# Patient Record
Sex: MALE | Race: WHITE | NOT HISPANIC OR LATINO | Employment: OTHER | URBAN - METROPOLITAN AREA
[De-identification: names, ages, dates, MRNs, and addresses within clinical notes are randomized per-mention and may not be internally consistent; named-entity substitution may affect disease eponyms.]

---

## 2017-01-05 ENCOUNTER — APPOINTMENT (OUTPATIENT)
Dept: RADIOLOGY | Facility: MEDICAL CENTER | Age: 37
DRG: 270 | End: 2017-01-05
Payer: MEDICARE

## 2017-01-05 ENCOUNTER — HOSPITAL ENCOUNTER (INPATIENT)
Facility: MEDICAL CENTER | Age: 37
LOS: 1 days | DRG: 270 | End: 2017-01-05
Attending: SURGERY | Admitting: SURGERY
Payer: MEDICARE

## 2017-01-05 ENCOUNTER — APPOINTMENT (OUTPATIENT)
Dept: RADIOLOGY | Facility: MEDICAL CENTER | Age: 37
DRG: 270 | End: 2017-01-05
Attending: SURGERY
Payer: MEDICARE

## 2017-01-05 VITALS
WEIGHT: 174.16 LBS | DIASTOLIC BLOOD PRESSURE: 65 MMHG | HEART RATE: 107 BPM | SYSTOLIC BLOOD PRESSURE: 96 MMHG | TEMPERATURE: 97.3 F | HEIGHT: 64 IN | RESPIRATION RATE: 16 BRPM | BODY MASS INDEX: 29.73 KG/M2 | OXYGEN SATURATION: 92 %

## 2017-01-05 DIAGNOSIS — I82.90 THROMBUS: ICD-10-CM

## 2017-01-05 PROBLEM — T82.868A THROMBOSIS OF RENAL DIALYSIS ARTERIOVENOUS GRAFT (HCC): Status: ACTIVE | Noted: 2017-01-05

## 2017-01-05 LAB
ANION GAP SERPL CALC-SCNC: 15 MMOL/L (ref 0–11.9)
BUN SERPL-MCNC: 88 MG/DL (ref 8–22)
CALCIUM SERPL-MCNC: 8.5 MG/DL (ref 8.5–10.5)
CHLORIDE SERPL-SCNC: 94 MMOL/L (ref 96–112)
CO2 SERPL-SCNC: 22 MMOL/L (ref 20–33)
CREAT SERPL-MCNC: 9.89 MG/DL (ref 0.5–1.4)
ERYTHROCYTE [DISTWIDTH] IN BLOOD BY AUTOMATED COUNT: 61.7 FL (ref 35.9–50)
FIBRINOGEN PPP-MCNC: 368 MG/DL (ref 215–460)
GFR SERPL CREATININE-BSD FRML MDRD: 6 ML/MIN/1.73 M 2
GLUCOSE SERPL-MCNC: 95 MG/DL (ref 65–99)
HCT VFR BLD AUTO: 33.2 % (ref 42–52)
HGB BLD-MCNC: 10.4 G/DL (ref 14–18)
INR PPP: 1.38 (ref 0.87–1.13)
MCH RBC QN AUTO: 30.4 PG (ref 27–33)
MCHC RBC AUTO-ENTMCNC: 31.3 G/DL (ref 33.7–35.3)
MCV RBC AUTO: 97.1 FL (ref 81.4–97.8)
PLATELET # BLD AUTO: 208 K/UL (ref 164–446)
PMV BLD AUTO: 9.6 FL (ref 9–12.9)
POTASSIUM SERPL-SCNC: 6.3 MMOL/L (ref 3.6–5.5)
PROTHROMBIN TIME: 17.4 SEC (ref 12–14.6)
RBC # BLD AUTO: 3.42 M/UL (ref 4.7–6.1)
SODIUM SERPL-SCNC: 131 MMOL/L (ref 135–145)
WBC # BLD AUTO: 4.8 K/UL (ref 4.8–10.8)

## 2017-01-05 PROCEDURE — 160002 HCHG RECOVERY MINUTES (STAT): Performed by: SURGERY

## 2017-01-05 PROCEDURE — 500698 HCHG HEMOCLIP, MEDIUM: Performed by: SURGERY

## 2017-01-05 PROCEDURE — 85384 FIBRINOGEN ACTIVITY: CPT

## 2017-01-05 PROCEDURE — 047L3ZZ DILATION OF LEFT FEMORAL ARTERY, PERCUTANEOUS APPROACH: ICD-10-PCS | Performed by: SURGERY

## 2017-01-05 PROCEDURE — 501499 HCHG SURG-I-LOOP, MINI (BLUE): Performed by: SURGERY

## 2017-01-05 PROCEDURE — 700105 HCHG RX REV CODE 258: Performed by: SURGERY

## 2017-01-05 PROCEDURE — 110382 HCHG SHELL REV 271: Performed by: SURGERY

## 2017-01-05 PROCEDURE — B51W1ZZ FLUOROSCOPY OF DIALYSIS SHUNT/FISTULA USING LOW OSMOLAR CONTRAST: ICD-10-PCS | Performed by: SURGERY

## 2017-01-05 PROCEDURE — C1894 INTRO/SHEATH, NON-LASER: HCPCS

## 2017-01-05 PROCEDURE — 501838 HCHG SUTURE GENERAL: Performed by: SURGERY

## 2017-01-05 PROCEDURE — A4606 OXYGEN PROBE USED W OXIMETER: HCPCS | Performed by: SURGERY

## 2017-01-05 PROCEDURE — 80048 BASIC METABOLIC PNL TOTAL CA: CPT

## 2017-01-05 PROCEDURE — 700111 HCHG RX REV CODE 636 W/ 250 OVERRIDE (IP)

## 2017-01-05 PROCEDURE — 501837 HCHG SUTURE CV: Performed by: SURGERY

## 2017-01-05 PROCEDURE — 306637 HCHG MISC ORTHO ITEM RC 0274

## 2017-01-05 PROCEDURE — 36905 THRMBC/NFS DIALYSIS CIRCUIT: CPT

## 2017-01-05 PROCEDURE — 06CN3ZZ EXTIRPATION OF MATTER FROM LEFT FEMORAL VEIN, PERCUTANEOUS APPROACH: ICD-10-PCS | Performed by: SURGERY

## 2017-01-05 PROCEDURE — 160025 RECOVERY II MINUTES (STATS): Performed by: SURGERY

## 2017-01-05 PROCEDURE — 500700 HCHG HEMOCLIP, SMALL (RED): Performed by: SURGERY

## 2017-01-05 PROCEDURE — 500043 HCHG BAG-A-JET: Performed by: SURGERY

## 2017-01-05 PROCEDURE — 85610 PROTHROMBIN TIME: CPT

## 2017-01-05 PROCEDURE — 500382 HCHG DRAIN, PENROSE 1X18: Performed by: SURGERY

## 2017-01-05 PROCEDURE — 160035 HCHG PACU - 1ST 60 MINS PHASE I: Performed by: SURGERY

## 2017-01-05 PROCEDURE — 3E05317 INTRODUCTION OF OTHER THROMBOLYTIC INTO PERIPHERAL ARTERY, PERCUTANEOUS APPROACH: ICD-10-PCS | Performed by: SURGERY

## 2017-01-05 PROCEDURE — 067N3ZZ DILATION OF LEFT FEMORAL VEIN, PERCUTANEOUS APPROACH: ICD-10-PCS | Performed by: SURGERY

## 2017-01-05 PROCEDURE — 04CL3ZZ EXTIRPATION OF MATTER FROM LEFT FEMORAL ARTERY, PERCUTANEOUS APPROACH: ICD-10-PCS | Performed by: SURGERY

## 2017-01-05 PROCEDURE — 700102 HCHG RX REV CODE 250 W/ 637 OVERRIDE(OP): Performed by: SURGERY

## 2017-01-05 PROCEDURE — 160046 HCHG PACU - 1ST 60 MINS PHASE II: Performed by: SURGERY

## 2017-01-05 PROCEDURE — A6402 STERILE GAUZE <= 16 SQ IN: HCPCS | Performed by: SURGERY

## 2017-01-05 PROCEDURE — A9270 NON-COVERED ITEM OR SERVICE: HCPCS | Performed by: SURGERY

## 2017-01-05 PROCEDURE — 700111 HCHG RX REV CODE 636 W/ 250 OVERRIDE (IP): Performed by: SURGERY

## 2017-01-05 PROCEDURE — 85027 COMPLETE CBC AUTOMATED: CPT

## 2017-01-05 PROCEDURE — 500888 HCHG PACK, MINOR BASIN: Performed by: SURGERY

## 2017-01-05 PROCEDURE — 502240 HCHG MISC OR SUPPLY RC 0272: Performed by: SURGERY

## 2017-01-05 RX ORDER — IODIXANOL 270 MG/ML
28 INJECTION, SOLUTION INTRAVASCULAR ONCE
Status: COMPLETED | OUTPATIENT
Start: 2017-01-05 | End: 2017-01-05

## 2017-01-05 RX ORDER — GABAPENTIN 300 MG/1
1200 CAPSULE ORAL
COMMUNITY
End: 2017-08-18

## 2017-01-05 RX ORDER — WARFARIN SODIUM 5 MG/1
5 TABLET ORAL DAILY
COMMUNITY
End: 2017-10-06

## 2017-01-05 RX ORDER — MIDAZOLAM HYDROCHLORIDE 1 MG/ML
INJECTION INTRAMUSCULAR; INTRAVENOUS
Status: COMPLETED
Start: 2017-01-05 | End: 2017-01-05

## 2017-01-05 RX ORDER — DIPHENHYDRAMINE HYDROCHLORIDE 50 MG/ML
25 INJECTION INTRAMUSCULAR; INTRAVENOUS ONCE
Status: COMPLETED | OUTPATIENT
Start: 2017-01-05 | End: 2017-01-05

## 2017-01-05 RX ORDER — DIPHENHYDRAMINE HYDROCHLORIDE 50 MG/ML
INJECTION INTRAMUSCULAR; INTRAVENOUS
Status: COMPLETED
Start: 2017-01-05 | End: 2017-01-05

## 2017-01-05 RX ORDER — MIDAZOLAM HYDROCHLORIDE 1 MG/ML
.5-2 INJECTION INTRAMUSCULAR; INTRAVENOUS PRN
Status: ACTIVE | OUTPATIENT
Start: 2017-01-05 | End: 2017-01-05

## 2017-01-05 RX ORDER — SODIUM CHLORIDE 9 MG/ML
500 INJECTION, SOLUTION INTRAVENOUS CONTINUOUS
Status: DISCONTINUED | OUTPATIENT
Start: 2017-01-05 | End: 2017-01-05 | Stop reason: HOSPADM

## 2017-01-05 RX ORDER — DIPHENHYDRAMINE HYDROCHLORIDE 50 MG/ML
25 INJECTION INTRAMUSCULAR; INTRAVENOUS
Status: DISCONTINUED | OUTPATIENT
Start: 2017-01-05 | End: 2017-01-05 | Stop reason: HOSPADM

## 2017-01-05 RX ORDER — DIPHENHYDRAMINE HYDROCHLORIDE 50 MG/ML
INJECTION INTRAMUSCULAR; INTRAVENOUS
Status: DISCONTINUED
Start: 2017-01-05 | End: 2017-01-05 | Stop reason: HOSPADM

## 2017-01-05 RX ORDER — DIPHENHYDRAMINE HYDROCHLORIDE 50 MG/ML
50 INJECTION INTRAMUSCULAR; INTRAVENOUS ONCE
Status: COMPLETED | OUTPATIENT
Start: 2017-01-05 | End: 2017-01-05

## 2017-01-05 RX ORDER — SODIUM CHLORIDE 9 MG/ML
500 INJECTION, SOLUTION INTRAVENOUS PRN
Status: DISCONTINUED | OUTPATIENT
Start: 2017-01-05 | End: 2017-01-05 | Stop reason: HOSPADM

## 2017-01-05 RX ORDER — DOXYCYCLINE HYCLATE 100 MG
100 TABLET ORAL 2 TIMES DAILY
Status: ON HOLD | COMMUNITY
Start: 2016-12-08 | End: 2017-03-28

## 2017-01-05 RX ADMIN — IODIXANOL 28 ML: 270 INJECTION, SOLUTION INTRAVASCULAR at 20:15

## 2017-01-05 RX ADMIN — DIPHENHYDRAMINE HYDROCHLORIDE 50 MG: 50 INJECTION INTRAMUSCULAR; INTRAVENOUS at 17:42

## 2017-01-05 RX ADMIN — ALTEPLASE 10 MG/HR: KIT at 18:22

## 2017-01-05 RX ADMIN — HYDROCORTISONE SODIUM SUCCINATE 100 MG: 100 INJECTION, POWDER, FOR SOLUTION INTRAMUSCULAR; INTRAVENOUS at 19:23

## 2017-01-05 RX ADMIN — DIPHENHYDRAMINE HYDROCHLORIDE 25 MG: 50 INJECTION INTRAMUSCULAR; INTRAVENOUS at 19:20

## 2017-01-05 RX ADMIN — SODIUM CHLORIDE 500 ML: 9 INJECTION, SOLUTION INTRAVENOUS at 16:00

## 2017-01-05 ASSESSMENT — PAIN SCALES - GENERAL
PAINLEVEL_OUTOF10: 0

## 2017-01-05 NOTE — IP AVS SNAPSHOT
" After Visit Summary                                                                                                                Name:Denis De Anda  Medical Record Number:[id number]  CSN: [id number]    YOB: 1980   Age: 36 y.o.  Sex: male  HT:1.626 m (5' 4.02\") WT: 79 kg (174 lb 2.6 oz)          Admit Date: 1/5/2017     Discharge Date:   Today's Date: 1/5/2017  Attending Doctor:  Jairo Hopkins M.D.                  Allergies:  Baclofen; Keflex; Pcn; Tape; and Contrast media with iodine                Discharge Instructions         ACTIVITY: Rest and take it easy for the first 24 hours.  A responsible adult is recommended to remain with you during that time.  It is normal to feel sleepy.  We encourage you to not do anything that requires balance, judgment or coordination.    MILD FLU-LIKE SYMPTOMS ARE NORMAL. YOU MAY EXPERIENCE GENERALIZED MUSCLE ACHES, THROAT IRRITATION, HEADACHE AND/OR SOME NAUSEA.    FOR 24 HOURS DO NOT:  Drive, operate machinery or run household appliances.  Drink beer or alcoholic beverages.   Make important decisions or sign legal documents.    SPECIAL INSTRUCTIONS:   Remind him to take Warfarin 10 mg tonight.  Remind him to take 15 grams Kayexalate tonight.  NO PUSHING, PULLING OR HEAVY LIFTING (10 LBS) FOR 24 HOURS, THEN RESUME NORMAL ACTIVITIES      DIET: To avoid nausea, slowly advance diet as tolerated, avoiding spicy or greasy foods for the first day.  Add more substantial food to your diet according to your physician's instructions.  INCREASE FLUIDS AND FIBER TO AVOID CONSTIPATION.    SURGICAL DRESSING/BATHING: Follow instructions given to you by your surgeon    FOLLOW-UP APPOINTMENT:  A follow-up appointment should be arranged with your doctor; call to schedule.    You should CALL YOUR PHYSICIAN if you develop:  Fever greater than 101 degrees F.  Pain not relieved by medication, or persistent nausea or vomiting.  Excessive bleeding (blood soaking through dressing) or " unexpected drainage from the wound.  Extreme redness or swelling around the incision site, drainage of pus or foul smelling drainage.  Inability to urinate or empty your bladder within 8 hours.  Problems with breathing or chest pain.    You should call 911 if you develop problems with breathing or chest pain.  If you are unable to contact your doctor or surgical center, you should go to the nearest emergency room or urgent care center.  Physician's telephone #: Dr. Hopkins [phone]    If any questions arise, call your doctor.  If your doctor is not available, please feel free to call the Surgical Center at [phone].  The Center is open Monday through Friday from 7AM to 7PM.  You can also call the [email or web address] HOTLINE open 24 hours/day, 7 days/week and speak to a nurse at [phone], or toll free at [phone].    A registered nurse may call you a few days after your surgery to see how you are doing after your procedure.    MEDICATIONS: Resume taking daily medication.  Take prescribed pain medication with food.  If no medication is prescribed, you may take non-aspirin pain medication if needed.  PAIN MEDICATION CAN BE VERY CONSTIPATING.  Take a stool softener or laxative such as senokot, pericolace, or milk of magnesia if needed.    No prescription given.  No pain medication given in recovery.    If your physician has prescribed pain medication that includes Acetaminophen (Tylenol), do not take additional Acetaminophen (Tylenol) while taking the prescribed medication.    Depression / Suicide Risk    As you are discharged from this [facility] facility, it is important to learn how to keep safe from harming yourself.    Recognize the warning signs:  · Abrupt changes in personality, positive or negative- including increase in energy   · Giving away possessions  · Change in eating patterns- significant weight changes-  positive or negative  · Change in sleeping patterns- unable to sleep or sleeping all the  time   · Unwillingness or inability to communicate  · Depression  · Unusual sadness, discouragement and loneliness  · Talk of wanting to die  · Neglect of personal appearance   · Rebelliousness- reckless behavior  · Withdrawal from people/activities they love  · Confusion- inability to concentrate     If you or a loved one observes any of these behaviors or has concerns about self-harm, here's what you can do:  · Talk about it- your feelings and reasons for harming yourself  · Remove any means that you might use to hurt yourself (examples: pills, rope, extension cords, firearm)  · Get professional help from the community (Mental Health, Substance Abuse, psychological counseling)  · Do not be alone:Call your Safe Contact- someone whom you trust who will be there for you.  · Call your local CRISIS HOTLINE 194-0449 or 226-326-5964  · Call your local Children's Mobile Crisis Response Team [place] (458) 169-7710 or www.[employer]  · Call the toll free National Suicide Prevention Hotlines   · National Suicide Prevention Lifeline 560-397-DIMY (3217)  · [employer] [facility] Network 800-SUICIDE (923-1163)       Medication List      CONTINUE taking these medications        Instructions    calcitRIOL 0.25 MCG Caps   Commonly known as:  ROCALTROL    Take 0.75 mcg by mouth every bedtime.   Dose:  0.75 mcg       cinacalcet 30 MG Tabs   Commonly known as:  SENSIPAR    Take 1 Tab by mouth every evening.   Dose:  30 mg       doxycycline 100 MG Tabs   Commonly known as:  VIBRAMYCIN    Take 100 mg by mouth 2 times a day. Unknown course started the second week of December   Dose:  100 mg       gabapentin 300 MG Caps   Commonly known as:  NEURONTIN    Take 1,200 mg by mouth every bedtime.   Dose:  1200 mg       hydrocodone/acetaminophen  MG Tabs   Commonly known as:  NORCO    Take 2 Tabs by mouth every 12 hours as needed.   Dose:  2 Tab       RENVELA 800 MG Tabs   Generic drug:  Sevelamer Carbonate    Take 3,200 mg by  mouth 3 times a day, with meals. With meals   Dose:  3200 mg       warfarin 5 MG Tabs   Commonly known as:  COUMADIN    Take 5 mg by mouth every day. 7.5 mg on Mon, Tues, Thurs and Friday 5 mg all other days   Dose:  5 mg               Medication Information     Above and/or attached are the medications your physician expects you to take upon discharge. Review all of your home medications and newly ordered medications with your doctor and/or pharmacist. Follow medication instructions as directed by your doctor and/or pharmacist. Please keep your medication list with you and share with your physician. Update the information when medications are discontinued, doses are changed, or new medications (including over-the-counter products) are added; and carry medication information at all times in the event of emergency situations.        Resources     Quit Smoking / Tobacco Use:    I understand the use of any tobacco products increases my chance of suffering from future heart disease or stroke and could cause other illnesses which may shorten my life. Quitting the use of tobacco products is the single most important thing I can do to improve my health. For further information on smoking / tobacco cessation call a Toll Free Quit Line at 1-664.740.1449 (*National [employer] [place]) or 1-968.536.4445 (American Lung Association) or you can access the web based program at www.lungusa.org.    [place] Tobacco Users Help Line:  (375) 798-2443       Toll Free: 1-392.735.4151  Quit Tobacco Program [facility] (927)689-0684    Crisis Hotline:    [place] Crisis Hotline:  1-392-OIEIYON or 1-889.449.6531    [place] Crisis Hotline:    1-461.832.8767 or 375-787-9424    Discharge Survey:   Thank you for choosing [facility]. We hope we did everything we could to make your hospital stay a pleasant one. You may be receiving a survey and we would appreciate your time and participation in answering the questions. Your  input is very valuable to us in our efforts to improve our service to our patients and their families.            Signatures     My signature on this form indicates that:    1. I acknowledge receipt and understanding of these Home Care Instruction.  2. My questions regarding this information have been answered to my satisfaction.  3. I have formulated a plan with my discharge nurse to obtain my prescribed medications for home.    __________________________________      __________________________________                   Patient Signature                                 Guardian/Responsible Adult Signature      __________________________________                 __________       ________                       Nurse Signature                                               Date                 Time

## 2017-01-05 NOTE — PROGRESS NOTES
The Medication Reconciliation has been completed per patient  Allergies have been reviewed  Antibiotic use in 30 days - On a long term doxycycline    Pharmacy:  [facility]    Dialysis is 4-5 times per week at home

## 2017-01-05 NOTE — IP AVS SNAPSHOT
1/5/2017          Denis De Anda  [address]  [place] [address]    Dear Denis:    [facility] wants to ensure your discharge home is safe and you or your loved ones have had all your questions answered regarding your care after you leave the hospital.    You may receive a telephone call within two days of your discharge.  This call is to make certain you understand your discharge instructions as well as ensure we provided you with the best care possible during your stay with us.     The call will only last approximately 3-5 minutes and will be done by a nurse.    Once again, we want to ensure your discharge home is safe and that you have a clear understanding of any next steps in your care.  If you have any questions or concerns, please do not hesitate to contact us, we are here for you.  Thank you for choosing [facility] for your healthcare needs.    Sincerely,    Ej Irene    [facility]

## 2017-01-06 ENCOUNTER — APPOINTMENT (OUTPATIENT)
Dept: RADIOLOGY | Facility: MEDICAL CENTER | Age: 37
DRG: 252 | End: 2017-01-06
Attending: SURGERY
Payer: MEDICARE

## 2017-01-06 ENCOUNTER — HOSPITAL ENCOUNTER (INPATIENT)
Facility: MEDICAL CENTER | Age: 37
LOS: 1 days | DRG: 252 | End: 2017-01-07
Attending: SURGERY | Admitting: SURGERY
Payer: MEDICARE

## 2017-01-06 ENCOUNTER — HOSPITAL ENCOUNTER (OUTPATIENT)
Facility: MEDICAL CENTER | Age: 37
DRG: 252 | End: 2017-01-06
Attending: SURGERY
Payer: MEDICARE

## 2017-01-06 PROBLEM — T82.858A BYPASS GRAFT STENOSIS (HCC): Status: ACTIVE | Noted: 2017-01-06

## 2017-01-06 LAB
ANION GAP SERPL CALC-SCNC: 20 MMOL/L (ref 0–11.9)
BASOPHILS # BLD AUTO: 0.02 K/UL (ref 0–0.12)
BASOPHILS NFR BLD AUTO: 0.4 % (ref 0–1.8)
BUN SERPL-MCNC: 109 MG/DL (ref 8–22)
CALCIUM SERPL-MCNC: 8.5 MG/DL (ref 8.5–10.5)
CHLORIDE SERPL-SCNC: 94 MMOL/L (ref 96–112)
CO2 SERPL-SCNC: 20 MMOL/L (ref 20–33)
CREAT SERPL-MCNC: 11.89 MG/DL (ref 0.5–1.4)
EOSINOPHIL # BLD: 0.08 K/UL (ref 0–0.51)
EOSINOPHIL NFR BLD AUTO: 1.6 % (ref 0–6.9)
ERYTHROCYTE [DISTWIDTH] IN BLOOD BY AUTOMATED COUNT: 61.3 FL (ref 35.9–50)
GLUCOSE SERPL-MCNC: 84 MG/DL (ref 65–99)
HCT VFR BLD AUTO: 32.2 % (ref 42–52)
HGB BLD-MCNC: 10.2 G/DL (ref 14–18)
IMM GRANULOCYTES # BLD AUTO: 0.02 K/UL (ref 0–0.11)
IMM GRANULOCYTES NFR BLD AUTO: 0.4 % (ref 0–0.9)
INR PPP: 1.55 (ref 0.87–1.13)
LYMPHOCYTES # BLD: 0.95 K/UL (ref 1–4.8)
LYMPHOCYTES NFR BLD AUTO: 19.5 % (ref 22–41)
MCH RBC QN AUTO: 30.4 PG (ref 27–33)
MCHC RBC AUTO-ENTMCNC: 31.7 G/DL (ref 33.7–35.3)
MCV RBC AUTO: 96.1 FL (ref 81.4–97.8)
MONOCYTES # BLD: 0.42 K/UL (ref 0–0.85)
MONOCYTES NFR BLD AUTO: 8.6 % (ref 0–13.4)
NEUTROPHILS # BLD: 3.39 K/UL (ref 1.82–7.42)
NEUTROPHILS NFR BLD AUTO: 69.5 % (ref 44–72)
NRBC # BLD AUTO: 0 K/UL
NRBC BLD-RTO: 0 /100 WBC
PLATELET # BLD AUTO: 163 K/UL (ref 164–446)
PMV BLD AUTO: 9.4 FL (ref 9–12.9)
POTASSIUM SERPL-SCNC: 5.8 MMOL/L (ref 3.6–5.5)
PROTHROMBIN TIME: 19.1 SEC (ref 12–14.6)
RBC # BLD AUTO: 3.35 M/UL (ref 4.7–6.1)
SODIUM SERPL-SCNC: 134 MMOL/L (ref 135–145)
WBC # BLD AUTO: 4.9 K/UL (ref 4.8–10.8)

## 2017-01-06 PROCEDURE — 04CL0ZZ EXTIRPATION OF MATTER FROM LEFT FEMORAL ARTERY, OPEN APPROACH: ICD-10-PCS | Performed by: SURGERY

## 2017-01-06 PROCEDURE — A4606 OXYGEN PROBE USED W OXIMETER: HCPCS | Performed by: SURGERY

## 2017-01-06 PROCEDURE — 502240 HCHG MISC OR SUPPLY RC 0272: Performed by: SURGERY

## 2017-01-06 PROCEDURE — 500043 HCHG BAG-A-JET: Performed by: SURGERY

## 2017-01-06 PROCEDURE — 160029 HCHG SURGERY MINUTES - 1ST 30 MINS LEVEL 4: Performed by: SURGERY

## 2017-01-06 PROCEDURE — 501445 HCHG STAPLER, SKIN DISP: Performed by: SURGERY

## 2017-01-06 PROCEDURE — 500700 HCHG HEMOCLIP, SMALL (RED): Performed by: SURGERY

## 2017-01-06 PROCEDURE — C2628 CATHETER, OCCLUSION: HCPCS | Performed by: SURGERY

## 2017-01-06 PROCEDURE — C1752 CATH,HEMODIALYSIS,SHORT-TERM: HCPCS | Performed by: SURGERY

## 2017-01-06 PROCEDURE — 90935 HEMODIALYSIS ONE EVALUATION: CPT

## 2017-01-06 PROCEDURE — 110371 HCHG SHELL REV 272: Performed by: SURGERY

## 2017-01-06 PROCEDURE — 160035 HCHG PACU - 1ST 60 MINS PHASE I: Performed by: SURGERY

## 2017-01-06 PROCEDURE — C1725 CATH, TRANSLUMIN NON-LASER: HCPCS | Performed by: SURGERY

## 2017-01-06 PROCEDURE — 501498 HCHG SURG-I-LOOP, MAXI (BLUE): Performed by: SURGERY

## 2017-01-06 PROCEDURE — 501480 HCHG STOCKINETTE: Performed by: SURGERY

## 2017-01-06 PROCEDURE — 06HM33Z INSERTION OF INFUSION DEVICE INTO RIGHT FEMORAL VEIN, PERCUTANEOUS APPROACH: ICD-10-PCS | Performed by: SURGERY

## 2017-01-06 PROCEDURE — 160002 HCHG RECOVERY MINUTES (STAT): Performed by: SURGERY

## 2017-01-06 PROCEDURE — 160039 HCHG SURGERY MINUTES - EA ADDL 1 MIN LEVEL 3: Performed by: SURGERY

## 2017-01-06 PROCEDURE — 067D0ZZ DILATION OF LEFT COMMON ILIAC VEIN, OPEN APPROACH: ICD-10-PCS | Performed by: SURGERY

## 2017-01-06 PROCEDURE — 160048 HCHG OR STATISTICAL LEVEL 1-5: Performed by: SURGERY

## 2017-01-06 PROCEDURE — A6402 STERILE GAUZE <= 16 SQ IN: HCPCS | Performed by: SURGERY

## 2017-01-06 PROCEDURE — 501838 HCHG SUTURE GENERAL: Performed by: SURGERY

## 2017-01-06 PROCEDURE — C1757 CATH, THROMBECTOMY/EMBOLECT: HCPCS | Performed by: SURGERY

## 2017-01-06 PROCEDURE — 700101 HCHG RX REV CODE 250

## 2017-01-06 PROCEDURE — 501837 HCHG SUTURE CV: Performed by: SURGERY

## 2017-01-06 PROCEDURE — B54BZZA ULTRASONOGRAPHY OF RIGHT LOWER EXTREMITY VEINS, GUIDANCE: ICD-10-PCS | Performed by: SURGERY

## 2017-01-06 PROCEDURE — 160036 HCHG PACU - EA ADDL 30 MINS PHASE I: Performed by: SURGERY

## 2017-01-06 PROCEDURE — 770001 HCHG ROOM/CARE - MED/SURG/GYN PRIV*

## 2017-01-06 PROCEDURE — C1769 GUIDE WIRE: HCPCS | Performed by: SURGERY

## 2017-01-06 PROCEDURE — 500698 HCHG HEMOCLIP, MEDIUM: Performed by: SURGERY

## 2017-01-06 PROCEDURE — 700111 HCHG RX REV CODE 636 W/ 250 OVERRIDE (IP)

## 2017-01-06 PROCEDURE — 501499 HCHG SURG-I-LOOP, MINI (BLUE): Performed by: SURGERY

## 2017-01-06 PROCEDURE — 500382 HCHG DRAIN, PENROSE 1X18: Performed by: SURGERY

## 2017-01-06 PROCEDURE — 502626 HCHG SURGIFLO HEMOSTATIC MATRIX 6ML: Performed by: SURGERY

## 2017-01-06 PROCEDURE — 160041 HCHG SURGERY MINUTES - EA ADDL 1 MIN LEVEL 4: Performed by: SURGERY

## 2017-01-06 PROCEDURE — 06CN0ZZ EXTIRPATION OF MATTER FROM LEFT FEMORAL VEIN, OPEN APPROACH: ICD-10-PCS | Performed by: SURGERY

## 2017-01-06 PROCEDURE — 160009 HCHG ANES TIME/MIN: Performed by: SURGERY

## 2017-01-06 PROCEDURE — B41G1ZZ FLUOROSCOPY OF LEFT LOWER EXTREMITY ARTERIES USING LOW OSMOLAR CONTRAST: ICD-10-PCS | Performed by: SURGERY

## 2017-01-06 PROCEDURE — 500888 HCHG PACK, MINOR BASIN: Performed by: SURGERY

## 2017-01-06 PROCEDURE — 160028 HCHG SURGERY MINUTES - 1ST 30 MINS LEVEL 3: Performed by: SURGERY

## 2017-01-06 PROCEDURE — B51C1ZZ FLUOROSCOPY OF LEFT LOWER EXTREMITY VEINS USING LOW OSMOLAR CONTRAST: ICD-10-PCS | Performed by: SURGERY

## 2017-01-06 PROCEDURE — 700111 HCHG RX REV CODE 636 W/ 250 OVERRIDE (IP): Performed by: INTERNAL MEDICINE

## 2017-01-06 PROCEDURE — 5A1D60Z PERFORMANCE OF URINARY FILTRATION, MULTIPLE: ICD-10-PCS | Performed by: INTERNAL MEDICINE

## 2017-01-06 PROCEDURE — A9270 NON-COVERED ITEM OR SERVICE: HCPCS | Performed by: SURGERY

## 2017-01-06 PROCEDURE — 700102 HCHG RX REV CODE 250 W/ 637 OVERRIDE(OP): Performed by: SURGERY

## 2017-01-06 PROCEDURE — 110382 HCHG SHELL REV 271: Performed by: SURGERY

## 2017-01-06 DEVICE — IMPLANTABLE DEVICE: Type: IMPLANTABLE DEVICE | Status: FUNCTIONAL

## 2017-01-06 RX ORDER — ONDANSETRON 2 MG/ML
INJECTION INTRAMUSCULAR; INTRAVENOUS
Status: COMPLETED
Start: 2017-01-06 | End: 2017-01-06

## 2017-01-06 RX ORDER — HEPARIN SODIUM 1000 [USP'U]/ML
3300 INJECTION, SOLUTION INTRAVENOUS; SUBCUTANEOUS ONCE
Status: COMPLETED | OUTPATIENT
Start: 2017-01-06 | End: 2017-01-06

## 2017-01-06 RX ORDER — WARFARIN SODIUM 7.5 MG/1
7.5 TABLET ORAL
Status: DISCONTINUED | OUTPATIENT
Start: 2017-01-06 | End: 2017-01-07 | Stop reason: HOSPADM

## 2017-01-06 RX ORDER — BUPIVACAINE HYDROCHLORIDE AND EPINEPHRINE 5; 5 MG/ML; UG/ML
INJECTION, SOLUTION EPIDURAL; INTRACAUDAL; PERINEURAL
Status: DISCONTINUED | OUTPATIENT
Start: 2017-01-06 | End: 2017-01-06 | Stop reason: HOSPADM

## 2017-01-06 RX ORDER — GABAPENTIN 400 MG/1
1200 CAPSULE ORAL
Status: DISCONTINUED | OUTPATIENT
Start: 2017-01-06 | End: 2017-01-07 | Stop reason: HOSPADM

## 2017-01-06 RX ORDER — HEPARIN SODIUM 1000 [USP'U]/ML
INJECTION, SOLUTION INTRAVENOUS; SUBCUTANEOUS
Status: COMPLETED
Start: 2017-01-06 | End: 2017-01-06

## 2017-01-06 RX ORDER — DOXYCYCLINE 100 MG/1
100 TABLET ORAL EVERY 12 HOURS
Status: DISCONTINUED | OUTPATIENT
Start: 2017-01-06 | End: 2017-01-07 | Stop reason: HOSPADM

## 2017-01-06 RX ORDER — HYDROCODONE BITARTRATE AND ACETAMINOPHEN 10; 325 MG/1; MG/1
2 TABLET ORAL EVERY 4 HOURS PRN
Status: DISCONTINUED | OUTPATIENT
Start: 2017-01-06 | End: 2017-01-07 | Stop reason: HOSPADM

## 2017-01-06 RX ORDER — SEVELAMER HYDROCHLORIDE 800 MG/1
3200 TABLET, FILM COATED ORAL
Status: DISCONTINUED | OUTPATIENT
Start: 2017-01-06 | End: 2017-01-07 | Stop reason: HOSPADM

## 2017-01-06 RX ORDER — SODIUM POLYSTYRENE SULFONATE 15 G/60ML
15 SUSPENSION ORAL; RECTAL ONCE
Status: DISCONTINUED | OUTPATIENT
Start: 2017-01-06 | End: 2017-01-07 | Stop reason: HOSPADM

## 2017-01-06 RX ORDER — DIPHENHYDRAMINE HYDROCHLORIDE 50 MG/ML
50 INJECTION INTRAMUSCULAR; INTRAVENOUS
Status: DISCONTINUED | OUTPATIENT
Start: 2017-01-06 | End: 2017-01-07 | Stop reason: HOSPADM

## 2017-01-06 RX ORDER — CINACALCET 30 MG/1
30 TABLET, FILM COATED ORAL EVERY EVENING
Status: DISCONTINUED | OUTPATIENT
Start: 2017-01-06 | End: 2017-01-07 | Stop reason: HOSPADM

## 2017-01-06 RX ORDER — IODIXANOL 270 MG/ML
INJECTION, SOLUTION INTRAVASCULAR
Status: DISCONTINUED | OUTPATIENT
Start: 2017-01-06 | End: 2017-01-06 | Stop reason: HOSPADM

## 2017-01-06 RX ORDER — HEPARIN SODIUM 5000 [USP'U]/ML
INJECTION, SOLUTION INTRAVENOUS; SUBCUTANEOUS
Status: DISCONTINUED | OUTPATIENT
Start: 2017-01-06 | End: 2017-01-06 | Stop reason: HOSPADM

## 2017-01-06 RX ORDER — HEPARIN SODIUM,PORCINE 1000/ML
VIAL (ML) INJECTION
Status: DISCONTINUED | OUTPATIENT
Start: 2017-01-06 | End: 2017-01-06 | Stop reason: HOSPADM

## 2017-01-06 RX ORDER — SODIUM CHLORIDE 9 MG/ML
500 INJECTION, SOLUTION INTRAVENOUS ONCE
Status: COMPLETED | OUTPATIENT
Start: 2017-01-06 | End: 2017-01-06

## 2017-01-06 RX ADMIN — HYDROCODONE BITARTRATE AND ACETAMINOPHEN 2 TABLET: 10; 325 TABLET ORAL at 21:58

## 2017-01-06 RX ADMIN — GABAPENTIN 1200 MG: 400 CAPSULE ORAL at 21:33

## 2017-01-06 RX ADMIN — HEPARIN SODIUM 3300 UNITS: 1000 INJECTION, SOLUTION INTRAVENOUS; SUBCUTANEOUS at 20:40

## 2017-01-06 RX ADMIN — ONDANSETRON 4 MG: 2 INJECTION, SOLUTION INTRAMUSCULAR; INTRAVENOUS at 16:15

## 2017-01-06 RX ADMIN — SODIUM CHLORIDE 500 ML: 9 INJECTION, SOLUTION INTRAVENOUS at 12:40

## 2017-01-06 RX ADMIN — CINACALCET HYDROCHLORIDE 30 MG: 30 TABLET, COATED ORAL at 21:58

## 2017-01-06 RX ADMIN — CALCITRIOL 0.75 MCG: 0.5 CAPSULE, LIQUID FILLED ORAL at 21:58

## 2017-01-06 RX ADMIN — DOXYCYCLINE 100 MG: 100 TABLET ORAL at 21:33

## 2017-01-06 ASSESSMENT — PAIN SCALES - GENERAL
PAINLEVEL_OUTOF10: 6
PAINLEVEL_OUTOF10: 0
PAINLEVEL_OUTOF10: 2
PAINLEVEL_OUTOF10: 6
PAINLEVEL_OUTOF10: 0

## 2017-01-06 ASSESSMENT — LIFESTYLE VARIABLES
EVER_SMOKED: NEVER
ALCOHOL_USE: NO

## 2017-01-06 NOTE — IP AVS SNAPSHOT
" After Visit Summary                                                                                                                  Name:Denis De Anda  Medical Record Number:[id number]  CSN: [id number]    YOB: 1980   Age: 36 y.o.  Sex: male  HT:1.626 m (5' 4\") WT: 81 kg (178 lb 9.2 oz)          Admit Date: 1/6/2017     Discharge Date:   Today's Date: 1/7/2017  Attending Doctor:  Jairo Hopkins M.D.                  Allergies:  Baclofen; Contrast media with iodine; Keflex; Pcn; and Tape            Discharge Instructions       Discharge Instructions    Discharged to home by car with relative. Discharged via walking, hospital escort: Refused.  Special equipment needed: Not Applicable    Be sure to schedule a follow-up appointment with your primary care doctor or any specialists as instructed.     Discharge Plan:   Diet Plan: Discussed  Activity Level: Discussed  Confirmed Follow up Appointment: Patient to Call and Schedule Appointment  Confirmed Symptoms Management: Discussed  Medication Reconciliation Updated: Yes  Influenza Vaccine Indication: Not indicated: Previously immunized this influenza season and > 8 years of age (September 2016)    I understand that a diet low in cholesterol, fat, and sodium is recommended for good health. Unless I have been given specific instructions below for another diet, I accept this instruction as my diet prescription.   Other diet: Renal diet     Special Instructions: None  · Is patient Post Blood Transfusion?  No    · Is patient discharged on Warfarin / Coumadin?   Yes    You are receiving the drug warfarin. Please understand the importance of monitoring warfarin with scheduled PT/INR blood draws.  [facility]up with [facility] in one week for INR lab..    IMPORTANT: HOW TO USE THIS INFORMATION:  This is a summary and does NOT have all possible information about this product. This information does not assure that this product is safe, effective, or " "appropriate for you. This information is not individual medical advice and does not substitute for the advice of your health care professional. Always ask your health care professional for complete information about this product and your specific health needs.      WARFARIN - ORAL (WARF-uh-rin)      COMMON BRAND NAME(S): Coumadin      WARNING:  Warfarin can cause very serious (possibly fatal) bleeding. This is more likely to occur when you first start taking this medication or if you take too much warfarin. To decrease your risk for bleeding, your doctor or other health care provider will monitor you closely and check your lab results (INR test) to make sure you are not taking too much warfarin. Keep all medical and laboratory appointments. Tell your doctor right away if you notice any signs of serious bleeding. See also Side Effects section.      USES:  This medication is used to treat blood clots (such as in deep vein thrombosis-DVT or pulmonary embolus-PE) and/or to prevent new clots from forming in your body. Preventing harmful blood clots helps to reduce the risk of a stroke or heart attack. Conditions that increase your risk of developing blood clots include a certain type of irregular heart rhythm (atrial fibrillation), heart valve replacement, recent heart attack, and certain surgeries (such as hip/knee replacement). Warfarin is commonly called a \"blood thinner,\" but the more correct term is \"anticoagulant.\" It helps to keep blood flowing smoothly in your body by decreasing the amount of certain substances (clotting proteins) in your blood.      HOW TO USE:  Read the Medication Guide provided by your pharmacist before you start taking warfarin and each time you get a refill. If you have any questions, ask your doctor or pharmacist. Take this medication by mouth with or without food as directed by your doctor or other health care professional, usually once a day. It is very important to take it exactly as " directed. Do not increase the dose, take it more frequently, or stop using it unless directed by your doctor. Dosage is based on your medical condition, laboratory tests (such as INR), and response to treatment. Your doctor or other health care provider will monitor you closely while you are taking this medication to determine the right dose for you. Use this medication regularly to get the most benefit from it. To help you remember, take it at the same time each day. It is important to eat a balanced, consistent diet while taking warfarin. Some foods can affect how warfarin works in your body and may affect your treatment and dose. Avoid sudden large increases or decreases in your intake of foods high in vitamin K (such as broccoli, cauliflower, cabbage, brussels sprouts, kale, spinach, and other green leafy vegetables, liver, green tea, certain vitamin supplements). If you are trying to lose weight, check with your doctor before you try to go on a diet. Cranberry products may also affect how your warfarin works. Limit the amount of cranberry juice (16 ounces/480 milliliters a day) or other cranberry products you may drink or eat.      SIDE EFFECTS:  Nausea, loss of appetite, or stomach/abdominal pain may occur. If any of these effects persist or worsen, tell your doctor or pharmacist promptly. Remember that your doctor has prescribed this medication because he or she has judged that the benefit to you is greater than the risk of side effects. Many people using this medication do not have serious side effects. This medication can cause serious bleeding if it affects your blood clotting proteins too much (shown by unusually high INR lab results). Even if your doctor stops your medication, this risk of bleeding can continue for up to a week. Tell your doctor right away if you have any signs of serious bleeding, including: unusual pain/swelling/discomfort, unusual/easy bruising, prolonged bleeding from cuts or gums,  persistent/frequent nosebleeds, unusually heavy/prolonged menstrual flow, pink/dark urine, coughing up blood, vomit that is bloody or looks like coffee grounds, severe headache, dizziness/fainting, unusual or persistent tiredness/weakness, bloody/black/tarry stools, chest pain, shortness of breath, difficulty swallowing. Tell your doctor right away if any of these unlikely but serious side effects occur: persistent nausea/vomiting, severe stomach/abdominal pain, yellowing eyes/skin. This drug rarely has caused very serious (possibly fatal) problems if its effects lead to small blood clots (usually at the beginning of treatment). This can lead to severe skin/tissue damage that may require surgery or amputation if left untreated. Patients with certain blood conditions (protein C or S deficiency) may be at greater risk. Get medical help right away if any of these rare but serious side effects occur: painful/red/purplish patches on the skin (such as on the toe, breast, abdomen), change in the amount of urine, vision changes, confusion, slurred speech, weakness on one side of the body. A very serious allergic reaction to this drug is rare. However, get medical help right away if you notice any symptoms of a serious allergic reaction, including: rash, itching/swelling (especially of the face/tongue/throat), severe dizziness, trouble breathing. This is not a complete list of possible side effects. If you notice other effects not listed above, contact your doctor or pharmacist. In the [place] - Call your doctor for medical advice about side effects. You may report side effects to FDA at 8-895-MEQ-7237. In [place] - Call your doctor for medical advice about side effects. You may report side effects to [facility] [place] at [phone].      PRECAUTIONS:  Before taking warfarin, tell your doctor or pharmacist if you are allergic to it; or if you have any other allergies. This product may contain inactive ingredients, which can cause  allergic reactions or other problems. Talk to your pharmacist for more details. Before using this medication, tell your doctor or pharmacist your medical history, especially of: blood disorders (such as anemia, hemophilia), bleeding problems (such as bleeding of the stomach/intestines, bleeding in the brain), blood vessel disorders (such as aneurysms), recent major injury/surgery, liver disease, alcohol use, mental/mood disorders (including memory problems), frequent falls/injuries. It is important that all your doctors and dentists know that you take warfarin. Before having surgery or any medical/dental procedures, tell your doctor or dentist that you are taking this medication and about all the products you use (including prescription drugs, nonprescription drugs, and herbal products). Avoid getting injections into the muscles. If you must have an injection into a muscle (for example, a flu shot), it should be given in the arm. This way, it will be easier to check for bleeding and/or apply pressure bandages. This medication may cause stomach bleeding. Daily use of alcohol while using this medicine will increase your risk for stomach bleeding and may also affect how this medication works. Limit or avoid alcoholic beverages. If you have not been eating well, if you have an illness or infection that causes fever, vomiting, or diarrhea for more than 2 days, or if you start using any antibiotic medications, contact your doctor or pharmacist immediately because these conditions can affect how warfarin works. This medication can cause heavy bleeding. To lower the chance of getting cut, bruised, or injured, use great caution with sharp objects like safety razors and nail cutters. Use an electric razor when shaving and a soft toothbrush when brushing your teeth. Avoid activities such as contact sports. If you fall or injure yourself, especially if you hit your head, call your doctor immediately. Your doctor may need to  "check you. The Food & Drug Administration has stated that generic warfarin products are interchangeable. However, consult your doctor or pharmacist before switching warfarin products. Be careful not to take more than one medication that contains warfarin unless specifically directed by the doctor or health care provider who is monitoring your warfarin treatment. Older adults may be at greater risk for bleeding while using this drug. This medication is not recommended for use during pregnancy because of serious (possibly fatal) harm to an unborn baby. Discuss the use of reliable forms of birth control with your doctor. If you become pregnant or think you may be pregnant, tell your doctor immediately. If you are planning pregnancy, discuss a plan for managing your condition with your doctor before you become pregnant. Your doctor may switch the type of medication you use during pregnancy. Very small amounts of this medication may pass into breast milk but is unlikely to harm a nursing infant. Consult your doctor before breast-feeding.      DRUG INTERACTIONS:  Drug interactions may change how your medications work or increase your risk for serious side effects. This document does not contain all possible drug interactions. Keep a list of all the products you use (including prescription/nonprescription drugs and herbal products) and share it with your doctor and pharmacist. Do not start, stop, or change the dosage of any medicines without your doctor's approval. Warfarin interacts with many prescription, nonprescription, vitamin, and herbal products. This includes medications that are applied to the skin or inside the vagina or rectum. The interactions with warfarin usually result in an increase or decrease in the \"blood-thinning\" (anticoagulant) effect. Your doctor or other health care professional should closely monitor you to prevent serious bleeding or clotting problems. While taking warfarin, it is very important " to tell your doctor or pharmacist of any changes in medications, vitamins, or herbal products that you are taking. Some products that may interact with this drug include: capecitabine, imatinib, mifepristone. Aspirin, aspirin-like drugs (salicylates), and nonsteroidal anti-inflammatory drugs (NSAIDs such as ibuprofen, naproxen, celecoxib) may have effects similar to warfarin. These drugs may increase the risk of bleeding problems if taken during treatment with warfarin. Carefully check all prescription/nonprescription product labels (including drugs applied to the skin such as pain-relieving creams) since the products may contain NSAIDs or salicylates. Talk to your doctor about using a different medication (such as acetaminophen) to treat pain/fever. Low-dose aspirin and related drugs (such as clopidogrel, ticlopidine) should be continued if prescribed by your doctor for specific medical reasons such as heart attack or stroke prevention. Consult your doctor or pharmacist for more details. Many herbal products interact with warfarin. Tell your doctor before taking any herbal products, especially bromelains, coenzyme Q10, cranberry, danshen, dong quai, fenugreek, garlic, ginkgo biloba, ginseng, and Harry's wort, among others. This medication may interfere with a certain laboratory test to measure theophylline levels, possibly causing false test results. Make sure laboratory personnel and all your doctors know you use this drug.      OVERDOSE:  If overdose is suspected, contact a poison control center or emergency room immediately. [place] residents can call the [place] National Poison Hotline at 1-750.387.1180. [place] residents can call a provincial poison control center. Symptoms of overdose may include: bloody/black/tarry stools, pink/dark urine, unusual/prolonged bleeding.      NOTES:  Do not share this medication with others. Laboratory and/or medical tests (such as INR, complete blood count) must be performed  periodically to monitor your progress or check for side effects. Consult your doctor for more details.      MISSED DOSE:  For the best possible benefit, do not miss any doses. If you do miss a dose and remember on the same day, take it as soon as you remember. If you remember on the next day, skip the missed dose and resume your usual dosing schedule. Do not double the dose to catch up because this could increase your risk for bleeding. Keep a record of missed doses to give to your doctor or pharmacist. Contact your doctor or pharmacist if you miss 2 or more doses in a row.      STORAGE:  Store at room temperature away from light and moisture. Do not store in the bathroom. Keep all medications away from children and pets. Do not flush medications down the toilet or pour them into a drain unless instructed to do so. Properly discard this product when it is  or no longer needed. Consult your pharmacist or local waste disposal company for more details about how to safely discard your product.      MEDICAL ALERT:  Your condition and medication can cause complications in a medical emergency. For information about enrolling in MedicAlert, call 1-205.260.3374 ([place]) or 1-315.885.5936 ([place]).      Information last revised 2010 Copyright(c) 2010 [employer]      Vitamin K Foods and Warfarin  Warfarin is a medicine that helps prevent harmful blood clots by causing blood to clot more slowly. It does this by decreasing the activity of vitamin K, which promotes normal blood clotting. For the dose of warfarin you have been prescribed to work well, you need to get about the same amount of vitamin K from your food from day to day. Suddenly getting a lot more vitamin K could cause your blood to clot too quickly. A sudden decrease in vitamin K intake could cause your blood to clot too slowly. These changes in vitamin K intake could lead to dangerous blood clots or to bleeding.  WHAT GENERAL GUIDELINES DO I  NEED TO FOLLOW?  · Keep your intake of vitamin K consistent from day to day. To do this, you must be aware of which foods contain moderate or high amounts of vitamin K. Listed below are some foods that are very high, high, or moderately high in vitamin K. If you eat these foods, make sure you eat a consistent amount of them from day to day.   · Avoid major changes in your diet, or tell your health care provider before changing your diet.  · If you take a multivitamin that contains vitamin K, be sure to take it every day.  · If you drink green tea, drink the same amount each day.  WHAT FOODS ARE VERY HIGH IN VITAMIN K?   · Greens, such as Swiss chard and beet, neo, mustard, or turnip greens (fresh or frozen, cooked).  · Kale (fresh or frozen, cooked).    · Parsley (raw).   · Spinach (cooked).    WHAT FOODS ARE HIGH IN VITAMIN K?  · Asparagus (frozen, cooked).  · Broccoli.    · Bok joseline (cooked).    · [place] sprouts (fresh or frozen, cooked).   · Cabbage (cooked).  · Coleslaw.  WHAT FOODS ARE MODERATELY HIGH IN VITAMIN K?  · Blueberries.  · Black-eyed peas.  · Endive (raw).    · Green leaf lettuce (raw).    · Green scallions (raw).  · Kale (raw).  · Okra (frozen, cooked).  · Plantains (fried).  · Blaine lettuce (raw).    · Sauerkraut (canned).    · Spinach (raw).     This information is not intended to replace advice given to you by your health care provider. Make sure you discuss any questions you have with your health care provider.     Document Released: 10/15/2010 Document Revised: 01/08/2016 Document Reviewed: 10/22/2014  Elsevier Interactive Patient Education ©2016 [employer]             Depression / Suicide Risk    As you are discharged from this [facility] facility, it is important to learn how to keep safe from harming yourself.    Recognize the warning signs:  · Abrupt changes in personality, positive or negative- including increase in energy   · Giving away possessions  · Change in eating  patterns- significant weight changes-  positive or negative  · Change in sleeping patterns- unable to sleep or sleeping all the time   · Unwillingness or inability to communicate  · Depression  · Unusual sadness, discouragement and loneliness  · Talk of wanting to die  · Neglect of personal appearance   · Rebelliousness- reckless behavior  · Withdrawal from people/activities they love  · Confusion- inability to concentrate     If you or a loved one observes any of these behaviors or has concerns about self-harm, here's what you can do:  · Talk about it- your feelings and reasons for harming yourself  · Remove any means that you might use to hurt yourself (examples: pills, rope, extension cords, firearm)  · Get professional help from the community (Mental Health, Substance Abuse, psychological counseling)  · Do not be alone:Call your Safe Contact- someone whom you trust who will be there for you.  · Call your local CRISIS HOTLINE 509-4909 or 613-731-5267  · Call your local Children's Mobile Crisis Response Team [place] (701) 593-8608 or wwwZappRx  · Call the toll free National Suicide Prevention Hotlines   · National Suicide Prevention Lifeline 655-051-WRUO (3027)  National Hope Line Network 800-SUICIDE (424-5075)    Hemodialysis, Care After  These instructions provide you with information on caring for yourself after your hemodialysis session. Your health care provider may also give you more specific instructions. Your treatment has been planned according to current medical practices, but problems sometimes occur. Call your health care provider if you have any problems or questions after your procedure.  HOME CARE INSTRUCTIONS   General Instructions   · Take medicines only as directed by your health care provider.  · Keep all dialysis appointments. Dialysis appointments should be scheduled regularly. Do not skip an appointment.  · Carry a wallet card, bracelet, or medical identification tag that  shows you are a dialysis patient. Always keep it with you. If you are in an accident or have a medical emergency and cannot communicate, this item will inform health care providers about your condition.  Diet Instructions  · Talk to your health care provider about how much fluid you can consume. Keep track of your fluid intake to make sure you do not consume more fluid than is allowed. This is important because fluid can build up in your body between dialysis sessions. Built-up fluid affects your blood pressure and can make your heart work harder.  · Limit your sodium intake. This is important because sodium makes you thirsty and the amount of fluid you can drink every day is limited.  · Limit your intake of the mineral potassium. Potassium levels can rise between dialysis sessions. If they become too high, they can cause a dangerous heart rhythm and even death.  · Limit your intake of the mineral phosphorus. Consuming too much phosphorus can cause your bones to lose calcium. This makes them weaker and more likely to break.  · Eat high-quality proteins that are low in phosphorus. High-quality proteins include those found in fish, poultry, and eggs.  · Take vitamin and mineral supplements as directed by your health care provider.  · Talk with your health care provider before taking new supplements.  Vascular Access Instructions  · Avoid sleeping on your vascular access.  · If your vascular access is on your arm:  · Do not lift weights or heavy objects with that arm.  · Do not wear clothing that is tight over that arm.  · Do not have your blood pressure measured on that arm.  · Do not allow needle punctures (such as for taking blood) on that arm.  · If you have a catheter, do not open it between dialysis sessions.  · If you have a fistula or graft, wash it with antibacterial soap every day and before each dialysis session.  · If you have a fistula or graft, feel for a vibration over it (thrill) every day. A thrill  means the access is working.  SEEK MEDICAL CARE IF:  · You have a fever.  · You have chills.  · Your vascular access feels warm.  · You find a pimple on your vascular access.  · There is swelling or redness around the vascular access site.  · There is drainage or bleeding at the vascular access site.  SEEK IMMEDIATE MEDICAL CARE IF:  · You have a fistula, and pain, numbness, or unusual paleness develops in the hand on the side of your fistula.  · You have a fistula or graft and do not feel a thrill.  · You develop dizziness or weakness that you have not had before.  · You develop shortness of breath.  · You develop chest pain.  · There is pus at the vascular access site.  · There is bleeding at the vascular access site that cannot be easily controlled.     This information is not intended to replace advice given to you by your health care provider. Make sure you discuss any questions you have with your health care provider.     Document Released: 04/14/2014 Document Revised: 01/08/2016 Document Reviewed: 04/14/2014  [employer] Interactive Patient Education ©2016 [employer] Inc.                Follow-up Information     1. Follow up with Jairo Hopkins M.D. In 1 week.    Specialties:  Surgery, Radiology    Why:  Pt will Monday and set up appt.     Contact information    [address]  [place] [id number] [phone]           Discharge Medication Instructions:    Below are the medications your physician expects you to take upon discharge:    Review all your home medications and newly ordered medications with your doctor and/or pharmacist. Follow medication instructions as directed by your doctor and/or pharmacist.    Please keep your medication list with you and share with your physician.               Medication List      CONTINUE taking these medications        Instructions    calcitRIOL 0.25 MCG Caps   Last time this was given:  0.75 mcg on 1/6/2017  9:58 PM   Commonly known as:  ROCALTROL    Take 0.75 mcg  by mouth every bedtime.   Dose:  0.75 mcg       cinacalcet 30 MG Tabs   Last time this was given:  30 mg on 1/6/2017  9:58 PM   Commonly known as:  SENSIPAR    Take 1 Tab by mouth every evening.   Dose:  30 mg       doxycycline 100 MG Tabs   Commonly known as:  VIBRAMYCIN    Take 100 mg by mouth 2 times a day. Unknown course started the second week of December   Dose:  100 mg       gabapentin 300 MG Caps   Last time this was given:  1,200 mg on 1/6/2017  9:33 PM   Commonly known as:  NEURONTIN    Take 1,200 mg by mouth every bedtime.   Dose:  1200 mg       hydrocodone/acetaminophen  MG Tabs   Last time this was given:  2 Tabs on 1/7/2017  5:47 AM   Commonly known as:  NORCO    Take 2 Tabs by mouth every 12 hours as needed.   Dose:  2 Tab       RENVELA 800 MG Tabs   Generic drug:  Sevelamer Carbonate    Take 3,200 mg by mouth 3 times a day, with meals. With meals   Dose:  3200 mg       warfarin 5 MG Tabs   Commonly known as:  COUMADIN    Take 5 mg by mouth every day. 7.5 mg on Mon, Tues, Thurs and Friday 5 mg all other days 10 mg on night prior to surgery   Dose:  5 mg               Instructions           Diet / Nutrition:    Follow any diet instructions given to you by your doctor or the dietician, including how much salt (sodium) you are allowed each day.    If you are overweight, talk to your doctor about a weight reduction plan.    Activity:    Remain physically active following your doctor's instructions about exercise and activity.    Rest often.     Any time you become even a little tired or short of breath, SIT DOWN and rest.    Worsening Symptoms:    Report any of the following signs and symptoms to the doctor's office immediately:    *Pain of jaw, arm, or neck  *Chest pain not relieved by medication                               *Dizziness or loss of consciousness  *Difficulty breathing even when at rest   *More tired than usual                                       *Bleeding drainage or swelling of  surgical site  *Swelling of feet, ankles, legs or stomach                 *Fever (>100ºF)  *Pink or blood tinged sputum  *Weight gain (3lbs/day or 5lbs /week)           *Shock from internal defibrillator (if applicable)  *Palpitations or irregular heartbeats                *Cool and/or numb extremities    Stroke Awareness    Common Risk Factors for Stroke include:    Age  Atrial Fibrillation  Carotid Artery Stenosis  Diabetes Mellitus  Excessive alcohol consumption  High blood pressure  Overweight   Physical inactivity  Smoking    Warning signs and symptoms of a stroke include:    *Sudden numbness or weakness of the face, arm or leg (especially on one side of the body).  *Sudden confusion, trouble speaking or understanding.  *Sudden trouble seeing in one or both eyes.  *Sudden trouble walking, dizziness, loss of balance or coordination.Sudden severe headache with no known cause.    It is very important to get treatment quickly when a stroke occurs. If you experience any of the above warning signs, call 911 immediately.                   Disclaimer         Quit Smoking / Tobacco Use:    I understand the use of any tobacco products increases my chance of suffering from future heart disease or stroke and could cause other illnesses which may shorten my life. Quitting the use of tobacco products is the single most important thing I can do to improve my health. For further information on smoking / tobacco cessation call a Toll Free Quit Line at 1-130.478.1273 (*National [employer] [place]) or 1-453.745.9052 (American Lung Association) or you can access the web based program at www.lungusa.org.    [place] Tobacco Users Help Line:  (347) 275-6294       Toll Free: 1-362.590.2446  Quit Tobacco Program [facility] ([phone]    Crisis Hotline:    [place] Crisis Hotline:  4-493-TZEJGWG or 1-630.795.5382    [place] Crisis Hotline:    1-786.587.7042 or 419-633-6201    Discharge Survey:   Thank you for  choosing [facility]. We hope we did everything we could to make your hospital stay a pleasant one. You may be receiving a phone survey and we would appreciate your time and participation in answering the questions. Your input is very valuable to us in our efforts to improve our service to our patients and their families.        My signature on this form indicates that:    1. I have reviewed and understand the above information.  2. My questions regarding this information have been answered to my satisfaction.  3. I have formulated a plan with my discharge nurse to obtain my prescribed medications for home.                  Disclaimer         __________________________________                     __________       ________                       Patient Signature                                                 Date                    Time

## 2017-01-06 NOTE — IP AVS SNAPSHOT
[employer] Access Code: Activation code not generated  Current [employer] Status: Active    [employer]  A secure, online tool to manage your health information     [employer]’s [employer]® is a secure, online tool that connects you to your personalized health information from the privacy of your home -- day or night - making it very easy for you to manage your healthcare. Once the activation process is completed, you can even access your medical information using the [employer] aniket, which is available for free in the Apple Aniket store or Google Play store.     G[employer] provides the following levels of access (as shown below):   My Chart Features   [facility] Primary Care Doctor [facility]  Specialists [facility]  Urgent  Care Non-[facility]  Primary Care  Doctor   Email your healthcare team securely and privately 24/7 X X X X   Manage appointments: schedule your next appointment; view details of past/upcoming appointments X      Request prescription refills. X      View recent personal medical records, including lab and immunizations X X X X   View health record, including health history, allergies, medications X X X X   Read reports about your outpatient visits, procedures, consult and ER notes X X X X   See your discharge summary, which is a recap of your hospital and/or ER visit that includes your diagnosis, lab results, and care plan. X X       How to register for [employer]:  1. Go to  [email or web address].  2. Click on the Sign Up Now box, which takes you to the New Member Sign Up page. You will need to provide the following information:  a. Enter your [employer] Access Code exactly as it appears at the top of this page. (You will not need to use this code after you’ve completed the sign-up process. If you do not sign up before the expiration date, you must request a new code.)   b. Enter your date of birth.   c. Enter your home email address.   d. Click Submit, and follow the next screen’s instructions.  3. Create a Gam[employer] ID. This will  be your An[employer] login ID and cannot be changed, so think of one that is secure and easy to remember.  4. Create a [employer] password. You can change your password at any time.  5. Enter your Password Reset Question and Answer. This can be used at a later time if you forget your password.   6. Enter your e-mail address. This allows you to receive e-mail notifications when new information is available in [employer].  7. Click Sign Up. You can now view your health information.    For assistance activating your [employer] account, call [phone]

## 2017-01-06 NOTE — IP AVS SNAPSHOT
1/7/2017          Denis De Anda  [address]  [place] [address]    Dear Denis:    [facility] wants to ensure your discharge home is safe and you or your loved ones have had all your questions answered regarding your care after you leave the hospital.    You may receive a telephone call within two days of your discharge.  This call is to make certain you understand your discharge instructions as well as ensure we provided you with the best care possible during your stay with us.     The call will only last approximately 3-5 minutes and will be done by a nurse.    Once again, we want to ensure your discharge home is safe and that you have a clear understanding of any next steps in your care.  If you have any questions or concerns, please do not hesitate to contact us, we are here for you.  Thank you for choosing [facility] for your healthcare needs.    Sincerely,    Ej Irene    [facility]

## 2017-01-06 NOTE — PROGRESS NOTES
IR Procedure Note:    Dr. Hopkins performed an angioplasty and angiojet of left thigh.  Dr. Hopkins aware of potassium of 6.3. The pt tolerated the procedure well. The pt was tachycardic throughout the procedure but otherwise stable.  Gauze and tegaderm applied to left groin, CDI.  Pt understood MD order to take Kayexalate and dialyze when getting home, family also informed.  Report given to Srinivas DAVIDSON.  Dr. Hopkins spoke with family in waiting room.  Pt transported to [facility].

## 2017-01-06 NOTE — IP AVS SNAPSHOT
" <p align=\"LEFT\"><IMG SRC=\"//[email or web address]/blob$/[email or web address]\" alt=\"Image\" WIDTH=\"50%\" HEIGHT=\"200\" BORDER=\"\"></p>                   Name:Denis De Anda  Medical Record Number:[id number]  CSN: [id number]    YOB: 1980   Age: 36 y.o.  Sex: male  HT:1.626 m (5' 4\") WT: 81 kg (178 lb 9.2 oz)          Admit Date: 1/6/2017     Discharge Date:   Today's Date: 1/7/2017  Attending Doctor:  Jairo Hopkins M.D.                  Allergies:  Baclofen; Contrast media with iodine; Keflex; Pcn; and Tape          Follow-up Information     1. Follow up with Jairo Hopkins M.D. In 1 week.    Specialties:  Surgery, Radiology    Why:  Pt will Monday and set up appt.     Contact information    [address]  [address]  [facility] [address] [phone]           Medication List      Take these Medications        Instructions    calcitRIOL 0.25 MCG Caps   Commonly known as:  ROCALTROL    Take 0.75 mcg by mouth every bedtime.   Dose:  0.75 mcg       cinacalcet 30 MG Tabs   Commonly known as:  SENSIPAR    Take 1 Tab by mouth every evening.   Dose:  30 mg       doxycycline 100 MG Tabs   Commonly known as:  VIBRAMYCIN    Take 100 mg by mouth 2 times a day. Unknown course started the second week of December   Dose:  100 mg       gabapentin 300 MG Caps   Commonly known as:  NEURONTIN    Take 1,200 mg by mouth every bedtime.   Dose:  1200 mg       hydrocodone/acetaminophen  MG Tabs   Commonly known as:  NORCO    Take 2 Tabs by mouth every 12 hours as needed.   Dose:  2 Tab       RENVELA 800 MG Tabs   Generic drug:  Sevelamer Carbonate    Take 3,200 mg by mouth 3 times a day, with meals. With meals   Dose:  3200 mg       warfarin 5 MG Tabs   Commonly known as:  COUMADIN    Take 5 mg by mouth every day. 7.5 mg on Mon, Tues, Thurs and Friday 5 mg all other days 10 mg on night prior to surgery   Dose:  5 mg         "

## 2017-01-06 NOTE — DISCHARGE INSTRUCTIONS
ACTIVITY: Rest and take it easy for the first 24 hours.  A responsible adult is recommended to remain with you during that time.  It is normal to feel sleepy.  We encourage you to not do anything that requires balance, judgment or coordination.    MILD FLU-LIKE SYMPTOMS ARE NORMAL. YOU MAY EXPERIENCE GENERALIZED MUSCLE ACHES, THROAT IRRITATION, HEADACHE AND/OR SOME NAUSEA.    FOR 24 HOURS DO NOT:  Drive, operate machinery or run household appliances.  Drink beer or alcoholic beverages.   Make important decisions or sign legal documents.    SPECIAL INSTRUCTIONS:   Remind him to take Warfarin 10 mg tonight.  Remind him to take 15 grams Kayexalate tonight.  NO PUSHING, PULLING OR HEAVY LIFTING (10 LBS) FOR 24 HOURS, THEN RESUME NORMAL ACTIVITIES      DIET: To avoid nausea, slowly advance diet as tolerated, avoiding spicy or greasy foods for the first day.  Add more substantial food to your diet according to your physician's instructions.  INCREASE FLUIDS AND FIBER TO AVOID CONSTIPATION.    SURGICAL DRESSING/BATHING: Follow instructions given to you by your surgeon    FOLLOW-UP APPOINTMENT:  A follow-up appointment should be arranged with your doctor; call to schedule.    You should CALL YOUR PHYSICIAN if you develop:  Fever greater than 101 degrees F.  Pain not relieved by medication, or persistent nausea or vomiting.  Excessive bleeding (blood soaking through dressing) or unexpected drainage from the wound.  Extreme redness or swelling around the incision site, drainage of pus or foul smelling drainage.  Inability to urinate or empty your bladder within 8 hours.  Problems with breathing or chest pain.    You should call 911 if you develop problems with breathing or chest pain.  If you are unable to contact your doctor or surgical center, you should go to the nearest emergency room or urgent care center.  Physician's telephone #: Dr. Hopkins [phone]    If any questions arise, call your doctor.  If your doctor is not  available, please feel free to call the Surgical Center at [phone].  The Center is open Monday through Friday from 7AM to 7PM.  You can also call the HEALTH HOTLINE open 24 hours/day, 7 days/week and speak to a nurse at [phone], or toll free at [phone].    A registered nurse may call you a few days after your surgery to see how you are doing after your procedure.    MEDICATIONS: Resume taking daily medication.  Take prescribed pain medication with food.  If no medication is prescribed, you may take non-aspirin pain medication if needed.  PAIN MEDICATION CAN BE VERY CONSTIPATING.  Take a stool softener or laxative such as senokot, pericolace, or milk of magnesia if needed.    No prescription given.  No pain medication given in recovery.    If your physician has prescribed pain medication that includes Acetaminophen (Tylenol), do not take additional Acetaminophen (Tylenol) while taking the prescribed medication.    Depression / Suicide Risk    As you are discharged from this [facility] facility, it is important to learn how to keep safe from harming yourself.    Recognize the warning signs:  · Abrupt changes in personality, positive or negative- including increase in energy   · Giving away possessions  · Change in eating patterns- significant weight changes-  positive or negative  · Change in sleeping patterns- unable to sleep or sleeping all the time   · Unwillingness or inability to communicate  · Depression  · Unusual sadness, discouragement and loneliness  · Talk of wanting to die  · Neglect of personal appearance   · Rebelliousness- reckless behavior  · Withdrawal from people/activities they love  · Confusion- inability to concentrate     If you or a loved one observes any of these behaviors or has concerns about self-harm, here's what you can do:  · Talk about it- your feelings and reasons for harming yourself  · Remove any means that you might use to hurt yourself (examples: pills, rope,  extension cords, firearm)  · Get professional help from the community (Mental Health, Substance Abuse, psychological counseling)  · Do not be alone:Call your Safe Contact- someone whom you trust who will be there for you.  · Call your local CRISIS HOTLINE 334-4408 or 603-794-0662  · Call your local Children's Mobile Crisis Response Team [place] (756) 887-7842 or www.LoveIt  · Call the toll free National Suicide Prevention Hotlines   · National Suicide Prevention Lifeline 021-083-TLIN (3900)  · National Hope Line Network 800-SUICIDE (645-5311)

## 2017-01-06 NOTE — OR NURSING
Pt received from post procedure area, cath lab, es courted by RN for IR. Pt awake alert denying pain. Left groin graph/fistula site with 2 Sg site under clear tape/op site. Pulses checked with good results. Left groin and foot pp.  Pt with excessive itching regardless of previous anti itch medications provided during procedure. Dr. Hopkins contacted for t/o for meds. poc for pt to go home. Pt has home medications and does home hemodialysis. K elevated and surgeon aware. poc reviewed with Physician and pt and pt's Mom. . V/u and agreement.

## 2017-01-06 NOTE — OR NURSING
Pt aware of Dr. Hopkins's order to take kayexalate, warfarin and perform dialysis as soon as he gets home. Pt and family understand instructions. Dressings on Left thigh with gauze and tegaderm CDI. Pt verbalizes that he does his own dialysis at home.

## 2017-01-07 VITALS
DIASTOLIC BLOOD PRESSURE: 52 MMHG | BODY MASS INDEX: 30.49 KG/M2 | RESPIRATION RATE: 18 BRPM | SYSTOLIC BLOOD PRESSURE: 81 MMHG | TEMPERATURE: 96.6 F | WEIGHT: 178.57 LBS | HEIGHT: 64 IN | HEART RATE: 83 BPM | OXYGEN SATURATION: 97 %

## 2017-01-07 LAB
ANION GAP SERPL CALC-SCNC: 12 MMOL/L (ref 0–11.9)
BASOPHILS # BLD AUTO: 0.5 % (ref 0–1.8)
BASOPHILS # BLD: 0.02 K/UL (ref 0–0.12)
BUN SERPL-MCNC: 52 MG/DL (ref 8–22)
CALCIUM SERPL-MCNC: 7.2 MG/DL (ref 8.5–10.5)
CHLORIDE SERPL-SCNC: 98 MMOL/L (ref 96–112)
CO2 SERPL-SCNC: 26 MMOL/L (ref 20–33)
CREAT SERPL-MCNC: 7.49 MG/DL (ref 0.5–1.4)
EOSINOPHIL # BLD AUTO: 0.27 K/UL (ref 0–0.51)
EOSINOPHIL NFR BLD: 7.3 % (ref 0–6.9)
ERYTHROCYTE [DISTWIDTH] IN BLOOD BY AUTOMATED COUNT: 64.5 FL (ref 35.9–50)
GFR SERPL CREATININE-BSD FRML MDRD: 8 ML/MIN/1.73 M 2
GLUCOSE SERPL-MCNC: 114 MG/DL (ref 65–99)
HCT VFR BLD AUTO: 27.4 % (ref 42–52)
HGB BLD-MCNC: 8.4 G/DL (ref 14–18)
IMM GRANULOCYTES # BLD AUTO: 0.01 K/UL (ref 0–0.11)
IMM GRANULOCYTES NFR BLD AUTO: 0.3 % (ref 0–0.9)
INR PPP: 1.9 (ref 0.87–1.13)
LYMPHOCYTES # BLD AUTO: 1.08 K/UL (ref 1–4.8)
LYMPHOCYTES NFR BLD: 29.2 % (ref 22–41)
MCH RBC QN AUTO: 30.1 PG (ref 27–33)
MCHC RBC AUTO-ENTMCNC: 30.7 G/DL (ref 33.7–35.3)
MCV RBC AUTO: 98.2 FL (ref 81.4–97.8)
MONOCYTES # BLD AUTO: 0.34 K/UL (ref 0–0.85)
MONOCYTES NFR BLD AUTO: 9.2 % (ref 0–13.4)
NEUTROPHILS # BLD AUTO: 1.98 K/UL (ref 1.82–7.42)
NEUTROPHILS NFR BLD: 53.5 % (ref 44–72)
NRBC # BLD AUTO: 0 K/UL
NRBC BLD AUTO-RTO: 0 /100 WBC
PLATELET # BLD AUTO: 133 K/UL (ref 164–446)
PMV BLD AUTO: 9.9 FL (ref 9–12.9)
POTASSIUM SERPL-SCNC: 5 MMOL/L (ref 3.6–5.5)
PROTHROMBIN TIME: 22.4 SEC (ref 12–14.6)
RBC # BLD AUTO: 2.79 M/UL (ref 4.7–6.1)
SODIUM SERPL-SCNC: 136 MMOL/L (ref 135–145)
WBC # BLD AUTO: 3.7 K/UL (ref 4.8–10.8)

## 2017-01-07 PROCEDURE — 80048 BASIC METABOLIC PNL TOTAL CA: CPT

## 2017-01-07 PROCEDURE — 85610 PROTHROMBIN TIME: CPT

## 2017-01-07 PROCEDURE — A9270 NON-COVERED ITEM OR SERVICE: HCPCS | Performed by: SURGERY

## 2017-01-07 PROCEDURE — 700102 HCHG RX REV CODE 250 W/ 637 OVERRIDE(OP): Performed by: SURGERY

## 2017-01-07 PROCEDURE — 85025 COMPLETE CBC W/AUTO DIFF WBC: CPT

## 2017-01-07 PROCEDURE — 90935 HEMODIALYSIS ONE EVALUATION: CPT

## 2017-01-07 RX ADMIN — DOXYCYCLINE 100 MG: 100 TABLET ORAL at 08:38

## 2017-01-07 RX ADMIN — HYDROCODONE BITARTRATE AND ACETAMINOPHEN 2 TABLET: 10; 325 TABLET ORAL at 15:44

## 2017-01-07 RX ADMIN — HYDROCODONE BITARTRATE AND ACETAMINOPHEN 2 TABLET: 10; 325 TABLET ORAL at 05:47

## 2017-01-07 RX ADMIN — RENAGEL 3200 MG: 800 TABLET ORAL at 08:37

## 2017-01-07 ASSESSMENT — PAIN SCALES - GENERAL
PAINLEVEL_OUTOF10: 2
PAINLEVEL_OUTOF10: 8
PAINLEVEL_OUTOF10: 6

## 2017-01-07 ASSESSMENT — ENCOUNTER SYMPTOMS
NAUSEA: 0
CHILLS: 0
VOMITING: 0
FEVER: 0

## 2017-01-07 NOTE — PROGRESS NOTES
Hemodialysis ordered by Dr. Najjar. Treatment started at 1734 and ended at 2034. Dr. Najjar notified and aware pt hypotensive pre and during tx. NS bolus during tx, total of 600 ml. Pt stable, vss, no c/o post tx. See flow sheets for details. Net  ml. Report to ZONIA Sharpe RN.

## 2017-01-07 NOTE — DISCHARGE INSTRUCTIONS
Discharge Instructions    Discharged to home by car with relative. Discharged via walking, hospital escort: Refused.  Special equipment needed: Not Applicable    Be sure to schedule a follow-up appointment with your primary care doctor or any specialists as instructed.     Discharge Plan:   Diet Plan: Discussed  Activity Level: Discussed  Confirmed Follow up Appointment: Patient to Call and Schedule Appointment  Confirmed Symptoms Management: Discussed  Medication Reconciliation Updated: Yes  Influenza Vaccine Indication: Not indicated: Previously immunized this influenza season and > 8 years of age (September 2016)    I understand that a diet low in cholesterol, fat, and sodium is recommended for good health. Unless I have been given specific instructions below for another diet, I accept this instruction as my diet prescription.   Other diet: Renal diet     Special Instructions: None  · Is patient Post Blood Transfusion?  No    · Is patient discharged on Warfarin / Coumadin?   Yes    You are receiving the drug warfarin. Please understand the importance of monitoring warfarin with scheduled PT/INR blood draws.  Follow-up with the [facility] in one week for INR lab..    IMPORTANT: HOW TO USE THIS INFORMATION:  This is a summary and does NOT have all possible information about this product. This information does not assure that this product is safe, effective, or appropriate for you. This information is not individual medical advice and does not substitute for the advice of your health care professional. Always ask your health care professional for complete information about this product and your specific health needs.      WARFARIN - ORAL (WARF-uh-rin)      COMMON BRAND NAME(S): Coumadin      WARNING:  Warfarin can cause very serious (possibly fatal) bleeding. This is more likely to occur when you first start taking this medication or if you take too much warfarin. To decrease your risk for bleeding, your doctor  "or other health care provider will monitor you closely and check your lab results (INR test) to make sure you are not taking too much warfarin. Keep all medical and laboratory appointments. Tell your doctor right away if you notice any signs of serious bleeding. See also Side Effects section.      USES:  This medication is used to treat blood clots (such as in deep vein thrombosis-DVT or pulmonary embolus-PE) and/or to prevent new clots from forming in your body. Preventing harmful blood clots helps to reduce the risk of a stroke or heart attack. Conditions that increase your risk of developing blood clots include a certain type of irregular heart rhythm (atrial fibrillation), heart valve replacement, recent heart attack, and certain surgeries (such as hip/knee replacement). Warfarin is commonly called a \"blood thinner,\" but the more correct term is \"anticoagulant.\" It helps to keep blood flowing smoothly in your body by decreasing the amount of certain substances (clotting proteins) in your blood.      HOW TO USE:  Read the Medication Guide provided by your pharmacist before you start taking warfarin and each time you get a refill. If you have any questions, ask your doctor or pharmacist. Take this medication by mouth with or without food as directed by your doctor or other health care professional, usually once a day. It is very important to take it exactly as directed. Do not increase the dose, take it more frequently, or stop using it unless directed by your doctor. Dosage is based on your medical condition, laboratory tests (such as INR), and response to treatment. Your doctor or other health care provider will monitor you closely while you are taking this medication to determine the right dose for you. Use this medication regularly to get the most benefit from it. To help you remember, take it at the same time each day. It is important to eat a balanced, consistent diet while taking warfarin. Some foods can " affect how warfarin works in your body and may affect your treatment and dose. Avoid sudden large increases or decreases in your intake of foods high in vitamin K (such as broccoli, cauliflower, cabbage, brussels sprouts, kale, spinach, and other green leafy vegetables, liver, green tea, certain vitamin supplements). If you are trying to lose weight, check with your doctor before you try to go on a diet. Cranberry products may also affect how your warfarin works. Limit the amount of cranberry juice (16 ounces/480 milliliters a day) or other cranberry products you may drink or eat.      SIDE EFFECTS:  Nausea, loss of appetite, or stomach/abdominal pain may occur. If any of these effects persist or worsen, tell your doctor or pharmacist promptly. Remember that your doctor has prescribed this medication because he or she has judged that the benefit to you is greater than the risk of side effects. Many people using this medication do not have serious side effects. This medication can cause serious bleeding if it affects your blood clotting proteins too much (shown by unusually high INR lab results). Even if your doctor stops your medication, this risk of bleeding can continue for up to a week. Tell your doctor right away if you have any signs of serious bleeding, including: unusual pain/swelling/discomfort, unusual/easy bruising, prolonged bleeding from cuts or gums, persistent/frequent nosebleeds, unusually heavy/prolonged menstrual flow, pink/dark urine, coughing up blood, vomit that is bloody or looks like coffee grounds, severe headache, dizziness/fainting, unusual or persistent tiredness/weakness, bloody/black/tarry stools, chest pain, shortness of breath, difficulty swallowing. Tell your doctor right away if any of these unlikely but serious side effects occur: persistent nausea/vomiting, severe stomach/abdominal pain, yellowing eyes/skin. This drug rarely has caused very serious (possibly fatal) problems if its  effects lead to small blood clots (usually at the beginning of treatment). This can lead to severe skin/tissue damage that may require surgery or amputation if left untreated. Patients with certain blood conditions (protein C or S deficiency) may be at greater risk. Get medical help right away if any of these rare but serious side effects occur: painful/red/purplish patches on the skin (such as on the toe, breast, abdomen), change in the amount of urine, vision changes, confusion, slurred speech, weakness on one side of the body. A very serious allergic reaction to this drug is rare. However, get medical help right away if you notice any symptoms of a serious allergic reaction, including: rash, itching/swelling (especially of the face/tongue/throat), severe dizziness, trouble breathing. This is not a complete list of possible side effects. If you notice other effects not listed above, contact your doctor or pharmacist. In the [place] - Call your doctor for medical advice about side effects. You may report side effects to FDA at 2-117-MYR-3892. In [place] - Call your doctor for medical advice about side effects. You may report side effects to [facility] [place] at 1-140.403.4024.      PRECAUTIONS:  Before taking warfarin, tell your doctor or pharmacist if you are allergic to it; or if you have any other allergies. This product may contain inactive ingredients, which can cause allergic reactions or other problems. Talk to your pharmacist for more details. Before using this medication, tell your doctor or pharmacist your medical history, especially of: blood disorders (such as anemia, hemophilia), bleeding problems (such as bleeding of the stomach/intestines, bleeding in the brain), blood vessel disorders (such as aneurysms), recent major injury/surgery, liver disease, alcohol use, mental/mood disorders (including memory problems), frequent falls/injuries. It is important that all your doctors and dentists know that you take  warfarin. Before having surgery or any medical/dental procedures, tell your doctor or dentist that you are taking this medication and about all the products you use (including prescription drugs, nonprescription drugs, and herbal products). Avoid getting injections into the muscles. If you must have an injection into a muscle (for example, a flu shot), it should be given in the arm. This way, it will be easier to check for bleeding and/or apply pressure bandages. This medication may cause stomach bleeding. Daily use of alcohol while using this medicine will increase your risk for stomach bleeding and may also affect how this medication works. Limit or avoid alcoholic beverages. If you have not been eating well, if you have an illness or infection that causes fever, vomiting, or diarrhea for more than 2 days, or if you start using any antibiotic medications, contact your doctor or pharmacist immediately because these conditions can affect how warfarin works. This medication can cause heavy bleeding. To lower the chance of getting cut, bruised, or injured, use great caution with sharp objects like safety razors and nail cutters. Use an electric razor when shaving and a soft toothbrush when brushing your teeth. Avoid activities such as contact sports. If you fall or injure yourself, especially if you hit your head, call your doctor immediately. Your doctor may need to check you. The Food & Drug Administration has stated that generic warfarin products are interchangeable. However, consult your doctor or pharmacist before switching warfarin products. Be careful not to take more than one medication that contains warfarin unless specifically directed by the doctor or health care provider who is monitoring your warfarin treatment. Older adults may be at greater risk for bleeding while using this drug. This medication is not recommended for use during pregnancy because of serious (possibly fatal) harm to an unborn baby.  "Discuss the use of reliable forms of birth control with your doctor. If you become pregnant or think you may be pregnant, tell your doctor immediately. If you are planning pregnancy, discuss a plan for managing your condition with your doctor before you become pregnant. Your doctor may switch the type of medication you use during pregnancy. Very small amounts of this medication may pass into breast milk but is unlikely to harm a nursing infant. Consult your doctor before breast-feeding.      DRUG INTERACTIONS:  Drug interactions may change how your medications work or increase your risk for serious side effects. This document does not contain all possible drug interactions. Keep a list of all the products you use (including prescription/nonprescription drugs and herbal products) and share it with your doctor and pharmacist. Do not start, stop, or change the dosage of any medicines without your doctor's approval. Warfarin interacts with many prescription, nonprescription, vitamin, and herbal products. This includes medications that are applied to the skin or inside the vagina or rectum. The interactions with warfarin usually result in an increase or decrease in the \"blood-thinning\" (anticoagulant) effect. Your doctor or other health care professional should closely monitor you to prevent serious bleeding or clotting problems. While taking warfarin, it is very important to tell your doctor or pharmacist of any changes in medications, vitamins, or herbal products that you are taking. Some products that may interact with this drug include: capecitabine, imatinib, mifepristone. Aspirin, aspirin-like drugs (salicylates), and nonsteroidal anti-inflammatory drugs (NSAIDs such as ibuprofen, naproxen, celecoxib) may have effects similar to warfarin. These drugs may increase the risk of bleeding problems if taken during treatment with warfarin. Carefully check all prescription/nonprescription product labels (including drugs " applied to the skin such as pain-relieving creams) since the products may contain NSAIDs or salicylates. Talk to your doctor about using a different medication (such as acetaminophen) to treat pain/fever. Low-dose aspirin and related drugs (such as clopidogrel, ticlopidine) should be continued if prescribed by your doctor for specific medical reasons such as heart attack or stroke prevention. Consult your doctor or pharmacist for more details. Many herbal products interact with warfarin. Tell your doctor before taking any herbal products, especially bromelains, coenzyme Q10, cranberry, danshen, dong quai, fenugreek, garlic, ginkgo biloba, ginseng, and Harry's wort, among others. This medication may interfere with a certain laboratory test to measure theophylline levels, possibly causing false test results. Make sure laboratory personnel and all your doctors know you use this drug.      OVERDOSE:  If overdose is suspected, contact a poison control center or emergency room immediately.  residents can call the [place] Poison Hotline at 1-669.107.6360. [place] residents can call a provincial poison control center. Symptoms of overdose may include: bloody/black/tarry stools, pink/dark urine, unusual/prolonged bleeding.      NOTES:  Do not share this medication with others. Laboratory and/or medical tests (such as INR, complete blood count) must be performed periodically to monitor your progress or check for side effects. Consult your doctor for more details.      MISSED DOSE:  For the best possible benefit, do not miss any doses. If you do miss a dose and remember on the same day, take it as soon as you remember. If you remember on the next day, skip the missed dose and resume your usual dosing schedule. Do not double the dose to catch up because this could increase your risk for bleeding. Keep a record of missed doses to give to your doctor or pharmacist. Contact your doctor or pharmacist if you miss 2 or more  doses in a row.      STORAGE:  Store at room temperature away from light and moisture. Do not store in the bathroom. Keep all medications away from children and pets. Do not flush medications down the toilet or pour them into a drain unless instructed to do so. Properly discard this product when it is  or no longer needed. Consult your pharmacist or local waste disposal company for more details about how to safely discard your product.      MEDICAL ALERT:  Your condition and medication can cause complications in a medical emergency. For information about enrolling in MedicAlert, call 1-628.959.1983 ([place]) or 1-898.457.4064 ([place]).      Information last revised 2010 Copyright(c) 2010 [employer]      Vitamin K Foods and Warfarin  Warfarin is a medicine that helps prevent harmful blood clots by causing blood to clot more slowly. It does this by decreasing the activity of vitamin K, which promotes normal blood clotting. For the dose of warfarin you have been prescribed to work well, you need to get about the same amount of vitamin K from your food from day to day. Suddenly getting a lot more vitamin K could cause your blood to clot too quickly. A sudden decrease in vitamin K intake could cause your blood to clot too slowly. These changes in vitamin K intake could lead to dangerous blood clots or to bleeding.  WHAT GENERAL GUIDELINES DO I NEED TO FOLLOW?  · Keep your intake of vitamin K consistent from day to day. To do this, you must be aware of which foods contain moderate or high amounts of vitamin K. Listed below are some foods that are very high, high, or moderately high in vitamin K. If you eat these foods, make sure you eat a consistent amount of them from day to day.   · Avoid major changes in your diet, or tell your health care provider before changing your diet.  · If you take a multivitamin that contains vitamin K, be sure to take it every day.  · If you drink green tea, drink the same  amount each day.  WHAT FOODS ARE VERY HIGH IN VITAMIN K?   · Greens, such as Swiss chard and beet, neo, mustard, or turnip greens (fresh or frozen, cooked).  · Kale (fresh or frozen, cooked).    · Parsley (raw).   · Spinach (cooked).    WHAT FOODS ARE HIGH IN VITAMIN K?  · Asparagus (frozen, cooked).  · Broccoli.    · Bok joseline (cooked).    · [place] sprouts (fresh or frozen, cooked).   · Cabbage (cooked).  · Coleslaw.  WHAT FOODS ARE MODERATELY HIGH IN VITAMIN K?  · Blueberries.  · Black-eyed peas.  · Endive (raw).    · Green leaf lettuce (raw).    · Green scallions (raw).  · Kale (raw).  · Okra (frozen, cooked).  · Plantains (fried).  · Blaine lettuce (raw).    · Sauerkraut (canned).    · Spinach (raw).     This information is not intended to replace advice given to you by your health care provider. Make sure you discuss any questions you have with your health care provider.     Document Released: 10/15/2010 Document Revised: 01/08/2016 Document Reviewed: 10/22/2014  [employer] Interactive Patient Education ©2016 [employer]             Depression / Suicide Risk    As you are discharged from this [facility] facility, it is important to learn how to keep safe from harming yourself.    Recognize the warning signs:  · Abrupt changes in personality, positive or negative- including increase in energy   · Giving away possessions  · Change in eating patterns- significant weight changes-  positive or negative  · Change in sleeping patterns- unable to sleep or sleeping all the time   · Unwillingness or inability to communicate  · Depression  · Unusual sadness, discouragement and loneliness  · Talk of wanting to die  · Neglect of personal appearance   · Rebelliousness- reckless behavior  · Withdrawal from people/activities they love  · Confusion- inability to concentrate     If you or a loved one observes any of these behaviors or has concerns about self-harm, here's what you can do:  · Talk about it- your feelings  and reasons for harming yourself  · Remove any means that you might use to hurt yourself (examples: pills, rope, extension cords, firearm)  · Get professional help from the community (Mental Health, Substance Abuse, psychological counseling)  · Do not be alone:Call your Safe Contact- someone whom you trust who will be there for you.  · Call your local CRISIS HOTLINE 057-2499 or 310-457-1818  · Call your local Children's Mobile Crisis Response Team [place] (302) 258-7528 or [place]  · Call the toll free National Suicide Prevention Hotlines   · National Suicide Prevention Lifeline 238-603-UWGN (7758)  National Hope Line Network 800-SUICIDE (520-2844)    Hemodialysis, Care After  These instructions provide you with information on caring for yourself after your hemodialysis session. Your health care provider may also give you more specific instructions. Your treatment has been planned according to current medical practices, but problems sometimes occur. Call your health care provider if you have any problems or questions after your procedure.  HOME CARE INSTRUCTIONS   General Instructions   · Take medicines only as directed by your health care provider.  · Keep all dialysis appointments. Dialysis appointments should be scheduled regularly. Do not skip an appointment.  · Carry a wallet card, bracelet, or medical identification tag that shows you are a dialysis patient. Always keep it with you. If you are in an accident or have a medical emergency and cannot communicate, this item will inform health care providers about your condition.  Diet Instructions  · Talk to your health care provider about how much fluid you can consume. Keep track of your fluid intake to make sure you do not consume more fluid than is allowed. This is important because fluid can build up in your body between dialysis sessions. Built-up fluid affects your blood pressure and can make your heart work harder.  · Limit your sodium  intake. This is important because sodium makes you thirsty and the amount of fluid you can drink every day is limited.  · Limit your intake of the mineral potassium. Potassium levels can rise between dialysis sessions. If they become too high, they can cause a dangerous heart rhythm and even death.  · Limit your intake of the mineral phosphorus. Consuming too much phosphorus can cause your bones to lose calcium. This makes them weaker and more likely to break.  · Eat high-quality proteins that are low in phosphorus. High-quality proteins include those found in fish, poultry, and eggs.  · Take vitamin and mineral supplements as directed by your health care provider.  · Talk with your health care provider before taking new supplements.  Vascular Access Instructions  · Avoid sleeping on your vascular access.  · If your vascular access is on your arm:  · Do not lift weights or heavy objects with that arm.  · Do not wear clothing that is tight over that arm.  · Do not have your blood pressure measured on that arm.  · Do not allow needle punctures (such as for taking blood) on that arm.  · If you have a catheter, do not open it between dialysis sessions.  · If you have a fistula or graft, wash it with antibacterial soap every day and before each dialysis session.  · If you have a fistula or graft, feel for a vibration over it (thrill) every day. A thrill means the access is working.  SEEK MEDICAL CARE IF:  · You have a fever.  · You have chills.  · Your vascular access feels warm.  · You find a pimple on your vascular access.  · There is swelling or redness around the vascular access site.  · There is drainage or bleeding at the vascular access site.  SEEK IMMEDIATE MEDICAL CARE IF:  · You have a fistula, and pain, numbness, or unusual paleness develops in the hand on the side of your fistula.  · You have a fistula or graft and do not feel a thrill.  · You develop dizziness or weakness that you have not had before.  · You  develop shortness of breath.  · You develop chest pain.  · There is pus at the vascular access site.  · There is bleeding at the vascular access site that cannot be easily controlled.     This information is not intended to replace advice given to you by your health care provider. Make sure you discuss any questions you have with your health care provider.     Document Released: 04/14/2014 Document Revised: 01/08/2016 Document Reviewed: 04/14/2014  [employer] Interactive Patient Education ©2016 [employer]

## 2017-01-07 NOTE — PROGRESS NOTES
Hospital Medicine Progress Note, Adult, Complex               Author: Fadi Najjar Date & Time created: 2017  1:32 PM     Interval History:  Pt with ESRD, presented with AVF thrombosis.  Seen and examined while getting HD.      Review of Systems:  Review of Systems   Constitutional: Negative for fever and chills.   Gastrointestinal: Negative for nausea and vomiting.       Physical Exam:  Physical Exam   Constitutional: He is oriented to person, place, and time.   HENT:   Nose: Nose normal.   Cardiovascular: Normal rate.    Pulmonary/Chest: Effort normal. He has no wheezes. He has no rales.   Musculoskeletal: He exhibits no edema.   Neurological: He is alert and oriented to person, place, and time.       Labs:        Invalid input(s): [id number]      Recent Labs      17   16317   1021  17   0646   SODIUM  131*  134*  136   POTASSIUM  6.3*  5.8*  5.0   CHLORIDE  94*  94*  98   CO2  22  20  26   BUN  88*  109*  52*   CREATININE  9.89*  11.89*  7.49*   CALCIUM  8.5  8.5  7.2*     Recent Labs      17   1639  17   1021  17   0646   GLUCOSE  95  84  114*     Recent Labs      17   1628  17   1639  17   1021  17   0645  17   0646   RBC  3.42*   --   3.35*   --   2.79*   HEMOGLOBIN  10.4*   --   10.2*   --   8.4*   HEMATOCRIT  33.2*   --   32.2*   --   27.4*   PLATELETCT  208   --   163*   --   133*   PROTHROMBTM   --   17.4*  19.1*  22.4*   --    INR   --   1.38*  1.55*  1.90*   --      Recent Labs      17   1628  17   1021  17   0646   WBC  4.8  4.9  3.7*   NEUTSPOLYS   --   69.50  53.50   LYMPHOCYTES   --   19.50*  29.20   MONOCYTES   --   8.60  9.20   EOSINOPHILS   --   1.60  7.30*   BASOPHILS   --   0.40  0.50           Hemodynamics:  Temp (24hrs), Av.3 °C (97.4 °F), Min:35.9 °C (96.6 °F), Max:37.6 °C (99.6 °F)  Temperature: 35.9 °C (96.6 °F)  Pulse  Av.8  Min: 83  Max: 117Heart Rate (Monitored): (!) 119  Blood  Pressure: (!) 81/52 mmHg, NIBP: (!) 87/54 mmHg     Respiratory:    Respiration: 18, Pulse Oximetry: 97 %           Fluids:    Intake/Output Summary (Last 24 hours) at 01/07/17 1332  Last data filed at 01/07/17 0550   Gross per 24 hour   Intake   1930 ml   Output   1426 ml   Net    504 ml     Weight: 81 kg (178 lb 9.2 oz)  GI/Nutrition:  Orders Placed This Encounter   Procedures   • DIET ORDER     Standing Status: Standing      Number of Occurrences: 1      Standing Expiration Date:      Order Specific Question:  Diet:     Answer:  Renal [8]     Medical Decision Making, by Problem:  Active Hospital Problems    Diagnosis   • 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  Renal dose all meds  Avoid nephrotoxins  Epo with HD

## 2017-01-07 NOTE — PROGRESS NOTES
Report received from dialysis RN, pt tolerated well. Dr. Hopkins spoke with pt while in dialysis and wrote orders for discharge. Will prepare d/c paperwork.

## 2017-01-07 NOTE — OR NURSING
Report given to Lisa DAVIDSON. Patient to transfer to T 410. Plan of care discussed. Report also given to Mary Dialysis RN. Patient is to go to ground floor dialysis to complete session and from there transfer to room. Family at patient's bedside, updated on plan of care. Patient updated on plan of care. Questions answered. Patient placed on transport.

## 2017-01-07 NOTE — PROGRESS NOTES
HD treatment today per routine order using R femoral CVC.Treatment tolerated well.Net UF removed 1400 ml.Report given to primary Rn.

## 2017-01-07 NOTE — OR SURGEON
Immediate Post-Operative Note      PreOp Diagnosis: Stage 5 kidney disease with recurrent thrombosis of left thigh AV graft    PostOp Diagnosis: Same    Procedure(s) (LRB):  THROMBECTOMY-OPEN THROMBECTOMY OF LEFT THIGH GRAFT (Left)  Bilateral iliac venagrams.  Retrograde left femoral arteriograms.  Balloon angioplasty 12mm distal left iliac vein, and  7 mm of loop graft.  CATH PLACEMENT (Right) Thomas.FEMORAL VEIN    Surgeon(s):  Jairo Hopkins M.D.    Anesthesiologist/Type of Anesthesia:  Anesthesiologist: Mello Valentine M.D./General    Surgical Staff:  Circulator: Barbara Reeder R.N.  Scrub Person: Cassie Lara    Specimen: none    Estimated Blood Loss: 200    Findings: thrombosed graft with good inflow and outflow    Complications: None apparent.        1/6/2017 4:25 PM Jairo Hopkins

## 2017-01-07 NOTE — OP REPORT
DATE OF SERVICE:  01/06/2017    PREOPERATIVE DIAGNOSIS:  Stage V kidney disease with recurrent thrombosis,   left thigh arteriovenous loop graft.    POSTOPERATIVE DIAGNOSIS:  Stage V kidney disease with recurrent thrombosis,   left thigh arteriovenous loop graft.    OPERATIONS:  1.  Open thrombectomy, left thigh AV loop graft.  Operative graft angiographic   imaging.  Bilateral iliac venograms and retrograde left femoral arteriograms.  2.  Balloon angioplasty 12x40 mm distal left iliac vein and 7x40 mm venous end   of the AV loop graft.  3.  Right femoral Mahurkar catheter placement 12-Portuguese x 20 cm right common   femoral vein with fluoroscopic guidance and ultrasound guidance.    SURGEON:  Jairo Hopkins MD    ANESTHESIA:  General.    ANESTHESIOLOGIST:  Mello Valentine MD    FINDINGS:  The loop graft after thrombectomy had very vigorous inflow and an   unobstructed outflow.  Imaging study showed the graft to be clean and   unimpaired with significant tortuosity.  There was mild tortuosity in the   segmental graft replacement area of the lateral thigh.  The common femoral   artery, profunda and superficial femoral arteries in the groin were widely   patent.  The arterial end of the graft looked widely patent.  The venous   outflow after treatment was widely patent.  The iliac veins bilaterally were   widely patent by contrast imaging.  The Mahurkar catheter placement is   satisfactory radiographically and can be used for dialysis.  At the end of the   procedure this AV graft had a thrill palpable in the distal loop.    DESCRIPTION OF PROCEDURE:  The patient was brought back to the operating room.    He had recurrent thrombosis.  His left AV graft that had been treated   percutaneously yesterday.  He was placed on the operating table and put under   general anesthesia.  A timeout was performed.  Bilateral groins and the entire   left lower extremity were prepped with ChloraPrep and draped sterilely.  I   examined  with ultrasound, the left AV loop graft and saw the the graft to have   no sign of fluid around the graft and it appeared well incorporated   throughout its course.  There was thrombus noted throughout.  At the distal   loop and an operated area injected a mL of 0.5% Marcaine with epinephrine in   the skin and made a transverse incision over the distal loop graft.  I   dissected a short segment of graft out.  I opened it 50% with a scalpel and   fine scissors.  The venous outflow was treated with a 4-Micronesian Navneet balloon   thrombectomy catheter and I removed a lot of clot and got good backbleeding.    I placed an irrigation balloon catheter and imaged.  It looked widely patent.    I was concerned about the failure overnight, so I attempted to use a graft   thrombectomy catheter.  I attempted to insert this into the graft at the opening in the distal   loop and it was too stiff to pass through the graft safely and I did not want   to traumatize the graft.  I therefore withdrew it without utilizing it.  I   repeated thrombectomies several times.  I then, with the irrigation balloon   catheter in place,  instilled heparinized saline while I proceeded to do the   arterial end.  We had given 5000 units of heparin intravenously as an initial   dose when I still saw some clot forming I asked for another 1000 units for a   total of 6000 units of intravenous heparin for the procedure.  The heparin was   not reversed.  The left loop which was the arterial side was thrombectomized   of a large quantity of clot and very vigorous arterial flow was noted.    Imaging through a balloon occlusion catheter showed a few defects at the   proximal graft that look like small clots.  These were removed with additional   thrombectomy.  I inspected the graft and it looked very good.  It was widely   patent radiographically and its flow was very powerful consistent with an open   graft.  I instilled heparinized saline in this graft.  I  then took a   guidewire and a sheath size 7-Burmese on the venous outflow side and passed   this up and inflated a 12 mm balloon across the anastomosis and into the iliac   artery.  I did this trying to make sure that I had not overlooked any   particular aspect of this graft.  I noticed a slight narrowing just distal to   the venous anastomosis in the graft and a 7 mm balloon was inflated to 11   atmospheres and this showed improvement.  Once I completed these therapies and   was convinced that there was no more clot to be removed and we had good   inflow and outflow, I proceeded to close the graft with a running suture of C5   [place].  Flow was established.  I irrigated the wound.  I put a small   amount of Surgiflo in it and then irrigated most of this out.  I closed this   with 2 layers of running 4-0 Vicryl.  I could later feel a thrill through the   area.    I next moved to the right groin to establish a catheter dialysis.  I injected   local anesthesia over the right common femoral vein.  There were many abundant   tortuous accessory veins from the patient's collateral vein formation   secondary to superior vena cava syndrome.  I avoided these with the needle.  I   placed a 3 mm J wire and followed this by a series of dilators and then a   12-Burmese 20 cm long Mahurkar catheter.  Through the catheter, I injected   contrast imaging, the iliac system on the right and it looked widely patent.    I secured the catheter with 3-0 Ethilon.  I instilled 1 mL of 5000 units per   mL heparin in each port.  I placed a Biopatch and a sterile dressing.  Blood   loss was approximately 200 mL.  He tolerated the procedure well.  He was   premedicated for contrast allergy with 200 of Solu-Cortef and 50 mg of   Benadryl and did not show any manifestations of allergic reaction during the   procedure.  The left thigh wounds were dressed with Tegaderm and gauze.  He   was taken to recovery room.  I informed his parents and  brother of his status   and the operation.  The patient needs dialysis and Dr. Najjar has been   consulted and he will order dialysis tonight.  This will allow observation, so   I can make sure his graft is open or reoperate tomorrow as necessary.       ____________________________________     MD BEN NUGENT / RIAZ    DD:  01/06/2017 16:38:22  DT:  01/06/2017 17:50:27    D#:  [id number]  Job#:  [id number]

## 2017-01-07 NOTE — PROGRESS NOTES
PO 1  Left thigh AV graft thrombectomy.  Undergoing dialysis presently through Mahnarendra in right femoral vein.  He feels good.  His AVgraft has a thrill and bruit.    Plan discharge home and follow up if graft occludes or to remove Mahurkar if graft stays open.

## 2017-01-07 NOTE — CARE PLAN
Problem: Communication  Goal: The ability to communicate needs accurately and effectively will improve  Patient alert and oriented x4. Patient able to make needs known to staff.     Problem: Safety  Goal: Will remain free from falls  Room/floor clear. Proper signs up. Bed low and locked. Call light and belonging within reach. Hourly rounding in place.

## 2017-01-07 NOTE — PROGRESS NOTES
Patient arrived floor via gurney. Pt alert and oriented x4. Pt c/o R groin pain 6/10, medicated per MAR. Patient currently resting in bed no visible or stated distress other than R groin irritation. Checked dialysis site, no s/s bleeding, dressing CDI. +bruit, +thrill on L femoral graft. Pt hypotensive. Pt on RA, sating >90%. Bed control on and bed in locked in position. Bed alarm NOT on, patient educated regarding safety risk and continues to refuse. Pt agreed to call before getting up. Call light and personal belongings within reach. Hourly rounding in place.

## 2017-01-07 NOTE — CONSULTS
DATE OF SERVICE:  01/06/2017    REQUESTING PHYSICIAN:  Jairo Hopkins MD    REASON FOR CONSULTATION:  Management of chronic kidney disease and assist in   the need for urgent dialysis and also management of hyperkalemia.    Patient seen and examined, medical records were reviewed.    HISTORY OF PRESENT ILLNESS:  The patient is a very pleasant 36-year-old   gentleman with a past medical history significant for end-stage renal disease,   undergoes home hemodialysis on a daily basis; however, patient had   dysfunctional hemodialysis access, so his last dialysis was on Wednesday.  He   was admitted earlier today underwent open thrombectomy for his left thigh AV   loop graft and right femoral dialysis catheter placement by Dr. Hopkins;   however, he was found to have hyperkalemia with potassium at 5.8.  Also, he   missed dialysis for the last couple days, so we were called to manage his   hyperkalemia, end-stage renal disease, assessing the need for hemodialysis.    Patient actually is a very poor historian right now because he is lethargic   post-sedation.    Past medical history, social history, family history, medication, review of   systems as well as allergies are unobtainable because the patient's medical   status.    PHYSICAL EXAMINATION:  GENERAL:  Patient is in no apparent distress.  VITAL SIGNS:  Showed blood pressure of 100/70 and heart rate was 63.  HEAD:  Normocephalic, atraumatic.  Sclerae is anicteric.  NECK:  No lymphadenopathy.  CHEST:  Normal.  LUNGS:  Clear to auscultation.  HEART:  S1, S2.  ABDOMEN:  Soft.  EXTREMITIES:  There is +1 edema.  SKIN:  No skin rashes.  NEUROLOGIC:  Patient is lethargic.  EXIT SITE:  Patient had right femoral hemodialysis catheter.    LABORATORY DATA:  His recent labs were reviewed.    ASSESSMENT:  1.  End-stage renal disease.  2.  Severe hyperkalemia.  3.  Anemia.  4.  Arteriovenous graft thrombosis.    PLAN:  1.  We will plan on urgent dialysis today to manage his  hyperkalemia as well   as his uremia, we will do another dialysis tomorrow to manage his uremia.  2.  Renal dose all medications.  3.  Avoid nephrotoxins.  4.  Prognosis is guarded.    Plan discussed in detail with Dr. Hopkins.  I would like to thank you for   consulting this interesting case.       ____________________________________     FADI NAJJAR, MD FN / RIAZ    DD:  01/06/2017 18:59:34  DT:  01/06/2017 20:01:11    D#:  [id number]  Job#:  [id number]

## 2017-01-07 NOTE — DISCHARGE SUMMARY
FINAL DIAGNOSES:  1.  Stage V kidney disease.  2.  Recurrent thrombosis, left thigh arteriovenous graft.  3.  Superior vena cava syndrome.  4.  History of superficial venous thrombosis.  5.  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  6.  Neuropathy  7.  Osteoporosis related to kidney disease.    OPERATIONS ON THIS ADMISSION:  1.  Open thrombectomy with angioplasties, left thigh AV graft.  2.  Insertion of right femoral Mahurkar dialysis catheter.    SUMMARY:  This 36-year-old with a complex history underwent an outpatient   catheter thrombolysis with TPA and angioplasty on 01/05/2017.  He also was   experiencing hyperkalemia at that point in time.  Despite a radiographically   satisfactory thrombectomy, he rethrombosed that evening.  He was admitted to   the hospital on 01/06/2017 since he had hyperkalemia and desperately needed a   dialysis access.  We kept his potassium at a reasonable level with several   doses of Kayexalate.  He underwent a procedure under general anesthesia with   mechanical thrombectomy of his left thigh AV graft.  Imaging of his venous   outflow through the iliac vein.  Inflow imaging.  Once this part of the   procedure was completed and flow restored and the wound closed, he was given a   right femoral vein Mahurkar catheter.  I injected contrast through this and   it showed the iliac vein in the inferior vena cava to be widely patent.  This   catheter was used yesterday for hemodialysis and again today and it has   functioned well.  He is feeling better.  His potassium this morning had come   down to a level of 5.0, which is the lowest it has been in several days.  His   creatinine had reduced from 11.89 to 7.49 and BUN from 109 down to 52.  He has   a chronic anemia with hemoglobin presently of 8.4.  The patient has some   hypotension associated with dialysis.  He is presently without pain.  His   blood pressure has been 81/50, heart rate 83, temperature  afebrile.  He is   presently alert and oriented.  Normal respiratory pattern.  No breathing   distress.  His left thigh AV graft shows a dry incisional bandage in the   distal part of the loop where I made the opening in the graft for the   thrombectomy.  His flap coverage of the AV graft infection side on the lateral   thigh is satisfactory.  It has only slight redness and no apparent fluid on   ultrasound or exam.  There is a palpable thrill in the AV graft on the distal   end of the arterial loop.  There is no tenderness.  The right femoral area was   dried it with the catheter in place.    MEDICATIONS:  He will continue on his home medications as follows.  1.  Calcitriol 0.25 mcg capsules with the dosage of 3 capsules equalling 0.75   mcg by mouth every bedtime.  2.  Cinacalcet 30 mg tablet every evening.  3.  Doxycycline 100 mg p.o. b.i.d.  4.  Gabapentin 300 mg with a dosage being 1200 mg by mouth every bedtime.  5.  Hydrocodone and acetaminophen  mg 2 tablets every 12 hours as needed   for pain.  6.  Renvela 800 mg tablets, taking 3200 mg by mouth 3 times a day with meals.  7.  Warfarin dose being adjusted by currently 7.5 mg per day, anticipating 5   mg tomorrow and a protime INR on Monday.  He likely will be alternating 5 and   7.5 mg tablets.    DISPOSITION:  He is sent home.  The plan is he will undergo home dialysis   tomorrow, Monday and if the graft works and maintains patency, then he will   come by the office and we will remove his right femoral catheter.  Otherwise,   he will be readmitted for another thrombectomy.       ____________________________________     MD BEN NUGENT / RIAZ    DD:  01/07/2017 14:14:58  DT:  01/07/2017 14:43:20    D#:  [id number]  Job#:  [id number]

## 2017-01-08 NOTE — PROGRESS NOTES
Pt was discharge to home, family came and picked up pt to drive him home. IV removed, right groin cath dressing reinforced, current dressing clean and dry. Discharge instructions given, pt will call and make follow apt with Dr. Hopkins. Pt has been on Coumadin for some time and is familiar with the diet, drug interactions and lab draw requirements, no teaching needed. No prescriptions given by MD, pt stated he had all his meds at home and did not need anything. All belongings with pt, declined transport, ambulated out gait steady.

## 2017-01-10 ENCOUNTER — HOSPITAL ENCOUNTER (OUTPATIENT)
Facility: MEDICAL CENTER | Age: 37
End: 2017-01-10
Attending: SURGERY
Payer: MEDICARE

## 2017-01-10 LAB
INR PPP: 1.68 (ref 0.87–1.13)
PROTHROMBIN TIME: 20.3 SEC (ref 12–14.6)

## 2017-01-10 PROCEDURE — 36415 COLL VENOUS BLD VENIPUNCTURE: CPT

## 2017-01-10 PROCEDURE — 85610 PROTHROMBIN TIME: CPT

## 2017-01-18 ENCOUNTER — HOSPITAL ENCOUNTER (OUTPATIENT)
Facility: MEDICAL CENTER | Age: 37
End: 2017-01-18
Attending: SURGERY
Payer: MEDICARE

## 2017-01-18 LAB
INR PPP: 2.13 (ref 0.87–1.13)
PROTHROMBIN TIME: 24.5 SEC (ref 12–14.6)

## 2017-01-18 PROCEDURE — 85610 PROTHROMBIN TIME: CPT

## 2017-01-25 ENCOUNTER — HOSPITAL ENCOUNTER (OUTPATIENT)
Facility: MEDICAL CENTER | Age: 37
End: 2017-01-25
Attending: SURGERY
Payer: MEDICARE

## 2017-01-25 ENCOUNTER — HOSPITAL ENCOUNTER (OUTPATIENT)
Facility: MEDICAL CENTER | Age: 37
End: 2017-01-25
Attending: INTERNAL MEDICINE
Payer: MEDICARE

## 2017-01-25 LAB
ALBUMIN SERPL BCP-MCNC: 4.8 G/DL (ref 3.2–4.9)
ALBUMIN/GLOB SERPL: 1.2 G/DL
ALP SERPL-CCNC: 146 U/L (ref 30–99)
ALT SERPL-CCNC: 8 U/L (ref 2–50)
ANION GAP SERPL CALC-SCNC: 13 MMOL/L (ref 0–11.9)
AST SERPL-CCNC: 12 U/L (ref 12–45)
BASOPHILS # BLD AUTO: 0.05 K/UL (ref 0–0.12)
BASOPHILS NFR BLD AUTO: 1.3 % (ref 0–1.8)
BILIRUB SERPL-MCNC: 0.7 MG/DL (ref 0.1–1.5)
BUN SERPL-MCNC: 49 MG/DL (ref 8–22)
CALCIUM SERPL-MCNC: 9.4 MG/DL (ref 8.5–10.5)
CHLORIDE SERPL-SCNC: 95 MMOL/L (ref 96–112)
CO2 SERPL-SCNC: 27 MMOL/L (ref 20–33)
CREAT SERPL-MCNC: 7.94 MG/DL (ref 0.5–1.4)
CRP SERPL HS-MCNC: 2.3 MG/DL (ref 0–0.75)
EOSINOPHIL # BLD: 0.25 K/UL (ref 0–0.51)
EOSINOPHIL NFR BLD AUTO: 6.5 % (ref 0–6.9)
ERYTHROCYTE [DISTWIDTH] IN BLOOD BY AUTOMATED COUNT: 57.5 FL (ref 35.9–50)
ERYTHROCYTE [SEDIMENTATION RATE] IN BLOOD BY WESTERGREN METHOD: 55 MM/HOUR (ref 0–15)
GLOBULIN SER CALC-MCNC: 3.9 G/DL (ref 1.9–3.5)
GLUCOSE SERPL-MCNC: 93 MG/DL (ref 65–99)
HCT VFR BLD AUTO: 35.4 % (ref 42–52)
HGB BLD-MCNC: 11 G/DL (ref 14–18)
IMM GRANULOCYTES # BLD AUTO: 0.01 K/UL (ref 0–0.11)
IMM GRANULOCYTES NFR BLD AUTO: 0.3 % (ref 0–0.9)
INR PPP: 2.62 (ref 0.87–1.13)
LYMPHOCYTES # BLD: 0.92 K/UL (ref 1–4.8)
LYMPHOCYTES NFR BLD AUTO: 24 % (ref 22–41)
MCH RBC QN AUTO: 29.6 PG (ref 27–33)
MCHC RBC AUTO-ENTMCNC: 31.1 G/DL (ref 33.7–35.3)
MCV RBC AUTO: 95.2 FL (ref 81.4–97.8)
MONOCYTES # BLD: 0.39 K/UL (ref 0–0.85)
MONOCYTES NFR BLD AUTO: 10.2 % (ref 0–13.4)
NEUTROPHILS # BLD: 2.22 K/UL (ref 1.82–7.42)
NEUTROPHILS NFR BLD AUTO: 57.7 % (ref 44–72)
NRBC # BLD AUTO: 0.02 K/UL
NRBC BLD-RTO: 0.5 /100 WBC
PLATELET # BLD AUTO: 216 K/UL (ref 164–446)
PMV BLD AUTO: 9.3 FL (ref 9–12.9)
POTASSIUM SERPL-SCNC: 4.1 MMOL/L (ref 3.6–5.5)
PROT SERPL-MCNC: 8.7 G/DL (ref 6–8.2)
PROTHROMBIN TIME: 28.8 SEC (ref 12–14.6)
RBC # BLD AUTO: 3.72 M/UL (ref 4.7–6.1)
SODIUM SERPL-SCNC: 135 MMOL/L (ref 135–145)
WBC # BLD AUTO: 3.8 K/UL (ref 4.8–10.8)

## 2017-01-25 PROCEDURE — 85652 RBC SED RATE AUTOMATED: CPT

## 2017-01-25 PROCEDURE — 80053 COMPREHEN METABOLIC PANEL: CPT

## 2017-01-25 PROCEDURE — 86140 C-REACTIVE PROTEIN: CPT

## 2017-01-25 PROCEDURE — 85025 COMPLETE CBC W/AUTO DIFF WBC: CPT

## 2017-02-03 ENCOUNTER — HOSPITAL ENCOUNTER (OUTPATIENT)
Facility: MEDICAL CENTER | Age: 37
End: 2017-02-03
Attending: SURGERY
Payer: MEDICARE

## 2017-02-03 LAB
INR PPP: 4.32 (ref 0.87–1.13)
PROTHROMBIN TIME: 42.7 SEC (ref 12–14.6)

## 2017-02-03 PROCEDURE — 36415 COLL VENOUS BLD VENIPUNCTURE: CPT

## 2017-02-03 PROCEDURE — 85610 PROTHROMBIN TIME: CPT

## 2017-02-13 ENCOUNTER — HOSPITAL ENCOUNTER (OUTPATIENT)
Facility: MEDICAL CENTER | Age: 37
End: 2017-02-13
Attending: SURGERY
Payer: MEDICARE

## 2017-02-13 LAB
INR PPP: 3.78 (ref 0.87–1.13)
PROTHROMBIN TIME: 38.5 SEC (ref 12–14.6)

## 2017-02-13 PROCEDURE — 85610 PROTHROMBIN TIME: CPT

## 2017-02-21 ENCOUNTER — HOSPITAL ENCOUNTER (OUTPATIENT)
Facility: MEDICAL CENTER | Age: 37
End: 2017-02-21
Attending: SURGERY
Payer: MEDICARE

## 2017-02-21 LAB
INR PPP: 2.94 (ref 0.87–1.13)
PROTHROMBIN TIME: 31.6 SEC (ref 12–14.6)

## 2017-02-21 PROCEDURE — 85610 PROTHROMBIN TIME: CPT

## 2017-02-21 PROCEDURE — 36415 COLL VENOUS BLD VENIPUNCTURE: CPT

## 2017-03-02 ENCOUNTER — HOSPITAL ENCOUNTER (OUTPATIENT)
Facility: MEDICAL CENTER | Age: 37
End: 2017-03-02
Attending: SURGERY
Payer: MEDICARE

## 2017-03-02 LAB
INR PPP: 3.66 (ref 0.87–1.13)
PROTHROMBIN TIME: 37.5 SEC (ref 12–14.6)

## 2017-03-02 PROCEDURE — 85610 PROTHROMBIN TIME: CPT

## 2017-03-03 ENCOUNTER — PATIENT OUTREACH (OUTPATIENT)
Dept: HEALTH INFORMATION MANAGEMENT | Facility: OTHER | Age: 37
End: 2017-03-03

## 2017-03-03 NOTE — Clinical Note
3/20/2017  Denis De Anda  [address]  [place] [address]     Dear Denis,    Based on your medical history, our records indicate that you are eligible for [facility] Care Management, a free program that focuses on coordinating the care of individuals with ongoing or complex medical needs. Coordinated care can decrease the total cost of care and, most importantly, improve your overall health.    As a Care Management participant, you’ll be assigned a personal care manager - a medical provider that specializes in carefully monitoring your health and providing care in between visits with your primary care provider and specialists. This program is of no cost to you as it’s a part of [facility]’s ongoing commitment to serving the people of our community.    Benefits of our free Care Management program include:  • Priority appointment scheduling with your [facility] healthcare team  • A personal care manager to coordinate your healthcare   • A comprehensive needs assessment  • Development of an individualized care plan   • Chronic disease education  • A free in-home monitoring device (for eligible patients)    Please contact us at [phone] as soon as possible to confirm your enrollment. Once you have confirmed your participation in this program, we will schedule an introduction with your personal care manager.     For your convenience, we are available 7 days a week from 8:30 a.m. - 7 p.m.    We look forward to speaking with you soon.    Sincerely,   Gunjan FISH

## 2017-03-13 ENCOUNTER — HOSPITAL ENCOUNTER (OUTPATIENT)
Facility: MEDICAL CENTER | Age: 37
End: 2017-03-13
Attending: SURGERY
Payer: MEDICARE

## 2017-03-13 LAB
INR PPP: 3.38 (ref 0.87–1.13)
PROTHROMBIN TIME: 35.2 SEC (ref 12–14.6)

## 2017-03-13 PROCEDURE — 85610 PROTHROMBIN TIME: CPT

## 2017-03-20 NOTE — PROGRESS NOTES
Attempt #:3    Verify PCP: yes    Communication Preference Obtained: yes          Care Coordination Enrollment (Attempt to Enroll in Care Coordination)  2. Is patient appropriate: YES  3. Is patient interested: YES Report Name: TOP UTILIZATION REPORT        Care Gap Scheduling (Attempt to Schedule EACH Overdue Care Gap!)     Health Maintenance Due   Topic Date Due   • Annual Wellness Visit  NOT DUE YET         [employer] Activation: already active  [employer] Aniket: no  Virtual Visits: no  Opt In to Text Messages: no

## 2017-03-28 ENCOUNTER — APPOINTMENT (OUTPATIENT)
Dept: RADIOLOGY | Facility: MEDICAL CENTER | Age: 37
End: 2017-03-28
Attending: SURGERY
Payer: MEDICARE

## 2017-03-28 ENCOUNTER — HOSPITAL ENCOUNTER (OUTPATIENT)
Facility: MEDICAL CENTER | Age: 37
End: 2017-03-28
Attending: SURGERY | Admitting: SURGERY
Payer: MEDICARE

## 2017-03-28 VITALS
OXYGEN SATURATION: 100 % | HEIGHT: 64 IN | WEIGHT: 182.98 LBS | SYSTOLIC BLOOD PRESSURE: 108 MMHG | HEART RATE: 79 BPM | BODY MASS INDEX: 31.24 KG/M2 | RESPIRATION RATE: 12 BRPM | DIASTOLIC BLOOD PRESSURE: 72 MMHG | TEMPERATURE: 98.1 F

## 2017-03-28 DIAGNOSIS — N18.5 CHRONIC KIDNEY DISEASE, STAGE V (HCC): ICD-10-CM

## 2017-03-28 LAB
ANION GAP SERPL CALC-SCNC: 21 MMOL/L (ref 0–11.9)
BUN SERPL-MCNC: 75 MG/DL (ref 8–22)
CALCIUM SERPL-MCNC: 7.8 MG/DL (ref 8.5–10.5)
CHLORIDE SERPL-SCNC: 94 MMOL/L (ref 96–112)
CO2 SERPL-SCNC: 23 MMOL/L (ref 20–33)
CREAT SERPL-MCNC: 12.62 MG/DL (ref 0.5–1.4)
ERYTHROCYTE [DISTWIDTH] IN BLOOD BY AUTOMATED COUNT: 56.2 FL (ref 35.9–50)
GFR SERPL CREATININE-BSD FRML MDRD: 5 ML/MIN/1.73 M 2
GLUCOSE SERPL-MCNC: 92 MG/DL (ref 65–99)
HCT VFR BLD AUTO: 35.3 % (ref 42–52)
HGB BLD-MCNC: 11 G/DL (ref 14–18)
INR PPP: 1.35 (ref 0.87–1.13)
MCH RBC QN AUTO: 28.1 PG (ref 27–33)
MCHC RBC AUTO-ENTMCNC: 31.2 G/DL (ref 33.7–35.3)
MCV RBC AUTO: 90.3 FL (ref 81.4–97.8)
PLATELET # BLD AUTO: 173 K/UL (ref 164–446)
PMV BLD AUTO: 9.7 FL (ref 9–12.9)
POTASSIUM SERPL-SCNC: 5.1 MMOL/L (ref 3.6–5.5)
PROTHROMBIN TIME: 17.1 SEC (ref 12–14.6)
RBC # BLD AUTO: 3.91 M/UL (ref 4.7–6.1)
SODIUM SERPL-SCNC: 138 MMOL/L (ref 135–145)
WBC # BLD AUTO: 3.4 K/UL (ref 4.8–10.8)

## 2017-03-28 PROCEDURE — 700111 HCHG RX REV CODE 636 W/ 250 OVERRIDE (IP): Performed by: SURGERY

## 2017-03-28 PROCEDURE — 99153 MOD SED SAME PHYS/QHP EA: CPT

## 2017-03-28 PROCEDURE — 700101 HCHG RX REV CODE 250

## 2017-03-28 PROCEDURE — 76937 US GUIDE VASCULAR ACCESS: CPT

## 2017-03-28 PROCEDURE — 160002 HCHG RECOVERY MINUTES (STAT)

## 2017-03-28 PROCEDURE — 85610 PROTHROMBIN TIME: CPT

## 2017-03-28 PROCEDURE — 77001 FLUOROGUIDE FOR VEIN DEVICE: CPT

## 2017-03-28 PROCEDURE — 80048 BASIC METABOLIC PNL TOTAL CA: CPT

## 2017-03-28 PROCEDURE — 700117 HCHG RX CONTRAST REV CODE 255: Performed by: SURGERY

## 2017-03-28 PROCEDURE — 85027 COMPLETE CBC AUTOMATED: CPT

## 2017-03-28 PROCEDURE — 36558 INSERT TUNNELED CV CATH: CPT

## 2017-03-28 PROCEDURE — 700111 HCHG RX REV CODE 636 W/ 250 OVERRIDE (IP)

## 2017-03-28 RX ORDER — DIPHENHYDRAMINE HYDROCHLORIDE 50 MG/ML
INJECTION INTRAMUSCULAR; INTRAVENOUS
Status: COMPLETED
Start: 2017-03-28 | End: 2017-03-28

## 2017-03-28 RX ORDER — CLINDAMYCIN PHOSPHATE 150 MG/ML
INJECTION, SOLUTION INTRAVENOUS
Status: COMPLETED
Start: 2017-03-28 | End: 2017-03-28

## 2017-03-28 RX ORDER — CLINDAMYCIN PHOSPHATE 600 MG/50ML
600 INJECTION, SOLUTION INTRAVENOUS ONCE
Status: COMPLETED | OUTPATIENT
Start: 2017-03-28 | End: 2017-03-28

## 2017-03-28 RX ORDER — DIPHENHYDRAMINE HYDROCHLORIDE 50 MG/ML
50 INJECTION INTRAMUSCULAR; INTRAVENOUS
Status: COMPLETED | OUTPATIENT
Start: 2017-03-28 | End: 2017-03-28

## 2017-03-28 RX ORDER — OXYCODONE HYDROCHLORIDE AND ACETAMINOPHEN 5; 325 MG/1; MG/1
1 TABLET ORAL EVERY 4 HOURS PRN
Status: DISCONTINUED | OUTPATIENT
Start: 2017-03-28 | End: 2017-03-28 | Stop reason: HOSPADM

## 2017-03-28 RX ORDER — DEXTROSE AND SODIUM CHLORIDE 5; .45 G/100ML; G/100ML
INJECTION, SOLUTION INTRAVENOUS CONTINUOUS
Status: DISCONTINUED | OUTPATIENT
Start: 2017-03-28 | End: 2017-03-28 | Stop reason: HOSPADM

## 2017-03-28 RX ORDER — SODIUM CHLORIDE 9 MG/ML
500 INJECTION, SOLUTION INTRAVENOUS PRN
Status: DISCONTINUED | OUTPATIENT
Start: 2017-03-28 | End: 2017-03-28 | Stop reason: HOSPADM

## 2017-03-28 RX ORDER — HEPARIN SODIUM 5000 [USP'U]/ML
INJECTION, SOLUTION INTRAVENOUS; SUBCUTANEOUS
Status: COMPLETED
Start: 2017-03-28 | End: 2017-03-28

## 2017-03-28 RX ORDER — IODIXANOL 270 MG/ML
12 INJECTION, SOLUTION INTRAVASCULAR ONCE
Status: COMPLETED | OUTPATIENT
Start: 2017-03-28 | End: 2017-03-28

## 2017-03-28 RX ORDER — BUPIVACAINE HYDROCHLORIDE AND EPINEPHRINE 5; 5 MG/ML; UG/ML
INJECTION, SOLUTION EPIDURAL; INTRACAUDAL; PERINEURAL
Status: COMPLETED
Start: 2017-03-28 | End: 2017-03-28

## 2017-03-28 RX ORDER — MIDAZOLAM HYDROCHLORIDE 1 MG/ML
INJECTION INTRAMUSCULAR; INTRAVENOUS
Status: COMPLETED
Start: 2017-03-28 | End: 2017-03-28

## 2017-03-28 RX ORDER — MIDAZOLAM HYDROCHLORIDE 1 MG/ML
.5-2 INJECTION INTRAMUSCULAR; INTRAVENOUS PRN
Status: ACTIVE | OUTPATIENT
Start: 2017-03-28 | End: 2017-03-28

## 2017-03-28 RX ADMIN — IODIXANOL 12 ML: 270 INJECTION, SOLUTION INTRAVASCULAR at 09:48

## 2017-03-28 RX ADMIN — MIDAZOLAM HYDROCHLORIDE 1 MG: 1 INJECTION, SOLUTION INTRAMUSCULAR; INTRAVENOUS at 09:16

## 2017-03-28 RX ADMIN — BUPIVACAINE HYDROCHLORIDE AND EPINEPHRINE BITARTRATE: 5; .005 INJECTION, SOLUTION EPIDURAL; INTRACAUDAL; PERINEURAL at 08:45

## 2017-03-28 RX ADMIN — HEPARIN SODIUM: 5000 INJECTION, SOLUTION INTRAVENOUS; SUBCUTANEOUS at 09:45

## 2017-03-28 RX ADMIN — DIPHENHYDRAMINE HYDROCHLORIDE 50 MG: 50 INJECTION INTRAMUSCULAR; INTRAVENOUS at 08:34

## 2017-03-28 RX ADMIN — MIDAZOLAM HYDROCHLORIDE 1 MG: 1 INJECTION, SOLUTION INTRAMUSCULAR; INTRAVENOUS at 09:12

## 2017-03-28 RX ADMIN — HEPARIN SODIUM: 5000 INJECTION, SOLUTION INTRAVENOUS; SUBCUTANEOUS at 08:45

## 2017-03-28 RX ADMIN — MIDAZOLAM 1 MG: 1 INJECTION INTRAMUSCULAR; INTRAVENOUS at 09:12

## 2017-03-28 RX ADMIN — FENTANYL CITRATE 50 MCG: 50 INJECTION, SOLUTION INTRAMUSCULAR; INTRAVENOUS at 09:12

## 2017-03-28 RX ADMIN — DEXTROSE AND SODIUM CHLORIDE: 5; .45 INJECTION, SOLUTION INTRAVENOUS at 08:15

## 2017-03-28 RX ADMIN — HYDROCORTISONE SODIUM SUCCINATE 200 MG: 100 INJECTION, POWDER, FOR SOLUTION INTRAMUSCULAR; INTRAVENOUS at 08:42

## 2017-03-28 RX ADMIN — CLINDAMYCIN PHOSPHATE 600 MG: 150 INJECTION, SOLUTION INTRAMUSCULAR; INTRAVENOUS at 08:42

## 2017-03-28 ASSESSMENT — PAIN SCALES - GENERAL
PAINLEVEL_OUTOF10: 0

## 2017-03-28 NOTE — OR NURSING
1009 Received to PPU #1, awake. With double lumen catheter to R femoral- intact and capped with dressing CDI.  Family at bedside.

## 2017-03-28 NOTE — IP AVS SNAPSHOT
" Home Care Instructions                                                                                                                Name:Denis Inman Lagrange  Medical Record Number:[id number]  CSN: [id number]    YOB: 1980   Age: 36 y.o.  Sex: male  HT:1.626 m (5' 4\") WT: 83 kg (182 lb 15.7 oz)          Admit Date: 3/28/2017     Discharge Date:   Today's Date: 3/28/2017  Attending Doctor:  Jairo Hopkins M.D.                  Allergies:  Baclofen; Contrast media with iodine; Keflex; Pcn; and Tape                Discharge Instructions       TUNNELED HEMODIALYSIS CATHETER, Care After  Refer to this sheet in the next few weeks. These instructions provide you with information on caring for yourself after your procedure. Your caregiver may also give you more specific instructions. Your treatment has been planned according to current medical practices, but problems sometimes occur. Call your caregiver if you have any problems or questions after your procedure.   HOME CARE INSTRUCTIONS  · Rest at home the day of the procedure. You will likely be able to return to normal activities the following day.  · Follow your caregiver's specific instructions for the type of device that you have.  · Only take over-the-counter or prescription medicines as directed by your caregiver.  · Keep the insertion site of the catheter clean and dry at all times.  ¨ Change the bandages (dressings) over the catheter site as directed by your caregiver.  ¨ Wash the area around the catheter site during each dressing change. Sponge bathe the area using a germ-killing (antiseptic) solution as directed by your caregiver.  ¨ Look for redness or swelling at the insertion site during each dressing change.  · Apply an antibiotic ointment as directed by your caregiver.  · Flush your catheter as directed to keep it from becoming clogged.  · Always wash your hands thoroughly before changing dressings or flushing the catheter.  · Do not let air " enter the catheter.  ¨ Never open the cap at the catheter tip.  ¨ Always make sure there is no air in the syringe or in the tubing for infusions.     · Do not lift anything heavy.  · Do not drive until your caregiver approves.  · Do not shower or bathe until your caregiver approves. When you shower or bathe, place a piece of plastic wrap over the catheter site. Do not allow the catheter site or the dressing to get wet. If taking a bath, do not allow the catheter to get submerged in the water.  If the catheter was inserted through an arm vein:   · Avoid wearing tight clothes or jewelry on the arm that has the catheter.    · Do not sleep with your head on the arm that has the catheter.    · Do not allow use of a blood pressure cuff on the arm that has the catheter.    · Do not let anyone draw blood from the arm that has the catheter, except through the catheter itself.  SEEK MEDICAL CARE IF:  · You have bleeding at the insertion site of the catheter.    · You feel weak or nauseous.    · Your catheter is not working properly.    · You have redness, pain, swelling, and warmth at the insertion site.    · You notice fluid draining from the insertion site.    SEEK IMMEDIATE MEDICAL CARE IF:  · Your catheter breaks or has a hole in it.    · Your catheter comes loose or gets pulled completely out. If this happens, hold firm pressure over the area with your hand or a clean cloth.    · You have a fever.  · You have chills.    · Your catheter becomes totally blocked.    · You have swelling in your arm, shoulder, neck, or face.    · You have bleeding from the insertion site that does not stop.    · You develop chest pain or have trouble breathing.    · You feel dizzy or faint.    MAKE SURE YOU:  · Understand these instructions.  · Will watch your condition.  · Will get help right away if you are not doing well or get worse.     This information is not intended to replace advice given to you by your health care provider. Make  sure you discuss any questions you have with your health care provider.     Document Released: 12/04/2013 Document Revised: 08/20/2014 Document Reviewed: 12/04/2013  [employer] Patient Education ©2016 [employer]  Discharge Education for patients on SINA (Obstructive Sleep Apnea) Protocol    Prior to receiving sedation or anesthesia, we screen all patients for Obstructive Sleep Apnea.  During your screening, you were identified as having suspected, but not confirmed Obstructive Sleep Apnea(SINA).    What is Obstructive Sleep Apnea?  Sleep apnea (AP-ne-ah) is a common disorder which involves breathing pauses that occur during sleep.  These can last from 10 seconds to a minute or longer.  Normal breathing resumes often with a loud snort or choking sound.    Sleep apnea occurs in all age groups and both genders but is more common in men and people over 40 years of age.  It has been estimated that as many as 18 million Americans have sleep apnea.  Most people who have sleep apnea don’t know they have it because it only occurs during sleep.  A family member and/or bed partner may first notice the signs of sleep apnea.  Sleep apnea is a chronic (ongoing) condition that disrupts the quality and quantity of your sleep repeatedly throughout the night.  This often results in excessive daytime sleepiness or fatigue during the day.  It may also contribute to high blood pressure, heart problems, and complications following medications used for surgery and procedures.    To establish a definitive diagnosis, further testing from a specialist would be needed.  We recommend that you follow up with your primary care physician.    We recommend that you should be with an adult observer for at least 24 hours after your sedation/anesthesia.  If you have a CPAP machine, you should wear it during any sleep period (day or night) for the week following your procedure.  We encourage you to sleep on your side or in a sitting  position, even with napping.  Lying flat on your back increases the risk of apnea and airway obstruction during your post procedure recovery period.    It is important to prevent over-sedation that could increase your risk for apnea.  Please take all pain medication as directed by your physician.  If you are not getting pain relief, please contact your physician to discuss possible approaches to relieving pain while minimizing medications that can affect your breathing and oxygen levels.        ACTIVITY: Rest and take it easy for the first 24 hours.  A responsible adult is recommended to remain with you during that time.  It is normal to feel sleepy.  We encourage you to not do anything that requires balance, judgment or coordination.    MILD FLU-LIKE SYMPTOMS ARE NORMAL. YOU MAY EXPERIENCE GENERALIZED MUSCLE ACHES, THROAT IRRITATION, HEADACHE AND/OR SOME NAUSEA.    FOR 24 HOURS DO NOT:  Drive, operate machinery or run household appliances.  Drink beer or alcoholic beverages.   Make important decisions or sign legal documents.    SPECIAL INSTRUCTIONS:  CARE OF CATHETER  AS WAS PREVIOUSLY INSTRUCTED    DIET: To avoid nausea, slowly advance diet as tolerated, avoiding spicy or greasy foods for the first day.  Add more substantial food to your diet according to your physician's instructions.  Babies can be fed formula or breast milk as soon as they are hungry.  INCREASE FLUIDS AND FIBER TO AVOID CONSTIPATION.    SURGICAL DRESSING/BATHING: AS PER PREVIOUS  INSTRUCTIONS    FOLLOW-UP APPOINTMENT:  A follow-up appointment should be arranged with your doctor in 1 WEEK; call to schedule.    You should CALL YOUR PHYSICIAN if you develop:  Fever greater than 101 degrees F.  Pain not relieved by medication, or persistent nausea or vomiting.  Excessive bleeding (blood soaking through dressing) or unexpected drainage from the wound.  Extreme redness or swelling around the incision site, drainage of pus or foul smelling  drainage.  Inability to urinate or empty your bladder within 8 hours.  Problems with breathing or chest pain.    You should call 911 if you develop problems with breathing or chest pain.  If you are unable to contact your doctor or surgical center, you should go to the nearest emergency room or urgent care center.  Physician's telephone # DR WINTERS- [phone]    If any questions arise, call your doctor.  If your doctor is not available, please feel free to call the Surgical Center at [phone].  The Center is open Monday through Friday from 7AM to 7PM.  You can also call the HEALTH HOTLINE open 24 hours/day, 7 days/week and speak to a nurse at [phone], or toll free at [phone].    A registered nurse may call you a few days after your surgery to see how you are doing after your procedure.    MEDICATIONS: Resume taking daily medication.  Take prescribed pain medication with food.  If no medication is prescribed, you may take non-aspirin pain medication if needed.  PAIN MEDICATION CAN BE VERY CONSTIPATING.  Take a stool softener or laxative such as senokot, pericolace, or milk of magnesia if needed.        If your physician has prescribed pain medication that includes Acetaminophen (Tylenol), do not take additional Acetaminophen (Tylenol) while taking the prescribed medication.    Depression / Suicide Risk    As you are discharged from this [facility] facility, it is important to learn how to keep safe from harming yourself.    Recognize the warning signs:  · Abrupt changes in personality, positive or negative- including increase in energy   · Giving away possessions  · Change in eating patterns- significant weight changes-  positive or negative  · Change in sleeping patterns- unable to sleep or sleeping all the time   · Unwillingness or inability to communicate  · Depression  · Unusual sadness, discouragement and loneliness  · Talk of wanting to die  · Neglect of personal  appearance   · Rebelliousness- reckless behavior  · Withdrawal from people/activities they love  · Confusion- inability to concentrate     If you or a loved one observes any of these behaviors or has concerns about self-harm, here's what you can do:  · Talk about it- your feelings and reasons for harming yourself  · Remove any means that you might use to hurt yourself (examples: pills, rope, extension cords, firearm)  · Get professional help from the community (Mental Health, Substance Abuse, psychological counseling)  · Do not be alone:Call your Safe Contact- someone whom you trust who will be there for you.  · Call your local CRISIS HOTLINE 552-2667 or 588-785-3606  · Call your local Children's Mobile Crisis Response Team [place] (854) 279-3156 or www.SoCAT  · Call the toll free National Suicide Prevention Hotlines   · National Suicide Prevention Lifeline 800-027-KZUJ (4348)  · [employer] 800-SUICIDE (314-7616)       Medication List      CONTINUE taking these medications        Instructions    calcitRIOL 0.25 MCG Caps   Commonly known as:  ROCALTROL    Take 0.75 mcg by mouth every bedtime.   Dose:  0.75 mcg       cinacalcet 30 MG Tabs   Commonly known as:  SENSIPAR    Take 1 Tab by mouth every evening.   Dose:  30 mg       gabapentin 300 MG Caps   Commonly known as:  NEURONTIN    Take 1,200 mg by mouth every bedtime.   Dose:  1200 mg       hydrocodone/acetaminophen  MG Tabs   Commonly known as:  NORCO    Take 2 Tabs by mouth every 12 hours as needed.   Dose:  2 Tab       RENVELA 800 MG Tabs   Generic drug:  Sevelamer Carbonate    Take 3,200 mg by mouth 3 times a day, with meals. With meals   Dose:  3200 mg       warfarin 5 MG Tabs   Commonly known as:  COUMADIN    Take 5 mg by mouth every day. 7.5 mg on Mon, Tues, Thurs and Friday 5 mg all other days 10 mg on night prior to surgery   Dose:  5 mg               Medication Information     Above and/or attached are the  medications your physician expects you to take upon discharge. Review all of your home medications and newly ordered medications with your doctor and/or pharmacist. Follow medication instructions as directed by your doctor and/or pharmacist. Please keep your medication list with you and share with your physician. Update the information when medications are discontinued, doses are changed, or new medications (including over-the-counter products) are added; and carry medication information at all times in the event of emergency situations.        Resources     Quit Smoking / Tobacco Use:    I understand the use of any tobacco products increases my chance of suffering from future heart disease or stroke and could cause other illnesses which may shorten my life. Quitting the use of tobacco products is the single most important thing I can do to improve my health. For further information on smoking / tobacco cessation call a Toll Free Quit Line at 1-100.691.9929 (*[employer] [place]) or 1-551.185.4968 (American Lung Association) or you can access the web based program at www.[facility].org.    [place] Tobacco Users Help Line:  (440) 608-5745       Toll Free: 1-710.384.7083  Quit Tobacco Program [facility] (455)820-1737    Crisis Hotline:    [place] Crisis Hotline:  6-332-VLLHZCC or 1-263.328.9280    [place] Crisis Hotline:    1-326.732.4102 or 846-147-5698    Discharge Survey:   Thank you for choosing [facility]. We hope we did everything we could to make your hospital stay a pleasant one. You may be receiving a survey and we would appreciate your time and participation in answering the questions. Your input is very valuable to us in our efforts to improve our service to our patients and their families.            Signatures     My signature on this form indicates that:    1. I acknowledge receipt and understanding of these Home Care Instruction.  2. My questions regarding this information  have been answered to my satisfaction.  3. I have formulated a plan with my discharge nurse to obtain my prescribed medications for home.    __________________________________      __________________________________                   Patient Signature                                 Guardian/Responsible Adult Signature      __________________________________                 __________       ________                       Nurse Signature                                               Date                 Time

## 2017-03-28 NOTE — OR NURSING
1009 Received to PPU #1, awake. With Hemodialysis catheter to R groin intact and capped, dressing CDI. Pules palpable to distal foot.   No c/o pain or discomfort. Family at bedside.  1032 No changes, given juice.  1125 Sleeping , no signs of discomfort. O@ on at 2l/nc.  1200 Given snacks to eat. Family assisting. No changes.  1230 Prepared for discharge by Nydia DAVIDSON.   1245 Discharged home with family.

## 2017-03-28 NOTE — OR NURSING
0716 Goran DAVIDSON from Radiology made aware of iodine contrast..  0803 Call placed to Dr Hopkins.  0810 Dr Dixon called back and was advised of iodine allergy with order for benadryl and solucortef- carried out. Called Pharmacy for solucortef.  0830 RN went to Pharmacy to  drug- Goran from IR to administer.

## 2017-03-28 NOTE — DISCHARGE INSTRUCTIONS
TUNNELED HEMODIALYSIS CATHETER, Care After  Refer to this sheet in the next few weeks. These instructions provide you with information on caring for yourself after your procedure. Your caregiver may also give you more specific instructions. Your treatment has been planned according to current medical practices, but problems sometimes occur. Call your caregiver if you have any problems or questions after your procedure.   HOME CARE INSTRUCTIONS  · Rest at home the day of the procedure. You will likely be able to return to normal activities the following day.  · Follow your caregiver's specific instructions for the type of device that you have.  · Only take over-the-counter or prescription medicines as directed by your caregiver.  · Keep the insertion site of the catheter clean and dry at all times.  ¨ Change the bandages (dressings) over the catheter site as directed by your caregiver.  ¨ Wash the area around the catheter site during each dressing change. Sponge bathe the area using a germ-killing (antiseptic) solution as directed by your caregiver.  ¨ Look for redness or swelling at the insertion site during each dressing change.  · Apply an antibiotic ointment as directed by your caregiver.  · Flush your catheter as directed to keep it from becoming clogged.  · Always wash your hands thoroughly before changing dressings or flushing the catheter.  · Do not let air enter the catheter.  ¨ Never open the cap at the catheter tip.  ¨ Always make sure there is no air in the syringe or in the tubing for infusions.     · Do not lift anything heavy.  · Do not drive until your caregiver approves.  · Do not shower or bathe until your caregiver approves. When you shower or bathe, place a piece of plastic wrap over the catheter site. Do not allow the catheter site or the dressing to get wet. If taking a bath, do not allow the catheter to get submerged in the water.  If the catheter was inserted through an arm vein:   · Avoid  wearing tight clothes or jewelry on the arm that has the catheter.    · Do not sleep with your head on the arm that has the catheter.    · Do not allow use of a blood pressure cuff on the arm that has the catheter.    · Do not let anyone draw blood from the arm that has the catheter, except through the catheter itself.  SEEK MEDICAL CARE IF:  · You have bleeding at the insertion site of the catheter.    · You feel weak or nauseous.    · Your catheter is not working properly.    · You have redness, pain, swelling, and warmth at the insertion site.    · You notice fluid draining from the insertion site.    SEEK IMMEDIATE MEDICAL CARE IF:  · Your catheter breaks or has a hole in it.    · Your catheter comes loose or gets pulled completely out. If this happens, hold firm pressure over the area with your hand or a clean cloth.    · You have a fever.  · You have chills.    · Your catheter becomes totally blocked.    · You have swelling in your arm, shoulder, neck, or face.    · You have bleeding from the insertion site that does not stop.    · You develop chest pain or have trouble breathing.    · You feel dizzy or faint.    MAKE SURE YOU:  · Understand these instructions.  · Will watch your condition.  · Will get help right away if you are not doing well or get worse.     This information is not intended to replace advice given to you by your health care provider. Make sure you discuss any questions you have with your health care provider.     Document Released: 12/04/2013 Document Revised: 08/20/2014 Document Reviewed: 12/04/2013  [employer] Interactive Patient Education ©2016 [employer]  Discharge Education for patients on SINA (Obstructive Sleep Apnea) Protocol    Prior to receiving sedation or anesthesia, we screen all patients for Obstructive Sleep Apnea.  During your screening, you were identified as having suspected, but not confirmed Obstructive Sleep Apnea(SINA).    What is Obstructive Sleep Apnea?  Sleep apnea  (AP-ne-ah) is a common disorder which involves breathing pauses that occur during sleep.  These can last from 10 seconds to a minute or longer.  Normal breathing resumes often with a loud snort or choking sound.    Sleep apnea occurs in all age groups and both genders but is more common in men and people over 40 years of age.  It has been estimated that as many as 18 million Americans have sleep apnea.  Most people who have sleep apnea don’t know they have it because it only occurs during sleep.  A family member and/or bed partner may first notice the signs of sleep apnea.  Sleep apnea is a chronic (ongoing) condition that disrupts the quality and quantity of your sleep repeatedly throughout the night.  This often results in excessive daytime sleepiness or fatigue during the day.  It may also contribute to high blood pressure, heart problems, and complications following medications used for surgery and procedures.    To establish a definitive diagnosis, further testing from a specialist would be needed.  We recommend that you follow up with your primary care physician.    We recommend that you should be with an adult observer for at least 24 hours after your sedation/anesthesia.  If you have a CPAP machine, you should wear it during any sleep period (day or night) for the week following your procedure.  We encourage you to sleep on your side or in a sitting position, even with napping.  Lying flat on your back increases the risk of apnea and airway obstruction during your post procedure recovery period.    It is important to prevent over-sedation that could increase your risk for apnea.  Please take all pain medication as directed by your physician.  If you are not getting pain relief, please contact your physician to discuss possible approaches to relieving pain while minimizing medications that can affect your breathing and oxygen levels.        ACTIVITY: Rest and take it easy for the first 24 hours.  A  responsible adult is recommended to remain with you during that time.  It is normal to feel sleepy.  We encourage you to not do anything that requires balance, judgment or coordination.    MILD FLU-LIKE SYMPTOMS ARE NORMAL. YOU MAY EXPERIENCE GENERALIZED MUSCLE ACHES, THROAT IRRITATION, HEADACHE AND/OR SOME NAUSEA.    FOR 24 HOURS DO NOT:  Drive, operate machinery or run household appliances.  Drink beer or alcoholic beverages.   Make important decisions or sign legal documents.    SPECIAL INSTRUCTIONS:  CARE OF CATHETER  AS WAS PREVIOUSLY INSTRUCTED    DIET: To avoid nausea, slowly advance diet as tolerated, avoiding spicy or greasy foods for the first day.  Add more substantial food to your diet according to your physician's instructions.  Babies can be fed formula or breast milk as soon as they are hungry.  INCREASE FLUIDS AND FIBER TO AVOID CONSTIPATION.    SURGICAL DRESSING/BATHING: AS PER PREVIOUS  INSTRUCTIONS    FOLLOW-UP APPOINTMENT:  A follow-up appointment should be arranged with your doctor in 1 WEEK; call to schedule.    You should CALL YOUR PHYSICIAN if you develop:  Fever greater than 101 degrees F.  Pain not relieved by medication, or persistent nausea or vomiting.  Excessive bleeding (blood soaking through dressing) or unexpected drainage from the wound.  Extreme redness or swelling around the incision site, drainage of pus or foul smelling drainage.  Inability to urinate or empty your bladder within 8 hours.  Problems with breathing or chest pain.    You should call 911 if you develop problems with breathing or chest pain.  If you are unable to contact your doctor or surgical center, you should go to the nearest emergency room or urgent care center.  Physician's telephone # DR WINTERS- [phone]    If any questions arise, call your doctor.  If your doctor is not available, please feel free to call the Surgical Center at [phone].  The Center is open Monday through Friday from 7AM to 7PM.  You  can also call the HEALTH HOTLINE open 24 hours/day, 7 days/week and speak to a nurse at [phone], or toll free at [phone].    A registered nurse may call you a few days after your surgery to see how you are doing after your procedure.    MEDICATIONS: Resume taking daily medication.  Take prescribed pain medication with food.  If no medication is prescribed, you may take non-aspirin pain medication if needed.  PAIN MEDICATION CAN BE VERY CONSTIPATING.  Take a stool softener or laxative such as senokot, pericolace, or milk of magnesia if needed.        If your physician has prescribed pain medication that includes Acetaminophen (Tylenol), do not take additional Acetaminophen (Tylenol) while taking the prescribed medication.    Depression / Suicide Risk    As you are discharged from this [facility] facility, it is important to learn how to keep safe from harming yourself.    Recognize the warning signs:  · Abrupt changes in personality, positive or negative- including increase in energy   · Giving away possessions  · Change in eating patterns- significant weight changes-  positive or negative  · Change in sleeping patterns- unable to sleep or sleeping all the time   · Unwillingness or inability to communicate  · Depression  · Unusual sadness, discouragement and loneliness  · Talk of wanting to die  · Neglect of personal appearance   · Rebelliousness- reckless behavior  · Withdrawal from people/activities they love  · Confusion- inability to concentrate     If you or a loved one observes any of these behaviors or has concerns about self-harm, here's what you can do:  · Talk about it- your feelings and reasons for harming yourself  · Remove any means that you might use to hurt yourself (examples: pills, rope, extension cords, firearm)  · Get professional help from the community (Mental Health, Substance Abuse, psychological counseling)  · Do not be alone:Call your Safe Contact- someone whom you trust who  will be there for you.  · Call your local CRISIS HOTLINE 986-7436 or 668-259-3480  · Call your local Children's Mobile Crisis Response Team [place] (455) 901-1541 or www.NMRKT  · Call the toll free National Suicide Prevention Hotlines   · National Suicide Prevention Lifeline 634-883-DFMR (3107)  · National EachNet Line Network 800-SUICIDE (919-8080)

## 2017-03-28 NOTE — IP AVS SNAPSHOT
3/28/2017          Denis De Anda  [address]  [place] [address]    Dear Denis:    Critical access hospital wants to ensure your discharge home is safe and you or your loved ones have had all your questions answered regarding your care after you leave the hospital.    You may receive a telephone call within two days of your discharge.  This call is to make certain you understand your discharge instructions as well as ensure we provided you with the best care possible during your stay with us.     The call will only last approximately 3-5 minutes and will be done by a nurse.    Once again, we want to ensure your discharge home is safe and that you have a clear understanding of any next steps in your care.  If you have any questions or concerns, please do not hesitate to contact us, we are here for you.  Thank you for choosing [facility] for your healthcare needs.    Sincerely,    Ej Irene    [facility]

## 2017-03-28 NOTE — OP REPORT
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    OPERATIONS:  1.  Right common femoral vein sheath placement under ultrasound and   fluoroscopic guidance after confirming patency.  2.  Right iliac and inferior vena cava venograms.  3.  Insertion of tunneled Palindrome 28 cm long hemodialysis catheter, right   femoral vein.    SURGEON:  Jairo Hopkins MD    ANESTHESIA:  Local anesthesia and moderate conscious sedation with 10 mL 1%   Xylocaine and 10 mL 0.5% bupivacaine with epinephrine.    FINDINGS:  Iliac vein on the right and vena cava appear widely patent.    DESCRIPTION OF PROCEDURE:  After obtaining informed consent, the patient was   positioned supine on the angiography table.  His right groin and right entire   anterior thigh were prepped with ChloraPrep and draped sterilely.  The patient   had been premedicated with Solu-Medrol 200 mg and Benadryl 50 mg for his   contrast allergy.  He was also given preprocedure prophylactic antibiotics in   the form of 600 mg of Cleocin.  He was maintained with mask oxygen through the   procedure and remained stable.    After draping the right groin and thigh areas sterilely following the   ChloraPrep, I examined the right common femoral vein with ultrasound and   confirmed it was fully compressible.  The adjacent common femoral artery had   mild medial calcinosis, but no large plaque.  I injected local anesthesia in   the planned catheter tunnel site and the entrance site to the vein in the   groin.  I made a short incision approximately 1.5 cm long several centimeters   distal to the groin crease.  I created a small subcutaneous space with a   mosquito hemostat.  I then used ultrasound and  an angioaccess needle.  I had   injected local anesthesia over the common femoral vein.  I inserted the   angioaccess needle into the right common femoral vein and followed this with a   3 mm J-wire and a 5-Dominican sheath.  Through the sheath, I injected contrast   and did digital subtraction imaging showing patent external iliac, common   iliac, and inferior vena cava.  I tunneled a 28 cm long Palindrome catheter   between a stab wound approximately 5-6 cm distal to the insertion site.    Following this, I placed a long J-wire through the sheath and removed the   sheath.  The dilators were successively passed over the guidewire.  They were   observed fluoroscopically.  The dilator sheath was placed and I removed the   wire and dilator and inserted the catheter through the sheath and with some   manipulation was able to get it advanced up into the right common iliac vein.    I adjusted the cuff and the catheter tunnel to be approximately 2.5 cm   proximal to the skin site entry point.  I aspirated and flushed both ports and   they worked well with no resistance.  I flushed the catheter with dilute   heparin saline.  I then instilled 1.75 mL of concentrated heparin in each   port.  The port capacity is 2.1 cm.  When hemostasis was good, I closed the   proximal thigh incision with 2 layers of running 4-0 Vicryl.  I dressed this   initially with Dermabond.  I secured the catheter with 3-0 Ethilon to the   thigh.  I approximated the exit site of the catheter from the skin with a   single vertical mattress of 4-0 Vicryl to help control bleeding should there   be some and to also help prevent dislodgement of the catheter.  When the area   looked satisfactory, I placed a Biopatch around the exit site.  This was   followed by an island dressing.  I placed a Tegaderm over the incision near   the groin crease with a 2x2 on the gauze.  The dressings all appeared very   satisfactory and there was no bleeding.  The catheter  course looked good on   fluoroscopy with no kinking.  Function as I stated was very good.  He   tolerated the procedure well and was taken to the recovery area for   observation to make sure he does not have a contrast reaction and to make sure   that he does not have bleeding prior to discharge.  I spoke to his parents in   detail about management plans.       ____________________________________     MD BEN NUGENT / RIAZ    DD:  03/28/2017 10:15:17  DT:  03/28/2017 14:00:22    D#:  [id number]  Job#:  [id number]

## 2017-03-28 NOTE — PROGRESS NOTES
IR NOTE:  RIGHT FEMORAL TUNNELED DIALYSIS CATHETER PLACED BY DR. WINTERS, PT TOLERATED WELL AND HEMODYNAMICALLY STABLE ON RA.

## 2017-04-04 ENCOUNTER — TELEPHONE (OUTPATIENT)
Dept: VASCULAR LAB | Facility: MEDICAL CENTER | Age: 37
End: 2017-04-04

## 2017-04-04 NOTE — TELEPHONE ENCOUNTER
Left voicemail message for patient to return call to [facility] to establish care      Renetta Dunlap, PHARMD

## 2017-04-05 ENCOUNTER — HOSPITAL ENCOUNTER (OUTPATIENT)
Facility: MEDICAL CENTER | Age: 37
End: 2017-04-05
Attending: SURGERY
Payer: MEDICARE

## 2017-04-05 LAB
INR PPP: 2.43 (ref 0.87–1.13)
PROTHROMBIN TIME: 27.2 SEC (ref 12–14.6)

## 2017-04-05 PROCEDURE — 85610 PROTHROMBIN TIME: CPT

## 2017-04-06 ENCOUNTER — ANTICOAGULATION MONITORING (OUTPATIENT)
Dept: VASCULAR LAB | Facility: MEDICAL CENTER | Age: 37
End: 2017-04-06

## 2017-04-06 NOTE — PROGRESS NOTES
Anticoagulation Summary as of 4/6/2017     INR goal 2.0-3.0   Selected INR 2.43 (4/5/2017)   Maintenance plan 7.5 mg (5 mg x 1.5) on Mon, Fri; 5 mg (5 mg x 1) all other days   Weekly total 40 mg   Plan last modified Raul Shea, PHARMD (4/6/2017)   Next INR check 4/11/2017   Target end date Indefinite    Indications   AV graft thrombosis (CMS-HCC) [T82.868A]         Anticoagulation Episode Summary     INR check location     Preferred lab     Send INR reminders to     Comments       Anticoagulation Care Providers     Provider Role Specialty Phone number    Jairo Hopkins M.D. 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 Has been on warfarin for some time. Continue current dose of warfarin for now. Has a new patient appointment in clinic on Tuesday.    Raul Shea, PHARMD

## 2017-04-17 ENCOUNTER — ANTICOAGULATION VISIT (OUTPATIENT)
Dept: VASCULAR LAB | Facility: MEDICAL CENTER | Age: 37
End: 2017-04-17
Attending: INTERNAL MEDICINE
Payer: MEDICARE

## 2017-04-17 ENCOUNTER — HOSPITAL ENCOUNTER (OUTPATIENT)
Facility: MEDICAL CENTER | Age: 37
End: 2017-04-17
Attending: INTERNAL MEDICINE
Payer: MEDICARE

## 2017-04-17 DIAGNOSIS — T82.868A AV GRAFT THROMBOSIS, INITIAL ENCOUNTER (HCC): ICD-10-CM

## 2017-04-17 LAB
INR PPP: 5.38 (ref 0.87–1.13)
INR PPP: >8 (ref 2–3.5)
PROTHROMBIN TIME: 50.8 SEC (ref 12–14.6)

## 2017-04-17 PROCEDURE — 99211 OFF/OP EST MAY X REQ PHY/QHP: CPT

## 2017-04-17 PROCEDURE — 85610 PROTHROMBIN TIME: CPT

## 2017-04-17 NOTE — PROGRESS NOTES
Anticoagulation Summary as of 4/17/2017     INR goal 2.0-3.0   Selected INR >8 (4/17/2017)   The INR is not numeric and cannot be flagged as normal/abnormal.   Maintenance plan 7.5 mg (5 mg x 1.5) on Mon, Fri; 5 mg (5 mg x 1) all other days   Weekly total 40 mg   Plan last modified VIVEK TrinidadD (4/6/2017)   Next INR check 4/20/2017   Target end date Indefinite    Indications   AV graft thrombosis (CMS-HCC) [T82.868A]         Anticoagulation Episode Summary     INR check location     Preferred lab     Send INR reminders to     Comments       Anticoagulation Care Providers     Provider Role Specialty Phone number    Jairo Hopkins M.D. Referring Surgery [phone]    [facility] Responsible  [phone]        Anticoagulation Patient Findings   Negatives Missed Doses, Extra Doses, Medication Changes, Antibiotic Use, Diet Changes, Dental/Other Procedures, Hospitalization, Bleeding Gums, Nose Bleeds, Blood in Urine, Blood in Stool, Any Bruising, Other Complaints        Patient seen in clinic today, and INR was SUPRA-therapeutic at >8.0.  Confirmed the current warfarin dosing regimen and patient compliance. Patient denies any interval changes to diet and/or medications. Patient denies any signs/symptoms of bleeding or clotting. Patient had dialysis two days ago. Patient will go to lab to confirm reading. Patient will HOLD warfarin x 2 days, then will resume current regimen and will follow up on Thursday.    **note: when we receive lab INR confirmation, if there are any changes, pt would like us to call him at x [phone] since he will be at work all day    Next appt  Thursday April 20, 2017 7:45am    Isaiah East  PharmD

## 2017-04-17 NOTE — MR AVS SNAPSHOT
Denis De Anda   2017 8:15 AM   Anticoagulation Visit   MRN: [id number]    Department:  Vascular Medicine   Dept Phone:  [phone]    Description:  Male : 1980   Provider:  [facility] EXAM 5           Allergies as of 2017     Allergen Noted Reactions    Baclofen 2015   Unspecified    Total loss of memory, sedation.   RXN=2015    Contrast Media With Iodine [Iodine] 2016   Rash    RXN=2017    Keflex 2009   Rash    RXN=possibly >10 years    Pcn [Penicillins] 2009   Rash    RXN=possibly >10 years ago    Tape 2013   Rash    Paper tape and tegaderm ok  RXN=ongoing      Vital Signs     Smoking Status                   Never Smoker            Basic Information     Date Of Birth Sex Race Ethnicity Preferred Language    1980 Male White Non- English      Your appointments     2017  3:00 PM   CARE MANAGEMENT OUTREACH with Julia Moran R.N.   Population Health (--)    [address] [place] [address] [id number] [phone]           ***IMPORTANT**** This is a phone visit only. Do not come into the office. The Care Manager will call you at the scheduled time for Care Manager Telephone Visit.            2017  7:40 AM   Established Patient with Cricket Hook M.D.   [facility]    [address] [place] [id number] [phone]           You will be receiving a confirmation call a few days before your appointment from our automated call confirmation system.            2017  7:45 AM   Established Patient with [facility] EXAM 4   [facility]  (--)    [address] [place] [id number]   [phone]              Problem List              ICD-10-CM Priority Class Noted - Resolved    Insomnia G47.00   2009 - Present    Dupuytren's contracture of hand M72.0 Low  2010 - Present    Sleep apnea G47.30   10/3/2011 - Present    Osteopenia M85.80   5/10/2013 -  Present    Vertebral compression fracture (CMS-HCC) M48.50XA   5/10/2013 - Present    Hyperlipidemia with target LDL less than 100 E78.5 Low  6/28/2013 - Present    Thrombophlebitis I80.9   9/13/2013 - Present    Venous collateral circulation, any site I87.8   9/13/2013 - Present    Calciphylaxis E83.59   9/13/2013 - Present    Anemia D64.9   9/26/2013 - Present    End stage renal disease (CMS-HCC) N18.6   12/12/2013 - Present    Pain R52   2/21/2015 - Present    Hyponatremia E87.1   2/23/2015 - Present    Reflex sympathetic dystrophy G90.50   3/24/2015 - Present    Non-healing surgical wound T81.89XA   6/22/2015 - Present    Acropachy [id number]   3/25/2016 - Present    Seizure disorder (CMS-HCC) G40.909   4/5/2016 - Present    Bacteremia R78.81   4/7/2016 - Present    Lethargy R53.83   4/9/2016 - Present    Pain syndrome, chronic G89.4   4/10/2016 - Present    Hyperkalemia E87.5   7/8/2016 - Present    ESRD (end stage renal disease) (CMS-HCC) N18.6 Medium  7/8/2016 - Present    Dermatofibroma of right upper arm D23.61   7/11/2016 - Present    Other specified epidermal thickening L85.8   8/5/2016 - Present    Chronic anticoagulation Z79.01   9/18/2016 - Present    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   10/20/2016 - Present    Open wound of left thigh S71.102A High  10/28/2016 - Present    Hypertension I10 Medium  11/15/2016 - Present    Hyperparathyroidism (CMS-HCC) E21.3 Low  11/15/2016 - Present    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 High  11/15/2016 - Present    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, 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 Low  11/15/2016 - Present    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   1/5/2017 - Present    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   1/6/2017 - Present    Chronic kidney disease, stage V (CMS-HCC) N18.5   3/28/2017 - Present      Health Maintenance        Date Due Completion Dates    IMM DTaP/Tdap/Td Vaccine (2 - Tdap) 3/20/2018 (Originally  8/16/2000) 1/7/2000    IMM PNEUMOCOCCAL 19-64 (ADULT) HIGHEST RISK SERIES (1 of 3 - PCV13) 3/20/2018 (Originally 8/16/2000) ---            Results     POCT Protime      Component    INR    >8                        Current Immunizations     Hepatitis B Vaccine Non-Recombivax (Ped/Adol) 10/5/2000, 2/8/2000, 1/7/2000    Influenza TIV (IM) 9/30/2015, 9/9/2014    Influenza Vaccine Pediatric 9/15/2009    MMR Vaccine 3/11/1983    Pneumococcal Vaccine (PCV7) Historical Data 1/1/2013    QF GOLD 8/7/2013    QUANTIFERON GOLD 8/7/2013    TD Vaccine 1/7/2000      Below and/or attached are the medications your provider expects you to take. Review all of your home medications and newly ordered medications with your provider and/or pharmacist. Follow medication instructions as directed by your provider and/or pharmacist. Please keep your medication list with you and share with your provider. Update the information when medications are discontinued, doses are changed, or new medications (including over-the-counter products) are added; and carry medication information at all times in the event of emergency situations     Allergies:  BACLOFEN - Unspecified     CONTRAST MEDIA WITH IODINE - Rash     KEFLEX - Rash     PCN - Rash     TAPE - Rash               Medications  Valid as of: April 17, 2017 -  8:32 AM    Generic Name Brand Name Tablet Size Instructions for use    Calcitriol (Cap) ROCALTROL 0.25 MCG Take 0.75 mcg by mouth every bedtime.        Cinacalcet HCl (Tab) SENSIPAR 30 MG Take 1 Tab by mouth every evening.        Gabapentin (Cap) NEURONTIN 300 MG Take 1,200 mg by mouth every bedtime.        Hydrocodone-Acetaminophen (Tab) NORCO  MG Take 2 Tabs by mouth every 12 hours as needed.        Sevelamer Carbonate (Tab) Sevelamer Carbonate 800 MG Take 3,200 mg by mouth 3 times a day, with meals. With meals        Warfarin Sodium (Tab) COUMADIN 5 MG Take 5 mg by mouth every day. 7.5 mg on Mon, Tues, Thurs and Friday  5 mg all  other days  10 mg on night prior to surgery        .                 Medicines prescribed today were sent to:     [id number] - [place], [place] - [address] AT [facility] & [address]    [address] [place] [address]    Phone: [phone] Fax: [phone]    Open 24 Hours?: No    [facility] - [place] [id number] [address]    Phone: [phone] Fax: [phone]    Open 24 Hours?: No      Medication refill instructions:       If your prescription bottle indicates you have medication refills left, it is not necessary to call your provider’s office. Please contact your pharmacy and they will refill your medication.    If your prescription bottle indicates you do not have any refills left, you may request refills at any time through one of the following ways: The online Rad system (except Urgent Care), by calling your provider’s office, or by asking your pharmacy to contact your provider’s office with a refill request. Medication refills are processed only during regular business hours and may not be available until the next business day. Your provider may request additional information or to have a follow-up visit with you prior to refilling your medication.   *Please Note: Medication refills are assigned a new Rx number when refilled electronically. Your pharmacy may indicate that no refills were authorized even though a new prescription for the same medication is available at the pharmacy. Please request the medicine by name with the pharmacy before contacting your provider for a refill.        Warfarin Dosing Calendar   April 2017 Details    Sun Mon Tue Wed Thu Fri Sat           1                 2               3               4               5               6               7               8                 9               10               11               12               13               14               15                 16                17   >8   Hold   See details      18      Hold         19      5 mg         20      5 mg         21               22                 23               24               25               26               27               28               29                 30                      Date Details   04/17 This INR check   INR: >8       Date of next INR:  4/20/2017         How to take your warfarin dose     To take:  5 mg Take 1 of the 5 mg tablets.    Hold Do not take your warfarin dose. See the Details table to the right for additional instructions.                   [employer] Access Code: Activation code not generated  Current [employer] Status: Active

## 2017-04-18 ENCOUNTER — PATIENT OUTREACH (OUTPATIENT)
Dept: HEALTH INFORMATION MANAGEMENT | Facility: OTHER | Age: 37
End: 2017-04-18

## 2017-04-18 LAB — INR BLD: >8 (ref 0.9–1.2)

## 2017-04-18 NOTE — PROGRESS NOTES
Outbound call to patient for possible enrollment in CCM, 2nd attempt. Left VM to call back. Scheduled another outreach.

## 2017-04-19 ENCOUNTER — HOSPITAL ENCOUNTER (OUTPATIENT)
Dept: RADIOLOGY | Facility: MEDICAL CENTER | Age: 37
End: 2017-04-19
Attending: INTERNAL MEDICINE
Payer: MEDICARE

## 2017-04-19 ENCOUNTER — OFFICE VISIT (OUTPATIENT)
Dept: MEDICAL GROUP | Facility: PHYSICIAN GROUP | Age: 37
End: 2017-04-19
Payer: MEDICARE

## 2017-04-19 VITALS
SYSTOLIC BLOOD PRESSURE: 112 MMHG | RESPIRATION RATE: 14 BRPM | HEART RATE: 98 BPM | TEMPERATURE: 97.1 F | WEIGHT: 184 LBS | HEIGHT: 64 IN | BODY MASS INDEX: 31.41 KG/M2 | DIASTOLIC BLOOD PRESSURE: 66 MMHG | OXYGEN SATURATION: 100 %

## 2017-04-19 DIAGNOSIS — M54.6 ACUTE MIDLINE THORACIC BACK PAIN: ICD-10-CM

## 2017-04-19 PROCEDURE — 72070 X-RAY EXAM THORAC SPINE 2VWS: CPT

## 2017-04-19 PROCEDURE — 1036F TOBACCO NON-USER: CPT | Performed by: INTERNAL MEDICINE

## 2017-04-19 PROCEDURE — 99204 OFFICE O/P NEW MOD 45 MIN: CPT | Performed by: INTERNAL MEDICINE

## 2017-04-19 PROCEDURE — G8432 DEP SCR NOT DOC, RNG: HCPCS | Performed by: INTERNAL MEDICINE

## 2017-04-19 PROCEDURE — G8419 CALC BMI OUT NRM PARAM NOF/U: HCPCS | Performed by: INTERNAL MEDICINE

## 2017-04-19 PROCEDURE — 72040 X-RAY EXAM NECK SPINE 2-3 VW: CPT

## 2017-04-19 ASSESSMENT — PATIENT HEALTH QUESTIONNAIRE - PHQ9: CLINICAL INTERPRETATION OF PHQ2 SCORE: 0

## 2017-04-19 NOTE — ASSESSMENT & PLAN NOTE
Pt reports that for the past 2 weeks he has been having back pain in the middle of the upper back between the shoulders. The pain can get to a 10/10 intensity at it's worse but currently it's mild. He takes norco daily for pain.     There was no preceding trauma. Sitting for long periods makes it worse. Sometimes walking makes it worse. Prior to onset he did not have similar symptoms for many years when he had previous fractures.     Denies fevers/chills, numbness/weakness of extremities, bowel/bladder incontinence.

## 2017-04-19 NOTE — PROGRESS NOTES
Chief Complaint   Patient presents with   • Back Pain     2 weeks          HISTORY OF THE PRESENT ILLNESS: Patient is a 36 y.o. male. This pleasant patient is here today for back pain      Acute midline thoracic back pain  Pt reports that for the past 2 weeks he has been having back pain in the middle of the upper back between the shoulders. The pain can get to a 10/10 intensity at it's worse but currently it's mild. He takes norco daily for pain.     There was no preceding trauma. Sitting for long periods makes it worse. Sometimes walking makes it worse. Prior to onset he did not have similar symptoms for many years when he had previous fractures.     Denies fevers/chills, numbness/weakness of extremities, bowel/bladder incontinence.       Allergies: Baclofen; Contrast media with iodine; Keflex; Pcn; and Tape    Current Outpatient Prescriptions Ordered in [facility]   Medication Sig Dispense Refill   • warfarin (COUMADIN) 5 MG Tab Take 5 mg by mouth every day. 7.5 mg on Mon, Tues, Thurs and Friday  5 mg all other days  10 mg on night prior to surgery     • gabapentin (NEURONTIN) 300 MG Cap Take 1,200 mg by mouth every bedtime.     • hydrocodone/acetaminophen (NORCO)  MG Tab Take 2 Tabs by mouth every 12 hours as needed.     • cinacalcet (SENSIPAR) 30 MG Tab Take 1 Tab by mouth every evening. 30 Tab 0   • Sevelamer Carbonate (RENVELA) 800 MG TABS Take 3,200 mg by mouth 3 times a day, with meals. With meals     • calcitRIOL (ROCALTROL) 0.25 MCG CAPS Take 0.75 mcg by mouth every bedtime.       No current Epic-ordered facility-administered medications on file.       Past Medical History   Diagnosis Date   • Contracture of palmar fascia    • Toxic uninodular goiter without mention of thyrotoxic crisis or storm    • Hypertension    • Pain      Chronic pain   • Chronic kidney disease, unspecified    • Renal failure      hemodialysis   • Arrhythmia      irregular EKG   • Urinary incontinence    • Seizure (CMS-HCC)       last seizure 04/1/2016   • Sleep apnea      BIPAP   • Dialysis       hemodialysis at home 5 days a week   • Hemorrhagic disorder (CMS-HCC)      on coumadin       Past Surgical History   Procedure Laterality Date   • Mass excision ortho  10/9/08     Performed by BELKIS BREAUX at SURGERY SAME DAY [facility] ORS   • Av fistula creation  11/6/08     Performed by MARI WINTERS at SURGERY [facility] ORS   • Arteriogram  3/5/2009     Performed by MARI WINTERS at SURGERY [facility] ORS   • Angioplasty balloon  3/5/2009     Performed by MARI WINTERS at SURGERY [facility] ORS   • Av fistulogram  3/5/2009     Performed by MARI WINTERS at SURGERY [facility] ORS   • Us-kidney transplant  1996, 2001     x2   • Endarterectomy  11/16/2010     Performed by MARI WINTERS at SURGERY [facility] ORS   • Av fistulogram  11/16/2010     Performed by MARI WINTERS at SURGERY [facility] ORS   • Cath placement  2/1/2011     Performed by MARI WINTERS at SURGERY [facility] ORS   • Angioplasty balloon  2/1/2011     Performed by MARI WINTERS at SURGERY [facility] ORS   • Av fistulogram  6/5/2011     Performed by MARI WINTERS at SURGERY [facility] ORS   • Cath placement  6/5/2011     Performed by MARI WINTERS at SURGERY [facility] ORS   • Av fistula revision  11/3/2011     Performed by MARI WINTERS at SURGERY [facility] ORS   • Bone spur excision  12/8/2011     Performed by BELKIS BREAUX at SURGERY SAME DAY [facility] ORS   • Recovery  5/18/2012     Performed by SURGERY, IR-RECOVERY at SURGERY [facility] ORS   • Incision and drainage general  9/13/2013     Performed by Mari Winters M.D. at SURGERY [facility] ORS   • Debridement  9/13/2013     Performed by Mari Winters M.D. at SURGERY [facility] ORS   • Vein ligation  9/13/2013     Performed by Mari Winters M.D. at SURGERY [facility] ORS   • Recovery  6/13/2014     Performed by Ir-Recovery Surgery at  SURGERY SAME DAY [facility] ORS   • Av fistula revision  9/30/2014     Performed by Jairo Hopkins M.D. at SURGERY [facility]   • Irrigation & debridement general  12/15/2014     Performed by Jairo Hopkins M.D. at SURGERY [facility]   • Av fistula revision  2/8/2015     Performed by Jairo Hopkins M.D. at SURGERY [facility]   • Av fistula revision  2/22/2015     Performed by Lurdes Moffett M.D. at SURGERY [facility]   • Av fistula revision  3/20/2015     Performed by Jairo Hopkins M.D. at SURGERY [facility]   • Pr inject nerv blck,stellate ganglion  3/24/2015     Performed by Bin Pro at [facility]   • Av fistula creation  4/20/2015     Performed by Jairo Hopkins M.D. at SURGERY [facility]   • Av fistula thrombolysis Left 6/3/2015     Procedure: AV FISTULA THROMBOLYSIS THIGH REPLACEMENT;  Surgeon: Jairo Hopkins M.D.;  Location: SURGERY [facility];  Service:    • Irrigation & debridement general Left 6/3/2015     Procedure: IRRIGATION & DEBRIDEMENT GENERAL;  Surgeon: Jairo Hopkins M.D.;  Location: SURGERY [facility];  Service:    • Av fistula thrombolysis Left 6/9/2015     Procedure: THROMBECTOMY AV GRAFT THIGH;  Surgeon: Jairo Hopkins M.D.;  Location: [facility];  Service:    • Av fistula revision Left 6/17/2015     Procedure: AV FISTULA REVISION;  Surgeon: Jairo Hopkins M.D.;  Location: SURGERY [facility];  Service:    • Irrigation & debridement general Left 6/17/2015     Procedure: IRRIGATION & DEBRIDEMENT GENERAL ARM W/EXPLORATION WOUND & CONTROL BLEEDING;  Surgeon: Jairo Hopkins M.D.;  Location: SURGERY [facility];  Service:    • Recovery  8/7/2015     Procedure:  VASCULAR CASE VIANEY-RIGHT ILIAC VENOGRAM WITH ANGIOPLASTY, REMOVAL RIGHT FEMORAL TUNNELED DIALYSIS CATHETER  **VRE**;  Surgeon: Hang Surgery;  Location: SURGERY PRE[facility]POST PROC UNIT [facility];  Service:    • Pr percut implnt  neuroelect,epidural  2/19/2016     Procedure: IMPLANT NEUROSTIM EPI ARRAY;  Surgeon: Bin Pro;  [facility]: [facility];  Service: Pain Management   • Pr percut implnt neuroelect,epidural  2/19/2016     Procedure: IMPLANT NEUROSTIM EPI ARRAY;  Surgeon: Bin Pro;  Location: [facility];  Service: Pain Management   • Parathyroidectomy  2006   • Inguinal hernia repair Bilateral 2001, 2002   • Spinal cord stimulator N/A 3/25/2016     Procedure: SPINAL CORD STIMULATOR;  Surgeon: Bin Pro;  Location: SURGERY [facility];  Service:    • Recovery  7/8/2016     Procedure: IR1-VASCULAR CASE-HOPKINS-LEFT THIGH AV GRAFT THROMBOLYSIS WITH TISSUE PLASMINOGEN ACTIVATOR AND ANGIOJET ARTHERECTOMY   ;  Surgeon: Recoveryonly Surgery;  Location: SURGERY PRE-POST PROC UNIT [facility];  Service:    • Lesion excision general Right 7/11/2016     Procedure: LESION EXCISION GENERAL FOR ARM SKIN;  Surgeon: Jairo Hopkins M.D.;  Location: SURGERY [facility];  Service:    • Mass excision general Right 8/5/2016     Procedure: MASS EXCISION GENERAL FOR CALCIPHYLAXIS SKIN AND SUBCUTANEOUS TISSUE FOREARM;  Surgeon: Jairo Hopkins M.D.;  Location: SURGERY [facility];  Service:    • Cath placement Right 9/17/2016     Procedure: CATH PLACEMENT - tunneled dialysis cath placement right femoral ;  Surgeon: Quentin Alicia M.D.;  Location: SURGERY [facility];  Service:    • Thrombectomy Left 9/18/2016     Procedure: THROMBECTOMY - and fistula revision;  Surgeon: Jairo Hopkins M.D.;  Location: SURGERY [facility];  Service:    • Av fistulogram  9/18/2016     Procedure: AV FISTULOGRAM;  Surgeon: Jairo Hopkins M.D.;  Location: SURGERY [facility];  Service:    • Thrombectomy Left 10/20/2016     Procedure: THROMBECTOMY THIGH;  Surgeon: Jairo Hopkins M.D.;  Location: SURGERY [facility];  Service:    • Incision and drainage general  10/20/2016     Procedure: INCISION AND DRAINAGE  "GENERAL HEMATOMA;  Surgeon: Jairo Hopkins M.D.;  Location: SURGERY [facility];  Service:    • Irrigation & debridement general Left 10/28/2016     Procedure: IRRIGATION & DEBRIDEMENT GENERAL THIGH WITH IRRIGATING WOUND VAC PLACEMENT;  Surgeon: Jairo Hopkins M.D.;  Location: SURGERY [facility];  Service:    • Flap closure Left 11/17/2016     Procedure: Fasciocutaneous Flap Closure Left Upper Leg;  Surgeon: Samson Rosenbaum Jr., M.D.;  Location: SURGERY [facility];  Service:    • Thrombectomy Left 1/6/2017     Procedure: THROMBECTOMY-OPEN THROMBECTOMY WITH LEFT THIGH GRAFT;  Surgeon: Jairo Hopkins M.D.;  Location: SURGERY [facility];  Service:    • Cath placement Right 1/6/2017     Procedure: CATH PLACEMENT;  Surgeon: Jairo Hopkins M.D.;  Location: SURGERY [facility];  Service:    • Thrombectomy Left 1/5/2017     Procedure: THROMBECTOMY-THIGH AV LOOP GRAFT AND ANGIOJET;  Surgeon: Jairo Hopkins M.D.;  Location: SURGERY [facility];  Service:        Social History   Substance Use Topics   • Smoking status: Never Smoker    • Smokeless tobacco: Never Used   • Alcohol Use: No       Family History   Problem Relation Age of Onset   • Arthritis Father    • Lung Disease Father    • Heart Disease Father    • Hypertension Brother        Review of Systems :    Denies bowel/bladder incontinence, fevers/chills        Exam: Blood pressure 112/66, pulse 98, temperature 36.2 °C (97.1 °F), resp. rate 14, height 1.626 m (5' 4\"), weight 83.462 kg (184 lb), SpO2 100 %.    Blood pressure 112/66, pulse 98, temperature 36.2 °C (97.1 °F), resp. rate 14, height 1.626 m (5' 4\"), weight 83.462 kg (184 lb), SpO2 100 %. Body mass index is 31.57 kg/(m^2).   Physical Exam:  Constitutional: Alert & oriented, no acute distress  Eye: Equal, round and reactive, conjunctiva clear, lids normal  ENMT: Lips without lesions, good dentition, oropharynx clear  Respiratory: Unlabored respiratory effort, lungs " clear to auscultation, no wheezes, no ronchi  Cardiovascular: Normal S1, S2, no murmur, no edema  Abdomen: obese  Skin: Warm, dry, good turgor, no rashes in visible areas  Neuro: No overt focal neurologic deficits, normal gait  Psych: Normal strength and sensation of bilateral upper and lower extremities  Musculoskeletal: Mild TTP of back    Assessment/Plan  1. Acute midline thoracic back pain  Will order x-rays for further evaluation. No red flag symptoms of fevers/chills, numbness/weakness of extremities, bowel/bladder incontinence that are indicative of spinal cord compression. Pt offered PT but states he does not have time to do this with his work schedule. Therefore pt given information packet with stretching/exercises that can be done produced by [facility]. Pt counseled that if pain persists to let us know and we will consider referral to [facility]  - DX-THORACIC SPINE-2 VIEWS; Future  - DX-CERVICAL SPINE-2 OR 3 VIEWS; Future      Return if symptoms worsen or fail to improve.    Please note that this dictation was created using voice recognition software. I have made every reasonable attempt to correct obvious errors, but I expect that there are errors of grammar and possibly content that I did not discover before finalizing the note.

## 2017-04-19 NOTE — MR AVS SNAPSHOT
"        Denis De Anda   2017 7:40 AM   Office Visit   MRN: [id number]    Department:  [facility]   Dept Phone:  [phone]    Description:  Male : 1980   Provider:  Cricket Hook M.D.           Reason for Visit     Back Pain 2 weeks       Allergies as of 2017     Allergen Noted Reactions    Baclofen 2015   Unspecified    Total loss of memory, sedation.   RXN=2015    Contrast Media With Iodine [Iodine] 2016   Rash    RXN=2017    Keflex 2009   Rash    RXN=possibly >10 years    Pcn [Penicillins] 2009   Rash    RXN=possibly >10 years ago    Tape 2013   Rash    Paper tape and tegaderm ok  RXN=ongoing      You were diagnosed with     Acute midline thoracic back pain   [[id number]]         Vital Signs     Blood Pressure Pulse Temperature Respirations Height Weight    112/66 mmHg 98 36.2 °C (97.1 °F) 14 1.626 m (5' 4\") 83.462 kg (184 lb)    Body Mass Index Oxygen Saturation Smoking Status             31.57 kg/m2 100% Never Smoker          Basic Information     Date Of Birth Sex Race Ethnicity Preferred Language    1980 Male White Non- English      Your appointments     2017  7:45 AM   Established Patient with [facility] EXAM 4   [facility]  (--)    [address] [place] [address]   [phone]            2017  4:00 PM   CARE MANAGEMENT OUTREACH with Julia Moran R.N.   Population Health (--)    [address] [place] [address]   [phone]           ***IMPORTANT**** This is a phone visit only. Do not come into the office. The Care Manager will call you at the scheduled time for Care Manager Telephone Visit.              Problem List              ICD-10-CM Priority Class Noted - Resolved    Insomnia G47.00   2009 - Present    Dupuytren's contracture of hand M72.0 Low  2010 - Present    Sleep apnea G47.30   10/3/2011 - Present    Osteopenia M85.80   5/10/2013 - " Present    Vertebral compression fracture (CMS-HCC) M48.50XA   5/10/2013 - Present    Hyperlipidemia with target LDL less than 100 E78.5 Low  6/28/2013 - Present    Thrombophlebitis I80.9   9/13/2013 - Present    Venous collateral circulation, any site I87.8   9/13/2013 - Present    Calciphylaxis E83.59   9/13/2013 - Present    Anemia D64.9   9/26/2013 - Present    End stage renal disease (CMS-HCC) N18.6   12/12/2013 - Present    Pain R52   2/21/2015 - Present    Hyponatremia E87.1   2/23/2015 - Present    Reflex sympathetic dystrophy G90.50   3/24/2015 - Present    Non-healing surgical wound T81.89XA   6/22/2015 - Present    Acropachy [id number]   3/25/2016 - Present    Seizure disorder (CMS-HCC) G40.909   4/5/2016 - Present    Bacteremia R78.81   4/7/2016 - Present    Lethargy R53.83   4/9/2016 - Present    Pain syndrome, chronic G89.4   4/10/2016 - Present    Hyperkalemia E87.5   7/8/2016 - Present    ESRD (end stage renal disease) (CMS-HCC) N18.6 Medium  7/8/2016 - Present    Dermatofibroma of right upper arm D23.61   7/11/2016 - Present    Other specified epidermal thickening L85.8   8/5/2016 - Present    Chronic anticoagulation Z79.01   9/18/2016 - Present    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   10/20/2016 - Present    Open wound of left thigh S71.102A High  10/28/2016 - Present    Hypertension I10 Medium  11/15/2016 - Present    Hyperparathyroidism (CMS-HCC) E21.3 Low  11/15/2016 - Present    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 High  11/15/2016 - Present    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, 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 Low  11/15/2016 - Present    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   1/5/2017 - Present    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   1/6/2017 - Present    Chronic kidney disease, stage V (CMS-HCC) N18.5   3/28/2017 - Present    Acute midline thoracic back pain M54.6   4/19/2017 - Present      Health Maintenance        Date Due Completion  Dates    IMM DTaP/Tdap/Td Vaccine (2 - Tdap) 3/20/2018 (Originally 8/16/2000) 1/7/2000    IMM PNEUMOCOCCAL 19-64 (ADULT) HIGHEST RISK SERIES (1 of 3 - PCV13) 3/20/2018 (Originally 8/16/2000) ---            Current Immunizations     Hepatitis B Vaccine Non-Recombivax (Ped/Adol) 10/5/2000, 2/8/2000, 1/7/2000    Influenza TIV (IM) 9/30/2015, 9/9/2014    Influenza Vaccine Pediatric 9/15/2009    MMR Vaccine 3/11/1983    Pneumococcal Vaccine (PCV7) Historical Data 1/1/2013    QF GOLD 8/7/2013    QUANTIFERON GOLD 8/7/2013    TD Vaccine 1/7/2000      Below and/or attached are the medications your provider expects you to take. Review all of your home medications and newly ordered medications with your provider and/or pharmacist. Follow medication instructions as directed by your provider and/or pharmacist. Please keep your medication list with you and share with your provider. Update the information when medications are discontinued, doses are changed, or new medications (including over-the-counter products) are added; and carry medication information at all times in the event of emergency situations     Allergies:  BACLOFEN - Unspecified     CONTRAST MEDIA WITH IODINE - Rash     KEFLEX - Rash     PCN - Rash     TAPE - Rash               Medications  Valid as of: April 19, 2017 -  8:01 AM    Generic Name Brand Name Tablet Size Instructions for use    Calcitriol (Cap) ROCALTROL 0.25 MCG Take 0.75 mcg by mouth every bedtime.        Cinacalcet HCl (Tab) SENSIPAR 30 MG Take 1 Tab by mouth every evening.        Gabapentin (Cap) NEURONTIN 300 MG Take 1,200 mg by mouth every bedtime.        Hydrocodone-Acetaminophen (Tab) NORCO  MG Take 2 Tabs by mouth every 12 hours as needed.        Sevelamer Carbonate (Tab) Sevelamer Carbonate 800 MG Take 3,200 mg by mouth 3 times a day, with meals. With meals        Warfarin Sodium (Tab) COUMADIN 5 MG Take 5 mg by mouth every day. 7.5 mg on Mon, Tues, Thurs and Friday  5 mg all other  days  10 mg on night prior to surgery        .                 Medicines prescribed today were sent to:     [id number]  [place], [place] - [address] AT [facility]    [address] [place] [address]    Phone: [phone] Fax: [phone]    Open 24 Hours?: No    [facility] [address] [facility]    [address] [place] [address]    Phone: [phone] Fax: [phone]    Open 24 Hours?: No      Medication refill instructions:       If your prescription bottle indicates you have medication refills left, it is not necessary to call your provider’s office. Please contact your pharmacy and they will refill your medication.    If your prescription bottle indicates you do not have any refills left, you may request refills at any time through one of the following ways: The online [employer] system (except Urgent Care), by calling your provider’s office, or by asking your pharmacy to contact your provider’s office with a refill request. Medication refills are processed only during regular business hours and may not be available until the next business day. Your provider may request additional information or to have a follow-up visit with you prior to refilling your medication.   *Please Note: Medication refills are assigned a new Rx number when refilled electronically. Your pharmacy may indicate that no refills were authorized even though a new prescription for the same medication is available at the pharmacy. Please request the medicine by name with the pharmacy before contacting your provider for a refill.        Your To Do List     Future Labs/Procedures Complete By Expires    DX-CERVICAL SPINE-2 OR 3 VIEWS  As directed 4/19/2018    DX-THORACIC SPINE-2 VIEWS  As directed 4/19/2018         [employer] Access Code: Activation code not generated  Current [employer] Status: Active

## 2017-04-21 ENCOUNTER — TELEPHONE (OUTPATIENT)
Dept: MEDICAL GROUP | Facility: PHYSICIAN GROUP | Age: 37
End: 2017-04-21

## 2017-04-21 NOTE — TELEPHONE ENCOUNTER
1. Caller Name: Denis De Anda                                         Call Back Number: [phone] (home)       Patient approves a detailed voicemail message: yes    2. SPECIFIC Action To Be Taken: Referral    3. Diagnosis/Clinical Reason for Request: T14.8 (ICD-10-CM) - Compression fracture    4. Specialty & Provider Name/Lab/Imaging Location: Dr. Eddie Baker [facility] [phone]    5. Is appointment scheduled for requested order/referral: no    Patient informed they will receive a return phone call from the office ONLY if there are any questions before processing their request. Advised to call back if they haven't received a call from the referral department in 5 days.

## 2017-04-26 ENCOUNTER — TELEPHONE (OUTPATIENT)
Dept: VASCULAR LAB | Facility: MEDICAL CENTER | Age: 37
End: 2017-04-26

## 2017-04-27 NOTE — TELEPHONE ENCOUNTER
Patient missed anticoag appt yesterday.  Will ask clinical staff to reach out to him for update.    Michael Bloch, MD  Anticoagulation Clinic

## 2017-05-01 ENCOUNTER — ANTICOAGULATION MONITORING (OUTPATIENT)
Dept: VASCULAR LAB | Facility: MEDICAL CENTER | Age: 37
End: 2017-05-01

## 2017-05-01 DIAGNOSIS — T82.868D AV GRAFT THROMBOSIS, SUBSEQUENT ENCOUNTER: ICD-10-CM

## 2017-05-01 NOTE — PROGRESS NOTES
Called patient to follow up since last appointment patient was a no show.   Asked patient to please call the [facility] to reschedule a follow up appointment.    Isaiah East PharmD

## 2017-05-04 ENCOUNTER — HOSPITAL ENCOUNTER (OUTPATIENT)
Facility: MEDICAL CENTER | Age: 37
End: 2017-05-04
Attending: PAIN MEDICINE | Admitting: PAIN MEDICINE
Payer: MEDICARE

## 2017-05-04 VITALS
OXYGEN SATURATION: 95 % | RESPIRATION RATE: 18 BRPM | TEMPERATURE: 97 F | DIASTOLIC BLOOD PRESSURE: 63 MMHG | SYSTOLIC BLOOD PRESSURE: 112 MMHG | HEART RATE: 100 BPM | HEIGHT: 64 IN | WEIGHT: 182.76 LBS | BODY MASS INDEX: 31.2 KG/M2

## 2017-05-04 PROBLEM — G89.4 CHRONIC PAIN DISORDER: Status: ACTIVE | Noted: 2017-05-04

## 2017-05-04 LAB
BASOPHILS # BLD AUTO: 1.1 % (ref 0–1.8)
BASOPHILS # BLD: 0.05 K/UL (ref 0–0.12)
EOSINOPHIL # BLD AUTO: 0.21 K/UL (ref 0–0.51)
EOSINOPHIL NFR BLD: 4.6 % (ref 0–6.9)
ERYTHROCYTE [DISTWIDTH] IN BLOOD BY AUTOMATED COUNT: 63.8 FL (ref 35.9–50)
HCT VFR BLD AUTO: 37.1 % (ref 42–52)
HGB BLD-MCNC: 11.7 G/DL (ref 14–18)
IMM GRANULOCYTES # BLD AUTO: 0.02 K/UL (ref 0–0.11)
IMM GRANULOCYTES NFR BLD AUTO: 0.4 % (ref 0–0.9)
INR PPP: 1.16 (ref 0.87–1.13)
LYMPHOCYTES # BLD AUTO: 1.13 K/UL (ref 1–4.8)
LYMPHOCYTES NFR BLD: 24.6 % (ref 22–41)
MCH RBC QN AUTO: 29.7 PG (ref 27–33)
MCHC RBC AUTO-ENTMCNC: 31.5 G/DL (ref 33.7–35.3)
MCV RBC AUTO: 94.2 FL (ref 81.4–97.8)
MONOCYTES # BLD AUTO: 0.42 K/UL (ref 0–0.85)
MONOCYTES NFR BLD AUTO: 9.2 % (ref 0–13.4)
NEUTROPHILS # BLD AUTO: 2.76 K/UL (ref 1.82–7.42)
NEUTROPHILS NFR BLD: 60.1 % (ref 44–72)
NRBC # BLD AUTO: 0 K/UL
NRBC BLD AUTO-RTO: 0 /100 WBC
PLATELET # BLD AUTO: 149 K/UL (ref 164–446)
PMV BLD AUTO: 9.8 FL (ref 9–12.9)
POTASSIUM SERPL-SCNC: 5 MMOL/L (ref 3.6–5.5)
PROTHROMBIN TIME: 14.6 SEC (ref 12–14.6)
RBC # BLD AUTO: 3.94 M/UL (ref 4.7–6.1)
WBC # BLD AUTO: 4.6 K/UL (ref 4.8–10.8)

## 2017-05-04 PROCEDURE — 160009 HCHG ANES TIME/MIN: Performed by: PAIN MEDICINE

## 2017-05-04 PROCEDURE — 36415 COLL VENOUS BLD VENIPUNCTURE: CPT

## 2017-05-04 PROCEDURE — 501445 HCHG STAPLER, SKIN DISP: Performed by: PAIN MEDICINE

## 2017-05-04 PROCEDURE — 84132 ASSAY OF SERUM POTASSIUM: CPT

## 2017-05-04 PROCEDURE — 501838 HCHG SUTURE GENERAL: Performed by: PAIN MEDICINE

## 2017-05-04 PROCEDURE — 160036 HCHG PACU - EA ADDL 30 MINS PHASE I: Performed by: PAIN MEDICINE

## 2017-05-04 PROCEDURE — 500448 HCHG DRESSING, TELFA 3X4: Performed by: PAIN MEDICINE

## 2017-05-04 PROCEDURE — 700101 HCHG RX REV CODE 250

## 2017-05-04 PROCEDURE — 160035 HCHG PACU - 1ST 60 MINS PHASE I: Performed by: PAIN MEDICINE

## 2017-05-04 PROCEDURE — 85610 PROTHROMBIN TIME: CPT

## 2017-05-04 PROCEDURE — 700111 HCHG RX REV CODE 636 W/ 250 OVERRIDE (IP)

## 2017-05-04 PROCEDURE — 160041 HCHG SURGERY MINUTES - EA ADDL 1 MIN LEVEL 4: Performed by: PAIN MEDICINE

## 2017-05-04 PROCEDURE — 160048 HCHG OR STATISTICAL LEVEL 1-5: Performed by: PAIN MEDICINE

## 2017-05-04 PROCEDURE — 160002 HCHG RECOVERY MINUTES (STAT): Performed by: PAIN MEDICINE

## 2017-05-04 PROCEDURE — A6402 STERILE GAUZE <= 16 SQ IN: HCPCS | Performed by: PAIN MEDICINE

## 2017-05-04 PROCEDURE — 500866 HCHG NEEDLE, SPINAL 25G: Performed by: PAIN MEDICINE

## 2017-05-04 PROCEDURE — 160029 HCHG SURGERY MINUTES - 1ST 30 MINS LEVEL 4: Performed by: PAIN MEDICINE

## 2017-05-04 PROCEDURE — 85025 COMPLETE CBC W/AUTO DIFF WBC: CPT

## 2017-05-04 RX ORDER — BACITRACIN 50000 [IU]/1
INJECTION, POWDER, FOR SOLUTION INTRAMUSCULAR
Status: DISCONTINUED | OUTPATIENT
Start: 2017-05-04 | End: 2017-05-04 | Stop reason: HOSPADM

## 2017-05-04 RX ORDER — LIDOCAINE HYDROCHLORIDE 10 MG/ML
INJECTION, SOLUTION INFILTRATION; PERINEURAL
Status: DISCONTINUED | OUTPATIENT
Start: 2017-05-04 | End: 2017-05-04 | Stop reason: HOSPADM

## 2017-05-04 RX ORDER — BUPIVACAINE HYDROCHLORIDE AND EPINEPHRINE 2.5; 5 MG/ML; UG/ML
INJECTION, SOLUTION EPIDURAL; INFILTRATION; INTRACAUDAL; PERINEURAL
Status: DISCONTINUED | OUTPATIENT
Start: 2017-05-04 | End: 2017-05-04 | Stop reason: HOSPADM

## 2017-05-04 RX ORDER — SODIUM CHLORIDE 9 MG/ML
1000 INJECTION, SOLUTION INTRAVENOUS
Status: DISCONTINUED | OUTPATIENT
Start: 2017-05-04 | End: 2017-05-04 | Stop reason: HOSPADM

## 2017-05-04 ASSESSMENT — PAIN SCALES - GENERAL
PAINLEVEL_OUTOF10: 0
PAINLEVEL_OUTOF10: 1
PAINLEVEL_OUTOF10: 0

## 2017-05-04 NOTE — OR NURSING
1439 To PACU from OR on [facility]. All bedside rails up for safe transfer. Sleeping, skin pink warm dry. Back dressing cdi. Even non labored breathing  1445 arousble and calling. denies pain and nausea.   1456 52/34, 69/46 BP, notified MD Vasquez, given neostigmin and phenylephrine.   1459 84/37  1504 95/41  1555 pt awake and alert. Denies pain and nausea. Sitting on edge of bed with feet dangling. Denies dizziness. Even non labored breathing.   1600 Ok for pt to discharge with current blood pressure per MD Vasquez.  Pt discharged via wheelchair escorted by family and RN. Left with all personal possessions and discharge instructions. Pt and family verbalized understanding of instructions.  PIV removed. Even and non labored breathing. No signs of distress noted.

## 2017-05-04 NOTE — IP AVS SNAPSHOT
5/4/2017    Denis De Anda  [address]  [place] [address]    Dear Denis:    [facility] wants to ensure your discharge home is safe and you or your loved ones have had all of your questions answered regarding your care after you leave the hospital.    Below is a list of resources and contact information should you have any questions regarding your hospital stay, follow-up instructions, or active medical symptoms.    Questions or Concerns Regarding… Contact   Medical Questions Related to Your Discharge  (7 days a week, 8am-5pm) Contact a Nurse Care Coordinator   [phone]   Medical Questions Not Related to Your Discharge  (24 hours a day / 7 days a week)  Contact the Nurse Health Line   [phone]    Medications or Discharge Instructions Refer to your discharge packet   or contact your [facility] Primary Care Provider   [phone]   Follow-up Appointment(s) Schedule your appointment via [employer]   or contact Scheduling [phone]   Billing Review your statement via [employer]  or contact Billing [phone]   Medical Records Review your records via [employer]   or contact Medical Records [phone]     You may receive a telephone call within two days of discharge. This call is to make certain you understand your discharge instructions and have the opportunity to have any questions answered. You can also easily access your medical information, test results and upcoming appointments via the [employer] free online health management tool. You can learn more and sign up at [employer]. For assistance setting up your [employer] account, please call [phone].    Once again, we want to ensure your discharge home is safe and that you have a clear understanding of any next steps in your care. If you have any questions or concerns, please do not hesitate to contact us, we are here for you. Thank you for choosing [facility] for your healthcare needs.    Sincerely,    Your [facility] Healthcare Team

## 2017-05-04 NOTE — IP AVS SNAPSHOT
" Home Care Instructions                                                                                                                Name:Denis Siegelham  Medical Record Number:[id number]  CSN: [id number]    YOB: 1980   Age: 36 y.o.  Sex: male  HT:1.626 m (5' 4\") WT: 82.9 kg (182 lb 12.2 oz)          Admit Date: 5/4/2017     Discharge Date:   Today's Date: 5/4/2017  Attending Doctor:  Bin Pro M.D.                  Allergies:  Baclofen; Contrast media with iodine; Keflex; Pcn; and Tape                Discharge Instructions         ACTIVITY: Rest and take it easy for the first 24 hours.  A responsible adult is recommended to remain with you during that time.  It is normal to feel sleepy.  We encourage you to not do anything that requires balance, judgment or coordination.    MILD FLU-LIKE SYMPTOMS ARE NORMAL. YOU MAY EXPERIENCE GENERALIZED MUSCLE ACHES, THROAT IRRITATION, HEADACHE AND/OR SOME NAUSEA.    FOR 24 HOURS DO NOT:  Drive, operate machinery or run household appliances.  Drink beer or alcoholic beverages.   Make important decisions or sign legal documents.    SPECIAL INSTRUCTIONS:     Keep clean dry and intact  Ok to shower in three day and take off dressing.     DIET: To avoid nausea, slowly advance diet as tolerated, avoiding spicy or greasy foods for the first day.  Add more substantial food to your diet according to your physician's instructions.  Babies can be fed formula or breast milk as soon as they are hungry.  INCREASE FLUIDS AND FIBER TO AVOID CONSTIPATION.      FOLLOW-UP APPOINTMENT:  A follow-up appointment should be arranged with your doctor: call to schedule.    You should CALL YOUR PHYSICIAN if you develop:  Fever greater than 101 degrees F.  Pain not relieved by medication, or persistent nausea or vomiting.  Excessive bleeding (blood soaking through dressing) or unexpected drainage from the wound.  Extreme redness or swelling around the incision site, drainage of pus or foul " smelling drainage.  Inability to urinate or empty your bladder within 8 hours.  Problems with breathing or chest pain.    You should call 911 if you develop problems with breathing or chest pain.  If you are unable to contact your doctor or surgical center, you should go to the nearest emergency room or urgent care center.  Physician's telephone #: [phone]      If any questions arise, call your doctor.  If your doctor is not available, please feel free to call the Surgical Center at [phone].  The Center is open Monday through Friday from 7AM to 7PM.  You can also call the [employer] HOTLINE open 24 hours/day, 7 days/week and speak to a nurse at [phone], or toll free at [phone].    A registered nurse may call you a few days after your surgery to see how you are doing after your procedure.    MEDICATIONS: Resume taking daily medication.  Take prescribed pain medication with food.  If no medication is prescribed, you may take non-aspirin pain medication if needed.  PAIN MEDICATION CAN BE VERY CONSTIPATING.  Take a stool softener or laxative such as senokot, pericolace, or milk of magnesia if needed.    Prescription given for clindamycin.  Last pain medication given at n/a.    If your physician has prescribed pain medication that includes Acetaminophen (Tylenol), do not take additional Acetaminophen (Tylenol) while taking the prescribed medication.    Depression / Suicide Risk    As you are discharged from this Critical access hospital facility, it is important to learn how to keep safe from harming yourself.    Recognize the warning signs:  · Abrupt changes in personality, positive or negative- including increase in energy   · Giving away possessions  · Change in eating patterns- significant weight changes-  positive or negative  · Change in sleeping patterns- unable to sleep or sleeping all the time   · Unwillingness or inability to communicate  · Depression  · Unusual sadness, discouragement and  loneliness  · Talk of wanting to die  · Neglect of personal appearance   · Rebelliousness- reckless behavior  · Withdrawal from people/activities they love  · Confusion- inability to concentrate     If you or a loved one observes any of these behaviors or has concerns about self-harm, here's what you can do:  · Talk about it- your feelings and reasons for harming yourself  · Remove any means that you might use to hurt yourself (examples: pills, rope, extension cords, firearm)  · Get professional help from the community (Mental Health, Substance Abuse, psychological counseling)  · Do not be alone:Call your Safe Contact- someone whom you trust who will be there for you.  · Call your local CRISIS HOTLINE 580-1781 or 718-613-1296  · Call your local Children's Mobile Crisis Response Team [place] (357) 217-6013 or www.[employer]  · Call the toll free National Suicide Prevention Hotlines   · National Suicide Prevention Lifeline 137-997-PHOT (6403)  · [employer] Hope Line Network 800-SUICIDE (446-9830)       Medication List      ASK your doctor about these medications        Instructions    Morning Afternoon Evening Bedtime    calcitRIOL 0.25 MCG Caps   Commonly known as:  ROCALTROL        Take 0.75 mcg by mouth every bedtime.   Dose:  0.75 mcg                        cinacalcet 30 MG Tabs   Commonly known as:  SENSIPAR        Take 1 Tab by mouth every evening.   Dose:  30 mg                        gabapentin 300 MG Caps   Commonly known as:  NEURONTIN        Take 1,200 mg by mouth every bedtime.   Dose:  1200 mg                        hydrocodone/acetaminophen  MG Tabs   Commonly known as:  NORCO        Take 2 Tabs by mouth every 12 hours as needed.   Dose:  2 Tab                        RENVELA 800 MG Tabs   Generic drug:  Sevelamer Carbonate        Take 3,200 mg by mouth 3 times a day, with meals. With meals   Dose:  3200 mg                        warfarin 5 MG Tabs   Commonly known as:  COUMADIN         Take 5 mg by mouth every day. 7.5 mg on Mon, Tues, Thurs and Friday 5 mg all other days 10 mg on night prior to surgery   Dose:  5 mg                                Medication Information     Above and/or attached are the medications your physician expects you to take upon discharge. Review all of your home medications and newly ordered medications with your doctor and/or pharmacist. Follow medication instructions as directed by your doctor and/or pharmacist. Please keep your medication list with you and share with your physician. Update the information when medications are discontinued, doses are changed, or new medications (including over-the-counter products) are added; and carry medication information at all times in the event of emergency situations.        Resources     Quit Smoking / Tobacco Use:    I understand the use of any tobacco products increases my chance of suffering from future heart disease or stroke and could cause other illnesses which may shorten my life. Quitting the use of tobacco products is the single most important thing I can do to improve my health. For further information on smoking / tobacco cessation call a Toll Free Quit Line at 1-118.785.5757 (*[facility]) or 1-286.241.2723 (American Lung Association) or you can access the web based program at www.lungusa.org.    [place] Tobacco Users Help Line:  (741) 203-7171       Toll Free: 1-421.757.4635  Quit Tobacco Program [facility] Services (385)586-2991    Crisis Hotline:    [place] Crisis Hotline:  2-719-TFYVKAK or 1-626.256.6239    [place] Crisis Hotline:    1-706.924.1777 or 629-698-0523    Discharge Survey:   Thank you for choosing [facility]. We hope we did everything we could to make your hospital stay a pleasant one. You may be receiving a survey and we would appreciate your time and participation in answering the questions. Your input is very valuable to us in our efforts to improve our service to our  patients and their families.            Signatures     My signature on this form indicates that:    1. I acknowledge receipt and understanding of these Home Care Instruction.  2. My questions regarding this information have been answered to my satisfaction.  3. I have formulated a plan with my discharge nurse to obtain my prescribed medications for home.    __________________________________      __________________________________                   Patient Signature                                 Guardian/Responsible Adult Signature      __________________________________                 __________       ________                       Nurse Signature                                               Date                 Time

## 2017-05-04 NOTE — DISCHARGE INSTRUCTIONS
ACTIVITY: Rest and take it easy for the first 24 hours.  A responsible adult is recommended to remain with you during that time.  It is normal to feel sleepy.  We encourage you to not do anything that requires balance, judgment or coordination.    MILD FLU-LIKE SYMPTOMS ARE NORMAL. YOU MAY EXPERIENCE GENERALIZED MUSCLE ACHES, THROAT IRRITATION, HEADACHE AND/OR SOME NAUSEA.    FOR 24 HOURS DO NOT:  Drive, operate machinery or run household appliances.  Drink beer or alcoholic beverages.   Make important decisions or sign legal documents.    SPECIAL INSTRUCTIONS:     Keep clean dry and intact  Ok to shower in three day and take off dressing.     DIET: To avoid nausea, slowly advance diet as tolerated, avoiding spicy or greasy foods for the first day.  Add more substantial food to your diet according to your physician's instructions.  Babies can be fed formula or breast milk as soon as they are hungry.  INCREASE FLUIDS AND FIBER TO AVOID CONSTIPATION.      FOLLOW-UP APPOINTMENT:  A follow-up appointment should be arranged with your doctor: call to schedule.    You should CALL YOUR PHYSICIAN if you develop:  Fever greater than 101 degrees F.  Pain not relieved by medication, or persistent nausea or vomiting.  Excessive bleeding (blood soaking through dressing) or unexpected drainage from the wound.  Extreme redness or swelling around the incision site, drainage of pus or foul smelling drainage.  Inability to urinate or empty your bladder within 8 hours.  Problems with breathing or chest pain.    You should call 911 if you develop problems with breathing or chest pain.  If you are unable to contact your doctor or surgical center, you should go to the nearest emergency room or urgent care center.  Physician's telephone #: [phone]      If any questions arise, call your doctor.  If your doctor is not available, please feel free to call the Surgical Center at [phone].  The Center is open Monday through Friday  from 7AM to 7PM.  You can also call the HEALTH HOTLINE open 24 hours/day, 7 days/week and speak to a nurse at [phone], or toll free at [phone].    A registered nurse may call you a few days after your surgery to see how you are doing after your procedure.    MEDICATIONS: Resume taking daily medication.  Take prescribed pain medication with food.  If no medication is prescribed, you may take non-aspirin pain medication if needed.  PAIN MEDICATION CAN BE VERY CONSTIPATING.  Take a stool softener or laxative such as senokot, pericolace, or milk of magnesia if needed.    Prescription given for clindamycin.  Last pain medication given at n/a.    If your physician has prescribed pain medication that includes Acetaminophen (Tylenol), do not take additional Acetaminophen (Tylenol) while taking the prescribed medication.    Depression / Suicide Risk    As you are discharged from this [facility] facility, it is important to learn how to keep safe from harming yourself.    Recognize the warning signs:  · Abrupt changes in personality, positive or negative- including increase in energy   · Giving away possessions  · Change in eating patterns- significant weight changes-  positive or negative  · Change in sleeping patterns- unable to sleep or sleeping all the time   · Unwillingness or inability to communicate  · Depression  · Unusual sadness, discouragement and loneliness  · Talk of wanting to die  · Neglect of personal appearance   · Rebelliousness- reckless behavior  · Withdrawal from people/activities they love  · Confusion- inability to concentrate     If you or a loved one observes any of these behaviors or has concerns about self-harm, here's what you can do:  · Talk about it- your feelings and reasons for harming yourself  · Remove any means that you might use to hurt yourself (examples: pills, rope, extension cords, firearm)  · Get professional help from the community (Mental Health, Substance Abuse,  psychological counseling)  · Do not be alone:Call your Safe Contact- someone whom you trust who will be there for you.  · Call your local CRISIS HOTLINE 527-8878 or 813-273-6987  · Call your local Children's Mobile Crisis Response Team [place] (675) 066-2507 or www.besomebody.  · Call the toll free National Suicide Prevention Hotlines   · National Suicide Prevention Lifeline 391-238-WVIW (7187)  · National Hope Line Network 800-SUICIDE (964-7877)

## 2017-05-04 NOTE — OR NURSING
1250 Pt. Allergies and NPO status verified, home medications reconciled. Belongings secured. Pt. Verbalizes understanding of pain scale, expected course of stay and plan of care. Surgical site verified with pt. Pt. And family given home care instructions for discharge planning. IV access established.

## 2017-05-05 NOTE — OP REPORT
"Surgeon: Bin Pro MD      REMOVAL OF SCS AND EPIDURAL LEAD    ATTENDING:  Bin Pro MD      ASSISTANT:None   PREOPERATIVE DIAGNOSIS:  Chronic pain syndrome    POSTOPERATIVE DIAGNOSIS:  Chronic pain syndrome    PROCEDURE PERFORMED:  1. Removal of the epidural lead of the spinal cord stimulator.  2. Removal of the internal pulse generator and the extension wire.    ANESTHESIA:  General Anesthesia with ETT.    MONITORS:   Automatic blood pressure cuff and pulse oximetry.    INDICATIONS:  Patient stated that \"he wants it out\", he states that he believes his overall body twitching and seizures are related to the SCS, despite the fact that neurologist and other physician had told him in the past he has abnormal EEG and seizures are not related to that. I honored his decision and I removed the system.  MEDICATIONS:  please to see  the chart    ALLERGIES:  please see the chart  REVIEW OF SYSTEMS:  Negative for fever, chills, chest pain, SOB, bleeding abnormalities, nausea, vomiting, diarrhea    FOCUSED PHYSICAL EXAMINATION:  The patient is awake, alert and oriented, and is in no acute distress.  Vital signs are stable.  The patient is afebrile.  The rest of the PE is essentially unchanged from the patient's recent visit to our office.      We explained the procedure to the patient including the risks, benefits and alternatives to the procedure.  The patient verbalized understanding and was willing to proceed.    PROCEDURE IN DETAIL:  An informed consent was obtained.  The patient received clindamycin intravenously before the procedure. The patient was taken to the procedure room and was positively identified by the staff and the attending physician.  The patient was positioned prone on the procedure bed.  All bony prominences were checked and padded.  Vital signs were monitored as above and remained stable throughout the procedure. The skin was prepped and draped in the standard sterile fashion. A surgical pause " (time-out) was performed and was agreed upon by the members of the team. The skin and subcutaneous tissues were infiltrated with liodcaine 1% mixed with epinephrine and bupivacien 0.25% . Scalpel 10 was used and cut through the old incision site and scars.  Sharp and blunt dissection was utilized in both areas.  Full hemostasis was obtained with Bovie cautery.  The internal pulse generator and the extension wire were removed from the right side above the buttock area.  The anchoring device was disconnected from the epidural lead.  The epidural round lead was removed intact from the epidural space.  Both incisions were checked for hemostasis repeatedly.  Both incisions were irrigated with sterile normal saline mixed with bacitracin.  The subcutaneous tissues were approximated using 2-0 Vicryl.  The skin was approximated using staples.  The incision was covered by gauze, telfa and tegaderm.  The patient was transferred in stable condition to the Recovery Room.  Blood loss was minimal.    COMPLICATIONS:  None.    DISPOSITION:  1. Discharge to home when the discharge criteria are met.  2. Return to the Pain Clinic in 10-14 days for staple removal.

## 2017-05-07 NOTE — OP REPORT
"  Surgeon:   Bin Pro MD  Patient name:   Denis De Anda  YOB: 1980  Date Seen:   05/04/2017    Assistants: None    Surgeon: Bin Pro MD      REMOVAL OF SCS AND EPIDURAL LEAD    ATTENDING:  Bin Pro MD      ASSISTANT:None   PREOPERATIVE DIAGNOSIS:  Chronic pain syndrome    POSTOPERATIVE DIAGNOSIS:  Chronic pain syndrome    PROCEDURE PERFORMED:  1. Removal of the epidural lead of the spinal cord stimulator.  2. Removal of the internal pulse generator and the extension wire.    ANESTHESIA:  General Anesthesia with ETT.    MONITORS:   Automatic blood pressure cuff and pulse oximetry.    INDICATIONS:  Patient stated that \"he wants it out\", he states that he believes his overall body twitching and seizures are related to the SCS, despite the fact that neurologist and other physician had told him in the past he has abnormal EEG and seizures are not related to that. I honored his decision and I removed the system.  MEDICATIONS:  please to see  the chart    ALLERGIES:  please see the chart  REVIEW OF SYSTEMS:  Negative for fever, chills, chest pain, SOB, bleeding abnormalities, nausea, vomiting, diarrhea    FOCUSED PHYSICAL EXAMINATION:  The patient is awake, alert and oriented, and is in no acute distress.  Vital signs are stable.  The patient is afebrile.  The rest of the PE is essentially unchanged from the patient's recent visit to our office.      We explained the procedure to the patient including the risks, benefits and alternatives to the procedure.  The patient verbalized understanding and was willing to proceed.    PROCEDURE IN DETAIL:  An informed consent was obtained.  The patient received clindamycin intravenously before the procedure. The patient was taken to the procedure room and was positively identified by the staff and the attending physician.  The patient was positioned prone on the procedure bed.  All bony prominences were checked and padded.  Vital signs were monitored as " above and remained stable throughout the procedure. The skin was prepped and draped in the standard sterile fashion. A surgical pause (time-out) was performed and was agreed upon by the members of the team. The skin and subcutaneous tissues were infiltrated with liodcaine 1% mixed with epinephrine and bupivacien 0.25% . Scalpel 10 was used and cut through the old incision site and scars.  Sharp and blunt dissection was utilized in both areas.  Full hemostasis was obtained with Bovie cautery.  The internal pulse generator and the extension wire were removed from the right side above the buttock area.  The anchoring device was disconnected from the epidural lead.  The epidural round lead was removed intact from the epidural space.  Both incisions were checked for hemostasis repeatedly.  Both incisions were irrigated with sterile normal saline mixed with bacitracin.  The subcutaneous tissues were approximated using 2-0 Vicryl.  The skin was approximated using staples.  The incision was covered by gauze, telfa and tegaderm.  The patient was transferred in stable condition to the Recovery Room.  Blood loss was minimal.    COMPLICATIONS:  None.    DISPOSITION:  1. Discharge to home when the discharge criteria are met.  2. Return to the Pain Clinic in 10-14 days for staple removal

## 2017-05-12 ENCOUNTER — ANTICOAGULATION VISIT (OUTPATIENT)
Dept: VASCULAR LAB | Facility: MEDICAL CENTER | Age: 37
End: 2017-05-12
Attending: INTERNAL MEDICINE
Payer: MEDICARE

## 2017-05-12 DIAGNOSIS — Z79.01 CHRONIC ANTICOAGULATION: ICD-10-CM

## 2017-05-12 LAB — INR PPP: 1.6 (ref 2–3.5)

## 2017-05-12 PROCEDURE — 99212 OFFICE O/P EST SF 10 MIN: CPT

## 2017-05-12 PROCEDURE — 85610 PROTHROMBIN TIME: CPT

## 2017-05-12 NOTE — MR AVS SNAPSHOT
Denis De Anda   2017 7:30 AM   Anticoagulation Visit   MRN: [id number]    Department:  Vascular Medicine   Dept Phone:  [phone]    Description:  Male : 1980   Provider:  [facility] EXAM 5           Allergies as of 2017     Allergen Noted Reactions    Baclofen 2015   Unspecified    Total loss of memory, sedation.   RXN=2015    Contrast Media With Iodine [Iodine] 2016   Rash    RXN=2017    Keflex 2009   Rash    RXN=possibly >10 years    Pcn [Penicillins] 2009   Rash    RXN=possibly >10 years ago    Tape 2013   Rash    Paper tape and tegaderm ok  RXN=ongoing      Vital Signs     Smoking Status                   Never Smoker            Basic Information     Date Of Birth Sex Race Ethnicity Preferred Language    1980 Male White Non- English      Your appointments     May 12, 2017  7:30 AM   Established Patient with [facility] EXAM 5   [facility]  (--)    [address] [address]   [phone]            May 15, 2017  7:45 AM   Established Patient with [facility] EXAM 4   [facility]  (--)    [address] [address]   [phone]              Problem List              ICD-10-CM Priority Class Noted - Resolved    Insomnia G47.00   2009 - Present    Dupuytren's contracture of hand M72.0 Low  2010 - Present    Sleep apnea G47.30   10/3/2011 - Present    Osteopenia M85.80   5/10/2013 - Present    Vertebral compression fracture (CMS-HCC) M48.50XA   5/10/2013 - Present    Hyperlipidemia with target LDL less than 100 E78.5 Low  2013 - Present    Thrombophlebitis I80.9   2013 - Present    Venous collateral circulation, any site I87.8   2013 - Present    Calciphylaxis E83.59   2013 - Present    Anemia D64.9   2013 - Present    End stage renal disease (CMS-[facility]) N18.6   2013 - Present    Pain  R52   2/21/2015 - Present    Hyponatremia E87.1   2/23/2015 - Present    Reflex sympathetic dystrophy G90.50   3/24/2015 - Present    Non-healing surgical wound T81.89XA   6/22/2015 - Present    Acropachy BTC8856   3/25/2016 - Present    Seizure disorder (CMS-HCC) G40.909   4/5/2016 - Present    Bacteremia R78.81   4/7/2016 - Present    Lethargy R53.83   4/9/2016 - Present    Pain syndrome, chronic G89.4   4/10/2016 - Present    Hyperkalemia E87.5   7/8/2016 - Present    ESRD (end stage renal disease) (CMS-HCC) N18.6 Medium  7/8/2016 - Present    Dermatofibroma of right upper arm D23.61   7/11/2016 - Present    Other specified epidermal thickening L85.8   8/5/2016 - Present    Chronic anticoagulation Z79.01   9/18/2016 - Present    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   10/20/2016 - Present    Open wound of left thigh S71.102A High  10/28/2016 - Present    Hypertension I10 Medium  11/15/2016 - Present    Hyperparathyroidism (CMS-HCC) E21.3 Low  11/15/2016 - Present    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 High  11/15/2016 - Present    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, 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 Low  11/15/2016 - Present    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   1/5/2017 - Present    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   1/6/2017 - Present    Chronic kidney disease, stage V (CMS-HCC) N18.5   3/28/2017 - Present    Acute midline thoracic back pain M54.6   4/19/2017 - Present    Chronic pain disorder G89.4   5/4/2017 - Present      Health Maintenance        Date Due Completion Dates    IMM DTaP/Tdap/Td Vaccine (2 - Tdap) 3/20/2018 (Originally 8/16/2000) 1/7/2000    IMM PNEUMOCOCCAL 19-64 (ADULT) HIGHEST RISK SERIES (1 of 3 - PCV13) 3/20/2018 (Originally 8/16/2000) ---            Results     POCT Protime      Component    INR    1.6                        Current Immunizations     Hepatitis B Vaccine Non-Recombivax (Ped/Adol) 10/5/2000, 2/8/2000, 1/7/2000     Influenza TIV (IM) 9/30/2015, 9/9/2014    Influenza Vaccine Pediatric 9/15/2009    MMR Vaccine 3/11/1983    Pneumococcal Vaccine (PCV7) Historical Data 1/1/2013    QF GOLD 8/7/2013    QUANTIFERON GOLD 8/7/2013    TD Vaccine 1/7/2000      Below and/or attached are the medications your provider expects you to take. Review all of your home medications and newly ordered medications with your provider and/or pharmacist. Follow medication instructions as directed by your provider and/or pharmacist. Please keep your medication list with you and share with your provider. Update the information when medications are discontinued, doses are changed, or new medications (including over-the-counter products) are added; and carry medication information at all times in the event of emergency situations     Allergies:  BACLOFEN - Unspecified     CONTRAST MEDIA WITH IODINE - Rash     KEFLEX - Rash     PCN - Rash     TAPE - Rash               Medications  Valid as of: May 12, 2017 -  7:27 AM    Generic Name Brand Name Tablet Size Instructions for use    Calcitriol (Cap) ROCALTROL 0.25 MCG Take 0.75 mcg by mouth every bedtime.        Cinacalcet HCl (Tab) SENSIPAR 30 MG Take 1 Tab by mouth every evening.        Gabapentin (Cap) NEURONTIN 300 MG Take 1,200 mg by mouth every bedtime.        Hydrocodone-Acetaminophen (Tab) NORCO  MG Take 2 Tabs by mouth every 12 hours as needed.        Sevelamer Carbonate (Tab) Sevelamer Carbonate 800 MG Take 3,200 mg by mouth 3 times a day, with meals. With meals        Warfarin Sodium (Tab) COUMADIN 5 MG Take 5 mg by mouth every day. 7.5 mg on Mon, Tues, Thurs and Friday  5 mg all other days  10 mg on night prior to surgery        .                 Medicines prescribed today were sent to:     [id number] - [place] - [address] AT [facility] OF "[employer]" [address]    [address] [address]    Phone: [phone] Fax: [phone]    Open 24 Hours?: No    [facility] [place] - [address] [address]    Phone: [phone] Fax: [phone]    Open 24 Hours?: No      Medication refill instructions:       If your prescription bottle indicates you have medication refills left, it is not necessary to call your provider’s office. Please contact your pharmacy and they will refill your medication.    If your prescription bottle indicates you do not have any refills left, you may request refills at any time through one of the following ways: The online cafegive system (except Urgent Care), by calling your provider’s office, or by asking your pharmacy to contact your provider’s office with a refill request. Medication refills are processed only during regular business hours and may not be available until the next business day. Your provider may request additional information or to have a follow-up visit with you prior to refilling your medication.   *Please Note: Medication refills are assigned a new Rx number when refilled electronically. Your pharmacy may indicate that no refills were authorized even though a new prescription for the same medication is available at the pharmacy. Please request the medicine by name with the pharmacy before contacting your provider for a refill.        Warfarin Dosing Calendar   May 2017 Details    Sun Mon Tue Wed Thu Fri Sat      1               2               3               4               5               6                 7               8               9               10               11               12   1.6   5 mg   See details      13      5 mg           14      5 mg         15      7.5 mg         16               17               18               19               20                 21               22               23               24               25               26               27                 28               29               30               31                   Date Details   05/12 This INR check    INR: 1.6       Date of next INR:  5/15/2017         How to take your warfarin dose     To take:  5 mg Take 1 of the 5 mg tablets.    To take:  7.5 mg Take 1.5 of the 5 mg tablets.              [employer] Access Code: Activation code not generated  Current [employer] Status: Active

## 2017-05-12 NOTE — PROGRESS NOTES
Anticoagulation Summary as of 5/12/2017     INR goal 2.0-3.0   Selected INR 1.6! (5/12/2017)   Maintenance plan 7.5 mg (5 mg x 1.5) on Mon, Fri; 5 mg (5 mg x 1) all other days   Weekly total 40 mg   Plan last modified Raul Shea, PHARMD (4/6/2017)   Next INR check 5/15/2017   Target end date Indefinite    Indications   AV graft thrombosis (CMS-HCC) [T82.868A]         Anticoagulation Episode Summary     INR check location     Preferred lab     Send INR reminders to     Comments       Anticoagulation Care Providers     Provider Role Specialty Phone number    Jairo Hopkins M.D. Referring Surgery [phone]    [facility] Responsible  [phone]        Anticoagulation Patient Findings  Pt's INR is SUBtherapeutic.   Pt started warfarin only 2 days ago after having back surgery.  Denies s/s of bleeding.  No lovenox, pt is on dialysis.  Pt is on last day of Clindamycin, likely reason for rapid INR increase.  Pt to reduce tomorrow's dose then Pt is to continue with current warfarin dosing regimen.    Follow up in 3 days.    Donald Cedeño, VIVEKD

## 2017-05-15 ENCOUNTER — ANTICOAGULATION VISIT (OUTPATIENT)
Dept: VASCULAR LAB | Facility: MEDICAL CENTER | Age: 37
End: 2017-05-15
Attending: INTERNAL MEDICINE
Payer: MEDICARE

## 2017-05-15 ENCOUNTER — HOSPITAL ENCOUNTER (OUTPATIENT)
Facility: MEDICAL CENTER | Age: 37
End: 2017-05-15
Payer: COMMERCIAL

## 2017-05-15 VITALS — DIASTOLIC BLOOD PRESSURE: 58 MMHG | SYSTOLIC BLOOD PRESSURE: 91 MMHG

## 2017-05-15 DIAGNOSIS — Z79.01 CHRONIC ANTICOAGULATION: ICD-10-CM

## 2017-05-15 DIAGNOSIS — T82.868D AV GRAFT THROMBOSIS, SUBSEQUENT ENCOUNTER: ICD-10-CM

## 2017-05-15 LAB
BDY FAT % MEASURED: 22.2 %
BP DIAS: 63 MMHG
BP SYS: 127 MMHG
CHOLEST SERPL-MCNC: 141 MG/DL (ref 100–199)
DIABETES HTDIA: NO
EVENT NAME HTEVT: NORMAL
FASTING HTFAS: YES
GLUCOSE SERPL-MCNC: 91 MG/DL (ref 65–99)
HDLC SERPL-MCNC: 25 MG/DL
HYPERTENSION HTHYP: YES
INR BLD: 1.6 (ref 0.9–1.2)
INR BLD: 2.9 (ref 0.9–1.2)
INR PPP: 2.9 (ref 2–3.5)
LDLC SERPL CALC-MCNC: 72 MG/DL
SCREENING LOC CITY HTCIT: NORMAL
SCREENING LOC STATE HTSTA: NORMAL
SCREENING LOCATION HTLOC: NORMAL
SMOKING HTSMO: NO
SUBSCRIBER ID HTSID: NORMAL
TRIGL SERPL-MCNC: 219 MG/DL (ref 0–149)

## 2017-05-15 PROCEDURE — 80061 LIPID PANEL: CPT

## 2017-05-15 PROCEDURE — S5190 WELLNESS ASSESSMENT BY NONPH: HCPCS

## 2017-05-15 PROCEDURE — 99211 OFF/OP EST MAY X REQ PHY/QHP: CPT

## 2017-05-15 PROCEDURE — 82947 ASSAY GLUCOSE BLOOD QUANT: CPT

## 2017-05-15 PROCEDURE — 85610 PROTHROMBIN TIME: CPT

## 2017-05-15 NOTE — PROGRESS NOTES
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 Referring Surgery [phone]    [facility] Responsible  [phone]          Plan:  Patient is therapeutic today. Confirmed dosing regimen. He was off warfarin about 5 days ago, has taken 4 doses as outlined in calendar and INR is already therapeutic. Prior to his recent procedure INR was greater than 8.0. Patient denies any changes in medications or diet. Patient denies any signs or symptoms of bleeding or clotting. Instructed patient to call clinic if any unusual bleeding or bruising occurs. Will reduce pts warfarin dose to 2.5 mg today as INR has trended up quickly and then have him begin reduced weekly regimen as outlined. . Will follow-up with patient in 3 days.        Muriel Khan, Pharm D

## 2017-05-15 NOTE — MR AVS SNAPSHOT
Denis De Anda   5/15/2017 7:45 AM   Anticoagulation Visit   MRN: [id number]    Department:  Vascular Medicine   Dept Phone:  [phone]    Description:  Male : 1980   Provider:  V EXAM 4           Allergies as of 5/15/2017     Allergen Noted Reactions    Baclofen 2015   Unspecified    Total loss of memory, sedation.   RXN=2015    Contrast Media With Iodine [Iodine] 2016   Rash    RXN=2017    Keflex 2009   Rash    RXN=possibly >10 years    Pcn [Penicillins] 2009   Rash    RXN=possibly >10 years ago    Tape 2013   Rash    Paper tape and tegaderm ok  RXN=ongoing      Vital Signs     Smoking Status                   Never Smoker            Basic Information     Date Of Birth Sex Race Ethnicity Preferred Language    1980 Male White Non- English      Your appointments     May 15, 2017  7:45 AM   Established Patient with IHV EXAM 4   [facility]  (--)    [address] [address]   [phone]            May 18, 2017  7:30 AM   Established Patient with IHVH EXAM 5   [facility]  (--)    [address] [address]   [phone]              Problem List              ICD-10-CM Priority Class Noted - Resolved    Insomnia G47.00   2009 - Present    Dupuytren's contracture of hand M72.0 Low  2010 - Present    Sleep apnea G47.30   10/3/2011 - Present    Osteopenia M85.80   5/10/2013 - Present    Vertebral compression fracture (CMS-HCC) M48.50XA   5/10/2013 - Present    Hyperlipidemia with target LDL less than 100 E78.5 Low  2013 - Present    Thrombophlebitis I80.9   2013 - Present    Venous collateral circulation, any site I87.8   2013 - Present    Calciphylaxis E83.59   2013 - Present    Anemia D64.9   2013 - Present    End stage renal disease (CMS-[facility]) N18.6   2013 - Present    Pain  R52   2/21/2015 - Present    Hyponatremia E87.1   2/23/2015 - Present    Reflex sympathetic dystrophy G90.50   3/24/2015 - Present    Non-healing surgical wound T81.89XA   6/22/2015 - Present    Acropachy [id number]   3/25/2016 - Present    Seizure disorder (CMS-HCC) G40.909   4/5/2016 - Present    Bacteremia R78.81   4/7/2016 - Present    Lethargy R53.83   4/9/2016 - Present    Pain syndrome, chronic G89.4   4/10/2016 - Present    Hyperkalemia E87.5   7/8/2016 - Present    ESRD (end stage renal disease) (CMS-HCC) N18.6 Medium  7/8/2016 - Present    Dermatofibroma of right upper arm D23.61   7/11/2016 - Present    Other specified epidermal thickening L85.8   8/5/2016 - Present    Chronic anticoagulation Z79.01   9/18/2016 - Present    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   10/20/2016 - Present    Open wound of left thigh S71.102A High  10/28/2016 - Present    Hypertension I10 Medium  11/15/2016 - Present    Hyperparathyroidism (CMS-HCC) E21.3 Low  11/15/2016 - Present    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 High  11/15/2016 - Present    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, 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 Low  11/15/2016 - Present    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   1/5/2017 - Present    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   1/6/2017 - Present    Chronic kidney disease, stage V (CMS-HCC) N18.5   3/28/2017 - Present    Acute midline thoracic back pain M54.6   4/19/2017 - Present    Chronic pain disorder G89.4   5/4/2017 - Present      Health Maintenance        Date Due Completion Dates    IMM DTaP/Tdap/Td Vaccine (2 - Tdap) 3/20/2018 (Originally 8/16/2000) 1/7/2000    IMM PNEUMOCOCCAL 19-64 (ADULT) HIGHEST RISK SERIES (1 of 3 - PCV13) 3/20/2018 (Originally 8/16/2000) ---            Results     POCT Protime      Component    INR    2.9                        Current Immunizations     Hepatitis B Vaccine Non-Recombivax (Ped/Adol) 10/5/2000, 2/8/2000, 1/7/2000     Influenza TIV (IM) 9/30/2015, 9/9/2014    Influenza Vaccine Pediatric 9/15/2009    MMR Vaccine 3/11/1983    Pneumococcal Vaccine (PCV7) Historical Data 1/1/2013    QF GOLD 8/7/2013    QUANTIFERON GOLD 8/7/2013    TD Vaccine 1/7/2000      Below and/or attached are the medications your provider expects you to take. Review all of your home medications and newly ordered medications with your provider and/or pharmacist. Follow medication instructions as directed by your provider and/or pharmacist. Please keep your medication list with you and share with your provider. Update the information when medications are discontinued, doses are changed, or new medications (including over-the-counter products) are added; and carry medication information at all times in the event of emergency situations     Allergies:  BACLOFEN - Unspecified     CONTRAST MEDIA WITH IODINE - Rash     KEFLEX - Rash     PCN - Rash     TAPE - Rash               Medications  Valid as of: May 15, 2017 -  7:22 AM    Generic Name Brand Name Tablet Size Instructions for use    Calcitriol (Cap) ROCALTROL 0.25 MCG Take 0.75 mcg by mouth every bedtime.        Cinacalcet HCl (Tab) SENSIPAR 30 MG Take 1 Tab by mouth every evening.        Gabapentin (Cap) NEURONTIN 300 MG Take 1,200 mg by mouth every bedtime.        Hydrocodone-Acetaminophen (Tab) NORCO  MG Take 2 Tabs by mouth every 12 hours as needed.        Sevelamer Carbonate (Tab) Sevelamer Carbonate 800 MG Take 3,200 mg by mouth 3 times a day, with meals. With meals        Warfarin Sodium (Tab) COUMADIN 5 MG Take 5 mg by mouth every day. 7.5 mg on Mon, Tues, Thurs and Friday  5 mg all other days  10 mg on night prior to surgery        .                 Medicines prescribed today were sent to:     [id number] - [place] - [address] AT [facility] [address]    [address] [address]    Phone: [phone] Fax: [phone]    Open 24 Hours?: No    [facility] [place] - [address] [address]    Phone: [phone] Fax: [phone]    Open 24 Hours?: No      Medication refill instructions:       If your prescription bottle indicates you have medication refills left, it is not necessary to call your provider’s office. Please contact your pharmacy and they will refill your medication.    If your prescription bottle indicates you do not have any refills left, you may request refills at any time through one of the following ways: The online [employer] system (except Urgent Care), by calling your provider’s office, or by asking your pharmacy to contact your provider’s office with a refill request. Medication refills are processed only during regular business hours and may not be available until the next business day. Your provider may request additional information or to have a follow-up visit with you prior to refilling your medication.   *Please Note: Medication refills are assigned a new Rx number when refilled electronically. Your pharmacy may indicate that no refills were authorized even though a new prescription for the same medication is available at the pharmacy. Please request the medicine by name with the pharmacy before contacting your provider for a refill.        Warfarin Dosing Calendar   May 2017 Details    Sun Mon Tue Wed Thu Fri Sat      1               2               3               4               5               6                 7               8               9               10               11               12               13                 14               15   2.9   2.5 mg   See details      16      5 mg         17      5 mg         18      5 mg         19               20                 21               22               23               24               25               26               27                 28               29               30               31                   Date Details   05/15 This INR check    INR: 2.9       Date of next INR:  5/18/2017         How to take your warfarin dose     To take:  2.5 mg Take 0.5 of a 5 mg tablet.    To take:  5 mg Take 1 of the 5 mg tablets.              [employer] Access Code: Activation code not generated  Current Sien Status: Active

## 2017-05-18 ENCOUNTER — ANTICOAGULATION VISIT (OUTPATIENT)
Dept: VASCULAR LAB | Facility: MEDICAL CENTER | Age: 37
End: 2017-05-18
Attending: INTERNAL MEDICINE
Payer: MEDICARE

## 2017-05-18 VITALS — DIASTOLIC BLOOD PRESSURE: 52 MMHG | SYSTOLIC BLOOD PRESSURE: 88 MMHG | HEART RATE: 99 BPM

## 2017-05-18 DIAGNOSIS — T82.868D AV GRAFT THROMBOSIS, SUBSEQUENT ENCOUNTER: ICD-10-CM

## 2017-05-18 LAB
INR BLD: 2.8 (ref 0.9–1.2)
INR PPP: 2.8 (ref 2–3.5)

## 2017-05-18 PROCEDURE — 99211 OFF/OP EST MAY X REQ PHY/QHP: CPT

## 2017-05-18 PROCEDURE — 85610 PROTHROMBIN TIME: CPT

## 2017-05-18 NOTE — PROGRESS NOTES
OP Anticoagulation Service Note    Date: 5/18/2017    Anticoagulation Summary as of 5/18/2017     INR goal 2.0-3.0   Selected INR 2.8 (5/18/2017)   Maintenance plan 2.5 mg (5 mg x 0.5) on Mon; 5 mg (5 mg x 1) all other days   Weekly total 32.5 mg   Plan last modified Muriel Khan PHARMD (5/18/2017)   Next INR check 5/25/2017   Target end date Indefinite    Indications   AV graft thrombosis (CMS-HCC) [T82.868A]         Anticoagulation Episode Summary     INR check location     Preferred lab     Send INR reminders to     Comments       Anticoagulation Care Providers     Provider Role Specialty Phone number    Jairo Hopkins M.D. Referring Surgery [phone]    [facility] Responsible  [phone]        Plan:  Patient is therapeutic today. Confirmed he followed dosing instructions. Patient denies any changes in medications or diet. Patient denies any signs or symptoms of bleeding or clotting. Instructed patient to call clinic if any unusual bleeding or bruising occurs. Will begin dosing as outlined, as he received 32.5 mg this past week and INR has been stable. Will follow-up with patient in 1 week. Patient reports Dr. Hopkins told him his INR range was 2.5 - 3.5, will contact his office to confirm INR range.       Muriel Khan, Pharm D

## 2017-05-18 NOTE — MR AVS SNAPSHOT
Denis De Anda   2017 7:30 AM   Anticoagulation Visit   MRN: [id number]    Department:  Vascular Medicine   Dept Phone:  [phone]    Description:  Male : 1980   Provider:  V EXAM 5           Allergies as of 2017     Allergen Noted Reactions    Baclofen 2015   Unspecified    Total loss of memory, sedation.   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, subsequent encounter   [[id number]]         Vital Signs     Smoking Status                   Never Smoker            Basic Information     Date Of Birth Sex Race Ethnicity Preferred Language    1980 Male White Non- English      Your appointments     May 18, 2017  7:30 AM   Established Patient with IHV EXAM 5   [facility]  (--)    [address] [address]   [phone]            May 25, 2017  7:30 AM   Established Patient with IHV EXAM 5   [facility]  (--)    [address] [address]   [phone]              Problem List              ICD-10-CM Priority Class Noted - Resolved    Insomnia G47.00   2009 - Present    Dupuytren's contracture of hand M72.0 Low  2010 - Present    Sleep apnea G47.30   10/3/2011 - Present    Osteopenia M85.80   5/10/2013 - Present    Vertebral compression fracture (CMS-HCC) M48.50XA   5/10/2013 - Present    Hyperlipidemia with target LDL less than 100 E78.5 Low  2013 - Present    Thrombophlebitis I80.9   2013 - Present    Venous collateral circulation, any site I87.8   2013 - Present    Calciphylaxis E83.59   2013 - Present    Anemia D64.9    9/26/2013 - Present    End stage renal disease (CMS-HCC) N18.6   12/12/2013 - Present    Pain R52   2/21/2015 - Present    Hyponatremia E87.1   2/23/2015 - Present    Reflex sympathetic dystrophy G90.50   3/24/2015 - Present    Non-healing surgical wound T81.89XA   6/22/2015 - Present    Acropachy [id number]   3/25/2016 - Present    Seizure disorder (CMS-HCC) G40.909   4/5/2016 - Present    Bacteremia R78.81   4/7/2016 - Present    Lethargy R53.83   4/9/2016 - Present    Pain syndrome, chronic G89.4   4/10/2016 - Present    Hyperkalemia E87.5   7/8/2016 - Present    ESRD (end stage renal disease) (CMS-HCC) N18.6 Medium  7/8/2016 - Present    Dermatofibroma of right upper arm D23.61   7/11/2016 - Present    Other specified epidermal thickening L85.8   8/5/2016 - Present    Chronic anticoagulation Z79.01   9/18/2016 - Present    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   10/20/2016 - Present    Open wound of left thigh S71.102A High  10/28/2016 - Present    Hypertension I10 Medium  11/15/2016 - Present    Hyperparathyroidism (CMS-HCC) E21.3 Low  11/15/2016 - Present    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 High  11/15/2016 - Present    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, 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.11 Low  11/15/2016 - Present    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   1/5/2017 - Present    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   1/6/2017 - Present    Chronic kidney disease, stage V (CMS-HCC) N18.5   3/28/2017 - Present    Acute midline thoracic back pain M54.6   4/19/2017 - Present    Chronic pain disorder G89.4   5/4/2017 - Present      Health Maintenance        Date Due Completion Dates    IMM DTaP/Tdap/Td Vaccine (2 - Tdap) 3/20/2018 (Originally 8/16/2000) 1/7/2000    IMM PNEUMOCOCCAL 19-64 (ADULT) HIGHEST RISK SERIES (1 of 3 - PCV13) 3/20/2018 (Originally 8/16/2000) ---            Results     POCT Protime      Component    INR    2.8                        Current  Immunizations     Hepatitis B Vaccine Non-Recombivax (Ped/Adol) 10/5/2000, 2/8/2000, 1/7/2000    Influenza TIV (IM) 9/30/2015, 9/9/2014    Influenza Vaccine Pediatric 9/15/2009    MMR Vaccine 3/11/1983    Pneumococcal Vaccine (PCV7) Historical Data 1/1/2013    QF GOLD 8/7/2013    QUANTIFERON GOLD 8/7/2013    TD Vaccine 1/7/2000      Below and/or attached are the medications your provider expects you to take. Review all of your home medications and newly ordered medications with your provider and/or pharmacist. Follow medication instructions as directed by your provider and/or pharmacist. Please keep your medication list with you and share with your provider. Update the information when medications are discontinued, doses are changed, or new medications (including over-the-counter products) are added; and carry medication information at all times in the event of emergency situations     Allergies:  BACLOFEN - Unspecified     CONTRAST MEDIA WITH IODINE - Rash     KEFLEX - Rash     PCN - Rash     TAPE - Rash               Medications  Valid as of: May 18, 2017 -  7:15 AM    Generic Name Brand Name Tablet Size Instructions for use    Calcitriol (Cap) ROCALTROL 0.25 MCG Take 0.75 mcg by mouth every bedtime.        Cinacalcet HCl (Tab) SENSIPAR 30 MG Take 1 Tab by mouth every evening.        Gabapentin (Cap) NEURONTIN 300 MG Take 1,200 mg by mouth every bedtime.        Hydrocodone-Acetaminophen (Tab) NORCO  MG Take 2 Tabs by mouth every 12 hours as needed.        Sevelamer Carbonate (Tab) Sevelamer Carbonate 800 MG Take 3,200 mg by mouth 3 times a day, with meals. With meals        Warfarin Sodium (Tab) COUMADIN 5 MG Take 5 mg by mouth every day. 7.5 mg on Mon, Tues, Thurs and Friday  5 mg all other days  10 mg on night prior to surgery        .                 Medicines prescribed today were sent to:     [id number]  [place], [place] - [address] AT [facility] [place] [employer] [facility]    [address]  [place] [address]    Phone: [phone] Fax: [phone]    Open 24 Hours?: No    [facility] - [place], [place] - [address] [facility] [address] [facility]    [address] [facility] [facility] [address]    Phone: [phone] Fax: [phone]    Open 24 Hours?: No      Medication refill instructions:       If your prescription bottle indicates you have medication refills left, it is not necessary to call your provider’s office. Please contact your pharmacy and they will refill your medication.    If your prescription bottle indicates you do not have any refills left, you may request refills at any time through one of the following ways: The online [employer] system (except Urgent Care), by calling your provider’s office, or by asking your pharmacy to contact your provider’s office with a refill request. Medication refills are processed only during regular business hours and may not be available until the next business day. Your provider may request additional information or to have a follow-up visit with you prior to refilling your medication.   *Please Note: Medication refills are assigned a new Rx number when refilled electronically. Your pharmacy may indicate that no refills were authorized even though a new prescription for the same medication is available at the pharmacy. Please request the medicine by name with the pharmacy before contacting your provider for a refill.        Warfarin Dosing Calendar   May 2017 Details    Sun Mon Tue Wed Thu Fri Sat      1               2               3               4               5               6                 7               8               9               10               11               12               13                 14               15               16               17               18   2.8   5 mg   See details      19      5 mg         20      5 mg           21      5 mg         22      2.5 mg         23      5 mg         24      5 mg         25      5 mg         26               27                  28               29               30               31                   Date Details   05/18 This INR check   INR: 2.8       Date of next INR:  5/25/2017         How to take your warfarin dose     To take:  2.5 mg Take 0.5 of a 5 mg tablet.    To take:  5 mg Take 1 of the 5 mg tablets.              [employer] Access Code: Activation code not generated  Current [employer] Status: Active

## 2017-05-22 ENCOUNTER — ANTICOAGULATION MONITORING (OUTPATIENT)
Dept: VASCULAR LAB | Facility: MEDICAL CENTER | Age: 37
End: 2017-05-22

## 2017-05-22 DIAGNOSIS — T82.868D AV GRAFT THROMBOSIS, SUBSEQUENT ENCOUNTER: ICD-10-CM

## 2017-05-22 NOTE — PROGRESS NOTES
Dr. Hopkins's office returned phone call. Pts INR goal is 2.5 - 3.5, they will send new referral to our office. Last INR is within this range. Updated in chart.     VIVEK SalinasD

## 2017-05-25 ENCOUNTER — ANTICOAGULATION VISIT (OUTPATIENT)
Dept: VASCULAR LAB | Facility: MEDICAL CENTER | Age: 37
End: 2017-05-25
Attending: INTERNAL MEDICINE
Payer: MEDICARE

## 2017-05-25 VITALS — SYSTOLIC BLOOD PRESSURE: 91 MMHG | DIASTOLIC BLOOD PRESSURE: 53 MMHG | HEART RATE: 99 BPM

## 2017-05-25 DIAGNOSIS — T82.868D AV GRAFT THROMBOSIS, SUBSEQUENT ENCOUNTER: ICD-10-CM

## 2017-05-25 LAB
INR BLD: 3.7 (ref 0.9–1.2)
INR PPP: 3.7 (ref 2–3.5)

## 2017-05-25 PROCEDURE — 99211 OFF/OP EST MAY X REQ PHY/QHP: CPT

## 2017-05-25 PROCEDURE — 85610 PROTHROMBIN TIME: CPT

## 2017-05-25 NOTE — MR AVS SNAPSHOT
Denis De Anda   2017 7:30 AM   Anticoagulation Visit   MRN: [id number]    Department:  Vascular Medicine   Dept Phone:  [phone]    Description:  Male : 1980   Provider:  V EXAM 5           Allergies as of 2017     Allergen Noted Reactions    Baclofen 2015   Unspecified    Total loss of memory, sedation.   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, subsequent encounter   [[id number]]         Vital Signs     Smoking Status                   Never Smoker            Basic Information     Date Of Birth Sex Race Ethnicity Preferred Language    1980 Male White Non- English      Your appointments     May 25, 2017  7:30 AM   Established Patient with IHV EXAM 5   [facility]  (--)    [address] [facility] [address]   [phone]            2017  7:30 AM   Established Patient with IHV EXAM 4   [facility]  (--)    [address] [facility] [address]   [phone]              Problem List              ICD-10-CM Priority Class Noted - Resolved    Insomnia G47.00   2009 - Present    Dupuytren's contracture of hand M72.0 Low  2010 - Present    Sleep apnea G47.30   10/3/2011 - Present    Osteopenia M85.80   5/10/2013 - Present    Vertebral compression fracture (CMS-HCC) M48.50XA   5/10/2013 - Present    Hyperlipidemia with target LDL less than 100 E78.5 Low  2013 - Present    Thrombophlebitis I80.9   2013 - Present    Venous collateral circulation, any site I87.8   2013 - Present    Calciphylaxis E83.59   2013 - Present    Anemia D64.9    9/26/2013 - Present    End stage renal disease (CMS-HCC) N18.6   12/12/2013 - Present    Pain R52   2/21/2015 - Present    Hyponatremia E87.1   2/23/2015 - Present    Reflex sympathetic dystrophy G90.50   3/24/2015 - Present    Non-healing surgical wound T81.89XA   6/22/2015 - Present    Acropachy [id number]   3/25/2016 - Present    Seizure disorder (CMS-HCC) G40.909   4/5/2016 - Present    Bacteremia R78.81   4/7/2016 - Present    Lethargy R53.83   4/9/2016 - Present    Pain syndrome, chronic G89.4   4/10/2016 - Present    Hyperkalemia E87.5   7/8/2016 - Present    ESRD (end stage renal disease) (CMS-HCC) N18.6 Medium  7/8/2016 - Present    Dermatofibroma of right upper arm D23.61   7/11/2016 - Present    Other specified epidermal thickening L85.8   8/5/2016 - Present    Chronic anticoagulation Z79.01   9/18/2016 - Present    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   10/20/2016 - Present    Open wound of left thigh S71.102A High  10/28/2016 - Present    Hypertension I10 Medium  11/15/2016 - Present    Hyperparathyroidism (CMS-HCC) E21.3 Low  11/15/2016 - Present    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 High  11/15/2016 - Present    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, 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 Low  11/15/2016 - Present    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   1/5/2017 - Present    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   1/6/2017 - Present    Chronic kidney disease, stage V (CMS-HCC) N18.5   3/28/2017 - Present    Acute midline thoracic back pain M54.6   4/19/2017 - Present    Chronic pain disorder G89.4   5/4/2017 - Present      Health Maintenance        Date Due Completion Dates    IMM DTaP/Tdap/Td Vaccine (2 - Tdap) 3/20/2018 (Originally 8/16/2000) 1/7/2000    IMM PNEUMOCOCCAL 19-64 (ADULT) HIGHEST RISK SERIES (1 of 3 - PCV13) 3/20/2018 (Originally 8/16/2000) ---            Results     POCT Protime      Component    INR    3.7                        Current  Immunizations     Hepatitis B Vaccine Non-Recombivax (Ped/Adol) 10/5/2000, 2/8/2000, 1/7/2000    Influenza TIV (IM) 9/30/2015, 9/9/2014    Influenza Vaccine Pediatric 9/15/2009    MMR Vaccine 3/11/1983    Pneumococcal Vaccine (PCV7) Historical Data 1/1/2013    QF GOLD 8/7/2013    QUANTIFERON GOLD 8/7/2013    TD Vaccine 1/7/2000      Below and/or attached are the medications your provider expects you to take. Review all of your home medications and newly ordered medications with your provider and/or pharmacist. Follow medication instructions as directed by your provider and/or pharmacist. Please keep your medication list with you and share with your provider. Update the information when medications are discontinued, doses are changed, or new medications (including over-the-counter products) are added; and carry medication information at all times in the event of emergency situations     Allergies:  BACLOFEN - Unspecified     CONTRAST MEDIA WITH IODINE - Rash     KEFLEX - Rash     PCN - Rash     TAPE - Rash               Medications  Valid as of: May 25, 2017 -  7:19 AM    Generic Name Brand Name Tablet Size Instructions for use    Calcitriol (Cap) ROCALTROL 0.25 MCG Take 0.75 mcg by mouth every bedtime.        Cinacalcet HCl (Tab) SENSIPAR 30 MG Take 1 Tab by mouth every evening.        Gabapentin (Cap) NEURONTIN 300 MG Take 1,200 mg by mouth every bedtime.        Hydrocodone-Acetaminophen (Tab) NORCO  MG Take 2 Tabs by mouth every 12 hours as needed.        Sevelamer Carbonate (Tab) Sevelamer Carbonate 800 MG Take 3,200 mg by mouth 3 times a day, with meals. With meals        Warfarin Sodium (Tab) COUMADIN 5 MG Take 5 mg by mouth every day. 7.5 mg on Mon, Tues, Thurs and Friday  5 mg all other days  10 mg on night prior to surgery        .                 Medicines prescribed today were sent to:     [id number]  [place], [place] - [address] AT [facility] [address] [facility]    [address]  [place] [address]    Phone: [phone] Fax: [phone]    Open 24 Hours?: No    [facility] - [place], [place] - [address]    [address] [place] [address]    Phone: [phone] Fax: [phone]    Open 24 Hours?: No      Medication refill instructions:       If your prescription bottle indicates you have medication refills left, it is not necessary to call your provider’s office. Please contact your pharmacy and they will refill your medication.    If your prescription bottle indicates you do not have any refills left, you may request refills at any time through one of the following ways: The online [employer] system (except Urgent Care), by calling your provider’s office, or by asking your pharmacy to contact your provider’s office with a refill request. Medication refills are processed only during regular business hours and may not be available until the next business day. Your provider may request additional information or to have a follow-up visit with you prior to refilling your medication.   *Please Note: Medication refills are assigned a new Rx number when refilled electronically. Your pharmacy may indicate that no refills were authorized even though a new prescription for the same medication is available at the pharmacy. Please request the medicine by name with the pharmacy before contacting your provider for a refill.        Warfarin Dosing Calendar   May 2017 Details    Sun Mon Tue Wed Thu Fri Sat      1               2               3               4               5               6                 7               8               9               10               11               12               13                 14               15               16               17               18               19               20                 21               22               23               24               25   3.7   2.5 mg   See details      26      5 mg         27      5 mg           28         5 mg         29      2.5 mg         30      5 mg         31      5 mg             Date Details   05/25 This INR check   INR: 3.7               How to take your warfarin dose     To take:  2.5 mg Take 0.5 of a 5 mg tablet.    To take:  5 mg Take 1 of the 5 mg tablets.           Warfarin Dosing Calendar   June 2017 Details    Sun Mon Tue Wed Thu Fri Sat         1      2.5 mg         2               3                 4               5               6               7               8               9               10                 11               12               13               14               15               16               17                 18               19               20               21               22               23               24                 25               26               27               28               29               30                 Date Details   No additional details    Date of next INR:  6/1/2017         How to take your warfarin dose     To take:  2.5 mg Take 0.5 of a 5 mg tablet.              [employer] Access Code: Activation code not generated  Current [employer] Status: Active

## 2017-05-25 NOTE — PROGRESS NOTES
OP Anticoagulation Service Note    Date: 5/25/2017    Anticoagulation Summary as of 5/25/2017     INR goal 2.5-3.5   Selected INR 3.7! (5/25/2017)   Maintenance plan 2.5 mg (5 mg x 0.5) on Mon, Thu; 5 mg (5 mg x 1) all other days   Weekly total 30 mg   Plan last modified Muriel Khan PHARMD (5/25/2017)   Next INR check 6/1/2017   Target end date Indefinite    Indications   AV graft thrombosis (CMS-HCC) [T82.868A]         Anticoagulation Episode Summary     INR check location     Preferred lab     Send INR reminders to     Comments INR goal of 2.5 - 3.5 per Dr. Hopkins      Anticoagulation Care Providers     Provider Role Specialty Phone number    Jairo Hopkins M.D. Referring Surgery [phone]    [facility]agulation Services Responsible  [phone]        Anticoagulation Patient Findings   Negatives Missed Doses, Extra Doses, Medication Changes, Antibiotic Use, Diet Changes, Dental/Other Procedures, Hospitalization, Bleeding Gums, Nose Bleeds, Blood in Urine, Blood in Stool, Any Bruising, Other Complaints          Plan:  INR is high today. Confirmed dosing regimen. No missed or extra doses taken. Patient denies sign/symptoms of bleeding/clotting. No recent medication changes and patient has been eating a consistent diet. Instructed pt to call clinic with any concerns of bleeding or thrombosis. Will decrease weekly regimen as outlined.  Follow up in 1 week(s)      Muriel Khan PHARMD

## 2017-05-31 DIAGNOSIS — T82.868D AV GRAFT THROMBOSIS, SUBSEQUENT ENCOUNTER: ICD-10-CM

## 2017-06-01 ENCOUNTER — ANTICOAGULATION VISIT (OUTPATIENT)
Dept: VASCULAR LAB | Facility: MEDICAL CENTER | Age: 37
End: 2017-06-01
Attending: INTERNAL MEDICINE
Payer: MEDICARE

## 2017-06-01 DIAGNOSIS — Z79.01 CHRONIC ANTICOAGULATION: ICD-10-CM

## 2017-06-01 LAB
INR BLD: 3.4 (ref 0.9–1.2)
INR PPP: 3.4 (ref 2–3.5)

## 2017-06-01 PROCEDURE — 99211 OFF/OP EST MAY X REQ PHY/QHP: CPT

## 2017-06-01 PROCEDURE — 85610 PROTHROMBIN TIME: CPT

## 2017-06-01 NOTE — MR AVS SNAPSHOT
Denis De Anda   2017 7:30 AM   Anticoagulation Visit   MRN: [id number]    Department:  Vascular Medicine   Dept Phone:  [phone]    Description:  Male : 1980   Provider:  V EXAM 4           Allergies as of 2017     Allergen Noted Reactions    Baclofen 2015   Unspecified    Total loss of memory, sedation.   RXN=2015    Contrast Media With Iodine [Iodine] 2016   Rash    RXN=2017    Keflex 2009   Rash    RXN=possibly >10 years    Pcn [Penicillins] 2009   Rash    RXN=possibly >10 years ago    Tape 2013   Rash    Paper tape and tegaderm ok  RXN=ongoing      Vital Signs     Smoking Status                   Never Smoker            Basic Information     Date Of Birth Sex Race Ethnicity Preferred Language    1980 Male White Non- English      Your appointments     2017  7:30 AM   Established Patient with IHV EXAM 4   [facility]  (--)    [address] [facility] [address]   [phone]            2017  7:30 AM   Established Patient with IHV EXAM 5   [facility]  (--)    [address] [facility] [address]   [phone]              Problem List              ICD-10-CM Priority Class Noted - Resolved    Insomnia G47.00   2009 - Present    Dupuytren's contracture of hand M72.0 Low  2010 - Present    Sleep apnea G47.30   10/3/2011 - Present    Osteopenia M85.80   5/10/2013 - Present    Vertebral compression fracture (CMS-HCC) M48.50XA   5/10/2013 - Present    Hyperlipidemia with target LDL less than 100 E78.5 Low  2013 - Present    Thrombophlebitis I80.9   2013 - Present    Venous collateral circulation, any site I87.8   2013 - Present    Calciphylaxis E83.59   2013 - Present    Anemia D64.9   2013 - Present    End stage renal disease (CMS-[facility]) N18.6   2013 - Present    Pain R52    2/21/2015 - Present    Hyponatremia E87.1   2/23/2015 - Present    Reflex sympathetic dystrophy G90.50   3/24/2015 - Present    Non-healing surgical wound T81.89XA   6/22/2015 - Present    Acropachy [id number]   3/25/2016 - Present    Seizure disorder (CMS-HCC) G40.909   4/5/2016 - Present    Bacteremia R78.81   4/7/2016 - Present    Lethargy R53.83   4/9/2016 - Present    Pain syndrome, chronic G89.4   4/10/2016 - Present    Hyperkalemia E87.5   7/8/2016 - Present    ESRD (end stage renal disease) (CMS-HCC) N18.6 Medium  7/8/2016 - Present    Dermatofibroma of right upper arm D23.61   7/11/2016 - Present    Other specified epidermal thickening L85.8   8/5/2016 - Present    Chronic anticoagulation Z79.01   9/18/2016 - Present    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   10/20/2016 - Present    Open wound of left thigh S71.102A High  10/28/2016 - Present    Hypertension I10 Medium  11/15/2016 - Present    Hyperparathyroidism (CMS-HCC) E21.3 Low  11/15/2016 - Present    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 High  11/15/2016 - Present    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, 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.11 Low  11/15/2016 - Present    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   1/5/2017 - Present    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   1/6/2017 - Present    Chronic kidney disease, stage V (CMS-HCC) N18.5   3/28/2017 - Present    Acute midline thoracic back pain M54.6   4/19/2017 - Present    Chronic pain disorder G89.4   5/4/2017 - Present      Health Maintenance        Date Due Completion Dates    IMM DTaP/Tdap/Td Vaccine (2 - Tdap) 3/20/2018 (Originally 8/16/2000) 1/7/2000    IMM PNEUMOCOCCAL 19-64 (ADULT) HIGHEST RISK SERIES (1 of 3 - PCV13) 3/20/2018 (Originally 8/16/2000) ---            Results     POCT Protime      Component    INR    3.4                        Current Immunizations     Hepatitis B Vaccine Non-Recombivax (Ped/Adol) 10/5/2000, 2/8/2000, 1/7/2000     Influenza TIV (IM) 9/30/2015, 9/9/2014    Influenza Vaccine Pediatric 9/15/2009    MMR Vaccine 3/11/1983    Pneumococcal Vaccine (PCV7) Historical Data 1/1/2013    QF GOLD 8/7/2013    QUANTIFERON GOLD 8/7/2013    TD Vaccine 1/7/2000      Below and/or attached are the medications your provider expects you to take. Review all of your home medications and newly ordered medications with your provider and/or pharmacist. Follow medication instructions as directed by your provider and/or pharmacist. Please keep your medication list with you and share with your provider. Update the information when medications are discontinued, doses are changed, or new medications (including over-the-counter products) are added; and carry medication information at all times in the event of emergency situations     Allergies:  BACLOFEN - Unspecified     CONTRAST MEDIA WITH IODINE - Rash     KEFLEX - Rash     PCN - Rash     TAPE - Rash               Medications  Valid as of: June 01, 2017 -  7:24 AM    Generic Name Brand Name Tablet Size Instructions for use    Calcitriol (Cap) ROCALTROL 0.25 MCG Take 0.75 mcg by mouth every bedtime.        Cinacalcet HCl (Tab) SENSIPAR 30 MG Take 1 Tab by mouth every evening.        Gabapentin (Cap) NEURONTIN 300 MG Take 1,200 mg by mouth every bedtime.        Hydrocodone-Acetaminophen (Tab) NORCO  MG Take 2 Tabs by mouth every 12 hours as needed.        Sevelamer Carbonate (Tab) Sevelamer Carbonate 800 MG Take 3,200 mg by mouth 3 times a day, with meals. With meals        Warfarin Sodium (Tab) COUMADIN 5 MG Take 5 mg by mouth every day. 7.5 mg on Mon, Tues, Thurs and Friday  5 mg all other days  10 mg on night prior to surgery        .                 Medicines prescribed today were sent to:     [id number] - [place] - [address] AT [facility] [place] & [address]    [address] [address]    Phone: [phone] Fax: [phone]    Open 24 Hours?: No    [facility] -  [place], [place] - [address]    [address] [place] [address]    Phone: [phone] Fax: [phone]    Open 24 Hours?: No      Medication refill instructions:       If your prescription bottle indicates you have medication refills left, it is not necessary to call your provider’s office. Please contact your pharmacy and they will refill your medication.    If your prescription bottle indicates you do not have any refills left, you may request refills at any time through one of the following ways: The online [employer] system (except Urgent Care), by calling your provider’s office, or by asking your pharmacy to contact your provider’s office with a refill request. Medication refills are processed only during regular business hours and may not be available until the next business day. Your provider may request additional information or to have a follow-up visit with you prior to refilling your medication.   *Please Note: Medication refills are assigned a new Rx number when refilled electronically. Your pharmacy may indicate that no refills were authorized even though a new prescription for the same medication is available at the pharmacy. Please request the medicine by name with the pharmacy before contacting your provider for a refill.        Warfarin Dosing Calendar   June 2017 Details    Sun Mon Tue Wed Thu Fri Sat         1   3.4   2.5 mg   See details      2      5 mg         3      5 mg           4      5 mg         5      2.5 mg         6      5 mg         7      5 mg         8      2.5 mg         9      5 mg         10      5 mg           11      5 mg         12      2.5 mg         13               14               15               16               17                 18               19               20               21               22               23               24                 25               26               27               28               29               30                  Date Details   06/01 This INR check   INR: 3.4       Date of next INR:  6/12/2017         How to take your warfarin dose     To take:  2.5 mg Take 0.5 of a 5 mg tablet.    To take:  5 mg Take 1 of the 5 mg tablets.              [employer] Access Code: Activation code not generated  Current [employer] Status: Active

## 2017-06-01 NOTE — PROGRESS NOTES
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 Hopkins      Anticoagulation Care Providers     Provider Role Specialty Phone number    Jairo Hopkins M.D. Referring Surgery [phone]    [facility] Anticoagulation Services Responsible  [phone]        Anticoagulation Patient Findings    Patient's INR was therapeutic today in clinic.    Pt denies any unusual s/s of bleeding, bruising, clotting or any changes to diet or medications.  Confirmed dosing regimen.  Pt is to continue with current warfarin dosing regimen.  Follow up in 1.5 weeks.    Donald Cedeño PHARMD     Pt called back to report that he will be having plastic surgery 6-5-2017.  Pt will begin holding warfarin today - lovenox bridging is not indicated: last Cr 12.62.  Follow up in 1 weeks.post op   Renetta Dunlap PHARMD

## 2017-06-05 ENCOUNTER — HOSPITAL ENCOUNTER (OUTPATIENT)
Facility: MEDICAL CENTER | Age: 37
End: 2017-06-05
Attending: PLASTIC SURGERY | Admitting: PLASTIC SURGERY
Payer: MEDICARE

## 2017-06-05 VITALS
HEIGHT: 64 IN | HEART RATE: 67 BPM | OXYGEN SATURATION: 99 % | TEMPERATURE: 98.2 F | BODY MASS INDEX: 31.54 KG/M2 | WEIGHT: 184.75 LBS | RESPIRATION RATE: 16 BRPM

## 2017-06-05 PROBLEM — L30.4 INTERTRIGO: Status: ACTIVE | Noted: 2017-06-05

## 2017-06-05 LAB
ANION GAP SERPL CALC-SCNC: 14 MMOL/L (ref 0–11.9)
BUN SERPL-MCNC: 45 MG/DL (ref 8–22)
CALCIUM SERPL-MCNC: 9.4 MG/DL (ref 8.5–10.5)
CHLORIDE SERPL-SCNC: 95 MMOL/L (ref 96–112)
CO2 SERPL-SCNC: 26 MMOL/L (ref 20–33)
CREAT SERPL-MCNC: 9.53 MG/DL (ref 0.5–1.4)
GFR SERPL CREATININE-BSD FRML MDRD: 6 ML/MIN/1.73 M 2
GLUCOSE SERPL-MCNC: 83 MG/DL (ref 65–99)
INR PPP: 1.17 (ref 0.87–1.13)
POTASSIUM SERPL-SCNC: 5.2 MMOL/L (ref 3.6–5.5)
PROTHROMBIN TIME: 15.3 SEC (ref 12–14.6)
SODIUM SERPL-SCNC: 135 MMOL/L (ref 135–145)

## 2017-06-05 PROCEDURE — 160046 HCHG PACU - 1ST 60 MINS PHASE II: Performed by: PLASTIC SURGERY

## 2017-06-05 PROCEDURE — 500064 HCHG BINDER, 4-PANEL MED/LG: Performed by: PLASTIC SURGERY

## 2017-06-05 PROCEDURE — A6223 GAUZE >16<=48 NO W/SAL W/O B: HCPCS | Performed by: PLASTIC SURGERY

## 2017-06-05 PROCEDURE — 501835 HCHG SUTURE PLASTIC: Performed by: PLASTIC SURGERY

## 2017-06-05 PROCEDURE — 500698 HCHG HEMOCLIP, MEDIUM: Performed by: PLASTIC SURGERY

## 2017-06-05 PROCEDURE — 500389 HCHG DRAIN, RESERVOIR SUCT JP 100CC: Performed by: PLASTIC SURGERY

## 2017-06-05 PROCEDURE — 500123 HCHG BOVIE, CONTROL W/BLADE: Performed by: PLASTIC SURGERY

## 2017-06-05 PROCEDURE — 501838 HCHG SUTURE GENERAL: Performed by: PLASTIC SURGERY

## 2017-06-05 PROCEDURE — 700111 HCHG RX REV CODE 636 W/ 250 OVERRIDE (IP)

## 2017-06-05 PROCEDURE — 36415 COLL VENOUS BLD VENIPUNCTURE: CPT

## 2017-06-05 PROCEDURE — A6404 STERILE GAUZE > 48 SQ IN: HCPCS | Performed by: PLASTIC SURGERY

## 2017-06-05 PROCEDURE — 501445 HCHG STAPLER, SKIN DISP: Performed by: PLASTIC SURGERY

## 2017-06-05 PROCEDURE — 700101 HCHG RX REV CODE 250

## 2017-06-05 PROCEDURE — 500423 HCHG DRESSING, ABD COMBINE: Performed by: PLASTIC SURGERY

## 2017-06-05 PROCEDURE — 500371 HCHG DRAIN, BLAKE 10MM: Performed by: PLASTIC SURGERY

## 2017-06-05 PROCEDURE — 160002 HCHG RECOVERY MINUTES (STAT): Performed by: PLASTIC SURGERY

## 2017-06-05 PROCEDURE — 160048 HCHG OR STATISTICAL LEVEL 1-5: Performed by: PLASTIC SURGERY

## 2017-06-05 PROCEDURE — 160035 HCHG PACU - 1ST 60 MINS PHASE I: Performed by: PLASTIC SURGERY

## 2017-06-05 PROCEDURE — 502240 HCHG MISC OR SUPPLY RC 0272: Performed by: PLASTIC SURGERY

## 2017-06-05 PROCEDURE — 93010 ELECTROCARDIOGRAM REPORT: CPT | Performed by: INTERNAL MEDICINE

## 2017-06-05 PROCEDURE — 160036 HCHG PACU - EA ADDL 30 MINS PHASE I: Performed by: PLASTIC SURGERY

## 2017-06-05 PROCEDURE — 160009 HCHG ANES TIME/MIN: Performed by: PLASTIC SURGERY

## 2017-06-05 PROCEDURE — 500697 HCHG HEMOCLIP, LARGE (ORANGE): Performed by: PLASTIC SURGERY

## 2017-06-05 PROCEDURE — 85610 PROTHROMBIN TIME: CPT

## 2017-06-05 PROCEDURE — 160028 HCHG SURGERY MINUTES - 1ST 30 MINS LEVEL 3: Performed by: PLASTIC SURGERY

## 2017-06-05 PROCEDURE — 160039 HCHG SURGERY MINUTES - EA ADDL 1 MIN LEVEL 3: Performed by: PLASTIC SURGERY

## 2017-06-05 PROCEDURE — 700102 HCHG RX REV CODE 250 W/ 637 OVERRIDE(OP)

## 2017-06-05 PROCEDURE — 93005 ELECTROCARDIOGRAM TRACING: CPT | Performed by: PLASTIC SURGERY

## 2017-06-05 PROCEDURE — A9270 NON-COVERED ITEM OR SERVICE: HCPCS

## 2017-06-05 PROCEDURE — 160047 HCHG PACU  - EA ADDL 30 MINS PHASE II: Performed by: PLASTIC SURGERY

## 2017-06-05 PROCEDURE — 80048 BASIC METABOLIC PNL TOTAL CA: CPT

## 2017-06-05 PROCEDURE — 160025 RECOVERY II MINUTES (STATS): Performed by: PLASTIC SURGERY

## 2017-06-05 RX ORDER — ERYTHROMYCIN 500 MG/1
500 TABLET, COATED ORAL 4 TIMES DAILY
Qty: 28 TAB | Refills: 0 | Status: ON HOLD | OUTPATIENT
Start: 2017-06-05 | End: 2017-07-31

## 2017-06-05 RX ORDER — CELECOXIB 200 MG/1
CAPSULE ORAL
Status: COMPLETED
Start: 2017-06-05 | End: 2017-06-05

## 2017-06-05 RX ORDER — ACETAMINOPHEN 500 MG
TABLET ORAL
Status: COMPLETED
Start: 2017-06-05 | End: 2017-06-05

## 2017-06-05 RX ORDER — GABAPENTIN 300 MG/1
CAPSULE ORAL
Status: COMPLETED
Start: 2017-06-05 | End: 2017-06-05

## 2017-06-05 RX ORDER — HYDROCODONE BITARTRATE AND ACETAMINOPHEN 10; 325 MG/1; MG/1
1-2 TABLET ORAL EVERY 12 HOURS PRN
Qty: 20 TAB | Refills: 0 | Status: ON HOLD | OUTPATIENT
Start: 2017-06-05 | End: 2018-03-02

## 2017-06-05 RX ORDER — LIDOCAINE HYDROCHLORIDE 10 MG/ML
0.5 INJECTION, SOLUTION INFILTRATION; PERINEURAL
Status: COMPLETED | OUTPATIENT
Start: 2017-06-05 | End: 2017-06-05

## 2017-06-05 RX ORDER — SODIUM CHLORIDE 9 MG/ML
INJECTION, SOLUTION INTRAVENOUS CONTINUOUS
Status: DISCONTINUED | OUTPATIENT
Start: 2017-06-05 | End: 2017-06-05 | Stop reason: HOSPADM

## 2017-06-05 RX ORDER — BUPIVACAINE HYDROCHLORIDE AND EPINEPHRINE 2.5; 5 MG/ML; UG/ML
INJECTION, SOLUTION EPIDURAL; INFILTRATION; INTRACAUDAL; PERINEURAL
Status: DISCONTINUED | OUTPATIENT
Start: 2017-06-05 | End: 2017-06-05 | Stop reason: HOSPADM

## 2017-06-05 RX ORDER — LIDOCAINE HYDROCHLORIDE 10 MG/ML
INJECTION, SOLUTION INFILTRATION; PERINEURAL
Status: DISCONTINUED
Start: 2017-06-05 | End: 2017-06-05 | Stop reason: HOSPADM

## 2017-06-05 RX ORDER — LIDOCAINE AND PRILOCAINE 25; 25 MG/G; MG/G
1 CREAM TOPICAL
Status: COMPLETED | OUTPATIENT
Start: 2017-06-05 | End: 2017-06-05

## 2017-06-05 RX ORDER — OXYCODONE HCL 5 MG/5 ML
SOLUTION, ORAL ORAL
Status: COMPLETED
Start: 2017-06-05 | End: 2017-06-05

## 2017-06-05 RX ADMIN — OXYCODONE HYDROCHLORIDE 10 MG: 5 SOLUTION ORAL at 16:20

## 2017-06-05 RX ADMIN — FENTANYL CITRATE 25 MCG: 50 INJECTION, SOLUTION INTRAMUSCULAR; INTRAVENOUS at 16:25

## 2017-06-05 RX ADMIN — ACETAMINOPHEN 1000 MG: 500 TABLET, FILM COATED ORAL at 13:10

## 2017-06-05 RX ADMIN — LIDOCAINE HYDROCHLORIDE 0.5 ML: 10 INJECTION, SOLUTION INFILTRATION; PERINEURAL at 13:05

## 2017-06-05 RX ADMIN — GABAPENTIN 300 MG: 300 CAPSULE ORAL at 13:10

## 2017-06-05 RX ADMIN — FENTANYL CITRATE 25 MCG: 50 INJECTION, SOLUTION INTRAMUSCULAR; INTRAVENOUS at 16:20

## 2017-06-05 RX ADMIN — SODIUM CHLORIDE: 9 INJECTION, SOLUTION INTRAVENOUS at 13:10

## 2017-06-05 RX ADMIN — CELECOXIB 400 MG: 200 CAPSULE ORAL at 13:10

## 2017-06-05 ASSESSMENT — PAIN SCALES - GENERAL
PAINLEVEL_OUTOF10: 0
PAINLEVEL_OUTOF10: 4
PAINLEVEL_OUTOF10: 0

## 2017-06-05 NOTE — IP AVS SNAPSHOT
" Home Care Instructions                                                                                                                Name:Denis Siegelham  Medical Record Number:[id number]  CSN: [id number]    YOB: 1980   Age: 36 y.o.  Sex: male  HT:1.626 m (5' 4\") WT: 83.8 kg (184 lb 11.9 oz)          Admit Date: 6/5/2017     Discharge Date:   Today's Date: 6/5/2017  Attending Doctor:  Samson Rosenbaum Jr., *                  Allergies:  Baclofen; Contrast media with iodine; Keflex; Pcn; and Tape                Discharge Instructions         ACTIVITY: Rest and take it easy for the first 24 hours.  A responsible adult is recommended to remain with you during that time.  It is normal to feel sleepy.  We encourage you to not do anything that requires balance, judgment or coordination.    MILD FLU-LIKE SYMPTOMS ARE NORMAL. YOU MAY EXPERIENCE GENERALIZED MUSCLE ACHES, THROAT IRRITATION, HEADACHE AND/OR SOME NAUSEA.    FOR 24 HOURS DO NOT:  Drive, operate machinery or run household appliances.  Drink beer or alcoholic beverages.   Make important decisions or sign legal documents.    SPECIAL INSTRUCTIONS:     May remov dressings in 24-48 hours  Follow up Dr Rosenbaum x 1 week   LUISITO to bulb suction    Bulb Drain Home Care  A bulb drain consists of a thin rubber tube and a soft, round bulb that creates a gentle suction. The rubber tube is placed in the area where you had surgery. A bulb is attached to the end of the tube that is outside the body. The bulb drain removes excess fluid that normally builds up in a surgical wound after surgery. The color and amount of fluid will vary. Immediately after surgery, the fluid is bright red and is a little thicker than water. It may gradually change to a yellow or pink color and become more thin and water-like. When the amount decreases to about 1 or 2 tbsp in 24 hours, your health care provider will usually remove it.  DAILY CARE  · Keep the bulb flat (compressed) at all " times, except while emptying it. The flatness creates suction. You can flatten the bulb by squeezing it firmly in the middle and then closing the cap.  · Keep sites where the tube enters the skin dry and covered with a bandage (dressing).  · Secure the tube 1-2 in (2.5-5.1 cm) below the insertion sites to keep it from pulling on your stitches. The tube is stitched in place and will not slip out.  · Secure the bulb as directed by your health care provider.  · For the first 3 days after surgery, there usually is more fluid in the bulb. Empty the bulb whenever it becomes half full because the bulb does not create enough suction if it is too full. The bulb could also overflow. Write down how much fluid you remove each time you empty your drain. Add up the amount removed in 24 hours.  · Empty the bulb at the same time every day once the amount of fluid decreases and you only need to empty it once a day. Write down the amounts and the 24-hour totals to give to your health care provider. This helps your health care provider know when the tubes can be removed.  EMPTYING THE BULB DRAIN  Before emptying the bulb, get a measuring cup, a piece of paper and a pen, and wash your hands.  · Gently run your fingers down the tube (stripping) to empty any drainage from the tubing into the bulb. This may need to be done several times a day to clear the tubing of clots and tissue.  · Open the bulb cap to release suction, which causes it to inflate. Do not touch the inside of the cap.  · Gently run your fingers down the tube (stripping) to empty any drainage from the tubing into the bulb.  · Hold the cap out of the way, and pour fluid into the measuring cup.    · Squeeze the bulb to provide suction.   · Replace the cap.    · Check the tape that holds the tube to your skin. If it is becoming loose, you can remove the loose piece of tape and apply a new one. Then, pin the bulb to your shirt.    · Write down the amount of fluid you emptied  out. Write down the date and each time you emptied your bulb drain. (If there are 2 bulbs, note the amount of drainage from each bulb and keep the totals separate. Your health care provider will want to know the total amounts for each drain and which tube is draining more.)    · Flush the fluid down the toilet and wash your hands.    · Call your health care provider once you have less than 2 tbsp of fluid collecting in the bulb drain every 24 hours.  If there is drainage around the tube site, change dressings and keep the area dry. Cleanse around tube with sterile saline and place dry gauze around site. This gauze should be changed when it is soiled. If it stays clean and unsoiled, it should still be changed daily.   SEEK MEDICAL CARE IF:  · Your drainage has a bad smell or is cloudy.    · You have a fever.    · Your drainage is increasing instead of decreasing.    · Your tube fell out.    · You have redness or swelling around the tube site.    · You have drainage from a surgical wound.    · Your bulb drain will not stay flat after you empty it.    MAKE SURE YOU:   · Understand these instructions.  · Will watch your condition.  · Will get help right away if you are not doing well or get worse.     This information is not intended to replace advice given to you by your health care provider. Make sure you discuss any questions you have with your health care provider.      DIET: To avoid nausea, slowly advance diet as tolerated, avoiding spicy or greasy foods for the first day.  Add more substantial food to your diet according to your physician's instructions.  Babies can be fed formula or breast milk as soon as they are hungry.  INCREASE FLUIDS AND FIBER TO AVOID CONSTIPATION.    SURGICAL DRESSING/BATHING: Follow instructions given to you by your surgeon.    FOLLOW-UP APPOINTMENT:  A follow-up appointment should be arranged with your doctor; call to schedule.    You should CALL YOUR PHYSICIAN if you develop:  Fever  greater than 101 degrees F.  Pain not relieved by medication, or persistent nausea or vomiting.  Excessive bleeding (blood soaking through dressing) or unexpected drainage from the wound.  Extreme redness or swelling around the incision site, drainage of pus or foul smelling drainage.  Inability to urinate or empty your bladder within 8 hours.  Problems with breathing or chest pain.    You should call 911 if you develop problems with breathing or chest pain.  If you are unable to contact your doctor or surgical center, you should go to the nearest emergency room or urgent care center.  Physician's telephone #: Dr. Rosenbaum [phone]    If any questions arise, call your doctor.  If your doctor is not available, please feel free to call the Surgical Center at [phone].  The Center is open Monday through Friday from 7AM to 7PM.  You can also call the [employer] HOTLINE open 24 hours/day, 7 days/week and speak to a nurse at [phone], or toll free at [phone].    A registered nurse may call you a few days after your surgery to see how you are doing after your procedure.    MEDICATIONS: Resume taking daily medication.  Take prescribed pain medication with food.  If no medication is prescribed, you may take non-aspirin pain medication if needed.  PAIN MEDICATION CAN BE VERY CONSTIPATING.  Take a stool softener or laxative such as senokot, pericolace, or milk of magnesia if needed.    Prescription given for erythromycin and norco.  Last pain medication given at 4:20 pm.    If your physician has prescribed pain medication that includes Acetaminophen (Tylenol), do not take additional Acetaminophen (Tylenol) while taking the prescribed medication.    Depression / Suicide Risk    As you are discharged from this [facility] facility, it is important to learn how to keep safe from harming yourself.    Recognize the warning signs:  · Abrupt changes in personality, positive or negative- including increase in energy      · Giving away possessions  · Change in eating patterns- significant weight changes-  positive or negative  · Change in sleeping patterns- unable to sleep or sleeping all the time   · Unwillingness or inability to communicate  · Depression  · Unusual sadness, discouragement and loneliness  · Talk of wanting to die  · Neglect of personal appearance   · Rebelliousness- reckless behavior  · Withdrawal from people/activities they love  · Confusion- inability to concentrate     If you or a loved one observes any of these behaviors or has concerns about self-harm, here's what you can do:  · Talk about it- your feelings and reasons for harming yourself  · Remove any means that you might use to hurt yourself (examples: pills, rope, extension cords, firearm)  · Get professional help from the community (Mental Health, Substance Abuse, psychological counseling)  · Do not be alone:Call your Safe Contact- someone whom you trust who will be there for you.  · Call your local CRISIS HOTLINE 869-4706 or 375-329-6727  · Call your local Children's Mobile Crisis Response Team [place] (135) 385-8346 or [employer] [employer]  · Call the toll free National Suicide Prevention Hotlines   · National Suicide Prevention Lifeline 745-431-PYYM (5427)  · National Hope Line Network 800-SUICIDE (106-4949)       Medication List      START taking these medications        Instructions    Morning Afternoon Evening Bedtime    erythromycin base 500 MG tablet   Commonly known as:  E-MYCIN        Take 1 Tab by mouth 4 times a day.   Dose:  500 mg                          CHANGE how you take these medications        Instructions    Morning Afternoon Evening Bedtime    hydrocodone/acetaminophen  MG Tabs   What changed:  how much to take   Commonly known as:  NORCO        Take 1-2 Tabs by mouth every 12 hours as needed.   Dose:  1-2 Tab                          CONTINUE taking these medications        Instructions    Morning Afternoon Evening  Bedtime    calcitRIOL 0.25 MCG Caps   Commonly known as:  ROCALTROL        Take 0.75 mcg by mouth every bedtime.   Dose:  0.75 mcg                        cinacalcet 30 MG Tabs   Commonly known as:  SENSIPAR        Take 1 Tab by mouth every evening.   Dose:  30 mg                        gabapentin 300 MG Caps   Last time this was given:  300 mg on 6/5/2017  1:10 PM   Commonly known as:  NEURONTIN        Take 1,200 mg by mouth every bedtime.   Dose:  1200 mg                        RENVELA 800 MG Tabs   Generic drug:  Sevelamer Carbonate        Take 3,200 mg by mouth 3 times a day, with meals. With meals   Dose:  3200 mg                        warfarin 5 MG Tabs   Commonly known as:  COUMADIN        Take 5 mg by mouth every day. 2.5  mg on Mon, Thurs  5 mg all other days   Dose:  5 mg                             Where to Get Your Medications      You can get these medications from any pharmacy     Bring a paper prescription for each of these medications    - erythromycin base 500 MG tablet  - hydrocodone/acetaminophen  MG Tabs            Medication Information     Above and/or attached are the medications your physician expects you to take upon discharge. Review all of your home medications and newly ordered medications with your doctor and/or pharmacist. Follow medication instructions as directed by your doctor and/or pharmacist. Please keep your medication list with you and share with your physician. Update the information when medications are discontinued, doses are changed, or new medications (including over-the-counter products) are added; and carry medication information at all times in the event of emergency situations.        Resources     Quit Smoking / Tobacco Use:    I understand the use of any tobacco products increases my chance of suffering from future heart disease or stroke and could cause other illnesses which may shorten my life. Quitting the use of tobacco products is the single most important  thing I can do to improve my health. For further information on smoking / tobacco cessation call a Toll Free Quit Line at 1-533.146.7651 (*National [employer] [place]) or 1-486.994.3866 (American Lung Association) or you can access the web based program at www.lungusa.org.    [place] Tobacco Users Help Line:  (571) 765-5877       Toll Free: 1-182.867.9073  Quit Tobacco Program [facility] (066)802-2626    Crisis Hotline:    [place] Crisis Hotline:  0-646-XXEICXL or 1-395.115.6806    [place] Crisis Hotline:    1-969.444.8106 or 635-064-4659    Discharge Survey:   Thank you for choosing [facility]. We hope we did everything we could to make your hospital stay a pleasant one. You may be receiving a survey and we would appreciate your time and participation in answering the questions. Your input is very valuable to us in our efforts to improve our service to our patients and their families.            Signatures     My signature on this form indicates that:    1. I acknowledge receipt and understanding of these Home Care Instruction.  2. My questions regarding this information have been answered to my satisfaction.  3. I have formulated a plan with my discharge nurse to obtain my prescribed medications for home.    __________________________________      __________________________________                   Patient Signature                                 Guardian/Responsible Adult Signature      __________________________________                 __________       ________                       Nurse Signature                                               Date                 Time

## 2017-06-05 NOTE — OR SURGEON
Immediate Post-Operative Note      PreOp Diagnosis:Abdo pain with excess skin soft tissue    PostOp Diagnosis:same    Procedure(s):  ABDOMINOPLASTY - FOR PANNICULECTOMY - Wound Class: Clean    Surgeon(s):  EDISON Limon Jr., M.D.    Anesthesiologist/Type of Anesthesia:  Anesthesiologist: Leighton Connell M.D./General    Surgical Staff:  Circulator: Arielle Shepherd R.N.; Barbara Reeder R.N.  Scrub Person: Ashley Royal    Specimen: 0    Estimated Blood Loss: 150 ccc    Findings: 0    Complications: 0        6/5/2017 2:56 PM Samson Rosenbaum Jr.

## 2017-06-05 NOTE — PROGRESS NOTES
The Medication Reconciliation process has been completed by interviewing the patient    Allergies have been reviewed  Antibiotic use in 30 days - none    Home Pharmacy:  [facility]

## 2017-06-05 NOTE — IP AVS SNAPSHOT
6/5/2017    Denis De Anda  [address]  [place] [address]    Dear Denis:    [facility] wants to ensure your discharge home is safe and you or your loved ones have had all of your questions answered regarding your care after you leave the hospital.    Below is a list of resources and contact information should you have any questions regarding your hospital stay, follow-up instructions, or active medical symptoms.    Questions or Concerns Regarding… Contact   Medical Questions Related to Your Discharge  (7 days a week, 8am-5pm) Contact a Nurse Care Coordinator   [phone]   Medical Questions Not Related to Your Discharge  (24 hours a day / 7 days a week)  Contact the Nurse Health Line   [phone]    Medications or Discharge Instructions Refer to your discharge packet   or contact your [facility] Primary Care Provider   [phone]   Follow-up Appointment(s) Schedule your appointment via [employer]   or contact Scheduling [phone]   Billing Review your statement via [employer]  or contact Billing [phone]   Medical Records Review your records via [employer]   or contact Medical Records [phone]     You may receive a telephone call within two days of discharge. This call is to make certain you understand your discharge instructions and have the opportunity to have any questions answered. You can also easily access your medical information, test results and upcoming appointments via the [employer] free online health management tool. You can learn more and sign up at [employer]. For assistance setting up your [employer] account, please call [phone].    Once again, we want to ensure your discharge home is safe and that you have a clear understanding of any next steps in your care. If you have any questions or concerns, please do not hesitate to contact us, we are here for you. Thank you for choosing [facility] for your healthcare needs.    Sincerely,    Your [facility] Healthcare Team

## 2017-06-06 LAB — EKG IMPRESSION: NORMAL

## 2017-06-06 NOTE — DISCHARGE INSTRUCTIONS
ACTIVITY: Rest and take it easy for the first 24 hours.  A responsible adult is recommended to remain with you during that time.  It is normal to feel sleepy.  We encourage you to not do anything that requires balance, judgment or coordination.    MILD FLU-LIKE SYMPTOMS ARE NORMAL. YOU MAY EXPERIENCE GENERALIZED MUSCLE ACHES, THROAT IRRITATION, HEADACHE AND/OR SOME NAUSEA.    FOR 24 HOURS DO NOT:  Drive, operate machinery or run household appliances.  Drink beer or alcoholic beverages.   Make important decisions or sign legal documents.    SPECIAL INSTRUCTIONS:     May remove dressings in 24-48 hours  Follow up Dr Rosenbaum x 1 week   LUISITO to bulb suction    Bulb Drain Home Care  A bulb drain consists of a thin rubber tube and a soft, round bulb that creates a gentle suction. The rubber tube is placed in the area where you had surgery. A bulb is attached to the end of the tube that is outside the body. The bulb drain removes excess fluid that normally builds up in a surgical wound after surgery. The color and amount of fluid will vary. Immediately after surgery, the fluid is bright red and is a little thicker than water. It may gradually change to a yellow or pink color and become more thin and water-like. When the amount decreases to about 1 or 2 tbsp in 24 hours, your health care provider will usually remove it.  DAILY CARE  · Keep the bulb flat (compressed) at all times, except while emptying it. The flatness creates suction. You can flatten the bulb by squeezing it firmly in the middle and then closing the cap.  · Keep sites where the tube enters the skin dry and covered with a bandage (dressing).  · Secure the tube 1-2 in (2.5-5.1 cm) below the insertion sites to keep it from pulling on your stitches. The tube is stitched in place and will not slip out.  · Secure the bulb as directed by your health care provider.  · For the first 3 days after surgery, there usually is more fluid in the bulb. Empty the bulb whenever  it becomes half full because the bulb does not create enough suction if it is too full. The bulb could also overflow. Write down how much fluid you remove each time you empty your drain. Add up the amount removed in 24 hours.  · Empty the bulb at the same time every day once the amount of fluid decreases and you only need to empty it once a day. Write down the amounts and the 24-hour totals to give to your health care provider. This helps your health care provider know when the tubes can be removed.  EMPTYING THE BULB DRAIN  Before emptying the bulb, get a measuring cup, a piece of paper and a pen, and wash your hands.  · Gently run your fingers down the tube (stripping) to empty any drainage from the tubing into the bulb. This may need to be done several times a day to clear the tubing of clots and tissue.  · Open the bulb cap to release suction, which causes it to inflate. Do not touch the inside of the cap.  · Gently run your fingers down the tube (stripping) to empty any drainage from the tubing into the bulb.  · Hold the cap out of the way, and pour fluid into the measuring cup.    · Squeeze the bulb to provide suction.   · Replace the cap.    · Check the tape that holds the tube to your skin. If it is becoming loose, you can remove the loose piece of tape and apply a new one. Then, pin the bulb to your shirt.    · Write down the amount of fluid you emptied out. Write down the date and each time you emptied your bulb drain. (If there are 2 bulbs, note the amount of drainage from each bulb and keep the totals separate. Your health care provider will want to know the total amounts for each drain and which tube is draining more.)    · Flush the fluid down the toilet and wash your hands.    · Call your health care provider once you have less than 2 tbsp of fluid collecting in the bulb drain every 24 hours.  If there is drainage around the tube site, change dressings and keep the area dry. Cleanse around tube with  sterile saline and place dry gauze around site. This gauze should be changed when it is soiled. If it stays clean and unsoiled, it should still be changed daily.   SEEK MEDICAL CARE IF:  · Your drainage has a bad smell or is cloudy.    · You have a fever.    · Your drainage is increasing instead of decreasing.    · Your tube fell out.    · You have redness or swelling around the tube site.    · You have drainage from a surgical wound.    · Your bulb drain will not stay flat after you empty it.    MAKE SURE YOU:   · Understand these instructions.  · Will watch your condition.  · Will get help right away if you are not doing well or get worse.     This information is not intended to replace advice given to you by your health care provider. Make sure you discuss any questions you have with your health care provider.      DIET: To avoid nausea, slowly advance diet as tolerated, avoiding spicy or greasy foods for the first day.  Add more substantial food to your diet according to your physician's instructions.  Babies can be fed formula or breast milk as soon as they are hungry.  INCREASE FLUIDS AND FIBER TO AVOID CONSTIPATION.    SURGICAL DRESSING/BATHING: Follow instructions given to you by your surgeon.    FOLLOW-UP APPOINTMENT:  A follow-up appointment should be arranged with your doctor; call to schedule.    You should CALL YOUR PHYSICIAN if you develop:  Fever greater than 101 degrees F.  Pain not relieved by medication, or persistent nausea or vomiting.  Excessive bleeding (blood soaking through dressing) or unexpected drainage from the wound.  Extreme redness or swelling around the incision site, drainage of pus or foul smelling drainage.  Inability to urinate or empty your bladder within 8 hours.  Problems with breathing or chest pain.    You should call 911 if you develop problems with breathing or chest pain.  If you are unable to contact your doctor or surgical center, you should go to the nearest emergency  room or urgent care center.  Physician's telephone #: Dr. Rosenbaum [phone]    If any questions arise, call your doctor.  If your doctor is not available, please feel free to call the Surgical Center at [phone].  The Center is open Monday through Friday from 7AM to 7PM.  You can also call the HEALTH HOTLINE open 24 hours/day, 7 days/week and speak to a nurse at [phone], or toll free at [phone].    A registered nurse may call you a few days after your surgery to see how you are doing after your procedure.    MEDICATIONS: Resume taking daily medication.  Take prescribed pain medication with food.  If no medication is prescribed, you may take non-aspirin pain medication if needed.  PAIN MEDICATION CAN BE VERY CONSTIPATING.  Take a stool softener or laxative such as senokot, pericolace, or milk of magnesia if needed.    Prescription given for erythromycin and norco.  Last pain medication given at 4:20 pm.    If your physician has prescribed pain medication that includes Acetaminophen (Tylenol), do not take additional Acetaminophen (Tylenol) while taking the prescribed medication.    Depression / Suicide Risk    As you are discharged from this [facility] facility, it is important to learn how to keep safe from harming yourself.    Recognize the warning signs:  · Abrupt changes in personality, positive or negative- including increase in energy   · Giving away possessions  · Change in eating patterns- significant weight changes-  positive or negative  · Change in sleeping patterns- unable to sleep or sleeping all the time   · Unwillingness or inability to communicate  · Depression  · Unusual sadness, discouragement and loneliness  · Talk of wanting to die  · Neglect of personal appearance   · Rebelliousness- reckless behavior  · Withdrawal from people/activities they love  · Confusion- inability to concentrate     If you or a loved one observes any of these behaviors or has concerns about self-harm,  here's what you can do:  · Talk about it- your feelings and reasons for harming yourself  · Remove any means that you might use to hurt yourself (examples: pills, rope, extension cords, firearm)  · Get professional help from the community (Mental Health, Substance Abuse, psychological counseling)  · Do not be alone:Call your Safe Contact- someone whom you trust who will be there for you.  · Call your local CRISIS HOTLINE 432-4328 or 290-459-5235  · Call your local Children's Mobile Crisis Response Team [place] (406) 126-9974 or www."[employer]  · Call the toll free National Suicide Prevention Hotlines   · National Suicide Prevention Lifeline 987-839-SLFT (6261)  · National Hope Line Network 800-SUICIDE (703-0993)

## 2017-06-06 NOTE — OP REPORT
DATE OF SERVICE:  06/05/2017    DATE OF PROCEDURE:  06/05/2017      PREOPERATIVE DIAGNOSIS:  Abdominal pain with abdominal pannus.      POSTOPERATIVE DIAGNOSIS:  Abdominal pain with abdominal pannus.      PROCEDURE:  Panniculectomy of the abdominal tissue.      SURGEON:  Samson Rosenbaum MD      ASSISTANT:  Jason Robertson MD      ANESTHESIA:  Leighton Connell MD      INDICATION FOR PROCEDURE:  The patient is a 36-year-old white male who has had   a long history of renal failure.  He has had over 100 surgeries.  The patient   is currently on dialysis, but he is employed and has issues with his abdomen   because of the contour of his abdomen after over 100 surgeries.  He cannot   keep his pants on properly and this necessitates him to cinching his pants to   such a degree that he gets excoriation of his skin which is obviously painful.    The patient and I had talked about options because the contour of his   abdomen, I could certainly see how his pants would not be able to stay in   position and that he would have this issue.  We talked about how we might   alter the contour of his abdomen to alleviate this problem.  The patient was   well informed of the risks, benefits and alternatives to the procedure and   participated in the decision to proceed with surgery.      DESCRIPTION OF PROCEDURE:  Patient was marked preoperatively in the holding   area, taken to the operating room and under general anesthesia, was prepped   and draped over the abdomen.  An incision made across the lower abdomen as   previously marked.  This was carried down through the skin and soft tissue   using electrocautery.  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  These were dealt with by using   Hemoclips as well as clamping, cutting and tying these vessels with 3-0   Vicryl.  This allowed us to take off the excess soft tissue that was inferior   to the umbilicus down to the fascia.  When this  had been accomplished and the   wounds were meticulously hemostatic, a 10 flat LUISITO drain was brought in through   separate stab wound and the wounds closed in layers using running and   interrupted sutures of 0 and 2-0 Vicryl and running subcuticular 3-0 Monocryl.    Sterile dressings were then applied.  The patient tolerated the procedure   well and was taken to the recovery room in good condition.  Needle, sponge,   and instrument counts were correct.       ____________________________________     MD YOVANI LYNNE / RIAZ    DD:  06/05/2017 15:15:11  DT:  06/05/2017 18:22:33    D#:  [id number]  Job#:  [id number]

## 2017-06-06 NOTE — OR NURSING
Patient and mom educated on LUISITO drain care.  Patient states he has had one in the past and knows how to manage it. Patient reminded to record amount emptied each time.  All questions answered.

## 2017-06-09 ENCOUNTER — HOSPITAL ENCOUNTER (OUTPATIENT)
Dept: RADIOLOGY | Facility: MEDICAL CENTER | Age: 37
End: 2017-06-09
Attending: NEUROLOGICAL SURGERY
Payer: MEDICARE

## 2017-06-09 DIAGNOSIS — S22.040A CLOSED WEDGE COMPRESSION FRACTURE OF FOURTH THORACIC VERTEBRA, INITIAL ENCOUNTER: ICD-10-CM

## 2017-06-09 PROCEDURE — 72070 X-RAY EXAM THORAC SPINE 2VWS: CPT

## 2017-06-12 ENCOUNTER — APPOINTMENT (OUTPATIENT)
Dept: VASCULAR LAB | Facility: MEDICAL CENTER | Age: 37
End: 2017-06-12
Attending: INTERNAL MEDICINE
Payer: MEDICARE

## 2017-06-15 ENCOUNTER — ANTICOAGULATION VISIT (OUTPATIENT)
Dept: VASCULAR LAB | Facility: MEDICAL CENTER | Age: 37
End: 2017-06-15
Attending: INTERNAL MEDICINE
Payer: MEDICARE

## 2017-06-15 DIAGNOSIS — T82.868S AV GRAFT THROMBOSIS, SEQUELA: ICD-10-CM

## 2017-06-15 LAB
INR BLD: 2.4 (ref 0.9–1.2)
INR PPP: 2.4 (ref 2–3.5)

## 2017-06-15 PROCEDURE — 85610 PROTHROMBIN TIME: CPT

## 2017-06-15 PROCEDURE — 99212 OFFICE O/P EST SF 10 MIN: CPT

## 2017-06-15 NOTE — PROGRESS NOTES
Anticoagulation Summary as of 6/15/2017     INR goal 2.5-3.5   Selected INR 2.4! 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 Hopkins      Anticoagulation Care Providers     Provider Role Specialty Phone number    Jairo Hopkins M.D. Referring Surgery [phone]    [facility] Responsible  [phone]        Anticoagulation Patient Findings    Pt's INR is SUBtherapeutic.  Pt denies any unusual s/s of bleeding, bruising, clotting or any changes to diet.   Started Clindamycin a week ago, would have seen reaction by this time.  Bolus today then Pt is to continue with current warfarin dosing regimen.    Follow up in 1 week.    Donald Cedeño, VIVEKD

## 2017-06-15 NOTE — MR AVS SNAPSHOT
Denis De Anda   6/15/2017 8:45 AM   Anticoagulation Visit   MRN: [id number]    Department:  Vascular Medicine   Dept Phone:  [phone]    Description:  Male : 1980   Provider:  [facility]           Allergies as of 6/15/2017     Allergen Noted Reactions    Baclofen 2015   Unspecified    Total loss of memory, sedation.   RXN=2015    Contrast Media With Iodine [Iodine] 2016   Rash    RXN=2017    Keflex 2009   Rash    RXN=possibly >10 years    Pcn [Penicillins] 2009   Rash    RXN=possibly >10 years ago    Tape 2013   Rash    Paper tape and tegaderm ok  RXN=ongoing      Vital Signs     Smoking Status                   Never Smoker            Basic Information     Date Of Birth Sex Race Ethnicity Preferred Language    1980 Male White Non- English      Your appointments     Larry 15, 2017  8:45 AM   Established Patient with [facility]AM [facility]   [facility]  (--)    [address] [facility] [address]   [phone]            2017  7:45 AM   Established Patient with [facility] EXAM 5   [facility]  (--)    [address] [address]   [phone]              Problem List              ICD-10-CM Priority Class Noted - Resolved    Insomnia G47.00   2009 - Present    Dupuytren's contracture of hand M72.0 Low  2010 - Present    Sleep apnea G47.30   10/3/2011 - Present    Osteopenia M85.80   5/10/2013 - Present    Vertebral compression fracture (CMS-HCC) M48.50XA   5/10/2013 - Present    Hyperlipidemia with target LDL less than 100 E78.5 Low  2013 - Present    Thrombophlebitis I80.9   2013 - Present    Venous collateral circulation, any site I87.8   2013 - Present    Calciphylaxis E83.59   2013 - Present    Anemia D64.9   2013 - Present    End stage renal disease (CMS-[facility]) N18.6   2013 - Present    Pain  R52   2/21/2015 - Present    Hyponatremia E87.1   2/23/2015 - Present    Reflex sympathetic dystrophy G90.50   3/24/2015 - Present    Non-healing surgical wound T81.89XA   6/22/2015 - Present    Acropachy [id number]   3/25/2016 - Present    Seizure disorder (CMS-HCC) G40.909   4/5/2016 - Present    Bacteremia R78.81   4/7/2016 - Present    Lethargy R53.83   4/9/2016 - Present    Pain syndrome, chronic G89.4   4/10/2016 - Present    Hyperkalemia E87.5   7/8/2016 - Present    ESRD (end stage renal disease) (CMS-HCC) N18.6 Medium  7/8/2016 - Present    Dermatofibroma of right upper arm D23.61   7/11/2016 - Present    Other specified epidermal thickening L85.8   8/5/2016 - Present    Chronic anticoagulation Z79.01   9/18/2016 - Present    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   10/20/2016 - Present    Open wound of left thigh S71.102A High  10/28/2016 - Present    Hypertension I10 Medium  11/15/2016 - Present    Hyperparathyroidism (CMS-HCC) E21.3 Low  11/15/2016 - Present    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 High  11/15/2016 - Present    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, 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 Low  11/15/2016 - Present    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   1/5/2017 - Present    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   1/6/2017 - Present    Chronic kidney disease, stage V (CMS-HCC) N18.5   3/28/2017 - Present    Acute midline thoracic back pain M54.6   4/19/2017 - Present    Chronic pain disorder G89.4   5/4/2017 - Present    Intertrigo L30.4   6/5/2017 - Present      Health Maintenance        Date Due Completion Dates    IMM DTaP/Tdap/Td Vaccine (2 - Tdap) 3/20/2018 (Originally 8/16/2000) 1/7/2000    IMM PNEUMOCOCCAL 19-64 (ADULT) HIGHEST RISK SERIES (1 of 3 - PCV13) 3/20/2018 (Originally 8/16/2000) ---            Results     POCT Protime      Component    INR    2.4                        Current Immunizations     Hepatitis B Vaccine Non-Recombivax  (Ped/Adol) 10/5/2000, 2/8/2000, 1/7/2000    Influenza TIV (IM) 9/30/2015, 9/9/2014    Influenza Vaccine Pediatric 9/15/2009    MMR Vaccine 3/11/1983    Pneumococcal Vaccine (PCV7) Historical Data 1/1/2013    QF GOLD 8/7/2013    QUANTIFERON GOLD 8/7/2013    TD Vaccine 1/7/2000      Below and/or attached are the medications your provider expects you to take. Review all of your home medications and newly ordered medications with your provider and/or pharmacist. Follow medication instructions as directed by your provider and/or pharmacist. Please keep your medication list with you and share with your provider. Update the information when medications are discontinued, doses are changed, or new medications (including over-the-counter products) are added; and carry medication information at all times in the event of emergency situations     Allergies:  BACLOFEN - Unspecified     CONTRAST MEDIA WITH IODINE - Rash     KEFLEX - Rash     PCN - Rash     TAPE - Rash               Medications  Valid as of: Shelley 15, 2017 -  7:57 AM    Generic Name Brand Name Tablet Size Instructions for use    Calcitriol (Cap) ROCALTROL 0.25 MCG Take 0.75 mcg by mouth every bedtime.        Cinacalcet HCl (Tab) SENSIPAR 30 MG Take 1 Tab by mouth every evening.        Erythromycin Base (Tab) E-MYCIN 500 MG Take 1 Tab by mouth 4 times a day.        Gabapentin (Cap) NEURONTIN 300 MG Take 1,200 mg by mouth every bedtime.        Hydrocodone-Acetaminophen (Tab) NORCO  MG Take 1-2 Tabs by mouth every 12 hours as needed.        Sevelamer Carbonate (Tab) Sevelamer Carbonate 800 MG Take 3,200 mg by mouth 3 times a day, with meals. With meals        Warfarin Sodium (Tab) COUMADIN 5 MG Take 5 mg by mouth every day. 2.5  mg on Mon, Thurs   5 mg all other days        .                 Medicines prescribed today were sent to:     [id number] - [place] ROMAN - [address] AT [facility] [address]    [address] [address]     Phone: [phone] Fax: [phone]    Open 24 Hours?: No    [facility] - [place], [place] - [address]    [address] [place] [address]    Phone: [phone] Fax: [phone]    Open 24 Hours?: No      Medication refill instructions:       If your prescription bottle indicates you have medication refills left, it is not necessary to call your provider’s office. Please contact your pharmacy and they will refill your medication.    If your prescription bottle indicates you do not have any refills left, you may request refills at any time through one of the following ways: The online [employer] system (except Urgent Care), by calling your provider’s office, or by asking your pharmacy to contact your provider’s office with a refill request. Medication refills are processed only during regular business hours and may not be available until the next business day. Your provider may request additional information or to have a follow-up visit with you prior to refilling your medication.   *Please Note: Medication refills are assigned a new Rx number when refilled electronically. Your pharmacy may indicate that no refills were authorized even though a new prescription for the same medication is available at the pharmacy. Please request the medicine by name with the pharmacy before contacting your provider for a refill.        Warfarin Dosing Calendar   June 2017 Details    Sun Mon Tue Wed Thu Fri Sat         1               2               3                 4               5               6               7               8               9               10                 11               12               13               14               15   2.4   5 mg   See details      16      5 mg         17      5 mg           18      5 mg         19      2.5 mg         20      5 mg         21      5 mg         22      2.5 mg         23               24                 25               26               27                28               29               30                 Date Details   06/15 This INR check   INR: 2.4       Date of next INR:  6/22/2017         How to take your warfarin dose     To take:  2.5 mg Take 0.5 of a 5 mg tablet.    To take:  5 mg Take 1 of the 5 mg tablets.              [employer] Access Code: Activation code not generated  Current [employer] Status: Active

## 2017-06-22 ENCOUNTER — ANTICOAGULATION VISIT (OUTPATIENT)
Dept: VASCULAR LAB | Facility: MEDICAL CENTER | Age: 37
End: 2017-06-22
Attending: INTERNAL MEDICINE
Payer: MEDICARE

## 2017-06-22 VITALS — DIASTOLIC BLOOD PRESSURE: 50 MMHG | HEART RATE: 100 BPM | SYSTOLIC BLOOD PRESSURE: 85 MMHG

## 2017-06-22 DIAGNOSIS — T82.868S: ICD-10-CM

## 2017-06-22 LAB
INR BLD: 3.6 (ref 0.9–1.2)
INR PPP: 3.6 (ref 2–3.5)

## 2017-06-22 PROCEDURE — 85610 PROTHROMBIN TIME: CPT

## 2017-06-22 PROCEDURE — 99212 OFFICE O/P EST SF 10 MIN: CPT

## 2017-06-22 NOTE — PROGRESS NOTES
Anticoagulation Summary as of 6/22/2017     INR goal 2.5-3.5   Selected INR 3.6! 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 Hopkins      Anticoagulation Care Providers     Provider Role Specialty Phone number    Jairo Hopkins M.D. Referring Surgery [phone]    [facility] Responsible  [phone]        Anticoagulation Patient Findings    Patient's INR was SUPRA therapeutic.  Pt denies any unusual s/s of bleeding, bruising, clotting or any changes to diet or medications.  Confirmed dosing regimen.  Pt already scheduled for a reduced dose today, Pt is to continue with current warfarin dosing regimen.   BP is low, pt denies dizziness.  CTM.     Follow up in 1 weeks.    Donald Cedeño, PHARMD

## 2017-06-22 NOTE — MR AVS SNAPSHOT
Denis De Anda   2017 7:45 AM   Anticoagulation Visit   MRN: [id number]    Department:  Vascular Medicine   Dept Phone:  [phone]    Description:  Male : 1980   Provider:  [facility] EXAM 5           Allergies as of 2017     Allergen Noted Reactions    Baclofen 2015   Unspecified    Total loss of memory, sedation.   RXN=2015    Contrast Media With Iodine [Iodine] 2016   Rash    RXN=2017    Keflex 2009   Rash    RXN=possibly >10 years    Pcn [Penicillins] 2009   Rash    RXN=possibly >10 years ago    Tape 2013   Rash    Paper tape and tegaderm ok  RXN=ongoing      Vital Signs     Smoking Status                   Never Smoker            Basic Information     Date Of Birth Sex Race Ethnicity Preferred Language    1980 Male White Non- English      Your appointments     2017  7:45 AM   Established Patient with IHV EXAM 5   [facility]  (--)    [address] [facility] [address] [facility] [address]   [phone]            2017  7:30 AM   Established Patient with IHVH EXAM 5   [facility]  (--)    [address] [address] [facility] [address]   [phone]              Problem List              ICD-10-CM Priority Class Noted - Resolved    Insomnia G47.00   2009 - Present    Dupuytren's contracture of hand M72.0 Low  2010 - Present    Sleep apnea G47.30   10/3/2011 - Present    Osteopenia M85.80   5/10/2013 - Present    Vertebral compression fracture (CMS-HCC) M48.50XA   5/10/2013 - Present    Hyperlipidemia with target LDL less than 100 E78.5 Low  2013 - Present    Thrombophlebitis I80.9   2013 - Present    Venous collateral circulation, any site I87.8   2013 - Present    Calciphylaxis E83.59   2013 - Present    Anemia D64.9   2013 - Present    End stage renal disease (CMS-[facility]) N18.6   2013 - Present    Pain  R52   2/21/2015 - Present    Hyponatremia E87.1   2/23/2015 - Present    Reflex sympathetic dystrophy G90.50   3/24/2015 - Present    Non-healing surgical wound T81.89XA   6/22/2015 - Present    Acropachy [id number]   3/25/2016 - Present    Seizure disorder (CMS-HCC) G40.909   4/5/2016 - Present    Bacteremia R78.81   4/7/2016 - Present    Lethargy R53.83   4/9/2016 - Present    Pain syndrome, chronic G89.4   4/10/2016 - Present    Hyperkalemia E87.5   7/8/2016 - Present    ESRD (end stage renal disease) (CMS-HCC) N18.6 Medium  7/8/2016 - Present    Dermatofibroma of right upper arm D23.61   7/11/2016 - Present    Other specified epidermal thickening L85.8   8/5/2016 - Present    Chronic anticoagulation Z79.01   9/18/2016 - Present    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   10/20/2016 - Present    Open wound of left thigh S71.102A High  10/28/2016 - Present    Hypertension I10 Medium  11/15/2016 - Present    Hyperparathyroidism (CMS-HCC) E21.3 Low  11/15/2016 - Present    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 High  11/15/2016 - Present    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, 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 Low  11/15/2016 - Present    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   1/5/2017 - Present    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   1/6/2017 - Present    Chronic kidney disease, stage V (CMS-HCC) N18.5   3/28/2017 - Present    Acute midline thoracic back pain M54.6   4/19/2017 - Present    Chronic pain disorder G89.4   5/4/2017 - Present    Intertrigo L30.4   6/5/2017 - Present      Health Maintenance        Date Due Completion Dates    IMM DTaP/Tdap/Td Vaccine (2 - Tdap) 3/20/2018 (Originally 8/16/2000) 1/7/2000    IMM PNEUMOCOCCAL 19-64 (ADULT) HIGHEST RISK SERIES (1 of 3 - PCV13) 3/20/2018 (Originally 8/16/2000) ---            Results     POCT Protime      Component    INR    3.6                        Current Immunizations     Hepatitis B Vaccine Non-Recombivax  (Ped/Adol) 10/5/2000, 2/8/2000, 1/7/2000    Influenza TIV (IM) 9/30/2015, 9/9/2014    Influenza Vaccine Pediatric 9/15/2009    MMR Vaccine 3/11/1983    Pneumococcal Vaccine (PCV7) Historical Data 1/1/2013    QF GOLD 8/7/2013    QUANTIFERON GOLD 8/7/2013    TD Vaccine 1/7/2000      Below and/or attached are the medications your provider expects you to take. Review all of your home medications and newly ordered medications with your provider and/or pharmacist. Follow medication instructions as directed by your provider and/or pharmacist. Please keep your medication list with you and share with your provider. Update the information when medications are discontinued, doses are changed, or new medications (including over-the-counter products) are added; and carry medication information at all times in the event of emergency situations     Allergies:  BACLOFEN - Unspecified     CONTRAST MEDIA WITH IODINE - Rash     KEFLEX - Rash     PCN - Rash     TAPE - Rash               Medications  Valid as of: June 22, 2017 -  7:20 AM    Generic Name Brand Name Tablet Size Instructions for use    Calcitriol (Cap) ROCALTROL 0.25 MCG Take 0.75 mcg by mouth every bedtime.        Cinacalcet HCl (Tab) SENSIPAR 30 MG Take 1 Tab by mouth every evening.        Erythromycin Base (Tab) E-MYCIN 500 MG Take 1 Tab by mouth 4 times a day.        Gabapentin (Cap) NEURONTIN 300 MG Take 1,200 mg by mouth every bedtime.        Hydrocodone-Acetaminophen (Tab) NORCO  MG Take 1-2 Tabs by mouth every 12 hours as needed.        Sevelamer Carbonate (Tab) Sevelamer Carbonate 800 MG Take 3,200 mg by mouth 3 times a day, with meals. With meals        Warfarin Sodium (Tab) COUMADIN 5 MG Take 5 mg by mouth every day. 2.5  mg on Mon, Thurs   5 mg all other days        .                 Medicines prescribed today were sent to:     [id number] - [place] - [address] AT [facility] [address]    [address] [address]     Phone: [phone] Fax: [phone]    Open 24 Hours?: No    [facility] - [place], [place] - [address]    [address] [place] [address]    Phone: [phone] Fax: [phone]    Open 24 Hours?: No      Medication refill instructions:       If your prescription bottle indicates you have medication refills left, it is not necessary to call your provider’s office. Please contact your pharmacy and they will refill your medication.    If your prescription bottle indicates you do not have any refills left, you may request refills at any time through one of the following ways: The online [employer] system (except Urgent Care), by calling your provider’s office, or by asking your pharmacy to contact your provider’s office with a refill request. Medication refills are processed only during regular business hours and may not be available until the next business day. Your provider may request additional information or to have a follow-up visit with you prior to refilling your medication.   *Please Note: Medication refills are assigned a new Rx number when refilled electronically. Your pharmacy may indicate that no refills were authorized even though a new prescription for the same medication is available at the pharmacy. Please request the medicine by name with the pharmacy before contacting your provider for a refill.        Warfarin Dosing Calendar   June 2017 Details    Sun Mon Tue Wed Thu Fri Sat         1               2               3                 4               5               6               7               8               9               10                 11               12               13               14               15               16               17                 18               19               20               21               22   3.6   2.5 mg   See details      23      5 mg         24      5 mg           25      5 mg         26      2.5 mg         27      5 mg         28         5 mg         29      2.5 mg         30                 Date Details   06/22 This INR check   INR: 3.6       Date of next INR:  6/29/2017         How to take your warfarin dose     To take:  2.5 mg Take 0.5 of a 5 mg tablet.    To take:  5 mg Take 1 of the 5 mg tablets.              [employer] Access Code: Activation code not generated  Current [employer] Status: Active

## 2017-06-29 ENCOUNTER — ANTICOAGULATION VISIT (OUTPATIENT)
Dept: VASCULAR LAB | Facility: MEDICAL CENTER | Age: 37
End: 2017-06-29
Attending: INTERNAL MEDICINE
Payer: MEDICARE

## 2017-06-29 DIAGNOSIS — T82.868S: ICD-10-CM

## 2017-06-29 LAB — INR PPP: 4.3 (ref 2–3.5)

## 2017-06-29 PROCEDURE — 85610 PROTHROMBIN TIME: CPT

## 2017-06-29 PROCEDURE — 99212 OFFICE O/P EST SF 10 MIN: CPT

## 2017-06-29 NOTE — MR AVS SNAPSHOT
Denis De Anda   2017 7:30 AM   Anticoagulation Visit   MRN: [id number]    Department:  Vascular Medicine   Dept Phone:  [phone]    Description:  Male : 1980   Provider:  V EXAM 5           Allergies as of 2017     Allergen Noted Reactions    Baclofen 2015   Unspecified    Total loss of memory, sedation.   RXN=2015    Contrast Media With Iodine [Iodine] 2016   Rash    RXN=2017    Keflex 2009   Rash    RXN=possibly >10 years    Pcn [Penicillins] 2009   Rash    RXN=possibly >10 years ago    Tape 2013   Rash    Paper tape and tegaderm ok  RXN=ongoing      Vital Signs     Smoking Status                   Never Smoker            Basic Information     Date Of Birth Sex Race Ethnicity Preferred Language    1980 Male White Non- English      Your appointments     2017  7:30 AM   Established Patient with IHVH EXAM 5   [facility] [place] [place] [facility]  (--)    [address] [facility] [address]   [phone]            2017  7:45 AM   Established Patient with IHVH EXAM 4   [facility] [place] [place] [facility]  (--)    [address] [facility] [address]   [phone]              Problem List              ICD-10-CM Priority Class Noted - Resolved    Insomnia G47.00   2009 - Present    Dupuytren's contracture of hand M72.0 Low  2010 - Present    Sleep apnea G47.30   10/3/2011 - Present    Osteopenia M85.80   5/10/2013 - Present    Vertebral compression fracture (CMS-HCC) M48.50XA   5/10/2013 - Present    Hyperlipidemia with target LDL less than 100 E78.5 Low  2013 - Present    Thrombophlebitis I80.9   2013 - Present    Venous collateral circulation, any site I87.8   2013 - Present    Calciphylaxis E83.59   2013 - Present    Anemia D64.9   2013 - Present    End stage renal disease (CMS-[facility]) N18.6   2013 - Present    Pain  R52   2/21/2015 - Present    Hyponatremia E87.1   2/23/2015 - Present    Reflex sympathetic dystrophy G90.50   3/24/2015 - Present    Non-healing surgical wound T81.89XA   6/22/2015 - Present    Acropachy [id number]   3/25/2016 - Present    Seizure disorder (CMS-HCC) G40.909   4/5/2016 - Present    Bacteremia R78.81   4/7/2016 - Present    Lethargy R53.83   4/9/2016 - Present    Pain syndrome, chronic G89.4   4/10/2016 - Present    Hyperkalemia E87.5   7/8/2016 - Present    ESRD (end stage renal disease) (CMS-HCC) N18.6 Medium  7/8/2016 - Present    Dermatofibroma of right upper arm D23.61   7/11/2016 - Present    Other specified epidermal thickening L85.8   8/5/2016 - Present    Chronic anticoagulation Z79.01   9/18/2016 - Present    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   10/20/2016 - Present    Open wound of left thigh S71.102A High  10/28/2016 - Present    Hypertension I10 Medium  11/15/2016 - Present    Hyperparathyroidism (CMS-HCC) E21.3 Low  11/15/2016 - Present    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 High  11/15/2016 - Present    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, 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 Low  11/15/2016 - Present    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   1/5/2017 - Present    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   1/6/2017 - Present    Chronic kidney disease, stage V (CMS-HCC) N18.5   3/28/2017 - Present    Acute midline thoracic back pain M54.6   4/19/2017 - Present    Chronic pain disorder G89.4   5/4/2017 - Present    Intertrigo L30.4   6/5/2017 - Present      Health Maintenance        Date Due Completion Dates    IMM DTaP/Tdap/Td Vaccine (2 - Tdap) 3/20/2018 (Originally 8/16/2000) 1/7/2000    IMM PNEUMOCOCCAL 19-64 (ADULT) HIGHEST RISK SERIES (1 of 3 - PCV13) 3/20/2018 (Originally 8/16/2000) ---            Results     POCT Protime      Component    INR    4.3                        Current Immunizations     Hepatitis B Vaccine Non-Recombivax  (Ped/Adol) 10/5/2000, 2/8/2000, 1/7/2000    Influenza TIV (IM) 9/30/2015, 9/9/2014    Influenza Vaccine Pediatric 9/15/2009    MMR Vaccine 3/11/1983    Pneumococcal Vaccine (PCV7) Historical Data 1/1/2013    QF GOLD 8/7/2013    QUANTIFERON GOLD 8/7/2013    TD Vaccine 1/7/2000      Below and/or attached are the medications your provider expects you to take. Review all of your home medications and newly ordered medications with your provider and/or pharmacist. Follow medication instructions as directed by your provider and/or pharmacist. Please keep your medication list with you and share with your provider. Update the information when medications are discontinued, doses are changed, or new medications (including over-the-counter products) are added; and carry medication information at all times in the event of emergency situations     Allergies:  BACLOFEN - Unspecified     CONTRAST MEDIA WITH IODINE - Rash     KEFLEX - Rash     PCN - Rash     TAPE - Rash               Medications  Valid as of: June 29, 2017 -  7:07 AM    Generic Name Brand Name Tablet Size Instructions for use    Calcitriol (Cap) ROCALTROL 0.25 MCG Take 0.75 mcg by mouth every bedtime.        Cinacalcet HCl (Tab) SENSIPAR 30 MG Take 1 Tab by mouth every evening.        Erythromycin Base (Tab) E-MYCIN 500 MG Take 1 Tab by mouth 4 times a day.        Gabapentin (Cap) NEURONTIN 300 MG Take 1,200 mg by mouth every bedtime.        Hydrocodone-Acetaminophen (Tab) NORCO  MG Take 1-2 Tabs by mouth every 12 hours as needed.        Sevelamer Carbonate (Tab) Sevelamer Carbonate 800 MG Take 3,200 mg by mouth 3 times a day, with meals. With meals        Warfarin Sodium (Tab) COUMADIN 5 MG Take 5 mg by mouth every day. 2.5  mg on Mon, Thurs   5 mg all other days        .                 Medicines prescribed today were sent to:     [id number] - [place] - [address] AT [facility] & [address]    [address] [address]     Phone: [phone] Fax: [phone]    Open 24 Hours?: No    [facility] - [place], [place] - [address]    [address] [place] [address]    Phone: [phone] Fax: [phone]    Open 24 Hours?: No      Medication refill instructions:       If your prescription bottle indicates you have medication refills left, it is not necessary to call your provider’s office. Please contact your pharmacy and they will refill your medication.    If your prescription bottle indicates you do not have any refills left, you may request refills at any time through one of the following ways: The online [employer] system (except Urgent Care), by calling your provider’s office, or by asking your pharmacy to contact your provider’s office with a refill request. Medication refills are processed only during regular business hours and may not be available until the next business day. Your provider may request additional information or to have a follow-up visit with you prior to refilling your medication.   *Please Note: Medication refills are assigned a new Rx number when refilled electronically. Your pharmacy may indicate that no refills were authorized even though a new prescription for the same medication is available at the pharmacy. Please request the medicine by name with the pharmacy before contacting your provider for a refill.        Warfarin Dosing Calendar   June 2017 Details    Sun Mon Tue Wed Thu Fri Sat         1               2               3                 4               5               6               7               8               9               10                 11               12               13               14               15               16               17                 18               19               20               21               22               23               24                 25               26               27               28               29   4.3   Hold   See details       30      2.5 mg           Date Details   06/29 This INR check   INR: 4.3               How to take your warfarin dose     To take:  2.5 mg Take 0.5 of a 5 mg tablet.    Hold Do not take your warfarin dose. See the Details table to the right for additional instructions.                Warfarin Dosing Calendar   July 2017 Details    Sun Mon Tue Wed Thu Fri Sat           1      5 mg           2      5 mg         3      2.5 mg         4      5 mg         5      2.5 mg         6      5 mg         7      2.5 mg         8      5 mg           9      5 mg         10      2.5 mg         11      5 mg         12      2.5 mg         13      5 mg         14               15                 16               17               18               19               20               21               22                 23               24               25               26               27               28               29                 30               31                     Date Details   No additional details    Date of next INR:  7/13/2017         How to take your warfarin dose     To take:  2.5 mg Take 0.5 of a 5 mg tablet.    To take:  5 mg Take 1 of the 5 mg tablets.              [employer] Access Code: Activation code not generated  Current [employer] Status: Active

## 2017-06-29 NOTE — PROGRESS NOTES
Anticoagulation Summary as of 6/29/2017     INR goal 2.5-3.5   Selected INR 4.3! (6/29/2017)   Maintenance plan 2.5 mg (5 mg x 0.5) on Mon, Wed, Fri; 5 mg (5 mg x 1) all other days   Weekly total 27.5 mg   Plan last modified Chriss Keating, PHARMD (6/29/2017)   Next INR check 7/13/2017   Target end date Indefinite    Indications   AV graft thrombosis (CMS-HCC) [T82.868A]         Anticoagulation Episode Summary     INR check location     Preferred lab     Send INR reminders to     Comments INR goal of 2.5 - 3.5 per Dr. Hopkins      Anticoagulation Care Providers     Provider Role Specialty Phone number    Jairo Hopkins M.D. Referring Surgery [phone]    [facility] Responsible  [phone]        Anticoagulation Patient Findings   Positives Antibiotic Use    Negatives Missed Doses, Extra Doses, Medication Changes, Diet Changes, Dental/Other Procedures, Hospitalization, Bleeding Gums, Nose Bleeds, Blood in Urine, Blood in Stool, Any Bruising, Other Complaints    Comments clinda           Current Outpatient Prescriptions on File Prior to Visit   Medication Sig Dispense Refill   • hydrocodone/acetaminophen (NORCO)  MG Tab Take 1-2 Tabs by mouth every 12 hours as needed. 20 Tab 0   • erythromycin base (E-MYCIN) 500 MG tablet Take 1 Tab by mouth 4 times a day. 28 Tab 0   • warfarin (COUMADIN) 5 MG Tab Take 5 mg by mouth every day. 2.5  mg on Mon, Thurs   5 mg all other days     • gabapentin (NEURONTIN) 300 MG Cap Take 1,200 mg by mouth every bedtime.     • cinacalcet (SENSIPAR) 30 MG Tab Take 1 Tab by mouth every evening. 30 Tab 0   • Sevelamer Carbonate (RENVELA) 800 MG TABS Take 3,200 mg by mouth 3 times a day, with meals. With meals     • calcitRIOL (ROCALTROL) 0.25 MCG CAPS Take 0.75 mcg by mouth every bedtime.       No current facility-administered medications on file prior to visit.       Lab Results   Component Value Date/Time    SODIUM 135 06/05/2017 01:02 PM    POTASSIUM 5.2  06/05/2017 01:02 PM    CHLORIDE 95* 06/05/2017 01:02 PM    CO2 26 06/05/2017 01:02 PM    GLUCOSE 83 06/05/2017 01:02 PM    BUN 45* 06/05/2017 01:02 PM    CREATININE 9.53* 06/05/2017 01:02 PM          Denis Inman Adilson seen in clinic today  INR  supra-therapeutic.    Denies signs/symptoms of bleeding and/or thrombosis.    Denies changes to diet or medications.   Follow up appointment in 1 week(s).      Hold today then decrease weekly warfarin dose as noted       Chriss Keating, PHARMD

## 2017-06-30 LAB — INR BLD: 4.3 (ref 0.9–1.2)

## 2017-07-06 ENCOUNTER — ANTICOAGULATION VISIT (OUTPATIENT)
Dept: VASCULAR LAB | Facility: MEDICAL CENTER | Age: 37
End: 2017-07-06
Attending: INTERNAL MEDICINE
Payer: MEDICARE

## 2017-07-06 VITALS — HEART RATE: 104 BPM | DIASTOLIC BLOOD PRESSURE: 43 MMHG | SYSTOLIC BLOOD PRESSURE: 85 MMHG

## 2017-07-06 DIAGNOSIS — T82.868S AV GRAFT THROMBOSIS, SEQUELA: ICD-10-CM

## 2017-07-06 DIAGNOSIS — T82.868A AV GRAFT THROMBOSIS, INITIAL ENCOUNTER (HCC): ICD-10-CM

## 2017-07-06 LAB — INR PPP: 2.7 (ref 2–3.5)

## 2017-07-06 PROCEDURE — 85610 PROTHROMBIN TIME: CPT

## 2017-07-06 PROCEDURE — 99211 OFF/OP EST MAY X REQ PHY/QHP: CPT

## 2017-07-06 NOTE — MR AVS SNAPSHOT
Denis De Anda   2017 7:45 AM   Anticoagulation Visit   MRN: [id number]    Department:  Vascular Medicine   Dept Phone:  [phone]    Description:  Male : 1980   Provider:  [facility] EXAM 4           Allergies as of 2017     Allergen Noted Reactions    Baclofen 2015   Unspecified    Total loss of memory, sedation.   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, sequela   [[id number]]         Vital Signs     Blood Pressure Pulse Smoking Status             85/43 mmHg 104 Never Smoker          Basic Information     Date Of Birth Sex Race Ethnicity Preferred Language    1980 Male White Non- English      Your appointments     2017  7:45 AM   Established Patient with V EXAM 4   [facility]  (--)    [address] [place] [address]   [phone]            2017  7:30 AM   Established Patient with V EXAM 4   [facility]  (--)    [address] [place] [address]   [phone]              Problem List              ICD-10-CM Priority Class Noted - Resolved    Insomnia G47.00   2009 - Present    Dupuytren's contracture of hand M72.0 Low  2010 - Present    Sleep apnea G47.30   10/3/2011 - Present    Osteopenia M85.80   5/10/2013 - Present    Vertebral compression fracture (CMS-HCC) M48.50XA   5/10/2013 - Present    Hyperlipidemia with target LDL less than 100 E78.5 Low  2013 - Present    Thrombophlebitis I80.9   2013 - Present    Venous collateral circulation, any site I87.8   2013 - Present    Calciphylaxis E83.59   2013 - Present    Anemia  D64.9   9/26/2013 - Present    End stage renal disease (CMS-HCC) N18.6   12/12/2013 - Present    Pain R52   2/21/2015 - Present    Hyponatremia E87.1   2/23/2015 - Present    Reflex sympathetic dystrophy G90.50   3/24/2015 - Present    Non-healing surgical wound T81.89XA   6/22/2015 - Present    Acropachy HWQ8052   3/25/2016 - Present    Seizure disorder (CMS-HCC) G40.909   4/5/2016 - Present    Bacteremia R78.81   4/7/2016 - Present    Lethargy R53.83   4/9/2016 - Present    Pain syndrome, chronic G89.4   4/10/2016 - Present    Hyperkalemia E87.5   7/8/2016 - Present    ESRD (end stage renal disease) (CMS-HCC) N18.6 Medium  7/8/2016 - Present    Dermatofibroma of right upper arm D23.61   7/11/2016 - Present    Other specified epidermal thickening L85.8   8/5/2016 - Present    Chronic anticoagulation Z79.01   9/18/2016 - Present    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   10/20/2016 - Present    Open wound of left thigh S71.102A High  10/28/2016 - Present    Hypertension I10 Medium  11/15/2016 - Present    Hyperparathyroidism (CMS-HCC) E21.3 Low  11/15/2016 - Present    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 High  11/15/2016 - Present    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, 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 Low  11/15/2016 - Present    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   1/5/2017 - Present    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   1/6/2017 - Present    Chronic kidney disease, stage V (CMS-HCC) N18.5   3/28/2017 - Present    Acute midline thoracic back pain M54.6   4/19/2017 - Present    Chronic pain disorder G89.4   5/4/2017 - Present    Intertrigo L30.4   6/5/2017 - Present      Health Maintenance        Date Due Completion Dates    IMM INFLUENZA (1) 3/20/2018 (Originally 9/1/2017) 9/30/2015, 9/9/2014, 9/15/2009    IMM DTaP/Tdap/Td Vaccine (2 - Tdap) 3/20/2018 (Originally 8/16/2000) 1/7/2000    IMM PNEUMOCOCCAL 19-64 (ADULT) HIGHEST RISK SERIES (1 of 3 - PCV13)  3/20/2018 (Originally 8/16/2000) ---            Results     POCT Protime      Component    INR    2.7                        Current Immunizations     Hepatitis B Vaccine Non-Recombivax (Ped/Adol) 10/5/2000, 2/8/2000, 1/7/2000    Influenza TIV (IM) 9/30/2015, 9/9/2014    Influenza Vaccine Pediatric 9/15/2009    MMR Vaccine 3/11/1983    Pneumococcal Vaccine (PCV7) Historical Data 1/1/2013    QF GOLD 8/7/2013    QUANTIFERON GOLD 8/7/2013    TD Vaccine 1/7/2000      Below and/or attached are the medications your provider expects you to take. Review all of your home medications and newly ordered medications with your provider and/or pharmacist. Follow medication instructions as directed by your provider and/or pharmacist. Please keep your medication list with you and share with your provider. Update the information when medications are discontinued, doses are changed, or new medications (including over-the-counter products) are added; and carry medication information at all times in the event of emergency situations     Allergies:  BACLOFEN - Unspecified     CONTRAST MEDIA WITH IODINE - Rash     KEFLEX - Rash     PCN - Rash     TAPE - Rash               Medications  Valid as of: July 06, 2017 -  7:29 AM    Generic Name Brand Name Tablet Size Instructions for use    Calcitriol (Cap) ROCALTROL 0.25 MCG Take 0.75 mcg by mouth every bedtime.        Cinacalcet HCl (Tab) SENSIPAR 30 MG Take 1 Tab by mouth every evening.        Erythromycin Base (Tab) E-MYCIN 500 MG Take 1 Tab by mouth 4 times a day.        Gabapentin (Cap) NEURONTIN 300 MG Take 1,200 mg by mouth every bedtime.        Hydrocodone-Acetaminophen (Tab) NORCO  MG Take 1-2 Tabs by mouth every 12 hours as needed.        Sevelamer Carbonate (Tab) Sevelamer Carbonate 800 MG Take 3,200 mg by mouth 3 times a day, with meals. With meals        Warfarin Sodium (Tab) COUMADIN 5 MG Take 5 mg by mouth every day. 2.5  mg on Mon, Thurs   5 mg all other days        .                    Medicines prescribed today were sent to:     [id number] - [place], [place] - [address] AT [facility] [address]    [address] [place] [address]    Phone: [phone] Fax: [phone]    Open 24 Hours?: No    [facility] - [place], [place] - [address]    [address] [place] [address]    Phone: [phone] Fax: [phone]    Open 24 Hours?: No      Medication refill instructions:       If your prescription bottle indicates you have medication refills left, it is not necessary to call your provider’s office. Please contact your pharmacy and they will refill your medication.    If your prescription bottle indicates you do not have any refills left, you may request refills at any time through one of the following ways: The online [facility] system (except Urgent Care), by calling your provider’s office, or by asking your pharmacy to contact your provider’s office with a refill request. Medication refills are processed only during regular business hours and may not be available until the next business day. Your provider may request additional information or to have a follow-up visit with you prior to refilling your medication.   *Please Note: Medication refills are assigned a new Rx number when refilled electronically. Your pharmacy may indicate that no refills were authorized even though a new prescription for the same medication is available at the pharmacy. Please request the medicine by name with the pharmacy before contacting your provider for a refill.        Warfarin Dosing Calendar   July 2017 Details    Sun Mon Tue Wed Thu Fri Sat           1                 2               3               4               5               6   2.7   5 mg   See details      7      2.5 mg         8      5 mg           9      5 mg         10      2.5 mg         11      5 mg         12      2.5 mg         13      5 mg         14      2.5 mg         15      5 mg           16       5 mg         17      2.5 mg         18      5 mg         19      2.5 mg         20      5 mg         21               22                 23               24               25               26               27               28               29                 30               31                     Date Details   07/06 This INR check   INR: 2.7       Date of next INR:  7/20/2017         How to take your warfarin dose     To take:  2.5 mg Take 0.5 of a 5 mg tablet.    To take:  5 mg Take 1 of the 5 mg tablets.              [employer] Access Code: Activation code not generated  Current [employer] Status: Active

## 2017-07-06 NOTE — PROGRESS NOTES
Anticoagulation Summary as of 7/6/2017     INR goal 2.5-3.5   Selected INR 2.7 (7/6/2017)   Maintenance plan 2.5 mg (5 mg x 0.5) on Mon, Wed, Fri; 5 mg (5 mg x 1) all other days   Weekly total 27.5 mg   Plan last modified Chriss Keating PHARMD (6/29/2017)   Next INR check 7/20/2017   Target end date Indefinite    Indications   AV graft thrombosis (CMS-HCC) [T82.868A]         Anticoagulation Episode Summary     INR check location     Preferred lab     Send INR reminders to     Comments INR goal of 2.5 - 3.5 per Dr. Hopkins      Anticoagulation Care Providers     Provider Role Specialty Phone number    Jairo Hopkins M.D. Referring Surgery [phone]    [facility] Responsible  [phone]        Anticoagulation Patient Findings   Negatives Missed Doses, Extra Doses, Medication Changes, Antibiotic Use, Diet Changes, Dental/Other Procedures, Hospitalization, Bleeding Gums, Nose Bleeds, Blood in Urine, Blood in Stool, Any Bruising, Other Complaints        Pt is therapeutic today. Pt is to continue with current warfarin dosing regimen.  Pt denies any unusual s/s of bleeding, bruising, clotting or any changes to diet or medications.  Follow up in 2 weeks.    VIVEK VarelaD    PT is s/p plastic surgery 6-5-2017 and his appetite is not quite back to normal yet

## 2017-07-31 ENCOUNTER — HOSPITAL ENCOUNTER (INPATIENT)
Facility: MEDICAL CENTER | Age: 37
LOS: 1 days | DRG: 901 | End: 2017-08-01
Attending: PLASTIC SURGERY | Admitting: PLASTIC SURGERY
Payer: MEDICARE

## 2017-07-31 PROBLEM — Z98.890: Status: ACTIVE | Noted: 2017-07-31

## 2017-07-31 PROBLEM — I96 GANGRENE (HCC): Status: ACTIVE | Noted: 2017-07-31

## 2017-07-31 LAB
ANION GAP SERPL CALC-SCNC: 15 MMOL/L (ref 0–11.9)
BUN SERPL-MCNC: 48 MG/DL (ref 8–22)
CALCIUM SERPL-MCNC: 9.5 MG/DL (ref 8.5–10.5)
CHLORIDE SERPL-SCNC: 94 MMOL/L (ref 96–112)
CO2 SERPL-SCNC: 26 MMOL/L (ref 20–33)
CREAT SERPL-MCNC: 8.31 MG/DL (ref 0.5–1.4)
ERYTHROCYTE [DISTWIDTH] IN BLOOD BY AUTOMATED COUNT: 60 FL (ref 35.9–50)
GFR SERPL CREATININE-BSD FRML MDRD: 7 ML/MIN/1.73 M 2
GLUCOSE SERPL-MCNC: 96 MG/DL (ref 65–99)
HCT VFR BLD AUTO: 29.7 % (ref 42–52)
HGB BLD-MCNC: 9.1 G/DL (ref 14–18)
INR PPP: 7.9 (ref 0.87–1.13)
MCH RBC QN AUTO: 27.9 PG (ref 27–33)
MCHC RBC AUTO-ENTMCNC: 30.6 G/DL (ref 33.7–35.3)
MCV RBC AUTO: 91.1 FL (ref 81.4–97.8)
PLATELET # BLD AUTO: 232 K/UL (ref 164–446)
PMV BLD AUTO: 9.2 FL (ref 9–12.9)
POTASSIUM SERPL-SCNC: 3.9 MMOL/L (ref 3.6–5.5)
PROTHROMBIN TIME: 68.7 SEC (ref 12–14.6)
RBC # BLD AUTO: 3.26 M/UL (ref 4.7–6.1)
SODIUM SERPL-SCNC: 135 MMOL/L (ref 135–145)
WBC # BLD AUTO: 3.9 K/UL (ref 4.8–10.8)

## 2017-07-31 PROCEDURE — 500440 HCHG DRESSING, STERILE ROLL (KERLIX): Performed by: PLASTIC SURGERY

## 2017-07-31 PROCEDURE — 160028 HCHG SURGERY MINUTES - 1ST 30 MINS LEVEL 3: Performed by: PLASTIC SURGERY

## 2017-07-31 PROCEDURE — 500423 HCHG DRESSING, ABD COMBINE: Performed by: PLASTIC SURGERY

## 2017-07-31 PROCEDURE — 160039 HCHG SURGERY MINUTES - EA ADDL 1 MIN LEVEL 3: Performed by: PLASTIC SURGERY

## 2017-07-31 PROCEDURE — 85610 PROTHROMBIN TIME: CPT

## 2017-07-31 PROCEDURE — 700101 HCHG RX REV CODE 250

## 2017-07-31 PROCEDURE — 501487 HCHG STRYKER TIP: Performed by: PLASTIC SURGERY

## 2017-07-31 PROCEDURE — 501486 HCHG STRYKER IRRIG SET HC W/TUBING: Performed by: PLASTIC SURGERY

## 2017-07-31 PROCEDURE — 500064 HCHG BINDER, 4-PANEL MED/LG: Performed by: PLASTIC SURGERY

## 2017-07-31 PROCEDURE — 700111 HCHG RX REV CODE 636 W/ 250 OVERRIDE (IP)

## 2017-07-31 PROCEDURE — 160009 HCHG ANES TIME/MIN: Performed by: PLASTIC SURGERY

## 2017-07-31 PROCEDURE — 501445 HCHG STAPLER, SKIN DISP: Performed by: PLASTIC SURGERY

## 2017-07-31 PROCEDURE — 160048 HCHG OR STATISTICAL LEVEL 1-5: Performed by: PLASTIC SURGERY

## 2017-07-31 PROCEDURE — 160002 HCHG RECOVERY MINUTES (STAT): Performed by: PLASTIC SURGERY

## 2017-07-31 PROCEDURE — A6223 GAUZE >16<=48 NO W/SAL W/O B: HCPCS | Performed by: PLASTIC SURGERY

## 2017-07-31 PROCEDURE — 501838 HCHG SUTURE GENERAL: Performed by: PLASTIC SURGERY

## 2017-07-31 PROCEDURE — 500697 HCHG HEMOCLIP, LARGE (ORANGE): Performed by: PLASTIC SURGERY

## 2017-07-31 PROCEDURE — 85027 COMPLETE CBC AUTOMATED: CPT

## 2017-07-31 PROCEDURE — 770006 HCHG ROOM/CARE - MED/SURG/GYN SEMI*

## 2017-07-31 PROCEDURE — 0JB80ZZ EXCISION OF ABDOMEN SUBCUTANEOUS TISSUE AND FASCIA, OPEN APPROACH: ICD-10-PCS | Performed by: PLASTIC SURGERY

## 2017-07-31 PROCEDURE — 160035 HCHG PACU - 1ST 60 MINS PHASE I: Performed by: PLASTIC SURGERY

## 2017-07-31 PROCEDURE — 160036 HCHG PACU - EA ADDL 30 MINS PHASE I: Performed by: PLASTIC SURGERY

## 2017-07-31 PROCEDURE — 500444 HCHG HEMOSTAT, SURGICEL 2X3: Performed by: PLASTIC SURGERY

## 2017-07-31 PROCEDURE — 80048 BASIC METABOLIC PNL TOTAL CA: CPT

## 2017-07-31 PROCEDURE — 502240 HCHG MISC OR SUPPLY RC 0272: Performed by: PLASTIC SURGERY

## 2017-07-31 RX ORDER — LIDOCAINE AND PRILOCAINE 25; 25 MG/G; MG/G
1 CREAM TOPICAL
Status: COMPLETED | OUTPATIENT
Start: 2017-07-31 | End: 2017-07-31

## 2017-07-31 RX ORDER — CLINDAMYCIN HYDROCHLORIDE 150 MG/1
600 CAPSULE ORAL 2 TIMES DAILY
COMMUNITY
End: 2017-08-18

## 2017-07-31 RX ORDER — LIDOCAINE HYDROCHLORIDE 10 MG/ML
0.5 INJECTION, SOLUTION INFILTRATION; PERINEURAL
Status: COMPLETED | OUTPATIENT
Start: 2017-07-31 | End: 2017-07-31

## 2017-07-31 RX ORDER — HALOPERIDOL 5 MG/ML
INJECTION INTRAMUSCULAR
Status: COMPLETED
Start: 2017-07-31 | End: 2017-07-31

## 2017-07-31 RX ORDER — SODIUM CHLORIDE 9 MG/ML
INJECTION, SOLUTION INTRAVENOUS CONTINUOUS
Status: DISCONTINUED | OUTPATIENT
Start: 2017-07-31 | End: 2017-08-01 | Stop reason: HOSPADM

## 2017-07-31 RX ORDER — LIDOCAINE HYDROCHLORIDE 10 MG/ML
INJECTION, SOLUTION INFILTRATION; PERINEURAL
Status: COMPLETED
Start: 2017-07-31 | End: 2017-07-31

## 2017-07-31 RX ORDER — MAGNESIUM HYDROXIDE 1200 MG/15ML
LIQUID ORAL
Status: DISCONTINUED | OUTPATIENT
Start: 2017-07-31 | End: 2017-07-31 | Stop reason: HOSPADM

## 2017-07-31 RX ORDER — LORAZEPAM 2 MG/ML
INJECTION INTRAMUSCULAR
Status: COMPLETED
Start: 2017-07-31 | End: 2017-07-31

## 2017-07-31 RX ADMIN — HALOPERIDOL LACTATE 1 MG: 5 INJECTION, SOLUTION INTRAMUSCULAR at 20:26

## 2017-07-31 RX ADMIN — HYDROMORPHONE HYDROCHLORIDE 0.5 MG: 1 INJECTION, SOLUTION INTRAMUSCULAR; INTRAVENOUS; SUBCUTANEOUS at 20:34

## 2017-07-31 RX ADMIN — HALOPERIDOL LACTATE 1 MG: 5 INJECTION, SOLUTION INTRAMUSCULAR at 20:30

## 2017-07-31 RX ADMIN — LIDOCAINE HYDROCHLORIDE 0.5 ML: 10 INJECTION, SOLUTION INFILTRATION; PERINEURAL at 18:45

## 2017-07-31 RX ADMIN — LORAZEPAM 0.5 MG: 2 INJECTION INTRAMUSCULAR; INTRAVENOUS at 20:26

## 2017-07-31 RX ADMIN — LORAZEPAM 2026 MG: 2 INJECTION INTRAMUSCULAR; INTRAVENOUS at 00:05

## 2017-07-31 RX ADMIN — SODIUM CHLORIDE: 9 INJECTION, SOLUTION INTRAVENOUS at 18:59

## 2017-07-31 RX ADMIN — HYDROMORPHONE HYDROCHLORIDE 0.5 MG: 1 INJECTION, SOLUTION INTRAMUSCULAR; INTRAVENOUS; SUBCUTANEOUS at 20:42

## 2017-07-31 RX ADMIN — HYDROMORPHONE HYDROCHLORIDE 0.5 MG: 1 INJECTION, SOLUTION INTRAMUSCULAR; INTRAVENOUS; SUBCUTANEOUS at 20:26

## 2017-07-31 RX ADMIN — LORAZEPAM 0.5 MG: 2 INJECTION INTRAMUSCULAR; INTRAVENOUS at 20:36

## 2017-07-31 RX ADMIN — MORPHINE SULFATE: 50 INJECTION, SOLUTION, CONCENTRATE INTRAVENOUS at 20:45

## 2017-07-31 RX ADMIN — HYDROMORPHONE HYDROCHLORIDE 0.5 MG: 1 INJECTION, SOLUTION INTRAMUSCULAR; INTRAVENOUS; SUBCUTANEOUS at 20:50

## 2017-07-31 ASSESSMENT — PAIN SCALES - GENERAL
PAINLEVEL_OUTOF10: 2
PAINLEVEL_OUTOF10: 6
PAINLEVEL_OUTOF10: 4
PAINLEVEL_OUTOF10: 2
PAINLEVEL_OUTOF10: 2
PAINLEVEL_OUTOF10: 4
PAINLEVEL_OUTOF10: 4
PAINLEVEL_OUTOF10: 10
PAINLEVEL_OUTOF10: 10
PAINLEVEL_OUTOF10: 2
PAINLEVEL_OUTOF10: 4
PAINLEVEL_OUTOF10: 2
PAINLEVEL_OUTOF10: 2
PAINLEVEL_OUTOF10: 10
PAINLEVEL_OUTOF10: 4
PAINLEVEL_OUTOF10: 2

## 2017-07-31 NOTE — IP AVS SNAPSHOT
8/1/2017    Denis De Anda  [address]  [place] [address]    Dear Denis:    [facility] wants to ensure your discharge home is safe and you or your loved ones have had all of your questions answered regarding your care after you leave the hospital.    Below is a list of resources and contact information should you have any questions regarding your hospital stay, follow-up instructions, or active medical symptoms.    Questions or Concerns Regarding… Contact   Medical Questions Related to Your Discharge  (7 days a week, 8am-5pm) Contact a Nurse Care Coordinator   [phone]   Medical Questions Not Related to Your Discharge  (24 hours a day / 7 days a week)  Contact the Nurse Health Line   [phone]    Medications or Discharge Instructions Refer to your discharge packet   or contact your [facility] Primary Care Provider   [phone]   Follow-up Appointment(s) Schedule your appointment via yetu   or contact Scheduling [phone]   Billing Review your statement via yetu  or contact Billing [phone]   Medical Records Review your records via yetu   or contact Medical Records [phone]     You may receive a telephone call within two days of discharge. This call is to make certain you understand your discharge instructions and have the opportunity to have any questions answered. You can also easily access your medical information, test results and upcoming appointments via the yetu free online health management tool. You can learn more and sign up at [email or web address]. For assistance setting up your yetu account, please call [phone].    Once again, we want to ensure your discharge home is safe and that you have a clear understanding of any next steps in your care. If you have any questions or concerns, please do not hesitate to contact us, we are here for you. Thank you for choosing [facility] for your healthcare needs.    Sincerely,    Your [facility] Healthcare Team

## 2017-07-31 NOTE — IP AVS SNAPSHOT
" <p align=\"LEFT\"><IMG SRC=\"//[email or web address]\" alt=\"Image\" WIDTH=\"50%\" HEIGHT=\"200\" BORDER=\"\"></p>                   Name:Denis De Anda  Medical Record Number:[id number]  CSN: [id number]    YOB: 1980   Age: 36 y.o.  Sex: male  HT:1.626 m (5' 4\") WT: 80.1 kg (176 lb 9.4 oz)          Admit Date: 7/31/2017     Discharge Date:   Today's Date: 8/1/2017  Attending Doctor:  Samson Rosenbaum Jr., *                  Allergies:  Baclofen; Contrast media with iodine; Keflex; Pcn; and Tape          Follow-up Information     1. Follow up with Samson Rosenbaum Jr., M.D. On 8/4/2017.    Specialty:  Plastic Surgery    Contact information    [address]  [facility] [address]  [phone]           Medication List      Take these Medications        Instructions    calcitRIOL 0.25 MCG Caps   Commonly known as:  ROCALTROL    Take 0.75 mcg by mouth every bedtime.   Dose:  0.75 mcg       cinacalcet 30 MG Tabs   Commonly known as:  SENSIPAR    Take 1 Tab by mouth every evening.   Dose:  30 mg       clindamycin 150 MG Caps   Commonly known as:  CLEOCIN    Take 600 mg by mouth 2 times a day.   Dose:  600 mg       gabapentin 300 MG Caps   Commonly known as:  NEURONTIN    Take 1,200 mg by mouth every bedtime.   Dose:  1200 mg       hydrocodone/acetaminophen  MG Tabs   Commonly known as:  NORCO    Take 1-2 Tabs by mouth every 12 hours as needed.   Dose:  1-2 Tab       RENVELA 800 MG Tabs   Generic drug:  Sevelamer Carbonate    Take 3,200 mg by mouth 3 times a day, with meals. With meals   Dose:  3200 mg       warfarin 5 MG Tabs   Commonly known as:  COUMADIN    Take 5 mg by mouth every day. 2.5  mg on Mon, Thurs  5 mg all other days   Dose:  5 mg         "

## 2017-07-31 NOTE — IP AVS SNAPSHOT
[employer] Access Code: Activation code not generated  Current [employer] Status: Active    [employer]  A secure, online tool to manage your health information     [employer]® is a secure, online tool that connects you to your personalized health information from the privacy of your home -- day or night - making it very easy for you to manage your healthcare. Once the activation process is completed, you can even access your medical information using the [employer] aniket, which is available for free in the Apple Aniket store or Google Play store.     [employer] provides the following levels of access (as shown below):   My Chart Features   [facility] Primary Care Doctor [facility]  Specialists [facility]  Urgent  Care Non-[facility]  Primary Care  Doctor   Email your healthcare team securely and privately 24/7 X X X X   Manage appointments: schedule your next appointment; view details of past/upcoming appointments X      Request prescription refills. X      View recent personal medical records, including lab and immunizations X X X X   View health record, including health history, allergies, medications X X X X   Read reports about your outpatient visits, procedures, consult and ER notes X X X X   See your discharge summary, which is a recap of your hospital and/or ER visit that includes your diagnosis, lab results, and care plan. X X       How to register for [employer]:  1. Go to  [email or web address].  2. Click on the Sign Up Now box, which takes you to the New Member Sign Up page. You will need to provide the following information:  a. Enter your [employer] Access Code exactly as it appears at the top of this page. (You will not need to use this code after you’ve completed the sign-up process. If you do not sign up before the expiration date, you must request a new code.)   b. Enter your date of birth.   c. Enter your home email address.   d. Click Submit, and follow the next screen’s instructions.  3. Create a [employer] ID. This will  be your [employer] login ID and cannot be changed, so think of one that is secure and easy to remember.  4. Create a [employer] password. You can change your password at any time.  5. Enter your Password Reset Question and Answer. This can be used at a later time if you forget your password.   6. Enter your e-mail address. This allows you to receive e-mail notifications when new information is available in [employer].  7. Click Sign Up. You can now view your health information.    For assistance activating your [employer] account, call [phone]

## 2017-07-31 NOTE — IP AVS SNAPSHOT
" Home Care Instructions                                                                                                                  Name:Denis De Anda  Medical Record Number:[id number]  CSN: [id number]    YOB: 1980   Age: 36 y.o.  Sex: male  HT:1.626 m (5' 4\") WT: 80.1 kg (176 lb 9.4 oz)          Admit Date: 7/31/2017     Discharge Date:   Today's Date: 8/1/2017  Attending Doctor:  Samson Rosenbaum Jr., *                  Allergies:  Baclofen; Contrast media with iodine; Keflex; Pcn; and Tape            Discharge Instructions       YOB: 1980   Age: 36 y.o.               Admit Date: 7/31/2017     Discharge Date: 8/1/2017  Attending Doctor:  Samson Rosenbaum Jr., *                  Allergies:  Baclofen; Contrast media with iodine; Keflex; Pcn; and Tape  Medical History (as on file):   Past Medical History   Diagnosis Date   • Contracture of palmar fascia    • Toxic uninodular goiter without mention of thyrotoxic crisis or storm    • Hypertension    • Pain      Chronic pain   • Chronic kidney disease, unspecified    • Renal failure      hemodialysis   • Arrhythmia      irregular EKG   • Urinary incontinence    • Seizure (CMS-HCC)      last seizure 04/1/2016   • Sleep apnea      BIPAP   • Dialysis       hemodialysis at home 5 days a week   • Hemorrhagic disorder (CMS-[facility])      on coumadin     Past Surgical History   Procedure Laterality Date   • Mass excision ortho  10/9/08     Performed by BELKIS BREAUX at SURGERY SAME DAY [facility] ORS   • Av fistula creation  11/6/08     Performed by MARI WINTERS at SURGERY [facility] ORS   • Arteriogram  3/5/2009     Performed by MARI WINTERS at SURGERY [facility] ORS   • Angioplasty balloon  3/5/2009     Performed by MARI WINTERS at SURGERY [facility] ORS   • Av fistulogram  3/5/2009     Performed by MARI WINTERS at SURGERY [facility] ORS   • Us-kidney transplant  1996, 2001     x2   • Endarterectomy  11/16/2010   " Performed by JAIRO HOPKINS at SURGERY [facility] ORS   • Av fistulogram  11/16/2010     Performed by JAIRO HOPKINS at SURGERY [facility] ORS   • Cath placement  2/1/2011     Performed by JAIRO HOPKINS at SURGERY [facility] ORS   • Angioplasty balloon  2/1/2011     Performed by JAIRO HOPKINS at SURGERY [facility] ORS   • Av fistulogram  6/5/2011     Performed by JAIRO HOPKINS at SURGERY [facility] ORS   • Cath placement  6/5/2011     Performed by JAIRO HOPKINS at SURGERY [facility] ORS   • Av fistula revision  11/3/2011     Performed by JAIRO HOPKINS at SURGERY [facility] ORS   • Bone spur excision  12/8/2011     Performed by BELKIS BREAUX at SURGERY SAME DAY [facility] ORS   • Recovery  5/18/2012     Performed by SURGERY, IR-RECOVERY at [facility] ORS   • Incision and drainage general  9/13/2013     Performed by Jairo Hopkins M.D. at SURGERY [facility] ORS   • Debridement  9/13/2013     Performed by Jairo Hopkins M.D. at SURGERY [facility] ORS   • Vein ligation  9/13/2013     Performed by Jairo Hopkins M.D. at SURGERY [facility]   • Recovery  6/13/2014     Performed by Ir-Recovery Surgery at [facility] SAME DAY [facility] ORS   • Av fistula revision  9/30/2014     Performed by Jairo Hopkins M.D. at SURGERY [facility]   • Irrigation & debridement general  12/15/2014     Performed by Jairo Hopkins M.D. at SURGERY [facility] ORS   • Av fistula revision  2/8/2015     Performed by Jairo Hopkins M.D. at SURGERY [facility] ORS   • Av fistula revision  2/22/2015     Performed by Lurdes Moffett M.D. at SURGERY [facility]   • Av fistula revision  3/20/2015     Performed by Jairo Hopkins M.D. at SURGERY [facility]   • Pr inject nerv blck,stellate ganglion  3/24/2015     Performed by Bin Pro at SURGERY [facility]   • Av fistula creation  4/20/2015     Performed by Jairo Hopkins M.D. at SURGERY [facility] ORS   • Av  fistula thrombolysis Left 6/3/2015     Procedure: AV FISTULA THROMBOLYSIS THIGH REPLACEMENT;  Surgeon: Jairo Hopkins M.D.;  Location: SURGERY [facility];  Service:    • Irrigation & debridement general Left 6/3/2015     Procedure: IRRIGATION & DEBRIDEMENT GENERAL;  Surgeon: Jairo Hopkins M.D.;  Location: SURGERY [facility];  Service:    • Av fistula thrombolysis Left 6/9/2015     Procedure: THROMBECTOMY AV GRAFT THIGH;  Surgeon: Jairo Hopkins M.D.;  Location: SURGERY [facility];  Service:    • Av fistula revision Left 6/17/2015     Procedure: AV FISTULA REVISION;  Surgeon: Jairo Hopkins M.D.;  Location: SURGERY [facility];  Service:    • Irrigation & debridement general Left 6/17/2015     Procedure: IRRIGATION & DEBRIDEMENT GENERAL ARM W/EXPLORATION WOUND & CONTROL BLEEDING;  Surgeon: Jairo Hopkins M.D.;  Location: SURGERY [facility];  Service:    • Recovery  8/7/2015     Procedure:  VASCULAR CASE VIANEY-RIGHT ILIAC VENOGRAM WITH ANGIOPLASTY, REMOVAL RIGHT FEMORAL TUNNELED DIALYSIS CATHETER  **VRE**;  Surgeon: Recoveryonly Surgery;  Location: SURGERY PRE-POST PROC UNIT [facility] [place] [place];  Service:    • Pr percut implnt neuroelect,epidural  2/19/2016     Procedure: IMPLANT NEUROSTIM EPI ARRAY;  Surgeon: Bin Pro;  Location: SURGERY [facility];  Service: Pain Management   • Pr percut implnt neuroelect,epidural  2/19/2016     Procedure: IMPLANT NEUROSTIM EPI ARRAY;  Surgeon: Bin Pro;  Location: SURGERY [facility];  Service: Pain Management   • Parathyroidectomy  2006   • Inguinal hernia repair Bilateral 2001, 2002   • Spinal cord stimulator N/A 3/25/2016     Procedure: SPINAL CORD STIMULATOR;  Surgeon: Bin Pro;  Location: SURGERY [facility];  Service:    • Recovery  7/8/2016     Procedure: IR1-VASCULAR CASE-VIANEY-LEFT THIGH AV GRAFT THROMBOLYSIS WITH TISSUE PLASMINOGEN ACTIVATOR AND ANGIOJET ARTHERECTOMY   ;  Surgeon: Recoveryonly Surgery;  Location: SURGERY  PRE-POST PROC UNIT [facility] [place];  Service:    • Lesion excision general Right 7/11/2016     Procedure: LESION EXCISION GENERAL FOR ARM SKIN;  Surgeon: Jairo Hopkins M.D.;  Location: SURGERY [facility];  Service:    • Mass excision general Right 8/5/2016     Procedure: MASS EXCISION GENERAL FOR CALCIPHYLAXIS SKIN AND SUBCUTANEOUS TISSUE FOREARM;  Surgeon: Jairo Hopkins M.D.;  Location: SURGERY [facility];  Service:    • Cath placement Right 9/17/2016     Procedure: CATH PLACEMENT - tunneled dialysis cath placement right femoral ;  Surgeon: Quentin Alicia M.D.;  Location: SURGERY [facility];  Service:    • Thrombectomy Left 9/18/2016     Procedure: THROMBECTOMY - and fistula revision;  Surgeon: Jairo Hopkins M.D.;  Location: SURGERY [facility];  Service:    • Av fistulogram  9/18/2016     Procedure: AV FISTULOGRAM;  Surgeon: Jairo Hopkins M.D.;  Location: SURGERY [facility];  Service:    • Thrombectomy Left 10/20/2016     Procedure: THROMBECTOMY THIGH;  Surgeon: Jairo Hopkins M.D.;  Location: SURGERY [facility];  Service:    • Incision and drainage general  10/20/2016     Procedure: INCISION AND DRAINAGE GENERAL HEMATOMA;  Surgeon: Jairo Hopkins M.D.;  Location: SURGERY [facility];  Service:    • Irrigation & debridement general Left 10/28/2016     Procedure: IRRIGATION & DEBRIDEMENT GENERAL THIGH WITH IRRIGATING WOUND VAC PLACEMENT;  Surgeon: Jairo Hopkins M.D.;  Location: SURGERY [facility];  Service:    • Flap closure Left 11/17/2016     Procedure: Fasciocutaneous Flap Closure Left Upper Leg;  Surgeon: Samson Rosenbaum Jr., M.D.;  Location: SURGERY [facility];  Service:    • Thrombectomy Left 1/6/2017     Procedure: THROMBECTOMY-OPEN THROMBECTOMY WITH LEFT THIGH GRAFT;  Surgeon: Jairo Hopkins M.D.;  Location: SURGERY [facility];  Service:    • Cath placement Right 1/6/2017     Procedure: CATH PLACEMENT;  Surgeon: Jairo Hopkins M.D.;   Location: SURGERY [facility];  Service:    • Thrombectomy Left 1/5/2017     Procedure: THROMBECTOMY-THIGH AV LOOP GRAFT AND ANGIOJET;  Surgeon: Jairo Hopkins M.D.;  Location: SURGERY [facility];  Service:    • Spinal cord stimulator N/A 5/4/2017     Procedure: SPINAL CORD STIMULATOR - EXPLANT;  Surgeon: Bin Pro M.D.;  Location: SURGERY [facility];  Service:    • Abdominoplasty  6/5/2017     Procedure: ABDOMINOPLASTY - FOR PANNICULECTOMY;  Surgeon: Samson Rosenbaum Jr., M.D.;  Location: SURGERY [facility];  Service:    • Irrigation & debridement general  7/31/2017     Procedure: IRRIGATION & DEBRIDEMENT GENERAL-ABDOMEN;  Surgeon: Samson Rosenbaum Jr., M.D.;  Location: SURGERY [facility];  Service:        Discharge Instructions  Blood Pressure: (!) 92/59 mmHg  Weight: 80.1 kg (176 lb 9.4 oz)    Discharged to home by car with relative. Discharged via wheelchair, hospital escort: Yes.    Special equipment needed: Not Applicable    Belongings with: Personal    Instructions:    Keep binder on except for shower   Follow up Dr Rosenbaum this Friday  Call coumadin clinic for follow up when you get home     Be sure to schedule a follow-up appointment with your primary care doctor or any specialists as instructed.     Discharge Plan:   Diet Plan: Discussed  Activity Level: Discussed  Confirmed Follow up Appointment: Patient to Call and Schedule Appointment  Confirmed Symptoms Management: Discussed  Medication Reconciliation Updated: Yes  Influenza Vaccine Indication: Not indicated: Previously immunized this influenza season and > 8 years of age    DIET:  You may eat any foods that you can tolerate.  It is a good idea to eat a high fiber diet and take in plenty of fluids to prevent constipation.  If you do become constipated you may want to take a mild laxative or take ducolax tablets on a daily basis until your bowel habits are regular.  Constipation can be very uncomfortable, along with  straining, after recent surgery.    I understand that a diet low in cholesterol, fat, and sodium is recommended for good health. Unless I have been given specific instructions below for another diet, I accept this instruction as my diet prescription.       Discharge Medication Instructions:    Below are the medications your physician expects you to take upon discharge:    Review all your home medications and newly ordered medications with your doctor and/or pharmacist. Follow medication instructions as directed by your doctor and/or pharmacist.  GENERAL POST-OPERATIVE  PATIENT INSTRUCTIONS      FOLLOW-UP:  Please call your physician/ return to ER if you have any fevers greater than 100.4, drainage from your wound that is not clear or looks infected, persistent bleeding, increasing abdominal pain, problems urinating, or persistent nausea/vomiting.      WOUND CARE INSTRUCTIONS: Keep binder on except for shower   Follow Dr Rosenbaum this Friday No baths, hot tubs, swimming, no submerging incisions, unit til healed.     ACTIVITY:  You are encouraged to cough and deep breath or use your incentive spirometer if you were given one, every 15-30 minutes when awake.  This will help prevent respiratory complications and low grade fevers post-operatively if you had a general anesthetic.  You may want to hug a pillow when coughing and sneezing to add additional support to the surgical area, if you had abdominal or chest surgery, which will decrease pain during these times.  You are encouraged to walk and engage in light activity for the next two weeks.  You should not lift more than 10 pounds during this time frame as it could put you at increased risk for complications.      MEDICATIONS:  Try to take narcotic medications and anti-inflammatory medications, such as tylenol, ibuprofen, naprosyn, etc., with food.  This will minimize stomach upset from the medication.  Should you develop nausea and vomiting from the pain medication, or  develop a rash, please discontinue the medication and contact your physician.  You should not drive, make important decisions, or operate machinery when taking narcotic pain medication.    QUESTIONS:  Please feel free to call your physician or the hospital  if you have any questions, and they will be glad to assist you.         Discharge Instructions    Special Instructions: None    · Is patient discharged on Warfarin / Coumadin?   Yes    You are receiving the drug warfarin. Please understand the importance of monitoring warfarin with scheduled PT/INR blood draws.  Follow-up with a call to your personal Doctor's office in 3 days to schedule a PT/INR. .    IMPORTANT: HOW TO USE THIS INFORMATION:  This is a summary and does NOT have all possible information about this product. This information does not assure that this product is safe, effective, or appropriate for you. This information is not individual medical advice and does not substitute for the advice of your health care professional. Always ask your health care professional for complete information about this product and your specific health needs.      WARFARIN - ORAL (WARF-uh-rin)      COMMON BRAND NAME(S): Coumadin      WARNING:  Warfarin can cause very serious (possibly fatal) bleeding. This is more likely to occur when you first start taking this medication or if you take too much warfarin. To decrease your risk for bleeding, your doctor or other health care provider will monitor you closely and check your lab results (INR test) to make sure you are not taking too much warfarin. Keep all medical and laboratory appointments. Tell your doctor right away if you notice any signs of serious bleeding. See also Side Effects section.      USES:  This medication is used to treat blood clots (such as in deep vein thrombosis-DVT or pulmonary embolus-PE) and/or to prevent new clots from forming in your body. Preventing harmful blood clots helps to reduce the  "risk of a stroke or heart attack. Conditions that increase your risk of developing blood clots include a certain type of irregular heart rhythm (atrial fibrillation), heart valve replacement, recent heart attack, and certain surgeries (such as hip/knee replacement). Warfarin is commonly called a \"blood thinner,\" but the more correct term is \"anticoagulant.\" It helps to keep blood flowing smoothly in your body by decreasing the amount of certain substances (clotting proteins) in your blood.      HOW TO USE:  Read the Medication Guide provided by your pharmacist before you start taking warfarin and each time you get a refill. If you have any questions, ask your doctor or pharmacist. Take this medication by mouth with or without food as directed by your doctor or other health care professional, usually once a day. It is very important to take it exactly as directed. Do not increase the dose, take it more frequently, or stop using it unless directed by your doctor. Dosage is based on your medical condition, laboratory tests (such as INR), and response to treatment. Your doctor or other health care provider will monitor you closely while you are taking this medication to determine the right dose for you. Use this medication regularly to get the most benefit from it. To help you remember, take it at the same time each day. It is important to eat a balanced, consistent diet while taking warfarin. Some foods can affect how warfarin works in your body and may affect your treatment and dose. Avoid sudden large increases or decreases in your intake of foods high in vitamin K (such as broccoli, cauliflower, cabbage, brussels sprouts, kale, spinach, and other green leafy vegetables, liver, green tea, certain vitamin supplements). If you are trying to lose weight, check with your doctor before you try to go on a diet. Cranberry products may also affect how your warfarin works. Limit the amount of cranberry juice (16 ounces/480 " milliliters a day) or other cranberry products you may drink or eat.      SIDE EFFECTS:  Nausea, loss of appetite, or stomach/abdominal pain may occur. If any of these effects persist or worsen, tell your doctor or pharmacist promptly. Remember that your doctor has prescribed this medication because he or she has judged that the benefit to you is greater than the risk of side effects. Many people using this medication do not have serious side effects. This medication can cause serious bleeding if it affects your blood clotting proteins too much (shown by unusually high INR lab results). Even if your doctor stops your medication, this risk of bleeding can continue for up to a week. Tell your doctor right away if you have any signs of serious bleeding, including: unusual pain/swelling/discomfort, unusual/easy bruising, prolonged bleeding from cuts or gums, persistent/frequent nosebleeds, unusually heavy/prolonged menstrual flow, pink/dark urine, coughing up blood, vomit that is bloody or looks like coffee grounds, severe headache, dizziness/fainting, unusual or persistent tiredness/weakness, bloody/black/tarry stools, chest pain, shortness of breath, difficulty swallowing. Tell your doctor right away if any of these unlikely but serious side effects occur: persistent nausea/vomiting, severe stomach/abdominal pain, yellowing eyes/skin. This drug rarely has caused very serious (possibly fatal) problems if its effects lead to small blood clots (usually at the beginning of treatment). This can lead to severe skin/tissue damage that may require surgery or amputation if left untreated. Patients with certain blood conditions (protein C or S deficiency) may be at greater risk. Get medical help right away if any of these rare but serious side effects occur: painful/red/purplish patches on the skin (such as on the toe, breast, abdomen), change in the amount of urine, vision changes, confusion, slurred speech, weakness on one  side of the body. A very serious allergic reaction to this drug is rare. However, get medical help right away if you notice any symptoms of a serious allergic reaction, including: rash, itching/swelling (especially of the face/tongue/throat), severe dizziness, trouble breathing. This is not a complete list of possible side effects. If you notice other effects not listed above, contact your doctor or pharmacist. In the [place] - Call your doctor for medical advice about side effects. You may report side effects to FDA at 8-174-VUG-4930. In [place] - Call your doctor for medical advice about side effects. You may report side effects to [facility] [place] at 1-654.514.9413.      PRECAUTIONS:  Before taking warfarin, tell your doctor or pharmacist if you are allergic to it; or if you have any other allergies. This product may contain inactive ingredients, which can cause allergic reactions or other problems. Talk to your pharmacist for more details. Before using this medication, tell your doctor or pharmacist your medical history, especially of: blood disorders (such as anemia, hemophilia), bleeding problems (such as bleeding of the stomach/intestines, bleeding in the brain), blood vessel disorders (such as aneurysms), recent major injury/surgery, liver disease, alcohol use, mental/mood disorders (including memory problems), frequent falls/injuries. It is important that all your doctors and dentists know that you take warfarin. Before having surgery or any medical/dental procedures, tell your doctor or dentist that you are taking this medication and about all the products you use (including prescription drugs, nonprescription drugs, and herbal products). Avoid getting injections into the muscles. If you must have an injection into a muscle (for example, a flu shot), it should be given in the arm. This way, it will be easier to check for bleeding and/or apply pressure bandages. This medication may cause stomach bleeding. Daily use  of alcohol while using this medicine will increase your risk for stomach bleeding and may also affect how this medication works. Limit or avoid alcoholic beverages. If you have not been eating well, if you have an illness or infection that causes fever, vomiting, or diarrhea for more than 2 days, or if you start using any antibiotic medications, contact your doctor or pharmacist immediately because these conditions can affect how warfarin works. This medication can cause heavy bleeding. To lower the chance of getting cut, bruised, or injured, use great caution with sharp objects like safety razors and nail cutters. Use an electric razor when shaving and a soft toothbrush when brushing your teeth. Avoid activities such as contact sports. If you fall or injure yourself, especially if you hit your head, call your doctor immediately. Your doctor may need to check you. The Food & Drug Administration has stated that generic warfarin products are interchangeable. However, consult your doctor or pharmacist before switching warfarin products. Be careful not to take more than one medication that contains warfarin unless specifically directed by the doctor or health care provider who is monitoring your warfarin treatment. Older adults may be at greater risk for bleeding while using this drug. This medication is not recommended for use during pregnancy because of serious (possibly fatal) harm to an unborn baby. Discuss the use of reliable forms of birth control with your doctor. If you become pregnant or think you may be pregnant, tell your doctor immediately. If you are planning pregnancy, discuss a plan for managing your condition with your doctor before you become pregnant. Your doctor may switch the type of medication you use during pregnancy. Very small amounts of this medication may pass into breast milk but is unlikely to harm a nursing infant. Consult your doctor before breast-feeding.      DRUG INTERACTIONS:  Drug  "interactions may change how your medications work or increase your risk for serious side effects. This document does not contain all possible drug interactions. Keep a list of all the products you use (including prescription/nonprescription drugs and herbal products) and share it with your doctor and pharmacist. Do not start, stop, or change the dosage of any medicines without your doctor's approval. Warfarin interacts with many prescription, nonprescription, vitamin, and herbal products. This includes medications that are applied to the skin or inside the vagina or rectum. The interactions with warfarin usually result in an increase or decrease in the \"blood-thinning\" (anticoagulant) effect. Your doctor or other health care professional should closely monitor you to prevent serious bleeding or clotting problems. While taking warfarin, it is very important to tell your doctor or pharmacist of any changes in medications, vitamins, or herbal products that you are taking. Some products that may interact with this drug include: capecitabine, imatinib, mifepristone. Aspirin, aspirin-like drugs (salicylates), and nonsteroidal anti-inflammatory drugs (NSAIDs such as ibuprofen, naproxen, celecoxib) may have effects similar to warfarin. These drugs may increase the risk of bleeding problems if taken during treatment with warfarin. Carefully check all prescription/nonprescription product labels (including drugs applied to the skin such as pain-relieving creams) since the products may contain NSAIDs or salicylates. Talk to your doctor about using a different medication (such as acetaminophen) to treat pain/fever. Low-dose aspirin and related drugs (such as clopidogrel, ticlopidine) should be continued if prescribed by your doctor for specific medical reasons such as heart attack or stroke prevention. Consult your doctor or pharmacist for more details. Many herbal products interact with warfarin. Tell your doctor before taking " any herbal products, especially bromelains, coenzyme Q10, cranberry, danshen, dong quai, fenugreek, garlic, ginkgo biloba, ginseng, and Harry's wort, among others. This medication may interfere with a certain laboratory test to measure theophylline levels, possibly causing false test results. Make sure laboratory personnel and all your doctors know you use this drug.      OVERDOSE:  If overdose is suspected, contact a poison control center or emergency room immediately. [place] residents can call the [place] National Poison Hotline at 1-539.444.5652. [place] residents can call a provincial poison control center. Symptoms of overdose may include: bloody/black/tarry stools, pink/dark urine, unusual/prolonged bleeding.      NOTES:  Do not share this medication with others. Laboratory and/or medical tests (such as INR, complete blood count) must be performed periodically to monitor your progress or check for side effects. Consult your doctor for more details.      MISSED DOSE:  For the best possible benefit, do not miss any doses. If you do miss a dose and remember on the same day, take it as soon as you remember. If you remember on the next day, skip the missed dose and resume your usual dosing schedule. Do not double the dose to catch up because this could increase your risk for bleeding. Keep a record of missed doses to give to your doctor or pharmacist. Contact your doctor or pharmacist if you miss 2 or more doses in a row.      STORAGE:  Store at room temperature away from light and moisture. Do not store in the bathroom. Keep all medications away from children and pets. Do not flush medications down the toilet or pour them into a drain unless instructed to do so. Properly discard this product when it is  or no longer needed. Consult your pharmacist or local waste disposal company for more details about how to safely discard your product.      MEDICAL ALERT:  Your condition and medication can cause complications in  a medical emergency. For information about enrolling in MedicAlert, call 1-621.253.6055 ([place]) or 1-601.824.9847 ([place]).      Information last revised October 2010 Copyright(c) 2010 [employer]             · Is patient Post Blood Transfusion?  No    Depression / Suicide Risk    As you are discharged from this [facility] facility, it is important to learn how to keep safe from harming yourself.    Recognize the warning signs:  · Abrupt changes in personality, positive or negative- including increase in energy   · Giving away possessions  · Change in eating patterns- significant weight changes-  positive or negative  · Change in sleeping patterns- unable to sleep or sleeping all the time   · Unwillingness or inability to communicate  · Depression  · Unusual sadness, discouragement and loneliness  · Talk of wanting to die  · Neglect of personal appearance   · Rebelliousness- reckless behavior  · Withdrawal from people/activities they love  · Confusion- inability to concentrate     If you or a loved one observes any of these behaviors or has concerns about self-harm, here's what you can do:  · Talk about it- your feelings and reasons for harming yourself  · Remove any means that you might use to hurt yourself (examples: pills, rope, extension cords, firearm)  · Get professional help from the community (Mental Health, Substance Abuse, psychological counseling)  · Do not be alone:Call your Safe Contact- someone whom you trust who will be there for you.  · Call your local CRISIS HOTLINE 678-3818 or 047-675-0731  · Call your local Children's Mobile Crisis Response Team [place] (016) 707-7984 or www.[employer]  · Call the toll free National Suicide Prevention Hotlines   · National Suicide Prevention Lifeline 237-537-XCRC (5574)  · National Hope Line Network 800-SUICIDE (854-0300)          Follow-up Information     1. Follow up with Samson Rosenbaum Jr., M.D. On 8/4/2017.    Specialty:  Plastic  Surgery    Contact information    [address]  [place] [address]  [phone]           Discharge Medication Instructions:    Below are the medications your physician expects you to take upon discharge:    Review all your home medications and newly ordered medications with your doctor and/or pharmacist. Follow medication instructions as directed by your doctor and/or pharmacist.    Please keep your medication list with you and share with your physician.               Medication List      CONTINUE taking these medications        Instructions    Morning Afternoon Evening Bedtime    calcitRIOL 0.25 MCG Caps   Commonly known as:  ROCALTROL        Take 0.75 mcg by mouth every bedtime.   Dose:  0.75 mcg                        cinacalcet 30 MG Tabs   Commonly known as:  SENSIPAR        Take 1 Tab by mouth every evening.   Dose:  30 mg                        clindamycin 150 MG Caps   Commonly known as:  CLEOCIN        Take 600 mg by mouth 2 times a day.   Dose:  600 mg                        gabapentin 300 MG Caps   Commonly known as:  NEURONTIN        Take 1,200 mg by mouth every bedtime.   Dose:  1200 mg                        hydrocodone/acetaminophen  MG Tabs   Commonly known as:  NORCO        Take 1-2 Tabs by mouth every 12 hours as needed.   Dose:  1-2 Tab                        RENVELA 800 MG Tabs   Generic drug:  Sevelamer Carbonate        Take 3,200 mg by mouth 3 times a day, with meals. With meals   Dose:  3200 mg                        warfarin 5 MG Tabs   Commonly known as:  COUMADIN        Take 5 mg by mouth every day. 2.5  mg on Mon, Thurs  5 mg all other days   Dose:  5 mg                                Instructions           Diet / Nutrition:    Follow any diet instructions given to you by your doctor or the dietician, including how much salt (sodium) you are allowed each day.    If you are overweight, talk to your doctor about a weight reduction plan.    Activity:    Remain physically  active following your doctor's instructions about exercise and activity.    Rest often.     Any time you become even a little tired or short of breath, SIT DOWN and rest.    Worsening Symptoms:    Report any of the following signs and symptoms to the doctor's office immediately:    *Pain of jaw, arm, or neck  *Chest pain not relieved by medication                               *Dizziness or loss of consciousness  *Difficulty breathing even when at rest   *More tired than usual                                       *Bleeding drainage or swelling of surgical site  *Swelling of feet, ankles, legs or stomach                 *Fever (>100ºF)  *Pink or blood tinged sputum  *Weight gain (3lbs/day or 5lbs /week)           *Shock from internal defibrillator (if applicable)  *Palpitations or irregular heartbeats                *Cool and/or numb extremities    Stroke Awareness    Common Risk Factors for Stroke include:    Age  Atrial Fibrillation  Carotid Artery Stenosis  Diabetes Mellitus  Excessive alcohol consumption  High blood pressure  Overweight   Physical inactivity  Smoking    Warning signs and symptoms of a stroke include:    *Sudden numbness or weakness of the face, arm or leg (especially on one side of the body).  *Sudden confusion, trouble speaking or understanding.  *Sudden trouble seeing in one or both eyes.  *Sudden trouble walking, dizziness, loss of balance or coordination.Sudden severe headache with no known cause.    It is very important to get treatment quickly when a stroke occurs. If you experience any of the above warning signs, call 911 immediately.                   Disclaimer         Quit Smoking / Tobacco Use:    I understand the use of any tobacco products increases my chance of suffering from future heart disease or stroke and could cause other illnesses which may shorten my life. Quitting the use of tobacco products is the single most important thing I can do to improve my health. For further  information on smoking / tobacco cessation call a Toll Free Quit Line at 1-828.708.8250 (*National Cancer [place]) or 1-322.107.2410 (American Lung Association) or you can access the web based program at www.lungusa.org.    [place] Tobacco Users Help Line:  (821) 846-7382       Toll Free: 1-801.151.4154  Quit Tobacco Program [facility] (578)958-1704    Crisis Hotline:    [place] Crisis Hotline:  5-318-ASRZEOQ or 1-294.179.6188    [place] Crisis Hotline:    1-739.790.6196 or 606-401-9588    Discharge Survey:   Thank you for choosing [facility]. We hope we did everything we could to make your hospital stay a pleasant one. You may be receiving a phone survey and we would appreciate your time and participation in answering the questions. Your input is very valuable to us in our efforts to improve our service to our patients and their families.        My signature on this form indicates that:    1. I have reviewed and understand the above information.  2. My questions regarding this information have been answered to my satisfaction.  3. I have formulated a plan with my discharge nurse to obtain my prescribed medications for home.                  Disclaimer         __________________________________                     __________       ________                       Patient Signature                                                 Date                    Time

## 2017-08-01 VITALS
SYSTOLIC BLOOD PRESSURE: 92 MMHG | TEMPERATURE: 97.8 F | OXYGEN SATURATION: 92 % | BODY MASS INDEX: 30.15 KG/M2 | DIASTOLIC BLOOD PRESSURE: 59 MMHG | WEIGHT: 176.59 LBS | HEART RATE: 82 BPM | RESPIRATION RATE: 18 BRPM | HEIGHT: 64 IN

## 2017-08-01 ASSESSMENT — PAIN SCALES - GENERAL
PAINLEVEL_OUTOF10: 5
PAINLEVEL_OUTOF10: 3
PAINLEVEL_OUTOF10: 5
PAINLEVEL_OUTOF10: 2

## 2017-08-01 ASSESSMENT — LIFESTYLE VARIABLES
ALCOHOL_USE: NO
EVER_SMOKED: NEVER

## 2017-08-01 NOTE — CARE PLAN
Problem: Safety  Goal: Will remain free from falls  Intervention: Implement fall precautions  Patient educated on fall prevention and is refusing bed alarm despite education.

## 2017-08-01 NOTE — PROGRESS NOTES
Pt admitted to room 436-02  via transport in [facility] from PACU.  Pt lethargic upon arrival, easily arousable .VSS, on 3L 02.  pain in abd controlled with morphine PCA. Basal rate discontinued due to lethargy. Abd dressing d/i, binder in place. Oriented to room call light and smoking policy.  Reviewed plan of care (equipment, incentive spirometer, sequential compression devices, medications, activity, diet, fall precautions, skin care, and pain) with patient and family.

## 2017-08-01 NOTE — PROGRESS NOTES
"Blood pressure 125/51, pulse 77, temperature 36.4 °C (97.6 °F), resp. rate 16, height 1.626 m (5' 4\"), weight 80.1 kg (176 lb 9.4 oz), SpO2 100 %.    I/O last 3 completed shifts:  In: 700 [I.V.:700]  Out: 10     Pt much better  Pain was intense in recovery room but now doing well  Wound look fine . No further bleeding  I think OK for DC  FU my office this FRIDAy  "

## 2017-08-01 NOTE — OR SURGEON
Operative Report    PreOp Diagnosis:Bleeding wound abdomen    PostOp Diagnosis: same    Procedure(s):  IRRIGATION & DEBRIDEMENT GENERAL-ABDOMEN - Wound Class: Dirty or Infected  Closure of wound    Surgeon(s):  Samson Rosenbaum Jr., M.D.    Anesthesiologist/Type of Anesthesia:  Anesthesiologist: Nando Ornelas M.D./General    Surgical Staff:  Circulator: Qian Leigh R.N.  Scrub Person: Josiah Harris    Specimens:  * No specimens in log *    Estimated Blood Loss:min    Findings: wound    Complications: 0        7/31/2017 8:32 PM Samson Rosenbaum Jr.

## 2017-08-01 NOTE — PROGRESS NOTES
"Patient fatigued this am but easily aroused. Patient has long history with renal faliure and dialysis, reports preforms dialysis M,W,F through fistula in left thigh. Anuric. Patient would like BP only taken in right arm, CNA given report. INR was supratherapetuic yesterday, on coumadin at home. Patient has lateral abdominal incision with xeroform, dry gauze and abdominal binder, no bleeding noted. PCA for pain control. MD orders reviewed, all questions answered. /51 mmHg  Pulse 77  Temp(Src) 36.4 °C (97.6 °F)  Resp 16  Ht 1.626 m (5' 4\")  Wt 80.1 kg (176 lb 9.4 oz)  BMI 30.30 kg/m2  SpO2 100%            "

## 2017-08-01 NOTE — DISCHARGE INSTRUCTIONS
YOB: 1980   Age: 36 y.o.               Admit Date: 7/31/2017     Discharge Date: 8/1/2017  Attending Doctor:  Samson Rosenbaum Jr., *                  Allergies:  Baclofen; Contrast media with iodine; Keflex; Pcn; and Tape  Medical History (as on file):   Past Medical History   Diagnosis Date   • Contracture of palmar fascia    • Toxic uninodular goiter without mention of thyrotoxic crisis or storm    • Hypertension    • Pain      Chronic pain   • Chronic kidney disease, unspecified    • Renal failure      hemodialysis   • Arrhythmia      irregular EKG   • Urinary incontinence    • Seizure (CMS-HCC)      last seizure 04/1/2016   • Sleep apnea      BIPAP   • Dialysis       hemodialysis at home 5 days a week   • Hemorrhagic disorder (CMS-HCC)      on coumadin     Past Surgical History   Procedure Laterality Date   • Mass excision ortho  10/9/08     Performed by BELKIS BREAUX at SURGERY SAME DAY [facility]   • Av fistula creation  11/6/08     Performed by MARI WINTERS at SURGERY [facility] ORS   • Arteriogram  3/5/2009     Performed by MARI WINTERS at SURGERY [facility] ORS   • Angioplasty balloon  3/5/2009     Performed by MARI WINTERS at SURGERY [facility] ORS   • Av fistulogram  3/5/2009     Performed by MARI WINTERS at SURGERY [facility] ORS   • Us-kidney transplant  1996, 2001     x2   • Endarterectomy  11/16/2010     Performed by MARI WINTERS at SURGERY [facility] ORS   • Av fistulogram  11/16/2010     Performed by MARI WINTERS at SURGERY [facility] ORS   • Cath placement  2/1/2011     Performed by MARI WINTERS at SURGERY [facility] ORS   • Angioplasty balloon  2/1/2011     Performed by MARI WINTERS at SURGERY [facility] ORS   • Av fistulogram  6/5/2011     Performed by MARI WINTERS at SURGERY [facility] ORS   • Cath placement  6/5/2011     Performed by MARI WINTERS at SURGERY [facility] ORS   • Av fistula revision  11/3/2011      Performed by JAIRO HOPKINS at SURGERY [facility]   • Bone spur excision  12/8/2011     Performed by BELKIS BREAUX at SURGERY SAME DAY [facility] ORS   • Recovery  5/18/2012     Performed by SURGERY, IR-RECOVERY at [facility]   • Incision and drainage general  9/13/2013     Performed by Jairo Hopkins M.D. at SURGERY [facility]   • Debridement  9/13/2013     Performed by Jairo Hopkins M.D. at SURGERY [facility]   • Vein ligation  9/13/2013     Performed by Jairo Hopkins M.D. at SURGERY [facility]   • Recovery  6/13/2014     Performed by Ir-Recovery Surgery at SURGERY SAME DAY [facility]   • Av fistula revision  9/30/2014     Performed by Jairo Hopkins M.D. at [facility]   • Irrigation & debridement general  12/15/2014     Performed by Jairo Hopkins M.D. at SURGERY [facility]   • Av fistula revision  2/8/2015     Performed by Jairo Hopkins M.D. at SURGERY [facility]   • Av fistula revision  2/22/2015     Performed by Lurdes Moffett M.D. at SURGERY [facility]   • Av fistula revision  3/20/2015     Performed by Jairo Hopkins M.D. at [facility]   • Pr inject nerv blck,stellate ganglion  3/24/2015     Performed by Bin Pro at [facility]   • Av fistula creation  4/20/2015     Performed by Jairo Hopkins M.D. at SURGERY [facility]   • Av fistula thrombolysis Left 6/3/2015     Procedure: AV FISTULA THROMBOLYSIS THIGH REPLACEMENT;  Surgeon: Jairo Hopkins M.D.;  Location: SURGERY [facility];  Service:    • Irrigation & debridement general Left 6/3/2015     Procedure: IRRIGATION & DEBRIDEMENT GENERAL;  Surgeon: Jairo Hopkins M.D.;  Location: [facility];  Service:    • Av fistula thrombolysis Left 6/9/2015     Procedure: THROMBECTOMY AV GRAFT THIGH;  Surgeon: Jairo Hopkins M.D.;  Location: SURGERY [facility];  Service:    • Av fistula revision Left  6/17/2015     Procedure: AV FISTULA REVISION;  Surgeon: Jairo Hopkins M.D.;  Location: SURGERY [facility];  Service:    • Irrigation & debridement general Left 6/17/2015     Procedure: IRRIGATION & DEBRIDEMENT GENERAL ARM W/EXPLORATION WOUND & CONTROL BLEEDING;  Surgeon: Jairo Hopkins M.D.;  Location: SURGERY [facility];  Service:    • Recovery  8/7/2015     Procedure:  VASCULAR CASE VIANEY-RIGHT ILIAC VENOGRAM WITH ANGIOPLASTY, REMOVAL RIGHT FEMORAL TUNNELED DIALYSIS CATHETER  **VRE**;  Surgeon: Melinda Surgery;  Location: SURGERY PRE-POST PROC UNIT [facility];  Service:    • Pr percut implnt neuroelect,epidural  2/19/2016     Procedure: IMPLANT NEUROSTIM EPI ARRAY;  Surgeon: Bin Pro;  Location: SURGERY [facility];  Service: Pain Management   • Pr percut implnt neuroelect,epidural  2/19/2016     Procedure: IMPLANT NEUROSTIM EPI ARRAY;  Surgeon: Bin Pro;  Location: SURGERY [facility];  Service: Pain Management   • Parathyroidectomy  2006   • Inguinal hernia repair Bilateral 2001, 2002   • Spinal cord stimulator N/A 3/25/2016     Procedure: SPINAL CORD STIMULATOR;  Surgeon: Bin Pro;  Location: SURGERY [facility];  Service:    • Recovery  7/8/2016     Procedure: IR1-VASCULAR CASE-VIANEY-LEFT THIGH AV GRAFT THROMBOLYSIS WITH TISSUE PLASMINOGEN ACTIVATOR AND ANGIOJET ARTHERECTOMY   ;  Surgeon: Melinda Surgery;  Location: SURGERY PRE-POST PROC UNIT [facility];  Service:    • Lesion excision general Right 7/11/2016     Procedure: LESION EXCISION GENERAL FOR ARM SKIN;  Surgeon: Jairo Hopkins M.D.;  Location: SURGERY [facility];  Service:    • Mass excision general Right 8/5/2016     Procedure: MASS EXCISION GENERAL FOR CALCIPHYLAXIS SKIN AND SUBCUTANEOUS TISSUE FOREARM;  Surgeon: Jairo Hopkins M.D.;  Location: SURGERY [facility];  Service:    • Cath placement Right 9/17/2016     Procedure: CATH PLACEMENT - tunneled dialysis cath placement right femoral ;   Surgeon: Quentin Alicia M.D.;  Location: [facility];  Service:    • Thrombectomy Left 9/18/2016     Procedure: THROMBECTOMY - and fistula revision;  Surgeon: Jairo Hopkins M.D.;  Location: [facility];  Service:    • Av fistulogram  9/18/2016     Procedure: AV FISTULOGRAM;  Surgeon: Jairo Hopkins M.D.;  Location: [facility];  Service:    • Thrombectomy Left 10/20/2016     Procedure: THROMBECTOMY THIGH;  Surgeon: Jairo Hopkins M.D.;  Location: [facility];  Service:    • Incision and drainage general  10/20/2016     Procedure: INCISION AND DRAINAGE GENERAL HEMATOMA;  Surgeon: Jairo Hopkins M.D.;  Location: [facility];  Service:    • Irrigation & debridement general Left 10/28/2016     Procedure: IRRIGATION & DEBRIDEMENT GENERAL THIGH WITH IRRIGATING WOUND VAC PLACEMENT;  Surgeon: Jairo Hopkins M.D.;  Location: [facility];  Service:    • Flap closure Left 11/17/2016     Procedure: Fasciocutaneous Flap Closure Left Upper Leg;  Surgeon: Samson Rosenbaum Jr., M.D.;  Location: [facility];  Service:    • Thrombectomy Left 1/6/2017     Procedure: THROMBECTOMY-OPEN THROMBECTOMY WITH LEFT THIGH GRAFT;  Surgeon: Jairo Hopkins M.D.;  Location: [facility];  Service:    • Cath placement Right 1/6/2017     Procedure: CATH PLACEMENT;  Surgeon: Jairo Hopkins M.D.;  Location: [facility];  Service:    • Thrombectomy Left 1/5/2017     Procedure: THROMBECTOMY-THIGH AV LOOP GRAFT AND ANGIOJET;  Surgeon: Jairo Hopkins M.D.;  Location: [facility];  Service:    • Spinal cord stimulator N/A 5/4/2017     Procedure: SPINAL CORD STIMULATOR - EXPLANT;  Surgeon: Bin Pro M.D.;  Location: [facility];  Service:    • Abdominoplasty  6/5/2017     Procedure: ABDOMINOPLASTY - FOR PANNICULECTOMY;  Surgeon: Samson Rosenbaum Jr., M.D.;  Location: SURGERY [facility];   Service:    • Irrigation & debridement general  7/31/2017     Procedure: IRRIGATION & DEBRIDEMENT GENERAL-ABDOMEN;  Surgeon: Samson Rosenbaum Jr., M.D.;  Location: SURGERY [facility];  Service:        Discharge Instructions  Blood Pressure: (!) 92/59 mmHg  Weight: 80.1 kg (176 lb 9.4 oz)    Discharged to home by car with relative. Discharged via wheelchair, hospital escort: Yes.    Special equipment needed: Not Applicable    Belongings with: Personal    Instructions:    Keep binder on except for shower   Follow up Dr Rosenbaum this Friday  Call coumadin clinic for follow up when you get home     Be sure to schedule a follow-up appointment with your primary care doctor or any specialists as instructed.     Discharge Plan:   Diet Plan: Discussed  Activity Level: Discussed  Confirmed Follow up Appointment: Patient to Call and Schedule Appointment  Confirmed Symptoms Management: Discussed  Medication Reconciliation Updated: Yes  Influenza Vaccine Indication: Not indicated: Previously immunized this influenza season and > 8 years of age    DIET:  You may eat any foods that you can tolerate.  It is a good idea to eat a high fiber diet and take in plenty of fluids to prevent constipation.  If you do become constipated you may want to take a mild laxative or take ducolax tablets on a daily basis until your bowel habits are regular.  Constipation can be very uncomfortable, along with straining, after recent surgery.    I understand that a diet low in cholesterol, fat, and sodium is recommended for good health. Unless I have been given specific instructions below for another diet, I accept this instruction as my diet prescription.       Discharge Medication Instructions:    Below are the medications your physician expects you to take upon discharge:    Review all your home medications and newly ordered medications with your doctor and/or pharmacist. Follow medication instructions as directed by your doctor and/or  pharmacist.  GENERAL POST-OPERATIVE  PATIENT INSTRUCTIONS      FOLLOW-UP:  Please call your physician/ return to ER if you have any fevers greater than 100.4, drainage from your wound that is not clear or looks infected, persistent bleeding, increasing abdominal pain, problems urinating, or persistent nausea/vomiting.      WOUND CARE INSTRUCTIONS: Keep binder on except for shower   Follow Dr Rosenbaum this Friday No baths, hot tubs, swimming, no submerging incisions, unit til healed.     ACTIVITY:  You are encouraged to cough and deep breath or use your incentive spirometer if you were given one, every 15-30 minutes when awake.  This will help prevent respiratory complications and low grade fevers post-operatively if you had a general anesthetic.  You may want to hug a pillow when coughing and sneezing to add additional support to the surgical area, if you had abdominal or chest surgery, which will decrease pain during these times.  You are encouraged to walk and engage in light activity for the next two weeks.  You should not lift more than 10 pounds during this time frame as it could put you at increased risk for complications.      MEDICATIONS:  Try to take narcotic medications and anti-inflammatory medications, such as tylenol, ibuprofen, naprosyn, etc., with food.  This will minimize stomach upset from the medication.  Should you develop nausea and vomiting from the pain medication, or develop a rash, please discontinue the medication and contact your physician.  You should not drive, make important decisions, or operate machinery when taking narcotic pain medication.    QUESTIONS:  Please feel free to call your physician or the hospital  if you have any questions, and they will be glad to assist you.         Discharge Instructions    Special Instructions: None    · Is patient discharged on Warfarin / Coumadin?   Yes    You are receiving the drug warfarin. Please understand the importance of monitoring  "warfarin with scheduled PT/INR blood draws.  Follow-up with a call to your personal Doctor's office in 3 days to schedule a PT/INR. .    IMPORTANT: HOW TO USE THIS INFORMATION:  This is a summary and does NOT have all possible information about this product. This information does not assure that this product is safe, effective, or appropriate for you. This information is not individual medical advice and does not substitute for the advice of your health care professional. Always ask your health care professional for complete information about this product and your specific health needs.      WARFARIN - ORAL (WARF-uh-rin)      COMMON BRAND NAME(S): Coumadin      WARNING:  Warfarin can cause very serious (possibly fatal) bleeding. This is more likely to occur when you first start taking this medication or if you take too much warfarin. To decrease your risk for bleeding, your doctor or other health care provider will monitor you closely and check your lab results (INR test) to make sure you are not taking too much warfarin. Keep all medical and laboratory appointments. Tell your doctor right away if you notice any signs of serious bleeding. See also Side Effects section.      USES:  This medication is used to treat blood clots (such as in deep vein thrombosis-DVT or pulmonary embolus-PE) and/or to prevent new clots from forming in your body. Preventing harmful blood clots helps to reduce the risk of a stroke or heart attack. Conditions that increase your risk of developing blood clots include a certain type of irregular heart rhythm (atrial fibrillation), heart valve replacement, recent heart attack, and certain surgeries (such as hip/knee replacement). Warfarin is commonly called a \"blood thinner,\" but the more correct term is \"anticoagulant.\" It helps to keep blood flowing smoothly in your body by decreasing the amount of certain substances (clotting proteins) in your blood.      HOW TO USE:  Read the Medication " Guide provided by your pharmacist before you start taking warfarin and each time you get a refill. If you have any questions, ask your doctor or pharmacist. Take this medication by mouth with or without food as directed by your doctor or other health care professional, usually once a day. It is very important to take it exactly as directed. Do not increase the dose, take it more frequently, or stop using it unless directed by your doctor. Dosage is based on your medical condition, laboratory tests (such as INR), and response to treatment. Your doctor or other health care provider will monitor you closely while you are taking this medication to determine the right dose for you. Use this medication regularly to get the most benefit from it. To help you remember, take it at the same time each day. It is important to eat a balanced, consistent diet while taking warfarin. Some foods can affect how warfarin works in your body and may affect your treatment and dose. Avoid sudden large increases or decreases in your intake of foods high in vitamin K (such as broccoli, cauliflower, cabbage, brussels sprouts, kale, spinach, and other green leafy vegetables, liver, green tea, certain vitamin supplements). If you are trying to lose weight, check with your doctor before you try to go on a diet. Cranberry products may also affect how your warfarin works. Limit the amount of cranberry juice (16 ounces/480 milliliters a day) or other cranberry products you may drink or eat.      SIDE EFFECTS:  Nausea, loss of appetite, or stomach/abdominal pain may occur. If any of these effects persist or worsen, tell your doctor or pharmacist promptly. Remember that your doctor has prescribed this medication because he or she has judged that the benefit to you is greater than the risk of side effects. Many people using this medication do not have serious side effects. This medication can cause serious bleeding if it affects your blood clotting  proteins too much (shown by unusually high INR lab results). Even if your doctor stops your medication, this risk of bleeding can continue for up to a week. Tell your doctor right away if you have any signs of serious bleeding, including: unusual pain/swelling/discomfort, unusual/easy bruising, prolonged bleeding from cuts or gums, persistent/frequent nosebleeds, unusually heavy/prolonged menstrual flow, pink/dark urine, coughing up blood, vomit that is bloody or looks like coffee grounds, severe headache, dizziness/fainting, unusual or persistent tiredness/weakness, bloody/black/tarry stools, chest pain, shortness of breath, difficulty swallowing. Tell your doctor right away if any of these unlikely but serious side effects occur: persistent nausea/vomiting, severe stomach/abdominal pain, yellowing eyes/skin. This drug rarely has caused very serious (possibly fatal) problems if its effects lead to small blood clots (usually at the beginning of treatment). This can lead to severe skin/tissue damage that may require surgery or amputation if left untreated. Patients with certain blood conditions (protein C or S deficiency) may be at greater risk. Get medical help right away if any of these rare but serious side effects occur: painful/red/purplish patches on the skin (such as on the toe, breast, abdomen), change in the amount of urine, vision changes, confusion, slurred speech, weakness on one side of the body. A very serious allergic reaction to this drug is rare. However, get medical help right away if you notice any symptoms of a serious allergic reaction, including: rash, itching/swelling (especially of the face/tongue/throat), severe dizziness, trouble breathing. This is not a complete list of possible side effects. If you notice other effects not listed above, contact your doctor or pharmacist. In the [place] - Call your doctor for medical advice about side effects. You may report side effects to FDA at  6-637-OJA-9639. In [place] - Call your doctor for medical advice about side effects. You may report side effects to [facility] [place] at 1-969.900.1782.      PRECAUTIONS:  Before taking warfarin, tell your doctor or pharmacist if you are allergic to it; or if you have any other allergies. This product may contain inactive ingredients, which can cause allergic reactions or other problems. Talk to your pharmacist for more details. Before using this medication, tell your doctor or pharmacist your medical history, especially of: blood disorders (such as anemia, hemophilia), bleeding problems (such as bleeding of the stomach/intestines, bleeding in the brain), blood vessel disorders (such as aneurysms), recent major injury/surgery, liver disease, alcohol use, mental/mood disorders (including memory problems), frequent falls/injuries. It is important that all your doctors and dentists know that you take warfarin. Before having surgery or any medical/dental procedures, tell your doctor or dentist that you are taking this medication and about all the products you use (including prescription drugs, nonprescription drugs, and herbal products). Avoid getting injections into the muscles. If you must have an injection into a muscle (for example, a flu shot), it should be given in the arm. This way, it will be easier to check for bleeding and/or apply pressure bandages. This medication may cause stomach bleeding. Daily use of alcohol while using this medicine will increase your risk for stomach bleeding and may also affect how this medication works. Limit or avoid alcoholic beverages. If you have not been eating well, if you have an illness or infection that causes fever, vomiting, or diarrhea for more than 2 days, or if you start using any antibiotic medications, contact your doctor or pharmacist immediately because these conditions can affect how warfarin works. This medication can cause heavy bleeding. To lower the chance of  getting cut, bruised, or injured, use great caution with sharp objects like safety razors and nail cutters. Use an electric razor when shaving and a soft toothbrush when brushing your teeth. Avoid activities such as contact sports. If you fall or injure yourself, especially if you hit your head, call your doctor immediately. Your doctor may need to check you. The Food & Drug Administration has stated that generic warfarin products are interchangeable. However, consult your doctor or pharmacist before switching warfarin products. Be careful not to take more than one medication that contains warfarin unless specifically directed by the doctor or health care provider who is monitoring your warfarin treatment. Older adults may be at greater risk for bleeding while using this drug. This medication is not recommended for use during pregnancy because of serious (possibly fatal) harm to an unborn baby. Discuss the use of reliable forms of birth control with your doctor. If you become pregnant or think you may be pregnant, tell your doctor immediately. If you are planning pregnancy, discuss a plan for managing your condition with your doctor before you become pregnant. Your doctor may switch the type of medication you use during pregnancy. Very small amounts of this medication may pass into breast milk but is unlikely to harm a nursing infant. Consult your doctor before breast-feeding.      DRUG INTERACTIONS:  Drug interactions may change how your medications work or increase your risk for serious side effects. This document does not contain all possible drug interactions. Keep a list of all the products you use (including prescription/nonprescription drugs and herbal products) and share it with your doctor and pharmacist. Do not start, stop, or change the dosage of any medicines without your doctor's approval. Warfarin interacts with many prescription, nonprescription, vitamin, and herbal products. This includes medications  "that are applied to the skin or inside the vagina or rectum. The interactions with warfarin usually result in an increase or decrease in the \"blood-thinning\" (anticoagulant) effect. Your doctor or other health care professional should closely monitor you to prevent serious bleeding or clotting problems. While taking warfarin, it is very important to tell your doctor or pharmacist of any changes in medications, vitamins, or herbal products that you are taking. Some products that may interact with this drug include: capecitabine, imatinib, mifepristone. Aspirin, aspirin-like drugs (salicylates), and nonsteroidal anti-inflammatory drugs (NSAIDs such as ibuprofen, naproxen, celecoxib) may have effects similar to warfarin. These drugs may increase the risk of bleeding problems if taken during treatment with warfarin. Carefully check all prescription/nonprescription product labels (including drugs applied to the skin such as pain-relieving creams) since the products may contain NSAIDs or salicylates. Talk to your doctor about using a different medication (such as acetaminophen) to treat pain/fever. Low-dose aspirin and related drugs (such as clopidogrel, ticlopidine) should be continued if prescribed by your doctor for specific medical reasons such as heart attack or stroke prevention. Consult your doctor or pharmacist for more details. Many herbal products interact with warfarin. Tell your doctor before taking any herbal products, especially bromelains, coenzyme Q10, cranberry, danshen, dong quai, fenugreek, garlic, ginkgo biloba, ginseng, and Harry's wort, among others. This medication may interfere with a certain laboratory test to measure theophylline levels, possibly causing false test results. Make sure laboratory personnel and all your doctors know you use this drug.      OVERDOSE:  If overdose is suspected, contact a poison control center or emergency room immediately. [place] residents can call the [place] National " Poison Hotline at 1-991.965.6677. [place] residents can call a provincial poison control center. Symptoms of overdose may include: bloody/black/tarry stools, pink/dark urine, unusual/prolonged bleeding.      NOTES:  Do not share this medication with others. Laboratory and/or medical tests (such as INR, complete blood count) must be performed periodically to monitor your progress or check for side effects. Consult your doctor for more details.      MISSED DOSE:  For the best possible benefit, do not miss any doses. If you do miss a dose and remember on the same day, take it as soon as you remember. If you remember on the next day, skip the missed dose and resume your usual dosing schedule. Do not double the dose to catch up because this could increase your risk for bleeding. Keep a record of missed doses to give to your doctor or pharmacist. Contact your doctor or pharmacist if you miss 2 or more doses in a row.      STORAGE:  Store at room temperature away from light and moisture. Do not store in the bathroom. Keep all medications away from children and pets. Do not flush medications down the toilet or pour them into a drain unless instructed to do so. Properly discard this product when it is  or no longer needed. Consult your pharmacist or local waste disposal company for more details about how to safely discard your product.      MEDICAL ALERT:  Your condition and medication can cause complications in a medical emergency. For information about enrolling in MedicAlert, call 1-864.228.1786 ([place]) or 1-818.116.7323 ([place]).      Information last revised 2010 Copyright(c) 2010 [employer]             · Is patient Post Blood Transfusion?  No    Depression / Suicide Risk    As you are discharged from this [facility], it is important to learn how to keep safe from harming yourself.    Recognize the warning signs:  · Abrupt changes in personality, positive or negative- including increase  in energy   · Giving away possessions  · Change in eating patterns- significant weight changes-  positive or negative  · Change in sleeping patterns- unable to sleep or sleeping all the time   · Unwillingness or inability to communicate  · Depression  · Unusual sadness, discouragement and loneliness  · Talk of wanting to die  · Neglect of personal appearance   · Rebelliousness- reckless behavior  · Withdrawal from people/activities they love  · Confusion- inability to concentrate     If you or a loved one observes any of these behaviors or has concerns about self-harm, here's what you can do:  · Talk about it- your feelings and reasons for harming yourself  · Remove any means that you might use to hurt yourself (examples: pills, rope, extension cords, firearm)  · Get professional help from the community (Mental Health, Substance Abuse, psychological counseling)  · Do not be alone:Call your Safe Contact- someone whom you trust who will be there for you.  · Call your local CRISIS HOTLINE 255-1241 or 853-930-5772  · Call your local Children's Mobile Crisis Response Team [place] (585) 502-8820 or www.LiveLeaf  · Call the toll free National Suicide Prevention Hotlines   · National Suicide Prevention Lifeline 289-779-MWSB (9740)  · National Hope Line Network 800-SUICIDE (905-2072)

## 2017-08-01 NOTE — OP REPORT
DATE OF SERVICE:  07/31/2017    PREOPERATIVE DIAGNOSIS:  Complex bleeding wound of abdomen.    POSTOPERATIVE DIAGNOSIS:  Complex bleeding wound of abdomen.    PROCEDURE:  Debridement of wound with closure of wound.    SURGEON:  Samson Rosenbaum MD    ANESTHESIOLOGIST:  Nando Ornelas MD    INDICATION FOR PROCEDURE:  The patient is a 36-year-old white male who   recently had an excision of painful abdominal wall tissue couple of months   ago.  He has a long history of chronic renal failure.  He has been on dialysis   for many, many years.  He was slow to heal and had some areas of breakdown   that were persistently bleeding.  This had become a source of concern for the   patient and the wound was such that I felt we would not be able to temporize   this as an outpatient any longer.  It will be necessary to take him to the OR.    Patient was well informed of the risks, benefits and alternatives to the   procedure and participated in the decision to proceed.    DESCRIPTION OF PROCEDURE:  Patient was marked preoperatively in the holding   area, taken to the operating room and under general anesthesia was prepped and   draped over the abdomen.  I began by sharply debriding devitalized tissue   across the lower abdominal incision.  This was over approximately 12 cm of the   wound.  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  In debriding all the areas, I tried to remove all devitalized   tissue that we could, while still not getting into the large abdominal wall   vessels.  When I felt that we had cleaned out the area sufficiently, I then   placed large stitches of 0 Prolene across the wound to provide hemostasis.    There were some other areas that had some excoriation and these were covered   with some silver nitrate as well as some Surgicel.  When I had the wounds   cleaned and hemostatic with these sutures,  I then placed a compressive   dressing and covered this with an abdominal binder.  The patient tolerated the   procedure well.  He had minimal blood loss during the operation.  He was   taken to the recovery room in stable condition.  Needle, sponge, and   instrument counts were correct.       ____________________________________     MD YOVANI LYNNE / RIAZ    DD:  07/31/2017 20:42:23  DT:  07/31/2017 21:12:02    D#:  [id number]  Job#:  [id number]

## 2017-08-01 NOTE — PROGRESS NOTES
Discharging Patient home per physician order.  Discharged with father.  Demonstrated understanding of discharge instructions, follow up appointments, home medications, prescriptions, home care for surgical wound, and nursing care instructions for post-op abdominal surgery and incisional care per MD order, coumadin information and follow up.   Ambulating self assistance, voiding without difficulty, pain well controlled, tolerating oral medications, oxygen saturation greater than 90% , tolerating diet.   Educational handouts  given and discussed.  Verbalized understanding of discharge instructions and educational handouts.  All questions answered.  Belongings with patient at time of discharge.

## 2017-08-01 NOTE — DISCHARGE SUMMARY
DATE OF ADMISSION:  07/31/2017    DATE OF DISCHARGE:  08/01/2017    ADMISSION DIAGNOSIS:  Bleeding open wound from abdomen.    DISCHARGE DIAGNOSIS:  Bleeding open wound from abdomen.    PROCEDURES DURING THIS HOSPITALIZATION:  Debridement and closure of bleeding   wound of abdomen.    HISTORY OF PRESENT ILLNESS AND HOSPITAL COURSE:  Patient is a 36-year-old who   is a long time chronic renal failure patient who had surgery on his abdomen to   remove some painful tissue couple of months ago, had persistent bleeding from   varices in and around his abdominal wound.  I felt that this was going to be   at this point unmanageable with outpatient care and we would need to take him   to the operating room.  He was taken to the operating room on the date of   admission where debridement and suturing of this wound was undertaken.  In the   recovery room, the patient was complaining of intense pain like he never had   before and I felt it would be necessary to admit him for pain control.    Postoperatively, he did well however and wound up needing very little pain   medication after his initial bout of pain in the recovery room and on the   morning of the next postoperative day 1, he said he was without significant   pain and was very anxious to be discharged home.  He does dialysis at home and   I think this would be appropriate to allow him to go home.  The wounds   themselves were looking good with no further significant bleeding.  He is due   to follow up in my office in just 4 days and we will plan to see him back at   that time.  He is discharged home in stable condition.  His prognosis remains   very guarded.  Obviously, he is with chronic renal failure and he is due to   have many more problems in the future and likely to have further   hospitalizations, but overall he is doing well now.  He will continue with his   regular medications at home.  Again, I will see him in about 4 days, sooner   if any questions or  concerns.       ____________________________________     MD YOVANI LYNNE    DD:  08/01/2017 08:52:57  DT:  08/01/2017 09:03:43    D#:  [id number]  Job#:  [id number]

## 2017-08-03 ENCOUNTER — PATIENT OUTREACH (OUTPATIENT)
Dept: HEALTH INFORMATION MANAGEMENT | Facility: OTHER | Age: 37
End: 2017-08-03

## 2017-08-06 NOTE — DOCUMENTATION QUERY
DOCUMENTATION QUERY    PROVIDERS: Please select “Cosign w/ note”to reply to query.    To better represent the severity of illness of your patient, please review the following information and exercise your independent professional judgment in responding to this query.     Debridement of wound is documented in operative report on 7-31-17 - Sharply debriding devitalized tissue across the abdominal wall.  Based upon the clinical findings, risk factors, and treatment, can this diagnosis be further specified?    • Excisional Debridement (if so please include)  1. deepest level of debridement treated  2. instrument  3. nature of the tissue  4. appearance and size of wound  5. technique  • Non-excisional Debridement  • Other explanation of clinical findings (please document)  • Unable to determine        The medical record reflects the following:   Clinical Findings  Bleeding abdominal wound, ESRD    Treatment  Debridement in operating room on 7-31-17   Risk Factors  Bleeding abdominal wound, ESRD   Location within medical record  Operative report     Thank you,   KRISTIAN Ontiveros

## 2017-08-15 ENCOUNTER — ANTICOAGULATION VISIT (OUTPATIENT)
Dept: VASCULAR LAB | Facility: MEDICAL CENTER | Age: 37
End: 2017-08-15
Attending: INTERNAL MEDICINE
Payer: MEDICARE

## 2017-08-15 DIAGNOSIS — T82.868A AV GRAFT THROMBOSIS, INITIAL ENCOUNTER (HCC): ICD-10-CM

## 2017-08-15 LAB — INR PPP: 1.8 (ref 2–3.5)

## 2017-08-15 PROCEDURE — 99212 OFFICE O/P EST SF 10 MIN: CPT

## 2017-08-15 PROCEDURE — 85610 PROTHROMBIN TIME: CPT

## 2017-08-15 NOTE — MR AVS SNAPSHOT
Denis De Anda   8/15/2017 11:00 AM   Anticoagulation Visit   MRN: [id number]    Department:  Vascular Medicine   Dept Phone:  [phone]    Description:  Male : 1980   Provider:  [facility] EXAM 4           Allergies as of 8/15/2017     Allergen Noted Reactions    Baclofen 2015   Unspecified    Total loss of memory, sedation.   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, initial encounter (CMS-HCC)   [[id number]]         Vital Signs     Smoking Status                   Never Smoker            Basic Information     Date Of Birth Sex Race Ethnicity Preferred Language    1980 Male White Non- English      Your appointments     Aug 15, 2017 11:00 AM   Established Patient with [facility] EXAM 4   [facility]  (--)    [address] [address]   [phone]            Aug 18, 2017 10:00 AM   ANNUAL EXAM PREVENTATIVE with TUNDE Palmer   [facility] [address] [address]   [phone]            Aug 21, 2017  2:15 PM   Established Patient with [facility] EXAM 4   [facility]  (--)    [address] [address]   [phone]              Problem List              ICD-10-CM Priority Class Noted - Resolved    Insomnia G47.00   2009 - Present    Dupuytren's contracture of hand M72.0 Low  2010 - Present    Sleep apnea G47.30   10/3/2011 - Present    Osteopenia M85.80   5/10/2013 - Present    Vertebral compression fracture (CMS-HCC) M48.50XA   5/10/2013 - Present    Hyperlipidemia with target LDL less than 100 E78.5 Low   6/28/2013 - Present    Thrombophlebitis I80.9   9/13/2013 - Present    Venous collateral circulation, any site I87.8   9/13/2013 - Present    Calciphylaxis 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.59   9/13/2013 - Present    Anemia D64.9   9/26/2013 - Present    End stage renal disease (CMS-HCC) N18.6   12/12/2013 - Present    Pain R52   2/21/2015 - Present    Hyponatremia E87.1   2/23/2015 - Present    Reflex sympathetic dystrophy G90.50   3/24/2015 - Present    Non-healing surgical wound T81.89XA   6/22/2015 - Present    Acropachy TNE2423   3/25/2016 - Present    Seizure disorder (CMS-HCC) G40.909   4/5/2016 - Present    Bacteremia R78.81   4/7/2016 - Present    Lethargy R53.83   4/9/2016 - Present    Pain syndrome, chronic G89.4   4/10/2016 - Present    Hyperkalemia E87.5   7/8/2016 - Present    ESRD (end stage renal disease) (CMS-HCC) N18.6 Medium  7/8/2016 - Present    Dermatofibroma of right upper arm D23.61   7/11/2016 - Present    Other specified epidermal thickening L85.8   8/5/2016 - Present    Chronic anticoagulation Z79.01   9/18/2016 - Present    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   10/20/2016 - Present    Open wound of left thigh S71.102A High  10/28/2016 - Present    Hypertension I10 Medium  11/15/2016 - Present    Hyperparathyroidism (CMS-HCC) E21.3 Low  11/15/2016 - Present    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 High  11/15/2016 - Present    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, 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 Low  11/15/2016 - Present    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   1/5/2017 - Present    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   1/6/2017 - Present    Chronic kidney disease, stage V (CMS-HCC) N18.5   3/28/2017 - Present    Acute midline thoracic back pain M54.6   4/19/2017 - Present    Chronic pain disorder G89.4   5/4/2017 - Present    Intertrigo L30.4   6/5/2017 - Present    Gangrene (CMS-HCC) I96   7/31/2017 - Present    Status post repair of complex wound Z98.890    7/31/2017 - Present      Health Maintenance        Date Due Completion Dates    IMM INFLUENZA (1) 3/20/2018 (Originally 9/1/2017) 9/30/2015, 9/9/2014, 9/15/2009    IMM DTaP/Tdap/Td Vaccine (2 - Tdap) 3/20/2018 (Originally 8/16/2000) 1/7/2000    IMM PNEUMOCOCCAL 19-64 (ADULT) HIGHEST RISK SERIES (1 of 3 - PCV13) 3/20/2018 (Originally 8/16/2000) ---            Results     POCT Protime      Component    INR    1.8                        Current Immunizations     Hepatitis B Vaccine Non-Recombivax (Ped/Adol) 10/5/2000, 2/8/2000, 1/7/2000    Influenza TIV (IM) 9/30/2015, 9/9/2014    Influenza Vaccine Pediatric 9/15/2009    MMR Vaccine 3/11/1983    Pneumococcal Vaccine (PCV7) Historical Data 1/1/2013    QF GOLD 8/7/2013    QUANTIFERON GOLD 8/7/2013    TD Vaccine 1/7/2000      Below and/or attached are the medications your provider expects you to take. Review all of your home medications and newly ordered medications with your provider and/or pharmacist. Follow medication instructions as directed by your provider and/or pharmacist. Please keep your medication list with you and share with your provider. Update the information when medications are discontinued, doses are changed, or new medications (including over-the-counter products) are added; and carry medication information at all times in the event of emergency situations     Allergies:  BACLOFEN - Unspecified     CONTRAST MEDIA WITH IODINE - Rash     KEFLEX - Rash     PCN - Rash     TAPE - Rash               Medications  Valid as of: August 15, 2017 - 10:53 AM    Generic Name Brand Name Tablet Size Instructions for use    Calcitriol (Cap) ROCALTROL 0.25 MCG Take 0.75 mcg by mouth every bedtime.        Cinacalcet HCl (Tab) SENSIPAR 30 MG Take 1 Tab by mouth every evening.        Clindamycin HCl (Cap) CLEOCIN 150 MG Take 600 mg by mouth 2 times a day.        Gabapentin (Cap) NEURONTIN 300 MG Take 1,200 mg by mouth every bedtime.        Hydrocodone-Acetaminophen (Tab)  NORCO  MG Take 1-2 Tabs by mouth every 12 hours as needed.        Sevelamer Carbonate (Tab) Sevelamer Carbonate 800 MG Take 3,200 mg by mouth 3 times a day, with meals. With meals        Warfarin Sodium (Tab) COUMADIN 5 MG Take 5 mg by mouth every day. 2.5  mg on Mon, Thurs   5 mg all other days        .                 Medicines prescribed today were sent to:     [id number] - [place] - [address] AT [facility] [address]    [address] [place] [address]    Phone: [phone] Fax: [phone]    Open 24 Hours?: No    [facility] [place] - [address]    [address] [place] [address]    Phone: [phone] Fax: [phone]    Open 24 Hours?: No      Medication refill instructions:       If your prescription bottle indicates you have medication refills left, it is not necessary to call your provider’s office. Please contact your pharmacy and they will refill your medication.    If your prescription bottle indicates you do not have any refills left, you may request refills at any time through one of the following ways: The online [employer] system (except Urgent Care), by calling your provider’s office, or by asking your pharmacy to contact your provider’s office with a refill request. Medication refills are processed only during regular business hours and may not be available until the next business day. Your provider may request additional information or to have a follow-up visit with you prior to refilling your medication.   *Please Note: Medication refills are assigned a new Rx number when refilled electronically. Your pharmacy may indicate that no refills were authorized even though a new prescription for the same medication is available at the pharmacy. Please request the medicine by name with the pharmacy before contacting your provider for a refill.        Warfarin Dosing Calendar   August 2017 Details    Sun Mon Tue Wed Thu Fri Sat       1                  2               3               4               5                 6               7               8               9               10               11               12                 13               14               15   1.8   5 mg   See details      16      2.5 mg         17      5 mg         18      2.5 mg         19      5 mg           20      5 mg         21      2.5 mg         22      5 mg         23               24               25               26                 27               28               29               30               31                  Date Details   08/15 This INR check   INR: 1.8       Date of next INR:  8/22/2017         How to take your warfarin dose     To take:  2.5 mg Take 0.5 of a 5 mg tablet.    To take:  5 mg Take 1 of the 5 mg tablets.              [employer] Access Code: Activation code not generated  Current [employer] Status: Active

## 2017-08-15 NOTE — PROGRESS NOTES
OP Anticoagulation Service Note    Date: 8/15/2017    Anticoagulation Summary as of 8/15/2017     INR goal 2.5-3.5   Selected INR 1.8! (8/15/2017)   Maintenance plan 2.5 mg (5 mg x 0.5) on Mon, Wed, Fri; 5 mg (5 mg x 1) all other days   Weekly total 27.5 mg   Plan last modified Chriss Keating PHARMD (6/29/2017)   Next INR check 8/21/2017   Target end date Indefinite    Indications   AV graft thrombosis (CMS-HCC) [T82.868A]         Anticoagulation Episode Summary     INR check location     Preferred lab     Send INR reminders to     Comments INR goal of 2.5 - 3.5 per Dr. Hopkins      Anticoagulation Care Providers     Provider Role Specialty Phone number    Jairo Hopkins M.D. Referring Surgery [phone]    [facility] Responsible  [phone]        Anticoagulation Patient Findings      Plan:  INR is low today. Patient was off his warfarin for a procedure. Prior to procedure his INR was 7.9. He had surgery on some abdomen wounds.  Patient denies sign/symptoms of bleeding/clotting. No recent medication changes and patient has been eating a consistent diet.  Instructed pt to call clinic with any concerns of bleeding or thrombosis. Pt restarted his warfarin about 5 days ago at his usual dose, will have him continue current dose of warfarin.  Follow up in 6 day(s)      Jocelyn Sharpe D

## 2017-08-18 ENCOUNTER — OFFICE VISIT (OUTPATIENT)
Dept: MEDICAL GROUP | Facility: PHYSICIAN GROUP | Age: 37
End: 2017-08-18
Payer: MEDICARE

## 2017-08-18 VITALS
SYSTOLIC BLOOD PRESSURE: 80 MMHG | OXYGEN SATURATION: 97 % | HEART RATE: 111 BPM | DIASTOLIC BLOOD PRESSURE: 42 MMHG | BODY MASS INDEX: 30.56 KG/M2 | TEMPERATURE: 97.7 F | WEIGHT: 179 LBS | HEIGHT: 64 IN

## 2017-08-18 DIAGNOSIS — Z79.01 CHRONIC ANTICOAGULATION: ICD-10-CM

## 2017-08-18 DIAGNOSIS — T86.12 RENAL TRANSPLANT FAILURE AND REJECTION: ICD-10-CM

## 2017-08-18 DIAGNOSIS — G89.4 CHRONIC PAIN DISORDER: ICD-10-CM

## 2017-08-18 DIAGNOSIS — E21.3 HYPERPARATHYROIDISM (HCC): ICD-10-CM

## 2017-08-18 DIAGNOSIS — E78.5 HYPERLIPIDEMIA WITH TARGET LDL LESS THAN 100: ICD-10-CM

## 2017-08-18 DIAGNOSIS — E83.59 CALCIPHYLAXIS: ICD-10-CM

## 2017-08-18 DIAGNOSIS — E87.1 HYPONATREMIA: ICD-10-CM

## 2017-08-18 DIAGNOSIS — Z00.00 MEDICARE ANNUAL WELLNESS VISIT, SUBSEQUENT: Primary | ICD-10-CM

## 2017-08-18 DIAGNOSIS — G90.50 REFLEX SYMPATHETIC DYSTROPHY: ICD-10-CM

## 2017-08-18 DIAGNOSIS — I80.9 THROMBOPHLEBITIS: ICD-10-CM

## 2017-08-18 DIAGNOSIS — I96 GANGRENE (HCC): ICD-10-CM

## 2017-08-18 DIAGNOSIS — T82.868S: ICD-10-CM

## 2017-08-18 DIAGNOSIS — I99.8 VENOUS COLLATERAL CIRCULATION, ANY SITE: ICD-10-CM

## 2017-08-18 DIAGNOSIS — M54.6 ACUTE MIDLINE THORACIC BACK PAIN: ICD-10-CM

## 2017-08-18 DIAGNOSIS — T82.868A AV GRAFT THROMBOSIS, INITIAL ENCOUNTER (HCC): ICD-10-CM

## 2017-08-18 DIAGNOSIS — M72.0 DUPUYTREN'S CONTRACTURE OF HAND: ICD-10-CM

## 2017-08-18 DIAGNOSIS — N18.6 ESRD (END STAGE RENAL DISEASE) (HCC): ICD-10-CM

## 2017-08-18 DIAGNOSIS — E87.5 HYPERKALEMIA: ICD-10-CM

## 2017-08-18 DIAGNOSIS — G89.4 PAIN SYNDROME, CHRONIC: ICD-10-CM

## 2017-08-18 DIAGNOSIS — G47.30 SLEEP APNEA, UNSPECIFIED TYPE: ICD-10-CM

## 2017-08-18 DIAGNOSIS — D23.61 DERMATOFIBROMA OF RIGHT UPPER ARM: ICD-10-CM

## 2017-08-18 DIAGNOSIS — G40.909 SEIZURE DISORDER (HCC): ICD-10-CM

## 2017-08-18 DIAGNOSIS — R78.81 BACTEREMIA: ICD-10-CM

## 2017-08-18 DIAGNOSIS — T86.11 RENAL TRANSPLANT FAILURE AND REJECTION: ICD-10-CM

## 2017-08-18 DIAGNOSIS — N18.6 END STAGE RENAL DISEASE (HCC): ICD-10-CM

## 2017-08-18 DIAGNOSIS — R52 PAIN: ICD-10-CM

## 2017-08-18 DIAGNOSIS — N18.5 CHRONIC KIDNEY DISEASE, STAGE V (HCC): ICD-10-CM

## 2017-08-18 DIAGNOSIS — Z98.890 STATUS POST REPAIR OF COMPLEX WOUND: ICD-10-CM

## 2017-08-18 PROBLEM — T82.858A BYPASS GRAFT STENOSIS (HCC): Status: RESOLVED | Noted: 2017-01-06 | Resolved: 2017-08-18

## 2017-08-18 LAB — INR BLD: 1.8 (ref 0.9–1.2)

## 2017-08-18 PROCEDURE — G0439 PPPS, SUBSEQ VISIT: HCPCS | Performed by: NURSE PRACTITIONER

## 2017-08-18 RX ORDER — GABAPENTIN 600 MG/1
1800 TABLET ORAL
Status: ON HOLD | COMMUNITY
End: 2020-03-14

## 2017-08-18 RX ORDER — HYDROCODONE BITARTRATE 30 MG/1
TABLET, EXTENDED RELEASE ORAL
Status: ON HOLD | COMMUNITY
End: 2018-03-02

## 2017-08-18 ASSESSMENT — PATIENT HEALTH QUESTIONNAIRE - PHQ9: CLINICAL INTERPRETATION OF PHQ2 SCORE: 0

## 2017-08-18 NOTE — PATIENT INSTRUCTIONS
Continue with care through Tony Mcmillan M.D..  Next Medicare Annual Wellness Visit is due in one year.    Continue care with specialists you are seeing for your chronic problems.    Attached is some preventative information for you to read.          Fall Prevention and Home Safety  Falls cause injuries and can affect all age groups. It is possible to prevent falls.   HOW TO PREVENT FALLS  · Wear shoes with rubber soles that do not have an opening for your toes.   · Keep the inside and outside of your house well lit.   · Use night lights throughout your home.   · Remove clutter from floors.   · Clean up floor spills.   · Remove throw rugs or fasten them to the floor with carpet tape.   · Do not place electrical cords across pathways.   · Put grab bars by your tub, shower, and toilet. Do not use towel bars as grab bars.   · Put handrails on both sides of the stairway. Fix loose handrails.   · Do not climb on stools or stepladders, if possible.   · Do not wax your floors.   · Repair uneven or unsafe sidewalks, walkways, or stairs.   · Keep items you use a lot within reach.   · Be aware of pets.   · Keep emergency numbers next to the telephone.   · Put smoke detectors in your home and near bedrooms.   Ask your doctor what other things you can do to prevent falls.  Document Released: 10/14/2010 Document Revised: 06/18/2013 Document Reviewed: 03/19/2013  ExitCare® Patient Information ©2013 [employer].    Exercise to Stay Healthy      Exercise helps you become and stay healthy.  EXERCISE IDEAS AND TIPS  Choose exercises that:  · You enjoy.   · Fit into your day.   You do not need to exercise really hard to be healthy. You can do exercises at a slow or medium level and stay healthy. You can:  · Stretch before and after working out.   · Try yoga, Pilates, or carito chi.   · Lift weights.   · Walk fast, swim, jog, run, climb stairs, bicycle, dance, or rollerskate.   · Take aerobic classes.   Exercises that burn  about 150 calories:  · Running 1 ½ miles in 15 minutes.   · Playing volleyball for 45 to 60 minutes.   · Washing and waxing a car for 45 to 60 minutes.   · Playing touch football for 45 minutes.   · Walking 1 ¾ miles in 35 minutes.   · Pushing a stroller 1 ½ miles in 30 minutes.   · Playing basketball for 30 minutes.   · Raking leaves for 30 minutes.   · Bicycling 5 miles in 30 minutes.   · Walking 2 miles in 30 minutes.   · Dancing for 30 minutes.   · Shoveling snow for 15 minutes.   · Swimming laps for 20 minutes.   · Walking up stairs for 15 minutes.   · Bicycling 4 miles in 15 minutes.   · Gardening for 30 to 45 minutes.   · Jumping rope for 15 minutes.   · Washing windows or floors for 45 to 60 minutes.     .   .   Document Released: 06/18/2013 Document Reviewed: 06/18/2013  ExitCare® Patient Information ©2013 [employer].    Recommend annual flu vaccine.  Next due in Fall, 2017.  If you decide not to have the flu vaccine, recommend good handwashing and staying out of crowds during flu season.

## 2017-08-18 NOTE — ASSESSMENT & PLAN NOTE
Chronic condition managed with current medical regimen  Stable per review   Continue with BiPap  Followed by pulm

## 2017-08-18 NOTE — MR AVS SNAPSHOT
Denis Siegelham   2017 10:00 AM   Office Visit   MRN: [id number]    Department:  [facility]   Dept Phone:  [phone]    Description:  Male : 1980   Provider:  TUNDE Palmer           Reason for Visit     Annual Exam subsequent      Allergies as of 2017     Allergen Noted Reactions    Baclofen 2015   Unspecified    Total loss of memory, sedation.   RXN=2015    Contrast Media With Iodine [Iodine] 2016   Rash    RXN=2017    Keflex 2009   Rash    RXN=possibly >10 years    Pcn [Penicillins] 2009   Rash    RXN=possibly >10 years ago    Tape 2013   Rash    Paper tape and tegaderm ok  RXN=ongoing      You were diagnosed with     Medicare annual wellness visit, subsequent   [024677]  -  Primary     Vertebral compression fracture, sequela   [691082]       Venous collateral circulation, any site   [160772]       Thrombosis of renal dialysis arteriovenous graft, sequela   [790708]       Thrombophlebitis   [438377]       Status post repair of complex wound   [310830]       Sleep apnea, unspecified type   [0620091]       Seizure disorder (CMS-HCC)   [612073]       Renal transplant failure and rejection   [935054]       Reflex sympathetic dystrophy   [2766048]       Pain   [265910]       Pain syndrome, chronic   [593224]       Hyponatremia   [937129]       Hyperparathyroidism (CMS-HCC)   [7442916]       Hyperlipidemia with target LDL less than 100   [129123]       Hyperkalemia   [382148]       Gangrene (CMS-[facility])   [785.4.ICD-9-CM]       ESRD (end stage renal disease) (CMS-HCC)   [233108]       End stage renal disease (CMS-HCC)   [585.6.ICD-9-CM]       Dupuytren's contracture of hand   [129934]       Dermatofibroma of right upper arm   [308174]       Chronic pain disorder   [843528]       Chronic kidney disease, stage V (CMS-[facility])   [585.5.ICD-9-CM]       Chronic anticoagulation   [637923]       Calciphylaxis   [515345]       Bacteremia    "[790.7.ICD-9-CM]       AV graft thrombosis, initial encounter (CMS-HCC)   [3083188]       Acute midline thoracic back pain   [3192943]         Vital Signs     Blood Pressure Pulse Temperature Height Weight Body Mass Index    80/42 mmHg 111 36.5 °C (97.7 °F) 1.626 m (5' 4\") 81.194 kg (179 lb) 30.71 kg/m2    Oxygen Saturation Smoking Status                97% Never Smoker           Basic Information     Date Of Birth Sex Race Ethnicity Preferred Language    1980 Male White Non- English      Your appointments     Aug 21, 2017  2:15 PM   Established Patient with IHV EXAM 4   [facility]  (--)    [facility] [facility] [address]   [phone]              Problem List              ICD-10-CM Priority Class Noted - Resolved    Insomnia G47.00   5/29/2009 - Present    Sleep apnea G47.30   10/3/2011 - Present    Osteopenia M85.80   5/10/2013 - Present    Vertebral compression fracture (CMS-HCC) M48.50XA   5/10/2013 - Present    Hyperlipidemia with target LDL less than 100 E78.5 Low  6/28/2013 - Present    Venous collateral circulation, any site I87.8   9/13/2013 - Present    Anemia D64.9   9/26/2013 - Present    Hyponatremia E87.1   2/23/2015 - Present    Reflex sympathetic dystrophy G90.50   3/24/2015 - Present    Non-healing surgical wound T81.89XA   6/22/2015 - Present    Acropachy [id number]   3/25/2016 - Present    Seizure disorder (CMS-HCC) G40.909   4/5/2016 - Present    Lethargy R53.83   4/9/2016 - Present    Pain syndrome, chronic G89.4   4/10/2016 - Present    Hyperkalemia E87.5   7/8/2016 - Present    ESRD (end stage renal disease) (CMS-HCC) N18.6 Medium  7/8/2016 - Present    Dermatofibroma of right upper arm D23.61   7/11/2016 - Present    Other specified epidermal thickening L85.8   8/5/2016 - Present    Chronic anticoagulation Z79.01   9/18/2016 - Present    Open wound of left thigh S71.102A High  10/28/2016 - Present    Hypertension I10 " Medium  11/15/2016 - Present    Hyperparathyroidism (CMS-HCC) E21.3 Low  11/15/2016 - Present    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, T86.11 Low  11/15/2016 - Present    Chronic kidney disease, stage V (CMS-HCC) N18.5   3/28/2017 - Present    Acute midline thoracic back pain M54.6   4/19/2017 - Present    Intertrigo L30.4   6/5/2017 - Present    Gangrene (CMS-HCC) I96   7/31/2017 - Present    Status post repair of complex wound Z98.890   7/31/2017 - Present      Health Maintenance        Date Due Completion Dates    IMM INFLUENZA (1) 3/20/2018 (Originally 9/1/2017) 9/30/2015, 9/9/2014, 9/15/2009    IMM DTaP/Tdap/Td Vaccine (2 - Tdap) 3/20/2018 (Originally 8/16/2000) 1/7/2000    IMM PNEUMOCOCCAL 19-64 (ADULT) HIGHEST RISK SERIES (1 of 3 - PCV13) 3/20/2018 (Originally 8/16/2000) ---            Current Immunizations     Hepatitis B Vaccine Non-Recombivax (Ped/Adol) 10/5/2000, 2/8/2000, 1/7/2000    Influenza TIV (IM) 9/30/2015, 9/9/2014    Influenza Vaccine Pediatric 9/15/2009    MMR Vaccine 3/11/1983    Pneumococcal Vaccine (PCV7) Historical Data 1/1/2013    QF GOLD 8/7/2013    QUANTIFERON GOLD 8/7/2013    TD Vaccine 1/7/2000      Below and/or attached are the medications your provider expects you to take. Review all of your home medications and newly ordered medications with your provider and/or pharmacist. Follow medication instructions as directed by your provider and/or pharmacist. Please keep your medication list with you and share with your provider. Update the information when medications are discontinued, doses are changed, or new medications (including over-the-counter products) are added; and carry medication information at all times in the event of emergency situations     Allergies:  BACLOFEN - Unspecified     CONTRAST MEDIA WITH IODINE - Rash     KEFLEX - Rash     PCN - Rash     TAPE - Rash               Medications  Valid as of: August 18, 2017 -  1:08 PM    Generic Name Brand Name Tablet  Size Instructions for use    Calcitriol (Cap) ROCALTROL 0.25 MCG Take 0.75 mcg by mouth every bedtime.        Cinacalcet HCl (Tab) SENSIPAR 30 MG Take 1 Tab by mouth every evening.        Gabapentin (Tab) NEURONTIN 600 MG Take 600 mg by mouth 3 times a day.        HYDROcodone Bitartrate (Tablet Extended Release 24 hour Abuse-Deterrent) HYDROcodone Bitartrate ER 30 MG Take  by mouth.        Hydrocodone-Acetaminophen (Tab) NORCO  MG Take 1-2 Tabs by mouth every 12 hours as needed.        Sevelamer Carbonate (Tab) Sevelamer Carbonate 800 MG Take 3,200 mg by mouth 3 times a day, with meals. With meals        Warfarin Sodium (Tab) COUMADIN 5 MG Take 5 mg by mouth every day. 2.5  mg on Mon, Thurs   5 mg all other days        .                 Medicines prescribed today were sent to:     [facility] [address] [place] [address] [place], [place] - [address] [facility] [address]    [address] [place] [address]    Phone: [phone] Fax: [phone]    Open 24 Hours?: No    [facility] - [place], [place] - [address]    [address] [place] [address]    Phone: [phone] Fax: [phone]    Open 24 Hours?: No      Medication refill instructions:       If your prescription bottle indicates you have medication refills left, it is not necessary to call your provider’s office. Please contact your pharmacy and they will refill your medication.    If your prescription bottle indicates you do not have any refills left, you may request refills at any time through one of the following ways: The online [employer] system (except Urgent Care), by calling your provider’s office, or by asking your pharmacy to contact your provider’s office with a refill request. Medication refills are processed only during regular business hours and may not be available until the next business day. Your provider may request additional information or to have a follow-up visit with you prior to refilling your medication.   *Please  Note: Medication refills are assigned a new Rx number when refilled electronically. Your pharmacy may indicate that no refills were authorized even though a new prescription for the same medication is available at the pharmacy. Please request the medicine by name with the pharmacy before contacting your provider for a refill.        Instructions    Continue with care through Tony Mcmillan M.D..  Next Medicare Annual Wellness Visit is due in one year.    Continue care with specialists you are seeing for your chronic problems.    Attached is some preventative information for you to read.          Fall Prevention and Home Safety  Falls cause injuries and can affect all age groups. It is possible to prevent falls.   HOW TO PREVENT FALLS  · Wear shoes with rubber soles that do not have an opening for your toes.   · Keep the inside and outside of your house well lit.   · Use night lights throughout your home.   · Remove clutter from floors.   · Clean up floor spills.   · Remove throw rugs or fasten them to the floor with carpet tape.   · Do not place electrical cords across pathways.   · Put grab bars by your tub, shower, and toilet. Do not use towel bars as grab bars.   · Put handrails on both sides of the stairway. Fix loose handrails.   · Do not climb on stools or stepladders, if possible.   · Do not wax your floors.   · Repair uneven or unsafe sidewalks, walkways, or stairs.   · Keep items you use a lot within reach.   · Be aware of pets.   · Keep emergency numbers next to the telephone.   · Put smoke detectors in your home and near bedrooms.   Ask your doctor what other things you can do to prevent falls.  Document Released: 10/14/2010 Document Revised: 06/18/2013 Document Reviewed: 03/19/2013  ExitCare® Patient Information ©2013 [employer]    Exercise to Stay Healthy      Exercise helps you become and stay healthy.  EXERCISE IDEAS AND TIPS  Choose exercises that:  · You enjoy.   · Fit into your day.      You do not need to exercise really hard to be healthy. You can do exercises at a slow or medium level and stay healthy. You can:  · Stretch before and after working out.   · Try yoga, Pilates, or carito chi.   · Lift weights.   · Walk fast, swim, jog, run, climb stairs, bicycle, dance, or rollerskate.   · Take aerobic classes.   Exercises that burn about 150 calories:  · Running 1 ½ miles in 15 minutes.   · Playing volleyball for 45 to 60 minutes.   · Washing and waxing a car for 45 to 60 minutes.   · Playing touch football for 45 minutes.   · Walking 1 ¾ miles in 35 minutes.   · Pushing a stroller 1 ½ miles in 30 minutes.   · Playing basketball for 30 minutes.   · Raking leaves for 30 minutes.   · Bicycling 5 miles in 30 minutes.   · Walking 2 miles in 30 minutes.   · Dancing for 30 minutes.   · Shoveling snow for 15 minutes.   · Swimming laps for 20 minutes.   · Walking up stairs for 15 minutes.   · Bicycling 4 miles in 15 minutes.   · Gardening for 30 to 45 minutes.   · Jumping rope for 15 minutes.   · Washing windows or floors for 45 to 60 minutes.     .   .   Document Released: 06/18/2013 Document Reviewed: 06/18/2013  ExitCare® Patient Information ©2013 [employer].    Recommend annual flu vaccine.  Next due in Fall, 2017.  If you decide not to have the flu vaccine, recommend good handwashing and staying out of crowds during flu season.                 Vayyar Access Code: Activation code not generated  Current Vayyar Status: Active

## 2017-08-18 NOTE — ASSESSMENT & PLAN NOTE
Chronic condition managed with current medical regimen  Stable per review with last seizure 3/2016  Pt believes the last seizure was due to his spinal cord stimulator, since removal no seizures   Continue with current meds  Followed by neuro

## 2017-08-18 NOTE — PROGRESS NOTES
CC:   Medicare Annual Wellness Visit    HPI:  Denis is a 37 y.o. here for Medicare Annual Wellness Visit    Patient Active Problem List    Diagnosis Date Noted   • Open wound of left thigh 10/28/2016     Priority: High   • Hypertension 11/15/2016     Priority: Medium   • ESRD (end stage renal disease) (CMS-HCC) 07/08/2016     Priority: Medium   • Hyperparathyroidism (CMS-HCC) 11/15/2016     Priority: Low   • Renal transplant failure and rejection x 2 11/15/2016     Priority: Low   • Hyperlipidemia with target LDL less than 100 06/28/2013     Priority: Low   • Gangrene (CMS-HCC) 07/31/2017   • Status post repair of complex wound 07/31/2017   • Intertrigo 06/05/2017   • Acute midline thoracic back pain 04/19/2017   • Chronic kidney disease, stage V (CMS-HCC) 03/28/2017   • Chronic anticoagulation 09/18/2016   • Other specified epidermal thickening 08/05/2016   • Dermatofibroma of right upper arm 07/11/2016   • Hyperkalemia 07/08/2016   • Pain syndrome, chronic 04/10/2016   • Lethargy 04/09/2016   • Seizure disorder (CMS-HCC) 04/05/2016   • Acropachy 03/25/2016   • Non-healing surgical wound 06/22/2015   • Reflex sympathetic dystrophy 03/24/2015   • Hyponatremia 02/23/2015   • Anemia 09/26/2013   • Venous collateral circulation, any site 09/13/2013   • Osteopenia 05/10/2013   • Vertebral compression fracture (CMS-HCC) 05/10/2013   • Sleep apnea 10/03/2011   • Insomnia 05/29/2009     Current Outpatient Prescriptions   Medication Sig Dispense Refill   • gabapentin (NEURONTIN) 600 MG tablet Take 600 mg by mouth 3 times a day.     • HYDROcodone Bitartrate ER (HYSINGLA ER) 30 MG Tablet Extended Release 24 hour Abuse-Deterrent Take  by mouth.     • hydrocodone/acetaminophen (NORCO)  MG Tab Take 1-2 Tabs by mouth every 12 hours as needed. 20 Tab 0   • warfarin (COUMADIN) 5 MG Tab Take 5 mg by mouth every day. 2.5  mg on Mon, Thurs   5 mg all other days     • cinacalcet (SENSIPAR) 30 MG Tab Take 1 Tab by mouth every  evening. 30 Tab 0   • Sevelamer Carbonate (RENVELA) 800 MG TABS Take 3,200 mg by mouth 3 times a day, with meals. With meals     • calcitRIOL (ROCALTROL) 0.25 MCG CAPS Take 0.75 mcg by mouth every bedtime.       No current facility-administered medications for this visit.      Current supplements: no  Chronic narcotic pain medicines: no  Allergies: Baclofen; Contrast media with iodine; Keflex; Pcn; and Tape  Exercise: as tha, works full time  Current social contact/activities: Has support of family and friends      Screening:  Depression Screening    Little interest or pleasure in doing things?  0 - not at all  Feeling down, depressed , or hopeless? 0 - not at all  Trouble falling or staying asleep, or sleeping too much?     Feeling tired or having little energy?     Poor appetite or overeating?     Feeling bad about yourself - or that you are a failure or have let yourself or your family down?    Trouble concentrating on things, such as reading the newspaper or watching television?    Moving or speaking so slowly that other people could have noticed.  Or the opposite - being so fidgety or restless that you have been moving around a lot more than usual?     Thoughts that you would be better off dead, or of hurting yourself?     Patient Health Questionnaire Score:      If depressive symptoms identified deferred to follow up visit unless specifically addressed in assessment and plan.    Interpretation of PHQ-9 Total Score   Score Severity   1-4 No Depression   5-9 Mild Depression   10-14 Moderate Depression   15-19 Moderately Severe Depression   20-27 Severe Depression      Screening for Cognitive Impairment    Three Minute Recall (apple, watch, malik)  2/3    Draw clock face with all 12 numbers set to the hand to show 10 minutes past 11 o'clock  1 5  Cognitive concerns identified deferred for follow up unless specifically addressed in assessment and plan.    Fall Risk Assessment    Has the patient had two or more  falls in the last year or any fall with injury in the last year?  No    Safety Assessment    Throw rugs on floor.  No  Handrails on all stairs.  Yes  Good lighting in all hallways.  Yes  Difficulty hearing.  No  Patient counseled about all safety risks that were identified.    Functional Assessment ADLs    Are there any barriers preventing you from cooking for yourself or meeting nutritional needs?  No.    Are there any barriers preventing you from driving safely or obtaining transportation?  No.    Are there any barriers preventing you from using a telephone or calling for help?  No.    Are there any barriers preventing you from shopping?  No.    Are there any barriers preventing you from taking care of your own finances?  No.    Are there any barriers preventing you from managing your medications?  No.    Are currently engaging any exercise or physical activity?  No.       Health Maintenance Summary                Annual Wellness Visit Overdue 7/24/2016      Done 7/24/2015 Visit Dx: Medicare annual wellness visit, subsequent    IMM INFLUENZA Postponed 3/20/2018 Originally 9/1/2017. Patient Refused     Done 9/30/2015 Imm Admin: Influenza TIV (IM)     Patient has more history with this topic...    IMM DTaP/Tdap/Td Vaccine Postponed 3/20/2018 Originally 8/16/2000. Patient Refused     Done 1/7/2000 Imm Admin: TD Vaccine    IMM PNEUMOCOCCAL 19-64 (ADULT) HIGHEST RISK SERIES Postponed 3/20/2018 Originally 8/16/2000. Patient Refused          Patient Care Team:  Tony Mcmillan M.D. as PCP - General  Jairo Hopkins M.D. (Surgery)  Dee Perales M.D. (Nephrology)  Samson Rosenbaum Jr., M.D. as Consulting Physician (Plastic Surgery)  Bin Pro M.D. as Consulting Physician (Pain Management)        Social History   Substance Use Topics   • Smoking status: Never Smoker    • Smokeless tobacco: Never Used   • Alcohol Use: No       Family History   Problem Relation Age of Onset   • Arthritis Father      RA   • Lung  Disease Father    • Heart Disease Father      MI   • Hypertension Brother      He  has a past medical history of Contracture of palmar fascia; Toxic uninodular goiter without mention of thyrotoxic crisis or storm; Hypertension; Pain; Chronic kidney disease, unspecified; Renal failure; Arrhythmia; Urinary incontinence; Seizure (CMS-[facility]); Sleep apnea; Dialysis; and Hemorrhagic disorder (CMS-HCC). He also has no past medical history of Encounter for long-term (current) use of other medications.   Past Surgical History   Procedure Laterality Date   • Mass excision ortho  10/9/08     Performed by BELKIS BREAUX at SURGERY SAME DAY [facility]S   • Av fistula creation  11/6/08     Performed by MARI WINTERS at SURGERY [facility]   • Arteriogram  3/5/2009     Performed by MARI WINTERS at SURGERY [facility]   • Angioplasty balloon  3/5/2009     Performed by MARI WINTERS at SURGERY [facility]   • Av fistulogram  3/5/2009     Performed by MARI WINTERS at SURGERY [facility]   • Us-kidney transplant  1996, 2001     x2   • Endarterectomy  11/16/2010     Performed by MARI WINTERS at SURGERY [facility]   • Av fistulogram  11/16/2010     Performed by MARI WINTERS at SURGERY [facility]   • Cath placement  2/1/2011     Performed by MARI WINTERS at SURGERY [facility]   • Angioplasty balloon  2/1/2011     Performed by MARI WINTERS at SURGERY [facility]   • Av fistulogram  6/5/2011     Performed by MARI WINTERS at SURGERY [facility]   • Cath placement  6/5/2011     Performed by MARI WINTERS at SURGERY [facility]   • Av fistula revision  11/3/2011     Performed by MARI WINTERS at SURGERY [facility]   • Bone spur excision  12/8/2011     Performed by BELKIS BREAUX at SURGERY SAME DAY [facility]   • Recovery  5/18/2012     Performed by SURGERY, IR-RECOVERY at SURGERY [facility] ORS   • Incision and drainage general   9/13/2013     Performed by Jairo Hopkins M.D. at SURGERY [facility]   • Debridement  9/13/2013     Performed by Jairo Hopkins M.D. at SURGERY [facility]   • Vein ligation  9/13/2013     Performed by Jairo Hopkins M.D. at SURGERY [facility]   • Recovery  6/13/2014     Performed by Ir-Recovery Surgery at [facility] SAME DAY [facility]   • Av fistula revision  9/30/2014     Performed by Jairo Hopkins M.D. at SURGERY [facility]   • Irrigation & debridement general  12/15/2014     Performed by Jairo Hopkins M.D. at SURGERY [facility]   • Av fistula revision  2/8/2015     Performed by Jairo Hopkins M.D. at SURGERY [facility]   • Av fistula revision  2/22/2015     Performed by Lurdes Moffett M.D. at SURGERY [facility]   • Av fistula revision  3/20/2015     Performed by Jairo Hopkins M.D. at SURGERY [facility]   • Pr inject nerv blck,stellate ganglion  3/24/2015     Performed by Bin Pro at [facility]   • Av fistula creation  4/20/2015     Performed by Jairo Hopkins M.D. at [facility]   • Av fistula thrombolysis Left 6/3/2015     Procedure: AV FISTULA THROMBOLYSIS THIGH REPLACEMENT;  Surgeon: Jairo Hopkins M.D.;  Location: [facility];  Service:    • Irrigation & debridement general Left 6/3/2015     Procedure: IRRIGATION & DEBRIDEMENT GENERAL;  Surgeon: Jairo Hopkins M.D.;  Location: [facility];  Service:    • Av fistula thrombolysis Left 6/9/2015     Procedure: THROMBECTOMY AV GRAFT THIGH;  Surgeon: Jairo Hopkins M.D.;  Location: SURGERY [facility];  Service:    • Av fistula revision Left 6/17/2015     Procedure: AV FISTULA REVISION;  Surgeon: Jairo Hopkins M.D.;  Location: [facility];  Service:    • Irrigation & debridement general Left 6/17/2015     Procedure: IRRIGATION & DEBRIDEMENT GENERAL ARM W/EXPLORATION WOUND & CONTROL BLEEDING;  Surgeon: Jairo ROWE  EDISON Hopkins;  Location: SURGERY [facility];  Service:    • Recovery  8/7/2015     Procedure:  VASCULAR CASE VIANEY-RIGHT ILIAC VENOGRAM WITH ANGIOPLASTY, REMOVAL RIGHT FEMORAL TUNNELED DIALYSIS CATHETER  **VRE**;  Surgeon: Melinda Surgery;  Location: SURGERY PRE-POST PROC UNIT [facility];  Service:    • Pr percut implnt neuroelect,epidural  2/19/2016     Procedure: IMPLANT NEUROSTIM EPI ARRAY;  Surgeon: Bin Pro;  Location: SURGERY [facility];  Service: Pain Management   • Pr percut implnt neuroelect,epidural  2/19/2016     Procedure: IMPLANT NEUROSTIM EPI ARRAY;  Surgeon: Bin Pro;  Location: SURGERY [facility];  Service: Pain Management   • Parathyroidectomy  2006   • Inguinal hernia repair Bilateral 2001, 2002   • Spinal cord stimulator N/A 3/25/2016     Procedure: SPINAL CORD STIMULATOR;  Surgeon: Bin Pro;  Location: [facility];  Service:    • Recovery  7/8/2016     Procedure: IR1-VASCULAR CASE-VIANEY-LEFT THIGH AV GRAFT THROMBOLYSIS WITH TISSUE PLASMINOGEN ACTIVATOR AND ANGIOJET ARTHERECTOMY   ;  Surgeon: Melinda Surgery;  Location: SURGERY PRE-POST PROC UNIT [facility];  Service:    • Lesion excision general Right 7/11/2016     Procedure: LESION EXCISION GENERAL FOR ARM SKIN;  Surgeon: Jairo Hopkins M.D.;  Location: [facility];  Service:    • Mass excision general Right 8/5/2016     Procedure: MASS EXCISION GENERAL FOR CALCIPHYLAXIS SKIN AND SUBCUTANEOUS TISSUE FOREARM;  Surgeon: Jairo Hopkins M.D.;  Location: [facility];  Service:    • Cath placement Right 9/17/2016     Procedure: CATH PLACEMENT - tunneled dialysis cath placement right femoral ;  Surgeon: Quentin Alicia M.D.;  Location: [facility];  Service:    • Thrombectomy Left 9/18/2016     Procedure: THROMBECTOMY - and fistula revision;  Surgeon: Jairo Hopkins M.D.;  Location: [facility];  Service:    • Av fistulogram  9/18/2016     Procedure: AV  FISTULOGRAM;  Surgeon: Jairo Hopkins M.D.;  Location: [facility];  Service:    • Thrombectomy Left 10/20/2016     Procedure: THROMBECTOMY THIGH;  Surgeon: Jairo Hopkins M.D.;  Location: [facility];  Service:    • Incision and drainage general  10/20/2016     Procedure: INCISION AND DRAINAGE GENERAL HEMATOMA;  Surgeon: Jairo Hopkins M.D.;  Location: [facility];  Service:    • Irrigation & debridement general Left 10/28/2016     Procedure: IRRIGATION & DEBRIDEMENT GENERAL THIGH WITH IRRIGATING WOUND VAC PLACEMENT;  Surgeon: Jairo Hopkins M.D.;  Location: [facility];  Service:    • Flap closure Left 11/17/2016     Procedure: Fasciocutaneous Flap Closure Left Upper Leg;  Surgeon: Samson Rosenbaum Jr., M.D.;  Location: [facility];  Service:    • Thrombectomy Left 1/6/2017     Procedure: THROMBECTOMY-OPEN THROMBECTOMY WITH LEFT THIGH GRAFT;  Surgeon: Jairo Hopkins M.D.;  Location: [facility];  Service:    • Cath placement Right 1/6/2017     Procedure: CATH PLACEMENT;  Surgeon: Jairo Hopkins M.D.;  Location: [facility];  Service:    • Thrombectomy Left 1/5/2017     Procedure: THROMBECTOMY-THIGH AV LOOP GRAFT AND ANGIOJET;  Surgeon: Jairo Hopkins M.D.;  Location: [facility];  Service:    • Spinal cord stimulator N/A 5/4/2017     Procedure: SPINAL CORD STIMULATOR - EXPLANT;  Surgeon: Bin Pro M.D.;  Location: [facility];  Service:    • Abdominoplasty  6/5/2017     Procedure: ABDOMINOPLASTY - FOR PANNICULECTOMY;  Surgeon: Samson Rosenbaum Jr., M.D.;  Location: [facility];  Service:    • Irrigation & debridement general  7/31/2017     Procedure: IRRIGATION & DEBRIDEMENT GENERAL-ABDOMEN;  Surgeon: Samson Rosenbaum Jr., M.D.;  Location: [facility];  Service:        ROS:    Ostomy or other tubes or amputations: yes  L thigh graft for home  "dialysis  Chronic oxygen use no  Last eye exam 2016  : Denies incontinence.dialysis  Gait: Uses no assistive device   Hearing excellent.    Dentition good    Exam: Blood pressure 80/42, pulse 111, temperature 36.5 °C (97.7 °F), height 1.626 m (5' 4\"), weight 81.194 kg (179 lb), SpO2 97 %. Body mass index is 30.71 kg/(m^2).  Alert, oriented in no acute distress.  Eye contact is good, speech goal directed, affect calm      Assessment and Plan. The following treatment and monitoring plan is recommended for all problems as listed below:   Vertebral compression fracture  Chronic condition managed with current medical regimen  Stable per review   Continue with current meds  Followed by specialty     Venous collateral circulation, any site  Followed by vascular    Thrombosis of renal dialysis arteriovenous graft (CMS-[facility])  Historical data, resolved with intervention    Thrombophlebitis  Resolved per pt    Status post repair of complex wound  Wounds are still in process of healing  Followed by wound care    Sleep apnea  Chronic condition managed with current medical regimen  Stable per review   Continue with BiPap  Followed by pulm     Seizure disorder  Chronic condition managed with current medical regimen  Stable per review with last seizure 3/2016  Pt believes the last seizure was due to his spinal cord stimulator, since removal no seizures   Continue with current meds  Followed by neuro        Renal transplant failure and rejection x 2  Has been on the kidney transplant list for past 13 yrs    Reflex sympathetic dystrophy  Chronic condition managed with current medical regimen  Stable per review at this time   Continue with surveillance  Followed by specialty       Pain  duplicate    Pain syndrome, chronic  Chronic condition managed with current medical regimen  Cont with chronic pain   Continue with current meds, recent increase in dose of gabapentin  Followed by specialty..        Other specified epidermal " thickening  Followed by specialty  No change per pt    Osteopenia  Chronic condition managed with current medical regimen  Stable per review   Continue with current meds  Followed by Tony Mcmillan M.D..        Hyponatremia  7/31/2017   Sodium 135 135 - 145 mmol/L Final   Followed by nephrology and Tony Mcmillan M.D.       Hypertension  BP 80/42, pts normal range  Followed by specialty    Hyperparathyroidism (CMS-HCC)  7/31/2017   Calcium 9.5 8.5 - 10.5 mg/dL Final   Followed by specialty      Hyperlipidemia with target LDL less than 100  No current labs in chart  No meds, diet managed  Followed by Tony Mcmillan M.D..       Hyperkalemia  7/31/2017   Potassium 3.9 3.6 - 5.5 mmol/L Final   Followed by specialty and Tony Mcmillan M.D..       Gangrene (CMS-HCC)  Per pt surgical intervention  Followed by wound care    ESRD (end stage renal disease) (CMS-HCC)  Pt states home dialysis  Access is L thigh graft  Followed by nephrology    End stage renal disease (CMS-HCC)  duplicate    Dupuytren's contracture of hand  Per pt resolved    Dermatofibroma of right upper arm  Per pt no intervention  Followed by Tony Mcmillan M.D..     Chronic pain disorder  duplicate    Chronic kidney disease, stage V (CMS-HCC)  Pt states home dialysis  Access is L thigh graft  Followed by nephrology    Chronic anticoagulation  Has routine INR  Followed by specialty    Calciphylaxis  Surgically removed by vascular surgeon    Bypass graft stenosis (CMS-HCC)  Resolved with intervention    Bacteremia  Historical data    AV graft thrombosis (CMS-HCC)  Resolved with intervention    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  Resolved with intervention    Anemia  Chronic anemia of renal failure  Managed by nephrology    Acute midline thoracic back pain  States improved but not gone with increase of gabapentin  Followed by Tony Mcmillan M.D..     Acropachy  Followed by [facility]  Care Screening recommendations reviewed with patient today: per Patient Instructions  DPA/Advanced directive: .has NO advanced directive - not interested in additional information    Next office visit for recheck of chronic medical conditions is due with Tony Mcmillan M.D. in 6 months

## 2017-08-18 NOTE — ASSESSMENT & PLAN NOTE
Chronic condition managed with current medical regimen  Cont with chronic pain   Continue with current meds, recent increase in dose of gabapentin  Followed by specialty..

## 2017-08-18 NOTE — ASSESSMENT & PLAN NOTE
Chronic condition managed with current medical regimen  Stable per review at this time   Continue with surveillance  Followed by specialty

## 2017-08-18 NOTE — ASSESSMENT & PLAN NOTE
7/31/2017   Potassium 3.9 3.6 - 5.5 mmol/L Final   Followed by filomena and Tony Mcmillan M.D..

## 2017-08-18 NOTE — ASSESSMENT & PLAN NOTE
Chronic condition managed with current medical regimen  Stable per review   Continue with current meds  Followed by specialty

## 2017-08-18 NOTE — ASSESSMENT & PLAN NOTE
Chronic condition managed with current medical regimen  Stable per review   Continue with current meds  Followed by Tony Mcmillan M.D..

## 2017-08-24 ENCOUNTER — ANTICOAGULATION VISIT (OUTPATIENT)
Dept: VASCULAR LAB | Facility: MEDICAL CENTER | Age: 37
End: 2017-08-24
Attending: INTERNAL MEDICINE
Payer: MEDICARE

## 2017-08-24 VITALS — HEART RATE: 105 BPM | DIASTOLIC BLOOD PRESSURE: 54 MMHG | SYSTOLIC BLOOD PRESSURE: 99 MMHG

## 2017-08-24 DIAGNOSIS — Z79.01 CHRONIC ANTICOAGULATION: ICD-10-CM

## 2017-08-24 LAB — INR PPP: 4.3 (ref 2–3.5)

## 2017-08-24 NOTE — PROGRESS NOTES
"Anticoagulation Summary as of 8/24/2017     INR goal 2.5-3.5   Selected INR 4.3! (8/24/2017)   Maintenance plan 5 mg (5 mg x 1) on Mon, Wed, Fri; 2.5 mg (5 mg x 0.5) all other days   Weekly total 25 mg   Plan last modified Donald Cedeño, PHARMD (8/24/2017)   Next INR check 8/31/2017   Target end date Indefinite    Indications   AV graft thrombosis (CMS-HCC) (Resolved) [T82.868A]         Anticoagulation Episode Summary     INR check location     Preferred lab     Send INR reminders to     Comments INR goal of 2.5 - 3.5 per Dr. Hopkins      Anticoagulation Care Providers     Provider Role Specialty Phone number    Jairo Hopkins M.D. Referring Surgery [phone]    [facility] Responsible  [phone]        Anticoagulation Patient Findings      Denis De Anda seen in clinic today  INR SUPRA-therapeutic.    Surgical site has not had bleeding except for last night.  Pt states he found himself in a 12\" puddle of blood.  States he has obtained hemostasis.   Denies changes to diet or medications.   Follow up appointment in 1 week(s).  Instructed pt to seek medical help for serious bleeding.    Hold warfarin today then begin 10% reduced regimen.     Donald Cedeño, PHARMD             "

## 2017-08-24 NOTE — MR AVS SNAPSHOT
Denis De Anda   2017 8:00 AM   Anticoagulation Visit   MRN: [id number]    Department:  Vascular Medicine   Dept Phone:  [phone]    Description:  Male : 1980   Provider:  [facility] EXAM 4           Allergies as of 2017     Allergen Noted Reactions    Baclofen 2015   Unspecified    Total loss of memory, sedation.   RXN=2015    Contrast Media With Iodine [Iodine] 2016   Rash    RXN=2017    Keflex 2009   Rash    RXN=possibly >10 years    Pcn [Penicillins] 2009   Rash    RXN=possibly >10 years ago    Tape 2013   Rash    Paper tape and tegaderm ok  RXN=ongoing      Vital Signs     Smoking Status                   Never Smoker            Basic Information     Date Of Birth Sex Race Ethnicity Preferred Language    1980 Male White Non- English      Your appointments     Aug 31, 2017  8:00 AM   Established Patient with [facility] EXAM 4   [facility]  (--)    [facility] [id number]   [phone]              Problem List              ICD-10-CM Priority Class Noted - Resolved    Insomnia G47.00   2009 - Present    Sleep apnea G47.30   10/3/2011 - Present    Osteopenia M85.80   5/10/2013 - Present    Vertebral compression fracture (CMS-HCC) M48.50XA   5/10/2013 - Present    Hyperlipidemia with target LDL less than 100 E78.5 Low  2013 - Present    Venous collateral circulation, any site I87.8   2013 - Present    Anemia D64.9   2013 - Present    Hyponatremia E87.1   2015 - Present    Reflex sympathetic dystrophy G90.50   3/24/2015 - Present    Non-healing surgical wound T81.89XA   2015 - Present    Acropachy [id number]   3/25/2016 - Present    Seizure disorder (CMS-HCC) G40.909   2016 - Present    Lethargy R53.83   2016 - Present    Pain syndrome, chronic G89.4   4/10/2016 - Present    Hyperkalemia E87.5   2016 - Present    ESRD (end stage renal  disease) (CMS-HCC) N18.6 Medium  7/8/2016 - Present    Dermatofibroma of right upper arm D23.61   7/11/2016 - Present    Other specified epidermal thickening L85.8   8/5/2016 - Present    Chronic anticoagulation Z79.01   9/18/2016 - Present    Open wound of left thigh S71.102A High  10/28/2016 - Present    Hypertension I10 Medium  11/15/2016 - Present    Hyperparathyroidism (CMS-HCC) E21.3 Low  11/15/2016 - Present    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, T86.11 Low  11/15/2016 - Present    Chronic kidney disease, stage V (CMS-HCC) N18.5   3/28/2017 - Present    Acute midline thoracic back pain M54.6   4/19/2017 - Present    Intertrigo L30.4   6/5/2017 - Present    Gangrene (CMS-HCC) I96   7/31/2017 - Present    Status post repair of complex wound Z98.890   7/31/2017 - Present      Health Maintenance        Date Due Completion Dates    IMM INFLUENZA (1) 3/20/2018 (Originally 9/1/2017) 9/30/2015, 9/9/2014, 9/15/2009    IMM DTaP/Tdap/Td Vaccine (2 - Tdap) 3/20/2018 (Originally 8/16/2000) 1/7/2000    IMM PNEUMOCOCCAL 19-64 (ADULT) HIGHEST RISK SERIES (1 of 3 - PCV13) 3/20/2018 (Originally 8/16/2000) ---            Results     POCT Protime      Component    INR    4.3                        Current Immunizations     Hepatitis B Vaccine Non-Recombivax (Ped/Adol) 10/5/2000, 2/8/2000, 1/7/2000    Influenza TIV (IM) 9/30/2015, 9/9/2014    Influenza Vaccine Pediatric 9/15/2009    MMR Vaccine 3/11/1983    Pneumococcal Vaccine (PCV7) Historical Data 1/1/2013    QF GOLD 8/7/2013    QUANTIFERON GOLD 8/7/2013    TD Vaccine 1/7/2000      Below and/or attached are the medications your provider expects you to take. Review all of your home medications and newly ordered medications with your provider and/or pharmacist. Follow medication instructions as directed by your provider and/or pharmacist. Please keep your medication list with you and share with your provider. Update the information when medications are  discontinued, doses are changed, or new medications (including over-the-counter products) are added; and carry medication information at all times in the event of emergency situations     Allergies:  BACLOFEN - Unspecified     CONTRAST MEDIA WITH IODINE - Rash     KEFLEX - Rash     PCN - Rash     TAPE - Rash               Medications  Valid as of: August 24, 2017 -  8:14 AM    Generic Name Brand Name Tablet Size Instructions for use    Calcitriol (Cap) ROCALTROL 0.25 MCG Take 0.75 mcg by mouth every bedtime.        Cinacalcet HCl (Tab) SENSIPAR 30 MG Take 1 Tab by mouth every evening.        Gabapentin (Tab) NEURONTIN 600 MG Take 600 mg by mouth 3 times a day.        HYDROcodone Bitartrate (Tablet Extended Release 24 hour Abuse-Deterrent) HYDROcodone Bitartrate ER 30 MG Take  by mouth.        Hydrocodone-Acetaminophen (Tab) NORCO  MG Take 1-2 Tabs by mouth every 12 hours as needed.        Sevelamer Carbonate (Tab) Sevelamer Carbonate 800 MG Take 3,200 mg by mouth 3 times a day, with meals. With meals        Warfarin Sodium (Tab) COUMADIN 5 MG Take 5 mg by mouth every day. 2.5  mg on Mon, Thurs   5 mg all other days        .                 Medicines prescribed today were sent to:     [facility] [address] [place] [address] [place] [address] [address] AT [facility] [address] [place] [address] [place] [address]    Phone: [phone] Fax: [phone]    Open 24 Hours?: No    [facility] - [address]    [address] [facility] [address] [place] [address]    Phone: [phone] Fax: [phone]    Open 24 Hours?: No      Medication refill instructions:       If your prescription bottle indicates you have medication refills left, it is not necessary to call your provider’s office. Please contact your pharmacy and they will refill your medication.    If your prescription bottle indicates you do not have any refills left, you may request refills at any time through one of the following ways: The online  [employer] system (except Urgent Care), by calling your provider’s office, or by asking your pharmacy to contact your provider’s office with a refill request. Medication refills are processed only during regular business hours and may not be available until the next business day. Your provider may request additional information or to have a follow-up visit with you prior to refilling your medication.   *Please Note: Medication refills are assigned a new Rx number when refilled electronically. Your pharmacy may indicate that no refills were authorized even though a new prescription for the same medication is available at the pharmacy. Please request the medicine by name with the pharmacy before contacting your provider for a refill.        Warfarin Dosing Calendar   August 2017 Details    Sun Mon Tue Wed Thu Fri Sat       1               2               3               4               5                 6               7               8               9               10               11               12                 13               14               15               16               17               18               19                 20               21               22               23               24   4.3   Hold   See details      25      5 mg         26      2.5 mg           27      2.5 mg         28      5 mg         29      2.5 mg         30      5 mg         31      2.5 mg            Date Details   08/24 This INR check   INR: 4.3       Date of next INR:  8/31/2017         How to take your warfarin dose     To take:  2.5 mg Take 0.5 of a 5 mg tablet.    To take:  5 mg Take 1 of the 5 mg tablets.    Hold Do not take your warfarin dose. See the Details table to the right for additional instructions.                   [employer] Access Code: Activation code not generated  Current [employer] Status: Active

## 2017-08-28 ENCOUNTER — TELEPHONE (OUTPATIENT)
Dept: NEPHROLOGY | Facility: MEDICAL CENTER | Age: 37
End: 2017-08-28

## 2017-08-28 DIAGNOSIS — Z99.2 ESRD (END STAGE RENAL DISEASE) ON DIALYSIS (HCC): ICD-10-CM

## 2017-08-28 DIAGNOSIS — N18.6 ESRD (END STAGE RENAL DISEASE) ON DIALYSIS (HCC): ICD-10-CM

## 2017-08-28 LAB — INR BLD: 4.3 (ref 0.9–1.2)

## 2017-08-28 NOTE — TELEPHONE ENCOUNTER
Was the patient seen in the last year in this department? Yes     Does patient have an active prescription for medications requested? Yes     Received Request Via: Pharmacy       Pharmacy called that pt needs a refill for sensipar 60mg

## 2017-08-29 RX ORDER — CINACALCET 30 MG/1
60 TABLET, FILM COATED ORAL EVERY EVENING
Qty: 90 TAB | Refills: 3 | Status: ON HOLD | OUTPATIENT
Start: 2017-08-29 | End: 2018-03-09

## 2017-09-06 NOTE — TELEPHONE ENCOUNTER
Pharmacy is requesting sensipar being written for sensipar 60mg and every other day 30mg. They need us to verify this dosage and rewrite the prescription reflecting this. Please advise.

## 2017-09-12 ENCOUNTER — TELEPHONE (OUTPATIENT)
Dept: NEPHROLOGY | Facility: MEDICAL CENTER | Age: 37
End: 2017-09-12

## 2017-09-12 NOTE — TELEPHONE ENCOUNTER
Pharmacy is requesting sensipar being written for sensipar 60mg and every other day 30mg. They need us to verify this dosage and rewrite the prescription reflecting this. Please advise.  Thank you

## 2017-09-14 ENCOUNTER — HOSPITAL ENCOUNTER (OUTPATIENT)
Facility: MEDICAL CENTER | Age: 37
End: 2017-09-14
Attending: INTERNAL MEDICINE
Payer: MEDICARE

## 2017-09-14 LAB — HGB BLD-MCNC: 8.4 G/DL (ref 14–18)

## 2017-09-14 PROCEDURE — 85018 HEMOGLOBIN: CPT

## 2017-09-14 PROCEDURE — 36415 COLL VENOUS BLD VENIPUNCTURE: CPT

## 2017-09-20 ENCOUNTER — EH NON-PROVIDER (OUTPATIENT)
Dept: OCCUPATIONAL MEDICINE | Facility: CLINIC | Age: 37
End: 2017-09-20

## 2017-09-20 ENCOUNTER — HOSPITAL ENCOUNTER (OUTPATIENT)
Facility: MEDICAL CENTER | Age: 37
End: 2017-09-20
Attending: PREVENTIVE MEDICINE
Payer: COMMERCIAL

## 2017-09-20 DIAGNOSIS — Z02.89 ENCOUNTER FOR OCCUPATIONAL HEALTH EXAMINATION: ICD-10-CM

## 2017-09-20 PROCEDURE — 86480 TB TEST CELL IMMUN MEASURE: CPT | Performed by: PREVENTIVE MEDICINE

## 2017-09-21 ENCOUNTER — OFFICE VISIT (OUTPATIENT)
Dept: MEDICAL GROUP | Facility: PHYSICIAN GROUP | Age: 37
End: 2017-09-21
Payer: MEDICARE

## 2017-09-21 VITALS
TEMPERATURE: 97.9 F | HEART RATE: 114 BPM | OXYGEN SATURATION: 92 % | DIASTOLIC BLOOD PRESSURE: 58 MMHG | SYSTOLIC BLOOD PRESSURE: 92 MMHG | HEIGHT: 64 IN | BODY MASS INDEX: 31.24 KG/M2 | RESPIRATION RATE: 16 BRPM | WEIGHT: 183 LBS

## 2017-09-21 DIAGNOSIS — R60.0 LOCALIZED EDEMA: ICD-10-CM

## 2017-09-21 PROBLEM — H93.8X9 EAR PRESSURE: Status: ACTIVE | Noted: 2017-09-21

## 2017-09-21 PROBLEM — H93.8X1 PRESSURE SENSATION IN RIGHT EAR: Status: ACTIVE | Noted: 2017-09-21

## 2017-09-21 LAB
M TB TUBERC IFN-G BLD QL: NEGATIVE
M TB TUBERC IFN-G/MITOGEN IGNF BLD: -0.02
M TB TUBERC IGNF/MITOGEN IGNF CONTROL: 10.35 [IU]/ML
MITOGEN IGNF BCKGRD COR BLD-ACNC: 0.07 [IU]/ML

## 2017-09-21 PROCEDURE — 99213 OFFICE O/P EST LOW 20 MIN: CPT | Performed by: INTERNAL MEDICINE

## 2017-09-21 NOTE — ASSESSMENT & PLAN NOTE
Pt reports swelling an pressure in his right ear. He first thought it was cauliflower ear but then realized it was a fluid collection. He states that he can push on the area and move the fluid around. There is no pain with movement of the ear but when he moved the fluid around. He denies any fevers/chills. He denies any edema of the other parts of the neck or face. He denies lymphadenopathy. He denies symptoms of nasal congestion, rhinitis, or sore throat. There was no trauma to the area. He denies new environmental exposures, medications, or chemical exposures.

## 2017-09-21 NOTE — PROGRESS NOTES
Subjective:   Denis De Anda is a 37 y.o. male here today for right ear edema    Localized edema  Pt reports swelling an pressure in his right ear. He first thought it was cauliflower ear but then realized it was a fluid collection. He states that he can push on the area and move the fluid around. There is no pain with movement of the ear but when he moved the fluid around. He denies any fevers/chills. He denies any edema of the other parts of the neck or face. He denies lymphadenopathy. He denies symptoms of nasal congestion, rhinitis, or sore throat. There was no trauma to the area. He denies new environmental exposures, medications, or chemical exposures.        Current medicines (including changes today)  Current Outpatient Prescriptions   Medication Sig Dispense Refill   • cinacalcet (SENSIPAR) 30 MG Tab Take 2 Tabs by mouth every evening. 90 Tab 3   • gabapentin (NEURONTIN) 600 MG tablet Take 600 mg by mouth 3 times a day.     • HYDROcodone Bitartrate ER (HYSINGLA ER) 30 MG Tablet Extended Release 24 hour Abuse-Deterrent Take  by mouth.     • hydrocodone/acetaminophen (NORCO)  MG Tab Take 1-2 Tabs by mouth every 12 hours as needed. 20 Tab 0   • warfarin (COUMADIN) 5 MG Tab Take 5 mg by mouth every day. 2.5  mg on Mon, Thurs   5 mg all other days     • Sevelamer Carbonate (RENVELA) 800 MG TABS Take 3,200 mg by mouth 3 times a day, with meals. With meals     • calcitRIOL (ROCALTROL) 0.25 MCG CAPS Take 0.75 mcg by mouth every bedtime.       No current facility-administered medications for this visit.      He  has a past medical history of Arrhythmia; Chronic kidney disease, unspecified; Contracture of palmar fascia; Dialysis; Hemorrhagic disorder (CMS-HCC); Hypertension; Pain; Renal failure; Seizure (CMS-[facility]); Sleep apnea; Toxic uninodular goiter without mention of thyrotoxic crisis or storm; and Urinary incontinence. He also has no past medical history of Encounter for long-term (current) use of other  "medications.    ROS   Denies fevers/chills, pain of ear     Objective:     Blood pressure (!) 92/58, pulse (!) 114, temperature 36.6 °C (97.9 °F), resp. rate 16, height 1.626 m (5' 4\"), weight 83 kg (183 lb), SpO2 92 %. Body mass index is 31.41 kg/m².   Physical Exam:  Constitutional: Alert & oriented, no acute distress  Ears: Right ear with edema at the superior aspect, area is soft to palpation, no pain with palpation, normal ear canal  Neuro: No overt focal neurologic deficits, normal gait    Assessment and Plan:   The following treatment plan was discussed    1. Localized edema  Etiology is unclear. This does not appear to be on infectious origin as there is no pain with palpation. There are no other areas of edema. We will monitor this clinically. I have asked patient to inform us if pain develops as we can consider treatment for otitis externa in this situation. If edema worsens or if it has not improved in 1-2 months patient will inform us and we can consider further testing with ultrasound and/or testing for angioedema.     Followup: Return if symptoms worsen or fail to improve.    Please note that this dictation was created using voice recognition software. I have made every reasonable attempt to correct obvious errors, but I expect that there are errors of grammar and possibly content that I did not discover before finalizing the note.          "

## 2017-09-23 ENCOUNTER — HOSPITAL ENCOUNTER (EMERGENCY)
Facility: MEDICAL CENTER | Age: 37
End: 2017-09-23
Attending: EMERGENCY MEDICINE
Payer: MEDICARE

## 2017-09-23 ENCOUNTER — APPOINTMENT (OUTPATIENT)
Dept: RADIOLOGY | Facility: MEDICAL CENTER | Age: 37
End: 2017-09-23
Attending: EMERGENCY MEDICINE
Payer: MEDICARE

## 2017-09-23 VITALS
TEMPERATURE: 98.2 F | HEART RATE: 112 BPM | BODY MASS INDEX: 30.11 KG/M2 | WEIGHT: 176.37 LBS | DIASTOLIC BLOOD PRESSURE: 67 MMHG | HEIGHT: 64 IN | RESPIRATION RATE: 21 BRPM | SYSTOLIC BLOOD PRESSURE: 118 MMHG | OXYGEN SATURATION: 99 %

## 2017-09-23 DIAGNOSIS — K28.4 BLEEDING ULCER: ICD-10-CM

## 2017-09-23 LAB
ALBUMIN SERPL BCP-MCNC: 3.7 G/DL (ref 3.2–4.9)
ALBUMIN/GLOB SERPL: 1 G/DL
ALP SERPL-CCNC: 119 U/L (ref 30–99)
ALT SERPL-CCNC: <5 U/L (ref 2–50)
ANION GAP SERPL CALC-SCNC: 18 MMOL/L (ref 0–11.9)
APTT PPP: 62.4 SEC (ref 24.7–36)
AST SERPL-CCNC: 6 U/L (ref 12–45)
BASOPHILS # BLD AUTO: 0.7 % (ref 0–1.8)
BASOPHILS # BLD: 0.05 K/UL (ref 0–0.12)
BILIRUB SERPL-MCNC: 0.4 MG/DL (ref 0.1–1.5)
BUN SERPL-MCNC: 71 MG/DL (ref 8–22)
CALCIUM SERPL-MCNC: 8.9 MG/DL (ref 8.5–10.5)
CHLORIDE SERPL-SCNC: 94 MMOL/L (ref 96–112)
CO2 SERPL-SCNC: 24 MMOL/L (ref 20–33)
CREAT SERPL-MCNC: 9.84 MG/DL (ref 0.5–1.4)
EOSINOPHIL # BLD AUTO: 0.08 K/UL (ref 0–0.51)
EOSINOPHIL NFR BLD: 1.1 % (ref 0–6.9)
ERYTHROCYTE [DISTWIDTH] IN BLOOD BY AUTOMATED COUNT: 56.5 FL (ref 35.9–50)
GFR SERPL CREATININE-BSD FRML MDRD: 6 ML/MIN/1.73 M 2
GLOBULIN SER CALC-MCNC: 3.7 G/DL (ref 1.9–3.5)
GLUCOSE SERPL-MCNC: 120 MG/DL (ref 65–99)
HCT VFR BLD AUTO: 26.2 % (ref 42–52)
HGB BLD-MCNC: 7.9 G/DL (ref 14–18)
IMM GRANULOCYTES # BLD AUTO: 0.05 K/UL (ref 0–0.11)
IMM GRANULOCYTES NFR BLD AUTO: 0.7 % (ref 0–0.9)
INR PPP: 2.22 (ref 0.87–1.13)
LYMPHOCYTES # BLD AUTO: 0.71 K/UL (ref 1–4.8)
LYMPHOCYTES NFR BLD: 9.5 % (ref 22–41)
MCH RBC QN AUTO: 27.5 PG (ref 27–33)
MCHC RBC AUTO-ENTMCNC: 30.2 G/DL (ref 33.7–35.3)
MCV RBC AUTO: 91.3 FL (ref 81.4–97.8)
MONOCYTES # BLD AUTO: 0.45 K/UL (ref 0–0.85)
MONOCYTES NFR BLD AUTO: 6 % (ref 0–13.4)
NEUTROPHILS # BLD AUTO: 6.17 K/UL (ref 1.82–7.42)
NEUTROPHILS NFR BLD: 82 % (ref 44–72)
NRBC # BLD AUTO: 0 K/UL
NRBC BLD AUTO-RTO: 0 /100 WBC
PLATELET # BLD AUTO: 221 K/UL (ref 164–446)
PMV BLD AUTO: 9 FL (ref 9–12.9)
POTASSIUM SERPL-SCNC: 4.8 MMOL/L (ref 3.6–5.5)
PROT SERPL-MCNC: 7.4 G/DL (ref 6–8.2)
PROTHROMBIN TIME: 25.3 SEC (ref 12–14.6)
RBC # BLD AUTO: 2.87 M/UL (ref 4.7–6.1)
SODIUM SERPL-SCNC: 136 MMOL/L (ref 135–145)
WBC # BLD AUTO: 7.5 K/UL (ref 4.8–10.8)

## 2017-09-23 PROCEDURE — 85610 PROTHROMBIN TIME: CPT

## 2017-09-23 PROCEDURE — 74176 CT ABD & PELVIS W/O CONTRAST: CPT

## 2017-09-23 PROCEDURE — 96361 HYDRATE IV INFUSION ADD-ON: CPT

## 2017-09-23 PROCEDURE — 85730 THROMBOPLASTIN TIME PARTIAL: CPT

## 2017-09-23 PROCEDURE — 96374 THER/PROPH/DIAG INJ IV PUSH: CPT

## 2017-09-23 PROCEDURE — 99284 EMERGENCY DEPT VISIT MOD MDM: CPT

## 2017-09-23 PROCEDURE — 71010 DX-CHEST-PORTABLE (1 VIEW): CPT

## 2017-09-23 PROCEDURE — 80053 COMPREHEN METABOLIC PANEL: CPT

## 2017-09-23 PROCEDURE — 700111 HCHG RX REV CODE 636 W/ 250 OVERRIDE (IP)

## 2017-09-23 PROCEDURE — 85025 COMPLETE CBC W/AUTO DIFF WBC: CPT

## 2017-09-23 PROCEDURE — 96376 TX/PRO/DX INJ SAME DRUG ADON: CPT

## 2017-09-23 PROCEDURE — 700105 HCHG RX REV CODE 258: Performed by: EMERGENCY MEDICINE

## 2017-09-23 PROCEDURE — 700111 HCHG RX REV CODE 636 W/ 250 OVERRIDE (IP): Performed by: EMERGENCY MEDICINE

## 2017-09-23 RX ORDER — SODIUM CHLORIDE 9 MG/ML
1000 INJECTION, SOLUTION INTRAVENOUS ONCE
Status: COMPLETED | OUTPATIENT
Start: 2017-09-23 | End: 2017-09-23

## 2017-09-23 RX ADMIN — FENTANYL CITRATE 75 MCG: 50 INJECTION, SOLUTION INTRAMUSCULAR; INTRAVENOUS at 19:00

## 2017-09-23 RX ADMIN — FENTANYL CITRATE 50 MCG: 50 INJECTION, SOLUTION INTRAMUSCULAR; INTRAVENOUS at 16:06

## 2017-09-23 RX ADMIN — SODIUM CHLORIDE 1000 ML: 9 INJECTION, SOLUTION INTRAVENOUS at 17:00

## 2017-09-23 RX ADMIN — FENTANYL CITRATE 50 MCG: 50 INJECTION, SOLUTION INTRAMUSCULAR; INTRAVENOUS at 17:21

## 2017-09-23 ASSESSMENT — ENCOUNTER SYMPTOMS
PALPITATIONS: 0
ROS GI COMMENTS: SEE ABOVE
NEUROLOGICAL NEGATIVE: 1
HEADACHES: 0
FEVER: 0
EYES NEGATIVE: 1
CARDIOVASCULAR NEGATIVE: 1
MUSCULOSKELETAL NEGATIVE: 1
CONSTITUTIONAL NEGATIVE: 1
CHILLS: 0
ORTHOPNEA: 0
RESPIRATORY NEGATIVE: 1
ROS SKIN COMMENTS: SEE ABOVE

## 2017-09-23 ASSESSMENT — PAIN SCALES - GENERAL: PAINLEVEL_OUTOF10: 10

## 2017-09-23 NOTE — ED NOTES
Lab here for blood collection.  Mother is a phlebotomist and Dr Bowling approves for her to draw him.

## 2017-09-23 NOTE — ED NOTES
.............  Chief Complaint   Patient presents with   • Post Op Bleeding     BIB EMS from home.  wound in lower rt abd began bleeding spontaneuosly-lost about 250 ml of blood.  Pressure applied and currently no bleeding   Pt had initial surgery to remove excess abd skin in June 2017.  Wound dehised and was repaired in aug 2017.  Cont to bleed everyday but today could not get is stopped.  Pt does home hemodialysis.  Last had it thurs.

## 2017-09-23 NOTE — ED PROVIDER NOTES
ED Provider Note    CHIEF COMPLAINT  Chief Complaint   Patient presents with   • Post Op Bleeding     BIB EMS from home.  wound in lower rt abd began bleeding spontaneuosly-lost about 250 ml of blood.  Pressure applied and currently no bleeding       HPI  Denis De Anda is a 37 y.o. male With comfortable medical history including ESRD, on dialysis, treated by multiple fistula revisions secondary to clot. Patient on Coumadin for this. 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 The patient with recent cosmetic surgery for excess abdominal skin in June, this was then revised in August. Since the revision patient reports he's had considerable bleeding at the site. He has been seen in the emergency department for this multiple times. Patient reports today the bleeding worsen considerably, reporting around 1 coffee cup of blood loss from the wound site. Patient reports the bleeding is venous oozing. Denies any bleeding otherwise. Denies any hematochezia or melena. Denies any vomiting or hematemesis. Patient denies any fevers or constitutional symptoms. Patient with long-standing pain at site of surgery and reports this is chronic but mildly worse today. Patient reports pain is superficially located immediately at the ulceration site. He denies any [place] abdominal pain. Patient denies any recent history of dialysis problems last dialysis was on Thursday and reports this was normal. He does his dialysis at home. He does not feel significantly volume overloaded today and denies any shortness of breath lower extremity edema or orthopnea.    REVIEW OF SYSTEMS  Review of Systems   Constitutional: Negative.  Negative for chills and fever.   HENT: Negative.    Eyes: Negative.    Respiratory: Negative.    Cardiovascular: Negative.  Negative for chest pain, palpitations, orthopnea and leg swelling.   Gastrointestinal:        See above   Genitourinary: Negative.    Musculoskeletal: Negative.    Skin:         See above   Neurological: Negative.  Negative for headaches.       See HPI for further details. All other systems are negative.     PAST MEDICAL HISTORY   has a past medical history of Arrhythmia; Chronic kidney disease, unspecified; Contracture of palmar fascia; Dialysis; Hemorrhagic disorder (CMS-HCC); Hypertension; Pain; Renal failure; Seizure (CMS-[facility]); Sleep apnea; Toxic uninodular goiter without mention of thyrotoxic crisis or storm; and Urinary incontinence.    SOCIAL HISTORY  Social History     Social History Main Topics   • Smoking status: Never Smoker   • Smokeless tobacco: Never Used   • Alcohol use No   • Drug use: No   • Sexual activity: Yes     Partners: Female       SURGICAL HISTORY   has a past surgical history that includes mass excision ortho (10/9/08); av fistula creation (11/6/08); arteriogram (3/5/2009); angioplasty balloon (3/5/2009); av fistulogram (3/5/2009); us-kidney transplant (1996, 2001); endarterectomy (11/16/2010); av fistulogram (11/16/2010); cath placement (2/1/2011); angioplasty balloon (2/1/2011); av fistulogram (6/5/2011); cath placement (6/5/2011); av fistula revision (11/3/2011); bone spur excision (12/8/2011); recovery (5/18/2012); incision and drainage general (9/13/2013); debridement (9/13/2013); vein ligation (9/13/2013); recovery (6/13/2014); av fistula revision (9/30/2014); irrigation & debridement general (12/15/2014); av fistula revision (2/8/2015); av fistula revision (2/22/2015); av fistula revision (3/20/2015); inject nerv blck,stellate ganglion (3/24/2015); av fistula creation (4/20/2015); av fistula thrombolysis (Left, 6/3/2015); irrigation & debridement general (Left, 6/3/2015); av fistula thrombolysis (Left, 6/9/2015); av fistula revision (Left, 6/17/2015); irrigation & debridement general (Left, 6/17/2015); recovery (8/7/2015); percut implnt neuroelect,epidural (2/19/2016); percut implnt neuroelect,epidural (2/19/2016); parathyroidectomy (2006); inguinal hernia  repair (Bilateral, 2001, 2002); spinal cord stimulator (N/A, 3/25/2016); recovery (7/8/2016); lesion excision general (Right, 7/11/2016); mass excision general (Right, 8/5/2016); cath placement (Right, 9/17/2016); thrombectomy (Left, 9/18/2016); av fistulogram (9/18/2016); thrombectomy (Left, 10/20/2016); incision and drainage general (10/20/2016); irrigation & debridement general (Left, 10/28/2016); flap closure (Left, 11/17/2016); thrombectomy (Left, 1/6/2017); cath placement (Right, 1/6/2017); thrombectomy (Left, 1/5/2017); spinal cord stimulator (N/A, 5/4/2017); abdominoplasty (6/5/2017); and irrigation & debridement general (7/31/2017).    CURRENT MEDICATIONS  Home Medications     Reviewed by Mary Spence R.N. (Registered Nurse) on 09/23/17 at 1534  Med List Status: Partial   Medication Last Dose Status   calcitRIOL (ROCALTROL) 0.25 MCG CAPS 9/22/2017 Active   cinacalcet (SENSIPAR) 30 MG Tab 9/22/2017 Active   gabapentin (NEURONTIN) 600 MG tablet 9/22/2017 Active   HYDROcodone Bitartrate ER (HYSINGLA ER) 30 MG Tablet Extended Release 24 hour Abuse-Deterrent 9/22/2017 Active   hydrocodone/acetaminophen (NORCO)  MG Tab 9/22/2017 Active   Sevelamer Carbonate (RENVELA) 800 MG TABS 9/22/2017 Active   warfarin (COUMADIN) 5 MG Tab 9/22/2017 Active                ALLERGIES  Allergies   Allergen Reactions   • Baclofen Unspecified     Total loss of memory, sedation.   RXN=6/2015   • Contrast Media With Iodine [Iodine] Rash     RXN=1/5/2017   • Keflex Rash     RXN=possibly >10 years   • Pcn [Penicillins] Rash     RXN=possibly >10 years ago   • Tape Rash     Paper tape and tegaderm ok  RXN=ongoing       PHYSICAL EXAM  Physical Exam   Constitutional: He is oriented to person, place, and time. He appears well-developed and well-nourished.   HENT:   Head: Normocephalic and atraumatic.   Eyes: Conjunctivae are normal. Pupils are equal, round, and reactive to light.   Neck: Normal range of motion. Neck supple.    Abdominal: Soft. Bowel sounds are normal.   Caput medusa, positive fluid wave, no pain to deep palpation of the abdomen. Patient with grade 2 skin ulceration overlying the right lower quadrant with mild venous oozing. There is no underlying fluctuance. There is minimal surrounding erythema and minimal induration.   Neurological: He is alert and oriented to person, place, and time.   Skin: Skin is warm and dry. No rash noted.     Results for orders placed or performed during the hospital encounter of 09/23/17   CBC WITH DIFFERENTIAL   Result Value Ref Range    WBC 7.5 4.8 - 10.8 K/uL    RBC 2.87 (L) 4.70 - 6.10 M/uL    Hemoglobin 7.9 (L) 14.0 - 18.0 g/dL    Hematocrit 26.2 (L) 42.0 - 52.0 %    MCV 91.3 81.4 - 97.8 fL    MCH 27.5 27.0 - 33.0 pg    MCHC 30.2 (L) 33.7 - 35.3 g/dL    RDW 56.5 (H) 35.9 - 50.0 fL    Platelet Count 221 164 - 446 K/uL    MPV 9.0 9.0 - 12.9 fL    Neutrophils-Polys 82.00 (H) 44.00 - 72.00 %    Lymphocytes 9.50 (L) 22.00 - 41.00 %    Monocytes 6.00 0.00 - 13.40 %    Eosinophils 1.10 0.00 - 6.90 %    Basophils 0.70 0.00 - 1.80 %    Immature Granulocytes 0.70 0.00 - 0.90 %    Nucleated RBC 0.00 /100 WBC    Neutrophils (Absolute) 6.17 1.82 - 7.42 K/uL    Lymphs (Absolute) 0.71 (L) 1.00 - 4.80 K/uL    Monos (Absolute) 0.45 0.00 - 0.85 K/uL    Eos (Absolute) 0.08 0.00 - 0.51 K/uL    Baso (Absolute) 0.05 0.00 - 0.12 K/uL    Immature Granulocytes (abs) 0.05 0.00 - 0.11 K/uL    NRBC (Absolute) 0.00 K/uL   COMP METABOLIC PANEL   Result Value Ref Range    Sodium 136 135 - 145 mmol/L    Potassium 4.8 3.6 - 5.5 mmol/L    Chloride 94 (L) 96 - 112 mmol/L    Co2 24 20 - 33 mmol/L    Anion Gap 18.0 (H) 0.0 - 11.9    Glucose 120 (H) 65 - 99 mg/dL    Bun 71 (HH) 8 - 22 mg/dL    Creatinine 9.84 (HH) 0.50 - 1.40 mg/dL    Calcium 8.9 8.5 - 10.5 mg/dL    AST(SGOT) 6 (L) 12 - 45 U/L    ALT(SGPT) <5 2 - 50 U/L    Alkaline Phosphatase 119 (H) 30 - 99 U/L    Total Bilirubin 0.4 0.1 - 1.5 mg/dL    Albumin 3.7 3.2 - 4.9  g/dL    Total Protein 7.4 6.0 - 8.2 g/dL    Globulin 3.7 (H) 1.9 - 3.5 g/dL    A-G Ratio 1.0 g/dL   APTT   Result Value Ref Range    APTT 62.4 (H) 24.7 - 36.0 sec   PROTHROMBIN TIME   Result Value Ref Range    PT 25.3 (H) 12.0 - 14.6 sec    INR 2.22 (H) 0.87 - 1.13   ESTIMATED GFR   Result Value Ref Range    GFR If  7 (A) >60 mL/min/1.73 m 2    GFR If Non African American 6 (A) >60 mL/min/1.73 m 2     CT-ABDOMEN-PELVIS W/O   Final Result      1.  Left lower quadrant subcutaneous open wound that does not appear to connect to the abdomen and there is no evidence for abscess or fistula      2.  Extensive collateral vasculature within the abdominal wall      3.  Splenomegaly which is probably due to portal hypertension      4.  Small left kidney consistent with chronic renal failure      5.  Absent right kidney      6.  Rejected right lower quadrant renal transplant      DX-CHEST-PORTABLE (1 VIEW)   Final Result      Stable chest with no acute consolidation identified      Stable superior mediastinum widening could be from ectopic thyroid gland, lymphadenopathy or vascular structures              COURSE & MEDICAL DECISION MAKING  Pertinent Labs & Imaging studies reviewed. (See chart for details)  Patient here with ongoing pain and bleeding at surgical site. Patient tachycardic likely secondary to pain and possible blood loss. Checking basic labs. Patient without any signs of major volume overload, we will give small bolus of normal saline for dehydration and reassess. Continue reassess given patient's tenuous volume status. Patient without any signs of systemic infection. Given fentanyl for pain, avoid morphine given patient's ESRD. Patient does not make any urine, will check CT with contrast for evaluation of possible fistula versus underlying abscess given the pain.  Patient's labs consistent with ESRD and Coumadin use. Patient with elevated anion gap which is consistent with patient's uremia. Awaiting  CT. Will discuss case with patient's surgeon.  CT is negative for any underlying abscess or fistula  I discussed case with the patient's plastic surgeon. Patient with bleeding likely secondary to the encroaching caput medusa. Patient with a drop in hemoglobin not below threshold. I discussed patient and we can admit him and check serial hemoglobins however given that there is no current bleeding I feel that he would be safe for discharge. Patient would like to return home. 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 Patient will follow-up with his plastic surgeon. Patient will continue the antibiotics for possible associated infection.  The patient will not drink alcohol nor drive with prescribed medications. The patient will return for worsening symptoms and is stable at the time of discharge. The patient verbalizes understanding and will comply.    FINAL IMPRESSION  1. Postsurgical bleeding       Electronically signed by: Jeff Bowling, 9/23/2017 4:08 PM

## 2017-09-24 NOTE — ED NOTES
Has been re medicated but pt states that small dose won't help at all.  No further bleeding from the rt lower abd incisional site. Awaits CT

## 2017-09-24 NOTE — DISCHARGE INSTRUCTIONS
U have large veins on her abdomen that are complicating the ceiling of your surgical wound. Occasionally these large veins will bleed. If the bleeding is significant, enough to soak through the dressing or you develop any major shortness of breath or feeling like there going to pass out you must return to the emergency department. Please restart the clindamycin which your prescribed. Please take the at home medication regimen which is prescribed by your pain specialist. Please follow up with your pain specialist and discuss changing the regimen so your pain can be better managed

## 2017-10-06 ENCOUNTER — ANTICOAGULATION MONITORING (OUTPATIENT)
Dept: VASCULAR LAB | Facility: MEDICAL CENTER | Age: 37
End: 2017-10-06

## 2017-10-06 RX ORDER — WARFARIN SODIUM 2.5 MG/1
TABLET ORAL
Qty: 180 TAB | Refills: 0 | Status: ON HOLD | OUTPATIENT
Start: 2017-10-06 | End: 2018-03-09

## 2017-10-06 NOTE — PROGRESS NOTES
Dosing calendar updated to reflect 2.5mg tablets that the patient requested  Renetta Dunlap, JocelynD

## 2017-10-10 ENCOUNTER — ANTICOAGULATION VISIT (OUTPATIENT)
Dept: VASCULAR LAB | Facility: MEDICAL CENTER | Age: 37
End: 2017-10-10
Attending: INTERNAL MEDICINE
Payer: MEDICARE

## 2017-10-10 DIAGNOSIS — Z79.01 CHRONIC ANTICOAGULATION: ICD-10-CM

## 2017-10-10 LAB
INR BLD: 1.6 (ref 0.9–1.2)
INR PPP: 2.6 (ref 2–3.5)

## 2017-10-10 PROCEDURE — 99211 OFF/OP EST MAY X REQ PHY/QHP: CPT

## 2017-10-10 PROCEDURE — 85610 PROTHROMBIN TIME: CPT

## 2017-10-10 NOTE — PROGRESS NOTES
OP Anticoagulation Service Note    Date: 10/10/2017  There were no vitals filed for this visit.    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 Hopkins      Anticoagulation Care Providers     Provider Role Specialty Phone number    Jairo Hopkins M.D. Referring Surgery [phone]    [facility]  [phone]        Anticoagulation Patient Findings  Patient Findings     Positives:   Hospital admission    Negatives:   Signs/symptoms of thrombosis, Signs/symptoms of bleeding, Laboratory test error suspected, Change in health, Change in alcohol use, Change in activity, Upcoming invasive procedure, Emergency department visit, Upcoming dental procedure, Missed doses, Extra doses, Change in medications, Change in diet/appetite, Bruising, Other complaints    Comments:   Bleeding from surgical sites, pt may need skin graft in the future          HPI:   Denis De Anda seen in clinic today, 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   Denies any interval changes to diet  Denies any interval changes to medications since last appt.   Denies any complications or cost  restrictions with current therapy.       A/P   INR  sub-therapeutic.   Will have pt resume his previous warfarin dosing regimen.     Follow up appointment in 1 week(s).  Muriel Khan, JocelynD

## 2017-10-17 ENCOUNTER — ANTICOAGULATION VISIT (OUTPATIENT)
Dept: VASCULAR LAB | Facility: MEDICAL CENTER | Age: 37
End: 2017-10-17
Attending: INTERNAL MEDICINE
Payer: MEDICARE

## 2017-10-17 VITALS — DIASTOLIC BLOOD PRESSURE: 51 MMHG | HEART RATE: 99 BPM | SYSTOLIC BLOOD PRESSURE: 106 MMHG

## 2017-10-17 DIAGNOSIS — Z79.01 CHRONIC ANTICOAGULATION: ICD-10-CM

## 2017-10-17 LAB — INR PPP: 3.5 (ref 2–3.5)

## 2017-10-17 PROCEDURE — 99211 OFF/OP EST MAY X REQ PHY/QHP: CPT

## 2017-10-17 PROCEDURE — 85610 PROTHROMBIN TIME: CPT

## 2017-10-18 LAB — INR BLD: 3.5 (ref 0.9–1.2)

## 2017-10-24 ENCOUNTER — ANTICOAGULATION VISIT (OUTPATIENT)
Dept: VASCULAR LAB | Facility: MEDICAL CENTER | Age: 37
End: 2017-10-24
Attending: INTERNAL MEDICINE
Payer: MEDICARE

## 2017-10-24 VITALS — HEART RATE: 117 BPM | SYSTOLIC BLOOD PRESSURE: 88 MMHG | DIASTOLIC BLOOD PRESSURE: 53 MMHG

## 2017-10-24 DIAGNOSIS — Z79.01 CHRONIC ANTICOAGULATION: ICD-10-CM

## 2017-10-24 LAB — INR PPP: 4 (ref 2–3.5)

## 2017-10-24 PROCEDURE — 85610 PROTHROMBIN TIME: CPT

## 2017-10-24 PROCEDURE — 99212 OFFICE O/P EST SF 10 MIN: CPT

## 2017-10-24 NOTE — PROGRESS NOTES
Anticoagulation Summary  As of 10/24/2017    INR goal:   2.5-3.5   TTR:   32.2 % (6.4 mo)   Today's INR:   4.0!   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 Hopkins      Anticoagulation Care Providers     Provider Role Specialty Phone number    Jairo Hopkins M.D. Referring Surgery [phone]    [facility] Responsible  [phone]        Anticoagulation Patient Findings      HPI:  Denis De Anda seen in clinic today, on anticoagulation therapy with warfarin for AV thrombis  Changes to current medical/health status since last appt:   Denies signs/symptoms of bleeding and/or thrombosis since the last appt.    Denies any interval changes to diet  Denies any interval changes to medications since last appt.   Denies any complications or cost restrictions with current therapy.   BP recorded in vitals.    A/P   INR  supra-therapeutic.   Decrease weekly warfarin dose as noted      Follow up appointment in 1 week(s).     Chriss Keating, JocelynD

## 2017-10-26 LAB — INR BLD: 4 (ref 0.9–1.2)

## 2017-10-31 ENCOUNTER — ANTICOAGULATION VISIT (OUTPATIENT)
Dept: VASCULAR LAB | Facility: MEDICAL CENTER | Age: 37
End: 2017-10-31
Attending: INTERNAL MEDICINE
Payer: MEDICARE

## 2017-10-31 VITALS — DIASTOLIC BLOOD PRESSURE: 46 MMHG | SYSTOLIC BLOOD PRESSURE: 88 MMHG | HEART RATE: 121 BPM

## 2017-10-31 DIAGNOSIS — Z79.01 CHRONIC ANTICOAGULATION: ICD-10-CM

## 2017-10-31 LAB
INR BLD: 2.4 (ref 0.9–1.2)
INR PPP: 2.4 (ref 2–3.5)

## 2017-10-31 PROCEDURE — 99211 OFF/OP EST MAY X REQ PHY/QHP: CPT

## 2017-10-31 PROCEDURE — 85610 PROTHROMBIN TIME: CPT

## 2017-10-31 NOTE — PROGRESS NOTES
Anticoagulation Summary  As of 10/31/2017    INR goal:   2.5-3.5   TTR:   33.3 % (6.6 mo)   Today's INR:   2.4!   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 Hopkins      Anticoagulation Care Providers     Provider Role Specialty Phone number    Jairo Hopkins M.D. Referring Surgery [phone]    [facility] Responsible  [phone]        Anticoagulation Patient Findings      HPI:  Denis De Anda seen in clinic today, on anticoagulation therapy with warfarin for AV thrombois  Changes to current medical/health status since last appt:   Denies signs/symptoms of bleeding and/or thrombosis since the last appt.    Denies any interval changes to diet  Denies any interval changes to medications since last appt.   Denies any complications or cost restrictions with current therapy.   BP recorded in vitals.    A/P   INR  sub-therapeutic.   Continue weekly warfarin dose as noted  Per pt as his last INR was 4.0      Follow up appointment in 2 week(s).     Chriss Keating, JocelynD

## 2017-11-14 ENCOUNTER — APPOINTMENT (OUTPATIENT)
Dept: RADIOLOGY | Facility: MEDICAL CENTER | Age: 37
End: 2017-11-14
Attending: SURGERY
Payer: MEDICARE

## 2017-11-14 ENCOUNTER — HOSPITAL ENCOUNTER (EMERGENCY)
Facility: MEDICAL CENTER | Age: 37
End: 2017-11-15
Attending: EMERGENCY MEDICINE | Admitting: SURGERY
Payer: MEDICARE

## 2017-11-14 LAB
ABO GROUP BLD: NORMAL
ALBUMIN SERPL BCP-MCNC: 4.2 G/DL (ref 3.2–4.9)
ALBUMIN/GLOB SERPL: 1.1 G/DL
ALP SERPL-CCNC: 151 U/L (ref 30–99)
ALT SERPL-CCNC: <5 U/L (ref 2–50)
ANION GAP SERPL CALC-SCNC: 21 MMOL/L (ref 0–11.9)
ANISOCYTOSIS BLD QL SMEAR: ABNORMAL
APTT PPP: 55 SEC (ref 24.7–36)
AST SERPL-CCNC: 7 U/L (ref 12–45)
BASOPHILS # BLD AUTO: 2.7 % (ref 0–1.8)
BASOPHILS # BLD: 0.18 K/UL (ref 0–0.12)
BILIRUB SERPL-MCNC: 0.3 MG/DL (ref 0.1–1.5)
BLD GP AB SCN SERPL QL: NORMAL
BUN SERPL-MCNC: 90 MG/DL (ref 8–22)
CALCIUM SERPL-MCNC: 8.8 MG/DL (ref 8.5–10.5)
CHLORIDE SERPL-SCNC: 93 MMOL/L (ref 96–112)
CO2 SERPL-SCNC: 20 MMOL/L (ref 20–33)
CREAT SERPL-MCNC: 12.05 MG/DL (ref 0.5–1.4)
EOSINOPHIL # BLD AUTO: 0.17 K/UL (ref 0–0.51)
EOSINOPHIL NFR BLD: 2.6 % (ref 0–6.9)
ERYTHROCYTE [DISTWIDTH] IN BLOOD BY AUTOMATED COUNT: 61.2 FL (ref 35.9–50)
GFR SERPL CREATININE-BSD FRML MDRD: 5 ML/MIN/1.73 M 2
GLOBULIN SER CALC-MCNC: 4 G/DL (ref 1.9–3.5)
GLUCOSE SERPL-MCNC: 95 MG/DL (ref 65–99)
HCT VFR BLD AUTO: 36.5 % (ref 42–52)
HGB BLD-MCNC: 10 G/DL (ref 14–18)
HYPOCHROMIA BLD QL SMEAR: ABNORMAL
INR PPP: 2.57 (ref 0.87–1.13)
LYMPHOCYTES # BLD AUTO: 1.21 K/UL (ref 1–4.8)
LYMPHOCYTES NFR BLD: 18.6 % (ref 22–41)
MANUAL DIFF BLD: NORMAL
MCH RBC QN AUTO: 26.2 PG (ref 27–33)
MCHC RBC AUTO-ENTMCNC: 27.4 G/DL (ref 33.7–35.3)
MCV RBC AUTO: 95.8 FL (ref 81.4–97.8)
MICROCYTES BLD QL SMEAR: ABNORMAL
MONOCYTES # BLD AUTO: 0.34 K/UL (ref 0–0.85)
MONOCYTES NFR BLD AUTO: 5.3 % (ref 0–13.4)
MORPHOLOGY BLD-IMP: NORMAL
NEUTROPHILS # BLD AUTO: 4.6 K/UL (ref 1.82–7.42)
NEUTROPHILS NFR BLD: 70.8 % (ref 44–72)
NRBC # BLD AUTO: 0 K/UL
NRBC BLD AUTO-RTO: 0 /100 WBC
PLATELET # BLD AUTO: 209 K/UL (ref 164–446)
PLATELET BLD QL SMEAR: NORMAL
PMV BLD AUTO: 9.2 FL (ref 9–12.9)
POLYCHROMASIA BLD QL SMEAR: NORMAL
POTASSIUM SERPL-SCNC: 5.6 MMOL/L (ref 3.6–5.5)
PROT SERPL-MCNC: 8.2 G/DL (ref 6–8.2)
PROTHROMBIN TIME: 27.3 SEC (ref 12–14.6)
RBC # BLD AUTO: 3.81 M/UL (ref 4.7–6.1)
RBC BLD AUTO: PRESENT
RH BLD: NORMAL
SODIUM SERPL-SCNC: 134 MMOL/L (ref 135–145)
WBC # BLD AUTO: 6.5 K/UL (ref 4.8–10.8)

## 2017-11-14 PROCEDURE — 160035 HCHG PACU - 1ST 60 MINS PHASE I: Performed by: SURGERY

## 2017-11-14 PROCEDURE — C1894 INTRO/SHEATH, NON-LASER: HCPCS | Performed by: SURGERY

## 2017-11-14 PROCEDURE — 86850 RBC ANTIBODY SCREEN: CPT

## 2017-11-14 PROCEDURE — 501445 HCHG STAPLER, SKIN DISP: Performed by: SURGERY

## 2017-11-14 PROCEDURE — 700111 HCHG RX REV CODE 636 W/ 250 OVERRIDE (IP): Performed by: ANESTHESIOLOGY

## 2017-11-14 PROCEDURE — 501837 HCHG SUTURE CV: Performed by: SURGERY

## 2017-11-14 PROCEDURE — 502240 HCHG MISC OR SUPPLY RC 0272: Performed by: SURGERY

## 2017-11-14 PROCEDURE — 160041 HCHG SURGERY MINUTES - EA ADDL 1 MIN LEVEL 4: Performed by: SURGERY

## 2017-11-14 PROCEDURE — 36415 COLL VENOUS BLD VENIPUNCTURE: CPT

## 2017-11-14 PROCEDURE — C1725 CATH, TRANSLUMIN NON-LASER: HCPCS | Performed by: SURGERY

## 2017-11-14 PROCEDURE — 700101 HCHG RX REV CODE 250

## 2017-11-14 PROCEDURE — 85027 COMPLETE CBC AUTOMATED: CPT

## 2017-11-14 PROCEDURE — 85610 PROTHROMBIN TIME: CPT

## 2017-11-14 PROCEDURE — 160009 HCHG ANES TIME/MIN: Performed by: SURGERY

## 2017-11-14 PROCEDURE — 93005 ELECTROCARDIOGRAM TRACING: CPT | Performed by: EMERGENCY MEDICINE

## 2017-11-14 PROCEDURE — 99291 CRITICAL CARE FIRST HOUR: CPT

## 2017-11-14 PROCEDURE — C1769 GUIDE WIRE: HCPCS | Performed by: SURGERY

## 2017-11-14 PROCEDURE — 160029 HCHG SURGERY MINUTES - 1ST 30 MINS LEVEL 4: Performed by: SURGERY

## 2017-11-14 PROCEDURE — C1750 CATH, HEMODIALYSIS,LONG-TERM: HCPCS | Performed by: SURGERY

## 2017-11-14 PROCEDURE — L8670 VASCULAR GRAFT, SYNTHETIC: HCPCS | Performed by: SURGERY

## 2017-11-14 PROCEDURE — 501838 HCHG SUTURE GENERAL: Performed by: SURGERY

## 2017-11-14 PROCEDURE — 94760 N-INVAS EAR/PLS OXIMETRY 1: CPT

## 2017-11-14 PROCEDURE — 110454 HCHG SHELL REV 250: Performed by: SURGERY

## 2017-11-14 PROCEDURE — 500700 HCHG HEMOCLIP, SMALL (RED): Performed by: SURGERY

## 2017-11-14 PROCEDURE — 86901 BLOOD TYPING SEROLOGIC RH(D): CPT

## 2017-11-14 PROCEDURE — 160036 HCHG PACU - EA ADDL 30 MINS PHASE I: Performed by: SURGERY

## 2017-11-14 PROCEDURE — 85007 BL SMEAR W/DIFF WBC COUNT: CPT

## 2017-11-14 PROCEDURE — 700111 HCHG RX REV CODE 636 W/ 250 OVERRIDE (IP)

## 2017-11-14 PROCEDURE — 80053 COMPREHEN METABOLIC PANEL: CPT

## 2017-11-14 PROCEDURE — 160048 HCHG OR STATISTICAL LEVEL 1-5: Performed by: SURGERY

## 2017-11-14 PROCEDURE — 500698 HCHG HEMOCLIP, MEDIUM: Performed by: SURGERY

## 2017-11-14 PROCEDURE — 86900 BLOOD TYPING SEROLOGIC ABO: CPT

## 2017-11-14 PROCEDURE — 85730 THROMBOPLASTIN TIME PARTIAL: CPT

## 2017-11-14 PROCEDURE — 160002 HCHG RECOVERY MINUTES (STAT): Performed by: SURGERY

## 2017-11-14 DEVICE — CATHETER HEMOSPLIT 27CM (5EA/CA): Type: IMPLANTABLE DEVICE | Site: FEMUR | Status: FUNCTIONAL

## 2017-11-14 DEVICE — GRAFT GOR STRCH 4-7X45CM: Type: IMPLANTABLE DEVICE | Site: FEMUR | Status: FUNCTIONAL

## 2017-11-14 RX ORDER — IODIXANOL 270 MG/ML
INJECTION, SOLUTION INTRAVASCULAR
Status: DISCONTINUED | OUTPATIENT
Start: 2017-11-14 | End: 2017-11-15 | Stop reason: HOSPADM

## 2017-11-14 RX ORDER — BUPIVACAINE HYDROCHLORIDE 5 MG/ML
INJECTION, SOLUTION EPIDURAL; INTRACAUDAL
Status: DISCONTINUED | OUTPATIENT
Start: 2017-11-14 | End: 2017-11-15 | Stop reason: HOSPADM

## 2017-11-14 RX ORDER — METHYLPREDNISOLONE SODIUM SUCCINATE 125 MG/2ML
125 INJECTION, POWDER, LYOPHILIZED, FOR SOLUTION INTRAMUSCULAR; INTRAVENOUS ONCE
Status: DISCONTINUED | OUTPATIENT
Start: 2017-11-14 | End: 2017-11-15 | Stop reason: HOSPADM

## 2017-11-14 RX ORDER — HEPARIN SODIUM 5000 [USP'U]/ML
INJECTION, SOLUTION INTRAVENOUS; SUBCUTANEOUS
Status: DISCONTINUED | OUTPATIENT
Start: 2017-11-14 | End: 2017-11-15 | Stop reason: HOSPADM

## 2017-11-14 ASSESSMENT — PAIN SCALES - GENERAL
PAINLEVEL_OUTOF10: 1
PAINLEVEL_OUTOF10: 0

## 2017-11-14 NOTE — ED NOTES
Pt to triage with bleeding from left leg dialysis site. Dressing applied to site. Pt to have surgery on Thursday. Pt advised to return to triage nurse for any changes or concerns.

## 2017-11-15 VITALS
RESPIRATION RATE: 12 BRPM | BODY MASS INDEX: 31.16 KG/M2 | TEMPERATURE: 98.3 F | HEIGHT: 64 IN | WEIGHT: 182.54 LBS | DIASTOLIC BLOOD PRESSURE: 66 MMHG | HEART RATE: 106 BPM | SYSTOLIC BLOOD PRESSURE: 99 MMHG | OXYGEN SATURATION: 96 %

## 2017-11-15 NOTE — ED PROVIDER NOTES
ED Provider Note    Scribed for Vin Judge M.D. by Katheryn Ventura. 11/14/2017, 4:52 PM.    Primary care provider: Tony Mcmillan M.D.  Means of arrival: walk in  History obtained from: Patient  History limited by: None    CHIEF COMPLAINT  Chief Complaint   Patient presents with   • Vascular Access Problem       HPI  Denis De Anda is a 37 y.o. male with renal failure who presents to the Emergency Department for vascular access issues with his left thigh fistula onset today. He states the fistula has not been used in dialysis for the past two months. The patient last received dialysis yesterday. The patient reports the area around the fistula has been bleeding intermittently for the past week. He states he followed up with Dr. 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 The patient reports he was scheduled for surgery in two days. He claims today he noticed blood dripping down his leg. The patient states after consulting with Dr. Hopkins, he was recommended to come to the ED to receive emergency surgery tonight. He reports he is currently on Coumadin for previous thrombosed veins on his thigh.     REVIEW OF SYSTEMS  Pertinent positives include vascular access issues. Pertinent negatives include no fever.   E.    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    SURGICAL HISTORY   has a past surgical history that includes mass excision ortho (10/9/08); av fistula creation (11/6/08); arteriogram (3/5/2009); angioplasty balloon (3/5/2009); av fistulogram (3/5/2009); us-kidney transplant (1996, 2001); endarterectomy (11/16/2010); av fistulogram (11/16/2010); cath placement (2/1/2011); angioplasty  balloon (2/1/2011); av fistulogram (6/5/2011); cath placement (6/5/2011); av fistula revision (11/3/2011); bone spur excision (12/8/2011); recovery (5/18/2012); incision and drainage general (9/13/2013); debridement (9/13/2013); vein ligation (9/13/2013); recovery (6/13/2014); av fistula revision (9/30/2014); irrigation & debridement general (12/15/2014); av fistula revision (2/8/2015); av fistula revision (2/22/2015); av fistula revision (3/20/2015); inject nerv blck,stellate ganglion (3/24/2015); av fistula creation (4/20/2015); av fistula thrombolysis (Left, 6/3/2015); irrigation & debridement general (Left, 6/3/2015); av fistula thrombolysis (Left, 6/9/2015); av fistula revision (Left, 6/17/2015); irrigation & debridement general (Left, 6/17/2015); recovery (8/7/2015); percut implnt neuroelect,epidural (2/19/2016); percut implnt neuroelect,epidural (2/19/2016); parathyroidectomy (2006); inguinal hernia repair (Bilateral, 2001, 2002); spinal cord stimulator (N/A, 3/25/2016); recovery (7/8/2016); lesion excision general (Right, 7/11/2016); mass excision general (Right, 8/5/2016); cath placement (Right, 9/17/2016); thrombectomy (Left, 9/18/2016); av fistulogram (9/18/2016); thrombectomy (Left, 10/20/2016); incision and drainage general (10/20/2016); irrigation & debridement general (Left, 10/28/2016); flap closure (Left, 11/17/2016); thrombectomy (Left, 1/6/2017); cath placement (Right, 1/6/2017); thrombectomy (Left, 1/5/2017); spinal cord stimulator (N/A, 5/4/2017); abdominoplasty (6/5/2017); and irrigation & debridement general (7/31/2017).    SOCIAL HISTORY  Social History   Substance Use Topics   • Smoking status: Never Smoker   • Smokeless tobacco: Never Used   • Alcohol use No      History   Drug Use No       FAMILY HISTORY  Family History   Problem Relation Age of Onset   • Arthritis Father      RA   • Lung Disease Father    • Heart Disease Father      MI   • Hypertension Brother        CURRENT  "MEDICATIONS  Home Medications     Reviewed by Ayana Salinas R.N. (Registered Nurse) on 11/14/17 at 1645  Med List Status: <None>   Medication Last Dose Status   calcitRIOL (ROCALTROL) 0.25 MCG CAPS 9/22/2017 Active   cinacalcet (SENSIPAR) 30 MG Tab 9/22/2017 Active   gabapentin (NEURONTIN) 600 MG tablet 9/22/2017 Active   HYDROcodone Bitartrate ER (HYSINGLA ER) 30 MG Tablet Extended Release 24 hour Abuse-Deterrent 9/22/2017 Active   hydrocodone/acetaminophen (NORCO)  MG Tab 9/22/2017 Active   Sevelamer Carbonate (RENVELA) 800 MG TABS 9/22/2017 Active   warfarin (COUMADIN) 2.5 MG Tab  Active                ALLERGIES  Allergies   Allergen Reactions   • Baclofen Unspecified     Total loss of memory, sedation.   RXN=6/2015   • Contrast Media With Iodine [Iodine] Rash     RXN=1/5/2017   • Keflex Rash     RXN=possibly >10 years   • Pcn [Penicillins] Rash     RXN=possibly >10 years ago   • Tape Rash     Paper tape and tegaderm ok  RXN=ongoing       PHYSICAL EXAM  VITAL SIGNS: /70   Pulse (!) 131   Temp 37.1 °C (98.7 °F) (Temporal)   Resp 18   Ht 1.626 m (5' 4\")   Wt 82.8 kg (182 lb 8.7 oz)   SpO2 94%   BMI 31.33 kg/m²     Constitutional: Well developed, Well nourished, No acute distress, Non-toxic appearance.   HENT: Normocephalic, Atraumatic, mucous membranes moist, no erythema, exudates, swelling, or masses, nares patent  Eyes: nonicteric  Neck: Supple, no meningismus  Lymphatic: No lymphadenopathy noted.   Cardiovascular: Regular rate and rhythm, no gallops rubs or murmurs  Lungs: Clear bilaterally   Abdomen: Open wound on right lower abdomen, non foul smelling but mild drainage, Bowel sounds normal, Soft, No tenderness  Skin: Warm, Dry, no rash  Extremities: Dressing on left thigh covering fistula, 2+ pedal pulse, No edema  Neurologic: Motor senses grossly intact, Alert, appropriate, follows commands, moving all extremities, normal speech   Psychiatric: Affect normal    DIAGNOSTIC STUDIES / " PROCEDURES    LABS  Results for orders placed or performed during the hospital encounter of 17   EKG (ER)   Result Value Ref Range    Report       [facility] Emergency Dept.    Test Date:  2017  Pt Name:    ODALYS APPIAH                Department: ER  MRN:        [id number]                      Room:       [facility]  Gender:     M                            Technician: [id number]  :        1980                   Requested By:BERNARDO DE LA TORRE  Order #:    [id number]                    Reading MD:    Measurements  Intervals                                Axis  Rate:       95                           P:          47  MN:         144                          QRS:        25  QRSD:       80                           T:          46  QT:         360  QTc:        453    Interpretive Statements  SINUS RHYTHM  BORDERLINE LOW VOLTAGE IN FRONTAL LEADS  Compared to ECG 2017 12:49:33  No significant changes         All labs reviewed by me.    EKG   Obtained at 17:50  Normal Sinus rhythm   Rate 95  Low Voltages  No ST segment elevation or depression.   No hypertrophy  Final Impression: Normal EKG with low voltages.    COURSE & MEDICAL DECISION MAKING  Nursing notes, VS, PMSFHx reviewed in chart.     4:52 PM Patient seen and examined at bedside. The patient presents with vascular access issues to his left thigh fistula and the differential diagnosis includes but is not limited to bleeding fistula. Ordered APTT, PTT, CMP, CBC, and EKG. He was informed I will be consulting with Dr. Hopkins.    4:56 PM - Paged Vascular Surgery.     5:19 PM - Patient was reevaluated at bedside. His heart rate is 112 at this time.     6:02 PM - Consult with Dr. Moffett, Vascular, who states that Dr. Hopkins will consult with the patient    6:03 PM - Patient was reevaluated at bedside. Dr. Hopkins is currently consulting with the patient.     6:31 PM - Patient has been taken to OR for surgery by Dr. Hopkins at this time.     Decision  Making:  This is a 37 y.o. year old male who presents with A bleeding fistula. The patient was evaluated here and subsequently I talked with Dr. Moffett over the phone. She stated Dr. Hopkins was on his way in. Dr. Hopkins arrived and took the patient to the operating room for repair of his fistula.    DISPOSITION:  Patient will be admitted to Dr. Hopkins in guarded condition.      FINAL IMPRESSION  1. Bleeding fistula      Katheryn SANCHEZ (Sugey), am scribing for, and in the presence of, Vin Judge M.D..    Electronically signed by: Katheryn Ventura (Sugey), 11/14/2017    IVin M.D. personally performed the services described in this documentation, as scribed by Katheryn Ventura in my presence, and it is both accurate and complete.    The note accurately reflects work and decisions made by me.  Vin Judge  11/17/2017  10:48 AM

## 2017-11-15 NOTE — OP REPORT
DATE OF SERVICE:  11/14/2017    PREOPERATIVE DIAGNOSIS:  Bleeding left thigh arteriovenous graft.    POSTOPERATIVE DIAGNOSIS:  Bleeding left thigh arteriovenous graft.    OPERATIONS:  1.  Right femoral vein tunneled hemodialysis catheter insertion under   ultrasound and fluoroscopic guidance.  2.  Left thigh AV graft sheath placement, one on the arterial and the other   being end of the AV loop graft.  3.  Balloon angioplasty 8 mm x 40 mm of the venous outflow and 5 mm x 40 mm of   the arterial inflow.  4.  Exploration of the bleeding sinus of the distal arterial loop of the AV   graft with resection of the generated graft segment.  5.  Interposition 20 cm long stretch [place]-Moose 7 mm diameter in the arterial   end of the graft bypassing the degenerated segment of graft.  6.  Completion angiogram.    SURGEON:  Jairo Hopkins MD    ASSISTANT:  None.    ANESTHESIA:  General anesthesia.    ANESTHESIOLOGIST:  Devorah Valdez MD    DESCRIPTION OF PROCEDURE:  After obtaining informed consent, the patient was   placed supine on the OSI operating table.  To get good IV access in case he   had blood loss, the anesthesia asked us to place a tunneled catheter first.    In the right groin, we clipped the hair and prepped it with ChloraPrep and   draped sterilely.  I examined with ultrasound, the common femoral vein, which   was patent.  The adjacent artery pulsation could be seen and the confluence of   the saphenofemoral junction, cephalad to the area I placed the initial access   needle.  I made a short groin incision and used ultrasound to insert an   angioaccess needle into the right common femoral vein followed by an Amplatz   wire.  I then tunneled a 27 cm Bard HemoSplit catheter between the stab wound   on the anterior thigh and the short groin incision.  I passed dilators over   the wire.  The catheter was inserted through the peelaway sheath and the   sheath removed.  The catheter was observed fluoroscopically.   What I noticed   was it went and flipped over in an angle.  This was the venous port.  I tried   with guidewires to adjust its position, but it turned out that it ended up   draped over the bifurcation of the inferior vena cava into the iliac veins and   this looked like a good location for this to undergo catheter.  Each port was   aspirated and flushed and functioned well.  The catheter was secured with 2-0   Ethilon to the thigh.  The groin incision was closed with 2 layers of running   4-0 Vicryl.  A Biopatch was placed around the catheter access site.  An   island dressing was applied.    Next, we prepped the patient's left groin and lower extremity   circumferentially with Betadine.  During this prep, I held pressure over the   bleeding site in the arterial end of the graft where there was a residual scab   and a recent significant hemorrhage.  Once the patient's leg was completely   prepped with Betadine, he was draped sterilely.  I was able to take my hand   off the bleeding site since it had temporarily stopped.  I used ultrasound   guidance with a micropuncture needle, wire, and dilator to place a 6-Algerian   sheath directed toward the groin in the proximal arterial end of the graft and   similarly in the proximal venous end of the graft.  We did several   fistulograms, and I had felt previously that there would be some benefit doing   angioplasties.  In addition, this is our method of control bleeding during   the case.  I passed a 5 mm x 40 mm Ravia balloon over Glidewire in the   arterial side of the graft.  This stopped the inflow when we reinflated the   balloon.  On the venous side, I passed an 8 mm x 40 mm noticed a wasting of   the balloon with inflation and this controlled the backflow.  I then proceeded   to examine the bleeding site.  The skin was eroded all around it from   multiple needle punctures, and there was a scab over the bleeding site in a   chronic 3 to 4 mm opening in the graft.  I  tried to dissect this graft out and   it was very fragile and it was significantly degenerated to not be of any   use.  I have considered putting in a segment that would bypass the bad area.    I would use a curvilinear incision over the looping end of the graft, where it   curved around the anterior thigh.  This was an incision I made in continuity   with the removal of the degenerated graft segment.  I found that the graft   where I exposed it had rings around it for support.  This was ideal since I   was mobilizing it to get a curvature that I could hook up to the new segment   of graft and keep the fistula running.  I carefully dissected out a 2-3 cm   long bit of the graft and cut off the degenerated section and preserved the   ring segment that I could easily angle reasonably gentle curve toward the   arterial inflow.  I used ultrasound, and I passed a coronary dilator through   this old graft trying to identify by ultrasound where the graft was a good   place to do the proximal anastomosis.  This was approximately 2/3rds of the   way up to the groin.  Through a longitudinal incision, 2.5-3 cm long, I   exposed a normal healthy-appearing graft unlike the other end.  This had never   been punctured because it was so deep.  I mobilized 2 cm of it, then   transected it distally.  I put a Clare-Wick tunneler between the distal graft   exposure and the proximal arterial graft and the graft exposure.  I sutured   the new graft, which was a 4-7 mm Bar[place]-Moose stretch graft and with the arterial   inflow graft.  I then tunneled it with the tunneler between this location in   the proximal thigh and the distal aspect of the exposed graft in the area   where the loop was transverse across the thigh.  I trimmed the graft to the   proper length and sewed it end-to-end to the segment of graft that had rings   on it.  Flow was established and appeared very good.  Hemostasis was difficult   at first.  The patient had been given  a dose of 5000 followed by another 1000   units of heparin for this long case.  He had an INR of 2.4 at the start   because he is on Coumadin to keep this open.  Once we had flow going, we did   fistulograms through the sheath and rapid flow was identified and no   significant stenosis evident.  The curvature is a little sharper at the area   of graft resection and replacement distally.  It did not look severe enough to   hamper ongoing patency and function.  We irrigated the wounds.  It took   thrombin mixed as Surgiflo to help aid in achieving hemostasis.  I suctioned   most of the residual powder out after using this.  We had some trouble getting   the arterial and venous sheath sites to stop bleeding, so we gave protamine   and ultimately gave 50 mg of protamine.  That helped a little bit, but we also   injected a small amount of thrombin into the sheath entry sites and this   helped stop the bleeding at these locations.  At the end of the procedure,   there was an excellent Doppler signal throughout the fistula consistent with   good rapid flow.  The wounds were dressed with gauze and Tegaderm.  Blood loss   was less than 100 mL.  The patient tolerated the procedure well and was taken   to recovery room.  I informed his family that he is to use his right femoral   tunneled catheter and in 4-6 weeks we will consider whether he can use his old   left thigh graft again.       ____________________________________     MD BEN Davis / RIAZ    DD:  11/15/2017 00:09:15  DT:  11/15/2017 02:08:56    D#:  [id number]  Job#:  [id number]

## 2017-11-15 NOTE — DISCHARGE INSTRUCTIONS
ACTIVITY: Rest and take it easy for the first 24 hours.  A responsible adult is recommended to remain with you during that time.  It is normal to feel sleepy.  We encourage you to not do anything that requires balance, judgment or coordination.    MILD FLU-LIKE SYMPTOMS ARE NORMAL. YOU MAY EXPERIENCE GENERALIZED MUSCLE ACHES, THROAT IRRITATION, HEADACHE AND/OR SOME NAUSEA.    FOR 24 HOURS DO NOT:  Drive, operate machinery or run household appliances.  Drink beer or alcoholic beverages.   Make important decisions or sign legal documents.    SPECIAL INSTRUCTIONS: keep dressing clean and dry    DIET: To avoid nausea, slowly advance diet as tolerated, avoiding spicy or greasy foods for the first day.  Add more substantial food to your diet according to your physician's instructions.  Babies can be fed formula or breast milk as soon as they are hungry.  INCREASE FLUIDS AND FIBER TO AVOID CONSTIPATION.    SURGICAL DRESSING/BATHING: keep dressing clean and dry    FOLLOW-UP APPOINTMENT:  A follow-up appointment should be arranged with your doctor in 1 week; call to schedule.    You should CALL YOUR PHYSICIAN if you develop:  Fever greater than 101 degrees F.  Pain not relieved by medication, or persistent nausea or vomiting.  Excessive bleeding (blood soaking through dressing) or unexpected drainage from the wound.  Extreme redness or swelling around the incision site, drainage of pus or foul smelling drainage.  Inability to urinate or empty your bladder within 8 hours.  Problems with breathing or chest pain.    You should call 911 if you develop problems with breathing or chest pain.  If you are unable to contact your doctor or surgical center, you should go to the nearest emergency room or urgent care center.  Physician's telephone #: Dr. Hopkins [phone]    If any questions arise, call your doctor.  If your doctor is not available, please feel free to call the Surgical Center at [phone].  The Center is open Monday  through Friday from 7AM to 7PM.  You can also call the HEALTH HOTLINE open 24 hours/day, 7 days/week and speak to a nurse at [phone], or toll free at [phone].    A registered nurse may call you a few days after your surgery to see how you are doing after your procedure.    MEDICATIONS: Resume taking daily medication.  Take prescribed pain medication with food.  If no medication is prescribed, you may take non-aspirin pain medication if needed.  PAIN MEDICATION CAN BE VERY CONSTIPATING.  Take a stool softener or laxative such as senokot, pericolace, or milk of magnesia if needed.      If your physician has prescribed pain medication that includes Acetaminophen (Tylenol), do not take additional Acetaminophen (Tylenol) while taking the prescribed medication.    Depression / Suicide Risk    As you are discharged from this [facility] facility, it is important to learn how to keep safe from harming yourself.    Recognize the warning signs:  · Abrupt changes in personality, positive or negative- including increase in energy   · Giving away possessions  · Change in eating patterns- significant weight changes-  positive or negative  · Change in sleeping patterns- unable to sleep or sleeping all the time   · Unwillingness or inability to communicate  · Depression  · Unusual sadness, discouragement and loneliness  · Talk of wanting to die  · Neglect of personal appearance   · Rebelliousness- reckless behavior  · Withdrawal from people/activities they love  · Confusion- inability to concentrate     If you or a loved one observes any of these behaviors or has concerns about self-harm, here's what you can do:  · Talk about it- your feelings and reasons for harming yourself  · Remove any means that you might use to hurt yourself (examples: pills, rope, extension cords, firearm)  · Get professional help from the community (Mental Health, Substance Abuse, psychological counseling)  · Do not be alone:Call your Safe  Contact- someone whom you trust who will be there for you.  · Call your local CRISIS HOTLINE 610-8333 or 392-058-4135  · Call your local Children's Mobile Crisis Response Team [place] (007) 397-7085 or www.[employer]  · Call the toll free National Suicide Prevention Hotlines   · National Suicide Prevention Lifeline 855-850-TTPO (2468)  · National Share0 Line Network 800-SUICIDE (336-8080)

## 2017-11-15 NOTE — OR SURGEON
Immediate Post OP Note    PreOp Diagnosis: Bleeding left thigh arteriovenous graft    PostOp Diagnosis: Same    Procedure(s):  CATH PLACEMENT - Tunneled dialysis right femoral vein - Wound Class: Clean  AV GRAFT REVISION - with interposition of 7 mm Gortex 20 cm graft.  Wound Class: Clean  Insertion of two sheaths and balloon angioplasties arterial and venous ends.    Surgeon(s):  Jairo Hopkins M.D.    Anesthesiologist/Type of Anesthesia:  Anesthesiologist: Devorah Valdez M.D./General    Surgical Staff:  Circulator: Allie Calero R.N.  Relief Circulator: Agapito Lange R.N.; Dipti Yee R.N.  Scrub Person: Jose Patel R.N.; Trace Buchanan  Radiology Technologist: Morteza Piña    Specimens:  * No specimens in log *    Estimated Blood Loss: 100    Findings: degenerated graft with large defect at bleeding site.    Complications: None initially apparent.        11/14/2017 11:52 PM Jairo Hopkins

## 2017-11-15 NOTE — ED NOTES
Assist RN  piv established, labs collected and sent.  Message left w/lab tech's pager to collect blue top on this pt.

## 2017-11-15 NOTE — ED NOTES
Pt ambulated back to yellow 56 with steady gait.  Pressure dressing applied to L leg.  Pt of Dr Hopkins. Pt states ERP to notify Dr Hopkins on patients arrival.

## 2017-11-30 ENCOUNTER — OFFICE VISIT (OUTPATIENT)
Dept: MEDICAL GROUP | Facility: PHYSICIAN GROUP | Age: 37
End: 2017-11-30
Payer: MEDICARE

## 2017-11-30 VITALS
HEART RATE: 102 BPM | TEMPERATURE: 97.3 F | OXYGEN SATURATION: 94 % | SYSTOLIC BLOOD PRESSURE: 110 MMHG | RESPIRATION RATE: 16 BRPM | DIASTOLIC BLOOD PRESSURE: 60 MMHG

## 2017-11-30 DIAGNOSIS — J18.9 ATYPICAL PNEUMONIA: ICD-10-CM

## 2017-11-30 DIAGNOSIS — J02.9 SORE THROAT: ICD-10-CM

## 2017-11-30 DIAGNOSIS — R40.20 LOSS OF CONSCIOUSNESS (HCC): ICD-10-CM

## 2017-11-30 PROBLEM — R05.9 COUGH: Status: ACTIVE | Noted: 2017-11-30

## 2017-11-30 PROBLEM — R55 SYNCOPE: Status: ACTIVE | Noted: 2017-11-30

## 2017-11-30 PROBLEM — R05.9 COUGH: Status: RESOLVED | Noted: 2017-11-30 | Resolved: 2017-11-30

## 2017-11-30 LAB
INT CON NEG: NEGATIVE
INT CON POS: POSITIVE
S PYO AG THROAT QL: NEGATIVE

## 2017-11-30 PROCEDURE — 99214 OFFICE O/P EST MOD 30 MIN: CPT | Performed by: INTERNAL MEDICINE

## 2017-11-30 PROCEDURE — 87880 STREP A ASSAY W/OPTIC: CPT | Performed by: INTERNAL MEDICINE

## 2017-11-30 RX ORDER — AZITHROMYCIN 250 MG/1
TABLET, FILM COATED ORAL
Qty: 6 TAB | Refills: 0 | Status: ON HOLD | OUTPATIENT
Start: 2017-11-30 | End: 2018-03-02

## 2017-11-30 NOTE — ASSESSMENT & PLAN NOTE
"Pt has been having episodes of syncope in which he has spells where he is standing and the next thing he knows is he's on the floor picking himself up. He is here with his family who states that she noticed that he was looking off into the distance and was not responsive. She noticed that he then was having abnormal twitching of his arms and that he was typing random things. She states that he was difficult to get to focus during this spell and \"his arms were moving and doing there own thing\". The next day he had an episode where he had a similar episode and fell and hit his toe. She states that his arms jerked up but he was not aware of it. There was no rhythmic movement of the limbs or tongue biting. He has had seizures in the past and she states it was not similar to a seizure he has had before, the most recent episode was about 1.5 years ago.   This began about 4 days ago, he has actually had improvement in symptoms since onset.   "

## 2017-11-30 NOTE — PROGRESS NOTES
"Subjective:   Denis De Anda is a 37 y.o. male here today for possible seizures, sore throat, cough    Loss of consciousness (CMS-[facility], located within [facility])  Pt has been having episodes of syncope in which he has spells where he is standing and the next thing he knows is he's on the floor picking himself up. He is here with his family who states that she noticed that he was looking off into the distance and was not responsive. She noticed that he then was having abnormal twitching of his arms and that he was typing random things. She states that he was difficult to get to focus during this spell and \"his arms were moving and doing there own thing\". The next day he had an episode where he had a similar episode and fell and hit his toe. She states that his arms jerked up but he was not aware of it. There was no rhythmic movement of the limbs or tongue biting. He has had seizures in the past and she states it was not similar to a seizure he has had before, the most recent episode was about 1.5 years ago.   This began about 4 days ago, he has actually had improvement in symptoms since onset.     Sore throat  Pt reports a sore throat on the left side that began yesterday. He reports associated symptoms of cough that is productive of mucus sputum. He denies fevers/chills. He denies facial pain/pressure in the sinuses. He denies nausea/vomiting or myalgia. He denies SOB but reports wheezing when he breathes.     He has had many sick contacts in the house. He gets similar symptoms about once per year.        Current medicines (including changes today)  Current Outpatient Prescriptions   Medication Sig Dispense Refill   • azithromycin (ZITHROMAX) 250 MG Tab 2 tabs po for 1 day, then 1 tab po for next 4 days 6 Tab 0   • warfarin (COUMADIN) 2.5 MG Tab Take one to two (1-2) tablets daily as directed by [facility] 180 Tab 0   • cinacalcet (SENSIPAR) 30 MG Tab Take 2 Tabs by mouth every evening. 90 Tab 3   • gabapentin " (NEURONTIN) 600 MG tablet Take 600 mg by mouth 3 times a day.     • HYDROcodone Bitartrate ER (HYSINGLA ER) 30 MG Tablet Extended Release 24 hour Abuse-Deterrent Take  by mouth.     • hydrocodone/acetaminophen (NORCO)  MG Tab Take 1-2 Tabs by mouth every 12 hours as needed. 20 Tab 0   • Sevelamer Carbonate (RENVELA) 800 MG TABS Take 3,200 mg by mouth 3 times a day, with meals. With meals     • calcitRIOL (ROCALTROL) 0.25 MCG CAPS Take 0.75 mcg by mouth every bedtime.       No current facility-administered medications for this visit.      He  has a past medical history of Arrhythmia; Chronic kidney disease, unspecified; Contracture of palmar fascia; Dialysis; Hemorrhagic disorder (CMS-HCC); Hypertension; Pain; Renal failure; Seizure (CMS-HCC); Sleep apnea; Toxic uninodular goiter without mention of thyrotoxic crisis or storm; and Urinary incontinence. He also has no past medical history of Encounter for long-term (current) use of other medications.    ROS   No fevers/chills, no nausea/vomiting       Objective:     Blood pressure 110/60, pulse (!) 102, temperature 36.3 °C (97.3 °F), resp. rate 16, SpO2 94 %. There is no height or weight on file to calculate BMI.   Physical Exam:  Constitutional: Alert & oriented, no acute distress  Eye: Conjunctiva clear, lids normal, no discharge  ENMT: Lips without lesions, normal external nose and ears, Oropharynx clear with no purulence or erythema, no tenderness to palpation of sinuses  Neck: Trachea midline, no masses, no thyromegaly. No cervical or supraclavicular lymphadenopathy  Respiratory: Unlabored respiratory effort, lungs clear to auscultation, no wheezes, no ronchi  Cardiovascular: Normal S1, S2, no murmur  Skin: Warm, dry, good turgor, no rashes in visible areas  Neuro: No overt focal neurologic deficits  Psych: Normal mood and affect      Assessment and Plan:   The following treatment plan was discussed    1. Loss of consciousness (CMS-HCC)  Etiology concerning  for possible partial complex seizures. Will refer to neurology for further evaluation  - REFERRAL TO NEUROLOGY    2. Sore throat  Rapid strep test negative. Etiology possibly due to atypical pneumonia given symptoms of productive cough. We'll prescribe azithromycin the patient counseled to not take this unless symptoms do not spontaneously improve over the next 3 days. Patient does not have any physical exam findings concerning for lobar pneumonia, sinusitis, or bacterial pharyngitis  - POCT Rapid Strep A    3. Atypical pneumonia  As above  - azithromycin (ZITHROMAX) 250 MG Tab; 2 tabs po for 1 day, then 1 tab po for next 4 days  Dispense: 6 Tab; Refill: 0      Followup: Return if symptoms worsen or fail to improve.    Please note that this dictation was created using voice recognition software. I have made every reasonable attempt to correct obvious errors, but I expect that there are errors of grammar and possibly content that I did not discover before finalizing the note.

## 2017-11-30 NOTE — ASSESSMENT & PLAN NOTE
Pt reports a sore throat on the left side that began yesterday. He reports associated symptoms of cough that is productive of mucus sputum. He denies fevers/chills. He denies facial pain/pressure in the sinuses. He denies nausea/vomiting or myalgia. He denies SOB but reports wheezing when he breathes.     He has had many sick contacts in the house. He gets similar symptoms about once per year.

## 2017-12-01 ENCOUNTER — TELEPHONE (OUTPATIENT)
Dept: MEDICAL GROUP | Facility: PHYSICIAN GROUP | Age: 37
End: 2017-12-01

## 2017-12-01 ENCOUNTER — OFFICE VISIT (OUTPATIENT)
Dept: NEUROLOGY | Facility: MEDICAL CENTER | Age: 37
End: 2017-12-01
Payer: MEDICARE

## 2017-12-01 VITALS
WEIGHT: 177 LBS | HEIGHT: 64 IN | HEART RATE: 71 BPM | RESPIRATION RATE: 16 BRPM | OXYGEN SATURATION: 96 % | BODY MASS INDEX: 30.22 KG/M2 | SYSTOLIC BLOOD PRESSURE: 116 MMHG | DIASTOLIC BLOOD PRESSURE: 58 MMHG | TEMPERATURE: 98.2 F

## 2017-12-01 DIAGNOSIS — N18.5 CHRONIC KIDNEY DISEASE, STAGE V (HCC): ICD-10-CM

## 2017-12-01 DIAGNOSIS — G40.909 SEIZURE DISORDER (HCC): ICD-10-CM

## 2017-12-01 PROCEDURE — 99214 OFFICE O/P EST MOD 30 MIN: CPT | Performed by: PSYCHIATRY & NEUROLOGY

## 2017-12-01 RX ORDER — LEVETIRACETAM 250 MG/1
250 TABLET ORAL 2 TIMES DAILY
Qty: 60 TAB | Refills: 3 | Status: ON HOLD | OUTPATIENT
Start: 2017-12-01 | End: 2019-01-25

## 2017-12-01 RX ORDER — LORAZEPAM 1 MG/1
1 TABLET ORAL
Qty: 2 TAB | Refills: 0 | Status: SHIPPED | OUTPATIENT
Start: 2017-12-01 | End: 2018-03-08

## 2017-12-01 NOTE — PATIENT INSTRUCTIONS
IMPRESSION:    1. Seizure like for 5 years--   2. Renal failure-- congenital, status post kidney dialysis( rejection) 2 times, now on dialysis. Hypercoagulability ( on coumadin for 3 years-- was put on by his vascular surgeon)    PLAN/RECOMMENDATIONS:      MRI of brain   EEG  And therapeutic trial of Keppra 250mg two times per day ( the patient was put on Keppra a couple of times  before)      Explain to the patient, MRI and routine EEG are not prefect  People with seizures could have normal MRI and normal EEG  PLUS, the patient's pervious EEG was not normal either    No driving 3 months after any spells of passing out      It is fine for the patient to go back to work-- he is working in [employer]      SIGNATURE:  Latasha Samayoa    CC:  Tony Mcmillan M.D.

## 2017-12-01 NOTE — PROGRESS NOTES
NEUROLOGY NOTE    Referring Physician  Tony Mcmillan M.D      CHIEF COMPLAINT:  Passing out --- 2 times Monday-- one time in Tuesday-- twitching and passing out    5 years ago, seizure like, was admitted to [facility]  2 years ago, seizure like--- was admitted to [facility] hospital in [facility]      Chief Complaint   Patient presents with   • Establish Care     Loss of consciouness       PRESENT ILLNESS:     Passing out --- 2 times Monday-- one time in Tuesday-- twitching and passing out    5 years ago, seizure like, was admitted to [facility]  2 years ago, seizure like--- was admitted to [facility] hospital in [facility]    PAST MEDICAL HISTORY:  Past Medical History:   Diagnosis Date   • Arrhythmia     irregular EKG   • Chronic kidney disease, unspecified    • Contracture of palmar fascia    • Dialysis      hemodialysis at home 5 days a week   • Hemorrhagic disorder (CMS-HCC)     on coumadin   • Hypertension    • Pain     Chronic pain   • Renal failure     hemodialysis   • Seizure (CMS-HCC)     last seizure 04/1/2016   • Sleep apnea     BIPAP   • Toxic uninodular goiter without mention of thyrotoxic crisis or storm    • Urinary incontinence        PAST SURGICAL HISTORY:  Past Surgical History:   Procedure Laterality Date   • CATH PLACEMENT Right 11/14/2017    Procedure: CATH PLACEMENT - PERMA;  Surgeon: Jairo Hopkins M.D.;  Location: [facility];  Service: General   • AV FISTULA REVISION Left 11/14/2017    Procedure: AV GRAFT REVISION;  Surgeon: Jairo Hopkins M.D.;  Location: [facility];  Service: General   • IRRIGATION & DEBRIDEMENT GENERAL  7/31/2017    Procedure: IRRIGATION & DEBRIDEMENT GENERAL-ABDOMEN;  Surgeon: Samson Rosenbaum Jr., M.D.;  Location: [facility];  Service:    • ABDOMINOPLASTY  6/5/2017    Procedure: ABDOMINOPLASTY - FOR PANNICULECTOMY;  Surgeon: Samson Rosenbaum Jr., M.D.;  Location: [facility];  Service:    • SPINAL  CORD STIMULATOR N/A 5/4/2017    Procedure: SPINAL CORD STIMULATOR - EXPLANT;  Surgeon: Bin Pro M.D.;  Location: [facility];  Service:    • THROMBECTOMY Left 1/6/2017    Procedure: THROMBECTOMY-OPEN THROMBECTOMY WITH LEFT THIGH GRAFT;  Surgeon: Jairo Hopkins M.D.;  Location: SURGERY [facility];  Service:    • CATH PLACEMENT Right 1/6/2017    Procedure: CATH PLACEMENT;  Surgeon: Jairo Hopkins M.D.;  Location: SURGERY [facility];  Service:    • THROMBECTOMY Left 1/5/2017    Procedure: THROMBECTOMY-THIGH AV LOOP GRAFT AND ANGIOJET;  Surgeon: Jairo Hopkins M.D.;  Location: SURGERY [facility];  Service:    • FLAP CLOSURE Left 11/17/2016    Procedure: Fasciocutaneous Flap Closure Left Upper Leg;  Surgeon: Samson Rosenbaum Jr., M.D.;  Location: SURGERY [facility];  Service:    • IRRIGATION & DEBRIDEMENT GENERAL Left 10/28/2016    Procedure: IRRIGATION & DEBRIDEMENT GENERAL THIGH WITH IRRIGATING WOUND VAC PLACEMENT;  Surgeon: Jairo Hopkins M.D.;  Location: SURGERY [facility];  Service:    • THROMBECTOMY Left 10/20/2016    Procedure: THROMBECTOMY THIGH;  Surgeon: Jairo Hopkins M.D.;  Location: [facility];  Service:    • INCISION AND DRAINAGE GENERAL  10/20/2016    Procedure: INCISION AND DRAINAGE GENERAL HEMATOMA;  Surgeon: Jairo Hopkins M.D.;  Location: SURGERY [facility];  Service:    • THROMBECTOMY Left 9/18/2016    Procedure: THROMBECTOMY - and fistula revision;  Surgeon: Jairo Hopkins M.D.;  Location: SURGERY [facility];  Service:    • AV FISTULOGRAM  9/18/2016    Procedure: AV FISTULOGRAM;  Surgeon: Jairo Hopkins M.D.;  Location: SURGERY [facility];  Service:    • CATH PLACEMENT Right 9/17/2016    Procedure: CATH PLACEMENT - tunneled dialysis cath placement right femoral ;  Surgeon: Quentin Alicia M.D.;  Location: SURGERY [facility];  Service:    • MASS EXCISION GENERAL Right 8/5/2016    Procedure: MASS  EXCISION GENERAL FOR CALCIPHYLAXIS SKIN AND SUBCUTANEOUS TISSUE FOREARM;  Surgeon: Jairo Hopkins M.D.;  Location: SURGERY [facility];  Service:    • LESION EXCISION GENERAL Right 7/11/2016    Procedure: LESION EXCISION GENERAL FOR ARM SKIN;  Surgeon: Jairo Hopkins M.D.;  Location: SURGERY [facility];  Service:    • RECOVERY  7/8/2016    Procedure: IR1-VASCULAR CASE-VIANEY-LEFT THIGH AV GRAFT THROMBOLYSIS WITH TISSUE PLASMINOGEN ACTIVATOR AND ANGIOJET ARTHERECTOMY   ;  Surgeon: [facility] Surgery;  Location: SURGERY PRE-POST PROC UNIT [facility];  Service:    • SPINAL CORD STIMULATOR N/A 3/25/2016    Procedure: SPINAL CORD STIMULATOR;  Surgeon: Bin Pro;  Location: SURGERY [facility];  Service:    • PB PERCUT IMPLNT NEUROELECT,EPIDURAL  2/19/2016    Procedure: IMPLANT NEUROSTIM EPI ARRAY;  Surgeon: Bin Pro;  Location: [facility];  Service: Pain Management   • PB PERCUT IMPLNT NEUROELECT,EPIDURAL  2/19/2016    Procedure: IMPLANT NEUROSTIM EPI ARRAY;  Surgeon: Bin Pro;  Location: SURGERY [facility];  Service: Pain Management   • RECOVERY  8/7/2015    Procedure:  VASCULAR CASE VIANEY-RIGHT ILIAC VENOGRAM WITH ANGIOPLASTY, REMOVAL RIGHT FEMORAL TUNNELED DIALYSIS CATHETER  **VRE**;  Surgeon: Recoveryonly Surgery;  Location: SURGERY PRE-POST PROC UNIT [facility];  Service:    • AV FISTULA REVISION Left 6/17/2015    Procedure: AV FISTULA REVISION;  Surgeon: Jairo Hopkins M.D.;  Location: SURGERY [facility];  Service:    • IRRIGATION & DEBRIDEMENT GENERAL Left 6/17/2015    Procedure: IRRIGATION & DEBRIDEMENT GENERAL ARM W/EXPLORATION WOUND & CONTROL BLEEDING;  Surgeon: Jairo Hopkins M.D.;  Location: SURGERY [facility];  Service:    • AV FISTULA THROMBOLYSIS Left 6/9/2015    Procedure: THROMBECTOMY AV GRAFT THIGH;  Surgeon: Jairo Hopkins M.D.;  Location: SURGERY [facility];  Service:    • AV FISTULA THROMBOLYSIS Left 6/3/2015    Procedure: AV FISTULA  THROMBOLYSIS THIGH REPLACEMENT;  Surgeon: Jairo Hopkins M.D.;  Location: SURGERY [facility];  Service:    • IRRIGATION & DEBRIDEMENT GENERAL Left 6/3/2015    Procedure: IRRIGATION & DEBRIDEMENT GENERAL;  Surgeon: Jairo Hopkins M.D.;  Location: [facility];  Service:    • AV FISTULA CREATION  4/20/2015    Performed by Jairo Hopkins M.D. at SURGERY [facility]   • PB INJECT NERV BLCK,STELLATE GANGLION  3/24/2015    Performed by Bin Pro [place]   • AV FISTULA REVISION  3/20/2015    Performed by Jairo Hopkins M.D. at SURGERY [facility]   • AV FISTULA REVISION  2/22/2015    Performed by Lurdes Moffett M.D. at SURGERY [facility]   • AV FISTULA REVISION  2/8/2015    Performed by Jairo Hopkins M.D. at SURGERY [facility]   • IRRIGATION & DEBRIDEMENT GENERAL  12/15/2014    Performed by Jairo Hopkins M.D. at SURGERY [facility]   • AV FISTULA REVISION  9/30/2014    Performed by Jairo Hopkins M.D. at SURGERY [facility]   • RECOVERY  6/13/2014    Performed by [facility] at [facility] SAME DAY [facility]   • INCISION AND DRAINAGE GENERAL  9/13/2013    Performed by Jairo Hopkins M.D. at SURGERY [facility]   • DEBRIDEMENT  9/13/2013    Performed by Jairo Hopkins M.D. at SURGERY [facility]   • VEIN LIGATION  9/13/2013    Performed by Jairo Hopkins M.D. at SURGERY [facility]   • RECOVERY  5/18/2012    Performed by SURGERY, IR-RECOVERY at [facility]   • BONE SPUR EXCISION  12/8/2011    Performed by BELKIS BREAUX at SURGERY SAME DAY [facility]   • AV FISTULA REVISION  11/3/2011    Performed by JAIRO HOPKINS at SURGERY [facility]   • AV FISTULOGRAM  6/5/2011    Performed by JAIRO HOPKINS at SURGERY [facility]   • CATH PLACEMENT  6/5/2011    Performed by JAIRO HOPKINS at SURGERY [facility]   • CATH PLACEMENT  2/1/2011    Performed by JAIRO HOPKINS at  SURGERY [facility]   • ANGIOPLASTY BALLOON  2/1/2011    Performed by MARI WINTERS at SURGERY [facility]   • ENDARTERECTOMY  11/16/2010    Performed by MARI WINTERS at SURGERY [facility]   • AV FISTULOGRAM  11/16/2010    Performed by MARI WINTERS at SURGERY [facility]   • ARTERIOGRAM  3/5/2009    Performed by MARI WINTERS at SURGERY [facility]   • ANGIOPLASTY BALLOON  3/5/2009    Performed by MARI WINTERS at SURGERY [facility]   • AV FISTULOGRAM  3/5/2009    Performed by MARI WINTERS at SURGERY [facility]   • AV FISTULA CREATION  11/6/08    Performed by MARI WINTERS at SURGERY [facility]   • MASS EXCISION ORTHO  10/9/08    Performed by BELKIS BREAUX at SURGERY SAME DAY [facility]   • PARATHYROIDECTOMY  2006   • INGUINAL HERNIA REPAIR Bilateral 2001, 2002   • US-KIDNEY TRANSPLANT  1996, 2001    x2       FAMILY HISTORY:  Family History   Problem Relation Age of Onset   • Arthritis Father      RA   • Lung Disease Father    • Heart Disease Father      MI   • Hypertension Brother        SOCIAL HISTORY:  Social History     Social History   • Marital status: Single     Spouse name: N/A   • Number of children: N/A   • Years of education: N/A     Occupational History   • Not on file.     Social History Main Topics   • Smoking status: Never Smoker   • Smokeless tobacco: Never Used   • Alcohol use No   • Drug use: No   • Sexual activity: Yes     Partners: Female     Other Topics Concern   • Not on file     Social History Narrative   • No narrative on file     ALLERGIES:  Allergies   Allergen Reactions   • Baclofen Unspecified     Total loss of memory, sedation.   RXN=6/2015   • Contrast Media With Iodine [Iodine] Rash     RXN=1/5/2017   • Keflex Rash     RXN=possibly >10 years   • Pcn [Penicillins] Rash     RXN=possibly >10 years ago   • Tape Rash     Paper tape and tegaderm ok  RXN=ongoing     TOBHX  History   Smoking Status   • Never Smoker  "  Smokeless Tobacco   • Never Used     ALCHX  History   Alcohol Use No     DRUGHX  History   Drug Use No           MEDICATIONS:  Current Outpatient Prescriptions   Medication   • warfarin (COUMADIN) 2.5 MG Tab   • cinacalcet (SENSIPAR) 30 MG Tab   • gabapentin (NEURONTIN) 600 MG tablet   • HYDROcodone Bitartrate ER (HYSINGLA ER) 30 MG Tablet Extended Release 24 hour Abuse-Deterrent   • hydrocodone/acetaminophen (NORCO)  MG Tab   • Sevelamer Carbonate (RENVELA) 800 MG TABS   • calcitRIOL (ROCALTROL) 0.25 MCG CAPS   • azithromycin (ZITHROMAX) 250 MG Tab     No current facility-administered medications for this visit.        REVIEW OF SYSTEM:    Constitutional: Denies fevers, Denies weight changes   Eyes: Denies changes in vision, no eye pain   Ears/Nose/Throat/Mouth: Denies nasal congestion or sore throat   Cardiovascular: Denies chest pain or palpitations   Respiratory: Denies SOB.   Gastrointestinal/Hepatic: Denies abdominal pain, nausea, vomiting, diarrhea, constipation or GI bleeding   Genitourinary: renal failure-- born--  Musculoskeletal/Rheum: Denies joint pain and swelling   Skin/Breast: Denies rash, denies breast lumps or discharge   Neurological: seizures,   Psychiatric: denies mood disorder   Endocrine: denies hx of diabetes or thyroid dysfunction   Heme/Oncology/Lymph Nodes: Denies enlarged lymph nodes, denies brusing or known bleeding disorder   Allergic/Immunologic: Denies hx of allergies         PHYSICAL AND NEUROLOGICAL EXMAINATIONS:  VITAL SIGNS: /58   Pulse 71   Temp 36.8 °C (98.2 °F)   Resp 16   Ht 1.626 m (5' 4\")   Wt 80.3 kg (177 lb)   SpO2 96%   BMI 30.38 kg/m²   CURRENT WEIGHT: BMI: Body mass index is 30.38 kg/m².  PREVIOUS WEIGHTS:  Wt Readings from Last 25 Encounters:   12/01/17 80.3 kg (177 lb)   11/14/17 82.8 kg (182 lb 8.7 oz)   09/23/17 80 kg (176 lb 5.9 oz)   09/21/17 83 kg (183 lb)   08/18/17 81.2 kg (179 lb)   07/31/17 80.1 kg (176 lb 9.4 oz)   06/05/17 83.8 kg (184 " lb 11.9 oz)   05/04/17 82.9 kg (182 lb 12.2 oz)   04/19/17 83.5 kg (184 lb)   03/28/17 83 kg (182 lb 15.7 oz)   01/06/17 81 kg (178 lb 9.2 oz)   01/05/17 79 kg (174 lb 2.6 oz)   10/28/16 77.2 kg (170 lb 3.1 oz)   10/19/16 75.7 kg (166 lb 14.2 oz)   09/17/16 78.1 kg (172 lb 2.9 oz)   09/15/16 73 kg (160 lb 15 oz)   08/05/16 74.6 kg (164 lb 7.4 oz)   07/11/16 73.9 kg (162 lb 14.7 oz)   07/08/16 75 kg (165 lb 5.5 oz)   04/09/16 75 kg (165 lb 5.5 oz)   04/07/16 74.4 kg (164 lb 0.4 oz)   03/25/16 74.9 kg (165 lb 2 oz)   02/19/16 77.1 kg (170 lb)   08/07/15 72 kg (158 lb 11.7 oz)   07/24/15 74.8 kg (165 lb)       General appearance of patient: WDWN(+) NAD(+)    EYES  o Fundus : Papilledem(-) Exudates(-) Hemorrhage(-)  Nervous System  Orientation to time, place and person(+)  Memory normal(-)  Language: aphasia(-)  Knowledge: past(+) Current(+)  Attention(+)  Cranial Nerves  • Nerve 2: intact  • Nerve 3,4,6: intact  • Nerve 5 : intact  • Nerve 7: intact  • Nerve 8: intact  • Nerve 9 & 10: intact  • Nerve 11: intact  • Nerve 12: intact  Muscle Power and muscle tone: symmetric, normal in upper and lower  Sensory System: Pin sensation intact(+)  Reflexes: symmetric throughout  Cerebellar Function FNP normal   Gait : Steady(+) TandemGait steady(+)  Heart and Vascular  Peripheral Vasucular system : Edema (-) Swelling(-)  RHB, Breathing sound clear  abdomen bowel sound normoactive  Extremities freely moveable  Joints no contracture       NEUROIMAGING: I reviewed the MRI/CT of brain       The patient stated that his congenital myelopathy shut down his kidney?    IMPRESSION:    1. Seizure like for 5 years--   2. Renal failure-- congenital, status post kidney dialysis( rejection) 2 times, now on dialysis. Hypercoagulability ( on coumadin for 3 years-- was put on by his vascular surgeon)    PLAN/RECOMMENDATIONS:      MRI of brain   EEG  And therapeutic trial of Keppra 250mg two times per day ( the patient was put on Keppra a couple  of times  before)      Explain to the patient, MRI and routine EEG are not prefect  People with seizures could have normal MRI and normal EEG  PLUS, the patient's pervious EEG was not normal either    No driving 3 months after any spells of passing out    It is fine for the patient to go back to work-- he is working in [employer]      SIGNATURE:  Latasha Samayoa    CC:  Tony Mcmillan M.D.

## 2017-12-01 NOTE — TELEPHONE ENCOUNTER
VOICEMAIL: Thursday 11/30/17 @ 2:46pm  1. Caller Name: Denis De Anda                      Call Back Number: [phone](CELL)  2. Message: Pt is asking for a work release and if it can be faxed to his work.    3. Patient approves office to leave a detailed voicemail/MyChart message: yes

## 2017-12-04 NOTE — TELEPHONE ENCOUNTER
Is this a note to go back to work? It looks like his neurologist stated in his note he is ok to go back to work.     Cricket Hook M.D.

## 2017-12-04 NOTE — TELEPHONE ENCOUNTER
Called pt back. He was at work.  Left message to call if ne still needs the letter to return to work

## 2017-12-05 ENCOUNTER — TELEPHONE (OUTPATIENT)
Dept: VASCULAR LAB | Facility: MEDICAL CENTER | Age: 37
End: 2017-12-05

## 2017-12-08 ENCOUNTER — HOSPITAL ENCOUNTER (OUTPATIENT)
Dept: RADIOLOGY | Facility: MEDICAL CENTER | Age: 37
End: 2017-12-08
Attending: PSYCHIATRY & NEUROLOGY
Payer: MEDICARE

## 2017-12-08 DIAGNOSIS — G40.909 SEIZURE DISORDER (HCC): ICD-10-CM

## 2017-12-08 DIAGNOSIS — N18.5 CHRONIC KIDNEY DISEASE, STAGE V (HCC): ICD-10-CM

## 2017-12-08 PROCEDURE — 70551 MRI BRAIN STEM W/O DYE: CPT

## 2017-12-13 ENCOUNTER — HOSPITAL ENCOUNTER (OUTPATIENT)
Facility: MEDICAL CENTER | Age: 37
End: 2017-12-13
Attending: SURGERY
Payer: MEDICARE

## 2017-12-13 ENCOUNTER — ANTICOAGULATION VISIT (OUTPATIENT)
Dept: VASCULAR LAB | Facility: MEDICAL CENTER | Age: 37
End: 2017-12-13
Attending: INTERNAL MEDICINE
Payer: MEDICARE

## 2017-12-13 DIAGNOSIS — T82.868A AV GRAFT THROMBOSIS, INITIAL ENCOUNTER (HCC): ICD-10-CM

## 2017-12-13 LAB
INR PPP: 3.67 (ref 0.87–1.13)
PROTHROMBIN TIME: 36.2 SEC (ref 12–14.6)

## 2017-12-13 PROCEDURE — 85610 PROTHROMBIN TIME: CPT | Mod: 59

## 2017-12-13 PROCEDURE — 85610 PROTHROMBIN TIME: CPT

## 2017-12-13 PROCEDURE — 99212 OFFICE O/P EST SF 10 MIN: CPT | Performed by: PHARMACIST

## 2017-12-13 NOTE — PROGRESS NOTES
Anticoagulation Summary  As of 12/13/2017    INR goal:   2.5-3.5   TTR:   39.9 % (8 mo)   Today's INR:   3.67!   Maintenance plan:   5 mg (2.5 mg x 2) on Mon, Fri; 2.5 mg (2.5 mg x 1) all other days   Weekly total:   22.5 mg   Plan last modified:   Chriss Keating, PharmD (10/24/2017)   Next INR check:   12/20/2017   Target end date:   Indefinite    Indications    AV graft thrombosis (CMS-HCC) (Resolved) [T82.868A]             Anticoagulation Episode Summary     INR check location:       Preferred lab:       Send INR reminders to:       Comments:   INR goal of 2.5 - 3.5 per Dr. Hopkins      Anticoagulation Care Providers     Provider Role Specialty Phone number    Jairo Hopkins M.D. Referring Surgery [phone]    [facility] Responsible  [phone]        Anticoagulation Patient Findings  Patient Findings     Positives:   Signs/symptoms of bleeding, Hospital admission    Negatives:   Signs/symptoms of thrombosis, Laboratory test error suspected, Change in health, Change in alcohol use, Change in activity, Upcoming invasive procedure, Emergency department visit, Upcoming dental procedure, Missed doses, Extra doses, Change in medications, Change in diet/appetite, Bruising, Other complaints    Comments:   Vascular surgery to repair graft done last month  Bloody nose over the last couple days        HPI:   Denis Adilson seen in clinic today, on anticoagulation therapy with warfarin for chronic anticoagulation due to history of AV graft thrombosis    Patient's previous INR was therapeutic at 2.57 on 11-14-17 during hospitalization, at which time patient was instructed to continue with current warfarin regimen.  He returns to clinic today to recheck INR to ensure it is therapeutic and thus preventing possible clotting and/or bleeding/bruising complications.    CHADS-VASc = n/a  (unadjusted ischemic stroke risk/year:  n/a)    Does patient have any changes to current medical/health status since  last appt (Y/N):  Hospital admission for emergency vascular graft repair last month  Does patient have any signs/symptoms of bleeding and/or thrombosis since the last appt (Y/N):  Bloody noses this week  Does patient have any interval changes to diet or medications since last appt (Y/N):  NO  Are there any complications or cost restrictions with current therapy (Y/N):  NO      Vitals:  Patient declines BP/vitals check at today's visit.     Asssessment:      INR supratherapeutic at 3.67, therefore putting patient at slightly higher risk of bleeding complications.   Reason(s) for out of range INR today:  No identifiable causes for high INR    NO POCT done as patient had this INR drawn at lab this morning    Plan:  Because he is experiencing bloody noses with elevated INR, will have him HOLD X 1, then resume current warfarin regimen.     Follow up:  Because warfarin is a high risk medication and current CHEST guidelines recommend regular monitoring intervals (few days up to 12 weeks), will have patient return to clinic in 1 weeks to recheck INR.    Cricket Mujica, PharmD

## 2017-12-16 LAB — EKG IMPRESSION: NORMAL

## 2017-12-20 ENCOUNTER — ANTICOAGULATION VISIT (OUTPATIENT)
Dept: VASCULAR LAB | Facility: MEDICAL CENTER | Age: 37
End: 2017-12-20
Attending: INTERNAL MEDICINE
Payer: MEDICARE

## 2017-12-20 VITALS — DIASTOLIC BLOOD PRESSURE: 49 MMHG | SYSTOLIC BLOOD PRESSURE: 76 MMHG | HEART RATE: 104 BPM

## 2017-12-20 DIAGNOSIS — Z79.01 CHRONIC ANTICOAGULATION: ICD-10-CM

## 2017-12-20 LAB
INR BLD: 1.8 (ref 0.9–1.2)
INR PPP: 1.8 (ref 2–3.5)

## 2017-12-20 PROCEDURE — 85610 PROTHROMBIN TIME: CPT

## 2017-12-20 PROCEDURE — 99212 OFFICE O/P EST SF 10 MIN: CPT | Performed by: NURSE PRACTITIONER

## 2017-12-20 NOTE — PROGRESS NOTES
Anticoagulation Summary  As of 12/20/2017    INR goal:   2.5-3.5   TTR:   40.2 % (8.3 mo)   Today's INR:   1.8!   Maintenance plan:   5 mg (2.5 mg x 2) on Mon, Fri; 2.5 mg (2.5 mg x 1) all other days   Weekly total:   22.5 mg   Plan last modified:   Chriss Keating, PharmD (10/24/2017)   Next INR check:   12/29/2017   Target end date:   Indefinite    Indications    AV graft thrombosis (CMS-HCC) (Resolved) [T82.868A]             Anticoagulation Episode Summary     INR check location:       Preferred lab:       Send INR reminders to:       Comments:   INR goal of 2.5 - 3.5 per Dr. Hopkins      Anticoagulation Care Providers     Provider Role Specialty Phone number    Jairo Hopkins M.D. Referring Surgery [phone]    [facility] Anticoagulation Services Responsible  [phone]        Anticoagulation Patient Findings      HPI:  Denis De Anda seen in clinic today for follow up on anticoagulation therapy in the presence of AV graft thrombosis. Denies any changes to current medical/health status since last appointment. Denies any medication or diet changes. No current symptoms of bleeding or thrombosis reported.    A/P:   INR subtherapeutic. INR down from 3.67 last week with a dose hold. Increase tonight then continue current regimen. No recent VTEs from what I can see after reviewing his chart. 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 BP recorded in vitals. BP 76/49. He states his BPs are hard to get due to his fistulas. Declined a recheck. No lightheadedness or dizziness.    Follow up appointment in 1 week(s).    Next Appointment: Friday , December 29, 2017 at 7:45 am.     Hanna LARKIN

## 2018-01-03 ENCOUNTER — ANTICOAGULATION VISIT (OUTPATIENT)
Dept: VASCULAR LAB | Facility: MEDICAL CENTER | Age: 38
End: 2018-01-03
Attending: INTERNAL MEDICINE
Payer: MEDICARE

## 2018-01-03 DIAGNOSIS — T82.868D THROMBOSIS OF ARTERIOVENOUS GRAFT, SUBSEQUENT ENCOUNTER: ICD-10-CM

## 2018-01-03 LAB
INR BLD: 1.9 (ref 0.9–1.2)
INR PPP: 1.9 (ref 2–3.5)

## 2018-01-03 PROCEDURE — 85610 PROTHROMBIN TIME: CPT

## 2018-01-03 PROCEDURE — 99212 OFFICE O/P EST SF 10 MIN: CPT | Performed by: PHARMACIST

## 2018-01-03 NOTE — PROGRESS NOTES
Anticoagulation Summary  As of 1/3/2018    INR goal:   2.5-3.5   TTR:   38.1 % (8.7 mo)   Today's INR:   1.9!   Maintenance plan:   5 mg (2.5 mg x 2) on Mon, Wed, Fri; 2.5 mg (2.5 mg x 1) all other days   Weekly total:   25 mg   Plan last modified:   Maeve Saha, PharmD (1/3/2018)   Next INR check:   1/10/2018   Target end date:   Indefinite    Indications    AV graft thrombosis (CMS-HCC) (Resolved) [T82.868A]             Anticoagulation Episode Summary     INR check location:       Preferred lab:       Send INR reminders to:       Comments:   INR goal of 2.5 - 3.5 per Dr. Hopkins      Anticoagulation Care Providers     Provider Role Specialty Phone number    Jairo Hopkins M.D. Referring Surgery [phone]    [facility] Anticoagulation Services Responsible  [phone]        Anticoagulation Patient Findings      HPI:  Denis De Anda seen in clinic today, on anticoagulation therapy with warfarin for AV graft thrombosis  Changes to current medical/health status since last appt: denies  Denies signs/symptoms of bleeding and/or thrombosis since the last appt.    Denies any interval changes to diet  Denies any interval changes to medications since last appt.   Denies any complications or cost restrictions with current therapy.   BP declined      A/P   INR  is SUB-therapeutic.   Will have pt start a 11% increased weekly dose.     Follow up appointment in 1 week(s).    Maeve Saha, PharmD

## 2018-01-10 ENCOUNTER — ANTICOAGULATION VISIT (OUTPATIENT)
Dept: VASCULAR LAB | Facility: MEDICAL CENTER | Age: 38
End: 2018-01-10
Attending: INTERNAL MEDICINE
Payer: MEDICARE

## 2018-01-10 ENCOUNTER — HOSPITAL ENCOUNTER (EMERGENCY)
Facility: MEDICAL CENTER | Age: 38
End: 2018-01-10
Payer: MEDICARE

## 2018-01-10 VITALS
DIASTOLIC BLOOD PRESSURE: 68 MMHG | HEIGHT: 64 IN | WEIGHT: 181.44 LBS | SYSTOLIC BLOOD PRESSURE: 110 MMHG | RESPIRATION RATE: 16 BRPM | BODY MASS INDEX: 30.98 KG/M2 | OXYGEN SATURATION: 96 % | HEART RATE: 125 BPM | TEMPERATURE: 97.3 F

## 2018-01-10 DIAGNOSIS — Z79.01 CHRONIC ANTICOAGULATION: ICD-10-CM

## 2018-01-10 LAB — INR PPP: 3.5 (ref 2–3.5)

## 2018-01-10 PROCEDURE — 302449 STATCHG TRIAGE ONLY (STATISTIC)

## 2018-01-10 PROCEDURE — 99211 OFF/OP EST MAY X REQ PHY/QHP: CPT | Performed by: NURSE PRACTITIONER

## 2018-01-10 PROCEDURE — 85610 PROTHROMBIN TIME: CPT

## 2018-01-10 NOTE — ED NOTES
Chief Complaint   Patient presents with   • Post Op Bleeding     RLQ bleeding from surgical site.       Noticed bleeding from surgical site while leaving work this afternoon.  States lost aprox 50 mL blood.  Bleeding controlled.  Triage process explained to patient.  Pt back to waiting room.  Pt instructed to inform RN if any changes or questions arise.

## 2018-01-10 NOTE — PROGRESS NOTES
Anticoagulation Summary  As of 1/10/2018    INR goal:   2.5-3.5   TTR:   38.7 % (9 mo)   Today's INR:   3.5   Maintenance plan:   5 mg (2.5 mg x 2) on Mon, Wed, Fri; 2.5 mg (2.5 mg x 1) all other days   Weekly total:   25 mg   Plan last modified:   Maeve Saha, PharmD (1/3/2018)   Next INR check:   1/17/2018   Target end date:   Indefinite    Indications    AV graft thrombosis (CMS-HCC) (Resolved) [T82.868A]             Anticoagulation Episode Summary     INR check location:       Preferred lab:       Send INR reminders to:       Comments:   INR goal of 2.5 - 3.5 per Dr. Hopkins      Anticoagulation Care Providers     Provider Role Specialty Phone number    Jairo Hopkins M.D. Referring Surgery [phone]    [facility] Anticoagulation Services Responsible  [phone]        Anticoagulation Patient Findings      HPI:  Denis De Anda seen in clinic today for follow up on anticoagulation therapy in the presence of AV graft thrombosis, chronic anticoagulation. Denies any changes to current medical/health status since last appointment. Denies any medication or diet changes. No current symptoms of bleeding or thrombosis reported.    A/P:   INR therapeutic. INR up from 1.9 last week, however, we will continue current regimen.     Follow up appointment in 1 week(s).    Next Appointment: Wednesday, January 17, 2018 at 7:30 am.     Hanna LARKIN

## 2018-01-11 LAB — INR BLD: 3.5 (ref 0.9–1.2)

## 2018-01-17 ENCOUNTER — ANTICOAGULATION VISIT (OUTPATIENT)
Dept: VASCULAR LAB | Facility: MEDICAL CENTER | Age: 38
End: 2018-01-17
Attending: INTERNAL MEDICINE
Payer: MEDICARE

## 2018-01-17 VITALS — HEART RATE: 119 BPM | SYSTOLIC BLOOD PRESSURE: 89 MMHG | DIASTOLIC BLOOD PRESSURE: 47 MMHG

## 2018-01-17 DIAGNOSIS — Z79.01 CHRONIC ANTICOAGULATION: ICD-10-CM

## 2018-01-17 LAB — INR PPP: 1.7 (ref 2–3.5)

## 2018-01-17 PROCEDURE — 85610 PROTHROMBIN TIME: CPT

## 2018-01-17 PROCEDURE — 99212 OFFICE O/P EST SF 10 MIN: CPT

## 2018-01-17 NOTE — PROGRESS NOTES
Anticoagulation Summary  As of 1/17/2018    INR goal:   2.5-3.5   TTR:   39.1 % (9.2 mo)   Today's INR:   1.7!   Maintenance plan:   5 mg (2.5 mg x 2) on Mon, Wed, Fri; 2.5 mg (2.5 mg x 1) all other days   Weekly total:   25 mg   Plan last modified:   Maeve Saha, PharmD (1/3/2018)   Next INR check:   1/24/2018   Target end date:   Indefinite    Indications    AV graft thrombosis (CMS-HCC) (Resolved) [T82.868A]             Anticoagulation Episode Summary     INR check location:       Preferred lab:       Send INR reminders to:       Comments:   INR goal of 2.5 - 3.5 per Dr. Hopkins      Anticoagulation Care Providers     Provider Role Specialty Phone number    Jairo Hopkins M.D. Referring Surgery [phone]    [facility] Anticoagulation Services Responsible  [phone]        Anticoagulation Patient Findings      HPI:  Denis Imnan De Anda seen in clinic today, on anticoagulation therapy with warfarn for AV graft thrombosis.   Changes to current medical/health status since last appt: Pt has had some bleeding surgical site, he consequently held a couple of doses over the past week.   Denies any interval changes to diet.   Denies any interval changes to medications since last appt.   Denies any complications or cost restrictions with current therapy.   BP recorded in vitals.  Confirmed dosing regimen.     A/P   INR  SUB-therapeutic.   Pt is to continue with current warfarin dosing regimen.     Follow up appointment in 1 week(s).    Donald Cedeño, PharmD

## 2018-01-18 ENCOUNTER — NON-PROVIDER VISIT (OUTPATIENT)
Dept: OCCUPATIONAL MEDICINE | Facility: CLINIC | Age: 38
End: 2018-01-18

## 2018-01-18 DIAGNOSIS — Z29.89 NEED FOR ISOLATION: ICD-10-CM

## 2018-01-18 LAB — INR BLD: 1.7 (ref 0.9–1.2)

## 2018-01-18 PROCEDURE — 94375 RESPIRATORY FLOW VOLUME LOOP: CPT | Performed by: PREVENTIVE MEDICINE

## 2018-01-19 ENCOUNTER — EH NON-PROVIDER (OUTPATIENT)
Dept: OCCUPATIONAL MEDICINE | Facility: CLINIC | Age: 38
End: 2018-01-19

## 2018-01-19 DIAGNOSIS — Z01.89 RESPIRATORY CLEARANCE EXAMINATION, ENCOUNTER FOR: ICD-10-CM

## 2018-01-19 PROCEDURE — 8916 PR CLEARANCE ONLY: Performed by: PREVENTIVE MEDICINE

## 2018-01-22 ENCOUNTER — HOSPITAL ENCOUNTER (OUTPATIENT)
Facility: MEDICAL CENTER | Age: 38
End: 2018-01-22
Payer: COMMERCIAL

## 2018-01-22 LAB
BDY FAT % MEASURED: 24.3 %
BP DIAS: 46 MMHG
BP SYS: 87 MMHG
CHOLEST SERPL-MCNC: 103 MG/DL (ref 100–199)
DIABETES HTDIA: NO
EVENT NAME HTEVT: NORMAL
FASTING HTFAS: YES
GLUCOSE SERPL-MCNC: 95 MG/DL (ref 65–99)
HDLC SERPL-MCNC: 26 MG/DL
HYPERTENSION HTHYP: NO
LDLC SERPL CALC-MCNC: 48 MG/DL
SCREENING LOC CITY HTCIT: NORMAL
SCREENING LOC STATE HTSTA: NORMAL
SCREENING LOCATION HTLOC: NORMAL
SMOKING HTSMO: NO
SUBSCRIBER ID HTSID: NORMAL
TRIGL SERPL-MCNC: 146 MG/DL (ref 0–149)

## 2018-01-22 PROCEDURE — 36415 COLL VENOUS BLD VENIPUNCTURE: CPT

## 2018-01-22 PROCEDURE — 82947 ASSAY GLUCOSE BLOOD QUANT: CPT

## 2018-01-22 PROCEDURE — S5190 WELLNESS ASSESSMENT BY NONPH: HCPCS

## 2018-01-22 PROCEDURE — 80061 LIPID PANEL: CPT

## 2018-01-23 ENCOUNTER — TELEPHONE (OUTPATIENT)
Dept: NEPHROLOGY | Facility: MEDICAL CENTER | Age: 38
End: 2018-01-23

## 2018-01-23 NOTE — TELEPHONE ENCOUNTER
Patient called and would like to talk to you please call him soon at [phone] after 3 thank you

## 2018-01-24 ENCOUNTER — ANTICOAGULATION VISIT (OUTPATIENT)
Dept: VASCULAR LAB | Facility: MEDICAL CENTER | Age: 38
End: 2018-01-24
Attending: INTERNAL MEDICINE
Payer: MEDICARE

## 2018-01-24 VITALS — HEART RATE: 120 BPM | DIASTOLIC BLOOD PRESSURE: 91 MMHG | SYSTOLIC BLOOD PRESSURE: 106 MMHG | HEIGHT: 64 IN

## 2018-01-24 DIAGNOSIS — T82.868D THROMBOSIS OF ARTERIOVENOUS GRAFT, SUBSEQUENT ENCOUNTER: ICD-10-CM

## 2018-01-24 LAB
INR BLD: 2.3 (ref 0.9–1.2)
INR PPP: 2.3 (ref 2–3.5)

## 2018-01-24 PROCEDURE — 85610 PROTHROMBIN TIME: CPT

## 2018-01-24 PROCEDURE — 99212 OFFICE O/P EST SF 10 MIN: CPT | Performed by: PHARMACIST

## 2018-01-24 NOTE — PROGRESS NOTES
Anticoagulation Summary  As of 1/24/2018    INR goal:   2.5-3.5   TTR:   38.2 % (9.4 mo)   Today's INR:   2.3!   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 Hopkins      Anticoagulation Care Providers     Provider Role Specialty Phone number    Jairo Hokpins M.D. 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    Denies any interval changes to diet  Denies any interval changes to medications since last appt.   Denies any complications or cost restrictions with current therapy.   BP recorded in vitals.      A/P   INR  is sub-therapeutic.   Will have pt take a boost dose of 7.5mg x1 today and then resume his normal dose. 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     Follow up appointment in 1 week(s).    Maeve Saha, PharmD

## 2018-01-31 ENCOUNTER — TELEPHONE (OUTPATIENT)
Dept: MEDICAL GROUP | Facility: PHYSICIAN GROUP | Age: 38
End: 2018-01-31

## 2018-01-31 DIAGNOSIS — R56.9 SEIZURE (HCC): ICD-10-CM

## 2018-01-31 NOTE — TELEPHONE ENCOUNTER
Phone Number Called: [phone] (home)     Message: patient would like to request referral to Dr. Hawk Ballesteros from neurology, patients twitching has been getting worse, patient is falling asleep while doing activities like watching TV. Please Advise    Left Message for patient to call back: no

## 2018-02-01 ENCOUNTER — TELEPHONE (OUTPATIENT)
Dept: NEUROLOGY | Facility: MEDICAL CENTER | Age: 38
End: 2018-02-01

## 2018-02-01 ENCOUNTER — HOSPITAL ENCOUNTER (OUTPATIENT)
Facility: MEDICAL CENTER | Age: 38
End: 2018-02-02
Attending: PLASTIC SURGERY | Admitting: PLASTIC SURGERY
Payer: MEDICARE

## 2018-02-01 PROBLEM — T81.30XA WOUND DEHISCENCE: Status: ACTIVE | Noted: 2018-02-01

## 2018-02-01 LAB
ALBUMIN SERPL BCP-MCNC: 4.2 G/DL (ref 3.2–4.9)
ALBUMIN/GLOB SERPL: 1.2 G/DL
ALP SERPL-CCNC: 165 U/L (ref 30–99)
ALT SERPL-CCNC: 8 U/L (ref 2–50)
ANION GAP SERPL CALC-SCNC: 16 MMOL/L (ref 0–11.9)
ANISOCYTOSIS BLD QL SMEAR: ABNORMAL
AST SERPL-CCNC: 10 U/L (ref 12–45)
BASOPHILS # BLD AUTO: 0.4 % (ref 0–1.8)
BASOPHILS # BLD: 0.04 K/UL (ref 0–0.12)
BILIRUB SERPL-MCNC: 0.5 MG/DL (ref 0.1–1.5)
BUN SERPL-MCNC: 51 MG/DL (ref 8–22)
CALCIUM SERPL-MCNC: 8.5 MG/DL (ref 8.5–10.5)
CHLORIDE SERPL-SCNC: 90 MMOL/L (ref 96–112)
CO2 SERPL-SCNC: 28 MMOL/L (ref 20–33)
COMMENT 1642: NORMAL
CREAT SERPL-MCNC: 7.62 MG/DL (ref 0.5–1.4)
EOSINOPHIL # BLD AUTO: 0.03 K/UL (ref 0–0.51)
EOSINOPHIL NFR BLD: 0.3 % (ref 0–6.9)
ERYTHROCYTE [DISTWIDTH] IN BLOOD BY AUTOMATED COUNT: 65.2 FL (ref 35.9–50)
GLOBULIN SER CALC-MCNC: 3.6 G/DL (ref 1.9–3.5)
GLUCOSE SERPL-MCNC: 111 MG/DL (ref 65–99)
HCT VFR BLD AUTO: 28.1 % (ref 42–52)
HGB BLD-MCNC: 8.8 G/DL (ref 14–18)
IMM GRANULOCYTES # BLD AUTO: 0.05 K/UL (ref 0–0.11)
IMM GRANULOCYTES NFR BLD AUTO: 0.5 % (ref 0–0.9)
LYMPHOCYTES # BLD AUTO: 0.66 K/UL (ref 1–4.8)
LYMPHOCYTES NFR BLD: 7.1 % (ref 22–41)
MACROCYTES BLD QL SMEAR: ABNORMAL
MCH RBC QN AUTO: 28.9 PG (ref 27–33)
MCHC RBC AUTO-ENTMCNC: 31.3 G/DL (ref 33.7–35.3)
MCV RBC AUTO: 92.4 FL (ref 81.4–97.8)
MICROCYTES BLD QL SMEAR: ABNORMAL
MONOCYTES # BLD AUTO: 0.63 K/UL (ref 0–0.85)
MONOCYTES NFR BLD AUTO: 6.8 % (ref 0–13.4)
MORPHOLOGY BLD-IMP: NORMAL
NEUTROPHILS # BLD AUTO: 7.91 K/UL (ref 1.82–7.42)
NEUTROPHILS NFR BLD: 84.9 % (ref 44–72)
NRBC # BLD AUTO: 0 K/UL
NRBC BLD-RTO: 0 /100 WBC
PLATELET # BLD AUTO: 170 K/UL (ref 164–446)
PLATELET BLD QL SMEAR: NORMAL
PMV BLD AUTO: 9.5 FL (ref 9–12.9)
POTASSIUM SERPL-SCNC: 4.3 MMOL/L (ref 3.6–5.5)
PROT SERPL-MCNC: 7.8 G/DL (ref 6–8.2)
RBC # BLD AUTO: 3.04 M/UL (ref 4.7–6.1)
RBC BLD AUTO: PRESENT
SODIUM SERPL-SCNC: 134 MMOL/L (ref 135–145)
WBC # BLD AUTO: 9.3 K/UL (ref 4.8–10.8)

## 2018-02-01 PROCEDURE — 501838 HCHG SUTURE GENERAL: Performed by: PLASTIC SURGERY

## 2018-02-01 PROCEDURE — 99291 CRITICAL CARE FIRST HOUR: CPT

## 2018-02-01 PROCEDURE — 160035 HCHG PACU - 1ST 60 MINS PHASE I: Performed by: PLASTIC SURGERY

## 2018-02-01 PROCEDURE — 500698 HCHG HEMOCLIP, MEDIUM: Performed by: PLASTIC SURGERY

## 2018-02-01 PROCEDURE — 700102 HCHG RX REV CODE 250 W/ 637 OVERRIDE(OP): Performed by: PLASTIC SURGERY

## 2018-02-01 PROCEDURE — 160048 HCHG OR STATISTICAL LEVEL 1-5: Performed by: PLASTIC SURGERY

## 2018-02-01 PROCEDURE — 80053 COMPREHEN METABOLIC PANEL: CPT

## 2018-02-01 PROCEDURE — 160036 HCHG PACU - EA ADDL 30 MINS PHASE I: Performed by: PLASTIC SURGERY

## 2018-02-01 PROCEDURE — 160002 HCHG RECOVERY MINUTES (STAT): Performed by: PLASTIC SURGERY

## 2018-02-01 PROCEDURE — A9270 NON-COVERED ITEM OR SERVICE: HCPCS | Performed by: PLASTIC SURGERY

## 2018-02-01 PROCEDURE — G0378 HOSPITAL OBSERVATION PER HR: HCPCS

## 2018-02-01 PROCEDURE — 85025 COMPLETE CBC W/AUTO DIFF WBC: CPT

## 2018-02-01 PROCEDURE — 500700 HCHG HEMOCLIP, SMALL (RED): Performed by: PLASTIC SURGERY

## 2018-02-01 PROCEDURE — 160027 HCHG SURGERY MINUTES - 1ST 30 MINS LEVEL 2: Performed by: PLASTIC SURGERY

## 2018-02-01 PROCEDURE — 700101 HCHG RX REV CODE 250

## 2018-02-01 PROCEDURE — 700111 HCHG RX REV CODE 636 W/ 250 OVERRIDE (IP): Performed by: PLASTIC SURGERY

## 2018-02-01 PROCEDURE — 160038 HCHG SURGERY MINUTES - EA ADDL 1 MIN LEVEL 2: Performed by: PLASTIC SURGERY

## 2018-02-01 PROCEDURE — 96374 THER/PROPH/DIAG INJ IV PUSH: CPT

## 2018-02-01 PROCEDURE — 700111 HCHG RX REV CODE 636 W/ 250 OVERRIDE (IP)

## 2018-02-01 PROCEDURE — 36415 COLL VENOUS BLD VENIPUNCTURE: CPT

## 2018-02-01 PROCEDURE — 700102 HCHG RX REV CODE 250 W/ 637 OVERRIDE(OP)

## 2018-02-01 PROCEDURE — A6402 STERILE GAUZE <= 16 SQ IN: HCPCS | Performed by: PLASTIC SURGERY

## 2018-02-01 PROCEDURE — A9270 NON-COVERED ITEM OR SERVICE: HCPCS

## 2018-02-01 PROCEDURE — 500697 HCHG HEMOCLIP, LARGE (ORANGE): Performed by: PLASTIC SURGERY

## 2018-02-01 PROCEDURE — 160009 HCHG ANES TIME/MIN: Performed by: PLASTIC SURGERY

## 2018-02-01 RX ORDER — AMOXICILLIN 250 MG
2 CAPSULE ORAL 2 TIMES DAILY
Status: DISCONTINUED | OUTPATIENT
Start: 2018-02-01 | End: 2018-02-02 | Stop reason: HOSPADM

## 2018-02-01 RX ORDER — HYDROMORPHONE HYDROCHLORIDE 2 MG/ML
INJECTION, SOLUTION INTRAMUSCULAR; INTRAVENOUS; SUBCUTANEOUS
Status: COMPLETED
Start: 2018-02-01 | End: 2018-02-01

## 2018-02-01 RX ORDER — OXYCODONE HCL 5 MG/5 ML
SOLUTION, ORAL ORAL
Status: COMPLETED
Start: 2018-02-01 | End: 2018-02-01

## 2018-02-01 RX ORDER — POLYETHYLENE GLYCOL 3350 17 G/17G
1 POWDER, FOR SOLUTION ORAL
Status: DISCONTINUED | OUTPATIENT
Start: 2018-02-01 | End: 2018-02-02 | Stop reason: HOSPADM

## 2018-02-01 RX ORDER — SODIUM CHLORIDE 9 MG/ML
INJECTION, SOLUTION INTRAVENOUS CONTINUOUS
Status: DISCONTINUED | OUTPATIENT
Start: 2018-02-01 | End: 2018-02-02

## 2018-02-01 RX ORDER — MAGNESIUM HYDROXIDE 1200 MG/15ML
LIQUID ORAL
Status: DISCONTINUED | OUTPATIENT
Start: 2018-02-01 | End: 2018-02-01 | Stop reason: HOSPADM

## 2018-02-01 RX ORDER — HYDROMORPHONE HYDROCHLORIDE 2 MG/ML
0.5 INJECTION, SOLUTION INTRAMUSCULAR; INTRAVENOUS; SUBCUTANEOUS
Status: DISCONTINUED | OUTPATIENT
Start: 2018-02-01 | End: 2018-02-02 | Stop reason: HOSPADM

## 2018-02-01 RX ORDER — BISACODYL 10 MG
10 SUPPOSITORY, RECTAL RECTAL
Status: DISCONTINUED | OUTPATIENT
Start: 2018-02-01 | End: 2018-02-02 | Stop reason: HOSPADM

## 2018-02-01 RX ORDER — AZITHROMYCIN 250 MG/1
250 TABLET, FILM COATED ORAL DAILY
Status: DISCONTINUED | OUTPATIENT
Start: 2018-02-02 | End: 2018-02-02

## 2018-02-01 RX ADMIN — FENTANYL CITRATE 50 MCG: 50 INJECTION, SOLUTION INTRAMUSCULAR; INTRAVENOUS at 21:10

## 2018-02-01 RX ADMIN — HYDROMORPHONE HYDROCHLORIDE 0.5 MG: 2 INJECTION INTRAMUSCULAR; INTRAVENOUS; SUBCUTANEOUS at 22:05

## 2018-02-01 RX ADMIN — HYDROMORPHONE HYDROCHLORIDE 0.5 MG: 2 INJECTION, SOLUTION INTRAMUSCULAR; INTRAVENOUS; SUBCUTANEOUS at 22:37

## 2018-02-01 RX ADMIN — STANDARDIZED SENNA CONCENTRATE AND DOCUSATE SODIUM 2 TABLET: 8.6; 5 TABLET, FILM COATED ORAL at 23:20

## 2018-02-01 RX ADMIN — HYDROMORPHONE HYDROCHLORIDE 0.5 MG: 2 INJECTION INTRAMUSCULAR; INTRAVENOUS; SUBCUTANEOUS at 21:48

## 2018-02-01 RX ADMIN — FENTANYL CITRATE 50 MCG: 50 INJECTION, SOLUTION INTRAMUSCULAR; INTRAVENOUS at 21:31

## 2018-02-01 RX ADMIN — HYDROMORPHONE HYDROCHLORIDE 0.5 MG: 2 INJECTION INTRAMUSCULAR; INTRAVENOUS; SUBCUTANEOUS at 21:07

## 2018-02-01 RX ADMIN — OXYCODONE HYDROCHLORIDE 10 MG: 5 SOLUTION ORAL at 21:05

## 2018-02-01 RX ADMIN — HYDROMORPHONE HYDROCHLORIDE 0.5 MG: 2 INJECTION INTRAMUSCULAR; INTRAVENOUS; SUBCUTANEOUS at 20:59

## 2018-02-01 ASSESSMENT — PATIENT HEALTH QUESTIONNAIRE - PHQ9
6. FEELING BAD ABOUT YOURSELF - OR THAT YOU ARE A FAILURE OR HAVE LET YOURSELF OR YOUR FAMILY DOWN: NOT AL ALL
SUM OF ALL RESPONSES TO PHQ QUESTIONS 1-9: 1
7. TROUBLE CONCENTRATING ON THINGS, SUCH AS READING THE NEWSPAPER OR WATCHING TELEVISION: NOT AT ALL
9. THOUGHTS THAT YOU WOULD BE BETTER OFF DEAD, OR OF HURTING YOURSELF: NOT AT ALL
5. POOR APPETITE OR OVEREATING: NOT AT ALL
SUM OF ALL RESPONSES TO PHQ9 QUESTIONS 1 AND 2: 1
1. LITTLE INTEREST OR PLEASURE IN DOING THINGS: SEVERAL DAYS
3. TROUBLE FALLING OR STAYING ASLEEP OR SLEEPING TOO MUCH: NOT AT ALL
8. MOVING OR SPEAKING SO SLOWLY THAT OTHER PEOPLE COULD HAVE NOTICED. OR THE OPPOSITE, BEING SO FIGETY OR RESTLESS THAT YOU HAVE BEEN MOVING AROUND A LOT MORE THAN USUAL: NOT AT ALL
2. FEELING DOWN, DEPRESSED, IRRITABLE, OR HOPELESS: NOT AT ALL
4. FEELING TIRED OR HAVING LITTLE ENERGY: NOT AT ALL

## 2018-02-01 ASSESSMENT — PAIN SCALES - GENERAL
PAINLEVEL_OUTOF10: 9
PAINLEVEL_OUTOF10: 8
PAINLEVEL_OUTOF10: 9
PAINLEVEL_OUTOF10: 9
PAINLEVEL_OUTOF10: 8
PAINLEVEL_OUTOF10: 8
PAINLEVEL_OUTOF10: 10
PAINLEVEL_OUTOF10: 10
PAINLEVEL_OUTOF10: 5

## 2018-02-01 ASSESSMENT — LIFESTYLE VARIABLES
EVER_SMOKED: NEVER
ALCOHOL_USE: NO

## 2018-02-02 VITALS
TEMPERATURE: 96.1 F | WEIGHT: 173.94 LBS | BODY MASS INDEX: 29.7 KG/M2 | RESPIRATION RATE: 18 BRPM | HEIGHT: 64 IN | SYSTOLIC BLOOD PRESSURE: 132 MMHG | OXYGEN SATURATION: 98 % | HEART RATE: 92 BPM | DIASTOLIC BLOOD PRESSURE: 64 MMHG

## 2018-02-02 PROCEDURE — A9270 NON-COVERED ITEM OR SERVICE: HCPCS | Performed by: PLASTIC SURGERY

## 2018-02-02 PROCEDURE — 700102 HCHG RX REV CODE 250 W/ 637 OVERRIDE(OP): Performed by: PLASTIC SURGERY

## 2018-02-02 PROCEDURE — G0378 HOSPITAL OBSERVATION PER HR: HCPCS

## 2018-02-02 PROCEDURE — 700111 HCHG RX REV CODE 636 W/ 250 OVERRIDE (IP): Performed by: PLASTIC SURGERY

## 2018-02-02 PROCEDURE — 96376 TX/PRO/DX INJ SAME DRUG ADON: CPT

## 2018-02-02 RX ORDER — LEVETIRACETAM 250 MG/1
250 TABLET ORAL 2 TIMES DAILY
Status: DISCONTINUED | OUTPATIENT
Start: 2018-02-02 | End: 2018-02-02 | Stop reason: HOSPADM

## 2018-02-02 RX ORDER — OXYCODONE HCL 20 MG/1
20 TABLET, FILM COATED, EXTENDED RELEASE ORAL DAILY
Status: DISCONTINUED | OUTPATIENT
Start: 2018-02-02 | End: 2018-02-02 | Stop reason: HOSPADM

## 2018-02-02 RX ORDER — SEVELAMER CARBONATE 800 MG/1
3200 TABLET, FILM COATED ORAL
Status: DISCONTINUED | OUTPATIENT
Start: 2018-02-02 | End: 2018-02-02 | Stop reason: HOSPADM

## 2018-02-02 RX ORDER — TIZANIDINE 4 MG/1
8 TABLET ORAL
Status: DISCONTINUED | OUTPATIENT
Start: 2018-02-02 | End: 2018-02-02 | Stop reason: HOSPADM

## 2018-02-02 RX ORDER — HYDROCODONE BITARTRATE AND ACETAMINOPHEN 10; 325 MG/1; MG/1
1-2 TABLET ORAL EVERY 12 HOURS PRN
Status: DISCONTINUED | OUTPATIENT
Start: 2018-02-02 | End: 2018-02-02 | Stop reason: HOSPADM

## 2018-02-02 RX ORDER — TIZANIDINE 4 MG/1
8 TABLET ORAL
Status: ON HOLD | COMMUNITY
End: 2018-03-02

## 2018-02-02 RX ORDER — HYDROCODONE BITARTRATE 30 MG/1
30 TABLET, EXTENDED RELEASE ORAL DAILY
Status: DISCONTINUED | OUTPATIENT
Start: 2018-02-02 | End: 2018-02-02

## 2018-02-02 RX ORDER — GABAPENTIN 300 MG/1
600 CAPSULE ORAL 3 TIMES DAILY
Status: DISCONTINUED | OUTPATIENT
Start: 2018-02-02 | End: 2018-02-02 | Stop reason: HOSPADM

## 2018-02-02 RX ORDER — CINACALCET 30 MG/1
60 TABLET, FILM COATED ORAL EVERY EVENING
Status: DISCONTINUED | OUTPATIENT
Start: 2018-02-02 | End: 2018-02-02 | Stop reason: HOSPADM

## 2018-02-02 RX ADMIN — LEVETIRACETAM 250 MG: 250 TABLET ORAL at 08:51

## 2018-02-02 RX ADMIN — TIZANIDINE 8 MG: 4 TABLET ORAL at 02:13

## 2018-02-02 RX ADMIN — CINACALCET HYDROCHLORIDE 60 MG: 30 TABLET, COATED ORAL at 02:16

## 2018-02-02 RX ADMIN — CALCITRIOL 0.75 MCG: 0.25 CAPSULE, LIQUID FILLED ORAL at 02:15

## 2018-02-02 RX ADMIN — STANDARDIZED SENNA CONCENTRATE AND DOCUSATE SODIUM 2 TABLET: 8.6; 5 TABLET, FILM COATED ORAL at 08:52

## 2018-02-02 RX ADMIN — HYDROMORPHONE HYDROCHLORIDE 0.5 MG: 2 INJECTION, SOLUTION INTRAMUSCULAR; INTRAVENOUS; SUBCUTANEOUS at 01:06

## 2018-02-02 RX ADMIN — OXYCODONE HYDROCHLORIDE 20 MG: 20 TABLET, FILM COATED, EXTENDED RELEASE ORAL at 02:14

## 2018-02-02 RX ADMIN — HYDROCODONE BITARTRATE AND ACETAMINOPHEN 2 TABLET: 10; 325 TABLET ORAL at 08:52

## 2018-02-02 RX ADMIN — GABAPENTIN 600 MG: 300 CAPSULE ORAL at 08:52

## 2018-02-02 ASSESSMENT — PAIN SCALES - GENERAL
PAINLEVEL_OUTOF10: 6
PAINLEVEL_OUTOF10: 7
PAINLEVEL_OUTOF10: 9
PAINLEVEL_OUTOF10: 9

## 2018-02-02 ASSESSMENT — COPD QUESTIONNAIRES
DO YOU EVER COUGH UP ANY MUCUS OR PHLEGM?: NO/ONLY WITH OCCASIONAL COLDS OR INFECTIONS
COPD SCREENING SCORE: 0
DURING THE PAST 4 WEEKS HOW MUCH DID YOU FEEL SHORT OF BREATH: NONE/LITTLE OF THE TIME
HAVE YOU SMOKED AT LEAST 100 CIGARETTES IN YOUR ENTIRE LIFE: NO/DON'T KNOW

## 2018-02-02 ASSESSMENT — LIFESTYLE VARIABLES: EVER_SMOKED: NEVER

## 2018-02-02 NOTE — DISCHARGE INSTRUCTIONS
Discharge Instructions    Discharged to home by car with relative. Discharged via wheelchair, hospital escort: Yes.  Special equipment needed: Not Applicable    Be sure to schedule a follow-up appointment with your primary care doctor or any specialists as instructed.     Discharge Plan:   Diet Plan: Discussed  Activity Level: Discussed  Confirmed Follow up Appointment: Patient to Call and Schedule Appointment  Confirmed Symptoms Management: Discussed  Medication Reconciliation Updated: Yes  Influenza Vaccine Indication: Not indicated: Previously immunized this influenza season and > 8 years of age    I understand that a diet low in cholesterol, fat, and sodium is recommended for good health. Unless I have been given specific instructions below for another diet, I accept this instruction as my diet prescription.   Other diet: per MD orders     Special Instructions: None    · Is patient discharged on Warfarin / Coumadin?   No     Depression / Suicide Risk    As [facility] are [facility], it is important to learn how to keep safe from harming yourself.    Recognize the warning signs:  · Abrupt changes in personality, positive or negative- including increase in energy   · Giving away possessions  · Change in eating patterns- significant weight changes-  positive or negative  · Change in sleeping patterns- unable to sleep or sleeping all the time   · Unwillingness or inability to communicate  · Depression  · Unusual sadness, discouragement and loneliness  · Talk of wanting to die  · Neglect of personal appearance   · Rebelliousness- reckless behavior  · Withdrawal from people/activities they love  · Confusion- inability to concentrate     If you or a loved one observes any of these behaviors or has concerns about self-harm, here's what you can do:  · Talk about it- your feelings and reasons for harming yourself  · Remove any means that you might use to hurt yourself (examples: pills, rope,  extension cords, firearm)  · Get professional help from the community (Mental Health, Substance Abuse, psychological counseling)  · Do not be alone:Call your Safe Contact- someone whom you trust who will be there for you.  · Call your local CRISIS HOTLINE 448-5707 or 106-489-1796  · Call your local Children's Mobile Crisis Response Team [place] (559) 896-2349 or www.Vecast  · Call the toll free National Suicide Prevention Hotlines   · National Suicide Prevention Lifeline 792-341-WXYV (3471)  · Instant API Line Network 800-SUICIDE (665-3227)      Wound Dehiscence  Wound dehiscence is when a surgical cut (incision) breaks open and does not heal properly after surgery. It usually happens 7-10 days after surgery. This can be a serious condition. It is important to identify and treat this condition early.   CAUSES   Some common causes of wound dehiscence include:  · Stretching of the wound area. This may be caused by lifting, vomiting, violent coughing, or straining during bowel movements.  · Wound infection.  · Early stitch (suture) removal.  RISK FACTORS  Various things can increase your risk of developing wound dehiscence, including:  · Obesity.  · Lung disease.  · Smoking.  · Poor nutrition.  · Contamination during surgery.  SIGNS AND SYMPTOMS  · Bleeding from the wound.  · Pain.  · Fever.  · Wound starts breaking open.  DIAGNOSIS   · Your health care provider may diagnose wound dehiscence by monitoring the incision and noting any changes in the wound. These changes can include an increase in drainage or pain. The health care provider may also ask you if you have noticed any stretching or tearing of the wound.  · Wound cultures may be taken to determine if there is an infection.   · Imaging studies, such as an MRI scan or CT scan, may be done to determine if there is a collection of pus or fluid in the wound area.  TREATMENT  Treatment may include:  · Wound care.  · Surgical repair.  · Antibiotic  medicine to treat or prevent infection.  · Medicines to reduce pain and swelling.  HOME CARE INSTRUCTIONS   · Only take over-the-counter or prescription medicines for pain, discomfort, or fever as directed by your health care provider. Taking pain medicine 30 minutes before changing a bandage (dressing) can help relieve pain.  · Take your antibiotics as directed. Finish them even if you start to feel better.  · Gently wash the area with mild soap and water 2 times a day, or as directed. Rinse off the soap. Pat the area dry with a clean towel. Do not rub the wound. This may cause bleeding.  · Follow your health care provider's instructions for how often you need to change the dressing and packing inside. Wash your hands well before and after changing your dressing. Apply a dressing to the wound as directed.  · Take showers. Do not soak the wound, bathe, swim, or use a hot tub until directed by your health care provider.  · Avoid exercises that make you sweat heavily.  · Use anti-itch medicine as directed by your health care provider. The wound may itch when it is healing. Do not pick or scratch at the wound.  · Do not lift more than 10 pounds (4.5 kg) until the wound is healed, or as directed by your health care provider.  · Keep all follow-up appointments as directed.  SEEK MEDICAL CARE IF:  · You have excessive bleeding from your surgical wound.  · Your wound does not seem to be healing properly.  · You have a fever.  SEEK IMMEDIATE MEDICAL CARE IF:   · You have increased swelling or redness around the wound.  · You have increasing pain in the wound.  · You have an increasing amount of pus coming from the wound.  · Your wound breaks open farther.  MAKE SURE YOU:   · Understand these instructions.  · Will watch your condition.  · Will get help right away if you are not doing well or get worse.     This information is not intended to replace advice given to you by your health care provider. Make sure you discuss any  questions you have with your health care provider.     Document Released: 03/09/2005 Document Revised: 12/23/2014 Document Reviewed: 08/25/2014  Elsevier Interactive Patient Education ©2016 [employer]

## 2018-02-02 NOTE — CONSULTS
DATE OF SERVICE:  02/01/2018    CHIEF COMPLAINT:  Bleeding from abdominal wound.    HISTORY OF PRESENT ILLNESS:  Patient is a 37-year-old white male who is known   to me from previous hospitalizations and surgeries.  The patient has a long   history of insulin-dependent diabetes.  He has been on dialysis.  He has had   multiple surgeries.  He had an abdominal operation to remove a significant   excoriated skin.  He has been very, very slow to heal and has had bleeding   issues from the wound.  Today he has spurting blood from abdominal varices   that is controlled with direct pressure, but he is unable to stop this   bleeding.  He came to the emergency room and I saw the patient in the   emergency room.    PAST MEDICAL HISTORY:  Reviewed in addition to the surgeries on his abdomen.    PAST SURGICAL HISTORY:  He has had multiple dialysis procedures.    MEDICATIONS:  Multiple, reviewed in the chart.    REVIEW OF SYSTEMS:  Negative for headache, fever, visual disturbances, nausea,   vomiting, chest pain, cough, abdominal pain, hematochezia, melena, or   diarrhea.    PHYSICAL EXAMINATION:  GENERAL:  He is lying in bed.  He is alert.  He is oriented.  HEENT:  He is normocephalic, atraumatic.  NECK:  Supple.  CHEST:  Clear.  ABDOMEN:  Wound on his abdomen has direct pressure with some gauze, but the   minute the gauze is removed, there is immediate gush of dark blood consistent   with an abdominal varices ruptured.  EXTREMITIES:  Within normal limits.    ASSESSMENT AND PLAN:  The patient is going to need to have this hemorrhage   controlled.  I think it is going to be best accomplished in the operating   room.  We will plan to get him upstairs as soon as we can, probably need to be   admitted and will consult the hospitalist service.       ____________________________________     MD YOVANI LYNNE / RIAZ    DD:  02/01/2018 19:31:44  DT:  02/01/2018 20:47:53    D#:  [id number]  Job#:  [id number]

## 2018-02-02 NOTE — DISCHARGE SUMMARY
DATE OF ADMISSION:  02/01/2018    DATE OF DISCHARGE:  02/02/2018    ADMISSION DIAGNOSIS:  Bleeding wound, abdomen.    DISCHARGE DIAGNOSIS:  Bleeding wound, abdomen.    PROCEDURES DURING THIS HOSPITALIZATION:  Closure of complex wound of abdomen.    HISTORY OF PRESENT ILLNESS AND HOSPITAL COURSE:  Patient is a 37-year-old   white male who has had a complex medical history stemming from renal failure.    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  He has   significant varices in the abdominal wall.  He has had bleeding from this in   the past.  Last night, he had extensive bleeding from a ruptured varix, came   to the emergency room, I saw the patient, took him immediately to the   operating room where the wound was cleaned and the bleeders tied off and the   wound was closed.  Postoperatively, the patient did well.  On the morning   after surgery, his pain was controlled with oral pain medications.  There was   no further bleeding.  I think it is safe to allow the patient to go home.  He   will need to be dialyzed.  Follow up in my office in about a week, obviously   we will see him sooner if any questions or concerns.  He can have his normal   diet.  He is able to be discharged with all of his preoperative medications.       ____________________________________     MD YOVANI LYNNE / RIAZ    DD:  02/02/2018 07:34:45  DT:  02/02/2018 14:56:15    D#:  [id number]  Job#:  [id number]

## 2018-02-02 NOTE — CARE PLAN
Problem: Pain Management  Goal: Pain level will decrease to patient's comfort goal  Outcome: PROGRESSING AS EXPECTED  Pain assessed and medicated per MAR. MD also contacted regarding patient's home medications for chronic pain. All orders received.    Problem: Communication  Goal: The ability to communicate needs accurately and effectively will improve  Outcome: PROGRESSING AS EXPECTED  Education provided on importance of using call light to alert staff of patient needs. Pt demonstrates understanding by using call light appropriately.

## 2018-02-02 NOTE — PROGRESS NOTES
Pt arrived on unit around 2230 with transport via hospital bed. Family at bedside.  Assessment completed.  AA&Ox4. Pt is tachycardic with HR in low 100s. Pt also hypotensive with BP 91/58. MD aware.  SpO2 >92% on 2L NC.  Reporting 9/10 pain. Medicated per MAR.   Generalized varices noted throughout chest and abdomen.  Surgical incision to right lower abdomen. Dressing intact. Slight drainage noted.  Renal diet. Denies N/V at this time.   Hemodialysis cath noted to right groin. Pt is anuric.  LBM PTA.  Pt oriented to room, unit, and introduced to staff.  All needs met at this time. Call light within reach. Pt calls appropriately.

## 2018-02-02 NOTE — PROGRESS NOTES
Pt A&Ox4, sitting up in bed. C/o pain to abdomen, medicated per MAR. Pt has dialysis cath to right groin. Dressing to right abdomen CDI, no s/s bleeding. Planning for d/c this AM. Pt states he does dialysis at home and will preform dialysis when he goes home today. Safety education provided. Bed locked in low. Call light within reach. Hourly rounding in place. Encouraged pt to call for assistance.

## 2018-02-02 NOTE — PROGRESS NOTES
"Blood pressure (!) 93/26, pulse 87, temperature 36.1 °C (96.9 °F), resp. rate 16, height 1.626 m (5' 4\"), weight 78.9 kg (173 lb 15.1 oz), SpO2 100 %.    I/O last 3 completed shifts:  In: 620 [P.O.:220; I.V.:400]  Out: 50   Pt well  No further bleed Wound OK   OK for DC Home   FU my office next week  "

## 2018-02-02 NOTE — PROGRESS NOTES
2 RN skin check complete. Surgical incision to right abdomen with dressing noted. Excoriation to right abdomen noted from previous surgery. Bilateral toes dry and flaky. No other notable findings.       Patient refused use of bed alarm despite receiving education regarding safety. Treaded slippers in place and bed in lowest position. Reinforced proper use of call light prior to ambulation. Patient verbalized understanding and uses call light appropriately.

## 2018-02-02 NOTE — PROGRESS NOTES
Pt discharged with RN in W/C. Prescriptions were given x 0. D/C instructions signed and placed in chart. Chart given to U/C. Charge notified. Medications from pts drawer returned to pharmacy. Controlled substance use informed consent complete.

## 2018-02-02 NOTE — ED NOTES
Pt states MD Rosenbaum has been into see him. Report given to pre-op Fady DAVIDSON, she will place pt on transport list. Pt states his last intake was a small bowl of cereal at 1000.

## 2018-02-02 NOTE — ED TRIAGE NOTES
Pt arrived via [facility] from home. Pt has plastic surgery to [facility] in July 2017 (states panis removed). Pt states since surgery intermittent post op bleeding to site. Pt states bleeding started this past Sat. But has been able to get it to stop with pressure bandage. Pt states today bleeding started again and it won't stop.     Pt has pressure applied to site. Plastic surgeon MD Rosenbaum, states he knows he is in the ED.     A/o x4, speaking in full sentences.

## 2018-02-02 NOTE — RESPIRATORY CARE
COPD EDUCATION by COPD CLINICAL EDUCATOR  2/2/2018 at 9:21 AM by Julissa Tovar     Patient reviewed by COPD education team. Patient does not qualify for COPD program.

## 2018-02-02 NOTE — OR SURGEON
Immediate Post OP Note    PreOp Diagnosis: Bleeding open wound    PostOp Diagnosis: same    Procedure(s):  WOUND EXPLORATION GENERAL, REPAIR BLEEDING - Wound Class: Contaminated    Surgeon(s):  Samson Rosenbaum Jr., M.D.    Anesthesiologist/Type of Anesthesia:  Anesthesiologist: Daniel Hess M.D./General    Surgical Staff:  Circulator: Delma Subramanian, R.NWaldo; Dipti Yee R.NWaldo  Scrub Person: Celsa Berg; Demetria Malone; Dipti Beard    Specimens:  * No specimens in log *    Estimated Blood Loss: 25 cc    Findings: bleeding abdominal wall varix    Complications:0        2/1/2018 9:03 PM Samson Rosenbaum Jr.

## 2018-02-02 NOTE — OP REPORT
DATE OF SERVICE:  02/01/2018    PREOPERATIVE DIAGNOSIS:  Bleeding open wound.    POSTOPERATIVE DIAGNOSIS:  Bleeding open wound.    PROCEDURE:  Closure of complex bleeding wound of abdomen.    SURGEON:  Samson Rosenbaum MD    ANESTHESIOLOGIST:  Daniel Hess MD    INDICATION FOR PROCEDURE:  The patient is a 37-year-old white male who is well   known to me.  He has had a complex wound on his abdomen that has been   granulating for several months.  He has had off and on bleeding from abdominal   varices.  Tonight he had an extensive bleed that was uncontrollable.  I told   him he needs to come to the emergency room.  I saw him there and we planned to   take him to the operating room.  Patient was well informed of the risks,   benefits and alternatives and participated in the decision to proceed with   surgery.    DESCRIPTION OF PROCEDURE:  The patient was marked preoperatively in the   holding area, taken to the operating room and under general anesthesia, was   prepped and draped over the abdomen.  The wound bled significantly when   pressure was removed and kept pressure over the area to again identify the   area that was bleeding.  This was an abdominal varix that had broken open.  A   2-0 nylon was used to place a stitch around this and this controlled the   bleeding in this area.  The wound was then cleaned off using a 10 blade as   best I could.  There were numerous areas that I began to bleed when this was   cleaned off of superficial tissue.  Additional stitches of 2-0 nylon were then   placed across the wound to bring the wound together and finally a #2 nylon   was used to stitch the entire area to hold it securely.  The bleeding was   completely stopped and the wound itself had been closed down following this.    Sterile dressings were then applied.  The patient tolerated the procedure   well.  Total length of the wound was 8 cm.  He was taken to the recovery room   in stable condition.  Needle, sponge, and  instrument counts were correct.       ____________________________________     MD YOVANI LYNNE / RIAZ    DD:  02/01/2018 21:13:34  DT:  02/01/2018 21:47:51    D#:  [id number]  Job#:  [id number]

## 2018-02-02 NOTE — ED NOTES
Pt states he last took coumadin a couple of days ago, states he stopped taking the medication when the bleeding began. Blood has been collected and sent.

## 2018-02-19 ENCOUNTER — HOSPITAL ENCOUNTER (INPATIENT)
Facility: MEDICAL CENTER | Age: 38
LOS: 11 days | DRG: 856 | End: 2018-03-02
Attending: EMERGENCY MEDICINE | Admitting: INTERNAL MEDICINE
Payer: MEDICARE

## 2018-02-19 ENCOUNTER — RESOLUTE PROFESSIONAL BILLING HOSPITAL PROF FEE (OUTPATIENT)
Dept: HOSPITALIST | Facility: MEDICAL CENTER | Age: 38
End: 2018-02-19
Payer: MEDICARE

## 2018-02-19 DIAGNOSIS — S31.109S OPEN WOUND ANTERIOR ABDOMINAL WALL, SEQUELA: ICD-10-CM

## 2018-02-19 DIAGNOSIS — T81.89XS NON-HEALING SURGICAL WOUND, SEQUELA: ICD-10-CM

## 2018-02-19 DIAGNOSIS — Z99.2 ANEMIA IN CHRONIC KIDNEY DISEASE, ON CHRONIC DIALYSIS (HCC): ICD-10-CM

## 2018-02-19 DIAGNOSIS — G89.4 PAIN SYNDROME, CHRONIC: ICD-10-CM

## 2018-02-19 DIAGNOSIS — D63.1 ANEMIA IN CHRONIC KIDNEY DISEASE, ON CHRONIC DIALYSIS (HCC): ICD-10-CM

## 2018-02-19 DIAGNOSIS — N18.9 CHRONIC RENAL FAILURE, UNSPECIFIED CKD STAGE: ICD-10-CM

## 2018-02-19 DIAGNOSIS — N18.6 ANEMIA IN CHRONIC KIDNEY DISEASE, ON CHRONIC DIALYSIS (HCC): ICD-10-CM

## 2018-02-19 PROBLEM — D68.318 HEMORRHAGIC DISORDER DUE TO CIRCULATING ANTICOAGULANTS (HCC): Status: ACTIVE | Noted: 2018-02-19

## 2018-02-19 LAB
ABO GROUP BLD: NORMAL
ALBUMIN SERPL BCP-MCNC: 4.2 G/DL (ref 3.2–4.9)
ALBUMIN/GLOB SERPL: 1 G/DL
ALP SERPL-CCNC: 196 U/L (ref 30–99)
ALT SERPL-CCNC: 8 U/L (ref 2–50)
ANION GAP SERPL CALC-SCNC: 18 MMOL/L (ref 0–11.9)
ANISOCYTOSIS BLD QL SMEAR: ABNORMAL
APTT PPP: 139.7 SEC (ref 24.7–36)
AST SERPL-CCNC: 9 U/L (ref 12–45)
BARCODED ABORH UBTYP: 5100
BARCODED ABORH UBTYP: 5100
BARCODED ABORH UBTYP: 7300
BARCODED ABORH UBTYP: 9500
BARCODED PRD CODE UBPRD: NORMAL
BARCODED UNIT NUM UBUNT: NORMAL
BASOPHILS # BLD AUTO: 0.7 % (ref 0–1.8)
BASOPHILS # BLD: 0.07 K/UL (ref 0–0.12)
BILIRUB SERPL-MCNC: 0.6 MG/DL (ref 0.1–1.5)
BLD GP AB SCN SERPL QL: NORMAL
BUN SERPL-MCNC: 72 MG/DL (ref 8–22)
CALCIUM SERPL-MCNC: 8.2 MG/DL (ref 8.5–10.5)
CHLORIDE SERPL-SCNC: 87 MMOL/L (ref 96–112)
CO2 SERPL-SCNC: 26 MMOL/L (ref 20–33)
COMMENT 1642: NORMAL
COMPONENT FT 8504FT: NORMAL
COMPONENT FT 8504FT: NORMAL
COMPONENT R 8504R: NORMAL
COMPONENT R 8504R: NORMAL
CREAT SERPL-MCNC: 11.22 MG/DL (ref 0.5–1.4)
EOSINOPHIL # BLD AUTO: 0.11 K/UL (ref 0–0.51)
EOSINOPHIL NFR BLD: 1.1 % (ref 0–6.9)
ERYTHROCYTE [DISTWIDTH] IN BLOOD BY AUTOMATED COUNT: 60.3 FL (ref 35.9–50)
GLOBULIN SER CALC-MCNC: 4.1 G/DL (ref 1.9–3.5)
GLUCOSE BLD-MCNC: 110 MG/DL (ref 65–99)
GLUCOSE SERPL-MCNC: 97 MG/DL (ref 65–99)
HCT VFR BLD AUTO: 28.8 % (ref 42–52)
HGB BLD-MCNC: 8.5 G/DL (ref 14–18)
IMM GRANULOCYTES # BLD AUTO: 0.09 K/UL (ref 0–0.11)
IMM GRANULOCYTES NFR BLD AUTO: 0.9 % (ref 0–0.9)
INR PPP: 6.32 (ref 0.87–1.13)
LIPASE SERPL-CCNC: 13 U/L (ref 11–82)
LYMPHOCYTES # BLD AUTO: 1.13 K/UL (ref 1–4.8)
LYMPHOCYTES NFR BLD: 11.7 % (ref 22–41)
MCH RBC QN AUTO: 27.2 PG (ref 27–33)
MCHC RBC AUTO-ENTMCNC: 29.5 G/DL (ref 33.7–35.3)
MCV RBC AUTO: 92 FL (ref 81.4–97.8)
MONOCYTES # BLD AUTO: 0.48 K/UL (ref 0–0.85)
MONOCYTES NFR BLD AUTO: 5 % (ref 0–13.4)
MORPHOLOGY BLD-IMP: NORMAL
NEUTROPHILS # BLD AUTO: 7.74 K/UL (ref 1.82–7.42)
NEUTROPHILS NFR BLD: 80.6 % (ref 44–72)
NRBC # BLD AUTO: 0 K/UL
NRBC BLD-RTO: 0 /100 WBC
PLATELET # BLD AUTO: 224 K/UL (ref 164–446)
PLATELET BLD QL SMEAR: NORMAL
PMV BLD AUTO: 10.3 FL (ref 9–12.9)
POTASSIUM SERPL-SCNC: 4.4 MMOL/L (ref 3.6–5.5)
PRODUCT TYPE UPROD: NORMAL
PROT SERPL-MCNC: 8.3 G/DL (ref 6–8.2)
PROTHROMBIN TIME: 55.7 SEC (ref 12–14.6)
RBC # BLD AUTO: 3.13 M/UL (ref 4.7–6.1)
RBC BLD AUTO: PRESENT
RH BLD: NORMAL
SODIUM SERPL-SCNC: 131 MMOL/L (ref 135–145)
UNIT STATUS USTAT: NORMAL
WBC # BLD AUTO: 9.6 K/UL (ref 4.8–10.8)

## 2018-02-19 PROCEDURE — 501838 HCHG SUTURE GENERAL: Performed by: PLASTIC SURGERY

## 2018-02-19 PROCEDURE — 160038 HCHG SURGERY MINUTES - EA ADDL 1 MIN LEVEL 2: Performed by: PLASTIC SURGERY

## 2018-02-19 PROCEDURE — 700111 HCHG RX REV CODE 636 W/ 250 OVERRIDE (IP)

## 2018-02-19 PROCEDURE — 82962 GLUCOSE BLOOD TEST: CPT

## 2018-02-19 PROCEDURE — 36430 TRANSFUSION BLD/BLD COMPNT: CPT

## 2018-02-19 PROCEDURE — 99291 CRITICAL CARE FIRST HOUR: CPT

## 2018-02-19 PROCEDURE — 86901 BLOOD TYPING SEROLOGIC RH(D): CPT

## 2018-02-19 PROCEDURE — 160048 HCHG OR STATISTICAL LEVEL 1-5: Performed by: PLASTIC SURGERY

## 2018-02-19 PROCEDURE — 86850 RBC ANTIBODY SCREEN: CPT

## 2018-02-19 PROCEDURE — 05LY3ZZ OCCLUSION OF UPPER VEIN, PERCUTANEOUS APPROACH: ICD-10-PCS | Performed by: PLASTIC SURGERY

## 2018-02-19 PROCEDURE — A6223 GAUZE >16<=48 NO W/SAL W/O B: HCPCS | Performed by: PLASTIC SURGERY

## 2018-02-19 PROCEDURE — 500423 HCHG DRESSING, ABD COMBINE: Performed by: PLASTIC SURGERY

## 2018-02-19 PROCEDURE — 5A1D70Z PERFORMANCE OF URINARY FILTRATION, INTERMITTENT, LESS THAN 6 HOURS PER DAY: ICD-10-PCS | Performed by: INTERNAL MEDICINE

## 2018-02-19 PROCEDURE — 0JB80ZZ EXCISION OF ABDOMEN SUBCUTANEOUS TISSUE AND FASCIA, OPEN APPROACH: ICD-10-PCS | Performed by: PLASTIC SURGERY

## 2018-02-19 PROCEDURE — 160035 HCHG PACU - 1ST 60 MINS PHASE I: Performed by: PLASTIC SURGERY

## 2018-02-19 PROCEDURE — 160002 HCHG RECOVERY MINUTES (STAT): Performed by: PLASTIC SURGERY

## 2018-02-19 PROCEDURE — 160003 HCHG RECOVERY MINUTES (EXTENDED) STAT: Performed by: PLASTIC SURGERY

## 2018-02-19 PROCEDURE — 160027 HCHG SURGERY MINUTES - 1ST 30 MINS LEVEL 2: Performed by: PLASTIC SURGERY

## 2018-02-19 PROCEDURE — 83690 ASSAY OF LIPASE: CPT

## 2018-02-19 PROCEDURE — 80053 COMPREHEN METABOLIC PANEL: CPT

## 2018-02-19 PROCEDURE — 700102 HCHG RX REV CODE 250 W/ 637 OVERRIDE(OP)

## 2018-02-19 PROCEDURE — 85730 THROMBOPLASTIN TIME PARTIAL: CPT

## 2018-02-19 PROCEDURE — 86900 BLOOD TYPING SEROLOGIC ABO: CPT

## 2018-02-19 PROCEDURE — 500064 HCHG BINDER, 4-PANEL MED/LG: Performed by: PLASTIC SURGERY

## 2018-02-19 PROCEDURE — 700105 HCHG RX REV CODE 258: Performed by: INTERNAL MEDICINE

## 2018-02-19 PROCEDURE — 96375 TX/PRO/DX INJ NEW DRUG ADDON: CPT

## 2018-02-19 PROCEDURE — 700102 HCHG RX REV CODE 250 W/ 637 OVERRIDE(OP): Performed by: INTERNAL MEDICINE

## 2018-02-19 PROCEDURE — A9270 NON-COVERED ITEM OR SERVICE: HCPCS | Performed by: INTERNAL MEDICINE

## 2018-02-19 PROCEDURE — P9017 PLASMA 1 DONOR FRZ W/IN 8 HR: HCPCS

## 2018-02-19 PROCEDURE — 160009 HCHG ANES TIME/MIN: Performed by: PLASTIC SURGERY

## 2018-02-19 PROCEDURE — A9270 NON-COVERED ITEM OR SERVICE: HCPCS

## 2018-02-19 PROCEDURE — 700111 HCHG RX REV CODE 636 W/ 250 OVERRIDE (IP): Performed by: EMERGENCY MEDICINE

## 2018-02-19 PROCEDURE — 90935 HEMODIALYSIS ONE EVALUATION: CPT

## 2018-02-19 PROCEDURE — 99223 1ST HOSP IP/OBS HIGH 75: CPT | Mod: AI | Performed by: INTERNAL MEDICINE

## 2018-02-19 PROCEDURE — 700101 HCHG RX REV CODE 250

## 2018-02-19 PROCEDURE — 160036 HCHG PACU - EA ADDL 30 MINS PHASE I: Performed by: PLASTIC SURGERY

## 2018-02-19 PROCEDURE — 85610 PROTHROMBIN TIME: CPT

## 2018-02-19 PROCEDURE — 96374 THER/PROPH/DIAG INJ IV PUSH: CPT

## 2018-02-19 PROCEDURE — 83036 HEMOGLOBIN GLYCOSYLATED A1C: CPT

## 2018-02-19 PROCEDURE — 30233L1 TRANSFUSION OF NONAUTOLOGOUS FRESH PLASMA INTO PERIPHERAL VEIN, PERCUTANEOUS APPROACH: ICD-10-PCS | Performed by: ANESTHESIOLOGY

## 2018-02-19 PROCEDURE — 36415 COLL VENOUS BLD VENIPUNCTURE: CPT

## 2018-02-19 PROCEDURE — 770020 HCHG ROOM/CARE - TELE (206)

## 2018-02-19 PROCEDURE — 85025 COMPLETE CBC W/AUTO DIFF WBC: CPT

## 2018-02-19 PROCEDURE — A6404 STERILE GAUZE > 48 SQ IN: HCPCS | Performed by: PLASTIC SURGERY

## 2018-02-19 RX ORDER — ONDANSETRON 2 MG/ML
4 INJECTION INTRAMUSCULAR; INTRAVENOUS EVERY 4 HOURS PRN
Status: DISCONTINUED | OUTPATIENT
Start: 2018-02-19 | End: 2018-03-02 | Stop reason: HOSPADM

## 2018-02-19 RX ORDER — SEVELAMER CARBONATE 800 MG/1
3200 TABLET, FILM COATED ORAL
Status: DISCONTINUED | OUTPATIENT
Start: 2018-02-19 | End: 2018-03-02 | Stop reason: HOSPADM

## 2018-02-19 RX ORDER — GABAPENTIN 300 MG/1
600 CAPSULE ORAL 3 TIMES DAILY
Status: DISCONTINUED | OUTPATIENT
Start: 2018-02-19 | End: 2018-03-02 | Stop reason: HOSPADM

## 2018-02-19 RX ORDER — ACETAMINOPHEN 325 MG/1
650 TABLET ORAL EVERY 6 HOURS PRN
Status: DISCONTINUED | OUTPATIENT
Start: 2018-02-19 | End: 2018-03-02 | Stop reason: HOSPADM

## 2018-02-19 RX ORDER — DEXTROSE MONOHYDRATE 25 G/50ML
25 INJECTION, SOLUTION INTRAVENOUS
Status: DISCONTINUED | OUTPATIENT
Start: 2018-02-19 | End: 2018-03-02 | Stop reason: HOSPADM

## 2018-02-19 RX ORDER — PHYTONADIONE 5 MG/1
5 TABLET ORAL ONCE
Status: COMPLETED | OUTPATIENT
Start: 2018-02-19 | End: 2018-02-19

## 2018-02-19 RX ORDER — TIZANIDINE 4 MG/1
8 TABLET ORAL
Status: DISCONTINUED | OUTPATIENT
Start: 2018-02-19 | End: 2018-02-20

## 2018-02-19 RX ORDER — HALOPERIDOL 5 MG/ML
INJECTION INTRAMUSCULAR
Status: COMPLETED
Start: 2018-02-19 | End: 2018-02-19

## 2018-02-19 RX ORDER — AMOXICILLIN 250 MG
2 CAPSULE ORAL 2 TIMES DAILY
Status: DISCONTINUED | OUTPATIENT
Start: 2018-02-19 | End: 2018-03-02 | Stop reason: HOSPADM

## 2018-02-19 RX ORDER — PROMETHAZINE HYDROCHLORIDE 25 MG/1
12.5-25 TABLET ORAL EVERY 4 HOURS PRN
Status: DISCONTINUED | OUTPATIENT
Start: 2018-02-19 | End: 2018-03-02 | Stop reason: HOSPADM

## 2018-02-19 RX ORDER — HEPARIN SODIUM 1000 [USP'U]/ML
INJECTION, SOLUTION INTRAVENOUS; SUBCUTANEOUS
Status: COMPLETED
Start: 2018-02-19 | End: 2018-02-19

## 2018-02-19 RX ORDER — LEVETIRACETAM 500 MG/1
250 TABLET ORAL 2 TIMES DAILY
Status: DISCONTINUED | OUTPATIENT
Start: 2018-02-19 | End: 2018-02-23

## 2018-02-19 RX ORDER — CINACALCET 30 MG/1
60 TABLET, FILM COATED ORAL EVERY EVENING
Status: DISCONTINUED | OUTPATIENT
Start: 2018-02-19 | End: 2018-03-02 | Stop reason: HOSPADM

## 2018-02-19 RX ORDER — MORPHINE SULFATE 4 MG/ML
4 INJECTION, SOLUTION INTRAMUSCULAR; INTRAVENOUS ONCE
Status: COMPLETED | OUTPATIENT
Start: 2018-02-19 | End: 2018-02-19

## 2018-02-19 RX ORDER — ONDANSETRON 2 MG/ML
4 INJECTION INTRAMUSCULAR; INTRAVENOUS ONCE
Status: COMPLETED | OUTPATIENT
Start: 2018-02-19 | End: 2018-02-19

## 2018-02-19 RX ORDER — LORAZEPAM 1 MG/1
1 TABLET ORAL
Status: DISCONTINUED | OUTPATIENT
Start: 2018-02-19 | End: 2018-03-02 | Stop reason: HOSPADM

## 2018-02-19 RX ORDER — OXYCODONE HYDROCHLORIDE 5 MG/1
5 TABLET ORAL
Status: DISCONTINUED | OUTPATIENT
Start: 2018-02-19 | End: 2018-03-02 | Stop reason: HOSPADM

## 2018-02-19 RX ORDER — LABETALOL HYDROCHLORIDE 5 MG/ML
10 INJECTION, SOLUTION INTRAVENOUS EVERY 4 HOURS PRN
Status: DISCONTINUED | OUTPATIENT
Start: 2018-02-19 | End: 2018-03-02 | Stop reason: HOSPADM

## 2018-02-19 RX ORDER — SODIUM CHLORIDE 9 MG/ML
INJECTION, SOLUTION INTRAVENOUS CONTINUOUS
Status: DISCONTINUED | OUTPATIENT
Start: 2018-02-19 | End: 2018-03-02 | Stop reason: HOSPADM

## 2018-02-19 RX ORDER — ONDANSETRON 4 MG/1
4 TABLET, ORALLY DISINTEGRATING ORAL EVERY 4 HOURS PRN
Status: DISCONTINUED | OUTPATIENT
Start: 2018-02-19 | End: 2018-03-02 | Stop reason: HOSPADM

## 2018-02-19 RX ORDER — PROMETHAZINE HYDROCHLORIDE 25 MG/1
12.5-25 SUPPOSITORY RECTAL EVERY 4 HOURS PRN
Status: DISCONTINUED | OUTPATIENT
Start: 2018-02-19 | End: 2018-03-02 | Stop reason: HOSPADM

## 2018-02-19 RX ORDER — MIDAZOLAM HYDROCHLORIDE 1 MG/ML
INJECTION INTRAMUSCULAR; INTRAVENOUS
Status: COMPLETED
Start: 2018-02-19 | End: 2018-02-19

## 2018-02-19 RX ORDER — POLYETHYLENE GLYCOL 3350 17 G/17G
1 POWDER, FOR SOLUTION ORAL
Status: DISCONTINUED | OUTPATIENT
Start: 2018-02-19 | End: 2018-03-02 | Stop reason: HOSPADM

## 2018-02-19 RX ORDER — OXYCODONE HYDROCHLORIDE 10 MG/1
10 TABLET ORAL
Status: DISCONTINUED | OUTPATIENT
Start: 2018-02-19 | End: 2018-03-02 | Stop reason: HOSPADM

## 2018-02-19 RX ORDER — BISACODYL 10 MG
10 SUPPOSITORY, RECTAL RECTAL
Status: DISCONTINUED | OUTPATIENT
Start: 2018-02-19 | End: 2018-03-02 | Stop reason: HOSPADM

## 2018-02-19 RX ORDER — MORPHINE SULFATE 4 MG/ML
4 INJECTION, SOLUTION INTRAMUSCULAR; INTRAVENOUS
Status: DISCONTINUED | OUTPATIENT
Start: 2018-02-19 | End: 2018-03-02 | Stop reason: HOSPADM

## 2018-02-19 RX ADMIN — HYDROMORPHONE HYDROCHLORIDE 0.25 MG: 10 INJECTION, SOLUTION INTRAMUSCULAR; INTRAVENOUS; SUBCUTANEOUS at 15:30

## 2018-02-19 RX ADMIN — CALCITRIOL 0.75 MCG: 0.25 CAPSULE, LIQUID FILLED ORAL at 22:46

## 2018-02-19 RX ADMIN — HEPARIN SODIUM 37000 UNITS: 1000 INJECTION, SOLUTION INTRAVENOUS; SUBCUTANEOUS at 21:58

## 2018-02-19 RX ADMIN — HALOPERIDOL LACTATE 1 MG: 5 INJECTION, SOLUTION INTRAMUSCULAR at 15:45

## 2018-02-19 RX ADMIN — MORPHINE SULFATE 4 MG: 4 INJECTION INTRAVENOUS at 13:19

## 2018-02-19 RX ADMIN — GABAPENTIN 600 MG: 300 CAPSULE ORAL at 22:45

## 2018-02-19 RX ADMIN — ONDANSETRON HYDROCHLORIDE 4 MG: 2 INJECTION, SOLUTION INTRAMUSCULAR; INTRAVENOUS at 13:19

## 2018-02-19 RX ADMIN — FENTANYL CITRATE 50 MCG: 50 INJECTION, SOLUTION INTRAMUSCULAR; INTRAVENOUS at 15:05

## 2018-02-19 RX ADMIN — MIDAZOLAM 1 MG: 1 INJECTION INTRAMUSCULAR; INTRAVENOUS at 15:35

## 2018-02-19 RX ADMIN — SODIUM CHLORIDE: 9 INJECTION, SOLUTION INTRAVENOUS at 22:54

## 2018-02-19 RX ADMIN — HYDROMORPHONE HYDROCHLORIDE 0.25 MG: 10 INJECTION, SOLUTION INTRAMUSCULAR; INTRAVENOUS; SUBCUTANEOUS at 15:20

## 2018-02-19 RX ADMIN — MIDAZOLAM 1 MG: 1 INJECTION INTRAMUSCULAR; INTRAVENOUS at 15:15

## 2018-02-19 RX ADMIN — SEVELAMER CARBONATE 3200 MG: 800 TABLET, FILM COATED ORAL at 22:48

## 2018-02-19 RX ADMIN — FENTANYL CITRATE 50 MCG: 50 INJECTION, SOLUTION INTRAMUSCULAR; INTRAVENOUS at 15:10

## 2018-02-19 RX ADMIN — OXYCODONE HYDROCHLORIDE 10 MG: 10 TABLET ORAL at 22:50

## 2018-02-19 RX ADMIN — TIZANIDINE HYDROCHLORIDE 8 MG: 4 TABLET ORAL at 22:47

## 2018-02-19 RX ADMIN — PHYTONADIONE 5 MG: 5 TABLET ORAL at 22:53

## 2018-02-19 RX ADMIN — LEVETIRACETAM 250 MG: 500 TABLET, FILM COATED ORAL at 22:45

## 2018-02-19 ASSESSMENT — PAIN SCALES - WONG BAKER
WONGBAKER_NUMERICALRESPONSE: HURTS AS MUCH AS POSSIBLE

## 2018-02-19 ASSESSMENT — COGNITIVE AND FUNCTIONAL STATUS - GENERAL
MOBILITY SCORE: 18
EATING MEALS: A LITTLE
DRESSING REGULAR LOWER BODY CLOTHING: A LITTLE
DAILY ACTIVITIY SCORE: 18
HELP NEEDED FOR BATHING: A LITTLE
DRESSING REGULAR UPPER BODY CLOTHING: A LITTLE
STANDING UP FROM CHAIR USING ARMS: A LITTLE
SUGGESTED CMS G CODE MODIFIER MOBILITY: CK
PERSONAL GROOMING: A LITTLE
WALKING IN HOSPITAL ROOM: A LITTLE
SUGGESTED CMS G CODE MODIFIER DAILY ACTIVITY: CK
MOVING TO AND FROM BED TO CHAIR: A LITTLE
MOVING FROM LYING ON BACK TO SITTING ON SIDE OF FLAT BED: A LITTLE
TOILETING: A LITTLE
CLIMB 3 TO 5 STEPS WITH RAILING: A LITTLE
TURNING FROM BACK TO SIDE WHILE IN FLAT BAD: A LITTLE

## 2018-02-19 ASSESSMENT — PAIN SCALES - GENERAL: PAINLEVEL_OUTOF10: 10

## 2018-02-19 NOTE — ED PROVIDER NOTES
ED Provider Note    CHIEF COMPLAINT  Chief Complaint   Patient presents with   • Open Wound     PT had sx about 2 weeks ago and rolled over in bed and abd incision opened. Abd is bleeding, but controlled with pressure. Pt states when pressure removed it will squirt across room. PT was laying in about 100 cc of blood when EMS arrived. Pt has been npo since yesterday. no s/s of infection       HPI  Denis De Anda is a 37 y.o. male who presents for evaluation of bleeding from an abdominal wall wound. The patient is followed by Dr. Samson Rosenbaum of plastic surgery. 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 This is resulted in the patient developing chest and abdominal wall very sees. 2 half weeks ago the patient was taken back to the operating room for bleeding from this site. Over the past 3 days he's been having some bleeding and then today he went to sit up and had significant bleeding. He is coming in for evaluation of continued abdominal wall bleeding.    REVIEW OF SYSTEMS  See HPI for further details. All other systems are negative.     PAST MEDICAL HISTORY  Past Medical History:   Diagnosis Date   • Arrhythmia     irregular EKG   • Chronic kidney disease, unspecified    • Contracture of palmar fascia    • Dialysis      hemodialysis at home 5 days a week   • Hemorrhagic disorder (CMS-HCC)     on coumadin   • Hypertension    • Pain     Chronic pain   • Renal failure     hemodialysis   • Seizure (CMS-HCC)     last seizure 04/1/2016   • Sleep apnea     BIPAP   • Toxic uninodular goiter without mention of thyrotoxic crisis or storm    • Urinary incontinence        FAMILY HISTORY  Family History   Problem Relation Age of Onset   • Arthritis Father      RA   • Lung Disease Father    • Heart Disease Father      MI   • Hypertension Brother        SOCIAL HISTORY  Social History     Social History   • Marital status: Single     Spouse name: N/A   • Number of  children: N/A   • Years of education: N/A     Social History Main Topics   • Smoking status: Never Smoker   • Smokeless tobacco: Never Used   • Alcohol use No   • Drug use: No   • Sexual activity: Yes     Partners: Female     Other Topics Concern   • Not on file     Social History Narrative   • No narrative on file       SURGICAL HISTORY  Past Surgical History:   Procedure Laterality Date   • WOUND EXPLORATION GENERAL  2/1/2018    Procedure: WOUND EXPLORATION GENERAL, REPAIR BLEEDING;  Surgeon: Samson Rosenbaum Jr., M.D.;  Location: [facility];  Service: Plastics   • CATH PLACEMENT Right 11/14/2017    Procedure: CATH PLACEMENT - PERMA;  Surgeon: Jairo Hopkins M.D.;  Location: SURGERY [facility];  Service: General   • AV FISTULA REVISION Left 11/14/2017    Procedure: AV GRAFT REVISION;  Surgeon: Jairo Hopkins M.D.;  Location: [facility];  Service: General   • IRRIGATION & DEBRIDEMENT GENERAL  7/31/2017    Procedure: IRRIGATION & DEBRIDEMENT GENERAL-ABDOMEN;  Surgeon: Samson Rosenbaum Jr., M.D.;  Location: [facility];  Service:    • ABDOMINOPLASTY  6/5/2017    Procedure: ABDOMINOPLASTY - FOR PANNICULECTOMY;  Surgeon: Samson Rosenbaum Jr., M.D.;  Location: [facility];  Service:    • SPINAL CORD STIMULATOR N/A 5/4/2017    Procedure: SPINAL CORD STIMULATOR - EXPLANT;  Surgeon: Bin Pro M.D.;  Location: [facility];  Service:    • THROMBECTOMY Left 1/6/2017    Procedure: THROMBECTOMY-OPEN THROMBECTOMY WITH LEFT THIGH GRAFT;  Surgeon: Jairo Hopkins M.D.;  Location: [facility];  Service:    • CATH PLACEMENT Right 1/6/2017    Procedure: CATH PLACEMENT;  Surgeon: Jairo Hopkins M.D.;  Location: [facility];  Service:    • THROMBECTOMY Left 1/5/2017    Procedure: THROMBECTOMY-THIGH AV LOOP GRAFT AND ANGIOJET;  Surgeon: Jairo Hopkins M.D.;  Location: [facility];  Service:    • FLAP CLOSURE  Left 11/17/2016    Procedure: Fasciocutaneous Flap Closure Left Upper Leg;  Surgeon: Samson Rosenbaum Jr., M.D.;  Location: SURGERY [facility];  Service:    • IRRIGATION & DEBRIDEMENT GENERAL Left 10/28/2016    Procedure: IRRIGATION & DEBRIDEMENT GENERAL THIGH WITH IRRIGATING WOUND VAC PLACEMENT;  Surgeon: Jairo Hopkins M.D.;  Location: SURGERY [facility];  Service:    • THROMBECTOMY Left 10/20/2016    Procedure: THROMBECTOMY THIGH;  Surgeon: Jairo Hopkins M.D.;  Location: SURGERY [facility];  Service:    • INCISION AND DRAINAGE GENERAL  10/20/2016    Procedure: INCISION AND DRAINAGE GENERAL HEMATOMA;  Surgeon: Jairo Hopkins M.D.;  Location: SURGERY [facility];  Service:    • THROMBECTOMY Left 9/18/2016    Procedure: THROMBECTOMY - and fistula revision;  Surgeon: Jairo Hopkins M.D.;  Location: SURGERY [facility];  Service:    • AV FISTULOGRAM  9/18/2016    Procedure: AV FISTULOGRAM;  Surgeon: Jairo Hopkins M.D.;  Location: SURGERY [facility];  Service:    • CATH PLACEMENT Right 9/17/2016    Procedure: CATH PLACEMENT - tunneled dialysis cath placement right femoral ;  Surgeon: Quentin Alicia M.D.;  Location: SURGERY [facility];  Service:    • MASS EXCISION GENERAL Right 8/5/2016    Procedure: MASS EXCISION GENERAL FOR CALCIPHYLAXIS SKIN AND SUBCUTANEOUS TISSUE FOREARM;  Surgeon: Jairo Hopkins M.D.;  Location: SURGERY [facility];  Service:    • LESION EXCISION GENERAL Right 7/11/2016    Procedure: LESION EXCISION GENERAL FOR ARM SKIN;  Surgeon: Jairo Hopkins M.D.;  Location: SURGERY [facility];  Service:    • RECOVERY  7/8/2016    Procedure: IR1-VASCULAR CASE-HOPKINS-LEFT THIGH AV GRAFT THROMBOLYSIS WITH TISSUE PLASMINOGEN ACTIVATOR AND ANGIOJET ARTHERECTOMY   ;  Surgeon: Melinda Surgery;  Location: SURGERY PRE-POST PROC UNIT [facility];  Service:    • SPINAL CORD STIMULATOR N/A 3/25/2016    Procedure: SPINAL CORD STIMULATOR;  Surgeon: Bin Pro;   Location: SURGERY [facility];  Service:    • PB PERCUT IMPLNT NEUROELECT,EPIDURAL  2/19/2016    Procedure: IMPLANT NEUROSTIM EPI ARRAY;  Surgeon: Bin Pro;  Location: SURGERY [facility];  Service: Pain Management   • PB PERCUT IMPLNT NEUROELECT,EPIDURAL  2/19/2016    Procedure: IMPLANT NEUROSTIM EPI ARRAY;  Surgeon: Bin Pro;  Location: SURGERY [facility];  Service: Pain Management   • RECOVERY  8/7/2015    Procedure:  VASCULAR CASE VIANEY-RIGHT ILIAC VENOGRAM WITH ANGIOPLASTY, REMOVAL RIGHT FEMORAL TUNNELED DIALYSIS CATHETER  **VRE**;  Surgeon: Melinda Surgery;  Location: SURGERY PRE-POST PROC UNIT [facility];  Service:    • AV FISTULA REVISION Left 6/17/2015    Procedure: AV FISTULA REVISION;  Surgeon: Jairo Hopkins M.D.;  Location: SURGERY [facility];  Service:    • IRRIGATION & DEBRIDEMENT GENERAL Left 6/17/2015    Procedure: IRRIGATION & DEBRIDEMENT GENERAL ARM W/EXPLORATION WOUND & CONTROL BLEEDING;  Surgeon: Jairo Hopkins M.D.;  Location: SURGERY [facility];  Service:    • AV FISTULA THROMBOLYSIS Left 6/9/2015    Procedure: THROMBECTOMY AV GRAFT THIGH;  Surgeon: Jairo Hopkins M.D.;  Location: SURGERY [facility];  Service:    • AV FISTULA THROMBOLYSIS Left 6/3/2015    Procedure: AV FISTULA THROMBOLYSIS THIGH REPLACEMENT;  Surgeon: Jairo Hopkins M.D.;  Location: SURGERY [facility];  Service:    • IRRIGATION & DEBRIDEMENT GENERAL Left 6/3/2015    Procedure: IRRIGATION & DEBRIDEMENT GENERAL;  Surgeon: Jairo Hopkins M.D.;  Location: SURGERY [facility];  Service:    • AV FISTULA CREATION  4/20/2015    Performed by Jairo Hopkins M.D. at [facility]   • PB INJECT NERV BLCK,STELLATE GANGLION  3/24/2015    Performed by Bin Pro at [facility]   • AV FISTULA REVISION  3/20/2015    Performed by Jairo Hopkins M.D. at [facility]   • AV FISTULA REVISION  2/22/2015    Performed by Lurdes Moffett M.D. at  SURGERY [facility] ORS   • AV FISTULA REVISION  2/8/2015    Performed by Jairo Winters M.D. at SURGERY [facility] ORS   • IRRIGATION & DEBRIDEMENT GENERAL  12/15/2014    Performed by Jairo Winters M.D. at SURGERY [facility]   • AV FISTULA REVISION  9/30/2014    Performed by Jairo Winters M.D. at SURGERY [facility]   • RECOVERY  6/13/2014    Performed by Ir[facility]Recovery Surgery at SURGERY SAME DAY [facility] ORS   • INCISION AND DRAINAGE GENERAL  9/13/2013    Performed by Jairo Winters M.D. at SURGERY [facility] ORS   • DEBRIDEMENT  9/13/2013    Performed by Jairo Winters M.D. at SURGERY [facility]   • VEIN LIGATION  9/13/2013    Performed by Jairo Winters M.D. at SURGERY [facility]   • RECOVERY  5/18/2012    Performed by SURGERY, IR-RECOVERY at SURGERY [facility]   • BONE SPUR EXCISION  12/8/2011    Performed by BELKIS BREAUX at SURGERY SAME DAY [facility]   • AV FISTULA REVISION  11/3/2011    Performed by JAIRO WINTERS at SURGERY [facility] ORS   • AV FISTULOGRAM  6/5/2011    Performed by JAIRO WINTERS at SURGERY [facility]   • CATH PLACEMENT  6/5/2011    Performed by JAIRO WINTERS at SURGERY [facility]   • CATH PLACEMENT  2/1/2011    Performed by JAIRO WINTERS at SURGERY [facility]   • ANGIOPLASTY BALLOON  2/1/2011    Performed by JAIRO WINTERS at SURGERY [facility]   • ENDARTERECTOMY  11/16/2010    Performed by JAIRO WINTERS at SURGERY [facility] ORS   • AV FISTULOGRAM  11/16/2010    Performed by JAIRO WINTERS at SURGERY [facility] ORS   • ARTERIOGRAM  3/5/2009    Performed by JAIRO WINTERS at SURGERY [facility]   • ANGIOPLASTY BALLOON  3/5/2009    Performed by JAIRO WINTERS at SURGERY [facility] ORS   • AV FISTULOGRAM  3/5/2009    Performed by JAIRO WINTERS at SURGERY [facility]   • AV FISTULA CREATION  11/6/08    Performed by JAIRO WINTERS at SURGERY [facility]   • MASS  EXCISION ORTHO  10/9/08    Performed by BELKIS BREAUX at SURGERY SAME DAY [facility] ORS   • PARATHYROIDECTOMY  2006   • INGUINAL HERNIA REPAIR Bilateral 2001, 2002   • US-KIDNEY TRANSPLANT  1996, 2001    x2       CURRENT MEDICATIONS  Home Medications     Reviewed by Eleni Redmond R.N. (Registered Nurse) on 02/19/18 at 1200  Med List Status: Partial   Medication Last Dose Status   azithromycin (ZITHROMAX) 250 MG Tab Not Taking Active   calcitRIOL (ROCALTROL) 0.25 MCG CAPS 2/18/2018 Active   cinacalcet (SENSIPAR) 30 MG Tab 2/18/2018 Active   gabapentin (NEURONTIN) 600 MG tablet 2/18/2018 Active   HYDROcodone Bitartrate ER (HYSINGLA ER) 30 MG Tablet Extended Release 24 hour Abuse-Deterrent 2/18/2018 Active   hydrocodone/acetaminophen (NORCO)  MG Tab 2/1/2018 Active   levetiracetam (KEPPRA) 250 MG tablet 2/18/2018 Active   lorazepam (ATIVAN) 1 MG Tab not taking Active   Sevelamer Carbonate (RENVELA) 800 MG TABS 2/18/2018 Active   tizanidine (ZANAFLEX) 4 MG Tab 2/18/2018 Active   warfarin (COUMADIN) 2.5 MG Tab 2/18/2018 Active                ALLERGIES  Allergies   Allergen Reactions   • Baclofen Unspecified     Total loss of memory, sedation.   RXN=6/2015   • Contrast Media With Iodine [Iodine] Rash     RXN=1/5/2017   • Keflex Rash     RXN=possibly >10 years   • Pcn [Penicillins] Rash     RXN=possibly >10 years ago   • Tape Rash     Paper tape and tegaderm ok  RXN=ongoing       PHYSICAL EXAM  VITAL SIGNS: Reviewed.   Constitutional: Well developed, Well nourished.   HENT: Normocephalic, Atraumatic.   Eyes:  EOMI, Conjunctiva normal, No discharge.   Cardiovascular: Normal heart rate.   Thorax & Lungs: No respiratory distress.   Abdomen: There is no open wound to the right side of the abdomen. There is some large nylon suture in place. He has active bleeding from the medial aspect of this wound.  Skin: Warm, Dry.   Musculoskeletal: Good range of motion in all major joints.  Neurologic: Awake  alert.    COURSE & MEDICAL DECISION MAKING  Pertinent Labs & Imaging studies reviewed. (See chart for details)  Is a 37-year-old here for evaluation of bleeding from an abdominal wound. The patient has an IVC thrombus and therefore is on Coumadin and has developed disease as a result of thrombus. He has an open wound on his abdomen that is being treated by Dr. Rosenbaum and Dr. Robertson. Remove the dressing on exam he has a steady flow of what appears to be venous blood. I have the patient hold direct pressure on the site. IV is established and laboratories are obtained. The patient has a history of chronic renal failure and is a dialysis patient. He has an anemia due to his chronic renal failure. I discussed the case with Dr. Robertson and he is coming to evaluate the patient and will admit the patient and take him to the operating room for surgical intervention.    FINAL IMPRESSION  1. Open wound abdominal wall  2. Abdominal wall varices with active bleeding  3. Chronic renal failure         Electronically signed by: Aravind Laird, 2/19/2018 1:02 PM

## 2018-02-19 NOTE — ED NOTES
Attempted PIV placement and unsuccessful. Sent down one purple specimen. Sosa DAVIDSON to attempt at this time.

## 2018-02-19 NOTE — CONSULTS
DATE OF SERVICE:  02/19/2018    CHIEF COMPLAINT:  Bleeding from abdominal wall.    BRIEF HISTORY:  The patient is a 37-year-old gentleman with a very complicated   medical history.  The patient has an occluded IVC, which has created massive   abdominal wall varices.  The patient is also a renal failure patient,   receiving dialysis for this.  He has been a patient of Dr. Rosenbaum's where he has   been treating an abdominal wound.  This wound will intermittently bleed.    This wound is now bleeding again from the massive dilated varices.  I took a   call from the patient, asked him to present to the emergency department.  He   is now in the emergency department with active bleeding from the abdominal   wall.    PAST MEDICAL HISTORY:  Significant for the thrombosed IVC, end-stage renal   disease, multiple abdominal wall varices, etc.    PAST SURGICAL HISTORY:  Significant for multiple treatments of this abdominal   wound because of active hemorrhage.    REVIEW OF SYSTEMS:  Noncontributory other than the acute bleeding.    PHYSICAL EXAMINATION:  GENERAL:  Reveals a gentleman who is uncomfortable from having his dressing   just removed.  VITAL SIGNS:  There is no temperature or vital signs recorded yet in the chart   and I am waiting for this.  ABDOMEN:  Has the documented varices with active bleeding, which is   moderate-to-severe in nature.    LABORATORY DATA:  Shows a hematocrit of 28.8 with the previous hematocrit over   2 weeks ago of 28.1.  His sodium is 131, potassium 4.4, chloride 87, CO2 of   26, glucose 97, BUN 72, creatinine 11.2, calcium 8.2.    ASSESSMENT AND PLAN:  Active bleeding requiring an emergent trip to the   operating room for control of hemorrhage.  The patient will be resuscitated in   the operating room accordingly.  His crit is not low, but he may just be   under resuscitated and will see.  Clearly, he does not appear to be in shock   at least at this point.  This is a difficult situation,  especially with the   need for his anticoagulation.  We will do our best to provide control of the   hemorrhage.       ____________________________________     MD BLAYNE BILLY / RIAZ    DD:  02/19/2018 13:28:45  DT:  02/19/2018 15:28:59    D#:  [id number]  Job#:  [id number]

## 2018-02-19 NOTE — OR SURGEON
Immediate Post OP Note    PreOp Diagnosis: active hemorrhage from abdominal wall secondary to abdominal wall varices.   Necrotic tissue of abdominal wall    PostOp Diagnosis: Same    Procedure(s):  Ligation of bleeding abdominal wall varices  Sharp debridement of skin and subcutaneous tissue of abdominal wall     Wound Class: Contaminated    Surgeon(s):  Jason Robertson M.D.    Anesthesiologist/Type of Anesthesia:  Anesthesiologist: Charlie Schroeder M.D./General    Surgical Staff:  Circulator: Emilie Gabriel R.N.  Relief Circulator: Andrés Alejandro R.N.  Scrub Person: Jessica Clayton  Count Clayton: Delma Eastman R.N.    Specimens:None  * No specimens in log *    Estimated Blood Loss:10 cc  Findings: couple of spots of bleeding, necrotic tissue of abdominal wall  Complications: none        2/19/2018 2:55 PM Jason Robertson

## 2018-02-19 NOTE — ED TRIAGE NOTES
PT BIB EMS for below complaint.   Chief Complaint   Patient presents with   • Open Wound     PT had sx about 2 weeks ago and rolled over in bed and abd incision opened. Abd is bleeding, but controlled with pressure. Pt states when pressure removed it will squirt across room. PT was laying in about 100 cc of blood when EMS arrived. Pt has been npo since yesterday. no s/s of infection     Triage complete. Pt in room. Pt on monitor.

## 2018-02-20 ENCOUNTER — APPOINTMENT (OUTPATIENT)
Dept: RADIOLOGY | Facility: MEDICAL CENTER | Age: 38
DRG: 856 | End: 2018-02-20
Attending: HOSPITALIST
Payer: MEDICARE

## 2018-02-20 PROBLEM — I86.8 ABDOMINAL VARICOSITIES: Status: ACTIVE | Noted: 2018-02-20

## 2018-02-20 PROBLEM — I82.220 IVC THROMBOSIS (HCC): Status: ACTIVE | Noted: 2018-02-20

## 2018-02-20 PROBLEM — R57.9 SHOCK (HCC): Status: ACTIVE | Noted: 2018-02-20

## 2018-02-20 LAB
AMMONIA PLAS-SCNC: 24 UMOL/L (ref 11–45)
ANION GAP SERPL CALC-SCNC: 14 MMOL/L (ref 0–11.9)
BASOPHILS # BLD AUTO: 0.9 % (ref 0–1.8)
BASOPHILS # BLD: 0.04 K/UL (ref 0–0.12)
BUN SERPL-MCNC: 35 MG/DL (ref 8–22)
CALCIUM SERPL-MCNC: 8.1 MG/DL (ref 8.5–10.5)
CFT BLD TEG: 10.2 MIN (ref 5–10)
CHLORIDE SERPL-SCNC: 96 MMOL/L (ref 96–112)
CLOT ANGLE BLD TEG: 65.3 DEGREES (ref 53–72)
CLOT LYSIS 30M P MA LENFR BLD TEG: 0 % (ref 0–8)
CO2 SERPL-SCNC: 26 MMOL/L (ref 20–33)
CORTIS SERPL-MCNC: 18.3 UG/DL (ref 0–23)
CREAT SERPL-MCNC: 7.28 MG/DL (ref 0.5–1.4)
CT.EXTRINSIC BLD ROTEM: 2 MIN (ref 1–3)
EKG IMPRESSION: NORMAL
EOSINOPHIL # BLD AUTO: 0.02 K/UL (ref 0–0.51)
EOSINOPHIL NFR BLD: 0.4 % (ref 0–6.9)
ERYTHROCYTE [DISTWIDTH] IN BLOOD BY AUTOMATED COUNT: 57.3 FL (ref 35.9–50)
ERYTHROCYTE [DISTWIDTH] IN BLOOD BY AUTOMATED COUNT: 58.4 FL (ref 35.9–50)
ERYTHROCYTE [DISTWIDTH] IN BLOOD BY AUTOMATED COUNT: 59.7 FL (ref 35.9–50)
ERYTHROCYTE [DISTWIDTH] IN BLOOD BY AUTOMATED COUNT: 61.3 FL (ref 35.9–50)
EST. AVERAGE GLUCOSE BLD GHB EST-MCNC: 108 MG/DL
GLUCOSE BLD-MCNC: 105 MG/DL (ref 65–99)
GLUCOSE BLD-MCNC: 120 MG/DL (ref 65–99)
GLUCOSE BLD-MCNC: 132 MG/DL (ref 65–99)
GLUCOSE BLD-MCNC: 174 MG/DL (ref 65–99)
GLUCOSE BLD-MCNC: 84 MG/DL (ref 65–99)
GLUCOSE SERPL-MCNC: 93 MG/DL (ref 65–99)
HBA1C MFR BLD: 5.4 % (ref 0–5.6)
HBV SURFACE AB SERPL IA-ACNC: >1000 MIU/ML (ref 0–10)
HCT VFR BLD AUTO: 24.2 % (ref 42–52)
HCT VFR BLD AUTO: 28.8 % (ref 42–52)
HCT VFR BLD AUTO: 29.7 % (ref 42–52)
HCT VFR BLD AUTO: 30.9 % (ref 42–52)
HGB BLD-MCNC: 7 G/DL (ref 14–18)
HGB BLD-MCNC: 8.9 G/DL (ref 14–18)
HGB BLD-MCNC: 9.2 G/DL (ref 14–18)
HGB BLD-MCNC: 9.4 G/DL (ref 14–18)
IMM GRANULOCYTES # BLD AUTO: 0.05 K/UL (ref 0–0.11)
IMM GRANULOCYTES NFR BLD AUTO: 1.1 % (ref 0–0.9)
INR PPP: 3.77 (ref 0.87–1.13)
INR PPP: 3.84 (ref 0.87–1.13)
LACTATE BLD-SCNC: 2.5 MMOL/L (ref 0.5–2)
LYMPHOCYTES # BLD AUTO: 0.34 K/UL (ref 1–4.8)
LYMPHOCYTES NFR BLD: 7.3 % (ref 22–41)
MAGNESIUM SERPL-MCNC: 2.3 MG/DL (ref 1.5–2.5)
MCF BLD TEG: 74.9 MM (ref 50–70)
MCH RBC QN AUTO: 27 PG (ref 27–33)
MCH RBC QN AUTO: 27.6 PG (ref 27–33)
MCH RBC QN AUTO: 28 PG (ref 27–33)
MCH RBC QN AUTO: 28 PG (ref 27–33)
MCHC RBC AUTO-ENTMCNC: 28.9 G/DL (ref 33.7–35.3)
MCHC RBC AUTO-ENTMCNC: 30.4 G/DL (ref 33.7–35.3)
MCHC RBC AUTO-ENTMCNC: 30.9 G/DL (ref 33.7–35.3)
MCHC RBC AUTO-ENTMCNC: 31 G/DL (ref 33.7–35.3)
MCV RBC AUTO: 90.3 FL (ref 81.4–97.8)
MCV RBC AUTO: 90.6 FL (ref 81.4–97.8)
MCV RBC AUTO: 90.9 FL (ref 81.4–97.8)
MCV RBC AUTO: 93.4 FL (ref 81.4–97.8)
MONOCYTES # BLD AUTO: 0.14 K/UL (ref 0–0.85)
MONOCYTES NFR BLD AUTO: 3 % (ref 0–13.4)
NEUTROPHILS # BLD AUTO: 4.09 K/UL (ref 1.82–7.42)
NEUTROPHILS NFR BLD: 87.3 % (ref 44–72)
NRBC # BLD AUTO: 0 K/UL
NRBC BLD-RTO: 0 /100 WBC
PA AA BLD-ACNC: 0 %
PA ADP BLD-ACNC: 54.3 %
PHOSPHATE SERPL-MCNC: 4.3 MG/DL (ref 2.5–4.5)
PLATELET # BLD AUTO: 163 K/UL (ref 164–446)
PLATELET # BLD AUTO: 185 K/UL (ref 164–446)
PLATELET # BLD AUTO: 202 K/UL (ref 164–446)
PLATELET # BLD AUTO: 212 K/UL (ref 164–446)
PMV BLD AUTO: 10.1 FL (ref 9–12.9)
PMV BLD AUTO: 9.2 FL (ref 9–12.9)
PMV BLD AUTO: 9.7 FL (ref 9–12.9)
PMV BLD AUTO: 9.9 FL (ref 9–12.9)
POTASSIUM SERPL-SCNC: 4.6 MMOL/L (ref 3.6–5.5)
PROTHROMBIN TIME: 37 SEC (ref 12–14.6)
PROTHROMBIN TIME: 37.5 SEC (ref 12–14.6)
RBC # BLD AUTO: 2.59 M/UL (ref 4.7–6.1)
RBC # BLD AUTO: 3.18 M/UL (ref 4.7–6.1)
RBC # BLD AUTO: 3.29 M/UL (ref 4.7–6.1)
RBC # BLD AUTO: 3.4 M/UL (ref 4.7–6.1)
SODIUM SERPL-SCNC: 136 MMOL/L (ref 135–145)
TEG ALGORITHM TGALG: ABNORMAL
TROPONIN I SERPL-MCNC: <0.01 NG/ML (ref 0–0.04)
WBC # BLD AUTO: 16 K/UL (ref 4.8–10.8)
WBC # BLD AUTO: 17.5 K/UL (ref 4.8–10.8)
WBC # BLD AUTO: 17.7 K/UL (ref 4.8–10.8)
WBC # BLD AUTO: 4.7 K/UL (ref 4.8–10.8)

## 2018-02-20 PROCEDURE — P9016 RBC LEUKOCYTES REDUCED: HCPCS | Mod: 91

## 2018-02-20 PROCEDURE — 93005 ELECTROCARDIOGRAM TRACING: CPT | Performed by: HOSPITALIST

## 2018-02-20 PROCEDURE — 4A133J1 MONITORING OF ARTERIAL PULSE, PERIPHERAL, PERCUTANEOUS APPROACH: ICD-10-PCS | Performed by: HOSPITALIST

## 2018-02-20 PROCEDURE — 86706 HEP B SURFACE ANTIBODY: CPT

## 2018-02-20 PROCEDURE — 36430 TRANSFUSION BLD/BLD COMPNT: CPT

## 2018-02-20 PROCEDURE — 82962 GLUCOSE BLOOD TEST: CPT

## 2018-02-20 PROCEDURE — 87205 SMEAR GRAM STAIN: CPT

## 2018-02-20 PROCEDURE — 85576 BLOOD PLATELET AGGREGATION: CPT

## 2018-02-20 PROCEDURE — 700101 HCHG RX REV CODE 250: Performed by: INTERNAL MEDICINE

## 2018-02-20 PROCEDURE — 87040 BLOOD CULTURE FOR BACTERIA: CPT | Mod: 91

## 2018-02-20 PROCEDURE — 85347 COAGULATION TIME ACTIVATED: CPT

## 2018-02-20 PROCEDURE — 99291 CRITICAL CARE FIRST HOUR: CPT | Mod: 25 | Performed by: HOSPITALIST

## 2018-02-20 PROCEDURE — 83605 ASSAY OF LACTIC ACID: CPT

## 2018-02-20 PROCEDURE — 02HV33Z INSERTION OF INFUSION DEVICE INTO SUPERIOR VENA CAVA, PERCUTANEOUS APPROACH: ICD-10-PCS | Performed by: HOSPITALIST

## 2018-02-20 PROCEDURE — 5A1D70Z PERFORMANCE OF URINARY FILTRATION, INTERMITTENT, LESS THAN 6 HOURS PER DAY: ICD-10-PCS | Performed by: INTERNAL MEDICINE

## 2018-02-20 PROCEDURE — 87077 CULTURE AEROBIC IDENTIFY: CPT | Mod: 91

## 2018-02-20 PROCEDURE — 84100 ASSAY OF PHOSPHORUS: CPT

## 2018-02-20 PROCEDURE — 36556 INSERT NON-TUNNEL CV CATH: CPT | Performed by: HOSPITALIST

## 2018-02-20 PROCEDURE — 85384 FIBRINOGEN ACTIVITY: CPT

## 2018-02-20 PROCEDURE — 93005 ELECTROCARDIOGRAM TRACING: CPT | Performed by: INTERNAL MEDICINE

## 2018-02-20 PROCEDURE — 700111 HCHG RX REV CODE 636 W/ 250 OVERRIDE (IP): Performed by: INTERNAL MEDICINE

## 2018-02-20 PROCEDURE — 84484 ASSAY OF TROPONIN QUANT: CPT

## 2018-02-20 PROCEDURE — 94640 AIRWAY INHALATION TREATMENT: CPT

## 2018-02-20 PROCEDURE — 85025 COMPLETE CBC W/AUTO DIFF WBC: CPT

## 2018-02-20 PROCEDURE — 87186 SC STD MICRODIL/AGAR DIL: CPT | Mod: 91

## 2018-02-20 PROCEDURE — 86923 COMPATIBILITY TEST ELECTRIC: CPT

## 2018-02-20 PROCEDURE — 4A133B1 MONITORING OF ARTERIAL PRESSURE, PERIPHERAL, PERCUTANEOUS APPROACH: ICD-10-PCS | Performed by: HOSPITALIST

## 2018-02-20 PROCEDURE — 71045 X-RAY EXAM CHEST 1 VIEW: CPT

## 2018-02-20 PROCEDURE — 700102 HCHG RX REV CODE 250 W/ 637 OVERRIDE(OP): Performed by: INTERNAL MEDICINE

## 2018-02-20 PROCEDURE — A9270 NON-COVERED ITEM OR SERVICE: HCPCS | Performed by: INTERNAL MEDICINE

## 2018-02-20 PROCEDURE — 700105 HCHG RX REV CODE 258: Performed by: INTERNAL MEDICINE

## 2018-02-20 PROCEDURE — 83735 ASSAY OF MAGNESIUM: CPT

## 2018-02-20 PROCEDURE — 03HY32Z INSERTION OF MONITORING DEVICE INTO UPPER ARTERY, PERCUTANEOUS APPROACH: ICD-10-PCS | Performed by: HOSPITALIST

## 2018-02-20 PROCEDURE — 30233N1 TRANSFUSION OF NONAUTOLOGOUS RED BLOOD CELLS INTO PERIPHERAL VEIN, PERCUTANEOUS APPROACH: ICD-10-PCS | Performed by: INTERNAL MEDICINE

## 2018-02-20 PROCEDURE — 770022 HCHG ROOM/CARE - ICU (200)

## 2018-02-20 PROCEDURE — 93010 ELECTROCARDIOGRAM REPORT: CPT | Performed by: INTERNAL MEDICINE

## 2018-02-20 PROCEDURE — 82140 ASSAY OF AMMONIA: CPT

## 2018-02-20 PROCEDURE — 82533 TOTAL CORTISOL: CPT

## 2018-02-20 PROCEDURE — 80074 ACUTE HEPATITIS PANEL: CPT

## 2018-02-20 PROCEDURE — 87070 CULTURE OTHR SPECIMN AEROBIC: CPT

## 2018-02-20 PROCEDURE — 90935 HEMODIALYSIS ONE EVALUATION: CPT

## 2018-02-20 PROCEDURE — 700101 HCHG RX REV CODE 250: Performed by: HOSPITALIST

## 2018-02-20 PROCEDURE — 36556 INSERT NON-TUNNEL CV CATH: CPT

## 2018-02-20 PROCEDURE — 80048 BASIC METABOLIC PNL TOTAL CA: CPT

## 2018-02-20 PROCEDURE — 700111 HCHG RX REV CODE 636 W/ 250 OVERRIDE (IP): Performed by: HOSPITALIST

## 2018-02-20 PROCEDURE — 700101 HCHG RX REV CODE 250

## 2018-02-20 PROCEDURE — 700105 HCHG RX REV CODE 258: Performed by: HOSPITALIST

## 2018-02-20 PROCEDURE — C1751 CATH, INF, PER/CENT/MIDLINE: HCPCS

## 2018-02-20 PROCEDURE — B543ZZA ULTRASONOGRAPHY OF RIGHT JUGULAR VEINS, GUIDANCE: ICD-10-PCS | Performed by: HOSPITALIST

## 2018-02-20 PROCEDURE — 85610 PROTHROMBIN TIME: CPT

## 2018-02-20 PROCEDURE — 36620 INSERTION CATHETER ARTERY: CPT | Performed by: HOSPITALIST

## 2018-02-20 PROCEDURE — 85027 COMPLETE CBC AUTOMATED: CPT | Mod: 91

## 2018-02-20 RX ORDER — SODIUM CHLORIDE 9 MG/ML
INJECTION, SOLUTION INTRAVENOUS
Status: ACTIVE
Start: 2018-02-20 | End: 2018-02-20

## 2018-02-20 RX ORDER — NOREPINEPHRINE BITARTRATE 1 MG/ML
INJECTION, SOLUTION INTRAVENOUS
Status: COMPLETED
Start: 2018-02-20 | End: 2018-02-20

## 2018-02-20 RX ORDER — ALBUMIN (HUMAN) 12.5 G/50ML
12.5 SOLUTION INTRAVENOUS
Status: DISCONTINUED | OUTPATIENT
Start: 2018-02-20 | End: 2018-03-02 | Stop reason: HOSPADM

## 2018-02-20 RX ORDER — IPRATROPIUM BROMIDE AND ALBUTEROL SULFATE 2.5; .5 MG/3ML; MG/3ML
3 SOLUTION RESPIRATORY (INHALATION)
Status: DISCONTINUED | OUTPATIENT
Start: 2018-02-20 | End: 2018-02-21

## 2018-02-20 RX ORDER — NOREPINEPHRINE BITARTRATE 1 MG/ML
INJECTION, SOLUTION INTRAVENOUS
Status: DISCONTINUED
Start: 2018-02-20 | End: 2018-02-20

## 2018-02-20 RX ORDER — HEPARIN SODIUM 1000 [USP'U]/ML
3900 INJECTION, SOLUTION INTRAVENOUS; SUBCUTANEOUS
Status: DISCONTINUED | OUTPATIENT
Start: 2018-02-20 | End: 2018-03-02 | Stop reason: HOSPADM

## 2018-02-20 RX ADMIN — NOREPINEPHRINE BITARTRATE 20 MCG/MIN: 1 INJECTION INTRAVENOUS at 12:00

## 2018-02-20 RX ADMIN — INSULIN HUMAN 1 UNITS: 100 INJECTION, SOLUTION PARENTERAL at 22:08

## 2018-02-20 RX ADMIN — GABAPENTIN 600 MG: 300 CAPSULE ORAL at 09:08

## 2018-02-20 RX ADMIN — STANDARDIZED SENNA CONCENTRATE AND DOCUSATE SODIUM 2 TABLET: 8.6; 5 TABLET, FILM COATED ORAL at 09:08

## 2018-02-20 RX ADMIN — IPRATROPIUM BROMIDE AND ALBUTEROL SULFATE 3 ML: .5; 3 SOLUTION RESPIRATORY (INHALATION) at 20:28

## 2018-02-20 RX ADMIN — TAZOBACTAM SODIUM AND PIPERACILLIN SODIUM 4.5 G: 500; 4 INJECTION, SOLUTION INTRAVENOUS at 22:07

## 2018-02-20 RX ADMIN — TAZOBACTAM SODIUM AND PIPERACILLIN SODIUM 4.5 G: 500; 4 INJECTION, SOLUTION INTRAVENOUS at 09:05

## 2018-02-20 RX ADMIN — OXYCODONE HYDROCHLORIDE 10 MG: 10 TABLET ORAL at 22:37

## 2018-02-20 RX ADMIN — MORPHINE SULFATE 4 MG: 4 INJECTION INTRAVENOUS at 22:04

## 2018-02-20 RX ADMIN — PHENYLEPHRINE HYDROCHLORIDE 100 MCG/MIN: 10 INJECTION INTRAVENOUS at 19:00

## 2018-02-20 RX ADMIN — VASOPRESSIN 0.03 UNITS/MIN: 20 INJECTION INTRAVENOUS at 13:33

## 2018-02-20 RX ADMIN — HYDROCORTISONE SODIUM SUCCINATE 50 MG: 100 INJECTION, POWDER, FOR SOLUTION INTRAMUSCULAR; INTRAVENOUS at 12:11

## 2018-02-20 RX ADMIN — LEVETIRACETAM 250 MG: 500 TABLET, FILM COATED ORAL at 22:05

## 2018-02-20 RX ADMIN — CINACALCET HYDROCHLORIDE 60 MG: 30 TABLET, COATED ORAL at 22:04

## 2018-02-20 RX ADMIN — HEPARIN SODIUM 3900 UNITS: 1000 INJECTION, SOLUTION INTRAVENOUS; SUBCUTANEOUS at 17:55

## 2018-02-20 RX ADMIN — TAZOBACTAM SODIUM AND PIPERACILLIN SODIUM 3.38 G: 375; 3 INJECTION, SOLUTION INTRAVENOUS at 05:29

## 2018-02-20 RX ADMIN — CALCITRIOL 0.75 MCG: 0.25 CAPSULE, LIQUID FILLED ORAL at 22:48

## 2018-02-20 RX ADMIN — VANCOMYCIN HYDROCHLORIDE 1900 MG: 100 INJECTION, POWDER, LYOPHILIZED, FOR SOLUTION INTRAVENOUS at 05:35

## 2018-02-20 RX ADMIN — LEVETIRACETAM 250 MG: 500 TABLET, FILM COATED ORAL at 09:09

## 2018-02-20 RX ADMIN — ACETAMINOPHEN 650 MG: 325 TABLET, FILM COATED ORAL at 09:08

## 2018-02-20 RX ADMIN — GABAPENTIN 600 MG: 300 CAPSULE ORAL at 22:05

## 2018-02-20 RX ADMIN — NOREPINEPHRINE BITARTRATE 8 MG: 1 INJECTION INTRAVENOUS at 05:04

## 2018-02-20 RX ADMIN — PHENYLEPHRINE HYDROCHLORIDE 20 MCG/MIN: 10 INJECTION INTRAVENOUS at 13:44

## 2018-02-20 RX ADMIN — HYDROCORTISONE SODIUM SUCCINATE 50 MG: 100 INJECTION, POWDER, FOR SOLUTION INTRAMUSCULAR; INTRAVENOUS at 17:44

## 2018-02-20 RX ADMIN — NOREPINEPHRINE BITARTRATE 20 MCG/MIN: 1 INJECTION INTRAVENOUS at 05:46

## 2018-02-20 ASSESSMENT — ENCOUNTER SYMPTOMS
WHEEZING: 0
VOMITING: 0
FEVER: 0
ORTHOPNEA: 1
CHILLS: 0
HEMOPTYSIS: 0
EYES NEGATIVE: 1
FOCAL WEAKNESS: 0
PALPITATIONS: 0
SHORTNESS OF BREATH: 1
MYALGIAS: 0
DIZZINESS: 0
ABDOMINAL PAIN: 1
HEADACHES: 0
BACK PAIN: 0
SENSORY CHANGE: 0
NAUSEA: 0
COUGH: 0
WEIGHT LOSS: 1
DIARRHEA: 0
SPUTUM PRODUCTION: 0

## 2018-02-20 ASSESSMENT — PAIN SCALES - GENERAL
PAINLEVEL_OUTOF10: 8
PAINLEVEL_OUTOF10: 8
PAINLEVEL_OUTOF10: ASSUMED PAIN PRESENT
PAINLEVEL_OUTOF10: 8
PAINLEVEL_OUTOF10: 10
PAINLEVEL_OUTOF10: 8
PAINLEVEL_OUTOF10: 0
PAINLEVEL_OUTOF10: 8
PAINLEVEL_OUTOF10: 0

## 2018-02-20 ASSESSMENT — LIFESTYLE VARIABLES: DO YOU DRINK ALCOHOL: NO

## 2018-02-20 NOTE — DISCHARGE PLANNING
Outpatient Dialysis Note    Confirmed patient is active at:    [facility]  [address]   [place] [address]      Schedule: [facility]      Spoke with Emilie at facility who confirmed.    Forwarded records for review.    Dialysis Coordinator, [facility]  Pina Irwin [phone]

## 2018-02-20 NOTE — PROGRESS NOTES
"Blood pressure (!) 86/41, pulse (!) 154, temperature 37.4 °C (99.3 °F), resp. rate 17, height 1.626 m (5' 4\"), weight 77.4 kg (170 lb 10.2 oz), SpO2 100 %.    I/O last 3 completed shifts:  In: 2710.8 [P.O.:100; I.V.:1310.8; Blood:800; Dialysis:500]  Out: 500 [Dialysis:500]  Events noted  Pt resting  Pain controlled  Wound actually looks better than it has  May be possible to skin graft soon but I would want pt to recover more before return to OR  "

## 2018-02-20 NOTE — PROGRESS NOTES
Pulmonary Critical Care Progress Note        Chief Complaint: Hypotension and acute blood loss anemia.     History of Present Illness:  I was kindly asked by Dr. Lindsay to see   and evaluate this gentleman regarding the above problems.  This is a   37-year-old gentleman with a complicated past medical history.  He has   end-stage renal disease.  He is on home hemodialysis 5 times a week.  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  Today, he came to the emergency room   complaining of bleeding from his abdominal wall varices.  He was seen by Dr. Jason Robertson from Plastic Surgery.  He was taken to the operating theater where   he underwent suture ligation of abdominal wall varices for control of active   hemorrhage.  There was also debridement of skin and subcutaneous tissue of the   abdominal wall.  Postoperatively, he has had hypotension and tachycardia and   is now critically ill in the intensive care unit on norepinephrine drip.     He denies any chest pain or chest pressure.  He has not had any recent illness   and has not had any fevers, chills or sweats prior to admission.    Please note above history obtained by the by partner Dr. Sandoval early this morning.    ROS:  Respiratory: positive shortness of breath, Cardiac: negative, GI: positive abdominal pain.  All other systems negative.    Interval Events:  24 hour interval history reviewed    ESRD with failed AV fistulas, clotting IVC filter.   Renal transplant, but has right femoral vascath.  Seizure d/o   Ligations of abdominal wall varices, and increase HR  2 FFP, 2 RBC's, 7.9 hbg given this AM  Levophed with MAP 60 goal  Pulmonary HTN   Sinus tachy this am  Lethargic  Confusion for months  Interstitial edema  Add Vasopressin planned.  SINA on Bipap  Sips and meds  Tm 101.4  LA 2.4    Zosyn and Vanco started overnight  3 l/min mask  HD last Pm, no fluid off  Cbc q6 being followed.   Wound culture.  Palliative care consult recommended by charge Rn.   Hydrocortisone 50 q 6 to be started    PFSH:  No change.    Respiratory:     Pulse Oximetry: 100 %  Chest Tube Drains:          Exam: rales bibasilar patient has significant anterior chest wall variceal lesions.   ImagingCXR  I have personally reviewed the chest x-ray my impression is  areas good placement of a right IJ central catheter. There is interstitial edema noted bilaterally.        Invalid input(s): [id number]    HemoDynamics:  Pulse: (!) 151, Heart Rate (Monitored): (!) 155  Blood Pressure: (!) 86/41, Arterial BP: 102/50, NIBP: 109/52  CVP (mm Hg): (!) 313 MM HG    Exam: sinus tachycardia. Patient remains in septic shock on vasopressors including phenylephrine, norepinephrine, vasopressin.  There is a right femoral Vas-Cath in place.  Imaging: None - Reviewed        Neuro:  GCS         Exam: Confused. The patient has obvious fatigue and appears acutely ill. He does move all 4 limbs to command. Imaging: None - Reviewed    Fluids:  Intake/Output       02/18/18 0700 - 02/19/18 0659 (Not Admitted) 02/19/18 0700 - 02/20/18 0659 02/20/18 0700 - 02/21/18 0659      [id number] 0293-7165 Total 8443-6465 1664-9895 Total 9883-5555 2486-8923 Total       Intake    P.O.  --  -- --  100  -- 100  --  -- --    P.O. -- -- -- 100 -- 100 -- -- --    I.V.  --  -- --  500  810.8 1310.8  --  -- --    Crystalloid Intake -- -- -- 500 -- 500 -- -- --    Norepinephrine Volume -- -- -- -- 10.8 10.8 -- -- --    IV Piggyback Volume (Vanco) -- -- -- -- 500 500 -- -- --    IV Piggyback Volume (Zosyn) -- -- -- -- 50 50 -- -- --    IV Volume (NS) -- -- -- -- 250 250 -- -- --    Blood  --  -- --  450  350 800  --  -- --    FFP Total Volume (Non-Barcoded) -- -- -- 450 -- 450 -- -- --    Volume (RELEASE RED BLOOD CELLS) -- -- -- -- 350 350 -- -- --    Dialysis  --  -- --   --  500 500  --  -- --    Dialysis Volume (Dialysis Intake) -- -- -- -- 500 500 -- -- --    Total Intake -- -- -- 1050 1660.8 2710.8 -- -- --       Output    Dialysis  --  -- --  --  500 500  --  -- --    Dialysis Output (Dialysis Output) -- -- -- -- 500 500 -- -- --    Total Output -- -- -- -- 500 500 -- -- --       Net I/O     -- -- -- 1050 1160.8 2210.8 -- -- --        Weight: 77.4 kg (170 lb 10.2 oz)  Recent Labs      185  18   011   SODIUM  131*  136   POTASSIUM  4.4  4.6   CHLORIDE  87*  96   CO2  26  26   BUN  72*  35*   CREATININE  11.22*  7.28*   CALCIUM  8.2*  8.1*       GI/Nutrition:  Exam: Surgical site noted patient is a binder in place which was not removed at this time. This is for treatment of his abdominal varices.  Imaging: None performed.   NPO  Liver Function  Recent Labs      185  18   ALTSGPT  8   --    ASTSGOT  9*   --    ALKPHOSPHAT  196*   --    TBILIRUBIN  0.6   --    LIPASE  13   --    GLUCOSE  97  93       Heme:  Recent Labs      18   01118   0134   RBC  3.13*  2.59*   --    HEMOGLOBIN  8.5*  7.0*   --    HEMATOCRIT  28.8*  24.2*   --    PLATELETCT  224  163*   --    PROTHROMBTM  55.7*  37.0*  37.5*   APTT  139.7*   --    --    INR  6.32*  3.77*  3.84*       Infectious Disease:  Temp  Av.1 °C (98.7 °F)  Min: 35.9 °C (96.7 °F)  Max: 38.6 °C (101.4 °F)  Micro: antibiotics reviewed. Blood cultures positive for gram-negative rods. Patient remains on Zosyn.  Recent Labs      185  18   WBC  9.6  4.7*   NEUTSPOLYS  80.60*  87.30*   LYMPHOCYTES  11.70*  7.30*   MONOCYTES  5.00  3.00   EOSINOPHILS  1.10  0.40   BASOPHILS  0.70  0.90   ASTSGOT  9*   --    ALTSGPT  8   --    ALKPHOSPHAT  196*   --    TBILIRUBIN  0.6   --      Current Facility-Administered Medications   Medication Dose Frequency Provider Last Rate Last Dose   • SODIUM CHLORIDE 0.9 % IV SOLN           • SODIUM CHLORIDE 0.9 % IV  SOLN           • norepinephrine (LEVOPHED) 8 mg in  mL Infusion  0.5-30 mcg/min Continuous ZONIA ScalesOWaldo 56.3 mL/hr at 02/20/18 0620 30 mcg/min at 02/20/18 0620   • MD ALERT... vancomycin per pharmacy protocol   pharmacy to dose ABRAHAM Scales.SCOTT.       • vancomycin 1,900 mg in  mL IVPB  25 mg/kg Once Moises Lindsay D.O. 166.7 mL/hr at 02/20/18 0535 1,900 mg at 02/20/18 0535   • piperacillin-tazobactam (ZOSYN) IVPB premix 4.5 g  4.5 g Q12HRS Moises Lindsay D.O.       • calcitRIOL (ROCALTROL) capsule 0.75 mcg  0.75 mcg QHS Jeronimo Langston M.D.   0.75 mcg at 02/19/18 2246   • cinacalcet (SENSIPAR) tablet 60 mg  60 mg Q EVENING Jeronimo Langston M.D.   Stopped at 02/19/18 2100   • gabapentin (NEURONTIN) capsule 600 mg  600 mg TID Jeronimo Langston M.D.   600 mg at 02/19/18 2245   • levETIRAcetam (KEPPRA) tablet 250 mg  250 mg BID Jeronimo Langston M.D.   250 mg at 02/19/18 2245   • LORazepam (ATIVAN) tablet 1 mg  1 mg QDAY PRN Jeronimo Langston M.D.       • sevelamer carbonate (RENVELA) tablet 3,200 mg  3,200 mg TID WITH MEALS Jeronimo Langston M.D.   3,200 mg at 02/19/18 2248   • tizanidine (ZANAFLEX) tablet 8 mg  8 mg QHS Jeronimo Langston M.D.   8 mg at 02/19/18 2247   • senna-docusate (PERICOLACE or SENOKOT S) 8.6-50 MG per tablet 2 Tab  2 Tab BID Jeronimo Langston M.D.        And   • polyethylene glycol/lytes (MIRALAX) PACKET 1 Packet  1 Packet QDAY PRN Jeronimo Langston M.D.        And   • magnesium hydroxide (MILK OF MAGNESIA) suspension 30 mL  30 mL QDAY PRN Jeronimo Langston M.D.        And   • bisacodyl (DULCOLAX) suppository 10 mg  10 mg QDAY PRN Jeronimo Langston M.D.       • Respiratory Care per Protocol   Continuous RT Jeronimo Langston M.D.       • NS infusion   Continuous Liu Sandoval M.D. 10 mL/hr at 02/20/18 0605     • labetalol (NORMODYNE,TRANDATE) injection 10 mg  10 mg Q4HRS PRN Jeronimo Langston M.D.       • ondansetron (ZOFRAN) syringe/vial injection 4 mg   4 mg Q4HRS PRN Jreonimo Langston M.D.       • ondansetron (ZOFRAN ODT) dispertab 4 mg  4 mg Q4HRS PRN Jeronimo Langston M.D.       • promethazine (PHENERGAN) tablet 12.5-25 mg  12.5-25 mg Q4HRS PRN Jeronimo Langston M.D.       • promethazine (PHENERGAN) suppository 12.5-25 mg  12.5-25 mg Q4HRS PRN Jeronimo Langston M.D.       • prochlorperazine (COMPAZINE) injection 5-10 mg  5-10 mg Q4HRS PRN Jeronimo Langston M.D.       • acetaminophen (TYLENOL) tablet 650 mg  650 mg Q6HRS PRN Jeronimo Langston M.D.       • Pharmacy Consult Request ...Pain Management Review   PRN Jeronimo Langston M.D.        And   • oxyCODONE immediate-release (ROXICODONE) tablet 5 mg  5 mg Q3HRS PRN Jeronimo Langston M.D.        And   • oxyCODONE immediate-release (ROXICODONE) tablet 10 mg  10 mg Q3HRS PRN Jeronimo Langston M.D.   10 mg at 02/19/18 2250    And   • morphine (pf) 4 mg/ml injection 4 mg  4 mg Q3HRS PRN Jeronimo Langston M.D.       • insulin regular (HUMULIN R) injection 1-6 Units  1-6 Units 4X/DAY ACHTIERRA Langston M.D.   Stopped at 02/19/18 2100    And   • glucose 4 g chewable tablet 16 g  16 g Q15 MIN PRN Jeronimo Langston M.D.        And   • dextrose 50% (D50W) injection 25 mL  25 mL Q15 MIN PRN Jeronimo Langston M.D.         Last reviewed on 2/19/2018  1:36 PM by Emilie Gabriel R.N.    Quality  Measures:  Labs reviewed and Medications reviewed  Diaz catheter: No Diaz      DVT Prophylaxis: Contraindicated - High bleeding risk  DVT prophylaxis - mechanical: SCDs  Ulcer prophylaxis: Yes  Antibiotics: Treating active infection/contamination beyond 24 hours perioperative coverage    Assessment and plan:    1. Status post abdominal wall variceal bleed with some necrotic skin tissue of the abdominal wall    -Patient underwent suture ligation of the varices to control hemorrhage as well as debridement of the skin of the abdominal wall.  -Procedure performed by plastic surgery Dr. Robertson.  -Your typical postop management with the use of binder at this  time.    2. Shock.    -Patient is a combination of septic and hemorrhagic shock.  -Blood cultures positive for gram-negative rods.  -Continue with broad-spectrum antibiotics with Zosyn.  -Consider ID consultation.  -We have added vasopressin, hydrocortisone for septic shock, and additional albumin. Dante-Synephrine may be a better choice for vasopressors if he remains tachycardic. If needed.    3. Occluded inferior vena cava.    -Patient normally on Coumadin but at this time is held. He had FFP given preoperatively as well. Packed cells were required as well.    4. 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    -Patient has a right femoral Vas-Cath in position.  -Did have dialysis performed perioperatively.  -Pending his respiratory status, may require repeat Hd.    5. Acute hypoxemic respiratory failure.    -At this time patient is not requiring intubation, however because of his shock and possible developing fluid overload may require intubation.   -Follow-up chest x-ray is planned for tomorrow.  -He does have obstructive sleep apnea and CPAP she has been ordered.    6. History of seizure disorder    -Continue on Keppra at this time.  -Continue to observe.    Discussed patient condition and risk of morbidity and/or mortality with Charge nurse / hot rounds.  M.D. rounds as well, as with family.  Patient remains critically ill.  Critical care time = 45 minutes in directly providing and coordinating critical care and extensive data review.  No time overlap and excludes procedures.    Chase Daly D.O.

## 2018-02-20 NOTE — PROGRESS NOTES
Arterial line successfully placed by Dr. Willams. BP 68/27. Room S104 ready, patient being prepared for transfer.

## 2018-02-20 NOTE — PROCEDURES
DATE OF SERVICE:  02/20/2018    PROCEDURE:  Right IJ triple lumen central line placement.    INDICATION:  Obtain central venous access for management of shock   undifferentiated.    CONSENT:  Verbal consent obtained from the patient's mother by phone, the   patient himself was obtunded and unable to give an adequate consent.    PROCEDURE IN DETAIL:  The patient supine with his head rotated to the left   side, sterilely prepped and draped in the usual fashion.  An ultrasound probe   was used to identify the right common carotid and right IJ.  Under direct   visualization of the probe, a guide needle was advanced into the lumen of the   right IJ, and nonpulsatile venous blood was returned.  A guidewire was then   advanced into the lumen of the needle into the right IJ.  The needle was   retracted and removed.  Using the ultrasound again, the wire was identified   within the lumen of the right IJ.  A small skin nick was then made, and the   dilator was advanced over the wire to dilate the tract.  The dilator was then   removed, and the central line was placed over the wire.  The line was then   sutured in place at the base of the neck using 3-0 Vicryl in the usual   fashion.  All 3 ports were aspirated of nonpulsatile venous blood, and then   flushed with sterile saline.  Sterile dressing was applied along with   antibiotics patch.    Stat postprocedure chest x-ray to my eye demonstrates appropriate placement   with no evidence of pneumothorax.  Final radiology read is pending.    ESTIMATED BLOOD LOSS:  1 mL.    COMPLICATIONS:  None.    The patient has tolerated the procedure well.       ____________________________________     DO NANCI Soares / RIAZ    DD:  02/20/2018 04:55:57  DT:  02/20/2018 09:01:54    D#:  [id number]  Job#:  [id number]

## 2018-02-20 NOTE — PROGRESS NOTES
"Pharmacy Kinetics 37 y.o. male on vancomycin day # 1 2/20/2018    Received vancomycin loading dose    Indication for Treatment: Unknown source of infection, multiple surgeries, ESRD patient    Pertinent history per medical record: Admitted on 2/19/2018 for Bleeding abdominal wall varices. PMH ESRD, he is currently on Coumadin for occluded IVC, which resulted to abdominal varices.  He has had several episodes of bleeding from the abdominal varices, and has required multiple surgeries for that. He had recurrence of bleeding in the last several days, and plastic surgery has admitted him initially for recurrent bleeding of the abdominal wall varices.  He was then brought to the OR and underwent suture ligation of the abdominal wall varices, and debridement of the skin and subcutaneous tissues of the abdominal wall.  Post op obtunded and hypotensive.  On quick chart review appears to have complex ID hx, seen by DOMINIC previously.      Other antibiotics: Zosyn    Allergies: Baclofen; Contrast media with iodine [iodine]; Keflex; Pcn [penicillins]; and Tape     List concerns for renal function: ESRD - appears to be anuric    Pertinent cultures to date:   2/20/18 blood cultures x2 in process  10/28/16 thigh wound - MSSA  8/5/16 elbow wound - MSSA  5/6/15 elbow wound - VRE    Recent Labs      02/19/18   1225  02/20/18   0117  02/20/18   0600   WBC  9.6  4.7*  16.0*   NEUTSPOLYS  80.60*  87.30*   --      Recent Labs      02/19/18   1225  02/20/18   0117   BUN  72*  35*   CREATININE  11.22*  7.28*   ALBUMIN  4.2   --      No results for input(s): VANCOTROUGH, VANCOPEAK, VANCORANDOM in the last 72 hours.  Intake/Output Summary (Last 24 hours) at 02/20/18 0755  Last data filed at 02/20/18 0600   Gross per 24 hour   Intake          2710.79 ml   Output              500 ml   Net          2210.79 ml      Blood pressure (!) 86/41, pulse (!) 151, temperature 37.1 °C (98.7 °F), resp. rate 20, height 1.626 m (5' 4\"), weight 77.4 kg (170 lb " 10.2 oz), SpO2 100 %. Temp (24hrs), Av.1 °C (98.7 °F), Min:35.9 °C (96.7 °F), Max:38.6 °C (101.4 °F)      A/P   1. Vancomycin dose change: Pulse dose vancomycin  2. Next vancomycin level: Level in ~48-72 hours (not ordered)  3. Goal trough: 16-20 mcg/ml  4. Comments: ESRD patient. Had a 24 hour level of 38.5 in 2016 at [facility]. Expect it will take quite some time to clear vanco. PCN allergy challenged with Zosyn. Monitor for rash. Narrow therapy as able. Pharmacy will follow.    Parker Gotti, PharmD, BCPS

## 2018-02-20 NOTE — PROGRESS NOTES
Patient arrived on floor via bed with PPU RN and Dayla transporter. Dialysis called at same time and stated patient is supposed to go to dialysis at this time. Patient placed on monitor. Monitor room notified. Per Sam[facility] CCT patient is SR on the monitor at this time. Patient stated pain okay at that time. Patient off floor in bed with Dayla transporter to dialysis.

## 2018-02-20 NOTE — PROGRESS NOTES
HD treatment were ordered by Dr. Perales, pt was able to toletate HD treatment without any difficulty, pt was running low bp and asymptomatic, notified Dr. Perales given order of no UF. Pt was only able to pull 500 m ldue to low bp during HD treatment. Gave report to Mary DAVIDSON, pt Fem cath were dry and intact, no swelling or redness were noted post tx. Per pt voiced that he will do his own dressing changed once he gets home and refused my offer to change his dialysis dressing. Access were locked with heparin 1000 units/ml, 1.9 ml arterial port and 2.0 ml venous port. Pt was stable post tx, denies any complaint of pain and SOB, no fever or any distress were noted post tx. Treatment started at 1922 and ended at 2152, see flow sheets for further details.

## 2018-02-20 NOTE — CONSULTS
DATE OF SERVICE:  02/20/2018    REQUESTING PHYSICIAN:  Moises Lindsay DO    CONSULTING PHYSICIAN:  Liu Sandoval MD    TYPE OF CONSULTATION:  Pulmonary medicine and critical care medicine.    REASON FOR CONSULTATION:  Evaluation and management of hypotension and acute   blood loss anemia and assist with critical care management.    CHIEF COMPLAINT:  Weakness, bleeding from abdomen.    HISTORY OF PRESENT ILLNESS:  I was kindly asked by Dr. Lindsay to see   and evaluate this gentleman regarding the above problems.  This is a   37-year-old gentleman with a complicated past medical history.  He has   end-stage renal disease.  He is on home hemodialysis 5 times a week.  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  Today, he came to the emergency room   complaining of bleeding from his abdominal wall varices.  He was seen by Dr. Jason Robertson from Plastic Surgery.  He was taken to the operating theater where   he underwent suture ligation of abdominal wall varices for control of active   hemorrhage.  There was also debridement of skin and subcutaneous tissue of the   abdominal wall.  Postoperatively, he has had hypotension and tachycardia and   is now critically ill in the intensive care unit on norepinephrine drip.    He denies any chest pain or chest pressure.  He has not had any recent illness   and has not had any fevers, chills or sweats prior to admission.    CURRENT MEDICATIONS:  He is on calcitriol 0.75 mcg a day, cinacalcet 60 mg in   the evening, gabapentin 600 mg 3 times a day, hydrocodone as needed,   Levetiracetam 250 mg twice a day, lorazepam as needed, sevelamer carbonate   3200 mg 3 times a day, tizanidine 8 mg at bedtime.  He takes Coumadin based   upon his INR.    ALLERGIES:  BACLOFEN, CONTRAST MEDIA, KEFLEX,  PENICILLIN, AND TAPE.    PAST SURGICAL HISTORY:  He has had 2 kidney transplants and an inguinal hernia   repair.  He has had multiple vascular access procedures.  He has also had   previous surgical treatment of bleeding from his abdominal wall varices.    ILLNESSES:  End-stage renal disease, on home hemodialysis; systemic arterial   hypertension; seizures; obstructive sleep apnea, on home BiPAP; diabetes   mellitus type 2; occluded inferior vena cava on Coumadin; and chronic pain.    SOCIAL HISTORY:  He does not smoke or drink.    FAMILY HISTORY:  Noncontributory.    REVIEW OF SYSTEMS:  The AMA and CMS system review does not reveal additional   significant positive findings.    PHYSICAL EXAMINATION:  VITAL SIGNS:  His maximum temperature since admission is 101.4, his   temperature right now is 99.6, his blood pressure is 86/41 with a heart rate   of 143 and a respiratory rate of 20.  GENERAL:  Well-developed, well-nourished gentleman.  HEENT:  Normocephalic, atraumatic.  Sinuses are nontender.  Nares patent.    Oropharynx with dry mucous membranes.  NECK:  Trachea midline, supple.  CHEST:  Symmetrical.  HEART:  Tachycardic, regular rhythm.  LUNGS:  He is slightly tachypneic.  His lungs are clear to auscultation and   percussion.  ABDOMEN:  Soft, nondistended.  He has a dressing and binder in place.  EXTREMITIES:  No clubbing or cyanosis.  NEUROLOGIC:  He is awake and alert.  He is moving all extremities.    DIAGNOSTIC DATA:  His white blood cell count is 4700, hemoglobin 7.0,   hematocrit 24.2, platelet count 163,000.  Sodium 136, potassium 4.6, chloride   96, CO2 26, BUN 35, creatinine 7.28, glucose 93.  Lactic acid 2.5.  INR was   3.84.  Hepatitis panel shows that it is positive for hepatitis surface   antibody.    IMPRESSION:  1.  Hypotension.  I suspect that this is due to acute blood loss anemia and   intravascular volume depletion.  2.  Acute blood loss anemia.  3.  Thrombocytopenia.  4.  Leukopenia.  5.   Status post suture ligation of abdominal wall varices for control of   active hemorrhage.  6.  Occluded inferior vena cava.  He chronically takes Coumadin.  His INR is   elevated.  7.  Chronic pain.  8.  Diabetes mellitus, type 2.  9.  Obstructive sleep apnea, on BiPAP.  10.  History of systemic arterial hypertension.  11.  Seizure disorder.  12.  End-stage renal disease, on hemodialysis.    PLAN/MEDICAL DECISION MAKING:  This gentleman is critically ill.  He is now in   the intensive care unit.  A central venous catheter and arterial catheter had   been placed.  We will transfuse packed red blood cells as well as fresh   frozen plasma.  I am going to stop his Coumadin.  I am going to follow his   response to transfusion very closely.  I am going to follow serial hemoglobins   and hematocrits and transfuse blood products as necessary.  I am going to   obtain a thromboelastogram with platelet mapping and we will make further   recommendations on transfusion of blood products based upon these results.    His antihypertensives will be held at this time, his blood sugars will be   controlled.    At the current time, this gentleman's prognosis is quite guarded and he is   critically ill.  I have spent a total of 90 minutes providing direct critical   care services at the bedside.  There has been no time overlap.  The time spent   excludes the time spent performing procedures (73623, [id number]).    The case has been reviewed with nursing, respiratory therapy, and Dr. Lindsay.    Thank you Dr. Lindsay for allowing us to participate in the care of   this gentleman.  We will continue to follow him with great interest.       ____________________________________     MD COLE ORTIZ / RIAZ    DD:  02/20/2018 06:21:49  DT:  02/20/2018 11:30:21    D#:  [id number]  Job#:  [id number]    cc: FARIBA Scales DO, MD

## 2018-02-20 NOTE — PROGRESS NOTES
Pt beginning to have frequent PACs; stat EKG ordered and troponins drawn.   Updated Dr Daly regarding EKG and pt's ectopy. Trops negative. Orders to call if pt's ectopy increases. No further orders rec'd at this time.

## 2018-02-20 NOTE — CONSULTS
DATE OF SERVICE:  02/19/2018    REQUESTING PHYSICIAN:  Jeronimo Langston M.D.    REASON FOR CONSULTATION:  To evaluate and provide dialysis for patient with   end-stage renal disease.    HISTORY OF PRESENT ILLNESS:  The patient is a 37-year-old male with end-stage   renal disease, on home hemodialysis, who completed last dialysis treatment on   Friday, admitted to the hospital with bleeding from abdominal wall varices   treated surgically.  Currently, patient in PACU, still somnolent, however,   answering questions appropriately, complaining of abdominal wall pain.  Also,   noticed quite complicated bleeding and was given 2 units of fresh frozen   plasma.    REVIEW OF SYSTEMS:  GENERAL:  Positive for fatigue.  No fever or chills.  Positive for weight   loss.  HEENT:  No headache.  No nasal congestion, no nasal bleeds.  No sore throat.    Eyes:  No double or blurry vision or eye pain.  RESPIRATORY:  No shortness of breath.  No cough, no hemoptysis.  CARDIOVASCULAR:  No chest pain, no palpitations, no dyspnea on exertion, no   edema.  GASTROINTESTINAL:  Positive for abdominal pain post surgically, no   nausea or vomiting.  GENITOURINARY:  Making very low urine, no dysuria.  MUSCULOSKELETAL:  No arthralgias, no myalgias.  No joint pain, no back pain.    PAST MEDICAL HISTORY:  End-stage renal disease, on hemodialysis, _____   seizure, thrombosed IVC, hypertension, calciphylaxis.    PAST SURGICAL HISTORY:  Multiple surgical treatments for abdominal wound, AV   fistula creation, tunneled dialysis catheter placements, history of renal   transplant surgery.    FAMILY HISTORY:  No history of kidney disease.    SOCIAL HISTORY:  No tobacco, alcohol or drug use.    PHYSICAL EXAMINATION:  VITAL SIGNS:  Blood pressure 83/45, pulse 99, temperature 36.2 Celsius.  GENERAL APPEARANCE:  Well-developed, well-nourished male in no acute distress.  HEENT:  Normocephalic, atraumatic.  Pupils equal, round, reactive to light.    Extraocular  movements intact.  Nares patent.  Oropharynx clear, moist mucosa,   no erythema or exudate. NECK:  Supple, no lymphadenopathy, no thyromegaly   appreciated.  LUNGS:  Clear to auscultation bilaterally.  No wheezes, rales, or rhonchi.  HEART:  Regular rate and rhythm.  No rub or gallop.  ABDOMEN:  Diffusely tender to palpation.  Bowel sounds diminished.  EXTREMITIES:  Trace pedal edema.  No cyanosis.  NEUROLOGIC:  Alert and oriented x3.  No focal deficit.    LABORATORY RESULTS:  Reviewed, revealed hemoglobin level 8.5, sodium 131,   potassium 4.4, CO2 26, BUN 72 and creatinine 11.2.    ASSESSMENT AND PLAN:  The patient is a 37-year-old male with end-stage renal   disease, on home hemodialysis, admitted with abdominal wall varices bleeding.  1.  End-stage renal disease.  We will continue dialysis today and then per   patient's schedule.  Electrolytes remain stable.  2.  Anemia.  To monitor hemoglobin level.  Epogen with hemodialysis.  3.  Volume.  We will provide gentle ultrafiltration with hemodialysis as blood   pressure tolerates.    RECOMMENDATIONS:  1.  Dialysis today, then per patient's schedule.  2.  Daily monitoring of hemoglobin level.  3.  Basic metabolic panel of heparin today with dialysis.  4.  To provide renal diet.  5.  We will follow the patient closely.    Thank you for the consult.       ____________________________________     MD CR OROZCO / RIAZ    DD:  02/19/2018 17:07:02  DT:  02/20/2018 02:26:47    D#:  [id number]  Job#:  [id number]

## 2018-02-20 NOTE — PROGRESS NOTES
Called patient mother Joy and updated her on patients status and that the patient is going to be moving to the ICU. Advised Joy that at this time we did not have a room number at this time but that as soon as we got the room number and which ICU I would call her back and let her know.

## 2018-02-20 NOTE — PROGRESS NOTES
Patient arrived to S104 at 0413 accompanied by ACLS RNx3, patient on transport monitor.       BP 82/51  Temp: 101f temporal   O2 sat: 100%, 3L oxymask    1 unit PRBC transfusing currently.   Dr. Willams at the bedside at 0418.

## 2018-02-20 NOTE — PROGRESS NOTES
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.  - pt has never had an IVC filter placed in his groin, but he has had clots removed from his R Femoral with grafts (mother is unsure if arterial vs venous).

## 2018-02-20 NOTE — PROGRESS NOTES
Pt seen and examined. Discussed with family.  Seen today by overnight hospitalist.  Keep in ICU. titrating pressors.

## 2018-02-20 NOTE — PROGRESS NOTES
"Pharmacy Kinetics 37 y.o. male on vancomycin day # 2 2018    Currently on Vancomycin Pulse dose    Indication for Treatment: Unknown source of infection    Pertinent history per medical record: Admitted on 2018 for Bleeding abdominal wall varices. 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  He has had several episodes of bleeding from the abdominal varices, and has required multiple surgeries for that. He had recurrence of bleeding in the last several days, and plastic surgery has admitted him initially for recurrent bleeding of the abdominal wall varices.  He was then brought to the OR and underwent suture ligation of the abdominal wall varices, and debridement of the skin and subcutaneous tissues of the abdominal wall.  Post op obtunded and hypotensive.  On quick chart review appears to have complex ID hx, seen by [facility]INIC previously.    Other antibiotics: Zosyn 4.5g q12h    Allergies: Baclofen; Contrast media with iodine [iodine]; Keflex; Pcn [penicillins]; and Tape     List concerns for renal function: HD-ESRD s/p 2 rejected renal transplants    Pertinent cultures to date:    BLD - Results pending    Recent Labs      18   1225  18   0117  18   0600   WBC  9.6  4.7*  16.0*   NEUTSPOLYS  80.60*  87.30*   --      Recent Labs      18   1225  18   0117   BUN  72*  35*   CREATININE  11.22*  7.28*   ALBUMIN  4.2   --      No results for input(s): VANCOTROUGH, VANCOPEAK, VANCORANDOM in the last 72 hours.  Intake/Output Summary (Last 24 hours) at 18 1142  Last data filed at 18 1000   Gross per 24 hour   Intake          3017.89 ml   Output              500 ml   Net          2517.89 ml      Blood pressure (!) 86/41, pulse (!) 138, temperature 37.4 °C (99.3 °F), resp. rate (!) 29, height 1.626 m (5' 4\"), weight 77.4 kg (170 lb 10.2 oz), SpO2 100 %. Temp (24hrs), Av.1 °C (98.7 °F), Min:35.9 °C (96.7 °F), Max:38.6 °C " (101.4 °F)      A/P   1. Vancomycin dose change: Continue with vancomycin pulse dosing  2. Next vancomycin level: 2/21 @ 530 (24 hour level)  3. Goal trough: 16-20 mcg/mL  4. Comments: Patient dialyzed yesterday, now back on home schedule of 5 days per week. Vancomycin given at 530 this morning. Based on previous vancomycin dosing, should clear vancomycin slowly. Infectious causes include: post surgical wound, bacteremia from catheter, and necrotic bowel. Will continue to monitor.    Tab eFlton, JocelynD

## 2018-02-20 NOTE — PROGRESS NOTES
Patient arrived back on floor via bed with transport Dayla from Dialysis. Patient previously placed on monitor. Monitor room notified that patient is back on floor. Per Alejandra WANG patient ST on the monitor at this time. Patient states pain of  10/10. Plan of care discussed with the patient. Patient declines any other needs at this time. Bed locked and in the lowest position with call light within reach.

## 2018-02-20 NOTE — PROGRESS NOTES
Nephrology Progress Note, Adult, Complex               Author: Dee Angella Date & Time created: 2/20/2018  1:30 PM     Interval History:  36 y/o male with ESRD/HHD admitted with abdominal wound infection, bleeding from varices -treated surgically  Septic/hypotensive  Received dialysis last night -not able UF effectively due to low BP  This morning very somnolent ,   hypotensive on pressor  Tachycardic    Review of Systems:  Review of Systems   Constitutional: Positive for malaise/fatigue and weight loss. Negative for chills and fever.   HENT: Negative.    Eyes: Negative.    Respiratory: Positive for shortness of breath. Negative for cough, hemoptysis, sputum production and wheezing.    Cardiovascular: Positive for orthopnea. Negative for chest pain, palpitations and leg swelling.   Gastrointestinal: Positive for abdominal pain. Negative for diarrhea, nausea and vomiting.   Genitourinary: Negative for dysuria.   Musculoskeletal: Negative for back pain, joint pain and myalgias.   Skin: Negative.    Neurological: Negative for dizziness, sensory change, focal weakness and headaches.       Physical Exam:  Physical Exam   Constitutional: He appears well-developed and well-nourished. No distress.   HENT:   Head: Normocephalic and atraumatic.   Nose: Nose normal.   Mouth/Throat: Oropharynx is clear and moist.   Eyes: Conjunctivae and EOM are normal. Pupils are equal, round, and reactive to light. No scleral icterus.   Neck: Normal range of motion. Neck supple. No thyromegaly present.   Cardiovascular: Normal rate and regular rhythm.  Exam reveals no gallop and no friction rub.    Pulmonary/Chest: Effort normal and breath sounds normal. No respiratory distress.   Abdominal: He exhibits distension. There is tenderness.   Abdominal wall covered with dressing   Musculoskeletal: He exhibits no edema.   Lymphadenopathy:     He has no cervical adenopathy.   Neurological: No cranial nerve deficit.   Very somnolent   Skin: Skin is  warm. No rash noted. No erythema.   Nursing note and vitals reviewed.      Labs:        Invalid input(s): [id number]      Recent Labs      18   1225  18   0117   SODIUM  131*  136   POTASSIUM  4.4  4.6   CHLORIDE  87*  96   CO2  26  26   BUN  72*  35*   CREATININE  11.22*  7.28*   CALCIUM  8.2*  8.1*     Recent Labs      18   1225  18   0117   ALTSGPT  8   --    ASTSGOT  9*   --    ALKPHOSPHAT  196*   --    TBILIRUBIN  0.6   --    LIPASE  13   --    GLUCOSE  97  93     Recent Labs      18   1225  18   0117  18   0134  18   0600   RBC  3.13*  2.59*   --   3.18*   HEMOGLOBIN  8.5*  7.0*   --   8.9*   HEMATOCRIT  28.8*  24.2*   --   28.8*   PLATELETCT  224  163*   --   185   PROTHROMBTM  55.7*  37.0*  37.5*   --    APTT  139.7*   --    --    --    INR  6.32*  3.77*  3.84*   --      Recent Labs      18   1225  18   0117  18   0600   WBC  9.6  4.7*  16.0*   NEUTSPOLYS  80.60*  87.30*   --    LYMPHOCYTES  11.70*  7.30*   --    MONOCYTES  5.00  3.00   --    EOSINOPHILS  1.10  0.40   --    BASOPHILS  0.70  0.90   --    ASTSGOT  9*   --    --    ALTSGPT  8   --    --    ALKPHOSPHAT  196*   --    --    TBILIRUBIN  0.6   --    --            Hemodynamics:  Temp (24hrs), Av.1 °C (98.7 °F), Min:35.9 °C (96.7 °F), Max:38.6 °C (101.4 °F)  Temperature: 37.4 °C (99.3 °F)  Pulse  Av  Min: 84  Max: 154Heart Rate (Monitored): (!) 138  Blood Pressure: (!) 86/41, Arterial BP: 101/57, NIBP: 121/65  CVP (mm Hg): (!) 30 MM HG  Respiratory:    Respiration: (!) 29, Pulse Oximetry: 100 %, O2 Daily Delivery Respiratory : Silicone Nasal Cannula           Fluids:    Intake/Output Summary (Last 24 hours) at 18 1330  Last data filed at 18 1000   Gross per 24 hour   Intake          3017.89 ml   Output              500 ml   Net          2517.89 ml     Weight: 77.4 kg (170 lb 10.2 oz)  GI/Nutrition:  Orders Placed This Encounter   Procedures   • Diet Order      Standing Status:   Standing     Number of Occurrences:   1     Order Specific Question:   Diet:     Answer:   Clear Liquids - No Red Foods [12]     Medical Decision Making, by Problem:  Active Hospital Problems    Diagnosis   • Hypertension [I10]   • ESRD (end stage renal disease) (CMS-[facility]) [N18.6]   • IVC thrombosis (CMS-[facility]) [I82.220]   • Abdominal varicosities [I86.8]   • Shock (CMS-[facility]) [R57.9]   • Hemorrhagic disorder due to circulating anticoagulants (CMS-[facility]) [D68.318]   • Sleep apnea [G47.30]       Quality-Core Measures   Reviewed items::  Labs reviewed and Medications reviewed  Central line in place:  Dialysis    Assessment and plan:    1.ESRD/HHD -daily dialysis -will dialyze today  2.Hypotension/sepsis/on pressor  3.Electrolytes: mild hyponatremia -to monitor  4.Anemia: Hb better post transfusion  5.Volume:UF with HD as BP tolerates with albumin support    Recs: daily dialysis             All meds to renal doses             Keep MAP> 65             Will follow

## 2018-02-20 NOTE — PROGRESS NOTES
"Labeling on blood will not scan into the system. It says \"product unknown\". Blood Bank notified; they sent up a paper form to use to document instead. See this form for all documentation regarding this blood transfusion.   "

## 2018-02-20 NOTE — PROGRESS NOTES
Dr. Willams at bedside. Discussed patient's SBP in the 60s. Plan is to place arterial line. Still waiting on ICU bed to be cleaned.

## 2018-02-20 NOTE — H&P
CHIEF COMPLAINT:  Bleeding abdominal wall varices.     HISTORY OF PRESENT ILLNESS:  This is a 37-year-old male with history of   end-stage renal disease, on home hemodialysis, hypertension, seizure disorder,   obstructive sleep apnea, on nocturnal CPAP, and type 2 diabetes mellitus.  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  He has had several episodes of bleeding from the abdominal varices,   and has required multiple surgeries for that.     He had recurrence of bleeding in the last several days, and plastic surgery   has admitted him initially for recurrent bleeding of the abdominal wall   varices.  He was then brought to the OR and underwent suture ligation of the   abdominal wall varices, and debridement of the skin and subcutaneous tissues   of the abdominal wall.     Prior to this admission, patient denied any symptoms such as chest pain,   shortness of breath, nausea, vomiting, abdominal pain, or diarrhea.  He   continues to do home dialysis, and sees Dr. Serrato for his end-stage renal   disease.     Postoperatively, patient's blood pressure was noted to be low in the 70s-80s,   but patient states that this is his baseline and his blood pressure usually   runs low like that.  He denies any symptoms from the low blood pressure such   as lightheadedness, dizziness, or chest pain.     I was asked by Dr. Robertson of plastic surgery to admit the patient for continued   medical management.     REVIEW OF SYSTEMS  A complete review of system was done. All other systems were negative.     PMH/PSH/FMH: I personally reviewed all ancillary histories as noted.     PAST MEDICAL HISTORY:       Past Medical History:   Diagnosis Date   • Arrhythmia       irregular EKG   • Chronic kidney disease, unspecified     • Contracture of palmar fascia     • Dialysis        hemodialysis at home 5 days a week   • Hemorrhagic disorder (CMS-HCC)       on coumadin   • Hypertension     • Pain        Chronic pain   • Renal failure       hemodialysis   • Seizure (CMS-HCC)       last seizure 04/1/2016   • Sleep apnea       BIPAP   • Toxic uninodular goiter without mention of thyrotoxic crisis or storm     • Urinary incontinence           PAST SURGICAL HISTORY:  Past Surgical History   Past Surgical History:   Procedure Laterality Date   • WOUND CLOSURE GENERAL   2/19/2018     Procedure: WOUND CLOSURE GENERAL-ABDOMINAL WALL HEMORRHAGE;  Surgeon: Jason Robertson M.D.;  Location: SURGERY [facility];  Service: Plastics   • WOUND EXPLORATION GENERAL   2/1/2018     Procedure: WOUND EXPLORATION GENERAL, REPAIR BLEEDING;  Surgeon: Samson Rosenbaum Jr., M.D.;  Location: SURGERY [facility];  Service: Plastics   • CATH PLACEMENT Right 11/14/2017     Procedure: CATH PLACEMENT - PERMA;  Surgeon: Jairo Hopkins M.D.;  Location: SURGERY [facility];  Service: General   • AV FISTULA REVISION Left 11/14/2017     Procedure: AV GRAFT REVISION;  Surgeon: Jairo Hopkins M.D.;  Location: SURGERY [facility];  Service: General   • IRRIGATION & DEBRIDEMENT GENERAL   7/31/2017     Procedure: IRRIGATION & DEBRIDEMENT GENERAL-ABDOMEN;  Surgeon: Samson Rosenbaum Jr., M.D.;  Location: SURGERY [facility];  Service:    • ABDOMINOPLASTY   6/5/2017     Procedure: ABDOMINOPLASTY - FOR PANNICULECTOMY;  Surgeon: Samson Rosenbaum Jr., M.D.;  Location: SURGERY [facility];  Service:    • SPINAL CORD STIMULATOR N/A 5/4/2017     Procedure: SPINAL CORD STIMULATOR - EXPLANT;  Surgeon: Bin Pro M.D.;  Location: [facility];  Service:    • THROMBECTOMY Left 1/6/2017     Procedure: THROMBECTOMY-OPEN THROMBECTOMY WITH LEFT THIGH GRAFT;  Surgeon: Jairo Hokpins M.D.;  Location: SURGERY [facility];  Service:    • CATH PLACEMENT Right 1/6/2017     Procedure: CATH PLACEMENT;  Surgeon: Jairo Hopkins M.D.;  Location: [facility];  Service:    • THROMBECTOMY Left 1/5/2017     Procedure:  THROMBECTOMY-THIGH AV LOOP GRAFT AND ANGIOJET;  Surgeon: Jairo Hopkins M.D.;  Location: SURGERY [facility];  Service:    • FLAP CLOSURE Left 11/17/2016     Procedure: Fasciocutaneous Flap Closure Left Upper Leg;  Surgeon: Samson Rosenbaum Jr., M.D.;  Location: SURGERY [facility];  Service:    • IRRIGATION & DEBRIDEMENT GENERAL Left 10/28/2016     Procedure: IRRIGATION & DEBRIDEMENT GENERAL THIGH WITH IRRIGATING WOUND VAC PLACEMENT;  Surgeon: Jairo Hopkins M.D.;  Location: SURGERY [facility];  Service:    • THROMBECTOMY Left 10/20/2016     Procedure: THROMBECTOMY THIGH;  Surgeon: Jairo Hopkins M.D.;  Location: SURGERY [facility];  Service:    • INCISION AND DRAINAGE GENERAL   10/20/2016     Procedure: INCISION AND DRAINAGE GENERAL HEMATOMA;  Surgeon: Jairo Hopkins M.D.;  Location: [facility];  Service:    • THROMBECTOMY Left 9/18/2016     Procedure: THROMBECTOMY - and fistula revision;  Surgeon: Jairo Hopkins M.D.;  Location: [facility];  Service:    • AV FISTULOGRAM   9/18/2016     Procedure: AV FISTULOGRAM;  Surgeon: Jairo Hopkins M.D.;  Location: [facility];  Service:    • CATH PLACEMENT Right 9/17/2016     Procedure: CATH PLACEMENT - tunneled dialysis cath placement right femoral ;  Surgeon: Quentin Alicia M.D.;  Location: [facility];  Service:    • MASS EXCISION GENERAL Right 8/5/2016     Procedure: MASS EXCISION GENERAL FOR CALCIPHYLAXIS SKIN AND SUBCUTANEOUS TISSUE FOREARM;  Surgeon: Jairo Hopkins M.D.;  Location: SURGERY [facility];  Service:    • LESION EXCISION GENERAL Right 7/11/2016     Procedure: LESION EXCISION GENERAL FOR ARM SKIN;  Surgeon: Jairo Hopkins M.D.;  Location: [facility];  Service:    • RECOVERY   7/8/2016     Procedure: IR1-VASCULAR CASE-HOPKINS-LEFT THIGH AV GRAFT THROMBOLYSIS WITH TISSUE PLASMINOGEN ACTIVATOR AND ANGIOJET ARTHERECTOMY   ;  Surgeon: Melinda  Surgery;  Location: SURGERY PRE-POST PROC UNIT [facility];  Service:    • SPINAL CORD STIMULATOR N/A 3/25/2016     Procedure: SPINAL CORD STIMULATOR;  Surgeon: Bin Pro;  Location: SURGERY [facility];  Service:    • PB PERCUT IMPLNT NEUROELECT,EPIDURAL   2/19/2016     Procedure: IMPLANT NEUROSTIM EPI ARRAY;  Surgeon: Bin Pro;  Location: SURGERY [facility];  Service: Pain Management   • PB PERCUT IMPLNT NEUROELECT,EPIDURAL   2/19/2016     Procedure: IMPLANT NEUROSTIM EPI ARRAY;  Surgeon: Bin Pro;  Location: SURGERY [facility];  Service: Pain Management   • RECOVERY   8/7/2015     Procedure:  VASCULAR CASE VIANEY-RIGHT ILIAC VENOGRAM WITH ANGIOPLASTY, REMOVAL RIGHT FEMORAL TUNNELED DIALYSIS CATHETER  **VRE**;  Surgeon: Melinda Surgery;  Location: SURGERY PRE-POST PROC UNIT [facility];  Service:    • AV FISTULA REVISION Left 6/17/2015     Procedure: AV FISTULA REVISION;  Surgeon: Jairo Hopkins M.D.;  Location: SURGERY [facility];  Service:    • IRRIGATION & DEBRIDEMENT GENERAL Left 6/17/2015     Procedure: IRRIGATION & DEBRIDEMENT GENERAL ARM W/EXPLORATION WOUND & CONTROL BLEEDING;  Surgeon: Jairo Hopkins M.D.;  Location: SURGERY [facility];  Service:    • AV FISTULA THROMBOLYSIS Left 6/9/2015     Procedure: THROMBECTOMY AV GRAFT THIGH;  Surgeon: Jairo Hopkins M.D.;  Location: SURGERY [facility];  Service:    • AV FISTULA THROMBOLYSIS Left 6/3/2015     Procedure: AV FISTULA THROMBOLYSIS THIGH REPLACEMENT;  Surgeon: Jairo Hopkins M.D.;  Location: SURGERY [facility];  Service:    • IRRIGATION & DEBRIDEMENT GENERAL Left 6/3/2015     Procedure: IRRIGATION & DEBRIDEMENT GENERAL;  Surgeon: Jairo Hopkins M.D.;  Location: SURGERY [facility];  Service:    • AV FISTULA CREATION   4/20/2015     Performed by Jairo Hopkins M.D. at [facility]   • PB INJECT NERV BLCK,STELLATE GANGLION   3/24/2015     Performed by Bin Pro at [facility]  ORS   • AV FISTULA REVISION   3/20/2015     Performed by Jairo Winters M.D. at SURGERY [facility]   • AV FISTULA REVISION   2/22/2015     Performed by Lurdes Moffett M.D. at SURGERY [facility]   • AV FISTULA REVISION   2/8/2015     Performed by Jairo Winters M.D. at SURGERY [facility] ORS   • IRRIGATION & DEBRIDEMENT GENERAL   12/15/2014     Performed by Jairo Winters M.D. at SURGERY [facility] ORS   • AV FISTULA REVISION   9/30/2014     Performed by Jairo Winters M.D. at SURGERY [facility] ORS   • RECOVERY   6/13/2014     Performed by Ir-Recovery Surgery at SURGERY SAME DAY [facility] ORS   • INCISION AND DRAINAGE GENERAL   9/13/2013     Performed by Jairo Winters M.D. at SURGERY [facility]   • DEBRIDEMENT   9/13/2013     Performed by Jairo Winters M.D. at SURGERY [facility] ORS   • VEIN LIGATION   9/13/2013     Performed by Jairo Winters M.D. at SURGERY [facility] ORS   • RECOVERY   5/18/2012     Performed by SURGERY, IR-RECOVERY at SURGERY [facility] ORS   • BONE SPUR EXCISION   12/8/2011     Performed by BELKIS BREAUX at SURGERY SAME DAY [facility]   • AV FISTULA REVISION   11/3/2011     Performed by JAIRO WINTERS at SURGERY [facility] ORS   • AV FISTULOGRAM   6/5/2011     Performed by JAIRO WINTERS at SURGERY [facility] ORS   • CATH PLACEMENT   6/5/2011     Performed by JAIRO WINTERS at SURGERY [facility] ORS   • CATH PLACEMENT   2/1/2011     Performed by JAIRO WINTERS at SURGERY [facility] ORS   • ANGIOPLASTY BALLOON   2/1/2011     Performed by JAIRO WINTERS at SURGERY [facility] ORS   • ENDARTERECTOMY   11/16/2010     Performed by JAIRO WINTERS at SURGERY [facility] ORS   • AV FISTULOGRAM   11/16/2010     Performed by JAIRO WINTERS at SURGERY [facility] ORS   • ARTERIOGRAM   3/5/2009     Performed by JAIRO WINTERS at SURGERY [facility] ORS   • ANGIOPLASTY BALLOON   3/5/2009     Performed by JAIRO WINTERS at  SURGERY [facility] ORS   • AV FISTULOGRAM   3/5/2009     Performed by MARI WINTERS at SURGERY [facility] ORS   • AV FISTULA CREATION   11/6/08     Performed by MARI WINTERS at SURGERY [facility] ORS   • MASS EXCISION ORTHO   10/9/08     Performed by BELKIS BREAUX at SURGERY SAME DAY [facility] ORS   • PARATHYROIDECTOMY   2006   • INGUINAL HERNIA REPAIR Bilateral 2001, 2002   • US-KIDNEY TRANSPLANT   1996, 2001     x2            PERSONAL/SOCIAL HISTORY:       Social History   Substance Use Topics   • Smoking status: Never Smoker   • Smokeless tobacco: Never Used   • Alcohol use No         FAMILY MEDICAL HISTORY:  Family History          Family History   Problem Relation Age of Onset   • Arthritis Father         RA   • Lung Disease Father     • Heart Disease Father         MI   • Hypertension Brother              ALLERGIES:  Baclofen; Contrast media with iodine [iodine]; Keflex; Pcn [penicillins]; and Tape     HOME MEDICATIONS:          Prior to Admission medications    Medication Sig Start Date End Date Taking? Authorizing Provider   tizanidine (ZANAFLEX) 4 MG Tab Take 8 mg by mouth every bedtime.       Physician Outpatient   levetiracetam (KEPPRA) 250 MG tablet Take 1 Tab by mouth 2 times a day. 12/1/17     Latasha Samayoa M.D.   lorazepam (ATIVAN) 1 MG Tab Take 1 Tab by mouth 1 time daily as needed for Anxiety (30 minutes before MRI, could repeat one more dose for claustrophobia) for up to 2 doses. 30 minutes before MRI, could repeat one more dose for claustrophobia 12/1/17     Latasha Samayoa M.D.   azithromycin (ZITHROMAX) 250 MG Tab 2 tabs po for 1 day, then 1 tab po for next 4 days  Patient not taking: Reported on 12/1/2017 11/30/17     Cricket Hook M.D.   warfarin (COUMADIN) 2.5 MG Tab Take one to two (1-2) tablets daily as [facility] 10/6/17     SHAUNA Moya   cinacalcet (SENSIPAR) 30 MG Tab Take 2 Tabs by mouth every evening. 8/29/17     Gorge  Najjar, M.D.   gabapentin (NEURONTIN) 600 MG tablet Take 600 mg by mouth 3 times a day.       Physician Outpatient   HYDROcodone Bitartrate ER (HYSINGLA ER) 30 MG Tablet Extended Release 24 hour Abuse-Deterrent Take  by mouth.       Physician Outpatient   hydrocodone/acetaminophen (NORCO)  MG Tab Take 1-2 Tabs by mouth every 12 hours as needed. 6/5/17     Samson Rosenbaum Jr., M.D.   Sevelamer Carbonate (RENVELA) 800 MG TABS Take 3,200 mg by mouth 3 times a day, with meals. With meals       Physician Outpatient   calcitRIOL (ROCALTROL) 0.25 MCG CAPS Take 0.75 mcg by mouth every bedtime.       Dee Perales M.D.               PHYSICAL EXAMINATION:  VITAL SIGNS:  Blood pressure 71/43, heart rate 99, respiratory rate 17 and   oxygen saturation 100% on 3 liters nasal cannula.  CONSTITUTIONAL:  Slightly somnolent, but easily arousable, not in   cardiorespiratory distress.  HENT: Normocephalic, atraumatic, (-) tonsillopharyngal congestion or exudate.  EYES: PERRLA, pink conjuctivae, (-) icteric sclerae  NECK: (-) cervical lymphadenopathy, (-) neck rigidity  CARDIOVASCULAR: Distinct heart sounds, RRR, (-) murmurs, rubs, gallops, (-) LE edema  RESPIRATORY: Equal chest expansion, clear breath sounds, (-) crackles/wheezes/rales/rhonchi  GASTROINTESTINAL:  Ligated abdominal wall varices, with dressing intact,   CDI.  No further bleeding.  No tenderness.  No guarding or rebound.  No   peritoneal signs.  MUSCULOSKELETAL: (-) joint swelling/tenderness, (-) joint deformities, (-) muscle tenderness,   (-) gross limitation of movement of 4 extremities  SKIN: (-) erythema, warmth, rashes, ulcers, open wounds  PSYCHIATRIC: mood, affect, and thought content WNL, behavior age appropriate  NEUROLOGIC: Non-focal, moves all 4 extremities, sensory grossly intact        PERTINENT DIAGNOSTIC RESULTS:  Reviewed, and as mentioned above. Please refer to ED course.        DIAGNOSTICS:    CBC:  WBC of 9.6, hemoglobin of 8.5 and platelet  count of 224.     BMP:  Sodium 131, potassium 4.4, chloride 87, CO2 of 26, anion gap of 18,   glucose 97, BUN of 72 and creatinine of 11.22.  AST 9, ALT is 8, alkaline   phosphatase 196 and total bilirubin 0.6.  Albumin 4.2.  Lipase 13.     INR 6.32.     ASSESSMENT:  1.  Bleeding abdominal wall varices due to occluded IVC, s/p ligation  2.  Hemorrhagic disorder due to circulating extrinsic anticoagulants.  3.  Supratherapeutic INR.  4.  Baseline low blood pressure.  5.  End-stage renal disease, on hemodialysis.  6.  Seizure disorder.  7.  Obstructive sleep apnea, on CPAP.  8.  Type 2 diabetes mellitus.     PLAN:  ---  I will admit him to the telemetry unit.  I anticipate that he will need at   least 2 midnights hospital stay to provide medically necessary service.  ---  He already underwent ligation of the bleeding abdominal vessels.  Plastic   Surgery to continue to follow for postoperative management.  I will continue   him on pain control with oral oxycodone and IV morphine.  We will monitor him   for rebleeding.  We will monitor his hemoglobin daily.  ---  With his supratherapeutic INR and bleeding, I will give him oral vitamin K   5 mg x1.  We will follow his INR.  I will get Coumadin to be redosed by   pharmacy once his INR is therapeutic.  We will let it slide down for now.  He   already received FFPs.  ---  His blood pressure is borderline low, but this is likely his baseline,   with contribution from narcotics given postoperatively.  I will start him on   very gentle IV fluids of 50 mL per hour until the effects of IV narcotics has   subsided.  We will monitor him closely on telemetry.  He may need to be   started on midodrine if blood pressure is persistently low.  ---  I will resume his home dose calcitriol, Sensipar, and renvela.  I spoke with   Dr. Dee Perales of nephrology, who will see the patient for resumption of his   hemodialysis.  ---  I will resume his home dose Keppra.  ---  I will resume his  nocturnal CPAP for his SINA.  ---  I will put him on sliding scale insulin while he is in house.  Check   hemoglobin A1c.  We will do Accu-Cheks a.c. and at bedtime.        DVT prophylaxis -- hold Coumadin for now, supratherapeutic INR, SCD.  GI prophylaxis -- not indicated.  Code status -- full code.         ____________________________________     Jeronimo Langston M.D.    LANDON / RIAZ    DD:  02/19/2018 18:07:58  DT:  02/19/2018 19:18:17    D#:  [id number]  Job#:  [id number]

## 2018-02-20 NOTE — PROGRESS NOTES
Inpatient Anticoagulation Service Note    Date: 2/19/2018    Patient is a 38 yo male with a PMH of renal transplant (rejection x 2), HTN, Insomnia, abdominal wound (due to erosion of the skin around the abdominal wall varices), and ESRD on dialysis who presents to the ED with an occluded IVC w/ abdominal varices and active bleeding. Now s/p suture and ligation of abdominal wall varices with debridement of skin and subcutaneous tissue of the abdominal wall. This is his second time in 3 weeks going to the OR foe this.    As an outpatient he receives:  5 mg (2.5 mg x 2) on Mon, Wed, Fri; 2.5 mg (2.5 mg x 1) all other days    Goal INR: 2-3 (clarified with Dr. Langston, reduced INR goal due to active bleeding)    Indication: IVC thrombosis    Hemoglobin Value: (!) 8.5  Hematocrit Value: (!) 28.8  Lab Platelet Value: 224    INR from last 7 days     Date/Time INR Value    02/19/18 1225 (!)  6.32        Dose from last 7 days     None          (If less than 5 days and overlap therapy discontinued -- document reason (i.e. Bleed Risk))    (If still on overlap therapy, if No -- document reason (i.e. Bleed Risk))      Plan:   1. INR supratherapeutic, patient given 5 mg PO Vit K  2. Will HOLD warfarin   3. INR ordered daily x 7.  4. Pharmacist will continue to monitor     Pharmacist suggested discharge dosing:   Mon: 5 mg; Tues: 2.5 mg; Wed: 2.5; Thur: 2.5 mg; Fri: 5 mg; Sat.: 2.5mg ; Sun: 2.5 mg.    NAYANA Saha, PharmD

## 2018-02-20 NOTE — PROGRESS NOTES
Rounds with Dr Daly and PMA team; updated team on pt's status, including: pt's HR in 150s, pt is maxed on levo to maintain MAP >60mmHg, H&H up 2 points post 2 units of PRBCs, pt very lethargic and intermittently confused- pt falling asleep during conversation. Pt has large surgical wound on lower right abdominal quadrant which smells foul and has purulent drainage.   Orders to switch vasopressor from norepi to shyanne and to add vasopressin; orders for wound consult, orders for HD today, orders to start hydrocortisone. Will continue to Q6hr labs.

## 2018-02-20 NOTE — PROGRESS NOTES
Paged MD Matamoros regarding patient HR up in the 130s and 140s since arrival on the floor. Per the patient this is his baseline but per the documented HRs in EPIC he has been in the 80s and 90s. Per MD have labs drawn early to check H&H and have EKG ordered and call with results.   Problem: Ventilation Defect:  Goal: Ability to achieve and maintain unassisted ventilation or tolerate decreased levels of ventilator support  Outcome: MET Date Met:  06/02/17  Extubated

## 2018-02-20 NOTE — RESPIRATORY CARE
COPD EDUCATION by COPD CLINICAL EDUCATOR  2/20/2018 at 6:08 AM by Julissa Tovar     Patient reviewed by COPD education team. Patient does not qualify for COPD program.

## 2018-02-20 NOTE — PROGRESS NOTES
2 RN Skin Assessment with Alta Cleary arms have old scars.  Scab on Right tip of thumb - per patient it is old  Left upper thigh - scab from old graft site that per patient is old and healing slow  Left LE has scratches and scabs on shin  Bilat feet dry/calloused heels  bilat feet - toes all red and slow to refill, per patient this always red when he is cold  bilat ears - red/blanching; silicone tubing placed with grey foam ear protectors.   Dialysis permacath on right upper thigh - pt refusing dressing change per dialysis RN note.  Scab on right upper thigh above dialysis site.

## 2018-02-20 NOTE — PROGRESS NOTES
[facility]    Date of Service: 2018    Chief Complaint  37 y.o. male admitted 2018 with thrombosed IVC filter.  Admitted with bleeding abd varices.  S/p OR with Dr Robertson for ligation of same     Interval Problem Update  Called to floor pt obtunded and hypotensive    Consultants/Specialty  Plastics Dr Robertson    Disposition  Transfer to ICU    Critical care time 45mins exclusive of procedure time.  Treating undiferentiated shock, titratingn pressors, giving blood products        Review of Systems   Unable to perform ROS: Mental status change      Physical Exam  Laboratory/Imaging   Hemodynamics  Temp (24hrs), Av °C (98.6 °F), Min:35.9 °C (96.7 °F), Max:38.6 °C (101.4 °F)   Temperature: 37.3 °C (99.1 °F)  Pulse  Av.9  Min: 84  Max: 147 Heart Rate (Monitored): 83  Blood Pressure: (!) 86/41, NIBP: (!) 81/43      Respiratory      Respiration: (!) 30, Pulse Oximetry: 100 %, O2 Daily Delivery Respiratory : Silicone Nasal Cannula             Fluids    Intake/Output Summary (Last 24 hours) at 18 0523  Last data filed at 18 2152   Gross per 24 hour   Intake             1550 ml   Output              500 ml   Net             1050 ml       Nutrition  Orders Placed This Encounter   Procedures   • Diet Order     Standing Status:   Standing     Number of Occurrences:   1     Order Specific Question:   Diet:     Answer:   Renal [8]     Physical Exam   Constitutional: He appears well-developed and well-nourished. No distress.   HENT:   Head: Normocephalic and atraumatic.   Eyes: Conjunctivae are normal.   Neck: No JVD present.   Cardiovascular: Normal rate.  Exam reveals no gallop.    No murmur heard.  Pulmonary/Chest: Effort normal. No stridor. No respiratory distress. He has no wheezes. He has no rales.   Abdominal: Soft. There is no tenderness. There is no rebound and no guarding.   Multiple abd varices noted   Musculoskeletal: He exhibits no edema.   Neurological:   Pardeep  with dificulty, oriented to hospital but otherwise confused   Skin: Skin is warm and dry. No rash noted. He is not diaphoretic.   Nursing note and vitals reviewed.      Recent Labs      02/19/18   1225  02/20/18   0117   WBC  9.6  4.7*   RBC  3.13*  2.59*   HEMOGLOBIN  8.5*  7.0*   HEMATOCRIT  28.8*  24.2*   MCV  92.0  93.4   MCH  27.2  27.0   MCHC  29.5*  28.9*   RDW  60.3*  61.3*   PLATELETCT  224  163*   MPV  10.3  9.9     Recent Labs      02/19/18   1225  02/20/18   0117   SODIUM  131*  136   POTASSIUM  4.4  4.6   CHLORIDE  87*  96   CO2  26  26   GLUCOSE  97  93   BUN  72*  35*   CREATININE  11.22*  7.28*   CALCIUM  8.2*  8.1*     Recent Labs      02/19/18   1225  02/20/18   0117  02/20/18   0134   APTT  139.7*   --    --    INR  6.32*  3.77*  3.84*                  Assessment/Plan     Shock (CMS-[facility])   Assessment & Plan    Undiferentiated: sepsis vs hemorrhagic vs relative adrenal insuficiency  Vanc/zosyn given recent surgery  Culture  Check cortisol  Giving 2 units PRBCs  q6 H&H  RIJ  And art line placed  Follow CVP, MAP titrate fluids and pressors          Quality-Core Measures   Reviewed items::  EKG reviewed, Labs reviewed, Medications reviewed and Radiology images reviewed  DVT prophylaxis - mechanical:  SCDs  Ulcer Prophylaxis::  Not indicated  Antibiotics:  Treating active infection/contamination beyond 24 hours perioperative coverage

## 2018-02-20 NOTE — PROCEDURES
DATE OF SERVICE:  02/20/2018    PROCEDURE:  Right brachial arterial line placement.    INDICATION:  Obtain accurate blood pressure measurements in a patient who has   a shock.    Consent obtained from the patient's mother as the patient himself is obtunded   and unable to cooperate.    PROCEDURE IN DETAIL:  Area of the right lower arm sterilely prepped and draped   in the usual fashion.  Ultrasound probe was then used to identify the right   antecubital artery.  Under ultrasound guidance, a guide needle was then placed   into the lumen of the artery, and using Seldinger technique, an art line was   then placed.  This was sutured in place using 3-0 Vicryl.  It was attached to   the pressure transducer and an arterial waveform was noted with systolic   pressure of 60.  Sterile dressing was then applied.  The patient tolerated the   procedure well.    COMPLICATIONS:  None.       ____________________________________     DO NANCI Soares / RIAZ    DD:  02/20/2018 04:09:42  DT:  02/20/2018 08:58:50    D#:  [id number]  Job#:  [id number]

## 2018-02-20 NOTE — CODE DOCUMENTATION
After abdominal surgery, patient went to dialysis, and then was admitted to room T804. RN gave patient pain medication (10mg of Oxy) and then the patient went to sleep. Patient was found unresponsive to painful stimuli and was twitching. Rapid Response was called.

## 2018-02-21 PROBLEM — D69.6 THROMBOCYTOPENIA (HCC): Status: ACTIVE | Noted: 2018-02-21

## 2018-02-21 LAB
ALBUMIN SERPL BCP-MCNC: 3.5 G/DL (ref 3.2–4.9)
ALBUMIN/GLOB SERPL: 1 G/DL
ALP SERPL-CCNC: 181 U/L (ref 30–99)
ALT SERPL-CCNC: 8 U/L (ref 2–50)
ANION GAP SERPL CALC-SCNC: 14 MMOL/L (ref 0–11.9)
AST SERPL-CCNC: 14 U/L (ref 12–45)
BILIRUB SERPL-MCNC: 1 MG/DL (ref 0.1–1.5)
BUN SERPL-MCNC: 32 MG/DL (ref 8–22)
CALCIUM SERPL-MCNC: 8 MG/DL (ref 8.5–10.5)
CHLORIDE SERPL-SCNC: 97 MMOL/L (ref 96–112)
CO2 SERPL-SCNC: 23 MMOL/L (ref 20–33)
CREAT SERPL-MCNC: 5.54 MG/DL (ref 0.5–1.4)
ERYTHROCYTE [DISTWIDTH] IN BLOOD BY AUTOMATED COUNT: 56.1 FL (ref 35.9–50)
ERYTHROCYTE [DISTWIDTH] IN BLOOD BY AUTOMATED COUNT: 56.2 FL (ref 35.9–50)
ERYTHROCYTE [DISTWIDTH] IN BLOOD BY AUTOMATED COUNT: 56.3 FL (ref 35.9–50)
ERYTHROCYTE [DISTWIDTH] IN BLOOD BY AUTOMATED COUNT: 58 FL (ref 35.9–50)
GLOBULIN SER CALC-MCNC: 3.4 G/DL (ref 1.9–3.5)
GLUCOSE BLD-MCNC: 133 MG/DL (ref 65–99)
GLUCOSE BLD-MCNC: 198 MG/DL (ref 65–99)
GLUCOSE SERPL-MCNC: 189 MG/DL (ref 65–99)
GRAM STN SPEC: NORMAL
HCT VFR BLD AUTO: 24.7 % (ref 42–52)
HCT VFR BLD AUTO: 25.3 % (ref 42–52)
HCT VFR BLD AUTO: 25.7 % (ref 42–52)
HCT VFR BLD AUTO: 25.7 % (ref 42–52)
HGB BLD-MCNC: 7.8 G/DL (ref 14–18)
HGB BLD-MCNC: 7.9 G/DL (ref 14–18)
HGB BLD-MCNC: 8 G/DL (ref 14–18)
HGB BLD-MCNC: 8 G/DL (ref 14–18)
INR PPP: 2.67 (ref 0.87–1.13)
LACTATE BLD-SCNC: 1.5 MMOL/L (ref 0.5–2)
MCH RBC QN AUTO: 27.9 PG (ref 27–33)
MCH RBC QN AUTO: 27.9 PG (ref 27–33)
MCH RBC QN AUTO: 28.2 PG (ref 27–33)
MCH RBC QN AUTO: 28.3 PG (ref 27–33)
MCHC RBC AUTO-ENTMCNC: 31.1 G/DL (ref 33.7–35.3)
MCHC RBC AUTO-ENTMCNC: 31.1 G/DL (ref 33.7–35.3)
MCHC RBC AUTO-ENTMCNC: 31.2 G/DL (ref 33.7–35.3)
MCHC RBC AUTO-ENTMCNC: 31.6 G/DL (ref 33.7–35.3)
MCV RBC AUTO: 89.2 FL (ref 81.4–97.8)
MCV RBC AUTO: 89.5 FL (ref 81.4–97.8)
MCV RBC AUTO: 89.5 FL (ref 81.4–97.8)
MCV RBC AUTO: 90.7 FL (ref 81.4–97.8)
PLATELET # BLD AUTO: 137 K/UL (ref 164–446)
PLATELET # BLD AUTO: 141 K/UL (ref 164–446)
PLATELET # BLD AUTO: 148 K/UL (ref 164–446)
PLATELET # BLD AUTO: 158 K/UL (ref 164–446)
PMV BLD AUTO: 9.6 FL (ref 9–12.9)
PMV BLD AUTO: 9.7 FL (ref 9–12.9)
PMV BLD AUTO: 9.7 FL (ref 9–12.9)
PMV BLD AUTO: 9.9 FL (ref 9–12.9)
POTASSIUM SERPL-SCNC: 5.7 MMOL/L (ref 3.6–5.5)
PROT SERPL-MCNC: 6.9 G/DL (ref 6–8.2)
PROTHROMBIN TIME: 28.1 SEC (ref 12–14.6)
RBC # BLD AUTO: 2.77 M/UL (ref 4.7–6.1)
RBC # BLD AUTO: 2.79 M/UL (ref 4.7–6.1)
RBC # BLD AUTO: 2.87 M/UL (ref 4.7–6.1)
RBC # BLD AUTO: 2.87 M/UL (ref 4.7–6.1)
SIGNIFICANT IND 70042: NORMAL
SITE SITE: NORMAL
SODIUM SERPL-SCNC: 134 MMOL/L (ref 135–145)
SOURCE SOURCE: NORMAL
TROPONIN I SERPL-MCNC: 0.02 NG/ML (ref 0–0.04)
VANCOMYCIN SERPL-MCNC: 22.5 UG/ML
WBC # BLD AUTO: 11.8 K/UL (ref 4.8–10.8)
WBC # BLD AUTO: 7 K/UL (ref 4.8–10.8)
WBC # BLD AUTO: 8.7 K/UL (ref 4.8–10.8)
WBC # BLD AUTO: 9.9 K/UL (ref 4.8–10.8)

## 2018-02-21 PROCEDURE — 80202 ASSAY OF VANCOMYCIN: CPT

## 2018-02-21 PROCEDURE — 700101 HCHG RX REV CODE 250: Performed by: INTERNAL MEDICINE

## 2018-02-21 PROCEDURE — 85610 PROTHROMBIN TIME: CPT

## 2018-02-21 PROCEDURE — 83605 ASSAY OF LACTIC ACID: CPT

## 2018-02-21 PROCEDURE — A9270 NON-COVERED ITEM OR SERVICE: HCPCS | Performed by: INTERNAL MEDICINE

## 2018-02-21 PROCEDURE — 84484 ASSAY OF TROPONIN QUANT: CPT

## 2018-02-21 PROCEDURE — 700105 HCHG RX REV CODE 258: Performed by: INTERNAL MEDICINE

## 2018-02-21 PROCEDURE — 700111 HCHG RX REV CODE 636 W/ 250 OVERRIDE (IP): Performed by: INTERNAL MEDICINE

## 2018-02-21 PROCEDURE — 94640 AIRWAY INHALATION TREATMENT: CPT

## 2018-02-21 PROCEDURE — 85027 COMPLETE CBC AUTOMATED: CPT

## 2018-02-21 PROCEDURE — 87040 BLOOD CULTURE FOR BACTERIA: CPT | Mod: 91

## 2018-02-21 PROCEDURE — 700111 HCHG RX REV CODE 636 W/ 250 OVERRIDE (IP): Performed by: HOSPITALIST

## 2018-02-21 PROCEDURE — 5A1D70Z PERFORMANCE OF URINARY FILTRATION, INTERMITTENT, LESS THAN 6 HOURS PER DAY: ICD-10-PCS | Performed by: INTERNAL MEDICINE

## 2018-02-21 PROCEDURE — 90935 HEMODIALYSIS ONE EVALUATION: CPT

## 2018-02-21 PROCEDURE — 99233 SBSQ HOSP IP/OBS HIGH 50: CPT | Performed by: INTERNAL MEDICINE

## 2018-02-21 PROCEDURE — 82962 GLUCOSE BLOOD TEST: CPT

## 2018-02-21 PROCEDURE — 80053 COMPREHEN METABOLIC PANEL: CPT

## 2018-02-21 PROCEDURE — 700102 HCHG RX REV CODE 250 W/ 637 OVERRIDE(OP): Performed by: INTERNAL MEDICINE

## 2018-02-21 PROCEDURE — 770022 HCHG ROOM/CARE - ICU (200)

## 2018-02-21 RX ORDER — IPRATROPIUM BROMIDE AND ALBUTEROL SULFATE 2.5; .5 MG/3ML; MG/3ML
3 SOLUTION RESPIRATORY (INHALATION)
Status: DISCONTINUED | OUTPATIENT
Start: 2018-02-21 | End: 2018-03-02 | Stop reason: HOSPADM

## 2018-02-21 RX ORDER — PREDNISONE 50 MG/1
50 TABLET ORAL DAILY
Status: DISCONTINUED | OUTPATIENT
Start: 2018-02-21 | End: 2018-02-22

## 2018-02-21 RX ADMIN — INSULIN HUMAN 1 UNITS: 100 INJECTION, SOLUTION PARENTERAL at 18:41

## 2018-02-21 RX ADMIN — HEPARIN SODIUM 3900 UNITS: 1000 INJECTION, SOLUTION INTRAVENOUS; SUBCUTANEOUS at 09:50

## 2018-02-21 RX ADMIN — HYDROCORTISONE SODIUM SUCCINATE 50 MG: 100 INJECTION, POWDER, FOR SOLUTION INTRAMUSCULAR; INTRAVENOUS at 00:23

## 2018-02-21 RX ADMIN — VASOPRESSIN 0.03 UNITS/MIN: 20 INJECTION INTRAVENOUS at 01:04

## 2018-02-21 RX ADMIN — INSULIN HUMAN 2 UNITS: 100 INJECTION, SOLUTION PARENTERAL at 20:07

## 2018-02-21 RX ADMIN — HYDROCORTISONE SODIUM SUCCINATE 50 MG: 100 INJECTION, POWDER, FOR SOLUTION INTRAMUSCULAR; INTRAVENOUS at 05:01

## 2018-02-21 RX ADMIN — MORPHINE SULFATE 4 MG: 4 INJECTION INTRAVENOUS at 02:10

## 2018-02-21 RX ADMIN — GABAPENTIN 600 MG: 300 CAPSULE ORAL at 18:00

## 2018-02-21 RX ADMIN — OXYCODONE HYDROCHLORIDE 10 MG: 10 TABLET ORAL at 17:54

## 2018-02-21 RX ADMIN — OXYCODONE HYDROCHLORIDE 10 MG: 10 TABLET ORAL at 09:30

## 2018-02-21 RX ADMIN — PREDNISONE 50 MG: 50 TABLET ORAL at 12:00

## 2018-02-21 RX ADMIN — FENTANYL CITRATE 50 MCG: 50 INJECTION INTRAMUSCULAR; INTRAVENOUS at 19:49

## 2018-02-21 RX ADMIN — TAZOBACTAM SODIUM AND PIPERACILLIN SODIUM 4.5 G: 500; 4 INJECTION, SOLUTION INTRAVENOUS at 09:26

## 2018-02-21 RX ADMIN — LEVETIRACETAM 250 MG: 500 TABLET, FILM COATED ORAL at 19:47

## 2018-02-21 RX ADMIN — LEVETIRACETAM 250 MG: 500 TABLET, FILM COATED ORAL at 09:28

## 2018-02-21 RX ADMIN — CINACALCET HYDROCHLORIDE 60 MG: 30 TABLET, COATED ORAL at 19:50

## 2018-02-21 RX ADMIN — IPRATROPIUM BROMIDE AND ALBUTEROL SULFATE 3 ML: .5; 3 SOLUTION RESPIRATORY (INHALATION) at 07:25

## 2018-02-21 RX ADMIN — PHENYLEPHRINE HYDROCHLORIDE 20 MCG/MIN: 10 INJECTION INTRAVENOUS at 04:22

## 2018-02-21 RX ADMIN — GABAPENTIN 600 MG: 300 CAPSULE ORAL at 19:49

## 2018-02-21 RX ADMIN — INSULIN HUMAN 1 UNITS: 100 INJECTION, SOLUTION PARENTERAL at 06:12

## 2018-02-21 RX ADMIN — GABAPENTIN 600 MG: 300 CAPSULE ORAL at 09:25

## 2018-02-21 RX ADMIN — SEVELAMER CARBONATE 3200 MG: 800 TABLET, FILM COATED ORAL at 18:30

## 2018-02-21 RX ADMIN — CALCITRIOL 0.75 MCG: 0.25 CAPSULE, LIQUID FILLED ORAL at 19:48

## 2018-02-21 RX ADMIN — IPRATROPIUM BROMIDE AND ALBUTEROL SULFATE 3 ML: .5; 3 SOLUTION RESPIRATORY (INHALATION) at 19:25

## 2018-02-21 RX ADMIN — TAZOBACTAM SODIUM AND PIPERACILLIN SODIUM 4.5 G: 500; 4 INJECTION, SOLUTION INTRAVENOUS at 19:54

## 2018-02-21 RX ADMIN — FENTANYL CITRATE 25 MCG: 50 INJECTION, SOLUTION INTRAMUSCULAR; INTRAVENOUS at 12:49

## 2018-02-21 ASSESSMENT — PAIN SCALES - GENERAL
PAINLEVEL_OUTOF10: 8
PAINLEVEL_OUTOF10: 3
PAINLEVEL_OUTOF10: 6
PAINLEVEL_OUTOF10: 8
PAINLEVEL_OUTOF10: 6
PAINLEVEL_OUTOF10: 7
PAINLEVEL_OUTOF10: 3
PAINLEVEL_OUTOF10: 4
PAINLEVEL_OUTOF10: 3
PAINLEVEL_OUTOF10: 6
PAINLEVEL_OUTOF10: 2
PAINLEVEL_OUTOF10: 3
PAINLEVEL_OUTOF10: 3
PAINLEVEL_OUTOF10: 4
PAINLEVEL_OUTOF10: 5
PAINLEVEL_OUTOF10: 6
PAINLEVEL_OUTOF10: 8

## 2018-02-21 ASSESSMENT — ENCOUNTER SYMPTOMS
WEAKNESS: 0
DIARRHEA: 0
FALLS: 0
STRIDOR: 0
FEVER: 0
SPUTUM PRODUCTION: 0
EYES NEGATIVE: 1
SENSORY CHANGE: 0
LOSS OF CONSCIOUSNESS: 0
FOCAL WEAKNESS: 0
DEPRESSION: 0
HEMOPTYSIS: 0
CONSTIPATION: 0
BACK PAIN: 0
VOMITING: 0
HEADACHES: 0
ABDOMINAL PAIN: 1
PALPITATIONS: 0
COUGH: 0
NAUSEA: 0
WHEEZING: 0
DIZZINESS: 0
CHILLS: 0
SHORTNESS OF BREATH: 0
MYALGIAS: 0
TINGLING: 0
ORTHOPNEA: 0

## 2018-02-21 NOTE — PROGRESS NOTES
Dr. Lee updated on pt's + blood cultures. We also do not need repeat the blood cultures yet although they are ordered.

## 2018-02-21 NOTE — CARE PLAN
Problem: Pain  Goal: Alleviation of Pain or a reduction in pain to the patient's comfort goal  Outcome: PROGRESSING AS EXPECTED  Pain assessed q2h. Pt positioned with pillows for comfort. Pain medications given PRN as needed per MAR. Distraction techniques in place - television on.    Problem: Hemodynamic Status  Goal: Vital Signs and Fluid Balance Management  Outcome: PROGRESSING AS EXPECTED  Continuous BP monitoring via art line. Titrating vasopressor medications per MD order. I/O documented q2h. Peripheral perfusion assessed q4h.

## 2018-02-21 NOTE — PROGRESS NOTES
Pulmonary Critical Care Progress Note        Chief Complaint: Hypotension and acute blood loss anemia.     History of Present Illness:  I was kindly asked by Dr. Lindsay to see   and evaluate this gentleman regarding the above problems.  This is a   37-year-old gentleman with a complicated past medical history.  He has   end-stage renal disease.  He is on home hemodialysis 5 times a week.  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  Today, he came to the emergency room   complaining of bleeding from his abdominal wall varices.  He was seen by Dr. Jason Robertson from Plastic Surgery.  He was taken to the operating theater where   he underwent suture ligation of abdominal wall varices for control of active   hemorrhage.  There was also debridement of skin and subcutaneous tissue of the   abdominal wall.  Postoperatively, he has had hypotension and tachycardia and   is now critically ill in the intensive care unit on norepinephrine drip.     He denies any chest pain or chest pressure.  He has not had any recent illness   and has not had any fevers, chills or sweats prior to admission.    Please note above history obtained by the by partner Dr. Sandoval early this morning.    ROS:  Respiratory: positive shortness of breath, improved after hemodialysis Cardiac: negative, GI: positive abdominal pain, however less painful.  All other systems negative.    Interval Events:  24 hour interval history reviewed    No skin graft until Monday plan per plastic surgeon  More alert today and states he feels better  HD yesterday and today.  HD and using right fem. Cath  Wet to dry dressings initially recommended but now Xeroform per plastic surgeon  Off shyanne  D/C vaspressin  Sinus arrthymia  Room air  Clear liquid diet:  Advance  Lactic 1.5 last PM  H and H  7.8  CBC q8 please.  BMP 2 hours after Hd.   Gram - Prem on BC   D/c deonebs.  Zosyn and Vanco  Prednisone wean planned to 50 mg x 2    PFSH:  No change.    Respiratory:     Pulse Oximetry: 94 %  Chest Tube Drains:          Exam: rales bibasilar patient has significant anterior chest wall variceal lesions.   Imaging: No chest x-ray today        Invalid input(s): [id number]    HemoDynamics:  Pulse: (!) 59, Heart Rate (Monitored): 61  Arterial BP: 104/50, NIBP: 126/60  CVP (mm Hg): (!) 29 MM HG    Exam: Sinus bradycardia today. Off all vasopressors today. There is a right femoral Vas-Cath in place.  Imaging: None - Reviewed  Recent Labs      02/20/18   1558  02/21/18   0505   TROPONINI  <0.01  0.02       Neuro:  GCS Total Marcelle Coma Score: 15       Exam: Much more alert today The patient has obvious fatigue and appears acutely ill. He does move all 4 limbs to command. Imaging: None - Reviewed    Fluids:  Intake/Output       02/19/18 0700 - 02/20/18 0659 02/20/18 0700 - 02/21/18 0659 02/21/18 0700 - 02/22/18 0659      [id number] 8204-2319 Total 4438-7343 6009-1536 Total 0588-1720 6872-6087 Total       Intake    P.O.  100  -- 100  --  600 600  --  -- --    P.O. 100 -- 100 -- 600 600 -- -- --    I.V.  500  810.8 1310.8  790.3  642.4 1432.7  --  -- --    Crystalloid Intake 500 -- 500 -- -- -- -- -- --    Vasopressin Volume -- -- -- 40.1 108 148.1 -- -- --    Phenylephrine Volume -- -- -- 192.4 314.4 506.8 -- -- --    Norepinephrine Volume -- 10.8 10.8 387.9 -- 387.9 -- -- --    IV Piggyback Volume (Vanco) -- 500 500 -- -- -- -- -- --    IV Piggyback Volume (Zosyn) -- 50 50 50 100 150 -- -- --    IV Volume (NS) -- 250 250 120 120 240 -- -- --    Blood  450  350 800  --  -- --  --  -- --    FFP Total Volume (Non-Barcoded) 450 -- 450 -- -- -- -- -- --    Volume (RELEASE RED BLOOD CELLS) -- 350 350 -- -- -- -- -- --    Dialysis  --  500 500  500  -- 500  --  -- --    Dialysis Volume (Dialysis Intake) -- 500 500 500 -- 500  -- -- --    Total Intake 1050 1660.8 2710.8 1290.3 1242.4 2532.7 -- -- --       Output    Dialysis  --  500 500  1500  -- 1500  --  -- --    Dialysis Output (Dialysis Output) --  -- 1500 -- -- --    Total Output --  -- 1500 -- -- --       Net I/O     1050 1160.8 2210.8 -209.7 1242.4 1032.7 -- -- --        Weight: 78.4 kg (172 lb 13.5 oz)  Recent Labs      18   1225  18   0117  18   1558  18   0505   SODIUM  131*  136   --   134*   POTASSIUM  4.4  4.6   --   5.7*   CHLORIDE  87*  96   --   97   CO2  26  26   --   23   BUN  72*  35*   --   32*   CREATININE  11.22*  7.28*   --   5.54*   MAGNESIUM   --    --   2.3   --    PHOSPHORUS   --    --   4.3   --    CALCIUM  8.2*  8.1*   --   8.0*       GI/Nutrition:  Exam: Surgical site noted patient is a binder in place which was not removed at this time. This is for treatment of his abdominal varices.Report per the plastic surgeon is that overall appears improved.  Imaging: None performed.   NPO  Liver Function  Recent Labs      18   1225  18   0117  18   0505   ALTSGPT  8   --   8   ASTSGOT  9*   --   14   ALKPHOSPHAT  196*   --   181*   TBILIRUBIN  0.6   --   1.0   LIPASE  13   --    --    GLUCOSE  97  93  189*       Heme:  Recent Labs      18   1225  18   0117  18   0134   18   1750  18   0020  18   0505   RBC  3.13*  2.59*   --    < >  3.40*  2.87*  2.77*   HEMOGLOBIN  8.5*  7.0*   --    < >  9.4*  8.0*  7.8*   HEMATOCRIT  28.8*  24.2*   --    < >  30.9*  25.7*  24.7*   PLATELETCT  224  163*   --    < >  202  158*  137*   PROTHROMBTM  55.7*  37.0*  37.5*   --    --    --   28.1*   APTT  139.7*   --    --    --    --    --    --    INR  6.32*  3.77*  3.84*   --    --    --   2.67*    < > = values in this interval not displayed.       Infectious Disease:  Temp  Av.7 °C (98 °F)  Min: 35.9 °C (96.6 °F)  Max: 37.4 °C (99.3 °F)  Micro: antibiotics reviewed. Blood cultures  positive for gram-negative rods, Lactose fermenting. Also polymicrobial infection from the wound Patient remains on Zosyn.  Recent Labs      02/19/18   1225  02/20/18   0117   02/20/18   1750  02/21/18   0020  02/21/18   0505   WBC  9.6  4.7*   < >  17.7*  11.8*  9.9   NEUTSPOLYS  80.60*  87.30*   --    --    --    --    LYMPHOCYTES  11.70*  7.30*   --    --    --    --    MONOCYTES  5.00  3.00   --    --    --    --    EOSINOPHILS  1.10  0.40   --    --    --    --    BASOPHILS  0.70  0.90   --    --    --    --    ASTSGOT  9*   --    --    --    --   14   ALTSGPT  8   --    --    --    --   8   ALKPHOSPHAT  196*   --    --    --    --   181*   TBILIRUBIN  0.6   --    --    --    --   1.0    < > = values in this interval not displayed.     Current Facility-Administered Medications   Medication Dose Frequency Provider Last Rate Last Dose   • norepinephrine (LEVOPHED) 8 mg in  mL Infusion  0.5-30 mcg/min Continuous ABRAHAM Scales.O.   Stopped at 02/20/18 1440   • MD ALERT... vancomycin per pharmacy protocol   pharmacy to dose ABRAHAM Scales.SCOTT.       • piperacillin-tazobactam (ZOSYN) IVPB premix 4.5 g  4.5 g Q12HRS ABRAHAM Scales.OWaldo   4.5 g at 02/20/18 2207   • phenylephrine (JOSSY-SYNEPHRINE) 40,000 mcg in  mL Infusion  0-300 mcg/min Continuous ABRAHAM Licea.O. 7.5 mL/hr at 02/21/18 0422 20 mcg/min at 02/21/18 0422   • vasopressin (VASOSTRICT) 20 Units in  mL Infusion  0.03 Units/min Continuous ABRAHAM Licea.O. 9 mL/hr at 02/21/18 0104 0.03 Units/min at 02/21/18 0104   • hydrocortisone sodium succinate PF (SOLU-CORTEF) 100 MG injection 50 mg  50 mg Q6HRS Chase Daly D.O.   50 mg at 02/21/18 0501   • albumin human 25% solution 12.5 g  12.5 g DIALYSIS PRN Dee Perales M.D.       • heparin injection 3,900 Units  3,900 Units DIALYSIS PRN Dee Perales M.D.   3,900 Units at 02/20/18 3968   • ipratropium-albuterol (DUONEB) nebulizer solution 3 mL  3 mL 4X/DAY  (RT) Chase Daly D.O.   3 mL at 02/20/18 2028   • calcitRIOL (ROCALTROL) capsule 0.75 mcg  0.75 mcg QHS Jeronimo Langston M.D.   0.75 mcg at 02/20/18 2248   • cinacalcet (SENSIPAR) tablet 60 mg  60 mg Q EVENING Jeronimo Langston M.D.   60 mg at 02/20/18 2204   • gabapentin (NEURONTIN) capsule 600 mg  600 mg TID Jeronimo Langston M.D.   600 mg at 02/20/18 2205   • levETIRAcetam (KEPPRA) tablet 250 mg  250 mg BID Jeronimo Langston M.D.   250 mg at 02/20/18 2205   • LORazepam (ATIVAN) tablet 1 mg  1 mg QDAY PRN Jeronimo Langston M.D.       • sevelamer carbonate (RENVELA) tablet 3,200 mg  3,200 mg TID WITH MEALS Jeronimo Langston M.D.   Stopped at 02/20/18 0730   • senna-docusate (PERICOLACE or SENOKOT S) 8.6-50 MG per tablet 2 Tab  2 Tab BID Jeronimo Langston M.D.   2 Tab at 02/20/18 0908    And   • polyethylene glycol/lytes (MIRALAX) PACKET 1 Packet  1 Packet QDAY PRN Jeronimo Langston M.D.        And   • magnesium hydroxide (MILK OF MAGNESIA) suspension 30 mL  30 mL QDAY PRN Jeronimo Langston M.D.        And   • bisacodyl (DULCOLAX) suppository 10 mg  10 mg QDAY PRN Jeronimo Langston M.D.       • Respiratory Care per Protocol   Continuous RT Jeronimo Langston M.D.       • NS infusion   Continuous Liu Sandoval M.D. 10 mL/hr at 02/20/18 0900     • labetalol (NORMODYNE,TRANDATE) injection 10 mg  10 mg Q4HRS PRN Jeronimo Langston M.D.       • ondansetron (ZOFRAN) syringe/vial injection 4 mg  4 mg Q4HRS PRN Jeronimo Langston M.D.       • ondansetron (ZOFRAN ODT) dispertab 4 mg  4 mg Q4HRS PRN Jeronimo Langston M.D.       • promethazine (PHENERGAN) tablet 12.5-25 mg  12.5-25 mg Q4HRS PRN Jeronimo Langston M.D.       • promethazine (PHENERGAN) suppository 12.5-25 mg  12.5-25 mg Q4HRS PRN Jeronimo Langston M.D.       • prochlorperazine (COMPAZINE) injection 5-10 mg  5-10 mg Q4HRS PRN Jeronimo Langston M.D.       • acetaminophen (TYLENOL) tablet 650 mg  650 mg Q6HRS PRN Jeronimo Langston M.D.   650 mg at 02/20/18 0908   • Pharmacy Consult Request  ...Pain Management Review   PRN Jeronimo Langston M.D.        And   • oxyCODONE immediate-release (ROXICODONE) tablet 5 mg  5 mg Q3HRS PRN Jeronimo Langston M.D.        And   • oxyCODONE immediate-release (ROXICODONE) tablet 10 mg  10 mg Q3HRS PRN Jeronimo Langston M.D.   10 mg at 02/20/18 2237    And   • morphine (pf) 4 mg/ml injection 4 mg  4 mg Q3HRS PRN Jeronimo Langston M.D.   4 mg at 02/21/18 0210   • insulin regular (HUMULIN R) injection 1-6 Units  1-6 Units 4X/DAY OLE Langston M.D.   1 Units at 02/21/18 0612    And   • glucose 4 g chewable tablet 16 g  16 g Q15 MIN PRN Jeronimo Langston M.D.        And   • dextrose 50% (D50W) injection 25 mL  25 mL Q15 MIN PRN Jeronimo Langston M.D.         Last reviewed on 2/19/2018  1:36 PM by Emilie Gabriel R.N.    Quality  Measures:   Labs reviewed and Medications reviewed   Diaz catheter: No Diaz       DVT Prophylaxis: Contraindicated - High bleeding risk   DVT prophylaxis - mechanical: SCDs   Ulcer prophylaxis: Yes   Antibiotics: Treating active infection/contamination beyond 24 hours perioperative coverage    Assessment and plan:    1. Status post abdominal wall variceal bleed with some necrotic skin tissue of the abdominal wall    -Patient underwent suture ligation of the varices to control hemorrhage as well as debridement of the skin of the abdominal wall.  -Procedure performed by plastic surgery Dr. Robertson. Plan for skin grafting on Monday.  -Your typical postop management with the use of binder at this time.    2. Shock.    -Patient is a combination of septic and hemorrhagic shock.  -Blood cultures positive for gram-negative rods.  -Continue with broad-spectrum antibiotics with Zosyn.  -Consider ID consultation.  -Patient now off of all vasopressors and improved.    3. Initially reported occluded inferior vena cava. This was determined not to be the case as noted in previous note.    -Patient normally on Coumadin but at this time is held. He had FFP given  preoperatively as well. Packed cells were required as well.    4. 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    -Patient has a right femoral Vas-Cath in position.  -Did have dialysis performed perioperatively.  -Pending his respiratory status.   -Hemodialysis again performed today.    5. Acute hypoxemic respiratory failure.    -Patient was requiring higher dose FiO2 post procedure yesterday however today is improved.  -He does have obstructive sleep apnea and CPAP she has been ordered.  -Typical pulmonary toilet.    6. History of seizure disorder    -Continue on Keppra at this time.  -Continue to observe.    Discussed patient condition and risk of morbidity and/or mortality with Charge nurse / hot rounds.  M.D. rounds as well, as with family.  Patient remains critically ill.  Critical care time = 35 minutes in directly providing and coordinating critical care and extensive data review.  No time overlap and excludes procedures.    Chase Daly D.O.

## 2018-02-21 NOTE — PROGRESS NOTES
This RN paged and then spoke with Dr Robertson via telephone regarding pt's wound with foul smell ([phone]= [facility] [facility] of plastic surgery). MD gave orders to do a wet to dry dressing with xeroform BID. This RN will update wound care.

## 2018-02-21 NOTE — PROGRESS NOTES
[facility] Progress Note    Hemodialysis treatment ordered today per Dr. Perales x 3 hours. Treatment initiated at 1454 and ended at 1754.    Pt stable post HD, BP supported with 2 pressors,VSS.; see paper flow sheets for details.    Net UF 1,000 mL    Post tx, CVC flushed with saline then locked with heparin 1000 units/mL per designated amount in each wing then clamped and capped. Aspirate heparin prior to next CVC use.    Report given to Primary RN.

## 2018-02-21 NOTE — PROGRESS NOTES
Lab called with results: blood cultures showed GRAM NEGATIVE RODS. Pt is currently on ABX x2. MD will be assessing pt shortly. This RN passed results on to oncoming RNLesly.

## 2018-02-21 NOTE — CARE PLAN
Problem: Bronchoconstriction:  Goal: Improve in air movement and diminished wheezing  Duoneb QID

## 2018-02-21 NOTE — CARE PLAN
Problem: Knowledge Deficit  Goal: Knowledge of disease process/condition, treatment plan, diagnostic tests, and medications will improve  Outcome: MET Date Met: 02/20/18  Pt's RASS improved to 0 by the end of the shift; Pt A&Ox4 with mild confusion. Pt updated and his questions were answered.     Problem: Pain Management  Goal: Pain level will decrease to patient's comfort goal  Outcome: MET Date Met: 02/20/18  Pt refused gabapentin throughout the shift. He states he is at his baseline pain level.

## 2018-02-21 NOTE — WOUND TEAM
In to see another pt and RN discussed that consult had been placed for this pt. This pt had I&D done yesterday by Dr Robertson. Recommended that RN discuss with surgeon what is order for wound care of I&D wound. Wound team not following unless plastic surgeon orders.

## 2018-02-21 NOTE — PROGRESS NOTES
HD Tx per Dr. SHARYN Perales x 3 hrs., initiated at 0650, ended at 0950, net UF 1000 ml. See paper flow-sheet for details.    Report given to DIAN Sandhu RN.

## 2018-02-21 NOTE — PROGRESS NOTES
[facility] Hospitalist Progress Note    Date of Service: 2/21/2018    Chief Complaint  37 y.o. male admitted 2/19/2018 with bleeding abdominal wall varices.    Interval Problem Update  Patient does have a history of abdominal wall variceal bleeding, has had procedures in the past.  During this hospital stay patient did have suture ligation of abdominal wall varices, debridement of skin and subcutaneous tissue of abdominal wall by Dr. Robertson on 2/19.  Per Dr. Rosenbaum, possible skin graft on Monday.  Patient seen and examined in the ICU, ICU care given.  Patient was noted to be hypotensive, has been on pressors, these are being weaned off.  Discussed patient condition and plan with Intensivist, RN, RT and charge nurse / hot rounds.    Awake and oriented  HD yesterday and again today  Gets HD 5/week at home  NSR  -110 on shyanne/vaso  RA  Tolerating clears, advance diet  Small liquid BM today  Anuric  Afebrile  Repeat lactic 1.5  Drop in hgb to 7.8  Gram - rods on blood culture, on vanco/zosyn   Dr Rosenbaum, possible graft on Monday  Pain with dressing change, fentanyl, oxy prn    Consultants/Specialty  Intensivist  Plastic surgery  Nephrology    Disposition  Patient requires additional treatment in the hospital, will likely be able to go home at time of discharge        Review of Systems   Constitutional: Positive for malaise/fatigue. Negative for chills and fever.   HENT: Negative for congestion.    Respiratory: Negative for cough, sputum production, shortness of breath and stridor.    Cardiovascular: Negative for chest pain, palpitations and leg swelling.   Gastrointestinal: Positive for abdominal pain. Negative for constipation, diarrhea, nausea and vomiting.   Genitourinary: Negative for dysuria and urgency.   Musculoskeletal: Negative for falls and myalgias.   Neurological: Negative for dizziness, tingling, loss of consciousness, weakness and headaches.   Psychiatric/Behavioral: Negative for depression and suicidal ideas.    All other systems reviewed and are negative.     Physical Exam  Laboratory/Imaging   Hemodynamics  Temp (24hrs), Av.7 °C (98 °F), Min:35.9 °C (96.6 °F), Max:37.4 °C (99.3 °F)   Temperature: 36.1 °C (97 °F)  Pulse  Av.5  Min: 47  Max: 157 Heart Rate (Monitored): 68  Arterial BP: 104/50, NIBP: 126/60 CVP (mm Hg): (!) 29 MM HG    Respiratory      Respiration: 18, Pulse Oximetry: 96 %, O2 Daily Delivery Respiratory : Room Air with O2 Available     Given By:: Mouthpiece  RUL Breath Sounds: Diminished, RML Breath Sounds: Diminished, RLL Breath Sounds: Diminished, ANGEL Breath Sounds: Diminished, LLL Breath Sounds: Diminished    Fluids    Intake/Output Summary (Last 24 hours) at 18 0743  Last data filed at 18 0600   Gross per 24 hour   Intake          2532.72 ml   Output             1500 ml   Net          1032.72 ml       Nutrition  Orders Placed This Encounter   Procedures   • Diet Order     Standing Status:   Standing     Number of Occurrences:   1     Order Specific Question:   Diet:     Answer:   Clear Liquids - No Red Foods [12]     Physical Exam   Constitutional: He is oriented to person, place, and time. He appears well-developed.  Non-toxic appearance. No distress.   HENT:   Head: Normocephalic and atraumatic.   Mouth/Throat: Oropharynx is clear and moist. No oropharyngeal exudate.   Eyes: Right eye exhibits no discharge. Left eye exhibits no discharge.   Neck: Neck supple. No tracheal deviation, no edema and no erythema present.   Cardiovascular: Normal rate and regular rhythm.  Exam reveals no gallop and no friction rub.    No murmur heard.  Pulmonary/Chest: Effort normal and breath sounds normal. No stridor. No respiratory distress. He has no wheezes. He has no rales. He exhibits no tenderness.   Abdominal: Soft. Bowel sounds are normal. He exhibits no distension. There is tenderness.   Musculoskeletal: Normal range of motion. He exhibits no edema or tenderness.   Lymphadenopathy:     He has  no cervical adenopathy.   Neurological: He is alert and oriented to person, place, and time. No cranial nerve deficit.   Skin: Skin is warm and dry. No rash noted. He is not diaphoretic. No erythema.   Psychiatric: He has a normal mood and affect. His behavior is normal. Judgment and thought content normal.   Nursing note and vitals reviewed.      Recent Labs      02/20/18   1750  02/21/18   0020  02/21/18   0505   WBC  17.7*  11.8*  9.9   RBC  3.40*  2.87*  2.77*   HEMOGLOBIN  9.4*  8.0*  7.8*   HEMATOCRIT  30.9*  25.7*  24.7*   MCV  90.9  89.5  89.2   MCH  27.6  27.9  28.2   MCHC  30.4*  31.1*  31.6*   RDW  59.7*  56.2*  56.3*   PLATELETCT  202  158*  137*   MPV  9.2  9.7  9.7     Recent Labs      02/19/18   1225  02/20/18   0117  02/21/18   0505   SODIUM  131*  136  134*   POTASSIUM  4.4  4.6  5.7*   CHLORIDE  87*  96  97   CO2  26  26  23   GLUCOSE  97  93  189*   BUN  72*  35*  32*   CREATININE  11.22*  7.28*  5.54*   CALCIUM  8.2*  8.1*  8.0*     Recent Labs      02/19/18   1225  02/20/18   0117  02/20/18   0134  02/21/18   0505   APTT  139.7*   --    --    --    INR  6.32*  3.77*  3.84*  2.67*                  Assessment/Plan     Shock (CMS-HCC)   Assessment & Plan    - Patient was placed on vancomycin and Zosyn to cover for possible intra-abdominal infection  - More likely his shock is due to blood loss  - Now that his bleeding has stopped his pressors are being decreased, continue to wean to off        Abdominal varicosities   Assessment & Plan    - Chronic issue, acute bleeding  - Now status post surgery  - No sign of gross bleeding        ESRD (end stage renal disease) (CMS-HCC)- (present on admission)   Assessment & Plan    - Patient generally gets hemodialysis 5 times a week, hemodialysis per nephrology        Thrombocytopenia (CMS-HCC)   Assessment & Plan    - Mild, no need for transfusion        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, on Coumadin as an outpatient, restart when  able        Hemorrhagic disorder due to circulating anticoagulants (CMS-[facility])   Assessment & Plan    - Is on Coumadin for occluded IVC, currently being held due to bleeding        Hyperkalemia- (present on admission)   Assessment & Plan    - Needs hemodialysis  - Repeat BMP in the morning        Seizure disorder (CMS-[facility])- (present on admission)   Assessment & Plan    - Continue Keppra, no sign of seizure activity        Normocytic anemia- (present on admission)   Assessment & Plan    - No longer any sign of gross bleeding, did have significant bleeding initially  - Repeat CBC in the morning        Sleep apnea- (present on admission)   Assessment & Plan    - Patient uses BiPAP at night, [facility] with [facility]          Quality-Core Measures   Reviewed items::  Labs reviewed, Medications reviewed and Radiology images reviewed  DVT prophylaxis pharmacological::  Contraindicated - High bleeding risk  DVT prophylaxis - mechanical:  SCDs  Antibiotics:  Treating fecal contamination

## 2018-02-21 NOTE — PROGRESS NOTES
Nephrology Progress Note, Adult, Complex               Author: Dee Angella Date & Time created: 2/21/2018  10:23 AM     Interval History:  38 y/o male with ESRD/HHD admitted with abdominal wound infection, bleeding from varices -treated surgically  Doing much better.  NO SOB.Low BP improved  Seen and examined during dialysis -tolerates well      Review of Systems:  Review of Systems   Constitutional: Positive for malaise/fatigue. Negative for chills and fever.   HENT: Negative.    Eyes: Negative.    Respiratory: Negative for cough, hemoptysis, sputum production, shortness of breath and wheezing.    Cardiovascular: Negative for chest pain, palpitations, orthopnea and leg swelling.   Gastrointestinal: Positive for abdominal pain. Negative for diarrhea, nausea and vomiting.   Genitourinary: Negative for dysuria.   Musculoskeletal: Negative for back pain, joint pain and myalgias.   Skin: Negative.    Neurological: Negative for dizziness, sensory change, focal weakness and headaches.       Physical Exam:  Physical Exam   Constitutional: He is oriented to person, place, and time. He appears well-developed and well-nourished. No distress.   HENT:   Head: Normocephalic and atraumatic.   Nose: Nose normal.   Mouth/Throat: Oropharynx is clear and moist.   Eyes: Conjunctivae and EOM are normal. Pupils are equal, round, and reactive to light. No scleral icterus.   Neck: Normal range of motion. Neck supple. No thyromegaly present.   Cardiovascular: Normal rate and regular rhythm.  Exam reveals no gallop and no friction rub.    Pulmonary/Chest: Effort normal and breath sounds normal. No respiratory distress.   Abdominal: Bowel sounds are normal. He exhibits distension. There is tenderness.   Abdominal wall covered with dressing   Musculoskeletal: He exhibits no edema.   Lymphadenopathy:     He has no cervical adenopathy.   Neurological: He is alert and oriented to person, place, and time. No cranial nerve deficit.   Skin: Skin is  warm. No rash noted. No erythema.   Nursing note and vitals reviewed.      Labs:        Invalid input(s): [id number]  Recent Labs      02/20/18   1558  02/21/18   0505   TROPONINI  <0.01  0.02     Recent Labs      02/19/18   1225  02/20/18   0117  02/20/18   1558  02/21/18   0505   SODIUM  131*  136   --   134*   POTASSIUM  4.4  4.6   --   5.7*   CHLORIDE  87*  96   --   97   CO2  26  26   --   23   BUN  72*  35*   --   32*   CREATININE  11.22*  7.28*   --   5.54*   MAGNESIUM   --    --   2.3   --    PHOSPHORUS   --    --   4.3   --    CALCIUM  8.2*  8.1*   --   8.0*     Recent Labs      02/19/18   1225  02/20/18   0117  02/21/18   0505   ALTSGPT  8   --   8   ASTSGOT  9*   --   14   ALKPHOSPHAT  196*   --   181*   TBILIRUBIN  0.6   --   1.0   LIPASE  13   --    --    GLUCOSE  97  93  189*     Recent Labs      02/19/18   1225  02/20/18   0117  02/20/18   0134   02/20/18   1750  02/21/18   0020  02/21/18   0505   RBC  3.13*  2.59*   --    < >  3.40*  2.87*  2.77*   HEMOGLOBIN  8.5*  7.0*   --    < >  9.4*  8.0*  7.8*   HEMATOCRIT  28.8*  24.2*   --    < >  30.9*  25.7*  24.7*   PLATELETCT  224  163*   --    < >  202  158*  137*   PROTHROMBTM  55.7*  37.0*  37.5*   --    --    --   28.1*   APTT  139.7*   --    --    --    --    --    --    INR  6.32*  3.77*  3.84*   --    --    --   2.67*    < > = values in this interval not displayed.     Recent Labs      02/19/18   1225  02/20/18   0117   02/20/18   1750  02/21/18   0020  02/21/18   0505   WBC  9.6  4.7*   < >  17.7*  11.8*  9.9   NEUTSPOLYS  80.60*  87.30*   --    --    --    --    LYMPHOCYTES  11.70*  7.30*   --    --    --    --    MONOCYTES  5.00  3.00   --    --    --    --    EOSINOPHILS  1.10  0.40   --    --    --    --    BASOPHILS  0.70  0.90   --    --    --    --    ASTSGOT  9*   --    --    --    --   14   ALTSGPT  8   --    --    --    --   8   ALKPHOSPHAT  196*   --    --    --    --   181*   TBILIRUBIN  0.6   --    --    --    --   1.0    < > =  values in this interval not displayed.           Hemodynamics:  Temp (24hrs), Av.5 °C (97.7 °F), Min:35.9 °C (96.6 °F), Max:37.2 °C (99 °F)  Temperature: 36.6 °C (97.8 °F)  Pulse  Av.2  Min: 47  Max: 157Heart Rate (Monitored): 84  Blood Pressure: 130/63, Arterial BP: 111/51, NIBP: 117/55  CVP (mm Hg): (!) 34 MM HG  Respiratory:    Respiration: 18, Pulse Oximetry: 97 %, O2 Daily Delivery Respiratory : Room Air with O2 Available     Given By:: Mouthpiece  RUL Breath Sounds: Diminished, RML Breath Sounds: Diminished, RLL Breath Sounds: Diminished, ANGEL Breath Sounds: Diminished, LLL Breath Sounds: Diminished  Fluids:    Intake/Output Summary (Last 24 hours) at 18 1023  Last data filed at 18 0950   Gross per 24 hour   Intake          2725.62 ml   Output             3000 ml   Net          -274.38 ml     Weight: 78.4 kg (172 lb 13.5 oz)  GI/Nutrition:  Orders Placed This Encounter   Procedures   • Diet Order     Standing Status:   Standing     Number of Occurrences:   1     Order Specific Question:   Diet:     Answer:   Clear Liquids - No Red Foods [12]     Medical Decision Making, by Problem:  Active Hospital Problems    Diagnosis   • Hypertension [I10]   • ESRD (end stage renal disease) (CMS-HCC) [N18.6]   • 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]   • Abdominal varicosities [I86.8]   • Shock (CMS-[facility]) [R57.9]   • Hemorrhagic disorder due to circulating anticoagulants (CMS-[facility]) [D68.318]   • Sleep apnea [G47.30]       Quality-Core Measures   Reviewed items::  Labs reviewed and Medications reviewed  Central line in place:  Dialysis    Assessment and plan:    1.ESRD/HHD -daily dialysis -seen and examined during      dialysis -please see dialysis flow sheet for details  2.Hypotension/sepsis/on pressor  3.Electrolytes: mild hyponatremia -better-to monitor                          Hyperkalemia -correcting with HD  4.Anemia: Hb better post transfusion  5.Volume:UF with HD as BP tolerates with albumin  support    Recs: daily dialysis             All meds to renal doses             Keep MAP> 65             Will follow

## 2018-02-22 LAB
ALBUMIN SERPL BCP-MCNC: 3.3 G/DL (ref 3.2–4.9)
ALBUMIN/GLOB SERPL: 1 G/DL
ALP SERPL-CCNC: 129 U/L (ref 30–99)
ALT SERPL-CCNC: 7 U/L (ref 2–50)
ANION GAP SERPL CALC-SCNC: 11 MMOL/L (ref 0–11.9)
AST SERPL-CCNC: 11 U/L (ref 12–45)
BACTERIA BLD CULT: ABNORMAL
BILIRUB SERPL-MCNC: 0.7 MG/DL (ref 0.1–1.5)
BUN SERPL-MCNC: 31 MG/DL (ref 8–22)
CALCIUM SERPL-MCNC: 7.2 MG/DL (ref 8.5–10.5)
CHLORIDE SERPL-SCNC: 99 MMOL/L (ref 96–112)
CO2 SERPL-SCNC: 26 MMOL/L (ref 20–33)
CREAT SERPL-MCNC: 4.3 MG/DL (ref 0.5–1.4)
ERYTHROCYTE [DISTWIDTH] IN BLOOD BY AUTOMATED COUNT: 57.1 FL (ref 35.9–50)
ERYTHROCYTE [DISTWIDTH] IN BLOOD BY AUTOMATED COUNT: 58.3 FL (ref 35.9–50)
GLOBULIN SER CALC-MCNC: 3.4 G/DL (ref 1.9–3.5)
GLUCOSE BLD-MCNC: 141 MG/DL (ref 65–99)
GLUCOSE BLD-MCNC: 150 MG/DL (ref 65–99)
GLUCOSE BLD-MCNC: 152 MG/DL (ref 65–99)
GLUCOSE BLD-MCNC: 154 MG/DL (ref 65–99)
GLUCOSE BLD-MCNC: 171 MG/DL (ref 65–99)
GLUCOSE BLD-MCNC: 212 MG/DL (ref 65–99)
GLUCOSE SERPL-MCNC: 171 MG/DL (ref 65–99)
HCT VFR BLD AUTO: 24 % (ref 42–52)
HCT VFR BLD AUTO: 25.1 % (ref 42–52)
HGB BLD-MCNC: 7.4 G/DL (ref 14–18)
HGB BLD-MCNC: 7.6 G/DL (ref 14–18)
INR PPP: 3.09 (ref 0.87–1.13)
LACTATE BLD-SCNC: 1.4 MMOL/L (ref 0.5–2)
MCH RBC QN AUTO: 27.7 PG (ref 27–33)
MCH RBC QN AUTO: 27.9 PG (ref 27–33)
MCHC RBC AUTO-ENTMCNC: 30.3 G/DL (ref 33.7–35.3)
MCHC RBC AUTO-ENTMCNC: 30.8 G/DL (ref 33.7–35.3)
MCV RBC AUTO: 90.6 FL (ref 81.4–97.8)
MCV RBC AUTO: 91.6 FL (ref 81.4–97.8)
PLATELET # BLD AUTO: 164 K/UL (ref 164–446)
PLATELET # BLD AUTO: 172 K/UL (ref 164–446)
PMV BLD AUTO: 10.1 FL (ref 9–12.9)
PMV BLD AUTO: 9.9 FL (ref 9–12.9)
POTASSIUM SERPL-SCNC: 4.5 MMOL/L (ref 3.6–5.5)
PROT SERPL-MCNC: 6.7 G/DL (ref 6–8.2)
PROTHROMBIN TIME: 31.6 SEC (ref 12–14.6)
RBC # BLD AUTO: 2.65 M/UL (ref 4.7–6.1)
RBC # BLD AUTO: 2.74 M/UL (ref 4.7–6.1)
SIGNIFICANT IND 70042: ABNORMAL
SIGNIFICANT IND 70042: ABNORMAL
SITE SITE: ABNORMAL
SITE SITE: ABNORMAL
SODIUM SERPL-SCNC: 136 MMOL/L (ref 135–145)
SOURCE SOURCE: ABNORMAL
SOURCE SOURCE: ABNORMAL
TROPONIN I SERPL-MCNC: <0.01 NG/ML (ref 0–0.04)
VANCOMYCIN SERPL-MCNC: 10.9 UG/ML
WBC # BLD AUTO: 7.5 K/UL (ref 4.8–10.8)
WBC # BLD AUTO: 8.1 K/UL (ref 4.8–10.8)

## 2018-02-22 PROCEDURE — 80202 ASSAY OF VANCOMYCIN: CPT

## 2018-02-22 PROCEDURE — 5A1D70Z PERFORMANCE OF URINARY FILTRATION, INTERMITTENT, LESS THAN 6 HOURS PER DAY: ICD-10-PCS | Performed by: INTERNAL MEDICINE

## 2018-02-22 PROCEDURE — 99233 SBSQ HOSP IP/OBS HIGH 50: CPT | Performed by: INTERNAL MEDICINE

## 2018-02-22 PROCEDURE — 700102 HCHG RX REV CODE 250 W/ 637 OVERRIDE(OP): Performed by: INTERNAL MEDICINE

## 2018-02-22 PROCEDURE — A9270 NON-COVERED ITEM OR SERVICE: HCPCS | Performed by: INTERNAL MEDICINE

## 2018-02-22 PROCEDURE — 82962 GLUCOSE BLOOD TEST: CPT

## 2018-02-22 PROCEDURE — 700111 HCHG RX REV CODE 636 W/ 250 OVERRIDE (IP): Performed by: INTERNAL MEDICINE

## 2018-02-22 PROCEDURE — 80053 COMPREHEN METABOLIC PANEL: CPT

## 2018-02-22 PROCEDURE — 85610 PROTHROMBIN TIME: CPT

## 2018-02-22 PROCEDURE — 700105 HCHG RX REV CODE 258: Performed by: INTERNAL MEDICINE

## 2018-02-22 PROCEDURE — 85027 COMPLETE CBC AUTOMATED: CPT

## 2018-02-22 PROCEDURE — 84484 ASSAY OF TROPONIN QUANT: CPT

## 2018-02-22 PROCEDURE — 90935 HEMODIALYSIS ONE EVALUATION: CPT

## 2018-02-22 PROCEDURE — 700111 HCHG RX REV CODE 636 W/ 250 OVERRIDE (IP): Performed by: HOSPITALIST

## 2018-02-22 PROCEDURE — 770001 HCHG ROOM/CARE - MED/SURG/GYN PRIV*

## 2018-02-22 PROCEDURE — 83605 ASSAY OF LACTIC ACID: CPT

## 2018-02-22 RX ORDER — PHYTONADIONE 5 MG/1
10 TABLET ORAL ONCE
Status: COMPLETED | OUTPATIENT
Start: 2018-02-22 | End: 2018-02-22

## 2018-02-22 RX ORDER — PREDNISONE 20 MG/1
20 TABLET ORAL DAILY
Status: DISCONTINUED | OUTPATIENT
Start: 2018-02-23 | End: 2018-03-02 | Stop reason: HOSPADM

## 2018-02-22 RX ADMIN — INSULIN HUMAN 1 UNITS: 100 INJECTION, SOLUTION PARENTERAL at 17:14

## 2018-02-22 RX ADMIN — SODIUM CHLORIDE: 9 INJECTION, SOLUTION INTRAVENOUS at 22:31

## 2018-02-22 RX ADMIN — GABAPENTIN 600 MG: 300 CAPSULE ORAL at 20:10

## 2018-02-22 RX ADMIN — SEVELAMER CARBONATE 3200 MG: 800 TABLET, FILM COATED ORAL at 08:17

## 2018-02-22 RX ADMIN — INSULIN HUMAN 1 UNITS: 100 INJECTION, SOLUTION PARENTERAL at 06:25

## 2018-02-22 RX ADMIN — STANDARDIZED SENNA CONCENTRATE AND DOCUSATE SODIUM 2 TABLET: 8.6; 5 TABLET, FILM COATED ORAL at 08:23

## 2018-02-22 RX ADMIN — CALCITRIOL 0.75 MCG: 0.25 CAPSULE, LIQUID FILLED ORAL at 20:10

## 2018-02-22 RX ADMIN — OXYCODONE HYDROCHLORIDE 10 MG: 10 TABLET ORAL at 12:28

## 2018-02-22 RX ADMIN — TAZOBACTAM SODIUM AND PIPERACILLIN SODIUM 4.5 G: 500; 4 INJECTION, SOLUTION INTRAVENOUS at 08:27

## 2018-02-22 RX ADMIN — TAZOBACTAM SODIUM AND PIPERACILLIN SODIUM 4.5 G: 500; 4 INJECTION, SOLUTION INTRAVENOUS at 22:31

## 2018-02-22 RX ADMIN — FENTANYL CITRATE 50 MCG: 50 INJECTION INTRAMUSCULAR; INTRAVENOUS at 12:38

## 2018-02-22 RX ADMIN — HEPARIN SODIUM 3900 UNITS: 1000 INJECTION, SOLUTION INTRAVENOUS; SUBCUTANEOUS at 09:30

## 2018-02-22 RX ADMIN — OXYCODONE HYDROCHLORIDE 10 MG: 10 TABLET ORAL at 07:35

## 2018-02-22 RX ADMIN — GABAPENTIN 600 MG: 300 CAPSULE ORAL at 08:23

## 2018-02-22 RX ADMIN — OXYCODONE HYDROCHLORIDE 10 MG: 10 TABLET ORAL at 22:31

## 2018-02-22 RX ADMIN — SEVELAMER CARBONATE 3200 MG: 800 TABLET, FILM COATED ORAL at 12:29

## 2018-02-22 RX ADMIN — LEVETIRACETAM 250 MG: 500 TABLET, FILM COATED ORAL at 20:16

## 2018-02-22 RX ADMIN — LEVETIRACETAM 250 MG: 500 TABLET, FILM COATED ORAL at 08:23

## 2018-02-22 RX ADMIN — FENTANYL CITRATE 50 MCG: 50 INJECTION INTRAMUSCULAR; INTRAVENOUS at 11:37

## 2018-02-22 RX ADMIN — FENTANYL CITRATE 50 MCG: 50 INJECTION INTRAMUSCULAR; INTRAVENOUS at 20:11

## 2018-02-22 RX ADMIN — CINACALCET HYDROCHLORIDE 60 MG: 30 TABLET, COATED ORAL at 20:10

## 2018-02-22 RX ADMIN — OXYCODONE HYDROCHLORIDE 10 MG: 10 TABLET ORAL at 07:41

## 2018-02-22 RX ADMIN — OXYCODONE HYDROCHLORIDE 10 MG: 10 TABLET ORAL at 04:19

## 2018-02-22 RX ADMIN — GABAPENTIN 600 MG: 300 CAPSULE ORAL at 15:48

## 2018-02-22 RX ADMIN — PREDNISONE 50 MG: 50 TABLET ORAL at 08:23

## 2018-02-22 RX ADMIN — SEVELAMER CARBONATE 3200 MG: 800 TABLET, FILM COATED ORAL at 17:15

## 2018-02-22 RX ADMIN — PHYTONADIONE 10 MG: 5 TABLET ORAL at 09:40

## 2018-02-22 RX ADMIN — VANCOMYCIN HYDROCHLORIDE 1200 MG: 100 INJECTION, POWDER, LYOPHILIZED, FOR SOLUTION INTRAVENOUS at 13:48

## 2018-02-22 ASSESSMENT — PAIN SCALES - GENERAL
PAINLEVEL_OUTOF10: 4
PAINLEVEL_OUTOF10: 8
PAINLEVEL_OUTOF10: 8
PAINLEVEL_OUTOF10: 2
PAINLEVEL_OUTOF10: 7
PAINLEVEL_OUTOF10: 8
PAINLEVEL_OUTOF10: 1
PAINLEVEL_OUTOF10: 3
PAINLEVEL_OUTOF10: 7
PAINLEVEL_OUTOF10: 7
PAINLEVEL_OUTOF10: 3
PAINLEVEL_OUTOF10: 8
PAINLEVEL_OUTOF10: 1
PAINLEVEL_OUTOF10: 1

## 2018-02-22 ASSESSMENT — ENCOUNTER SYMPTOMS
MYALGIAS: 0
COUGH: 0
SHORTNESS OF BREATH: 0
DEPRESSION: 0
CHILLS: 0
ORTHOPNEA: 0
VOMITING: 0
BACK PAIN: 0
LOSS OF CONSCIOUSNESS: 0
EYES NEGATIVE: 1
PALPITATIONS: 0
FEVER: 0
DIARRHEA: 0
ABDOMINAL PAIN: 1
FALLS: 0
HEMOPTYSIS: 0
WHEEZING: 0
SENSORY CHANGE: 0
NAUSEA: 0
NECK PAIN: 0
DIZZINESS: 0
HEADACHES: 0
FOCAL WEAKNESS: 0
SPUTUM PRODUCTION: 0
STRIDOR: 0

## 2018-02-22 ASSESSMENT — LIFESTYLE VARIABLES: DO YOU DRINK ALCOHOL: NO

## 2018-02-22 NOTE — ASSESSMENT & PLAN NOTE
- Chronic issue, acute bleeding  - Now status post surgery  - No sign of gross bleeding  - Dr. Rosenbaum did skin graft on Monday  Continue with pain control

## 2018-02-22 NOTE — CARE PLAN
Problem: Bowel/Gastric:  Goal: Normal bowel function is maintained or improved  Outcome: MET Date Met: 02/21/18  BM today; pt on renal diet    Problem: Fluid Volume:  Goal: Will maintain balanced intake and output  Outcome: MET Date Met: 02/21/18  HD today; pt tolerated well

## 2018-02-22 NOTE — PROGRESS NOTES
Nephrology Progress Note, Adult, Complex               Author: Dee Angella Date & Time created: 2/22/2018  1:49 PM     Interval History:  36 y/o male with ESRD/HHD admitted with abdominal wound infection, bleeding from varices -treated surgically  Doing much better.  NO SOB.  Low BP improved  Received dialysis this morning -tolerated well  (+) blood cx from dialysis catheter for Klebsiella PNA    Review of Systems:  Review of Systems   Constitutional: Positive for malaise/fatigue. Negative for chills and fever.   HENT: Negative.    Eyes: Negative.    Respiratory: Negative for cough, hemoptysis, sputum production, shortness of breath and wheezing.    Cardiovascular: Negative for chest pain, palpitations, orthopnea and leg swelling.   Gastrointestinal: Positive for abdominal pain. Negative for diarrhea, nausea and vomiting.   Genitourinary: Negative for dysuria.   Musculoskeletal: Negative for back pain, joint pain and myalgias.   Skin: Negative.    Neurological: Negative for dizziness, sensory change, focal weakness and headaches.       Physical Exam:  Physical Exam   Constitutional: He is oriented to person, place, and time. He appears well-developed and well-nourished. No distress.   HENT:   Head: Normocephalic and atraumatic.   Nose: Nose normal.   Mouth/Throat: Oropharynx is clear and moist.   Eyes: Conjunctivae and EOM are normal. Pupils are equal, round, and reactive to light. No scleral icterus.   Neck: Normal range of motion. Neck supple. No thyromegaly present.   Cardiovascular: Normal rate and regular rhythm.  Exam reveals no gallop and no friction rub.    Pulmonary/Chest: Effort normal and breath sounds normal. No respiratory distress.   Abdominal: Bowel sounds are normal. He exhibits distension. There is tenderness.   Abdominal wall covered with dressing   Musculoskeletal: He exhibits no edema.   Lymphadenopathy:     He has no cervical adenopathy.   Neurological: He is alert and oriented to person, place,  and time. No cranial nerve deficit.   Skin: Skin is warm. No rash noted. No erythema.   Nursing note and vitals reviewed.      Labs:        Invalid input(s): [id number]  Recent Labs      02/20/18   1558  02/21/18   0505  02/22/18   0250   TROPONINI  <0.01  0.02  <0.01     Recent Labs      02/20/18   0117  02/20/18   1558  02/21/18   0505  02/22/18   0250   SODIUM  136   --   134*  136   POTASSIUM  4.6   --   5.7*  4.5   CHLORIDE  96   --   97  99   CO2  26   --   23  26   BUN  35*   --   32*  31*   CREATININE  7.28*   --   5.54*  4.30*   MAGNESIUM   --   2.3   --    --    PHOSPHORUS   --   4.3   --    --    CALCIUM  8.1*   --   8.0*  7.2*     Recent Labs      02/20/18   0117  02/21/18   0505  02/22/18   0250   ALTSGPT   --   8  7   ASTSGOT   --   14  11*   ALKPHOSPHAT   --   181*  129*   TBILIRUBIN   --   1.0  0.7   GLUCOSE  93  189*  171*     Recent Labs      02/20/18   0134   02/21/18   0505   02/21/18   1830  02/22/18   0250  02/22/18   0308  02/22/18   1114   RBC   --    < >  2.77*   < >  2.79*   --   2.65*  2.74*   HEMOGLOBIN   --    < >  7.8*   < >  7.9*   --   7.4*  7.6*   HEMATOCRIT   --    < >  24.7*   < >  25.3*   --   24.0*  25.1*   PLATELETCT   --    < >  137*   < >  141*   --   164  172   PROTHROMBTM  37.5*   --   28.1*   --    --   31.6*   --    --    INR  3.84*   --   2.67*   --    --   3.09*   --    --     < > = values in this interval not displayed.     Recent Labs      02/20/18   0117   02/21/18   0505   02/21/18   1830  02/22/18   0250  02/22/18   0308  02/22/18   1114   WBC  4.7*   < >  9.9   < >  7.0   --   8.1  7.5   NEUTSPOLYS  87.30*   --    --    --    --    --    --    --    LYMPHOCYTES  7.30*   --    --    --    --    --    --    --    MONOCYTES  3.00   --    --    --    --    --    --    --    EOSINOPHILS  0.40   --    --    --    --    --    --    --    BASOPHILS  0.90   --    --    --    --    --    --    --    ASTSGOT   --    --   14   --    --   11*   --    --    ALTSGPT   --     --   8   --    --   7   --    --    ALKPHOSPHAT   --    --   181*   --    --   129*   --    --    TBILIRUBIN   --    --   1.0   --    --   0.7   --    --     < > = values in this interval not displayed.           Hemodynamics:  Temp (24hrs), Av.4 °C (97.6 °F), Min:36.2 °C (97.1 °F), Max:36.7 °C (98.1 °F)  Temperature: 36.2 °C (97.1 °F)  Pulse  Av.8  Min: 47  Max: 157Heart Rate (Monitored): 75  Arterial BP: (!) 97/59, NIBP: 120/60  CVP (mm Hg): (!) 272 MM HG  Respiratory:    Respiration: 13, Pulse Oximetry: 99 %, O2 Daily Delivery Respiratory : Room Air with O2 Available     Given By:: Mouthpiece  RUL Breath Sounds: Clear, RML Breath Sounds: Clear, RLL Breath Sounds: Clear, ANGEL Breath Sounds: Clear, LLL Breath Sounds: Clear  Fluids:    Intake/Output Summary (Last 24 hours) at 18 1349  Last data filed at 18 0900   Gross per 24 hour   Intake              900 ml   Output             2000 ml   Net            -1100 ml     Weight: 77.8 kg (171 lb 8.3 oz)  GI/Nutrition:  Orders Placed This Encounter   Procedures   • Diet Order     Standing Status:   Standing     Number of Occurrences:   1     Order Specific Question:   Diet:     Answer:   Renal [8]     Medical Decision Making, by Problem:  Active Hospital Problems    Diagnosis   • Hypertension [I10]   • ESRD (end stage renal disease) (CMS-[facility]) [N18.6]   • 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]   • Abdominal varicosities [I86.8]   • Shock (CMS-[facility]) [R57.9]   • Hemorrhagic disorder due to circulating anticoagulants (CMS-[facility]) [D68.318]   • Sleep apnea [G47.30]       Quality-Core Measures   Reviewed items::  Labs reviewed and Medications reviewed  Central line in place:  Dialysis    Assessment and plan:    1.ESRD/HHD -daily dialysis -received dialysis this morning -please see dialysis flow sheet for details  2.Hypotension/sepsis -improved  -off pressor  3.Electrolytes: mild hyponatremia - normalized-to monitor                          Hyperkalemia -corrected  with HD  4.Anemia: Epogen  5.Volume:UF with HD as BP   6.bacteriemia/line infection -ID consulted-f/u recommendations  Recs: daily dialysis             All meds to renal doses             Keep MAP> 65             Will follow

## 2018-02-22 NOTE — CARE PLAN
Problem: Risk for Infection, Impaired Wound Healing  Goal: Remain free from signs and symptoms of infection  Outcome: PROGRESSING AS EXPECTED  Antibiotics per MD, sterile dressing changes to wound daily.    Problem: Pain  Goal: Alleviation of Pain or a reduction in pain to the patient's comfort goal  Outcome: PROGRESSING AS EXPECTED  Pain assessed q2h. Pt positioned with pillows for comfort. Pain medications given PRN as needed per MAR. Distraction techniques in place - television on.

## 2018-02-22 NOTE — PROGRESS NOTES
[facility]    Date of Service: 2018    Chief Complaint  37 y.o. male admitted 2018 with bleeding abdominal wall varices.    Interval Problem Update  Patient does have a history of abdominal wall variceal bleeding, has had procedures in the past.  During this hospital stay patient did have suture ligation of abdominal wall varices, debridement of skin and subcutaneous tissue of abdominal wall by Dr. Robertson on .  Per Dr. Rosenbaum, possible skin graft on Monday.  Patient seen and examined in the ICU, ICU care given.  Patient was noted to be hypotensive, has been on pressors, now off.  Discussed patient condition and plan with Intensivist, RN, RT and charge nurse / hot rounds.    Awake and oriented  HD daily  No overnight events  NSR  SBP   RA  Tolerating diet  Anuric  Afebrile  Remove right radial art line  kleb pneumo in blood, on zosyn    Consultants/Specialty  Intensivist  Plastic surgery  Nephrology  Infectious disease    Disposition  Patient requires additional treatment in the hospital, will likely be able to go home at time of discharge        Review of Systems   Constitutional: Positive for malaise/fatigue. Negative for chills and fever.   HENT: Negative for congestion and hearing loss.    Respiratory: Negative for sputum production, shortness of breath and stridor.    Cardiovascular: Negative for chest pain and palpitations.   Gastrointestinal: Positive for abdominal pain. Negative for nausea and vomiting.   Genitourinary: Negative for dysuria, frequency and urgency.   Musculoskeletal: Negative for falls, myalgias and neck pain.   Neurological: Negative for dizziness, loss of consciousness and headaches.   Psychiatric/Behavioral: Negative for depression and suicidal ideas.   All other systems reviewed and are negative.     Physical Exam  Laboratory/Imaging   Hemodynamics  Temp (24hrs), Av.5 °C (97.7 °F), Min:36.2 °C (97.1 °F), Max:36.7 °C (98.1 °F)   Temperature: 36.2 °C  (97.1 °F)  Pulse  Av.8  Min: 47  Max: 157 Heart Rate (Monitored): 75  Blood Pressure: 130/63, Arterial BP: (!) 97/59, NIBP: 120/60 CVP (mm Hg): (!) 272 MM HG    Respiratory      Respiration: 13, Pulse Oximetry: 99 %, O2 Daily Delivery Respiratory : Room Air with O2 Available     Given By:: Mouthpiece  RUL Breath Sounds: Clear, RML Breath Sounds: Clear, RLL Breath Sounds: Clear, ANGEL Breath Sounds: Clear, LLL Breath Sounds: Clear    Fluids    Intake/Output Summary (Last 24 hours) at 18 0740  Last data filed at 18 0600   Gross per 24 hour   Intake          1706.35 ml   Output             1500 ml   Net           206.35 ml       Nutrition  Orders Placed This Encounter   Procedures   • Diet Order     Standing Status:   Standing     Number of Occurrences:   1     Order Specific Question:   Diet:     Answer:   Renal [8]     Physical Exam   Constitutional: He is oriented to person, place, and time.  Non-toxic appearance. No distress.   HENT:   Head: Normocephalic and atraumatic.   Mouth/Throat: No oropharyngeal exudate.   Eyes: Right eye exhibits no discharge. Left eye exhibits no discharge. No scleral icterus.   Neck: Neck supple. No edema and no erythema present.   Cardiovascular: Normal rate and regular rhythm.    No murmur heard.  Pulmonary/Chest: Effort normal and breath sounds normal. No stridor. No respiratory distress. He has no wheezes. He exhibits no tenderness.   Abdominal: Soft. Bowel sounds are normal. He exhibits no distension. There is tenderness.   Musculoskeletal: He exhibits no edema or tenderness.   Lymphadenopathy:     He has no cervical adenopathy.   Neurological: He is alert and oriented to person, place, and time.   Skin: Skin is warm and dry. He is not diaphoretic. No erythema.   Psychiatric: He has a normal mood and affect. His behavior is normal. Judgment normal.   Nursing note and vitals reviewed.      Recent Labs      18   1200  18   1830  18   0308   WBC  8.7   7.0  8.1   RBC  2.87*  2.79*  2.65*   HEMOGLOBIN  8.0*  7.9*  7.4*   HEMATOCRIT  25.7*  25.3*  24.0*   MCV  89.5  90.7  90.6   MCH  27.9  28.3  27.9   MCHC  31.1*  31.2*  30.8*   RDW  56.1*  58.0*  57.1*   PLATELETCT  148*  141*  164   MPV  9.6  9.9  10.1     Recent Labs      02/20/18   0117  02/21/18   0505  02/22/18   0250   SODIUM  136  134*  136   POTASSIUM  4.6  5.7*  4.5   CHLORIDE  96  97  99   CO2  26  23  26   GLUCOSE  93  189*  171*   BUN  35*  32*  31*   CREATININE  7.28*  5.54*  4.30*   CALCIUM  8.1*  8.0*  7.2*     Recent Labs      02/19/18   1225   02/20/18   0134  02/21/18   0505  02/22/18   0250   APTT  139.7*   --    --    --    --    INR  6.32*   < >  3.84*  2.67*  3.09*    < > = values in this interval not displayed.                  Assessment/Plan     Shock (CMS-HCC)   Assessment & Plan    -Resolved, now off pressors        Abdominal varicosities   Assessment & Plan    - Chronic issue, acute bleeding  - Now status post surgery  - No sign of gross bleeding  - Dr. 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, on Coumadin as an outpatient, restart when able        Hemorrhagic disorder due to circulating anticoagulants (CMS-HCC)   Assessment & Plan    - Is on Coumadin for occluded IVC, currently being held due to bleeding        Hyperkalemia- (present on admission)   Assessment & Plan    -Resolved with hemodialysis  - Repeat BMP in the morning        Seizure disorder  (CMS-HCC)- (present on admission)   Assessment & Plan    - Continue Keppra, no sign of seizure activity        Normocytic anemia- (present on admission)   Assessment & Plan    - No longer any sign of gross bleeding, did have significant bleeding initially  - Repeat CBC in the morning        Sleep apnea- (present on admission)   Assessment & Plan    - Patient uses BiPAP at night, [facility] with [facility]          Quality-Core Measures   Reviewed items::  Labs reviewed, Medications reviewed and Radiology images reviewed  DVT prophylaxis pharmacological::  Contraindicated - High bleeding risk  DVT prophylaxis - mechanical:  SCDs  Antibiotics:  Treating active infection/contamination beyond 24 hours perioperative coverage

## 2018-02-22 NOTE — PROGRESS NOTES
Pharmacy Kinetics 37 y.o. male on vancomycin day # 4 2/22/2018    Currently on Vancomycin Pulse Dosing       Indication for Treatment: SSTI    Pertinent history per medical record: Admitted on 2/19/2018 for Bleeding abdominal wall varices. 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  He has had several episodes of bleeding from the abdominal varices, and has required multiple surgeries for that. He had recurrence of bleeding in the last several days, and plastic surgery has admitted him initially for recurrent bleeding of the abdominal wall varices.  He was then brought to the OR and underwent suture ligation of the abdominal wall varices, and debridement of the skin and subcutaneous tissues of the abdominal wall.    On quick chart review appears to have complex ID hx, seen by [facility] previously.     Other antibiotics: Zosyn 4.5g q12h     Allergies: Baclofen; Contrast media with iodine [iodine]; Keflex; Pcn [penicillins]; and Tape      List concerns for renal function: HD-ESRD s/p 2 rejected renal transplants     Pertinent cultures to date:   2/20 BLD - Klebseilla pneumoniae  2/20 WND - Enterococcus and Non-Lactose Fermenting Gram Negative Prem  2/21 BLD - NGTD    Recent Labs      02/20/18   0117   02/21/18   0505  02/21/18   1200  02/21/18   1830  02/22/18   0308  02/22/18   1114   WBC  4.7*   < >  9.9  8.7  7.0  8.1  7.5   NEUTSPOLYS  87.30*   --    --    --    --    --    --     < > = values in this interval not displayed.     Recent Labs      02/20/18   0117  02/21/18   0505  02/22/18   0250   BUN  35*  32*  31*   CREATININE  7.28*  5.54*  4.30*   ALBUMIN   --   3.5  3.3     Recent Labs      02/21/18   0505  02/22/18   1006   VANCORANDOM  22.5  10.9     Intake/Output Summary (Last 24 hours) at 02/22/18 1245  Last data filed at 02/22/18 0900   Gross per 24 hour   Intake              900 ml   Output             2000 ml   Net            -1100 ml      Blood pressure  "130/63, pulse 76, temperature 36.2 °C (97.1 °F), resp. rate 13, height 1.626 m (5' 4\"), weight 77.8 kg (171 lb 8.3 oz), SpO2 99 %. Temp (24hrs), Av.4 °C (97.6 °F), Min:36.2 °C (97.1 °F), Max:36.7 °C (98.1 °F)      A/P   1. Vancomycin dose change: 1,200 mg IV x 1  2. Next vancomycin level:  at 500 (prior to HD)  3. Goal trough: 12-16 mcg/mL  4. Comments: Vanco stopped yesterday due to blood with GNR. Restarted with wound results today positive for enterococcus. Random level after two dialysis sessions after 1,900mg load came back at 10 mcg/mL. Will check a level again prior to hemodialysis.    Tab Felton, PharmD      "

## 2018-02-22 NOTE — PROGRESS NOTES
Upon hanging 2100 zosyn dose, 0900 dose was hanging and appeared to be full/roller clamp was clamped as if it did not run. Pharmacy notified. Will notify MD.

## 2018-02-22 NOTE — PROGRESS NOTES
"Blood pressure 130/63, pulse 76, temperature 36.2 °C (97.1 °F), resp. rate 13, height 1.626 m (5' 4\"), weight 77.8 kg (171 lb 8.3 oz), SpO2 99 %.    I/O last 3 completed shifts:  In: 2948.7 [P.O.:1460; I.V.:988.7; Dialysis:500]  Out: 1500 [Dialysis:1500]  Improving  Plan STS Monday   OK to tfer to floor  "

## 2018-02-22 NOTE — PROGRESS NOTES
Pulmonary Critical Care Progress Note        Chief Complaint: Hypotension and acute blood loss anemia.     History of Present Illness:  I was kindly asked by Dr. Lindsay to see   and evaluate this gentleman regarding the above problems.  This is a   37-year-old gentleman with a complicated past medical history.  He has   end-stage renal disease.  He is on home hemodialysis 5 times a week.  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  Today, he came to the emergency room   complaining of bleeding from his abdominal wall varices.  He was seen by Dr. Jason Robertson from Plastic Surgery.  He was taken to the operating theater where   he underwent suture ligation of abdominal wall varices for control of active   hemorrhage.  There was also debridement of skin and subcutaneous tissue of the   abdominal wall.  Postoperatively, he has had hypotension and tachycardia and   is now critically ill in the intensive care unit on norepinephrine drip.     He denies any chest pain or chest pressure.  He has not had any recent illness   and has not had any fevers, chills or sweats prior to admission.    Please note above history obtained by the by partner Dr. Sandoval early this morning.    ROS:  Respiratory: positive shortness of breath, improved after hemodialysis Cardiac: negative, GI: positive abdominal pain, however less painful.  All other systems negative.    Interval Events:  24 hour interval history reviewed    Variceal bleed  Improved hemodyamics  Right radial oumar to d/c  Up in chair  Room air  HD today, day HD  INR 3.09 and holding anticoagulation. Additional vitamin K  Vitamin K today oral  Klebsiella on pneumonia, on Zosyn and Vanco.  Keeping of vancomycin for gram-positive cocci of the wound is well.  Skin graft Monday  INR daily  Status post  hemodialysis again today.  Plans for surgical plastic intervention Monday.  Appears stable for transfer to surgical floor.    PFSH:  No change.    Respiratory:     Pulse Oximetry: 99 %  Chest Tube Drains:          Exam: Less rales on exam today. Patient has significant anterior chest wall variceal lesions.   Imaging: No chest x-ray today        Invalid input(s): [id number]    HemoDynamics:  Pulse: 76, Heart Rate (Monitored): 75  Blood Pressure: 130/63, Arterial BP: (!) 97/59, NIBP: 120/60  CVP (mm Hg): (!) 272 MM HG    Exam: Sinus rhythm today without obvious murmur rub or gallop. There is a right femoral Vas-Cath in place.  Imaging: None - Reviewed  Recent Labs      02/20/18   1558  02/21/18   0505  02/22/18   0250   TROPONINI  <0.01  0.02  <0.01       Neuro:  GCS Total [place] Coma Score: 15       Exam: Much more alert today. He does move all 4 limbs to command. No obvious focal neurologic deficits today.   Imaging: None - Reviewed    Fluids:  Intake/Output       02/20/18 0700 - 02/21/18 0659 02/21/18 0700 - 02/22/18 0659 02/22/18 0700 - 02/23/18 0659      6780-3374 9516-8873 Total 7106-1798 0590-4757 Total 8730-6699 7658-1761 Total       Intake    P.O.  --  600 600  400  460 860  --  -- --    P.O. -- 600 600 400 460 860 -- -- --    I.V.  790.3  642.4 1432.7  146.4  200 346.4  --  -- --    Vasopressin Volume 40.1 108 148.1 31.4 -- 31.4 -- -- --    Phenylephrine Volume 192.4 314.4 506.8 15 -- 15 -- -- --    Norepinephrine Volume 387.9 -- 387.9 -- -- -- -- -- --    IV Piggyback Volume (Zosyn) 50 100 150 -- 100 100 -- -- --    IV Volume (NS) 120 120 240 100 100 200 -- -- --    Dialysis  500  -- 500  500  -- 500  --  -- --    Dialysis Volume (Dialysis Intake) 500 -- 500 500 -- 500 -- -- --    Total Intake 1290.3 1242.4 2532.7 1046.4 660 1706.4 -- -- --       Output    Urine  --  -- --  --  -- --  --  -- --    Number of Times Voided -- -- -- 0 x -- 0 x -- -- --    Dialysis  1500  -- 1500  1500  -- 1500  --  -- --     Dialysis Output (Dialysis Output) 1500 -- 1500 1500 -- 1500 -- -- --    Stool  --  -- --  --  -- --  --  -- --    Number of Times Stooled -- -- -- 1 x -- 1 x -- -- --    Total Output 1500 -- 1500 1500 -- 1500 -- -- --       Net I/O     -209.7 1242.4 1032.7 -453.7 660 206.4 -- -- --        Weight: 77.8 kg (171 lb 8.3 oz)  Recent Labs      02/20/18   0117  02/20/18   1558  02/21/18   0505  02/22/18   0250   SODIUM  136   --   134*  136   POTASSIUM  4.6   --   5.7*  4.5   CHLORIDE  96   --   97  99   CO2  26   --   23  26   BUN  35*   --   32*  31*   CREATININE  7.28*   --   5.54*  4.30*   MAGNESIUM   --   2.3   --    --    PHOSPHORUS   --   4.3   --    --    CALCIUM  8.1*   --   8.0*  7.2*       GI/Nutrition:  Exam: Surgical site noted patient is a binder in place which was not removed at this time. This is for treatment of his abdominal varices. Report per the plastic surgeon is that overall appears improved. Binders in place at this time.  Imaging: None performed.   NPO  Liver Function  Recent Labs      02/19/18   1225  02/20/18   0117  02/21/18   0505  02/22/18   0250   ALTSGPT  8   --   8  7   ASTSGOT  9*   --   14  11*   ALKPHOSPHAT  196*   --   181*  129*   TBILIRUBIN  0.6   --   1.0  0.7   LIPASE  13   --    --    --    GLUCOSE  97  93  189*  171*       Heme:  Recent Labs      02/19/18   1225   02/20/18   0134   02/21/18   0505  02/21/18   1200  02/21/18   1830  02/22/18   0250  02/22/18   0308   RBC  3.13*   < >   --    < >  2.77*  2.87*  2.79*   --   2.65*   HEMOGLOBIN  8.5*   < >   --    < >  7.8*  8.0*  7.9*   --   7.4*   HEMATOCRIT  28.8*   < >   --    < >  24.7*  25.7*  25.3*   --   24.0*   PLATELETCT  224   < >   --    < >  137*  148*  141*   --   164   PROTHROMBTM  55.7*   < >  37.5*   --   28.1*   --    --   31.6*   --    APTT  139.7*   --    --    --    --    --    --    --    --    INR  6.32*   < >  3.84*   --   2.67*   --    --   3.09*   --     < > = values in this interval not displayed.        Infectious Disease:  Temp  Av.5 °C (97.7 °F)  Min: 36.2 °C (97.1 °F)  Max: 36.7 °C (98.1 °F)  Micro: antibiotics reviewed. Blood cultures positive for Klebsiella pneumonia. Will continue Zosyn. Continue with vancomycin for gram-positive cocci from the wound. Await final culture.  Recent Labs      18   1225  18   0117   18   0505  18   1200  18   1830  18   0250  18   0308   WBC  9.6  4.7*   < >  9.9  8.7  7.0   --   8.1   NEUTSPOLYS  80.60*  87.30*   --    --    --    --    --    --    LYMPHOCYTES  11.70*  7.30*   --    --    --    --    --    --    MONOCYTES  5.00  3.00   --    --    --    --    --    --    EOSINOPHILS  1.10  0.40   --    --    --    --    --    --    BASOPHILS  0.70  0.90   --    --    --    --    --    --    ASTSGOT  9*   --    --   14   --    --   11*   --    ALTSGPT  8   --    --   8   --    --   7   --    ALKPHOSPHAT  196*   --    --   181*   --    --   129*   --    TBILIRUBIN  0.6   --    --   1.0   --    --   0.7   --     < > = values in this interval not displayed.     Current Facility-Administered Medications   Medication Dose Frequency Provider Last Rate Last Dose   • fentaNYL (SUBLIMAZE) injection 25 mcg  25 mcg Q HOUR PRN Chase Daly D.O.   25 mcg at 18 1249    Or   • fentaNYL (SUBLIMAZE) injection 50 mcg  50 mcg Q HOUR PRN Chase Daly D.O.   50 mcg at 18 194    Or   • fentaNYL (SUBLIMAZE) injection 100 mcg  100 mcg Q HOUR PRN Chase Daly D.O.       • predniSONE (DELTASONE) tablet 50 mg  50 mg DAILY Chase Daly D.O.   50 mg at 18 1200   • ipratropium-albuterol (DUONEB) nebulizer solution 3 mL  3 mL Q4H PRN (RT) Denis Lagos D.O.       • norepinephrine (LEVOPHED) 8 mg in  mL Infusion  0.5-30 mcg/min Continuous Moises Lindsay D.O.   Stopped at 18 1440   • piperacillin-tazobactam (ZOSYN) IVPB premix 4.5 g  4.5 g Q12HRS Moises Lindsay D.O.   4.5 g at 18 195    • phenylephrine (JOSSY-SYNEPHRINE) 40,000 mcg in  mL Infusion  0-300 mcg/min Continuous Chase Daly D.O.   Stopped at 02/21/18 0800   • albumin human 25% solution 12.5 g  12.5 g DIALYSIS PRN Dee Perales M.D.       • heparin injection 3,900 Units  3,900 Units DIALYSIS PRN Dee Perales M.D.   3,900 Units at 02/21/18 0950   • calcitRIOL (ROCALTROL) capsule 0.75 mcg  0.75 mcg QHS Jeronimo Langston M.D.   0.75 mcg at 02/21/18 1948   • cinacalcet (SENSIPAR) tablet 60 mg  60 mg Q EVENING Jeronimo Langston M.D.   60 mg at 02/21/18 1950   • gabapentin (NEURONTIN) capsule 600 mg  600 mg TID Jeronimo Langston M.D.   600 mg at 02/21/18 1949   • levETIRAcetam (KEPPRA) tablet 250 mg  250 mg BID Jeronimo Langston M.D.   250 mg at 02/21/18 1947   • LORazepam (ATIVAN) tablet 1 mg  1 mg QDAY PRN Jeronimo Langston M.D.       • sevelamer carbonate (RENVELA) tablet 3,200 mg  3,200 mg TID WITH MEALS Jeronimo Langston M.D.   3,200 mg at 02/21/18 1830   • senna-docusate (PERICOLACE or SENOKOT S) 8.6-50 MG per tablet 2 Tab  2 Tab BID Jeronimo Langston M.D.   Stopped at 02/21/18 0900    And   • polyethylene glycol/lytes (MIRALAX) PACKET 1 Packet  1 Packet QDAY PRN Jeronimo Langston M.D.        And   • magnesium hydroxide (MILK OF MAGNESIA) suspension 30 mL  30 mL QDAY PRN Jeronimo Langston M.D.        And   • bisacodyl (DULCOLAX) suppository 10 mg  10 mg QDAY PRN Jeronimo Langston M.D.       • Respiratory Care per Protocol   Continuous RT Jeronimo Langston M.D.       • NS infusion   Continuous Liu Sandoval M.D. 10 mL/hr at 02/20/18 0900     • labetalol (NORMODYNE,TRANDATE) injection 10 mg  10 mg Q4HRS PRN Jeronimo Langston M.D.       • ondansetron (ZOFRAN) syringe/vial injection 4 mg  4 mg Q4HRS PRN Jeronimo Langston M.D.       • ondansetron (ZOFRAN ODT) dispertab 4 mg  4 mg Q4HRS PRN eJronimo Langston M.D.       • promethazine (PHENERGAN) tablet 12.5-25 mg  12.5-25 mg Q4HRS PRN Jeronimo Langston M.D.       • promethazine (PHENERGAN) suppository  12.5-25 mg  12.5-25 mg Q4HRS PRN Jeronimo Langston M.D.       • prochlorperazine (COMPAZINE) injection 5-10 mg  5-10 mg Q4HRS PRN Jeronimo Langston M.D.       • acetaminophen (TYLENOL) tablet 650 mg  650 mg Q6HRS PRN Jeronimo Langston M.D.   650 mg at 02/20/18 0908   • Pharmacy Consult Request ...Pain Management Review   PRN Jeronimo Langston M.D.        And   • oxyCODONE immediate-release (ROXICODONE) tablet 5 mg  5 mg Q3HRS PRN Jeronimo Langston M.D.        And   • oxyCODONE immediate-release (ROXICODONE) tablet 10 mg  10 mg Q3HRS PRDELORES Langston M.D.   10 mg at 02/22/18 0419    And   • morphine (pf) 4 mg/ml injection 4 mg  4 mg Q3HRS PRN Jeronimo Langston M.D.   4 mg at 02/21/18 0210   • insulin regular (HUMULIN R) injection 1-6 Units  1-6 Units 4X/DAY OLE Langston M.D.   1 Units at 02/22/18 0625    And   • glucose 4 g chewable tablet 16 g  16 g Q15 MIN PRN Jeronimo Langston M.D.        And   • dextrose 50% (D50W) injection 25 mL  25 mL Q15 MIN PRDELORES Langston M.D.         Last reviewed on 2/19/2018  1:36 PM by Emilie Gabriel R.N.    Quality  Measures:  Labs reviewed and Medications reviewed  Diaz catheter: No Diaz      DVT Prophylaxis: Contraindicated - High bleeding risk  DVT prophylaxis - mechanical: SCDs  Ulcer prophylaxis: Yes  Antibiotics: Treating active infection/contamination beyond 24 hours perioperative coverage    Assessment and plan:    1. Status post abdominal wall variceal bleed with some necrotic skin tissue of the abdominal wall    -Patient underwent suture ligation of the varices to control hemorrhage as well as debridement of the skin of the abdominal wall.  -Procedure performed by plastic surgery Dr. Robertson. Plan for skin grafting on Monday.  -Your typical postop management with the use of binder at this time.    2. Shock.    -Patient is a combination of septic and hemorrhagic shock.  Blood cultures positive for Klebsiella pneumonia.  -Continue with broad-spectrum antibiotics with Zosyn.  Consider discontinue vancomycin if culture wound is negative  -Will wean midodrine at this time.    3. Initially reported occluded inferior vena cava. This was determined not to be the case as noted in previous note.    -Patient normally on Coumadin but at this time is held. He had FFP given preoperatively as well. Packed cells were required as well.  -INR remains elevated. We'll continue to follow.  -Vitamin K is being given in preparation for surgical procedure by Monday. He may require additional FFP.    4. 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    -Patient has a right femoral Vas-Cath in position.  -Did have dialysis performed perioperatively and again today..  -Pending his respiratory status.     5. Acute hypoxemic respiratory failure.    -Patient was requiring higher dose FiO2 post procedure yesterday however today is improved.  -He does have obstructive sleep apnea and CPAP she has been ordered.  -Typical pulmonary toilet.  -Overall improved. Patient not requiring oxygen support at this time.    6. History of seizure disorder    -Continue on Keppra at this time.  -Continue to observe.    Plan to transfer to surgical floor.    Discussed patient condition and risk of morbidity and/or mortality with Charge nurse / hot rounds.  M.D. rounds as well, as with family.  Patient remains critically ill.  Critical care time = 35 minutes in directly providing and coordinating critical care and extensive data review.  No time overlap and excludes procedures.    Chase Daly D.O.

## 2018-02-22 NOTE — PROGRESS NOTES
HD treatment today per routine order using R femoral catheter,run better reversed.Treatment tolerated well.BP in the 90's-100's during treatment.Net UF removed 2 L.Report given to primary Rn.

## 2018-02-22 NOTE — PROGRESS NOTES
Rounds with Dr Daly, Dr Lagos, and plastic surgeon Dr Rosenbaum. Updated team on pt's status. HD planned today.   Dr Daly gave orders to repeat blood cultures, advance diet, stop pressors, mobilize, and change CBC to Q8hrs.   0930 All pressors off by this time. VSS.

## 2018-02-22 NOTE — DISCHARGE PLANNING
Care Transition Team Assessment    SW met at bedside with pt to obtain the information used in this assessment. Pt confirmed accuracy of facesheet. Pt lives in a single story home with his parents who are a good support for him. Prior to coming to the hospital, pt was independent, drove self and works full time. Pt has no financial concerns. Pt uses the [facility] in [facility] for prescriptions. Pt denies any substance use but does state that he's on a high dose of pain medications that he would like to get off of them if his pain can be well managed.     Plan: Pt's d/c plan is unknown at this time. SW to remain available to assist with d/c.    Information Source  Orientation : Oriented x 4  Information Given By: Patient  Informant's Name:  (Denis)  Who is responsible for making decisions for patient? : Patient    Readmission Evaluation  Is this a readmission?: Yes - planned readmission    Elopement Risk  Legal Hold: No  Ambulatory or Self Mobile in Wheelchair: Yes  Disoriented: No  Psychiatric Symptoms: None  History of Wandering: No  Elopement this Admit: No  Vocalizing Wanting to Leave: No  Displays Behaviors, Body Language Wanting to Leave: No-Not at Risk for Elopement  Elopement Risk: Not at Risk for Elopement     Discharge Preparedness  What is your plan after discharge?: Uncertain - pending medical team collaboration, Home with help, Skilled nursing facility, Home health care  What are your discharge supports?: Parent  Prior Functional Level: Ambulatory, Drives Self, Independent with Activities of Daily Living, Independent with Medication Management  Difficulity with ADLs: Bathing, Dressing, Walking  Difficulity with IADLs: Cooking, Driving, Laundry, Shopping    Functional Assesment  Prior Functional Level: Ambulatory, Drives Self, Independent with Activities of Daily Living, Independent with Medication Management    Finances  Financial Barriers to Discharge: No  Prescription Coverage: Yes     Advance  Directive  Advance Directive?: None    Domestic Abuse  Have you ever been the victim of abuse or violence?: No    Psychological Assessment  History of Substance Abuse: Prescription opioids  Substance Abuse Comments:  (Uses daily pain meds)  History of Psychiatric Problems: No  Non-compliant with Treatment: No  Newly Diagnosed Illness: No    Discharge Risks or Barriers  Discharge risks or barriers?: No    Anticipated Discharge Information  Anticipated discharge disposition: Dialysis, Discharge needs currently unknown, HHC, Home, SNF

## 2018-02-22 NOTE — ASSESSMENT & PLAN NOTE
- Is on Coumadin for occluded IVC, currently being held due to bleeding.   Will discuss with surgery when to restart

## 2018-02-23 LAB
ALBUMIN SERPL BCP-MCNC: 3.5 G/DL (ref 3.2–4.9)
ALBUMIN/GLOB SERPL: 1.1 G/DL
ALP SERPL-CCNC: 143 U/L (ref 30–99)
ALT SERPL-CCNC: 9 U/L (ref 2–50)
ANION GAP SERPL CALC-SCNC: 12 MMOL/L (ref 0–11.9)
AST SERPL-CCNC: 10 U/L (ref 12–45)
BILIRUB SERPL-MCNC: 0.8 MG/DL (ref 0.1–1.5)
BUN SERPL-MCNC: 31 MG/DL (ref 8–22)
CALCIUM SERPL-MCNC: 7.8 MG/DL (ref 8.5–10.5)
CHLORIDE SERPL-SCNC: 98 MMOL/L (ref 96–112)
CO2 SERPL-SCNC: 27 MMOL/L (ref 20–33)
CREAT SERPL-MCNC: 4.24 MG/DL (ref 0.5–1.4)
ERYTHROCYTE [DISTWIDTH] IN BLOOD BY AUTOMATED COUNT: 57.5 FL (ref 35.9–50)
ERYTHROCYTE [DISTWIDTH] IN BLOOD BY AUTOMATED COUNT: 57.5 FL (ref 35.9–50)
GLOBULIN SER CALC-MCNC: 3.2 G/DL (ref 1.9–3.5)
GLUCOSE BLD-MCNC: 139 MG/DL (ref 65–99)
GLUCOSE BLD-MCNC: 141 MG/DL (ref 65–99)
GLUCOSE BLD-MCNC: 143 MG/DL (ref 65–99)
GLUCOSE BLD-MCNC: 149 MG/DL (ref 65–99)
GLUCOSE SERPL-MCNC: 133 MG/DL (ref 65–99)
HCT VFR BLD AUTO: 24.9 % (ref 42–52)
HCT VFR BLD AUTO: 25.9 % (ref 42–52)
HGB BLD-MCNC: 7.6 G/DL (ref 14–18)
HGB BLD-MCNC: 7.6 G/DL (ref 14–18)
INR PPP: 1.52 (ref 0.87–1.13)
LACTATE BLD-SCNC: 2.3 MMOL/L (ref 0.5–2)
MCH RBC QN AUTO: 27.4 PG (ref 27–33)
MCH RBC QN AUTO: 28.3 PG (ref 27–33)
MCHC RBC AUTO-ENTMCNC: 29.3 G/DL (ref 33.7–35.3)
MCHC RBC AUTO-ENTMCNC: 30.5 G/DL (ref 33.7–35.3)
MCV RBC AUTO: 92.6 FL (ref 81.4–97.8)
MCV RBC AUTO: 93.5 FL (ref 81.4–97.8)
PLATELET # BLD AUTO: 200 K/UL (ref 164–446)
PLATELET # BLD AUTO: 202 K/UL (ref 164–446)
PMV BLD AUTO: 10.1 FL (ref 9–12.9)
PMV BLD AUTO: 9.8 FL (ref 9–12.9)
POTASSIUM SERPL-SCNC: 4.3 MMOL/L (ref 3.6–5.5)
PROT SERPL-MCNC: 6.7 G/DL (ref 6–8.2)
PROTHROMBIN TIME: 18 SEC (ref 12–14.6)
RBC # BLD AUTO: 2.69 M/UL (ref 4.7–6.1)
RBC # BLD AUTO: 2.77 M/UL (ref 4.7–6.1)
SODIUM SERPL-SCNC: 137 MMOL/L (ref 135–145)
TROPONIN I SERPL-MCNC: <0.01 NG/ML (ref 0–0.04)
VANCOMYCIN TROUGH SERPL-MCNC: 22.5 UG/ML (ref 10–20)
WBC # BLD AUTO: 6.1 K/UL (ref 4.8–10.8)
WBC # BLD AUTO: 9.1 K/UL (ref 4.8–10.8)

## 2018-02-23 PROCEDURE — 82962 GLUCOSE BLOOD TEST: CPT | Mod: 91

## 2018-02-23 PROCEDURE — 700111 HCHG RX REV CODE 636 W/ 250 OVERRIDE (IP): Mod: JG

## 2018-02-23 PROCEDURE — A9270 NON-COVERED ITEM OR SERVICE: HCPCS | Performed by: INTERNAL MEDICINE

## 2018-02-23 PROCEDURE — 83605 ASSAY OF LACTIC ACID: CPT

## 2018-02-23 PROCEDURE — 770001 HCHG ROOM/CARE - MED/SURG/GYN PRIV*

## 2018-02-23 PROCEDURE — 80202 ASSAY OF VANCOMYCIN: CPT

## 2018-02-23 PROCEDURE — 90935 HEMODIALYSIS ONE EVALUATION: CPT

## 2018-02-23 PROCEDURE — 700102 HCHG RX REV CODE 250 W/ 637 OVERRIDE(OP): Performed by: INTERNAL MEDICINE

## 2018-02-23 PROCEDURE — 85610 PROTHROMBIN TIME: CPT

## 2018-02-23 PROCEDURE — 5A1D70Z PERFORMANCE OF URINARY FILTRATION, INTERMITTENT, LESS THAN 6 HOURS PER DAY: ICD-10-PCS | Performed by: INTERNAL MEDICINE

## 2018-02-23 PROCEDURE — 700111 HCHG RX REV CODE 636 W/ 250 OVERRIDE (IP): Performed by: INTERNAL MEDICINE

## 2018-02-23 PROCEDURE — 84484 ASSAY OF TROPONIN QUANT: CPT

## 2018-02-23 PROCEDURE — 90471 IMMUNIZATION ADMIN: CPT

## 2018-02-23 PROCEDURE — 99232 SBSQ HOSP IP/OBS MODERATE 35: CPT | Performed by: HOSPITALIST

## 2018-02-23 PROCEDURE — 85027 COMPLETE CBC AUTOMATED: CPT | Mod: 91

## 2018-02-23 PROCEDURE — 700111 HCHG RX REV CODE 636 W/ 250 OVERRIDE (IP): Performed by: HOSPITALIST

## 2018-02-23 PROCEDURE — 3E0234Z INTRODUCTION OF SERUM, TOXOID AND VACCINE INTO MUSCLE, PERCUTANEOUS APPROACH: ICD-10-PCS | Performed by: HOSPITALIST

## 2018-02-23 PROCEDURE — 80053 COMPREHEN METABOLIC PANEL: CPT

## 2018-02-23 PROCEDURE — 90670 PCV13 VACCINE IM: CPT | Performed by: HOSPITALIST

## 2018-02-23 RX ORDER — HEPARIN SODIUM 1000 [USP'U]/ML
INJECTION, SOLUTION INTRAVENOUS; SUBCUTANEOUS
Status: COMPLETED
Start: 2018-02-23 | End: 2018-02-23

## 2018-02-23 RX ORDER — LEVETIRACETAM 250 MG/1
250 TABLET ORAL 2 TIMES DAILY
Status: DISCONTINUED | OUTPATIENT
Start: 2018-02-23 | End: 2018-03-02 | Stop reason: HOSPADM

## 2018-02-23 RX ADMIN — FENTANYL CITRATE 50 MCG: 50 INJECTION INTRAMUSCULAR; INTRAVENOUS at 17:54

## 2018-02-23 RX ADMIN — FENTANYL CITRATE 50 MCG: 50 INJECTION INTRAMUSCULAR; INTRAVENOUS at 20:50

## 2018-02-23 RX ADMIN — GABAPENTIN 600 MG: 300 CAPSULE ORAL at 15:00

## 2018-02-23 RX ADMIN — SEVELAMER CARBONATE 3200 MG: 800 TABLET, FILM COATED ORAL at 17:14

## 2018-02-23 RX ADMIN — HEPARIN SODIUM 3900 UNITS: 1000 INJECTION, SOLUTION INTRAVENOUS; SUBCUTANEOUS at 15:32

## 2018-02-23 RX ADMIN — SEVELAMER CARBONATE 3200 MG: 800 TABLET, FILM COATED ORAL at 11:27

## 2018-02-23 RX ADMIN — LEVETIRACETAM 250 MG: 500 TABLET, FILM COATED ORAL at 08:25

## 2018-02-23 RX ADMIN — LEVETIRACETAM 250 MG: 250 TABLET ORAL at 22:33

## 2018-02-23 RX ADMIN — GABAPENTIN 600 MG: 300 CAPSULE ORAL at 20:50

## 2018-02-23 RX ADMIN — ERYTHROPOIETIN 10000 UNITS: 10000 INJECTION, SOLUTION INTRAVENOUS; SUBCUTANEOUS at 14:51

## 2018-02-23 RX ADMIN — OXYCODONE HYDROCHLORIDE 10 MG: 10 TABLET ORAL at 17:14

## 2018-02-23 RX ADMIN — FENTANYL CITRATE 50 MCG: 50 INJECTION INTRAMUSCULAR; INTRAVENOUS at 22:34

## 2018-02-23 RX ADMIN — FENTANYL CITRATE 100 MCG: 50 INJECTION INTRAMUSCULAR; INTRAVENOUS at 01:23

## 2018-02-23 RX ADMIN — TAZOBACTAM SODIUM AND PIPERACILLIN SODIUM 4.5 G: 500; 4 INJECTION, SOLUTION INTRAVENOUS at 16:36

## 2018-02-23 RX ADMIN — SEVELAMER CARBONATE 3200 MG: 800 TABLET, FILM COATED ORAL at 08:24

## 2018-02-23 RX ADMIN — FENTANYL CITRATE 50 MCG: 50 INJECTION INTRAMUSCULAR; INTRAVENOUS at 16:40

## 2018-02-23 RX ADMIN — PNEUMOCOCCAL 13-VALENT CONJUGATE VACCINE 0.5 ML: 2.2; 2.2; 2.2; 2.2; 2.2; 4.4; 2.2; 2.2; 2.2; 2.2; 2.2; 2.2; 2.2 INJECTION, SUSPENSION INTRAMUSCULAR at 22:34

## 2018-02-23 RX ADMIN — CINACALCET HYDROCHLORIDE 60 MG: 30 TABLET, COATED ORAL at 20:50

## 2018-02-23 RX ADMIN — PREDNISONE 20 MG: 20 TABLET ORAL at 08:26

## 2018-02-23 RX ADMIN — CALCITRIOL 0.75 MCG: 0.25 CAPSULE, LIQUID FILLED ORAL at 20:50

## 2018-02-23 RX ADMIN — FENTANYL CITRATE 50 MCG: 50 INJECTION INTRAMUSCULAR; INTRAVENOUS at 08:21

## 2018-02-23 RX ADMIN — FENTANYL CITRATE 50 MCG: 50 INJECTION INTRAMUSCULAR; INTRAVENOUS at 11:18

## 2018-02-23 RX ADMIN — FENTANYL CITRATE 50 MCG: 50 INJECTION INTRAMUSCULAR; INTRAVENOUS at 14:01

## 2018-02-23 RX ADMIN — GABAPENTIN 600 MG: 300 CAPSULE ORAL at 08:25

## 2018-02-23 ASSESSMENT — ENCOUNTER SYMPTOMS
VOMITING: 0
COUGH: 0
SENSORY CHANGE: 0
CHILLS: 0
ORTHOPNEA: 0
FLANK PAIN: 0
BACK PAIN: 0
HEADACHES: 0
DEPRESSION: 0
WHEEZING: 0
NAUSEA: 0
EYES NEGATIVE: 1
DIZZINESS: 0
HEMOPTYSIS: 0
SHORTNESS OF BREATH: 0
FOCAL WEAKNESS: 0
DIARRHEA: 0
SPUTUM PRODUCTION: 0
MYALGIAS: 0
FEVER: 0
PALPITATIONS: 0
ABDOMINAL PAIN: 1

## 2018-02-23 ASSESSMENT — PAIN SCALES - GENERAL
PAINLEVEL_OUTOF10: 9
PAINLEVEL_OUTOF10: 6
PAINLEVEL_OUTOF10: 8
PAINLEVEL_OUTOF10: 7
PAINLEVEL_OUTOF10: 9
PAINLEVEL_OUTOF10: 4
PAINLEVEL_OUTOF10: 7
PAINLEVEL_OUTOF10: 8
PAINLEVEL_OUTOF10: 6
PAINLEVEL_OUTOF10: 4

## 2018-02-23 NOTE — PROGRESS NOTES
Received report from LETY Lawrence.   Patient arrived to unit via wheelchair.   Ambulated with a steady gait.   Up self in room.  Updated on poc, unit policies, white board, skylight, and call light.   Ate dinner prior to arrival.

## 2018-02-23 NOTE — CONSULTS
DATE OF SERVICE:  02/22/2018    REASON FOR CONSULTATION:  Sepsis.    CONSULTING PHYSICIAN:  Dee Perales MD    HISTORY OF PRESENT ILLNESS:  This is a 37-year-old white male with   longstanding history of end-stage renal disease, on hemodialysis;   hypertension, seizure disorder, diabetes who was originally admitted to the   hospital on 02/19/2018 electively for repair of a nonhealing wound on his   right lower abdomen from a failed tummy tuck procedure done last June.  He   states he did not feel particularly well prior to the procedure, but   attributed that to his chronic wound.  However, according to the chart, he was   actually being admitted for repair of his abdominal varices.  After his   operative procedure with ligation of bleeding abdominal wall varices and sharp   debridement of the skin and subcutaneous tissues on 02/19/2018, he had   postoperative hypotension with systolic blood pressures in the 70s-80s and was   admitted to the ICU for further evaluation and management.  He was started   empirically on vancomycin and Zosyn.  Wound cultures are showing enterococcus   and Klebsiella.  Infectious disease is consulted for antibiotic   recommendations and management.  He initially required pressors, but those are   currently weaned and he is planned for transfer to the floor.    REVIEW OF SYSTEMS:  All other systems reviewed and are negative.    ALLERGIES:  HE STATES HE IS ALLERGIC TO PENICILLIN AND KEFLEX WITH A RASH.    However, he is tolerating Zosyn without any side effect.    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    FAMILY HISTORY:  Cancer, diabetes, heart disease, lung disease, hypertension.    SOCIAL HISTORY:  Does not smoke, drink or use illicit drugs.    PHYSICAL EXAMINATION:  GENERAL:  Chronically  ill-appearing male, but no acute distress.  VITAL SIGNS:  T-max of 101.4 and since been afebrile, current temperature is   97.1, blood pressure 97/59, pulse 76, respiratory rate 13, oxygen saturation   % on room air.  He weighs 78 kilos.  HEENT:  Normocephalic, atraumatic.  Pupils are equal, round, and reactive to   light.  Extraocular movements intact.  Oropharynx is clear.  He has fair   dentition.  NECK:  Supple.  CARDIOVASCULAR:  Regular rate and rhythm.  EXTREMITIES:  He has got a right upper extremity arterial line in place.  He   has a right femoral hemodialysis catheter in place.  He is nontender without   erythema.  He also has a left side AV graft that he states has been tender.    There is an eschar over it.  He states the last time the eschar came out, it   was foul smelling, and he has a right IJ.  CHEST:  Grossly clear to auscultation bilaterally, unlabored.  ABDOMEN:  Soft, however, it is very tender over his ulcerated wound.  He does   have palpable abdominal varices, no visible granulation, shaggy border   measuring at least 6 cm.  No orlando purulence.  NEUROLOGIC:  He is awake, alert, answering questions appropriately, following   commands.    LABORATORY DATA:  Current labs, white blood count is 7.5, H and H 7.6 and 25,   platelets 172.  Sodium 136, potassium 4.5, chloride 99, bicarbonate 26,   glucose 171, BUN 31, creatinine 4.3, alkaline phosphatase 129.  He is immune   to hepatitis B.  Hep C was negative.  Abdominal wound cultures are showing   non-lactose fermenting gram negative rods as well as group D enterococcus.  He   has a blood culture as well x2 that are showing Klebsiella pneumoniae,   resistant to ampicillin, but otherwise susceptible.  Chest x-ray showed some   interstitial edema.    ASSESSMENT AND PLAN:  A 37-year-old white male with end-stage renal disease,   on hemodialysis, originally admitted for elective surgery for ligation of   bleeding, abdominal varices as well as  debridement of chronic nonhealing   necrotic wound of his right abdomen.  Postoperatively, he was complicated by   hemodynamic instability, need for transfer to the ICU due to septic shock.  He   has been successfully weaned off pressors.  He has 2 out of 2 blood cultures   positive for Klebsiella.  Wound is the likely source.  Also, it looks like it   is going to be growing a non-lactose fermenting gram-negative latrell as well as   Enterococcus.  We will continue him on the vancomycin and Zosyn for the time   being, pending final cultures and susceptibilities.  It is unclear if his   hemodialysis catheter has been contaminated as well as his graft.  Repeat   blood cultures x2 have been done from the 21st so far those are negative.  We   will attempt to treat in situ as he has limited vascular options.  For his   abdominal wound culture, plastic surgery is following.  He is status post   debridement.  We will follow up culture results.  For his diabetes, his last   hemoglobin A1c was 5.4, continues to control blood sugars.  For his end-stage   renal disease, we will dose adjust antibiotics as needed.  Further   recommendations per culture results and clinical course, discussed with   nephrology.    Thank you and we will follow with you.       ____________________________________     MD ALEXANDRA HERNDON / RIAZ    DD:  02/22/2018 12:38:13  DT:  02/22/2018 16:28:07    D#:  [id number]  Job#:  [id number]

## 2018-02-23 NOTE — PROGRESS NOTES
1734  Phone report to Rochelle DAVIDSON on [facility] 4th floor, headed to T [phone].  Belongings packed

## 2018-02-23 NOTE — ASSESSMENT & PLAN NOTE
- Klebsiella per blood culture ×2   abdominal wound cx + for enterococcus   zosyn, ID following. Repeated cx neg so far.  Zosyn and date March 17  We'll need to arrange outpatient infusion versus skilled nursing

## 2018-02-23 NOTE — PROGRESS NOTES
HEMODIALYSIS NOTES:     HD today x 3 hours per Dr. Perales . Initiated at 1230 and ended at 1530. Patient tolerated treatment. See paper flowsheet for details.    UF Net: 2,000 mL    R femoral catheter intact and patent with good flow on lines reversed during dialysis. No s/sx of infection, no redness nor bleeding noted on the CVC site. CVC dressing clean, dry and intact. Blood returned and CVC port flushed with NS and Heparin 1000 units/mL used  to lock catheter given per designated amount. CVC port clamped and capped.     Report given to JAYLYN Godoy RN.

## 2018-02-23 NOTE — DOCUMENTATION QUERY
"DOCUMENTATION QUERY    PROVIDERS: Please select “Cosign w/ note”to reply to query.    To better represent the severity of illness of your patient, please review the following information and exercise your independent professional judgment in responding to this query.     \"Sepsis\" is documented in the Infectious Disease and Nephrology Consult Notes. \"Combination of septic and hemorrhagic shock\" is documented in the Progress Notes. Sepsis is not documented in the History and Physical. Based upon the clinical findings, risk factors, and treatment, please specify if this condition was present on admission or developed after admission.    • Sepsis present on admission   • Sepsis developed after admission  • Unable to determine    The medical record reflects the following:   Clinical Findings 2/19 Vital Signs: BP: 67/44 - 90/46; T: 97.4 F;  P: 84 -102; RR: 7 - 20  2/19 WBC's: 9.6  2/20 Vital Signs: BP: 60/38 - 136/65;  T: 101.4; P: 52 - 157; RR: 10 - 35  2/20 WBC's 4.7 - 17.7  Lactic Acid: 2.5  2/20 Blood Cultures x 2: Positive - Klebsiella pneumoniae  2/20 Abdominal Wound Culture: Positive - Non-lactose fermenting Gram negative latrell and Group D Enterococcus species  2/20 Progress Note: \"Shock, undifferentiated: sepsis vs hemorrhagic vs relative adrenal insufficiency\" is documented.   Treatment IVNS; vancomycin; zosyn; norepinephrine; phenylephrine; vasopressin   Risk Factors ESRD; DM; multiple surgeries for abdominal varices   Location within medical record Consult Notes, Progress Notes, Lab Results, Vitals Flowsheet and MAR     Thank you,   Nu Combs RN  Clinical   [phone]          "

## 2018-02-23 NOTE — PROGRESS NOTES
Report received bedside.  Family at bedside.     Pain 7/10- medicated per mar    Fistula site on the left upper thigh and access site on the right upper thigh assessed- thrill present.     Right IJ assessed- dressing not fully intact- to be changed ASAP- see LDA's  All lumens flushed, + blood return, all lumens clamped.   ABX running.     PIV in right forearm assessed- saline locked.     Pt on room air. Lungs clear.     Abdominal binder intact.     Bed in lowest and locked position, call light and personal belongings within reach, POC discussed and all questions answered at this time.

## 2018-02-23 NOTE — PROGRESS NOTES
Pharmacy Kinetics 37 y.o. male on vancomycin day # 5   2/23/2018    Currently on Vancomycin Pulse Dosing   Vancomycin 1900mg iv x1 ; 02/20/18@0535   Vancomycin 1200mg iv x1 ; 02/23/18@1348     Indication for Treatment: SSTI     Pertinent history per medical record: Admitted on 2/19/2018 for Bleeding abdominal wall varices. 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  He has had several episodes of bleeding from the abdominal varices, and has required multiple surgeries for that. He had recurrence of bleeding in the last several days, and plastic surgery has admitted him initially for recurrent bleeding of the abdominal wall varices.  He was then brought to the OR and underwent suture ligation of the abdominal wall varices, and debridement of the skin and subcutaneous tissues of the abdominal wall.     On quick chart review appears to have complex ID hx, seen by DOMINIC previously.     Other antibiotics: Zosyn 4.5g q12h     Allergies: Baclofen; Contrast media with iodine [iodine]; Keflex; Pcn [penicillins]; and Tape      List concerns for renal function: HD-ESRD s/p 2 rejected renal transplants     Pertinent cultures to date:   02/20/18: Line,BLDx2: Klebseilla pneumoniae  02/20/18: Abd,Wnd: Enterococcus and Non-Lactose Fermenting Gram Negative Prem  02/21/18: PBCx1 - NGTD  02/21/18:DialysisPort,BLD:NGTD    Recent Labs      02/21/18   1200  02/21/18   1830  02/22/18   0308  02/22/18   1114  02/23/18   0143   WBC  8.7  7.0  8.1  7.5  9.1     Recent Labs      02/21/18   0505  02/22/18   0250  02/23/18   0143   BUN  32*  31*  31*   CREATININE  5.54*  4.30*  4.24*   ALBUMIN  3.5  3.3  3.5     Recent Labs      02/21/18   0505  02/22/18   1006   VANCORANDOM  22.5  10.9     Intake/Output Summary (Last 24 hours) at 02/23/18 0839  Last data filed at 02/23/18 0400   Gross per 24 hour   Intake             1010 ml   Output             2000 ml   Net             -990 ml      Blood  "pressure 126/69, pulse 80, temperature 36.3 °C (97.3 °F), resp. rate 16, height 1.626 m (5' 4\"), weight 77.8 kg (171 lb 8.3 oz), SpO2 100 %. Temp (24hrs), Av.3 °C (97.4 °F), Min:36.1 °C (96.9 °F), Max:36.7 °C (98.1 °F)      A/P   1. Vancomycin dose change: Pulse dosing   2. Next vancomycin level: 18@1400  3. Goal trough: 12-16 mcg/mL   4. Comments: ID following , continue current regimen. No leukocytosis , afebrile over interval. Level today @ 1400.     Charlie Abreu PharmD BCPS   "

## 2018-02-23 NOTE — PROGRESS NOTES
Infectious Disease Progress Note    Author: Johanna Elias M.D. Date & Time of service: 2018  11:28 AM    Chief Complaint:  FU abdominal wall abscess    Interval History:   AF, WBC 9.1, transferred out of the ICU overnight, having lots of pain, plan for skin graft on Monday, tolerating abx without issues  Labs Reviewed, Medications Reviewed, Radiology Reviewed and Wound Reviewed.    Review of Systems:  Review of Systems   Constitutional: Negative for chills and fever.   Respiratory: Negative for cough and shortness of breath.    Gastrointestinal: Positive for abdominal pain. Negative for nausea and vomiting.       Hemodynamics:  Temp (24hrs), Av.4 °C (97.5 °F), Min:36.1 °C (96.9 °F), Max:36.7 °C (98.1 °F)  Temperature: 36.6 °C (97.8 °F)  Pulse  Av.4  Min: 46  Max: 157Heart Rate (Monitored): 81  Blood Pressure: 100/47, Arterial BP: (!) 95/48, NIBP: 102/42      Physical Exam:  Physical Exam   Constitutional: He is oriented to person, place, and time. He appears well-developed.   Chronically ill     HENT:   Head: Normocephalic and atraumatic.   Eyes: EOM are normal. Pupils are equal, round, and reactive to light.   Neck:   R IJ catheter   Pulmonary/Chest: Effort normal. He has no wheezes. He has no rales.   Abdominal: Soft. There is tenderness.   R sided abd wound dressed +Binder in place    Abdominal varices   Musculoskeletal:   left sided AV graft   Neurological: He is alert and oriented to person, place, and time.       Meds:    Current Facility-Administered Medications:   •  MD ALERT... vancomycin  •  predniSONE  •  epoetin rj  •  fentaNYL **OR** fentaNYL **OR** fentaNYL  •  ipratropium-albuterol  •  piperacillin-tazobactam  •  albumin human 25%  •  heparin  •  calcitRIOL  •  cinacalcet  •  gabapentin  •  levETIRAcetam  •  LORazepam  •  sevelamer carbonate  •  senna-docusate **AND** polyethylene glycol/lytes **AND** magnesium hydroxide **AND** bisacodyl  •  Respiratory Care per Protocol  •   NS  •  labetalol  •  ondansetron  •  ondansetron  •  promethazine  •  promethazine  •  prochlorperazine  •  acetaminophen  •  Notify provider if pain remains uncontrolled **AND** Use the numeric rating scale (NRS-11) on regular floors and Critical-Care Pain Observation Tool (CPOT) on ICUs/Trauma to assess pain **AND** Pulse Ox (Oximetry) **AND** Pharmacy Consult Request **AND** If patient difficult to arouse and/or has respiratory depression, stop any opiates that are currently infusing and call a Rapid Response. **AND** oxyCODONE immediate-release **AND** oxyCODONE immediate-release **AND** morphine injection  •  insulin regular **AND** Accu-Chek ACHS **AND** NOTIFY MD and PharmD **AND** glucose 4 g **AND** dextrose 50%    Labs:  Recent Labs      02/22/18   0308  02/22/18   1114  02/23/18   0143   WBC  8.1  7.5  9.1   RBC  2.65*  2.74*  2.69*   HEMOGLOBIN  7.4*  7.6*  7.6*   HEMATOCRIT  24.0*  25.1*  24.9*   MCV  90.6  91.6  92.6   MCH  27.9  27.7  28.3   RDW  57.1*  58.3*  57.5*   PLATELETCT  164  172  200   MPV  10.1  9.9  10.1     Recent Labs      02/21/18   0505  02/22/18   0250  02/23/18   0143   SODIUM  134*  136  137   POTASSIUM  5.7*  4.5  4.3   CHLORIDE  97  99  98   CO2  23  26  27   GLUCOSE  189*  171*  133*   BUN  32*  31*  31*     Recent Labs      02/21/18   0505  02/22/18   0250  02/23/18   0143   ALBUMIN  3.5  3.3  3.5   TBILIRUBIN  1.0  0.7  0.8   ALKPHOSPHAT  181*  129*  143*   TOTPROTEIN  6.9  6.7  6.7   ALTSGPT  8  7  9   ASTSGOT  14  11*  10*   CREATININE  5.54*  4.30*  4.24*       Imaging:  Dx-chest-portable (1 View)    Result Date: 2/20/2018 2/20/2018 4:49 AM HISTORY/REASON FOR EXAM:  Central line placement TECHNIQUE/EXAM DESCRIPTION AND NUMBER OF VIEWS: Single portable view of the chest. COMPARISON: 9/23/2017 FINDINGS: Right IJ catheter tip projects over the SVC. There are surgical clips projecting over the left upper medial hemithorax. The mediastinal and cardiac silhouette is upper limits  of normal. Central vessels are prominent. There is interstitial prominence. There is no significant pleural effusion. There is no visible pneumothorax. There is widening of the superior mediastinum similar to the prior study.     1.  Satisfactory appearance of the right IJ catheter without pneumothorax. 2.  Stable widening of the superior mediastinum possibly due to vascular prominence or lymphadenopathy. 3.  There are changes of interstitial edema.      Micro:  Results     Procedure Component Value Units Date/Time    CULTURE WOUND W/ GRAM STAIN [[id number]]  (Abnormal) Collected:  02/20/18 2230    Order Status:  Completed Specimen:  Wound from Abdominal Updated:  02/23/18 0923     Significant Indicator POS (POS)     Source WND     Site ABDOMINAL     Culture Result Wound -- (A)     Gram Stain Result --     Many WBCs.  Many Gram positive cocci.  Rare Gram negative rods.       Culture Result Wound -- (A)     Non-lactose fermenting Gram negative latrell  Light growth       Culture Result Wound -- (A)     Group D Enterococcus species  Light growth  Combination therapy with ampicillin, penicillin, or  vancomycin (for susceptible strains) plus an aminoglycoside  is usually indicated for serious enterococcal infections,  such as endocarditis unless high-level resistance to both  gentamicin and streptomycin is documented; such combinations  are predicted to result in synergistic killing of the  Enterococcus.      Narrative:       Collected By:[id number] RODARTE GARY MENENDEZ    BLOOD CULTURE [[id number]]  (Abnormal) Collected:  02/20/18 0525    Order Status:  Completed Specimen:  Blood from Line Updated:  02/22/18 0809     Significant Indicator POS (POS)     Source BLD     Site LINE     Blood Culture Growth detected by Bactec instrument. 02/21/2018  06:52 (A)     Blood Culture -- (A)     Klebsiella pneumoniae  See previous culture for sensitivity report.      Narrative:       CALL  Bender  19 tel. [phone],  CALLED  19 tel. [phone]  "02/21/2018, 06:55, RB PERF. RESULTS CALLED  TO:[id number]  Collected By:[id number] CAYDEN MENENDEZ  Per Hospital Policy: Only change Specimen Src: to \"Line\" if  specified by physician order.    BLOOD CULTURE [[id number]]  (Abnormal)  (Susceptibility) Collected:  02/20/18 0525    Order Status:  Completed Specimen:  Blood from Line Updated:  02/22/18 0808     Significant Indicator POS (POS)     Source BLD     Site LINE     Blood Culture Growth detected by Bactec instrument. 02/20/2018  18:04 (A)     Blood Culture Klebsiella pneumoniae (A)    Narrative:       Collected By:[id number] CAYDEN MENENDEZ  Per Hospital Policy: Only change Specimen Src: to \"Line\" if  specified by physician order.    Culture & Susceptibility     KLEBSIELLA PNEUMONIAE     Antibiotic Sensitivity Microscan Unit Status    Ampicillin Resistant >16 mcg/mL Final    Cefepime Sensitive <=8 mcg/mL Final    Cefotaxime Sensitive <=2 mcg/mL Final    Cefotetan Sensitive <=16 mcg/mL Final    Ceftazidime Sensitive <=1 mcg/mL Final    Ceftriaxone Sensitive <=8 mcg/mL Final    Cefuroxime Sensitive <=4 mcg/mL Final    Ciprofloxacin Sensitive <=1 mcg/mL Final    Ertapenem Sensitive <=1 mcg/mL Final    Gentamicin Sensitive <=4 mcg/mL Final    Pip/Tazobactam Sensitive <=16 mcg/mL Final    Tigecycline Sensitive <=2 mcg/mL Final    Tobramycin Sensitive <=4 mcg/mL Final    Trimeth/Sulfa Sensitive <=2/38 mcg/mL Final                       BLOOD CULTURE [[id number]] Collected:  02/21/18 1020    Order Status:  Completed Specimen:  Blood from Line Updated:  02/22/18 0652     Significant Indicator NEG     Source BLD     Site Peripheral     Blood Culture --     No Growth    Note: Blood cultures are incubated for 5 days and  are monitored continuously.Positive blood cultures  are called to the RN and reported as soon as  they are identified.      Narrative:       Collected By:[id number] TACOS BRASHER  Per Hospital Policy: Only change Specimen Src: to \"Line\" if  specified by physician " "order.    BLOOD CULTURE [[id number]] Collected:  02/21/18 0955    Order Status:  Completed Specimen:  Blood from Peripheral Updated:  02/22/18 0652     Significant Indicator NEG     Source BLD     Site Dialysis Port     Blood Culture --     No Growth    Note: Blood cultures are incubated for 5 days and  are monitored continuously.Positive blood cultures  are called to the RN and reported as soon as  they are identified.      Narrative:       Collected By:[id number] TACOS BRASHER  Per Hospital Policy: Only change Specimen Src: to \"Line\" if  specified by physician order.    GRAM STAIN [[id number]] Collected:  02/20/18 2230    Order Status:  Completed Specimen:  Wound Updated:  02/21/18 1533     Significant Indicator .     Source WND     Site ABDOMINAL     Gram Stain Result --     Many WBCs.  Many Gram positive cocci.  Rare Gram negative rods.      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    Wound cx + group D enterococcus, NLFGNR  Continue vancomycin and zosyn  Plan for skin graft by plastics on 2/26  Monitor vanco trough  S/p I&D on 2/19    Abdominal varices  S/p ligation on 2/19    ESRD on HD    Discussed with internal medicine.  "

## 2018-02-23 NOTE — PROGRESS NOTES
"Blood pressure 126/69, pulse 80, temperature 36.3 °C (97.3 °F), resp. rate 16, height 1.626 m (5' 4\"), weight 77.8 kg (171 lb 8.3 oz), SpO2 100 %.    I/O last 3 completed shifts:  In: 1790 [P.O.:1350; I.V.:440]  Out: 2000 [Dialysis:2000]  Wound should be ready for grafting by Monday  "

## 2018-02-23 NOTE — PROGRESS NOTES
Assumed care at 0700. Received report from night shift RN. Bedside report completed. AOx4.      C/o pain-medicated.  Denies nausea.  Tolerating diet.  +BM yesterday.      Left thigh fistula +bruit and thrill, scab present.  R. Thigh permacath noted, CDI    Ambulating with standby assist. Pt call light and belongings within reach, fall precautions in place.

## 2018-02-24 LAB
ALBUMIN SERPL BCP-MCNC: 3.2 G/DL (ref 3.2–4.9)
ALBUMIN/GLOB SERPL: 1.1 G/DL
ALP SERPL-CCNC: 135 U/L (ref 30–99)
ALT SERPL-CCNC: 10 U/L (ref 2–50)
ANION GAP SERPL CALC-SCNC: 12 MMOL/L (ref 0–11.9)
AST SERPL-CCNC: 11 U/L (ref 12–45)
BACTERIA WND AEROBE CULT: ABNORMAL
BILIRUB SERPL-MCNC: 0.7 MG/DL (ref 0.1–1.5)
BUN SERPL-MCNC: 28 MG/DL (ref 8–22)
CALCIUM SERPL-MCNC: 7.4 MG/DL (ref 8.5–10.5)
CHLORIDE SERPL-SCNC: 98 MMOL/L (ref 96–112)
CO2 SERPL-SCNC: 25 MMOL/L (ref 20–33)
CREAT SERPL-MCNC: 3.57 MG/DL (ref 0.5–1.4)
ERYTHROCYTE [DISTWIDTH] IN BLOOD BY AUTOMATED COUNT: 56.9 FL (ref 35.9–50)
ERYTHROCYTE [DISTWIDTH] IN BLOOD BY AUTOMATED COUNT: 56.9 FL (ref 35.9–50)
GLOBULIN SER CALC-MCNC: 2.9 G/DL (ref 1.9–3.5)
GLUCOSE BLD-MCNC: 124 MG/DL (ref 65–99)
GLUCOSE BLD-MCNC: 125 MG/DL (ref 65–99)
GLUCOSE BLD-MCNC: 126 MG/DL (ref 65–99)
GLUCOSE BLD-MCNC: 130 MG/DL (ref 65–99)
GLUCOSE SERPL-MCNC: 105 MG/DL (ref 65–99)
GRAM STN SPEC: ABNORMAL
HCT VFR BLD AUTO: 26.2 % (ref 42–52)
HCT VFR BLD AUTO: 27.6 % (ref 42–52)
HGB BLD-MCNC: 7.7 G/DL (ref 14–18)
HGB BLD-MCNC: 8.1 G/DL (ref 14–18)
INR PPP: 1.28 (ref 0.87–1.13)
LACTATE BLD-SCNC: 1.9 MMOL/L (ref 0.5–2)
MCH RBC QN AUTO: 27.6 PG (ref 27–33)
MCH RBC QN AUTO: 27.8 PG (ref 27–33)
MCHC RBC AUTO-ENTMCNC: 29.3 G/DL (ref 33.7–35.3)
MCHC RBC AUTO-ENTMCNC: 29.4 G/DL (ref 33.7–35.3)
MCV RBC AUTO: 93.9 FL (ref 81.4–97.8)
MCV RBC AUTO: 94.6 FL (ref 81.4–97.8)
PLATELET # BLD AUTO: 208 K/UL (ref 164–446)
PLATELET # BLD AUTO: 209 K/UL (ref 164–446)
PMV BLD AUTO: 9.3 FL (ref 9–12.9)
PMV BLD AUTO: 9.8 FL (ref 9–12.9)
POTASSIUM SERPL-SCNC: 4 MMOL/L (ref 3.6–5.5)
PROT SERPL-MCNC: 6.1 G/DL (ref 6–8.2)
PROTHROMBIN TIME: 15.7 SEC (ref 12–14.6)
RBC # BLD AUTO: 2.77 M/UL (ref 4.7–6.1)
RBC # BLD AUTO: 2.94 M/UL (ref 4.7–6.1)
SIGNIFICANT IND 70042: ABNORMAL
SITE SITE: ABNORMAL
SODIUM SERPL-SCNC: 135 MMOL/L (ref 135–145)
SOURCE SOURCE: ABNORMAL
TROPONIN I SERPL-MCNC: <0.01 NG/ML (ref 0–0.04)
WBC # BLD AUTO: 6 K/UL (ref 4.8–10.8)
WBC # BLD AUTO: 6.9 K/UL (ref 4.8–10.8)

## 2018-02-24 PROCEDURE — 85027 COMPLETE CBC AUTOMATED: CPT

## 2018-02-24 PROCEDURE — 83605 ASSAY OF LACTIC ACID: CPT

## 2018-02-24 PROCEDURE — 85610 PROTHROMBIN TIME: CPT

## 2018-02-24 PROCEDURE — 700102 HCHG RX REV CODE 250 W/ 637 OVERRIDE(OP): Performed by: INTERNAL MEDICINE

## 2018-02-24 PROCEDURE — 770001 HCHG ROOM/CARE - MED/SURG/GYN PRIV*

## 2018-02-24 PROCEDURE — A9270 NON-COVERED ITEM OR SERVICE: HCPCS | Performed by: INTERNAL MEDICINE

## 2018-02-24 PROCEDURE — 82962 GLUCOSE BLOOD TEST: CPT | Mod: 91

## 2018-02-24 PROCEDURE — 80053 COMPREHEN METABOLIC PANEL: CPT

## 2018-02-24 PROCEDURE — 99232 SBSQ HOSP IP/OBS MODERATE 35: CPT | Performed by: HOSPITALIST

## 2018-02-24 PROCEDURE — 700111 HCHG RX REV CODE 636 W/ 250 OVERRIDE (IP): Performed by: HOSPITALIST

## 2018-02-24 PROCEDURE — 700111 HCHG RX REV CODE 636 W/ 250 OVERRIDE (IP): Performed by: INTERNAL MEDICINE

## 2018-02-24 PROCEDURE — 84484 ASSAY OF TROPONIN QUANT: CPT

## 2018-02-24 RX ADMIN — FENTANYL CITRATE 50 MCG: 50 INJECTION INTRAMUSCULAR; INTRAVENOUS at 04:07

## 2018-02-24 RX ADMIN — LEVETIRACETAM 250 MG: 250 TABLET ORAL at 19:47

## 2018-02-24 RX ADMIN — FENTANYL CITRATE 50 MCG: 50 INJECTION INTRAMUSCULAR; INTRAVENOUS at 00:31

## 2018-02-24 RX ADMIN — FENTANYL CITRATE 50 MCG: 50 INJECTION INTRAMUSCULAR; INTRAVENOUS at 08:46

## 2018-02-24 RX ADMIN — FENTANYL CITRATE 50 MCG: 50 INJECTION, SOLUTION INTRAMUSCULAR; INTRAVENOUS at 21:45

## 2018-02-24 RX ADMIN — SEVELAMER CARBONATE 3200 MG: 800 TABLET, FILM COATED ORAL at 17:57

## 2018-02-24 RX ADMIN — TAZOBACTAM SODIUM AND PIPERACILLIN SODIUM 4.5 G: 500; 4 INJECTION, SOLUTION INTRAVENOUS at 04:56

## 2018-02-24 RX ADMIN — FENTANYL CITRATE 50 MCG: 50 INJECTION INTRAMUSCULAR; INTRAVENOUS at 10:37

## 2018-02-24 RX ADMIN — FENTANYL CITRATE 50 MCG: 50 INJECTION INTRAMUSCULAR; INTRAVENOUS at 12:21

## 2018-02-24 RX ADMIN — TAZOBACTAM SODIUM AND PIPERACILLIN SODIUM 4.5 G: 500; 4 INJECTION, SOLUTION INTRAVENOUS at 16:35

## 2018-02-24 RX ADMIN — OXYCODONE HYDROCHLORIDE 10 MG: 10 TABLET ORAL at 19:48

## 2018-02-24 RX ADMIN — LEVETIRACETAM 250 MG: 250 TABLET ORAL at 08:46

## 2018-02-24 RX ADMIN — GABAPENTIN 600 MG: 300 CAPSULE ORAL at 19:48

## 2018-02-24 RX ADMIN — FENTANYL CITRATE 50 MCG: 50 INJECTION INTRAMUSCULAR; INTRAVENOUS at 05:56

## 2018-02-24 RX ADMIN — STANDARDIZED SENNA CONCENTRATE AND DOCUSATE SODIUM 2 TABLET: 8.6; 5 TABLET, FILM COATED ORAL at 08:46

## 2018-02-24 RX ADMIN — CINACALCET HYDROCHLORIDE 30 MG: 30 TABLET, COATED ORAL at 19:51

## 2018-02-24 RX ADMIN — FENTANYL CITRATE 50 MCG: 50 INJECTION INTRAMUSCULAR; INTRAVENOUS at 14:10

## 2018-02-24 RX ADMIN — PREDNISONE 20 MG: 20 TABLET ORAL at 08:46

## 2018-02-24 RX ADMIN — SEVELAMER CARBONATE 3200 MG: 800 TABLET, FILM COATED ORAL at 08:46

## 2018-02-24 RX ADMIN — GABAPENTIN 600 MG: 300 CAPSULE ORAL at 08:46

## 2018-02-24 RX ADMIN — CALCITRIOL 0.75 MCG: 0.25 CAPSULE, LIQUID FILLED ORAL at 19:47

## 2018-02-24 RX ADMIN — FENTANYL CITRATE 75 MCG: 50 INJECTION, SOLUTION INTRAMUSCULAR; INTRAVENOUS at 16:50

## 2018-02-24 RX ADMIN — FENTANYL CITRATE 50 MCG: 50 INJECTION, SOLUTION INTRAMUSCULAR; INTRAVENOUS at 18:34

## 2018-02-24 RX ADMIN — GABAPENTIN 600 MG: 300 CAPSULE ORAL at 14:09

## 2018-02-24 RX ADMIN — SEVELAMER CARBONATE 3200 MG: 800 TABLET, FILM COATED ORAL at 12:21

## 2018-02-24 RX ADMIN — FENTANYL CITRATE 50 MCG: 50 INJECTION INTRAMUSCULAR; INTRAVENOUS at 06:51

## 2018-02-24 ASSESSMENT — ENCOUNTER SYMPTOMS
VOMITING: 0
FEVER: 0
FOCAL WEAKNESS: 0
PALPITATIONS: 0
WHEEZING: 0
ORTHOPNEA: 0
SHORTNESS OF BREATH: 0
HEADACHES: 0
SPUTUM PRODUCTION: 0
CHILLS: 0
DIARRHEA: 0
NAUSEA: 0
BACK PAIN: 0
ABDOMINAL PAIN: 1
HEMOPTYSIS: 0
DIZZINESS: 0
COUGH: 0
MYALGIAS: 0
SENSORY CHANGE: 0
EYES NEGATIVE: 1

## 2018-02-24 ASSESSMENT — PAIN SCALES - GENERAL
PAINLEVEL_OUTOF10: 10
PAINLEVEL_OUTOF10: 7
PAINLEVEL_OUTOF10: 7
PAINLEVEL_OUTOF10: 10
PAINLEVEL_OUTOF10: 9
PAINLEVEL_OUTOF10: 6
PAINLEVEL_OUTOF10: 10
PAINLEVEL_OUTOF10: 6
PAINLEVEL_OUTOF10: 8
PAINLEVEL_OUTOF10: ASSUMED PAIN PRESENT
PAINLEVEL_OUTOF10: 6

## 2018-02-24 NOTE — CARE PLAN
Problem: Safety  Goal: Will remain free from falls    Intervention: Assess risk factors for falls  Pt up with standby assist. Call light within reach. Calls for assistance as needed.       Problem: Psychosocial Needs:  Goal: Level of anxiety will decrease  Pt encouraged to ask questions and verbalize concerns    Problem: Mobility  Goal: Risk for activity intolerance will decrease  Pt encouraged to ambulate in hallway at least three times a day.

## 2018-02-24 NOTE — PROGRESS NOTES
[facility]    Date of Service: 2018    Chief Complaint  37 y.o. male admitted 2018 with bleeding abdominal wall varices.    Interval Problem Update  In bed, no new complains, continue c/o pain with dressing changes, as per nurse staff he is been asking for pain medication q1h will change to q2h for today and try to use more oral pain meds during dressing changes may give 75 mcg of fentanyl this will not be extra dose of fentanyl on top of his 50 mcg prn. No HD today.     Consultants/Specialty  Intensivist  Plastic surgery  Nephrology  Infectious disease    Disposition  Tbd.         Review of Systems   Constitutional: Negative for fever.   HENT: Negative for hearing loss.    Respiratory: Negative for cough.    Cardiovascular: Negative for chest pain.   Gastrointestinal: Positive for abdominal pain. Negative for vomiting.   Musculoskeletal: Negative for myalgias.   Neurological: Negative for dizziness.      Physical Exam  Laboratory/Imaging   Hemodynamics  Temp (24hrs), Av.6 °C (97.9 °F), Min:36.1 °C (97 °F), Max:36.9 °C (98.5 °F)   Temperature: 36.9 °C (98.5 °F)  Pulse  Av.2  Min: 46  Max: 157    Blood Pressure: 117/68     Respiratory      Respiration: 18, Pulse Oximetry: 98 %        RUL Breath Sounds: Clear, RML Breath Sounds: Clear, RLL Breath Sounds: Diminished, ANGEL Breath Sounds: Clear, LLL Breath Sounds: Diminished    Fluids    Intake/Output Summary (Last 24 hours) at 18 1504  Last data filed at 18 0400   Gross per 24 hour   Intake              800 ml   Output             2500 ml   Net            -1700 ml       Nutrition  Orders Placed This Encounter   Procedures   • Diet Order     Standing Status:   Standing     Number of Occurrences:   1     Order Specific Question:   Diet:     Answer:   Renal [8]     Physical Exam   Constitutional: He is oriented to person, place, and time.  Non-toxic appearance. No distress.   Neck: No edema and no erythema present.    Cardiovascular: Normal rate and regular rhythm.    No murmur heard.  Pulmonary/Chest: Effort normal and breath sounds normal. No respiratory distress. He has no wheezes.   Abdominal: Soft. Bowel sounds are normal. He exhibits no distension. There is tenderness. There is no rebound.   Musculoskeletal: He exhibits no edema.   Neurological: He is alert and oriented to person, place, and time.   Skin: No erythema.   Psychiatric: He has a normal mood and affect.   Nursing note and vitals reviewed.      Recent Labs      02/23/18   1650  02/24/18   0042  02/24/18   0946   WBC  6.1  6.9  6.0   RBC  2.77*  2.77*  2.94*   HEMOGLOBIN  7.6*  7.7*  8.1*   HEMATOCRIT  25.9*  26.2*  27.6*   MCV  93.5  94.6  93.9   MCH  27.4  27.8  27.6   MCHC  29.3*  29.4*  29.3*   RDW  57.5*  56.9*  56.9*   PLATELETCT  202  208  209   MPV  9.8  9.8  9.3     Recent Labs      02/22/18   0250  02/23/18   0143  02/24/18   0042   SODIUM  136  137  135   POTASSIUM  4.5  4.3  4.0   CHLORIDE  99  98  98   CO2  26  27  25   GLUCOSE  171*  133*  105*   BUN  31*  31*  28*   CREATININE  4.30*  4.24*  3.57*   CALCIUM  7.2*  7.8*  7.4*     Recent Labs      02/22/18   0250  02/23/18   0216  02/24/18   0042   INR  3.09*  1.52*  1.28*                  Assessment/Plan     Shock (CMS-HCC)   Assessment & Plan    -Resolved        Abdominal varicosities   Assessment & Plan    - Chronic issue, acute bleeding  - Now status post surgery  - No sign of gross bleeding  - Dr. Rosenbaum for possible skin graft on Monday.         Bacteremia- (present on admission)   Assessment & Plan    - Klebsiella per blood culture ×2  abdominal wound cx + for enterococcus, on vanc and zosyn, ID consulted. Repeated cx neg so far.         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        Hemorrhagic disorder due to circulating anticoagulants (CMS-[facility])   Assessment & Plan    - Is on Coumadin for occluded IVC, currently being held due to bleeding.         Hyperkalemia- (present on admission)   Assessment & Plan    -Resolved with hemodialysis          Seizure disorder (CMS-[facility])- (present on admission)   Assessment & Plan    - Continue Keppra, no sign of seizure activity        Normocytic anemia- (present on admission)   Assessment & Plan    - No longer any sign of gross bleeding, did have significant bleeding initially  - Repeat CBC in the morning, hb 8.1 today stable.         Sleep apnea- (present on admission)   Assessment & Plan    - Patient uses BiPAP at night, [facility] with [facility]          Quality-Core Measures   Reviewed items::  Labs reviewed and Medications reviewed  DVT prophylaxis pharmacological::  Contraindicated - High bleeding risk  DVT prophylaxis - mechanical:  SCDs  Antibiotics:  Treating active infection/contamination beyond 24 hours perioperative coverage

## 2018-02-24 NOTE — PROGRESS NOTES
Nephrology Progress Note, Adult, Complex               Author: Dee Angella Date & Time created: 2/23/2018  4:25 PM     Interval History:  36 y/o male with ESRD/HHD admitted with abdominal wound infection, bleeding from varices -treated surgically  Doing much better.  NO SOB.  Low BP improved  Received dialysis this morning -tolerated well  (+) blood cx from dialysis catheter for Klebsiella PNA -ID following    Review of Systems:  Review of Systems   Constitutional: Positive for malaise/fatigue. Negative for chills and fever.   HENT: Negative.    Eyes: Negative.    Respiratory: Negative for cough, hemoptysis, sputum production, shortness of breath and wheezing.    Cardiovascular: Negative for chest pain, palpitations, orthopnea and leg swelling.   Gastrointestinal: Positive for abdominal pain. Negative for diarrhea, nausea and vomiting.   Genitourinary: Negative for dysuria.   Musculoskeletal: Negative for back pain, joint pain and myalgias.   Skin: Negative.    Neurological: Negative for dizziness, sensory change, focal weakness and headaches.       Physical Exam:  Physical Exam   Constitutional: He is oriented to person, place, and time. He appears well-developed and well-nourished. No distress.   HENT:   Head: Normocephalic and atraumatic.   Nose: Nose normal.   Mouth/Throat: Oropharynx is clear and moist.   Eyes: Conjunctivae and EOM are normal. Pupils are equal, round, and reactive to light. No scleral icterus.   Neck: Normal range of motion. Neck supple. No thyromegaly present.   Cardiovascular: Normal rate and regular rhythm.  Exam reveals no gallop and no friction rub.    Pulmonary/Chest: Effort normal and breath sounds normal. No respiratory distress.   Abdominal: Bowel sounds are normal. He exhibits distension. There is tenderness.   Abdominal wall covered with dressing   Musculoskeletal: He exhibits no edema.   Lymphadenopathy:     He has no cervical adenopathy.   Neurological: He is alert and oriented to  person, place, and time. No cranial nerve deficit.   Skin: Skin is warm. No rash noted. No erythema.   Nursing note and vitals reviewed.      Labs:        Invalid input(s): [id number]  Recent Labs      18   05018   0143   TROPONINI  0.02  <0.01  <0.01     Recent Labs      18   0505  18   SODIUM  134*  136  137   POTASSIUM  5.7*  4.5  4.3   CHLORIDE  97  99  98   CO2  23  26  27   BUN  32*  31*  31*   CREATININE  5.54*  4.30*  4.24*   CALCIUM  8.0*  7.2*  7.8*     Recent Labs      18   05018   ALTSGPT  8  7  9   ASTSGOT  14  11*  10*   ALKPHOSPHAT  181*  129*  143*   TBILIRUBIN  1.0  0.7  0.8   GLUCOSE  189*  171*  133*     Recent Labs      18   05018   03018   0143  02/23/18   0216   RBC  2.77*   < >   --   2.65*  2.74*  2.69*   --    HEMOGLOBIN  7.8*   < >   --   7.4*  7.6*  7.6*   --    HEMATOCRIT  24.7*   < >   --   24.0*  25.1*  24.9*   --    PLATELETCT  137*   < >   --   164  172  200   --    PROTHROMBTM  28.1*   --   31.6*   --    --    --   18.0*   INR  2.67*   --   3.09*   --    --    --   1.52*    < > = values in this interval not displayed.     Recent Labs      18   05018   0143   WBC  9.9   < >   --   8.1  7.5  9.1   ASTSGOT  14   --   11*   --    --   10*   ALTSGPT  8   --   7   --    --   9   ALKPHOSPHAT  181*   --   129*   --    --   143*   TBILIRUBIN  1.0   --   0.7   --    --   0.8    < > = values in this interval not displayed.           Hemodynamics:  Temp (24hrs), Av.3 °C (97.4 °F), Min:36.1 °C (96.9 °F), Max:36.8 °C (98.3 °F)  Temperature: 36.8 °C (98.3 °F)  Pulse  Av.3  Min: 46  Max: 157Heart Rate (Monitored): 81  Blood Pressure: (!) 94/40, NIBP: 102/42    Respiratory:    Respiration: 17, Pulse Oximetry: 98 %        RUL Breath Sounds: Clear, RML Breath  Sounds: Clear, RLL Breath Sounds: Diminished, ANGEL Breath Sounds: Clear, LLL Breath Sounds: Diminished  Fluids:    Intake/Output Summary (Last 24 hours) at 02/23/18 1625  Last data filed at 02/23/18 1600   Gross per 24 hour   Intake             1590 ml   Output             2500 ml   Net             -910 ml        GI/Nutrition:  Orders Placed This Encounter   Procedures   • Diet Order     Standing Status:   Standing     Number of Occurrences:   1     Order Specific Question:   Diet:     Answer:   Renal [8]     Medical Decision Making, by Problem:  Active Hospital Problems    Diagnosis   • Hypertension [I10]   • ESRD (end stage renal disease) (CMS-[facility]) [N18.6]   • 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]   • Abdominal varicosities [I86.8]   • Shock (CMS-[facility]) [R57.9]   • Hemorrhagic disorder due to circulating anticoagulants (CMS-[facility]) [D68.318]   • Sleep apnea [G47.30]       Quality-Core Measures   Reviewed items::  Labs reviewed and Medications reviewed  Central line in place:  Dialysis    Assessment and plan:    1.ESRD/HHD -seen and examined during dialysis -please see dialysis flow sheet for details  2.Hypotension/sepsis -improved  -off pressor  3.Electrolytes: mild hyponatremia - normalized-to monitor                          Hyperkalemia -corrected with HD  4.Anemia: Epogen  5.Volume:UF with HD as BP   6.bacteriemia/line infection -ID consulted-f/u recommendations  Recs: hold  HD tomorrow             All meds to renal doses             Will follow

## 2018-02-24 NOTE — PROGRESS NOTES
Report received from RN, assumed care at 1900  Pt is  AOx4, responds appropriately.    Pt rates pain at 8/10, on a scale of 1-10, pt medicated per MAR  Pt denies any SOB, chest pain, new onset of numbness/ tingling   Pt ambulates with a stand by assist   Pt has +flatus, + bowel sounds, last BM on 2/22/2018  Pt is on dialysis   Pt has fistula site on the upper left thigh and access site on the right upper thigh, site was assessed thrill present   Pt has a right IJ triple lumen, all lines flushed and patent, NS running TKO through one line, dressing changed   Pt has abdominal wound that is covered with a dressing and abdominal binder   Pt is tolerating renal diet, denies nausea/vomiting.  Plan of care discussed, all questions answered.  Explained importance of calling before getting OOB and pt verbalizes understanding.  Call light and belongings within reach, treaded slipper socks on, bed in lowest locked position.  Hourly rounding in place, all needs met at this time

## 2018-02-24 NOTE — PROGRESS NOTES
Nephrology Progress Note, Adult, Complex               Author: Dee Angella Date & Time created: 2/24/2018  12:49 PM     Interval History:  38 y/o male with ESRD/HHD admitted with abdominal wound infection, bleeding from varices -treated surgically  Doing much better.  NO SOB.  Low BP improved  (+) blood cx from dialysis catheter for Klebsiella PNA -on Abx -ID following  Scheduled skin graft to abd wall on Monday at 17:00  Review of Systems:  Review of Systems   Constitutional: Positive for malaise/fatigue. Negative for chills and fever.   HENT: Negative.    Eyes: Negative.    Respiratory: Negative for cough, hemoptysis, sputum production, shortness of breath and wheezing.    Cardiovascular: Negative for chest pain, palpitations, orthopnea and leg swelling.   Gastrointestinal: Positive for abdominal pain. Negative for diarrhea, nausea and vomiting.   Genitourinary: Negative for dysuria.   Musculoskeletal: Negative for back pain, joint pain and myalgias.   Skin: Negative.    Neurological: Negative for dizziness, sensory change, focal weakness and headaches.       Physical Exam:  Physical Exam   Constitutional: He is oriented to person, place, and time. He appears well-developed and well-nourished. No distress.   HENT:   Head: Normocephalic and atraumatic.   Nose: Nose normal.   Mouth/Throat: Oropharynx is clear and moist.   Eyes: Conjunctivae and EOM are normal. Pupils are equal, round, and reactive to light. No scleral icterus.   Neck: Normal range of motion. Neck supple. No thyromegaly present.   Cardiovascular: Normal rate and regular rhythm.  Exam reveals no gallop and no friction rub.    Pulmonary/Chest: Effort normal and breath sounds normal. No respiratory distress.   Abdominal: Bowel sounds are normal. He exhibits distension. There is tenderness.   Abdominal wall covered with dressing   Musculoskeletal: He exhibits no edema.   Lymphadenopathy:     He has no cervical adenopathy.   Neurological: He is alert and  oriented to person, place, and time. No cranial nerve deficit.   Skin: Skin is warm. No rash noted. No erythema.   Nursing note and vitals reviewed.      Labs:        Invalid input(s): [id number]  Recent Labs      18   TROPONINI  <0.01  <0.01  <0.01     Recent Labs      18   SODIUM  136  137  135   POTASSIUM  4.5  4.3  4.0   CHLORIDE  99  98  98   CO2  26  27  25   BUN  31*  31*  28*   CREATININE  4.30*  4.24*  3.57*   CALCIUM  7.2*  7.8*  7.4*     Recent Labs      18   ALTSGPT  7  9  10   ASTSGOT  11*  10*  11*   ALKPHOSPHAT  129*  143*  135*   TBILIRUBIN  0.7  0.8  0.7   GLUCOSE  171*  133*  105*     Recent Labs      18   0946   RBC   --    < >   --   2.77*  2.77*  2.94*   HEMOGLOBIN   --    < >   --   7.6*  7.7*  8.1*   HEMATOCRIT   --    < >   --   25.9*  26.2*  27.6*   PLATELETCT   --    < >   --   202  208  209   PROTHROMBTM  31.6*   --   18.0*   --   15.7*   --    INR  3.09*   --   1.52*   --   1.28*   --     < > = values in this interval not displayed.     Recent Labs      18   0946   WBC   --    < >  9.1  6.1  6.9  6.0   ASTSGOT  11*   --   10*   --   11*   --    ALTSGPT  7   --   9   --   10   --    ALKPHOSPHAT  129*   --   143*   --   135*   --    TBILIRUBIN  0.7   --   0.8   --   0.7   --     < > = values in this interval not displayed.           Hemodynamics:  Temp (24hrs), Av.6 °C (97.9 °F), Min:36.1 °C (97 °F), Max:36.9 °C (98.5 °F)  Temperature: 36.9 °C (98.5 °F)  Pulse  Av  Min: 46  Max: 157   Blood Pressure: 117/68    Respiratory:    Respiration: 18, Pulse Oximetry: 98 %        RUL Breath Sounds: Clear, RML Breath Sounds: Clear, RLL Breath Sounds: Diminished, ANGEL Breath Sounds: Clear, LLL Breath  Sounds: Diminished  Fluids:    Intake/Output Summary (Last 24 hours) at 02/24/18 1249  Last data filed at 02/24/18 0400   Gross per 24 hour   Intake              800 ml   Output             2500 ml   Net            -1700 ml     Weight: 77.6 kg (171 lb 1.2 oz)  GI/Nutrition:  Orders Placed This Encounter   Procedures   • Diet Order     Standing Status:   Standing     Number of Occurrences:   1     Order Specific Question:   Diet:     Answer:   Renal [8]     Medical Decision Making, by Problem:  Active Hospital Problems    Diagnosis   • Hypertension [I10]   • ESRD (end stage renal disease) (CMS-[facility]) [N18.6]   • 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]   • Abdominal varicosities [I86.8]   • Shock (CMS-[facility]) [R57.9]   • Hemorrhagic disorder due to circulating anticoagulants (CMS-[facility]) [D68.318]   • Sleep apnea [G47.30]       Quality-Core Measures   Reviewed items::  Labs reviewed and Medications reviewed  Central line in place:  Dialysis    Assessment and plan:    1.ESRD/HHD -next dialysis tomorrow  2.Hypotension/sepsis -improved  -off pressor  3.Electrolytes: electrolytes - well controlled  4.Anemia: Epogen 10 000 units with HD on MWF  5.Volume:UF with HD as BP   6.bacteriemia/line infection -on vanc, Zosyn per ID recommendations  Recs: HD tomorrow             All meds to renal doses             Will follow

## 2018-02-24 NOTE — PROGRESS NOTES
"Pt AA&Ox4. Pain rating at 7. Medicated per MAR. No c/o n/v, n/t, SOB. Right quad abdomen wound dressing intact. Pt with abd binder in place. Right upper thigh permacath dressing intact. Left upper thigh fistula with bruit and thrill. Pt up with standby assist. Plan of care discussed. Hourly rounding in place. Call light within reach. Blood pressure 137/46, pulse 77, temperature 36.6 °C (97.9 °F), resp. rate 18, height 1.626 m (5' 4\"), weight 77.8 kg (171 lb 8.3 oz), SpO2 94 %.      "

## 2018-02-24 NOTE — PROGRESS NOTES
Infectious Disease Progress Note    Author: Johanna Elias M.D. Date & Time of service: 2018  10:24 AM    Chief Complaint:  FU abdominal wall abscess    Interval History:   AF, WBC 9.1, transferred out of the ICU overnight, having lots of pain, plan for skin graft on Monday, tolerating abx without issues   AF, WBC 6.9, sitting in chair, pain controlled if binder not removed  Labs Reviewed, Medications Reviewed, Radiology Reviewed and Wound Reviewed.    Review of Systems:  Review of Systems   Constitutional: Negative for chills and fever.   Respiratory: Negative for cough and shortness of breath.    Gastrointestinal: Positive for abdominal pain. Negative for nausea and vomiting.       Hemodynamics:  Temp (24hrs), Av.6 °C (97.9 °F), Min:36.1 °C (97 °F), Max:36.9 °C (98.5 °F)  Temperature: 36.9 °C (98.5 °F)  Pulse  Av  Min: 46  Max: 157   Blood Pressure: 117/68      Physical Exam:  Physical Exam   Constitutional: He is oriented to person, place, and time. He appears well-developed.   Chronically ill     HENT:   Head: Normocephalic and atraumatic.   Eyes: EOM are normal. Pupils are equal, round, and reactive to light.   Neck:   R IJ catheter   Pulmonary/Chest: Effort normal. He has no wheezes. He has no rales.   Abdominal: Soft. There is tenderness.   R sided abd wound dressed +Binder in place    Abdominal varices   Musculoskeletal:   left thigh AV graft   Neurological: He is alert and oriented to person, place, and time.       Meds:    Current Facility-Administered Medications:   •  levETIRAcetam  •  MD ALERT... vancomycin  •  predniSONE  •  epoetin rj  •  fentaNYL **OR** fentaNYL **OR** fentaNYL  •  ipratropium-albuterol  •  piperacillin-tazobactam  •  albumin human 25%  •  heparin  •  calcitRIOL  •  cinacalcet  •  gabapentin  •  LORazepam  •  sevelamer carbonate  •  senna-docusate **AND** polyethylene glycol/lytes **AND** magnesium hydroxide **AND** bisacodyl  •  Respiratory Care per  Protocol  •  NS  •  labetalol  •  ondansetron  •  ondansetron  •  promethazine  •  promethazine  •  prochlorperazine  •  acetaminophen  •  Notify provider if pain remains uncontrolled **AND** Use the numeric rating scale (NRS-11) on regular floors and Critical-Care Pain Observation Tool (CPOT) on ICUs/Trauma to assess pain **AND** Pulse Ox (Oximetry) **AND** Pharmacy Consult Request **AND** If patient difficult to arouse and/or has respiratory depression, stop any opiates that are currently infusing and call a Rapid Response. **AND** oxyCODONE immediate-release **AND** oxyCODONE immediate-release **AND** morphine injection  •  insulin regular **AND** Accu-Chek ACHS **AND** NOTIFY MD and PharmD **AND** glucose 4 g **AND** dextrose 50%    Labs:  Recent Labs      02/23/18   0143  02/23/18   1650  02/24/18   0042   WBC  9.1  6.1  6.9   RBC  2.69*  2.77*  2.77*   HEMOGLOBIN  7.6*  7.6*  7.7*   HEMATOCRIT  24.9*  25.9*  26.2*   MCV  92.6  93.5  94.6   MCH  28.3  27.4  27.8   RDW  57.5*  57.5*  56.9*   PLATELETCT  200  202  208   MPV  10.1  9.8  9.8     Recent Labs      02/22/18   0250  02/23/18   0143  02/24/18   0042   SODIUM  136  137  135   POTASSIUM  4.5  4.3  4.0   CHLORIDE  99  98  98   CO2  26  27  25   GLUCOSE  171*  133*  105*   BUN  31*  31*  28*     Recent Labs      02/22/18   0250  02/23/18   0143  02/24/18   0042   ALBUMIN  3.3  3.5  3.2   TBILIRUBIN  0.7  0.8  0.7   ALKPHOSPHAT  129*  143*  135*   TOTPROTEIN  6.7  6.7  6.1   ALTSGPT  7  9  10   ASTSGOT  11*  10*  11*   CREATININE  4.30*  4.24*  3.57*       Imaging:  Dx-chest-portable (1 View)    Result Date: 2/20/2018 2/20/2018 4:49 AM HISTORY/REASON FOR EXAM:  Central line placement TECHNIQUE/EXAM DESCRIPTION AND NUMBER OF VIEWS: Single portable view of the chest. COMPARISON: 9/23/2017 FINDINGS: Right IJ catheter tip projects over the SVC. There are surgical clips projecting over the left upper medial hemithorax. The mediastinal and cardiac silhouette is  upper limits of normal. Central vessels are prominent. There is interstitial prominence. There is no significant pleural effusion. There is no visible pneumothorax. There is widening of the superior mediastinum similar to the prior study.     1.  Satisfactory appearance of the right IJ catheter without pneumothorax. 2.  Stable widening of the superior mediastinum possibly due to vascular prominence or lymphadenopathy. 3.  There are changes of interstitial edema.      Micro:  Results     Procedure Component Value Units Date/Time    CULTURE WOUND W/ GRAM STAIN [[id number]]  (Abnormal)  (Susceptibility) Collected:  02/20/18 2230    Order Status:  Completed Specimen:  Wound from Abdominal Updated:  02/24/18 0832     Significant Indicator POS (POS)     Source WND     Site ABDOMINAL     Culture Result Wound -- (A)     Gram Stain Result --     Many WBCs.  Many Gram positive cocci.  Rare Gram negative rods.       Culture Result Wound -- (A)     Pseudomonas aeruginosa  Light growth  P.aeruginosa may develop resistance during prolonged therapy  with all antibiotics. Isolates that are initially susceptible  may become resistant within three to four days after  initiation of therapy. Testing of repeat isolates may be  warranted.       Culture Result Wound -- (A)     Enterococcus faecalis  Light growth  Combination therapy with ampicillin, penicillin, or  vancomycin (for susceptible strains) plus an aminoglycoside  is usually indicated for serious enterococcal infections,  such as endocarditis unless high-level resistance to both  gentamicin and streptomycin is documented; such combinations  are predicted to result in synergistic killing of the  Enterococcus.       Culture Result Wound -- (A)     Escherichia coli  Rare growth      Narrative:       Collected By:[id number] CAYDEN MENENDEZ    Culture & Susceptibility     ENTEROCOCCUS FAECALIS     Antibiotic Sensitivity Microscan Unit Status    Ampicillin Sensitive <=2 mcg/mL Final     "Daptomycin Sensitive <=0.5 mcg/mL Final    Gent Synergy Sensitive <=500 mcg/mL Final    Penicillin Sensitive 2 mcg/mL Final    Vancomycin Sensitive 2 mcg/mL Final              ESCHERICHIA COLI     Antibiotic Sensitivity Microscan Unit Status    Ampicillin Sensitive <=8 mcg/mL Final    Cefepime Sensitive <=8 mcg/mL Final    Cefotaxime Sensitive <=2 mcg/mL Final    Cefotetan Sensitive <=16 mcg/mL Final    Ceftazidime Sensitive <=1 mcg/mL Final    Ceftriaxone Sensitive <=8 mcg/mL Final    Cefuroxime Sensitive <=4 mcg/mL Final    Ciprofloxacin Sensitive <=1 mcg/mL Final    Ertapenem Sensitive <=1 mcg/mL Final    Gentamicin Sensitive <=4 mcg/mL Final    Pip/Tazobactam Sensitive <=16 mcg/mL Final    Tigecycline Sensitive <=2 mcg/mL Final    Tobramycin Sensitive <=4 mcg/mL Final    Trimeth/Sulfa Resistant >2/38 mcg/mL Final              PSEUDOMONAS AERUGINOSA     Antibiotic Sensitivity Microscan Unit Status    Amikacin Sensitive <=16 mcg/mL Final    Cefepime Sensitive <=8 mcg/mL Final    Ceftazidime Sensitive 4 mcg/mL Final    Ciprofloxacin Sensitive <=1 mcg/mL Final    Gentamicin Sensitive <=4 mcg/mL Final    Imipenem Sensitive 2 mcg/mL Final    Meropenem Sensitive <=1 mcg/mL Final    Pip/Tazobactam Sensitive <=16 mcg/mL Final    Tobramycin Sensitive <=4 mcg/mL Final                       BLOOD CULTURE [[id number]]  (Abnormal) Collected:  02/20/18 0525    Order Status:  Completed Specimen:  Blood from Line Updated:  02/22/18 0809     Significant Indicator POS (POS)     Source BLD     Site LINE     Blood Culture Growth detected by Bactec instrument. 02/21/2018  06:52 (A)     Blood Culture -- (A)     Klebsiella pneumoniae  See previous culture for sensitivity report.      Narrative:       CALL  Bender  19 tel. [phone],  CALLED  19 tel. [phone] 02/21/2018, 06:55, RB PERF. RESULTS CALLED  TO:[id number]  Collected By:[id number] CAYDEN MENENDEZ  Per Hospital Policy: Only change Specimen Src: to \"Line\" if  specified by physician " "order.    BLOOD CULTURE [[id number]]  (Abnormal)  (Susceptibility) Collected:  02/20/18 0525    Order Status:  Completed Specimen:  Blood from Line Updated:  02/22/18 0808     Significant Indicator POS (POS)     Source BLD     Site LINE     Blood Culture Growth detected by Bactec instrument. 02/20/2018  18:04 (A)     Blood Culture Klebsiella pneumoniae (A)    Narrative:       Collected By:[id number] CAYDEN MENENDEZ  Per Hospital Policy: Only change Specimen Src: to \"Line\" if  specified by physician order.    Culture & Susceptibility     KLEBSIELLA PNEUMONIAE     Antibiotic Sensitivity Microscan Unit Status    Ampicillin Resistant >16 mcg/mL Final    Cefepime Sensitive <=8 mcg/mL Final    Cefotaxime Sensitive <=2 mcg/mL Final    Cefotetan Sensitive <=16 mcg/mL Final    Ceftazidime Sensitive <=1 mcg/mL Final    Ceftriaxone Sensitive <=8 mcg/mL Final    Cefuroxime Sensitive <=4 mcg/mL Final    Ciprofloxacin Sensitive <=1 mcg/mL Final    Ertapenem Sensitive <=1 mcg/mL Final    Gentamicin Sensitive <=4 mcg/mL Final    Pip/Tazobactam Sensitive <=16 mcg/mL Final    Tigecycline Sensitive <=2 mcg/mL Final    Tobramycin Sensitive <=4 mcg/mL Final    Trimeth/Sulfa Sensitive <=2/38 mcg/mL Final                       BLOOD CULTURE [[id number]] Collected:  02/21/18 1020    Order Status:  Completed Specimen:  Blood from Line Updated:  02/22/18 0652     Significant Indicator NEG     Source BLD     Site Peripheral     Blood Culture --     No Growth    Note: Blood cultures are incubated for 5 days and  are monitored continuously.Positive blood cultures  are called to the RN and reported as soon as  they are identified.      Narrative:       Collected By:[id number] TACOS BRASHER  Per Hospital Policy: Only change Specimen Src: to \"Line\" if  specified by physician order.    BLOOD CULTURE [[id number]] Collected:  02/21/18 0955    Order Status:  Completed Specimen:  Blood from Peripheral Updated:  02/22/18 0652     Significant Indicator NEG    " " Source BLD     Site Dialysis Port     Blood Culture --     No Growth    Note: Blood cultures are incubated for 5 days and  are monitored continuously.Positive blood cultures  are called to the RN and reported as soon as  they are identified.      Narrative:       Collected By:[id number] TACOS BRASHER  Per Hospital Policy: Only change Specimen Src: to \"Line\" if  specified by physician order.    GRAM STAIN [[id number]] Collected:  02/20/18 2230    Order Status:  Completed Specimen:  Wound Updated:  02/21/18 1533     Significant Indicator .     Source WND     Site ABDOMINAL     Gram Stain Result --     Many WBCs.  Many Gram positive cocci.  Rare Gram negative rods.      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    Wound cx + enterococcus faecalis (amp S), E coli, PSAR  DC vancomycin   Continue zosyn  Plan for skin graft by plastics on 2/26  S/p I&D on 2/19    Abdominal varices  S/p ligation on 2/19    ESRD on HD    Discussed with internal medicine.  "

## 2018-02-24 NOTE — PROGRESS NOTES
[facility]ist Progress Note    Date of Service: 2018    Chief Complaint  37 y.o. male admitted 2018 with bleeding abdominal wall varices.    Interval Problem Update  Resting in bed, no fever no chills, possible grafting on Monday, continue iv atb per ID, he is in good spirit. Hemodynamically stable.     Consultants/Specialty  Intensivist  Plastic surgery  Nephrology  Infectious disease    Disposition  Tbd.         Review of Systems   Constitutional: Negative for fever and malaise/fatigue.   HENT: Negative for hearing loss.    Respiratory: Negative for cough.    Cardiovascular: Negative for chest pain and palpitations.   Gastrointestinal: Positive for abdominal pain. Negative for nausea and vomiting.   Genitourinary: Negative for flank pain.   Musculoskeletal: Negative for myalgias.   Neurological: Negative for dizziness.   Psychiatric/Behavioral: Negative for depression.      Physical Exam  Laboratory/Imaging   Hemodynamics  Temp (24hrs), Av.3 °C (97.4 °F), Min:36.1 °C (96.9 °F), Max:36.8 °C (98.3 °F)   Temperature: 36.8 °C (98.3 °F)  Pulse  Av.5  Min: 46  Max: 157 Heart Rate (Monitored): 81  Blood Pressure: (!) 94/40, NIBP: 102/42     Respiratory      Respiration: 17, Pulse Oximetry: 98 %        RUL Breath Sounds: Clear, RML Breath Sounds: Clear, RLL Breath Sounds: Diminished, ANGEL Breath Sounds: Clear, LLL Breath Sounds: Diminished    Fluids    Intake/Output Summary (Last 24 hours) at 18 1627  Last data filed at 18 1600   Gross per 24 hour   Intake             1590 ml   Output             2500 ml   Net             -910 ml       Nutrition  Orders Placed This Encounter   Procedures   • Diet Order     Standing Status:   Standing     Number of Occurrences:   1     Order Specific Question:   Diet:     Answer:   Renal [8]     Physical Exam   Constitutional: He is oriented to person, place, and time.  Non-toxic appearance. No distress.   HENT:   Mouth/Throat: No oropharyngeal exudate.    Eyes: No scleral icterus.   Neck: Neck supple. No edema and no erythema present.   Cardiovascular: Normal rate and regular rhythm.    No murmur heard.  Pulmonary/Chest: Effort normal and breath sounds normal. No respiratory distress. He has no wheezes.   Abdominal: Soft. Bowel sounds are normal. He exhibits no distension. There is tenderness. There is no rebound.   Musculoskeletal: He exhibits no edema.   Neurological: He is alert and oriented to person, place, and time.   Skin: No erythema.   Psychiatric: He has a normal mood and affect. His behavior is normal. Judgment normal.   Nursing note and vitals reviewed.      Recent Labs      02/22/18   0308  02/22/18   1114  02/23/18   0143   WBC  8.1  7.5  9.1   RBC  2.65*  2.74*  2.69*   HEMOGLOBIN  7.4*  7.6*  7.6*   HEMATOCRIT  24.0*  25.1*  24.9*   MCV  90.6  91.6  92.6   MCH  27.9  27.7  28.3   MCHC  30.8*  30.3*  30.5*   RDW  57.1*  58.3*  57.5*   PLATELETCT  164  172  200   MPV  10.1  9.9  10.1     Recent Labs      02/21/18   0505  02/22/18   0250  02/23/18   0143   SODIUM  134*  136  137   POTASSIUM  5.7*  4.5  4.3   CHLORIDE  97  99  98   CO2  23  26  27   GLUCOSE  189*  171*  133*   BUN  32*  31*  31*   CREATININE  5.54*  4.30*  4.24*   CALCIUM  8.0*  7.2*  7.8*     Recent Labs      02/21/18   0505  02/22/18   0250  02/23/18   0216   INR  2.67*  3.09*  1.52*                  Assessment/Plan     Shock (CMS-HCC)   Assessment & Plan    -Resolved        Abdominal varicosities   Assessment & Plan    - Chronic issue, acute bleeding  - Now status post surgery  - No sign of gross bleeding  - Dr. Rosenbaum for possible skin graft on Monday.         Bacteremia- (present on admission)   Assessment & Plan    - Klebsiella per blood culture ×2  abdominal wound cx + for enterococcus, on vanc and zosyn, ID consulted.         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        Hemorrhagic disorder due to circulating anticoagulants (CMS-HCC)   Assessment & Plan    - Is on Coumadin for occluded IVC, currently being held due to bleeding.         Hyperkalemia- (present on admission)   Assessment & Plan    -Resolved with hemodialysis          Seizure disorder (CMS-HCC)- (present on admission)   Assessment & Plan    - Continue Keppra, no sign of seizure activity        Normocytic anemia- (present on admission)   Assessment & Plan    - No longer any sign of gross bleeding, did have significant bleeding initially  - Repeat CBC in the morning, hb 7.6 today stable.         Sleep apnea- (present on admission)   Assessment & Plan    - Patient uses BiPAP at night, [facility]          Quality-Core Measures   Reviewed items::  Labs reviewed, Medications reviewed and Radiology images reviewed  DVT prophylaxis pharmacological::  Contraindicated - High bleeding risk  DVT prophylaxis - mechanical:  SCDs  Antibiotics:  Treating active infection/contamination beyond 24 hours perioperative coverage

## 2018-02-24 NOTE — CARE PLAN
Problem: Communication  Goal: The ability to communicate needs accurately and effectively will improve  Outcome: PROGRESSING AS EXPECTED  Pt able to communicate needs effectivly and efficiently, pt updated on POC, all needs met at this time     Problem: Safety  Goal: Will remain free from falls  Outcome: PROGRESSING AS EXPECTED  Call light within reach, treaded socks on, bed in lowest position, all needs met at this time, personal belongings within reach

## 2018-02-25 LAB
ANION GAP SERPL CALC-SCNC: 11 MMOL/L (ref 0–11.9)
BUN SERPL-MCNC: 46 MG/DL (ref 8–22)
CALCIUM SERPL-MCNC: 7.8 MG/DL (ref 8.5–10.5)
CHLORIDE SERPL-SCNC: 98 MMOL/L (ref 96–112)
CO2 SERPL-SCNC: 25 MMOL/L (ref 20–33)
CREAT SERPL-MCNC: 5.4 MG/DL (ref 0.5–1.4)
GLUCOSE BLD-MCNC: 111 MG/DL (ref 65–99)
GLUCOSE BLD-MCNC: 98 MG/DL (ref 65–99)
GLUCOSE SERPL-MCNC: 106 MG/DL (ref 65–99)
INR PPP: 1.23 (ref 0.87–1.13)
POTASSIUM SERPL-SCNC: 4.1 MMOL/L (ref 3.6–5.5)
PROTHROMBIN TIME: 15.2 SEC (ref 12–14.6)
SODIUM SERPL-SCNC: 134 MMOL/L (ref 135–145)

## 2018-02-25 PROCEDURE — A9270 NON-COVERED ITEM OR SERVICE: HCPCS | Performed by: INTERNAL MEDICINE

## 2018-02-25 PROCEDURE — 90935 HEMODIALYSIS ONE EVALUATION: CPT

## 2018-02-25 PROCEDURE — 700111 HCHG RX REV CODE 636 W/ 250 OVERRIDE (IP): Performed by: HOSPITALIST

## 2018-02-25 PROCEDURE — 770001 HCHG ROOM/CARE - MED/SURG/GYN PRIV*

## 2018-02-25 PROCEDURE — 82962 GLUCOSE BLOOD TEST: CPT | Mod: 91

## 2018-02-25 PROCEDURE — 5A1D70Z PERFORMANCE OF URINARY FILTRATION, INTERMITTENT, LESS THAN 6 HOURS PER DAY: ICD-10-PCS | Performed by: INTERNAL MEDICINE

## 2018-02-25 PROCEDURE — 700105 HCHG RX REV CODE 258: Performed by: INTERNAL MEDICINE

## 2018-02-25 PROCEDURE — 99232 SBSQ HOSP IP/OBS MODERATE 35: CPT | Performed by: HOSPITALIST

## 2018-02-25 PROCEDURE — 700102 HCHG RX REV CODE 250 W/ 637 OVERRIDE(OP): Performed by: INTERNAL MEDICINE

## 2018-02-25 PROCEDURE — 85610 PROTHROMBIN TIME: CPT

## 2018-02-25 PROCEDURE — 700111 HCHG RX REV CODE 636 W/ 250 OVERRIDE (IP): Performed by: INTERNAL MEDICINE

## 2018-02-25 PROCEDURE — 700111 HCHG RX REV CODE 636 W/ 250 OVERRIDE (IP)

## 2018-02-25 PROCEDURE — 80048 BASIC METABOLIC PNL TOTAL CA: CPT

## 2018-02-25 RX ORDER — HEPARIN SODIUM 1000 [USP'U]/ML
INJECTION, SOLUTION INTRAVENOUS; SUBCUTANEOUS
Status: COMPLETED
Start: 2018-02-25 | End: 2018-02-25

## 2018-02-25 RX ADMIN — OXYCODONE HYDROCHLORIDE 10 MG: 10 TABLET ORAL at 08:30

## 2018-02-25 RX ADMIN — LEVETIRACETAM 250 MG: 250 TABLET ORAL at 20:18

## 2018-02-25 RX ADMIN — FENTANYL CITRATE 50 MCG: 50 INJECTION, SOLUTION INTRAMUSCULAR; INTRAVENOUS at 09:35

## 2018-02-25 RX ADMIN — GABAPENTIN 600 MG: 300 CAPSULE ORAL at 08:30

## 2018-02-25 RX ADMIN — GABAPENTIN 600 MG: 300 CAPSULE ORAL at 20:17

## 2018-02-25 RX ADMIN — SEVELAMER CARBONATE 3200 MG: 800 TABLET, FILM COATED ORAL at 17:47

## 2018-02-25 RX ADMIN — SEVELAMER CARBONATE 3200 MG: 800 TABLET, FILM COATED ORAL at 08:30

## 2018-02-25 RX ADMIN — PREDNISONE 20 MG: 20 TABLET ORAL at 08:30

## 2018-02-25 RX ADMIN — FENTANYL CITRATE 75 MCG: 50 INJECTION, SOLUTION INTRAMUSCULAR; INTRAVENOUS at 16:44

## 2018-02-25 RX ADMIN — SODIUM CHLORIDE: 9 INJECTION, SOLUTION INTRAVENOUS at 03:41

## 2018-02-25 RX ADMIN — GABAPENTIN 600 MG: 300 CAPSULE ORAL at 15:35

## 2018-02-25 RX ADMIN — CALCITRIOL 0.75 MCG: 0.25 CAPSULE, LIQUID FILLED ORAL at 20:17

## 2018-02-25 RX ADMIN — FENTANYL CITRATE 50 MCG: 50 INJECTION, SOLUTION INTRAMUSCULAR; INTRAVENOUS at 14:08

## 2018-02-25 RX ADMIN — OXYCODONE HYDROCHLORIDE 10 MG: 10 TABLET ORAL at 15:59

## 2018-02-25 RX ADMIN — ONDANSETRON 4 MG: 4 TABLET, ORALLY DISINTEGRATING ORAL at 23:46

## 2018-02-25 RX ADMIN — TAZOBACTAM SODIUM AND PIPERACILLIN SODIUM 4.5 G: 500; 4 INJECTION, SOLUTION INTRAVENOUS at 15:36

## 2018-02-25 RX ADMIN — OXYCODONE HYDROCHLORIDE 10 MG: 10 TABLET ORAL at 20:18

## 2018-02-25 RX ADMIN — TAZOBACTAM SODIUM AND PIPERACILLIN SODIUM 4.5 G: 500; 4 INJECTION, SOLUTION INTRAVENOUS at 03:51

## 2018-02-25 RX ADMIN — OXYCODONE HYDROCHLORIDE 10 MG: 10 TABLET ORAL at 02:43

## 2018-02-25 RX ADMIN — LEVETIRACETAM 250 MG: 250 TABLET ORAL at 08:30

## 2018-02-25 RX ADMIN — FENTANYL CITRATE 75 MCG: 50 INJECTION, SOLUTION INTRAMUSCULAR; INTRAVENOUS at 03:40

## 2018-02-25 RX ADMIN — SEVELAMER CARBONATE 3200 MG: 800 TABLET, FILM COATED ORAL at 11:08

## 2018-02-25 RX ADMIN — CINACALCET HYDROCHLORIDE 60 MG: 30 TABLET, COATED ORAL at 20:18

## 2018-02-25 RX ADMIN — FENTANYL CITRATE 50 MCG: 50 INJECTION, SOLUTION INTRAMUSCULAR; INTRAVENOUS at 22:00

## 2018-02-25 RX ADMIN — HEPARIN SODIUM 3900 UNITS: 1000 INJECTION, SOLUTION INTRAVENOUS; SUBCUTANEOUS at 14:28

## 2018-02-25 RX ADMIN — OXYCODONE HYDROCHLORIDE 10 MG: 10 TABLET ORAL at 23:43

## 2018-02-25 ASSESSMENT — ENCOUNTER SYMPTOMS
SHORTNESS OF BREATH: 0
VOMITING: 0
MYALGIAS: 0
FOCAL WEAKNESS: 0
BLURRED VISION: 0
BACK PAIN: 0
NAUSEA: 0
FEVER: 0
CHILLS: 0
ABDOMINAL PAIN: 1
COUGH: 0
ORTHOPNEA: 0
PALPITATIONS: 0
SENSORY CHANGE: 0
SPUTUM PRODUCTION: 0
DIARRHEA: 0
HEADACHES: 0
WHEEZING: 0
EYES NEGATIVE: 1
DIZZINESS: 0
HEMOPTYSIS: 0

## 2018-02-25 ASSESSMENT — PAIN SCALES - GENERAL
PAINLEVEL_OUTOF10: 6
PAINLEVEL_OUTOF10: 8
PAINLEVEL_OUTOF10: 6
PAINLEVEL_OUTOF10: 7
PAINLEVEL_OUTOF10: ASSUMED PAIN PRESENT
PAINLEVEL_OUTOF10: 8

## 2018-02-25 NOTE — PROGRESS NOTES
Dressing to the abdomen wound changed. Pt tolerated well. MD at bedside to assess wound. Pt in chair at this time.

## 2018-02-25 NOTE — PROGRESS NOTES
Nephrology Progress Note, Adult, Complex               Author: Dee Angella Date & Time created: 2/25/2018  12:15 PM     Interval History:  36 y/o male with ESRD/HHD admitted with abdominal wound infection, bleeding from varices -treated surgically  Doing much better.  No complaints  No acute events  Scheduled skin graft to abdominal wound -tomorrow to OR at 17;00  (+) blood cx from dialysis catheter for Klebsiella PNA - abx per ID    Review of Systems:  Review of Systems   Constitutional: Positive for malaise/fatigue. Negative for chills and fever.   HENT: Negative.    Eyes: Negative.    Respiratory: Negative for cough, hemoptysis, sputum production, shortness of breath and wheezing.    Cardiovascular: Negative for chest pain, palpitations, orthopnea and leg swelling.   Gastrointestinal: Positive for abdominal pain. Negative for diarrhea, nausea and vomiting.   Genitourinary: Negative for dysuria.   Musculoskeletal: Negative for back pain, joint pain and myalgias.   Skin: Negative.    Neurological: Negative for dizziness, sensory change, focal weakness and headaches.       Physical Exam:  Physical Exam   Constitutional: He is oriented to person, place, and time. He appears well-developed and well-nourished. No distress.   HENT:   Head: Normocephalic and atraumatic.   Nose: Nose normal.   Mouth/Throat: Oropharynx is clear and moist.   Eyes: Conjunctivae and EOM are normal. Pupils are equal, round, and reactive to light. No scleral icterus.   Neck: Normal range of motion. Neck supple. No thyromegaly present.   Cardiovascular: Normal rate and regular rhythm.  Exam reveals no gallop and no friction rub.    Pulmonary/Chest: Effort normal and breath sounds normal. No respiratory distress.   Abdominal: Bowel sounds are normal. He exhibits no distension. There is tenderness.   Abdominal wall covered with dressing   Musculoskeletal: He exhibits no edema.   Lymphadenopathy:     He has no cervical adenopathy.   Neurological:  He is alert and oriented to person, place, and time. No cranial nerve deficit.   Skin: Skin is warm. No rash noted. No erythema.   Nursing note and vitals reviewed.      Labs:        Invalid input(s): [id number]  Recent Labs      18   TROPONINI  <0.01  <0.01     Recent Labs      183  18   0042  18   0330   SODIUM  137  135  134*   POTASSIUM  4.3  4.0  4.1   CHLORIDE  98  98  98   CO2  27  25  25   BUN  31*  28*  46*   CREATININE  4.24*  3.57*  5.40*   CALCIUM  7.8*  7.4*  7.8*     Recent Labs      18   0330   ALTSGPT  9  10   --    ASTSGOT  10*  11*   --    ALKPHOSPHAT  143*  135*   --    TBILIRUBIN  0.8  0.7   --    GLUCOSE  133*  105*  106*     Recent Labs      18   0216  18   0946  18   0330   RBC   --   2.77*  2.77*  2.94*   --    HEMOGLOBIN   --   7.6*  7.7*  8.1*   --    HEMATOCRIT   --   25.9*  26.2*  27.6*   --    PLATELETCT   --   202  208  209   --    PROTHROMBTM  18.0*   --   15.7*   --   15.2*   INR  1.52*   --   1.28*   --   1.23*     Recent Labs      18   00418   0946   WBC  9.1  6.1  6.9  6.0   ASTSGOT  10*   --   11*   --    ALTSGPT  9   --   10   --    ALKPHOSPHAT  143*   --   135*   --    TBILIRUBIN  0.8   --   0.7   --            Hemodynamics:  Temp (24hrs), Av.6 °C (97.8 °F), Min:36.2 °C (97.1 °F), Max:37.1 °C (98.8 °F)  Temperature: 36.4 °C (97.5 °F)  Pulse  Av.9  Min: 46  Max: 157   Blood Pressure: 135/70    Respiratory:    Respiration: 16, Pulse Oximetry: 98 %        RUL Breath Sounds: Clear, RML Breath Sounds: Clear, RLL Breath Sounds: Diminished, ANGEL Breath Sounds: Clear, LLL Breath Sounds: Diminished  Fluids:    Intake/Output Summary (Last 24 hours) at 18 1215  Last data filed at 18 0834   Gross per 24 hour   Intake              140 ml   Output                0 ml   Net               140 ml        GI/Nutrition:  Orders Placed This Encounter   Procedures   • Diet Order     Standing Status:   Standing     Number of Occurrences:   1     Order Specific Question:   Diet:     Answer:   Renal [8]   • DIET NPO     Standing Status:   Standing     Number of Occurrences:   8     Order Specific Question:   Restrict to:     Answer:   Sips with Medications [3]     Medical Decision Making, by Problem:  Active Hospital Problems    Diagnosis   • Hypertension [I10]   • ESRD (end stage renal disease) (CMS-HCC) [N18.6]   • 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]   • Abdominal varicosities [I86.8]   • Shock (CMS-HCC) [R57.9]   • Hemorrhagic disorder due to circulating anticoagulants (CMS-HCC) [D68.318]   • Sleep apnea [G47.30]       Quality-Core Measures   Reviewed items::  Labs reviewed and Medications reviewed  Central line in place:  Dialysis    Assessment and plan:    1.ESRD/HHD -seen and examined during dialysis -please see dialysis flow sheet for details  2.Hypotension/sepsis -improved  -off pressor  3.Electrolytes: mild hyponatremia - normalized-to monitor                          Hyperkalemia -corrected with HD  4.Anemia: Hb better -on Epogen  5.Volume:UF with HD as BP tolerates  Recs: HD today and tomorrow morning             All meds to renal doses             Will follow

## 2018-02-25 NOTE — PROGRESS NOTES
HD treatment were ordered by Dr. Perales, pt was able to tolerate HD treatment without any difficulty, pt was able to pull 2.0 liters of fluids. Gave report to Dany DAVIDSON, pt Fem dressing were dry and intact, no swelling or redness were noted post tx. Pt voiced that he will do his own dressing changed. Pt access were locked with heparin 1000 units.ml, 1.9 ml arterial port and 2.0 ml venous port. Pt was stable post tx, denies any complaint of pain and SOB, no fever or any distress were noted post tx. Treatment started at 1123 and ended at 1423, see flow sheets for further details.

## 2018-02-25 NOTE — PROGRESS NOTES
[facility]ist Progress Note    Date of Service: 2018    Chief Complaint  37 y.o. male admitted 2018 with bleeding abdominal wall varices.    Interval Problem Update  HD today, no complains, surgery tomorrow. NPO after MN.     Consultants/Specialty  Intensivist  Plastic surgery  Nephrology  Infectious disease    Disposition  Tbd.         Review of Systems   Constitutional: Negative for fever.   HENT: Negative for hearing loss.    Eyes: Negative for blurred vision.   Respiratory: Negative for cough.    Gastrointestinal: Positive for abdominal pain. Negative for vomiting.   Musculoskeletal: Negative for myalgias.   Neurological: Negative for dizziness.      Physical Exam  Laboratory/Imaging   Hemodynamics  Temp (24hrs), Av.6 °C (97.8 °F), Min:36.2 °C (97.1 °F), Max:37.1 °C (98.8 °F)   Temperature: 36.4 °C (97.5 °F)  Pulse  Av  Min: 46  Max: 157    Blood Pressure: 135/70     Respiratory      Respiration: 16, Pulse Oximetry: 98 %        RUL Breath Sounds: Clear, RML Breath Sounds: Clear, RLL Breath Sounds: Diminished, ANGEL Breath Sounds: Clear, LLL Breath Sounds: Diminished    Fluids    Intake/Output Summary (Last 24 hours) at 18 1453  Last data filed at 18 1423   Gross per 24 hour   Intake              780 ml   Output             2000 ml   Net            -1220 ml       Nutrition  Orders Placed This Encounter   Procedures   • Diet Order     Standing Status:   Standing     Number of Occurrences:   1     Order Specific Question:   Diet:     Answer:   Renal [8]   • DIET NPO     Standing Status:   Standing     Number of Occurrences:   8     Order Specific Question:   Restrict to:     Answer:   Sips with Medications [3]     Physical Exam   Constitutional: He is oriented to person, place, and time.  Non-toxic appearance. No distress.   Neck: No edema and no erythema present.   Cardiovascular: Normal rate and regular rhythm.    No murmur heard.  Pulmonary/Chest: Effort normal and breath sounds  normal. No respiratory distress. He has no wheezes.   Abdominal: Soft. Bowel sounds are normal. He exhibits no distension. There is tenderness. There is no rebound.   Right sided wound healing, no discharge.    Musculoskeletal: He exhibits no edema.   Neurological: He is alert and oriented to person, place, and time.   Skin: No erythema.   Psychiatric: He has a normal mood and affect.   Nursing note and vitals reviewed.      Recent Labs      02/23/18   1650  02/24/18   0042  02/24/18   0946   WBC  6.1  6.9  6.0   RBC  2.77*  2.77*  2.94*   HEMOGLOBIN  7.6*  7.7*  8.1*   HEMATOCRIT  25.9*  26.2*  27.6*   MCV  93.5  94.6  93.9   MCH  27.4  27.8  27.6   MCHC  29.3*  29.4*  29.3*   RDW  57.5*  56.9*  56.9*   PLATELETCT  202  208  209   MPV  9.8  9.8  9.3     Recent Labs      02/23/18   0143  02/24/18   0042  02/25/18   0330   SODIUM  137  135  134*   POTASSIUM  4.3  4.0  4.1   CHLORIDE  98  98  98   CO2  27  25  25   GLUCOSE  133*  105*  106*   BUN  31*  28*  46*   CREATININE  4.24*  3.57*  5.40*   CALCIUM  7.8*  7.4*  7.8*     Recent Labs      02/23/18   0216  02/24/18   0042  02/25/18   0330   INR  1.52*  1.28*  1.23*                  Assessment/Plan     Shock (CMS-HCC)   Assessment & Plan    -Resolved        Abdominal varicosities   Assessment & Plan    - Chronic issue, acute bleeding  - Now status post surgery  - No sign of gross bleeding  - Dr. Rosenbaum for possible skin graft on tomorrow.        Bacteremia- (present on admission)   Assessment & Plan    - Klebsiella per blood culture ×2  abdominal wound cx + for enterococcus, on vanc and zosyn, ID consulted. Repeated cx neg so far.         ESRD (end stage renal disease) (CMS-HCC)- (present on admission)   Assessment & Plan    - Patient generally gets hemodialysis 5 times a week, hemodialysis per nephrology        Thrombocytopenia (CMS-HCC)   Assessment & Plan    Resolved.         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        Hemorrhagic disorder due to circulating anticoagulants (CMS-[facility])   Assessment & Plan    - Is on Coumadin for occluded IVC, currently being held due to bleeding.         Hyperkalemia- (present on admission)   Assessment & Plan    -Resolved with hemodialysis          Seizure disorder (CMS-[facility])- (present on admission)   Assessment & Plan    - Continue Keppra, no sign of seizure activity        Normocytic anemia- (present on admission)   Assessment & Plan    - No longer any sign of gross bleeding, did have significant bleeding initially  - Repeat CBC in the morning, hb 8.1  stable.         Sleep apnea- (present on admission)   Assessment & Plan    - Patient uses BiPAP at night, [facility]          Quality-Core Measures   Reviewed items::  Labs reviewed and Medications reviewed  DVT prophylaxis pharmacological::  Contraindicated - High bleeding risk  DVT prophylaxis - mechanical:  SCDs  Antibiotics:  Treating active infection/contamination beyond 24 hours perioperative coverage

## 2018-02-25 NOTE — PROGRESS NOTES
Patient pain more controlled when given PO oxy before Iv. Tolerated dressing change without incident. CL dressing changed. VSS, accu checks WNL, patient would like them discontinued. States regular BM yesterday. Dialysis today.

## 2018-02-25 NOTE — PROGRESS NOTES
"Pt AA&Ox4. Pain rating at 8. Medicated per MAR. No c/o n/v, n/t, SOB. Right quad abdomen wound dressing intact. Pt with abd binder in place. Hypoactive BS. +BM.  Right upper thigh permacath dressing intact. Left upper thigh fistula with bruit and thrill. Pt to go to dialysis today.  Pt up with standby assist. Plan of care discussed. Hourly rounding in place. Call light within reach. Blood pressure 143/77, pulse 79, temperature 36.2 °C (97.1 °F), resp. rate 18, height 1.626 m (5' 4\"), weight 77.6 kg (171 lb 1.2 oz), SpO2 98 %.      "

## 2018-02-25 NOTE — PROGRESS NOTES
"Blood pressure 135/70, pulse 90, temperature 36.4 °C (97.5 °F), resp. rate 16, height 1.626 m (5' 4\"), weight 77.6 kg (171 lb 1.2 oz), SpO2 98 %.    I/O last 3 completed shifts:  In: 520 [P.O.:180; I.V.:340]  Out: -   Improving  Plan sts tomorrow  "

## 2018-02-26 LAB
BACTERIA BLD CULT: NORMAL
BACTERIA BLD CULT: NORMAL
BASOPHILS # BLD AUTO: 0.7 % (ref 0–1.8)
BASOPHILS # BLD: 0.05 K/UL (ref 0–0.12)
EOSINOPHIL # BLD AUTO: 0.12 K/UL (ref 0–0.51)
EOSINOPHIL NFR BLD: 1.6 % (ref 0–6.9)
ERYTHROCYTE [DISTWIDTH] IN BLOOD BY AUTOMATED COUNT: 58.1 FL (ref 35.9–50)
HCT VFR BLD AUTO: 27.5 % (ref 42–52)
HGB BLD-MCNC: 8 G/DL (ref 14–18)
IMM GRANULOCYTES # BLD AUTO: 0.26 K/UL (ref 0–0.11)
IMM GRANULOCYTES NFR BLD AUTO: 3.4 % (ref 0–0.9)
INR PPP: 1.23 (ref 0.87–1.13)
LYMPHOCYTES # BLD AUTO: 1.4 K/UL (ref 1–4.8)
LYMPHOCYTES NFR BLD: 18.3 % (ref 22–41)
MCH RBC QN AUTO: 27.6 PG (ref 27–33)
MCHC RBC AUTO-ENTMCNC: 29.1 G/DL (ref 33.7–35.3)
MCV RBC AUTO: 94.8 FL (ref 81.4–97.8)
MONOCYTES # BLD AUTO: 0.61 K/UL (ref 0–0.85)
MONOCYTES NFR BLD AUTO: 8 % (ref 0–13.4)
NEUTROPHILS # BLD AUTO: 5.21 K/UL (ref 1.82–7.42)
NEUTROPHILS NFR BLD: 68 % (ref 44–72)
NRBC # BLD AUTO: 0 K/UL
NRBC BLD-RTO: 0 /100 WBC
PLATELET # BLD AUTO: 251 K/UL (ref 164–446)
PMV BLD AUTO: 10.1 FL (ref 9–12.9)
POTASSIUM SERPL-SCNC: 3.8 MMOL/L (ref 3.6–5.5)
PROTHROMBIN TIME: 15.2 SEC (ref 12–14.6)
RBC # BLD AUTO: 2.9 M/UL (ref 4.7–6.1)
SIGNIFICANT IND 70042: NORMAL
SIGNIFICANT IND 70042: NORMAL
SITE SITE: NORMAL
SITE SITE: NORMAL
SOURCE SOURCE: NORMAL
SOURCE SOURCE: NORMAL
WBC # BLD AUTO: 7.7 K/UL (ref 4.8–10.8)

## 2018-02-26 PROCEDURE — 160002 HCHG RECOVERY MINUTES (STAT): Performed by: PLASTIC SURGERY

## 2018-02-26 PROCEDURE — 160009 HCHG ANES TIME/MIN: Performed by: PLASTIC SURGERY

## 2018-02-26 PROCEDURE — A9270 NON-COVERED ITEM OR SERVICE: HCPCS | Performed by: INTERNAL MEDICINE

## 2018-02-26 PROCEDURE — 700111 HCHG RX REV CODE 636 W/ 250 OVERRIDE (IP): Performed by: HOSPITALIST

## 2018-02-26 PROCEDURE — 160048 HCHG OR STATISTICAL LEVEL 1-5: Performed by: PLASTIC SURGERY

## 2018-02-26 PROCEDURE — 500447 HCHG DRESSING, TEGADERM 8X12: Performed by: PLASTIC SURGERY

## 2018-02-26 PROCEDURE — 36415 COLL VENOUS BLD VENIPUNCTURE: CPT

## 2018-02-26 PROCEDURE — 160036 HCHG PACU - EA ADDL 30 MINS PHASE I: Performed by: PLASTIC SURGERY

## 2018-02-26 PROCEDURE — 501835 HCHG SUTURE PLASTIC: Performed by: PLASTIC SURGERY

## 2018-02-26 PROCEDURE — 770001 HCHG ROOM/CARE - MED/SURG/GYN PRIV*

## 2018-02-26 PROCEDURE — 85610 PROTHROMBIN TIME: CPT

## 2018-02-26 PROCEDURE — 160029 HCHG SURGERY MINUTES - 1ST 30 MINS LEVEL 4: Performed by: PLASTIC SURGERY

## 2018-02-26 PROCEDURE — 501445 HCHG STAPLER, SKIN DISP: Performed by: PLASTIC SURGERY

## 2018-02-26 PROCEDURE — 5A1D70Z PERFORMANCE OF URINARY FILTRATION, INTERMITTENT, LESS THAN 6 HOURS PER DAY: ICD-10-PCS | Performed by: INTERNAL MEDICINE

## 2018-02-26 PROCEDURE — 0HBHXZZ EXCISION OF RIGHT UPPER LEG SKIN, EXTERNAL APPROACH: ICD-10-PCS | Performed by: PLASTIC SURGERY

## 2018-02-26 PROCEDURE — 700105 HCHG RX REV CODE 258: Performed by: INTERNAL MEDICINE

## 2018-02-26 PROCEDURE — 500440 HCHG DRESSING, STERILE ROLL (KERLIX): Performed by: PLASTIC SURGERY

## 2018-02-26 PROCEDURE — 99232 SBSQ HOSP IP/OBS MODERATE 35: CPT | Performed by: HOSPITALIST

## 2018-02-26 PROCEDURE — 700102 HCHG RX REV CODE 250 W/ 637 OVERRIDE(OP): Performed by: INTERNAL MEDICINE

## 2018-02-26 PROCEDURE — A9270 NON-COVERED ITEM OR SERVICE: HCPCS

## 2018-02-26 PROCEDURE — 700111 HCHG RX REV CODE 636 W/ 250 OVERRIDE (IP): Mod: JG | Performed by: INTERNAL MEDICINE

## 2018-02-26 PROCEDURE — 700102 HCHG RX REV CODE 250 W/ 637 OVERRIDE(OP)

## 2018-02-26 PROCEDURE — 700111 HCHG RX REV CODE 636 W/ 250 OVERRIDE (IP): Performed by: INTERNAL MEDICINE

## 2018-02-26 PROCEDURE — 85025 COMPLETE CBC W/AUTO DIFF WBC: CPT

## 2018-02-26 PROCEDURE — 700111 HCHG RX REV CODE 636 W/ 250 OVERRIDE (IP)

## 2018-02-26 PROCEDURE — A6223 GAUZE >16<=48 NO W/SAL W/O B: HCPCS | Performed by: PLASTIC SURGERY

## 2018-02-26 PROCEDURE — 90935 HEMODIALYSIS ONE EVALUATION: CPT

## 2018-02-26 PROCEDURE — 160041 HCHG SURGERY MINUTES - EA ADDL 1 MIN LEVEL 4: Performed by: PLASTIC SURGERY

## 2018-02-26 PROCEDURE — 84132 ASSAY OF SERUM POTASSIUM: CPT

## 2018-02-26 PROCEDURE — 0HR7X74 REPLACEMENT OF ABDOMEN SKIN WITH AUTOLOGOUS TISSUE SUBSTITUTE, PARTIAL THICKNESS, EXTERNAL APPROACH: ICD-10-PCS | Performed by: PLASTIC SURGERY

## 2018-02-26 PROCEDURE — 700101 HCHG RX REV CODE 250

## 2018-02-26 PROCEDURE — 160035 HCHG PACU - 1ST 60 MINS PHASE I: Performed by: PLASTIC SURGERY

## 2018-02-26 RX ORDER — EPINEPHRINE 1 MG/ML(1)
AMPUL (ML) INJECTION
Status: DISCONTINUED | OUTPATIENT
Start: 2018-02-26 | End: 2018-02-26 | Stop reason: HOSPADM

## 2018-02-26 RX ORDER — MAGNESIUM CARB/ALUMINUM HYDROX 105-160MG
TABLET,CHEWABLE ORAL
Status: DISCONTINUED | OUTPATIENT
Start: 2018-02-26 | End: 2018-02-26 | Stop reason: HOSPADM

## 2018-02-26 RX ORDER — HEPARIN SODIUM 1000 [USP'U]/ML
INJECTION, SOLUTION INTRAVENOUS; SUBCUTANEOUS
Status: COMPLETED
Start: 2018-02-26 | End: 2018-02-26

## 2018-02-26 RX ORDER — OXYCODONE HYDROCHLORIDE AND ACETAMINOPHEN 5; 325 MG/1; MG/1
TABLET ORAL
Status: COMPLETED
Start: 2018-02-26 | End: 2018-02-26

## 2018-02-26 RX ADMIN — HYDROMORPHONE HYDROCHLORIDE 0.5 MG: 10 INJECTION, SOLUTION INTRAMUSCULAR; INTRAVENOUS; SUBCUTANEOUS at 17:30

## 2018-02-26 RX ADMIN — GABAPENTIN 600 MG: 300 CAPSULE ORAL at 19:44

## 2018-02-26 RX ADMIN — FENTANYL CITRATE 50 MCG: 50 INJECTION, SOLUTION INTRAMUSCULAR; INTRAVENOUS at 19:43

## 2018-02-26 RX ADMIN — PREDNISONE 20 MG: 20 TABLET ORAL at 11:54

## 2018-02-26 RX ADMIN — OXYCODONE HYDROCHLORIDE 10 MG: 10 TABLET ORAL at 19:45

## 2018-02-26 RX ADMIN — FENTANYL CITRATE 50 MCG: 50 INJECTION, SOLUTION INTRAMUSCULAR; INTRAVENOUS at 17:30

## 2018-02-26 RX ADMIN — STANDARDIZED SENNA CONCENTRATE AND DOCUSATE SODIUM 1 TABLET: 8.6; 5 TABLET, FILM COATED ORAL at 19:45

## 2018-02-26 RX ADMIN — GABAPENTIN 600 MG: 300 CAPSULE ORAL at 11:54

## 2018-02-26 RX ADMIN — CALCITRIOL 0.75 MCG: 0.25 CAPSULE, LIQUID FILLED ORAL at 19:45

## 2018-02-26 RX ADMIN — TAZOBACTAM SODIUM AND PIPERACILLIN SODIUM 4.5 G: 500; 4 INJECTION, SOLUTION INTRAVENOUS at 03:58

## 2018-02-26 RX ADMIN — OXYCODONE HYDROCHLORIDE 10 MG: 10 TABLET ORAL at 03:41

## 2018-02-26 RX ADMIN — FENTANYL CITRATE 50 MCG: 50 INJECTION, SOLUTION INTRAMUSCULAR; INTRAVENOUS at 11:50

## 2018-02-26 RX ADMIN — FENTANYL CITRATE 50 MCG: 50 INJECTION, SOLUTION INTRAMUSCULAR; INTRAVENOUS at 17:40

## 2018-02-26 RX ADMIN — OXYCODONE HYDROCHLORIDE 10 MG: 10 TABLET ORAL at 22:43

## 2018-02-26 RX ADMIN — OXYCODONE AND ACETAMINOPHEN 2 TABLET: 5; 325 TABLET ORAL at 17:10

## 2018-02-26 RX ADMIN — HYDROMORPHONE HYDROCHLORIDE 0.5 MG: 10 INJECTION, SOLUTION INTRAMUSCULAR; INTRAVENOUS; SUBCUTANEOUS at 17:15

## 2018-02-26 RX ADMIN — FENTANYL CITRATE 50 MCG: 50 INJECTION, SOLUTION INTRAMUSCULAR; INTRAVENOUS at 00:43

## 2018-02-26 RX ADMIN — FENTANYL CITRATE 50 MCG: 50 INJECTION, SOLUTION INTRAMUSCULAR; INTRAVENOUS at 21:47

## 2018-02-26 RX ADMIN — FENTANYL CITRATE 50 MCG: 50 INJECTION, SOLUTION INTRAMUSCULAR; INTRAVENOUS at 13:53

## 2018-02-26 RX ADMIN — OXYCODONE HYDROCHLORIDE 10 MG: 10 TABLET ORAL at 06:45

## 2018-02-26 RX ADMIN — GABAPENTIN 600 MG: 300 CAPSULE ORAL at 14:04

## 2018-02-26 RX ADMIN — LEVETIRACETAM 250 MG: 250 TABLET ORAL at 19:45

## 2018-02-26 RX ADMIN — SEVELAMER CARBONATE 3200 MG: 800 TABLET, FILM COATED ORAL at 22:44

## 2018-02-26 RX ADMIN — HEPARIN SODIUM 3900 UNITS: 1000 INJECTION, SOLUTION INTRAVENOUS; SUBCUTANEOUS at 10:20

## 2018-02-26 RX ADMIN — CINACALCET HYDROCHLORIDE 60 MG: 30 TABLET, COATED ORAL at 19:45

## 2018-02-26 RX ADMIN — FENTANYL CITRATE 75 MCG: 50 INJECTION, SOLUTION INTRAMUSCULAR; INTRAVENOUS at 17:15

## 2018-02-26 RX ADMIN — HYDROMORPHONE HYDROCHLORIDE 0.5 MG: 10 INJECTION, SOLUTION INTRAMUSCULAR; INTRAVENOUS; SUBCUTANEOUS at 17:20

## 2018-02-26 RX ADMIN — FENTANYL CITRATE 50 MCG: 50 INJECTION, SOLUTION INTRAMUSCULAR; INTRAVENOUS at 05:36

## 2018-02-26 RX ADMIN — OXYCODONE HYDROCHLORIDE 10 MG: 10 TABLET ORAL at 09:53

## 2018-02-26 RX ADMIN — LEVETIRACETAM 250 MG: 250 TABLET ORAL at 11:56

## 2018-02-26 RX ADMIN — ERYTHROPOIETIN 10000 UNITS: 10000 INJECTION, SOLUTION INTRAVENOUS; SUBCUTANEOUS at 10:17

## 2018-02-26 RX ADMIN — HYDROMORPHONE HYDROCHLORIDE 0.5 MG: 10 INJECTION, SOLUTION INTRAMUSCULAR; INTRAVENOUS; SUBCUTANEOUS at 17:10

## 2018-02-26 RX ADMIN — FENTANYL CITRATE 75 MCG: 50 INJECTION, SOLUTION INTRAMUSCULAR; INTRAVENOUS at 17:20

## 2018-02-26 RX ADMIN — SODIUM CHLORIDE: 9 INJECTION, SOLUTION INTRAVENOUS at 06:12

## 2018-02-26 ASSESSMENT — ENCOUNTER SYMPTOMS
HEADACHES: 0
DOUBLE VISION: 0
SENSORY CHANGE: 0
DIZZINESS: 0
ABDOMINAL PAIN: 0
DIARRHEA: 0
SHORTNESS OF BREATH: 0
COUGH: 0
VOMITING: 0
MYALGIAS: 0
FOCAL WEAKNESS: 0
CHILLS: 0
NAUSEA: 0
FEVER: 0
ABDOMINAL PAIN: 1
BLURRED VISION: 0

## 2018-02-26 ASSESSMENT — PAIN SCALES - GENERAL
PAINLEVEL_OUTOF10: 9
PAINLEVEL_OUTOF10: 10
PAINLEVEL_OUTOF10: 6

## 2018-02-26 NOTE — PROGRESS NOTES
HEMODIALYSIS NOTES:     HD today x 3 hours per Dr. Najjar . Initiated at 0719 and ended at 1019. Patient verbalized headache one hour prior to end. Primary RN gave pain medication. Patient tolerated treatment. See paper flowsheet for details.    UF Net: 1,500 mL    R femoral catheter intact and patent with good flow on lines reversed during dialysis. No s/sx of infection, no redness nor bleeding noted on the CVC site. CVC dressing clean, dry and intact. Blood returned and CVC port flushed with NS and Heparin 1000 units/mL used  to lock catheter given per designated amount. CVC port clamped and capped.     Report given to SHARYN Veliz RN.

## 2018-02-26 NOTE — CARE PLAN
Problem: Infection  Goal: Will remain free from infection  Outcome: PROGRESSING AS EXPECTED  Continue IVAB as ordered  Dressing change completed as ordered  VSS  Continue to monitor labs

## 2018-02-26 NOTE — PROGRESS NOTES
Nephrology Progress Note, Adult, Complex               Author: Fadi Najjar Date & Time created: 2/26/2018  12:20 PM     Interval History:  36 y/o male with ESRD/HHD admitted with abdominal wound infection, bleeding from varices -treated surgically  Doing much better.  Seen and examined while getting HD.  Plan for surgery this afternoon  Review of Systems:  Review of Systems   Constitutional: Positive for malaise/fatigue. Negative for chills and fever.   Cardiovascular: Negative for chest pain and leg swelling.   Gastrointestinal: Negative for abdominal pain, diarrhea, nausea and vomiting.   Genitourinary: Negative for dysuria.   Neurological: Negative for dizziness, sensory change, focal weakness and headaches.       Physical Exam:  Physical Exam   Constitutional: He is oriented to person, place, and time.   HENT:   Nose: Nose normal.   Eyes: Conjunctivae are normal. No scleral icterus.   Cardiovascular: Normal rate and regular rhythm.    Pulmonary/Chest: Effort normal and breath sounds normal. No respiratory distress.   Musculoskeletal: He exhibits no edema.   Neurological: He is alert and oriented to person, place, and time.   Nursing note and vitals reviewed.      Labs:        Invalid input(s): [id number]  Recent Labs      02/24/18 0042   TROPONINI  <0.01     Recent Labs      02/24/18   0042  02/25/18   0330   SODIUM  135  134*   POTASSIUM  4.0  4.1   CHLORIDE  98  98   CO2  25  25   BUN  28*  46*   CREATININE  3.57*  5.40*   CALCIUM  7.4*  7.8*     Recent Labs      02/24/18   0042  02/25/18   0330   ALTSGPT  10   --    ASTSGOT  11*   --    ALKPHOSPHAT  135*   --    TBILIRUBIN  0.7   --    GLUCOSE  105*  106*     Recent Labs      02/24/18   0042  02/24/18   0946  02/25/18   0330  02/26/18   0710   RBC  2.77*  2.94*   --   2.90*   HEMOGLOBIN  7.7*  8.1*   --   8.0*   HEMATOCRIT  26.2*  27.6*   --   27.5*   PLATELETCT  208  209   --   251   PROTHROMBTM  15.7*   --   15.2*   --    INR  1.28*   --   1.23*   --       Recent Labs      18   0042  18   0946  18   0710   WBC  6.9  6.0  7.7   NEUTSPOLYS   --    --   68.00   LYMPHOCYTES   --    --   18.30*   MONOCYTES   --    --   8.00   EOSINOPHILS   --    --   1.60   BASOPHILS   --    --   0.70   ASTSGOT  11*   --    --    ALTSGPT  10   --    --    ALKPHOSPHAT  135*   --    --    TBILIRUBIN  0.7   --    --            Hemodynamics:  Temp (24hrs), Av.7 °C (98.1 °F), Min:36.6 °C (97.8 °F), Max:36.9 °C (98.5 °F)  Temperature: 36.7 °C (98 °F)  Pulse  Av.7  Min: 46  Max: 157   Blood Pressure: (!) 174/66    Respiratory:    Respiration: 19, Pulse Oximetry: 95 %        RUL Breath Sounds: Clear, RML Breath Sounds: Clear, RLL Breath Sounds: Clear, ANGEL Breath Sounds: Clear, LLL Breath Sounds: Diminished  Fluids:    Intake/Output Summary (Last 24 hours) at 18 1220  Last data filed at 18 1020   Gross per 24 hour   Intake             1300 ml   Output             4000 ml   Net            -2700 ml        GI/Nutrition:  Orders Placed This Encounter   Procedures   • DIET NPO     Standing Status:   Standing     Number of Occurrences:   8     Order Specific Question:   Restrict to:     Answer:   Sips with Medications [3]     Medical Decision Making, by Problem:  Active Hospital Problems    Diagnosis   • Hypertension [I10]   • ESRD (end stage renal disease) (CMS-[facility]) [N18.6]   • 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]   • Abdominal varicosities [I86.8]   • Shock (CMS-[facility]) [R57.9]   • Hemorrhagic disorder due to circulating anticoagulants (CMS-[facility]) [D68.318]   • Sleep apnea [G47.30]       Quality-Core Measures   Reviewed items::  Labs reviewed and Medications reviewed  Central line in place:  Dialysis    Assessment and plan:    1.ESRD/HHD:HD as needed  2.Hypotension/sepsis  3.Electrolytes: mild hyponatremia - normalized-to monitor                          Hyperkalemia -corrected with HD  4.Anemia: Hb better -on Epogen  5.Volume:UF with HD as BP tolerates  Recs:   HD  as needed  All meds to renal doses

## 2018-02-26 NOTE — PROGRESS NOTES
"Assumed care of patient from dialysis nurse.  Patient is alert and oriented times 4, states pain of 7/10, medicated per MAR.  VSS BP (!) 174/66   Pulse 94   Temp 36.7 °C (98 °F)   Resp 19   Ht 1.626 m (5' 4\")   Wt 77.6 kg (171 lb 1.2 oz)   SpO2 95%   BMI 29.37 kg/m²   PIV in the RAC, patent and saline locked.  Triple Lumen IJ to the right, patent and running NS TKO in all lumens.  Dressing to the abdomen, CDI.  Patient had dialysis today, 1.5L out.  Currently NPO awaiting surgery today.  Anuric, bowel movement yesterday.  POC discussed for the day, bed is locked and in the lowest position, call light is within reach.  All needs are met at this time.  Hourly rounding is in place.  "

## 2018-02-26 NOTE — PROGRESS NOTES
Patient in good spirits this pm, looking forward to OR tmw, NPO at Mn, dressing changed, pt tolerated it well. Premedicated with PO and IV analgesic as ordered. VSS, IVAB continued. Will ready for OR tmw.

## 2018-02-26 NOTE — PROGRESS NOTES
Patient refuses labs this am, states he is a first case this am for dialysis and they always draw his labs after and would like for the nurse at that time to draw all labs for today then.

## 2018-02-26 NOTE — CARE PLAN
Problem: Safety  Goal: Will remain free from injury  Pt up with standby assist. Call light within reach. Calls for assistance as needed.     Problem: Psychosocial Needs:  Goal: Level of anxiety will decrease  Pain controlled with oral and pain management,     Problem: Mobility  Goal: Risk for activity intolerance will decrease  Pt ambulates in hallway with independently.

## 2018-02-26 NOTE — PROGRESS NOTES
"Blood pressure (!) 174/66, pulse 94, temperature 36.7 °C (98 °F), resp. rate 19, height 1.626 m (5' 4\"), weight 77.6 kg (171 lb 1.2 oz), SpO2 95 %.    I/O last 3 completed shifts:  In: 940 [P.O.:200; I.V.:240; Dialysis:500]  Out: 2000 [Dialysis:2000]  To OR today for STSG  "

## 2018-02-26 NOTE — CARE PLAN
Problem: Pain Management  Goal: Pain level will decrease to patient's comfort goal  Outcome: PROGRESSING AS EXPECTED  Medicated per MAR for pain of 7/10    Problem: Mobility  Goal: Risk for activity intolerance will decrease  Outcome: PROGRESSING AS EXPECTED  Encouraged patient to ambulate as tolerated.

## 2018-02-27 LAB
ANION GAP SERPL CALC-SCNC: 13 MMOL/L (ref 0–11.9)
ANISOCYTOSIS BLD QL SMEAR: ABNORMAL
BASOPHILS # BLD AUTO: 0.3 % (ref 0–1.8)
BASOPHILS # BLD: 0.03 K/UL (ref 0–0.12)
BUN SERPL-MCNC: 28 MG/DL (ref 8–22)
CALCIUM SERPL-MCNC: 6.7 MG/DL (ref 8.5–10.5)
CHLORIDE SERPL-SCNC: 100 MMOL/L (ref 96–112)
CO2 SERPL-SCNC: 25 MMOL/L (ref 20–33)
COMMENT 1642: NORMAL
CREAT SERPL-MCNC: 4.16 MG/DL (ref 0.5–1.4)
EOSINOPHIL # BLD AUTO: 0 K/UL (ref 0–0.51)
EOSINOPHIL NFR BLD: 0 % (ref 0–6.9)
ERYTHROCYTE [DISTWIDTH] IN BLOOD BY AUTOMATED COUNT: 59.1 FL (ref 35.9–50)
GLUCOSE SERPL-MCNC: 100 MG/DL (ref 65–99)
HCT VFR BLD AUTO: 27.7 % (ref 42–52)
HGB BLD-MCNC: 8.2 G/DL (ref 14–18)
IMM GRANULOCYTES # BLD AUTO: 0.43 K/UL (ref 0–0.11)
IMM GRANULOCYTES NFR BLD AUTO: 3.7 % (ref 0–0.9)
INR PPP: 1.21 (ref 0.87–1.13)
LYMPHOCYTES # BLD AUTO: 0.71 K/UL (ref 1–4.8)
LYMPHOCYTES NFR BLD: 6.2 % (ref 22–41)
MCH RBC QN AUTO: 28.4 PG (ref 27–33)
MCHC RBC AUTO-ENTMCNC: 29.6 G/DL (ref 33.7–35.3)
MCV RBC AUTO: 95.8 FL (ref 81.4–97.8)
MICROCYTES BLD QL SMEAR: ABNORMAL
MONOCYTES # BLD AUTO: 0.77 K/UL (ref 0–0.85)
MONOCYTES NFR BLD AUTO: 6.7 % (ref 0–13.4)
MORPHOLOGY BLD-IMP: NORMAL
NEUTROPHILS # BLD AUTO: 9.57 K/UL (ref 1.82–7.42)
NEUTROPHILS NFR BLD: 83.1 % (ref 44–72)
NRBC # BLD AUTO: 0 K/UL
NRBC BLD-RTO: 0 /100 WBC
PLATELET # BLD AUTO: 282 K/UL (ref 164–446)
PLATELET BLD QL SMEAR: NORMAL
PMV BLD AUTO: 10.1 FL (ref 9–12.9)
POTASSIUM SERPL-SCNC: 4.5 MMOL/L (ref 3.6–5.5)
PROTHROMBIN TIME: 15 SEC (ref 12–14.6)
RBC # BLD AUTO: 2.89 M/UL (ref 4.7–6.1)
RBC BLD AUTO: PRESENT
SODIUM SERPL-SCNC: 138 MMOL/L (ref 135–145)
WBC # BLD AUTO: 11.5 K/UL (ref 4.8–10.8)

## 2018-02-27 PROCEDURE — 700111 HCHG RX REV CODE 636 W/ 250 OVERRIDE (IP): Performed by: INTERNAL MEDICINE

## 2018-02-27 PROCEDURE — A9270 NON-COVERED ITEM OR SERVICE: HCPCS | Performed by: INTERNAL MEDICINE

## 2018-02-27 PROCEDURE — A9270 NON-COVERED ITEM OR SERVICE: HCPCS | Performed by: SURGERY

## 2018-02-27 PROCEDURE — 700111 HCHG RX REV CODE 636 W/ 250 OVERRIDE (IP): Performed by: HOSPITALIST

## 2018-02-27 PROCEDURE — 700102 HCHG RX REV CODE 250 W/ 637 OVERRIDE(OP): Performed by: INTERNAL MEDICINE

## 2018-02-27 PROCEDURE — 700102 HCHG RX REV CODE 250 W/ 637 OVERRIDE(OP): Performed by: SURGERY

## 2018-02-27 PROCEDURE — 80048 BASIC METABOLIC PNL TOTAL CA: CPT

## 2018-02-27 PROCEDURE — 85025 COMPLETE CBC W/AUTO DIFF WBC: CPT

## 2018-02-27 PROCEDURE — 770001 HCHG ROOM/CARE - MED/SURG/GYN PRIV*

## 2018-02-27 PROCEDURE — 700105 HCHG RX REV CODE 258: Performed by: INTERNAL MEDICINE

## 2018-02-27 PROCEDURE — 99232 SBSQ HOSP IP/OBS MODERATE 35: CPT | Performed by: HOSPITALIST

## 2018-02-27 PROCEDURE — 85610 PROTHROMBIN TIME: CPT

## 2018-02-27 RX ORDER — BACITRACIN ZINC 500 [USP'U]/G
OINTMENT TOPICAL DAILY
Status: DISCONTINUED | OUTPATIENT
Start: 2018-02-27 | End: 2018-03-02 | Stop reason: HOSPADM

## 2018-02-27 RX ADMIN — TAZOBACTAM SODIUM AND PIPERACILLIN SODIUM 4.5 G: 500; 4 INJECTION, SOLUTION INTRAVENOUS at 04:21

## 2018-02-27 RX ADMIN — OXYCODONE HYDROCHLORIDE 10 MG: 10 TABLET ORAL at 16:52

## 2018-02-27 RX ADMIN — OXYCODONE HYDROCHLORIDE 10 MG: 10 TABLET ORAL at 09:55

## 2018-02-27 RX ADMIN — BACITRACIN ZINC: 500 OINTMENT TOPICAL at 17:21

## 2018-02-27 RX ADMIN — LEVETIRACETAM 250 MG: 250 TABLET ORAL at 19:38

## 2018-02-27 RX ADMIN — OXYCODONE HYDROCHLORIDE 10 MG: 10 TABLET ORAL at 13:21

## 2018-02-27 RX ADMIN — SODIUM CHLORIDE: 9 INJECTION, SOLUTION INTRAVENOUS at 21:30

## 2018-02-27 RX ADMIN — FENTANYL CITRATE 50 MCG: 50 INJECTION, SOLUTION INTRAMUSCULAR; INTRAVENOUS at 07:30

## 2018-02-27 RX ADMIN — GABAPENTIN 600 MG: 300 CAPSULE ORAL at 07:35

## 2018-02-27 RX ADMIN — SEVELAMER CARBONATE 3200 MG: 800 TABLET, FILM COATED ORAL at 17:26

## 2018-02-27 RX ADMIN — PREDNISONE 20 MG: 20 TABLET ORAL at 07:36

## 2018-02-27 RX ADMIN — GABAPENTIN 600 MG: 300 CAPSULE ORAL at 19:38

## 2018-02-27 RX ADMIN — FENTANYL CITRATE 50 MCG: 50 INJECTION, SOLUTION INTRAMUSCULAR; INTRAVENOUS at 21:31

## 2018-02-27 RX ADMIN — FENTANYL CITRATE 50 MCG: 50 INJECTION, SOLUTION INTRAMUSCULAR; INTRAVENOUS at 18:24

## 2018-02-27 RX ADMIN — SEVELAMER CARBONATE 3200 MG: 800 TABLET, FILM COATED ORAL at 07:35

## 2018-02-27 RX ADMIN — CALCITRIOL 0.75 MCG: 0.25 CAPSULE, LIQUID FILLED ORAL at 19:38

## 2018-02-27 RX ADMIN — FENTANYL CITRATE 50 MCG: 50 INJECTION, SOLUTION INTRAMUSCULAR; INTRAVENOUS at 10:34

## 2018-02-27 RX ADMIN — FENTANYL CITRATE 50 MCG: 50 INJECTION, SOLUTION INTRAMUSCULAR; INTRAVENOUS at 04:20

## 2018-02-27 RX ADMIN — OXYCODONE HYDROCHLORIDE 10 MG: 10 TABLET ORAL at 06:40

## 2018-02-27 RX ADMIN — FENTANYL CITRATE 50 MCG: 50 INJECTION, SOLUTION INTRAMUSCULAR; INTRAVENOUS at 15:03

## 2018-02-27 RX ADMIN — GABAPENTIN 600 MG: 300 CAPSULE ORAL at 15:05

## 2018-02-27 RX ADMIN — TAZOBACTAM SODIUM AND PIPERACILLIN SODIUM 4.5 G: 500; 4 INJECTION, SOLUTION INTRAVENOUS at 16:13

## 2018-02-27 RX ADMIN — OXYCODONE HYDROCHLORIDE 10 MG: 10 TABLET ORAL at 02:50

## 2018-02-27 RX ADMIN — CINACALCET HYDROCHLORIDE 60 MG: 30 TABLET, COATED ORAL at 19:38

## 2018-02-27 RX ADMIN — OXYCODONE HYDROCHLORIDE 10 MG: 10 TABLET ORAL at 20:50

## 2018-02-27 RX ADMIN — FENTANYL CITRATE 50 MCG: 50 INJECTION, SOLUTION INTRAMUSCULAR; INTRAVENOUS at 01:11

## 2018-02-27 RX ADMIN — LEVETIRACETAM 250 MG: 250 TABLET ORAL at 07:35

## 2018-02-27 RX ADMIN — SEVELAMER CARBONATE 3200 MG: 800 TABLET, FILM COATED ORAL at 11:54

## 2018-02-27 ASSESSMENT — PAIN SCALES - GENERAL
PAINLEVEL_OUTOF10: 8
PAINLEVEL_OUTOF10: 5
PAINLEVEL_OUTOF10: 7
PAINLEVEL_OUTOF10: 4
PAINLEVEL_OUTOF10: 5
PAINLEVEL_OUTOF10: 7
PAINLEVEL_OUTOF10: 7

## 2018-02-27 ASSESSMENT — ENCOUNTER SYMPTOMS
PALPITATIONS: 0
SENSORY CHANGE: 0
VOMITING: 0
HEADACHES: 0
COUGH: 0
DOUBLE VISION: 0
BLURRED VISION: 0
DEPRESSION: 0
BACK PAIN: 1
NAUSEA: 0
DIARRHEA: 0
FOCAL WEAKNESS: 0
MYALGIAS: 1
ABDOMINAL PAIN: 1
FEVER: 0
SHORTNESS OF BREATH: 0
DIZZINESS: 0
CHILLS: 0

## 2018-02-27 ASSESSMENT — LIFESTYLE VARIABLES: SUBSTANCE_ABUSE: 0

## 2018-02-27 NOTE — PROGRESS NOTES
Vascular    VSS afeb    Patient being transported to OR for STSG to abdominal wall.  Right catheter looks OK on the outside and left AVG has an excellent thrill.  Apparently Dr. Hopkins was waiting for eschar to fall off the lateral loop of the graft, but today, it almost looks like some dried on gauze, stuck in the base of the wound.     I asked Denis to see if Dr. Rosenbaum would be willing to look at the wound and clean it up.  If memory serves, I think he worked on the graft at some point to salvage the prosthetic lateral aspect of the graft.   Will check in again.

## 2018-02-27 NOTE — PROGRESS NOTES
"Blood pressure 153/92, pulse 78, temperature 36.4 °C (97.6 °F), resp. rate 18, height 1.626 m (5' 4\"), weight 77.6 kg (171 lb 1.2 oz), SpO2 100 %.    I/O last 3 completed shifts:  In: 1390 [P.O.:680; I.V.:210; Dialysis:500]  Out: 2040 [Dialysis:2000]  Doing as expected  Will change dressing THURSDAY  If all looks well, should be able to be DCd at that time  "

## 2018-02-27 NOTE — PROGRESS NOTES
Patient in good spirits this pm, sitting up in bed eating Germán Mccauley post op without N/V. Rating pain 9/10 to RLQ and burning to right upper thigh. Medicating with IV and PO as ordered with some relief.  abd dressing CDI binder on, RLE donor site with moderate amt of SS drainage under tegaderm. VSS, IVABX continued as ordered. Will continue to monitor

## 2018-02-27 NOTE — PROGRESS NOTES
[facility]    Date of Service: 2018    Chief Complaint  37 y.o. male admitted 2018 with bleeding abdominal wall varices.    Interval Problem Update  Had HD this morning, feeling ok no complains, waiting surgery.      Consultants/Specialty  Intensivist  Plastic surgery  Nephrology  Infectious disease    Disposition  Tbd.         Review of Systems   Constitutional: Negative for fever.   HENT: Negative for hearing loss.    Eyes: Negative for blurred vision and double vision.   Respiratory: Negative for cough.    Gastrointestinal: Positive for abdominal pain. Negative for vomiting.   Musculoskeletal: Negative for myalgias.   Neurological: Negative for dizziness.      Physical Exam  Laboratory/Imaging   Hemodynamics  Temp (24hrs), Av.6 °C (97.9 °F), Min:36.3 °C (97.4 °F), Max:36.9 °C (98.5 °F)   Temperature: 36.7 °C (98 °F)  Pulse  Av.9  Min: 46  Max: 157 Heart Rate (Monitored): 89  Blood Pressure: 153/51     Respiratory      Respiration: 17, Pulse Oximetry: 97 %        RUL Breath Sounds: Clear, RML Breath Sounds: Clear, RLL Breath Sounds: Clear, ANGEL Breath Sounds: Clear, LLL Breath Sounds: Diminished    Fluids    Intake/Output Summary (Last 24 hours) at 18 1657  Last data filed at 18 1200   Gross per 24 hour   Intake              710 ml   Output             2000 ml   Net            -1290 ml       Nutrition  Orders Placed This Encounter   Procedures   • DIET NPO     Standing Status:   Standing     Number of Occurrences:   8     Order Specific Question:   Restrict to:     Answer:   Sips with Medications [3]     Physical Exam   Constitutional: He is oriented to person, place, and time.  Non-toxic appearance. No distress.   Neck: No edema and no erythema present. No thyromegaly present.   Cardiovascular: Normal rate and regular rhythm.    No murmur heard.  Pulmonary/Chest: Effort normal and breath sounds normal. No respiratory distress. He has no wheezes.   Abdominal:  Soft. Bowel sounds are normal. He exhibits no distension. There is tenderness.   Right sided wound healing, no discharge.    Musculoskeletal: He exhibits no edema.   Neurological: He is alert and oriented to person, place, and time.   Skin: No erythema.   Psychiatric: He has a normal mood and affect.   Nursing note and vitals reviewed.      Recent Labs      02/24/18   0042  02/24/18   0946  02/26/18   0710   WBC  6.9  6.0  7.7   RBC  2.77*  2.94*  2.90*   HEMOGLOBIN  7.7*  8.1*  8.0*   HEMATOCRIT  26.2*  27.6*  27.5*   MCV  94.6  93.9  94.8   MCH  27.8  27.6  27.6   MCHC  29.4*  29.3*  29.1*   RDW  56.9*  56.9*  58.1*   PLATELETCT  208  209  251   MPV  9.8  9.3  10.1     Recent Labs      02/24/18   0042  02/25/18   0330  02/26/18   1351   SODIUM  135  134*   --    POTASSIUM  4.0  4.1  3.8   CHLORIDE  98  98   --    CO2  25  25   --    GLUCOSE  105*  106*   --    BUN  28*  46*   --    CREATININE  3.57*  5.40*   --    CALCIUM  7.4*  7.8*   --      Recent Labs      02/24/18   0042  02/25/18   0330  02/26/18   1359   INR  1.28*  1.23*  1.23*                  Assessment/Plan     Shock (CMS-HCC)   Assessment & Plan    -Resolved        Abdominal varicosities   Assessment & Plan    - Chronic issue, acute bleeding  - Now status post surgery  - No sign of gross bleeding  - Dr. Rosenbaum for possible skin graft on today        Bacteremia- (present on admission)   Assessment & Plan    - Klebsiella per blood culture ×2  abdominal wound cx + for enterococcus, on vanc and zosyn, ID consulted. Repeated cx neg so far.         ESRD (end stage renal disease) (CMS-HCC)- (present on admission)   Assessment & Plan    - Patient generally gets hemodialysis 5 times a week, hemodialysis per nephrology        Thrombocytopenia (CMS-HCC)   Assessment & Plan    Resolved.         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        Hemorrhagic disorder due to circulating  anticoagulants (CMS-HCC)   Assessment & Plan    - Is on Coumadin for occluded IVC, currently being held due to bleeding.         Hyperkalemia- (present on admission)   Assessment & Plan    -Resolved with hemodialysis.          Seizure disorder (CMS-HCC)- (present on admission)   Assessment & Plan    - Continue Keppra, no sign of seizure activity.        Normocytic anemia- (present on admission)   Assessment & Plan    - No longer any sign of gross bleeding, did have significant bleeding initially  - Repeat CBC in the morning, hb 8.0  stable.        Sleep apnea- (present on admission)   Assessment & Plan    - Patient uses BiPAP at night, [facility] with [facility].          Quality-Core Measures   Reviewed items::  Labs reviewed and Medications reviewed  DVT prophylaxis pharmacological::  Contraindicated - High bleeding risk  DVT prophylaxis - mechanical:  SCDs  Antibiotics:  Treating active infection/contamination beyond 24 hours perioperative coverage

## 2018-02-27 NOTE — OP REPORT
DATE OF SERVICE:  02/26/2018    PREOPERATIVE DIAGNOSIS:  Complex wound of abdomen.    POSTOPERATIVE DIAGNOSIS:  Complex wound of abdomen.    PROCEDURE:  Split skin graft to abdominal wound.    SURGEON:  Samson Rosenbaum MD.    ANESTHESIOLOGIST:  Eliseo Malhotra MD    INDICATION FOR PROCEDURE:  The patient is a 37-year-old white male who has had   a wound over his abdomen for quite some time now, we have been monitoring   this, hoping this was going to heal by secondary intention.  The patient has   numerous other medical problems including an inferior vena caval occlusion   which has resulted in him having extremely large abdominal varices.  These   have been prone to bleeding and we have been reluctant to do anything that   might cause extensive bleeding, which he has had numerous times in the past;   however, most recently, the patient had been admitted for bleeding and the   wound itself has progressed to the point where I feel it would tolerate a   split skin graft.  I talked to the patient about this at length and we elected   to proceed with split skin grafting.    DESCRIPTION OF PROCEDURE:  Patient was marked preoperatively in the holding   area, taken to the operating room and under general anesthesia was prepped and   draped over the abdomen as well as the right thigh.  I began by debriding the   wound sharply with curette.  Some other varices were noted; however, he did   not have an extensive bleeding.  Any bleeding he had was controlled with   judicious electrocautery and I felt that the wound was cleaned down to   granulating tissue and would likely support a graft.  At this point, a split   skin graft was taken from the patient's right thigh at 12/1000 of an inch   thickness.  This was meshed in 1.5:1 ratio.  This was placed over the wound   and secured using running sutures of 0 chromic.  A bolster was then placed.    This was a bolster using significant amounts of Xeroform as well as a Kerlix   gauze  that was covered with Tegaderm.  The donor site was covered with   epinephrine soaked _____ and Telfa covered with Tegaderm as well.  The patient   tolerated the procedure well and was taken to the recovery room in good   condition.  Needle, sponge, and instrument counts were correct.       ____________________________________     MD YOVANI LYNNE / RIAZ    DD:  02/26/2018 17:15:03  DT:  02/26/2018 18:31:19    D#:  [id number]  Job#:  [id number]

## 2018-02-27 NOTE — OR SURGEON
Immediate Post OP Note    PreOp Diagnosis: Complex wound Abdomen    PostOp Diagnosis: same    Procedure(s):  SPLIT THICKNESS SKIN GRAFT- TO ABDOMEN - Wound Class: Dirty or Infected    Surgeon(s):  Samson Rosenbaum Jr., M.D.    Anesthesiologist/Type of Anesthesia:  Anesthesiologist: Eliseo Malhotra M.D./General    Surgical Staff:  Circulator: Barbara Reeder R.N.  Scrub Person: Catarino Marquez; Eva Castro    Specimens:  * No specimens in log *    Estimated Blood Loss: <25cc    Findings: wound clean    Complications: 0        2/26/2018 5:00 PM Samson Rosenbaum Jr.

## 2018-02-27 NOTE — CARE PLAN
Problem: Safety  Goal: Will remain free from injury  Outcome: PROGRESSING AS EXPECTED  Encouraged patient to use call light as needed    Problem: Pain Management  Goal: Pain level will decrease to patient's comfort goal  Outcome: PROGRESSING AS EXPECTED  Medicated per MAR for pain of 7/10

## 2018-02-27 NOTE — PROGRESS NOTES
isela from Lab called with critical result of 6.7 CA at 0345. Critical lab result read back to ISELA.   Dr. santos notified of critical lab result at 0350.  Critical lab result read back by Dr. deutsch.

## 2018-02-27 NOTE — PROGRESS NOTES
"Assumed care of patient from night shift RN.  Patient is alert and oriented times 4, states pain of 8/10, medicated per MAR.  VSS /64   Pulse 82   Temp 36.4 °C (97.6 °F)   Resp (!) 10   Ht 1.626 m (5' 4\")   Wt 77.6 kg (171 lb 1.2 oz)   SpO2 96%   BMI 29.37 kg/m²   IJ in the R side of neck, triple lumen, patent and running NS TKO in all 3 lumen.  Abdominal wound to the R, dressing CDI.  Graft site R leg, tegaderm in place.  Permacath to the R thigh for dialysis.  Renal diet, tolerating well.  Last BM this AM, patient does not urinate (ESRD)  POC discussed for the day, bed is locked and in the lowest position, call light is within reach.  All needs are met at this time, hourly rounding is in place.  "

## 2018-02-27 NOTE — PROGRESS NOTES
Infectious Disease Progress Note    Author: Elmira Adame M.D. Date & Time of service: 2018  6:44 PM    Chief Complaint:  FU abdominal wall abscess    Interval History:   AF, WBC 9.1, transferred out of the ICU overnight, having lots of pain, plan for skin graft on Monday, tolerating abx without issues   AF, WBC 6.9, sitting in chair, pain controlled if binder not removed  2018-MAXIMUM TEMPERATURE 98.5 wbc 7.7 creatinine 5.4 c/o abdominal pain  Labs Reviewed, Medications Reviewed, Radiology Reviewed and Wound Reviewed.    Review of Systems:  Review of Systems   Constitutional: Negative for chills and fever.   Respiratory: Negative for cough and shortness of breath.    Gastrointestinal: Positive for abdominal pain. Negative for nausea and vomiting.       Hemodynamics:  Temp (24hrs), Av.6 °C (97.8 °F), Min:36 °C (96.8 °F), Max:36.9 °C (98.5 °F)  Temperature: 36 °C (96.8 °F)  Pulse  Av.6  Min: 46  Max: 157Heart Rate (Monitored): 68  Blood Pressure: 153/51, NIBP: (!) 108/36      Physical Exam:  Physical Exam   Constitutional: He is oriented to person, place, and time. He appears well-developed.   Chronically ill     HENT:   Head: Normocephalic and atraumatic.   Eyes: EOM are normal. Pupils are equal, round, and reactive to light.   Neck:   R IJ catheter   Pulmonary/Chest: Effort normal. He has no wheezes. He has no rales.   Abdominal: Soft. There is tenderness.   R sided abd wound dressed +Binder in place    Abdominal varices   Musculoskeletal:   left thigh AV graft   Neurological: He is alert and oriented to person, place, and time.       Meds:    Current Facility-Administered Medications:   •  [DISCONTINUED] fentaNYL **OR** fentaNYL **OR** [DISCONTINUED] fentaNYL  •  fentaNYL  •  levETIRAcetam  •  predniSONE  •  epoetin rj  •  ipratropium-albuterol  •  piperacillin-tazobactam  •  albumin human 25%  •  heparin  •  calcitRIOL  •  cinacalcet  •  gabapentin  •  LORazepam  •  sevelamer  carbonate  •  senna-docusate **AND** polyethylene glycol/lytes **AND** magnesium hydroxide **AND** bisacodyl  •  Respiratory Care per Protocol  •  NS  •  labetalol  •  ondansetron  •  ondansetron  •  promethazine  •  promethazine  •  prochlorperazine  •  acetaminophen  •  Notify provider if pain remains uncontrolled **AND** Use the numeric rating scale (NRS-11) on regular floors and Critical-Care Pain Observation Tool (CPOT) on ICUs/Trauma to assess pain **AND** Pulse Ox (Oximetry) **AND** Pharmacy Consult Request **AND** If patient difficult to arouse and/or has respiratory depression, stop any opiates that are currently infusing and call a Rapid Response. **AND** oxyCODONE immediate-release **AND** oxyCODONE immediate-release **AND** morphine injection  •  [DISCONTINUED] insulin regular **AND** Accu-Chek ACHS **AND** NOTIFY MD and PharmD **AND** glucose 4 g **AND** dextrose 50%    Labs:  Recent Labs      02/24/18 0042 02/24/18   0946  02/26/18   0710   WBC  6.9  6.0  7.7   RBC  2.77*  2.94*  2.90*   HEMOGLOBIN  7.7*  8.1*  8.0*   HEMATOCRIT  26.2*  27.6*  27.5*   MCV  94.6  93.9  94.8   MCH  27.8  27.6  27.6   RDW  56.9*  56.9*  58.1*   PLATELETCT  208  209  251   MPV  9.8  9.3  10.1   NEUTSPOLYS   --    --   68.00   LYMPHOCYTES   --    --   18.30*   MONOCYTES   --    --   8.00   EOSINOPHILS   --    --   1.60   BASOPHILS   --    --   0.70     Recent Labs      02/24/18 0042 02/25/18   0330  02/26/18   1351   SODIUM  135  134*   --    POTASSIUM  4.0  4.1  3.8   CHLORIDE  98  98   --    CO2  25  25   --    GLUCOSE  105*  106*   --    BUN  28*  46*   --      Recent Labs      02/24/18 0042 02/25/18   0330   ALBUMIN  3.2   --    TBILIRUBIN  0.7   --    ALKPHOSPHAT  135*   --    TOTPROTEIN  6.1   --    ALTSGPT  10   --    ASTSGOT  11*   --    CREATININE  3.57*  5.40*       Imaging:  Dx-chest-portable (1 View)    Result Date: 2/20/2018 2/20/2018 4:49 AM HISTORY/REASON FOR EXAM:  Central line placement  TECHNIQUE/EXAM DESCRIPTION AND NUMBER OF VIEWS: Single portable view of the chest. COMPARISON: 9/23/2017 FINDINGS: Right IJ catheter tip projects over the SVC. There are surgical clips projecting over the left upper medial hemithorax. The mediastinal and cardiac silhouette is upper limits of normal. Central vessels are prominent. There is interstitial prominence. There is no significant pleural effusion. There is no visible pneumothorax. There is widening of the superior mediastinum similar to the prior study.     1.  Satisfactory appearance of the right IJ catheter without pneumothorax. 2.  Stable widening of the superior mediastinum possibly due to vascular prominence or lymphadenopathy. 3.  There are changes of interstitial edema.      Micro:  Results     Procedure Component Value Units Date/Time    CULTURE WOUND W/ GRAM STAIN [[id number]]  (Abnormal)  (Susceptibility) Collected:  02/20/18 2230    Order Status:  Completed Specimen:  Wound from Abdominal Updated:  02/24/18 0832     Significant Indicator POS (POS)     Source WND     Site ABDOMINAL     Culture Result Wound -- (A)     Gram Stain Result --     Many WBCs.  Many Gram positive cocci.  Rare Gram negative rods.       Culture Result Wound -- (A)     Pseudomonas aeruginosa  Light growth  P.aeruginosa may develop resistance during prolonged therapy  with all antibiotics. Isolates that are initially susceptible  may become resistant within three to four days after  initiation of therapy. Testing of repeat isolates may be  warranted.       Culture Result Wound -- (A)     Enterococcus faecalis  Light growth  Combination therapy with ampicillin, penicillin, or  vancomycin (for susceptible strains) plus an aminoglycoside  is usually indicated for serious enterococcal infections,  such as endocarditis unless high-level resistance to both  gentamicin and streptomycin is documented; such combinations  are predicted to result in synergistic killing of  the  Enterococcus.       Culture Result Wound -- (A)     Escherichia coli  Rare growth      Narrative:       Collected By:[id number] CAYDEN MENENDEZ    Culture & Susceptibility     ENTEROCOCCUS FAECALIS     Antibiotic Sensitivity Microscan Unit Status    Ampicillin Sensitive <=2 mcg/mL Final    Daptomycin Sensitive <=0.5 mcg/mL Final    Gent Synergy Sensitive <=500 mcg/mL Final    Penicillin Sensitive 2 mcg/mL Final    Vancomycin Sensitive 2 mcg/mL Final              ESCHERICHIA COLI     Antibiotic Sensitivity Microscan Unit Status    Ampicillin Sensitive <=8 mcg/mL Final    Cefepime Sensitive <=8 mcg/mL Final    Cefotaxime Sensitive <=2 mcg/mL Final    Cefotetan Sensitive <=16 mcg/mL Final    Ceftazidime Sensitive <=1 mcg/mL Final    Ceftriaxone Sensitive <=8 mcg/mL Final    Cefuroxime Sensitive <=4 mcg/mL Final    Ciprofloxacin Sensitive <=1 mcg/mL Final    Ertapenem Sensitive <=1 mcg/mL Final    Gentamicin Sensitive <=4 mcg/mL Final    Pip/Tazobactam Sensitive <=16 mcg/mL Final    Tigecycline Sensitive <=2 mcg/mL Final    Tobramycin Sensitive <=4 mcg/mL Final    Trimeth/Sulfa Resistant >2/38 mcg/mL Final              PSEUDOMONAS AERUGINOSA     Antibiotic Sensitivity Microscan Unit Status    Amikacin Sensitive <=16 mcg/mL Final    Cefepime Sensitive <=8 mcg/mL Final    Ceftazidime Sensitive 4 mcg/mL Final    Ciprofloxacin Sensitive <=1 mcg/mL Final    Gentamicin Sensitive <=4 mcg/mL Final    Imipenem Sensitive 2 mcg/mL Final    Meropenem Sensitive <=1 mcg/mL Final    Pip/Tazobactam Sensitive <=16 mcg/mL Final    Tobramycin Sensitive <=4 mcg/mL Final                       BLOOD CULTURE [[id number]]  (Abnormal) Collected:  02/20/18 0525    Order Status:  Completed Specimen:  Blood from Line Updated:  02/22/18 0809     Significant Indicator POS (POS)     Source BLD     Site LINE     Blood Culture Growth detected by Bactec instrument. 02/21/2018  06:52 (A)     Blood Culture -- (A)     Klebsiella pneumoniae  See previous  "culture for sensitivity report.      Narrative:       CALL  Bender  19 tel. [phone],  CALLED  19 tel. [phone] 02/21/2018, 06:55, RB PERF. RESULTS CALLED  TO:[id number]  Collected By:[id number] CAYDEN MENENDEZ  Per Hospital Policy: Only change Specimen Src: to \"Line\" if  specified by physician order.    BLOOD CULTURE [[id number]]  (Abnormal)  (Susceptibility) Collected:  02/20/18 0525    Order Status:  Completed Specimen:  Blood from Line Updated:  02/22/18 0808     Significant Indicator POS (POS)     Source BLD     Site LINE     Blood Culture Growth detected by Bactec instrument. 02/20/2018  18:04 (A)     Blood Culture Klebsiella pneumoniae (A)    Narrative:       Collected By:[id number] CAYDEN MENENDEZ  Per Hospital Policy: Only change Specimen Src: to \"Line\" if  specified by physician order.    Culture & Susceptibility     KLEBSIELLA PNEUMONIAE     Antibiotic Sensitivity Microscan Unit Status    Ampicillin Resistant >16 mcg/mL Final    Cefepime Sensitive <=8 mcg/mL Final    Cefotaxime Sensitive <=2 mcg/mL Final    Cefotetan Sensitive <=16 mcg/mL Final    Ceftazidime Sensitive <=1 mcg/mL Final    Ceftriaxone Sensitive <=8 mcg/mL Final    Cefuroxime Sensitive <=4 mcg/mL Final    Ciprofloxacin Sensitive <=1 mcg/mL Final    Ertapenem Sensitive <=1 mcg/mL Final    Gentamicin Sensitive <=4 mcg/mL Final    Pip/Tazobactam Sensitive <=16 mcg/mL Final    Tigecycline Sensitive <=2 mcg/mL Final    Tobramycin Sensitive <=4 mcg/mL Final    Trimeth/Sulfa Sensitive <=2/38 mcg/mL Final                       BLOOD CULTURE [[id number]] Collected:  02/21/18 1020    Order Status:  Completed Specimen:  Blood from Line Updated:  02/22/18 0652     Significant Indicator NEG     Source BLD     Site Peripheral     Blood Culture --     No Growth    Note: Blood cultures are incubated for 5 days and  are monitored continuously.Positive blood cultures  are called to the RN and reported as soon as  they are identified.      Narrative:       Collected " "By:[id number] TACOS BRASHER  Per Hospital Policy: Only change Specimen Src: to \"Line\" if  specified by physician order.    BLOOD CULTURE [[id number]] Collected:  02/21/18 0955    Order Status:  Completed Specimen:  Blood from Peripheral Updated:  02/22/18 0652     Significant Indicator NEG     Source BLD     Site Dialysis Port     Blood Culture --     No Growth    Note: Blood cultures are incubated for 5 days and  are monitored continuously.Positive blood cultures  are called to the RN and reported as soon as  they are identified.      Narrative:       Collected By:[id number] TACOS BRASHER  Per Hospital Policy: Only change Specimen Src: to \"Line\" if  specified by physician order.    GRAM STAIN [[id number]] Collected:  02/20/18 2230    Order Status:  Completed Specimen:  Wound Updated:  02/21/18 1533     Significant Indicator .     Source WND     Site ABDOMINAL     Gram Stain Result --     Many WBCs.  Many Gram positive cocci.  Rare Gram negative rods.      Narrative:       Collected By:[id number] CAYDEN MENENDEZ    BLOOD CULTURE [[id number]]     Order Status:  Canceled Specimen:  Blood from Peripheral           Assessment:  Active Hospital Problems    Diagnosis   • Abdominal varicosities [I86.8]   • Shock (CMS-[facility]) [R57.9]   • Bacteremia [R78.81]   • ESRD (end stage renal disease) (CMS-[facility]) [N18.6]   • Thrombocytopenia (CMS-[facility]) [D69.6]   • IVC thrombosis (CMS-[facility]) [I82.220]   • Hemorrhagic disorder due to circulating anticoagulants (CMS-[facility]) [D68.318]   • Hyperkalemia [E87.5]   • Seizure disorder (CMS-[facility]) [G40.909]   • Normocytic anemia [D64.9]   • Sleep apnea [G47.30]       Plan:  Klebsiella pneumonia bacteremia  Likely 2/2 abd wound  Bcx 2/20 +klebsiella  Bcx 2/21 - NGTD  Continue zosyn  Worry about the dialysis catheter being infected since the cultures were positive from it      Nonhealing necrotic wound of abdomen.    Wound cx + enterococcus faecalis (amp S), E coli, PSAR  DC vancomycin   Continue " zosyn  Underwent skin graft on 2/26/2018  S/p I&D on 2/19    Abdominal varices  S/p ligation on 2/19    ESRD on HD    Discussed with internal medicine.

## 2018-02-27 NOTE — PROGRESS NOTES
Infectious Disease Progress Note    Author: Elmira Adame M.D. Date & Time of service: 2018  8:46 AM    Chief Complaint:  FU abdominal wall abscess    Interval History:   AF, WBC 9.1, transferred out of the ICU overnight, having lots of pain, plan for skin graft on Monday, tolerating abx without issues   AF, WBC 6.9, sitting in chair, pain controlled if binder not removed  2018-MAXIMUM TEMPERATURE 98.5 wbc 7.7 creatinine 5.4 c/o abdominal pain   -MAXIMUM TEMPERATURE 98.2. No new issues overnight. C/o  pain  Labs Reviewed, Medications Reviewed, Radiology Reviewed and Wound Reviewed.    Review of Systems:  Review of Systems   Constitutional: Negative for chills and fever.   Respiratory: Negative for cough and shortness of breath.    Gastrointestinal: Positive for abdominal pain. Negative for nausea and vomiting.       Hemodynamics:  Temp (24hrs), Av.5 °C (97.7 °F), Min:36 °C (96.8 °F), Max:36.8 °C (98.2 °F)  Temperature: 36.4 °C (97.6 °F)  Pulse  Av.5  Min: 46  Max: 157Heart Rate (Monitored): 68  Blood Pressure: 153/92, NIBP: (!) 108/36      Physical Exam:  Physical Exam   Constitutional: He is oriented to person, place, and time. He appears well-developed.   Chronically ill     HENT:   Head: Normocephalic and atraumatic.   Eyes: EOM are normal. Pupils are equal, round, and reactive to light.   Neck:   R IJ catheter   Pulmonary/Chest: Effort normal. He has no wheezes. He has no rales.   Abdominal: Soft. There is tenderness.   R sided abd wound dressed +Binder in place    Abdominal varices   Musculoskeletal:   left thigh AV graft   Neurological: He is alert and oriented to person, place, and time.       Meds:    Current Facility-Administered Medications:   •  [DISCONTINUED] fentaNYL **OR** fentaNYL **OR** [DISCONTINUED] fentaNYL  •  fentaNYL  •  levETIRAcetam  •  predniSONE  •  epoetin rj  •  ipratropium-albuterol  •  piperacillin-tazobactam  •  albumin human 25%  •  heparin  •   calcitRIOL  •  cinacalcet  •  gabapentin  •  LORazepam  •  sevelamer carbonate  •  senna-docusate **AND** polyethylene glycol/lytes **AND** magnesium hydroxide **AND** bisacodyl  •  Respiratory Care per Protocol  •  NS  •  labetalol  •  ondansetron  •  ondansetron  •  promethazine  •  promethazine  •  prochlorperazine  •  acetaminophen  •  Notify provider if pain remains uncontrolled **AND** Use the numeric rating scale (NRS-11) on regular floors and Critical-Care Pain Observation Tool (CPOT) on ICUs/Trauma to assess pain **AND** Pulse Ox (Oximetry) **AND** Pharmacy Consult Request **AND** If patient difficult to arouse and/or has respiratory depression, stop any opiates that are currently infusing and call a Rapid Response. **AND** oxyCODONE immediate-release **AND** oxyCODONE immediate-release **AND** morphine injection  •  [DISCONTINUED] insulin regular **AND** Accu-Chek ACHS **AND** NOTIFY MD and PharmD **AND** glucose 4 g **AND** dextrose 50%    Labs:  Recent Labs      02/24/18   0946  02/26/18   0710  02/27/18   0300   WBC  6.0  7.7  11.5*   RBC  2.94*  2.90*  2.89*   HEMOGLOBIN  8.1*  8.0*  8.2*   HEMATOCRIT  27.6*  27.5*  27.7*   MCV  93.9  94.8  95.8   MCH  27.6  27.6  28.4   RDW  56.9*  58.1*  59.1*   PLATELETCT  209  251  282   MPV  9.3  10.1  10.1   NEUTSPOLYS   --   68.00  83.10*   LYMPHOCYTES   --   18.30*  6.20*   MONOCYTES   --   8.00  6.70   EOSINOPHILS   --   1.60  0.00   BASOPHILS   --   0.70  0.30   RBCMORPHOLO   --    --   Present     Recent Labs      02/25/18   0330  02/26/18   1351  02/27/18   0300   SODIUM  134*   --   138   POTASSIUM  4.1  3.8  4.5   CHLORIDE  98   --   100   CO2  25   --   25   GLUCOSE  106*   --   100*   BUN  46*   --   28*     Recent Labs      02/25/18   0330  02/27/18   0300   CREATININE  5.40*  4.16*       Imaging:  Dx-chest-portable (1 View)    Result Date: 2/20/2018 2/20/2018 4:49 AM HISTORY/REASON FOR EXAM:  Central line placement TECHNIQUE/EXAM DESCRIPTION AND  "NUMBER OF VIEWS: Single portable view of the chest. COMPARISON: 9/23/2017 FINDINGS: Right IJ catheter tip projects over the SVC. There are surgical clips projecting over the left upper medial hemithorax. The mediastinal and cardiac silhouette is upper limits of normal. Central vessels are prominent. There is interstitial prominence. There is no significant pleural effusion. There is no visible pneumothorax. There is widening of the superior mediastinum similar to the prior study.     1.  Satisfactory appearance of the right IJ catheter without pneumothorax. 2.  Stable widening of the superior mediastinum possibly due to vascular prominence or lymphadenopathy. 3.  There are changes of interstitial edema.      Micro:  Results     Procedure Component Value Units Date/Time    BLOOD CULTURE [[id number]] Collected:  02/21/18 1020    Order Status:  Completed Specimen:  Blood from Line Updated:  02/26/18 2100     Significant Indicator NEG     Source BLD     Site Peripheral     Blood Culture No growth after 5 days of incubation.    Narrative:       Collected By:[id number] TACOS BRASHER  Per Hospital Policy: Only change Specimen Src: to \"Line\" if  specified by physician order.    BLOOD CULTURE [[id number]] Collected:  02/21/18 0955    Order Status:  Completed Specimen:  Blood from Peripheral Updated:  02/26/18 2100     Significant Indicator NEG     Source BLD     Site Dialysis Port     Blood Culture No growth after 5 days of incubation.    Narrative:       Collected By:[id number] TACOS BRASHER  Per Hospital Policy: Only change Specimen Src: to \"Line\" if  specified by physician order.    CULTURE WOUND W/ GRAM STAIN [[id number]]  (Abnormal)  (Susceptibility) Collected:  02/20/18 2230    Order Status:  Completed Specimen:  Wound from Abdominal Updated:  02/24/18 0832     Significant Indicator POS (POS)     Source WND     Site ABDOMINAL     Culture Result Wound -- (A)     Gram Stain Result --     Many WBCs.  Many Gram " positive cocci.  Rare Gram negative rods.       Culture Result Wound -- (A)     Pseudomonas aeruginosa  Light growth  P.aeruginosa may develop resistance during prolonged therapy  with all antibiotics. Isolates that are initially susceptible  may become resistant within three to four days after  initiation of therapy. Testing of repeat isolates may be  warranted.       Culture Result Wound -- (A)     Enterococcus faecalis  Light growth  Combination therapy with ampicillin, penicillin, or  vancomycin (for susceptible strains) plus an aminoglycoside  is usually indicated for serious enterococcal infections,  such as endocarditis unless high-level resistance to both  gentamicin and streptomycin is documented; such combinations  are predicted to result in synergistic killing of the  Enterococcus.       Culture Result Wound -- (A)     Escherichia coli  Rare growth      Narrative:       Collected By:[id number] CAYDEN MENENDEZ    Culture & Susceptibility     ENTEROCOCCUS FAECALIS     Antibiotic Sensitivity Microscan Unit Status    Ampicillin Sensitive <=2 mcg/mL Final    Daptomycin Sensitive <=0.5 mcg/mL Final    Gent Synergy Sensitive <=500 mcg/mL Final    Penicillin Sensitive 2 mcg/mL Final    Vancomycin Sensitive 2 mcg/mL Final              ESCHERICHIA COLI     Antibiotic Sensitivity Microscan Unit Status    Ampicillin Sensitive <=8 mcg/mL Final    Cefepime Sensitive <=8 mcg/mL Final    Cefotaxime Sensitive <=2 mcg/mL Final    Cefotetan Sensitive <=16 mcg/mL Final    Ceftazidime Sensitive <=1 mcg/mL Final    Ceftriaxone Sensitive <=8 mcg/mL Final    Cefuroxime Sensitive <=4 mcg/mL Final    Ciprofloxacin Sensitive <=1 mcg/mL Final    Ertapenem Sensitive <=1 mcg/mL Final    Gentamicin Sensitive <=4 mcg/mL Final    Pip/Tazobactam Sensitive <=16 mcg/mL Final    Tigecycline Sensitive <=2 mcg/mL Final    Tobramycin Sensitive <=4 mcg/mL Final    Trimeth/Sulfa Resistant >2/38 mcg/mL Final              PSEUDOMONAS AERUGINOSA      "Antibiotic Sensitivity Microscan Unit Status    Amikacin Sensitive <=16 mcg/mL Final    Cefepime Sensitive <=8 mcg/mL Final    Ceftazidime Sensitive 4 mcg/mL Final    Ciprofloxacin Sensitive <=1 mcg/mL Final    Gentamicin Sensitive <=4 mcg/mL Final    Imipenem Sensitive 2 mcg/mL Final    Meropenem Sensitive <=1 mcg/mL Final    Pip/Tazobactam Sensitive <=16 mcg/mL Final    Tobramycin Sensitive <=4 mcg/mL Final                       BLOOD CULTURE [[id number]]  (Abnormal) Collected:  02/20/18 0525    Order Status:  Completed Specimen:  Blood from Line Updated:  02/22/18 0809     Significant Indicator POS (POS)     Source BLD     Site LINE     Blood Culture Growth detected by Bactec instrument. 02/21/2018  06:52 (A)     Blood Culture -- (A)     Klebsiella pneumoniae  See previous culture for sensitivity report.      Narrative:       CALL  Bender  19 tel. [phone],  CALLED  19 tel. [phone] 02/21/2018, 06:55, RB PERF. RESULTS CALLED  TO:[id number]  Collected By:[id number] RODARTE GARY L  Per Hospital Policy: Only change Specimen Src: to \"Line\" if  specified by physician order.    BLOOD CULTURE [[id number]]  (Abnormal)  (Susceptibility) Collected:  02/20/18 0525    Order Status:  Completed Specimen:  Blood from Line Updated:  02/22/18 0808     Significant Indicator POS (POS)     Source BLD     Site LINE     Blood Culture Growth detected by Bactec instrument. 02/20/2018  18:04 (A)     Blood Culture Klebsiella pneumoniae (A)    Narrative:       Collected By:[id number] RODARTE GARY L  Per Hospital Policy: Only change Specimen Src: to \"Line\" if  specified by physician order.    Culture & Susceptibility     KLEBSIELLA PNEUMONIAE     Antibiotic Sensitivity Microscan Unit Status    Ampicillin Resistant >16 mcg/mL Final    Cefepime Sensitive <=8 mcg/mL Final    Cefotaxime Sensitive <=2 mcg/mL Final    Cefotetan Sensitive <=16 mcg/mL Final    Ceftazidime Sensitive <=1 mcg/mL Final    Ceftriaxone Sensitive <=8 mcg/mL Final    Cefuroxime " Sensitive <=4 mcg/mL Final    Ciprofloxacin Sensitive <=1 mcg/mL Final    Ertapenem Sensitive <=1 mcg/mL Final    Gentamicin Sensitive <=4 mcg/mL Final    Pip/Tazobactam Sensitive <=16 mcg/mL Final    Tigecycline Sensitive <=2 mcg/mL Final    Tobramycin Sensitive <=4 mcg/mL Final    Trimeth/Sulfa Sensitive <=2/38 mcg/mL Final                       GRAM STAIN [[id number]] Collected:  02/20/18 2230    Order Status:  Completed Specimen:  Wound Updated:  02/21/18 1533     Significant Indicator .     Source WND     Site ABDOMINAL     Gram Stain Result --     Many WBCs.  Many Gram positive cocci.  Rare Gram negative rods.      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    Wound cx + enterococcus faecalis (amp S), E coli, PSAR  DC vancomycin   Continue zosyn  Underwent skin graft on 2/26/2018  S/p I&D on 2/19    Abdominal varices  S/p ligation on 2/19    ESRD on HD    Discussed with internal medicine.

## 2018-02-28 LAB
ANION GAP SERPL CALC-SCNC: 11 MMOL/L (ref 0–11.9)
BASOPHILS # BLD AUTO: 0.9 % (ref 0–1.8)
BASOPHILS # BLD: 0.07 K/UL (ref 0–0.12)
BUN SERPL-MCNC: 42 MG/DL (ref 8–22)
CALCIUM SERPL-MCNC: 7 MG/DL (ref 8.5–10.5)
CHLORIDE SERPL-SCNC: 99 MMOL/L (ref 96–112)
CO2 SERPL-SCNC: 27 MMOL/L (ref 20–33)
CREAT SERPL-MCNC: 6.18 MG/DL (ref 0.5–1.4)
EOSINOPHIL # BLD AUTO: 0.17 K/UL (ref 0–0.51)
EOSINOPHIL NFR BLD: 2.1 % (ref 0–6.9)
ERYTHROCYTE [DISTWIDTH] IN BLOOD BY AUTOMATED COUNT: 60.7 FL (ref 35.9–50)
GLUCOSE SERPL-MCNC: 106 MG/DL (ref 65–99)
HCT VFR BLD AUTO: 26.2 % (ref 42–52)
HGB BLD-MCNC: 7.7 G/DL (ref 14–18)
IMM GRANULOCYTES # BLD AUTO: 0.17 K/UL (ref 0–0.11)
IMM GRANULOCYTES NFR BLD AUTO: 2.1 % (ref 0–0.9)
INR PPP: 1.25 (ref 0.87–1.13)
LYMPHOCYTES # BLD AUTO: 1.23 K/UL (ref 1–4.8)
LYMPHOCYTES NFR BLD: 15.5 % (ref 22–41)
MCH RBC QN AUTO: 28.2 PG (ref 27–33)
MCHC RBC AUTO-ENTMCNC: 29.4 G/DL (ref 33.7–35.3)
MCV RBC AUTO: 96 FL (ref 81.4–97.8)
MONOCYTES # BLD AUTO: 0.61 K/UL (ref 0–0.85)
MONOCYTES NFR BLD AUTO: 7.7 % (ref 0–13.4)
NEUTROPHILS # BLD AUTO: 5.68 K/UL (ref 1.82–7.42)
NEUTROPHILS NFR BLD: 71.7 % (ref 44–72)
NRBC # BLD AUTO: 0 K/UL
NRBC BLD-RTO: 0 /100 WBC
PLATELET # BLD AUTO: 275 K/UL (ref 164–446)
PMV BLD AUTO: 9.9 FL (ref 9–12.9)
POTASSIUM SERPL-SCNC: 4.5 MMOL/L (ref 3.6–5.5)
PROTHROMBIN TIME: 15.4 SEC (ref 12–14.6)
RBC # BLD AUTO: 2.73 M/UL (ref 4.7–6.1)
SODIUM SERPL-SCNC: 137 MMOL/L (ref 135–145)
WBC # BLD AUTO: 7.9 K/UL (ref 4.8–10.8)

## 2018-02-28 PROCEDURE — 700102 HCHG RX REV CODE 250 W/ 637 OVERRIDE(OP): Performed by: INTERNAL MEDICINE

## 2018-02-28 PROCEDURE — 700111 HCHG RX REV CODE 636 W/ 250 OVERRIDE (IP): Performed by: PLASTIC SURGERY

## 2018-02-28 PROCEDURE — 85025 COMPLETE CBC W/AUTO DIFF WBC: CPT

## 2018-02-28 PROCEDURE — 770001 HCHG ROOM/CARE - MED/SURG/GYN PRIV*

## 2018-02-28 PROCEDURE — 99233 SBSQ HOSP IP/OBS HIGH 50: CPT | Performed by: HOSPITALIST

## 2018-02-28 PROCEDURE — 80048 BASIC METABOLIC PNL TOTAL CA: CPT

## 2018-02-28 PROCEDURE — 5A1D70Z PERFORMANCE OF URINARY FILTRATION, INTERMITTENT, LESS THAN 6 HOURS PER DAY: ICD-10-PCS | Performed by: INTERNAL MEDICINE

## 2018-02-28 PROCEDURE — A9270 NON-COVERED ITEM OR SERVICE: HCPCS | Performed by: INTERNAL MEDICINE

## 2018-02-28 PROCEDURE — 700111 HCHG RX REV CODE 636 W/ 250 OVERRIDE (IP): Mod: JG

## 2018-02-28 PROCEDURE — 700111 HCHG RX REV CODE 636 W/ 250 OVERRIDE (IP): Performed by: HOSPITALIST

## 2018-02-28 PROCEDURE — 700111 HCHG RX REV CODE 636 W/ 250 OVERRIDE (IP): Performed by: INTERNAL MEDICINE

## 2018-02-28 PROCEDURE — 90935 HEMODIALYSIS ONE EVALUATION: CPT

## 2018-02-28 PROCEDURE — 85610 PROTHROMBIN TIME: CPT

## 2018-02-28 RX ORDER — HEPARIN SODIUM 1000 [USP'U]/ML
INJECTION, SOLUTION INTRAVENOUS; SUBCUTANEOUS
Status: COMPLETED
Start: 2018-02-28 | End: 2018-02-28

## 2018-02-28 RX ORDER — MEPERIDINE HYDROCHLORIDE 100 MG/ML
75 INJECTION INTRAMUSCULAR; INTRAVENOUS; SUBCUTANEOUS
Status: DISCONTINUED | OUTPATIENT
Start: 2018-02-28 | End: 2018-03-02 | Stop reason: HOSPADM

## 2018-02-28 RX ADMIN — FENTANYL CITRATE 50 MCG: 50 INJECTION, SOLUTION INTRAMUSCULAR; INTRAVENOUS at 11:42

## 2018-02-28 RX ADMIN — HEPARIN SODIUM 3900 UNITS: 1000 INJECTION, SOLUTION INTRAVENOUS; SUBCUTANEOUS at 11:10

## 2018-02-28 RX ADMIN — STANDARDIZED SENNA CONCENTRATE AND DOCUSATE SODIUM 2 TABLET: 8.6; 5 TABLET, FILM COATED ORAL at 21:45

## 2018-02-28 RX ADMIN — OXYCODONE HYDROCHLORIDE 10 MG: 10 TABLET ORAL at 00:44

## 2018-02-28 RX ADMIN — PREDNISONE 20 MG: 20 TABLET ORAL at 11:25

## 2018-02-28 RX ADMIN — MEPERIDINE HYDROCHLORIDE 75 MG: 100 INJECTION INTRAMUSCULAR; INTRAVENOUS; SUBCUTANEOUS at 21:49

## 2018-02-28 RX ADMIN — OXYCODONE HYDROCHLORIDE 10 MG: 10 TABLET ORAL at 06:17

## 2018-02-28 RX ADMIN — FENTANYL CITRATE 50 MCG: 50 INJECTION, SOLUTION INTRAMUSCULAR; INTRAVENOUS at 10:03

## 2018-02-28 RX ADMIN — GABAPENTIN 600 MG: 300 CAPSULE ORAL at 14:45

## 2018-02-28 RX ADMIN — LEVETIRACETAM 250 MG: 250 TABLET ORAL at 23:48

## 2018-02-28 RX ADMIN — SEVELAMER CARBONATE 3200 MG: 800 TABLET, FILM COATED ORAL at 17:52

## 2018-02-28 RX ADMIN — TAZOBACTAM SODIUM AND PIPERACILLIN SODIUM 4.5 G: 500; 4 INJECTION, SOLUTION INTRAVENOUS at 03:57

## 2018-02-28 RX ADMIN — CINACALCET HYDROCHLORIDE 60 MG: 30 TABLET, COATED ORAL at 21:45

## 2018-02-28 RX ADMIN — FENTANYL CITRATE 75 MCG: 50 INJECTION, SOLUTION INTRAMUSCULAR; INTRAVENOUS at 07:42

## 2018-02-28 RX ADMIN — GABAPENTIN 600 MG: 300 CAPSULE ORAL at 11:25

## 2018-02-28 RX ADMIN — FENTANYL CITRATE 50 MCG: 50 INJECTION, SOLUTION INTRAMUSCULAR; INTRAVENOUS at 03:57

## 2018-02-28 RX ADMIN — GABAPENTIN 600 MG: 300 CAPSULE ORAL at 21:45

## 2018-02-28 RX ADMIN — CALCITRIOL 0.75 MCG: 0.25 CAPSULE, LIQUID FILLED ORAL at 21:45

## 2018-02-28 RX ADMIN — LEVETIRACETAM 250 MG: 250 TABLET ORAL at 11:27

## 2018-02-28 RX ADMIN — SEVELAMER CARBONATE 3200 MG: 800 TABLET, FILM COATED ORAL at 12:58

## 2018-02-28 RX ADMIN — ERYTHROPOIETIN 10000 UNITS: 10000 INJECTION, SOLUTION INTRAVENOUS; SUBCUTANEOUS at 09:42

## 2018-02-28 RX ADMIN — TAZOBACTAM SODIUM AND PIPERACILLIN SODIUM 4.5 G: 500; 4 INJECTION, SOLUTION INTRAVENOUS at 17:52

## 2018-02-28 RX ADMIN — OXYCODONE HYDROCHLORIDE 10 MG: 10 TABLET ORAL at 11:25

## 2018-02-28 RX ADMIN — MEPERIDINE HYDROCHLORIDE 75 MG: 100 INJECTION INTRAMUSCULAR; INTRAVENOUS; SUBCUTANEOUS at 14:45

## 2018-02-28 ASSESSMENT — ENCOUNTER SYMPTOMS
SENSORY CHANGE: 0
BACK PAIN: 1
DEPRESSION: 0
ABDOMINAL PAIN: 1
DIARRHEA: 0
CHILLS: 0
MYALGIAS: 1
DIZZINESS: 0
COUGH: 0
ABDOMINAL PAIN: 0
FOCAL WEAKNESS: 0
PALPITATIONS: 0
VOMITING: 0
NAUSEA: 0
HEADACHES: 0
FEVER: 0
BLURRED VISION: 0
DOUBLE VISION: 0

## 2018-02-28 ASSESSMENT — PAIN SCALES - GENERAL
PAINLEVEL_OUTOF10: 6
PAINLEVEL_OUTOF10: 3
PAINLEVEL_OUTOF10: 10
PAINLEVEL_OUTOF10: 9
PAINLEVEL_OUTOF10: 2
PAINLEVEL_OUTOF10: 7
PAINLEVEL_OUTOF10: 9
PAINLEVEL_OUTOF10: 10

## 2018-02-28 ASSESSMENT — LIFESTYLE VARIABLES
DO YOU DRINK ALCOHOL: NO
SUBSTANCE_ABUSE: 0

## 2018-02-28 NOTE — PROGRESS NOTES
Infectious Disease Progress Note    Author: Elmira Adame M.D. Date & Time of service: 2018  4:46 PM    Chief Complaint:  FU abdominal wall abscess    Interval History:   AF, WBC 9.1, transferred out of the ICU overnight, having lots of pain, plan for skin graft on Monday, tolerating abx without issues   AF, WBC 6.9, sitting in chair, pain controlled if binder not removed  2018-MAXIMUM TEMPERATURE 98.5 wbc 7.7 creatinine 5.4 c/o abdominal pain  2018-MAXIMUM TEMPERATURE 98.2. Complains of some abdominal pain. WBC 11.5 platelets 282  Labs Reviewed, Medications Reviewed, Radiology Reviewed and Wound Reviewed.    Review of Systems:  Review of Systems   Constitutional: Negative for chills and fever.   Respiratory: Negative for cough and shortness of breath.    Gastrointestinal: Positive for abdominal pain. Negative for nausea and vomiting.       Hemodynamics:  Temp (24hrs), Av.4 °C (97.6 °F), Min:36 °C (96.8 °F), Max:36.8 °C (98.2 °F)  Temperature: 36.4 °C (97.6 °F)  Pulse  Av.5  Min: 46  Max: 157Heart Rate (Monitored): 68  Blood Pressure: 153/92, NIBP: (!) 108/36      Physical Exam:  Physical Exam   Constitutional: He is oriented to person, place, and time. He appears well-developed.   Chronically ill     HENT:   Head: Normocephalic and atraumatic.   Eyes: EOM are normal. Pupils are equal, round, and reactive to light.   Neck:   R IJ catheter   Pulmonary/Chest: Effort normal. He has no wheezes. He has no rales.   Abdominal: Soft. There is tenderness.       Abdominal binder present   Musculoskeletal:   Right thigh dialysis catheter   Neurological: He is alert and oriented to person, place, and time.       Meds:    Current Facility-Administered Medications:   •  [DISCONTINUED] fentaNYL **OR** fentaNYL **OR** [DISCONTINUED] fentaNYL  •  fentaNYL  •  levETIRAcetam  •  predniSONE  •  epoetin rj  •  ipratropium-albuterol  •  piperacillin-tazobactam  •  albumin human 25%  •  heparin  •   calcitRIOL  •  cinacalcet  •  gabapentin  •  LORazepam  •  sevelamer carbonate  •  senna-docusate **AND** polyethylene glycol/lytes **AND** magnesium hydroxide **AND** bisacodyl  •  Respiratory Care per Protocol  •  NS  •  labetalol  •  ondansetron  •  ondansetron  •  promethazine  •  promethazine  •  prochlorperazine  •  acetaminophen  •  Notify provider if pain remains uncontrolled **AND** Use the numeric rating scale (NRS-11) on regular floors and Critical-Care Pain Observation Tool (CPOT) on ICUs/Trauma to assess pain **AND** Pulse Ox (Oximetry) **AND** Pharmacy Consult Request **AND** If patient difficult to arouse and/or has respiratory depression, stop any opiates that are currently infusing and call a Rapid Response. **AND** oxyCODONE immediate-release **AND** oxyCODONE immediate-release **AND** morphine injection  •  [DISCONTINUED] insulin regular **AND** Accu-Chek ACHS **AND** NOTIFY MD and PharmD **AND** glucose 4 g **AND** dextrose 50%    Labs:  Recent Labs      02/26/18   0710  02/27/18   0300   WBC  7.7  11.5*   RBC  2.90*  2.89*   HEMOGLOBIN  8.0*  8.2*   HEMATOCRIT  27.5*  27.7*   MCV  94.8  95.8   MCH  27.6  28.4   RDW  58.1*  59.1*   PLATELETCT  251  282   MPV  10.1  10.1   NEUTSPOLYS  68.00  83.10*   LYMPHOCYTES  18.30*  6.20*   MONOCYTES  8.00  6.70   EOSINOPHILS  1.60  0.00   BASOPHILS  0.70  0.30   RBCMORPHOLO   --   Present     Recent Labs      02/25/18   0330  02/26/18   1351  02/27/18   0300   SODIUM  134*   --   138   POTASSIUM  4.1  3.8  4.5   CHLORIDE  98   --   100   CO2  25   --   25   GLUCOSE  106*   --   100*   BUN  46*   --   28*     Recent Labs      02/25/18   0330  02/27/18   0300   CREATININE  5.40*  4.16*       Imaging:  Dx-chest-portable (1 View)    Result Date: 2/20/2018 2/20/2018 4:49 AM HISTORY/REASON FOR EXAM:  Central line placement TECHNIQUE/EXAM DESCRIPTION AND NUMBER OF VIEWS: Single portable view of the chest. COMPARISON: 9/23/2017 FINDINGS: Right IJ catheter tip  "projects over the SVC. There are surgical clips projecting over the left upper medial hemithorax. The mediastinal and cardiac silhouette is upper limits of normal. Central vessels are prominent. There is interstitial prominence. There is no significant pleural effusion. There is no visible pneumothorax. There is widening of the superior mediastinum similar to the prior study.     1.  Satisfactory appearance of the right IJ catheter without pneumothorax. 2.  Stable widening of the superior mediastinum possibly due to vascular prominence or lymphadenopathy. 3.  There are changes of interstitial edema.      Micro:  Results     Procedure Component Value Units Date/Time    BLOOD CULTURE [[id number]] Collected:  02/21/18 1020    Order Status:  Completed Specimen:  Blood from Line Updated:  02/26/18 2100     Significant Indicator NEG     Source BLD     Site Peripheral     Blood Culture No growth after 5 days of incubation.    Narrative:       Collected By:[id number] TACOS BRASHER  Per Hospital Policy: Only change Specimen Src: to \"Line\" if  specified by physician order.    BLOOD CULTURE [[id number]] Collected:  02/21/18 0955    Order Status:  Completed Specimen:  Blood from Peripheral Updated:  02/26/18 2100     Significant Indicator NEG     Source BLD     Site Dialysis Port     Blood Culture No growth after 5 days of incubation.    Narrative:       Collected By:[id number] TACOS BRASHER  Per Hospital Policy: Only change Specimen Src: to \"Line\" if  specified by physician order.    CULTURE WOUND W/ GRAM STAIN [[id number]]  (Abnormal)  (Susceptibility) Collected:  02/20/18 2230    Order Status:  Completed Specimen:  Wound from Abdominal Updated:  02/24/18 0832     Significant Indicator POS (POS)     Source WND     Site ABDOMINAL     Culture Result Wound -- (A)     Gram Stain Result --     Many WBCs.  Many Gram positive cocci.  Rare Gram negative rods.       Culture Result Wound -- (A)     Pseudomonas aeruginosa  Light " growth  P.aeruginosa may develop resistance during prolonged therapy  with all antibiotics. Isolates that are initially susceptible  may become resistant within three to four days after  initiation of therapy. Testing of repeat isolates may be  warranted.       Culture Result Wound -- (A)     Enterococcus faecalis  Light growth  Combination therapy with ampicillin, penicillin, or  vancomycin (for susceptible strains) plus an aminoglycoside  is usually indicated for serious enterococcal infections,  such as endocarditis unless high-level resistance to both  gentamicin and streptomycin is documented; such combinations  are predicted to result in synergistic killing of the  Enterococcus.       Culture Result Wound -- (A)     Escherichia coli  Rare growth      Narrative:       Collected By:[id number] CAYDEN MENENDEZ    Culture & Susceptibility     ENTEROCOCCUS FAECALIS     Antibiotic Sensitivity Microscan Unit Status    Ampicillin Sensitive <=2 mcg/mL Final    Daptomycin Sensitive <=0.5 mcg/mL Final    Gent Synergy Sensitive <=500 mcg/mL Final    Penicillin Sensitive 2 mcg/mL Final    Vancomycin Sensitive 2 mcg/mL Final              ESCHERICHIA COLI     Antibiotic Sensitivity Microscan Unit Status    Ampicillin Sensitive <=8 mcg/mL Final    Cefepime Sensitive <=8 mcg/mL Final    Cefotaxime Sensitive <=2 mcg/mL Final    Cefotetan Sensitive <=16 mcg/mL Final    Ceftazidime Sensitive <=1 mcg/mL Final    Ceftriaxone Sensitive <=8 mcg/mL Final    Cefuroxime Sensitive <=4 mcg/mL Final    Ciprofloxacin Sensitive <=1 mcg/mL Final    Ertapenem Sensitive <=1 mcg/mL Final    Gentamicin Sensitive <=4 mcg/mL Final    Pip/Tazobactam Sensitive <=16 mcg/mL Final    Tigecycline Sensitive <=2 mcg/mL Final    Tobramycin Sensitive <=4 mcg/mL Final    Trimeth/Sulfa Resistant >2/38 mcg/mL Final              PSEUDOMONAS AERUGINOSA     Antibiotic Sensitivity Microscan Unit Status    Amikacin Sensitive <=16 mcg/mL Final    Cefepime Sensitive <=8  "mcg/mL Final    Ceftazidime Sensitive 4 mcg/mL Final    Ciprofloxacin Sensitive <=1 mcg/mL Final    Gentamicin Sensitive <=4 mcg/mL Final    Imipenem Sensitive 2 mcg/mL Final    Meropenem Sensitive <=1 mcg/mL Final    Pip/Tazobactam Sensitive <=16 mcg/mL Final    Tobramycin Sensitive <=4 mcg/mL Final                       BLOOD CULTURE [[id number]]  (Abnormal) Collected:  02/20/18 0525    Order Status:  Completed Specimen:  Blood from Line Updated:  02/22/18 0809     Significant Indicator POS (POS)     Source BLD     Site LINE     Blood Culture Growth detected by Bactec instrument. 02/21/2018  06:52 (A)     Blood Culture -- (A)     Klebsiella pneumoniae  See previous culture for sensitivity report.      Narrative:       CALL  Bender  19 tel. [phone],  CALLED  19 tel. [phone] 02/21/2018, 06:55, RB PERF. RESULTS CALLED  TO:[id number]  Collected By:[id number] [employer]  Per Hospital Policy: Only change Specimen Src: to \"Line\" if  specified by physician order.    BLOOD CULTURE [[id number]]  (Abnormal)  (Susceptibility) Collected:  02/20/18 0525    Order Status:  Completed Specimen:  Blood from Line Updated:  02/22/18 0808     Significant Indicator POS (POS)     Source BLD     Site LINE     Blood Culture Growth detected by Bactec instrument. 02/20/2018  18:04 (A)     Blood Culture Klebsiella pneumoniae (A)    Narrative:       Collected By:[id number] [employer]  Per Hospital Policy: Only change Specimen Src: to \"Line\" if  specified by physician order.    Culture & Susceptibility     KLEBSIELLA PNEUMONIAE     Antibiotic Sensitivity Microscan Unit Status    Ampicillin Resistant >16 mcg/mL Final    Cefepime Sensitive <=8 mcg/mL Final    Cefotaxime Sensitive <=2 mcg/mL Final    Cefotetan Sensitive <=16 mcg/mL Final    Ceftazidime Sensitive <=1 mcg/mL Final    Ceftriaxone Sensitive <=8 mcg/mL Final    Cefuroxime Sensitive <=4 mcg/mL Final    Ciprofloxacin Sensitive <=1 mcg/mL Final    Ertapenem Sensitive <=1 mcg/mL Final    " Gentamicin Sensitive <=4 mcg/mL Final    Pip/Tazobactam Sensitive <=16 mcg/mL Final    Tigecycline Sensitive <=2 mcg/mL Final    Tobramycin Sensitive <=4 mcg/mL Final    Trimeth/Sulfa Sensitive <=2/38 mcg/mL Final                       GRAM STAIN [[id number]] Collected:  02/20/18 2230    Order Status:  Completed Specimen:  Wound Updated:  02/21/18 1533     Significant Indicator .     Source WND     Site ABDOMINAL     Gram Stain Result --     Many WBCs.  Many Gram positive cocci.  Rare Gram negative rods.      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    Wound cx + enterococcus faecalis (amp S), E coli, PSAR  DC vancomycin   Continue zosyn  Underwent skin graft on 2/26/2018  S/p I&D on 2/19    Abdominal varices  S/p ligation on 2/19    ESRD on HD    Discussed with internal medicine.

## 2018-02-28 NOTE — PROGRESS NOTES
0645 Received report, introduced self to pt, reviewed labs, orders, allergies, code status    0750 pt taken to dialysis    1150 pt arrived back from dialysis, writhing in pain.    1500 dressing on LLE changed as ordered, spoke with Dr Rosenbaum, clarified to not touch the dressing on the graft site on RLE.     1500 pt noted to have substantial decrease in pain with the demerol.    1800 pt maintaining a low pain level still from 1445 dose of demerol. Is very pleasant with staff

## 2018-02-28 NOTE — PROGRESS NOTES
Nephrology Progress Note, Adult, Complex               Author: Fadi Najjar Date & Time created: 2/28/2018  2:22 PM     Interval History:  38 y/o male with ESRD/HHD admitted with abdominal wound infection.  Seen and examined while getting HD.    Review of Systems:  Review of Systems   Constitutional: Negative for chills, fever and malaise/fatigue.   Cardiovascular: Negative for chest pain and leg swelling.   Gastrointestinal: Positive for abdominal pain. Negative for diarrhea, nausea and vomiting.   Genitourinary: Negative for dysuria.   Neurological: Negative for dizziness, sensory change, focal weakness and headaches.       Physical Exam:  Physical Exam   Constitutional: He is oriented to person, place, and time. No distress.   HENT:   Nose: Nose normal.   Eyes: Conjunctivae are normal. No scleral icterus.   Cardiovascular: Normal rate and regular rhythm.    Pulmonary/Chest: Effort normal and breath sounds normal. No respiratory distress.   Musculoskeletal: He exhibits no edema.   Neurological: He is alert and oriented to person, place, and time.   Skin: He is not diaphoretic.   Psychiatric: He has a normal mood and affect. His behavior is normal.   Nursing note and vitals reviewed.      Labs:        Invalid input(s): [id number]      Recent Labs      02/26/18   1351  02/27/18   0300  02/28/18   0400   SODIUM   --   138  137   POTASSIUM  3.8  4.5  4.5   CHLORIDE   --   100  99   CO2   --   25  27   BUN   --   28*  42*   CREATININE   --   4.16*  6.18*   CALCIUM   --   6.7*  7.0*     Recent Labs      02/27/18   0300  02/28/18   0400   GLUCOSE  100*  106*     Recent Labs      02/26/18   0710  02/26/18   1359  02/27/18   0300  02/28/18   0400   RBC  2.90*   --   2.89*  2.73*   HEMOGLOBIN  8.0*   --   8.2*  7.7*   HEMATOCRIT  27.5*   --   27.7*  26.2*   PLATELETCT  251   --   282  275   PROTHROMBTM   --   15.2*  15.0*  15.4*   INR   --   1.23*  1.21*  1.25*     Recent Labs      02/26/18   0710  02/27/18   0300   18   0400   WBC  7.7  11.5*  7.9   NEUTSPOLYS  68.00  83.10*  71.70   LYMPHOCYTES  18.30*  6.20*  15.50*   MONOCYTES  8.00  6.70  7.70   EOSINOPHILS  1.60  0.00  2.10   BASOPHILS  0.70  0.30  0.90           Hemodynamics:  Temp (24hrs), Av.4 °C (97.6 °F), Min:35.9 °C (96.7 °F), Max:36.8 °C (98.2 °F)  Temperature: 36.8 °C (98.2 °F)  Pulse  Av.4  Min: 46  Max: 157   Blood Pressure: 158/88    Respiratory:    Respiration: 18, Pulse Oximetry: 96 %        RUL Breath Sounds: Clear, RML Breath Sounds: Clear, RLL Breath Sounds: Diminished, ANGEL Breath Sounds: Clear, LLL Breath Sounds: Diminished  Fluids:    Intake/Output Summary (Last 24 hours) at 18 1422  Last data filed at 18 1200   Gross per 24 hour   Intake             1200 ml   Output             1600 ml   Net             -400 ml        GI/Nutrition:  Orders Placed This Encounter   Procedures   • DIET ORDER     Standing Status:   Standing     Number of Occurrences:   1     Order Specific Question:   Diet:     Answer:   Renal [8]     Medical Decision Making, by Problem:  Active Hospital Problems    Diagnosis   • Hypertension [I10]   • ESRD (end stage renal disease) (CMS-HCC) [N18.6]   • 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

## 2018-02-28 NOTE — PROGRESS NOTES
Paged Dr Hopkins in regards to exposed graft material on the L thigh.  New orders received to irrigate with sterile saline, apply bacitracin ointment and cover with sterile occlusive dressing Q Day.

## 2018-02-28 NOTE — PROGRESS NOTES
Patient unable to void this pm, BS with 645cc  in bladder at 2300, patient refuses straight cath at this time, explained the risks and benefits of retention vs catheterization. Agreed to let patient wait until 0230 before straight cath. Patient denies pain tonight, right neck dressing CDI. Neuros intact, ambulated to BR with SBA, IS to 4000, on Ra, temperature runs low, this is normal per patient. IVF and abx continued as ordered. Will continue to monitor

## 2018-02-28 NOTE — PROGRESS NOTES
Nephrology Progress Note, Adult, Complex               Author: Fadi Najjar Date & Time created: 2/27/2018  4:10 PM     Interval History:  36 y/o male with ESRD/HHD admitted with abdominal wound infection, bleeding from varices -treated surgically  Doing much better.  Had surgery yesterday  Review of Systems:  Review of Systems   Constitutional: Positive for malaise/fatigue. Negative for chills and fever.   Cardiovascular: Negative for chest pain and leg swelling.   Gastrointestinal: Positive for abdominal pain. Negative for diarrhea, nausea and vomiting.   Genitourinary: Negative for dysuria.   Neurological: Negative for dizziness, sensory change, focal weakness and headaches.       Physical Exam:  Physical Exam   Constitutional: He is oriented to person, place, and time. No distress.   HENT:   Nose: Nose normal.   Eyes: Conjunctivae are normal. No scleral icterus.   Cardiovascular: Normal rate and regular rhythm.    Pulmonary/Chest: Effort normal and breath sounds normal. No respiratory distress.   Musculoskeletal: He exhibits no edema.   Neurological: He is alert and oriented to person, place, and time.   Skin: He is not diaphoretic.   Psychiatric: He has a normal mood and affect. His behavior is normal.   Nursing note and vitals reviewed.      Labs:        Invalid input(s): [id number]      Recent Labs      02/25/18   0330  02/26/18   1351  02/27/18   0300   SODIUM  134*   --   138   POTASSIUM  4.1  3.8  4.5   CHLORIDE  98   --   100   CO2  25   --   25   BUN  46*   --   28*   CREATININE  5.40*   --   4.16*   CALCIUM  7.8*   --   6.7*     Recent Labs      02/25/18   0330  02/27/18   0300   GLUCOSE  106*  100*     Recent Labs      02/25/18   0330  02/26/18   0710  02/26/18   1359  02/27/18   0300   RBC   --   2.90*   --   2.89*   HEMOGLOBIN   --   8.0*   --   8.2*   HEMATOCRIT   --   27.5*   --   27.7*   PLATELETCT   --   251   --   282   PROTHROMBTM  15.2*   --   15.2*  15.0*   INR  1.23*   --   1.23*  1.21*      Recent Labs      18   0710  18   0300   WBC  7.7  11.5*   NEUTSPOLYS  68.00  83.10*   LYMPHOCYTES  18.30*  6.20*   MONOCYTES  8.00  6.70   EOSINOPHILS  1.60  0.00   BASOPHILS  0.70  0.30           Hemodynamics:  Temp (24hrs), Av.4 °C (97.6 °F), Min:36 °C (96.8 °F), Max:36.8 °C (98.2 °F)  Temperature: 36.4 °C (97.6 °F)  Pulse  Av.5  Min: 46  Max: 157Heart Rate (Monitored): 68  Blood Pressure: 153/92, NIBP: (!) 108/36    Respiratory:    Respiration: 18, Pulse Oximetry: 100 %        RUL Breath Sounds: Clear, RML Breath Sounds: Clear, RLL Breath Sounds: Diminished, ANGEL Breath Sounds: Clear, LLL Breath Sounds: Diminished  Fluids:    Intake/Output Summary (Last 24 hours) at 18 1610  Last data filed at 18 1200   Gross per 24 hour   Intake              920 ml   Output               40 ml   Net              880 ml        GI/Nutrition:  Orders Placed This Encounter   Procedures   • DIET ORDER     Standing Status:   Standing     Number of Occurrences:   1     Order Specific Question:   Diet:     Answer:   Renal [8]     Medical Decision Making, by Problem:  Active Hospital Problems    Diagnosis   • Hypertension [I10]   • ESRD (end stage renal disease) (CMS-HCC) [N18.6]   • 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

## 2018-02-28 NOTE — CARE PLAN
Problem: Infection  Goal: Will remain free from infection  Outcome: PROGRESSING AS EXPECTED  Maintain standard precautions    Problem: Pain Management  Goal: Pain level will decrease to patient's comfort goal  Outcome: PROGRESSING SLOWER THAN EXPECTED  Administer pRNs as ordered, make sure providers are aware of pain level

## 2018-02-28 NOTE — PROGRESS NOTES
[facility]    Date of Service: 2018    Chief Complaint  37 y.o. male admitted 2018 with bleeding abdominal wall varices.    Interval Problem Update  Complains of generalized pain and wants Dilaudid every hour    Consultants/Specialty  Intensivist  Plastic surgery  Nephrology  Infectious disease    Disposition  Likely home on Thursday        Review of Systems   Constitutional: Negative for chills and fever.   HENT: Negative for hearing loss and tinnitus.    Eyes: Negative for blurred vision and double vision.   Respiratory: Negative for cough.    Cardiovascular: Negative for chest pain and palpitations.   Gastrointestinal: Positive for abdominal pain. Negative for nausea and vomiting.   Genitourinary: Negative for dysuria and frequency.   Musculoskeletal: Positive for back pain and myalgias.   Skin: Negative for itching and rash.   Neurological: Negative for dizziness and headaches.   Psychiatric/Behavioral: Negative for depression and substance abuse.      Physical Exam  Laboratory/Imaging   Hemodynamics  Temp (24hrs), Av.6 °C (97.9 °F), Min:36.4 °C (97.6 °F), Max:36.8 °C (98.2 °F)   Temperature: 36.7 °C (98 °F)  Pulse  Av.6  Min: 46  Max: 157    Blood Pressure: 152/70     Respiratory      Respiration: 18, Pulse Oximetry: 95 %        RUL Breath Sounds: Clear, RML Breath Sounds: Clear, RLL Breath Sounds: Diminished, ANGEL Breath Sounds: Clear, LLL Breath Sounds: Diminished    Fluids    Intake/Output Summary (Last 24 hours) at 18  Last data filed at 18 1608   Gross per 24 hour   Intake              540 ml   Output                0 ml   Net              540 ml       Nutrition  Orders Placed This Encounter   Procedures   • DIET ORDER     Standing Status:   Standing     Number of Occurrences:   1     Order Specific Question:   Diet:     Answer:   Renal [8]     Physical Exam   Constitutional: He is oriented to person, place, and time.  Non-toxic appearance. No  distress.   HENT:   Head: Normocephalic and atraumatic.   Eyes: Conjunctivae are normal. Pupils are equal, round, and reactive to light.   Neck: Normal range of motion. No edema and no erythema present. No thyromegaly present.   Cardiovascular: Normal rate and regular rhythm.    No murmur heard.  Pulmonary/Chest: Effort normal and breath sounds normal. No respiratory distress.   Abdominal: Soft. Bowel sounds are normal. He exhibits no distension. There is tenderness.   Right sided wound healing, no discharge.    Musculoskeletal: He exhibits no edema.   Neurological: He is alert and oriented to person, place, and time.   Skin: Skin is dry. He is not diaphoretic. No erythema.   Psychiatric: He has a normal mood and affect.   Nursing note and vitals reviewed.      Recent Labs      02/26/18   0710  02/27/18   0300   WBC  7.7  11.5*   RBC  2.90*  2.89*   HEMOGLOBIN  8.0*  8.2*   HEMATOCRIT  27.5*  27.7*   MCV  94.8  95.8   MCH  27.6  28.4   MCHC  29.1*  29.6*   RDW  58.1*  59.1*   PLATELETCT  251  282   MPV  10.1  10.1     Recent Labs      02/25/18   0330  02/26/18   1351  02/27/18   0300   SODIUM  134*   --   138   POTASSIUM  4.1  3.8  4.5   CHLORIDE  98   --   100   CO2  25   --   25   GLUCOSE  106*   --   100*   BUN  46*   --   28*   CREATININE  5.40*   --   4.16*   CALCIUM  7.8*   --   6.7*     Recent Labs      02/25/18   0330  02/26/18   1359  02/27/18   0300   INR  1.23*  1.23*  1.21*                  Assessment/Plan     Shock (CMS-HCC)   Assessment & Plan    -Resolved        Abdominal varicosities   Assessment & Plan    - Chronic issue, acute bleeding  - Now status post surgery  - No sign of gross bleeding  - Dr. Rosenbaum did skin graft yesterday and will follow up on Thursday  Continue with pain control        Bacteremia- (present on admission)   Assessment & Plan    - Klebsiella per blood culture ×2  abdominal wound cx + for enterococcus, on vanc and zosyn, ID following. Repeated cx neg so far.  concern for infected  dialysis catheter        ESRD (end stage renal disease) (CMS-HCC)- (present on admission)   Assessment & Plan    - Patient generally gets hemodialysis 5 times a week, hemodialysis per nephrology        Thrombocytopenia (CMS-HCC)   Assessment & Plan    Resolved.         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        Hemorrhagic disorder due to circulating anticoagulants (CMS-HCC)   Assessment & Plan    - Is on Coumadin for occluded IVC, currently being held due to bleeding.         Hyperkalemia- (present on admission)   Assessment & Plan    -Resolved with hemodialysis.          Seizure disorder (CMS-HCC)- (present on admission)   Assessment & Plan    - Continue Keppra, no sign of seizure activity.        Normocytic anemia- (present on admission)   Assessment & Plan    - No longer any sign of gross bleeding, did have significant bleeding initially  - Repeat CBC in the morning, hb 8.0  stable.        Sleep apnea- (present on admission)   Assessment & Plan    - Patient uses BiPAP at night, [facility].          Quality-Core Measures   Reviewed items::  Labs reviewed and Medications reviewed  DVT prophylaxis pharmacological::  Contraindicated - High bleeding risk  DVT prophylaxis - mechanical:  SCDs  Antibiotics:  Treating active infection/contamination beyond 24 hours perioperative coverage

## 2018-03-01 LAB
ANION GAP SERPL CALC-SCNC: 8 MMOL/L (ref 0–11.9)
BASOPHILS # BLD AUTO: 0.6 % (ref 0–1.8)
BASOPHILS # BLD: 0.06 K/UL (ref 0–0.12)
BUN SERPL-MCNC: 28 MG/DL (ref 8–22)
CALCIUM SERPL-MCNC: 7.1 MG/DL (ref 8.5–10.5)
CHLORIDE SERPL-SCNC: 100 MMOL/L (ref 96–112)
CO2 SERPL-SCNC: 27 MMOL/L (ref 20–33)
CREAT SERPL-MCNC: 5.47 MG/DL (ref 0.5–1.4)
EOSINOPHIL # BLD AUTO: 0.07 K/UL (ref 0–0.51)
EOSINOPHIL NFR BLD: 0.7 % (ref 0–6.9)
ERYTHROCYTE [DISTWIDTH] IN BLOOD BY AUTOMATED COUNT: 63.2 FL (ref 35.9–50)
GLUCOSE SERPL-MCNC: 104 MG/DL (ref 65–99)
HCT VFR BLD AUTO: 24.7 % (ref 42–52)
HGB BLD-MCNC: 7.1 G/DL (ref 14–18)
IMM GRANULOCYTES # BLD AUTO: 0.13 K/UL (ref 0–0.11)
IMM GRANULOCYTES NFR BLD AUTO: 1.3 % (ref 0–0.9)
INR PPP: 1.29 (ref 0.87–1.13)
LYMPHOCYTES # BLD AUTO: 0.86 K/UL (ref 1–4.8)
LYMPHOCYTES NFR BLD: 8.5 % (ref 22–41)
MCH RBC QN AUTO: 27.7 PG (ref 27–33)
MCHC RBC AUTO-ENTMCNC: 28.7 G/DL (ref 33.7–35.3)
MCV RBC AUTO: 96.5 FL (ref 81.4–97.8)
MONOCYTES # BLD AUTO: 0.66 K/UL (ref 0–0.85)
MONOCYTES NFR BLD AUTO: 6.5 % (ref 0–13.4)
NEUTROPHILS # BLD AUTO: 8.37 K/UL (ref 1.82–7.42)
NEUTROPHILS NFR BLD: 82.4 % (ref 44–72)
NRBC # BLD AUTO: 0 K/UL
NRBC BLD-RTO: 0 /100 WBC
PLATELET # BLD AUTO: 151 K/UL (ref 164–446)
PMV BLD AUTO: 10.3 FL (ref 9–12.9)
POTASSIUM SERPL-SCNC: 4.9 MMOL/L (ref 3.6–5.5)
PROTHROMBIN TIME: 15.8 SEC (ref 12–14.6)
RBC # BLD AUTO: 2.56 M/UL (ref 4.7–6.1)
SODIUM SERPL-SCNC: 135 MMOL/L (ref 135–145)
WBC # BLD AUTO: 10.2 K/UL (ref 4.8–10.8)

## 2018-03-01 PROCEDURE — 85610 PROTHROMBIN TIME: CPT

## 2018-03-01 PROCEDURE — 85025 COMPLETE CBC W/AUTO DIFF WBC: CPT

## 2018-03-01 PROCEDURE — A9270 NON-COVERED ITEM OR SERVICE: HCPCS | Performed by: SURGERY

## 2018-03-01 PROCEDURE — 99233 SBSQ HOSP IP/OBS HIGH 50: CPT | Performed by: HOSPITALIST

## 2018-03-01 PROCEDURE — 80048 BASIC METABOLIC PNL TOTAL CA: CPT

## 2018-03-01 PROCEDURE — 700111 HCHG RX REV CODE 636 W/ 250 OVERRIDE (IP): Performed by: PLASTIC SURGERY

## 2018-03-01 PROCEDURE — 700102 HCHG RX REV CODE 250 W/ 637 OVERRIDE(OP): Performed by: INTERNAL MEDICINE

## 2018-03-01 PROCEDURE — A9270 NON-COVERED ITEM OR SERVICE: HCPCS | Performed by: INTERNAL MEDICINE

## 2018-03-01 PROCEDURE — 770001 HCHG ROOM/CARE - MED/SURG/GYN PRIV*

## 2018-03-01 PROCEDURE — 700111 HCHG RX REV CODE 636 W/ 250 OVERRIDE (IP): Performed by: HOSPITALIST

## 2018-03-01 PROCEDURE — 700111 HCHG RX REV CODE 636 W/ 250 OVERRIDE (IP): Performed by: INTERNAL MEDICINE

## 2018-03-01 PROCEDURE — 700102 HCHG RX REV CODE 250 W/ 637 OVERRIDE(OP): Performed by: SURGERY

## 2018-03-01 RX ORDER — CINACALCET 30 MG/1
60 TABLET, FILM COATED ORAL DAILY
Status: ON HOLD | COMMUNITY
End: 2018-03-02

## 2018-03-01 RX ADMIN — MEPERIDINE HYDROCHLORIDE 75 MG: 100 INJECTION INTRAMUSCULAR; INTRAVENOUS; SUBCUTANEOUS at 11:24

## 2018-03-01 RX ADMIN — GABAPENTIN 600 MG: 300 CAPSULE ORAL at 20:44

## 2018-03-01 RX ADMIN — GABAPENTIN 600 MG: 300 CAPSULE ORAL at 11:15

## 2018-03-01 RX ADMIN — TAZOBACTAM SODIUM AND PIPERACILLIN SODIUM 4.5 G: 500; 4 INJECTION, SOLUTION INTRAVENOUS at 16:58

## 2018-03-01 RX ADMIN — SEVELAMER CARBONATE 3200 MG: 800 TABLET, FILM COATED ORAL at 16:58

## 2018-03-01 RX ADMIN — SEVELAMER CARBONATE 3200 MG: 800 TABLET, FILM COATED ORAL at 11:13

## 2018-03-01 RX ADMIN — CINACALCET HYDROCHLORIDE 60 MG: 30 TABLET, COATED ORAL at 20:44

## 2018-03-01 RX ADMIN — BACITRACIN ZINC: 500 OINTMENT TOPICAL at 11:31

## 2018-03-01 RX ADMIN — STANDARDIZED SENNA CONCENTRATE AND DOCUSATE SODIUM 2 TABLET: 8.6; 5 TABLET, FILM COATED ORAL at 11:15

## 2018-03-01 RX ADMIN — GABAPENTIN 600 MG: 300 CAPSULE ORAL at 14:57

## 2018-03-01 RX ADMIN — OXYCODONE HYDROCHLORIDE 10 MG: 10 TABLET ORAL at 23:46

## 2018-03-01 RX ADMIN — LEVETIRACETAM 250 MG: 250 TABLET ORAL at 11:20

## 2018-03-01 RX ADMIN — LEVETIRACETAM 250 MG: 250 TABLET ORAL at 20:44

## 2018-03-01 RX ADMIN — PREDNISONE 20 MG: 20 TABLET ORAL at 11:16

## 2018-03-01 RX ADMIN — OXYCODONE HYDROCHLORIDE 10 MG: 10 TABLET ORAL at 20:44

## 2018-03-01 RX ADMIN — TAZOBACTAM SODIUM AND PIPERACILLIN SODIUM 4.5 G: 500; 4 INJECTION, SOLUTION INTRAVENOUS at 04:30

## 2018-03-01 RX ADMIN — CALCITRIOL 0.75 MCG: 0.25 CAPSULE, LIQUID FILLED ORAL at 20:44

## 2018-03-01 ASSESSMENT — ENCOUNTER SYMPTOMS
HEADACHES: 0
BACK PAIN: 0
COUGH: 0
CHILLS: 0
ABDOMINAL PAIN: 1
SHORTNESS OF BREATH: 0
DIARRHEA: 0
FEVER: 0
VOMITING: 0
SENSORY CHANGE: 0
FOCAL WEAKNESS: 0
DIZZINESS: 0
ABDOMINAL PAIN: 0
DOUBLE VISION: 0
BLURRED VISION: 0
PALPITATIONS: 0
DEPRESSION: 0
NAUSEA: 0
MYALGIAS: 0

## 2018-03-01 ASSESSMENT — LIFESTYLE VARIABLES: SUBSTANCE_ABUSE: 0

## 2018-03-01 ASSESSMENT — PAIN SCALES - GENERAL
PAINLEVEL_OUTOF10: 5
PAINLEVEL_OUTOF10: 2
PAINLEVEL_OUTOF10: 6
PAINLEVEL_OUTOF10: 2
PAINLEVEL_OUTOF10: 6
PAINLEVEL_OUTOF10: 2
PAINLEVEL_OUTOF10: 2

## 2018-03-01 NOTE — DISCHARGE PLANNING
LSW attended IDT rounds, Pt was dicussed and discharge date has been estimated for Friday 3/2/18    LSW has completed IMM and Pt has no post acute needs at this time

## 2018-03-01 NOTE — PROGRESS NOTES
Hemodialysis ordered by Dr. Najjar. Treatment started at 0804 and ended at 1030 d/t clotting dialyzer and lines. Blood returned. Pt refused to go back to machine d/t severe pain to RT LE. Pt off tx 35 mins early, AMA signed. Dr. Najjar notified and aware. See flow sheets for details. Net UF 1.0 liters. Report to RADHA Templeton RN.

## 2018-03-01 NOTE — PROGRESS NOTES
"Blood pressure 124/76, pulse 78, temperature 36.9 °C (98.4 °F), resp. rate 17, height 1.626 m (5' 4\"), weight 77.6 kg (171 lb 1.2 oz), SpO2 97 %.    I/O last 3 completed shifts:  In: 1540 [P.O.:580; I.V.:460; Dialysis:500]  Out: 1600 [Dialysis:1600]  Pt well  Pain MUCH better  Pt now says he will not need any more IV demerol  Graft with 100 % take at this time   Donor site OK  Graft re bolstered  OK for DC I will FU my office next week  "

## 2018-03-01 NOTE — PROGRESS NOTES
Nephrology Progress Note, Adult, Complex               Author: Fadi Najjar Date & Time created: 3/1/2018  2:50 PM     Interval History:  38 y/o male with ESRD/HHD admitted with abdominal wound infection.  Doing better, his pain is better    Review of Systems:  Review of Systems   Constitutional: Negative for chills, fever and malaise/fatigue.   Cardiovascular: Negative for chest pain and leg swelling.   Gastrointestinal: Negative for diarrhea, nausea and vomiting.   Genitourinary: Negative for dysuria.   Neurological: Negative for dizziness, sensory change, focal weakness and headaches.       Physical Exam:  Physical Exam   Constitutional: He is oriented to person, place, and time. No distress.   HENT:   Nose: Nose normal.   Eyes: Conjunctivae are normal. No scleral icterus.   Cardiovascular: Normal rate and regular rhythm.    Pulmonary/Chest: Effort normal and breath sounds normal. No respiratory distress.   Musculoskeletal: He exhibits no edema.   Neurological: He is alert and oriented to person, place, and time.   Skin: He is not diaphoretic.   Psychiatric: He has a normal mood and affect. His behavior is normal.   Nursing note and vitals reviewed.      Labs:        Invalid input(s): [id number]      Recent Labs      02/27/18   0300  02/28/18   0400  03/01/18   0430   SODIUM  138  137  135   POTASSIUM  4.5  4.5  4.9   CHLORIDE  100  99  100   CO2  25 27 27   BUN  28*  42*  28*   CREATININE  4.16*  6.18*  5.47*   CALCIUM  6.7*  7.0*  7.1*     Recent Labs      02/27/18   0300  02/28/18   0400  03/01/18   0430   GLUCOSE  100*  106*  104*     Recent Labs      02/27/18   0300  02/28/18   0400  03/01/18   0430   RBC  2.89*  2.73*  2.56*   HEMOGLOBIN  8.2*  7.7*  7.1*   HEMATOCRIT  27.7*  26.2*  24.7*   PLATELETCT  282  275  151*   PROTHROMBTM  15.0*  15.4*  15.8*   INR  1.21*  1.25*  1.29*     Recent Labs      02/27/18   0300  02/28/18   0400  03/01/18   0430   WBC  11.5*  7.9  10.2   NEUTSPOLYS  83.10*  71.70   82.40*   LYMPHOCYTES  6.20*  15.50*  8.50*   MONOCYTES  6.70  7.70  6.50   EOSINOPHILS  0.00  2.10  0.70   BASOPHILS  0.30  0.90  0.60           Hemodynamics:  Temp (24hrs), Av.1 °C (98.8 °F), Min:36.2 °C (97.1 °F), Max:38.3 °C (100.9 °F)  Temperature: 36.9 °C (98.4 °F)  Pulse  Av.2  Min: 46  Max: 157   Blood Pressure: 124/76    Respiratory:    Respiration: 17, Pulse Oximetry: 97 %           Fluids:    Intake/Output Summary (Last 24 hours) at 18 1450  Last data filed at 18 1320   Gross per 24 hour   Intake              700 ml   Output                0 ml   Net              700 ml        GI/Nutrition:  Orders Placed This Encounter   Procedures   • DIET ORDER     Standing Status:   Standing     Number of Occurrences:   1     Order Specific Question:   Diet:     Answer:   Renal [8]     Medical Decision Making, by Problem:  Active Hospital Problems    Diagnosis   • Hypertension [I10]   • ESRD (end stage renal disease) (CMS-[facility]) [N18.6]   • 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

## 2018-03-01 NOTE — PROGRESS NOTES
[facility] Hospitalist Progress Note    Date of Service: 2018    Chief Complaint  37 y.o. male admitted 2018 with bleeding abdominal wall varices.    Interval Problem Update  Complains of severe pain in his graft site  No issues overnight  Dialysis per nephrology    Consultants/Specialty  Intensivist  Plastic surgery  Nephrology  Infectious disease    Disposition  Possibly home on Thursday        Review of Systems   Constitutional: Negative for chills and fever.   HENT: Negative for hearing loss and tinnitus.    Eyes: Negative for blurred vision and double vision.   Respiratory: Negative for cough.    Cardiovascular: Negative for chest pain and palpitations.   Gastrointestinal: Negative for abdominal pain and nausea.   Genitourinary: Negative for dysuria and frequency.   Musculoskeletal: Positive for back pain and myalgias.   Skin: Negative for itching and rash.   Neurological: Negative for dizziness and headaches.   Psychiatric/Behavioral: Negative for depression and substance abuse.      Physical Exam  Laboratory/Imaging   Hemodynamics  Temp (24hrs), Av.7 °C (98.1 °F), Min:35.9 °C (96.7 °F), Max:38.3 °C (100.9 °F)   Temperature: (!) 38.3 °C (100.9 °F)  Pulse  Av.7  Min: 46  Max: 157    Blood Pressure: 142/40     Respiratory      Respiration: 19, Pulse Oximetry: 92 %        RUL Breath Sounds: Clear, RML Breath Sounds: Clear, RLL Breath Sounds: Diminished, ANGEL Breath Sounds: Clear, LLL Breath Sounds: Diminished    Fluids    Intake/Output Summary (Last 24 hours) at 18 1742  Last data filed at 18 1315   Gross per 24 hour   Intake             1200 ml   Output             1600 ml   Net             -400 ml       Nutrition  Orders Placed This Encounter   Procedures   • DIET ORDER     Standing Status:   Standing     Number of Occurrences:   1     Order Specific Question:   Diet:     Answer:   Renal [8]     Physical Exam   Constitutional: He is oriented to person, place, and time.  Non-toxic  appearance. No distress.   HENT:   Head: Normocephalic and atraumatic.   Mouth/Throat: No oropharyngeal exudate.   Eyes: Conjunctivae are normal. Pupils are equal, round, and reactive to light.   Neck: Normal range of motion. No edema and no erythema present. No thyromegaly present.   Cardiovascular: Normal rate and regular rhythm.    No murmur heard.  Pulmonary/Chest: Effort normal and breath sounds normal. No respiratory distress.   Abdominal: Soft. Bowel sounds are normal. He exhibits no distension. There is tenderness.   Right sided wound healing, no discharge.    Musculoskeletal: He exhibits no edema.   Neurological: He is alert and oriented to person, place, and time.   Skin: Skin is dry. He is not diaphoretic. No erythema.   Psychiatric: He has a normal mood and affect.   Nursing note and vitals reviewed.      Recent Labs      02/26/18   0710  02/27/18   0300  02/28/18   0400   WBC  7.7  11.5*  7.9   RBC  2.90*  2.89*  2.73*   HEMOGLOBIN  8.0*  8.2*  7.7*   HEMATOCRIT  27.5*  27.7*  26.2*   MCV  94.8  95.8  96.0   MCH  27.6  28.4  28.2   MCHC  29.1*  29.6*  29.4*   RDW  58.1*  59.1*  60.7*   PLATELETCT  251  282  275   MPV  10.1  10.1  9.9     Recent Labs      02/26/18   1351  02/27/18   0300  02/28/18   0400   SODIUM   --   138  137   POTASSIUM  3.8  4.5  4.5   CHLORIDE   --   100  99   CO2   --   25  27   GLUCOSE   --   100*  106*   BUN   --   28*  42*   CREATININE   --   4.16*  6.18*   CALCIUM   --   6.7*  7.0*     Recent Labs      02/26/18   1359  02/27/18   0300  02/28/18   0400   INR  1.23*  1.21*  1.25*                  Assessment/Plan     Shock (CMS-HCC)   Assessment & Plan    -Resolved        Abdominal varicosities   Assessment & Plan    - Chronic issue, acute bleeding  - Now status post surgery  - No sign of gross bleeding  - Dr. Rosenbaum did skin graft on Monday and will follow up on Thursday  Continue with pain control: Started on Demerol per plastic surgery today        Bacteremia- (present on  admission)   Assessment & Plan    - Klebsiella per blood culture ×2  abdominal wound cx + for enterococcus, on vanc and zosyn, ID following. Repeated cx neg so far.  concern for infected dialysis catheter        ESRD (end stage renal disease) (CMS-HCC)- (present on admission)   Assessment & Plan    - Patient generally gets hemodialysis 5 times a week, hemodialysis per nephrology        Thrombocytopenia (CMS-HCC)   Assessment & Plan    Resolved.         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        Hemorrhagic disorder due to circulating anticoagulants (CMS-HCC)   Assessment & Plan    - Is on Coumadin for occluded IVC, currently being held due to bleeding.   Will discuss with surgery when to restart        Hyperkalemia- (present on admission)   Assessment & Plan    -Resolved with hemodialysis.          Seizure disorder (CMS-HCC)- (present on admission)   Assessment & Plan    - Continue Keppra, no sign of seizure activity.        Normocytic anemia- (present on admission)   Assessment & Plan    - No longer any sign of gross bleeding, did have significant bleeding initially  Stable        Sleep apnea- (present on admission)   Assessment & Plan    - Patient uses BiPAP at night, [facility] with [facility].          Quality-Core Measures   Reviewed items::  Labs reviewed and Medications reviewed  DVT prophylaxis pharmacological::  Contraindicated - High bleeding risk  DVT prophylaxis - mechanical:  SCDs  Antibiotics:  Treating active infection/contamination beyond 24 hours perioperative coverage

## 2018-03-02 ENCOUNTER — APPOINTMENT (OUTPATIENT)
Dept: RADIOLOGY | Facility: MEDICAL CENTER | Age: 38
DRG: 856 | End: 2018-03-02
Attending: INTERNAL MEDICINE
Payer: MEDICARE

## 2018-03-02 VITALS
OXYGEN SATURATION: 97 % | WEIGHT: 171.08 LBS | TEMPERATURE: 98 F | BODY MASS INDEX: 29.21 KG/M2 | HEIGHT: 64 IN | DIASTOLIC BLOOD PRESSURE: 79 MMHG | RESPIRATION RATE: 18 BRPM | HEART RATE: 97 BPM | SYSTOLIC BLOOD PRESSURE: 137 MMHG

## 2018-03-02 PROBLEM — R57.9 SHOCK (HCC): Status: RESOLVED | Noted: 2018-02-20 | Resolved: 2018-03-02

## 2018-03-02 LAB
ANION GAP SERPL CALC-SCNC: 9 MMOL/L (ref 0–11.9)
BASOPHILS # BLD AUTO: 0.5 % (ref 0–1.8)
BASOPHILS # BLD: 0.04 K/UL (ref 0–0.12)
BUN SERPL-MCNC: 41 MG/DL (ref 8–22)
CALCIUM SERPL-MCNC: 7.3 MG/DL (ref 8.5–10.5)
CHLORIDE SERPL-SCNC: 98 MMOL/L (ref 96–112)
CO2 SERPL-SCNC: 25 MMOL/L (ref 20–33)
CREAT SERPL-MCNC: 7.36 MG/DL (ref 0.5–1.4)
EOSINOPHIL # BLD AUTO: 0.08 K/UL (ref 0–0.51)
EOSINOPHIL NFR BLD: 1.1 % (ref 0–6.9)
ERYTHROCYTE [DISTWIDTH] IN BLOOD BY AUTOMATED COUNT: 62.5 FL (ref 35.9–50)
GLUCOSE SERPL-MCNC: 109 MG/DL (ref 65–99)
HCT VFR BLD AUTO: 26.3 % (ref 42–52)
HGB BLD-MCNC: 7.7 G/DL (ref 14–18)
IMM GRANULOCYTES # BLD AUTO: 0.09 K/UL (ref 0–0.11)
IMM GRANULOCYTES NFR BLD AUTO: 1.2 % (ref 0–0.9)
INR PPP: 1.21 (ref 0.87–1.13)
LYMPHOCYTES # BLD AUTO: 0.83 K/UL (ref 1–4.8)
LYMPHOCYTES NFR BLD: 11.4 % (ref 22–41)
MCH RBC QN AUTO: 28.3 PG (ref 27–33)
MCHC RBC AUTO-ENTMCNC: 29.3 G/DL (ref 33.7–35.3)
MCV RBC AUTO: 96.7 FL (ref 81.4–97.8)
MONOCYTES # BLD AUTO: 0.63 K/UL (ref 0–0.85)
MONOCYTES NFR BLD AUTO: 8.6 % (ref 0–13.4)
MORPHOLOGY BLD-IMP: NORMAL
NEUTROPHILS # BLD AUTO: 5.62 K/UL (ref 1.82–7.42)
NEUTROPHILS NFR BLD: 77.2 % (ref 44–72)
NRBC # BLD AUTO: 0 K/UL
NRBC BLD-RTO: 0 /100 WBC
PLATELET # BLD AUTO: 134 K/UL (ref 164–446)
PMV BLD AUTO: 10.7 FL (ref 9–12.9)
POTASSIUM SERPL-SCNC: 5.1 MMOL/L (ref 3.6–5.5)
PROTHROMBIN TIME: 15 SEC (ref 12–14.6)
RBC # BLD AUTO: 2.72 M/UL (ref 4.7–6.1)
SODIUM SERPL-SCNC: 132 MMOL/L (ref 135–145)
WBC # BLD AUTO: 7.3 K/UL (ref 4.8–10.8)

## 2018-03-02 PROCEDURE — 99239 HOSP IP/OBS DSCHRG MGMT >30: CPT | Performed by: HOSPITALIST

## 2018-03-02 PROCEDURE — 71045 X-RAY EXAM CHEST 1 VIEW: CPT

## 2018-03-02 PROCEDURE — 700102 HCHG RX REV CODE 250 W/ 637 OVERRIDE(OP): Performed by: INTERNAL MEDICINE

## 2018-03-02 PROCEDURE — 700111 HCHG RX REV CODE 636 W/ 250 OVERRIDE (IP)

## 2018-03-02 PROCEDURE — 80048 BASIC METABOLIC PNL TOTAL CA: CPT

## 2018-03-02 PROCEDURE — 85025 COMPLETE CBC W/AUTO DIFF WBC: CPT

## 2018-03-02 PROCEDURE — 90935 HEMODIALYSIS ONE EVALUATION: CPT

## 2018-03-02 PROCEDURE — 700111 HCHG RX REV CODE 636 W/ 250 OVERRIDE (IP): Performed by: HOSPITALIST

## 2018-03-02 PROCEDURE — 5A1D70Z PERFORMANCE OF URINARY FILTRATION, INTERMITTENT, LESS THAN 6 HOURS PER DAY: ICD-10-PCS | Performed by: INTERNAL MEDICINE

## 2018-03-02 PROCEDURE — 700111 HCHG RX REV CODE 636 W/ 250 OVERRIDE (IP): Performed by: INTERNAL MEDICINE

## 2018-03-02 PROCEDURE — 85610 PROTHROMBIN TIME: CPT

## 2018-03-02 PROCEDURE — A9270 NON-COVERED ITEM OR SERVICE: HCPCS | Performed by: INTERNAL MEDICINE

## 2018-03-02 RX ORDER — PREDNISONE 20 MG/1
20 TABLET ORAL DAILY
Qty: 30 TAB | Refills: 0 | Status: ON HOLD | OUTPATIENT
Start: 2018-03-02 | End: 2018-03-12

## 2018-03-02 RX ORDER — AMOXICILLIN AND CLAVULANATE POTASSIUM 500; 125 MG/1; MG/1
1 TABLET, FILM COATED ORAL EVERY 24 HOURS
Qty: 15 TAB | Refills: 0 | Status: SHIPPED | OUTPATIENT
Start: 2018-03-02 | End: 2018-03-17

## 2018-03-02 RX ORDER — LEVOFLOXACIN 500 MG/1
500 TABLET, FILM COATED ORAL
Status: DISCONTINUED | OUTPATIENT
Start: 2018-03-04 | End: 2018-03-02 | Stop reason: HOSPADM

## 2018-03-02 RX ORDER — HYDROCODONE BITARTRATE AND ACETAMINOPHEN 10; 325 MG/1; MG/1
1-2 TABLET ORAL EVERY 12 HOURS PRN
Qty: 20 TAB | Refills: 0 | Status: SHIPPED | OUTPATIENT
Start: 2018-03-02 | End: 2018-03-09

## 2018-03-02 RX ORDER — AMOXICILLIN AND CLAVULANATE POTASSIUM 500; 125 MG/1; MG/1
1 TABLET, FILM COATED ORAL EVERY 24 HOURS
Status: DISCONTINUED | OUTPATIENT
Start: 2018-03-02 | End: 2018-03-02 | Stop reason: HOSPADM

## 2018-03-02 RX ORDER — LEVOFLOXACIN 500 MG/1
750 TABLET, FILM COATED ORAL DAILY
Status: DISCONTINUED | OUTPATIENT
Start: 2018-03-02 | End: 2018-03-02 | Stop reason: HOSPADM

## 2018-03-02 RX ORDER — LEVOFLOXACIN 500 MG/1
500 TABLET, FILM COATED ORAL
Qty: 7 TAB | Refills: 0 | Status: SHIPPED | OUTPATIENT
Start: 2018-03-02 | End: 2018-03-17

## 2018-03-02 RX ORDER — HEPARIN SODIUM 1000 [USP'U]/ML
INJECTION, SOLUTION INTRAVENOUS; SUBCUTANEOUS
Status: COMPLETED
Start: 2018-03-02 | End: 2018-03-02

## 2018-03-02 RX ADMIN — STANDARDIZED SENNA CONCENTRATE AND DOCUSATE SODIUM 2 TABLET: 8.6; 5 TABLET, FILM COATED ORAL at 08:07

## 2018-03-02 RX ADMIN — SEVELAMER CARBONATE 3200 MG: 800 TABLET, FILM COATED ORAL at 08:06

## 2018-03-02 RX ADMIN — LEVETIRACETAM 250 MG: 250 TABLET ORAL at 08:09

## 2018-03-02 RX ADMIN — GABAPENTIN 600 MG: 300 CAPSULE ORAL at 16:28

## 2018-03-02 RX ADMIN — PREDNISONE 20 MG: 20 TABLET ORAL at 08:07

## 2018-03-02 RX ADMIN — ERYTHROPOIETIN 10000 UNITS: 10000 INJECTION, SOLUTION INTRAVENOUS; SUBCUTANEOUS at 14:29

## 2018-03-02 RX ADMIN — SEVELAMER CARBONATE 2400 MG: 800 TABLET, FILM COATED ORAL at 11:45

## 2018-03-02 RX ADMIN — GABAPENTIN 600 MG: 300 CAPSULE ORAL at 08:07

## 2018-03-02 RX ADMIN — HEPARIN SODIUM 3900 UNITS: 1000 INJECTION, SOLUTION INTRAVENOUS; SUBCUTANEOUS at 15:23

## 2018-03-02 RX ADMIN — BACITRACIN ZINC: 500 OINTMENT TOPICAL at 08:08

## 2018-03-02 RX ADMIN — TAZOBACTAM SODIUM AND PIPERACILLIN SODIUM 4.5 G: 500; 4 INJECTION, SOLUTION INTRAVENOUS at 05:17

## 2018-03-02 ASSESSMENT — ENCOUNTER SYMPTOMS
SHORTNESS OF BREATH: 0
DIZZINESS: 0
FEVER: 0
DIARRHEA: 0
FOCAL WEAKNESS: 0
VOMITING: 0
COUGH: 0
ABDOMINAL PAIN: 1
CHILLS: 0
HEADACHES: 0
SENSORY CHANGE: 0
NAUSEA: 0

## 2018-03-02 ASSESSMENT — PAIN SCALES - GENERAL
PAINLEVEL_OUTOF10: 2
PAINLEVEL_OUTOF10: 4
PAINLEVEL_OUTOF10: 2
PAINLEVEL_OUTOF10: 0
PAINLEVEL_OUTOF10: 0

## 2018-03-02 NOTE — DISCHARGE INSTRUCTIONS
Discharge Instructions    Discharged to home by car with relative. Discharged via wheelchair, hospital escort: Yes.  Special equipment needed: Not Applicable    Be sure to schedule a follow-up appointment with your primary care doctor or any specialists as instructed.     Discharge Plan:   Pneumococcal Vaccine Given - only chart on this line when given: Given (See MAR)  Influenza Vaccine Indication: Not indicated: Previously immunized this influenza season and > 8 years of age    I understand that a diet low in cholesterol, fat, and sodium is recommended for good health. Unless I have been given specific instructions below for another diet, I accept this instruction as my diet prescription.   Other diet: Regular    Special Instructions: None    · Is patient discharged on Warfarin / Coumadin?   No     Depression / Suicide Risk    As you are discharged from this [facility] facility, it is important to learn how to keep safe from harming yourself.    Recognize the warning signs:  · Abrupt changes in personality, positive or negative- including increase in energy   · Giving away possessions  · Change in eating patterns- significant weight changes-  positive or negative  · Change in sleeping patterns- unable to sleep or sleeping all the time   · Unwillingness or inability to communicate  · Depression  · Unusual sadness, discouragement and loneliness  · Talk of wanting to die  · Neglect of personal appearance   · Rebelliousness- reckless behavior  · Withdrawal from people/activities they love  · Confusion- inability to concentrate     If you or a loved one observes any of these behaviors or has concerns about self-harm, here's what you can do:  · Talk about it- your feelings and reasons for harming yourself  · Remove any means that you might use to hurt yourself (examples: pills, rope, extension cords, firearm)  · Get professional help from the community (Mental Health, Substance Abuse, psychological counseling)  · Do  not be alone:Call your Safe Contact- someone whom you trust who will be there for you.  · Call your local CRISIS HOTLINE 742-1864 or 506-848-1174  · Call your local Children's Mobile Crisis Response Team [place] (175) 975-7280 or www.[employer]  · Call the toll free National Suicide Prevention Hotlines   · National Suicide Prevention Lifeline 877-329-DILM (7318)  · Drink[employer] Line Network 800-SUICIDE (204-2173)    End-Stage Kidney Disease  End-stage kidney disease occurs when the kidneys are so damaged that they cannot do their job. The kidneys are two organs that do many important jobs in the body, which include:  · Removing wastes and extra fluids from the blood.  · Making hormones that maintain the amount of fluid in your tissues and blood vessels.  · Maintaining the right amount of fluids and chemicals in the body.  When the kidneys are damaged and cannot do their job, life-threatening problems occur. Without the help of the kidneys, toxins build up in the blood. In end-stage kidney disease, the kidneys cannot get better.  What are the causes?  End-stage kidney disease usually occurs when a long-lasting (chronic) kidney disease gets worse. It may also occur after the kidneys are suddenly damaged (acute kidney injury).  What increases the risk?  This condition is more likely to develop in people who are:  · Older than age 60.  · Male.  · Of -American descent.  · Current smokers or former smokers.  · Obese.  You may also have an increased risk for end-stage kidney disease if you:  · Have a family history of chronic kidney disease (CKD).  · Have had kidney disease for many years.  · Have other longstanding medical conditions that affect the kidneys, such as:  ¨ Cardiovascular disease including high blood pressure.  ¨ Diabetes.  ¨ Certain diseases that affect the immune system.  What are the signs or symptoms?  · Swelling (edema) of the face, legs, ankles, or feet.  · Numbness, tingling, or  loss of feeling (sensation) in your hands or feet.  · Tiredness (lethargy).  · Nausea or vomiting.  · Confusion, trouble concentrating, or loss of consciousness.  · Chest pain.  · Shortness of breath.  · Little to no urine production.  · Muscle twitches and cramps, especially in the legs.  · Constant itchiness.  · Loss of appetite.  · Pale skin and tissue lining your eyelids (conjunctiva).  · Headaches.  · Abnormally dark or light skin.  · Decrease in muscle size (muscle wasting).  · Easy bruising.  · Frequent hiccups.  · Stopping of menstruation in women.  · Seizures.  How is this diagnosed?  Your health care provider will measure your blood pressure and do some tests. These may include:  · Urine tests.  · Blood tests.  · Imaging tests.  · A test in which a sample of tissue is removed from the kidneys to be looked at under a microscope (kidney biopsy).  How is this treated?  There are two treatments for end-stage kidney disease:  · A procedure that removes toxic wastes from the body (dialysis). Depending on the type of dialysis you choose, it may be performed more than one time a day (peritoneal dialysis) or several times a week (hemodialysis).  · Surgery to receive a new kidney (kidney transplant).  In addition to having dialysis or a kidney transplant, you may need to take medicines:  · To control high blood pressure (hypertension).  · To control cholesterol.  · To maintain healthy electrolyte levels in your blood.  You may also be given a specific diet to follow that includes requirements or limits for:  · Salt (sodium).  · Protein.  · Phosphorous.  · Potassium.  · Calcium.  Follow these instructions at home:  · Follow your prescribed diet.  · Take over-the-counter and prescription medicines only as told by your health care provider.  ¨ Do not take any new medicines unless approved by your health care provider. Many medicines can worsen your kidney damage.  ¨ Do not take any vitamin and mineral supplements  unless approved by your health care provider. Many nutritional supplements can worsen your kidney damage.  ¨ The dose of some medicines that you take may need to be adjusted.  · Do not use any tobacco products, such as cigarettes, chewing tobacco, and e-cigarettes. If you need help quitting, ask your health care provider.  · Keep all follow-up visits as told by your health care provider. This is important.  · Keep track of your blood pressure. Report changes in your blood pressure as told by your health care provider.  · Achieve and maintain a healthy weight. If you need help with this, ask your health care provider.  · Start or continue an exercise plan. Try to exercise at least 30 minutes a day, 5 days a week.  · Stay current with immunizations as told by your health care provider.  Where to find more information:  · American Association of Kidney Patients: www.aakp.org  · National Kidney Foundation: www.kidney.org  · American Kidney Fund: www.akfinc.org  · Life Options Rehabilitation Program: www.lifeoptions.org and www.kidneyschool.org  Contact a health care provider if:  · Your symptoms get worse.  · You develop new symptoms.  Get help right away if:  · You have weakness in an arm or leg on one side of your body.  · You have difficulty speaking or you are slurring your speech.  · You have a sudden change in your vision.  · You have a sudden, severe headache.  · You have a sudden weight increase.  · You have difficulty breathing.  · Your symptoms suddenly get worse.  This information is not intended to replace advice given to you by your health care provider. Make sure you discuss any questions you have with your health care provider.  Document Released: 03/09/2005 Document Revised: 05/25/2017 Document Reviewed: 08/16/2013  Elsevier Interactive Patient Education © 2017 Elsevier Inc.    Skin Grafting, Care After  Refer to this sheet in the next few weeks. These instructions provide you with information about  caring for yourself after your procedure. Your health care provider may also give you more specific instructions. Your treatment has been planned according to current medical practices, but problems sometimes occur. Call your health care provider if you have any problems or questions after your procedure.  WHAT TO EXPECT AFTER THE PROCEDURE  After your procedure, it is common to have:  · Pain.  · Swelling.  HOME CARE INSTRUCTIONS  · Take medicines only as directed by your health care provider.  · Follow your health care provider's instructions about:  ¨ Care of your graft and your donor site.  ¨ Bandage (dressing) changes and removal.  ¨ Wound closure removal.  · Check your graft and your donor site every day for signs of infection. Watch for:  ¨ Redness, swelling, or pain.  ¨ Fluid, blood, or pus.  · You may need to wear a supportive bandage or wrap for several weeks.  · Do not take baths, swim, or use a hot tub until your health care provider approves.  · Do not exercise or do any other activities that could stretch the graft. Ask your health care provider when it will be safe for you to exercise again.  · Keep all follow-up visits as directed by your health care provider. This is important.  SEEK MEDICAL CARE IF:  · You have a fever.  · You have redness, swelling, or pain at your graft or your donor site.  · You have fluid, blood, or pus coming from your graft or your donor site.  · Your pain gets worse.  SEEK IMMEDIATE MEDICAL CARE IF:  · You have chest pain.  · You feel short of breath or you start coughing.  · You feel dizzy.     This information is not intended to replace advice given to you by your health care provider. Make sure you discuss any questions you have with your health care provider.     Document Released: 01/08/2016 Document Reviewed: 01/08/2016  [employer] Interactive Patient Education ©2016 [employer]    Anticoagulation, Generic  Anticoagulants are medications used to prevent clots from  developing in your veins. These medications are also known as blood thinners. If blood clots are untreated, they could travel to your lungs. This is called a pulmonary embolus. A blood clot in your lungs can be fatal.   Caregivers often use anticoagulants to prevent clots following surgery. Anticoagulants are also used along with aspirin when the heart is not getting enough blood.  Another anticoagulant called warfarin is started 2 to 3 days after a rapid-acting injectable anticoagulant is started. The rapid-acting anticoagulants are usually continued until warfarin has begun to work. Your caregiver will  this length of time by blood tests known as the prothrombin time (PT) and International Normalization Ratio (INR). This means that your blood is at the necessary and best level to prevent clots.  RISKS AND COMPLICATIONS  · If you have received recent epidural anesthesia, spinal anesthesia, or a spinal tap while receiving anticoagulants, you are at risk for developing a blood clot in or around the spine. This condition could result in long-term or permanent paralysis.  · Because anticoagulants thin your blood, severe bleeding may occur from any tissue or organ. Symptoms of the blood being too thin may include:  · Bleeding from the nose or gums that does not stop quickly.  · Unusual bruising or bruising easily.  · Swelling or pain at an injection site.  · A cut that does not stop bleeding within 10 minutes.  · Continual nausea for more than 1 day or vomiting blood.  · Coughing up blood.  · Blood in the urine which may appear as pink, red, or brown urine.  · Blood in bowel movements which may appear as red, dark or black stools.  · Sudden weakness or numbness of the face, arm, or leg, especially on one side of the body.  · Sudden confusion.  · Trouble speaking (aphasia) or understanding.  · Sudden trouble seeing in one or both eyes.  · Sudden trouble walking.  · Dizziness.  · Loss of balance or  coordination.  · Severe pain, such as a headache, joint pain, or back pain.  · Fever.  · Too little anticoagulation continues to allow the risk for blood clots.  HOME CARE INSTRUCTIONS   · Due to the complications of anticoagulants, it is very important that you take your anticoagulant as directed by your caregiver. Anticoagulants need to be taken exactly as instructed. Be sure you understand all your anticoagulant instructions.  · Warfarin. Your caregiver will advise you on the length of treatment (usually 3 6 months, sometimes lifelong).  · Take warfarin exactly as directed by your caregiver. It is recommended that you take your warfarin dose at the same time of the day. It is preferred that you take warfarin in the late afternoon. If you have been told to stop taking warfarin, do not resume taking warfarin until directed to do so by your caregiver. Follow your caregiver's instructions if you accidentally take an extra dose or miss a dose of warfarin. It is very important to take warfarin as directed since bleeding or blood clots could result in chronic or permanent injury, pain, or disability.  · Too much and too little warfarin are both dangerous. Too much warfarin increases the risk of bleeding. Too little warfarin continues to allow the risk for blood clots. While taking warfarin, you will need to have regular blood tests to measure your blood clotting time. These blood tests usually include both the PT and INR tests. The PT and INR results allow your caregiver to adjust your dose of warfarin. The dose can change for many reasons. It is critically important that you take warfarin exactly as prescribed, and that you have your PT and INR levels drawn exactly as directed. Follow up with your laboratory test appointments as directed. It is very important to keep your lab appointments. Not keeping lab appointments could result in a chronic or permanent injury, pain, or disability.  · Many foods, especially foods  high in vitamin K can interfere with warfarin and affect the PT and INR results. Foods high in vitamin K include spinach, kale, broccoli, cabbage, neo and turnip greens, brussels sprouts, peas, cauliflower, seaweed, and parsley as well as beef and pork liver, green tea, and soybean oil. You should eat a consistent amount of foods high in vitamin K. Avoid major changes in your diet, or notify your caregiver before changing your diet. Arrange a visit with a dietitian to answer your questions.  · Many medicines can interfere with warfarin and affect the PT and INR results. You must tell your caregiver about any and all medicines you take, this includes all vitamins and supplements. Ask your caregiver before taking these. Prescription and over-the-counter medicine consistency is critical to warfarin management. It is important that potential interactions are checked before you start a new medicine. Be especially cautious with aspirin and anti-inflammatory medicines. Ask your caregiver before taking these. Medicines such as antibiotics and acid-reducing medicine can interact with warfarin and can cause an increased warfarin effect. Warfarin can also interfere with the effectiveness of medicines you are taking. Do not take or discontinue any prescribed or over-the-counter medicine except on the advice of your caregiver or pharmacist.  · Some vitamins, supplements, and herbal products interfere with the effectiveness of warfarin. Vitamin E may increase the anticoagulant effects of warfarin. Vitamin K may can cause warfarin to be less effective. Do not take or discontinue any vitamin, supplement, or herbal product except on the advice of your caregiver or pharmacist.  · Alcohol can change the body's ability to handle warfarin. It is best to avoid alcoholic drinks or consume only very small amounts while taking warfarin. Notify your caregiver if you change your alcohol intake. A sudden increase in alcohol use can  increase your risk of bleeding. Chronic alcohol use can cause warfarin to be less effective.  · If you have a loss of appetite or get the stomach flu (viral gastroenteritis), talk to your caregiver as soon as possible. A decrease in your normal vitamin K intake can make you more sensitive to your usual dose of warfarin.  · Some medical conditions may increase your risk for bleeding while you are taking warfarin. A fever, diarrhea lasting more than a day, worsening heart failure, or worsening liver function are some medical conditions that could affect warfarin. Contact your caregiver if you have any of these medical conditions.  · Warfarin can have side effects, such as excessive bruising or bleeding. You will need to hold pressure over cuts for longer than usual.  · Be careful not to cut yourself when using sharp objects.  · Notify your dentist or other caregivers before procedures.  · Limit physical activities or sports that could result in a fall or cause injury. Avoid contact sports.  · Wear a medical alert bracelet or carry a medical alert card.  SEEK MEDICAL CARE IF:   · You develop any rashes.  · You have any worsening of the condition for which you are receiving anticoagulation therapy.  SEEK IMMEDIATE MEDICAL CARE IF:   · Bleeding from the nose or gums does not stop quickly.  · You have unusual bruising or are bruising easily.  · Swelling or pain occurs at an injection site.  · A cut does not stop bleeding within 10 minutes.  · You have continual nausea for more than 1 day or are vomiting blood.  · You are coughing up blood.  · You have blood in the urine.  · You have dark or black stools.  · You have sudden weakness or numbness of the face, arm, or leg, especially on one side of the body.  · You have sudden confusion.  · You have trouble speaking (aphasia) or understanding.  · You have sudden trouble seeing in one or both eyes.  · You have sudden trouble walking.  · You have dizziness.  · You have a loss  of balance or coordination.  · You have severe pain, such as a headache, joint pain, or back pain.  · You have a serious fall or head injury, even if you are not bleeding.  · You have an oral temperature above 102° F (38.9° C), not controlled by medicine.  ANY OF THESE SYMPTOMS MAY REPRESENT A SERIOUS PROBLEM THAT IS AN EMERGENCY. Do not wait to see if the symptoms will go away. Get medical help right away. Call your local emergency services (911 in [place] DO NOT drive yourself to the hospital.  MAKE SURE YOU:   · Understand these instructions.  · Will watch your condition.  · Will get help right away if you are not doing well or get worse.  Document Released: 12/18/2006 Document Revised: 09/11/2013 Document Reviewed: 07/22/2009  [employer] Patient Information ©2014 [employer].      Blood Transfusion , Adult  A blood transfusion is a procedure in which you receive donated blood, including plasma, platelets, and red blood cells, through an IV tube. You may need a blood transfusion because of illness, surgery, or injury. The blood may come from a donor. You may also be able to donate blood for yourself (autologous blood donation) before a surgery if you know that you might require a blood transfusion.  The blood given in a transfusion is made up of different types of cells. You may receive:  · Red blood cells. These carry oxygen to the cells in the body.  · White blood cells. These help you fight infections.  · Platelets. These help your blood to clot.  · Plasma. This is the liquid part of your blood and it helps with fluid imbalances.  If you have hemophilia or another clotting disorder, you may also receive other types of blood products.  Tell a health care provider about:  · Any allergies you have.  · All medicines you are taking, including vitamins, herbs, eye drops, creams, and over-the-counter medicines.  · Any problems you or family members have had with anesthetic medicines.  · Any blood disorders you  have.  · Any surgeries you have had.  · Any medical conditions you have, including any recent fever or cold symptoms.  · Whether you are pregnant or may be pregnant.  · Any previous reactions you have had during a blood transfusion.  What are the risks?  Generally, this is a safe procedure. However, problems may occur, including:  · Having an allergic reaction to something in the donated blood. Hives and itching may be symptoms of this type of reaction.  · Fever. This may be a reaction to the white blood cells in the transfused blood. Nausea or chest pain may accompany a fever.  · Iron overload. This can happen from having many transfusions.  · Transfusion-related acute lung injury (TRALI). This is a rare reaction that causes lung damage. The cause is not known. TRALI can occur within hours of a transfusion or several days later.  · Sudden (acute) or delayed hemolytic reactions. This happens if your blood does not match the cells in your transfusion. Your body’s defense system (immune system) may try to attack the new cells. This complication is rare. The symptoms include fever, chills, nausea, and low back pain or chest pain.  · Infection or disease transmission. This is rare.  What happens before the procedure?  · You will have a blood test to determine your blood type. This is necessary to know what kind of blood your body will accept and to match it to the donor blood.  · If you are going to have a planned surgery, you may be able to do an autologous blood donation. This may be done in case you need to have a transfusion.  · If you have had an allergic reaction to a transfusion in the past, you may be given medicine to help prevent a reaction. This medicine may be given to you by mouth or through an IV tube.  · You will have your temperature, blood pressure, and pulse monitored before the transfusion.  · Follow instructions from your health care provider about eating and drinking restrictions.  · Ask your health  care provider about:  ¨ Changing or stopping your regular medicines. This is especially important if you are taking diabetes medicines or blood thinners.  ¨ Taking medicines such as aspirin and ibuprofen. These medicines can thin your blood. Do not take these medicines before your procedure if your health care provider instructs you not to.  What happens during the procedure?  · An IV tube will be inserted into one of your veins.  · The bag of donated blood will be attached to your IV tube. The blood will then enter through your vein.  · Your temperature, blood pressure, and pulse will be monitored regularly during the transfusion. This monitoring is done to detect early signs of a transfusion reaction.  · If you have any signs or symptoms of a reaction, your transfusion will be stopped and you may be given medicine.  · When the transfusion is complete, your IV tube will be removed.  · Pressure may be applied to the IV site for a few minutes.  · A bandage (dressing) will be applied.  The procedure may vary among health care providers and hospitals.  What happens after the procedure?  · Your temperature, blood pressure, heart rate, breathing rate, and blood oxygen level will be monitored often.  · Your blood may be tested to see how you are responding to the transfusion.  · You may be warmed with fluids or blankets to maintain a normal body temperature.  Summary  · A blood transfusion is a procedure in which you receive donated blood, including plasma, platelets, and red blood cells, through an IV tube.  · Your temperature, blood pressure, and pulse will be monitored before, during, and after the transfusion.  · Your blood may be tested after the transfusion to see how your body has responded.  This information is not intended to replace advice given to you by your health care provider. Make sure you discuss any questions you have with your health care provider.  Document Released: 12/15/2001 Document Revised:  09/14/2017 Document Reviewed: 09/14/2017  [employer] Interactive Patient Education © 2017 [employer] Inc.

## 2018-03-02 NOTE — CARE PLAN
Problem: Safety  Goal: Will remain free from falls  Outcome: PROGRESSING AS EXPECTED  Patient educated on the importance of calling a staff member before getting out of bed. Patient verbalizes understanding and patient calling appropriately. Bed in lowest position, call light and other belongings within reach, treaded socks on, and hourly rounding in place.

## 2018-03-02 NOTE — CARE PLAN
Problem: Infection  Goal: Will remain free from infection    Intervention: Assess signs and symptoms of infection  Dressing changed to graft site per order. Bacitracin ointment applied. No redness or drainage noted      Problem: Mobility  Goal: Risk for activity intolerance will decrease    Intervention: Assess and monitor signs of activity intolerance  Up to chair for meals, declined ambulation despite encouragement, assistance, education

## 2018-03-02 NOTE — PROGRESS NOTES
Infectious Disease Progress Note    Author: Elmira Adame M.D. Date & Time of service: 3/2/2018  12:58 PM    Chief Complaint:  FU abdominal wall abscess    Interval History:   AF, WBC 9.1, transferred out of the ICU overnight, having lots of pain, plan for skin graft on Monday, tolerating abx without issues   AF, WBC 6.9, sitting in chair, pain controlled if binder not removed  2018-MAXIMUM TEMPERATURE 98.5 wbc 7.7 creatinine 5.4 c/o abdominal pain   -MAXIMUM TEMPERATURE 98.2. No new issues overnight. C/o  Pain  3/1/2018-MAXIMUM TEMPERATURE 98.7 WBCs 10.2 platelets 151  3/2/2018-MAXIMUM TEMPERATURE 98.3. Patient wants to go home and does not want to stay.  Labs Reviewed, Medications Reviewed, Radiology Reviewed and Wound Reviewed.    Review of Systems:  Review of Systems   Constitutional: Negative for chills and fever.   Respiratory: Negative for cough and shortness of breath.    Gastrointestinal: Positive for abdominal pain. Negative for nausea and vomiting.       Hemodynamics:  Temp (24hrs), Av.6 °C (97.8 °F), Min:36.2 °C (97.2 °F), Max:36.8 °C (98.3 °F)  Temperature: 36.6 °C (97.8 °F)  Pulse  Av.8  Min: 46  Max: 157   Blood Pressure: 141/91      Physical Exam:  Physical Exam   Constitutional: He is oriented to person, place, and time. He appears well-developed.   Chronically ill     HENT:   Head: Normocephalic and atraumatic.   Eyes: EOM are normal. Pupils are equal, round, and reactive to light.   Neck:   R IJ catheter   Pulmonary/Chest: Effort normal. He has no wheezes. He has no rales.   Abdominal: Soft. There is tenderness.   R sided abd wound dressed +Binder in place    Abdominal varices   Musculoskeletal:   left thigh catheter   Neurological: He is alert and oriented to person, place, and time.       Meds:    Current Facility-Administered Medications:   •  levoFLOXacin  •  [START ON 3/4/2018] levoFLOXacin  •  amoxicillin-clavulanate  •  meperidine  •  bacitracin  •   [DISCONTINUED] fentaNYL **OR** fentaNYL **OR** [DISCONTINUED] fentaNYL  •  fentaNYL  •  levETIRAcetam  •  predniSONE  •  epoetin rj  •  ipratropium-albuterol  •  piperacillin-tazobactam  •  albumin human 25%  •  heparin  •  calcitRIOL  •  cinacalcet  •  gabapentin  •  LORazepam  •  sevelamer carbonate  •  senna-docusate **AND** polyethylene glycol/lytes **AND** magnesium hydroxide **AND** bisacodyl  •  Respiratory Care per Protocol  •  NS  •  labetalol  •  ondansetron  •  ondansetron  •  promethazine  •  promethazine  •  prochlorperazine  •  acetaminophen  •  Notify provider if pain remains uncontrolled **AND** Use the numeric rating scale (NRS-11) on regular floors and Critical-Care Pain Observation Tool (CPOT) on ICUs/Trauma to assess pain **AND** Pulse Ox (Oximetry) **AND** Pharmacy Consult Request **AND** If patient difficult to arouse and/or has respiratory depression, stop any opiates that are currently infusing and call a Rapid Response. **AND** oxyCODONE immediate-release **AND** oxyCODONE immediate-release **AND** morphine injection  •  [DISCONTINUED] insulin regular **AND** Accu-Chek ACHS **AND** NOTIFY MD and PharmD **AND** glucose 4 g **AND** dextrose 50%    Labs:  Recent Labs      02/28/18   0400  03/01/18   0430  03/02/18   0344   WBC  7.9  10.2  7.3   RBC  2.73*  2.56*  2.72*   HEMOGLOBIN  7.7*  7.1*  7.7*   HEMATOCRIT  26.2*  24.7*  26.3*   MCV  96.0  96.5  96.7   MCH  28.2  27.7  28.3   RDW  60.7*  63.2*  62.5*   PLATELETCT  275  151*  134*   MPV  9.9  10.3  10.7   NEUTSPOLYS  71.70  82.40*  77.20*   LYMPHOCYTES  15.50*  8.50*  11.40*   MONOCYTES  7.70  6.50  8.60   EOSINOPHILS  2.10  0.70  1.10   BASOPHILS  0.90  0.60  0.50     Recent Labs      02/28/18   0400  03/01/18   0430  03/02/18   0345   SODIUM  137  135  132*   POTASSIUM  4.5  4.9  5.1   CHLORIDE  99  100  98   CO2  27  27  25   GLUCOSE  106*  104*  109*   BUN  42*  28*  41*     Recent Labs      02/28/18   0400  03/01/18   0430   "03/02/18   0345   CREATININE  6.18*  5.47*  7.36*       Imaging:  Dx-chest-portable (1 View)    Result Date: 2/20/2018 2/20/2018 4:49 AM HISTORY/REASON FOR EXAM:  Central line placement TECHNIQUE/EXAM DESCRIPTION AND NUMBER OF VIEWS: Single portable view of the chest. COMPARISON: 9/23/2017 FINDINGS: Right IJ catheter tip projects over the SVC. There are surgical clips projecting over the left upper medial hemithorax. The mediastinal and cardiac silhouette is upper limits of normal. Central vessels are prominent. There is interstitial prominence. There is no significant pleural effusion. There is no visible pneumothorax. There is widening of the superior mediastinum similar to the prior study.     1.  Satisfactory appearance of the right IJ catheter without pneumothorax. 2.  Stable widening of the superior mediastinum possibly due to vascular prominence or lymphadenopathy. 3.  There are changes of interstitial edema.      Micro:  Results     Procedure Component Value Units Date/Time    BLOOD CULTURE [[id number]] Collected:  02/21/18 1020    Order Status:  Completed Specimen:  Blood from Line Updated:  02/26/18 2100     Significant Indicator NEG     Source BLD     Site Peripheral     Blood Culture No growth after 5 days of incubation.    Narrative:       Collected By:[id number] TACOS BRASHER  Per Hospital Policy: Only change Specimen Src: to \"Line\" if  specified by physician order.    BLOOD CULTURE [[id number]] Collected:  02/21/18 0955    Order Status:  Completed Specimen:  Blood from Peripheral Updated:  02/26/18 2100     Significant Indicator NEG     Source BLD     Site Dialysis Port     Blood Culture No growth after 5 days of incubation.    Narrative:       Collected By:[id number] TACOS BRASHER  Per Hospital Policy: Only change Specimen Src: to \"Line\" if  specified by physician order.    CULTURE WOUND W/ GRAM STAIN [[id number]]  (Abnormal)  (Susceptibility) Collected:  02/20/18 2230    Order Status:  " Completed Specimen:  Wound from Abdominal Updated:  02/24/18 0832     Significant Indicator POS (POS)     Source WND     Site ABDOMINAL     Culture Result Wound -- (A)     Gram Stain Result --     Many WBCs.  Many Gram positive cocci.  Rare Gram negative rods.       Culture Result Wound -- (A)     Pseudomonas aeruginosa  Light growth  P.aeruginosa may develop resistance during prolonged therapy  with all antibiotics. Isolates that are initially susceptible  may become resistant within three to four days after  initiation of therapy. Testing of repeat isolates may be  warranted.       Culture Result Wound -- (A)     Enterococcus faecalis  Light growth  Combination therapy with ampicillin, penicillin, or  vancomycin (for susceptible strains) plus an aminoglycoside  is usually indicated for serious enterococcal infections,  such as endocarditis unless high-level resistance to both  gentamicin and streptomycin is documented; such combinations  are predicted to result in synergistic killing of the  Enterococcus.       Culture Result Wound -- (A)     Escherichia coli  Rare growth      Narrative:       Collected By:[id number] CAYDEN MENENDEZ    Culture & Susceptibility     ENTEROCOCCUS FAECALIS     Antibiotic Sensitivity Microscan Unit Status    Ampicillin Sensitive <=2 mcg/mL Final    Daptomycin Sensitive <=0.5 mcg/mL Final    Gent Synergy Sensitive <=500 mcg/mL Final    Penicillin Sensitive 2 mcg/mL Final    Vancomycin Sensitive 2 mcg/mL Final              ESCHERICHIA COLI     Antibiotic Sensitivity Microscan Unit Status    Ampicillin Sensitive <=8 mcg/mL Final    Cefepime Sensitive <=8 mcg/mL Final    Cefotaxime Sensitive <=2 mcg/mL Final    Cefotetan Sensitive <=16 mcg/mL Final    Ceftazidime Sensitive <=1 mcg/mL Final    Ceftriaxone Sensitive <=8 mcg/mL Final    Cefuroxime Sensitive <=4 mcg/mL Final    Ciprofloxacin Sensitive <=1 mcg/mL Final    Ertapenem Sensitive <=1 mcg/mL Final    Gentamicin Sensitive <=4 mcg/mL Final     Pip/Tazobactam Sensitive <=16 mcg/mL Final    Tigecycline Sensitive <=2 mcg/mL Final    Tobramycin Sensitive <=4 mcg/mL Final    Trimeth/Sulfa Resistant >2/38 mcg/mL Final              PSEUDOMONAS AERUGINOSA     Antibiotic Sensitivity Microscan Unit Status    Amikacin Sensitive <=16 mcg/mL Final    Cefepime Sensitive <=8 mcg/mL Final    Ceftazidime Sensitive 4 mcg/mL Final    Ciprofloxacin Sensitive <=1 mcg/mL Final    Gentamicin Sensitive <=4 mcg/mL Final    Imipenem Sensitive 2 mcg/mL Final    Meropenem Sensitive <=1 mcg/mL Final    Pip/Tazobactam Sensitive <=16 mcg/mL Final    Tobramycin Sensitive <=4 mcg/mL Final                             Assessment:  Active Hospital Problems    Diagnosis   • Abdominal varicosities [I86.8]   • Shock (CMS-HCC) [R57.9]   • Bacteremia [R78.81]   • ESRD (end stage renal disease) (CMS-HCC) [N18.6]   • Thrombocytopenia (CMS-HCC) [D69.6]   • 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   Change Zosyn to Levaquin 750 mg by mouth first day then 500 mg by mouth every 48 hours through 3/17/2018 give at least 2 hours before her 6 hours after sevelamer  Agent educated about drug interactions and need for compliance as an outpatient    Nonhealing necrotic wound of abdomen.    Wound cx + enterococcus faecalis (amp S), E coli, PSAR  Discontinue Zosyn  Underwent skin graft on 2/26/2018  S/p I&D on 2/19  Start Augmentin 500 mg by mouth daily. Aim through 3/17/2018      Abdominal varices  S/p ligation on 2/19    ESRD on HD    Duration of antibiotics   Levaquin 750 mg first dose and then 500 mg every other day through 3/17/2018  Augmentin 500  mg daily through 3/17/2018    Follow-up in the infectious disease clinic  Discussed with internal medicine.

## 2018-03-02 NOTE — PROGRESS NOTES
[facility] Hospitalist Progress Note    Date of Service: 3/1/2018    Chief Complaint  37 y.o. male admitted 2018 with bleeding abdominal wall varices.    Interval Problem Update  Pain much better today  No issues overnight  No dialysis today  Afebrile    Consultants/Specialty  Intensivist  Plastic surgery  Nephrology  Infectious disease    Disposition  Needs IV Zosyn therapy through ; will need to make arrangements        Review of Systems   Constitutional: Negative for chills and fever.   HENT: Negative for hearing loss and tinnitus.    Eyes: Negative for blurred vision and double vision.   Respiratory: Negative for cough.    Cardiovascular: Negative for chest pain and palpitations.   Gastrointestinal: Negative for abdominal pain and nausea.   Genitourinary: Negative for dysuria and frequency.   Musculoskeletal: Negative for back pain and myalgias.        Pain much better controlled today     Skin: Negative for itching and rash.   Neurological: Negative for dizziness and headaches.   Psychiatric/Behavioral: Negative for depression and substance abuse.      Physical Exam  Laboratory/Imaging   Hemodynamics  Temp (24hrs), Av.7 °C (98.1 °F), Min:36.2 °C (97.1 °F), Max:37.1 °C (98.7 °F)   Temperature: 36.8 °C (98.3 °F)  Pulse  Av.3  Min: 46  Max: 157    Blood Pressure: 133/81     Respiratory      Respiration: 16, Pulse Oximetry: 95 %             Fluids    Intake/Output Summary (Last 24 hours) at 18 1757  Last data filed at 18 1320   Gross per 24 hour   Intake              700 ml   Output                0 ml   Net              700 ml       Nutrition  Orders Placed This Encounter   Procedures   • DIET ORDER     Standing Status:   Standing     Number of Occurrences:   1     Order Specific Question:   Diet:     Answer:   Renal [8]     Physical Exam   Constitutional: He is oriented to person, place, and time.  Non-toxic appearance. No distress.   Appears chronically ill   HENT:   Head:  Normocephalic and atraumatic.   Mouth/Throat: No oropharyngeal exudate.   Eyes: Conjunctivae are normal. Pupils are equal, round, and reactive to light.   Neck: Normal range of motion. No edema and no erythema present. No thyromegaly present.   Cardiovascular: Normal rate and regular rhythm.    No murmur heard.  Pulmonary/Chest: Effort normal and breath sounds normal. No respiratory distress.   Abdominal: Soft. Bowel sounds are normal. He exhibits no distension. There is no tenderness.   Right sided wound healing, no discharge.    Musculoskeletal: He exhibits no edema or tenderness.   Neurological: He is alert and oriented to person, place, and time.   Skin: Skin is warm and dry. He is not diaphoretic. No erythema.   Psychiatric: He has a normal mood and affect.   Nursing note and vitals reviewed.      Recent Labs      02/27/18 0300 02/28/18 0400 03/01/18   0430   WBC  11.5*  7.9  10.2   RBC  2.89*  2.73*  2.56*   HEMOGLOBIN  8.2*  7.7*  7.1*   HEMATOCRIT  27.7*  26.2*  24.7*   MCV  95.8  96.0  96.5   MCH  28.4  28.2  27.7   MCHC  29.6*  29.4*  28.7*   RDW  59.1*  60.7*  63.2*   PLATELETCT  282  275  151*   MPV  10.1  9.9  10.3     Recent Labs      02/27/18   0300  02/28/18   0400  03/01/18   0430   SODIUM  138  137  135   POTASSIUM  4.5  4.5  4.9   CHLORIDE  100  99  100   CO2  25  27  27   GLUCOSE  100*  106*  104*   BUN  28*  42*  28*   CREATININE  4.16*  6.18*  5.47*   CALCIUM  6.7*  7.0*  7.1*     Recent Labs      02/27/18   0300  02/28/18   0400  03/01/18   0430   INR  1.21*  1.25*  1.29*                  Assessment/Plan     Shock (CMS-HCC)   Assessment & Plan    -Resolved        Abdominal varicosities   Assessment & Plan    - Chronic issue, acute bleeding  - Now status post surgery  - No sign of gross bleeding  - Dr. Rosenbaum did skin graft on Monday  Continue with pain control        Bacteremia- (present on admission)   Assessment & Plan    - Klebsiella per blood culture ×2    abdominal wound cx + for  enterococcus   zosyn, ID following. Repeated cx neg so far.          ESRD (end stage renal disease) (CMS-HCC)- (present on admission)   Assessment & Plan    - Patient generally gets hemodialysis 5 times a week, hemodialysis per nephrology        Thrombocytopenia (CMS-HCC)   Assessment & Plan    Resolved.         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        Hemorrhagic disorder due to circulating anticoagulants (CMS-HCC)   Assessment & Plan    - Is on Coumadin for occluded IVC, currently being held due to bleeding.   Will discuss with surgery when to restart        Hyperkalemia- (present on admission)   Assessment & Plan    -Resolved with hemodialysis.          Seizure disorder (CMS-HCC)- (present on admission)   Assessment & Plan    - Continue Keppra, no sign of seizure activity.        Normocytic anemia- (present on admission)   Assessment & Plan    - No longer any sign of gross bleeding, did have significant bleeding initially  Stable        Sleep apnea- (present on admission)   Assessment & Plan    - Patient uses BiPAP at night, [facility] with [facility].          Quality-Core Measures   Reviewed items::  Labs reviewed and Medications reviewed  DVT prophylaxis pharmacological::  Contraindicated - High bleeding risk  DVT prophylaxis - mechanical:  SCDs  Antibiotics:  Treating active infection/contamination beyond 24 hours perioperative coverage

## 2018-03-02 NOTE — PROGRESS NOTES
Infectious Disease Progress Note    Author: Elmira Adame M.D. Date & Time of service: 3/1/2018  5:41 PM    Chief Complaint:  FU abdominal wall abscess    Interval History:   AF, WBC 9.1, transferred out of the ICU overnight, having lots of pain, plan for skin graft on Monday, tolerating abx without issues   AF, WBC 6.9, sitting in chair, pain controlled if binder not removed  2018-MAXIMUM TEMPERATURE 98.5 wbc 7.7 creatinine 5.4 c/o abdominal pain   -MAXIMUM TEMPERATURE 98.2. No new issues overnight. C/o  Pain  3/1/2018-MAXIMUM TEMPERATURE 98.7 WBCs 10.2 platelets 151  Labs Reviewed, Medications Reviewed, Radiology Reviewed and Wound Reviewed.    Review of Systems:  Review of Systems   Constitutional: Negative for chills and fever.   Respiratory: Negative for cough and shortness of breath.    Gastrointestinal: Positive for abdominal pain. Negative for nausea and vomiting.       Hemodynamics:  Temp (24hrs), Av.7 °C (98.1 °F), Min:36.2 °C (97.1 °F), Max:37.1 °C (98.7 °F)  Temperature: 36.8 °C (98.3 °F)  Pulse  Av.1  Min: 46  Max: 157   Blood Pressure: 133/81      Physical Exam:  Physical Exam   Constitutional: He is oriented to person, place, and time. He appears well-developed.   Chronically ill     HENT:   Head: Normocephalic and atraumatic.   Eyes: EOM are normal. Pupils are equal, round, and reactive to light.   Neck:   R IJ catheter   Pulmonary/Chest: Effort normal. He has no wheezes. He has no rales.   Abdominal: Soft. There is tenderness.   R sided abd wound dressed +Binder in place    Abdominal varices   Musculoskeletal:   left thigh AV graft   Neurological: He is alert and oriented to person, place, and time.       Meds:    Current Facility-Administered Medications:   •  meperidine  •  bacitracin  •  [DISCONTINUED] fentaNYL **OR** fentaNYL **OR** [DISCONTINUED] fentaNYL  •  fentaNYL  •  levETIRAcetam  •  predniSONE  •  epoetin rj  •  ipratropium-albuterol  •   piperacillin-tazobactam  •  albumin human 25%  •  heparin  •  calcitRIOL  •  cinacalcet  •  gabapentin  •  LORazepam  •  sevelamer carbonate  •  senna-docusate **AND** polyethylene glycol/lytes **AND** magnesium hydroxide **AND** bisacodyl  •  Respiratory Care per Protocol  •  NS  •  labetalol  •  ondansetron  •  ondansetron  •  promethazine  •  promethazine  •  prochlorperazine  •  acetaminophen  •  Notify provider if pain remains uncontrolled **AND** Use the numeric rating scale (NRS-11) on regular floors and Critical-Care Pain Observation Tool (CPOT) on ICUs/Trauma to assess pain **AND** Pulse Ox (Oximetry) **AND** Pharmacy Consult Request **AND** If patient difficult to arouse and/or has respiratory depression, stop any opiates that are currently infusing and call a Rapid Response. **AND** oxyCODONE immediate-release **AND** oxyCODONE immediate-release **AND** morphine injection  •  [DISCONTINUED] insulin regular **AND** Accu-Chek ACHS **AND** NOTIFY MD and PharmD **AND** glucose 4 g **AND** dextrose 50%    Labs:  Recent Labs      02/27/18 0300 02/28/18 0400 03/01/18   0430   WBC  11.5*  7.9  10.2   RBC  2.89*  2.73*  2.56*   HEMOGLOBIN  8.2*  7.7*  7.1*   HEMATOCRIT  27.7*  26.2*  24.7*   MCV  95.8  96.0  96.5   MCH  28.4  28.2  27.7   RDW  59.1*  60.7*  63.2*   PLATELETCT  282  275  151*   MPV  10.1  9.9  10.3   NEUTSPOLYS  83.10*  71.70  82.40*   LYMPHOCYTES  6.20*  15.50*  8.50*   MONOCYTES  6.70  7.70  6.50   EOSINOPHILS  0.00  2.10  0.70   BASOPHILS  0.30  0.90  0.60   RBCMORPHOLO  Present   --    --      Recent Labs      02/27/18   0300  02/28/18   0400  03/01/18   0430   SODIUM  138  137  135   POTASSIUM  4.5  4.5  4.9   CHLORIDE  100  99  100   CO2  25  27  27   GLUCOSE  100*  106*  104*   BUN  28*  42*  28*     Recent Labs      02/27/18   0300  02/28/18   0400  03/01/18   0430   CREATININE  4.16*  6.18*  5.47*       Imaging:  Dx-chest-portable (1 View)    Result Date: 2/20/2018 2/20/2018 4:49 AM  "HISTORY/REASON FOR EXAM:  Central line placement TECHNIQUE/EXAM DESCRIPTION AND NUMBER OF VIEWS: Single portable view of the chest. COMPARISON: 9/23/2017 FINDINGS: Right IJ catheter tip projects over the SVC. There are surgical clips projecting over the left upper medial hemithorax. The mediastinal and cardiac silhouette is upper limits of normal. Central vessels are prominent. There is interstitial prominence. There is no significant pleural effusion. There is no visible pneumothorax. There is widening of the superior mediastinum similar to the prior study.     1.  Satisfactory appearance of the right IJ catheter without pneumothorax. 2.  Stable widening of the superior mediastinum possibly due to vascular prominence or lymphadenopathy. 3.  There are changes of interstitial edema.      Micro:  Results     Procedure Component Value Units Date/Time    BLOOD CULTURE [[id number]] Collected:  02/21/18 1020    Order Status:  Completed Specimen:  Blood from Line Updated:  02/26/18 2100     Significant Indicator NEG     Source BLD     Site Peripheral     Blood Culture No growth after 5 days of incubation.    Narrative:       Collected By:[id number] TACOS BRASHER  Per Hospital Policy: Only change Specimen Src: to \"Line\" if  specified by physician order.    BLOOD CULTURE [[id number]] Collected:  02/21/18 0955    Order Status:  Completed Specimen:  Blood from Peripheral Updated:  02/26/18 2100     Significant Indicator NEG     Source BLD     Site Dialysis Port     Blood Culture No growth after 5 days of incubation.    Narrative:       Collected By:[id number] TACOS BRASHER  Per Hospital Policy: Only change Specimen Src: to \"Line\" if  specified by physician order.    CULTURE WOUND W/ GRAM STAIN [[id number]]  (Abnormal)  (Susceptibility) Collected:  02/20/18 2230    Order Status:  Completed Specimen:  Wound from Abdominal Updated:  02/24/18 0832     Significant Indicator POS (POS)     Source WND     Site ABDOMINAL     " Culture Result Wound -- (A)     Gram Stain Result --     Many WBCs.  Many Gram positive cocci.  Rare Gram negative rods.       Culture Result Wound -- (A)     Pseudomonas aeruginosa  Light growth  P.aeruginosa may develop resistance during prolonged therapy  with all antibiotics. Isolates that are initially susceptible  may become resistant within three to four days after  initiation of therapy. Testing of repeat isolates may be  warranted.       Culture Result Wound -- (A)     Enterococcus faecalis  Light growth  Combination therapy with ampicillin, penicillin, or  vancomycin (for susceptible strains) plus an aminoglycoside  is usually indicated for serious enterococcal infections,  such as endocarditis unless high-level resistance to both  gentamicin and streptomycin is documented; such combinations  are predicted to result in synergistic killing of the  Enterococcus.       Culture Result Wound -- (A)     Escherichia coli  Rare growth      Narrative:       Collected By:[id number] CAYDEN MENENDEZ    Culture & Susceptibility     ENTEROCOCCUS FAECALIS     Antibiotic Sensitivity Microscan Unit Status    Ampicillin Sensitive <=2 mcg/mL Final    Daptomycin Sensitive <=0.5 mcg/mL Final    Gent Synergy Sensitive <=500 mcg/mL Final    Penicillin Sensitive 2 mcg/mL Final    Vancomycin Sensitive 2 mcg/mL Final              ESCHERICHIA COLI     Antibiotic Sensitivity Microscan Unit Status    Ampicillin Sensitive <=8 mcg/mL Final    Cefepime Sensitive <=8 mcg/mL Final    Cefotaxime Sensitive <=2 mcg/mL Final    Cefotetan Sensitive <=16 mcg/mL Final    Ceftazidime Sensitive <=1 mcg/mL Final    Ceftriaxone Sensitive <=8 mcg/mL Final    Cefuroxime Sensitive <=4 mcg/mL Final    Ciprofloxacin Sensitive <=1 mcg/mL Final    Ertapenem Sensitive <=1 mcg/mL Final    Gentamicin Sensitive <=4 mcg/mL Final    Pip/Tazobactam Sensitive <=16 mcg/mL Final    Tigecycline Sensitive <=2 mcg/mL Final    Tobramycin Sensitive <=4 mcg/mL Final     Trimeth/Sulfa Resistant >2/38 mcg/mL Final              PSEUDOMONAS AERUGINOSA     Antibiotic Sensitivity Microscan Unit Status    Amikacin Sensitive <=16 mcg/mL Final    Cefepime Sensitive <=8 mcg/mL Final    Ceftazidime Sensitive 4 mcg/mL Final    Ciprofloxacin Sensitive <=1 mcg/mL Final    Gentamicin Sensitive <=4 mcg/mL Final    Imipenem Sensitive 2 mcg/mL Final    Meropenem Sensitive <=1 mcg/mL Final    Pip/Tazobactam Sensitive <=16 mcg/mL Final    Tobramycin Sensitive <=4 mcg/mL Final                             Assessment:  Active Hospital Problems    Diagnosis   • Abdominal varicosities [I86.8]   • Shock (CMS-HCC) [R57.9]   • Bacteremia [R78.81]   • ESRD (end stage renal disease) (CMS-HCC) [N18.6]   • Thrombocytopenia (CMS-HCC) [D69.6]   • 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 Continue Zosyn through 3/17/2018    Nonhealing necrotic wound of abdomen.    Wound cx + enterococcus faecalis (amp S), E coli, PSAR  DC vancomycin   Continue zosyn  Underwent skin graft on 2/26/2018  S/p I&D on 2/19    Abdominal varices  S/p ligation on 2/19    ESRD on HD    Duration of antibiotics  Continue Zosyn through 3/17/2018.  Discussed with internal medicine.

## 2018-03-02 NOTE — PROGRESS NOTES
AO x 4, irritable r/t needing antibiotics for longer, wants to go home.  Dressing to L upper leg dialysis site changed per order.  No drainage, gortex material present.  Pain level 2/10, declines medication.  Plan of care discussed, questions answered, verbalized understanding.

## 2018-03-02 NOTE — PROGRESS NOTES
Report received bedside.    Pt agitated that he has to stay longer.     Pain 6/10 in abdomen and right leg- medicated per MAR    Pt on room air.     AB binder in place.     Graft site on right leg has moderate drainage under tegaderm     Dressing intact over left leg.     IJ dressing soiled. Changing tonight.     POC discussed

## 2018-03-02 NOTE — PROGRESS NOTES
"Upon changing pt's IJ dressing and checking for patency, this RN noticed blood and clear fluid draining from the incision site. Tubing appears to be dislodged from original place.     Dr. Garces (hospitalist) paged-   Orders in for CXR to confirm placement  PIV insertion (if possible) to continue ABX.     0341- results show  \"A portion of a probable vascular catheter projects in the right supraclavicular region.\"    paged and notified on results and notified that the IJ dressing is compromised due to the length of tubing being exposed-  New orders in to cover the IJ with more tegarderm to reduce risk of infection    New PIV in for abx tonight.   "

## 2018-03-02 NOTE — PROGRESS NOTES
"Blood pressure (!) 100/35, pulse 62, temperature 36.2 °C (97.2 °F), resp. rate 17, height 1.626 m (5' 4\"), weight 77.6 kg (171 lb 1.2 oz), SpO2 96 %.    I/O last 3 completed shifts:  In: 1100 [P.O.:480; I.V.:620]  Out: -   Doing well  Ready for DC from my stdpt    "

## 2018-03-02 NOTE — PROGRESS NOTES
AO x 4, patient upset about not being able to go home. Emotional support and reassurance provided. Medicated for pain with IV demerol for right leg and abdomen pain , patient refusing PO pain medication.  Antibiotics per mar.  Abdominal binder in place. Dr. Rosenbaum changed plastics dressings. Plan of care discussed, questions answered, verbalized understanding.

## 2018-03-02 NOTE — PROGRESS NOTES
Nephrology Progress Note, Adult, Complex               Author: Fadi Najjar Date & Time created: 3/2/2018  2:52 PM     Interval History:  38 y/o male with ESRD/HHD admitted with abdominal wound infection.  Doing better, his pain is better  Seen and examined while getting HD.    Review of Systems:  Review of Systems   Constitutional: Negative for chills, fever and malaise/fatigue.   Cardiovascular: Negative for chest pain and leg swelling.   Gastrointestinal: Negative for diarrhea, nausea and vomiting.   Genitourinary: Negative for dysuria.   Neurological: Negative for dizziness, sensory change, focal weakness and headaches.       Physical Exam:  Physical Exam   Constitutional: He is oriented to person, place, and time. No distress.   HENT:   Nose: Nose normal.   Eyes: Conjunctivae are normal. No scleral icterus.   Cardiovascular: Normal rate and regular rhythm.    Pulmonary/Chest: Effort normal and breath sounds normal. No respiratory distress.   Musculoskeletal: He exhibits no edema.   Neurological: He is alert and oriented to person, place, and time.   Skin: He is not diaphoretic.   Psychiatric: He has a normal mood and affect. His behavior is normal.   Nursing note and vitals reviewed.      Labs:        Invalid input(s): [id number]      Recent Labs      02/28/18 0400  03/01/18 0430 03/02/18   0345   SODIUM  137  135  132*   POTASSIUM  4.5  4.9  5.1   CHLORIDE  99  100  98   CO2  27  27  25   BUN  42*  28*  41*   CREATININE  6.18*  5.47*  7.36*   CALCIUM  7.0*  7.1*  7.3*     Recent Labs      02/28/18   0400  03/01/18   0430  03/02/18   0345   GLUCOSE  106*  104*  109*     Recent Labs      02/28/18   0400  03/01/18   0430  03/02/18   0344   RBC  2.73*  2.56*  2.72*   HEMOGLOBIN  7.7*  7.1*  7.7*   HEMATOCRIT  26.2*  24.7*  26.3*   PLATELETCT  275  151*  134*   PROTHROMBTM  15.4*  15.8*  15.0*   INR  1.25*  1.29*  1.21*     Recent Labs      02/28/18   0400  03/01/18   0430  03/02/18   0344   WBC  7.9  10.2   7.3   NEUTSPOLYS  71.70  82.40*  77.20*   LYMPHOCYTES  15.50*  8.50*  11.40*   MONOCYTES  7.70  6.50  8.60   EOSINOPHILS  2.10  0.70  1.10   BASOPHILS  0.90  0.60  0.50           Hemodynamics:  Temp (24hrs), Av.6 °C (97.8 °F), Min:36.2 °C (97.2 °F), Max:36.8 °C (98.3 °F)  Temperature: 36.6 °C (97.8 °F)  Pulse  Av.8  Min: 46  Max: 157   Blood Pressure: 141/91    Respiratory:    Respiration: 18, Pulse Oximetry: 97 %        RUL Breath Sounds: Clear, RML Breath Sounds: Clear, RLL Breath Sounds: Diminished, ANGEL Breath Sounds: Clear, LLL Breath Sounds: Diminished  Fluids:    Intake/Output Summary (Last 24 hours) at 18 1452  Last data filed at 18   Gross per 24 hour   Intake              540 ml   Output                0 ml   Net              540 ml        GI/Nutrition:  Orders Placed This Encounter   Procedures   • DIET ORDER     Standing Status:   Standing     Number of Occurrences:   1     Order Specific Question:   Diet:     Answer:   Renal [8]     Medical Decision Making, by Problem:  Active Hospital Problems    Diagnosis   • Hypertension [I10]   • ESRD (end stage renal disease) (CMS-HCC) [N18.6]   • 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

## 2018-03-02 NOTE — DISCHARGE SUMMARY
CHIEF COMPLAINT ON ADMISSION  Chief Complaint   Patient presents with   • Open Wound     PT had sx about 2 weeks ago and rolled over in bed and abd incision opened. Abd is bleeding, but controlled with pressure. Pt states when pressure removed it will squirt across room. PT was laying in about 100 cc of blood when EMS arrived. Pt has been npo since yesterday. no s/s of infection       CODE STATUS  Full Code    HPI & HOSPITAL COURSE  This is a 37 y.o. male here with bleeding abdominal wall varices. Patient is a very chronically ill 37-year-old male who has a history of end-stage renal disease for which he is on dialysis as well as seizure disorder and type II diabetes. He is on Coumadin therapy for an occluded IVC. Results of the abdominal varices. He was being managed by plastic surgery and he was taken to the operating room and underwent suture ligation of the abdominal wall varices and debridement of skin and subcutaneous tissues of the abdominal wall. Was found to have a supratherapeutic INR. He was given oral vitamin K and his Coumadin was held. He also received multiple units of FFP. He was transfused packed red blood cells as well. Initially he was admitted to the intensive care unit.    During the Hospital course he received his hemodialysis and nephrology continue to follow alongside.    Also during the hospitalization he was diagnosed as having sepsis. While in the ICU he was on pressor support and eventually weaned off of pressors. This is likely secondary to both acute blood loss as well as sepsis. He was put on broad-spectrum antibiotics including vancomycin and Zosyn. Blood cultures grew Klebsiella. He also had a nonhealing necrotic wound of his abdomen in these cultures grew out Enterococcus faecalis and E. coli and PSAR. He has been on Zosyn throughout the entire hospitalization. Upon discharge she was given a dose of Levaquin 750 mg. Infectious disease change him over to oral antibiotics. He is given  a continue with Augmentin 500 mg daily and the last date is March 17. He should also be on Levaquin 500 mg every other day and that should be taken either 2 hours before or 6 hours after sevALAMER. The final date on that is March 17 as well.    During the hospitalization he underwent a skin graft by plastic surgery. Following surgery he continued to complain of quite a bit of pain.    Time of discharge the pain has improved quite significantly. He needs a follow-up with plastic surgery in one week. He was also taken off of anticoagulation because of the bleeding varices. He needs to see his PCP within a week to have repeat labs drawn. If his hemoglobin is stable then at that point he needs to resume Coumadin. He will need a Lovenox bridge. I discussed this with the patient. He states that he will follow-up with his primary doctor    The patient met 2-midnight criteria for an inpatient stay at the time of discharge.    Therefore, he is discharged in fair and stable condition with close outpatient follow-up.    SPECIFIC OUTPATIENT FOLLOW-UP  PCP within a week to have labs drawn  Plastic surgery as scheduled    DISCHARGE PROBLEM LIST  Active Problems:    Bacteremia POA: Yes    Abdominal varicosities POA: Unknown    ESRD (end stage renal disease) (CMS-HCC) POA: Yes    Sleep apnea POA: Yes      Overview: Uses BiPap---    [facility]    Normocytic anemia POA: Yes    Seizure disorder (CMS-HCC) POA: Yes      Overview: April 2016. [facility]. Workup negative. Patient started on       Keppra 375 mg by mouth daily.    Hyperkalemia POA: Yes    Hemorrhagic disorder due to circulating anticoagulants (CMS-HCC) POA: Unknown    IVC thrombosis (CMS-HCC) POA: Unknown    Thrombocytopenia (CMS-HCC) POA: Unknown  Resolved Problems:    Shock (CMS-HCC) POA: Unknown      FOLLOW UP  Future Appointments  Date Time Provider Department Center   3/7/2018 1:00 PM NEURODIAGNOSTIC LAB [facility] [place]     Tony Mcmillan,  M.D.  [address]  [place] [address]  [phone]          Samson Rosenbaum Jr., M.D.  [address]  [place] [address]  [phone]          Tony Mcmillan M.D.  [address]  [place] [address]  [phone]            MEDICATIONS ON DISCHARGE   Denis De Anda   Home Medication Instructions SYMONE:[id number]    Printed on:03/02/18 7986   Medication Information                      amoxicillin-clavulanate (AUGMENTIN) 500-125 MG Tab  Take 1 Tab by mouth every 24 hours for 15 days.             calcitRIOL (ROCALTROL) 0.25 MCG CAPS  Take 0.75 mcg by mouth every bedtime.             cinacalcet (SENSIPAR) 30 MG Tab  Take 2 Tabs by mouth every evening.             gabapentin (NEURONTIN) 600 MG tablet  Take 600 mg by mouth 3 times a day.             HYDROcodone/acetaminophen (NORCO)  MG Tab  Take 1-2 Tabs by mouth every 12 hours as needed for up to 7 days.             levetiracetam (KEPPRA) 250 MG tablet  Take 1 Tab by mouth 2 times a day.             levoFLOXacin (LEVAQUIN) 500 MG tablet  Take 1 Tab by mouth every 48 hours for 15 days.             lorazepam (ATIVAN) 1 MG Tab  Take 1 Tab by mouth 1 time daily as needed for Anxiety (30 minutes before MRI, could repeat one more dose for claustrophobia) for up to 2 doses. 30 minutes before MRI, could repeat one more dose for claustrophobia             predniSONE (DELTASONE) 20 MG Tab  Take 1 Tab by mouth every day.             sevelamer carbonate (RENVELA) 800 MG Tab tablet  Take 3,200 mg by mouth 3 times a day, with meals. With meals             warfarin (COUMADIN) 2.5 MG Tab  Take one to two (1-2) tablets daily as directed by [facility]                 DIET  Orders Placed This Encounter   Procedures   • DIET ORDER     Standing Status:   Standing     Number of Occurrences:   1     Order Specific Question:   Diet:     Answer:   Renal [8]       ACTIVITY  As tolerated.  Weight bearing as  tolerated      CONSULTATIONS  Intensivist  Plastic surgery  Vascular surgery   Infectious disease    PROCEDURES  As per above    LABORATORY  Lab Results   Component Value Date/Time    SODIUM 132 (L) 03/02/2018 03:45 AM    POTASSIUM 5.1 03/02/2018 03:45 AM    CHLORIDE 98 03/02/2018 03:45 AM    CO2 25 03/02/2018 03:45 AM    GLUCOSE 109 (H) 03/02/2018 03:45 AM    BUN 41 (H) 03/02/2018 03:45 AM    CREATININE 7.36 (HH) 03/02/2018 03:45 AM    CREATININE 8.2 (HH) 05/06/2009 05:30 AM        Lab Results   Component Value Date/Time    WBC 7.3 03/02/2018 03:44 AM    HEMOGLOBIN 7.7 (L) 03/02/2018 03:44 AM    HEMATOCRIT 26.3 (L) 03/02/2018 03:44 AM    PLATELETCT 134 (L) 03/02/2018 03:44 AM        Total time of the discharge process exceeds 45 minutes

## 2018-03-03 NOTE — PROGRESS NOTES
HEMODIALYSIS NOTES:     HD today x 3 hours per Dr. Najjar . Initiated at 1221 and ended at 1521. Patient tolerated treatment. See paper flowsheet for details.    UF Net: 2,500 mL    R femoral intact and patent with good flow on lines reversed during dialysis. No s/sx of infection, no redness nor bleeding noted on the CVC site. CVC dressing clean, dry and intact. Blood returned and CVC port flushed with NS and Heparin 1000 units/mL used  to lock catheter given per designated amount. CVC port clamped and capped.     Report given to SAMEERA Butts RN.

## 2018-03-03 NOTE — PROGRESS NOTES
Discharging Patient home per physician order.  Discharged with mother.  Demonstrated understanding of discharge instructions, follow up appointments, home medications, prescriptions, home care for surgical wound, and nursing care instructions for skin graft, blood transfusion and kidney disease.  Ambulating without assistance, voiding without difficulty, pain well controlled, tolerating oral medications, oxygen saturation greater than 90% , tolerating diet.   Central line was removed by Polina Kat RN.  Dressing remains c/d/i on discharge.  Educational handouts given and discussed.  Discussed narcotic use and safety.  Discussed home antibiotics and times they should be taken.  Discussed anticoagulation.  Dr. Adame education patient regarding antibiotics and renvela as well and Dr. Cuadra reinforced teaching along with education regarding coumadin and narcotics.  Dr. Rosenbaum discussed dressings on surgical sites with patient in depth prior to discharge. Verbalized understanding of discharge instructions and educational handouts.  All questions answered.  Belongings with patient at time of discharge.

## 2018-03-05 ENCOUNTER — PATIENT OUTREACH (OUTPATIENT)
Dept: HEALTH INFORMATION MANAGEMENT | Facility: OTHER | Age: 38
End: 2018-03-05

## 2018-03-08 ENCOUNTER — APPOINTMENT (OUTPATIENT)
Dept: ADMISSIONS | Facility: MEDICAL CENTER | Age: 38
DRG: 270 | End: 2018-03-08
Attending: SURGERY
Payer: MEDICARE

## 2018-03-08 NOTE — DOCUMENTATION QUERY
"PCS DOCUMENTATION QUERY    Please review the following information and exercise your independent professional judgment in responding to this query.     During this patient’s stay you performed a ligation of bleeding abdominal varices and a debridement, which you described as Sharp debridement of skin and subcutaneous tissue of abdominal wall. The procedure was performed on February 19, 2018.    Unfortunately, \"sharp\" debridement cannot be coded without further clarification of this term.   Please specify if the sharp debridement procedure was excisional or non-excisional.     • Excisional Debridement -  surgical removal or cutting away of tissue, necrosis, or slough and is classified to the root operation \"Excision.\" Use of a sharp instrument does not always indicate that an excisional debridement was performed. Minor removal of loose fragments with scissors or using a sharp instrument to scrape away tissue is not an excisional debridement. Excisional debridement involves the use of a scalpel to remove devitalized tissue.   (please specify):    1. deepest level of debridement treated  2. instrument  3. nature of the tissue  4. appearance and size of wound  5. technique    • Non-excisional Debridement - Nonexcisional debridement of the skin is the nonoperative brushing, irrigating, scrubbing, or washing of devitalized tissue, necrosis, slough, or foreign material and is classified to the root operation \"Extraction\"     The medical record reflects the following: OPERATIVE DESCRIPTION: After induction of general endotracheal anesthesia, the patient's abdomen was prepped and draped sterilely. The patient had a number of old sutures from his previous surgery, which were removed. There were 2, maybe 3 places where there was active hemorrhage from rupture of the abdominal wall varices. These were treated with figure-of-eight sutures using 0 Vicryl. This resulted in hemostasis and no more active bleeding. There was some " necrotic skin and subcutaneous tissue of the abdominal wall. This was sharply debrided using a scalpel. Debridement went down to the level of the subcutaneous tissue and the abdominal wall. The area of debridement was approximately 2x8 cm in size. Bleeding from this was treated with electrocautery. A few minutes were allowed to elapse to make sure there were no other areas of bleeding. The wound once hemostatic was treated with antibiotic ointment, Xeroform gauze, ABD and an abdominal binder to place some pressure. The patient was extubated and taken to recovery room in good condition.   Clinical Findings    Treatment    Risk Factors    Location  Operative Note dated 2/19/18     Thank you,   Whit Lord, CRC, CCA  Coding  1

## 2018-03-09 ENCOUNTER — APPOINTMENT (OUTPATIENT)
Dept: RADIOLOGY | Facility: MEDICAL CENTER | Age: 38
DRG: 270 | End: 2018-03-09
Attending: SURGERY
Payer: MEDICARE

## 2018-03-09 ENCOUNTER — HOSPITAL ENCOUNTER (OUTPATIENT)
Facility: MEDICAL CENTER | Age: 38
DRG: 270 | End: 2018-03-09
Attending: SURGERY | Admitting: SURGERY
Payer: MEDICARE

## 2018-03-09 VITALS
WEIGHT: 168.43 LBS | RESPIRATION RATE: 18 BRPM | BODY MASS INDEX: 28.76 KG/M2 | DIASTOLIC BLOOD PRESSURE: 57 MMHG | SYSTOLIC BLOOD PRESSURE: 126 MMHG | OXYGEN SATURATION: 97 % | HEIGHT: 64 IN | HEART RATE: 95 BPM | TEMPERATURE: 97.6 F

## 2018-03-09 LAB
ANION GAP SERPL CALC-SCNC: 14 MMOL/L (ref 0–11.9)
BUN SERPL-MCNC: 33 MG/DL (ref 8–22)
CALCIUM SERPL-MCNC: 8.1 MG/DL (ref 8.5–10.5)
CHLORIDE SERPL-SCNC: 93 MMOL/L (ref 96–112)
CO2 SERPL-SCNC: 27 MMOL/L (ref 20–33)
CREAT SERPL-MCNC: 5.46 MG/DL (ref 0.5–1.4)
ERYTHROCYTE [DISTWIDTH] IN BLOOD BY AUTOMATED COUNT: 64.6 FL (ref 35.9–50)
GLUCOSE SERPL-MCNC: 138 MG/DL (ref 65–99)
HCT VFR BLD AUTO: 31.5 % (ref 42–52)
HGB BLD-MCNC: 9.4 G/DL (ref 14–18)
INR PPP: 1.26 (ref 0.87–1.13)
MCH RBC QN AUTO: 28.7 PG (ref 27–33)
MCHC RBC AUTO-ENTMCNC: 29.8 G/DL (ref 33.7–35.3)
MCV RBC AUTO: 96 FL (ref 81.4–97.8)
PLATELET # BLD AUTO: 155 K/UL (ref 164–446)
PMV BLD AUTO: 9.9 FL (ref 9–12.9)
POTASSIUM SERPL-SCNC: 4.5 MMOL/L (ref 3.6–5.5)
PROTHROMBIN TIME: 15.5 SEC (ref 12–14.6)
RBC # BLD AUTO: 3.28 M/UL (ref 4.7–6.1)
SODIUM SERPL-SCNC: 134 MMOL/L (ref 135–145)
WBC # BLD AUTO: 4.8 K/UL (ref 4.8–10.8)

## 2018-03-09 PROCEDURE — 160048 HCHG OR STATISTICAL LEVEL 1-5: Performed by: SURGERY

## 2018-03-09 PROCEDURE — 500382 HCHG DRAIN, PENROSE 1X18: Performed by: SURGERY

## 2018-03-09 PROCEDURE — C1894 INTRO/SHEATH, NON-LASER: HCPCS | Performed by: SURGERY

## 2018-03-09 PROCEDURE — 80048 BASIC METABOLIC PNL TOTAL CA: CPT

## 2018-03-09 PROCEDURE — C1757 CATH, THROMBECTOMY/EMBOLECT: HCPCS | Performed by: SURGERY

## 2018-03-09 PROCEDURE — 700101 HCHG RX REV CODE 250

## 2018-03-09 PROCEDURE — 500698 HCHG HEMOCLIP, MEDIUM: Performed by: SURGERY

## 2018-03-09 PROCEDURE — 160009 HCHG ANES TIME/MIN: Performed by: SURGERY

## 2018-03-09 PROCEDURE — A6402 STERILE GAUZE <= 16 SQ IN: HCPCS | Performed by: SURGERY

## 2018-03-09 PROCEDURE — 501837 HCHG SUTURE CV: Performed by: SURGERY

## 2018-03-09 PROCEDURE — 160039 HCHG SURGERY MINUTES - EA ADDL 1 MIN LEVEL 3: Performed by: SURGERY

## 2018-03-09 PROCEDURE — C1725 CATH, TRANSLUMIN NON-LASER: HCPCS | Performed by: SURGERY

## 2018-03-09 PROCEDURE — 160028 HCHG SURGERY MINUTES - 1ST 30 MINS LEVEL 3: Performed by: SURGERY

## 2018-03-09 PROCEDURE — 700111 HCHG RX REV CODE 636 W/ 250 OVERRIDE (IP)

## 2018-03-09 PROCEDURE — 501838 HCHG SUTURE GENERAL: Performed by: SURGERY

## 2018-03-09 PROCEDURE — 160025 RECOVERY II MINUTES (STATS): Performed by: SURGERY

## 2018-03-09 PROCEDURE — C2628 CATHETER, OCCLUSION: HCPCS | Performed by: SURGERY

## 2018-03-09 PROCEDURE — 160035 HCHG PACU - 1ST 60 MINS PHASE I: Performed by: SURGERY

## 2018-03-09 PROCEDURE — 110454 HCHG SHELL REV 250: Performed by: SURGERY

## 2018-03-09 PROCEDURE — C1769 GUIDE WIRE: HCPCS | Performed by: SURGERY

## 2018-03-09 PROCEDURE — 160046 HCHG PACU - 1ST 60 MINS PHASE II: Performed by: SURGERY

## 2018-03-09 PROCEDURE — 502240 HCHG MISC OR SUPPLY RC 0272: Performed by: SURGERY

## 2018-03-09 PROCEDURE — 160002 HCHG RECOVERY MINUTES (STAT): Performed by: SURGERY

## 2018-03-09 PROCEDURE — 700111 HCHG RX REV CODE 636 W/ 250 OVERRIDE (IP): Performed by: ANESTHESIOLOGY

## 2018-03-09 PROCEDURE — 500700 HCHG HEMOCLIP, SMALL (RED): Performed by: SURGERY

## 2018-03-09 PROCEDURE — 85610 PROTHROMBIN TIME: CPT

## 2018-03-09 PROCEDURE — 85027 COMPLETE CBC AUTOMATED: CPT

## 2018-03-09 RX ORDER — MIDAZOLAM HYDROCHLORIDE 1 MG/ML
INJECTION INTRAMUSCULAR; INTRAVENOUS
Status: DISPENSED
Start: 2018-03-09 | End: 2018-03-09

## 2018-03-09 RX ORDER — PREDNISONE 20 MG/1
20 TABLET ORAL ONCE
Status: DISCONTINUED | OUTPATIENT
Start: 2018-03-09 | End: 2018-03-09 | Stop reason: HOSPADM

## 2018-03-09 RX ORDER — BUPIVACAINE HYDROCHLORIDE AND EPINEPHRINE 5; 5 MG/ML; UG/ML
INJECTION, SOLUTION EPIDURAL; INTRACAUDAL; PERINEURAL
Status: DISCONTINUED | OUTPATIENT
Start: 2018-03-09 | End: 2018-03-09 | Stop reason: HOSPADM

## 2018-03-09 RX ORDER — LIDOCAINE HYDROCHLORIDE 10 MG/ML
0.5 INJECTION, SOLUTION INFILTRATION; PERINEURAL
Status: DISCONTINUED | OUTPATIENT
Start: 2018-03-09 | End: 2018-03-09 | Stop reason: HOSPADM

## 2018-03-09 RX ORDER — LIDOCAINE AND PRILOCAINE 25; 25 MG/G; MG/G
1 CREAM TOPICAL
Status: DISCONTINUED | OUTPATIENT
Start: 2018-03-09 | End: 2018-03-09 | Stop reason: HOSPADM

## 2018-03-09 RX ORDER — LIDOCAINE HYDROCHLORIDE 10 MG/ML
INJECTION, SOLUTION INFILTRATION; PERINEURAL
Status: COMPLETED
Start: 2018-03-09 | End: 2018-03-09

## 2018-03-09 RX ORDER — SODIUM CHLORIDE 9 MG/ML
INJECTION, SOLUTION INTRAVENOUS CONTINUOUS
Status: DISCONTINUED | OUTPATIENT
Start: 2018-03-09 | End: 2018-03-09 | Stop reason: HOSPADM

## 2018-03-09 RX ORDER — CINACALCET 30 MG/1
30 TABLET, FILM COATED ORAL
Status: ON HOLD | COMMUNITY
End: 2020-09-07

## 2018-03-09 RX ORDER — WARFARIN SODIUM 2.5 MG/1
2.5-5 TABLET ORAL EVERY EVENING
COMMUNITY
End: 2018-03-29

## 2018-03-09 RX ORDER — HEPARIN SODIUM,PORCINE 1000/ML
VIAL (ML) INJECTION
Status: DISCONTINUED | OUTPATIENT
Start: 2018-03-09 | End: 2018-03-09 | Stop reason: HOSPADM

## 2018-03-09 RX ADMIN — SODIUM CHLORIDE: 9 INJECTION, SOLUTION INTRAVENOUS at 07:00

## 2018-03-09 RX ADMIN — LIDOCAINE HYDROCHLORIDE 0.5 ML: 10 INJECTION, SOLUTION INFILTRATION; PERINEURAL at 06:30

## 2018-03-09 RX ADMIN — EPHEDRINE SULFATE 10 MG: 50 INJECTION INTRAMUSCULAR; INTRAVENOUS; SUBCUTANEOUS at 12:00

## 2018-03-09 ASSESSMENT — PAIN SCALES - GENERAL
PAINLEVEL_OUTOF10: 2
PAINLEVEL_OUTOF10: 3
PAINLEVEL_OUTOF10: 1
PAINLEVEL_OUTOF10: 1
PAINLEVEL_OUTOF10: 3
PAINLEVEL_OUTOF10: 3

## 2018-03-09 NOTE — OR NURSING
Dr Hopkins at bedside to assess post-op.  Updated on b/p trending down.  To give Ephedrine 10mg iv now.  Update Dr Hopkins as needed

## 2018-03-09 NOTE — H&P
DATE OF ADMISSION:  03/09/2018    REASON FOR ADMISSION:  Thrombectomy, left side AV loop graft, consideration of   exchange of right femoral tunneled dialysis catheter.    HISTORY OF PRESENT ILLNESS:  This 37-year-old stage V kidney patient was   hospitalized in February with positive blood cultures including Klebsiella   pneumonia and the wound cultures of an open right abdominal wound growing   Enterobacter faecalis and Klebsiella.  At that time, his left thigh AV loop   graft was flowing well.  He had one very small area of possibly exposed graft   laterally.  This was the area that had not healed after his last revision of   the loop graft on 11/14/2017.  At that time, I placed 20 cm long segment of   stretch [employer] on the arterial side of the loop graft to give a longer area   of dialysis access than it had previously.  The patient was admitted with   bleeding from his abdominal wound in February and it subsequently underwent a   split thickness skin graft on 02/22/2018 by Dr. Rosenbaum.  The patient now was   admitted for graft thrombectomy, which occurred.  The graft thrombosed almost   exactly 7 days ago after the patient had to be discharged.  He did not notify   me until approximately 4-5 days ago and the earliest time we could get him OR   and schedule was this date of admission on 03/09/2018.    PAST MEDICAL HISTORY AND MEDICAL ILLNESSES:  1.  Stage V kidney disease.  2.  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  3.  Hypertension.  4.  Hypothyroidism.  5.  Compression fracture of vertebrae.  6.  Chronic pain syndrome.  7.  Anemia of chronic kidney disease.    PAST SURGICAL HISTORY:  1.  Multiple dialysis accesses ultimately resulting in superior vena caval   syndrome.  2.  History of abdominoplasty surgery to get rid of redundant abdominal flabby   tissue.  3.  Hernia repair.  4.  Either thyroid repair or parathyroid surgery.    FAMILY HISTORY:  No  other family members with renal failure.    SOCIAL HISTORY:  No tobacco, no alcohol.  .  He works full time in the   [employer].    ALLERGIES:  BACLOFEN, PENICILLIN, IODINATED CONTRAST WHICH WE HAVE MITIGATED   WITH BENADRYL AND PREDNISONE, KEFLEX.    NORMAL MEDICATIONS:  As follows;  1.  Renvela 800 mg 4 tablets 3 times a day with meals.  2.  Calcitriol 0.25 mcg capsules 3 daily.  3.  Sensipar 30 mg tablet orally every Tuesday, Thursday, Saturday, and   Sunday.  4.  Norco 10/325 mg tablets p.r.n. pain.  5.  Warfarin 2.5 mg daily.  6.  Hysingla ER 30 mg tablet daily.  7.  Tizanidine 2 mg daily.  8.  Keppra 250 mg 1 tablet twice a day.  8.  Antibiotic therapy.    CURRENT MEDICATIONS:  Not known.    REVIEW OF SYSTEMS:  He has joint problems secondary to bone disease related to   his renal insufficiency.  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  He has chronic superior vena cava syndrome with swelling of head   and neck and multiple collaterals.  He has pain related to his abdominal wound   improving since he had a split thickness skin graft.    PHYSICAL EXAMINATION:  VITAL SIGNS:  Weight 167 pounds, BMI approximately 30, blood pressure right   arm 80/40, heart rate 72.  GENERAL DESCRIPTION:  He is an overweight young man who has swollen head and   neck.  Alert and oriented, not in any acute distress.  NECK:  Thick, jugular veins are not distended.  CHEST:  No murmur or gallop.  No precordial heave.  EXTREMITIES:  Radial pulses are palpable.  Femoral pulses are not palpable in   the lower extremities.  There is a left thigh AV graft that is thrombosed with   no thrill or bruit.  It has a bandage over the lateral mid segment where there   is a short open  surgical wound from a prior procedure.  The   rest of the graft is intact without exposure.  The right flank has a tunneled   catheter.  This has been his dialysis source for several months.                     walking with a reasonably  normal gait.    ASSESSMENT:  1.  Thrombosed left thigh AV loop graft.  2.  Possible short segment of graft exposure, possible infection.  3.  History of positive blood cultures related to open abdominal wound that   currently is treated with a split thickness skin graft.  4.  Stage V kidney disease.  5.  Chronic superior vena caval syndrome.  6.  Bone disease.  7.  Allergy to IODINATED CONTRAST agent that is manageable with premedication.  8.  Chronic fatigue syndrome.  9.  Sepsis with antibiotic therapy ongoing.    DISCUSSION AND PLAN:  The patient's left thigh AV loop graft is thrombosed.    We planned thrombectomy.  Possibly resection of the segment that is exposed.    Her vision is necessary.  I exchanged the tunneled catheter in his right groin   necessarily at this operation and it is imperative that the catheter be   exchanged.  On doing so, I would anticipate balloon angioplasty of the   catheter course to break up the fibrin sheath that is formed from his chronic   catheter.  This is his only catheter site available.  The patient's other   problems including abdominal wound are managed by plastic surgery and his   infection by infectious diseases.       ____________________________________     MD BEN Davis / NTS    DD:  03/08/2018 22:31:06  DT:  03/09/2018 00:52:42    D#:  [id number]  Job#:  [id number]

## 2018-03-09 NOTE — OR NURSING
D/c orders received. IV dc'd. Pt changed into clothing with assistance. Discharge instructions given; pt and family verbalized understanding and questions answered. Prescriptions with pt at home. Pt dc'd in w/c with CNA in stable condition.

## 2018-03-09 NOTE — DISCHARGE INSTRUCTIONS
ACTIVITY: Rest and take it easy for the first 24 hours.  A responsible adult is recommended to remain with you during that time.  It is normal to feel sleepy.  We encourage you to not do anything that requires balance, judgment or coordination.    MILD FLU-LIKE SYMPTOMS ARE NORMAL. YOU MAY EXPERIENCE GENERALIZED MUSCLE ACHES, THROAT IRRITATION, HEADACHE AND/OR SOME NAUSEA.    FOR 24 HOURS DO NOT:  Drive, operate machinery or run household appliances.  Drink beer or alcoholic beverages.   Make important decisions or sign legal documents.    SPECIAL INSTRUCTIONS: Follow MD instruction    DIET: To avoid nausea, slowly advance diet as tolerated, avoiding spicy or greasy foods for the first day.  Add more substantial food to your diet according to your physician's instructions.  Babies can be fed formula or breast milk as soon as they are hungry.  INCREASE FLUIDS AND FIBER TO AVOID CONSTIPATION.    SURGICAL DRESSING/BATHING: Follow MD instruction    FOLLOW-UP APPOINTMENT:  A follow-up appointment should be arranged with your doctor in 1-2 weeks; call to schedule.    You should CALL YOUR PHYSICIAN if you develop:  Fever greater than 101 degrees F.  Pain not relieved by medication, or persistent nausea or vomiting.  Excessive bleeding (blood soaking through dressing) or unexpected drainage from the wound.  Extreme redness or swelling around the incision site, drainage of pus or foul smelling drainage.  Inability to urinate or empty your bladder within 8 hours.  Problems with breathing or chest pain.    You should call 911 if you develop problems with breathing or chest pain.  If you are unable to contact your doctor or surgical center, you should go to the nearest emergency room or urgent care center.  Physician's telephone #: Dr. Hopkins [phone]    If any questions arise, call your doctor.  If your doctor is not available, please feel free to call the Surgical Center at [phone].  The Center is open Monday  through Friday from 7AM to 7PM.  You can also call the HEALTH HOTLINE open 24 hours/day, 7 days/week and speak to a nurse at [phone], or toll free at [phone].    A registered nurse may call you a few days after your surgery to see how you are doing after your procedure.    MEDICATIONS: Resume taking daily medication.  Take prescribed pain medication with food.  If no medication is prescribed, you may take non-aspirin pain medication if needed.  PAIN MEDICATION CAN BE VERY CONSTIPATING.  Take a stool softener or laxative such as senokot, pericolace, or milk of magnesia if needed.    Prescriptions for pain at home. Pain medication may be given anytime.     If your physician has prescribed pain medication that includes Acetaminophen (Tylenol), do not take additional Acetaminophen (Tylenol) while taking the prescribed medication.    Depression / Suicide Risk    As you are discharged from this [facility] facility, it is important to learn how to keep safe from harming yourself.    Recognize the warning signs:  · Abrupt changes in personality, positive or negative- including increase in energy   · Giving away possessions  · Change in eating patterns- significant weight changes-  positive or negative  · Change in sleeping patterns- unable to sleep or sleeping all the time   · Unwillingness or inability to communicate  · Depression  · Unusual sadness, discouragement and loneliness  · Talk of wanting to die  · Neglect of personal appearance   · Rebelliousness- reckless behavior  · Withdrawal from people/activities they love  · Confusion- inability to concentrate     If you or a loved one observes any of these behaviors or has concerns about self-harm, here's what you can do:  · Talk about it- your feelings and reasons for harming yourself  · Remove any means that you might use to hurt yourself (examples: pills, rope, extension cords, firearm)  · Get professional help from the community (Mental Health,  Substance Abuse, psychological counseling)  · Do not be alone:Call your Safe Contact- someone whom you trust who will be there for you.  · Call your local CRISIS HOTLINE 243-2395 or 582-425-6160  · Call your local Children's Mobile Crisis Response Team [place] (216) 354-1865 or www.[facility]  · Call the toll free National Suicide Prevention Hotlines   · National Suicide Prevention Lifeline 430-118-NATI (6912)  · National Hope Line Network 800-SUICIDE (943-8401)

## 2018-03-10 ENCOUNTER — RESOLUTE PROFESSIONAL BILLING HOSPITAL PROF FEE (OUTPATIENT)
Dept: HOSPITALIST | Facility: MEDICAL CENTER | Age: 38
End: 2018-03-10
Payer: MEDICARE

## 2018-03-10 ENCOUNTER — APPOINTMENT (OUTPATIENT)
Dept: RADIOLOGY | Facility: MEDICAL CENTER | Age: 38
DRG: 270 | End: 2018-03-10
Attending: SURGERY
Payer: MEDICARE

## 2018-03-10 ENCOUNTER — HOSPITAL ENCOUNTER (INPATIENT)
Facility: MEDICAL CENTER | Age: 38
LOS: 1 days | DRG: 270 | End: 2018-03-12
Attending: EMERGENCY MEDICINE | Admitting: INTERNAL MEDICINE
Payer: MEDICARE

## 2018-03-10 DIAGNOSIS — Z78.9 PROBLEM WITH VASCULAR ACCESS: ICD-10-CM

## 2018-03-10 PROBLEM — Z51.81 SUBTHERAPEUTIC ANTICOAGULATION: Status: ACTIVE | Noted: 2018-03-10

## 2018-03-10 PROBLEM — T85.868A GRAFT FAILURE DUE TO THROMBOSIS: Status: ACTIVE | Noted: 2018-03-10

## 2018-03-10 PROBLEM — Z79.01 SUBTHERAPEUTIC ANTICOAGULATION: Status: ACTIVE | Noted: 2018-03-10

## 2018-03-10 LAB
ALBUMIN SERPL BCP-MCNC: 4.3 G/DL (ref 3.2–4.9)
ALBUMIN/GLOB SERPL: 1.5 G/DL
ALP SERPL-CCNC: 171 U/L (ref 30–99)
ALT SERPL-CCNC: 54 U/L (ref 2–50)
ANION GAP SERPL CALC-SCNC: 19 MMOL/L (ref 0–11.9)
ANISOCYTOSIS BLD QL SMEAR: ABNORMAL
AST SERPL-CCNC: 18 U/L (ref 12–45)
BASOPHILS # BLD AUTO: 0 % (ref 0–1.8)
BASOPHILS # BLD: 0 K/UL (ref 0–0.12)
BILIRUB SERPL-MCNC: 0.6 MG/DL (ref 0.1–1.5)
BUN SERPL-MCNC: 50 MG/DL (ref 8–22)
CALCIUM SERPL-MCNC: 7.1 MG/DL (ref 8.5–10.5)
CHLORIDE SERPL-SCNC: 91 MMOL/L (ref 96–112)
CO2 SERPL-SCNC: 24 MMOL/L (ref 20–33)
CREAT SERPL-MCNC: 7.93 MG/DL (ref 0.5–1.4)
EOSINOPHIL # BLD AUTO: 0 K/UL (ref 0–0.51)
EOSINOPHIL NFR BLD: 0 % (ref 0–6.9)
ERYTHROCYTE [DISTWIDTH] IN BLOOD BY AUTOMATED COUNT: 68.2 FL (ref 35.9–50)
GLOBULIN SER CALC-MCNC: 2.8 G/DL (ref 1.9–3.5)
GLUCOSE SERPL-MCNC: 114 MG/DL (ref 65–99)
HCT VFR BLD AUTO: 30.1 % (ref 42–52)
HGB BLD-MCNC: 8.9 G/DL (ref 14–18)
INR PPP: 1.43 (ref 0.87–1.13)
LYMPHOCYTES # BLD AUTO: 0.37 K/UL (ref 1–4.8)
LYMPHOCYTES NFR BLD: 5.3 % (ref 22–41)
MACROCYTES BLD QL SMEAR: ABNORMAL
MANUAL DIFF BLD: NORMAL
MCH RBC QN AUTO: 29.2 PG (ref 27–33)
MCHC RBC AUTO-ENTMCNC: 29.6 G/DL (ref 33.7–35.3)
MCV RBC AUTO: 98.7 FL (ref 81.4–97.8)
METAMYELOCYTES NFR BLD MANUAL: 1.8 %
MICROCYTES BLD QL SMEAR: ABNORMAL
MONOCYTES # BLD AUTO: 0.06 K/UL (ref 0–0.85)
MONOCYTES NFR BLD AUTO: 0.9 % (ref 0–13.4)
MORPHOLOGY BLD-IMP: NORMAL
MORPHOLOGY BLD-IMP: NORMAL
MYELOCYTES NFR BLD MANUAL: 0.9 %
NEUTROPHILS # BLD AUTO: 6.38 K/UL (ref 1.82–7.42)
NEUTROPHILS NFR BLD: 89.4 % (ref 44–72)
NEUTS BAND NFR BLD MANUAL: 1.7 % (ref 0–10)
NRBC # BLD AUTO: 0 K/UL
NRBC BLD-RTO: 0 /100 WBC
PLATELET # BLD AUTO: 125 K/UL (ref 164–446)
PLATELET BLD QL SMEAR: NORMAL
PMV BLD AUTO: 10.4 FL (ref 9–12.9)
POTASSIUM SERPL-SCNC: 4.8 MMOL/L (ref 3.6–5.5)
PROT SERPL-MCNC: 7.1 G/DL (ref 6–8.2)
PROTHROMBIN TIME: 17.1 SEC (ref 12–14.6)
RBC # BLD AUTO: 3.05 M/UL (ref 4.7–6.1)
RBC BLD AUTO: PRESENT
SODIUM SERPL-SCNC: 134 MMOL/L (ref 135–145)
WBC # BLD AUTO: 7 K/UL (ref 4.8–10.8)

## 2018-03-10 PROCEDURE — 85610 PROTHROMBIN TIME: CPT

## 2018-03-10 PROCEDURE — G0378 HOSPITAL OBSERVATION PER HR: HCPCS

## 2018-03-10 PROCEDURE — 80048 BASIC METABOLIC PNL TOTAL CA: CPT

## 2018-03-10 PROCEDURE — 3E03317 INTRODUCTION OF OTHER THROMBOLYTIC INTO PERIPHERAL VEIN, PERCUTANEOUS APPROACH: ICD-10-PCS | Performed by: SURGERY

## 2018-03-10 PROCEDURE — 06CY3ZZ EXTIRPATION OF MATTER FROM LOWER VEIN, PERCUTANEOUS APPROACH: ICD-10-PCS | Performed by: SURGERY

## 2018-03-10 PROCEDURE — 99153 MOD SED SAME PHYS/QHP EA: CPT

## 2018-03-10 PROCEDURE — 047L3ZZ DILATION OF LEFT FEMORAL ARTERY, PERCUTANEOUS APPROACH: ICD-10-PCS | Performed by: SURGERY

## 2018-03-10 PROCEDURE — 85730 THROMBOPLASTIN TIME PARTIAL: CPT

## 2018-03-10 PROCEDURE — 36415 COLL VENOUS BLD VENIPUNCTURE: CPT

## 2018-03-10 PROCEDURE — 067Y3ZZ DILATION OF LOWER VEIN, PERCUTANEOUS APPROACH: ICD-10-PCS | Performed by: SURGERY

## 2018-03-10 PROCEDURE — 700105 HCHG RX REV CODE 258: Performed by: SURGERY

## 2018-03-10 PROCEDURE — A9270 NON-COVERED ITEM OR SERVICE: HCPCS | Performed by: HOSPITALIST

## 2018-03-10 PROCEDURE — 700102 HCHG RX REV CODE 250 W/ 637 OVERRIDE(OP): Performed by: HOSPITALIST

## 2018-03-10 PROCEDURE — 85025 COMPLETE CBC W/AUTO DIFF WBC: CPT

## 2018-03-10 PROCEDURE — 94760 N-INVAS EAR/PLS OXIMETRY 1: CPT

## 2018-03-10 PROCEDURE — 700111 HCHG RX REV CODE 636 W/ 250 OVERRIDE (IP): Performed by: HOSPITALIST

## 2018-03-10 PROCEDURE — 99285 EMERGENCY DEPT VISIT HI MDM: CPT

## 2018-03-10 PROCEDURE — 700111 HCHG RX REV CODE 636 W/ 250 OVERRIDE (IP)

## 2018-03-10 PROCEDURE — 3E05317 INTRODUCTION OF OTHER THROMBOLYTIC INTO PERIPHERAL ARTERY, PERCUTANEOUS APPROACH: ICD-10-PCS | Performed by: SURGERY

## 2018-03-10 PROCEDURE — 80053 COMPREHEN METABOLIC PANEL: CPT

## 2018-03-10 PROCEDURE — 80061 LIPID PANEL: CPT

## 2018-03-10 PROCEDURE — 99220 PR INITIAL OBSERVATION CARE,LEVL III: CPT | Performed by: HOSPITALIST

## 2018-03-10 PROCEDURE — 85027 COMPLETE CBC AUTOMATED: CPT

## 2018-03-10 PROCEDURE — 85007 BL SMEAR W/DIFF WBC COUNT: CPT

## 2018-03-10 PROCEDURE — 700111 HCHG RX REV CODE 636 W/ 250 OVERRIDE (IP): Performed by: SURGERY

## 2018-03-10 PROCEDURE — 04CL3ZZ EXTIRPATION OF MATTER FROM LEFT FEMORAL ARTERY, PERCUTANEOUS APPROACH: ICD-10-PCS | Performed by: SURGERY

## 2018-03-10 PROCEDURE — B41G1ZZ FLUOROSCOPY OF LEFT LOWER EXTREMITY ARTERIES USING LOW OSMOLAR CONTRAST: ICD-10-PCS | Performed by: SURGERY

## 2018-03-10 RX ORDER — ONDANSETRON 4 MG/1
4 TABLET, ORALLY DISINTEGRATING ORAL EVERY 4 HOURS PRN
Status: DISCONTINUED | OUTPATIENT
Start: 2018-03-10 | End: 2018-03-12 | Stop reason: HOSPADM

## 2018-03-10 RX ORDER — BISACODYL 10 MG
10 SUPPOSITORY, RECTAL RECTAL
Status: DISCONTINUED | OUTPATIENT
Start: 2018-03-10 | End: 2018-03-11

## 2018-03-10 RX ORDER — HYDROCODONE BITARTRATE AND ACETAMINOPHEN 5; 325 MG/1; MG/1
1-2 TABLET ORAL EVERY 6 HOURS PRN
Status: DISCONTINUED | OUTPATIENT
Start: 2018-03-10 | End: 2018-03-11

## 2018-03-10 RX ORDER — MIDAZOLAM HYDROCHLORIDE 1 MG/ML
INJECTION INTRAMUSCULAR; INTRAVENOUS
Status: COMPLETED
Start: 2018-03-10 | End: 2018-03-10

## 2018-03-10 RX ORDER — HEPARIN SODIUM,PORCINE 1000/ML
VIAL (ML) INJECTION
Status: COMPLETED
Start: 2018-03-10 | End: 2018-03-10

## 2018-03-10 RX ORDER — METHYLPREDNISOLONE SODIUM SUCCINATE 40 MG/ML
40 INJECTION, POWDER, LYOPHILIZED, FOR SOLUTION INTRAMUSCULAR; INTRAVENOUS ONCE
Status: COMPLETED | OUTPATIENT
Start: 2018-03-10 | End: 2018-03-10

## 2018-03-10 RX ORDER — LEVOFLOXACIN 500 MG/1
500 TABLET, FILM COATED ORAL
Status: DISCONTINUED | OUTPATIENT
Start: 2018-03-11 | End: 2018-03-12 | Stop reason: HOSPADM

## 2018-03-10 RX ORDER — PROMETHAZINE HYDROCHLORIDE 25 MG/1
12.5-25 TABLET ORAL EVERY 4 HOURS PRN
Status: DISCONTINUED | OUTPATIENT
Start: 2018-03-10 | End: 2018-03-11

## 2018-03-10 RX ORDER — MIDAZOLAM HYDROCHLORIDE 1 MG/ML
.5-2 INJECTION INTRAMUSCULAR; INTRAVENOUS PRN
Status: ACTIVE | OUTPATIENT
Start: 2018-03-10 | End: 2018-03-10

## 2018-03-10 RX ORDER — DIPHENHYDRAMINE HYDROCHLORIDE 50 MG/ML
INJECTION INTRAMUSCULAR; INTRAVENOUS
Status: COMPLETED
Start: 2018-03-10 | End: 2018-03-10

## 2018-03-10 RX ORDER — CINACALCET 30 MG/1
30 TABLET, FILM COATED ORAL
Status: DISCONTINUED | OUTPATIENT
Start: 2018-03-12 | End: 2018-03-12 | Stop reason: HOSPADM

## 2018-03-10 RX ORDER — CALCITRIOL 0.25 UG/1
0.75 CAPSULE, LIQUID FILLED ORAL
Status: DISCONTINUED | OUTPATIENT
Start: 2018-03-10 | End: 2018-03-12 | Stop reason: HOSPADM

## 2018-03-10 RX ORDER — ONDANSETRON 2 MG/ML
4 INJECTION INTRAMUSCULAR; INTRAVENOUS EVERY 4 HOURS PRN
Status: DISCONTINUED | OUTPATIENT
Start: 2018-03-10 | End: 2018-03-12 | Stop reason: HOSPADM

## 2018-03-10 RX ORDER — CINACALCET 30 MG/1
60 TABLET, FILM COATED ORAL
Status: DISCONTINUED | OUTPATIENT
Start: 2018-03-10 | End: 2018-03-12 | Stop reason: HOSPADM

## 2018-03-10 RX ORDER — BACITRACIN ZINC 500 [USP'U]/G
1 OINTMENT TOPICAL EVERY EVENING
Status: DISCONTINUED | OUTPATIENT
Start: 2018-03-10 | End: 2018-03-12 | Stop reason: HOSPADM

## 2018-03-10 RX ORDER — SODIUM CHLORIDE 9 MG/ML
500 INJECTION, SOLUTION INTRAVENOUS
Status: ACTIVE | OUTPATIENT
Start: 2018-03-10 | End: 2018-03-10

## 2018-03-10 RX ORDER — ACETAMINOPHEN 325 MG/1
650 TABLET ORAL EVERY 6 HOURS PRN
Status: DISCONTINUED | OUTPATIENT
Start: 2018-03-10 | End: 2018-03-12 | Stop reason: HOSPADM

## 2018-03-10 RX ORDER — GABAPENTIN 300 MG/1
600 CAPSULE ORAL 3 TIMES DAILY
Status: DISCONTINUED | OUTPATIENT
Start: 2018-03-10 | End: 2018-03-12 | Stop reason: HOSPADM

## 2018-03-10 RX ORDER — DIPHENHYDRAMINE HYDROCHLORIDE 50 MG/ML
50 INJECTION INTRAMUSCULAR; INTRAVENOUS ONCE
Status: COMPLETED | OUTPATIENT
Start: 2018-03-10 | End: 2018-03-10

## 2018-03-10 RX ORDER — PREDNISONE 20 MG/1
20 TABLET ORAL DAILY
Status: DISCONTINUED | OUTPATIENT
Start: 2018-03-10 | End: 2018-03-11

## 2018-03-10 RX ORDER — AMOXICILLIN 250 MG
2 CAPSULE ORAL 2 TIMES DAILY
Status: DISCONTINUED | OUTPATIENT
Start: 2018-03-10 | End: 2018-03-11

## 2018-03-10 RX ORDER — WARFARIN SODIUM 5 MG/1
5 TABLET ORAL
Status: DISCONTINUED | OUTPATIENT
Start: 2018-03-11 | End: 2018-03-12 | Stop reason: HOSPADM

## 2018-03-10 RX ORDER — SEVELAMER CARBONATE 800 MG/1
4000 TABLET, FILM COATED ORAL
Status: DISCONTINUED | OUTPATIENT
Start: 2018-03-10 | End: 2018-03-12 | Stop reason: HOSPADM

## 2018-03-10 RX ORDER — WARFARIN SODIUM 2.5 MG/1
2.5 TABLET ORAL
Status: DISCONTINUED | OUTPATIENT
Start: 2018-03-12 | End: 2018-03-12 | Stop reason: HOSPADM

## 2018-03-10 RX ORDER — AMOXICILLIN AND CLAVULANATE POTASSIUM 500; 125 MG/1; MG/1
1 TABLET, FILM COATED ORAL EVERY 24 HOURS
Status: DISCONTINUED | OUTPATIENT
Start: 2018-03-10 | End: 2018-03-12 | Stop reason: HOSPADM

## 2018-03-10 RX ORDER — LEVETIRACETAM 250 MG/1
250 TABLET ORAL 2 TIMES DAILY
Status: DISCONTINUED | OUTPATIENT
Start: 2018-03-10 | End: 2018-03-12 | Stop reason: HOSPADM

## 2018-03-10 RX ORDER — CINACALCET 30 MG/1
30-60 TABLET, FILM COATED ORAL DAILY
Status: DISCONTINUED | OUTPATIENT
Start: 2018-03-10 | End: 2018-03-10

## 2018-03-10 RX ORDER — WARFARIN SODIUM 7.5 MG/1
7.5 TABLET ORAL
Status: COMPLETED | OUTPATIENT
Start: 2018-03-10 | End: 2018-03-10

## 2018-03-10 RX ORDER — PROMETHAZINE HYDROCHLORIDE 25 MG/1
12.5-25 SUPPOSITORY RECTAL EVERY 4 HOURS PRN
Status: DISCONTINUED | OUTPATIENT
Start: 2018-03-10 | End: 2018-03-11

## 2018-03-10 RX ORDER — POLYETHYLENE GLYCOL 3350 17 G/17G
1 POWDER, FOR SOLUTION ORAL
Status: DISCONTINUED | OUTPATIENT
Start: 2018-03-10 | End: 2018-03-11

## 2018-03-10 RX ADMIN — AMOXICILLIN AND CLAVULANATE POTASSIUM 1 TABLET: 500; 125 TABLET, FILM COATED ORAL at 21:27

## 2018-03-10 RX ADMIN — MIDAZOLAM 2 MG: 1 INJECTION INTRAMUSCULAR; INTRAVENOUS at 11:40

## 2018-03-10 RX ADMIN — LEVETIRACETAM 250 MG: 250 TABLET ORAL at 16:05

## 2018-03-10 RX ADMIN — LEVETIRACETAM 250 MG: 250 TABLET ORAL at 21:27

## 2018-03-10 RX ADMIN — DIPHENHYDRAMINE HYDROCHLORIDE 50 MG: 50 INJECTION INTRAMUSCULAR; INTRAVENOUS at 11:40

## 2018-03-10 RX ADMIN — FENTANYL CITRATE 50 MCG: 50 INJECTION, SOLUTION INTRAMUSCULAR; INTRAVENOUS at 11:40

## 2018-03-10 RX ADMIN — HYDROCODONE BITARTRATE AND ACETAMINOPHEN 2 TABLET: 5; 325 TABLET ORAL at 21:26

## 2018-03-10 RX ADMIN — PREDNISONE 20 MG: 20 TABLET ORAL at 16:20

## 2018-03-10 RX ADMIN — CALCITRIOL 0.75 MCG: 0.25 CAPSULE, LIQUID FILLED ORAL at 21:26

## 2018-03-10 RX ADMIN — MIDAZOLAM 1 MG: 1 INJECTION INTRAMUSCULAR; INTRAVENOUS at 12:37

## 2018-03-10 RX ADMIN — SEVELAMER CARBONATE 4000 MG: 800 TABLET, FILM COATED ORAL at 16:07

## 2018-03-10 RX ADMIN — FENTANYL CITRATE 25 MCG: 50 INJECTION, SOLUTION INTRAMUSCULAR; INTRAVENOUS at 11:58

## 2018-03-10 RX ADMIN — MIDAZOLAM 1 MG: 1 INJECTION INTRAMUSCULAR; INTRAVENOUS at 11:58

## 2018-03-10 RX ADMIN — FENTANYL CITRATE 25 MCG: 50 INJECTION, SOLUTION INTRAMUSCULAR; INTRAVENOUS at 12:37

## 2018-03-10 RX ADMIN — METHYLPREDNISOLONE SODIUM SUCCINATE 40 MG: 40 INJECTION, POWDER, FOR SOLUTION INTRAMUSCULAR; INTRAVENOUS at 11:54

## 2018-03-10 RX ADMIN — WARFARIN SODIUM 7.5 MG: 7.5 TABLET ORAL at 17:38

## 2018-03-10 RX ADMIN — HEPARIN SODIUM 1050 UNITS/HR: 5000 INJECTION, SOLUTION INTRAVENOUS at 16:01

## 2018-03-10 RX ADMIN — GABAPENTIN 600 MG: 300 CAPSULE ORAL at 21:27

## 2018-03-10 RX ADMIN — GABAPENTIN 600 MG: 300 CAPSULE ORAL at 16:06

## 2018-03-10 RX ADMIN — ALTEPLASE 10 MG/HR: KIT at 12:39

## 2018-03-10 ASSESSMENT — ENCOUNTER SYMPTOMS
HEMOPTYSIS: 0
SPEECH CHANGE: 0
CONSTIPATION: 0
DIAPHORESIS: 0
LOSS OF CONSCIOUSNESS: 0
PALPITATIONS: 0
SENSORY CHANGE: 0
SPUTUM PRODUCTION: 0
HEADACHES: 0
BACK PAIN: 0
MYALGIAS: 0
DIZZINESS: 0
EYE DISCHARGE: 0
FOCAL WEAKNESS: 0
FEVER: 0
CHILLS: 0
EYE PAIN: 0
WHEEZING: 0
SORE THROAT: 0
SHORTNESS OF BREATH: 0
DEPRESSION: 0
ABDOMINAL PAIN: 0
CLAUDICATION: 0
NECK PAIN: 0
NAUSEA: 0
WEAKNESS: 0
COUGH: 0
DIARRHEA: 0
VOMITING: 0
BRUISES/BLEEDS EASILY: 0

## 2018-03-10 ASSESSMENT — PATIENT HEALTH QUESTIONNAIRE - PHQ9
SUM OF ALL RESPONSES TO PHQ QUESTIONS 1-9: 0
2. FEELING DOWN, DEPRESSED, IRRITABLE, OR HOPELESS: NOT AT ALL
SUM OF ALL RESPONSES TO PHQ9 QUESTIONS 1 AND 2: 0
1. LITTLE INTEREST OR PLEASURE IN DOING THINGS: NOT AT ALL

## 2018-03-10 ASSESSMENT — PAIN SCALES - GENERAL
PAINLEVEL_OUTOF10: 1
PAINLEVEL_OUTOF10: 2
PAINLEVEL_OUTOF10: 0

## 2018-03-10 ASSESSMENT — LIFESTYLE VARIABLES
SUBSTANCE_ABUSE: 0
EVER_SMOKED: NEVER
ALCOHOL_USE: NO

## 2018-03-10 NOTE — PROGRESS NOTES
IR Procedure RN's Note:    LEFT femoral fistula thrombolysis done by Dr. Hopkins; LEFT groin access site pt assessed upon arrival, pt A/Ox4, pt aware of the procedure; thrombolysis was performed using the angiojet and tPA along with angioplasties into the fistula; pt appears comfortable throughout the procedure with no signs of distress; pre medication for allergies to contrast given prior to start of case; manual pressure held for 20min; access site CDI, soft and no signs of bleeding or hematoma; bedside report given to martinez Duckworth taken back to [facility] for recovery while waiting for admission.

## 2018-03-10 NOTE — OP REPORT
DATE OF SERVICE:  03/09/2018    PREOPERATIVE DIAGNOSES:  Stage V kidney disease with thrombosed left thigh   arteriovenous loop graft, dysfunctional right femoral tunneled hemodialysis   catheter, and exposed graft remnant left lateral thigh.    POSTOPERATIVE DIAGNOSES:  Stage V kidney disease with thrombosed left thigh   arteriovenous loop graft, dysfunctional right femoral tunneled hemodialysis   catheter, and exposed graft remnant left lateral thigh.    OPERATIONS:  1.  Thrombectomy, left femoral loop graft.  2.  Left loop graft fistulograms and retrograde venograms and arteriograms.  3.  Balloon angioplasty, 7 and 8 mm venous outflow anastomosis and 5 mm   arterial inflow anastomosis.  4.  Right femoral tunneled dialysis catheter exchange for a Palindrome 28 cm.  5.  Sheath placement and iliac venograms, right side.  6.  Balloon angioplasty, right external iliac and common iliac veins with a   10x40 mm balloon.  7.  Inferior venacavogram.  8.  Excision of 5 cm length of old abandoned PTFE graft, lateral left thigh   with primary closure.    SURGEON:  Jairo Hopkins MD    ANESTHESIA:  General.    ANESTHESIOLOGIST:  Jim Ford MD    DESCRIPTION OF PROCEDURE:  After obtaining informed consent, the patient was   placed supine on the operating table.  I used Betadine soap and scrubbed his   thighs bilaterally including the catheter in the right groin.  On the left   side, I mapped out the graft and I also mapped out the segment of old graft   that had eroded through the skin in a 3-4 mm area.  There was no purulence.  I   injected 0.5% Marcaine in the distal loop of the graft and made a   longitudinal incision and dissected out a 2 cm segment of the ring portion of   the graft at the distal end of the loop.  I made a transverse graftotomy.  I   then did venous and arterial thrombectomy.  I was able to get a small amount   of backbleeding from the vein.  I used primarily the size #4 Navneet   catheters.  I  could not get the arterial end opened.  I took a V18 wire and   over the wire #3 Navneet and passed it through a 7-Occitan sheath up the   arterial limb of the graft.  The V18 wire went right through the anastomosis   easily unlike the Navneet catheter, which could not be passed.  I then passed   over the wire Navneet and was able to pull through several times.  It left a   small residual defect.  I passed a 5x40 mm angioplasty Shell balloon across   the anastomosis of the artery to the graft.  There was a defect at this   location and I inflated the balloon several times.  I subsequently could see   the debris that I had created and flushed it out of the graft.  The following   imaging studies with retrograde injection and digital subtraction showed a   good result.  I could not identify any remaining clots.  On the venous side, I   used over the wire occlusion balloon and injected retrograde and this showed   that there was a tight stenosis of the venous outflow.  I passed a 3 mm J wire   and a 7x40 mm Shell balloon and dilated this area several times.  I then   dilated it with an 8 mm balloon.  The retrograde injections showed the lumen   to be satisfactory and probably as large as it can get.  The venous outflow   just adjacent to the anastomosis was a large vein.  I attempted to pass a   graft thrombectomy catheter, but the graft was too small and angled to accept   this wire type of instrument.  I used the Navneet catheters femoral and passed   it through the limbs of the graft several times after I did the   angioplasties.  I also flushed the graft with heparinized saline to maintain   patency.  We had given the patient 7000 units of heparin and I did not reverse   it.  I did an interrupted closure of the graft with CV-6 [place].  I did   this while the 6-Occitan balloon occlusion catheters were in place to control   the bleeding.  When I had two sutures left to tie, I removed the balloon   occlusion  catheter while the scrub tech held pressure on the arterial inflow.    I tied these last sutures and flow was established with a good Doppler signal   throughout.  This persisted throughout his discharge.  I then proceeded to   the right groin.  I imaged the Bard HemoSplit catheter.  I dissected out the   cuff.  I passed a stiff Amplatz wire up to the inferior vena cava and pulled   out the Bard catheter.  I inserted a 9-Irish sheath and passed a 10x40 mm   [place] balloon up the external iliac artery, which I imaged with retrograde   injections.  My goal was to get rid of the fibrin sheath.  I improved the   appearance of the external and common iliac veins.  I then passed the   Palindrome 28 cm long catheter over the Amplatz wire.  This reached up to a   more proximal location than the Bard HemoSplit had been.  I injected contrast   through the catheter and showed the end of the catheter was in the inferior   vena cava, which should be a good location and better than the prior catheter.    I secured the catheter at the exit site with 2-0 Ethilon.  We flushed it and   then instilled with concentrated heparin 1000 units per mL with 2.1 mL per   port.  Biopatch and dressing were applied.  Bleeding was controlled with some   pressure.    I then took a scalpel after injecting local anesthesia to a location on the   lateral left thigh where there was a small opening that exposed PTFE residual   graft.  I made a longitudinal incision over the length of this, which is about   4-5 cm and dissected it out with a scalpel and with a Bovie.  I removed the   last remnant of this graft and did not leave any that I could identify.  I   irrigated the wound and closed with 2 layers of running 4-0 Vicryl.  I closed   the skin of the graft thrombectomy also with a running 4-0 Vicryl in 2 layers.    Dressings were applied.  Blood loss was less than 100 mL.  The patient was   taken to recovery room.  I was considering observation  overnight and   heparinization, but he insists upon going home, which is understandable due to   his prolonged prior hospital course.  Overall, the patient tolerated the   procedures well and will follow up with me as instructed.       ____________________________________     MD BEN Davis / RIAZ    DD:  03/09/2018 16:01:26  DT:  03/09/2018 17:54:22    D#:  [id number]  Job#:  [id number]

## 2018-03-10 NOTE — ASSESSMENT & PLAN NOTE
1996, 2000, 2 separate failed renal transplants.  Unclear why original renal failure, patient states he was born that way?  Prednisone is not for this  Supposed to be weaned from last ICU stay  Cut to prednisone 10 x 5 days  Then prednisone 5 x 5 days

## 2018-03-10 NOTE — ED NOTES
Patient returned from IR.  Bedside report received.  Abdominal binder in place.  Dressing to access site CDI.  Patient flat per MD orders.  VSS on 2LNC.

## 2018-03-10 NOTE — ED PROVIDER NOTES
ED Provider Note    Scribed for Gwendolyn Diggs M.D. by Ondina Alcocer. 3/10/2018, 8:58 AM.    Primary care provider: Tony Mcmillan M.D.  Means of arrival: Walk-In  History obtained from: Patient  History limited by: None    CHIEF COMPLAINT  Chief Complaint   Patient presents with   • Vascular Access Problem     dialysis graft to L thigh no loger working     HPI  Denis De Anda is a 37 y.o. male who presents to the Emergency Department after receiving a left thigh dialysis graft yesterday by Dr. Hopkins which is no longer working onset 7:30 PM last night. Patient was going to be admitted and kept on a heparin drip however was discharged home. He called Dr. Hopkins who told the patient he would put him on the surgery schedule then called him back 30 minutes later informing him that he will have to go through the ER since it is the weekend. Once in the ER, he wanted the attending physician to call him to discuss the treatment care plan. Patient last ate around 7-8:00 PM and last had fluids around 11:00 PM last night. Confirms a past medical history of congenital kidney disease which is followed by Dr. Owens. Denies taking immunosurpressants.     REVIEW OF SYSTEMS - C  Pertinent positives include vascular access problem. Pertinent negatives include no fever. All other systems reviewed and negative.     PAST MEDICAL HISTORY   has a past medical history of Arrhythmia; Chronic kidney disease, unspecified; Contracture of palmar fascia; Dialysis; Hemorrhagic disorder (CMS-HCC); Hypertension; Intra-abdominal varices; Pain; Renal failure; Seizure (CMS-[facility]); Sleep apnea; Superior vena cava obstruction with collaterals; and Toxic uninodular goiter without mention of thyrotoxic crisis or storm.    SURGICAL HISTORY   has a past surgical history that includes mass excision ortho (10/9/08); av fistula creation (11/6/08); arteriogram (3/5/2009); angioplasty balloon (3/5/2009); av fistulogram (3/5/2009); us-kidney  transplant (1996, 2001); endarterectomy (11/16/2010); av fistulogram (11/16/2010); cath placement (2/1/2011); angioplasty balloon (2/1/2011); av fistulogram (6/5/2011); cath placement (6/5/2011); av fistula revision (11/3/2011); bone spur excision (12/8/2011); recovery (5/18/2012); incision and drainage general (9/13/2013); debridement (9/13/2013); vein ligation (9/13/2013); recovery (6/13/2014); av fistula revision (9/30/2014); irrigation & debridement general (12/15/2014); av fistula revision (2/8/2015); av fistula revision (2/22/2015); av fistula revision (3/20/2015); inject nerv blck,stellate ganglion (3/24/2015); av fistula creation (4/20/2015); av fistula thrombolysis (Left, 6/3/2015); irrigation & debridement general (Left, 6/3/2015); av fistula thrombolysis (Left, 6/9/2015); av fistula revision (Left, 6/17/2015); irrigation & debridement general (Left, 6/17/2015); recovery (8/7/2015); percut implnt neuroelect,epidural (2/19/2016); percut implnt neuroelect,epidural (2/19/2016); parathyroidectomy (2006); inguinal hernia repair (Bilateral, 2001, 2002); spinal cord stimulator (N/A, 3/25/2016); recovery (7/8/2016); lesion excision general (Right, 7/11/2016); mass excision general (Right, 8/5/2016); cath placement (Right, 9/17/2016); thrombectomy (Left, 9/18/2016); av fistulogram (9/18/2016); thrombectomy (Left, 10/20/2016); incision and drainage general (10/20/2016); irrigation & debridement general (Left, 10/28/2016); flap closure (Left, 11/17/2016); thrombectomy (Left, 1/6/2017); cath placement (Right, 1/6/2017); thrombectomy (Left, 1/5/2017); spinal cord stimulator (N/A, 5/4/2017); abdominoplasty (6/5/2017); irrigation & debridement general (7/31/2017); cath placement (Right, 11/14/2017); av fistula revision (Left, 11/14/2017); wound exploration general (2/1/2018); wound closure general (2/19/2018); split thickness skin graft (2/26/2018); thrombectomy (Left, 3/9/2018); and cath placement (Right,  3/9/2018).    SOCIAL HISTORY  Social History   Substance Use Topics   • Smoking status: Never Smoker   • Smokeless tobacco: Never Used   • Alcohol use No      History   Drug Use No       FAMILY HISTORY  Family History   Problem Relation Age of Onset   • Arthritis Father      RA   • Lung Disease Father    • Heart Disease Father      MI   • Hypertension Brother        CURRENT MEDICATIONS  No current facility-administered medications on file prior to encounter.      Current Outpatient Prescriptions on File Prior to Encounter   Medication Sig Dispense Refill   • cinacalcet (SENSIPAR) 30 MG Tab Take 30-60 mg by mouth every day. 30 mg Mondays , Wednesdays , and Fridays. All other days 60 mg     • warfarin (COUMADIN) 2.5 MG Tab Take 2.5-5 mg by mouth every evening. 2.5 mg Mondays, Wednesdays, Fridays. 5 mg all other days     • BACITRACIN EX 1 Application by Apply externally route every day.     • predniSONE (DELTASONE) 20 MG Tab Take 1 Tab by mouth every day. 30 Tab 0   • amoxicillin-clavulanate (AUGMENTIN) 500-125 MG Tab Take 1 Tab by mouth every 24 hours for 15 days. 15 Tab 0   • levoFLOXacin (LEVAQUIN) 500 MG tablet Take 1 Tab by mouth every 48 hours for 15 days. 7 Tab 0   • levetiracetam (KEPPRA) 250 MG tablet Take 1 Tab by mouth 2 times a day. 60 Tab 3   • gabapentin (NEURONTIN) 600 MG tablet Take 600 mg by mouth 3 times a day.     • sevelamer carbonate (RENVELA) 800 MG Tab tablet Take 4,000 mg by mouth 3 times a day, with meals. With meals     • calcitRIOL (ROCALTROL) 0.25 MCG CAPS Take 0.75 mcg by mouth every bedtime.         ALLERGIES  Allergies   Allergen Reactions   • Baclofen Unspecified     Total loss of memory, sedation.   RXN=6/2015   • Contrast Media With Iodine [Iodine] Rash     RXN=1/5/2017   • Keflex Rash     RXN=possibly >10 years   • Pcn [Penicillins] Rash     RXN=possibly >10 years ago  Tolerated Zosyn on 2/20/18   • Tape Rash     Paper tape and tegaderm ok  RXN=ongoing       PHYSICAL EXAM  VITAL  "SIGNS: /59   Pulse 72   Temp 36.2 °C (97.1 °F) (Temporal)   Resp 16   Ht 1.626 m (5' 4\")   Wt 78.2 kg (172 lb 6.4 oz)   SpO2 100%   BMI 29.59 kg/m²     Constitutional:  Laying in gurney, able to answer questions  HENT: Nose is normal in appearance without rhinorrhea, external ears are normal,  moist mucous membranes  Eyes: Anicteric,  pupils are equal round and reactive, there is no conjunctival drainage or pallor   Neck: The trachea is midline, there is no obvious mass or meningeal signs  Cardiovascular: Equal radial pulsation, regular rate and rhythm without murmurs gallops or rubs  Thorax & Lungs: Respiratory rate and effort are normal. There is normal chest excursion with respiration. No wheezes rhonchi or rales noted.  Abdomen: Abdomen is normal in appearance, normal bowel sounds, no pain with cough, nonpulsatile  :  No CVA tenderness to palpation  Musculoskeletal: No deformities noted in all 4 extremities. Actively moves all 4 extremities  Skin: Right thigh port graft. Left thigh vascular access graft with no bruits or thrill. Visualized skin is warm, no erythema, no rash.  Neurologic:  Cranial nerves II through XII are intact there is no focal abnormality noted.  Psychiatric: Normal mood and mentation    DIAGNOSTIC STUDIES / PROCEDURES    LABS  Results for orders placed or performed during the hospital encounter of 03/10/18   CBC WITH DIFFERENTIAL   Result Value Ref Range    WBC 7.0 4.8 - 10.8 K/uL    RBC 3.05 (L) 4.70 - 6.10 M/uL    Hemoglobin 8.9 (L) 14.0 - 18.0 g/dL    Hematocrit 30.1 (L) 42.0 - 52.0 %    MCV 98.7 (H) 81.4 - 97.8 fL    MCH 29.2 27.0 - 33.0 pg    MCHC 29.6 (L) 33.7 - 35.3 g/dL    RDW 68.2 (H) 35.9 - 50.0 fL    Platelet Count 125 (L) 164 - 446 K/uL    MPV 10.4 9.0 - 12.9 fL    Nucleated RBC 0.00 /100 WBC    NRBC (Absolute) 0.00 K/uL    Neutrophils-Polys 89.40 (H) 44.00 - 72.00 %    Lymphocytes 5.30 (L) 22.00 - 41.00 %    Monocytes 0.90 0.00 - 13.40 %    Eosinophils 0.00 0.00 - " 6.90 %    Basophils 0.00 0.00 - 1.80 %    Neutrophils (Absolute) 6.38 1.82 - 7.42 K/uL    Lymphs (Absolute) 0.37 (L) 1.00 - 4.80 K/uL    Monos (Absolute) 0.06 0.00 - 0.85 K/uL    Eos (Absolute) 0.00 0.00 - 0.51 K/uL    Baso (Absolute) 0.00 0.00 - 0.12 K/uL    Anisocytosis 1+     Macrocytosis 1+     Microcytosis 1+    CMP   Result Value Ref Range    Sodium 134 (L) 135 - 145 mmol/L    Potassium 4.8 3.6 - 5.5 mmol/L    Chloride 91 (L) 96 - 112 mmol/L    Co2 24 20 - 33 mmol/L    Anion Gap 19.0 (H) 0.0 - 11.9    Glucose 114 (H) 65 - 99 mg/dL    Bun 50 (H) 8 - 22 mg/dL    Creatinine 7.93 (HH) 0.50 - 1.40 mg/dL    Calcium 7.1 (L) 8.5 - 10.5 mg/dL    AST(SGOT) 18 12 - 45 U/L    ALT(SGPT) 54 (H) 2 - 50 U/L    Alkaline Phosphatase 171 (H) 30 - 99 U/L    Total Bilirubin 0.6 0.1 - 1.5 mg/dL    Albumin 4.3 3.2 - 4.9 g/dL    Total Protein 7.1 6.0 - 8.2 g/dL    Globulin 2.8 1.9 - 3.5 g/dL    A-G Ratio 1.5 g/dL   PT/INR   Result Value Ref Range    PT 17.1 (H) 12.0 - 14.6 sec    INR 1.43 (H) 0.87 - 1.13   ESTIMATED GFR   Result Value Ref Range    GFR If  9 (A) >60 mL/min/1.73 m 2    GFR If Non  8 (A) >60 mL/min/1.73 m 2   MORPHOLOGY   Result Value Ref Range    RBC Morphology Present    PERIPHERAL SMEAR REVIEW   Result Value Ref Range    Peripheral Smear Review see below    DIFFERENTIAL MANUAL   Result Value Ref Range    Bands-Stabs 1.70 0.00 - 10.00 %    Metamyelocytes 1.80 %    Myelocytes 0.90 %    Manual Diff Status PERFORMED    PLATELET ESTIMATE   Result Value Ref Range    Plt Estimation Decreased      All labs reviewed by me.    COURSE & MEDICAL DECISION MAKING  Nursing notes and vital signs were reviewed. (See chart for details)  The patient's  records were reviewed, history was obtained from the patient and family member;     The patient presents with vascular access problem, and the differential diagnosis includes but is not limited to graft occlusion, no evidence of infection at this time.        8:58 AM - Initial orders in the Emergency Department included estimated GFR, morphology, peripheral smear review, differential manual, platelet estimate, CBC with differential, CMP, PT/INR.    10:27 AM - Consulted with the hospitalist who will figure out with Dr. Hopkins if the fistula needs to be observed over night or if the patient can be discharged home.     Additional work-up planned includes to hold in the ER for observation until patient is jeremiah to get an IR. This was discussed with Dr. Hopkins            FINAL IMPRESSION  1. Problem with vascular access          Ondina SANCHEZ (Sugey), am scribing for, and in the presence of, Gwendolyn Diggs M.D..    Electronically signed by: Ondina Alcocer (Sugey), 3/10/2018    IGwendolyn M.D. personally performed the services described in this documentation, as scribed by Ondina Alcocer in my presence, and it is both accurate and complete.    The note accurately reflects work and decisions made by me.  Gwendolyn Diggs  3/10/2018  2:09 PM

## 2018-03-10 NOTE — ASSESSMENT & PLAN NOTE
Recent length hospitalization 2/19-3/3 in ICU for Klebsiella bacteremia.  To finish augmentin, levaquin abx 3/17 renally dosed- ordered per ID.

## 2018-03-10 NOTE — PROGRESS NOTES
Fistula (graft) closed last night.  Plan to try percuteanous technique to re open.  Patient accepts.  Labs reviewed.

## 2018-03-10 NOTE — ASSESSMENT & PLAN NOTE
Source of bacteremia, closed by wound graft from right thigh by Dr. Rosenbaum.    Bandages in place, abdominal binder in place  2/2 IVC thrombus, on AC- coumadin

## 2018-03-10 NOTE — OP REPORT
DATE OF SERVICE:  03/10/2018    PREOPERATIVE DIAGNOSIS:  Thrombosed left thigh arteriovenous loop graft.    POSTOPERATIVE DIAGNOSIS:  Thrombosed left thigh arteriovenous loop graft.    OPERATIONS:  Percutaneous TPA thrombolysis, left thigh AV loop graft with   mechanical AngioJet thrombectomy, balloon angioplasty of the graft and venous   outflow.    SURGEON:  Jairo Hopkins MD    ANESTHESIA:  Local and moderate sedation.    MONITORING NURSE:  Registered nurse is Jael Junior RN.    DESCRIPTION OF PROCEDURE:  After obtaining informed consent, the patient was   placed supine on the angiography table.  It was less than 24 hours since he   has thrombosed postoperatively.  He had given informed consent.  A time-out   was performed.  We prepped the left thigh with ChloraPrep and draped   sterilely.  I used ultrasound to locate the placement of the sheath.  I chose   to have the sheath in the lateral fistula, which is the arterial side.  One in   the proximal thigh directed distally and the other in the distal thigh   directed proximally.  I used a micropuncture technique with ultrasound   guidance and local anesthesia of 1% Xylocaine.  I checked that I could easily   pass wires through the curvature of the entire fistula from the proximal left   groin arterial 6-Divehi sheath that was directed distally.  I could pass a   wire that traveled clear rounded out the venous outflow tract.  I also checked   the arterial inflow with a wire, and I could pass a Glidewire through it but   it required some manipulation.  Once our wires and sheaths were in place, I   started pulse spray drug delivery of TPA in a concentration of 10 mg of TPA   and 100 mg of normal saline.  The initial area I treated was the arterial   inflow through the distal sheath.  I injected or pulse sprayed 31 mL of   solution in this segment.  I then switched to the venous side by going through   the proximal left groin sheath directing it toward  the feet and up and around   into the femoral vein.  Before doing this, I exchanged a Glidewire with a 3   mm J wire for better control.  I sprayed the remaining TPA in this fashion   through the distal aspect of the arterial limb of the AV graft around the   curvature and proximally up to the venous outflow.  Meanwhile, the TPA was   working and we gradually noted pulsatility returning.  I shot some imaging   with contrast and it showed that we have flow, but still considerable   irregularity.  After a little more observation, images showed improvement.  I   did a retrograde angiogram of the arterial anastomosis by inflating a balloon   distal to the sheath.  Injecting contrast through the sheath refluxed through   the arterial anastomosis, and it looked widely patent without defect.  The   femoral artery was patent.  I could easily get venous outflow images and the   venous outflow half of the graft looked irregular.  I took a 6 mm balloon and   inflated it sequentially through the outflow side of the graft and resulting   improvement was visible with a widely patent, albeit slightly small appearing   outflow limb.  This surface was not as smooth as the newer segment of graft,   which is on the lateral thigh.  The outflow vein appeared to have no   restriction, but I dilated it with a 6 and then a 7 mm [place] balloon.  I   also dilated 2 areas where I have previously intervened.  These suture closed   transverse openings of the graft, there appeared to be strictures.  The 6 and   7 mm balloons, however, did not reveal any wasting when I inflated the   balloons in these 2 areas.  The flow was quite rapid.  A thrill developed.  I   did thrombectomy of the entire graft hoping to  additional clot that I   was seeing.  This was done with the AngioJet and thrombectomy mode from the   arterial the way up to the venous outflow.  Once this was done, the graft   lumen looked even better.  I chose to remove the wires  and sheaths and this   was done and I could palpate a thrill in the graft.  Pressure was held for 10   minutes and hemostasis was achieved and dressings were applied.  Blood loss   was less than 50 mL.  The patient's vital signs are stable.  He was advised of   the findings and status.  I planned to observe him overnight on heparin to   see if we can preserve patency of this AV graft that has been troublesome.       ____________________________________     MD BEN Davis / RIAZ    DD:  03/10/2018 13:51:36  DT:  03/10/2018 14:16:18    D#:  [id number]  Job#:  [id number]

## 2018-03-10 NOTE — ASSESSMENT & PLAN NOTE
Dr. Hopkins placed left thigh graft 3/9/18, went home with coumadin dose, but graft thrombosed overnight.  3/10 OR alteplase with Dr. Hopkins,   Continue IV heparin  Monitor APTT  Monitor INR, continue coumadin bridge with heparin  Monitor closely for adverse effects related to AC

## 2018-03-10 NOTE — ED NOTES
Per pt, Dr. Hopkins needs to be notified of arrival, surgery planned for today, for left thigh graft.

## 2018-03-10 NOTE — ED NOTES
Med rec complete per pt at bedside   Allergies reviewed   Pt started Augmentin and Levaquin on 03/02/18

## 2018-03-10 NOTE — PROGRESS NOTES
Inpatient Anticoagulation Service Note    Date: 3/10/2018  Reason for Anticoagulation: Other (Thrombosed dialysis fistula)      Hemoglobin Value: (!) 8.9  Hematocrit Value: (!) 30.1  Lab Platelet Value: (!) 125  Target INR: 2.0 to 3.0    INR from last 7 days     Date/Time INR Value    03/10/18 0925 (!)  1.43        Dose from last 7 days     Date/Time Dose (mg)    03/10/18 1400  7.5        Average Dose (mg):  (Prior regimen 2.5 mg MWF and 5 mg AOD)  Significant Interactions: Corticosteroids, Antibiotics  Bridge Therapy:  (Heparin wt-based protocol)     Reversal Agent Administered: Not Applicable    Comments: Patient continued on warfarin from PTA for history of multiple thrombosed AV grafts admitted with subtherapeutic INR. Patient's warfarin was recently on hold d/t bleeding esophageal varices and patient resumed dosing earlier this week. Patient s/p thrombolysis today and has been started on heparin bridge therapy with plan for discharge tomorrow if AV graft remains patent. Patient's warfarin drug interactions stable from PTA, remains on antibiotics (through March 16th) which can increase INR values. Patient cleared to start oral diet. Will give increased warfarin dose this evening and plan to resume prior home warfarin regimen tomorrow if INR remains stable.    Plan:  Warfarin 7.5 mg PO today. INR in AM.    Education Material Provided?: No (Chronic warfarin patient)    Pharmacist suggested discharge dosing: Resume prior home warfarin regimen of 2.5 mg PO Mon/Wed/Fri and 5 mg PO all other days of the week with follow-up INR within 48 hours of discharge     Pharmacy will continue to follow.     Cristy Heck, PharmD

## 2018-03-10 NOTE — ED TRIAGE NOTES
Pt ambulates to triage  Chief Complaint   Patient presents with   • Vascular Access Problem     dialysis graft to L thigh no loger working   pt sent by Dr Hopkins  Pt asked to wait in lobby, pt updated on triage process and pt asked to inform RN of any changes.

## 2018-03-10 NOTE — H&P
Hospital Medicine History and Physical    Date of Service  3/10/2018    Chief Complaint  Chief Complaint   Patient presents with   • Vascular Access Problem     dialysis graft to L thigh no loger working       History of Presenting Illness  37 y.o. male who presented 3/10/2018 with left thigh graft clotting after placement on 3/9/18 by Dr. Hopkins.  The patient took a dose of coumadin last night, but noticed with a stethoscope this morning that it had clotted off.  No thrill on palpation as well.  He did get a temporary right thigh HD catheter placed on 3/9/18 as well.  He had a prolonged hospitalization of 11 days for Klebsiella sepsis from abdominal wall cellulitis from 2/19 until 3/2.  He had presented with bleeding abdominal wall varices. He is a very chronically ill 37-year-old male who has a history of end-stage renal disease after 2 renal transplants 1996 and 2001 for which he is on dialysis as well as seizure disorder.  It is unclear as to the cause of the original renal failure.  Notes from nephrology do not indicate cause.   He is on Coumadin therapy for an occluded IVC. He was being managed by plastic surgery and he was taken to the operating room and underwent suture ligation of the abdominal wall varices and debridement of skin and subcutaneous tissues of the abdominal wall. Was found to have a supratherapeutic INR. He was given oral vitamin K and his Coumadin was held. He also received multiple units of FFP. He was transfused packed red blood cells as well. Initially he was admitted to the intensive care unit.     Also during the hospitalization he was diagnosed as having sepsis. While in the ICU he was on pressor support and eventually weaned off of pressors. This is likely secondary to both acute blood loss as well as sepsis. He was put on broad-spectrum antibiotics including vancomycin and Zosyn. Blood cultures grew Klebsiella. He also had a nonhealing necrotic wound of his abdomen in these cultures  grew out Enterococcus faecalis and E. coli and PSAR. He has been on Zosyn throughout the entire hospitalization. Upon discharge she was given a dose of Levaquin 750 mg. Infectious disease change him over to oral antibiotics. He is given a continue with Augmentin 500 mg daily and the last date is March 17. He should also be on Levaquin 500 mg every other day and that should be taken either 2 hours before or 6 hours after sevALAMER. The final date on that is March 17 as well.  Plan for this admission:   Today, his coumadin remains subtherapeutic at 1.43.  He is going to OR with Dr. Hopkins for alteplase for left thigh graft occlusion, start on heparin drip for 24 hours post graft alteplase,  transition to coumadin.  He does home hemodialysis.  He works full time in the [employer] at [employer] for last 15 years.  Lives with his father.    Primary Care Physician  Tony Mcmillan M.D.    Consultants  Dr. Hopkins    Code Status  Full code    Review of Systems  Review of Systems   Constitutional: Negative for chills, diaphoresis, fever and malaise/fatigue.   HENT: Negative for congestion and sore throat.    Eyes: Negative for pain and discharge.   Respiratory: Negative for cough, hemoptysis, sputum production, shortness of breath and wheezing.    Cardiovascular: Negative for chest pain, palpitations, claudication and leg swelling.   Gastrointestinal: Negative for abdominal pain, constipation, diarrhea, melena, nausea and vomiting.   Genitourinary: Negative for dysuria, frequency and urgency.   Musculoskeletal: Negative for back pain, joint pain, myalgias and neck pain.   Skin: Negative for itching and rash.   Neurological: Negative for dizziness, sensory change, speech change, focal weakness, loss of consciousness, weakness and headaches.   Endo/Heme/Allergies: Does not bruise/bleed easily.        Left thigh graft clotting.   Psychiatric/Behavioral: Negative for depression, substance abuse and suicidal ideas.        Past Medical  History  Past Medical History:   Diagnosis Date   • Arrhythmia     irregular EKG   • Chronic kidney disease, unspecified    • Contracture of palmar fascia    • Dialysis      hemodialysis at home 5 days a week   • Hemorrhagic disorder (CMS-HCC)     on coumadin   • Hypertension    • Intra-abdominal varices    • Pain     Chronic pain   • Renal failure     hemodialysis   • Seizure (CMS-HCC)     last seizure 04/1/2013   • Sleep apnea     BIPAP   • Superior vena cava obstruction with collaterals     from calcium deposits per 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   • Toxic uninodular goiter without mention of thyrotoxic crisis or storm        Surgical History  Past Surgical History:   Procedure Laterality Date   • THROMBECTOMY Left 3/9/2018    Procedure: THROMBECTOMY- THIGH DIALYSIS GRAFT AND REVISION  ;  Surgeon: Jairo Hopkins M.D.;  Location: [facility];  Service: General   • CATH PLACEMENT Right 3/9/2018    Procedure: CATH PLACEMENT - REPLACE DIALYSIS CATH RIGHT THIGH;  Surgeon: Jairo Hopkins M.D.;  Location: [facility];  Service: General   • SPLIT THICKNESS SKIN GRAFT  2/26/2018    Procedure: SPLIT THICKNESS SKIN GRAFT- TO ABDOMEN;  Surgeon: Samson Rosenbaum Jr., M.D.;  Location: [facility];  Service: Plastics   • WOUND CLOSURE GENERAL  2/19/2018    Procedure: WOUND CLOSURE GENERAL-ABDOMINAL WALL HEMORRHAGE;  Surgeon: Jason Robertson M.D.;  Location: [facility];  Service: Plastics   • WOUND EXPLORATION GENERAL  2/1/2018    Procedure: WOUND EXPLORATION GENERAL, REPAIR BLEEDING;  Surgeon: Samson Rosenbaum Jr., M.D.;  Location: [facility];  Service: Plastics   • CATH PLACEMENT Right 11/14/2017    Procedure: CATH PLACEMENT - PERMA;  Surgeon: Jairo Hopkins M.D.;  Location: [facility];  Service: General   • AV FISTULA REVISION Left 11/14/2017    Procedure: AV GRAFT REVISION;  Surgeon: Jairo Hopkins M.D.;   Location: [facility];  Service: General   • IRRIGATION & DEBRIDEMENT GENERAL  7/31/2017    Procedure: IRRIGATION & DEBRIDEMENT GENERAL-ABDOMEN;  Surgeon: Samson Rosenbaum Jr., M.D.;  Location: [facility];  Service:    • ABDOMINOPLASTY  6/5/2017    Procedure: ABDOMINOPLASTY - FOR PANNICULECTOMY;  Surgeon: Samson Rosenbaum Jr., M.D.;  Location: [facility];  Service:    • SPINAL CORD STIMULATOR N/A 5/4/2017    Procedure: SPINAL CORD STIMULATOR - EXPLANT;  Surgeon: Bin Pro M.D.;  Location: [facility];  Service:    • THROMBECTOMY Left 1/6/2017    Procedure: THROMBECTOMY-OPEN THROMBECTOMY WITH LEFT THIGH GRAFT;  Surgeon: Jairo Hopkins M.D.;  Location: [facility];  Service:    • CATH PLACEMENT Right 1/6/2017    Procedure: CATH PLACEMENT;  Surgeon: Jairo Hopkins M.D.;  Location: [facility];  Service:    • THROMBECTOMY Left 1/5/2017    Procedure: THROMBECTOMY-THIGH AV LOOP GRAFT AND ANGIOJET;  Surgeon: Jairo Hopkins M.D.;  Location: [facility];  Service:    • FLAP CLOSURE Left 11/17/2016    Procedure: Fasciocutaneous Flap Closure Left Upper Leg;  Surgeon: Samson Rosenbaum Jr., M.D.;  Location: [facility];  Service:    • IRRIGATION & DEBRIDEMENT GENERAL Left 10/28/2016    Procedure: IRRIGATION & DEBRIDEMENT GENERAL THIGH WITH IRRIGATING WOUND VAC PLACEMENT;  Surgeon: Jairo Hopkins M.D.;  Location: [facility];  Service:    • THROMBECTOMY Left 10/20/2016    Procedure: THROMBECTOMY THIGH;  Surgeon: Jairo Hopkins M.D.;  Location: [facility];  Service:    • INCISION AND DRAINAGE GENERAL  10/20/2016    Procedure: INCISION AND DRAINAGE GENERAL HEMATOMA;  Surgeon: Jairo Hopkins M.D.;  Location: [facility];  Service:    • THROMBECTOMY Left 9/18/2016    Procedure: THROMBECTOMY - and fistula revision;  Surgeon: Jairo Hopkins M.D.;  Location: [facility];  Service:    • AV FISTULOGRAM  9/18/2016    Procedure: AV FISTULOGRAM;  Surgeon: Jairo Hopkins M.D.;  Location: SURGERY [facility];  Service:    • CATH PLACEMENT Right 9/17/2016    Procedure: CATH PLACEMENT - tunneled dialysis cath placement right femoral ;  Surgeon: Quentin Alicia M.D.;  Location: SURGERY [facility];  Service:    • MASS EXCISION GENERAL Right 8/5/2016    Procedure: MASS EXCISION GENERAL FOR CALCIPHYLAXIS SKIN AND SUBCUTANEOUS TISSUE FOREARM;  Surgeon: Jairo Hopkins M.D.;  Location: SURGERY [facility];  Service:    • LESION EXCISION GENERAL Right 7/11/2016    Procedure: LESION EXCISION GENERAL FOR ARM SKIN;  Surgeon: Jairo Hopkins M.D.;  Location: SURGERY [facility];  Service:    • RECOVERY  7/8/2016    Procedure: IR1-VASCULAR CASE-VIANEY-LEFT THIGH AV GRAFT THROMBOLYSIS WITH TISSUE PLASMINOGEN ACTIVATOR AND ANGIOJET ARTHERECTOMY   ;  Surgeon: Melinda Surgery;  [facility]: [facility];  Service:    • SPINAL CORD STIMULATOR N/A 3/25/2016    Procedure: SPINAL CORD STIMULATOR;  Surgeon: Bin Pro;  Location: SURGERY [facility];  Service:    • PB PERCUT IMPLNT NEUROELECT,EPIDURAL  2/19/2016    Procedure: IMPLANT NEUROSTIM EPI ARRAY;  Surgeon: Bin Pro;  Location: [facility];  Service: Pain Management   • PB PERCUT IMPLNT NEUROELECT,EPIDURAL  2/19/2016    Procedure: IMPLANT NEUROSTIM EPI ARRAY;  Surgeon: Bin Pro;  Location: SURGERY [facility];  Service: Pain Management   • RECOVERY  8/7/2015    Procedure:  VASCULAR CASE VIANEY-RIGHT ILIAC VENOGRAM WITH ANGIOPLASTY, REMOVAL RIGHT FEMORAL TUNNELED DIALYSIS CATHETER  **VRE**;  Surgeon: Melinda Surgery;  Location: SURGERY PRE-POST PROC [facility]IT [facility];  Service:    • AV FISTULA REVISION Left 6/17/2015    Procedure: AV FISTULA REVISION;  Surgeon: Jairo Hopkins M.D.;  Location: SURGERY [facility];  Service:    • IRRIGATION & DEBRIDEMENT GENERAL Left  6/17/2015    Procedure: IRRIGATION & DEBRIDEMENT GENERAL ARM W/EXPLORATION WOUND & CONTROL BLEEDING;  Surgeon: Jairo Hopkins M.D.;  Location: SURGERY [facility];  Service:    • AV FISTULA THROMBOLYSIS Left 6/9/2015    Procedure: THROMBECTOMY AV GRAFT THIGH;  Surgeon: Jairo Hopkins M.D.;  Location: SURGERY [facility];  Service:    • AV FISTULA THROMBOLYSIS Left 6/3/2015    Procedure: AV FISTULA THROMBOLYSIS THIGH REPLACEMENT;  Surgeon: Jairo Hopkins M.D.;  Location: SURGERY [facility];  Service:    • IRRIGATION & DEBRIDEMENT GENERAL Left 6/3/2015    Procedure: IRRIGATION & DEBRIDEMENT GENERAL;  Surgeon: Jairo Hopkins M.D.;  Location: SURGERY [facility];  Service:    • AV FISTULA CREATION  4/20/2015    Performed by Jairo Hopkins M.D. at SURGERY [facility]   • PB INJECT NERV BLCK,STELLATE GANGLION  3/24/2015    Performed by Bin Pro at [facility]   • AV FISTULA REVISION  3/20/2015    Performed by Jairo Hopkins M.D. at SURGERY [facility]   • AV FISTULA REVISION  2/22/2015    Performed by Lurdes Moffett M.D. at SURGERY [facility]   • AV FISTULA REVISION  2/8/2015    Performed by Jairo Hopkins M.D. at [facility]   • IRRIGATION & DEBRIDEMENT GENERAL  12/15/2014    Performed by Jairo Hopkins M.D. at SURGERY [facility]   • AV FISTULA REVISION  9/30/2014    Performed by Jairo Hopkins M.D. at SURGERY [facility]   • RECOVERY  6/13/2014    Performed by [facility] at [facility] SAME DAY [facility]   • INCISION AND DRAINAGE GENERAL  9/13/2013    Performed by Jairo Hopkins M.D. at SURGERY [facility]   • DEBRIDEMENT  9/13/2013    Performed by Jairo Hopkins M.D. at [facility]   • VEIN LIGATION  9/13/2013    Performed by Jairo Hopkins M.D. at SURGERY [facility]   • RECOVERY  5/18/2012    Performed by SURGERY, IR-RECOVERY at [facility]   • BONE SPUR EXCISION   12/8/2011    Performed by BELKIS BREAUX at SURGERY SAME DAY [facility] ORS   • AV FISTULA REVISION  11/3/2011    Performed by MARI WINTERS at SURGERY [facility] ORS   • AV FISTULOGRAM  6/5/2011    Performed by MARI WINTERS at SURGERY [facility] ORS   • CATH PLACEMENT  6/5/2011    Performed by MARI WINTERS at SURGERY [facility] ORS   • CATH PLACEMENT  2/1/2011    Performed by MARI WINTERS at SURGERY [facility] ORS   • ANGIOPLASTY BALLOON  2/1/2011    Performed by MARI WINTERS at SURGERY [facility] ORS   • ENDARTERECTOMY  11/16/2010    Performed by MARI WINTERS at SURGERY [facility] ORS   • AV FISTULOGRAM  11/16/2010    Performed by MARI WINTERS at SURGERY [facility] ORS   • ARTERIOGRAM  3/5/2009    Performed by MARI WINTERS at SURGERY [facility] ORS   • ANGIOPLASTY BALLOON  3/5/2009    Performed by MARI WINETRS at SURGERY [facility] ORS   • AV FISTULOGRAM  3/5/2009    Performed by MARI WINTERS at SURGERY [facility] ORS   • AV FISTULA CREATION  11/6/08    Performed by MARI WINTERS at SURGERY [facility] ORS   • MASS EXCISION ORTHO  10/9/08    Performed by BELKIS BREAUX at SURGERY SAME DAY [facility] ORS   • PARATHYROIDECTOMY  2006   • INGUINAL HERNIA REPAIR Bilateral 2001, 2002   • US-KIDNEY TRANSPLANT  1996, 2001    x2       Medications  No current facility-administered medications on file prior to encounter.      Current Outpatient Prescriptions on File Prior to Encounter   Medication Sig Dispense Refill   • cinacalcet (SENSIPAR) 30 MG Tab Take 30-60 mg by mouth every day. 30 mg Mondays , Wednesdays , and Fridays. All other days 60 mg     • warfarin (COUMADIN) 2.5 MG Tab Take 2.5-5 mg by mouth every evening. 2.5 mg Mondays, Wednesdays, Fridays. 5 mg all other days     • BACITRACIN EX 1 Application by Apply externally route every day.     • predniSONE (DELTASONE) 20 MG Tab Take 1 Tab by mouth every day. 30 Tab 0   • amoxicillin-clavulanate  (AUGMENTIN) 500-125 MG Tab Take 1 Tab by mouth every 24 hours for 15 days. 15 Tab 0   • levoFLOXacin (LEVAQUIN) 500 MG tablet Take 1 Tab by mouth every 48 hours for 15 days. 7 Tab 0   • levetiracetam (KEPPRA) 250 MG tablet Take 1 Tab by mouth 2 times a day. 60 Tab 3   • gabapentin (NEURONTIN) 600 MG tablet Take 600 mg by mouth 3 times a day.     • sevelamer carbonate (RENVELA) 800 MG Tab tablet Take 4,000 mg by mouth 3 times a day, with meals. With meals     • calcitRIOL (ROCALTROL) 0.25 MCG CAPS Take 0.75 mcg by mouth every bedtime.         Family History  Family History   Problem Relation Age of Onset   • Arthritis Father      RA   • Lung Disease Father    • Heart Disease Father      MI   • Hypertension Brother        Social History  Social History   Substance Use Topics   • Smoking status: Never Smoker   • Smokeless tobacco: Never Used   • Alcohol use No       Allergies  Allergies   Allergen Reactions   • Baclofen Unspecified     Total loss of memory, sedation.   RXN=2015   • Contrast Media With Iodine [Iodine] Rash     RXN=2017   • Keflex Rash     RXN=possibly >10 years   • Pcn [Penicillins] Rash     RXN=possibly >10 years ago  Tolerated Zosyn on 18   • Tape Rash     Paper tape and tegaderm ok  RXN=ongoing        Physical Exam  Laboratory   Hemodynamics  Temp (24hrs), Av.2 °C (97.1 °F), Min:36.2 °C (97.1 °F), Max:36.2 °C (97.1 °F)   Temperature: 36.2 °C (97.1 °F)  Pulse  Av.5  Min: 67  Max: 80    Blood Pressure: 126/59, NIBP: (!) 106/32      Respiratory      Respiration: 16, Pulse Oximetry: 96 %             Physical Exam   Constitutional: He is oriented to person, place, and time. He appears well-developed and well-nourished. No distress.   HENT:   Head: Normocephalic and atraumatic.   Mouth/Throat: Oropharynx is clear and moist. No oropharyngeal exudate.   Eyes: Conjunctivae and EOM are normal. Pupils are equal, round, and reactive to light. Right eye exhibits no discharge. Left eye exhibits  no discharge. No scleral icterus.   Neck: Normal range of motion. Neck supple. No JVD present. No tracheal deviation present. No thyromegaly present.   Cardiovascular: Normal rate, regular rhythm and normal heart sounds.  Exam reveals no gallop and no friction rub.    No murmur heard.  Pulmonary/Chest: Effort normal and breath sounds normal. No respiratory distress. He has no wheezes. He has no rales. He exhibits no tenderness.   Abdominal: Soft. Bowel sounds are normal. He exhibits no distension and no mass. There is no tenderness. There is no rebound and no guarding.   Musculoskeletal: Normal range of motion. He exhibits no edema or tenderness.   No thrill on left thigh graft, no bruit on stethoscope exam.  Healing graft, no active bleeding.   Lymphadenopathy:     He has no cervical adenopathy.   Neurological: He is alert and oriented to person, place, and time. No cranial nerve deficit. He exhibits normal muscle tone.   Skin: Skin is warm and dry. No rash noted. He is not diaphoretic. No erythema.   Psychiatric: He has a normal mood and affect. His behavior is normal. Judgment and thought content normal.   Nursing note and vitals reviewed.      Recent Labs      03/09/18   0640  03/10/18   0925   WBC  4.8  7.0   RBC  3.28*  3.05*   HEMOGLOBIN  9.4*  8.9*   HEMATOCRIT  31.5*  30.1*   MCV  96.0  98.7*   MCH  28.7  29.2   MCHC  29.8*  29.6*   RDW  64.6*  68.2*   PLATELETCT  155*  125*   MPV  9.9  10.4     Recent Labs      03/09/18   0651  03/10/18   0925   SODIUM  134*  134*   POTASSIUM  4.5  4.8   CHLORIDE  93*  91*   CO2  27  24   GLUCOSE  138*  114*   BUN  33*  50*   CREATININE  5.46*  7.93*   CALCIUM  8.1*  7.1*     Recent Labs      03/09/18   0651  03/10/18   0925   ALTSGPT   --   54*   ASTSGOT   --   18   ALKPHOSPHAT   --   171*   TBILIRUBIN   --   0.6   GLUCOSE  138*  114*     Recent Labs      03/09/18   0651  03/10/18   0925   INR  1.26*  1.43*             Lab Results   Component Value Date    TROPONINI <0.01  02/24/2018     Urinalysis:  No results found for: SPECGRAVITY, GLUCOSEUR, KETONES, NITRITE, WBCURINE, RBCURINE, BACTERIA, EPITHELCELL     Imaging  none   Assessment/Plan     I anticipate this patient is appropriate for observation status at this time.    * 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 Hopkins placed left thigh graft 3/9/18, went home with coumadin dose, but graft thrombosed overnight.  3/10 OR alteplase with Dr. Hopkins, heparin drip to start post op.  Will need coumadin bridging since INR 1.4.        Abdominal varicosities- (present on admission)   Assessment & Plan    Source of bacteremia, closed by wound graft from right thigh by Dr. Rosenbaum.    Bandages in place, abdominal binder in place  2/2 IVC thrombus, on AC- coumadin          ESRD (end stage renal disease) (CMS-HCC)- (present on admission)   Assessment & Plan    Has had since 1996 after failed renal transplant.  Home HD per father.  Has temporary HD catheter rt thigh, new graft placement left thigh by Dr. Hopkins.  Followed by Dr. Serrato as outpatient MWF, no HD x2 days, missed fridays treatment.        Bacteremia- (present on admission)   Assessment & Plan    Recent length hospitalization 2/19-3/3 in ICU for Klebsiella bacteremia.  To finish augmentin, levaquin abx 3/17 renally dosed- ordered per ID.        Subtherapeutic anticoagulation- (present on admission)   Assessment & Plan    Warfarin per pharmacy dosing to start once procedure completed per Dr. Hopkins start.        Seizure disorder (CMS-HCC)- (present on admission)   Assessment & Plan    On home keppra.        Renal transplant failure and rejection x 2- (present on admission)   Assessment & Plan    1996, 2000 2 separate failed renal transplants.  Continue prednisone.  Unclear why original renal failure, patient states he was born that way?            VTE prophylaxis: heparin/coumadin.

## 2018-03-10 NOTE — ASSESSMENT & PLAN NOTE
Has had since 1996 after failed renal transplant.  Home HD per father.  Has temporary HD catheter rt thigh, new graft placement left thigh by Dr. Hopkins.  Followed by Dr. Serrato as outpatient MWF, no HD x2 days, missed fridays treatment.

## 2018-03-11 PROBLEM — G25.81 RLS (RESTLESS LEGS SYNDROME): Status: ACTIVE | Noted: 2018-03-11

## 2018-03-11 LAB
ANION GAP SERPL CALC-SCNC: 17 MMOL/L (ref 0–11.9)
APTT PPP: 34.3 SEC (ref 24.7–36)
APTT PPP: 39.1 SEC (ref 24.7–36)
APTT PPP: 47.3 SEC (ref 24.7–36)
APTT PPP: 97.2 SEC (ref 24.7–36)
BASOPHILS # BLD AUTO: 0.1 % (ref 0–1.8)
BASOPHILS # BLD: 0.01 K/UL (ref 0–0.12)
BUN SERPL-MCNC: 65 MG/DL (ref 8–22)
CALCIUM SERPL-MCNC: 7 MG/DL (ref 8.5–10.5)
CHLORIDE SERPL-SCNC: 91 MMOL/L (ref 96–112)
CHOLEST SERPL-MCNC: 169 MG/DL (ref 100–199)
CO2 SERPL-SCNC: 23 MMOL/L (ref 20–33)
CREAT SERPL-MCNC: 8.92 MG/DL (ref 0.5–1.4)
EOSINOPHIL # BLD AUTO: 0 K/UL (ref 0–0.51)
EOSINOPHIL NFR BLD: 0 % (ref 0–6.9)
ERYTHROCYTE [DISTWIDTH] IN BLOOD BY AUTOMATED COUNT: 67.6 FL (ref 35.9–50)
GLUCOSE SERPL-MCNC: 188 MG/DL (ref 65–99)
HCT VFR BLD AUTO: 26.7 % (ref 42–52)
HDLC SERPL-MCNC: 38 MG/DL
HGB BLD-MCNC: 8 G/DL (ref 14–18)
IMM GRANULOCYTES # BLD AUTO: 0.13 K/UL (ref 0–0.11)
IMM GRANULOCYTES NFR BLD AUTO: 1.8 % (ref 0–0.9)
INR PPP: 1.5 (ref 0.87–1.13)
INR PPP: 1.62 (ref 0.87–1.13)
LDLC SERPL CALC-MCNC: 109 MG/DL
LYMPHOCYTES # BLD AUTO: 0.36 K/UL (ref 1–4.8)
LYMPHOCYTES NFR BLD: 5.1 % (ref 22–41)
MCH RBC QN AUTO: 29.6 PG (ref 27–33)
MCHC RBC AUTO-ENTMCNC: 30 G/DL (ref 33.7–35.3)
MCV RBC AUTO: 98.9 FL (ref 81.4–97.8)
MONOCYTES # BLD AUTO: 0.23 K/UL (ref 0–0.85)
MONOCYTES NFR BLD AUTO: 3.3 % (ref 0–13.4)
NEUTROPHILS # BLD AUTO: 6.32 K/UL (ref 1.82–7.42)
NEUTROPHILS NFR BLD: 89.7 % (ref 44–72)
NRBC # BLD AUTO: 0 K/UL
NRBC BLD-RTO: 0 /100 WBC
PLATELET # BLD AUTO: 110 K/UL (ref 164–446)
PMV BLD AUTO: 9.9 FL (ref 9–12.9)
POTASSIUM SERPL-SCNC: 5.6 MMOL/L (ref 3.6–5.5)
PROTHROMBIN TIME: 17.8 SEC (ref 12–14.6)
PROTHROMBIN TIME: 18.9 SEC (ref 12–14.6)
RBC # BLD AUTO: 2.7 M/UL (ref 4.7–6.1)
SODIUM SERPL-SCNC: 131 MMOL/L (ref 135–145)
TRIGL SERPL-MCNC: 112 MG/DL (ref 0–149)
WBC # BLD AUTO: 7.1 K/UL (ref 4.8–10.8)

## 2018-03-11 PROCEDURE — 700111 HCHG RX REV CODE 636 W/ 250 OVERRIDE (IP)

## 2018-03-11 PROCEDURE — 700102 HCHG RX REV CODE 250 W/ 637 OVERRIDE(OP): Performed by: HOSPITALIST

## 2018-03-11 PROCEDURE — 700111 HCHG RX REV CODE 636 W/ 250 OVERRIDE (IP): Performed by: HOSPITALIST

## 2018-03-11 PROCEDURE — 5A1D70Z PERFORMANCE OF URINARY FILTRATION, INTERMITTENT, LESS THAN 6 HOURS PER DAY: ICD-10-PCS | Performed by: INTERNAL MEDICINE

## 2018-03-11 PROCEDURE — 85610 PROTHROMBIN TIME: CPT

## 2018-03-11 PROCEDURE — 700102 HCHG RX REV CODE 250 W/ 637 OVERRIDE(OP): Performed by: INTERNAL MEDICINE

## 2018-03-11 PROCEDURE — 85730 THROMBOPLASTIN TIME PARTIAL: CPT

## 2018-03-11 PROCEDURE — 90935 HEMODIALYSIS ONE EVALUATION: CPT

## 2018-03-11 PROCEDURE — A9270 NON-COVERED ITEM OR SERVICE: HCPCS | Performed by: INTERNAL MEDICINE

## 2018-03-11 PROCEDURE — 770001 HCHG ROOM/CARE - MED/SURG/GYN PRIV*

## 2018-03-11 PROCEDURE — 99233 SBSQ HOSP IP/OBS HIGH 50: CPT | Performed by: INTERNAL MEDICINE

## 2018-03-11 PROCEDURE — A9270 NON-COVERED ITEM OR SERVICE: HCPCS | Performed by: HOSPITALIST

## 2018-03-11 PROCEDURE — 700111 HCHG RX REV CODE 636 W/ 250 OVERRIDE (IP): Performed by: SURGERY

## 2018-03-11 RX ORDER — HEPARIN SODIUM 1000 [USP'U]/ML
4200 INJECTION, SOLUTION INTRAVENOUS; SUBCUTANEOUS
Status: DISCONTINUED | OUTPATIENT
Start: 2018-03-11 | End: 2018-03-12 | Stop reason: HOSPADM

## 2018-03-11 RX ORDER — PREDNISONE 10 MG/1
10 TABLET ORAL DAILY
Status: DISCONTINUED | OUTPATIENT
Start: 2018-03-12 | End: 2018-03-12 | Stop reason: HOSPADM

## 2018-03-11 RX ORDER — HEPARIN SODIUM 1000 [USP'U]/ML
INJECTION, SOLUTION INTRAVENOUS; SUBCUTANEOUS
Status: COMPLETED
Start: 2018-03-11 | End: 2018-03-11

## 2018-03-11 RX ORDER — HYDROCODONE BITARTRATE AND ACETAMINOPHEN 10; 325 MG/1; MG/1
2 TABLET ORAL
Status: COMPLETED | OUTPATIENT
Start: 2018-03-11 | End: 2018-03-11

## 2018-03-11 RX ADMIN — LEVOFLOXACIN 500 MG: 500 TABLET, FILM COATED ORAL at 22:50

## 2018-03-11 RX ADMIN — HYDROCODONE BITARTRATE AND ACETAMINOPHEN 2 TABLET: 10; 325 TABLET ORAL at 22:06

## 2018-03-11 RX ADMIN — GABAPENTIN 600 MG: 300 CAPSULE ORAL at 22:07

## 2018-03-11 RX ADMIN — CALCITRIOL 0.75 MCG: 0.25 CAPSULE, LIQUID FILLED ORAL at 22:06

## 2018-03-11 RX ADMIN — GABAPENTIN 600 MG: 300 CAPSULE ORAL at 17:17

## 2018-03-11 RX ADMIN — LEVETIRACETAM 250 MG: 250 TABLET ORAL at 08:39

## 2018-03-11 RX ADMIN — SEVELAMER CARBONATE 4000 MG: 800 TABLET, FILM COATED ORAL at 08:34

## 2018-03-11 RX ADMIN — LEVETIRACETAM 250 MG: 250 TABLET ORAL at 22:06

## 2018-03-11 RX ADMIN — SEVELAMER CARBONATE 4000 MG: 800 TABLET, FILM COATED ORAL at 13:17

## 2018-03-11 RX ADMIN — HEPARIN SODIUM 1450 UNITS/HR: 5000 INJECTION, SOLUTION INTRAVENOUS at 12:31

## 2018-03-11 RX ADMIN — HEPARIN SODIUM 4200 UNITS: 1000 INJECTION, SOLUTION INTRAVENOUS; SUBCUTANEOUS at 16:00

## 2018-03-11 RX ADMIN — WARFARIN SODIUM 5 MG: 5 TABLET ORAL at 22:00

## 2018-03-11 RX ADMIN — PREDNISONE 20 MG: 20 TABLET ORAL at 08:40

## 2018-03-11 RX ADMIN — SEVELAMER CARBONATE 4000 MG: 800 TABLET, FILM COATED ORAL at 17:17

## 2018-03-11 RX ADMIN — HYDROCODONE BITARTRATE AND ACETAMINOPHEN 2 TABLET: 10; 325 TABLET ORAL at 23:59

## 2018-03-11 RX ADMIN — GABAPENTIN 600 MG: 300 CAPSULE ORAL at 08:39

## 2018-03-11 RX ADMIN — HEPARIN SODIUM 1450 UNITS/HR: 5000 INJECTION, SOLUTION INTRAVENOUS at 08:10

## 2018-03-11 RX ADMIN — CINACALCET HYDROCHLORIDE 60 MG: 30 TABLET, COATED ORAL at 08:40

## 2018-03-11 RX ADMIN — AMOXICILLIN AND CLAVULANATE POTASSIUM 1 TABLET: 500; 125 TABLET, FILM COATED ORAL at 22:07

## 2018-03-11 ASSESSMENT — ENCOUNTER SYMPTOMS
PALPITATIONS: 0
SHORTNESS OF BREATH: 0
NAUSEA: 0
FALLS: 0
DOUBLE VISION: 0
DIAPHORESIS: 0
DIZZINESS: 0
BLOOD IN STOOL: 0
BLURRED VISION: 0
MYALGIAS: 0
ABDOMINAL PAIN: 0
DIARRHEA: 0
WEAKNESS: 0
BACK PAIN: 0
HEARTBURN: 0
CONSTIPATION: 0
PND: 0
COUGH: 0
CHILLS: 0
HEMOPTYSIS: 0
DEPRESSION: 0
NECK PAIN: 0
VOMITING: 0
HEADACHES: 0
FEVER: 0

## 2018-03-11 ASSESSMENT — COPD QUESTIONNAIRES
DO YOU EVER COUGH UP ANY MUCUS OR PHLEGM?: NO/ONLY WITH OCCASIONAL COLDS OR INFECTIONS
HAVE YOU SMOKED AT LEAST 100 CIGARETTES IN YOUR ENTIRE LIFE: NO/DON'T KNOW
DURING THE PAST 4 WEEKS HOW MUCH DID YOU FEEL SHORT OF BREATH: NONE/LITTLE OF THE TIME
COPD SCREENING SCORE: 0

## 2018-03-11 ASSESSMENT — LIFESTYLE VARIABLES
DO YOU DRINK ALCOHOL: NO
EVER_SMOKED: NEVER

## 2018-03-11 ASSESSMENT — PATIENT HEALTH QUESTIONNAIRE - PHQ9
SUM OF ALL RESPONSES TO PHQ9 QUESTIONS 1 AND 2: 0
1. LITTLE INTEREST OR PLEASURE IN DOING THINGS: NOT AT ALL
SUM OF ALL RESPONSES TO PHQ QUESTIONS 1-9: 0
2. FEELING DOWN, DEPRESSED, IRRITABLE, OR HOPELESS: NOT AT ALL

## 2018-03-11 ASSESSMENT — PAIN SCALES - GENERAL: PAINLEVEL_OUTOF10: 4

## 2018-03-11 NOTE — CONSULTS
Consults   Nephrology Inpatient Consultation    Date of Service  3/11/2018    Reason for Consultation  ESRD, assess the need for dialysis    History of Presenting Illness  37 y.o. male admitted 3/10/2018. 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 He has a history of kidney transplant ×2 in 1996 and 2001. He has been on dialysis and currently has very limited access available for hemodialysis. He has been dialyzing via a left thigh graft. On Friday night he noticed no bruit or thrill from the graft. He was subsequently taken by Dr. Hopkins to the operating room where he had percutaneous, thrombo-lysis of his left thigh AV loop graft . The patient also had a temporary hemodialysis catheter placed. He was started on IV heparin and plan is to bridge with Coumadin given that his INR is subtherapeutic. He is currently feeling fine. Awaiting dialysis today.    Referring Physician  Solomon Clayton M.D.    Consulting Physician  Karl Serrato M.D.    Review of Systems  Review of Systems   Constitutional: Negative for chills and fever.   Cardiovascular: Negative for chest pain and palpitations.   All other systems reviewed and are negative.     Past Medical History  Past Medical History:   Diagnosis Date   • Arrhythmia     irregular EKG   • Chronic kidney disease, unspecified    • Contracture of palmar fascia    • Dialysis      hemodialysis at home 5 days a week   • Hemorrhagic disorder (CMS-HCC)     on coumadin   • Hypertension    • Intra-abdominal varices    • Pain     Chronic pain   • Renal failure     hemodialysis   • Seizure (CMS-HCC)     last seizure 04/1/2013   • Sleep apnea     BIPAP   • Superior vena cava obstruction with collaterals     from calcium deposits per pt's mother; [facility]   • Toxic uninodular goiter without mention of thyrotoxic crisis or storm        Surgical History  Past Surgical History:   Procedure Laterality Date   •  THROMBECTOMY Left 3/9/2018    Procedure: THROMBECTOMY- THIGH DIALYSIS GRAFT AND REVISION  ;  Surgeon: Jairo Hopkins M.D.;  Location: [facility];  Service: General   • CATH PLACEMENT Right 3/9/2018    Procedure: CATH PLACEMENT - REPLACE DIALYSIS CATH RIGHT THIGH;  Surgeon: Jairo Hopkins M.D.;  Location: [facility];  Service: General   • SPLIT THICKNESS SKIN GRAFT  2/26/2018    Procedure: SPLIT THICKNESS SKIN GRAFT- TO ABDOMEN;  Surgeon: Samson Rosenbaum Jr., M.D.;  Location: [facility];  Service: Plastics   • WOUND CLOSURE GENERAL  2/19/2018    Procedure: WOUND CLOSURE GENERAL-ABDOMINAL WALL HEMORRHAGE;  Surgeon: Jason Robertson M.D.;  Location: [facility];  Service: Plastics   • WOUND EXPLORATION GENERAL  2/1/2018    Procedure: WOUND EXPLORATION GENERAL, REPAIR BLEEDING;  Surgeon: Samson Rosenbaum Jr., M.D.;  Location: [facility];  Service: Plastics   • CATH PLACEMENT Right 11/14/2017    Procedure: CATH PLACEMENT - PERMA;  Surgeon: Jairo Hopkins M.D.;  Location: [facility];  Service: General   • AV FISTULA REVISION Left 11/14/2017    Procedure: AV GRAFT REVISION;  Surgeon: Jairo Hopkins M.D.;  Location: [facility];  Service: General   • IRRIGATION & DEBRIDEMENT GENERAL  7/31/2017    Procedure: IRRIGATION & DEBRIDEMENT GENERAL-ABDOMEN;  Surgeon: Samson Rosenbaum Jr., M.D.;  Location: [facility];  Service:    • ABDOMINOPLASTY  6/5/2017    Procedure: ABDOMINOPLASTY - FOR PANNICULECTOMY;  Surgeon: Samson Rosenbaum Jr., M.D.;  Location: [facility];  Service:    • SPINAL CORD STIMULATOR N/A 5/4/2017    Procedure: SPINAL CORD STIMULATOR - EXPLANT;  Surgeon: Bin Pro M.D.;  Location: [facility];  Service:    • THROMBECTOMY Left 1/6/2017    Procedure: THROMBECTOMY-OPEN THROMBECTOMY WITH LEFT THIGH GRAFT;  Surgeon: Jairo Hopkins M.D.;  Location: [facility];  Service:    • CATH PLACEMENT Right 1/6/2017    Procedure: CATH PLACEMENT;  Surgeon: Jairo Hopkins M.D.;  Location: SURGERY [facility];  Service:    • THROMBECTOMY Left 1/5/2017    Procedure: THROMBECTOMY-THIGH AV LOOP GRAFT AND ANGIOJET;  Surgeon: Jairo Hopkins M.D.;  Location: SURGERY [facility];  Service:    • FLAP CLOSURE Left 11/17/2016    Procedure: Fasciocutaneous Flap Closure Left Upper Leg;  Surgeon: Samson Rosenbaum Jr., M.D.;  Location: SURGERY [facility];  Service:    • IRRIGATION & DEBRIDEMENT GENERAL Left 10/28/2016    Procedure: IRRIGATION & DEBRIDEMENT GENERAL THIGH WITH IRRIGATING WOUND VAC PLACEMENT;  Surgeon: Jairo Hopkins M.D.;  Location: SURGERY [facility];  Service:    • THROMBECTOMY Left 10/20/2016    Procedure: THROMBECTOMY THIGH;  Surgeon: Jairo Hopkins M.D.;  Location: SURGERY [facility];  Service:    • INCISION AND DRAINAGE GENERAL  10/20/2016    Procedure: INCISION AND DRAINAGE GENERAL HEMATOMA;  Surgeon: Jairo Hopkins M.D.;  Location: SURGERY [facility];  Service:    • THROMBECTOMY Left 9/18/2016    Procedure: THROMBECTOMY - and fistula revision;  Surgeon: Jairo Hopkins M.D.;  Location: [facility];  Service:    • AV FISTULOGRAM  9/18/2016    Procedure: AV FISTULOGRAM;  Surgeon: Jairo Hopkins M.D.;  Location: SURGERY [facility];  Service:    • CATH PLACEMENT Right 9/17/2016    Procedure: CATH PLACEMENT - tunneled dialysis cath placement right femoral ;  Surgeon: Quentin Alicia M.D.;  Location: SURGERY [facility];  Service:    • MASS EXCISION GENERAL Right 8/5/2016    Procedure: MASS EXCISION GENERAL FOR CALCIPHYLAXIS SKIN AND SUBCUTANEOUS TISSUE FOREARM;  Surgeon: Jairo Hopkins M.D.;  Location: SURGERY [facility];  Service:    • LESION EXCISION GENERAL Right 7/11/2016    Procedure: LESION EXCISION GENERAL FOR ARM SKIN;  Surgeon: Jairo Hopkins M.D.;  Location: [facility];   Service:    • RECOVERY  7/8/2016    Procedure: IR1-VASCULAR CASE-VIANEY-LEFT THIGH AV GRAFT THROMBOLYSIS WITH TISSUE PLASMINOGEN ACTIVATOR AND ANGIOJET ARTHERECTOMY   ;  Surgeon: Melinda Surgery;  Location: SURGERY PRE-POST PROC UNIT [facility];  Service:    • SPINAL CORD STIMULATOR N/A 3/25/2016    Procedure: SPINAL CORD STIMULATOR;  Surgeon: Bin Pro;  Location: SURGERY [facility];  Service:    • PB PERCUT IMPLNT NEUROELECT,EPIDURAL  2/19/2016    Procedure: IMPLANT NEUROSTIM EPI ARRAY;  Surgeon: Bin Pro;  Location: SURGERY [facility];  Service: Pain Management   • PB PERCUT IMPLNT NEUROELECT,EPIDURAL  2/19/2016    Procedure: IMPLANT NEUROSTIM EPI ARRAY;  Surgeon: Bin Pro;  Location: SURGERY [facility];  Service: Pain Management   • RECOVERY  8/7/2015    Procedure:  VASCULAR CASE VIANEY-RIGHT ILIAC VENOGRAM WITH ANGIOPLASTY, REMOVAL RIGHT FEMORAL TUNNELED DIALYSIS CATHETER  **VRE**;  Surgeon: Melinda Surgery;  Location: SURGERY PRE-POST PROC UNIT [facility];  Service:    • AV FISTULA REVISION Left 6/17/2015    Procedure: AV FISTULA REVISION;  Surgeon: Jairo Hopkins M.D.;  Location: SURGERY [facility];  Service:    • IRRIGATION & DEBRIDEMENT GENERAL Left 6/17/2015    Procedure: IRRIGATION & DEBRIDEMENT GENERAL ARM W/EXPLORATION WOUND & CONTROL BLEEDING;  Surgeon: Jairo Hopkins M.D.;  Location: [facility];  Service:    • AV FISTULA THROMBOLYSIS Left 6/9/2015    Procedure: THROMBECTOMY AV GRAFT THIGH;  Surgeon: Jairo Hopkins M.D.;  Location: SURGERY [facility];  Service:    • AV FISTULA THROMBOLYSIS Left 6/3/2015    Procedure: AV FISTULA THROMBOLYSIS THIGH REPLACEMENT;  Surgeon: Jairo Hopkins M.D.;  Location: SURGERY [facility];  Service:    • IRRIGATION & DEBRIDEMENT GENERAL Left 6/3/2015    Procedure: IRRIGATION & DEBRIDEMENT GENERAL;  Surgeon: Jairo Hopkins M.D.;  Location: [facility];  Service:    • AV FISTULA CREATION   4/20/2015    Performed by Jairo Hopkins M.D. at SURGERY [facility]   • PB INJECT NERV BLCK,STELLATE GANGLION  3/24/2015    Performed by Bin Pro at SURGERY [facility]   • AV FISTULA REVISION  3/20/2015    Performed by Jairo Hopkins M.D. at SURGERY [facility]   • AV FISTULA REVISION  2/22/2015    Performed by Lurdes Moffett M.D. at SURGERY [facility]   • AV FISTULA REVISION  2/8/2015    Performed by Jairo Hopkins M.D. at SURGERY [facility]   • IRRIGATION & DEBRIDEMENT GENERAL  12/15/2014    Performed by Jairo Hopkins M.D. at SURGERY [facility]   • AV FISTULA REVISION  9/30/2014    Performed by Jairo Hopkins M.D. at SURGERY [facility]   • RECOVERY  6/13/2014    Performed by [facility] Surgery at [facility] SAME DAY [facility] ORS   • INCISION AND DRAINAGE GENERAL  9/13/2013    Performed by Jairo Hopkins M.D. at SURGERY [facility] ORS   • DEBRIDEMENT  9/13/2013    Performed by Jairo Hopkins M.D. at SURGERY [facility]   • VEIN LIGATION  9/13/2013    Performed by Jairo Hopkins M.D. at SURGERY [facility]   • RECOVERY  5/18/2012    Performed by SURGERY, IR-RECOVERY at [facility]   • BONE SPUR EXCISION  12/8/2011    Performed by BELKIS BREAUX at SURGERY SAME DAY [facility]   • AV FISTULA REVISION  11/3/2011    Performed by JAIRO HOPKINS at SURGERY [facility] ORS   • AV FISTULOGRAM  6/5/2011    Performed by JAIRO HOPKINS at SURGERY [facility] ORS   • CATH PLACEMENT  6/5/2011    Performed by JAIRO HOPKINS at SURGERY [facility]   • CATH PLACEMENT  2/1/2011    Performed by JAIRO HOPKINS at SURGERY [facility]   • ANGIOPLASTY BALLOON  2/1/2011    Performed by JAIRO HOPKINS at SURGERY [facility] ORS   • ENDARTERECTOMY  11/16/2010    Performed by JAIRO HOPKINS at SURGERY [facility]   • AV FISTULOGRAM  11/16/2010    Performed by JAIRO HOPKINS at SURGERY [facility]   • ARTERIOGRAM   3/5/2009    Performed by MARI WINTERS at SURGERY [facility] ORS   • ANGIOPLASTY BALLOON  3/5/2009    Performed by MARI WINTERS at SURGERY [facility] ORS   • AV FISTULOGRAM  3/5/2009    Performed by MARI WINTERS at SURGERY [facility] ORS   • AV FISTULA CREATION  11/6/08    Performed by MARI WINTERS at SURGERY [facility] ORS   • MASS EXCISION ORTHO  10/9/08    Performed by BELKIS BREAUX at SURGERY SAME DAY [facility] ORS   • PARATHYROIDECTOMY  2006   • INGUINAL HERNIA REPAIR Bilateral 2001, 2002   • US-KIDNEY TRANSPLANT  1996, 2001    x2       Medications  No current facility-administered medications on file prior to encounter.      Current Outpatient Prescriptions on File Prior to Encounter   Medication Sig Dispense Refill   • cinacalcet (SENSIPAR) 30 MG Tab Take 30-60 mg by mouth every day. 30 mg Mondays , Wednesdays , and Fridays. All other days 60 mg     • warfarin (COUMADIN) 2.5 MG Tab Take 2.5-5 mg by mouth every evening. 2.5 mg Mondays, Wednesdays, Fridays. 5 mg all other days     • BACITRACIN EX 1 Application by Apply externally route every day.     • predniSONE (DELTASONE) 20 MG Tab Take 1 Tab by mouth every day. 30 Tab 0   • amoxicillin-clavulanate (AUGMENTIN) 500-125 MG Tab Take 1 Tab by mouth every 24 hours for 15 days. 15 Tab 0   • levoFLOXacin (LEVAQUIN) 500 MG tablet Take 1 Tab by mouth every 48 hours for 15 days. 7 Tab 0   • levetiracetam (KEPPRA) 250 MG tablet Take 1 Tab by mouth 2 times a day. 60 Tab 3   • gabapentin (NEURONTIN) 600 MG tablet Take 600 mg by mouth 3 times a day.     • sevelamer carbonate (RENVELA) 800 MG Tab tablet Take 4,000 mg by mouth 3 times a day, with meals. With meals     • calcitRIOL (ROCALTROL) 0.25 MCG CAPS Take 0.75 mcg by mouth every bedtime.         Family History  family history includes Arthritis in his father; Heart Disease in his father; Hypertension in his brother; Lung Disease in his father.    Social History  Social History    Substance Use Topics   • Smoking status: Never Smoker   • Smokeless tobacco: Never Used   • Alcohol use No       Allergies  Allergies   Allergen Reactions   • Baclofen Unspecified     Total loss of memory, sedation.   RXN=2015   • Contrast Media With Iodine [Iodine] Rash     RXN=2017   • Keflex Rash     RXN=possibly >10 years   • Pcn [Penicillins] Rash     RXN=possibly >10 years ago  Tolerated Zosyn on 18   • Tape Rash     Paper tape and tegaderm ok  RXN=ongoing        Physical Exam  Laboratory/Imaging   Hemodynamics  Temp (24hrs), Av.5 °C (97.7 °F), Min:36.1 °C (97 °F), Max:36.8 °C (98.2 °F)   Temperature: 36.1 °C (97 °F)  Pulse  Av.2  Min: 55  Max: 102    Blood Pressure: 102/73      Respiratory      Respiration: 17, Pulse Oximetry: 94 %             Fluids       Nutrition  Orders Placed This Encounter   Procedures   • Diet Order     Standing Status:   Standing     Number of Occurrences:   1     Order Specific Question:   Diet:     Answer:   Renal [8]       Physical Exam   Constitutional: He is oriented to person, place, and time. He appears well-developed and well-nourished.   HENT:   Head: Normocephalic and atraumatic.   Cardiovascular: Normal rate and regular rhythm.    Pulmonary/Chest: Effort normal and breath sounds normal.   Abdominal: Soft. Bowel sounds are normal.   Musculoskeletal: He exhibits no edema or tenderness.   Neurological: He is alert and oriented to person, place, and time.   Skin: Skin is warm and dry.       Recent Labs      18   0640  03/10/18   0925  03/10/18   2335   WBC  4.8  7.0  7.1   RBC  3.28*  3.05*  2.70*   HEMOGLOBIN  9.4*  8.9*  8.0*   HEMATOCRIT  31.5*  30.1*  26.7*   MCV  96.0  98.7*  98.9*   MCH  28.7  29.2  29.6   MCHC  29.8*  29.6*  30.0*   RDW  64.6*  68.2*  67.6*   PLATELETCT  155*  125*  110*   MPV  9.9  10.4  9.9     Recent Labs      18   0651  03/10/18   0925  03/10/18   2335   SODIUM  134*  134*  131*   POTASSIUM  4.5  4.8  5.6*   CHLORIDE   93*  91*  91*   CO2  27  24  23   GLUCOSE  138*  114*  188*   BUN  33*  50*  65*   CREATININE  5.46*  7.93*  8.92*   CALCIUM  8.1*  7.1*  7.0*     Recent Labs      03/10/18   0925  03/10/18   2335  03/11/18   0706  03/11/18   1222   APTT   --   34.3  39.1*  47.3*   INR  1.43*  1.50*  1.62*   --          Recent Labs      03/10/18   2335   TRIGLYCERIDE  112   HDL  38*   LDL  109*          Assessment/Plan     1. end-stage renal disease secondary to posterior urethral valves, currently on home hemodialysis. Dialysis due today.   2. status post left thigh graft thrombo-lysis   3. status post temporary dialysis catheter placement, dialysis to be done via the temporary dialysis catheter   4. status post kidney transplant ×2   5. anemia of chronic kidney disease, hemoglobin 8     -Plan for dialysis today   -Currently on a heparin drip to bridge until Coumadin therapeutic

## 2018-03-11 NOTE — RESPIRATORY CARE
COPD EDUCATION by COPD CLINICAL EDUCATOR  3/11/2018 at 6:47 AM by Daisy Taylor     Patient reviewed by COPD education team. Patient does not qualify for COPD program.

## 2018-03-11 NOTE — PROGRESS NOTES
"At bedside report, pt stated that he takes \"hydrocodone 50 mg at night time for restless legs.  Day shift RN already called earlier.  Pt already aware that he can have hydrocodone for pain.  However, pt asked night RN to call on call if possible.  This RN explained to pt that even on call MD will probably not order hydrocodone 50 mg.  Then, pt verbalized that on last admission, he got demerol.  Also, informed pt that on call MD will probably not order demerol.    "

## 2018-03-11 NOTE — CARE PLAN
Problem: Safety  Goal: Will remain free from injury    Intervention: Provide assistance with mobility  Pt educated with safety and injury prevention by using call light and alarms. Pt verbalize understanding and refused alarms.       Problem: Pain Management  Goal: Pain level will decrease to patient's comfort goal    Intervention: Follow pain managment plan developed in collaboration with patient and Interdisciplinary Team  Denies pain at this time.  Pt requesting for 50 mg of hydrocodone daily for his restless leg syndrome. Asked pt if he have something in writing from a doctor for his norco. Pt states that he has not gone to the pain clinic to get the prescription. Informed pt that writer will notify md regarding his request.

## 2018-03-11 NOTE — PROGRESS NOTES
Pt. Arrived via gurney thru hospital transport. Assumed care. Pt. Is awake, on bed. Initial vital signs taken and documented. Admit profile, med rec and assessment completed and documented. A&Ox4, currently on bed rest s/p vasc surgery with MD Hopkins. Pt. Currently denies pain. Started heparin gtts per protocol, verified with charge LETY Luna.    2 RN skin assessment performed with LETY Muniz. Dressing noted on bilateral trochanter area, CDI. Dressing on R hip, CDI. Dressing noted on R lower abdomen, CDI. Calloused bilateral heel and elbow, blanching. Sacral area, intact.

## 2018-03-11 NOTE — CARE PLAN
Problem: Venous Thromboembolism (VTW)/Deep Vein Thrombosis (DVT) Prevention:  Goal: Patient will participate in Venous Thrombosis (VTE)/Deep Vein Thrombosis (DVT)Prevention Measures  Outcome: PROGRESSING AS EXPECTED  Heparin gtts in use.    Problem: Discharge Barriers/Planning  Goal: Patient's continuum of care needs will be met  Outcome: PROGRESSING AS EXPECTED  S/p thrombectomy. On heparin gtts, next APTT check at 2200H.

## 2018-03-11 NOTE — PROGRESS NOTES
Pt requested RN contact on call MD and ask them to come to patients bedside as patient would like to talk to them regarding pain medication. RN explained that on call MD is only here for emergencies during night shift but we will pass it on to the day shift RN. Pt verbalized understanding.

## 2018-03-11 NOTE — PROGRESS NOTES
PO 1 left AVF thrombectomy  Has thrill this AM.  On heparin 1450 adjusted this am  PT/INR pending.  Had 7.5 coumadin last evening.  Needs dialysis which can be done through tunneled catheter.    Plan:  Continue heparin until INR therapeutic;  Dialysis today?    Anticipate discharge when INR therapeutic, possibly tomorrow AM.

## 2018-03-11 NOTE — PROGRESS NOTES
Pt AOx4. HANDY. Denies pain at this time. Pt updated on POC. Dr. Shell at bedside. Heparin rate change with LETY Luna. Dressing changed. Pt having breakfast and ordered cream of weat. Pt educated with fall and injury prevention. Pt verbalize understanding and refused alarms. Hourly rounding in place. Encouraged use of call light.

## 2018-03-11 NOTE — CARE PLAN
Problem: Safety  Goal: Will remain free from injury  Outcome: PROGRESSING AS EXPECTED  No bed alarm on pt. Pt calls for assistance appropriately. Threaded slipper socks in use.     Problem: Bowel/Gastric:  Goal: Normal bowel function is maintained or improved  Outcome: PROGRESSING AS EXPECTED  Pt ambulates independently. PO fluids at bedside. Pt refused stool softener this afternoon.

## 2018-03-11 NOTE — PROGRESS NOTES
Inpatient Anticoagulation Service Note    Date: 3/11/2018  Reason for Anticoagulation: Other (Comments) (Thrombosed dialysis fistula)        Hemoglobin Value: (!) 8  Hematocrit Value: (!) 26.7  Lab Platelet Value: (!) 110  Target INR: 2.0 to 3.0    INR from last 7 days     Date/Time INR Value    03/11/18 0706 (!)  1.62    03/10/18 2335 (!)  1.5    03/10/18 0925 (!)  1.43        Dose from last 7 days     Date/Time Dose (mg)    03/11/18 1400  5    03/10/18 1400  7.5        Average Dose (mg):  (Prior regimen 2.5 mg MWF and 5 mg AOD)  Significant Interactions: Corticosteroids, Antibiotics  Bridge Therapy: Yes (Heparin wt-based protocol)  Bridge Therapy Start Date: 03/10/18  Days of Overlap Therapy: 1    Reversal Agent Administered: Not Applicable  Comments: Patient resumed on coumadin early last week after bleeding 2/2 esophagel varices. Indication is for multiple thrombosed AV grafts per vascular surgery.  S/p thrombolysis, AV graft has thrill this AM per vascular. INR remains subtherapeutic but is trending up appropriately. Noted drop in H/H, however, no s/s of bleeding endorsed currently. DDIs stable prior to arrival. Remains on heparin gtt until INR therapeutic with plans to d/c once therapeutic level established. Will give home dose of 5 mg tonight as INR is expected to continue to trend towards therapeutic range after larger 7.5 mg dose last night. Will continue to monitor.    Plan:  Warfarin 5 mg PO   Education Material Provided?: No (Chronic warfarin patient. )  Pharmacist suggested discharge dosing: Home dosing as outlined above with INR follow-up in 2-3 days     Mario Martino, PharmD, BCPS

## 2018-03-12 ENCOUNTER — PATIENT OUTREACH (OUTPATIENT)
Dept: HEALTH INFORMATION MANAGEMENT | Facility: OTHER | Age: 38
End: 2018-03-12

## 2018-03-12 VITALS
WEIGHT: 172 LBS | OXYGEN SATURATION: 100 % | RESPIRATION RATE: 18 BRPM | HEIGHT: 64 IN | SYSTOLIC BLOOD PRESSURE: 99 MMHG | BODY MASS INDEX: 29.37 KG/M2 | DIASTOLIC BLOOD PRESSURE: 54 MMHG | TEMPERATURE: 97.9 F | HEART RATE: 105 BPM

## 2018-03-12 LAB
ALBUMIN SERPL BCP-MCNC: 3.8 G/DL (ref 3.2–4.9)
ALBUMIN/GLOB SERPL: 1.6 G/DL
ALP SERPL-CCNC: 146 U/L (ref 30–99)
ALT SERPL-CCNC: 41 U/L (ref 2–50)
ANION GAP SERPL CALC-SCNC: 12 MMOL/L (ref 0–11.9)
APTT PPP: 65.7 SEC (ref 24.7–36)
APTT PPP: 69.8 SEC (ref 24.7–36)
AST SERPL-CCNC: 25 U/L (ref 12–45)
BASOPHILS # BLD AUTO: 0.1 % (ref 0–1.8)
BASOPHILS # BLD: 0.01 K/UL (ref 0–0.12)
BILIRUB SERPL-MCNC: 0.6 MG/DL (ref 0.1–1.5)
BUN SERPL-MCNC: 41 MG/DL (ref 8–22)
CALCIUM SERPL-MCNC: 7.3 MG/DL (ref 8.5–10.5)
CHLORIDE SERPL-SCNC: 97 MMOL/L (ref 96–112)
CO2 SERPL-SCNC: 27 MMOL/L (ref 20–33)
CREAT SERPL-MCNC: 6.05 MG/DL (ref 0.5–1.4)
EOSINOPHIL # BLD AUTO: 0.01 K/UL (ref 0–0.51)
EOSINOPHIL NFR BLD: 0.1 % (ref 0–6.9)
ERYTHROCYTE [DISTWIDTH] IN BLOOD BY AUTOMATED COUNT: 72.7 FL (ref 35.9–50)
GLOBULIN SER CALC-MCNC: 2.4 G/DL (ref 1.9–3.5)
GLUCOSE SERPL-MCNC: 148 MG/DL (ref 65–99)
HCT VFR BLD AUTO: 27.3 % (ref 42–52)
HGB BLD-MCNC: 8.1 G/DL (ref 14–18)
IMM GRANULOCYTES # BLD AUTO: 0.2 K/UL (ref 0–0.11)
IMM GRANULOCYTES NFR BLD AUTO: 2.9 % (ref 0–0.9)
INR PPP: 2.03 (ref 0.87–1.13)
LYMPHOCYTES # BLD AUTO: 1.11 K/UL (ref 1–4.8)
LYMPHOCYTES NFR BLD: 16.2 % (ref 22–41)
MAGNESIUM SERPL-MCNC: 2.2 MG/DL (ref 1.5–2.5)
MCH RBC QN AUTO: 29.8 PG (ref 27–33)
MCHC RBC AUTO-ENTMCNC: 29.7 G/DL (ref 33.7–35.3)
MCV RBC AUTO: 100.4 FL (ref 81.4–97.8)
MONOCYTES # BLD AUTO: 0.43 K/UL (ref 0–0.85)
MONOCYTES NFR BLD AUTO: 6.3 % (ref 0–13.4)
NEUTROPHILS # BLD AUTO: 5.08 K/UL (ref 1.82–7.42)
NEUTROPHILS NFR BLD: 74.4 % (ref 44–72)
NRBC # BLD AUTO: 0 K/UL
NRBC BLD-RTO: 0 /100 WBC
PHOSPHATE SERPL-MCNC: 5.3 MG/DL (ref 2.5–4.5)
PLATELET # BLD AUTO: 113 K/UL (ref 164–446)
PMV BLD AUTO: 9.9 FL (ref 9–12.9)
POTASSIUM SERPL-SCNC: 4.4 MMOL/L (ref 3.6–5.5)
PROT SERPL-MCNC: 6.2 G/DL (ref 6–8.2)
PROTHROMBIN TIME: 22.6 SEC (ref 12–14.6)
RBC # BLD AUTO: 2.72 M/UL (ref 4.7–6.1)
SODIUM SERPL-SCNC: 136 MMOL/L (ref 135–145)
WBC # BLD AUTO: 6.8 K/UL (ref 4.8–10.8)

## 2018-03-12 PROCEDURE — A9270 NON-COVERED ITEM OR SERVICE: HCPCS | Performed by: HOSPITALIST

## 2018-03-12 PROCEDURE — 700111 HCHG RX REV CODE 636 W/ 250 OVERRIDE (IP): Performed by: SURGERY

## 2018-03-12 PROCEDURE — 99239 HOSP IP/OBS DSCHRG MGMT >30: CPT | Performed by: INTERNAL MEDICINE

## 2018-03-12 PROCEDURE — 83735 ASSAY OF MAGNESIUM: CPT

## 2018-03-12 PROCEDURE — 700102 HCHG RX REV CODE 250 W/ 637 OVERRIDE(OP): Performed by: HOSPITALIST

## 2018-03-12 PROCEDURE — 84100 ASSAY OF PHOSPHORUS: CPT

## 2018-03-12 PROCEDURE — 700111 HCHG RX REV CODE 636 W/ 250 OVERRIDE (IP): Mod: JG | Performed by: INTERNAL MEDICINE

## 2018-03-12 PROCEDURE — 85610 PROTHROMBIN TIME: CPT

## 2018-03-12 PROCEDURE — 85730 THROMBOPLASTIN TIME PARTIAL: CPT | Mod: 91

## 2018-03-12 PROCEDURE — 80053 COMPREHEN METABOLIC PANEL: CPT

## 2018-03-12 PROCEDURE — 85025 COMPLETE CBC W/AUTO DIFF WBC: CPT

## 2018-03-12 PROCEDURE — 700111 HCHG RX REV CODE 636 W/ 250 OVERRIDE (IP): Performed by: INTERNAL MEDICINE

## 2018-03-12 RX ORDER — PREDNISONE 5 MG/1
TABLET ORAL
Qty: 18 TAB | Refills: 0 | Status: SHIPPED | OUTPATIENT
Start: 2018-03-13 | End: 2018-03-21

## 2018-03-12 RX ADMIN — PREDNISONE 10 MG: 10 TABLET ORAL at 09:14

## 2018-03-12 RX ADMIN — SEVELAMER CARBONATE 4000 MG: 800 TABLET, FILM COATED ORAL at 09:12

## 2018-03-12 RX ADMIN — CINACALCET HYDROCHLORIDE 30 MG: 30 TABLET, COATED ORAL at 09:13

## 2018-03-12 RX ADMIN — LEVETIRACETAM 250 MG: 250 TABLET ORAL at 09:13

## 2018-03-12 RX ADMIN — HEPARIN SODIUM 1450 UNITS/HR: 5000 INJECTION, SOLUTION INTRAVENOUS at 05:23

## 2018-03-12 RX ADMIN — ERYTHROPOIETIN 4000 UNITS: 10000 INJECTION, SOLUTION INTRAVENOUS; SUBCUTANEOUS at 09:00

## 2018-03-12 RX ADMIN — GABAPENTIN 600 MG: 300 CAPSULE ORAL at 09:13

## 2018-03-12 ASSESSMENT — ENCOUNTER SYMPTOMS
NAUSEA: 0
VOMITING: 0
CHILLS: 0
DIZZINESS: 0
SENSORY CHANGE: 0
HEADACHES: 0
FOCAL WEAKNESS: 0
FEVER: 0
DIARRHEA: 0

## 2018-03-12 ASSESSMENT — LIFESTYLE VARIABLES: EVER_SMOKED: NEVER

## 2018-03-12 ASSESSMENT — PAIN SCALES - GENERAL
PAINLEVEL_OUTOF10: 4
PAINLEVEL_OUTOF10: 2
PAINLEVEL_OUTOF10: 2

## 2018-03-12 NOTE — PROGRESS NOTES
Inpatient Anticoagulation Service Note    Date: 3/12/2018  Reason for Anticoagulation: Other (Comments) (Thrombosed dialysis fistula)        Hemoglobin Value: (!) 8.1  Hematocrit Value: (!) 27.3  Lab Platelet Value: (!) 113  Target INR: 2.0 to 3.0    INR from last 7 days     Date/Time INR Value    03/12/18 0217 (!)  2.03    03/11/18 0706 (!)  1.62    03/10/18 2335 (!)  1.5    03/10/18 0925 (!)  1.43        Dose from last 7 days     Date/Time Dose (mg)    03/12/18 0900  2.5    03/11/18 1400  5    03/10/18 1400  7.5        Average Dose (mg):  (Prior regimen 2.5 mg MWF and 5 mg AOD)  Significant Interactions: Corticosteroids, Antibiotics  Bridge Therapy:  (Heparin wt-based protocol)  Bridge Therapy Start Date: 03/10/18  Days of Overlap Therapy: 2     Reversal Agent Administered: Not Applicable  Comments: INR just therapeutic.  Continue home dose and recommend stopping heparin. H/H remains low but is stable with no indication of bleeding noted. No new DDI.     Plan:  Warfarin 2.5mg with INR check tomorrow  Education Material Provided?: No (Chronic warfarin patient. )  Pharmacist suggested discharge dosing: Warfarin 2.5mg PO every Mon/Wed/Fri and 5mg on all other days with close f/u within 3 days      Kelly Luu, PharmD., BCPS

## 2018-03-12 NOTE — PROGRESS NOTES
Nephrology Progress Note, Adult, Complex               Author: Fadi Najjar Date & Time created: 3/12/2018  12:34 PM     Interval History:  36 y/o male with ESRD/HHD admitted with abdominal HD access thrombosis.  Doing better, getting ready to go home    Review of Systems:  Review of Systems   Constitutional: Negative for chills, fever and malaise/fatigue.   Cardiovascular: Negative for chest pain and leg swelling.   Gastrointestinal: Negative for diarrhea, nausea and vomiting.   Genitourinary: Negative for dysuria.   Neurological: Negative for dizziness, sensory change, focal weakness and headaches.       Physical Exam:  Physical Exam   Constitutional: He is oriented to person, place, and time. No distress.   HENT:   Nose: Nose normal.   Eyes: Conjunctivae are normal.   Cardiovascular: Normal rate and regular rhythm.    Pulmonary/Chest: Effort normal and breath sounds normal. No respiratory distress.   Musculoskeletal: He exhibits no edema.   Neurological: He is alert and oriented to person, place, and time.   Skin: He is not diaphoretic.   Psychiatric: He has a normal mood and affect. His behavior is normal.   Nursing note and vitals reviewed.      Labs:        Invalid input(s): [id number]      Recent Labs      03/10/18   0925  03/10/18   2335  03/12/18   0217   SODIUM  134*  131*  136   POTASSIUM  4.8  5.6*  4.4   CHLORIDE  91*  91*  97   CO2  24  23  27   BUN  50*  65*  41*   CREATININE  7.93*  8.92*  6.05*   MAGNESIUM   --    --   2.2   PHOSPHORUS   --    --   5.3*   CALCIUM  7.1*  7.0*  7.3*     Recent Labs      03/10/18   0925  03/10/18   2335  03/12/18   0217   ALTSGPT  54*   --   41   ASTSGOT  18   --   25   ALKPHOSPHAT  171*   --   146*   TBILIRUBIN  0.6   --   0.6   GLUCOSE  114*  188*  148*     Recent Labs      03/10/18   0925   03/10/18   2335  03/11/18   0706   03/11/18   1855  03/12/18   0217  03/12/18   0836   RBC  3.05*   --   2.70*   --    --    --   2.72*   --    HEMOGLOBIN  8.9*   --   8.0*    --    --    --   8.1*   --    HEMATOCRIT  30.1*   --   26.7*   --    --    --   27.3*   --    PLATELETCT  125*   --   110*   --    --    --   113*   --    PROTHROMBTM  17.1*   --   17.8*  18.9*   --    --   22.6*   --    APTT   --    < >  34.3  39.1*   < >  97.2*  69.8*  65.7*   INR  1.43*   --   1.50*  1.62*   --    --   2.03*   --     < > = values in this interval not displayed.     Recent Labs      03/10/18   0925  03/10/18   2335  18   0217   WBC  7.0  7.1  6.8   NEUTSPOLYS  89.40*  89.70*  74.40*   LYMPHOCYTES  5.30*  5.10*  16.20*   MONOCYTES  0.90  3.30  6.30   EOSINOPHILS  0.00  0.00  0.10   BASOPHILS  0.00  0.10  0.10   ASTSGOT  18   --   25   ALTSGPT  54*   --   41   ALKPHOSPHAT  171*   --   146*   TBILIRUBIN  0.6   --   0.6           Hemodynamics:  Temp (24hrs), Av.6 °C (97.9 °F), Min:36.3 °C (97.4 °F), Max:36.9 °C (98.5 °F)  Temperature: 36.6 °C (97.9 °F)  Pulse  Av.6  Min: 55  Max: 105   Blood Pressure: (!) 99/54     Respiratory:    Respiration: 18, Pulse Oximetry: 100 %           Fluids:    Intake/Output Summary (Last 24 hours) at 18 1234  Last data filed at 18 0500   Gross per 24 hour   Intake              860 ml   Output             2500 ml   Net            -1640 ml        GI/Nutrition:  Orders Placed This Encounter   Procedures   • Diet Order     Standing Status:   Standing     Number of Occurrences:   1     Order Specific Question:   Diet:     Answer:   Renal [8]   • DISCONTINUE DIET TRAY     Standing Status:   Standing     Number of Occurrences:   1     Medical Decision Making, by Problem:  Active Hospital Problems    Diagnosis   • Hypertension [I10]   • ESRD (end stage renal disease) (CMS-HCC) [N18.6]   • 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

## 2018-03-12 NOTE — DISCHARGE SUMMARY
C O N F I D E N T I A L I N F O R M A T I O N   -------------------------------------------------------------------------------------------------------------------   DISCHARGE SUMMARY    Patient ID:  Denis De Anda  [id number]  37 y.o.male  1980    Admit date: 3/10/2018    Discharge date and time: 03/12/18    Admitting Physician: Hanna Cervantes M.D.    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 Patient has underlying history of end-stage renal disease. The patient recently had a complicated hospitalization. He was recently discharged from the hospital on March 2, 2018 with notable infectious complications. He was supposed to be on oral Levaquin and Augmentin until March 17, 2018. Following patient's discharge patient underwent thrombectomy of the left femoral loop graft with Dr. Hopkins on March 9, 2018. Patient noticed that his graft had clotted off following his discharge and hence he presented to the emergency room for further evaluation. He was noted to have a subtherapeutic INR. He was evaluated by Dr. Hopkins and taken to the operating room on March 10, 2018 where percutaneous TPA thrombolysis of the left thigh AV graft was performed. Subsequently patient was maintained on intravenous heparin in the setting of a  subtherapeutic INR. On March 12, 2018 patient's INR is therapeutic. He has been cleared for discharge from vascular surgery perspective. Patient during his hospitalization underwent dialysis per nephrology team recommendations. His antibiotic regimen per previous infectious disease recommendation was continued. Of note this patient was on prednisone 20 mg. Patient reported being unaware of the indication for this. Denies being on this prior to his recent hospitalization. Review of electronic medical record system revealed that he was supposed to be on a prednisone taper following his recent critical illness. Patient has been on prednisone 20 mg for quite some time. Given this it was decided to taper this off as opposed to abruptly stopping it. He is advised to take prednisone 10 mg for 5 days with subsequently tapering it down to prednisone 5 mg for 5 days and then stopping. He is advised to continue his Coumadin following his discharge and maintain close follow-up with the Coumadin clinic. Otherwise patient will maintain outpatient follow-up with plastic surgery and infectious disease clinic. He will maintain outpatient dialysis. I have discussed the case with hospital schedulers and advised arrangement of outpatient follow-up with Coumadin clinic, primary care physician, infectious disease team. Patient to continue following with his dialysis and his plastic surgeon. Discharge planning has been discussed in detail with the patient. Patient is very very eager to be discharged home on March 12, 2018. No other complaints at this point in time. Patient is discharged home in stable condition.    Consults: nephrology and vascular sugery    Significant Studies:   IR-THROMB INF PTA DIALYSIS CIRCUIT    (Results Pending)     Disposition: Home    Patient Instructions: Givne  Activity: activity as tolerated  Wound Care: as directed  Diet:   Orders Placed This Encounter   Procedures   • Diet Order     Standing Status:    Standing     Number of Occurrences:   1     Order Specific Question:   Diet:     Answer:   Renal [8]   • DISCONTINUE DIET TRAY     Standing Status:   Standing     Number of Occurrences:   1         Medications at discharge:   Denis De Anda   Home Medication Instructions SYMONE:[id number]    Printed on:03/12/18 6177   Medication Information                      amoxicillin-clavulanate (AUGMENTIN) 500-125 MG Tab  Take 1 Tab by mouth every 24 hours for 15 days.             BACITRACIN EX  1 Application by Apply externally route every day.             calcitRIOL (ROCALTROL) 0.25 MCG CAPS  Take 0.75 mcg by mouth every bedtime.             cinacalcet (SENSIPAR) 30 MG Tab  Take 30-60 mg by mouth every day. 30 mg Mondays , Wednesdays , and Fridays. All other days 60 mg             gabapentin (NEURONTIN) 600 MG tablet  Take 600 mg by mouth 3 times a day.             levetiracetam (KEPPRA) 250 MG tablet  Take 1 Tab by mouth 2 times a day.             levoFLOXacin (LEVAQUIN) 500 MG tablet  Take 1 Tab by mouth every 48 hours for 15 days.             predniSONE (DELTASONE) 5 MG Tab  Take 10 mg once daily for five days, Then 5 mg once daily for five days             sevelamer carbonate (RENVELA) 800 MG Tab tablet  Take 4,000 mg by mouth 3 times a day, with meals. With meals             warfarin (COUMADIN) 2.5 MG Tab  Take 2.5-5 mg by mouth every evening. 2.5 mg Mondays, Wednesdays, Fridays. 5 mg all other days                 Opioid prescription history checked: N/A. None prescribed.     Time spend preparing discharge: 35 minutes. This included face to face with the patient, medication reconciliation, care co ordination with RN involved in patient care and discussion and co ordination with case management.     Signed:  Solomon Clayton  3/12/2018  1:18 PM

## 2018-03-12 NOTE — PROGRESS NOTES
Assessment completed. Pt is anxious to go home. Hospitalist RN in contact with MD about labs therapeutic for coumadin to discharge. Denies pain. Up self and steady. Cooperative but anxious, pleasant. No problems noted. Heparin running continuous weight based protocol and therapeutic. Labs drawn for APTT. F/U on lab values. No s/s distress noted. Bed low, call light in reach.

## 2018-03-12 NOTE — CARE PLAN
Problem: Infection  Goal: Will remain free from infection  Outcome: PROGRESSING AS EXPECTED  Patient will remain free from infection.Patient is on two different antibiotics and proper hand washing technique is being used.

## 2018-03-12 NOTE — DISCHARGE INSTRUCTIONS
1) F/U with Dr Hopkins in outpatient setting   2) Prednisone 10 mg once daily for five days and then 5 mg once daily for five days and then stop   3) Continue coumadin   4) Continue to have INR monitored at coumadin clinic   5) Follow up PCP in one week of hospital discharge   6) Continue follow up with plastic surgery   7) Continue nephrology follow up    Discharge Instructions    Discharged to home by car with relative. Discharged via walking, hospital escort: Refused.  Special equipment needed: Not Applicable    Be sure to schedule a follow-up appointment with your primary care doctor or any specialists as instructed.     Discharge Plan:   Influenza Vaccine Indication: Not indicated: Previously immunized this influenza season and > 8 years of age    I understand that a diet low in cholesterol, fat, and sodium is recommended for good health. Unless I have been given specific instructions below for another diet, I accept this instruction as my diet prescription.   Other diet: REnal    Special Instructions: Sepsis, Adult  Sepsis is a serious infection of your blood or tissues that affects your whole body. The infection that causes sepsis may be bacterial, viral, fungal, or parasitic. Sepsis may be life threatening. Sepsis can cause your blood pressure to drop. This may result in shock. Shock causes your central nervous system and your organs to stop working correctly.  What increases the risk?  Sepsis can happen in anyone, but it is more likely to happen in people who have weakened immune systems.  What are the signs or symptoms?  Symptoms of sepsis can include:  · Fever or low body temperature (hypothermia).  · Rapid breathing (hyperventilation).  · Chills.  · Rapid heartbeat (tachycardia).  · Confusion or light-headedness.  · Trouble breathing.  · Urinating much less than usual.  · Cool, clammy skin or red, flushed skin.  · Other problems with the heart, kidneys, or brain.  How is this diagnosed?  Your health care  provider will likely do tests to look for an infection, to see if the infection has spread to your blood, and to see how serious your condition is. Tests can include:  · Blood tests, including cultures of your blood.  · Cultures of other fluids from your body, such as:  ¨ Urine.  ¨ Pus from wounds.  ¨ Mucus coughed up from your lungs.  · Urine tests other than cultures.  · X-ray exams or other imaging tests.  How is this treated?  Treatment will begin with elimination of the source of infection. If your sepsis is likely caused by a bacterial or fungal infection, you will be given antibiotic or antifungal medicines.  You may also receive:  · Oxygen.  · Fluids through an IV tube.  · Medicines to increase your blood pressure.  · A machine to clean your blood (dialysis) if your kidneys fail.  · A machine to help you breathe if your lungs fail.  Get help right away if:  You get an infection or develop any of the signs and symptoms of sepsis after surgery or a hospitalization.  This information is not intended to replace advice given to you by your health care provider. Make sure you discuss any questions you have with your health care provider.  Document Released: 09/15/2004 Document Revised: 05/25/2017 Document Reviewed: 08/25/2014  [employer] Patient Education © 2017 [employer]      · Is patient discharged on Warfarin / Coumadin?   Yes    You are receiving the drug warfarin. Please understand the importance of monitoring warfarin with scheduled PT/INR blood draws.  Follow-up with a call to your personal Doctor's office in 3 days to schedule a PT/INR. .    IMPORTANT: HOW TO USE THIS INFORMATION:  This is a summary and does NOT have all possible information about this product. This information does not assure that this product is safe, effective, or appropriate for you. This information is not individual medical advice and does not substitute for the advice of your health care professional. Always ask your  "health care professional for complete information about this product and your specific health needs.      WARFARIN - ORAL (WARF-uh-rin)      COMMON BRAND NAME(S): Coumadin      WARNING:  Warfarin can cause very serious (possibly fatal) bleeding. This is more likely to occur when you first start taking this medication or if you take too much warfarin. To decrease your risk for bleeding, your doctor or other health care provider will monitor you closely and check your lab results (INR test) to make sure you are not taking too much warfarin. Keep all medical and laboratory appointments. Tell your doctor right away if you notice any signs of serious bleeding. See also Side Effects section.      USES:  This medication is used to treat blood clots (such as in deep vein thrombosis-DVT or pulmonary embolus-PE) and/or to prevent new clots from forming in your body. Preventing harmful blood clots helps to reduce the risk of a stroke or heart attack. Conditions that increase your risk of developing blood clots include a certain type of irregular heart rhythm (atrial fibrillation), heart valve replacement, recent heart attack, and certain surgeries (such as hip/knee replacement). Warfarin is commonly called a \"blood thinner,\" but the more correct term is \"anticoagulant.\" It helps to keep blood flowing smoothly in your body by decreasing the amount of certain substances (clotting proteins) in your blood.      HOW TO USE:  Read the Medication Guide provided by your pharmacist before you start taking warfarin and each time you get a refill. If you have any questions, ask your doctor or pharmacist. Take this medication by mouth with or without food as directed by your doctor or other health care professional, usually once a day. It is very important to take it exactly as directed. Do not increase the dose, take it more frequently, or stop using it unless directed by your doctor. Dosage is based on your medical condition, laboratory " tests (such as INR), and response to treatment. Your doctor or other health care provider will monitor you closely while you are taking this medication to determine the right dose for you. Use this medication regularly to get the most benefit from it. To help you remember, take it at the same time each day. It is important to eat a balanced, consistent diet while taking warfarin. Some foods can affect how warfarin works in your body and may affect your treatment and dose. Avoid sudden large increases or decreases in your intake of foods high in vitamin K (such as broccoli, cauliflower, cabbage, brussels sprouts, kale, spinach, and other green leafy vegetables, liver, green tea, certain vitamin supplements). If you are trying to lose weight, check with your doctor before you try to go on a diet. Cranberry products may also affect how your warfarin works. Limit the amount of cranberry juice (16 ounces/480 milliliters a day) or other cranberry products you may drink or eat.      SIDE EFFECTS:  Nausea, loss of appetite, or stomach/abdominal pain may occur. If any of these effects persist or worsen, tell your doctor or pharmacist promptly. Remember that your doctor has prescribed this medication because he or she has judged that the benefit to you is greater than the risk of side effects. Many people using this medication do not have serious side effects. This medication can cause serious bleeding if it affects your blood clotting proteins too much (shown by unusually high INR lab results). Even if your doctor stops your medication, this risk of bleeding can continue for up to a week. Tell your doctor right away if you have any signs of serious bleeding, including: unusual pain/swelling/discomfort, unusual/easy bruising, prolonged bleeding from cuts or gums, persistent/frequent nosebleeds, unusually heavy/prolonged menstrual flow, pink/dark urine, coughing up blood, vomit that is bloody or looks like coffee grounds,  severe headache, dizziness/fainting, unusual or persistent tiredness/weakness, bloody/black/tarry stools, chest pain, shortness of breath, difficulty swallowing. Tell your doctor right away if any of these unlikely but serious side effects occur: persistent nausea/vomiting, severe stomach/abdominal pain, yellowing eyes/skin. This drug rarely has caused very serious (possibly fatal) problems if its effects lead to small blood clots (usually at the beginning of treatment). This can lead to severe skin/tissue damage that may require surgery or amputation if left untreated. Patients with certain blood conditions (protein C or S deficiency) may be at greater risk. Get medical help right away if any of these rare but serious side effects occur: painful/red/purplish patches on the skin (such as on the toe, breast, abdomen), change in the amount of urine, vision changes, confusion, slurred speech, weakness on one side of the body. A very serious allergic reaction to this drug is rare. However, get medical help right away if you notice any symptoms of a serious allergic reaction, including: rash, itching/swelling (especially of the face/tongue/throat), severe dizziness, trouble breathing. This is not a complete list of possible side effects. If you notice other effects not listed above, contact your doctor or pharmacist. In the [place] - Call your doctor for medical advice about side effects. You may report side effects to FDA at 0-234-KSV-1109. In [place] - Call your doctor for medical advice about side effects. You may report side effects to [facility] [place] at [phone].      PRECAUTIONS:  Before taking warfarin, tell your doctor or pharmacist if you are allergic to it; or if you have any other allergies. This product may contain inactive ingredients, which can cause allergic reactions or other problems. Talk to your pharmacist for more details. Before using this medication, tell your doctor or pharmacist your medical  history, especially of: blood disorders (such as anemia, hemophilia), bleeding problems (such as bleeding of the stomach/intestines, bleeding in the brain), blood vessel disorders (such as aneurysms), recent major injury/surgery, liver disease, alcohol use, mental/mood disorders (including memory problems), frequent falls/injuries. It is important that all your doctors and dentists know that you take warfarin. Before having surgery or any medical/dental procedures, tell your doctor or dentist that you are taking this medication and about all the products you use (including prescription drugs, nonprescription drugs, and herbal products). Avoid getting injections into the muscles. If you must have an injection into a muscle (for example, a flu shot), it should be given in the arm. This way, it will be easier to check for bleeding and/or apply pressure bandages. This medication may cause stomach bleeding. Daily use of alcohol while using this medicine will increase your risk for stomach bleeding and may also affect how this medication works. Limit or avoid alcoholic beverages. If you have not been eating well, if you have an illness or infection that causes fever, vomiting, or diarrhea for more than 2 days, or if you start using any antibiotic medications, contact your doctor or pharmacist immediately because these conditions can affect how warfarin works. This medication can cause heavy bleeding. To lower the chance of getting cut, bruised, or injured, use great caution with sharp objects like safety razors and nail cutters. Use an electric razor when shaving and a soft toothbrush when brushing your teeth. Avoid activities such as contact sports. If you fall or injure yourself, especially if you hit your head, call your doctor immediately. Your doctor may need to check you. The Food & Drug Administration has stated that generic warfarin products are interchangeable. However, consult your doctor or pharmacist before  "switching warfarin products. Be careful not to take more than one medication that contains warfarin unless specifically directed by the doctor or health care provider who is monitoring your warfarin treatment. Older adults may be at greater risk for bleeding while using this drug. This medication is not recommended for use during pregnancy because of serious (possibly fatal) harm to an unborn baby. Discuss the use of reliable forms of birth control with your doctor. If you become pregnant or think you may be pregnant, tell your doctor immediately. If you are planning pregnancy, discuss a plan for managing your condition with your doctor before you become pregnant. Your doctor may switch the type of medication you use during pregnancy. Very small amounts of this medication may pass into breast milk but is unlikely to harm a nursing infant. Consult your doctor before breast-feeding.      DRUG INTERACTIONS:  Drug interactions may change how your medications work or increase your risk for serious side effects. This document does not contain all possible drug interactions. Keep a list of all the products you use (including prescription/nonprescription drugs and herbal products) and share it with your doctor and pharmacist. Do not start, stop, or change the dosage of any medicines without your doctor's approval. Warfarin interacts with many prescription, nonprescription, vitamin, and herbal products. This includes medications that are applied to the skin or inside the vagina or rectum. The interactions with warfarin usually result in an increase or decrease in the \"blood-thinning\" (anticoagulant) effect. Your doctor or other health care professional should closely monitor you to prevent serious bleeding or clotting problems. While taking warfarin, it is very important to tell your doctor or pharmacist of any changes in medications, vitamins, or herbal products that you are taking. Some products that may interact with this " drug include: capecitabine, imatinib, mifepristone. Aspirin, aspirin-like drugs (salicylates), and nonsteroidal anti-inflammatory drugs (NSAIDs such as ibuprofen, naproxen, celecoxib) may have effects similar to warfarin. These drugs may increase the risk of bleeding problems if taken during treatment with warfarin. Carefully check all prescription/nonprescription product labels (including drugs applied to the skin such as pain-relieving creams) since the products may contain NSAIDs or salicylates. Talk to your doctor about using a different medication (such as acetaminophen) to treat pain/fever. Low-dose aspirin and related drugs (such as clopidogrel, ticlopidine) should be continued if prescribed by your doctor for specific medical reasons such as heart attack or stroke prevention. Consult your doctor or pharmacist for more details. Many herbal products interact with warfarin. Tell your doctor before taking any herbal products, especially bromelains, coenzyme Q10, cranberry, danshen, dong quai, fenugreek, garlic, ginkgo biloba, ginseng, and Harry's wort, among others. This medication may interfere with a certain laboratory test to measure theophylline levels, possibly causing false test results. Make sure laboratory personnel and all your doctors know you use this drug.      OVERDOSE:  If overdose is suspected, contact a poison control center or emergency room immediately. [place] residents can call the [place] National Poison Hotline at 1-902.168.3569. [place] residents can call a provincial poison control center. Symptoms of overdose may include: bloody/black/tarry stools, pink/dark urine, unusual/prolonged bleeding.      NOTES:  Do not share this medication with others. Laboratory and/or medical tests (such as INR, complete blood count) must be performed periodically to monitor your progress or check for side effects. Consult your doctor for more details.      MISSED DOSE:  For the best possible benefit, do not miss  any doses. If you do miss a dose and remember on the same day, take it as soon as you remember. If you remember on the next day, skip the missed dose and resume your usual dosing schedule. Do not double the dose to catch up because this could increase your risk for bleeding. Keep a record of missed doses to give to your doctor or pharmacist. Contact your doctor or pharmacist if you miss 2 or more doses in a row.      STORAGE:  Store at room temperature away from light and moisture. Do not store in the bathroom. Keep all medications away from children and pets. Do not flush medications down the toilet or pour them into a drain unless instructed to do so. Properly discard this product when it is  or no longer needed. Consult your pharmacist or local waste disposal company for more details about how to safely discard your product.      MEDICAL ALERT:  Your condition and medication can cause complications in a medical emergency. For information about enrolling in MedicAlert, call 1-282.529.1701 ([place]) or 1-983.428.8700 ([place]).      Information last revised 2010 Copyright(c) 2010 [employer]             Depression / Suicide Risk    As you are discharged from this [facility] facility, it is important to learn how to keep safe from harming yourself.    Recognize the warning signs:  · Abrupt changes in personality, positive or negative- including increase in energy   · Giving away possessions  · Change in eating patterns- significant weight changes-  positive or negative  · Change in sleeping patterns- unable to sleep or sleeping all the time   · Unwillingness or inability to communicate  · Depression  · Unusual sadness, discouragement and loneliness  · Talk of wanting to die  · Neglect of personal appearance   · Rebelliousness- reckless behavior  · Withdrawal from people/activities they love  · Confusion- inability to concentrate     If you or a loved one observes any of these behaviors or has  concerns about self-harm, here's what you can do:  · Talk about it- your feelings and reasons for harming yourself  · Remove any means that you might use to hurt yourself (examples: pills, rope, extension cords, firearm)  · Get professional help from the community (Mental Health, Substance Abuse, psychological counseling)  · Do not be alone:Call your Safe Contact- someone whom you trust who will be there for you.  · Call your local CRISIS HOTLINE 474-4122 or 786-625-9659  · Call your local Children's Mobile Crisis Response Team [place] (420) 714-6181 or www.[employer]  · Call the toll free National Suicide Prevention Hotlines   · National Suicide Prevention Lifeline 665-716-SMKS (5475)  · National Hope Line Network 800-SUICIDE (396-0008)

## 2018-03-12 NOTE — PROGRESS NOTES
[facility] Hospitalist Progress Note    Date of Service: 3/11/2018    Chief Complaint  37 y.o. male admitted 3/10/2018 with thrombose AVG s/p OR with Dr Hopkins    Interval Problem Update  Seen and evaluated on rounds  Doing well  On [place] for RLS, wants home regimen resumed  Orders placed  Discussed with pharmacy  Wean prednisone  Continue IHD  Monitor APTT  Repeat labs in am tomorrow    Consultants/Specialty  Vascular surgery   Nephrology    Disposition  Once INR therapeutic        Review of Systems   Constitutional: Negative for chills, diaphoresis, fever and malaise/fatigue.   HENT: Negative for hearing loss and tinnitus.    Eyes: Negative for blurred vision and double vision.   Respiratory: Negative for cough, hemoptysis and shortness of breath.    Cardiovascular: Negative for chest pain, palpitations and PND.   Gastrointestinal: Negative for abdominal pain, blood in stool, constipation, diarrhea, heartburn, melena, nausea and vomiting.   Musculoskeletal: Negative for back pain, falls, joint pain, myalgias and neck pain.   Skin: Negative for itching and rash.   Neurological: Negative for dizziness, weakness and headaches.   Psychiatric/Behavioral: Negative for depression and suicidal ideas.      Physical Exam  Laboratory/Imaging   Hemodynamics  Temp (24hrs), Av.4 °C (97.6 °F), Min:36.1 °C (97 °F), Max:36.8 °C (98.2 °F)   Temperature: 36.3 °C (97.4 °F)  Pulse  Av.4  Min: 55  Max: 102    Blood Pressure: 101/57      Respiratory      Respiration: 18, Pulse Oximetry: 94 %             Fluids    Intake/Output Summary (Last 24 hours) at 18 6964  Last data filed at 18 1630   Gross per 24 hour   Intake              970 ml   Output             2500 ml   Net            -1530 ml       Nutrition  Orders Placed This Encounter   Procedures   • Diet Order     Standing Status:   Standing     Number of Occurrences:   1     Order Specific Question:   Diet:     Answer:   Renal [8]     Physical Exam   Constitutional:  He is oriented to person, place, and time. He appears well-developed and well-nourished. No distress.   HENT:   Head: Normocephalic.   Mouth/Throat: Oropharynx is clear and moist. No oropharyngeal exudate.   Eyes: Conjunctivae and EOM are normal. Pupils are equal, round, and reactive to light. No scleral icterus.   Neck: No JVD present.   Cardiovascular: Normal rate, regular rhythm and normal heart sounds.  Exam reveals no gallop and no friction rub.    No murmur heard.  Pulmonary/Chest: Effort normal and breath sounds normal. No stridor. No respiratory distress. He has no wheezes. He has no rales.   Abdominal: Soft. Bowel sounds are normal. He exhibits no distension. There is no tenderness. There is no rebound.   Abd binder in place. Wound healing. No infectious concerns.    Musculoskeletal: He exhibits no edema, tenderness or deformity.   R femoral TDC, L fem AVG   Neurological: He is alert and oriented to person, place, and time. He has normal reflexes. No cranial nerve deficit.   Skin: Skin is warm and dry. He is not diaphoretic.   Psychiatric: He has a normal mood and affect. His behavior is normal. Judgment and thought content normal.       Recent Labs      03/09/18   0640  03/10/18   0925  03/10/18   2335   WBC  4.8  7.0  7.1   RBC  3.28*  3.05*  2.70*   HEMOGLOBIN  9.4*  8.9*  8.0*   HEMATOCRIT  31.5*  30.1*  26.7*   MCV  96.0  98.7*  98.9*   MCH  28.7  29.2  29.6   MCHC  29.8*  29.6*  30.0*   RDW  64.6*  68.2*  67.6*   PLATELETCT  155*  125*  110*   MPV  9.9  10.4  9.9     Recent Labs      03/09/18   0651  03/10/18   0925  03/10/18   2335   SODIUM  134*  134*  131*   POTASSIUM  4.5  4.8  5.6*   CHLORIDE  93*  91*  91*   CO2  27  24  23   GLUCOSE  138*  114*  188*   BUN  33*  50*  65*   CREATININE  5.46*  7.93*  8.92*   CALCIUM  8.1*  7.1*  7.0*     Recent Labs      03/10/18   0925  03/10/18   2335  03/11/18   0706  03/11/18   1222   APTT   --   34.3  39.1*  47.3*   INR  1.43*  1.50*  1.62*   --           Recent Labs      03/10/18   2335   TRIGLYCERIDE  112   HDL  38*   LDL  109*          Assessment/Plan     * 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 Hopkins placed left thigh graft 3/9/18, went home with coumadin dose, but graft thrombosed overnight.  3/10 OR alteplase with Dr. Hopkins,   Continue IV heparin  Monitor APTT  Monitor INR, continue coumadin bridge with heparin  Monitor closely for adverse effects related to AC        Abdominal varicosities- (present on admission)   Assessment & Plan    Source of bacteremia, closed by wound graft from right thigh by Dr. Rosenbaum.    Bandages in place, abdominal binder in place  2/2 IVC thrombus, on AC- coumadin          ESRD (end stage renal disease) (CMS-HCC)- (present on admission)   Assessment & Plan    Has had since 1996 after failed renal transplant.  Home HD per father.  Has temporary HD catheter rt thigh, new graft placement left thigh by Dr. Hopkins.  Followed by Dr. Serrato as outpatient MWF, no HD x2 days, missed fridays treatment.        Bacteremia- (present on admission)   Assessment & Plan    Recent length hospitalization 2/19-3/3 in ICU for Klebsiella bacteremia.  To finish augmentin, levaquin abx 3/17 renally dosed- ordered per ID.        RLS (restless legs syndrome)   Assessment & Plan    Continue home dose of norco  Monitor for Adverse effects        Subtherapeutic anticoagulation- (present on admission)   Assessment & Plan    On heparin to coumadin bridge  Monitor APTT / INR        Seizure disorder (CMS-HCC)- (present on admission)   Assessment & Plan    On home keppra.        Renal transplant failure and rejection x 2- (present on admission)   Assessment & Plan    1996, 2000, 2 separate failed renal transplants.  Unclear why original renal failure, patient states he was born that way?  Prednisone is not for this  Supposed to be weaned from last ICU stay  Cut to prednisone 10 x 5 days  Then prednisone 5 x 5 days           Quality-Core Measures   Reviewed items::  Labs reviewed, Medications reviewed and Radiology images reviewed  Diaz catheter::  No Diaz  DVT prophylaxis pharmacological::  Heparin and Warfarin (Coumadin)  Antibiotics:  Treating active infection/contamination beyond 24 hours perioperative coverage  Assessed for rehabilitation services:  Patient returned to prior level of function, rehabilitation not indicated at this time

## 2018-03-12 NOTE — PROGRESS NOTES
HEMODIALYSIS NOTES:     HD today x 3 hours per Dr. Serrato. Initiated at 1327 and ended at 1627. Patient tolerated treatment. See paper flowsheet for details.    UF Net: 2,000 mL    R femoral catheter intact and patent with good flow during dialysis. No s/sx of infection, no redness nor bleeding noted on the CVC site. CVC dressing clean, dry and intact. Blood returned and CVC port flushed with NS and Heparin 1000 units/mL used  to lock catheter given per designated amount. CVC port clamped and capped.     Report given to TRAVIS Damon RN.

## 2018-03-12 NOTE — CARE PLAN
Problem: Safety  Goal: Will remain free from falls  Outcome: PROGRESSING AS EXPECTED  Patient will remain free of falls. Fall precautions are in place. Patient is a low fall risk.

## 2018-03-12 NOTE — PROGRESS NOTES
Left a message to dietary regarding pt's request to speak with them regarding pt's breakfast. Pt stated that he has not spoken to anybody regarding his dietary options from the kitchen.

## 2018-03-12 NOTE — CARE PLAN
Problem: Knowledge Deficit  Goal: Knowledge of disease process/condition, treatment plan, diagnostic tests, and medications will improve    Intervention: Explain information regarding disease process/condition, treatment plan, diagnostic tests, and medications and document in education  Verbalizes understanding POC for the day      Problem: Discharge Barriers/Planning  Goal: Patient's continuum of care needs will be met    Intervention: Explain discharge instructions and medication reconcilliation to patient and significant other/support system  Verbalizes understanding

## 2018-03-13 ENCOUNTER — PATIENT OUTREACH (OUTPATIENT)
Dept: HEALTH INFORMATION MANAGEMENT | Facility: OTHER | Age: 38
End: 2018-03-13

## 2018-03-14 ENCOUNTER — ANTICOAGULATION VISIT (OUTPATIENT)
Dept: VASCULAR LAB | Facility: MEDICAL CENTER | Age: 38
End: 2018-03-14
Attending: INTERNAL MEDICINE
Payer: MEDICARE

## 2018-03-14 DIAGNOSIS — T82.868D THROMBOSIS OF ARTERIOVENOUS GRAFT, SUBSEQUENT ENCOUNTER: ICD-10-CM

## 2018-03-14 LAB — INR PPP: 2.2 (ref 2–3.5)

## 2018-03-14 PROCEDURE — 85610 PROTHROMBIN TIME: CPT

## 2018-03-14 PROCEDURE — 99212 OFFICE O/P EST SF 10 MIN: CPT | Performed by: PHARMACIST

## 2018-03-19 ENCOUNTER — ANTICOAGULATION VISIT (OUTPATIENT)
Dept: VASCULAR LAB | Facility: MEDICAL CENTER | Age: 38
End: 2018-03-19
Attending: INTERNAL MEDICINE
Payer: MEDICARE

## 2018-03-19 DIAGNOSIS — T82.868S THROMBOSIS OF ARTERIOVENOUS GRAFT, SEQUELA: ICD-10-CM

## 2018-03-19 LAB
INR BLD: 2.2 (ref 0.9–1.2)
INR BLD: 2.5 (ref 0.9–1.2)
INR PPP: 2.5 (ref 2–3.5)

## 2018-03-19 PROCEDURE — 85610 PROTHROMBIN TIME: CPT

## 2018-03-19 PROCEDURE — 99211 OFF/OP EST MAY X REQ PHY/QHP: CPT

## 2018-03-19 NOTE — PROGRESS NOTES
Anticoagulation Summary  As of 3/19/2018    INR goal:   2.5-3.5   TTR:   35.9 % (10.6 mo)   Today's INR:   2.5   Maintenance plan:   2.5 mg (2.5 mg x 1) on Mon, Fri; 5 mg (2.5 mg x 2) all other days   Weekly total:   30 mg   Plan last modified:   Cricket Mujica, PharmD (3/14/2018)   Next INR check:   4/2/2018   Target end date:   Indefinite    Indications    AV graft thrombosis (CMS-HCC) (Resolved) [T82.868A]             Anticoagulation Episode Summary     INR check location:       Preferred lab:       Send INR reminders to:       Comments:   INR goal of 2.5 - 3.5 per Dr. Hopkins      Anticoagulation Care Providers     Provider Role Specialty Phone number    Jairo Hopkins M.D. Referring Surgery [phone]    [facility] Anticoagulation Services Responsible  [phone]        Anticoagulation Patient Findings  Patient Findings     Negatives:   Signs/symptoms of thrombosis, Signs/symptoms of bleeding, Laboratory test error suspected, Change in health, Change in alcohol use, Change in activity, Upcoming invasive procedure, Emergency department visit, Upcoming dental procedure, Missed doses, Extra doses, Change in medications, Change in diet/appetite, Hospital admission, Bruising, Other complaints        HPI:   Seen in clinic today, on anticoagulation therapy with warfarin for stroke prevention due to history of AV graft thrombosis    Previous INR  2.2 on 3/14/18     Does patient have any changes to current medical/health status since last appt (Y/N):  NO  Does patient have any signs/symptoms of bleeding and/or thrombosis since the last appt (Y/N):  NO  Does patient have any interval changes to diet or medications since last appt (Y/N):  NO  Are there any complications or cost restrictions with current therapy (Y/N):  NO      Vitals:  Patient declines BP/vitals check today      Asssessment:       INR therapeutic     Plan:  Instructed patient to continue with new increased weekly warfarin regimen established at last  visit.     Follow up:  Because warfarin is a high risk medication and current CHEST guidelines recommend regular monitoring intervals (few days up to 12 weeks), will have patient return to clinic in 2 weeks to recheck INR.    Jocelyn DillardD

## 2018-03-21 ENCOUNTER — OFFICE VISIT (OUTPATIENT)
Dept: INFECTIOUS DISEASES | Facility: MEDICAL CENTER | Age: 38
End: 2018-03-21
Payer: MEDICARE

## 2018-03-21 VITALS
BODY MASS INDEX: 30.56 KG/M2 | WEIGHT: 179 LBS | HEART RATE: 113 BPM | HEIGHT: 64 IN | DIASTOLIC BLOOD PRESSURE: 78 MMHG | TEMPERATURE: 97.8 F | OXYGEN SATURATION: 95 % | SYSTOLIC BLOOD PRESSURE: 104 MMHG

## 2018-03-21 DIAGNOSIS — B96.1 BACTEREMIA DUE TO KLEBSIELLA PNEUMONIAE: ICD-10-CM

## 2018-03-21 DIAGNOSIS — A49.8 ENTEROCOCCUS FAECALIS INFECTION: ICD-10-CM

## 2018-03-21 DIAGNOSIS — N18.6 ESRD (END STAGE RENAL DISEASE) (HCC): ICD-10-CM

## 2018-03-21 DIAGNOSIS — I86.8 ABDOMINAL VARICOSITIES: ICD-10-CM

## 2018-03-21 DIAGNOSIS — R78.81 BACTEREMIA DUE TO KLEBSIELLA PNEUMONIAE: ICD-10-CM

## 2018-03-21 DIAGNOSIS — A49.8 PSEUDOMONAS INFECTION: ICD-10-CM

## 2018-03-21 DIAGNOSIS — T85.868D GRAFT FAILURE DUE TO THROMBOSIS, SUBSEQUENT ENCOUNTER: ICD-10-CM

## 2018-03-21 DIAGNOSIS — A49.8 E. COLI INFECTION: ICD-10-CM

## 2018-03-21 PROCEDURE — 99214 OFFICE O/P EST MOD 30 MIN: CPT | Performed by: NURSE PRACTITIONER

## 2018-03-21 NOTE — PROGRESS NOTES
Infectious Disease Clinic    Subjective:     Chief Complaint   Patient presents with   • Hospital Follow-up     abdominal wall abscess     This is my first time meeting Mr. De Anda.      Interval History: 37 y.o. white male with longstanding history of end-stage renal disease, on hemodialysis; hypertension, seizure disorder and diabetes.  Hospitalized from 2/19- 3/2/18, admitted electively for repair of a nonhealing wound on his right lower abdomen from a failed tummy tuck procedure done last June 2017.  He stated he did not feel particularly well prior to the procedure, but attributed that to his chronic wound.  However, according to the chart, he was actually being admitted for repair of his abdominal varices.  After his operative procedure with ligation of bleeding abdominal wall varices and sharp debridement of the skin and subcutaneous tissues on 02/19/2018, he had postoperative hypotension with systolic blood pressures in the 70s-80s and was admitted to the ICU for further evaluation and management.  Wound cx on 2/20 +PSAR, E faecalis & E coli.  Bcx on 2/20 +Klebsiella.  Pt discharged home on PO Levaquin 750mg x 1 day, then 500mg every 48 hrs through 3/17/18.  Also, discharged home on PO Augmentin through 3/17/18.     Hospital records reviewed    Today, 3/21/2018: Patient reports feeling better and tolerated the PO Levaquin and Augmentin with minimal  adverse effect.  Had a couple bouts of diarrhea, but this has resolved.  Denies feeling generally ill, fevers/chills, general malaise, headache, n/v, abdominal pain, chest pain or shortness of breath.  Pt stating that the wound is healing well, he was seen by Dr. Rosenbaum who said was ok to keep ULICES and clean with soap and water.  Pt denies any drainage, no odor, minimal redness, no swelling and no pain to surgical site.  Pt has restless leg syndrome which causes him some pain in the evenings, for which he takes pain meds.  Was seen by Dr. Hopkins on Monday, plans to  "use fistula soon and get the temporary HD cath out.  Pt does HD at home 4x a week.     ROS  As documented above in my HPI.    Past Medical History:   Diagnosis Date   • Arrhythmia     irregular EKG   • Chronic kidney disease, unspecified    • Contracture of palmar fascia    • Dialysis      hemodialysis at home 5 days a week   • Hemorrhagic disorder (CMS-HCC)     on coumadin   • Hypertension    • Intra-abdominal varices    • Pain     Chronic pain   • Renal failure     hemodialysis   • Seizure (CMS-HCC)     last seizure 04/1/2013   • Sleep apnea     BIPAP   • Superior vena cava obstruction with collaterals     from calcium deposits per pt's mother; Dr Hopkins [facility]   • Toxic uninodular goiter without mention of thyrotoxic crisis or storm        Social History   Substance Use Topics   • Smoking status: Never Smoker   • Smokeless tobacco: Never Used   • Alcohol use No       Allergies: Baclofen; Contrast media with iodine [iodine]; Keflex; Pcn [penicillins]; and Tape    Pt's medication and problem list reviewed.     Objective:     PE:  /78   Pulse (!) 113   Temp 36.6 °C (97.8 °F)   Ht 1.626 m (5' 4\")   Wt 81.2 kg (179 lb)   SpO2 95%   BMI 30.73 kg/m²     Vital signs reviewed    Constitutional: Appears well-developed. No acute distress.  Speech fluent.  Appears chronically ill.    Eyes: Conjunctivae normal and EOM are normal. Pupils are equal, round, and reactive to light.   Neck: Trachea midline. Normal range of motion. No JVD.  Cardiovascular: Tachycardic, regular rhythm, normal heart sounds. No murmur, gallop, or friction rub. No edema.  Respiratory: No respiratory distress, unlabored respiratory effort.  Lungs clear to auscultation bilaterally. No wheezes or rales.   Abdomen: Soft, slightly tender to palpation, non-distended. BS + x 4. No masses.  R abd- ULICES, wound bed pink with diffuse scabbing and suture, no drainage, no odor, trace erythema along wound " edges.  Musculoskeletal: Steady gait.  Normal range of motion.  No joint or bone tenderness, swelling, erythema or deformity.    LLE fistula +bruit and thrill.  RLE temp HD cath- CDI, non tender, no erythema.  Skin: Warm and dry. Good turgor. No visible rashes or lesions.  Neurological: No cranial nerve deficit. Coordination normal.    Psychiatric: Alert and oriented to person, place, and time. Normal mood, calm affect.     Labs:  WBC   Date/Time Value Ref Range Status   03/12/2018 02:17 AM 6.8 4.8 - 10.8 K/uL Final     RBC   Date/Time Value Ref Range Status   03/12/2018 02:17 AM 2.72 (L) 4.70 - 6.10 M/uL Final     Hemoglobin   Date/Time Value Ref Range Status   03/12/2018 02:17 AM 8.1 (L) 14.0 - 18.0 g/dL Final     Hematocrit   Date/Time Value Ref Range Status   03/12/2018 02:17 AM 27.3 (L) 42.0 - 52.0 % Final     MCV   Date/Time Value Ref Range Status   03/12/2018 02:17 .4 (H) 81.4 - 97.8 fL Final     MCH   Date/Time Value Ref Range Status   03/12/2018 02:17 AM 29.8 27.0 - 33.0 pg Final     MCHC   Date/Time Value Ref Range Status   03/12/2018 02:17 AM 29.7 (L) 33.7 - 35.3 g/dL Final     MPV   Date/Time Value Ref Range Status   03/12/2018 02:17 AM 9.9 9.0 - 12.9 fL Final        Sodium   Date/Time Value Ref Range Status   03/12/2018 02:17  135 - 145 mmol/L Final     Potassium   Date/Time Value Ref Range Status   03/12/2018 02:17 AM 4.4 3.6 - 5.5 mmol/L Final     Chloride   Date/Time Value Ref Range Status   03/12/2018 02:17 AM 97 96 - 112 mmol/L Final     Co2   Date/Time Value Ref Range Status   03/12/2018 02:17 AM 27 20 - 33 mmol/L Final     Glucose   Date/Time Value Ref Range Status   03/12/2018 02:17  (H) 65 - 99 mg/dL Final     Bun   Date/Time Value Ref Range Status   03/12/2018 02:17 AM 41 (H) 8 - 22 mg/dL Final     Creatinine   Date/Time Value Ref Range Status   03/12/2018 02:17 AM 6.05 (HH) 0.50 - 1.40 mg/dL Final   05/06/2009 05:30 AM 8.2 (HH) 0.5 - 1.4 mg/dL Final       Alkaline  Phosphatase   Date/Time Value Ref Range Status   03/12/2018 02:17  (H) 30 - 99 U/L Final     AST(SGOT)   Date/Time Value Ref Range Status   03/12/2018 02:17 AM 25 12 - 45 U/L Final     ALT(SGPT)   Date/Time Value Ref Range Status   03/12/2018 02:17 AM 41 2 - 50 U/L Final     Total Bilirubin   Date/Time Value Ref Range Status   03/12/2018 02:17 AM 0.6 0.1 - 1.5 mg/dL Final      Assessment and Plan:   The following treatment plan was discussed with patient at length:    1. Bacteremia due to Klebsiella pneumoniae      -Completed PO Levaquin, no addtional abx to follow.  -Monitor for s/sx of worsening off abx: increased redness, pain, swelling, drainage, breakdown of surgical site, fevers, chills, general malaise, etc.  Notify ID or go to ER should these s/sx occur.   2. Graft failure due to thrombosis, subsequent encounter      -Completed PO Augmentin, no addtional abx to follow.  -Warning s/sx off abx as above.   3. E. coli infection      As above.   4. Pseudomonas infection      As above.   5. Enterococcus faecalis infection      As above.   6. ESRD (end stage renal disease) (CMS-HCC)      FU with Nephrology as directed.   7. Abdominal varicosities      FU with surgeon as directed.     Follow up: PRN, RTC sooner if needed. FU with PCP for ongoing chronic medical conditions.     CLAY Huff.

## 2018-03-29 ENCOUNTER — TELEPHONE (OUTPATIENT)
Dept: VASCULAR LAB | Facility: MEDICAL CENTER | Age: 38
End: 2018-03-29

## 2018-03-29 DIAGNOSIS — Z79.01 CHRONIC ANTICOAGULATION: ICD-10-CM

## 2018-03-29 RX ORDER — WARFARIN SODIUM 5 MG/1
TABLET ORAL
Qty: 90 TAB | Refills: 2 | Status: ON HOLD | OUTPATIENT
Start: 2018-03-29 | End: 2018-04-27

## 2018-03-29 NOTE — TELEPHONE ENCOUNTER
Pt called requesting warfarin change to 5mg tablets.  He understands he is to take the same dosage, that will be different based on his new tablet strength.    RX e prescribed.    CHAYA Garcia  [facility]

## 2018-04-05 ENCOUNTER — ANTICOAGULATION VISIT (OUTPATIENT)
Dept: VASCULAR LAB | Facility: MEDICAL CENTER | Age: 38
End: 2018-04-05
Attending: INTERNAL MEDICINE
Payer: MEDICARE

## 2018-04-05 DIAGNOSIS — Z79.01 CHRONIC ANTICOAGULATION: ICD-10-CM

## 2018-04-05 LAB — INR PPP: 3 (ref 2–3.5)

## 2018-04-05 PROCEDURE — 99211 OFF/OP EST MAY X REQ PHY/QHP: CPT

## 2018-04-05 PROCEDURE — 85610 PROTHROMBIN TIME: CPT

## 2018-04-05 NOTE — PROGRESS NOTES
Anticoagulation Summary  As of 4/5/2018    INR goal:   2.5-3.5   TTR:   39.1 % (11.2 mo)   Today's INR:   3   Maintenance plan:   2.5 mg (2.5 mg x 1) on Mon, Fri; 5 mg (2.5 mg x 2) all other days   Weekly total:   30 mg   Plan last modified:   Cricket Mujica, PharmD (3/14/2018)   Next INR check:   4/19/2018   Target end date:   Indefinite    Indications    AV graft thrombosis (CMS-HCC) (Resolved) [T82.868A]             Anticoagulation Episode Summary     INR check location:       Preferred lab:       Send INR reminders to:       Comments:   INR goal of 2.5 - 3.5 per Dr. Hopkins      Anticoagulation Care Providers     Provider Role Specialty Phone number    Jairo Hopkins M.D. Referring Surgery [phone]    [facility] Anticoagulation Services Responsible  [phone]        Anticoagulation Patient Findings      HPI:  Denis Inman De Anda seen in clinic today, on anticoagulation therapy with warfarin for graft thrombosis.   Changes to current medical/health status since last appt: none  Denies signs/symptoms of bleeding and/or thrombosis since the last appt.    Denies any interval changes to diet  Denies any interval changes to medications since last appt.   Denies any complications or cost restrictions with current therapy.   Declines vitals.  Confirmed dosing regimen.      A/P   INR  therapeutic.   Pt is to continue with current warfarin dosing regimen.     Follow up appointment in 2 week(s).    Donald Cedeño, PharmD

## 2018-04-06 ENCOUNTER — PATIENT OUTREACH (OUTPATIENT)
Dept: HEALTH INFORMATION MANAGEMENT | Facility: OTHER | Age: 38
End: 2018-04-06

## 2018-04-06 DIAGNOSIS — N18.6 ESRD (END STAGE RENAL DISEASE) (HCC): ICD-10-CM

## 2018-04-06 LAB — INR BLD: 3 (ref 0.9–1.2)

## 2018-04-17 ENCOUNTER — DOCUMENTATION (OUTPATIENT)
Dept: INFECTIOUS DISEASES | Facility: MEDICAL CENTER | Age: 38
End: 2018-04-17

## 2018-04-17 ENCOUNTER — PATIENT OUTREACH (OUTPATIENT)
Dept: HEALTH INFORMATION MANAGEMENT | Facility: OTHER | Age: 38
End: 2018-04-17

## 2018-04-17 ENCOUNTER — TELEPHONE (OUTPATIENT)
Dept: INFECTIOUS DISEASES | Facility: MEDICAL CENTER | Age: 38
End: 2018-04-17

## 2018-04-17 NOTE — PROGRESS NOTES
Outbound call to patient and left voice mail message with contact number to return call to CC RN to discuss the Care Coordination program.

## 2018-04-18 ENCOUNTER — PATIENT OUTREACH (OUTPATIENT)
Dept: HEALTH INFORMATION MANAGEMENT | Facility: OTHER | Age: 38
End: 2018-04-18

## 2018-04-18 NOTE — PROGRESS NOTES
Denis De Anda was admitted on 02/19/18 for abdominal wall varices, and was discharged on 03/02/18. [facility] Patient Advocate was able to successfully engage with the patient post-discharge on 3/5/18. Patient had a surgery scheduled on 03/09/18 for a thrombectomy. Patient readmitted on 03/10/18 for graft failure due to thrombosis. Patient was discharged on 3/12/18. [facility] Patient Advocate was able to successfully engage with the patient once again on 3/13/18. [facility] Patient Advocate scheduled a follow-up appointment with Whitney LARKIN (Infectious Disease) on 3/21/18 per discharge orders. Patient is also on dialysis secondary to end stage renal disease. Patient has a future appointment with Whitney LARKIN on 4/17/18.

## 2018-04-19 ENCOUNTER — ANTICOAGULATION VISIT (OUTPATIENT)
Dept: VASCULAR LAB | Facility: MEDICAL CENTER | Age: 38
End: 2018-04-19
Attending: INTERNAL MEDICINE
Payer: MEDICARE

## 2018-04-19 DIAGNOSIS — T85.868D GRAFT FAILURE DUE TO THROMBOSIS, SUBSEQUENT ENCOUNTER: ICD-10-CM

## 2018-04-19 LAB
INR BLD: 1.8 (ref 0.9–1.2)
INR PPP: 1.8 (ref 2–3.5)

## 2018-04-19 PROCEDURE — 99212 OFFICE O/P EST SF 10 MIN: CPT

## 2018-04-19 PROCEDURE — 85610 PROTHROMBIN TIME: CPT

## 2018-04-19 NOTE — PROGRESS NOTES
Anticoagulation Summary  As of 4/19/2018    INR goal:   2.5-3.5   TTR:   39.2 % (11.7 mo)   Today's INR:   1.8!   Maintenance plan:   2.5 mg (2.5 mg x 1) on Mon; 5 mg (2.5 mg x 2) all other days   Weekly total:   32.5 mg   Plan last modified:   Donald Cedeño, PharmD (4/19/2018)   Next INR check:   4/26/2018   Target end date:   Indefinite    Indications    AV graft thrombosis (CMS-HCC) (Resolved) [T82.868A]             Anticoagulation Episode Summary     INR check location:       Preferred lab:       Send INR reminders to:       Comments:   INR goal of 2.5 - 3.5 per Dr. Hopkins      Anticoagulation Care Providers     Provider Role Specialty Phone number    Jairo Hopkins M.D. Referring Surgery [phone]    [facility] Anticoagulation Services Responsible  [phone]        Anticoagulation Patient Findings      HPI:  Denis De Anda seen in clinic today, on anticoagulation therapy with warfarin for AV graft thrombosis.  Changes to current medical/health status since last appt: Pt states his port has closed off today.  He will notify Dr Hopkins.  Denies signs/symptoms of bleeding and/or thrombosis since the last appt.    Denies any interval changes to diet  Denies any interval changes to medications since last appt.   Denies any complications or cost restrictions with current therapy.   Unable to obtain vitals.  No missed doses.  Discussed initiating SQ heparin.  Pt would like to discuss the issue with Dr Hopkins before starting SQ heparin.  Requested pt to reach out to the clinic when a decision is made.     A/P   INR  SUB-therapeutic.   Bolus 7.5 mg warfarin x2 days then begin increased regimen.  Likely pt should be monitored weekly as INR was projecting really well before it became sub-therapeutic today.     Follow up appointment in 1 week(s).    Donald Cedeño, JocelynD

## 2018-04-20 ENCOUNTER — HOSPITAL ENCOUNTER (OUTPATIENT)
Facility: MEDICAL CENTER | Age: 38
End: 2018-04-20
Attending: SURGERY | Admitting: SURGERY
Payer: MEDICARE

## 2018-04-20 ENCOUNTER — APPOINTMENT (OUTPATIENT)
Dept: RADIOLOGY | Facility: MEDICAL CENTER | Age: 38
End: 2018-04-20
Attending: SURGERY
Payer: MEDICARE

## 2018-04-20 VITALS
DIASTOLIC BLOOD PRESSURE: 81 MMHG | WEIGHT: 178.79 LBS | HEIGHT: 64 IN | RESPIRATION RATE: 18 BRPM | BODY MASS INDEX: 30.52 KG/M2 | SYSTOLIC BLOOD PRESSURE: 133 MMHG | HEART RATE: 109 BPM | TEMPERATURE: 97.3 F | OXYGEN SATURATION: 96 %

## 2018-04-20 DIAGNOSIS — T82.868A THROMBOSIS DUE TO VASCULAR PROSTHETIC DEVICES, IMPLANTS AND GRAFTS, INITIAL ENCOUNTER (HCC): ICD-10-CM

## 2018-04-20 LAB
ANION GAP SERPL CALC-SCNC: 18 MMOL/L (ref 0–11.9)
BUN SERPL-MCNC: 32 MG/DL (ref 8–22)
CALCIUM SERPL-MCNC: 9.7 MG/DL (ref 8.5–10.5)
CHLORIDE SERPL-SCNC: 93 MMOL/L (ref 96–112)
CO2 SERPL-SCNC: 24 MMOL/L (ref 20–33)
CREAT SERPL-MCNC: 7.84 MG/DL (ref 0.5–1.4)
ERYTHROCYTE [DISTWIDTH] IN BLOOD BY AUTOMATED COUNT: 58.3 FL (ref 35.9–50)
GLUCOSE SERPL-MCNC: 86 MG/DL (ref 65–99)
HCT VFR BLD AUTO: 40.7 % (ref 42–52)
HGB BLD-MCNC: 12.5 G/DL (ref 14–18)
INR PPP: 2.05 (ref 0.87–1.13)
MCH RBC QN AUTO: 29.1 PG (ref 27–33)
MCHC RBC AUTO-ENTMCNC: 30.7 G/DL (ref 33.7–35.3)
MCV RBC AUTO: 94.9 FL (ref 81.4–97.8)
PLATELET # BLD AUTO: 228 K/UL (ref 164–446)
PMV BLD AUTO: 10.2 FL (ref 9–12.9)
POTASSIUM SERPL-SCNC: 3.8 MMOL/L (ref 3.6–5.5)
PROTHROMBIN TIME: 22.8 SEC (ref 12–14.6)
RBC # BLD AUTO: 4.29 M/UL (ref 4.7–6.1)
SODIUM SERPL-SCNC: 135 MMOL/L (ref 135–145)
WBC # BLD AUTO: 6.6 K/UL (ref 4.8–10.8)

## 2018-04-20 PROCEDURE — 85027 COMPLETE CBC AUTOMATED: CPT

## 2018-04-20 PROCEDURE — 85610 PROTHROMBIN TIME: CPT

## 2018-04-20 PROCEDURE — 700111 HCHG RX REV CODE 636 W/ 250 OVERRIDE (IP): Performed by: SURGERY

## 2018-04-20 PROCEDURE — 99153 MOD SED SAME PHYS/QHP EA: CPT

## 2018-04-20 PROCEDURE — 700111 HCHG RX REV CODE 636 W/ 250 OVERRIDE (IP)

## 2018-04-20 PROCEDURE — 700105 HCHG RX REV CODE 258: Performed by: SURGERY

## 2018-04-20 PROCEDURE — 700101 HCHG RX REV CODE 250

## 2018-04-20 PROCEDURE — 160002 HCHG RECOVERY MINUTES (STAT)

## 2018-04-20 PROCEDURE — 80048 BASIC METABOLIC PNL TOTAL CA: CPT

## 2018-04-20 RX ORDER — MIDAZOLAM HYDROCHLORIDE 1 MG/ML
.5-2 INJECTION INTRAMUSCULAR; INTRAVENOUS PRN
Status: ACTIVE | OUTPATIENT
Start: 2018-04-20 | End: 2018-04-20

## 2018-04-20 RX ORDER — DIPHENHYDRAMINE HYDROCHLORIDE 50 MG/ML
INJECTION INTRAMUSCULAR; INTRAVENOUS
Status: COMPLETED
Start: 2018-04-20 | End: 2018-04-20

## 2018-04-20 RX ORDER — IODIXANOL 270 MG/ML
52 INJECTION, SOLUTION INTRAVASCULAR ONCE
Status: COMPLETED | OUTPATIENT
Start: 2018-04-20 | End: 2018-04-20

## 2018-04-20 RX ORDER — SODIUM CHLORIDE 9 MG/ML
500 INJECTION, SOLUTION INTRAVENOUS
Status: DISPENSED | OUTPATIENT
Start: 2018-04-20 | End: 2018-04-20

## 2018-04-20 RX ORDER — HEPARIN SODIUM,PORCINE 1000/ML
VIAL (ML) INJECTION
Status: COMPLETED
Start: 2018-04-20 | End: 2018-04-20

## 2018-04-20 RX ORDER — HEPARIN SODIUM 1000 [USP'U]/ML
8000 INJECTION, SOLUTION INTRAVENOUS; SUBCUTANEOUS ONCE
Status: COMPLETED | OUTPATIENT
Start: 2018-04-20 | End: 2018-04-20

## 2018-04-20 RX ORDER — MIDAZOLAM HYDROCHLORIDE 1 MG/ML
INJECTION INTRAMUSCULAR; INTRAVENOUS
Status: COMPLETED
Start: 2018-04-20 | End: 2018-04-20

## 2018-04-20 RX ORDER — LIDOCAINE HYDROCHLORIDE 10 MG/ML
INJECTION, SOLUTION INFILTRATION; PERINEURAL
Status: COMPLETED
Start: 2018-04-20 | End: 2018-04-20

## 2018-04-20 RX ORDER — HEPARIN SODIUM 1000 [USP'U]/ML
5000 INJECTION, SOLUTION INTRAVENOUS; SUBCUTANEOUS ONCE
Status: COMPLETED | OUTPATIENT
Start: 2018-04-20 | End: 2018-04-20

## 2018-04-20 RX ORDER — DIPHENHYDRAMINE HYDROCHLORIDE 50 MG/ML
25-50 INJECTION INTRAMUSCULAR; INTRAVENOUS EVERY 4 HOURS PRN
Status: DISCONTINUED | OUTPATIENT
Start: 2018-04-20 | End: 2018-04-20 | Stop reason: HOSPADM

## 2018-04-20 RX ADMIN — FENTANYL CITRATE 50 MCG: 50 INJECTION, SOLUTION INTRAMUSCULAR; INTRAVENOUS at 13:23

## 2018-04-20 RX ADMIN — HEPARIN SODIUM 8000 UNITS: 1000 INJECTION, SOLUTION INTRAVENOUS; SUBCUTANEOUS at 14:06

## 2018-04-20 RX ADMIN — MIDAZOLAM 1 MG: 1 INJECTION INTRAMUSCULAR; INTRAVENOUS at 13:29

## 2018-04-20 RX ADMIN — MIDAZOLAM 1 MG: 1 INJECTION INTRAMUSCULAR; INTRAVENOUS at 14:37

## 2018-04-20 RX ADMIN — DIPHENHYDRAMINE HYDROCHLORIDE 50 MG: 50 INJECTION INTRAMUSCULAR; INTRAVENOUS at 16:00

## 2018-04-20 RX ADMIN — DIPHENHYDRAMINE HYDROCHLORIDE 50 MG: 50 INJECTION INTRAMUSCULAR; INTRAVENOUS at 13:23

## 2018-04-20 RX ADMIN — MIDAZOLAM 2 MG: 1 INJECTION INTRAMUSCULAR; INTRAVENOUS at 13:23

## 2018-04-20 RX ADMIN — HEPARIN SODIUM 5000 UNITS: 1000 INJECTION, SOLUTION INTRAVENOUS; SUBCUTANEOUS at 15:27

## 2018-04-20 RX ADMIN — FENTANYL CITRATE 50 MCG: 50 INJECTION, SOLUTION INTRAMUSCULAR; INTRAVENOUS at 15:13

## 2018-04-20 RX ADMIN — ALTEPLASE 1 MG/HR: KIT at 13:30

## 2018-04-20 RX ADMIN — FENTANYL CITRATE 25 MCG: 50 INJECTION, SOLUTION INTRAMUSCULAR; INTRAVENOUS at 14:37

## 2018-04-20 RX ADMIN — IODIXANOL 52 ML: 270 INJECTION, SOLUTION INTRAVASCULAR at 15:30

## 2018-04-20 RX ADMIN — LIDOCAINE HYDROCHLORIDE: 10 INJECTION, SOLUTION INFILTRATION; PERINEURAL at 13:30

## 2018-04-20 RX ADMIN — FENTANYL CITRATE 25 MCG: 50 INJECTION, SOLUTION INTRAMUSCULAR; INTRAVENOUS at 14:41

## 2018-04-20 RX ADMIN — FENTANYL CITRATE 50 MCG: 50 INJECTION, SOLUTION INTRAMUSCULAR; INTRAVENOUS at 13:29

## 2018-04-20 RX ADMIN — ALTEPLASE 1 MG/HR: KIT at 13:15

## 2018-04-20 ASSESSMENT — PAIN SCALES - GENERAL
PAINLEVEL_OUTOF10: 4
PAINLEVEL_OUTOF10: 0
PAINLEVEL_OUTOF10: 4
PAINLEVEL_OUTOF10: 0

## 2018-04-20 NOTE — DISCHARGE INSTRUCTIONS
ACTIVITY: Rest and take it easy for the first 24 hours.  A responsible adult is recommended to remain with you during that time.  It is normal to feel sleepy.  We encourage you to not do anything that requires balance, judgment or coordination.    MILD FLU-LIKE SYMPTOMS ARE NORMAL. YOU MAY EXPERIENCE GENERALIZED MUSCLE ACHES, THROAT IRRITATION, HEADACHE AND/OR SOME NAUSEA.    FOR 24 HOURS DO NOT:  Drive, operate machinery or run household appliances.  Drink beer or alcoholic beverages.   Make important decisions or sign legal documents.    SPECIAL INSTRUCTIONS: Take it easy for 24 hours    DIET: To avoid nausea, slowly advance diet as tolerated, avoiding spicy or greasy foods for the first day.  Add more substantial food to your diet according to your physician's instructions.  Babies can be fed formula or breast milk as soon as they are hungry.  INCREASE FLUIDS AND FIBER TO AVOID CONSTIPATION.    SURGICAL DRESSING/BATHING: Keep incisions dry for 24 hours, may shower and remove dressings 4/21 afternoon, do not need to replace dressings    FOLLOW-UP APPOINTMENT:  A follow-up appointment should be arranged with your doctor Sandeep [phone]; call to schedule.    You should CALL YOUR PHYSICIAN if you develop:  Fever greater than 101 degrees F.  Pain not relieved by medication, or persistent nausea or vomiting.  Excessive bleeding (blood soaking through dressing) or unexpected drainage from the wound.  Extreme redness or swelling around the incision site, drainage of pus or foul smelling drainage.  Inability to urinate or empty your bladder within 8 hours.  Problems with breathing or chest pain.    You should call 911 if you develop problems with breathing or chest pain.  If you are unable to contact your doctor or surgical center, you should go to the nearest emergency room or urgent care center.  Physician's telephone #: [phone]    If any questions arise, call your doctor.  If your doctor is not available, please feel  free to call the Surgical Center at [phone].  The Center is open Monday through Friday from 7AM to 7PM.  You can also call the HEALTH HOTLINE open 24 hours/day, 7 days/week and speak to a nurse at [phone], or toll free at [phone].    A registered nurse may call you a few days after your surgery to see how you are doing after your procedure.    MEDICATIONS: Resume taking daily medication.  Take prescribed pain medication with food.  If no medication is prescribed, you may take non-aspirin pain medication if needed.  PAIN MEDICATION CAN BE VERY CONSTIPATING.  Take a stool softener or laxative such as senokot, pericolace, or milk of magnesia if needed.    If your physician has prescribed pain medication that includes Acetaminophen (Tylenol), do not take additional Acetaminophen (Tylenol) while taking the prescribed medication.    Depression / Suicide Risk    As you are discharged from this [facility] facility, it is important to learn how to keep safe from harming yourself.    Recognize the warning signs:  · Abrupt changes in personality, positive or negative- including increase in energy   · Giving away possessions  · Change in eating patterns- significant weight changes-  positive or negative  · Change in sleeping patterns- unable to sleep or sleeping all the time   · Unwillingness or inability to communicate  · Depression  · Unusual sadness, discouragement and loneliness  · Talk of wanting to die  · Neglect of personal appearance   · Rebelliousness- reckless behavior  · Withdrawal from people/activities they love  · Confusion- inability to concentrate     If you or a loved one observes any of these behaviors or has concerns about self-harm, here's what you can do:  · Talk about it- your feelings and reasons for harming yourself  · Remove any means that you might use to hurt yourself (examples: pills, rope, extension cords, firearm)  · Get professional help from the community (Mental Health,  Substance Abuse, psychological counseling)  · Do not be alone:Call your Safe Contact- someone whom you trust who will be there for you.  · Call your local CRISIS HOTLINE 923-3539 or 042-848-1150  · Call your local Children's Mobile Crisis Response Team [place] (479) 277-7301 or www.[employer]  · Call the toll free National Suicide Prevention Hotlines   · National Suicide Prevention Lifeline 677-796-JSDJ (6226)  · National Hope Line Network 800-SUICIDE (279-9939)

## 2018-04-20 NOTE — OP REPORT
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    OPERATIONS:  1.  Sheath placements x2, left thigh AV graft under ultrasound and   fluoroscopic guidance with image recorded.  2.  AngioJet thrombectomy, left thigh AV graft.  3.  TPA lysis with 20 mg of TPA using pulse spray technique, left thigh AV   graft.  4.  Multiple balloon angioplasties 6 mm through and in the venous outflow 8x40   mm [place] balloons.  5.  A 3-[place] Navneet thrombectomy anastomoses and AV graft.  6.  Multiple fistulograms.    SURGEON:  Jairo Hopkins MD    ANESTHESIA:  Moderate conscious sedation.    FINDINGS:  The graft was completely thrombosed and without flow from the   arterial through the venous aspects of the graft.  Prior to pulling the sheath   out, we had established a widely patent defect-free AV graft that had brisk   flow.  Patient's blood pressure is generally low.  I could not detect flow   after pulled the sheath out.  Noted is that we heparinized him with 8000 units   of heparin and used 20 mg of TPA and he had a preexisting INR of 2.0.    Complications of recurrent failure of the graft the best I could tell at this   time is secondary to hypotension.  He runs chronically low blood pressure.    DESCRIPTION OF PROCEDURE:  After obtaining informed consent, the left thigh   was prepped with ChloraPrep and draped sterilely.  A time-out was performed.    I placed a micropuncture needle, wire, and dilator in the arterial end of the   fistula at the distal thigh and directed it toward the arterial anastomosis.    I placed, at this location, a 6-Ukrainian short sheath.  I did retrograde   contrast imaging.  I used an AngioJet from mechanical thrombectomy and this   cleared out most of the clot.  I then pulse sprayed  approximate 50 mL of a TPA   solution, which is amounting to 5 mg of TPA in that pulse spray delivery of   the arterial end of the graft.  I then placed a second arterial graft 6-Kazakh   sheath, but this one more proximal in the thigh directed toward the foot.    Through this, I used the AngioJet to clear out the bulk of the thrombus and   then pulse sprayed the remainder of the TPA, which I went back and forth a few   times, ultimately using 20 mg of TPA.  The dwell time was no shorter than 20   minutes overall, possibly longer.  Some of it probably washed away with some   initial flow restoration from the AngioJet thrombectomy.  We then took a 6x40   [place] balloon and dilated the entire fistula.  We dilated the venous   outflow.  I used an 8x40 mm at the venous outflow to make sure it was   adequate.  I inserted a 6 mm balloon into the arterial inflow.  I ran the #3   Navneet through the arterial anastomosis several times making sure there was   no residual clot there.  I did not see any defects on angiography.  At the   completion of the meticulously performed intervention, the graft had brisk   flow, no areas of stenosis.  There were 2 artifacts or ring-like defects that   are from the AV graft splicing on prior operations.  These have been seen   before and we were not a problem with the graft.  These areas were dilated and   there was no wasting.  There were no defects in the outflow, which was brisk.    We pulled the sheath and that practice prior to doing this, I inflated the   balloon in the graft to stop the flow, so I could do this suture closure of   the bleeding site with 5-0 Prolene.  At the more distal one, I pulled out the   wire and the balloon and sutured out with a figure-of-eight 5-0 Prolene.  This   achieved hemostasis at the sheath sites.  Shortly thereafter, I could not   feel any pulsation or thrill and with the Doppler, I could not hear any   signals.  We had worked 2-1/2 hours or  slightly more in this AV graft and I do   not know what else I could have done to maintain patency.  This is a problem   that we have experienced past.  The patient's vital signs remained stable.    His blood pressure was frequently low through the procedure in the 90 range or   less, which is his baseline.  Possibly this had a role to play.  I did not   reverse the 8000 units of heparin.  I gave him a dose of 5000 units at the end   of the procedure in the off-chance that this circulating TPA and heparin   might preserve patency of this graft, but I am not optimistic.       ____________________________________     MD BEN Davis / RIAZ    DD:  04/20/2018 15:43:21  DT:  04/20/2018 16:35:23    D#:  [id number]  Job#:  [id number]

## 2018-04-20 NOTE — PROGRESS NOTES
IR Procedure Note:    Site Marked and Confirmed with MD, RT, patient and RN pre procedure.    Diphenhydramine administered preprocedure for dermal allergy to contrast.    Dr. Hopkins performed left thigh graft angiogram and AngioJet thrombectomy and balloon thrombolysis. Pt premedicated with benadryl for dermal contrast allergy. Nasal airway and oxygen mask for mouth breathing with sedation. The pt tolerated the procedure well; ETCo2 baseline 32, with consistent waveform during the procedure.  Sutures, tegaderm and gauze applied to left thigh graft, CDI and soft; pressure held x 3 minutes.     Patient scratching right lateral thigh after procedure ended. Hand wrapped in washcloth. Post procedure order for benadryl from Dr. Hopkins.     Pt alert, oriented and verbally appropriate post procedure, vital signs stable during procedure and transport, see flow sheet for vital signs.  Report given to LETY Gee.  RN transported pt to PPU with Sao2 monitor = 95% on oxygen via NC @ 4 lpm.      Intraprocedural heparin administered: 8,000 units per Dr. Hopkins.  Intraprocedural alteplase via AngioJet administered by Dr. Hopkins.  Postprocedure heparin administered: 5,000 units per Dr. Hopkins

## 2018-04-21 NOTE — PROGRESS NOTES
1602 Patient arrived to unit from IR.  Awake and alert, c/o itching, medicated with benadryl. Graft sites to left leg CDI.  1630 Patient c/o itching, vitals and graft sites unchanged.  1715 Patient weaned off O2, states itching has improved, vitals stable, graft sites CDI.  1740 All lines and monitors discontinued. Reviewed discharge paperwork with pt. Discussed diet, activity, medications, follow up care and worsening symptoms. No questions at this time. Pt to be discharged home with mother via wheelchair with RN.

## 2018-04-24 ENCOUNTER — PATIENT OUTREACH (OUTPATIENT)
Dept: HEALTH INFORMATION MANAGEMENT | Facility: OTHER | Age: 38
End: 2018-04-24

## 2018-04-24 ENCOUNTER — TELEPHONE (OUTPATIENT)
Dept: MEDICAL GROUP | Facility: PHYSICIAN GROUP | Age: 38
End: 2018-04-24

## 2018-04-24 DIAGNOSIS — I10 ESSENTIAL HYPERTENSION: ICD-10-CM

## 2018-04-24 DIAGNOSIS — Z79.01 CHRONIC ANTICOAGULATION: ICD-10-CM

## 2018-04-24 DIAGNOSIS — I82.220 IVC THROMBOSIS (HCC): ICD-10-CM

## 2018-04-24 DIAGNOSIS — N18.6 ESRD (END STAGE RENAL DISEASE) (HCC): ICD-10-CM

## 2018-04-24 NOTE — PROGRESS NOTES
Outbound call to patient and left voice mail message with contact number to return call to CC RN to discuss the Care Coordination program.    Plan:  Will mail letter with contact information should patient choose to call at a later date and time to enroll

## 2018-04-24 NOTE — TELEPHONE ENCOUNTER
VOICEMAIL  1. Caller Name: Denis De Anda                        Call Back Number: [phone] (home)       2. Message: pt is requested a referral to [facility] please       3. Patient approves office to leave a detailed voicemail/MyChart message: N\A

## 2018-04-24 NOTE — LETTER
April 24, 2018        Denis Sie[place] [id number]  [place] [address]        Dear Denis:      Welcome to [facility] Care Coordination! Your team of Registered Nurses, Social Workers, and Pharmacists are partnered with your [facility] Providers to assist you with accessing resources and education to support your individual needs. As you work with your Care Coordination Team, you will be empowered to be successful with learning how to self-manage your health with the Patient Centered goals of helping you to feel better and staying out of the hospital. This program is at no cost to you as this is a part of [facility]’s ongoing commitment to serve the people of our community.  Benefits of working with your Care Coordination Team includes:  - Comprehensive assessment by a Registered Nurse on the telephone to identify your medical and health needs.   - Telephonic review of your medications by a Care Coordination Pharmacist to answer any questions you may have about your medications.  - Evaluation of social needs, such as, transportation; financial; food; housing; etc. by a Care Coordination  to connect you with eligible resources.  - For those eligible, we will connect you with the [facility], offering access to services to assist you to manage your Congestive Heart Failure or Chronic Obstructive Pulmonary Disease in your own home.    Please contact me at [phone] to start on the road to your Care Coordination services.       I am available 5 days a week, Monday through Friday from 8:00 a.m. - 5:00 p.m.  I look forward to speaking with you soon.    If you have any questions or concerns, please don't hesitate to call.      Sincerely,      Renetta Ruiz R.N.    Electronically Signed

## 2018-04-26 RX ORDER — SODIUM CHLORIDE 9 MG/ML
500 INJECTION, SOLUTION INTRAVENOUS
Status: CANCELLED | OUTPATIENT
Start: 2018-04-26 | End: 2018-04-26

## 2018-04-27 ENCOUNTER — APPOINTMENT (OUTPATIENT)
Dept: RADIOLOGY | Facility: MEDICAL CENTER | Age: 38
End: 2018-04-27
Attending: SURGERY
Payer: MEDICARE

## 2018-04-27 ENCOUNTER — HOSPITAL ENCOUNTER (OUTPATIENT)
Facility: MEDICAL CENTER | Age: 38
End: 2018-04-29
Attending: SURGERY | Admitting: SURGERY
Payer: MEDICARE

## 2018-04-27 LAB
ERYTHROCYTE [DISTWIDTH] IN BLOOD BY AUTOMATED COUNT: 55 FL (ref 35.9–50)
HCT VFR BLD AUTO: 31.7 % (ref 42–52)
HGB BLD-MCNC: 10 G/DL (ref 14–18)
INR PPP: 1.48 (ref 0.87–1.13)
MCH RBC QN AUTO: 29.9 PG (ref 27–33)
MCHC RBC AUTO-ENTMCNC: 31.5 G/DL (ref 33.7–35.3)
MCV RBC AUTO: 94.6 FL (ref 81.4–97.8)
PLATELET # BLD AUTO: 223 K/UL (ref 164–446)
PMV BLD AUTO: 9.6 FL (ref 9–12.9)
POTASSIUM BLD-SCNC: 4.5 MMOL/L (ref 3.6–5.5)
PROTHROMBIN TIME: 17.6 SEC (ref 12–14.6)
RBC # BLD AUTO: 3.35 M/UL (ref 4.7–6.1)
WBC # BLD AUTO: 9.2 K/UL (ref 4.8–10.8)

## 2018-04-27 PROCEDURE — 160035 HCHG PACU - 1ST 60 MINS PHASE I: Performed by: SURGERY

## 2018-04-27 PROCEDURE — 700111 HCHG RX REV CODE 636 W/ 250 OVERRIDE (IP)

## 2018-04-27 PROCEDURE — 501838 HCHG SUTURE GENERAL: Performed by: SURGERY

## 2018-04-27 PROCEDURE — 160048 HCHG OR STATISTICAL LEVEL 1-5: Performed by: SURGERY

## 2018-04-27 PROCEDURE — 160009 HCHG ANES TIME/MIN: Performed by: SURGERY

## 2018-04-27 PROCEDURE — 700105 HCHG RX REV CODE 258: Performed by: ANESTHESIOLOGY

## 2018-04-27 PROCEDURE — 160028 HCHG SURGERY MINUTES - 1ST 30 MINS LEVEL 3: Performed by: SURGERY

## 2018-04-27 PROCEDURE — 85610 PROTHROMBIN TIME: CPT

## 2018-04-27 PROCEDURE — 84132 ASSAY OF SERUM POTASSIUM: CPT

## 2018-04-27 PROCEDURE — 501837 HCHG SUTURE CV: Performed by: SURGERY

## 2018-04-27 PROCEDURE — C1769 GUIDE WIRE: HCPCS | Performed by: SURGERY

## 2018-04-27 PROCEDURE — 500698 HCHG HEMOCLIP, MEDIUM: Performed by: SURGERY

## 2018-04-27 PROCEDURE — C1725 CATH, TRANSLUMIN NON-LASER: HCPCS | Performed by: SURGERY

## 2018-04-27 PROCEDURE — 700101 HCHG RX REV CODE 250

## 2018-04-27 PROCEDURE — 500700 HCHG HEMOCLIP, SMALL (RED): Performed by: SURGERY

## 2018-04-27 PROCEDURE — C1894 INTRO/SHEATH, NON-LASER: HCPCS | Performed by: SURGERY

## 2018-04-27 PROCEDURE — A6402 STERILE GAUZE <= 16 SQ IN: HCPCS | Performed by: SURGERY

## 2018-04-27 PROCEDURE — C1757 CATH, THROMBECTOMY/EMBOLECT: HCPCS | Performed by: SURGERY

## 2018-04-27 PROCEDURE — 160036 HCHG PACU - EA ADDL 30 MINS PHASE I: Performed by: SURGERY

## 2018-04-27 PROCEDURE — 160039 HCHG SURGERY MINUTES - EA ADDL 1 MIN LEVEL 3: Performed by: SURGERY

## 2018-04-27 PROCEDURE — P9045 ALBUMIN (HUMAN), 5%, 250 ML: HCPCS | Mod: JG

## 2018-04-27 PROCEDURE — C2628 CATHETER, OCCLUSION: HCPCS | Performed by: SURGERY

## 2018-04-27 PROCEDURE — 36415 COLL VENOUS BLD VENIPUNCTURE: CPT

## 2018-04-27 PROCEDURE — 160002 HCHG RECOVERY MINUTES (STAT): Performed by: SURGERY

## 2018-04-27 PROCEDURE — 700111 HCHG RX REV CODE 636 W/ 250 OVERRIDE (IP): Performed by: SURGERY

## 2018-04-27 PROCEDURE — 85027 COMPLETE CBC AUTOMATED: CPT

## 2018-04-27 PROCEDURE — 700111 HCHG RX REV CODE 636 W/ 250 OVERRIDE (IP): Performed by: ANESTHESIOLOGY

## 2018-04-27 PROCEDURE — 700105 HCHG RX REV CODE 258

## 2018-04-27 RX ORDER — HEPARIN SODIUM,PORCINE 1000/ML
VIAL (ML) INJECTION
Status: DISCONTINUED | OUTPATIENT
Start: 2018-04-27 | End: 2018-04-27 | Stop reason: HOSPADM

## 2018-04-27 RX ORDER — ALBUMIN, HUMAN INJ 5% 5 %
SOLUTION INTRAVENOUS
Status: COMPLETED
Start: 2018-04-27 | End: 2018-04-27

## 2018-04-27 RX ORDER — SODIUM CHLORIDE 9 MG/ML
INJECTION, SOLUTION INTRAVENOUS CONTINUOUS
Status: DISCONTINUED | OUTPATIENT
Start: 2018-04-27 | End: 2018-04-29 | Stop reason: HOSPADM

## 2018-04-27 RX ORDER — HEPARIN SODIUM 5000 [USP'U]/ML
INJECTION, SOLUTION INTRAVENOUS; SUBCUTANEOUS
Status: COMPLETED
Start: 2018-04-27 | End: 2018-04-27

## 2018-04-27 RX ORDER — LIDOCAINE HYDROCHLORIDE 10 MG/ML
0.5 INJECTION, SOLUTION INFILTRATION; PERINEURAL
Status: ACTIVE | OUTPATIENT
Start: 2018-04-27 | End: 2018-04-28

## 2018-04-27 RX ORDER — WARFARIN SODIUM 5 MG/1
5-7.5 TABLET ORAL EVERY EVENING
Status: ON HOLD | COMMUNITY
End: 2018-06-26

## 2018-04-27 RX ORDER — BUPIVACAINE HYDROCHLORIDE 5 MG/ML
INJECTION, SOLUTION PERINEURAL
Status: DISCONTINUED | OUTPATIENT
Start: 2018-04-27 | End: 2018-04-27 | Stop reason: HOSPADM

## 2018-04-27 RX ORDER — LIDOCAINE HYDROCHLORIDE 10 MG/ML
INJECTION, SOLUTION INFILTRATION; PERINEURAL
Status: COMPLETED
Start: 2018-04-27 | End: 2018-04-27

## 2018-04-27 RX ORDER — MEPERIDINE HYDROCHLORIDE 50 MG/ML
50 INJECTION INTRAMUSCULAR; INTRAVENOUS; SUBCUTANEOUS ONCE
Status: COMPLETED | OUTPATIENT
Start: 2018-04-27 | End: 2018-04-28

## 2018-04-27 RX ORDER — HEPARIN SODIUM 1000 [USP'U]/ML
2000 INJECTION, SOLUTION INTRAVENOUS; SUBCUTANEOUS ONCE
Status: COMPLETED | OUTPATIENT
Start: 2018-04-27 | End: 2018-04-27

## 2018-04-27 RX ORDER — MIDAZOLAM HYDROCHLORIDE 1 MG/ML
INJECTION INTRAMUSCULAR; INTRAVENOUS
Status: DISPENSED
Start: 2018-04-27 | End: 2018-04-27

## 2018-04-27 RX ADMIN — PHENYLEPHRINE HYDROCHLORIDE 80 MCG/MIN: 10 INJECTION INTRAVENOUS at 18:50

## 2018-04-27 RX ADMIN — HYDROMORPHONE HYDROCHLORIDE 0.5 MG: 10 INJECTION, SOLUTION INTRAMUSCULAR; INTRAVENOUS; SUBCUTANEOUS at 22:40

## 2018-04-27 RX ADMIN — ALBUMIN (HUMAN): 2.5 SOLUTION INTRAVENOUS at 14:00

## 2018-04-27 RX ADMIN — FENTANYL CITRATE 25 MCG: 50 INJECTION, SOLUTION INTRAMUSCULAR; INTRAVENOUS at 18:45

## 2018-04-27 RX ADMIN — PHENYLEPHRINE HYDROCHLORIDE 100 MCG/MIN: 10 INJECTION INTRAVENOUS at 14:45

## 2018-04-27 RX ADMIN — ALBUMIN (HUMAN): 2.5 SOLUTION INTRAVENOUS at 14:30

## 2018-04-27 RX ADMIN — SODIUM CHLORIDE: 9 INJECTION, SOLUTION INTRAVENOUS at 09:10

## 2018-04-27 RX ADMIN — HEPARIN SODIUM 2000 UNITS: 1000 INJECTION, SOLUTION INTRAVENOUS; SUBCUTANEOUS at 15:23

## 2018-04-27 RX ADMIN — FENTANYL CITRATE 50 MCG: 50 INJECTION, SOLUTION INTRAMUSCULAR; INTRAVENOUS at 19:25

## 2018-04-27 RX ADMIN — HYDROMORPHONE HYDROCHLORIDE 0.5 MG: 10 INJECTION, SOLUTION INTRAMUSCULAR; INTRAVENOUS; SUBCUTANEOUS at 21:20

## 2018-04-27 RX ADMIN — FENTANYL CITRATE 25 MCG: 50 INJECTION, SOLUTION INTRAMUSCULAR; INTRAVENOUS at 18:05

## 2018-04-27 ASSESSMENT — PAIN SCALES - GENERAL
PAINLEVEL_OUTOF10: 0
PAINLEVEL_OUTOF10: 2
PAINLEVEL_OUTOF10: 0
PAINLEVEL_OUTOF10: 3
PAINLEVEL_OUTOF10: 2
PAINLEVEL_OUTOF10: 5
PAINLEVEL_OUTOF10: 3
PAINLEVEL_OUTOF10: 0
PAINLEVEL_OUTOF10: 6
PAINLEVEL_OUTOF10: 3
PAINLEVEL_OUTOF10: 4
PAINLEVEL_OUTOF10: 6
PAINLEVEL_OUTOF10: 0
PAINLEVEL_OUTOF10: 4
PAINLEVEL_OUTOF10: 2
PAINLEVEL_OUTOF10: 3
PAINLEVEL_OUTOF10: 3
PAINLEVEL_OUTOF10: 0
PAINLEVEL_OUTOF10: 2
PAINLEVEL_OUTOF10: 0
PAINLEVEL_OUTOF10: 6
PAINLEVEL_OUTOF10: 0
PAINLEVEL_OUTOF10: 2
PAINLEVEL_OUTOF10: 4
PAINLEVEL_OUTOF10: 3
PAINLEVEL_OUTOF10: 0
PAINLEVEL_OUTOF10: 0
PAINLEVEL_OUTOF10: 5
PAINLEVEL_OUTOF10: 6
PAINLEVEL_OUTOF10: 0
PAINLEVEL_OUTOF10: 2
PAINLEVEL_OUTOF10: 2
PAINLEVEL_OUTOF10: 4
PAINLEVEL_OUTOF10: 4
PAINLEVEL_OUTOF10: 3
PAINLEVEL_OUTOF10: 0
PAINLEVEL_OUTOF10: 0
PAINLEVEL_OUTOF10: 6
PAINLEVEL_OUTOF10: 0
PAINLEVEL_OUTOF10: 0
PAINLEVEL_OUTOF10: 2

## 2018-04-27 NOTE — OR NURSING
SBP remains in the 60's despite 500 mL Albumin infusion. Dante gtt initiated at 37.5 mL/hr per MD order. SBP goal 90. Pt A&OX4. Pt easily wakes but drifts back to sleep.

## 2018-04-27 NOTE — OR NURSING
Dr. Hopkins at bedside. Will return to assess pt after next case. Pt comfortable at this time, denies pain or nausea. Dante gtt still running at 37.5 mL/hr. BP stable.

## 2018-04-27 NOTE — OR SURGEON
Immediate Post OP Note    PreOp Diagnosis: Thrombosed left thigh arteriovenous graft.    PostOp Diagnosis: Same    Procedure(s):  THROMBECTOMY - THIGH GRAFT AND REVISION - Wound Class: Clean    Surgeon(s):  Jairo Hopkins M.D.    Anesthesiologist/Type of Anesthesia:  Anesthesiologist: Pastor Mandujano M.D./General    Surgical Staff:  Circulator: Jeff Lucio R.N.  Relief Circulator: Allie Calero R.N.  Relief Scrub: Qian Devi  Scrub Person: Josiah Solorzano Crary: Allie Calreo R.N.    Specimens removed if any:  * No specimens in log *    Estimated Blood Loss: 500    Findings: Thrombosed fistula    Complications: Contrast reaction.        4/27/2018 2:24 PM Jairo Hopkins M.D.

## 2018-04-28 ENCOUNTER — APPOINTMENT (OUTPATIENT)
Dept: RADIOLOGY | Facility: MEDICAL CENTER | Age: 38
End: 2018-04-28
Attending: SURGERY
Payer: MEDICARE

## 2018-04-28 LAB
ANION GAP SERPL CALC-SCNC: 15 MMOL/L (ref 0–11.9)
ANION GAP SERPL CALC-SCNC: 9 MMOL/L (ref 0–11.9)
BUN SERPL-MCNC: 28 MG/DL (ref 8–22)
BUN SERPL-MCNC: 54 MG/DL (ref 8–22)
CALCIUM SERPL-MCNC: 6.8 MG/DL (ref 8.5–10.5)
CALCIUM SERPL-MCNC: 7.6 MG/DL (ref 8.5–10.5)
CHLORIDE SERPL-SCNC: 100 MMOL/L (ref 96–112)
CHLORIDE SERPL-SCNC: 99 MMOL/L (ref 96–112)
CO2 SERPL-SCNC: 21 MMOL/L (ref 20–33)
CO2 SERPL-SCNC: 22 MMOL/L (ref 20–33)
CREAT SERPL-MCNC: 11.2 MG/DL (ref 0.5–1.4)
CREAT SERPL-MCNC: 6.79 MG/DL (ref 0.5–1.4)
ERYTHROCYTE [DISTWIDTH] IN BLOOD BY AUTOMATED COUNT: 53.7 FL (ref 35.9–50)
GLUCOSE SERPL-MCNC: 119 MG/DL (ref 65–99)
GLUCOSE SERPL-MCNC: 145 MG/DL (ref 65–99)
HCT VFR BLD AUTO: 26 % (ref 42–52)
HGB BLD-MCNC: 8.1 G/DL (ref 14–18)
MCH RBC QN AUTO: 29.5 PG (ref 27–33)
MCHC RBC AUTO-ENTMCNC: 31.2 G/DL (ref 33.7–35.3)
MCV RBC AUTO: 94.5 FL (ref 81.4–97.8)
PLATELET # BLD AUTO: 169 K/UL (ref 164–446)
PMV BLD AUTO: 9.6 FL (ref 9–12.9)
POTASSIUM SERPL-SCNC: 4.4 MMOL/L (ref 3.6–5.5)
POTASSIUM SERPL-SCNC: 5.7 MMOL/L (ref 3.6–5.5)
RBC # BLD AUTO: 2.75 M/UL (ref 4.7–6.1)
SODIUM SERPL-SCNC: 130 MMOL/L (ref 135–145)
SODIUM SERPL-SCNC: 136 MMOL/L (ref 135–145)
WBC # BLD AUTO: 5.9 K/UL (ref 4.8–10.8)

## 2018-04-28 PROCEDURE — 160002 HCHG RECOVERY MINUTES (STAT): Performed by: SURGERY

## 2018-04-28 PROCEDURE — 160048 HCHG OR STATISTICAL LEVEL 1-5: Performed by: SURGERY

## 2018-04-28 PROCEDURE — 90935 HEMODIALYSIS ONE EVALUATION: CPT

## 2018-04-28 PROCEDURE — 85027 COMPLETE CBC AUTOMATED: CPT

## 2018-04-28 PROCEDURE — 96375 TX/PRO/DX INJ NEW DRUG ADDON: CPT

## 2018-04-28 PROCEDURE — A6402 STERILE GAUZE <= 16 SQ IN: HCPCS | Performed by: SURGERY

## 2018-04-28 PROCEDURE — C1894 INTRO/SHEATH, NON-LASER: HCPCS | Performed by: SURGERY

## 2018-04-28 PROCEDURE — 501837 HCHG SUTURE CV: Performed by: SURGERY

## 2018-04-28 PROCEDURE — 500382 HCHG DRAIN, PENROSE 1X18: Performed by: SURGERY

## 2018-04-28 PROCEDURE — 700101 HCHG RX REV CODE 250

## 2018-04-28 PROCEDURE — 160009 HCHG ANES TIME/MIN: Performed by: SURGERY

## 2018-04-28 PROCEDURE — 160039 HCHG SURGERY MINUTES - EA ADDL 1 MIN LEVEL 3: Performed by: SURGERY

## 2018-04-28 PROCEDURE — C1769 GUIDE WIRE: HCPCS | Performed by: SURGERY

## 2018-04-28 PROCEDURE — C1757 CATH, THROMBECTOMY/EMBOLECT: HCPCS | Performed by: SURGERY

## 2018-04-28 PROCEDURE — 501838 HCHG SUTURE GENERAL: Performed by: SURGERY

## 2018-04-28 PROCEDURE — 700111 HCHG RX REV CODE 636 W/ 250 OVERRIDE (IP)

## 2018-04-28 PROCEDURE — 700111 HCHG RX REV CODE 636 W/ 250 OVERRIDE (IP): Performed by: SURGERY

## 2018-04-28 PROCEDURE — 500698 HCHG HEMOCLIP, MEDIUM: Performed by: SURGERY

## 2018-04-28 PROCEDURE — G0378 HOSPITAL OBSERVATION PER HR: HCPCS

## 2018-04-28 PROCEDURE — 36415 COLL VENOUS BLD VENIPUNCTURE: CPT

## 2018-04-28 PROCEDURE — C1725 CATH, TRANSLUMIN NON-LASER: HCPCS | Performed by: SURGERY

## 2018-04-28 PROCEDURE — P9047 ALBUMIN (HUMAN), 25%, 50ML: HCPCS | Mod: JG

## 2018-04-28 PROCEDURE — 500700 HCHG HEMOCLIP, SMALL (RED): Performed by: SURGERY

## 2018-04-28 PROCEDURE — 160035 HCHG PACU - 1ST 60 MINS PHASE I: Performed by: SURGERY

## 2018-04-28 PROCEDURE — 96374 THER/PROPH/DIAG INJ IV PUSH: CPT

## 2018-04-28 PROCEDURE — 160028 HCHG SURGERY MINUTES - 1ST 30 MINS LEVEL 3: Performed by: SURGERY

## 2018-04-28 PROCEDURE — C2628 CATHETER, OCCLUSION: HCPCS | Performed by: SURGERY

## 2018-04-28 PROCEDURE — 96376 TX/PRO/DX INJ SAME DRUG ADON: CPT

## 2018-04-28 PROCEDURE — 160036 HCHG PACU - EA ADDL 30 MINS PHASE I: Performed by: SURGERY

## 2018-04-28 PROCEDURE — 700102 HCHG RX REV CODE 250 W/ 637 OVERRIDE(OP): Performed by: SURGERY

## 2018-04-28 PROCEDURE — 80048 BASIC METABOLIC PNL TOTAL CA: CPT

## 2018-04-28 PROCEDURE — A9270 NON-COVERED ITEM OR SERVICE: HCPCS | Performed by: SURGERY

## 2018-04-28 RX ORDER — WARFARIN SODIUM 5 MG/1
5 TABLET ORAL
Status: DISCONTINUED | OUTPATIENT
Start: 2018-04-28 | End: 2018-04-29

## 2018-04-28 RX ORDER — GABAPENTIN 300 MG/1
600 CAPSULE ORAL 3 TIMES DAILY
Status: DISCONTINUED | OUTPATIENT
Start: 2018-04-28 | End: 2018-04-29 | Stop reason: HOSPADM

## 2018-04-28 RX ORDER — MEPERIDINE HYDROCHLORIDE 50 MG/1
50 TABLET ORAL ONCE
Status: COMPLETED | OUTPATIENT
Start: 2018-04-28 | End: 2018-04-28

## 2018-04-28 RX ORDER — LEVETIRACETAM 500 MG/1
250 TABLET ORAL 2 TIMES DAILY
Status: DISCONTINUED | OUTPATIENT
Start: 2018-04-28 | End: 2018-04-29 | Stop reason: HOSPADM

## 2018-04-28 RX ORDER — MEPERIDINE HYDROCHLORIDE 50 MG/1
50 TABLET ORAL
Status: DISCONTINUED | OUTPATIENT
Start: 2018-04-28 | End: 2018-04-28

## 2018-04-28 RX ORDER — MORPHINE SULFATE 4 MG/ML
4 INJECTION, SOLUTION INTRAMUSCULAR; INTRAVENOUS
Status: DISCONTINUED | OUTPATIENT
Start: 2018-04-28 | End: 2018-04-29 | Stop reason: HOSPADM

## 2018-04-28 RX ORDER — OXYCODONE HYDROCHLORIDE 10 MG/1
10 TABLET ORAL
Status: DISCONTINUED | OUTPATIENT
Start: 2018-04-28 | End: 2018-04-29 | Stop reason: HOSPADM

## 2018-04-28 RX ORDER — BUPIVACAINE HYDROCHLORIDE 5 MG/ML
INJECTION, SOLUTION PERINEURAL
Status: DISCONTINUED | OUTPATIENT
Start: 2018-04-28 | End: 2018-04-28 | Stop reason: HOSPADM

## 2018-04-28 RX ORDER — SEVELAMER CARBONATE 800 MG/1
4000 TABLET, FILM COATED ORAL
Status: DISCONTINUED | OUTPATIENT
Start: 2018-04-28 | End: 2018-04-29 | Stop reason: HOSPADM

## 2018-04-28 RX ORDER — MIDODRINE HYDROCHLORIDE 5 MG/1
10 TABLET ORAL
Status: DISCONTINUED | OUTPATIENT
Start: 2018-04-28 | End: 2018-04-29 | Stop reason: HOSPADM

## 2018-04-28 RX ORDER — SODIUM POLYSTYRENE SULFONATE 15 G/60ML
15 SUSPENSION ORAL; RECTAL ONCE
Status: DISCONTINUED | OUTPATIENT
Start: 2018-04-28 | End: 2018-04-28

## 2018-04-28 RX ORDER — WARFARIN SODIUM 2.5 MG/1
2.5-5 TABLET ORAL DAILY
Status: DISCONTINUED | OUTPATIENT
Start: 2018-04-28 | End: 2018-04-28

## 2018-04-28 RX ORDER — HEPARIN SODIUM,PORCINE 1000/ML
VIAL (ML) INJECTION
Status: DISCONTINUED | OUTPATIENT
Start: 2018-04-28 | End: 2018-04-28 | Stop reason: HOSPADM

## 2018-04-28 RX ORDER — MEPERIDINE HYDROCHLORIDE 25 MG/ML
INJECTION INTRAMUSCULAR; INTRAVENOUS; SUBCUTANEOUS
Status: COMPLETED
Start: 2018-04-28 | End: 2018-04-28

## 2018-04-28 RX ADMIN — MIDODRINE HYDROCHLORIDE 10 MG: 5 TABLET ORAL at 17:05

## 2018-04-28 RX ADMIN — MORPHINE SULFATE 4 MG: 4 INJECTION INTRAVENOUS at 20:46

## 2018-04-28 RX ADMIN — MEPERIDINE HYDROCHLORIDE 50 MG: 50 TABLET ORAL at 22:02

## 2018-04-28 RX ADMIN — GABAPENTIN 600 MG: 300 CAPSULE ORAL at 20:47

## 2018-04-28 RX ADMIN — GABAPENTIN 600 MG: 300 CAPSULE ORAL at 09:09

## 2018-04-28 RX ADMIN — LEVETIRACETAM 250 MG: 500 TABLET, FILM COATED ORAL at 09:09

## 2018-04-28 RX ADMIN — CALCITRIOL 0.75 MCG: 0.5 CAPSULE, LIQUID FILLED ORAL at 20:46

## 2018-04-28 RX ADMIN — WARFARIN SODIUM 5 MG: 5 TABLET ORAL at 18:52

## 2018-04-28 RX ADMIN — LEVETIRACETAM 250 MG: 500 TABLET, FILM COATED ORAL at 20:47

## 2018-04-28 RX ADMIN — MEPERIDINE HYDROCHLORIDE 50 MG: 50 INJECTION INTRAMUSCULAR; INTRAVENOUS; SUBCUTANEOUS at 00:56

## 2018-04-28 RX ADMIN — MORPHINE SULFATE 4 MG: 4 INJECTION INTRAVENOUS at 09:09

## 2018-04-28 RX ADMIN — MEPERIDINE HYDROCHLORIDE 25 MG: 25 INJECTION INTRAMUSCULAR; INTRAVENOUS; SUBCUTANEOUS at 17:40

## 2018-04-28 RX ADMIN — SEVELAMER CARBONATE 4000 MG: 800 TABLET, FILM COATED ORAL at 20:47

## 2018-04-28 RX ADMIN — MORPHINE SULFATE 4 MG: 4 INJECTION INTRAVENOUS at 12:21

## 2018-04-28 ASSESSMENT — LIFESTYLE VARIABLES: EVER_SMOKED: NEVER

## 2018-04-28 ASSESSMENT — PAIN SCALES - GENERAL
PAINLEVEL_OUTOF10: 2
PAINLEVEL_OUTOF10: 3
PAINLEVEL_OUTOF10: 7
PAINLEVEL_OUTOF10: 3
PAINLEVEL_OUTOF10: 3
PAINLEVEL_OUTOF10: 2
PAINLEVEL_OUTOF10: 2
PAINLEVEL_OUTOF10: 6
PAINLEVEL_OUTOF10: 2
PAINLEVEL_OUTOF10: 0
PAINLEVEL_OUTOF10: 2
PAINLEVEL_OUTOF10: 2
PAINLEVEL_OUTOF10: 3
PAINLEVEL_OUTOF10: 2
PAINLEVEL_OUTOF10: 3
PAINLEVEL_OUTOF10: 2
PAINLEVEL_OUTOF10: 2

## 2018-04-28 ASSESSMENT — PATIENT HEALTH QUESTIONNAIRE - PHQ9
2. FEELING DOWN, DEPRESSED, IRRITABLE, OR HOPELESS: NOT AT ALL
SUM OF ALL RESPONSES TO PHQ9 QUESTIONS 1 AND 2: 0
SUM OF ALL RESPONSES TO PHQ9 QUESTIONS 1 AND 2: 0
1. LITTLE INTEREST OR PLEASURE IN DOING THINGS: NOT AT ALL
1. LITTLE INTEREST OR PLEASURE IN DOING THINGS: NOT AT ALL
2. FEELING DOWN, DEPRESSED, IRRITABLE, OR HOPELESS: NOT AT ALL

## 2018-04-28 NOTE — OR SURGEON
Immediate Post OP Note    PreOp Diagnosis: Recurrent thrombosis left thigh arteriovenous loop bridge graft.    PostOp Diagnosis: same    Procedure(s):  THROMBECTOMY- Thigh W/ Graft - Wound Class: Clean    Surgeon(s):  Jairo Hopkins M.D.    Anesthesiologist/Type of Anesthesia:  Anesthesiologist: Liu Harvey M.D./General    Surgical Staff:  Circulator: Parker Nagy R.N.  Monitoring Nurse: Nimo Lowe R.N.  Scrub Person: Qian Devi    Specimens removed if any:  * No specimens in log *    Estimated Blood Loss: 50 mls    Findings: thrombosis    Complications: none apparent        4/28/2018 4:54 PM Jairo Hopkins M.D.

## 2018-04-28 NOTE — OR NURSING
Pt meets transfer criteria. Vs wnl. And parameters. Pain treated with rx'd meds with excellent results. Pt tolerates well.    Transferred to bed. Call to [facility]. Will arrange private room per charge rn.

## 2018-04-28 NOTE — PROGRESS NOTES
Inpatient Anticoagulation Service Note    Date: 4/28/2018  Reason for Anticoagulation: Deep Vein Thrombosis        Hemoglobin Value: (!) 10 (Results confirmed by repeat testing.)  Hematocrit Value: (!) 31.7 (Results confirmed by repeat testing.)  Lab Platelet Value: 223  Target INR: 2.0 to 3.0    INR from last 7 days     Date/Time INR Value    04/27/18 0910 (!)  1.48        Dose from last 7 days     Date/Time Dose (mg)    04/28/18 0100  5        Significant Interactions: Other (Comments)  Bridge Therapy: No    Reversal Agent Administered: Not Applicable  Comments: Warfarin resumed for AV graft thrombosis.  INR sub therapeutic.  No drug interactions.      Plan:  Give warfarin 5 mg daily.  Check INR with AM labs     Pharmacist suggested discharge dosing: Warfarin 5mg daily.  Follow up with [facility] coumadin clinic 48 hours after discharge.       Chriss Dickinson, PharmD

## 2018-04-28 NOTE — PROGRESS NOTES
HD treatment done today for 2.5 H .Treatment tolerated well.Patient to go directly to pre op.Net UF removed 2 L.Post treatment /74,P 98,T 97.8.

## 2018-04-28 NOTE — OR NURSING
Dr. Hopkins at bedside. Pt A&OX4. Pt to be weaned off Dante gtt and okay per Dr. Hopkins for SBP to remain 80-90's without Dante gtt. Pt to be admitted to [facility] and surgery planned for tomorrow, 4/28.

## 2018-04-28 NOTE — OP REPORT
DATE OF SERVICE:  04/27/2018    PREOPERATIVE DIAGNOSIS:  Recurrent thrombosis, left thigh arteriovenous loop   graft.    POSTOPERATIVE DIAGNOSIS:  Recurrent thrombosis, left thigh arteriovenous loop   graft.    OPERATION:  Open thrombectomy, right thigh loop graft with intraoperative   angiograms and with balloon angioplasties, venous anastomosis.    SURGEON:  Jairo Hopkins MD    ANESTHESIA:  General.    ANESTHESIOLOGIST:  Pastor Mandujano MD    DESCRIPTION OF PROCEDURE:  The patient was taken to the operating room and put   under general anesthesia.  I have spoken with Dr. Mandujano about his CONTRAST   allergy and the need to give him Solu-Cortef 50 mg and Benadryl 50 mg IV.    The patient's left lower extremity was prepped circumferentially and draped   sterilely.  A time out was performed.  I examined the graft location with   ultrasound.  The graft is thrombosed.  I made a diagonal incision in the area   of the graft where it curves in the distal lateral thigh region.  I dissected   out short segment of the graft and made a transverse graftotomy.  I used a #4   Navneet catheter and removed clot from the venous end.  I used contrast image.    I injected through some balloon occlusion catheters for contrast imaging.  I   worked on the arteriovenous anastomosis with multiple angioplasties with a   7x40 mm balloon.  I finally used a 10x40 balloon and it did not inflate   completely in the anastomosis, but it did result in a significant improvement   of the outflow appearance on angiogram.  Excellent backbleeding was present   and the graft itself was dilated with a 7 mm balloon and its contoured looked   good.  I proceeded to use a #3 Navneet to remove the clot from the arterial   end as well as a #4 Navneet.  We had excellent inflow achieved.  I proceeded   then to close the arteriotomy with CV-6 [place] and irrigated subcutaneous   tissue and closed it with 2 layers of 4-0 Vicryl.  Blood loss was considerable    because of the difficulty controlling bleeding while I was doing the   angioplasties and the thrombectomies.  The patient had a good thrill in the   bypass graft at completion of the repair.  He had a contrast reaction with   turning bright red all over his body and showing hypotension and tachycardia.    He was slow to respond to resuscitation, but he did respond and woke up   neurologically normal.  Patient was taken to recovery room.  He required   Dante-Synephrine drip to maintain a blood pressure above 90.  He was given   volume expanders by Dr. Mandujano.  His AV graft shut down in recovery room.  I   spoke to one of his brothers who was waiting for him in the waiting room.  At   the time of this dictation, the patient's vital signs are being supported by   phenylephrine and he is lethargic from the Benadryl.       ____________________________________     MD BEN Davis / RIAZ    DD:  04/27/2018 16:43:42  DT:  04/27/2018 17:01:50    D#:  [id number]  Job#:  [id number]

## 2018-04-28 NOTE — RESPIRATORY CARE
COPD EDUCATION by COPD CLINICAL EDUCATOR  4/28/2018 at 7:03 AM by Julissa Tovar     Patient reviewed by COPD education team. Patient does not qualify for COPD program.

## 2018-04-28 NOTE — CARE PLAN
Problem: Communication  Goal: The ability to communicate needs accurately and effectively will improve  Outcome: PROGRESSING AS EXPECTED  Reviewed plan of care for the rest of the evening. Call light within reach. Hourly rounding in place. All questions answered and all needs met.    Problem: Safety  Goal: Will remain free from falls  Outcome: PROGRESSING AS EXPECTED  Bed brakes on, upper side rails x 2 up, call light within reach. Hourly rounding in place.

## 2018-04-28 NOTE — OR NURSING
Dr Harvey at bedside to give 50ml of Albumin 25%. Pt awake and alert; no c/o pain. R thigh dsg CDI; bruit heard. Dr Hopkins at bedside; orders to keep SBP>100.  Orders written to give Midodrine PO as soon as we get it from pharmacy. Orders from Dr Harvey to give 1/4ml (5mcgs) of epinephrine (his vial) for SBP<90.

## 2018-04-28 NOTE — PROGRESS NOTES
"Received report from PACU RN Srinivas. Awaiting for pt's arrival into room T431-2.    2330: Pt admitted to room T431-2 via transport in hospital bed from PACU at 2330.  Pt /40   Pulse 93   Temp 36 °C (96.8 °F)   Resp 18   Ht 1.626 m (5' 4\")   Wt 78.7 kg (173 lb 8 oz)   SpO2 93%   BMI 29.78 kg/m²   Pain reported at 2/10 on a scale of 0-10. Oriented to room call light and smoking policy.  Reviewed plan of care (equipment, incentive spirometer, sequential compression devices, medications, activity, diet, fall precautions, skin care, and pain) with patient and family.    0150: 2 RN skin check done at bedside. Noted left AV fistula not in use. Right side abdomen skin graft. Old healed incision to lower abdomen and mid back healed incision. Right thigh perma-cath, left upper thigh sx incision. No other skin issues noted.   "

## 2018-04-28 NOTE — PROGRESS NOTES
Potassium 5.7 this AM so underwent dialysis.  VSS.  Hgb OK and consistent with blood loss yesterday.    Plan to repeat thrombectomy of arteriovenous graft that thrombosed during hypotension post op secondary to contrast reaction.    He would like me to repeat thrombectomy and we will do as much as possible to avoid hypotension.  I explained to patient that risks include dying.  He understands and has limited options for dialysis and therefore he and parents accept the risks.  His desire to have transplant has been elusive due to antibodies, and active infections.  Currently he is still healing and right abdominal wall wound.    Will proceed today since the dissection is recent and clot fairly fresh and best opportunity is now.

## 2018-04-29 VITALS
BODY MASS INDEX: 29.62 KG/M2 | RESPIRATION RATE: 18 BRPM | HEIGHT: 64 IN | TEMPERATURE: 97.9 F | SYSTOLIC BLOOD PRESSURE: 130 MMHG | OXYGEN SATURATION: 95 % | WEIGHT: 173.5 LBS | HEART RATE: 92 BPM | DIASTOLIC BLOOD PRESSURE: 46 MMHG

## 2018-04-29 LAB
INR PPP: 1.93 (ref 0.87–1.13)
PROTHROMBIN TIME: 21.7 SEC (ref 12–14.6)

## 2018-04-29 PROCEDURE — G0378 HOSPITAL OBSERVATION PER HR: HCPCS

## 2018-04-29 PROCEDURE — 700102 HCHG RX REV CODE 250 W/ 637 OVERRIDE(OP): Performed by: SURGERY

## 2018-04-29 PROCEDURE — 96376 TX/PRO/DX INJ SAME DRUG ADON: CPT

## 2018-04-29 PROCEDURE — 36415 COLL VENOUS BLD VENIPUNCTURE: CPT

## 2018-04-29 PROCEDURE — 85610 PROTHROMBIN TIME: CPT

## 2018-04-29 PROCEDURE — 700111 HCHG RX REV CODE 636 W/ 250 OVERRIDE (IP): Performed by: SURGERY

## 2018-04-29 PROCEDURE — A9270 NON-COVERED ITEM OR SERVICE: HCPCS | Performed by: SURGERY

## 2018-04-29 RX ORDER — MIDODRINE HYDROCHLORIDE 10 MG/1
10 TABLET ORAL
Qty: 60 TAB | Refills: 3 | Status: SHIPPED | OUTPATIENT
Start: 2018-04-29 | End: 2019-01-26

## 2018-04-29 RX ORDER — WARFARIN SODIUM 4 MG/1
4 TABLET ORAL
Status: DISCONTINUED | OUTPATIENT
Start: 2018-04-29 | End: 2018-04-29 | Stop reason: HOSPADM

## 2018-04-29 RX ADMIN — MIDODRINE HYDROCHLORIDE 10 MG: 5 TABLET ORAL at 08:55

## 2018-04-29 RX ADMIN — LEVETIRACETAM 250 MG: 500 TABLET, FILM COATED ORAL at 09:01

## 2018-04-29 RX ADMIN — GABAPENTIN 600 MG: 300 CAPSULE ORAL at 08:55

## 2018-04-29 RX ADMIN — MORPHINE SULFATE 4 MG: 4 INJECTION INTRAVENOUS at 02:00

## 2018-04-29 RX ADMIN — MORPHINE SULFATE 4 MG: 4 INJECTION INTRAVENOUS at 04:57

## 2018-04-29 RX ADMIN — MORPHINE SULFATE 4 MG: 4 INJECTION INTRAVENOUS at 08:55

## 2018-04-29 ASSESSMENT — PAIN SCALES - GENERAL
PAINLEVEL_OUTOF10: 5
PAINLEVEL_OUTOF10: 5

## 2018-04-29 NOTE — PROGRESS NOTES
Pt DC home this AM  Vitals stable and improved, systolics <100   No drainage from bandage on left thigh   Pt ambulating without assistance     Pt doing very well this morning  No questions / concerns     F/u with Sandeep in 1mo or PRN     Pt understands Vas-Cath teachings & will speak with MD about use & removal in future with Nephrologist / Sandeep     DC home, scripts sent to pharmacy

## 2018-04-29 NOTE — OR NURSING
Pt awake and alert; L AV graft has a thrill and a bruit. BP consistently in low teens. Report given to Bonilla DAVIDSON on [facility]. Pt transported in stable condition.

## 2018-04-29 NOTE — PROGRESS NOTES
Inpatient Anticoagulation Service Note    Date: 4/29/2018  Reason for Anticoagulation: Deep Vein Thrombosis        Hemoglobin Value: (!) 8.1  Hematocrit Value: (!) 26  Lab Platelet Value: 169  Target INR: 2.0 to 3.0    INR from last 7 days     Date/Time INR Value    04/29/18 0300 (!)  1.93    04/27/18 0910 (!)  1.48        Dose from last 7 days     Date/Time Dose (mg)    04/29/18 0900  4    04/28/18 0100  5        Significant Interactions: Other (Comments)  Bridge Therapy: No   Reported warfarin home dose: 2.5 mg M/F , 5mg AOD    Reversal Agent Administered: Not Applicable    Comments:   Warfarin resumed for AV graft thrombosis.  INR below goal, with increasing trend.  No drug interactions.      Plan:    Give warfarin 4 mg daily.  Check INR with AM labs.    Pharmacist suggested discharge dosing: Resume warfarin home regimen.  Follow up with [facility] coumadin clinic 48 hours after discharge.       Charlie BanksD BCPS

## 2018-04-29 NOTE — PROGRESS NOTES
PO 1 and 2.  Left thigh AV graft with good bruit.  Blood pressure this AM > 100    Alert and oriented and doing well.    Plan:  Home with Rx for Midodrine to see if can avoid low blood pressure that has led to failure of graft.  Also reduce amount fluid removed during dialysis.

## 2018-04-29 NOTE — CARE PLAN
Problem: Safety  Goal: Will remain free from injury  Outcome: PROGRESSING AS EXPECTED  Educate pt to utilize call light when needing assistance or getting out of bed. Bed brakes on. Bed at lowest position. Upper side rails x2 up. Call light within reach. Hourly rounding in place.    Problem: Knowledge Deficit  Goal: Knowledge of the prescribed therapeutic regimen will improve  Outcome: PROGRESSING AS EXPECTED  Discussed medication given for the evening. Timing of doses and administration. Discuss side effects. Pt aware of medication taking.    Problem: Pain Management  Goal: Pain level will decrease to patient's comfort goal  Outcome: PROGRESSING AS EXPECTED  Scheduled medication given for restless leg syndrome and medicated per pain at the beginning of shift. Assessment done prior to medication administration.

## 2018-04-29 NOTE — DISCHARGE INSTRUCTIONS
ACTIVITY: Rest and take it easy for the first 24 hours.  A responsible adult is recommended to remain with you during that time.  It is normal to feel sleepy.  We encourage you to not do anything that requires balance, judgment or coordination.    MILD FLU-LIKE SYMPTOMS ARE NORMAL. YOU MAY EXPERIENCE GENERALIZED MUSCLE ACHES, THROAT IRRITATION, HEADACHE AND/OR SOME NAUSEA.    FOR 24 HOURS DO NOT:  Drive, operate machinery or run household appliances.  Drink beer or alcoholic beverages.   Make important decisions or sign legal documents.    SPECIAL INSTRUCTIONS:     DIET: To avoid nausea, slowly advance diet as tolerated, avoiding spicy or greasy foods for the first day.  Add more substantial food to your diet according to your physician's instructions.  Babies can be fed formula or breast milk as soon as they are hungry.  INCREASE FLUIDS AND FIBER TO AVOID CONSTIPATION.    SURGICAL DRESSING/BATHING: See Notes Above    FOLLOW-UP APPOINTMENT:  A follow-up appointment should be arranged with your doctor in 1 month; call to schedule.    You should CALL YOUR PHYSICIAN if you develop:  Fever greater than 101 degrees F.  Pain not relieved by medication, or persistent nausea or vomiting.  Excessive bleeding (blood soaking through dressing) or unexpected drainage from the wound.  Extreme redness or swelling around the incision site, drainage of pus or foul smelling drainage.  Inability to urinate or empty your bladder within 8 hours.  Problems with breathing or chest pain.    You should call 911 if you develop problems with breathing or chest pain.  If you are unable to contact your doctor or surgical center, you should go to the nearest emergency room or urgent care center.  Physician's telephone #: See Appointments    If any questions arise, call your doctor.  If your doctor is not available, please feel free to call the Surgical Center at [phone].  The Center is open Monday through Friday from 7AM to 7PM.  You can  also call the HEALTH HOTLINE open 24 hours/day, 7 days/week and speak to a nurse at [phone], or toll free at [phone].    A registered nurse may call you a few days after your surgery to see how you are doing after your procedure.    MEDICATIONS: Resume taking daily medication.  Take prescribed pain medication with food.  If no medication is prescribed, you may take non-aspirin pain medication if needed.  PAIN MEDICATION CAN BE VERY CONSTIPATING.  Take a stool softener or laxative such as senokot, pericolace, or milk of magnesia if needed.    Prescription given for .  Last pain medication given at .    If your physician has prescribed pain medication that includes Acetaminophen (Tylenol), do not take additional Acetaminophen (Tylenol) while taking the prescribed medication.    Depression / Suicide Risk    As you are discharged from this [facility] facility, it is important to learn how to keep safe from harming yourself.    Recognize the warning signs:  · Abrupt changes in personality, positive or negative- including increase in energy   · Giving away possessions  · Change in eating patterns- significant weight changes-  positive or negative  · Change in sleeping patterns- unable to sleep or sleeping all the time   · Unwillingness or inability to communicate  · Depression  · Unusual sadness, discouragement and loneliness  · Talk of wanting to die  · Neglect of personal appearance   · Rebelliousness- reckless behavior  · Withdrawal from people/activities they love  · Confusion- inability to concentrate     If you or a loved one observes any of these behaviors or has concerns about self-harm, here's what you can do:  · Talk about it- your feelings and reasons for harming yourself  · Remove any means that you might use to hurt yourself (examples: pills, rope, extension cords, firearm)  · Get professional help from the community (Mental Health, Substance Abuse, psychological counseling)  · Do not be  alone:Call your Safe Contact- someone whom you trust who will be there for you.  · Call your local CRISIS HOTLINE 183-9594 or 140-654-9564  · Call your local Children's Mobile Crisis Response Team [place] (540) 226-7888 or www.[employer]  · Call the toll free National Suicide Prevention Hotlines   · National Suicide Prevention Lifeline 729-716-GQKP (6404)  · [employer] [facility] Line Network 800-SUICIDE (087-3771)      Discharge Instructions    Discharged to home by car with relative. Discharged via wheelchair, hospital escort: Yes.  Special equipment needed: Not Applicable    Be sure to schedule a follow-up appointment with your primary care doctor or any specialists as instructed.     Discharge Plan:   Diet Plan: Discussed  Activity Level: Discussed  Confirmed Follow up Appointment: Patient to Call and Schedule Appointment  Confirmed Symptoms Management: Discussed  Medication Reconciliation Updated: Yes  Influenza Vaccine Indication: Not indicated: Previously immunized this influenza season and > 8 years of age    I understand that a diet low in cholesterol, fat, and sodium is recommended for good health. Unless I have been given specific instructions below for another diet, I accept this instruction as my diet prescription.   Other diet: Renal Diet    Special Instructions:   Deep Vein Thrombosis Discharge Instructions    A deep vein thrombosis (DVT) is a blood clot (thrombus) that develops in a deep vein. A DVT is a clot in the deep, larger veins of the leg, arm, or pelvis. These are more dangerous than clots that might form in veins on the surface of the body. Deep vein thrombosis can lead to complications if the clot breaks off and travels in the bloodstream to the lungs.     CAUSES  Blood clots form in a vein for different reasons. Usually several things cause blood clots. They include:   · The flow of blood slows down.   · The inside of the vein is damaged in some way.   · The person has a condition that  makes blood clot more easily. These conditions may include:  · Older age (especially over 75 years old).  · Having a history of blood clots.  · Having major or lengthy surgery. Hip surgery is particularly high-risk.   · Breaking a hip or leg.  · Sitting or lying still for a long time.  · Cancer or cancer treatment.  · Having a long, thin tube (catheter) placed inside a vein during a medical procedure.   · Being overweight (obese).  · Pregnancy and childbirth.  · Medicines with estrogen.  · Smoking.  · Other circulation or heart problems.     SYMPTOMS  When a clot forms, it can either partially or totally block the blood flow in that vein. Symptoms of a DVT can include:  · Swelling of the leg or arm, especially if one side is much worse.  · Warmth and redness of the leg or arm, especially if one side is much worse.   · Pain in an arm or leg. If the clot is in the leg, symptoms may be more noticeable or worse when standing or walking.  If the blood clot travels to the lung, it may cause:  · Shortness of breath.  · Chest pain. The pain may be worsened by deep breaths.   · Coughing up thick mucus (phlegm), possibly flecked with blood.   Anyone with these symptoms should get emergency medical treatment right away. Call your local emergency  Services (911 in [place] if you have these symptoms.     DIAGNOSIS  If a DVT is suspected, your caregiver will take a full medical history. He or she will also perform a physical exam. Tests that also may be required include:   · Studies of the clotting properties of the blood.   · An ultrasound scan.   · X-rays to show the flow of blood when special dye is injected into the veins (venography).   · Studies of your lungs if you have any chest symptoms.     PREVENTION  · Exercise the legs regularly. Take a brisk 30 minute walk every day.   · Maintain a weight that is appropriate for your height.  · Avoid sitting or lying in bed for long periods of time without moving your legs.   · Women,  particularly those over the age of 35, should consider the risks and benefits of taking estrogen medicine, including birth control pills.   · Do not smoke, especially if you take estrogen medicines.   · Long-distance travel can increase your risk. You should exercise your legs by walking or pumping the muscles every hour.   · In hospital prevention: Prevention may include medical and non medical measures.     TREATMENT  · The most common treatment for DVT is blood thinning (anticoagulant) medicine, which reduces the blood's tendency to clot. Anticoagulants can stop new blood clots from forming and old ones from growing. They cannot dissolve existing clots. Your body does this by itself over time. Anticoagulants can be given by mouth, by intravenous (IV) access, or by injection. Your caregiver will determine the best program for you.   · Less commonly, clot-dissolving drugs (thrombolytics) are used to dissolve a DVT. They carry a high risk of bleeding, so they are used mainly in severe cases.   · Very rarely, a blood clot in the leg needs to be removed surgically.   · If you are unable to take anticoagulants, your caregiver may arrange for you to have a filter placed in a main vein in your belly (abdomen). This filter prevents clots from traveling to your lungs.     HOME CARE INSTRUCTIONS  Take all medicines prescribed by your caregiver. Follow the directions carefully.   · You will most likely continue taking anticoagulants after you leave the hospital. Your caregiver will advise you on the length of treatment (usually 3 to 5 months, sometimes for life).   · Taking too much or too little of an anticoagulant is dangerous. While taking this type of medicine, you will need to have regular blood tests to be sure the dose is correct. The dose can change for many reasons. It is critically important that you take this medicine exactly as prescribed, and that you have blood tests exactly as directed.   · Many foods can  interfere with anticoagulants. These include foods high in vitamin K, such as spinach, kale, broccoli, cabbage, neo and turnip greens, [place] sprouts, peas, cauliflower, seaweed, parsley, beef and pork liver, green tea, and soybean oil. Your caregiver should discuss limits on these foods with you or you should arrange a visit with a dietician to answer your questions.   · Many medicines can interfere with anticoagulants. You must tell your caregiver about any and all medicines you take. This includes all vitamins and supplements. Be especially cautious with aspirin and anti-inflammatory medicines. Ask your caregiver before taking these.   · Anticoagulants can have side effects, mostly excessive bruising or bleeding. You will need to hold pressure over cuts for longer than usual. Avoid alcoholic drinks or consume only very small amounts while taking this medicine.    If you are taking an anticoagulant:  · Wear a medical alert bracelet.  · Notify your dentist or other caregivers before procedures.  · Avoid contact sports.    · Ask your caregiver how soon you can go back to normal activities. Not being active can lead to new clots. Ask for a list of what you should and should not do.   · Exercise your lower leg muscles. This is important while traveling.   · You may need to wear compression stockings. These are tight elastic stockings that apply pressure to the lower legs. This can help keep the blood in the legs from clotting.   · If you are a smoker, you should quit.   · Learn as much as you can about DVT.     SEEK MEDICAL CARE IF:  · You have unusual bruising or any bleeding problems.  · The swelling or pain in your affected arm or leg is not gradually improving.   · You anticipate surgery or long-distance travel. You should get specific advice on DVT prevention.   · You discover other family members with blood clots. This may require further testing for inherited diseases or conditions.     SEEK IMMEDIATE  MEDICAL CARE IF:  · You develop chest pain.  · You develop severe shortness of breath.  · You begin to cough up bloody mucus or phlegm (sputum).  · You feel dizzy or faint.   · You develop swelling or pain in the leg.  · You have breathing problems after traveling.    MAKE SURE YOU:  · Understand these instructions.  · Will watch your condition.  · Will get help right away if you are not doing well or get worse.    and None    · Is patient discharged on Warfarin / Coumadin?   Yes    You are receiving the drug warfarin. Please understand the importance of monitoring warfarin with scheduled PT/INR blood draws.  Follow-up with the [facility] in one week for INR lab..    IMPORTANT: HOW TO USE THIS INFORMATION:  This is a summary and does NOT have all possible information about this product. This information does not assure that this product is safe, effective, or appropriate for you. This information is not individual medical advice and does not substitute for the advice of your health care professional. Always ask your health care professional for complete information about this product and your specific health needs.      WARFARIN - ORAL (WARF-uh-rin)      COMMON BRAND NAME(S): Coumadin      WARNING:  Warfarin can cause very serious (possibly fatal) bleeding. This is more likely to occur when you first start taking this medication or if you take too much warfarin. To decrease your risk for bleeding, your doctor or other health care provider will monitor you closely and check your lab results (INR test) to make sure you are not taking too much warfarin. Keep all medical and laboratory appointments. Tell your doctor right away if you notice any signs of serious bleeding. See also Side Effects section.      USES:  This medication is used to treat blood clots (such as in deep vein thrombosis-DVT or pulmonary embolus-PE) and/or to prevent new clots from forming in your body. Preventing harmful blood clots helps to  "reduce the risk of a stroke or heart attack. Conditions that increase your risk of developing blood clots include a certain type of irregular heart rhythm (atrial fibrillation), heart valve replacement, recent heart attack, and certain surgeries (such as hip/knee replacement). Warfarin is commonly called a \"blood thinner,\" but the more correct term is \"anticoagulant.\" It helps to keep blood flowing smoothly in your body by decreasing the amount of certain substances (clotting proteins) in your blood.      HOW TO USE:  Read the Medication Guide provided by your pharmacist before you start taking warfarin and each time you get a refill. If you have any questions, ask your doctor or pharmacist. Take this medication by mouth with or without food as directed by your doctor or other health care professional, usually once a day. It is very important to take it exactly as directed. Do not increase the dose, take it more frequently, or stop using it unless directed by your doctor. Dosage is based on your medical condition, laboratory tests (such as INR), and response to treatment. Your doctor or other health care provider will monitor you closely while you are taking this medication to determine the right dose for you. Use this medication regularly to get the most benefit from it. To help you remember, take it at the same time each day. It is important to eat a balanced, consistent diet while taking warfarin. Some foods can affect how warfarin works in your body and may affect your treatment and dose. Avoid sudden large increases or decreases in your intake of foods high in vitamin K (such as broccoli, cauliflower, cabbage, brussels sprouts, kale, spinach, and other green leafy vegetables, liver, green tea, certain vitamin supplements). If you are trying to lose weight, check with your doctor before you try to go on a diet. Cranberry products may also affect how your warfarin works. Limit the amount of cranberry juice (16 " ounces/480 milliliters a day) or other cranberry products you may drink or eat.      SIDE EFFECTS:  Nausea, loss of appetite, or stomach/abdominal pain may occur. If any of these effects persist or worsen, tell your doctor or pharmacist promptly. Remember that your doctor has prescribed this medication because he or she has judged that the benefit to you is greater than the risk of side effects. Many people using this medication do not have serious side effects. This medication can cause serious bleeding if it affects your blood clotting proteins too much (shown by unusually high INR lab results). Even if your doctor stops your medication, this risk of bleeding can continue for up to a week. Tell your doctor right away if you have any signs of serious bleeding, including: unusual pain/swelling/discomfort, unusual/easy bruising, prolonged bleeding from cuts or gums, persistent/frequent nosebleeds, unusually heavy/prolonged menstrual flow, pink/dark urine, coughing up blood, vomit that is bloody or looks like coffee grounds, severe headache, dizziness/fainting, unusual or persistent tiredness/weakness, bloody/black/tarry stools, chest pain, shortness of breath, difficulty swallowing. Tell your doctor right away if any of these unlikely but serious side effects occur: persistent nausea/vomiting, severe stomach/abdominal pain, yellowing eyes/skin. This drug rarely has caused very serious (possibly fatal) problems if its effects lead to small blood clots (usually at the beginning of treatment). This can lead to severe skin/tissue damage that may require surgery or amputation if left untreated. Patients with certain blood conditions (protein C or S deficiency) may be at greater risk. Get medical help right away if any of these rare but serious side effects occur: painful/red/purplish patches on the skin (such as on the toe, breast, abdomen), change in the amount of urine, vision changes, confusion, slurred speech,  weakness on one side of the body. A very serious allergic reaction to this drug is rare. However, get medical help right away if you notice any symptoms of a serious allergic reaction, including: rash, itching/swelling (especially of the face/tongue/throat), severe dizziness, trouble breathing. This is not a complete list of possible side effects. If you notice other effects not listed above, contact your doctor or pharmacist. In the [place] - Call your doctor for medical advice about side effects. You may report side effects to FDA at 6-980-BXM-0983. In [place] - Call your doctor for medical advice about side effects. You may report side effects to [facility] [place] at 1-806.168.4825.      PRECAUTIONS:  Before taking warfarin, tell your doctor or pharmacist if you are allergic to it; or if you have any other allergies. This product may contain inactive ingredients, which can cause allergic reactions or other problems. Talk to your pharmacist for more details. Before using this medication, tell your doctor or pharmacist your medical history, especially of: blood disorders (such as anemia, hemophilia), bleeding problems (such as bleeding of the stomach/intestines, bleeding in the brain), blood vessel disorders (such as aneurysms), recent major injury/surgery, liver disease, alcohol use, mental/mood disorders (including memory problems), frequent falls/injuries. It is important that all your doctors and dentists know that you take warfarin. Before having surgery or any medical/dental procedures, tell your doctor or dentist that you are taking this medication and about all the products you use (including prescription drugs, nonprescription drugs, and herbal products). Avoid getting injections into the muscles. If you must have an injection into a muscle (for example, a flu shot), it should be given in the arm. This way, it will be easier to check for bleeding and/or apply pressure bandages. This medication may cause stomach  bleeding. Daily use of alcohol while using this medicine will increase your risk for stomach bleeding and may also affect how this medication works. Limit or avoid alcoholic beverages. If you have not been eating well, if you have an illness or infection that causes fever, vomiting, or diarrhea for more than 2 days, or if you start using any antibiotic medications, contact your doctor or pharmacist immediately because these conditions can affect how warfarin works. This medication can cause heavy bleeding. To lower the chance of getting cut, bruised, or injured, use great caution with sharp objects like safety razors and nail cutters. Use an electric razor when shaving and a soft toothbrush when brushing your teeth. Avoid activities such as contact sports. If you fall or injure yourself, especially if you hit your head, call your doctor immediately. Your doctor may need to check you. The Food & Drug Administration has stated that generic warfarin products are interchangeable. However, consult your doctor or pharmacist before switching warfarin products. Be careful not to take more than one medication that contains warfarin unless specifically directed by the doctor or health care provider who is monitoring your warfarin treatment. Older adults may be at greater risk for bleeding while using this drug. This medication is not recommended for use during pregnancy because of serious (possibly fatal) harm to an unborn baby. Discuss the use of reliable forms of birth control with your doctor. If you become pregnant or think you may be pregnant, tell your doctor immediately. If you are planning pregnancy, discuss a plan for managing your condition with your doctor before you become pregnant. Your doctor may switch the type of medication you use during pregnancy. Very small amounts of this medication may pass into breast milk but is unlikely to harm a nursing infant. Consult your doctor before breast-feeding.      DRUG  "INTERACTIONS:  Drug interactions may change how your medications work or increase your risk for serious side effects. This document does not contain all possible drug interactions. Keep a list of all the products you use (including prescription/nonprescription drugs and herbal products) and share it with your doctor and pharmacist. Do not start, stop, or change the dosage of any medicines without your doctor's approval. Warfarin interacts with many prescription, nonprescription, vitamin, and herbal products. This includes medications that are applied to the skin or inside the vagina or rectum. The interactions with warfarin usually result in an increase or decrease in the \"blood-thinning\" (anticoagulant) effect. Your doctor or other health care professional should closely monitor you to prevent serious bleeding or clotting problems. While taking warfarin, it is very important to tell your doctor or pharmacist of any changes in medications, vitamins, or herbal products that you are taking. Some products that may interact with this drug include: capecitabine, imatinib, mifepristone. Aspirin, aspirin-like drugs (salicylates), and nonsteroidal anti-inflammatory drugs (NSAIDs such as ibuprofen, naproxen, celecoxib) may have effects similar to warfarin. These drugs may increase the risk of bleeding problems if taken during treatment with warfarin. Carefully check all prescription/nonprescription product labels (including drugs applied to the skin such as pain-relieving creams) since the products may contain NSAIDs or salicylates. Talk to your doctor about using a different medication (such as acetaminophen) to treat pain/fever. Low-dose aspirin and related drugs (such as clopidogrel, ticlopidine) should be continued if prescribed by your doctor for specific medical reasons such as heart attack or stroke prevention. Consult your doctor or pharmacist for more details. Many herbal products interact with warfarin. Tell your " doctor before taking any herbal products, especially bromelains, coenzyme Q10, cranberry, danshen, dong quai, fenugreek, garlic, ginkgo biloba, ginseng, and Harry's wort, among others. This medication may interfere with a certain laboratory test to measure theophylline levels, possibly causing false test results. Make sure laboratory personnel and all your doctors know you use this drug.      OVERDOSE:  If overdose is suspected, contact a poison control center or emergency room immediately. [place] residents can call the  National Poison Hotline at 1-988.814.2090. [place] residents can call a provincial poison control center. Symptoms of overdose may include: bloody/black/tarry stools, pink/dark urine, unusual/prolonged bleeding.      NOTES:  Do not share this medication with others. Laboratory and/or medical tests (such as INR, complete blood count) must be performed periodically to monitor your progress or check for side effects. Consult your doctor for more details.      MISSED DOSE:  For the best possible benefit, do not miss any doses. If you do miss a dose and remember on the same day, take it as soon as you remember. If you remember on the next day, skip the missed dose and resume your usual dosing schedule. Do not double the dose to catch up because this could increase your risk for bleeding. Keep a record of missed doses to give to your doctor or pharmacist. Contact your doctor or pharmacist if you miss 2 or more doses in a row.      STORAGE:  Store at room temperature away from light and moisture. Do not store in the bathroom. Keep all medications away from children and pets. Do not flush medications down the toilet or pour them into a drain unless instructed to do so. Properly discard this product when it is  or no longer needed. Consult your pharmacist or local waste disposal company for more details about how to safely discard your product.      MEDICAL ALERT:  Your condition and medication can  cause complications in a medical emergency. For information about enrolling in MedicAlert, call 1-224.612.8657 ([place]) or [phone] ([place]).      Information last revised October 2010 Copyright(c) 2010 [employer]             Depression / Suicide Risk    As you are discharged from this [facility] facility, it is important to learn how to keep safe from harming yourself.    Recognize the warning signs:  · Abrupt changes in personality, positive or negative- including increase in energy   · Giving away possessions  · Change in eating patterns- significant weight changes-  positive or negative  · Change in sleeping patterns- unable to sleep or sleeping all the time   · Unwillingness or inability to communicate  · Depression  · Unusual sadness, discouragement and loneliness  · Talk of wanting to die  · Neglect of personal appearance   · Rebelliousness- reckless behavior  · Withdrawal from people/activities they love  · Confusion- inability to concentrate     If you or a loved one observes any of these behaviors or has concerns about self-harm, here's what you can do:  · Talk about it- your feelings and reasons for harming yourself  · Remove any means that you might use to hurt yourself (examples: pills, rope, extension cords, firearm)  · Get professional help from the community (Mental Health, Substance Abuse, psychological counseling)  · Do not be alone:Call your Safe Contact- someone whom you trust who will be there for you.  · Call your local CRISIS HOTLINE 428-2744 or 369-436-7573  · Call your local Children's Mobile Crisis Response Team [place] (632) 395-0370 or www.[employer]  · Call the toll free National Suicide Prevention Hotlines   · National Suicide Prevention Lifeline 560-956-TBJW (6602)  · National Hope Line Network 800-SUICIDE (814-5473)

## 2018-04-29 NOTE — OP REPORT
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    POSTOPERATIVE DIAGNOSIS:  Stage V kidney disease with recurrent thrombosis to   left thigh arteriovenous loop graft.    OPERATIONS:  Left thigh arteriovenous loop bridge graft thrombectomy and   balloon angioplasty venous and arterial anastomosis.  Fistulograms.    SURGEON:  Jairo Hopkins MD    ASSISTANT:  None.    ANESTHESIA:  General anesthesia.    ANESTHESIOLOGIST:  Liu Harvey MD    DESCRIPTION OF PROCEDURE:  After obtaining informed consent, the patient was   taken to the operating room and put under general anesthesia.  His left lower   extremity was prepped with ChloraPrep and draped sterilely.  A time out was   performed.  The incision in the distal lateral aspect of his thigh which is   the arterial side was reopened.  The graft was exposed.  It was encircled with   vessel loops.  The [place] suture closure of the graft was taken down.  A #4   Navneet catheter was passed up and down the venous side until backbleeding   was achieved.  Similarly, this was done on the arterial side.  On the arterial   side, which has a narrowed proximal graft at the anastomosis, a 3-[place]   Navneet catheter was used and this was extracted a cast of clot that was   consistent with the neck of the inflow.  The inflow was vigorous and it was   difficult to control bleeding, but we were successful to keep the blood loss   to a minimum.  A balloon occlusion catheter was inflated and we injected   contrast showing some irregularities through the proximal anastomosis graft.    Over a Glidewire, I passed a 5x40 mm balloon and inflated it across the   proximal end of the graft into the artery.  The images, I took after this   angioplasty showed good patency of the arterial inflow.  On the venous side,   we did a similar process and used a 10x40 balloon to venous outflow.  This    improved it visibly in terms of diameter and there appeared to be no   significant filling defects.  I sutured close the graftotomy with CV-6   [place].  Flow was established and a thrill was present, noticeable   immediately in the venous side in the distal thigh.  I irrigated the wound   with saline and then closed the subcutaneous tissue with 4-0 Vicryl and the   skin with subcuticular 4-0 Vicryl.  Gauze and small Tegaderm were used for   dressing.  The patient was taken to recovery room where his graft is presently   patent with a thrill and bruit evident, but his blood pressure is very   labile.  Low blood pressure we believe is one of the reasons for his recurrent   graft failures.  Dr. Harvey worked with the patient in the recovery room,   trying to keep the blood pressure elevated.  We are going to try midodrine.    Patient will be observed overnight.  Future alternatives are limited.       ____________________________________     MD BEN Davis / RIAZ    DD:  04/28/2018 16:53:28  DT:  04/28/2018 17:20:18    D#:  [id number]  Job#:  [id number]

## 2018-05-01 ENCOUNTER — ANTICOAGULATION VISIT (OUTPATIENT)
Dept: VASCULAR LAB | Facility: MEDICAL CENTER | Age: 38
End: 2018-05-01
Attending: INTERNAL MEDICINE
Payer: MEDICARE

## 2018-05-01 DIAGNOSIS — Z79.01 CHRONIC ANTICOAGULATION: ICD-10-CM

## 2018-05-01 LAB — INR PPP: 2 (ref 2–3.5)

## 2018-05-01 PROCEDURE — 99212 OFFICE O/P EST SF 10 MIN: CPT

## 2018-05-01 PROCEDURE — 85610 PROTHROMBIN TIME: CPT

## 2018-05-01 NOTE — PROGRESS NOTES
OP Anticoagulation Service Note    Date: 5/1/2018  There were no vitals filed for this visit.    Anticoagulation Summary  As of 5/1/2018    INR goal:   2.5-3.5   TTR:   37.9 % (1 y)   Today's INR:   2.0!   Warfarin maintenance plan:   2.5 mg (2.5 mg x 1) on Mon, Fri; 5 mg (2.5 mg x 2) all other days   Weekly warfarin total:   30 mg   Plan last modified:   Chriss Keating, PharmD (5/1/2018)   Next INR check:   5/8/2018   Target end date:   Indefinite    Indications    AV graft thrombosis (HCC) (Resolved) [T82.868A]             Anticoagulation Episode Summary     INR check location:       Preferred lab:       Send INR reminders to:       Comments:   INR goal of 2.5 - 3.5 per Dr. Hopkins      Anticoagulation Care Providers     Provider Role Specialty Phone number    Jairo Hopkins M.D. Referring Surgery [phone]    [facility] Responsible  [phone]        Anticoagulation Patient Findings      HPI:   Denis De Anda seen in clinic today, they are here today for a INR check on anticoagulation therapy with warfarin because they have AV graft     The reason for today's visit is to prevent morbidity and mortality from a blood clot  and to reduce the risk of bleeding while on a anticoagulant.     Reason for today's visit (per our collaborative practice policy) is because their last INR was 1.93  on  4/29.   Intervention at the last visit:  He was in the hospital and DC'd Monday     Additional education provided today regarding reducing bleed risk and dietary constraints:  About diet    Any upcoming procedures:   Recent graft replacement    Confirmed warfarin dosing regimen  Interval Changes with foods rich in vitamin K: No  Interval Changes in ETOH:   No  Interval Changes in smoking status:  No  Interval Changes in medication:  No   Cost restriction:  No    S/S of bleeding or bruising:  No  Signs/symptoms  thrombosis since the last appt:  No  Bleed risk is:  moderate,     3 vitals included  with today's appt :No  (BP, HR, weight, ht, RR)     Assessment:   INR  sub-therapeutic.     Possible reason(s) for INR today:   recent surgery     Intervention required today to prevent:    They are at a increased risk of clots because INR is below goal.    They have a TTR of 37.9  which is not at target (TTR target/goal is 100%) and requires close follow up to prevent a adverse event (the lower the TTR the higher risk of clots, strokes, or bleeding).       Plan:  7.5mg today then continue weekly warfarin dose as noted      Follow up:  Follow up appointment in 1 week(s)       Other info:  Pt educated to contact our clinic with any changes in medications or s/s of bleeding or thrombosis    CHEST guidelines recommend frequent INR monitoring at regular intervals (a few days up to a max of 12 weeks) to ensure they are on the proper dose of warfarin and not having any complications from therapy.  INRs can dramatically change over a short time period due to diet, medications, and medical conditions.

## 2018-05-08 ENCOUNTER — ANTICOAGULATION VISIT (OUTPATIENT)
Dept: VASCULAR LAB | Facility: MEDICAL CENTER | Age: 38
End: 2018-05-08
Attending: INTERNAL MEDICINE
Payer: MEDICARE

## 2018-05-08 DIAGNOSIS — Z79.01 CHRONIC ANTICOAGULATION: ICD-10-CM

## 2018-05-08 LAB — INR PPP: 1.6 (ref 2–3.5)

## 2018-05-08 PROCEDURE — 99212 OFFICE O/P EST SF 10 MIN: CPT

## 2018-05-08 PROCEDURE — 85610 PROTHROMBIN TIME: CPT

## 2018-05-08 NOTE — PROGRESS NOTES
OP Anticoagulation Service Note    Date: 5/8/2018  There were no vitals filed for this visit.    Anticoagulation Summary  As of 5/8/2018    INR goal:   2.5-3.5   TTR:   37.2 % (1 y)   Today's INR:   1.6!   Warfarin maintenance plan:   5 mg (2.5 mg x 2) every day   Weekly warfarin total:   35 mg   Plan last modified:   Chriss Keating, PharmD (5/8/2018)   Next INR check:   5/11/2018   Target end date:   Indefinite    Indications    AV graft thrombosis (HCC) (Resolved) [T82.868A]             Anticoagulation Episode Summary     INR check location:       Preferred lab:       Send INR reminders to:       Comments:   INR goal of 2.5 - 3.5 per Dr. Hopkins      Anticoagulation Care Providers     Provider Role Specialty Phone number    Jairo Hopkins M.D. Referring Surgery [phone]    [facility] Responsible  [phone]        Anticoagulation Patient Findings      HPI:   Denis De Anda seen in clinic today, they are here today for a INR check on anticoagulation therapy with warfarin because they have AV graft    The reason for today's visit is to prevent morbidity and mortality from a blood clot  and to reduce the risk of bleeding while on a anticoagulant.     Reason for today's visit (per our collaborative practice policy) is because their last INR was 2.0  on  5/1.   Intervention at the last visit:  7.5mg that day  Then resumed dose     Additional education provided today regarding reducing bleed risk and dietary constraints:  About diet     Any upcoming procedures:   none    Confirmed warfarin dosing regimen  Interval Changes with foods rich in vitamin K: No  Interval Changes in ETOH:   No  Interval Changes in smoking status:  No  Interval Changes in medication:  No   Cost restriction:  No    S/S of bleeding or bruising:  No  Signs/symptoms  thrombosis since the last appt:  No  Bleed risk is:  moderate,     3 vitals included with today's appt :No  (BP, HR, weight, ht, RR)     Assessment:    INR  sub-therapeutic.     Possible reason(s) for INR today:   no diet changes, no apparent reason for inr to drop. No missed doses     Intervention required today to prevent:    They are at a increased risk of clots because INR is below goal.      They have a TTR of 37.2  which is not at target (TTR target/goal is 100%) and requires close follow up to prevent a adverse event (the lower the TTR the higher risk of clots, strokes, or bleeding).       Plan:  7.5 mg today then increase weekly warfarin dose as noted      Follow up:  Follow up appointment in 4 days        Other info:  Pt educated to contact our clinic with any changes in medications or s/s of bleeding or thrombosis    CHEST guidelines recommend frequent INR monitoring at regular intervals (a few days up to a max of 12 weeks) to ensure they are on the proper dose of warfarin and not having any complications from therapy.  INRs can dramatically change over a short time period due to diet, medications, and medical conditions.

## 2018-05-11 ENCOUNTER — ANTICOAGULATION VISIT (OUTPATIENT)
Dept: VASCULAR LAB | Facility: MEDICAL CENTER | Age: 38
End: 2018-05-11
Attending: INTERNAL MEDICINE
Payer: MEDICARE

## 2018-05-11 ENCOUNTER — HOSPITAL ENCOUNTER (OUTPATIENT)
Dept: RADIOLOGY | Facility: MEDICAL CENTER | Age: 38
End: 2018-05-11
Attending: NEUROLOGICAL SURGERY
Payer: MEDICARE

## 2018-05-11 DIAGNOSIS — M54.6 PAIN IN THORACIC SPINE: ICD-10-CM

## 2018-05-11 DIAGNOSIS — Z79.01 CHRONIC ANTICOAGULATION: ICD-10-CM

## 2018-05-11 LAB — INR PPP: 1.8 (ref 2–3.5)

## 2018-05-11 PROCEDURE — 85610 PROTHROMBIN TIME: CPT

## 2018-05-11 PROCEDURE — 72072 X-RAY EXAM THORAC SPINE 3VWS: CPT

## 2018-05-11 PROCEDURE — 99212 OFFICE O/P EST SF 10 MIN: CPT

## 2018-05-11 NOTE — PROGRESS NOTES
Anticoagulation Summary  As of 5/11/2018    INR goal:   2.5-3.5   TTR:   36.9 % (1 y)   Today's INR:   1.8!   Warfarin maintenance plan:   7.5 mg (2.5 mg x 3) on Mon, Fri; 5 mg (2.5 mg x 2) all other days   Weekly warfarin total:   40 mg   Plan last modified:   Donald Cedeño, PharmD (5/11/2018)   Next INR check:   5/15/2018   Target end date:   Indefinite    Indications    AV graft thrombosis (HCC) (Resolved) [T82.868A]             Anticoagulation Episode Summary     INR check location:       Preferred lab:       Send INR reminders to:       Comments:   INR goal of 2.5 - 3.5 per Dr. Hopkins      Anticoagulation Care Providers     Provider Role Specialty Phone number    Jairo Hopkins M.D. Referring Surgery [phone]    [facility] Responsible  [phone]        Anticoagulation Patient Findings      HPI:  Denis Storm De Anda seen in clinic today, on anticoagulation therapy with warfarin for AV graft thrombosis.   Changes to current medical/health status since last appt: none  Denies signs/symptoms of bleeding and/or thrombosis since the last appt.    Denies any interval changes to diet  Denies any interval changes to medications since last appt.   Denies any complications or cost restrictions with current therapy.   Requested to skip vitals today.   Confirmed dosing regimen.     A/P   INR  SUB-therapeutic.   Begin increased regimen.    Follow up appointment in 1 week(s).    Donald Cedeño, JocelynD

## 2018-05-13 NOTE — DISCHARGE SUMMARY
Critical Care D/C Summary    Admitting Date: 4/27/2018      Discharge Date: 4/29/2018  9:41 AM      HPI:      Surgical Procedures:  Procedure(s) and Anesthesia Type:     * THROMBECTOMY- Thigh W/ Graft - General    Consults:  Will need more research    Activity:  Unrestricted    Diet:  No orders of the defined types were placed in this encounter.      Activity, not able to pull from order.     Medications:  No current facility-administered medications for this encounter.      Current Outpatient Prescriptions   Medication Sig Dispense Refill   • midodrine (PROAMATINE) 10 MG tablet Take 1 Tab by mouth 3 times a day, with meals. Can hold dose if blood pressure greater than 120 systolic. 60 Tab 3   • Tapentadol HCl (NUCYNTA) 50 MG Tab Take 50 mg by mouth at bedtime as needed (restless leg). TID     • Tapentadol HCl (NUCYNTA) 100 MG Tab Take 100 mg by mouth 2 Times a Day.     • warfarin (COUMADIN) 5 MG Tab Take 2.5-5 mg by mouth every day. 2.5 mg on Monday and Fridays. 5 mg all other days     • cinacalcet (SENSIPAR) 30 MG Tab Take 30-60 mg by mouth every day. 30 mg Mondays , Wednesdays , and Fridays. All other days 60 mg     • levetiracetam (KEPPRA) 250 MG tablet Take 1 Tab by mouth 2 times a day. 60 Tab 3   • gabapentin (NEURONTIN) 600 MG tablet Take 600 mg by mouth 3 times a day.     • sevelamer carbonate (RENVELA) 800 MG Tab tablet Take 4,000 mg by mouth 3 times a day, with meals. With meals     • calcitRIOL (ROCALTROL) 0.25 MCG CAPS Take 0.75 mcg by mouth every bedtime.         Follow-up:  No Follow-up on file.

## 2018-05-15 ENCOUNTER — ANTICOAGULATION VISIT (OUTPATIENT)
Dept: VASCULAR LAB | Facility: MEDICAL CENTER | Age: 38
End: 2018-05-15
Attending: INTERNAL MEDICINE
Payer: MEDICARE

## 2018-05-15 VITALS — SYSTOLIC BLOOD PRESSURE: 99 MMHG | DIASTOLIC BLOOD PRESSURE: 55 MMHG | HEART RATE: 62 BPM

## 2018-05-15 DIAGNOSIS — T85.868D GRAFT FAILURE DUE TO THROMBOSIS, SUBSEQUENT ENCOUNTER: ICD-10-CM

## 2018-05-15 LAB
INR BLD: 2 (ref 0.9–1.2)
INR PPP: 2 (ref 2–3.5)

## 2018-05-15 PROCEDURE — 85610 PROTHROMBIN TIME: CPT

## 2018-05-15 PROCEDURE — 99212 OFFICE O/P EST SF 10 MIN: CPT

## 2018-05-15 NOTE — PROGRESS NOTES
Anticoagulation Summary  As of 5/15/2018    INR goal:   2.5-3.5   TTR:   36.5 % (1 y)   Today's INR:   2.0!   Warfarin maintenance plan:   7.5 mg (2.5 mg x 3) on Mon, Fri; 5 mg (2.5 mg x 2) all other days   Weekly warfarin total:   40 mg   Plan last modified:   Jocelyn MagallanesD (5/11/2018)   Next INR check:   5/22/2018   Target end date:   Indefinite    Indications    AV graft thrombosis (HCC) (Resolved) [T82.868A]             Anticoagulation Episode Summary     INR check location:       Preferred lab:       Send INR reminders to:       Comments:   INR goal of 2.5 - 3.5 per Dr. Hopkins      Anticoagulation Care Providers     Provider Role Specialty Phone number    Jairo Hopkins M.D. Referring Surgery [phone]    [facility] Responsible  [phone]        Anticoagulation Patient Findings  Patient Findings     Negatives:   Signs/symptoms of thrombosis, Signs/symptoms of bleeding, Laboratory test error suspected, Change in health, Change in alcohol use, Change in activity, Upcoming invasive procedure, Emergency department visit, Upcoming dental procedure, Missed doses, Extra doses, Change in medications, Change in diet/appetite, Hospital admission, Bruising, Other complaints        History of Present Illness: follow up appointment for chronic anticoagulation with the high risk medication, warfarin for av graft thombosis.    Last INR was out of range, dosage adjusted: pt remains sub therapeutic, however trending upward.  Will bolus dose with 7.5mg tonight, then resume current dosing regimen. Follow up in 1 weeks, to reduce risk of adverse events related to this high risk medication,  Warfarin.    Renetta Dunlap, PharmD

## 2018-05-17 LAB — INR BLD: 1.8 (ref 0.9–1.2)

## 2018-06-01 ENCOUNTER — HOSPITAL ENCOUNTER (OUTPATIENT)
Facility: MEDICAL CENTER | Age: 38
End: 2018-06-01
Attending: SURGERY | Admitting: SURGERY
Payer: MEDICARE

## 2018-06-01 ENCOUNTER — APPOINTMENT (OUTPATIENT)
Dept: RADIOLOGY | Facility: MEDICAL CENTER | Age: 38
End: 2018-06-01
Attending: SURGERY
Payer: MEDICARE

## 2018-06-01 VITALS
SYSTOLIC BLOOD PRESSURE: 120 MMHG | BODY MASS INDEX: 29.58 KG/M2 | HEIGHT: 64 IN | OXYGEN SATURATION: 95 % | HEART RATE: 108 BPM | TEMPERATURE: 98 F | RESPIRATION RATE: 14 BRPM | WEIGHT: 173.28 LBS | DIASTOLIC BLOOD PRESSURE: 43 MMHG

## 2018-06-01 DIAGNOSIS — N18.6 END STAGE RENAL DISEASE (HCC): ICD-10-CM

## 2018-06-01 LAB
ANION GAP SERPL CALC-SCNC: 16 MMOL/L (ref 0–11.9)
BUN SERPL-MCNC: 29 MG/DL (ref 8–22)
CALCIUM SERPL-MCNC: 10 MG/DL (ref 8.5–10.5)
CHLORIDE SERPL-SCNC: 90 MMOL/L (ref 96–112)
CO2 SERPL-SCNC: 27 MMOL/L (ref 20–33)
CREAT SERPL-MCNC: 5.87 MG/DL (ref 0.5–1.4)
ERYTHROCYTE [DISTWIDTH] IN BLOOD BY AUTOMATED COUNT: 56.6 FL (ref 35.9–50)
GLUCOSE SERPL-MCNC: 144 MG/DL (ref 65–99)
HCT VFR BLD AUTO: 34.6 % (ref 42–52)
HGB BLD-MCNC: 11 G/DL (ref 14–18)
INR PPP: 3.2 (ref 0.87–1.13)
MCH RBC QN AUTO: 29.1 PG (ref 27–33)
MCHC RBC AUTO-ENTMCNC: 31.8 G/DL (ref 33.7–35.3)
MCV RBC AUTO: 91.5 FL (ref 81.4–97.8)
PLATELET # BLD AUTO: 162 K/UL (ref 164–446)
PMV BLD AUTO: 10.1 FL (ref 9–12.9)
POTASSIUM SERPL-SCNC: 5.2 MMOL/L (ref 3.6–5.5)
PROTHROMBIN TIME: 32.5 SEC (ref 12–14.6)
RBC # BLD AUTO: 3.78 M/UL (ref 4.7–6.1)
SODIUM SERPL-SCNC: 133 MMOL/L (ref 135–145)
WBC # BLD AUTO: 3.8 K/UL (ref 4.8–10.8)

## 2018-06-01 PROCEDURE — 36589 REMOVAL TUNNELED CV CATH: CPT

## 2018-06-01 PROCEDURE — 700111 HCHG RX REV CODE 636 W/ 250 OVERRIDE (IP): Performed by: SURGERY

## 2018-06-01 PROCEDURE — 85027 COMPLETE CBC AUTOMATED: CPT

## 2018-06-01 PROCEDURE — 700117 HCHG RX CONTRAST REV CODE 255: Performed by: SURGERY

## 2018-06-01 PROCEDURE — 85610 PROTHROMBIN TIME: CPT

## 2018-06-01 PROCEDURE — 160002 HCHG RECOVERY MINUTES (STAT)

## 2018-06-01 PROCEDURE — 700101 HCHG RX REV CODE 250

## 2018-06-01 PROCEDURE — 80048 BASIC METABOLIC PNL TOTAL CA: CPT

## 2018-06-01 PROCEDURE — 700111 HCHG RX REV CODE 636 W/ 250 OVERRIDE (IP)

## 2018-06-01 RX ORDER — MIDAZOLAM HYDROCHLORIDE 1 MG/ML
INJECTION INTRAMUSCULAR; INTRAVENOUS
Status: COMPLETED
Start: 2018-06-01 | End: 2018-06-01

## 2018-06-01 RX ORDER — IODIXANOL 270 MG/ML
12 INJECTION, SOLUTION INTRAVASCULAR ONCE
Status: COMPLETED | OUTPATIENT
Start: 2018-06-01 | End: 2018-06-01

## 2018-06-01 RX ORDER — PREDNISONE 50 MG/1
50 TABLET ORAL
COMMUNITY
End: 2018-06-24

## 2018-06-01 RX ORDER — LIDOCAINE HYDROCHLORIDE AND EPINEPHRINE BITARTRATE 20; .01 MG/ML; MG/ML
INJECTION, SOLUTION SUBCUTANEOUS
Status: COMPLETED
Start: 2018-06-01 | End: 2018-06-01

## 2018-06-01 RX ORDER — MIDAZOLAM HYDROCHLORIDE 1 MG/ML
.5-2 INJECTION INTRAMUSCULAR; INTRAVENOUS PRN
Status: ACTIVE | OUTPATIENT
Start: 2018-06-01 | End: 2018-06-01

## 2018-06-01 RX ORDER — LIDOCAINE HYDROCHLORIDE 20 MG/ML
INJECTION, SOLUTION INFILTRATION; PERINEURAL
Status: COMPLETED
Start: 2018-06-01 | End: 2018-06-01

## 2018-06-01 RX ORDER — DIPHENHYDRAMINE HCL 50 MG
50 CAPSULE ORAL
COMMUNITY
End: 2018-06-24

## 2018-06-01 RX ORDER — DIPHENHYDRAMINE HYDROCHLORIDE 50 MG/ML
25 INJECTION INTRAMUSCULAR; INTRAVENOUS ONCE
Status: COMPLETED | OUTPATIENT
Start: 2018-06-01 | End: 2018-06-01

## 2018-06-01 RX ORDER — DIPHENHYDRAMINE HYDROCHLORIDE 50 MG/ML
INJECTION INTRAMUSCULAR; INTRAVENOUS
Status: COMPLETED
Start: 2018-06-01 | End: 2018-06-01

## 2018-06-01 RX ORDER — DIPHENHYDRAMINE HYDROCHLORIDE 50 MG/ML
25 INJECTION INTRAMUSCULAR; INTRAVENOUS EVERY 6 HOURS PRN
Status: DISCONTINUED | OUTPATIENT
Start: 2018-06-01 | End: 2018-06-01 | Stop reason: HOSPADM

## 2018-06-01 RX ORDER — SODIUM CHLORIDE 9 MG/ML
500 INJECTION, SOLUTION INTRAVENOUS
Status: ACTIVE | OUTPATIENT
Start: 2018-06-01 | End: 2018-06-01

## 2018-06-01 RX ADMIN — FENTANYL CITRATE 25 MCG: 50 INJECTION, SOLUTION INTRAMUSCULAR; INTRAVENOUS at 09:32

## 2018-06-01 RX ADMIN — MIDAZOLAM 1 MG: 1 INJECTION INTRAMUSCULAR; INTRAVENOUS at 09:36

## 2018-06-01 RX ADMIN — MIDAZOLAM 1 MG: 1 INJECTION INTRAMUSCULAR; INTRAVENOUS at 09:32

## 2018-06-01 RX ADMIN — LIDOCAINE HYDROCHLORIDE: 20 INJECTION, SOLUTION INFILTRATION; PERINEURAL at 09:37

## 2018-06-01 RX ADMIN — DIPHENHYDRAMINE HYDROCHLORIDE 25 MG: 50 INJECTION INTRAMUSCULAR; INTRAVENOUS at 09:35

## 2018-06-01 RX ADMIN — DIPHENHYDRAMINE HYDROCHLORIDE 25 MG: 50 INJECTION, SOLUTION INTRAMUSCULAR; INTRAVENOUS at 10:00

## 2018-06-01 RX ADMIN — THROMBIN, TOPICAL (BOVINE) 500 UNITS: KIT at 09:58

## 2018-06-01 RX ADMIN — LIDOCAINE HYDROCHLORIDE AND EPINEPHRINE: 20; 10 INJECTION, SOLUTION INFILTRATION; PERINEURAL at 09:37

## 2018-06-01 RX ADMIN — IODIXANOL 12 ML: 270 INJECTION, SOLUTION INTRAVASCULAR at 10:07

## 2018-06-01 ASSESSMENT — PAIN SCALES - GENERAL
PAINLEVEL_OUTOF10: 0

## 2018-06-01 NOTE — OR NURSING
"1024: Patient from radiology to PPU via gurney. Patient is awake and on bedrest/flat X 4 hours. VSS. RLE CMS intact. R groin incision site soft and dressing clean, dry and intact. Vascular access care management per MD order (see assessment). No c/o pain or nausea at this time. Will monitor closely. Vital signs per MD order.   Patient c/o \"itchy\", patient was medicated intra-op.   1100: VSS. R groin intact. Patient tolerating PO well. Mom at bedside. Patient flat in bed.   1230: VSS. R groin soft, CDI. HOB up 30 degrees. Patient denies pain or nausea at this time. Mom at bedside.   1300: .DC instructions given. Questions answered. Mom at bedside. R groin soft,CDI. No c/o pain or nausea. Patient wide awake. VSS. Patient met criteria for discharge.           "

## 2018-06-01 NOTE — OP REPORT
DATE OF SERVICE:  06/01/2018    PREOPERATIVE DIAGNOSES:  Stage V kidney disease.    POSTOPERATIVE DIAGNOSIS:  Stage V kidney disease.    OPERATIONS:  1.  Removal of right femoral tunneled hemodialysis catheter.  2.  Right iliac venograms.  3.  Balloon angioplasty, right iliac fibrin sheath.    SURGEON:  Jairo Hopkins MD    ANESTHESIA:  Moderate conscious sedation.    MONITORING NURSE:  LETY Santos    DESCRIPTION OF PROCEDURE:  After obtaining informed consent, the patient was   positioned supine on the angiography table.  His right thigh catheter exit   site and surrounding skin were prepped with Betadine and draped sterilely.  A   time-out was performed.  His INR was 3.2 and his potassium was 5.2.  BUN was   29, creatinine 5.87.    I removed the sutures holding the catheter in place.  I passed a 3-mm J wire   through the arterial port of the right femoral Palindrome catheter.  I   followed this on fluoroscopy with the wire meeting no resistance through the   catheter and proximally up to the vena cava.  I extracted the tunneled   dialysis catheter with the cuff by traction on it, breaking it free from the   subcutaneous attachments, which were not advanced.  It came out fairly easily.    Over the wire, I passed a 9-[place] 10 cm long sheath.  I did a venogram   showing a small diameter channel, which was the fibrin sheath.  My goal was to   preserve his iliac veins for future catheter access.  I therefore initially   dilated with an 8 mm balloon and then went to a 14 mm balloon for the common   iliac vein.  The completion imaging study showed that there was rapid venous   flow through it and the femoral vein itself appeared to be widely patent.    With this good result noted, I removed the wire and the sheath and held   pressure.  To reduce bleeding, I put 0.5 mL of fibrinogen in the exit site   while holding pressure occluding the channel, which was a long subcutaneous   tunnel of at least 10 cm.  This was done  to augment a small clot formation at   the exit site since his INR is so elevated.  There was no bleeding evident   after holding pressure for 10 minutes.  We applied a Tegaderm and gauze   dressing.  The patient had been preoped with prednisone and Benadryl and   intraoperatively was given 2 additional doses of Benadryl of 25 mg each since   he had a reaction to contrast again.  He has symptoms of tachycardia,   hypotension and erythema with contrast, and we did not prevent this completely   with premedication and Benadryl.  When his blood pressure had begun to rise   up to nearly 100 systolic and his heart rate slowed down somewhat, we took him   to the post-procedure unit for observation.  His mother was informed of his   reaction, which is expected due to our vast experience.  His vital signs were   felt to be satisfactory at the end of the procedure.       ____________________________________     MD BEN Davis / RIAZ    DD:  06/01/2018 10:36:54  DT:  06/01/2018 11:01:37    D#:  [id number]  Job#:  [id number]

## 2018-06-01 NOTE — PROGRESS NOTES
IR Procedure Note:    MD aware of no INR prior to case start, okay to precede without.      MD notified of INR of 3.2.    MD notified that patient became hypotensive and tachycardic at 0945.  Fluid bolus started.      Site Marked and Confirmed with MD, patient and RN pre procedure. Dr. Hopkins performed a right leg angiogram with angioplasty.  The pt tolerated the procedure well; ETCo2 baseline 31, with consistent waveform during the procedure.  Gauze and tegaderm applied to right groin, CDI and soft; pressure held x 5 minutes.  Pt alert and verbally appropriate post procedure, vital signs stable during procedure and transport, see flow sheet for vital signs.  Report given to Mahsa.  RN transported pt to PPU.      Procedure End Time: 1003    Sedation Monitoring End Time:  1030

## 2018-06-01 NOTE — DISCHARGE INSTRUCTIONS
ACTIVITY: Rest and take it easy for the first 24 hours.  A responsible adult is recommended to remain with you during that time.  It is normal to feel sleepy.  We encourage you to not do anything that requires balance, judgment or coordination.    MILD FLU-LIKE SYMPTOMS ARE NORMAL. YOU MAY EXPERIENCE GENERALIZED MUSCLE ACHES, THROAT IRRITATION, HEADACHE AND/OR SOME NAUSEA.    FOR 24 HOURS DO NOT:  Drive, operate machinery or run household appliances.  Drink beer or alcoholic beverages.   Make important decisions or sign legal documents.      DIET: To avoid nausea, slowly advance diet as tolerated, avoiding spicy or greasy foods for the first day.  Add more substantial food to your diet according to your physician's instructions.  Babies can be fed formula or breast milk as soon as they are hungry.  INCREASE FLUIDS AND FIBER TO AVOID CONSTIPATION.    SURGICAL DRESSING/BATHING:     Keep the dressing clean and dry for 24 hours.  May remove dressing tomorrow  and can shower.  Do not submerge site under water for 1 week.  No heavy lifting and limit movement for 2 days.      FOLLOW-UP APPOINTMENT:  A follow-up appointment should be arranged with your doctor ; call to schedule.    Follow up with [facility] with Noninvasive of left thigh AV graft.  Dr. Hopkins will arrange.    You should CALL YOUR PHYSICIAN if you develop:  Fever greater than 101 degrees F.  Pain not relieved by medication, or persistent nausea or vomiting.  Excessive bleeding (blood soaking through dressing) or unexpected drainage from the wound.  Extreme redness or swelling around the incision site, drainage of pus or foul smelling drainage.  Inability to urinate or empty your bladder within 8 hours.  Problems with breathing or chest pain.    You should call 911 if you develop problems with breathing or chest pain.  If you are unable to contact your doctor or surgical center, you should go to the nearest emergency room or urgent care center.       Physician's telephone #: [phone]    If any questions arise, call your doctor.  If your doctor is not available, please feel free to call the Surgical Center at [phone].  The Center is open Monday through Friday from 7AM to 7PM.  You can also call the HEALTH HOTLINE open 24 hours/day, 7 days/week and speak to a nurse at [phone], or toll free at [phone].    A registered nurse may call you a few days after your surgery to see how you are doing after your procedure.    MEDICATIONS: Resume taking daily medication.  Take prescribed pain medication with food.  If no medication is prescribed, you may take non-aspirin pain medication if needed.  PAIN MEDICATION CAN BE VERY CONSTIPATING.  Take a stool softener or laxative such as senokot, pericolace, or milk of magnesia if needed.      If your physician has prescribed pain medication that includes Acetaminophen (Tylenol), do not take additional Acetaminophen (Tylenol) while taking the prescribed medication.    Depression / Suicide Risk    As you are discharged from this [facility] facility, it is important to learn how to keep safe from harming yourself.    Recognize the warning signs:  · Abrupt changes in personality, positive or negative- including increase in energy   · Giving away possessions  · Change in eating patterns- significant weight changes-  positive or negative  · Change in sleeping patterns- unable to sleep or sleeping all the time   · Unwillingness or inability to communicate  · Depression  · Unusual sadness, discouragement and loneliness  · Talk of wanting to die  · Neglect of personal appearance   · Rebelliousness- reckless behavior  · Withdrawal from people/activities they love  · Confusion- inability to concentrate     If you or a loved one observes any of these behaviors or has concerns about self-harm, here's what you can do:  · Talk about it- your feelings and reasons for harming yourself  · Remove any means that you might  "use to hurt yourself (examples: pills, rope, extension cords, firearm)  · Get professional help from the community (Mental Health, Substance Abuse, psychological counseling)  · Do not be alone:Call your Safe Contact- someone whom you trust who will be there for you.  · Call your local CRISIS HOTLINE 508-3296 or 251-224-0283  · Call your local Children's Mobile Crisis Response Team [place] (997) 238-1536 or wwwInstaclustr  · Call the toll free National Suicide Prevention Hotlines   · National Suicide Prevention Lifeline 754-951-XXCH (5972)  · [employer] Hope Line Network 800-SUICIDE (003-8647)  ·             Post Angiogram Groin Care Instructions     INSTRUCTIONS  2. Examine (look and feel) the site of your incision site TODAY so you can recognize changes that should be called to your doctor (see below).  3. Avoid straining either by lifting or pulling objects for 4-5 days. Avoid lifting over 5 pounds.   4. For at least 72 hours, if you should sneeze or cough, please hold pressure over your groin area.  5. If you should begin to have oozing from the catheterization site, please hold firm pressure and call your doctor's office immediately.  6. If profuse bleeding occurs from the catheterization site, hold firm pressure and call \"215\" immediately for assistance.  7. Remove bandage after 24 hours.     ACTIVITY  2. Limit activity as instructed by your doctor.  3. No driving or very limited driving with frequent stops for one week.   4. If you must take a long car ride, stop every hour and walk around the car.   5. Warm showers or baths are permitted after the bandage is removed. Avoid hot showers, baths, hot tubs, and swimming for one week.    PLEASE CALL YOUR DOCTOR IF:  1. Temperature elevation occurs.  2. Catheterization site becomes reddened or begins to drain.   3. Bruising appears to be new or not resolving. The bruise may move down your leg. This is normal.  4. The small round lump in the groin increases " in size.  5. Any leg numbness, aching, or discomfort (immediately).  6. Increasing discomfort in the leg at the insertion site.  7. Chest pains, even if relieved by Nitroglycerin.    MISCELLANEOUS INSTRUCTIONS  1. Bruising may occur as a result of heart catheterization. Some of the discoloration may travel down the leg, going from blue to green in color.  2. A small round lump under the catheterization site will remain for up to six weeks.  3. If any questions arise call your physician's office. You can also call the [facility] HOTLINE open 24 hours/day, 7 days/week and speak to a nurse at [phone], or toll free at [phone].   4. You should call 911 if you develop problems with breathing or chest pain.      I acknowledge receipt and understanding of these Home Care instructions.

## 2018-06-05 ENCOUNTER — ANTICOAGULATION VISIT (OUTPATIENT)
Dept: VASCULAR LAB | Facility: MEDICAL CENTER | Age: 38
End: 2018-06-05
Attending: INTERNAL MEDICINE
Payer: MEDICARE

## 2018-06-05 VITALS — DIASTOLIC BLOOD PRESSURE: 42 MMHG | HEART RATE: 115 BPM | SYSTOLIC BLOOD PRESSURE: 104 MMHG

## 2018-06-05 DIAGNOSIS — T82.868D THROMBOSIS OF ARTERIOVENOUS GRAFT, SUBSEQUENT ENCOUNTER: ICD-10-CM

## 2018-06-05 LAB
INR BLD: 5.6 (ref 0.9–1.2)
INR PPP: 5.6 (ref 2–3.5)

## 2018-06-05 PROCEDURE — 99212 OFFICE O/P EST SF 10 MIN: CPT

## 2018-06-05 PROCEDURE — 85610 PROTHROMBIN TIME: CPT

## 2018-06-05 NOTE — PROGRESS NOTES
Anticoagulation Summary  As of 6/5/2018    INR goal:   2.5-3.5   TTR:   37.3 % (1.1 y)   Today's INR:   5.6!   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 Hopkins      Anticoagulation Care Providers     Provider Role Specialty Phone number    Jairo Hopkins M.D. Referring Surgery [phone]    [facility] Anticoagulation Services Responsible  [phone]        Anticoagulation Patient Findings  Patient Findings     Negatives:   Signs/symptoms of thrombosis, Signs/symptoms of bleeding, Laboratory test error suspected, Change in health, Change in alcohol use, Change in activity, Upcoming invasive procedure, Emergency department visit, Upcoming dental procedure, Missed doses, Extra doses, Change in medications, Change in diet/appetite, Hospital admission, Bruising, Other complaints        History of Present Illness: follow up appointment for chronic anticoagulation with the high risk medication, warfarin for av graft thrombosis..    Last INR was out of range, dosage adjusted: pt is now critically supra therapeutic today.  s/p permacath removal last week.  Will HOLD warfarin for 2 days, and reduce weekly dose by 5%. Follow up in 3 days to reduce risk of adverse events related to this high risk medication,  Warfarin.    Renetta Dunlap, JocelynD

## 2018-06-08 ENCOUNTER — ANTICOAGULATION VISIT (OUTPATIENT)
Dept: VASCULAR LAB | Facility: MEDICAL CENTER | Age: 38
End: 2018-06-08
Attending: INTERNAL MEDICINE
Payer: MEDICARE

## 2018-06-08 VITALS — SYSTOLIC BLOOD PRESSURE: 88 MMHG | HEART RATE: 108 BPM | DIASTOLIC BLOOD PRESSURE: 35 MMHG

## 2018-06-08 DIAGNOSIS — Z79.01 CHRONIC ANTICOAGULATION: ICD-10-CM

## 2018-06-08 LAB
INR BLD: 2 (ref 0.9–1.2)
INR PPP: 2 (ref 2–3.5)

## 2018-06-08 PROCEDURE — 99211 OFF/OP EST MAY X REQ PHY/QHP: CPT

## 2018-06-08 PROCEDURE — 85610 PROTHROMBIN TIME: CPT

## 2018-06-08 NOTE — PROGRESS NOTES
Anticoagulation Summary  As of 6/8/2018    INR goal:   2.5-3.5   TTR:   37.2 % (1.1 y)   Today's INR:   2!   Warfarin maintenance plan:   7.5 mg (2.5 mg x 3) on Mon; 5 mg (2.5 mg x 2) all other days   Weekly warfarin total:   37.5 mg   Plan last modified:   Renetta Dunlap, PharmD (6/5/2018)   Next INR check:   6/15/2018   Target end date:   Indefinite    Indications    AV graft thrombosis (HCC) [T82.868A]             Anticoagulation Episode Summary     INR check location:       Preferred lab:       Send INR reminders to:       Comments:   INR goal of 2.5 - 3.5 per Dr. Hopkins      Anticoagulation Care Providers     Provider Role Specialty Phone number    Jairo Hopkins M.D. Referring Surgery [phone]    [facility] Anticoagulation Services Responsible  [phone]        Anticoagulation Patient Findings      HPI:  Denis Siegelham seen in clinic today, on anticoagulation therapy with warfarin for AV graft thrombosis.   Changes to current medical/health status since last appt: none  Denies signs/symptoms of bleeding and/or thrombosis since the last appt.    Denies any interval changes to diet  Denies any interval changes to medications since last appt.   Denies any complications or cost restrictions with current therapy.   BP recorded in vitals.  Confirmed dosing regimen.   No dizziness.     A/P   INR  SUB-therapeutic.   Pt is to continue with current warfarin dosing regimen. INR will likely increase appropriately.     Follow up appointment in 1 week(s).    Donald Cedeño, PharmD

## 2018-06-14 ENCOUNTER — ANTICOAGULATION VISIT (OUTPATIENT)
Dept: VASCULAR LAB | Facility: MEDICAL CENTER | Age: 38
End: 2018-06-14
Attending: INTERNAL MEDICINE
Payer: MEDICARE

## 2018-06-14 DIAGNOSIS — Z79.01 CHRONIC ANTICOAGULATION: ICD-10-CM

## 2018-06-14 LAB
INR BLD: 3 (ref 0.9–1.2)
INR PPP: 3 (ref 2–3.5)

## 2018-06-14 PROCEDURE — 85610 PROTHROMBIN TIME: CPT

## 2018-06-14 PROCEDURE — 99211 OFF/OP EST MAY X REQ PHY/QHP: CPT

## 2018-06-14 NOTE — PROGRESS NOTES
Anticoagulation Summary  As of 6/14/2018    INR goal:   2.5-3.5   TTR:   37.4 % (1.1 y)   Today's INR:   3   Warfarin maintenance plan:   7.5 mg (2.5 mg x 3) on Mon; 5 mg (2.5 mg x 2) all other days   Weekly warfarin total:   37.5 mg   Plan last modified:   Jocelyn VarelaD (6/5/2018)   Next INR check:      Target end date:   Indefinite    Indications    AV graft thrombosis (HCC) [T82.868A]             Anticoagulation Episode Summary     INR check location:       Preferred lab:       Send INR reminders to:       Comments:   INR goal of 2.5 - 3.5 per Dr. Hopkins      Anticoagulation Care Providers     Provider Role Specialty Phone number    Jairo Hopkins M.D. Referring Surgery [phone]    [facility] Anticoagulation Services Responsible  [phone]        Anticoagulation Patient Findings      HPI:  Denis De Anda seen in clinic today, on anticoagulation therapy with warfarin for AV graft thrombosis.   Changes to current medical/health status since last appt: none  Denies signs/symptoms of bleeding and/or thrombosis since the last appt.    Denies any interval changes to diet  Denies any interval changes to medications since last appt.   Denies any complications or cost restrictions with current therapy.   Confirmed dosing regimen.     A/P   INR  therapeutic.   Pt is to continue with current warfarin dosing regimen.     Follow up appointment in 1 week(s).    Donald Cedeño, JocelynD

## 2018-06-18 DIAGNOSIS — N18.4 STAGE 4 CHRONIC KIDNEY DISEASE (HCC): ICD-10-CM

## 2018-06-24 ENCOUNTER — HOSPITAL ENCOUNTER (INPATIENT)
Facility: MEDICAL CENTER | Age: 38
LOS: 2 days | DRG: 981 | End: 2018-06-26
Attending: EMERGENCY MEDICINE | Admitting: INTERNAL MEDICINE
Payer: MEDICARE

## 2018-06-24 ENCOUNTER — APPOINTMENT (OUTPATIENT)
Dept: RADIOLOGY | Facility: MEDICAL CENTER | Age: 38
DRG: 981 | End: 2018-06-24
Attending: EMERGENCY MEDICINE
Payer: MEDICARE

## 2018-06-24 DIAGNOSIS — T82.868S THROMBOSIS OF ARTERIOVENOUS GRAFT, SEQUELA: Primary | ICD-10-CM

## 2018-06-24 DIAGNOSIS — R79.1 ELEVATED INR: ICD-10-CM

## 2018-06-24 DIAGNOSIS — R58 BLEEDING: ICD-10-CM

## 2018-06-24 DIAGNOSIS — N18.6 END STAGE RENAL DISEASE (HCC): ICD-10-CM

## 2018-06-24 DIAGNOSIS — E87.5 HYPERKALEMIA: ICD-10-CM

## 2018-06-24 DIAGNOSIS — I82.220 IVC THROMBOSIS (HCC): ICD-10-CM

## 2018-06-24 LAB
ALBUMIN SERPL BCP-MCNC: 3.7 G/DL (ref 3.2–4.9)
ALBUMIN/GLOB SERPL: 1.2 G/DL
ALP SERPL-CCNC: 110 U/L (ref 30–99)
ALT SERPL-CCNC: 9 U/L (ref 2–50)
ANION GAP SERPL CALC-SCNC: 19 MMOL/L (ref 0–11.9)
APTT PPP: 93.2 SEC (ref 24.7–36)
AST SERPL-CCNC: 9 U/L (ref 12–45)
BASOPHILS # BLD AUTO: 0.6 % (ref 0–1.8)
BASOPHILS # BLD: 0.03 K/UL (ref 0–0.12)
BILIRUB SERPL-MCNC: 0.4 MG/DL (ref 0.1–1.5)
BUN SERPL-MCNC: 86 MG/DL (ref 8–22)
CALCIUM SERPL-MCNC: 7.8 MG/DL (ref 8.5–10.5)
CHLORIDE SERPL-SCNC: 95 MMOL/L (ref 96–112)
CO2 SERPL-SCNC: 22 MMOL/L (ref 20–33)
CREAT SERPL-MCNC: 11.97 MG/DL (ref 0.5–1.4)
EOSINOPHIL # BLD AUTO: 0.21 K/UL (ref 0–0.51)
EOSINOPHIL NFR BLD: 4.5 % (ref 0–6.9)
ERYTHROCYTE [DISTWIDTH] IN BLOOD BY AUTOMATED COUNT: 60.2 FL (ref 35.9–50)
GLOBULIN SER CALC-MCNC: 3.2 G/DL (ref 1.9–3.5)
GLUCOSE SERPL-MCNC: 121 MG/DL (ref 65–99)
HCT VFR BLD AUTO: 25 % (ref 42–52)
HGB BLD-MCNC: 7.9 G/DL (ref 14–18)
IMM GRANULOCYTES # BLD AUTO: 0.02 K/UL (ref 0–0.11)
IMM GRANULOCYTES NFR BLD AUTO: 0.4 % (ref 0–0.9)
INR PPP: 4.37 (ref 0.87–1.13)
LYMPHOCYTES # BLD AUTO: 0.83 K/UL (ref 1–4.8)
LYMPHOCYTES NFR BLD: 17.9 % (ref 22–41)
MCH RBC QN AUTO: 29.6 PG (ref 27–33)
MCHC RBC AUTO-ENTMCNC: 31.6 G/DL (ref 33.7–35.3)
MCV RBC AUTO: 93.6 FL (ref 81.4–97.8)
MONOCYTES # BLD AUTO: 0.41 K/UL (ref 0–0.85)
MONOCYTES NFR BLD AUTO: 8.9 % (ref 0–13.4)
NEUTROPHILS # BLD AUTO: 3.13 K/UL (ref 1.82–7.42)
NEUTROPHILS NFR BLD: 67.7 % (ref 44–72)
NRBC # BLD AUTO: 0 K/UL
NRBC BLD-RTO: 0 /100 WBC
PLATELET # BLD AUTO: 188 K/UL (ref 164–446)
PMV BLD AUTO: 10.1 FL (ref 9–12.9)
POTASSIUM SERPL-SCNC: 6 MMOL/L (ref 3.6–5.5)
PROT SERPL-MCNC: 6.9 G/DL (ref 6–8.2)
PROTHROMBIN TIME: 41.6 SEC (ref 12–14.6)
RBC # BLD AUTO: 2.67 M/UL (ref 4.7–6.1)
SODIUM SERPL-SCNC: 136 MMOL/L (ref 135–145)
WBC # BLD AUTO: 4.6 K/UL (ref 4.8–10.8)

## 2018-06-24 PROCEDURE — 96376 TX/PRO/DX INJ SAME DRUG ADON: CPT

## 2018-06-24 PROCEDURE — 93005 ELECTROCARDIOGRAM TRACING: CPT | Performed by: EMERGENCY MEDICINE

## 2018-06-24 PROCEDURE — A9270 NON-COVERED ITEM OR SERVICE: HCPCS | Performed by: INTERNAL MEDICINE

## 2018-06-24 PROCEDURE — 99285 EMERGENCY DEPT VISIT HI MDM: CPT

## 2018-06-24 PROCEDURE — 80053 COMPREHEN METABOLIC PANEL: CPT

## 2018-06-24 PROCEDURE — 71045 X-RAY EXAM CHEST 1 VIEW: CPT

## 2018-06-24 PROCEDURE — 85730 THROMBOPLASTIN TIME PARTIAL: CPT

## 2018-06-24 PROCEDURE — 770020 HCHG ROOM/CARE - TELE (206)

## 2018-06-24 PROCEDURE — 99223 1ST HOSP IP/OBS HIGH 75: CPT | Mod: AI | Performed by: INTERNAL MEDICINE

## 2018-06-24 PROCEDURE — 85610 PROTHROMBIN TIME: CPT

## 2018-06-24 PROCEDURE — 700111 HCHG RX REV CODE 636 W/ 250 OVERRIDE (IP)

## 2018-06-24 PROCEDURE — 700111 HCHG RX REV CODE 636 W/ 250 OVERRIDE (IP): Performed by: INTERNAL MEDICINE

## 2018-06-24 PROCEDURE — 96374 THER/PROPH/DIAG INJ IV PUSH: CPT

## 2018-06-24 PROCEDURE — 700102 HCHG RX REV CODE 250 W/ 637 OVERRIDE(OP): Performed by: INTERNAL MEDICINE

## 2018-06-24 PROCEDURE — 85025 COMPLETE CBC W/AUTO DIFF WBC: CPT

## 2018-06-24 RX ORDER — CALCITRIOL 0.25 UG/1
0.75 CAPSULE, LIQUID FILLED ORAL
Status: DISCONTINUED | OUTPATIENT
Start: 2018-06-24 | End: 2018-06-26 | Stop reason: HOSPADM

## 2018-06-24 RX ORDER — CINACALCET 30 MG/1
60 TABLET, FILM COATED ORAL
Status: DISCONTINUED | OUTPATIENT
Start: 2018-06-26 | End: 2018-06-26 | Stop reason: HOSPADM

## 2018-06-24 RX ORDER — SEVELAMER CARBONATE 800 MG/1
4000 TABLET, FILM COATED ORAL
Status: DISCONTINUED | OUTPATIENT
Start: 2018-06-24 | End: 2018-06-26 | Stop reason: HOSPADM

## 2018-06-24 RX ORDER — ONDANSETRON 2 MG/ML
4 INJECTION INTRAMUSCULAR; INTRAVENOUS EVERY 4 HOURS PRN
Status: DISCONTINUED | OUTPATIENT
Start: 2018-06-24 | End: 2018-06-26 | Stop reason: HOSPADM

## 2018-06-24 RX ORDER — CINACALCET 30 MG/1
30-60 TABLET, FILM COATED ORAL DAILY
Status: DISCONTINUED | OUTPATIENT
Start: 2018-06-24 | End: 2018-06-24

## 2018-06-24 RX ORDER — LEVETIRACETAM 250 MG/1
250 TABLET ORAL 2 TIMES DAILY
Status: DISCONTINUED | OUTPATIENT
Start: 2018-06-24 | End: 2018-06-26 | Stop reason: HOSPADM

## 2018-06-24 RX ORDER — MIDODRINE HYDROCHLORIDE 5 MG/1
10 TABLET ORAL
Status: DISCONTINUED | OUTPATIENT
Start: 2018-06-24 | End: 2018-06-26 | Stop reason: HOSPADM

## 2018-06-24 RX ORDER — CINACALCET 30 MG/1
30 TABLET, FILM COATED ORAL
Status: DISCONTINUED | OUTPATIENT
Start: 2018-06-25 | End: 2018-06-26 | Stop reason: HOSPADM

## 2018-06-24 RX ORDER — GABAPENTIN 300 MG/1
600 CAPSULE ORAL 3 TIMES DAILY
Status: DISCONTINUED | OUTPATIENT
Start: 2018-06-24 | End: 2018-06-26 | Stop reason: HOSPADM

## 2018-06-24 RX ORDER — POLYETHYLENE GLYCOL 3350 17 G/17G
1 POWDER, FOR SOLUTION ORAL
Status: DISCONTINUED | OUTPATIENT
Start: 2018-06-24 | End: 2018-06-26 | Stop reason: HOSPADM

## 2018-06-24 RX ORDER — AMOXICILLIN 250 MG
2 CAPSULE ORAL 2 TIMES DAILY
Status: DISCONTINUED | OUTPATIENT
Start: 2018-06-24 | End: 2018-06-26 | Stop reason: HOSPADM

## 2018-06-24 RX ORDER — ONDANSETRON 4 MG/1
4 TABLET, ORALLY DISINTEGRATING ORAL EVERY 4 HOURS PRN
Status: DISCONTINUED | OUTPATIENT
Start: 2018-06-24 | End: 2018-06-26 | Stop reason: HOSPADM

## 2018-06-24 RX ORDER — ACETAMINOPHEN 325 MG/1
650 TABLET ORAL EVERY 6 HOURS PRN
Status: DISCONTINUED | OUTPATIENT
Start: 2018-06-24 | End: 2018-06-26 | Stop reason: HOSPADM

## 2018-06-24 RX ORDER — BISACODYL 10 MG
10 SUPPOSITORY, RECTAL RECTAL
Status: DISCONTINUED | OUTPATIENT
Start: 2018-06-24 | End: 2018-06-26 | Stop reason: HOSPADM

## 2018-06-24 RX ADMIN — CALCITRIOL 0.75 MCG: 0.25 CAPSULE, LIQUID FILLED ORAL at 21:23

## 2018-06-24 RX ADMIN — GABAPENTIN 600 MG: 300 CAPSULE ORAL at 21:24

## 2018-06-24 RX ADMIN — FENTANYL CITRATE 25 MCG: 50 INJECTION INTRAMUSCULAR; INTRAVENOUS at 18:44

## 2018-06-24 RX ADMIN — LEVETIRACETAM 250 MG: 250 TABLET ORAL at 21:25

## 2018-06-24 RX ADMIN — FENTANYL CITRATE 50 MCG: 50 INJECTION INTRAMUSCULAR; INTRAVENOUS at 21:23

## 2018-06-24 RX ADMIN — FENTANYL CITRATE 25 MCG: 50 INJECTION INTRAMUSCULAR; INTRAVENOUS at 17:00

## 2018-06-24 RX ADMIN — SEVELAMER CARBONATE 4000 MG: 800 TABLET, FILM COATED ORAL at 18:45

## 2018-06-24 ASSESSMENT — COPD QUESTIONNAIRES
IN THE PAST 12 MONTHS DO YOU DO LESS THAN YOU USED TO BECAUSE OF YOUR BREATHING PROBLEMS: DISAGREE/UNSURE
HAVE YOU SMOKED AT LEAST 100 CIGARETTES IN YOUR ENTIRE LIFE: NO/DON'T KNOW
DURING THE PAST 4 WEEKS HOW MUCH DID YOU FEEL SHORT OF BREATH: NONE/LITTLE OF THE TIME
DO YOU EVER COUGH UP ANY MUCUS OR PHLEGM?: NO/ONLY WITH OCCASIONAL COLDS OR INFECTIONS
COPD SCREENING SCORE: 0

## 2018-06-24 ASSESSMENT — ENCOUNTER SYMPTOMS
HEMOPTYSIS: 0
DIZZINESS: 0
CHILLS: 0
FEVER: 0
BACK PAIN: 1
NEUROLOGICAL NEGATIVE: 1
LOSS OF CONSCIOUSNESS: 0
WEIGHT LOSS: 0
SHORTNESS OF BREATH: 0
BLURRED VISION: 0
SPUTUM PRODUCTION: 0
PALPITATIONS: 0
HEARTBURN: 0
COUGH: 0
NAUSEA: 0
BRUISES/BLEEDS EASILY: 1
FOCAL WEAKNESS: 0
DEPRESSION: 0
MYALGIAS: 0
NECK PAIN: 0
ABDOMINAL PAIN: 1
VOMITING: 0

## 2018-06-24 ASSESSMENT — PAIN SCALES - GENERAL
PAINLEVEL_OUTOF10: 7
PAINLEVEL_OUTOF10: 5
PAINLEVEL_OUTOF10: 4
PAINLEVEL_OUTOF10: 8
PAINLEVEL_OUTOF10: 8

## 2018-06-24 ASSESSMENT — PATIENT HEALTH QUESTIONNAIRE - PHQ9
SUM OF ALL RESPONSES TO PHQ9 QUESTIONS 1 AND 2: 0
2. FEELING DOWN, DEPRESSED, IRRITABLE, OR HOPELESS: NOT AT ALL
1. LITTLE INTEREST OR PLEASURE IN DOING THINGS: NOT AT ALL

## 2018-06-24 ASSESSMENT — LIFESTYLE VARIABLES: ALCOHOL_USE: NO

## 2018-06-24 NOTE — ED PROVIDER NOTES
ED Provider Note    Scribed for Tab Coe M.D. by Loren Oliver. 6/24/2018, 3:56 PM.    Primary care provider: Tony Mcmillan M.D.  Means of arrival: EMS  History obtained from: Patient  History limited by: None    CHIEF COMPLAINT  Chief Complaint   Patient presents with   • Wound Check     Pt has hx of plastic surgery and skin grafting for vascular issues. Pt states that he had surgery on RLQ of abdomen aprox 1 year ago this month. Pt states bleeding started Wednesday. Bleeding has been intermittent until today and bleeding has not stopped.     HPI  Denis De Anda is a 37 y.o. male with a history of abdominal varicosities who presents to the Emergency Department with intermittently bleeding of his abdominal varicosities over the past four days. He reports having a surgery on these varicosities one year ago by Dr. Rosenbaum. He notes as the area heals, the scab gets loose and comes off and begins to bleed. He saw his plastic surgeon two days ago, and there was no bleeding at that time. He woke up yesterday morning with his varices bleeding again. He also reports pain overlying the site. He has no fever. The patient is on dialysis and does home hemodialysis. He has not had dialysis for the past two days. He is on Coumadin to preserve his dialysis graft.     REVIEW OF SYSTEMS  Pertinent positives include bleeding varices, variceal pain. Pertinent negatives include no fever. As above, all other systems reviewed and are negative.   See HPI for further details.     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    SURGICAL  HISTORY   has a past surgical history that includes mass excision ortho (10/9/08); av fistula creation (11/6/08); arteriogram (3/5/2009); angioplasty balloon (3/5/2009); av fistulogram (3/5/2009); us-kidney transplant (1996, 2001); endarterectomy (11/16/2010); av fistulogram (11/16/2010); cath placement (2/1/2011); angioplasty balloon (2/1/2011); av fistulogram (6/5/2011); cath placement (6/5/2011); av fistula revision (11/3/2011); bone spur excision (12/8/2011); recovery (5/18/2012); incision and drainage general (9/13/2013); debridement (9/13/2013); vein ligation (9/13/2013); recovery (6/13/2014); av fistula revision (9/30/2014); irrigation & debridement general (12/15/2014); av fistula revision (2/8/2015); av fistula revision (2/22/2015); av fistula revision (3/20/2015); inject nerv blck,stellate ganglion (3/24/2015); av fistula creation (4/20/2015); av fistula thrombolysis (Left, 6/3/2015); irrigation & debridement general (Left, 6/3/2015); av fistula thrombolysis (Left, 6/9/2015); av fistula revision (Left, 6/17/2015); irrigation & debridement general (Left, 6/17/2015); recovery (8/7/2015); percut implnt neuroelect,epidural (2/19/2016); percut implnt neuroelect,epidural (2/19/2016); parathyroidectomy (2006); inguinal hernia repair (Bilateral, 2001, 2002); spinal cord stimulator (N/A, 3/25/2016); recovery (7/8/2016); lesion excision general (Right, 7/11/2016); mass excision general (Right, 8/5/2016); cath placement (Right, 9/17/2016); thrombectomy (Left, 9/18/2016); av fistulogram (9/18/2016); thrombectomy (Left, 10/20/2016); incision and drainage general (10/20/2016); irrigation & debridement general (Left, 10/28/2016); flap closure (Left, 11/17/2016); thrombectomy (Left, 1/6/2017); cath placement (Right, 1/6/2017); thrombectomy (Left, 1/5/2017); spinal cord stimulator (N/A, 5/4/2017); abdominoplasty (6/5/2017); irrigation & debridement general (7/31/2017); cath placement (Right, 11/14/2017); av fistula  revision (Left, 11/14/2017); wound exploration general (2/1/2018); wound closure general (2/19/2018); split thickness skin graft (2/26/2018); thrombectomy (Left, 3/9/2018); cath placement (Right, 3/9/2018); thrombectomy (Left, 4/27/2018); and thrombectomy (Left, 4/28/2018).    SOCIAL HISTORY  Social History   Substance Use Topics   • Smoking status: Never Smoker   • Smokeless tobacco: Never Used   • Alcohol use No      History   Drug Use No     FAMILY HISTORY  Family History   Problem Relation Age of Onset   • Arthritis Father      RA   • Lung Disease Father    • Heart Disease Father      MI   • Hypertension Brother      CURRENT MEDICATIONS  Medication Sig Dispense Refill   • predniSONE (DELTASONE) 50 MG Tab Take 50 mg by mouth Pre-Op.     • diphenhydrAMINE (BENADRYL) 50 MG Cap Take 50 mg by mouth Pre-Op.     • midodrine (PROAMATINE) 10 MG tablet Take 1 Tab by mouth 3 times a day, with meals. Can hold dose if blood pressure greater than 120 systolic. 60 Tab 3   • Tapentadol HCl (NUCYNTA) 50 MG Tab Take 50 mg by mouth at bedtime as needed (restless leg). TID     • Tapentadol HCl (NUCYNTA) 100 MG Tab Take 100 mg by mouth 2 Times a Day.     • warfarin (COUMADIN) 5 MG Tab Take 2.5-5 mg by mouth every day. 2.5 mg on Monday and Fridays. 5 mg all other days     • cinacalcet (SENSIPAR) 30 MG Tab Take 30-60 mg by mouth every day. 30 mg Mondays , Wednesdays , and Fridays. All other days 60 mg     • levetiracetam (KEPPRA) 250 MG tablet Take 1 Tab by mouth 2 times a day. 60 Tab 3   • gabapentin (NEURONTIN) 600 MG tablet Take 600 mg by mouth 3 times a day.     • sevelamer carbonate (RENVELA) 800 MG Tab tablet Take 4,000 mg by mouth 3 times a day, with meals. With meals     • calcitRIOL (ROCALTROL) 0.25 MCG CAPS Take 0.75 mcg by mouth every bedtime.       ALLERGIES  Allergies   Allergen Reactions   • Baclofen Unspecified     Total loss of memory, sedation.   RXN=6/2015   • Contrast Media With Iodine [Iodine] Rash     RXN=1/5/2017  "  • Keflex Rash     RXN=possibly >10 years   • Pcn [Penicillins] Rash     RXN=possibly >10 years ago  Tolerated Zosyn on 2/20/18   • Tape Rash     Paper tape and tegaderm ok  RXN=ongoing     PHYSICAL EXAM  VITAL SIGNS: BP (!) 121/28   Pulse (!) 120   Temp 37.1 °C (98.8 °F)   Ht 1.626 m (5' 4\")   Wt 79.4 kg (175 lb)   SpO2 94%   BMI 30.04 kg/m²   Vitals reviewed.  Constitutional: Alert.  HENT: No signs of trauma, Bilateral external ears normal, Nose normal.   Eyes: Pupils are equal and reactive, Conjunctiva normal, Non-icteric.   Neck: Normal range of motion, No tenderness, Supple, No stridor.   Lymphatic: No lymphadenopathy noted.   Cardiovascular: Regular rate and rhythm, no murmurs.   Thorax & Lungs: Normal breath sounds, No respiratory distress, No wheezing, No chest tenderness.   Abdomen: Bowel sounds normal, Soft, No tenderness, No peritoneal signs, No masses, No pulsatile masses.   Skin: Warm, Dry, Varicose veins overlying right lower quadrant of abdomen, one with direct pressure is not bleeding, other varicose vein is bleeding a small amount  Back: No bony tenderness, No CVA tenderness.   Extremities: Intact distal pulses, No edema, No tenderness, No cyanosis  Musculoskeletal: Good range of motion in all major joints. No tenderness to palpation or major deformities noted.   Neurologic: Alert , Normal motor function, Normal sensory function, No focal deficits noted.   Psychiatric: Affect normal, Judgment normal, Mood normal.     DIAGNOSTIC STUDIES / PROCEDURES    LABS  Labs Reviewed   CBC WITH DIFFERENTIAL - Abnormal; Notable for the following:        Result Value    WBC 4.6 (*)     RBC 2.67 (*)     Hemoglobin 7.9 (*)     Hematocrit 25.0 (*)     MCHC 31.6 (*)     RDW 60.2 (*)     Lymphocytes 17.90 (*)     Lymphs (Absolute) 0.83 (*)     All other components within normal limits    Narrative:     Indicate which anticoagulants the patient is on:->COUMADIN   COMP METABOLIC PANEL - Abnormal; Notable for the " following:     Potassium 6.0 (*)     Chloride 95 (*)     Anion Gap 19.0 (*)     Glucose 121 (*)     Bun 86 (*)     Creatinine 11.97 (*)     Calcium 7.8 (*)     AST(SGOT) 9 (*)     Alkaline Phosphatase 110 (*)     All other components within normal limits    Narrative:     Indicate which anticoagulants the patient is on:->COUMADIN   PROTHROMBIN TIME - Abnormal; Notable for the following:     PT 41.6 (*)     INR 4.37 (*)     All other components within normal limits    Narrative:     Indicate which anticoagulants the patient is on:->COUMADIN   APTT - Abnormal; Notable for the following:     APTT 93.2 (*)     All other components within normal limits    Narrative:     Indicate which anticoagulants the patient is on:->COUMADIN   ESTIMATED GFR - Abnormal; Notable for the following:     GFR If  6 (*)     GFR If Non  5 (*)     All other components within normal limits    Narrative:     Indicate which anticoagulants the patient is on:->COUMADIN   HEMOGLOBIN AND HEMATOCRIT      All labs reviewed by me.    EKG  12 Lead EKG interpreted by me to show:  Sinus tachycardia  Rate 106  Axis: Normal  Intervals: Normal  Normal T waves  Normal ST segments  My impression of this EKG: Does not indicate ischemia or arrhythmia at this time.    COURSE & MEDICAL DECISION MAKING  Nursing notes, VS, PMSFHx reviewed in chart.    3:56 PM Patient seen and examined at bedside. Ordered for CBC with differential, CMP, PT/INR, PTT labs to evaluate.     3:58 PM Paged plastic surgery    4:01 PM Review of [facility] shows he received 150 hydrocodone 10 mg on 06/17/18.     4:05 PM Phone consult with plastic surgeon, Dr. Rosenbaum, who would not recommend managing his varices surgically at this time. He recommends placing Surgiform over the area.     4:41 PM ED testing reveals the patient's INR is 4.37. His potassium is 6.0. His creatinine is 11.97 and BUN is 86.     4:49 PM Patient was reevaluated at bedside and updated on my  conversation with the plastic surgeon and his lab results. As his INR is elevated, and he is due for dialysis so his electrolytes are abnormal. Because of this, the patient will be admitted. We will hold the patient's coumadin and his INR will be rechecked tomorrow. He understands need for admission and is agreeable to plan.     4:56 PM I consulted with the hospitalist, Dr. Guerra, who agreed to admit the patient to the hospital.     4:59 PM Paged patient's nephrologist, Dr. Najjar. Ordered EKG.     5:43 PM I spoke with Dr. Scherer who will set up dialysis for the patient.  The patient is admitted in fair condition.      DISPOSITION:  Patient will be admitted to hospitalist, Dr. Guerra, in guarded condition.    FINAL IMPRESSION  1. Bleeding    2. Elevated INR    3. Hyperkalemia    4. End stage renal disease (HCC)        Loren SANCHEZ (Scribdeena), am scribing for, and in the presence of, Tab Coe M.D..    Electronically signed by: Loren Oliver (Sugey), 6/24/2018    Tab SANCHEZ M.D. personally performed the services described in this documentation, as scribed by Loren Oliver in my presence, and it is both accurate and complete.    The note accurately reflects work and decisions made by me.  Tab Coe  6/24/2018  5:43 PM

## 2018-06-25 ENCOUNTER — HOSPITAL ENCOUNTER (OUTPATIENT)
Dept: RADIOLOGY | Facility: MEDICAL CENTER | Age: 38
End: 2018-06-25
Attending: NEUROLOGICAL SURGERY
Payer: MEDICARE

## 2018-06-25 ENCOUNTER — APPOINTMENT (OUTPATIENT)
Dept: HEMATOLOGY ONCOLOGY | Facility: MEDICAL CENTER | Age: 38
End: 2018-06-25
Payer: MEDICARE

## 2018-06-25 LAB
ANION GAP SERPL CALC-SCNC: 14 MMOL/L (ref 0–11.9)
ANION GAP SERPL CALC-SCNC: 20 MMOL/L (ref 0–11.9)
BASOPHILS # BLD AUTO: 1.2 % (ref 0–1.8)
BASOPHILS # BLD: 0.05 K/UL (ref 0–0.12)
BUN SERPL-MCNC: 38 MG/DL (ref 8–22)
BUN SERPL-MCNC: 91 MG/DL (ref 8–22)
CALCIUM SERPL-MCNC: 8.4 MG/DL (ref 8.5–10.5)
CALCIUM SERPL-MCNC: 8.6 MG/DL (ref 8.5–10.5)
CHLORIDE SERPL-SCNC: 94 MMOL/L (ref 96–112)
CHLORIDE SERPL-SCNC: 97 MMOL/L (ref 96–112)
CO2 SERPL-SCNC: 22 MMOL/L (ref 20–33)
CO2 SERPL-SCNC: 26 MMOL/L (ref 20–33)
CREAT SERPL-MCNC: 12.86 MG/DL (ref 0.5–1.4)
CREAT SERPL-MCNC: 9.07 MG/DL (ref 0.5–1.4)
EOSINOPHIL # BLD AUTO: 0.19 K/UL (ref 0–0.51)
EOSINOPHIL NFR BLD: 4.4 % (ref 0–6.9)
ERYTHROCYTE [DISTWIDTH] IN BLOOD BY AUTOMATED COUNT: 59.4 FL (ref 35.9–50)
ERYTHROCYTE [DISTWIDTH] IN BLOOD BY AUTOMATED COUNT: 60.9 FL (ref 35.9–50)
GLUCOSE SERPL-MCNC: 111 MG/DL (ref 65–99)
GLUCOSE SERPL-MCNC: 95 MG/DL (ref 65–99)
HCT VFR BLD AUTO: 23.8 % (ref 42–52)
HCT VFR BLD AUTO: 24.1 % (ref 42–52)
HCT VFR BLD AUTO: 24.8 % (ref 42–52)
HCT VFR BLD AUTO: 25.2 % (ref 42–52)
HGB BLD-MCNC: 7.4 G/DL (ref 14–18)
HGB BLD-MCNC: 7.4 G/DL (ref 14–18)
HGB BLD-MCNC: 7.7 G/DL (ref 14–18)
HGB BLD-MCNC: 7.9 G/DL (ref 14–18)
IMM GRANULOCYTES # BLD AUTO: 0.02 K/UL (ref 0–0.11)
IMM GRANULOCYTES NFR BLD AUTO: 0.5 % (ref 0–0.9)
INR PPP: 4.53 (ref 0.87–1.13)
LYMPHOCYTES # BLD AUTO: 1.29 K/UL (ref 1–4.8)
LYMPHOCYTES NFR BLD: 29.9 % (ref 22–41)
MAGNESIUM SERPL-MCNC: 2.4 MG/DL (ref 1.5–2.5)
MCH RBC QN AUTO: 28 PG (ref 27–33)
MCH RBC QN AUTO: 28.8 PG (ref 27–33)
MCHC RBC AUTO-ENTMCNC: 29.8 G/DL (ref 33.7–35.3)
MCHC RBC AUTO-ENTMCNC: 31.1 G/DL (ref 33.7–35.3)
MCV RBC AUTO: 92.6 FL (ref 81.4–97.8)
MCV RBC AUTO: 93.9 FL (ref 81.4–97.8)
MONOCYTES # BLD AUTO: 0.48 K/UL (ref 0–0.85)
MONOCYTES NFR BLD AUTO: 11.1 % (ref 0–13.4)
NEUTROPHILS # BLD AUTO: 2.28 K/UL (ref 1.82–7.42)
NEUTROPHILS NFR BLD: 52.9 % (ref 44–72)
NRBC # BLD AUTO: 0 K/UL
NRBC BLD-RTO: 0 /100 WBC
PLATELET # BLD AUTO: 176 K/UL (ref 164–446)
PLATELET # BLD AUTO: 219 K/UL (ref 164–446)
PMV BLD AUTO: 10 FL (ref 9–12.9)
PMV BLD AUTO: 10.1 FL (ref 9–12.9)
POTASSIUM SERPL-SCNC: 6.3 MMOL/L (ref 3.6–5.5)
POTASSIUM SERPL-SCNC: 7.1 MMOL/L (ref 3.6–5.5)
PROTHROMBIN TIME: 42.8 SEC (ref 12–14.6)
RBC # BLD AUTO: 2.57 M/UL (ref 4.7–6.1)
RBC # BLD AUTO: 2.64 M/UL (ref 4.7–6.1)
SODIUM SERPL-SCNC: 136 MMOL/L (ref 135–145)
SODIUM SERPL-SCNC: 137 MMOL/L (ref 135–145)
WBC # BLD AUTO: 4.3 K/UL (ref 4.8–10.8)
WBC # BLD AUTO: 7.1 K/UL (ref 4.8–10.8)

## 2018-06-25 PROCEDURE — 700111 HCHG RX REV CODE 636 W/ 250 OVERRIDE (IP): Performed by: INTERNAL MEDICINE

## 2018-06-25 PROCEDURE — 700101 HCHG RX REV CODE 250

## 2018-06-25 PROCEDURE — 85018 HEMOGLOBIN: CPT

## 2018-06-25 PROCEDURE — 500698 HCHG HEMOCLIP, MEDIUM: Performed by: PLASTIC SURGERY

## 2018-06-25 PROCEDURE — 302169 3/0 SUTURE W/NEEDLE: Performed by: INTERNAL MEDICINE

## 2018-06-25 PROCEDURE — A6223 GAUZE >16<=48 NO W/SAL W/O B: HCPCS | Performed by: PLASTIC SURGERY

## 2018-06-25 PROCEDURE — 160002 HCHG RECOVERY MINUTES (STAT): Performed by: PLASTIC SURGERY

## 2018-06-25 PROCEDURE — 500440 HCHG DRESSING, STERILE ROLL (KERLIX): Performed by: PLASTIC SURGERY

## 2018-06-25 PROCEDURE — 36415 COLL VENOUS BLD VENIPUNCTURE: CPT

## 2018-06-25 PROCEDURE — 110454 HCHG SHELL REV 250: Performed by: PLASTIC SURGERY

## 2018-06-25 PROCEDURE — 90935 HEMODIALYSIS ONE EVALUATION: CPT

## 2018-06-25 PROCEDURE — 80048 BASIC METABOLIC PNL TOTAL CA: CPT | Mod: 91

## 2018-06-25 PROCEDURE — 85610 PROTHROMBIN TIME: CPT

## 2018-06-25 PROCEDURE — 85014 HEMATOCRIT: CPT

## 2018-06-25 PROCEDURE — 83735 ASSAY OF MAGNESIUM: CPT

## 2018-06-25 PROCEDURE — 500697 HCHG HEMOCLIP, LARGE (ORANGE): Performed by: PLASTIC SURGERY

## 2018-06-25 PROCEDURE — 06LY0ZZ OCCLUSION OF LOWER VEIN, OPEN APPROACH: ICD-10-PCS | Performed by: PLASTIC SURGERY

## 2018-06-25 PROCEDURE — A9270 NON-COVERED ITEM OR SERVICE: HCPCS

## 2018-06-25 PROCEDURE — 700102 HCHG RX REV CODE 250 W/ 637 OVERRIDE(OP)

## 2018-06-25 PROCEDURE — 160035 HCHG PACU - 1ST 60 MINS PHASE I: Performed by: PLASTIC SURGERY

## 2018-06-25 PROCEDURE — 500700 HCHG HEMOCLIP, SMALL (RED): Performed by: PLASTIC SURGERY

## 2018-06-25 PROCEDURE — 85027 COMPLETE CBC AUTOMATED: CPT

## 2018-06-25 PROCEDURE — 501838 HCHG SUTURE GENERAL: Performed by: PLASTIC SURGERY

## 2018-06-25 PROCEDURE — A9270 NON-COVERED ITEM OR SERVICE: HCPCS | Performed by: INTERNAL MEDICINE

## 2018-06-25 PROCEDURE — 700111 HCHG RX REV CODE 636 W/ 250 OVERRIDE (IP): Performed by: PLASTIC SURGERY

## 2018-06-25 PROCEDURE — 160029 HCHG SURGERY MINUTES - 1ST 30 MINS LEVEL 4: Performed by: PLASTIC SURGERY

## 2018-06-25 PROCEDURE — 99233 SBSQ HOSP IP/OBS HIGH 50: CPT | Performed by: INTERNAL MEDICINE

## 2018-06-25 PROCEDURE — 5A1D70Z PERFORMANCE OF URINARY FILTRATION, INTERMITTENT, LESS THAN 6 HOURS PER DAY: ICD-10-PCS | Performed by: INTERNAL MEDICINE

## 2018-06-25 PROCEDURE — 500444 HCHG HEMOSTAT, SURGICEL 2X3: Performed by: PLASTIC SURGERY

## 2018-06-25 PROCEDURE — 770020 HCHG ROOM/CARE - TELE (206)

## 2018-06-25 PROCEDURE — 85025 COMPLETE CBC W/AUTO DIFF WBC: CPT

## 2018-06-25 PROCEDURE — 160048 HCHG OR STATISTICAL LEVEL 1-5: Performed by: PLASTIC SURGERY

## 2018-06-25 PROCEDURE — 700111 HCHG RX REV CODE 636 W/ 250 OVERRIDE (IP)

## 2018-06-25 PROCEDURE — 700102 HCHG RX REV CODE 250 W/ 637 OVERRIDE(OP): Performed by: INTERNAL MEDICINE

## 2018-06-25 PROCEDURE — 501445 HCHG STAPLER, SKIN DISP: Performed by: PLASTIC SURGERY

## 2018-06-25 PROCEDURE — 160009 HCHG ANES TIME/MIN: Performed by: PLASTIC SURGERY

## 2018-06-25 PROCEDURE — 160036 HCHG PACU - EA ADDL 30 MINS PHASE I: Performed by: PLASTIC SURGERY

## 2018-06-25 PROCEDURE — 500423 HCHG DRESSING, ABD COMBINE: Performed by: PLASTIC SURGERY

## 2018-06-25 RX ORDER — MIDAZOLAM HYDROCHLORIDE 1 MG/ML
INJECTION INTRAMUSCULAR; INTRAVENOUS
Status: ACTIVE
Start: 2018-06-25 | End: 2018-06-26

## 2018-06-25 RX ORDER — MORPHINE SULFATE 4 MG/ML
1 INJECTION, SOLUTION INTRAMUSCULAR; INTRAVENOUS
Status: DISCONTINUED | OUTPATIENT
Start: 2018-06-25 | End: 2018-06-26

## 2018-06-25 RX ORDER — OXYCODONE HCL 5 MG/5 ML
SOLUTION, ORAL ORAL
Status: COMPLETED
Start: 2018-06-25 | End: 2018-06-25

## 2018-06-25 RX ORDER — MIDAZOLAM HYDROCHLORIDE 1 MG/ML
5 INJECTION INTRAMUSCULAR; INTRAVENOUS ONCE
Status: COMPLETED | OUTPATIENT
Start: 2018-06-25 | End: 2018-06-25

## 2018-06-25 RX ORDER — LIDOCAINE HYDROCHLORIDE 10 MG/ML
1 INJECTION, SOLUTION INFILTRATION; PERINEURAL
Status: DISCONTINUED | OUTPATIENT
Start: 2018-06-25 | End: 2018-06-26 | Stop reason: HOSPADM

## 2018-06-25 RX ORDER — SODIUM CHLORIDE 9 MG/ML
500 INJECTION, SOLUTION INTRAVENOUS ONCE
Status: COMPLETED | OUTPATIENT
Start: 2018-06-26 | End: 2018-06-26

## 2018-06-25 RX ORDER — HYDROCODONE BITARTRATE AND ACETAMINOPHEN 10; 325 MG/1; MG/1
1 TABLET ORAL EVERY 6 HOURS PRN
Status: DISCONTINUED | OUTPATIENT
Start: 2018-06-25 | End: 2018-06-26

## 2018-06-25 RX ORDER — ONDANSETRON 2 MG/ML
INJECTION INTRAMUSCULAR; INTRAVENOUS
Status: COMPLETED
Start: 2018-06-25 | End: 2018-06-25

## 2018-06-25 RX ORDER — LIDOCAINE HYDROCHLORIDE AND EPINEPHRINE 10; 10 MG/ML; UG/ML
10 INJECTION, SOLUTION INFILTRATION; PERINEURAL ONCE
Status: DISPENSED | OUTPATIENT
Start: 2018-06-25 | End: 2018-06-26

## 2018-06-25 RX ORDER — LIDOCAINE HYDROCHLORIDE 10 MG/ML
INJECTION, SOLUTION INFILTRATION; PERINEURAL
Status: COMPLETED
Start: 2018-06-25 | End: 2018-06-25

## 2018-06-25 RX ORDER — BUPIVACAINE HYDROCHLORIDE AND EPINEPHRINE 5; 5 MG/ML; UG/ML
INJECTION, SOLUTION EPIDURAL; INTRACAUDAL; PERINEURAL
Status: DISCONTINUED | OUTPATIENT
Start: 2018-06-25 | End: 2018-06-25 | Stop reason: HOSPADM

## 2018-06-25 RX ORDER — MIDAZOLAM HYDROCHLORIDE 1 MG/ML
1 INJECTION INTRAMUSCULAR; INTRAVENOUS ONCE
Status: DISCONTINUED | OUTPATIENT
Start: 2018-06-25 | End: 2018-06-25

## 2018-06-25 RX ADMIN — OXYCODONE HYDROCHLORIDE 10 MG: 5 SOLUTION ORAL at 20:50

## 2018-06-25 RX ADMIN — GABAPENTIN 600 MG: 300 CAPSULE ORAL at 23:07

## 2018-06-25 RX ADMIN — FENTANYL CITRATE 50 MCG: 50 INJECTION, SOLUTION INTRAMUSCULAR; INTRAVENOUS at 21:10

## 2018-06-25 RX ADMIN — FENTANYL CITRATE 50 MCG: 50 INJECTION, SOLUTION INTRAMUSCULAR; INTRAVENOUS at 20:40

## 2018-06-25 RX ADMIN — FENTANYL CITRATE 25 MCG: 50 INJECTION INTRAMUSCULAR; INTRAVENOUS at 06:31

## 2018-06-25 RX ADMIN — GABAPENTIN 600 MG: 300 CAPSULE ORAL at 05:51

## 2018-06-25 RX ADMIN — HYDROMORPHONE HYDROCHLORIDE 0.5 MG: 10 INJECTION, SOLUTION INTRAMUSCULAR; INTRAVENOUS; SUBCUTANEOUS at 20:59

## 2018-06-25 RX ADMIN — LIDOCAINE HYDROCHLORIDE 1 ML: 10 INJECTION, SOLUTION INFILTRATION; PERINEURAL at 08:00

## 2018-06-25 RX ADMIN — LEVETIRACETAM 250 MG: 250 TABLET ORAL at 05:50

## 2018-06-25 RX ADMIN — FENTANYL CITRATE 50 MCG: 50 INJECTION, SOLUTION INTRAMUSCULAR; INTRAVENOUS at 20:30

## 2018-06-25 RX ADMIN — MIDAZOLAM HYDROCHLORIDE 5 MG: 1 INJECTION, SOLUTION INTRAMUSCULAR; INTRAVENOUS at 19:21

## 2018-06-25 RX ADMIN — MIDODRINE HYDROCHLORIDE 10 MG: 5 TABLET ORAL at 09:57

## 2018-06-25 RX ADMIN — GABAPENTIN 600 MG: 300 CAPSULE ORAL at 14:31

## 2018-06-25 RX ADMIN — SEVELAMER CARBONATE 4000 MG: 800 TABLET, FILM COATED ORAL at 12:57

## 2018-06-25 RX ADMIN — HYDROCODONE BITARTRATE AND ACETAMINOPHEN 1 TABLET: 10; 325 TABLET ORAL at 14:31

## 2018-06-25 RX ADMIN — FENTANYL CITRATE 25 MCG: 50 INJECTION INTRAMUSCULAR; INTRAVENOUS at 10:00

## 2018-06-25 RX ADMIN — CALCITRIOL 0.75 MCG: 0.25 CAPSULE, LIQUID FILLED ORAL at 23:07

## 2018-06-25 RX ADMIN — ONDANSETRON 4 MG: 2 INJECTION INTRAMUSCULAR; INTRAVENOUS at 20:55

## 2018-06-25 RX ADMIN — FENTANYL CITRATE 25 MCG: 50 INJECTION INTRAMUSCULAR; INTRAVENOUS at 16:11

## 2018-06-25 RX ADMIN — MIDODRINE HYDROCHLORIDE 10 MG: 5 TABLET ORAL at 18:51

## 2018-06-25 RX ADMIN — FENTANYL CITRATE 25 MCG: 50 INJECTION INTRAMUSCULAR; INTRAVENOUS at 12:57

## 2018-06-25 RX ADMIN — HYDROMORPHONE HYDROCHLORIDE 0.5 MG: 10 INJECTION, SOLUTION INTRAMUSCULAR; INTRAVENOUS; SUBCUTANEOUS at 21:05

## 2018-06-25 RX ADMIN — MIDODRINE HYDROCHLORIDE 10 MG: 5 TABLET ORAL at 11:47

## 2018-06-25 RX ADMIN — FENTANYL CITRATE 50 MCG: 50 INJECTION INTRAMUSCULAR; INTRAVENOUS at 00:40

## 2018-06-25 RX ADMIN — ERYTHROPOIETIN 10000 UNITS: 10000 INJECTION, SOLUTION INTRAVENOUS; SUBCUTANEOUS at 11:02

## 2018-06-25 RX ADMIN — LEVETIRACETAM 250 MG: 250 TABLET ORAL at 23:06

## 2018-06-25 RX ADMIN — CINACALCET HYDROCHLORIDE 30 MG: 30 TABLET, COATED ORAL at 11:47

## 2018-06-25 RX ADMIN — FENTANYL CITRATE 100 MCG: 50 INJECTION, SOLUTION INTRAMUSCULAR; INTRAVENOUS at 19:15

## 2018-06-25 RX ADMIN — ONDANSETRON HYDROCHLORIDE 4 MG: 2 INJECTION, SOLUTION INTRAMUSCULAR; INTRAVENOUS at 20:55

## 2018-06-25 ASSESSMENT — PAIN SCALES - GENERAL
PAINLEVEL_OUTOF10: 10
PAINLEVEL_OUTOF10: 9
PAINLEVEL_OUTOF10: 4
PAINLEVEL_OUTOF10: 9
PAINLEVEL_OUTOF10: 2
PAINLEVEL_OUTOF10: 6
PAINLEVEL_OUTOF10: 9
PAINLEVEL_OUTOF10: 9
PAINLEVEL_OUTOF10: 7
PAINLEVEL_OUTOF10: 9
PAINLEVEL_OUTOF10: 2
PAINLEVEL_OUTOF10: 8
PAINLEVEL_OUTOF10: 7
PAINLEVEL_OUTOF10: 8
PAINLEVEL_OUTOF10: 6
PAINLEVEL_OUTOF10: 8

## 2018-06-25 ASSESSMENT — ENCOUNTER SYMPTOMS
PALPITATIONS: 0
NAUSEA: 0
HEADACHES: 0
SHORTNESS OF BREATH: 0
SPUTUM PRODUCTION: 0
DEPRESSION: 0
DIZZINESS: 0
CHILLS: 1
COUGH: 0
FEVER: 0
VOMITING: 0
ABDOMINAL PAIN: 1

## 2018-06-25 ASSESSMENT — PATIENT HEALTH QUESTIONNAIRE - PHQ9
SUM OF ALL RESPONSES TO PHQ9 QUESTIONS 1 AND 2: 0
1. LITTLE INTEREST OR PLEASURE IN DOING THINGS: NOT AT ALL
2. FEELING DOWN, DEPRESSED, IRRITABLE, OR HOPELESS: NOT AT ALL

## 2018-06-25 NOTE — H&P
Hospital Medicine History & Physical Note    Date of Service  6/24/2018    Primary Care Physician  Tony Mcmillan M.D.    Consultants  renal  Plastics    Code Status  fc/fc    Chief Complaint  Bleeding from R flank and pain    History of Presenting Illness  37 y.o. male who presented 6/24/2018 with both medical issues. Patient states that he has a history of IVC thrombosis has been struggling off and on with right-sided abdominal varicosities that bleed on occasion. He is on chronic Coumadin with a goal INR of 2-3 for issues with AV graft occlusions as been compliant with his dosing with no clear change. Patient states that he saw Dr. Rosenbaum of plastic surgery 2 days ago and there is no bleeding issues and no further surgical surgical intervention planned at the time but then last visit had increasing bleeding and pain. He does do intermittent hemodialysis at home but has not done it for the last 2 days given his issues with his abdominal pain. He also says that he takes up to 50 mg of Norco tablets a day which seemed to help his restless leg syndrome. He essentially does not urinate at baseline and denies any diarrhea.     Review of Systems  Review of Systems   Constitutional: Positive for malaise/fatigue. Negative for chills, fever and weight loss.   HENT: Negative.  Negative for hearing loss.    Eyes: Negative for blurred vision.   Respiratory: Negative for cough, hemoptysis, sputum production and shortness of breath.    Cardiovascular: Positive for leg swelling. Negative for chest pain and palpitations.   Gastrointestinal: Positive for abdominal pain. Negative for heartburn, nausea and vomiting.   Genitourinary: Negative for dysuria.   Musculoskeletal: Positive for back pain. Negative for myalgias and neck pain.   Skin: Negative for itching.   Neurological: Negative.  Negative for dizziness, focal weakness and loss of consciousness.   Endo/Heme/Allergies: Bruises/bleeds easily.   Psychiatric/Behavioral:  Negative for depression.   All other systems reviewed and are negative.      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 He also has no past medical history of Encounter for long-term (current) use of other medications.    Surgical History   has a past surgical history that includes mass excision ortho (10/9/08); av fistula creation (11/6/08); arteriogram (3/5/2009); angioplasty balloon (3/5/2009); av fistulogram (3/5/2009); us-kidney transplant (1996, 2001); endarterectomy (11/16/2010); av fistulogram (11/16/2010); cath placement (2/1/2011); angioplasty balloon (2/1/2011); av fistulogram (6/5/2011); cath placement (6/5/2011); av fistula revision (11/3/2011); bone spur excision (12/8/2011); recovery (5/18/2012); incision and drainage general (9/13/2013); debridement (9/13/2013); vein ligation (9/13/2013); recovery (6/13/2014); av fistula revision (9/30/2014); irrigation & debridement general (12/15/2014); av fistula revision (2/8/2015); av fistula revision (2/22/2015); av fistula revision (3/20/2015); pr inject nerv blck,stellate ganglion (3/24/2015); av fistula creation (4/20/2015); av fistula thrombolysis (Left, 6/3/2015); irrigation & debridement general (Left, 6/3/2015); av fistula thrombolysis (Left, 6/9/2015); av fistula revision (Left, 6/17/2015); irrigation & debridement general (Left, 6/17/2015); recovery (8/7/2015); pr percut implnt neuroelect,epidural (2/19/2016); pr percut implnt neuroelect,epidural (2/19/2016); parathyroidectomy (2006); inguinal hernia repair (Bilateral, 2001, 2002); spinal cord stimulator (N/A, 3/25/2016);  recovery (7/8/2016); lesion excision general (Right, 7/11/2016); mass excision general (Right, 8/5/2016); cath placement (Right, 9/17/2016); thrombectomy (Left, 9/18/2016); av fistulogram (9/18/2016); thrombectomy (Left, 10/20/2016); incision and drainage general (10/20/2016); irrigation & debridement general (Left, 10/28/2016); flap closure (Left, 11/17/2016); thrombectomy (Left, 1/6/2017); cath placement (Right, 1/6/2017); thrombectomy (Left, 1/5/2017); spinal cord stimulator (N/A, 5/4/2017); abdominoplasty (6/5/2017); irrigation & debridement general (7/31/2017); cath placement (Right, 11/14/2017); av fistula revision (Left, 11/14/2017); wound exploration general (2/1/2018); wound closure general (2/19/2018); split thickness skin graft (2/26/2018); thrombectomy (Left, 3/9/2018); cath placement (Right, 3/9/2018); thrombectomy (Left, 4/27/2018); and thrombectomy (Left, 4/28/2018).     Family History  family history includes Arthritis in his father; Heart Disease in his father; Hypertension in his brother; Lung Disease in his father.     Social History   reports that he has never smoked. He has never used smokeless tobacco. He reports that he does not drink alcohol or use drugs.    Allergies  Allergies   Allergen Reactions   • Baclofen Unspecified     Total loss of memory, sedation.   RXN=6/2015   • Contrast Media With Iodine [Iodine] Rash     RXN=1/5/2017   • Keflex Rash     RXN=possibly >10 years   • Pcn [Penicillins] Rash     RXN=possibly >10 years ago  Tolerated Zosyn on 2/20/18   • Tape Rash     Paper tape and tegaderm ok  RXN=ongoing       Medications  Prescriptions Prior to Admission   Medication Sig Dispense Refill Last Dose   • midodrine (PROAMATINE) 10 MG tablet Take 1 Tab by mouth 3 times a day, with meals. Can hold dose if blood pressure greater than 120 systolic. 60 Tab 3 6/24/2018 at 0800   • warfarin (COUMADIN) 5 MG Tab Take 5-7.5 mg by mouth every evening. 5 mg on Tuesday, Wednesday, Thursday,  Friday, Saturday, Sunday  7.5 mg on monday 6/23/2018 at 1730   • cinacalcet (SENSIPAR) 30 MG Tab Take 30-60 mg by mouth every day. 30 mg Mondays , Wednesdays , and Fridays. All other days 60 mg   6/24/2018 at 0800   • levetiracetam (KEPPRA) 250 MG tablet Take 1 Tab by mouth 2 times a day. 60 Tab 3 6/24/2018 at 0800   • gabapentin (NEURONTIN) 600 MG tablet Take 600 mg by mouth 3 times a day.   6/24/2018 at 0800   • sevelamer carbonate (RENVELA) 800 MG Tab tablet Take 4,000 mg by mouth 3 times a day, with meals. With meals   6/24/2018 at 0800   • calcitRIOL (ROCALTROL) 0.25 MCG CAPS Take 0.75 mcg by mouth every bedtime.   6/23/2018 at 2200       Physical Exam  Blood Pressure: (!) 121/28   Temperature: 37.1 °C (98.8 °F)   Pulse: (!) 120       Pulse Oximetry: 94 %     Physical Exam   Constitutional: He is oriented to person, place, and time. He appears well-developed and well-nourished. No distress.   HENT:   Head: Normocephalic and atraumatic.   Mouth/Throat: No oropharyngeal exudate.   Eyes: Pupils are equal, round, and reactive to light. No scleral icterus.   Proptosis, chronic     Neck: Normal range of motion. Neck supple.   Cardiovascular: Normal rate, regular rhythm, normal heart sounds and intact distal pulses.    No murmur heard.  Pulmonary/Chest: Effort normal. No stridor. No respiratory distress. He has no wheezes. He has rales.   Abdominal: Soft. Bowel sounds are normal. He exhibits no distension. There is tenderness.   Abdominal varicose veins, actively oozing blood on the R side, none on the RIght   Musculoskeletal: Normal range of motion. He exhibits edema (2+ pitting edema B/L LE's). He exhibits no tenderness.   AV graft in the L proximal anterior thigh   Neurological: He is alert and oriented to person, place, and time. No cranial nerve deficit.   Skin: Skin is warm and dry. No rash noted.   Psychiatric: He has a normal mood and affect.   Nursing note and vitals reviewed.      Laboratory:  Recent Labs       06/24/18   1615   WBC  4.6*   RBC  2.67*   HEMOGLOBIN  7.9*   HEMATOCRIT  25.0*   MCV  93.6   MCH  29.6   MCHC  31.6*   RDW  60.2*   PLATELETCT  188   MPV  10.1     Recent Labs      06/24/18   1615   SODIUM  136   POTASSIUM  6.0*   CHLORIDE  95*   CO2  22   GLUCOSE  121*   BUN  86*   CREATININE  11.97*   CALCIUM  7.8*     Recent Labs      06/24/18   1615   ALTSGPT  9   ASTSGOT  9*   ALKPHOSPHAT  110*   TBILIRUBIN  0.4   GLUCOSE  121*     Recent Labs      06/24/18   1615   APTT  93.2*   INR  4.37*             Lab Results   Component Value Date    TROPONINI <0.01 02/24/2018       Urinalysis:    No results found for: SPECGRAVITY, GLUCOSEUR, KETONES, NITRITE, WBCURINE, RBCURINE, BACTERIA, EPITHELCELL     Imaging:  DX-CHEST-PORTABLE (1 VIEW)   Final Result      1.  Hypoinflation without other evidence for acute cardiopulmonary disease.   2.  Prominent superior mediastinum unchanged from prior exam.            Assessment/Plan:  I anticipate this patient will require at least two midnights for appropriate medical management, necessitating inpatient admission.    * Bleeding abdominal varicosities- (present on admission)   Assessment & Plan    -ERP talked with Dr Rosenbaum plastics, no surgical intervention at this time, apply pressure with gauze and adaptic        RLS (restless legs syndrome)- (present on admission)   Assessment & Plan    -controlled on norco only per patient        IVC thrombosis (HCC)- (present on admission)   Assessment & Plan    -causing large abdominal wall varicosities  -bleeding on the right, hold coumadin for now        Hemorrhagic disorder due to circulating anticoagulants (HCC)- (present on admission)   Assessment & Plan    -supra-therapeutic INR  -hold coumadin, let INR drift down slowly, daily INR  -no need for transfusions at this time        Seizure disorder (CMS-HCC)- (present on admission)   Assessment & Plan    -well controlled, continue gabapentin and keppra        ESRD on hemodialysis  (HCC)- (present on admission)   Assessment & Plan    -Najjar of renal consulted for urgent iHD tonight given hyperkalemia  -continue other outpatient diaylsis medications        Acute blood loss anemia- (present on admission)   Assessment & Plan    -2/2 blood loss from bleeding varices  -trasnfuse for less than 7/21  -daily CBC< hold coumadin        Vertebral compression fracture (HCC)- (present on admission)   Assessment & Plan    Outpatient treatment, was supposed to get MRI L/T spine tomorrow, pain control        Sleep apnea- (present on admission)   Assessment & Plan    BIPAP            VTE prophylaxis: SCD's

## 2018-06-25 NOTE — ASSESSMENT & PLAN NOTE
-supra-therapeutic INR  -hold coumadin, let INR drift down slowly, daily INR  -no need for transfusions at this time

## 2018-06-25 NOTE — PROGRESS NOTES
Dialysis RN called author x2, as pt requesting fentyl medication. Author verfifed time due, and pt lethargy when last assesed. Author to go to dialysis to ases pt and admin pain med as needed.

## 2018-06-25 NOTE — PROGRESS NOTES
Pt back in room, aox4, visiting with friend at bedside. Pt requesting increase in pain medication. Page placed to Dr. Poole.

## 2018-06-25 NOTE — PROGRESS NOTES
2nd Rn skin check. No open sores or wounds noted on the body except on right lower abdomen where patient dressings were applied. Saturation noted on dressing and reinforced with ABD pads. Patient does have scabs on left thigh where patient's fistula is located.

## 2018-06-25 NOTE — ASSESSMENT & PLAN NOTE
-ERP talked with Dr Rosenbaum plastics, no surgical intervention at this time, apply pressure with gauze and adaptic  -Wound care consulted

## 2018-06-25 NOTE — WOUND TEAM
"Wound RN attempted to see patient. Dressing supplies brought into room per Dr. Hatch recommendations. Pt and pts mother state he is in to much pain to that site and that it will bleed everywhere if we take the dressing off. Pt states \"They won't medicate me like I need to be medicated. Fentanyl does nothing for me I need 75 of Demerol to be out of pain.\" pt states his fentanyl is given every 3 hours and is not available for another 2 hrs. Pt requesting an extra dose of fentanyl prior to dressing change. Pts staff RN, Janee, currently at lunch. [facility] staff RN will have Janee call this wound RN at ext. [phone] when she returns to discuss POC.  "

## 2018-06-25 NOTE — PROGRESS NOTES
Author to dialysis room, pt alert oriented x4, holds conversation well w/o drowsiness, reports pain see flowsheet and mar.

## 2018-06-25 NOTE — CONSULTS
DATE OF SERVICE:  06/25/2018    REQUESTING PHYSICIAN:  Dr. Tab Richardson.    REASON FOR CONSULTATION:  Management of chronic kidney disease and   hyperkalemia.    The patient seen and examined, medical records were reviewed.    HISTORY OF PRESENT ILLNESS:  The patient is a pleasant 37-year-old gentleman   with a past medical history significant for end-stage renal disease, undergoes   home hemodialysis, he is on chronic anticoagulation to keep his hemodialysis   access patent, he presented to the hospital yesterday with right flank pain as   well as bleeding from his right flank site.  Patient was found to have   hyperkalemia, potassium today at 7.1.  Apparently, the patient did not have   dialysis for a few days.  The patient has no fever, no chills, no nausea, no   vomiting.  He does complain of some pain at the bleeding site.  No hematuria,   no dysuria.    PAST MEDICAL HISTORY:  Significant for   1.  End-stage renal disease.  2.  Hypertension.  3.  Chronic anticoagulation.    ALLERGIES:  The list was reviewed.    MEDICATIONS:  Reviewed.    SOCIAL HISTORY:  Patient has no history of smoking.  He works full time.    FAMILY HISTORY:  Positive for heart disease in his father.    MEDICATIONS:  Reviewed.    REVIEW OF SYSTEMS:  All systems were reviewed, so, it was evident negative   except outlined as in history of present illness.    PHYSICAL EXAMINATION:  GENERAL:  Patient is in no apparent distress.  VITAL SIGNS:  Showed blood pressure of 110/40, heart rate was 99.  HEENT:  Normocephalic, atraumatic.  Sclerae is anicteric.  NECK:  No lymphadenopathy.  CHEST:  Normal.  LUNGS:  Clear to auscultation.  HEART:  S1, S2.  ABDOMEN:  Soft, nontender.  EXTREMITIES:  Lower extremity edema.  SKIN:  No skin rashes.  NEUROLOGIC:  Patient alert, oriented x3.    LABORATORY DATA:  Recent labs work revealed  1.  End-stage renal disease.  2.  Hyperkalemia.  3.  Anemia.  4.  Abdominal wall bleeding.    PLAN:  1.  We will plan on  urgent dialysis to manage his hyperkalemia.  2.  Low potassium diet.  3.  Renal dose all medications.  4.  Avoid nephrotoxins.  5.  As for the anemia, we will continue erythropoietin supplement.  6.  Prognosis is guarded.    Plan discussed in detail with Dr. Coe.       ____________________________________     FADI NAJJAR, MD FN / RIAZ    DD:  06/25/2018 13:13:58  DT:  06/25/2018 13:35:36    D#:  [id number]  Job#:  [id number]

## 2018-06-25 NOTE — PROGRESS NOTES
Paged Dr. Najjar regarding possible urgent dialysis tonight. Confirmed by Dr. Najjar that patient will be dialyzed tomorrow morning. Will continue to monitor

## 2018-06-25 NOTE — PROGRESS NOTES
[facility] Hospitalist Progress Note    Date of Service: 2018    Chief Complaint  37 y.o. male admitted 2018 with hyperkalemia him a supratherapeutic INR, bleeding abdominal varicosities.     Interval Problem Update  Patient emergently dialyzed this morning by nephrology. Patient complains of bruising around his abdominal varices site. Wound care consult ordered. Patient complains of uncontrolled pain, patient started on Norco and IV morphine when necessary. Plan of care discussed with patient and family at bedside and all questions answered.    Consultants/Specialty  Nephrology    Disposition  TBD        Review of Systems   Constitutional: Positive for chills. Negative for fever.   Respiratory: Negative for cough, sputum production and shortness of breath.    Cardiovascular: Negative for chest pain and palpitations.   Gastrointestinal: Positive for abdominal pain (R sided). Negative for nausea and vomiting.   Musculoskeletal: Negative for joint pain.   Neurological: Negative for dizziness and headaches.   Psychiatric/Behavioral: Negative for depression and suicidal ideas.      Physical Exam  Laboratory/Imaging   Hemodynamics  Temp (24hrs), Av.4 °C (97.5 °F), Min:35.9 °C (96.7 °F), Max:37.1 °C (98.8 °F)   Temperature: 36.7 °C (98 °F)  Pulse  Av.9  Min: 91  Max: 120    Blood Pressure: 108/41      Respiratory      Respiration: 16, Pulse Oximetry: 99 %        RUL Breath Sounds: Clear, RML Breath Sounds: Clear, RLL Breath Sounds: Clear, ANGEL Breath Sounds: Clear, LLL Breath Sounds: Clear    Fluids    Intake/Output Summary (Last 24 hours) at 18 1408  Last data filed at 18 1107   Gross per 24 hour   Intake              500 ml   Output             2100 ml   Net            -1600 ml       Nutrition  Orders Placed This Encounter   Procedures   • Diet Order Renal     Standing Status:   Standing     Number of Occurrences:   1     Order Specific Question:   Diet:     Answer:   Renal [8]     Physical  Exam   Constitutional: He is oriented to person, place, and time. He appears well-developed and well-nourished. No distress.   HENT:   Head: Normocephalic and atraumatic.   Right Ear: External ear normal.   Left Ear: External ear normal.   Eyes: Conjunctivae are normal. Right eye exhibits no discharge. Left eye exhibits no discharge.   Neck: Normal range of motion. Neck supple. No tracheal deviation present. No thyromegaly present.   Cardiovascular: Normal rate and regular rhythm.    No murmur heard.  Pulmonary/Chest: Effort normal. No respiratory distress. He has no wheezes.   Abdominal: He exhibits no distension. There is tenderness. There is no rebound and no guarding.   Abdominal varicose veins, dressing with actively oozing bleeding noted right lateral abdominal wall. Tender to palpation     Musculoskeletal: He exhibits edema (2+ B/L LE).   Neurological: He is alert and oriented to person, place, and time.   Skin: Skin is warm. He is not diaphoretic.   AV graft in the L proximal anterior thigh    Psychiatric: He has a normal mood and affect. His behavior is normal. Thought content normal.       Recent Labs      06/24/18   1615 06/25/18   0623  06/25/18   1105   WBC  4.6*  4.3*   --    RBC  2.67*  2.64*   --    HEMOGLOBIN  7.9*  7.4*  7.7*   HEMATOCRIT  25.0*  24.8*  24.1*   MCV  93.6  93.9   --    MCH  29.6  28.0   --    MCHC  31.6*  29.8*   --    RDW  60.2*  60.9*   --    PLATELETCT  188  176   --    MPV  10.1  10.0   --      Recent Labs      06/24/18 1615 06/25/18   0623   SODIUM  136  136   POTASSIUM  6.0*  7.1*   CHLORIDE  95*  94*   CO2  22  22   GLUCOSE  121*  95   BUN  86*  91*   CREATININE  11.97*  12.86*   CALCIUM  7.8*  8.6     Recent Labs      06/24/18 1615  06/25/18   0436   APTT  93.2*   --    INR  4.37*  4.53*                  Assessment/Plan     * Bleeding abdominal varicosities- (present on admission)   Assessment & Plan    -ERP talked with Dr Rosenbaum plastics, no surgical intervention at  this time, apply pressure with gauze and adaptic  -Wound care consulted        RLS (restless legs syndrome)- (present on admission)   Assessment & Plan    -controlled on norco only per patient        IVC thrombosis (HCC)- (present on admission)   Assessment & Plan    -causing large abdominal wall varicosities  -bleeding on the right, hold coumadin for now        Hemorrhagic disorder due to circulating anticoagulants (HCC)- (present on admission)   Assessment & Plan    -supra-therapeutic INR  -hold coumadin, let INR drift down slowly, daily INR  -no need for transfusions at this time        Seizure disorder (CMS-HCC)- (present on admission)   Assessment & Plan    -well controlled, continue gabapentin and keppra        ESRD on hemodialysis (HCC)- (present on admission)   Assessment & Plan    -Najjar of renal consulted for urgent iHD tonight given hyperkalemia  -continue other outpatient diaylsis medications        Acute blood loss anemia- (present on admission)   Assessment & Plan    -2/2 blood loss from bleeding varices  -transfuse for Hb/ct less than 7/21  -daily CBC, hold coumadin        Vertebral compression fracture (HCC)- (present on admission)   Assessment & Plan    Outpatient treatment, was supposed to get MRI L/T spine tomorrow, pain control        Sleep apnea- (present on admission)   Assessment & Plan    BIPAP- pt's family to bring it in from home           Quality-Core Measures   Reviewed items::  Labs reviewed and Medications reviewed  Diaz catheter::  No Diaz  DVT prophylaxis pharmacological::  Warfarin (Coumadin)

## 2018-06-25 NOTE — RESPIRATORY CARE
COPD EDUCATION by COPD CLINICAL EDUCATOR  6/25/2018 at 7:53 AM by Daisy Taylor     Patient reviewed by COPD education team. Patient does not qualify for COPD program.

## 2018-06-25 NOTE — DISCHARGE PLANNING
Outpatient Dialysis Note    Confirmed patient is active at:      [facility]  [address]   [place] [address]      Schedule: HHD    Spoke with Sheila at facility who confirmed.    Forwarded records for review.    Dialysis Coordinator, [facility]  Pina Irwin [phone]

## 2018-06-25 NOTE — PROGRESS NOTES
Paging Nephro doc as diaysis RN says they do have diaysis orders.Dr naik on call, waiting for call back.

## 2018-06-25 NOTE — ASSESSMENT & PLAN NOTE
-2/2 blood loss from bleeding varices  -transfuse for Hb/ct less than 7/21  -daily CBC, hold coumadin

## 2018-06-25 NOTE — PROGRESS NOTES
3hr HD started @ 0806 and completed @ 1107,net UF 1600ml only limited by hypotension,VSS post HD.L thigh AVG + B/T,cannulation sites covered with DD,CDI,report given to Osiris DAVIDSON.

## 2018-06-25 NOTE — PROGRESS NOTES
Dr. Bhavik vyas md returned call, states he will be ordering dialysis. , reported crit labs to md.

## 2018-06-25 NOTE — PROGRESS NOTES
Dr Poole paged re: wound care and nurse at bedside. Wound care nurse started to peel off dressing, and vein started to squirt blood. Sand bag applied.     1639 Dr Poole returned call, states she will call surgeon.

## 2018-06-25 NOTE — WOUND TEAM
Wound RN in to change dressing with staff RN. Staff RN medicated pt with fentanyl IV prior to start. Top dressing removed to reveal a hardened ABD fully saturated in blood. This RN attempted to remove ABD to replace with sugifoam, Adaptic and ABD but pt immediately started to bleed with the slightest elevation of ABD. New ABD immediately applied over old ABD and pressure applied. Pt saturated through 2nd ABD as well. Towel and 10lb sand bag were applied over area. Towel saturated through to sand bag. Hospistalist notified of concern and need for physician intervention and possible suture to the area. Hospitalist stated that she would call surgery. Pt called his mother Veronica, and this RN updated mother per pts request. Top ABD and Towel were replaced and sandbag reapplied while waiting for surgery to assess patient. Discussed with staff RN possible need for rapid response as pts BP is low and pt has not stopped bleeding. Staff RN will update Charge RN for further POC. Pt calm, resting comfortably in bed with Sand bag in place. No advanced wound care needs identified. Wound team to sign off, please reconsult with any further advanced wound care needs.

## 2018-06-25 NOTE — CARE PLAN
Problem: Pain Management  Goal: Pain level will decrease to patient's comfort goal  Outcome: PROGRESSING AS EXPECTED  Discussed with patient regarding alternative pain control

## 2018-06-26 ENCOUNTER — PATIENT OUTREACH (OUTPATIENT)
Dept: HEALTH INFORMATION MANAGEMENT | Facility: OTHER | Age: 38
End: 2018-06-26

## 2018-06-26 VITALS
RESPIRATION RATE: 18 BRPM | HEIGHT: 64 IN | BODY MASS INDEX: 31.39 KG/M2 | DIASTOLIC BLOOD PRESSURE: 47 MMHG | WEIGHT: 183.86 LBS | HEART RATE: 89 BPM | SYSTOLIC BLOOD PRESSURE: 104 MMHG | TEMPERATURE: 97.9 F | OXYGEN SATURATION: 94 %

## 2018-06-26 PROBLEM — I86.8 ABDOMINAL VARICOSITIES: Status: RESOLVED | Noted: 2018-02-20 | Resolved: 2018-06-26

## 2018-06-26 LAB
ABO GROUP BLD: NORMAL
BARCODED ABORH UBTYP: 5100
BARCODED PRD CODE UBPRD: NORMAL
BARCODED UNIT NUM UBUNT: NORMAL
BLD GP AB SCN SERPL QL: NORMAL
COMPONENT R 8504R: NORMAL
HCT VFR BLD AUTO: 22.5 % (ref 42–52)
HGB BLD-MCNC: 6.8 G/DL (ref 14–18)
INR PPP: 3.06 (ref 0.87–1.13)
POTASSIUM SERPL-SCNC: 3.8 MMOL/L (ref 3.6–5.5)
PRODUCT TYPE UPROD: NORMAL
PROTHROMBIN TIME: 31.3 SEC (ref 12–14.6)
RH BLD: NORMAL
UNIT STATUS USTAT: NORMAL

## 2018-06-26 PROCEDURE — 86901 BLOOD TYPING SEROLOGIC RH(D): CPT

## 2018-06-26 PROCEDURE — 85014 HEMATOCRIT: CPT

## 2018-06-26 PROCEDURE — 700102 HCHG RX REV CODE 250 W/ 637 OVERRIDE(OP): Performed by: INTERNAL MEDICINE

## 2018-06-26 PROCEDURE — 36415 COLL VENOUS BLD VENIPUNCTURE: CPT

## 2018-06-26 PROCEDURE — 30233N1 TRANSFUSION OF NONAUTOLOGOUS RED BLOOD CELLS INTO PERIPHERAL VEIN, PERCUTANEOUS APPROACH: ICD-10-PCS | Performed by: INTERNAL MEDICINE

## 2018-06-26 PROCEDURE — 36430 TRANSFUSION BLD/BLD COMPNT: CPT

## 2018-06-26 PROCEDURE — 700101 HCHG RX REV CODE 250

## 2018-06-26 PROCEDURE — 90935 HEMODIALYSIS ONE EVALUATION: CPT

## 2018-06-26 PROCEDURE — 90935 HEMODIALYSIS ONE EVALUATION: CPT | Performed by: INTERNAL MEDICINE

## 2018-06-26 PROCEDURE — 700111 HCHG RX REV CODE 636 W/ 250 OVERRIDE (IP): Performed by: INTERNAL MEDICINE

## 2018-06-26 PROCEDURE — P9016 RBC LEUKOCYTES REDUCED: HCPCS

## 2018-06-26 PROCEDURE — 86923 COMPATIBILITY TEST ELECTRIC: CPT

## 2018-06-26 PROCEDURE — 84132 ASSAY OF SERUM POTASSIUM: CPT

## 2018-06-26 PROCEDURE — 85018 HEMOGLOBIN: CPT

## 2018-06-26 PROCEDURE — 700105 HCHG RX REV CODE 258: Performed by: INTERNAL MEDICINE

## 2018-06-26 PROCEDURE — 99239 HOSP IP/OBS DSCHRG MGMT >30: CPT | Performed by: INTERNAL MEDICINE

## 2018-06-26 PROCEDURE — 86850 RBC ANTIBODY SCREEN: CPT

## 2018-06-26 PROCEDURE — A9270 NON-COVERED ITEM OR SERVICE: HCPCS | Performed by: INTERNAL MEDICINE

## 2018-06-26 PROCEDURE — 86900 BLOOD TYPING SEROLOGIC ABO: CPT

## 2018-06-26 PROCEDURE — 5A1D70Z PERFORMANCE OF URINARY FILTRATION, INTERMITTENT, LESS THAN 6 HOURS PER DAY: ICD-10-PCS | Performed by: INTERNAL MEDICINE

## 2018-06-26 PROCEDURE — 85610 PROTHROMBIN TIME: CPT

## 2018-06-26 RX ORDER — LIDOCAINE HYDROCHLORIDE 10 MG/ML
INJECTION, SOLUTION INFILTRATION; PERINEURAL
Status: COMPLETED
Start: 2018-06-26 | End: 2018-06-26

## 2018-06-26 RX ORDER — HYDROCODONE BITARTRATE AND ACETAMINOPHEN 10; 325 MG/1; MG/1
1 TABLET ORAL EVERY 4 HOURS PRN
Status: DISCONTINUED | OUTPATIENT
Start: 2018-06-26 | End: 2018-06-26 | Stop reason: HOSPADM

## 2018-06-26 RX ORDER — WARFARIN SODIUM 5 MG/1
5-7.5 TABLET ORAL EVERY EVENING
Qty: 30 TAB | Refills: 3 | Status: SHIPPED | OUTPATIENT
Start: 2018-06-26 | End: 2018-07-26 | Stop reason: SDUPTHER

## 2018-06-26 RX ORDER — SODIUM CHLORIDE 9 MG/ML
500 INJECTION, SOLUTION INTRAVENOUS ONCE
Status: COMPLETED | OUTPATIENT
Start: 2018-06-26 | End: 2018-06-26

## 2018-06-26 RX ADMIN — SODIUM CHLORIDE 500 ML: 9 INJECTION, SOLUTION INTRAVENOUS at 07:54

## 2018-06-26 RX ADMIN — HYDROCODONE BITARTRATE AND ACETAMINOPHEN 1 TABLET: 10; 325 TABLET ORAL at 17:00

## 2018-06-26 RX ADMIN — SODIUM CHLORIDE 500 ML: 9 INJECTION, SOLUTION INTRAVENOUS at 04:55

## 2018-06-26 RX ADMIN — FENTANYL CITRATE 25 MCG: 50 INJECTION INTRAMUSCULAR; INTRAVENOUS at 09:18

## 2018-06-26 RX ADMIN — LIDOCAINE HYDROCHLORIDE 1 ML: 10 INJECTION, SOLUTION INFILTRATION; PERINEURAL at 09:39

## 2018-06-26 RX ADMIN — GABAPENTIN 600 MG: 300 CAPSULE ORAL at 05:03

## 2018-06-26 RX ADMIN — GABAPENTIN 600 MG: 300 CAPSULE ORAL at 13:17

## 2018-06-26 RX ADMIN — HYDROCODONE BITARTRATE AND ACETAMINOPHEN 1 TABLET: 10; 325 TABLET ORAL at 11:14

## 2018-06-26 RX ADMIN — SODIUM CHLORIDE 500 ML: 9 INJECTION, SOLUTION INTRAVENOUS at 00:00

## 2018-06-26 RX ADMIN — MIDODRINE HYDROCHLORIDE 10 MG: 5 TABLET ORAL at 07:53

## 2018-06-26 RX ADMIN — MIDODRINE HYDROCHLORIDE 10 MG: 5 TABLET ORAL at 13:17

## 2018-06-26 RX ADMIN — SEVELAMER CARBONATE 4000 MG: 800 TABLET, FILM COATED ORAL at 09:21

## 2018-06-26 RX ADMIN — LEVETIRACETAM 250 MG: 250 TABLET ORAL at 05:03

## 2018-06-26 RX ADMIN — SEVELAMER CARBONATE 4000 MG: 800 TABLET, FILM COATED ORAL at 13:16

## 2018-06-26 RX ADMIN — CINACALCET HYDROCHLORIDE 60 MG: 30 TABLET, COATED ORAL at 13:15

## 2018-06-26 ASSESSMENT — PAIN SCALES - GENERAL
PAINLEVEL_OUTOF10: 5
PAINLEVEL_OUTOF10: 8
PAINLEVEL_OUTOF10: 8
PAINLEVEL_OUTOF10: 1
PAINLEVEL_OUTOF10: 7
PAINLEVEL_OUTOF10: 8
PAINLEVEL_OUTOF10: 2

## 2018-06-26 ASSESSMENT — ENCOUNTER SYMPTOMS
MYALGIAS: 1
NAUSEA: 0
VOMITING: 0
WEAKNESS: 1
BACK PAIN: 1

## 2018-06-26 NOTE — OP REPORT
DATE OF SERVICE:  06/25/2018    PREOPERATIVE DIAGNOSIS:  Bleeding abdominal wall varices.    POSTOPERATIVE DIAGNOSIS:  Bleeding abdominal wall varices.    PROCEDURE:  Ligation of the abdominal wall varices.    SURGEON:  Samson Rosenbaum MD    ANESTHESIOLOGIST:  Satya Ely MD    INDICATION FOR PROCEDURE:  The patient is a 37-year-old well known to me.  He   was admitted with bleeding yesterday.  Today, he began to have more bleeding.    I was eventually called to see the patient.  I examined the patient at   bedside.  There was extensive hemorrhage from the patient's abdomen.  It could   not be adequately dealt with at the bedside.  I could not even assess the   situation.  I felt this was an urgent situation and I have called the   operating room for an emergency.  The patient and his family were well   informed of the risks, benefits and alternatives and participated in the   decision to proceed.    DESCRIPTION OF PROCEDURE:  The patient was taken to the operating room.  He   was placed under general anesthesia.  He had a compression dressing on that   seemed to be controlling the bleeding.  He was then draped over the area.  The   dressings were removed.  There was immediate gush of blood from the varices   and Betadine was then placed over the wound.  I was able to control the   bleeding with direct pressure using lap pads and then isolate the areas where   there appeared to be the biggest bleeding, ligation sutures of 2-0 Prolene   were then placed across bleeding areas.  There were at least 5 bleeding   varices in the skin itself.  There is no significant wound per se.  These   varices have just eroded through the skin.  Having controlled these areas of   bleeding, I placed Surgisis cell over the area.  This was followed by a   sterile compressive wrap.  The patient tolerated the procedure well.  He was   taken back to the recovery room in stable condition.  Needle, sponge, and   instrument counts were  correct.       ____________________________________     MD YOVANI LYNNE    DD:  06/25/2018 20:21:40  DT:  06/25/2018 20:49:54    D#:  [id number]  Job#:  [id number]

## 2018-06-26 NOTE — OR NURSING
Pt alert and oriented x4. Pt denies headache, light headedness or dizziness. Pt has low BP (see Flowsheets). Notified Dr. Ely, anesthesiologist. MD okay for Pt to go back to his room. Pt reports significant pain to abdomen. Pain medications given. Pt calm at this time. Pt denies nausea or vomiting. Dressing to abdomen, CDI. Abdominal binder in place. Report given to LETY Garibay. Pt's father,Kenneth, updated. Awaiting transport.

## 2018-06-26 NOTE — PROGRESS NOTES
Dr. Rosenbaum at bedside.  Patient refusing blood transfusion at this time. Patient wants to speak with his parents first and will think about it.

## 2018-06-26 NOTE — PROGRESS NOTES
Manual pressure maintained, pt is not bleeding though towel that is over. VS checked q5mins and maintained stable.

## 2018-06-26 NOTE — DISCHARGE INSTRUCTIONS
Discharge Instructions    Discharged to home by car with relative. Discharged via wheelchair, hospital escort: Refused.  Special equipment needed: Not Applicable    Be sure to schedule a follow-up appointment with your primary care doctor or any specialists as instructed.     Discharge Plan:   Diet Plan: Discussed  Activity Level: Discussed  Confirmed Follow up Appointment: Appointment Scheduled  Confirmed Symptoms Management: Discussed  Medication Reconciliation Updated: Yes  Pneumococcal Vaccine Administered/Refused: Not given - Patient refused pneumococcal vaccine  Influenza Vaccine Indication: Not indicated: Previously immunized this influenza season and > 8 years of age    I understand that a diet low in cholesterol, fat, and sodium is recommended for good health. Unless I have been given specific instructions below for another diet, I accept this instruction as my diet prescription.   Other diet: renal    Special Instructions: None    · Is patient discharged on Warfarin / Coumadin?   Yes    You are receiving the drug warfarin. Please understand the importance of monitoring warfarin with scheduled PT/INR blood draws.  Follow-up with a call to your personal Doctor's office in 3 days to schedule a PT/INR.    IMPORTANT: HOW TO USE THIS INFORMATION:  This is a summary and does NOT have all possible information about this product. This information does not assure that this product is safe, effective, or appropriate for you. This information is not individual medical advice and does not substitute for the advice of your health care professional. Always ask your health care professional for complete information about this product and your specific health needs.      WARFARIN - ORAL (WARF-uh-rin)      COMMON BRAND NAME(S): Coumadin      WARNING:  Warfarin can cause very serious (possibly fatal) bleeding. This is more likely to occur when you first start taking this medication or if you take too much warfarin. To  "decrease your risk for bleeding, your doctor or other health care provider will monitor you closely and check your lab results (INR test) to make sure you are not taking too much warfarin. Keep all medical and laboratory appointments. Tell your doctor right away if you notice any signs of serious bleeding. See also Side Effects section.      USES:  This medication is used to treat blood clots (such as in deep vein thrombosis-DVT or pulmonary embolus-PE) and/or to prevent new clots from forming in your body. Preventing harmful blood clots helps to reduce the risk of a stroke or heart attack. Conditions that increase your risk of developing blood clots include a certain type of irregular heart rhythm (atrial fibrillation), heart valve replacement, recent heart attack, and certain surgeries (such as hip/knee replacement). Warfarin is commonly called a \"blood thinner,\" but the more correct term is \"anticoagulant.\" It helps to keep blood flowing smoothly in your body by decreasing the amount of certain substances (clotting proteins) in your blood.      HOW TO USE:  Read the Medication Guide provided by your pharmacist before you start taking warfarin and each time you get a refill. If you have any questions, ask your doctor or pharmacist. Take this medication by mouth with or without food as directed by your doctor or other health care professional, usually once a day. It is very important to take it exactly as directed. Do not increase the dose, take it more frequently, or stop using it unless directed by your doctor. Dosage is based on your medical condition, laboratory tests (such as INR), and response to treatment. Your doctor or other health care provider will monitor you closely while you are taking this medication to determine the right dose for you. Use this medication regularly to get the most benefit from it. To help you remember, take it at the same time each day. It is important to eat a balanced, consistent " diet while taking warfarin. Some foods can affect how warfarin works in your body and may affect your treatment and dose. Avoid sudden large increases or decreases in your intake of foods high in vitamin K (such as broccoli, cauliflower, cabbage, brussels sprouts, kale, spinach, and other green leafy vegetables, liver, green tea, certain vitamin supplements). If you are trying to lose weight, check with your doctor before you try to go on a diet. Cranberry products may also affect how your warfarin works. Limit the amount of cranberry juice (16 ounces/480 milliliters a day) or other cranberry products you may drink or eat.      SIDE EFFECTS:  Nausea, loss of appetite, or stomach/abdominal pain may occur. If any of these effects persist or worsen, tell your doctor or pharmacist promptly. Remember that your doctor has prescribed this medication because he or she has judged that the benefit to you is greater than the risk of side effects. Many people using this medication do not have serious side effects. This medication can cause serious bleeding if it affects your blood clotting proteins too much (shown by unusually high INR lab results). Even if your doctor stops your medication, this risk of bleeding can continue for up to a week. Tell your doctor right away if you have any signs of serious bleeding, including: unusual pain/swelling/discomfort, unusual/easy bruising, prolonged bleeding from cuts or gums, persistent/frequent nosebleeds, unusually heavy/prolonged menstrual flow, pink/dark urine, coughing up blood, vomit that is bloody or looks like coffee grounds, severe headache, dizziness/fainting, unusual or persistent tiredness/weakness, bloody/black/tarry stools, chest pain, shortness of breath, difficulty swallowing. Tell your doctor right away if any of these unlikely but serious side effects occur: persistent nausea/vomiting, severe stomach/abdominal pain, yellowing eyes/skin. This drug rarely has caused very  serious (possibly fatal) problems if its effects lead to small blood clots (usually at the beginning of treatment). This can lead to severe skin/tissue damage that may require surgery or amputation if left untreated. Patients with certain blood conditions (protein C or S deficiency) may be at greater risk. Get medical help right away if any of these rare but serious side effects occur: painful/red/purplish patches on the skin (such as on the toe, breast, abdomen), change in the amount of urine, vision changes, confusion, slurred speech, weakness on one side of the body. A very serious allergic reaction to this drug is rare. However, get medical help right away if you notice any symptoms of a serious allergic reaction, including: rash, itching/swelling (especially of the face/tongue/throat), severe dizziness, trouble breathing. This is not a complete list of possible side effects. If you notice other effects not listed above, contact your doctor or pharmacist. In the [place] - Call your doctor for medical advice about side effects. You may report side effects to FDA at 2-384-RMN-6443. In [place] - Call your doctor for medical advice about side effects. You may report side effects to [facility] [place] at 1-444.900.2171.      PRECAUTIONS:  Before taking warfarin, tell your doctor or pharmacist if you are allergic to it; or if you have any other allergies. This product may contain inactive ingredients, which can cause allergic reactions or other problems. Talk to your pharmacist for more details. Before using this medication, tell your doctor or pharmacist your medical history, especially of: blood disorders (such as anemia, hemophilia), bleeding problems (such as bleeding of the stomach/intestines, bleeding in the brain), blood vessel disorders (such as aneurysms), recent major injury/surgery, liver disease, alcohol use, mental/mood disorders (including memory problems), frequent falls/injuries. It is important that all your  doctors and dentists know that you take warfarin. Before having surgery or any medical/dental procedures, tell your doctor or dentist that you are taking this medication and about all the products you use (including prescription drugs, nonprescription drugs, and herbal products). Avoid getting injections into the muscles. If you must have an injection into a muscle (for example, a flu shot), it should be given in the arm. This way, it will be easier to check for bleeding and/or apply pressure bandages. This medication may cause stomach bleeding. Daily use of alcohol while using this medicine will increase your risk for stomach bleeding and may also affect how this medication works. Limit or avoid alcoholic beverages. If you have not been eating well, if you have an illness or infection that causes fever, vomiting, or diarrhea for more than 2 days, or if you start using any antibiotic medications, contact your doctor or pharmacist immediately because these conditions can affect how warfarin works. This medication can cause heavy bleeding. To lower the chance of getting cut, bruised, or injured, use great caution with sharp objects like safety razors and nail cutters. Use an electric razor when shaving and a soft toothbrush when brushing your teeth. Avoid activities such as contact sports. If you fall or injure yourself, especially if you hit your head, call your doctor immediately. Your doctor may need to check you. The Food & Drug Administration has stated that generic warfarin products are interchangeable. However, consult your doctor or pharmacist before switching warfarin products. Be careful not to take more than one medication that contains warfarin unless specifically directed by the doctor or health care provider who is monitoring your warfarin treatment. Older adults may be at greater risk for bleeding while using this drug. This medication is not recommended for use during pregnancy because of serious  "(possibly fatal) harm to an unborn baby. Discuss the use of reliable forms of birth control with your doctor. If you become pregnant or think you may be pregnant, tell your doctor immediately. If you are planning pregnancy, discuss a plan for managing your condition with your doctor before you become pregnant. Your doctor may switch the type of medication you use during pregnancy. Very small amounts of this medication may pass into breast milk but is unlikely to harm a nursing infant. Consult your doctor before breast-feeding.      DRUG INTERACTIONS:  Drug interactions may change how your medications work or increase your risk for serious side effects. This document does not contain all possible drug interactions. Keep a list of all the products you use (including prescription/nonprescription drugs and herbal products) and share it with your doctor and pharmacist. Do not start, stop, or change the dosage of any medicines without your doctor's approval. Warfarin interacts with many prescription, nonprescription, vitamin, and herbal products. This includes medications that are applied to the skin or inside the vagina or rectum. The interactions with warfarin usually result in an increase or decrease in the \"blood-thinning\" (anticoagulant) effect. Your doctor or other health care professional should closely monitor you to prevent serious bleeding or clotting problems. While taking warfarin, it is very important to tell your doctor or pharmacist of any changes in medications, vitamins, or herbal products that you are taking. Some products that may interact with this drug include: capecitabine, imatinib, mifepristone. Aspirin, aspirin-like drugs (salicylates), and nonsteroidal anti-inflammatory drugs (NSAIDs such as ibuprofen, naproxen, celecoxib) may have effects similar to warfarin. These drugs may increase the risk of bleeding problems if taken during treatment with warfarin. Carefully check all " prescription/nonprescription product labels (including drugs applied to the skin such as pain-relieving creams) since the products may contain NSAIDs or salicylates. Talk to your doctor about using a different medication (such as acetaminophen) to treat pain/fever. Low-dose aspirin and related drugs (such as clopidogrel, ticlopidine) should be continued if prescribed by your doctor for specific medical reasons such as heart attack or stroke prevention. Consult your doctor or pharmacist for more details. Many herbal products interact with warfarin. Tell your doctor before taking any herbal products, especially bromelains, coenzyme Q10, cranberry, danshen, dong quai, fenugreek, garlic, ginkgo biloba, ginseng, and Panacea's wort, among others. This medication may interfere with a certain laboratory test to measure theophylline levels, possibly causing false test results. Make sure laboratory personnel and all your doctors know you use this drug.      OVERDOSE:  If overdose is suspected, contact a poison control center or emergency room immediately. [place] residents can call the [place] National Poison Hotline at 1-398.917.2740. [place] residents can call a provincial poison control center. Symptoms of overdose may include: bloody/black/tarry stools, pink/dark urine, unusual/prolonged bleeding.      NOTES:  Do not share this medication with others. Laboratory and/or medical tests (such as INR, complete blood count) must be performed periodically to monitor your progress or check for side effects. Consult your doctor for more details.      MISSED DOSE:  For the best possible benefit, do not miss any doses. If you do miss a dose and remember on the same day, take it as soon as you remember. If you remember on the next day, skip the missed dose and resume your usual dosing schedule. Do not double the dose to catch up because this could increase your risk for bleeding. Keep a record of missed doses to give to your doctor or  pharmacist. Contact your doctor or pharmacist if you miss 2 or more doses in a row.      STORAGE:  Store at room temperature away from light and moisture. Do not store in the bathroom. Keep all medications away from children and pets. Do not flush medications down the toilet or pour them into a drain unless instructed to do so. Properly discard this product when it is  or no longer needed. Consult your pharmacist or local waste disposal company for more details about how to safely discard your product.      MEDICAL ALERT:  Your condition and medication can cause complications in a medical emergency. For information about enrolling in MedicAlert, call 1-164.391.2037 ([place]) or 1-496.598.6028 ([place]).      Information last revised 2010 Copyright(c) 2010 [employer]             Depression / Suicide Risk    As you are discharged from this [facility] facility, it is important to learn how to keep safe from harming yourself.    Recognize the warning signs:  · Abrupt changes in personality, positive or negative- including increase in energy   · Giving away possessions  · Change in eating patterns- significant weight changes-  positive or negative  · Change in sleeping patterns- unable to sleep or sleeping all the time   · Unwillingness or inability to communicate  · Depression  · Unusual sadness, discouragement and loneliness  · Talk of wanting to die  · Neglect of personal appearance   · Rebelliousness- reckless behavior  · Withdrawal from people/activities they love  · Confusion- inability to concentrate     If you or a loved one observes any of these behaviors or has concerns about self-harm, here's what you can do:  · Talk about it- your feelings and reasons for harming yourself  · Remove any means that you might use to hurt yourself (examples: pills, rope, extension cords, firearm)  · Get professional help from the community (Mental Health, Substance Abuse, psychological counseling)  · Do not  be alone:Call your Safe Contact- someone whom you trust who will be there for you.  · Call your local CRISIS HOTLINE 311-5422 or 376-481-0792  · Call your local Children's Mobile Crisis Response Team [place] (891) 413-0207 or www.[employer]  · Call the toll free National Suicide Prevention Hotlines   · National Suicide Prevention Lifeline 965-949-KCDU (2241)  · [employer] Hope Line Network 800-SUICIDE (200-1713)  ·     Hyperkalemia  Introduction  Hyperkalemia is when you have too much potassium in your blood. Potassium is normally removed (excreted) from your body by your kidneys. If there is too much potassium in your blood, it can affect how your heart works.  Follow these instructions at home:  · Take medicines only as told by your doctor.  · Do not take any supplements, natural products, herbs, or vitamins unless your doctor says it is okay.  · Limit your alcohol intake as told by your doctor.  · Stop illegal drug use. If you need help quitting, ask your doctor.  · Keep all follow-up visits as told by your doctor. This is important.  · If you have kidney disease, you may need to follow a low potassium diet. A  (dietitian) can help you.  Contact a doctor if:  · Your heartbeat is not regular or very slow.  · You feel dizzy (light-headed).  · You feel weak.  · You feel sick to your stomach (nauseous).  · You have tingling in your hands or feet.  · You cannot feel your hands or feet.  Get help right away if:  · You are short of breath.  · You have chest pain.  · You pass out (faint).  · You cannot move your muscles.  This information is not intended to replace advice given to you by your health care provider. Make sure you discuss any questions you have with your health care provider.  Document Released: 12/18/2006 Document Revised: 05/25/2017 Document Reviewed: 03/25/2015  © 2017 [employer]    Postsurgical Bleeding  You have developed bleeding after surgery. A little bleeding following  surgery may be normal. Sometimes a second surgery to stop the bleeding is needed.   SYMPTOMS   The problems of bleeding following surgery depend on the amount and location of the bleeding:  · Sometimes there is a little bleeding from a wound following surgery. This is extremely common. It is not usually problem.   · Some surgeries will always have a small amount of bleeding following the surgery. An example of this would be a D & C (dilatation and curettage). This is a procedure where the inside of the uterus is scraped out. Because the surface is raw inside the uterus after the procedure, there is almost always some bleeding or oozing.   · Occasionally there may be a small vessel that either breaks loose from a suture (stitch) or a vessel that was not bleeding during the procedure because of spasm in the vessel. Then when the spasm goes away after surgery, bleeding begins.   · Bleeding into your brain after brain surgery happens occasionally and is very dangerous.   DIAGNOSIS   Your caregiver will often know what is wrong by examining you.   TREATMENT   · The treatment of bleeding problems following surgery will depend on the amount and the location.   · Your caregiver will decide if it is safe to watch this. If the problem does not get better, some additional surgery may be needed.   · Worrisome bleeding may require faster action and more surgery. It may be necessary to take you immediately back to surgery if there is a sudden loss of blood pressure following surgery. There may not be time to consult with family, or even the patient, if the problems are sudden and severe.   · A blood transfusion may be needed.   HOME CARE INSTRUCTIONS  If you have questions about your care, discuss them with your caregiver. Make sure all of your questions are answered.  SEEK MEDICAL CARE IF:  Bleeding is increased, or there is increased pain, swelling, heat or redness in the wound or you develop an unexplained  temperature.  Document Released: 03/09/2006 Document Revised: 03/11/2013 Document Reviewed: 11/13/2008  [employer]® Patient Information ©2013 [employer]

## 2018-06-26 NOTE — CARE PLAN
Problem: Infection  Goal: Will remain free from infection  Outcome: PROGRESSING AS EXPECTED  Maintain proper precautions for recent surgery

## 2018-06-26 NOTE — CARE PLAN
Problem: Venous Thromboembolism (VTW)/Deep Vein Thrombosis (DVT) Prevention:  Goal: Patient will participate in Venous Thrombosis (VTE)/Deep Vein Thrombosis (DVT)Prevention Measures  Outcome: PROGRESSING AS EXPECTED  SCDs on

## 2018-06-26 NOTE — OR SURGEON
Immediate Post OP Note    PreOp Diagnosis: bleeding abdominal varices  PostOp Diagnosis: same    Procedure(s):  ABDOMINAL EXPLORATION- CONTROL ABDOMINAL BLEEDING - Wound Class: Contaminated    Surgeon(s):  Samson Rosenbaum Jr., M.D.    Anesthesiologist/Type of Anesthesia:  Anesthesiologist: Satya Ely M.D./General    Surgical Staff:  Circulator: Barbara Reeder R.N.  Scrub Person: Eva Castro    Specimens removed if any:  * No specimens in log *    Estimated Blood Loss: 100 cc    Findings: bleeding varices    Complications: 0        6/25/2018 8:16 PM Samson Rosenbaum Jr., M.D.

## 2018-06-26 NOTE — PROGRESS NOTES
Received report from PACU RN, according to RN patient [id number] with no complaint of pain. Will be transported to unit shortly

## 2018-06-26 NOTE — PROGRESS NOTES
Paged Dr. Garces regarding patient's decreasing blood pressure, explained to Dr. Garces that patient is asymptomatic and easily aroused. Ordered 500 cc bolus and to monitor. Will continue to monitor.

## 2018-06-26 NOTE — PROGRESS NOTES
"Blood pressure (!) (P) 132/36, pulse (!) (P) 107, temperature (P) 36.4 °C (97.6 °F), resp. rate (P) 19, height 1.626 m (5' 4\"), weight 83.4 kg (183 lb 13.8 oz), SpO2 (P) 94 %.    I/O last 3 completed shifts:  In: 500 [Dialysis:500]  Out: 2100 [Dialysis:2100]  Doing well  OK for DC home from my stdpt  "

## 2018-06-26 NOTE — PROGRESS NOTES
3hr HD started @ 0943 and completed @ 1243,tx well tolerated,net UF = 2150ml,VSS.1 unit RBC transfused during HD w/o adversed rxn noted.R thigh AVG +B/T,cannulation sites covered with DD,CDI,report given to SAMEERA Ayala RN.

## 2018-06-26 NOTE — PROGRESS NOTES
During post op vital signs, pt blood pressure remainder less than or equal to 70 SBP. Paged hospitalist on call for possible light bolus of 500 cc. Light bolus ordered. Will continue to monitor

## 2018-06-26 NOTE — PROGRESS NOTES
Dr. Alcantar notified of patient Hemoglobin of 6.8 and low Blood pressures (SBP 70-80's). Received orders for 2 units PRBC's and 500cc Bolus.

## 2018-06-26 NOTE — CONSULTS
DATE OF SERVICE:  06/25/2018    CHIEF COMPLAINT:  Bleeding from abdominal varices.    HISTORY OF PRESENT ILLNESS:  Patient is a 37-year-old white male who is well   known to me.  He has had a long history of end-stage renal disease.  He has   had a number of different surgeries.  He had a surgery over a year ago on his   abdomen that has been extremely slow to heal.  He has had a skin graft over   the area, but was just readmitted a couple of days ago with bleeding from one   of his abdominal varices.  He was admitted.  He was taken off anticoagulation,   but I was called today because he was continuing to bleed.  I saw the patient   upstairs in his room.    PAST MEDICAL HISTORY:  Well known and well documented.    MEDICATIONS:  Reviewed.    REVIEW OF SYSTEMS:  Negative for headache, fever, visual disturbances, nausea,   vomiting, chest pain, cough, abdominal pain, hematochezia, melena or   diarrhea.    PHYSICAL EXAMINATION:  GENERAL:  He is lying in bed.  He is in some distress.  HEENT:  He is normocephalic, atraumatic.  NECK:  Supple.  CHEST:  Clear.    ASSESSMENT AND PLAN:  She has got abdominal varices that are bleeding   profusely.  These are all unable to be controlled with pressure and I felt   urgent return to the operating room for exploration of this area will be   absolutely necessary.  The patient I feel could be in danger of exsanguination   if this is not dealt with expeditiously.  The patient and his family are well   informed of the risks, benefits and alternatives and they understand the   urgency of the situation.       ____________________________________     MD YOVANI LYNNE / RIAZ    DD:  06/25/2018 19:20:31  DT:  06/25/2018 20:22:07    D#:  [id number]  Job#:  [id number]

## 2018-06-26 NOTE — PROGRESS NOTES
Hospital Medicine Progress Note, Adult, Complex               Author: Fadi Najjar Date & Time created: 6/26/2018  3:21 PM     Interval History:  Pt with ESRD, presented with abdominal wall varicosities bleeding  Seen and examined while getting HD.  Getting blood transfusion     Review of Systems:  Review of Systems   Constitutional: Positive for malaise/fatigue.   Gastrointestinal: Negative for nausea and vomiting.   Genitourinary: Negative for dysuria and urgency.   Musculoskeletal: Positive for back pain and myalgias.   Neurological: Positive for weakness.       Physical Exam:  Physical Exam   Constitutional: No distress.   Pulmonary/Chest: Effort normal.   Musculoskeletal: He exhibits no edema.   Skin: He is not diaphoretic.   Nursing note and vitals reviewed.      Labs:        Invalid input(s): [id number]      Recent Labs      06/24/18   1615 06/25/18   0623 06/25/18   2250   SODIUM  136  136  137   POTASSIUM  6.0*  7.1*  6.3*   CHLORIDE  95*  94*  97   CO2  22  22  26   BUN  86*  91*  38*   CREATININE  11.97*  12.86*  9.07*   MAGNESIUM   --   2.4   --    CALCIUM  7.8*  8.6  8.4*     Recent Labs      06/24/18   1615  06/25/18   0623  06/25/18   2250   ALTSGPT  9   --    --    ASTSGOT  9*   --    --    ALKPHOSPHAT  110*   --    --    TBILIRUBIN  0.4   --    --    GLUCOSE  121*  95  111*     Recent Labs      06/24/18   1615  06/25/18   0436  06/25/18   0623   06/25/18   1727  06/25/18   2250  06/26/18   0640  06/26/18   0641   RBC  2.67*   --   2.64*   --    --   2.57*   --    --    HEMOGLOBIN  7.9*   --   7.4*   < >  7.9*  7.4*   --   6.8*   HEMATOCRIT  25.0*   --   24.8*   < >  25.2*  23.8*   --   22.5*   PLATELETCT  188   --   176   --    --   219   --    --    PROTHROMBTM  41.6*  42.8*   --    --    --    --   31.3*   --    APTT  93.2*   --    --    --    --    --    --    --    INR  4.37*  4.53*   --    --    --    --   3.06*   --     < > = values in this interval not displayed.     Recent Labs       18   1615  18   0623  18   2250   WBC  4.6*  4.3*  7.1   NEUTSPOLYS  67.70  52.90   --    LYMPHOCYTES  17.90*  29.90   --    MONOCYTES  8.90  11.10   --    EOSINOPHILS  4.50  4.40   --    BASOPHILS  0.60  1.20   --    ASTSGOT  9*   --    --    ALTSGPT  9   --    --    ALKPHOSPHAT  110*   --    --    TBILIRUBIN  0.4   --    --            Hemodynamics:  Temp (24hrs), Av.5 °C (97.7 °F), Min:36.2 °C (97.2 °F), Max:37.1 °C (98.8 °F)  Temperature: 36.3 °C (97.4 °F)  Pulse  Av.3  Min: 73  Max: 129Heart Rate (Monitored): 100  Blood Pressure: (!) 97/33 (Nurse notified.), NIBP: (!) 88/19     Respiratory:    Respiration: 18, Pulse Oximetry: 95 %     Work Of Breathing / Effort: Mild  RUL Breath Sounds: Clear, RML Breath Sounds: Clear, RLL Breath Sounds: Clear, ANGEL Breath Sounds: Clear, LLL Breath Sounds: Clear  Fluids:    Intake/Output Summary (Last 24 hours) at 18 1521  Last data filed at 18 1400   Gross per 24 hour   Intake             1090 ml   Output             3000 ml   Net            -1910 ml        GI/Nutrition:  Orders Placed This Encounter   Procedures   • Diet Order Renal     Standing Status:   Standing     Number of Occurrences:   1     Order Specific Question:   Diet:     Answer:   Renal [8]     Medical Decision Making, by Problem:  Active Hospital Problems    Diagnosis   • *Bleeding abdominal varicosities [I86.8]   • RLS (restless legs syndrome) [G25.81]   • 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

## 2018-06-26 NOTE — PROGRESS NOTES
Patient and family updated on plan to discharge. Patient requesting to take evening medications when he gets home.

## 2018-06-26 NOTE — DISCHARGE SUMMARY
Discharge Summary    CHIEF COMPLAINT ON ADMISSION  Chief Complaint   Patient presents with   • Wound Check     Pt has hx of plastic surgery and skin grafting for vascular issues. Pt states that he had surgery on RLQ of abdomen aprox 1 year ago this month. Pt states bleeding started Wednesday. Bleeding has been intermittent until today and bleeding has not stopped.       Reason for Admission  Abdominal Wound     Admission Date  6/24/2018    CODE STATUS  Full Code    HPI & HOSPITAL COURSE  7 y.o. male admitted 6/24/2018 with hyperkalemia him a supratherapeutic INR, bleeding abdominal varicosities.   Patient emergently dialyzed by nephrology. Patient complains of bruising around his abdominal varices site. Wound care consult ordered. Patient complains of uncontrolled pain, patient started on Norco and IV morphine when necessary.  Patient was also seen by plastic surgeon Dr. Rosenbaum, a ligation of the abdominal wall varices was performed and patient's bleeding stopped.  Postoperatively patient still had anemia and required transfusion.  Patient's hyperkalemia also corrected by dialysis.  Patient received 2 section of dialysis during the hospital stay.  Patient's hyperkalemia resolved and currently 3.8.  Patient feels better and wants to go home.  Patient clinically significantly improved and stable and ready to be discharged home.  Patient is encouraged to follow-up with outpatient clinic as regular scheduled.       Therefore, he is discharged in good and stable condition to home with close outpatient follow-up.    The patient met 2-midnight criteria for an inpatient stay at the time of discharge.    Discharge Date  6/26/2018    FOLLOW UP ITEMS POST DISCHARGE  none    DISCHARGE DIAGNOSES  Principal Problem (Resolved):    Bleeding abdominal varicosities POA: Yes  Active Problems:    Sleep apnea POA: Yes      Overview: Uses BiPap---    [facility]    Vertebral compression fracture (HCC) POA: Yes      Overview: Lower  thoracic and upper lumbar spine 2013    ESRD on hemodialysis (HCC) POA: Yes    Seizure disorder (CMS-HCC) POA: Yes      Overview: April 2016. [facility]. Workup negative. Patient started on       Keppra 375 mg by mouth daily.    Hemorrhagic disorder due to circulating anticoagulants (HCC) POA: Yes    IVC thrombosis (HCC) POA: Yes    RLS (restless legs syndrome) POA: Yes  Resolved Problems:    Acute blood loss anemia POA: Yes    Hyperkalemia POA: Yes      FOLLOW UP  Future Appointments  Date Time Provider Department Center   6/28/2018 12:15 PM [facility]   7/12/2018 9:20 AM EDISON [facility] None     No follow-up provider specified.    MEDICATIONS ON DISCHARGE     Medication List      CHANGE how you take these medications      Instructions   warfarin 5 MG Tabs  What changed:  additional instructions  Commonly known as:  COUMADIN   Take 1-1.5 Tabs by mouth every evening. Need to hold until rechecked by warfarin clinic on thursday  Dose:  5-7.5 mg        CONTINUE taking these medications      Instructions   calcitRIOL 0.25 MCG Caps  Commonly known as:  ROCALTROL   Take 0.75 mcg by mouth every bedtime.  Dose:  0.75 mcg     cinacalcet 30 MG Tabs  Commonly known as:  SENSIPAR   Take 30-60 mg by mouth every day. 30 mg Mondays , Wednesdays , and Fridays. All other days 60 mg  Dose:  30-60 mg     gabapentin 600 MG tablet  Commonly known as:  NEURONTIN   Take 600 mg by mouth 3 times a day.  Dose:  600 mg     levETIRAcetam 250 MG tablet  Commonly known as:  KEPPRA   Take 1 Tab by mouth 2 times a day.  Dose:  250 mg     midodrine 10 MG tablet  Commonly known as:  PROAMATINE   Take 1 Tab by mouth 3 times a day, with meals. Can hold dose if blood pressure greater than 120 systolic.  Dose:  10 mg     RENVELA 800 MG Tabs tablet  Generic drug:  sevelamer carbonate   Take 4,000 mg by mouth 3 times a day, with meals. With meals  Dose:  4000 mg            Allergies  Allergies   Allergen  Reactions   • Baclofen Unspecified     Total loss of memory, sedation.   RXN=6/2015   • Contrast Media With Iodine [Iodine] Rash     RXN=1/5/2017   • Keflex Rash     RXN=possibly >10 years   • Pcn [Penicillins] Rash     RXN=possibly >10 years ago  Tolerated Zosyn on 2/20/18   • Tape Rash     Paper tape and tegaderm ok  RXN=ongoing       DIET  Orders Placed This Encounter   Procedures   • Diet Order Renal     Standing Status:   Standing     Number of Occurrences:   1     Order Specific Question:   Diet:     Answer:   Renal [8]       ACTIVITY  As tolerated.  Weight bearing as tolerated    CONSULTATIONS  Plastic surgeon  Wound care  Nephrology    PROCEDURES  Ligation of the abdominal wall varices.    DX-CHEST-PORTABLE (1 VIEW)   Final Result      1.  Hypoinflation without other evidence for acute cardiopulmonary disease.   2.  Prominent superior mediastinum unchanged from prior exam.          PE:  Gen: AAOx3, NAD  Eyes: PELLA  Neck: no JVD, no lymphadenopathy  Cardia: RRR, no mrg  Lungs: CTAB, no rales, rhonci or wheezing  Abd: NABS, soft, non extended, no mass  EXT: No C/C/E, peripheral pulse 2+ b/l  Neuro: CN II-XII intact, non focal, reflex 2+ symmetrical  Skin: Intact, no lesion, warm  Psych: Appropriate.      LABORATORY  Lab Results   Component Value Date    SODIUM 137 06/25/2018    POTASSIUM 3.8 06/26/2018    CHLORIDE 97 06/25/2018    CO2 26 06/25/2018    GLUCOSE 111 (H) 06/25/2018    BUN 38 (H) 06/25/2018    CREATININE 9.07 (HH) 06/25/2018    CREATININE 8.2 (HH) 05/06/2009        Lab Results   Component Value Date    WBC 7.1 06/25/2018    HEMOGLOBIN 6.8 (L) 06/26/2018    HEMATOCRIT 22.5 (L) 06/26/2018    PLATELETCT 219 06/25/2018        Total time of the discharge process exceeds 35 minutes.

## 2018-06-26 NOTE — OR NURSING
Pt via bed, accompanied by transport and ACLS RN, was transferred to [facility] at 2213. Pt reports 2/10 pain scale at this time. Notified LETY Garibay regarding Pt's arrival.

## 2018-06-26 NOTE — PROGRESS NOTES
Pt back on floor from dialysis, monitor room notified. Per dialysis RN, patient had 2.3L taken out, tolerated the dialysis and blood transfusion well with no reactions. BP stable throughout dialysis and blood transfusion as well. No s/s bleeding at fistula site. Will continue to monitor.

## 2018-06-26 NOTE — PROGRESS NOTES
Report received, pt care assumed, tele box on. Pt sleeping with no signs of distress noted at this time. Patient has abdominal binder on. Per night shift RN, patient BP low, gave x2 500cc bolus. Will continue to monitor. Bed in lowest position, call light within reach.

## 2018-06-26 NOTE — PROGRESS NOTES
Dr Rosenbaum called author, states to hold manual pressure for any bleeding. Dr. Rosenbaum states he will be at bedside as soon as he is available for intervention.

## 2018-06-27 NOTE — PROGRESS NOTES
Discharge instructions given to patient and family, verbalizes understanding and states plans for follow-up with PCP. New and home medication review, post-discharge activity level and worsening of symptoms needing follow-up care discussed. Telemetry monitor/IV cathlon removed. All belongings accounted for, all questions answered at this time. Patient escorted via wheelchair to car.

## 2018-06-28 ENCOUNTER — ANTICOAGULATION VISIT (OUTPATIENT)
Dept: VASCULAR LAB | Facility: MEDICAL CENTER | Age: 38
End: 2018-06-28
Attending: INTERNAL MEDICINE
Payer: MEDICARE

## 2018-06-28 DIAGNOSIS — T82.868D THROMBOSIS OF ARTERIOVENOUS GRAFT, SUBSEQUENT ENCOUNTER: ICD-10-CM

## 2018-06-28 LAB
INR BLD: 2.6 (ref 0.9–1.2)
INR PPP: 2.6 (ref 2–3.5)

## 2018-06-28 PROCEDURE — 85610 PROTHROMBIN TIME: CPT

## 2018-06-28 PROCEDURE — 99211 OFF/OP EST MAY X REQ PHY/QHP: CPT

## 2018-06-28 NOTE — PROGRESS NOTES
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 Hopkins      Anticoagulation Care Providers     Provider Role Specialty Phone number    Jairo Hopkins M.D. Referring Surgery [phone]    [facility] Anticoagulation Services Responsible  [phone]        Anticoagulation Patient Findings  Patient Findings     Negatives:   Signs/symptoms of thrombosis, Signs/symptoms of bleeding, Laboratory test error suspected, Change in health, Change in alcohol use, Change in activity, Upcoming invasive procedure, Emergency department visit, Upcoming dental procedure, Missed doses, Extra doses, Change in medications, Change in diet/appetite, Hospital admission, Bruising, Other complaints        History of Present Illness: follow up appointment for chronic anticoagulation with the high risk medication, warfarin for AV graft thrombosis.  Pt was recently hospitalized with emergency surgery on his abdomen for bleeding.  Pt has been off warfarin since then, and will continue to hold until he follows up with his plastic surgeon tomorrow..    Follow up in 1 weeks, to reduce risk of adverse events related to this high risk medication,  Warfarin.    Renetta Dunlap, PharmD

## 2018-06-29 ENCOUNTER — APPOINTMENT (OUTPATIENT)
Dept: VASCULAR LAB | Facility: MEDICAL CENTER | Age: 38
End: 2018-06-29
Attending: INTERNAL MEDICINE
Payer: MEDICARE

## 2018-07-03 LAB — EKG IMPRESSION: NORMAL

## 2018-07-05 ENCOUNTER — ANTICOAGULATION VISIT (OUTPATIENT)
Dept: VASCULAR LAB | Facility: MEDICAL CENTER | Age: 38
End: 2018-07-05
Attending: INTERNAL MEDICINE
Payer: MEDICARE

## 2018-07-05 DIAGNOSIS — T82.868D THROMBOSIS OF ARTERIOVENOUS GRAFT, SUBSEQUENT ENCOUNTER: ICD-10-CM

## 2018-07-05 LAB — INR PPP: 1.4 (ref 2–3.5)

## 2018-07-05 PROCEDURE — 99212 OFFICE O/P EST SF 10 MIN: CPT | Performed by: NURSE PRACTITIONER

## 2018-07-05 PROCEDURE — 85610 PROTHROMBIN TIME: CPT

## 2018-07-05 NOTE — PROGRESS NOTES
Anticoagulation Summary  As of 7/5/2018    INR goal:   2.5-3.5   TTR:   36.9 % (1.2 y)   Today's INR:   1.4!   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 Hopkins      Anticoagulation Care Providers     Provider Role Specialty Phone number    Jairo Hopkins M.D. Referring Surgery [phone]    [facility] Anticoagulation Services Responsible  [phone]        Anticoagulation Patient Findings      HPI:  Denis De Anda seen in clinic today for follow up on anticoagulation therapy in the presence of AV graft thrombosis. Recently had bleeding from a recent skin graft site to his right abdomen. Had surgery last week with Dr. Rosenbaum to stop bleeding. Mild blood noted to abdominal dressing but no continuous bleeding. He restarted warfarin this past Sunday. Took 2.5 mg on Monday instead of 7.5 mg. Denies any medication or diet changes.    A/P:   INR subtherapeutic. Will not give loading doses due to recent bleeding episode from abdominal wound. Continue current regimen.     Follow up appointment in 1 week(s).    Next Appointment: Thursday, July 12, 2018 at 8:45 am.     Hanna LARKIN

## 2018-07-06 LAB — INR BLD: 1.4 (ref 0.9–1.2)

## 2018-07-12 ENCOUNTER — ANTICOAGULATION VISIT (OUTPATIENT)
Dept: VASCULAR LAB | Facility: MEDICAL CENTER | Age: 38
End: 2018-07-12
Attending: INTERNAL MEDICINE
Payer: MEDICARE

## 2018-07-12 ENCOUNTER — OFFICE VISIT (OUTPATIENT)
Dept: HEMATOLOGY ONCOLOGY | Facility: MEDICAL CENTER | Age: 38
End: 2018-07-12
Payer: MEDICARE

## 2018-07-12 ENCOUNTER — HOSPITAL ENCOUNTER (OUTPATIENT)
Facility: MEDICAL CENTER | Age: 38
End: 2018-07-12
Attending: INTERNAL MEDICINE
Payer: MEDICARE

## 2018-07-12 VITALS
OXYGEN SATURATION: 94 % | RESPIRATION RATE: 18 BRPM | SYSTOLIC BLOOD PRESSURE: 88 MMHG | DIASTOLIC BLOOD PRESSURE: 48 MMHG | BODY MASS INDEX: 31.54 KG/M2 | HEIGHT: 64 IN | WEIGHT: 184.75 LBS | HEART RATE: 122 BPM | TEMPERATURE: 97.8 F

## 2018-07-12 DIAGNOSIS — T82.868A THROMBOSIS OF ARTERIOVENOUS GRAFT, INITIAL ENCOUNTER (HCC): ICD-10-CM

## 2018-07-12 DIAGNOSIS — T82.868D THROMBOSIS OF ARTERIOVENOUS GRAFT, SUBSEQUENT ENCOUNTER: ICD-10-CM

## 2018-07-12 LAB
INR BLD: 2.6 (ref 0.9–1.2)
INR PPP: 2.6 (ref 2–3.5)

## 2018-07-12 PROCEDURE — 81240 F2 GENE: CPT

## 2018-07-12 PROCEDURE — 81241 F5 GENE: CPT

## 2018-07-12 PROCEDURE — 85303 CLOT INHIBIT PROT C ACTIVITY: CPT

## 2018-07-12 PROCEDURE — 85300 ANTITHROMBIN III ACTIVITY: CPT

## 2018-07-12 PROCEDURE — 85306 CLOT INHIBIT PROT S FREE: CPT

## 2018-07-12 PROCEDURE — 85301 ANTITHROMBIN III ANTIGEN: CPT

## 2018-07-12 PROCEDURE — 86147 CARDIOLIPIN ANTIBODY EA IG: CPT | Mod: 91

## 2018-07-12 PROCEDURE — 99211 OFF/OP EST MAY X REQ PHY/QHP: CPT | Performed by: PHARMACIST

## 2018-07-12 PROCEDURE — 85610 PROTHROMBIN TIME: CPT

## 2018-07-12 PROCEDURE — 86146 BETA-2 GLYCOPROTEIN ANTIBODY: CPT | Mod: 91

## 2018-07-12 PROCEDURE — 99204 OFFICE O/P NEW MOD 45 MIN: CPT | Performed by: INTERNAL MEDICINE

## 2018-07-12 RX ORDER — HYDROCODONE BITARTRATE AND ACETAMINOPHEN 10; 325 MG/1; MG/1
2-6 TABLET ORAL
Status: ON HOLD | COMMUNITY
End: 2018-09-12

## 2018-07-12 ASSESSMENT — PAIN SCALES - GENERAL: PAINLEVEL: 3=SLIGHT PAIN

## 2018-07-12 NOTE — PROGRESS NOTES
Consult Note: Hematology    Date of consultation: 7/12/2018     Referring provider: Ursula Carrion A.P.R*    Reason for consultation:   CC: hypercoagulable     History of presenting illness:   First seen in on 7/12/2018  Denis De Anda  is a 37 y.o. year old male with complicated medical history. 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 He had a second transplant in Aug 2001 from his  brother which failed secondary to membranous glomerulonephropathy. He has been on HD since 2004.   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      All other review of systems are negative except what was mentioned above in the HPI.    Past Medical History:    Past Medical History:   Diagnosis Date   • Arrhythmia     irregular EKG   • Chronic kidney disease, unspecified    • Contracture of palmar fascia    • Dialysis      hemodialysis at home 5 days a week   • Graft failure due to thrombosis 3/10/2018   • Hemorrhagic disorder (HCC)     on coumadin   • Hypertension    • Intra-abdominal varices    • Pain     Chronic pain   • Renal failure     hemodialysis   • Seizure (HCC)     last seizure 04/1/2013   • Sleep apnea     BIPAP   • Superior vena cava obstruction with collaterals     from calcium deposits per 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   • Toxic uninodular goiter without mention of thyrotoxic crisis or storm        Past surgical history:    Past Surgical History:   Procedure Laterality Date   • ABDOMINAL EXPLORATION  6/25/2018    Procedure:  ABDOMINAL EXPLORATION- CONTROL ABDOMINAL BLEEDING;  Surgeon: Samson Rosenbaum Jr., M.D.;  Location: SURGERY [facility];  Service: Plastics   • THROMBECTOMY Left 4/28/2018    Procedure: THROMBECTOMY- Thigh W/ Graft;  Surgeon: Jairo Hopkins M.D.;  Location: SURGERY [facility];  Service: General   • THROMBECTOMY Left 4/27/2018    Procedure: THROMBECTOMY - THIGH GRAFT AND REVISION;  Surgeon: Jairo Hopkins M.D.;  Location: SURGERY [facility];  Service: General   • THROMBECTOMY Left 3/9/2018    Procedure: THROMBECTOMY- THIGH DIALYSIS GRAFT AND REVISION  ;  Surgeon: Jairo Hopkins M.D.;  Location: SURGERY [facility];  Service: General   • CATH PLACEMENT Right 3/9/2018    Procedure: CATH PLACEMENT - REPLACE DIALYSIS CATH RIGHT THIGH;  Surgeon: Jairo Hopkins M.D.;  Location: SURGERY [facility];  Service: General   • SPLIT THICKNESS SKIN GRAFT  2/26/2018    Procedure: SPLIT THICKNESS SKIN GRAFT- TO ABDOMEN;  Surgeon: Samson Rosenbaum Jr., M.D.;  Location: SURGERY [facility];  Service: Plastics   • WOUND CLOSURE GENERAL  2/19/2018    Procedure: WOUND CLOSURE GENERAL-ABDOMINAL WALL HEMORRHAGE;  Surgeon: Jason Robertson M.D.;  Location: SURGERY [facility];  Service: Plastics   • WOUND EXPLORATION GENERAL  2/1/2018    Procedure: WOUND EXPLORATION GENERAL, REPAIR BLEEDING;  Surgeon: Samson Rosenbaum Jr., M.D.;  Location: SURGERY [facility];  Service: Plastics   • CATH PLACEMENT Right 11/14/2017    Procedure: CATH PLACEMENT - PERMA;  Surgeon: Jairo Hopkins M.D.;  Location: SURGERY [facility];  Service: General   • AV FISTULA REVISION Left 11/14/2017    Procedure: AV GRAFT REVISION;  Surgeon: Jairo Hopkins M.D.;  Location: SURGERY [facility];  Service: General   • IRRIGATION & DEBRIDEMENT GENERAL  7/31/2017    Procedure: IRRIGATION & DEBRIDEMENT GENERAL-ABDOMEN;  Surgeon: Samson Rosenbaum Jr., M.D.;  Location: SURGERY [facility];  Service:    •  ABDOMINOPLASTY  6/5/2017    Procedure: ABDOMINOPLASTY - FOR PANNICULECTOMY;  Surgeon: Samson Rosenbaum Jr., M.D.;  Location: [facility];  Service:    • SPINAL CORD STIMULATOR N/A 5/4/2017    Procedure: SPINAL CORD STIMULATOR - EXPLANT;  Surgeon: Bin Pro M.D.;  Location: [facility];  Service:    • THROMBECTOMY Left 1/6/2017    Procedure: THROMBECTOMY-OPEN THROMBECTOMY WITH LEFT THIGH GRAFT;  Surgeon: Jairo Hopkins M.D.;  Location: [facility];  Service:    • CATH PLACEMENT Right 1/6/2017    Procedure: CATH PLACEMENT;  Surgeon: Jairo Hopkins M.D.;  Location: [facility];  Service:    • THROMBECTOMY Left 1/5/2017    Procedure: THROMBECTOMY-THIGH AV LOOP GRAFT AND ANGIOJET;  Surgeon: Jairo Hopkins M.D.;  Location: [facility];  Service:    • FLAP CLOSURE Left 11/17/2016    Procedure: Fasciocutaneous Flap Closure Left Upper Leg;  Surgeon: Samson Rosenbaum Jr., M.D.;  Location: [facility];  Service:    • IRRIGATION & DEBRIDEMENT GENERAL Left 10/28/2016    Procedure: IRRIGATION & DEBRIDEMENT GENERAL THIGH WITH IRRIGATING WOUND VAC PLACEMENT;  Surgeon: Jairo Hopkins M.D.;  Location: [facility];  Service:    • THROMBECTOMY Left 10/20/2016    Procedure: THROMBECTOMY THIGH;  Surgeon: Jairo Hopkins M.D.;  Location: [facility];  Service:    • INCISION AND DRAINAGE GENERAL  10/20/2016    Procedure: INCISION AND DRAINAGE GENERAL HEMATOMA;  Surgeon: Jairo Hopkins M.D.;  Location: [facility];  Service:    • THROMBECTOMY Left 9/18/2016    Procedure: THROMBECTOMY - and fistula revision;  Surgeon: Jairo Hopkins M.D.;  Location: [facility];  Service:    • AV FISTULOGRAM  9/18/2016    Procedure: AV FISTULOGRAM;  Surgeon: Jairo Hopkins M.D.;  Location: [facility];  Service:    • CATH PLACEMENT Right 9/17/2016    Procedure: CATH PLACEMENT - tunneled dialysis  cath placement right femoral ;  Surgeon: Quentin Alicia M.D.;  Location: SURGERY [facility];  Service:    • MASS EXCISION GENERAL Right 8/5/2016    Procedure: MASS EXCISION GENERAL FOR CALCIPHYLAXIS SKIN AND SUBCUTANEOUS TISSUE FOREARM;  Surgeon: Jairo Hopkins M.D.;  Location: SURGERY [facility];  Service:    • LESION EXCISION GENERAL Right 7/11/2016    Procedure: LESION EXCISION GENERAL FOR ARM SKIN;  Surgeon: Jairo Hopkins M.D.;  Location: SURGERY [facility];  Service:    • RECOVERY  7/8/2016    Procedure: IR1-VASCULAR CASE-VIANEY-LEFT THIGH AV GRAFT THROMBOLYSIS WITH TISSUE PLASMINOGEN ACTIVATOR AND ANGIOJET ARTHERECTOMY   ;  Surgeon: Melinda Surgery;  Location: SURGERY PRE-POST PROC UNIT [facility] [place];  Service:    • SPINAL CORD STIMULATOR N/A 3/25/2016    Procedure: SPINAL CORD STIMULATOR;  Surgeon: Bin Pro;  Location: [facility];  Service:    • PB PERCUT IMPLNT NEUROELECT,EPIDURAL  2/19/2016    Procedure: IMPLANT NEUROSTIM EPI ARRAY;  Surgeon: Bin Pro;  Location: [facility];  Service: Pain Management   • PB PERCUT IMPLNT NEUROELECT,EPIDURAL  2/19/2016    Procedure: IMPLANT NEUROSTIM EPI ARRAY;  Surgeon: Bin Pro;  Location: [facility];  Service: Pain Management   • RECOVERY  8/7/2015    Procedure:  VASCULAR CASE VIANEY-RIGHT ILIAC VENOGRAM WITH ANGIOPLASTY, REMOVAL RIGHT FEMORAL TUNNELED DIALYSIS CATHETER  **VRE**;  Surgeon: Melinda Surgery;  Location: SURGERY PRE-POST PROC UNIT [facility] [place];  Service:    • AV FISTULA REVISION Left 6/17/2015    Procedure: AV FISTULA REVISION;  Surgeon: Jairo Hopkins M.D.;  Location: SURGERY [facility];  Service:    • IRRIGATION & DEBRIDEMENT GENERAL Left 6/17/2015    Procedure: IRRIGATION & DEBRIDEMENT GENERAL ARM W/EXPLORATION WOUND & CONTROL BLEEDING;  Surgeon: Jairo Hopkins M.D.;  Location: SURGERY [facility];  Service:    • AV FISTULA THROMBOLYSIS Left 6/9/2015    Procedure: THROMBECTOMY  AV GRAFT THIGH;  Surgeon: Jairo Hopkins M.D.;  Location: SURGERY [facility];  Service:    • AV FISTULA THROMBOLYSIS Left 6/3/2015    Procedure: AV FISTULA THROMBOLYSIS THIGH REPLACEMENT;  Surgeon: Jairo Hopkins M.D.;  Location: SURGERY [facility];  Service:    • IRRIGATION & DEBRIDEMENT GENERAL Left 6/3/2015    Procedure: IRRIGATION & DEBRIDEMENT GENERAL;  Surgeon: Jairo Hopkins M.D.;  Location: SURGERY [facility];  Service:    • AV FISTULA CREATION  4/20/2015    Performed by Jairo Hopkins M.D. at SURGERY [facility]   • PB INJECT NERV BLCK,STELLATE GANGLION  3/24/2015    Performed by Bin Por at [facility]   • AV FISTULA REVISION  3/20/2015    Performed by Jairo Hopkins M.D. at [facility]   • AV FISTULA REVISION  2/22/2015    Performed by Lurdes Moffett M.D. at SURGERY [facility]   • AV FISTULA REVISION  2/8/2015    Performed by Jairo Hopkins M.D. at SURGERY [facility]   • IRRIGATION & DEBRIDEMENT GENERAL  12/15/2014    Performed by Jairo Hopkins M.D. at SURGERY [facility]   • AV FISTULA REVISION  9/30/2014    Performed by Jairo Hopkins M.D. at SURGERY [facility]   • RECOVERY  6/13/2014    Performed by Ir-Recovery Surgery at [facility] SAME DAY [facility]   • INCISION AND DRAINAGE GENERAL  9/13/2013    Performed by Jairo Hopkins M.D. at SURGERY [facility]   • DEBRIDEMENT  9/13/2013    Performed by Jairo Hopkins M.D. at SURGERY [facility]   • VEIN LIGATION  9/13/2013    Performed by Jairo Hopkins M.D. at SURGERY [facility]   • RECOVERY  5/18/2012    Performed by SURGERY, IR-RECOVERY at [facility]   • BONE SPUR EXCISION  12/8/2011    Performed by BELKIS BREAUX at SURGERY SAME DAY [facility]   • AV FISTULA REVISION  11/3/2011    Performed by JAIRO HOPKINS at SURGERY [facility]   • AV FISTULOGRAM  6/5/2011    Performed by JAIRO HOPKINS at SURGERY [facility]  [place] [facility]   • CATH PLACEMENT  6/5/2011    Performed by MARI WINTERS at SURGERY [facility] ORS   • CATH PLACEMENT  2/1/2011    Performed by MARI WINTERS at SURGERY [facility] ORS   • ANGIOPLASTY BALLOON  2/1/2011    Performed by MARI WINTERS at SURGERY [facility] ORS   • ENDARTERECTOMY  11/16/2010    Performed by MARI WINTERS at SURGERY [facility] ORS   • AV FISTULOGRAM  11/16/2010    Performed by MARI WINTERS at SURGERY [facility] ORS   • ARTERIOGRAM  3/5/2009    Performed by MARI WINTERS at SURGERY [facility] ORS   • ANGIOPLASTY BALLOON  3/5/2009    Performed by MARI WINTERS at SURGERY [facility] ORS   • AV FISTULOGRAM  3/5/2009    Performed by MARI WINTERS at SURGERY [facility] ORS   • AV FISTULA CREATION  11/6/08    Performed by MARI WINTERS at SURGERY [facility] ORS   • MASS EXCISION ORTHO  10/9/08    Performed by BELKIS BREAUX at SURGERY SAME DAY [facility] ORS   • PARATHYROIDECTOMY  2006   • INGUINAL HERNIA REPAIR Bilateral 2001, 2002   • US-KIDNEY TRANSPLANT  1996, 2001    x2       Allergies:  Baclofen; Contrast media with iodine [iodine]; Keflex; Pcn [penicillins]; and Tape    Medications:    Current Outpatient Prescriptions   Medication Sig Dispense Refill   • HYDROcodone/acetaminophen (NORCO)  MG Tab Take 1-2 Tabs by mouth every 6 hours as needed.     • warfarin (COUMADIN) 5 MG Tab Take 1-1.5 Tabs by mouth every evening. Need to hold until rechecked by warfarin clinic on thursday 30 Tab 3   • cinacalcet (SENSIPAR) 30 MG Tab Take 30-60 mg by mouth every day. 30 mg Mondays , Wednesdays , and Fridays. All other days 60 mg     • levetiracetam (KEPPRA) 250 MG tablet Take 1 Tab by mouth 2 times a day. 60 Tab 3   • gabapentin (NEURONTIN) 600 MG tablet Take 600 mg by mouth 3 times a day.     • sevelamer carbonate (RENVELA) 800 MG Tab tablet Take 4,000 mg by mouth 3 times a day, with meals. With meals     • calcitRIOL (ROCALTROL) 0.25 MCG CAPS Take  "0.75 mcg by mouth every bedtime.     • midodrine (PROAMATINE) 10 MG tablet Take 1 Tab by mouth 3 times a day, with meals. Can hold dose if blood pressure greater than 120 systolic. 60 Tab 3     No current facility-administered medications for this visit.        Social History:     Social History     Social History   • Marital status: Single     Spouse name: N/A   • Number of children: N/A   • Years of education: N/A     Occupational History   • Not on file.     Social History Main Topics   • Smoking status: Never Smoker   • Smokeless tobacco: Never Used   • Alcohol use No   • Drug use: No   • Sexual activity: Yes     Partners: Female     Other Topics Concern   • Not on file     Social History Narrative   • No narrative on file       Family History:     Family History   Problem Relation Age of Onset   • Arthritis Father      RA   • Lung Disease Father    • Heart Disease Father      MI   • Hypertension Brother        Physical Exam:  Vitals:   BP (!) 88/48   Pulse (!) 122   Temp 36.6 °C (97.8 °F)   Resp 18   Ht 1.626 m (5' 4\")   Wt 83.8 kg (184 lb 11.9 oz)   SpO2 94%   BMI 31.71 kg/m²     General: Not in acute distress,   HEENT: No pallor, icterus. Oropharynx clear.   Neck: Supple, no palpable masses.  Lymph nodes: No palpable cervical, supraclavicular, axillary or inguinal lymphadenopathy.    CVS: regular rate and rhythm, no rubs or gallops  RESP: Clear to auscultate bilaterally, no wheezing or crackles.   ABD: Soft, non tender, non distended, positive bowel sounds, no palpable organomegaly  EXT: No edema or cyanosis. collapsed AV fistula in LUE. Patent AV graft in LLE  CNS: Alert and oriented x3, No focal deficits.  Skin- No rash      Labs:   Anticoagulation Visit on 07/12/2018   Component Date Value Ref Range Status   • INR 07/12/2018 2.6   Final   Anticoagulation Visit on 07/05/2018   Component Date Value Ref Range Status   • INR 07/05/2018 1.4   Final   • POC INR 07/05/2018 1.4* 0.9 - 1.2 Final    Comment: " INR - Non-therapeutic Reference Range: 0.9-1.2  INR - Therapeutic Reference Range: 2.0-4.0           Imaging:    Assessment and Plan:  -Coagulopathy  -We will check for underlying factor V Leiden, prothrombin mutations, protein C protein S and Antithrombin III levels and antibodies to phospholipids.  Unfortunately the levels of protein C ,S and Antithrombin may be falsely depressed with his Coumadin which may make lower levels unreliable.  However normal levels will rule out deficiency.  -I do not suspect patient has any underlying coagulopathy as he never had any thrombosis of the AV fistula in his left upper extremity.  He is also never had DVT in his lower extremity or a pulmonary embolism.  -both of his previously failed transplants did not have any evidence of large vessel thrombosis  -Thrombosis of his dialysis graft are most likely secondary to foreign body reaction.  Continue anticoagulation as recommended by vascular surgery  -Follow up with PCP, return to hematology clinic as needed          He agreed and verbalized his agreement and understanding with the current plan.  I answered all questions and concerns he has at this time.  Dear  Ursula Carrion, SHREYAS.P.RMolina,  Thank you for allowing me to participate in his care.    All labs and/or imaging studies mentioned in the note above were reviewed with and explained to the patient as a pertain to medical decision making.    Please note that this dictation was created using voice recognition software. I have made every reasonable attempt to correct obvious errors, but I expect that there are errors of grammar and possibly content that I did not discover before finalizing the note.      SIGNATURES:  Una Johnson    CC:  EDISON Chowdhury Lorraine, A.P.R*

## 2018-07-12 NOTE — PROGRESS NOTES
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 Hopkins      Anticoagulation Care Providers     Provider Role Specialty Phone number    Jairo Hopkins M.D. Referring Surgery [phone]    [facility] Anticoagulation Services Responsible  [phone]        Anticoagulation Patient Findings      HPI:  Denis Storm De Anda seen in clinic today, on anticoagulation therapy with warfarin for AV graft thrombosis  Changes to current medical/health status since last appt: seeing Dr. Rosenbaum tomorrow for f/u, his incision is still bleeding off and on, he is going to continue to monitor  Denies signs/symptoms of bleeding and/or thrombosis since the last appt.    Denies any interval changes to diet  Denies any interval changes to medications since last appt.   Denies any complications or cost restrictions with current therapy.   BP declined.      A/P   INR  is-therapeutic.   Will continue with the same warfarin dosing.     Follow up appointment in 1 week(s).    Maeve Saha, PharmD

## 2018-07-13 ENCOUNTER — APPOINTMENT (OUTPATIENT)
Dept: CARDIOLOGY | Facility: MEDICAL CENTER | Age: 38
End: 2018-07-13
Attending: INTERNAL MEDICINE
Payer: MEDICARE

## 2018-07-13 LAB
B2 GLYCOPROT1 IGG SERPL IA-ACNC: 1 SGU (ref 0–20)
B2 GLYCOPROT1 IGM SERPL IA-ACNC: 1 SMU (ref 0–20)
CARDIOLIPIN IGA SER IA-ACNC: 2 APL (ref 0–11)
CARDIOLIPIN IGG SER IA-ACNC: 0 GPL (ref 0–14)
CARDIOLIPIN IGM SER IA-ACNC: 7 MPL (ref 0–12)

## 2018-07-14 LAB
AT III ACT/NOR PPP CHRO: 99 % (ref 76–128)
AT III AG ACT/NOR PPP IA: 98 % (ref 82–136)
PROT C ACT/NOR PPP: 15 % (ref 83–168)
PROT S ACT/NOR PPP: 37 % (ref 66–143)

## 2018-07-16 ENCOUNTER — HOSPITAL ENCOUNTER (OUTPATIENT)
Dept: RADIOLOGY | Facility: MEDICAL CENTER | Age: 38
End: 2018-07-16
Attending: NEUROLOGICAL SURGERY
Payer: MEDICARE

## 2018-07-16 VITALS — HEIGHT: 64 IN | WEIGHT: 175 LBS | BODY MASS INDEX: 29.88 KG/M2

## 2018-07-16 DIAGNOSIS — M54.6 PAIN IN THORACIC SPINE: ICD-10-CM

## 2018-07-16 RX ORDER — MIDAZOLAM HYDROCHLORIDE 1 MG/ML
INJECTION INTRAMUSCULAR; INTRAVENOUS
Status: DISPENSED
Start: 2018-07-16 | End: 2018-07-17

## 2018-07-17 ENCOUNTER — DOCUMENTATION (OUTPATIENT)
Dept: HEMATOLOGY ONCOLOGY | Facility: MEDICAL CENTER | Age: 38
End: 2018-07-17

## 2018-07-17 NOTE — PROGRESS NOTES
Results for ODALYS APPIAH (MRN [id number]) as of 7/17/2018 10:50   8/21/2014 11:55 7/12/2018 11:40   Protein C Functional 15 (L)    Protein C Functional  15 (L)   Protein S-Functional 50 (L) 37 (L)   Antithrombin III Ag Immuno  98   Antithrombin III, Functional 96 99   Factor Ii Dna Analysis Pt Gene Negative    V Leiden Factor Negative    MTHFR C665T Mutation Negative    MTHFR P3099G Mutation Heterozygous (A)    Additional Information See Note      Results for ODALYS APPIAH (MRN [id number]) as of 7/17/2018 10:50   7/12/2018 11:40   Protein C Functional 15 (L)   Protein S-Functional 37 (L)   Antithrombin III Ag Immuno 98   Antithrombin III, Functional 99   Anti-Cariolipin Ab Igg 0   Anti-Cardiolipin Ab Iga 2   Anti-Cardiolipin Ab Igm 7   Beta-2 Glycoprotein I Ab Igg 1   Beta-2 Glycoprotein I Igm 1     -I do not suspect patient has any underlying coagulopathy as he never had any thrombosis of the AV fistula in his left upper extremity. He is also never had VTE  -both of his previously failed transplants did not have any evidence of large vessel thrombosis on path review    -Thrombosis of his dialysis graft are most likely secondary to foreign body reaction.  Continue anticoagulation as recommended by vascular surgery    protein S and C are most likely depressed secondary to warfarin. If there is concern for Protein C /S deficiency patient will need to be off warfarin for at least 2 weeks to recheck levels.    -Follow up with PCP, return to hematology clinic as needed      Una Johnson  7/17/2018  11:05 AM

## 2018-07-19 ENCOUNTER — ANTICOAGULATION VISIT (OUTPATIENT)
Dept: VASCULAR LAB | Facility: MEDICAL CENTER | Age: 38
End: 2018-07-19
Attending: INTERNAL MEDICINE
Payer: MEDICARE

## 2018-07-19 VITALS — SYSTOLIC BLOOD PRESSURE: 90 MMHG | DIASTOLIC BLOOD PRESSURE: 56 MMHG | HEART RATE: 100 BPM | HEIGHT: 64 IN

## 2018-07-19 DIAGNOSIS — T82.868D THROMBOSIS OF ARTERIOVENOUS GRAFT, SUBSEQUENT ENCOUNTER: ICD-10-CM

## 2018-07-19 LAB
INR BLD: 4.1 (ref 0.9–1.2)
INR PPP: 4.1 (ref 2–3.5)

## 2018-07-19 PROCEDURE — 85610 PROTHROMBIN TIME: CPT

## 2018-07-19 PROCEDURE — 99212 OFFICE O/P EST SF 10 MIN: CPT | Performed by: PHARMACIST

## 2018-07-19 NOTE — PROGRESS NOTES
Anticoagulation Summary  As of 7/19/2018    INR goal:   2.5-3.5   TTR:   36.8 % (1.2 y)   Today's INR:   4.1!   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 Hopkins      Anticoagulation Care Providers     Provider Role Specialty Phone number    Jairo Hopkins M.D. Referring Surgery [phone]    [facility] Anticoagulation Services Responsible  [phone]        Anticoagulation Patient Findings      HPI:  Denis Storm De Anda seen in clinic today, on anticoagulation therapy with warfarin for AV graft thrombosis  Changes to current medical/health status since last appt: saw Dr Rosenbaum, healing well, still minimal bleeding off and on, but nothing more than a pen prick size  Denies signs/symptoms of bleeding and/or thrombosis since the last appt.    Denies any interval changes to diet  Denies any interval changes to medications since last appt.   Denies any complications or cost restrictions with current therapy.   BP recorded in vitals.      A/P   INR  is supra-therapeutic.   Will have pt HOLD his warfarin dose today and then resume his normal warfarin dosing.     Follow up appointment in 1 week(s).    Maeve Saha, PharmD

## 2018-07-26 ENCOUNTER — ANTICOAGULATION VISIT (OUTPATIENT)
Dept: VASCULAR LAB | Facility: MEDICAL CENTER | Age: 38
End: 2018-07-26
Attending: INTERNAL MEDICINE
Payer: MEDICARE

## 2018-07-26 DIAGNOSIS — Z79.01 CHRONIC ANTICOAGULATION: ICD-10-CM

## 2018-07-26 LAB — INR PPP: 3.5 (ref 2–3.5)

## 2018-07-26 PROCEDURE — 99211 OFF/OP EST MAY X REQ PHY/QHP: CPT

## 2018-07-26 PROCEDURE — 85610 PROTHROMBIN TIME: CPT

## 2018-07-26 RX ORDER — WARFARIN SODIUM 5 MG/1
5-7.5 TABLET ORAL EVERY EVENING
Qty: 45 TAB | Refills: 5 | Status: ON HOLD | OUTPATIENT
Start: 2018-07-26 | End: 2018-08-27

## 2018-07-26 NOTE — PROGRESS NOTES
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 Hopkins      Anticoagulation Care Providers     Provider Role Specialty Phone number    Jairo Hopkins M.D. Referring Surgery [phone]    [facility] Anticoagulation Services Responsible  [phone]        Anticoagulation Patient Findings      HPI:  Denis De Anda seen in clinic today, on anticoagulation therapy with warfarin for graft thrombosis.   Changes to current medical/health status since last appt: none  Denies signs/symptoms of bleeding and/or thrombosis since the last appt.    Denies any interval changes to diet  Denies any interval changes to medications since last appt.   Denies any complications or cost restrictions with current therapy.   Declines vitals.  Confirmed dosing regimen.     A/P   INR  therapeutic.   Pt is to continue with current warfarin dosing regimen.     Follow up appointment in 1 week(s).    Donald Cedeño, JocelynD

## 2018-07-27 LAB — INR BLD: 3.5 (ref 0.9–1.2)

## 2018-08-02 ENCOUNTER — ANTICOAGULATION VISIT (OUTPATIENT)
Dept: VASCULAR LAB | Facility: MEDICAL CENTER | Age: 38
End: 2018-08-02
Attending: INTERNAL MEDICINE
Payer: MEDICARE

## 2018-08-02 DIAGNOSIS — T82.868S THROMBOSIS OF ARTERIOVENOUS GRAFT, SEQUELA: ICD-10-CM

## 2018-08-02 LAB
INR BLD: 4.4 (ref 0.9–1.2)
INR PPP: 4.4 (ref 2–3.5)

## 2018-08-02 PROCEDURE — 85610 PROTHROMBIN TIME: CPT

## 2018-08-02 PROCEDURE — 99212 OFFICE O/P EST SF 10 MIN: CPT

## 2018-08-02 NOTE — PROGRESS NOTES
Anticoagulation Summary  As of 8/2/2018    INR goal:   2.5-3.5   TTR:   35.7 % (1.2 y)   Today's INR:   4.4!   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 Hopkins      Anticoagulation Care Providers     Provider Role Specialty Phone number    Jairo Hopkins M.D. Referring Surgery [phone]    [facility] Anticoagulation Services Responsible  [phone]        Anticoagulation Patient Findings  Patient Findings     Negatives:   Signs/symptoms of thrombosis, Signs/symptoms of bleeding, Laboratory test error suspected, Change in health, Change in alcohol use, Change in activity, Upcoming invasive procedure, Emergency department visit, Upcoming dental procedure, Missed doses, Extra doses, Change in medications, Change in diet/appetite, Hospital admission, Bruising, Other complaints        History of Present Illness: follow up appointment for chronic anticoagulation with the high risk medication, warfarin for AV graft thrombosis.    Last INR was at goal, now supra therapeutic today.  Will reduce today's dose ot 2.5 mg and reduce weekly dose by 35mg/week.  Follow up in 1 weeks, to reduce risk of adverse events related to this high risk medication,  Warfarin.    Renetta Dunlap, PharmD      Pt declines vitals today

## 2018-08-09 ENCOUNTER — ANTICOAGULATION VISIT (OUTPATIENT)
Dept: VASCULAR LAB | Facility: MEDICAL CENTER | Age: 38
End: 2018-08-09
Attending: INTERNAL MEDICINE
Payer: MEDICARE

## 2018-08-09 DIAGNOSIS — T82.868S THROMBOSIS OF ARTERIOVENOUS GRAFT, SEQUELA: ICD-10-CM

## 2018-08-09 LAB
INR BLD: 4.9 (ref 0.9–1.2)
INR PPP: 4.9 (ref 2–3.5)

## 2018-08-09 PROCEDURE — 85610 PROTHROMBIN TIME: CPT

## 2018-08-09 PROCEDURE — 99212 OFFICE O/P EST SF 10 MIN: CPT | Performed by: NURSE PRACTITIONER

## 2018-08-09 NOTE — PROGRESS NOTES
Anticoagulation Summary  As of 8/9/2018    INR goal:   2.5-3.5   TTR:   35.1 % (1.3 y)   Today's INR:   4.9!   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 Hopkins      Anticoagulation Care Providers     Provider Role Specialty Phone number    Jairo Hopkins M.D. Referring Surgery [phone]    [facility] Responsible  [phone]        Anticoagulation Patient Findings      HPI:  Denis De Anda seen in clinic today for follow up on anticoagulation therapy in the presence of AF graft. Continues to ooze blood from abdominal wound. His MD is aware. No other bleeding. Denies any medication or diet changes. No current symptoms of thrombosis reported.    A/P:   INR supratherapeutic. HOLD tonight and decrease regimen.     Follow up appointment in 1 week(s).    Next Appointment: Thursday, August 16, 2018 at 7:30 am.     Hanna LARKIN

## 2018-08-15 ENCOUNTER — ANTICOAGULATION VISIT (OUTPATIENT)
Dept: VASCULAR LAB | Facility: MEDICAL CENTER | Age: 38
End: 2018-08-15
Attending: INTERNAL MEDICINE
Payer: MEDICARE

## 2018-08-15 DIAGNOSIS — T82.868S THROMBOSIS OF ARTERIOVENOUS GRAFT, SEQUELA: ICD-10-CM

## 2018-08-15 LAB
INR BLD: 2.6 (ref 0.9–1.2)
INR PPP: 2.6 (ref 2–3.5)

## 2018-08-15 PROCEDURE — 99212 OFFICE O/P EST SF 10 MIN: CPT | Performed by: NURSE PRACTITIONER

## 2018-08-15 PROCEDURE — 85610 PROTHROMBIN TIME: CPT

## 2018-08-15 NOTE — PROGRESS NOTES
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 Hopkins      Anticoagulation Care Providers     Provider Role Specialty Phone number    Jairo Hopkins M.D. Referring Surgery [phone]    [facility] Responsible  [phone]        Anticoagulation Patient Findings      HPI:  Denis Storm De Anda seen in clinic today for follow up on anticoagulation therapy in the presence of AV graft thrombosis. Denies any changes to current medical/health status since last appointment. Denies any medication or diet changes. Continues to ooze from abdominal wound this week. Will speak with doctor today. Also had nose bleed yesterday. He held last night's dose. He had labs done yesterday with dialysis to check H/H - he doesn't know the results yet. Last week he took 5 mg on Friday instead of 2.5 mg by mistake.     A/P:   INR therapeutic. Resume previous regimen.     Follow up appointment in 1 week(s).    Next Appointment: Tuesday, August 21, 2018 at 8:00 am.     Hanna LARKIN

## 2018-08-16 ENCOUNTER — APPOINTMENT (OUTPATIENT)
Dept: VASCULAR LAB | Facility: MEDICAL CENTER | Age: 38
End: 2018-08-16
Attending: INTERNAL MEDICINE
Payer: MEDICARE

## 2018-08-20 ENCOUNTER — HOSPITAL ENCOUNTER (OUTPATIENT)
Dept: RADIOLOGY | Facility: MEDICAL CENTER | Age: 38
End: 2018-08-20
Attending: NEUROLOGICAL SURGERY
Payer: MEDICARE

## 2018-08-20 VITALS
BODY MASS INDEX: 31.54 KG/M2 | TEMPERATURE: 97.9 F | DIASTOLIC BLOOD PRESSURE: 56 MMHG | HEIGHT: 64 IN | HEART RATE: 72 BPM | RESPIRATION RATE: 16 BRPM | WEIGHT: 184.75 LBS | OXYGEN SATURATION: 95 % | SYSTOLIC BLOOD PRESSURE: 105 MMHG

## 2018-08-20 DIAGNOSIS — M54.6 PAIN IN THORACIC SPINE: ICD-10-CM

## 2018-08-20 PROCEDURE — 99153 MOD SED SAME PHYS/QHP EA: CPT

## 2018-08-20 PROCEDURE — 72146 MRI CHEST SPINE W/O DYE: CPT

## 2018-08-20 RX ORDER — MIDAZOLAM HYDROCHLORIDE 1 MG/ML
INJECTION INTRAMUSCULAR; INTRAVENOUS
Status: DISPENSED
Start: 2018-08-20 | End: 2018-08-20

## 2018-08-20 ASSESSMENT — PAIN SCALES - GENERAL
PAINLEVEL_OUTOF10: 2
PAINLEVEL_OUTOF10: 2
PAINLEVEL_OUTOF10: 4
PAINLEVEL_OUTOF10: 2

## 2018-08-20 NOTE — DISCHARGE INSTRUCTIONS
MRI ADULT DISCHARGE INSTRUCTIONS    You have been medicated today for your scan. Please follow the instructions below to ensure your safe recovery. If you have any questions or problems, feel free to call us at [phone] or [phone].     1.   Have someone stay with you to assist you as needed.    2.   Do not drive or operate any mechanical devices.    3.   Do not perform any activity that requires concentration. Make no major decisions over the next 24 hours.     4.   Be careful changing positions from laying down to sitting or standing, as you may become dizzy.     5.   Do not drink alcohol for 48 hours.    6.   There are no restrictions for eating your normal meals. Drink fluids.    7.   You may continue your usual medications for pain, tranquilizers, muscle relaxants or sedatives when awake.     8.   Tomorrow, you may continue your normal daily activities.     9.   Pressure dressing on 10 - 15 minutes. If swelling or bleeding occurs when removed, continue placing direct pressure on injection site for another 5 minutes, or until bleeding stops.     I have been informed of and understand the above discharge instructions. Midazolam (VERSED)  What is this medicine?  You were given MIDAZOLAM (PAOLA gomes) for your procedure today. This medication is a benzodiazepine. It is used to cause relaxation or sleep before surgery and to block the memory of the procedure.  This medicine may be used for other purposes; ask your health care provider or pharmacist if you have questions.  What side effects may I notice from receiving this medicine?  Side effects that you should report to your doctor or health care professional as soon as possible:  • allergic reactions like skin rash, itching or hives, swelling of the face, lips, or tongue  • breathing problems  • confusion  • dizziness or lightheadedness  • fast, irregular heartbeat  • halluninations during recovery  • numbness or tingling in the hands or feet  • pain, redness,  or swelling at site where injected  • seizures  Side effects that usually do not require medical attention (report to your doctor or health care professional if they continue or are bothersome):  • coughing  • headache  • hiccups  • involuntary eye and muscle movements  • loss of memory of events just before, during, and after use  • nausea, vomiting  • speech problems  • tiredness  • trouble sleeping or nightmares  This list may not describe all possible side effects. Call your doctor for medical advice about side effects. You may report side effects to FDA at 3-105-AUG-8745.    Fentanyl  What is this medicine?  You were given FENTANYL (FEN ta nil) for your procedure today, it is a pain reliever. It is used to treat breakthrough pain that your long acting pain medicine does not control. Do not use this medicine for a pain that will go away in a few days like pain from surgery, doctor or dentist visits.   This medicine may be used for other purposes; ask your health care provider or pharmacist if you have questions.  What side effects may I notice from receiving this medicine?  Side effects that you should report to your doctor or health care professional as soon as possible:  • allergic reactions like skin rash, itching or hives, swelling of the face, lips, or tongue  • breathing problems  • changes in vision  • confusion  • dry mouth  • feeling faint, lightheaded  • hallucination  • irregular heartbeat  • mouth pain, sores  • problems with balance, talking, walking  • trouble passing urine or change in the amount of urine  • unusual bleeding or bruising  • unusually weak or tired  Side effects that usually do not require medical attention (report to your doctor or health care professional if they continue or are bothersome):  • dizzy  • headache  • loss of appetite  • nausea, vomiting  • sweating  • tingling in mouth  This list may not describe all possible side effects. Call your doctor for medical advice about  side effects. You may report side effects to FDA at 3-358-FDA-5235.

## 2018-08-20 NOTE — FLOWSHEET NOTE
Patient tolerated sedation well. Contrast not given per Dr. Sepulveda due to patient's kidney status. Patient and Mom both stated he will do dialysis this morning after scan. Discharge instructions given, and patient home in stable condition. Ambulatory.

## 2018-08-27 ENCOUNTER — HOSPITAL ENCOUNTER (OUTPATIENT)
Facility: MEDICAL CENTER | Age: 38
End: 2018-08-27
Attending: PLASTIC SURGERY | Admitting: PLASTIC SURGERY
Payer: MEDICARE

## 2018-08-27 VITALS
HEART RATE: 97 BPM | HEIGHT: 64 IN | TEMPERATURE: 97.4 F | BODY MASS INDEX: 30.37 KG/M2 | WEIGHT: 177.91 LBS | SYSTOLIC BLOOD PRESSURE: 102 MMHG | RESPIRATION RATE: 18 BRPM | DIASTOLIC BLOOD PRESSURE: 83 MMHG | OXYGEN SATURATION: 99 %

## 2018-08-27 DIAGNOSIS — G89.18 POSTOPERATIVE PAIN: ICD-10-CM

## 2018-08-27 LAB
ANION GAP SERPL CALC-SCNC: 14 MMOL/L (ref 0–11.9)
BUN SERPL-MCNC: 40 MG/DL (ref 8–22)
CALCIUM SERPL-MCNC: 9 MG/DL (ref 8.5–10.5)
CHLORIDE SERPL-SCNC: 88 MMOL/L (ref 96–112)
CO2 SERPL-SCNC: 29 MMOL/L (ref 20–33)
CREAT SERPL-MCNC: 9.15 MG/DL (ref 0.5–1.4)
ERYTHROCYTE [DISTWIDTH] IN BLOOD BY AUTOMATED COUNT: 54.4 FL (ref 35.9–50)
GLUCOSE SERPL-MCNC: 95 MG/DL (ref 65–99)
HCT VFR BLD AUTO: 20.5 % (ref 42–52)
HGB BLD-MCNC: 6.4 G/DL (ref 14–18)
INR PPP: 2.73 (ref 0.87–1.13)
MCH RBC QN AUTO: 27.9 PG (ref 27–33)
MCHC RBC AUTO-ENTMCNC: 31.2 G/DL (ref 33.7–35.3)
MCV RBC AUTO: 89.5 FL (ref 81.4–97.8)
PLATELET # BLD AUTO: 183 K/UL (ref 164–446)
PMV BLD AUTO: 10 FL (ref 9–12.9)
POTASSIUM BLD-SCNC: 5 MMOL/L (ref 3.6–5.5)
POTASSIUM SERPL-SCNC: 5 MMOL/L (ref 3.6–5.5)
PROTHROMBIN TIME: 28.6 SEC (ref 12–14.6)
RBC # BLD AUTO: 2.29 M/UL (ref 4.7–6.1)
SODIUM SERPL-SCNC: 131 MMOL/L (ref 135–145)
WBC # BLD AUTO: 4.3 K/UL (ref 4.8–10.8)

## 2018-08-27 PROCEDURE — 85610 PROTHROMBIN TIME: CPT

## 2018-08-27 PROCEDURE — 700111 HCHG RX REV CODE 636 W/ 250 OVERRIDE (IP)

## 2018-08-27 PROCEDURE — 160029 HCHG SURGERY MINUTES - 1ST 30 MINS LEVEL 4: Performed by: PLASTIC SURGERY

## 2018-08-27 PROCEDURE — 160002 HCHG RECOVERY MINUTES (STAT): Performed by: PLASTIC SURGERY

## 2018-08-27 PROCEDURE — 700101 HCHG RX REV CODE 250

## 2018-08-27 PROCEDURE — 160009 HCHG ANES TIME/MIN: Performed by: PLASTIC SURGERY

## 2018-08-27 PROCEDURE — A6223 GAUZE >16<=48 NO W/SAL W/O B: HCPCS | Performed by: PLASTIC SURGERY

## 2018-08-27 PROCEDURE — 160036 HCHG PACU - EA ADDL 30 MINS PHASE I: Performed by: PLASTIC SURGERY

## 2018-08-27 PROCEDURE — 80048 BASIC METABOLIC PNL TOTAL CA: CPT

## 2018-08-27 PROCEDURE — 501445 HCHG STAPLER, SKIN DISP: Performed by: PLASTIC SURGERY

## 2018-08-27 PROCEDURE — 501838 HCHG SUTURE GENERAL: Performed by: PLASTIC SURGERY

## 2018-08-27 PROCEDURE — 160041 HCHG SURGERY MINUTES - EA ADDL 1 MIN LEVEL 4: Performed by: PLASTIC SURGERY

## 2018-08-27 PROCEDURE — 160046 HCHG PACU - 1ST 60 MINS PHASE II: Performed by: PLASTIC SURGERY

## 2018-08-27 PROCEDURE — 160025 RECOVERY II MINUTES (STATS): Performed by: PLASTIC SURGERY

## 2018-08-27 PROCEDURE — 160035 HCHG PACU - 1ST 60 MINS PHASE I: Performed by: PLASTIC SURGERY

## 2018-08-27 PROCEDURE — A9270 NON-COVERED ITEM OR SERVICE: HCPCS

## 2018-08-27 PROCEDURE — 700102 HCHG RX REV CODE 250 W/ 637 OVERRIDE(OP)

## 2018-08-27 PROCEDURE — 84132 ASSAY OF SERUM POTASSIUM: CPT

## 2018-08-27 PROCEDURE — 160048 HCHG OR STATISTICAL LEVEL 1-5: Performed by: PLASTIC SURGERY

## 2018-08-27 PROCEDURE — 85027 COMPLETE CBC AUTOMATED: CPT

## 2018-08-27 RX ORDER — MORPHINE SULFATE 4 MG/ML
INJECTION, SOLUTION INTRAMUSCULAR; INTRAVENOUS
Status: COMPLETED
Start: 2018-08-27 | End: 2018-08-27

## 2018-08-27 RX ORDER — SCOLOPAMINE TRANSDERMAL SYSTEM 1 MG/1
PATCH, EXTENDED RELEASE TRANSDERMAL
Status: COMPLETED
Start: 2018-08-27 | End: 2018-08-27

## 2018-08-27 RX ORDER — OXYCODONE HYDROCHLORIDE AND ACETAMINOPHEN 5; 325 MG/1; MG/1
1-2 TABLET ORAL EVERY 4 HOURS PRN
Qty: 30 TAB | Refills: 0 | Status: SHIPPED | OUTPATIENT
Start: 2018-08-27 | End: 2018-09-12

## 2018-08-27 RX ORDER — BUPIVACAINE HYDROCHLORIDE AND EPINEPHRINE 2.5; 5 MG/ML; UG/ML
INJECTION, SOLUTION EPIDURAL; INFILTRATION; INTRACAUDAL; PERINEURAL
Status: DISCONTINUED | OUTPATIENT
Start: 2018-08-27 | End: 2018-08-27 | Stop reason: HOSPADM

## 2018-08-27 RX ORDER — MEPERIDINE HYDROCHLORIDE 25 MG/ML
INJECTION INTRAMUSCULAR; INTRAVENOUS; SUBCUTANEOUS
Status: COMPLETED
Start: 2018-08-27 | End: 2018-08-27

## 2018-08-27 RX ORDER — GABAPENTIN 300 MG/1
1200 CAPSULE ORAL
Status: ON HOLD | COMMUNITY
End: 2020-03-14

## 2018-08-27 RX ORDER — OXYCODONE HCL 5 MG/5 ML
SOLUTION, ORAL ORAL
Status: COMPLETED
Start: 2018-08-27 | End: 2018-08-27

## 2018-08-27 RX ORDER — SODIUM CHLORIDE 9 MG/ML
INJECTION, SOLUTION INTRAVENOUS CONTINUOUS
Status: DISCONTINUED | OUTPATIENT
Start: 2018-08-27 | End: 2018-08-27 | Stop reason: HOSPADM

## 2018-08-27 RX ORDER — WARFARIN SODIUM 5 MG/1
2.5-5 TABLET ORAL DAILY
Status: ON HOLD | COMMUNITY
End: 2018-09-24

## 2018-08-27 RX ADMIN — SCOPOLAMINE 1 PATCH: 1 PATCH, EXTENDED RELEASE TRANSDERMAL at 13:00

## 2018-08-27 RX ADMIN — MEPERIDINE HYDROCHLORIDE 25 MG: 25 INJECTION INTRAMUSCULAR; INTRAVENOUS; SUBCUTANEOUS at 15:12

## 2018-08-27 RX ADMIN — FENTANYL CITRATE 50 MCG: 50 INJECTION, SOLUTION INTRAMUSCULAR; INTRAVENOUS at 15:05

## 2018-08-27 RX ADMIN — OXYCODONE HYDROCHLORIDE 10 MG: 5 SOLUTION ORAL at 15:00

## 2018-08-27 ASSESSMENT — PAIN SCALES - GENERAL
PAINLEVEL_OUTOF10: ASSUMED PAIN PRESENT
PAINLEVEL_OUTOF10: 5
PAINLEVEL_OUTOF10: ASSUMED PAIN PRESENT

## 2018-08-27 NOTE — OP REPORT
DATE OF SERVICE:  08/27/2018    PREOPERATIVE DIAGNOSIS:  Bleeding wound from abdomen.    POSTOPERATIVE DIAGNOSIS:  Bleeding wound from abdomen.    PROCEDURE PERFORMED:  Fasciocutaneous flap to abdominal wound from right arm.    SURGEON:  Samson Rosenbaum MD    ANESTHESIOLOGIST:  Jim Ford MD    INDICATION FOR PROCEDURE:  The patient is a 38-year-old white male who has had   multiple medical issues all of his adult life.  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  Despite   all interventions, he continues to break this wound open and bleed.  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  We talked   about how this would be performed.  The patient was well informed of the   risks, benefits and alternatives to the procedure and participated in the   decision to proceed with surgery.    DESCRIPTION OF PROCEDURE:  The patient was marked preoperatively in the   holding area, taken to the operating room and under general anesthesia, was   prepped and draped over the right upper extremity as well as the right   abdomen.  I began by debriding the wound on the abdomen.  This area was   deepithelialized and all the superficial tissue taken off the wound.  There   was a varix in this area that bled rather significantly.  This was clamped off   and tied off with 2-0 chromic sutures.  The wound having been prepared to   accept a flap, I outlined a flap from the patient's right forearm.  This was a   fasciocutaneous flap.  I elevated this from the ulnar aspect to the radial   aspect.  The flap itself measured 6x6 cm with a base of 8 cm based radially.    The flap was then inset into the wound using running and interrupted sutures   of 2-0 chromic as well.  This appeared viable  throughout the entire case.    When I was satisfied with the securing of the flap, I placed a 2-0 nylon   suture along the patient's arm to the abdominal wall as well to try to help   hold the arm in place.  The patient then had sterile dressings applied.  He   tolerated the procedure well and was taken to the recovery room in good   condition.  Needle, sponge, and instrument counts were correct.       ____________________________________     MD YOVANI LYNNE / NTS    DD:  08/27/2018 15:07:46  DT:  08/27/2018 15:18:31    D#:  [id number]  Job#:  [id number]

## 2018-08-27 NOTE — OR SURGEON
Immediate Post OP Note    PreOp Diagnosis: Bleedind wound    PostOp Diagnosis: same    Procedure(s):  MYOCUTANEOUS FLAP - RIGHT ARM TO TRUNK - Wound Class: Contaminated    Surgeon(s):  Samson Rosenbaum Jr., M.D.    Anesthesiologist/Type of Anesthesia:  Anesthesiologist: Jim Ford M.D./General    Surgical Staff:  Circulator: Karma Martell R.N.  Scrub Person: Ar Persaud; Rudy Norris    Specimens removed if any:  * No specimens in log *    Estimated Blood Loss: 40 cc    Findings: abdominal wall varix    Complications: 0        8/27/2018 2:59 PM Samson Rosenbaum Jr., M.D.

## 2018-08-27 NOTE — OR NURSING
CALLED ANESTHESIOLOGIST TO UPDATE AS TO B/P 110/36. PER MD NO NEW ORDERS AT THIS TIME. NO S/S OF DISTRESS. A&O X 4.

## 2018-08-27 NOTE — OR NURSING
POC reviewed with patient and family. Still attempting to get an IV. Call light in reach. Hourly rounding in place.

## 2018-08-27 NOTE — DISCHARGE INSTRUCTIONS
ACTIVITY: Rest and take it easy for the first 24 hours.  A responsible adult is recommended to remain with you during that time.  It is normal to feel sleepy.  We encourage you to not do anything that requires balance, judgment or coordination.    MILD FLU-LIKE SYMPTOMS ARE NORMAL. YOU MAY EXPERIENCE GENERALIZED MUSCLE ACHES, THROAT IRRITATION, HEADACHE AND/OR SOME NAUSEA.    FOR 24 HOURS DO NOT:  Drive, operate machinery or run household appliances.  Drink beer or alcoholic beverages.   Make important decisions or sign legal documents.    SPECIAL INSTRUCTIONS:   Follow Up with Dr Rosenbaum in  1 week    DIET: To avoid nausea, slowly advance diet as tolerated, avoiding spicy or greasy foods for the first day.  Add more substantial food to your diet according to your physician's instructions.  Babies can be fed formula or breast milk as soon as they are hungry.  INCREASE FLUIDS AND FIBER TO AVOID CONSTIPATION.    SURGICAL DRESSING/BATHING: Keep dressing clean and dry. Follow MD instructions.    FOLLOW-UP APPOINTMENT:  A follow-up appointment should be arranged with your doctor in 1 week; call to schedule.    You should CALL YOUR PHYSICIAN if you develop:  Fever greater than 101 degrees F.  Pain not relieved by medication, or persistent nausea or vomiting.  Excessive bleeding (blood soaking through dressing) or unexpected drainage from the wound.  Extreme redness or swelling around the incision site, drainage of pus or foul smelling drainage.  Inability to urinate or empty your bladder within 8 hours.  Problems with breathing or chest pain.    You should call 911 if you develop problems with breathing or chest pain.  If you are unable to contact your doctor or surgical center, you should go to the nearest emergency room or urgent care center.  Physician's telephone #: [phone]    If any questions arise, call your doctor.  If your doctor is not available, please feel free to call the Surgical Center at  [phone].  The Center is open Monday through Friday from 7AM to 7PM.  You can also call the HEALTH HOTLINE open 24 hours/day, 7 days/week and speak to a nurse at [phone], or toll free at [phone].    A registered nurse may call you a few days after your surgery to see how you are doing after your procedure.    MEDICATIONS: Resume taking daily medication.  Take prescribed pain medication with food.  If no medication is prescribed, you may take non-aspirin pain medication if needed.  PAIN MEDICATION CAN BE VERY CONSTIPATING.  Take a stool softener or laxative such as senokot, pericolace, or milk of magnesia if needed.    Prescription given for Percocet (pain).  Last pain medication given at 3:00pm. Next dose due at 7:00pm.    If your physician has prescribed pain medication that includes Acetaminophen (Tylenol), do not take additional Acetaminophen (Tylenol) while taking the prescribed medication.    Depression / Suicide Risk    As you are discharged from this [facility] facility, it is important to learn how to keep safe from harming yourself.    Recognize the warning signs:  · Abrupt changes in personality, positive or negative- including increase in energy   · Giving away possessions  · Change in eating patterns- significant weight changes-  positive or negative  · Change in sleeping patterns- unable to sleep or sleeping all the time   · Unwillingness or inability to communicate  · Depression  · Unusual sadness, discouragement and loneliness  · Talk of wanting to die  · Neglect of personal appearance   · Rebelliousness- reckless behavior  · Withdrawal from people/activities they love  · Confusion- inability to concentrate     If you or a loved one observes any of these behaviors or has concerns about self-harm, here's what you can do:  · Talk about it- your feelings and reasons for harming yourself  · Remove any means that you might use to hurt yourself (examples: pills, rope, extension cords,  firearm)  · Get professional help from the community (Mental Health, Substance Abuse, psychological counseling)  · Do not be alone:Call your Safe Contact- someone whom you trust who will be there for you.  · Call your local CRISIS HOTLINE 199-5376 or 196-521-2652  · Call your local Children's Mobile Crisis Response Team [place] (183) 251-9537 or www.[employer]Wavz  · Call the toll free National Suicide Prevention Hotlines   · National Suicide Prevention Lifeline 222-350-VBOQ (7688)  · National Hope Line Network 800-SUICIDE (137-7140)

## 2018-08-30 ENCOUNTER — ANTICOAGULATION VISIT (OUTPATIENT)
Dept: VASCULAR LAB | Facility: MEDICAL CENTER | Age: 38
End: 2018-08-30
Attending: INTERNAL MEDICINE
Payer: MEDICARE

## 2018-08-30 DIAGNOSIS — Z79.01 CHRONIC ANTICOAGULATION: ICD-10-CM

## 2018-08-30 LAB
INR BLD: 1.6 (ref 0.9–1.2)
INR PPP: 1.6 (ref 2–3.5)

## 2018-08-30 PROCEDURE — 85610 PROTHROMBIN TIME: CPT

## 2018-08-30 PROCEDURE — 99212 OFFICE O/P EST SF 10 MIN: CPT

## 2018-08-30 NOTE — PROGRESS NOTES
Anticoagulation Summary  As of 8/30/2018    INR goal:   2.5-3.5   TTR:   36.8 % (1.3 y)   Today's INR:   1.6!   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 Hopkins      Anticoagulation Care Providers     Provider Role Specialty Phone number    Jairo Hopkins M.D. Referring Surgery [phone]    [facility] Anticoagulation Services Responsible  [phone]        Anticoagulation Patient Findings      HPI:  Denis De Anda seen in clinic today, on anticoagulation therapy with warfarin for AV thrombosis.   Changes to current medical/health status since last appt: recent procedure to stop abdominal bleeding.   Bleeding has improved.   Denies any interval changes to diet  Denies any interval changes to medications since last appt.   Denies any complications or cost restrictions with current therapy.   Confirmed dosing regimen.     A/P   INR  therapeutic.   Pt has been holding warfarin due to recent procedure.  Will restart at usual dose given previous INR's have been trending well.  H/H is low, pt is currently on epoetin therapy and is working with physician to correct anemia.     Follow up appointment in 1 week(s).    Donald Cedeño, JocelynD

## 2018-08-31 ENCOUNTER — APPOINTMENT (OUTPATIENT)
Dept: ADMISSIONS | Facility: MEDICAL CENTER | Age: 38
DRG: 919 | End: 2018-08-31
Attending: PLASTIC SURGERY
Payer: MEDICARE

## 2018-09-06 ENCOUNTER — ANTICOAGULATION VISIT (OUTPATIENT)
Dept: VASCULAR LAB | Facility: MEDICAL CENTER | Age: 38
End: 2018-09-06
Attending: INTERNAL MEDICINE
Payer: MEDICARE

## 2018-09-06 ENCOUNTER — HOSPITAL ENCOUNTER (OUTPATIENT)
Dept: CARDIOLOGY | Facility: MEDICAL CENTER | Age: 38
End: 2018-09-06
Attending: INTERNAL MEDICINE
Payer: MEDICARE

## 2018-09-06 DIAGNOSIS — I10 ESSENTIAL HYPERTENSION, MALIGNANT: ICD-10-CM

## 2018-09-06 DIAGNOSIS — T82.868D THROMBOSIS OF ARTERIOVENOUS GRAFT, SUBSEQUENT ENCOUNTER: ICD-10-CM

## 2018-09-06 LAB — INR PPP: 3.3 (ref 2–3.5)

## 2018-09-06 PROCEDURE — 93306 TTE W/DOPPLER COMPLETE: CPT

## 2018-09-06 PROCEDURE — 93306 TTE W/DOPPLER COMPLETE: CPT | Mod: 26 | Performed by: INTERNAL MEDICINE

## 2018-09-06 PROCEDURE — 99212 OFFICE O/P EST SF 10 MIN: CPT | Mod: 25 | Performed by: NURSE PRACTITIONER

## 2018-09-06 PROCEDURE — 85610 PROTHROMBIN TIME: CPT

## 2018-09-06 NOTE — PROGRESS NOTES
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 Hopkins      Anticoagulation Care Providers     Provider Role Specialty Phone number    Jairo Hopkins M.D. Referring Surgery [phone]    [facility] Anticoagulation Services Responsible  [phone]        Anticoagulation Patient Findings      HPI:  Denis De Anda seen in clinic today for follow up on anticoagulation therapy in the presence of AF graft thrombosis. Pt scheduled for extensive skin graft surgery on Monday with Dr. Rosenbaum. Will remain on warfarin. Pt's left arm currently stitched to his left abdominal wound. Not currently bleeding. Denies any medication or diet changes. No current symptoms of bleeding or thrombosis reported.    A/P:   INR therapeutic. Continue current regimen.    Follow up appointment in 1 week(s).    Next Appointment: Thursday, September 13, 2018 at 8:00 am.     Hanna LARKIN

## 2018-09-10 ENCOUNTER — HOSPITAL ENCOUNTER (INPATIENT)
Facility: MEDICAL CENTER | Age: 38
LOS: 1 days | DRG: 919 | End: 2018-09-12
Attending: EMERGENCY MEDICINE | Admitting: HOSPITALIST
Payer: MEDICARE

## 2018-09-10 ENCOUNTER — HOSPITAL ENCOUNTER (OUTPATIENT)
Facility: MEDICAL CENTER | Age: 38
DRG: 919 | End: 2018-09-10
Attending: PLASTIC SURGERY | Admitting: PLASTIC SURGERY
Payer: MEDICARE

## 2018-09-10 VITALS
WEIGHT: 177.25 LBS | BODY MASS INDEX: 30.42 KG/M2 | DIASTOLIC BLOOD PRESSURE: 58 MMHG | HEART RATE: 95 BPM | SYSTOLIC BLOOD PRESSURE: 86 MMHG | TEMPERATURE: 97 F | OXYGEN SATURATION: 95 % | RESPIRATION RATE: 16 BRPM

## 2018-09-10 DIAGNOSIS — Z99.2 STAGE 5 CHRONIC KIDNEY DISEASE ON CHRONIC DIALYSIS (HCC): ICD-10-CM

## 2018-09-10 DIAGNOSIS — G90.50 REFLEX SYMPATHETIC DYSTROPHY: ICD-10-CM

## 2018-09-10 DIAGNOSIS — T81.89XS NON-HEALING SURGICAL WOUND, SEQUELA: ICD-10-CM

## 2018-09-10 DIAGNOSIS — G89.4 PAIN SYNDROME, CHRONIC: ICD-10-CM

## 2018-09-10 DIAGNOSIS — D62 ACUTE POSTHEMORRHAGIC ANEMIA: ICD-10-CM

## 2018-09-10 DIAGNOSIS — R79.1 SUPRATHERAPEUTIC INR: ICD-10-CM

## 2018-09-10 DIAGNOSIS — N18.6 STAGE 5 CHRONIC KIDNEY DISEASE ON CHRONIC DIALYSIS (HCC): ICD-10-CM

## 2018-09-10 LAB
ALBUMIN SERPL BCP-MCNC: 4.1 G/DL (ref 3.2–4.9)
ALBUMIN/GLOB SERPL: 1 G/DL
ALP SERPL-CCNC: 149 U/L (ref 30–99)
ALT SERPL-CCNC: <5 U/L (ref 2–50)
ANION GAP SERPL CALC-SCNC: 20 MMOL/L (ref 0–11.9)
AST SERPL-CCNC: 15 U/L (ref 12–45)
BILIRUB SERPL-MCNC: 0.4 MG/DL (ref 0.1–1.5)
BUN SERPL-MCNC: 54 MG/DL (ref 8–22)
CALCIUM SERPL-MCNC: 10.2 MG/DL (ref 8.5–10.5)
CHLORIDE SERPL-SCNC: 88 MMOL/L (ref 96–112)
CO2 SERPL-SCNC: 22 MMOL/L (ref 20–33)
CREAT SERPL-MCNC: 8.81 MG/DL (ref 0.5–1.4)
GLOBULIN SER CALC-MCNC: 4.1 G/DL (ref 1.9–3.5)
GLUCOSE SERPL-MCNC: 100 MG/DL (ref 65–99)
INR PPP: 4.04 (ref 0.87–1.13)
INR PPP: 5.42 (ref 0.87–1.13)
POTASSIUM SERPL-SCNC: 4.8 MMOL/L (ref 3.6–5.5)
PROT SERPL-MCNC: 8.2 G/DL (ref 6–8.2)
PROTHROMBIN TIME: 39.1 SEC (ref 12–14.6)
PROTHROMBIN TIME: 49.3 SEC (ref 12–14.6)
SODIUM SERPL-SCNC: 130 MMOL/L (ref 135–145)

## 2018-09-10 PROCEDURE — 700101 HCHG RX REV CODE 250

## 2018-09-10 PROCEDURE — 83735 ASSAY OF MAGNESIUM: CPT

## 2018-09-10 PROCEDURE — 80053 COMPREHEN METABOLIC PANEL: CPT | Mod: 91

## 2018-09-10 PROCEDURE — 85007 BL SMEAR W/DIFF WBC COUNT: CPT

## 2018-09-10 PROCEDURE — 160035 HCHG PACU - 1ST 60 MINS PHASE I: Performed by: PLASTIC SURGERY

## 2018-09-10 PROCEDURE — 160048 HCHG OR STATISTICAL LEVEL 1-5: Performed by: PLASTIC SURGERY

## 2018-09-10 PROCEDURE — 96374 THER/PROPH/DIAG INJ IV PUSH: CPT

## 2018-09-10 PROCEDURE — 700105 HCHG RX REV CODE 258: Performed by: EMERGENCY MEDICINE

## 2018-09-10 PROCEDURE — 500423 HCHG DRESSING, ABD COMBINE: Performed by: PLASTIC SURGERY

## 2018-09-10 PROCEDURE — 85027 COMPLETE CBC AUTOMATED: CPT

## 2018-09-10 PROCEDURE — A9270 NON-COVERED ITEM OR SERVICE: HCPCS

## 2018-09-10 PROCEDURE — 500064 HCHG BINDER, 4-PANEL MED/LG: Performed by: PLASTIC SURGERY

## 2018-09-10 PROCEDURE — 160002 HCHG RECOVERY MINUTES (STAT): Performed by: PLASTIC SURGERY

## 2018-09-10 PROCEDURE — 160036 HCHG PACU - EA ADDL 30 MINS PHASE I: Performed by: PLASTIC SURGERY

## 2018-09-10 PROCEDURE — 700102 HCHG RX REV CODE 250 W/ 637 OVERRIDE(OP)

## 2018-09-10 PROCEDURE — 84100 ASSAY OF PHOSPHORUS: CPT

## 2018-09-10 PROCEDURE — 96375 TX/PRO/DX INJ NEW DRUG ADDON: CPT

## 2018-09-10 PROCEDURE — 99285 EMERGENCY DEPT VISIT HI MDM: CPT

## 2018-09-10 PROCEDURE — 700111 HCHG RX REV CODE 636 W/ 250 OVERRIDE (IP)

## 2018-09-10 PROCEDURE — 36415 COLL VENOUS BLD VENIPUNCTURE: CPT

## 2018-09-10 PROCEDURE — 700111 HCHG RX REV CODE 636 W/ 250 OVERRIDE (IP): Performed by: EMERGENCY MEDICINE

## 2018-09-10 PROCEDURE — 85610 PROTHROMBIN TIME: CPT | Mod: 91

## 2018-09-10 PROCEDURE — 85610 PROTHROMBIN TIME: CPT

## 2018-09-10 PROCEDURE — 501838 HCHG SUTURE GENERAL: Performed by: PLASTIC SURGERY

## 2018-09-10 PROCEDURE — 160029 HCHG SURGERY MINUTES - 1ST 30 MINS LEVEL 4: Performed by: PLASTIC SURGERY

## 2018-09-10 PROCEDURE — 80053 COMPREHEN METABOLIC PANEL: CPT

## 2018-09-10 PROCEDURE — 160009 HCHG ANES TIME/MIN: Performed by: PLASTIC SURGERY

## 2018-09-10 PROCEDURE — 160041 HCHG SURGERY MINUTES - EA ADDL 1 MIN LEVEL 4: Performed by: PLASTIC SURGERY

## 2018-09-10 RX ORDER — SODIUM CHLORIDE 9 MG/ML
1000 INJECTION, SOLUTION INTRAVENOUS
Status: DISCONTINUED | OUTPATIENT
Start: 2018-09-10 | End: 2018-09-10 | Stop reason: HOSPADM

## 2018-09-10 RX ORDER — BUPIVACAINE HYDROCHLORIDE AND EPINEPHRINE 5; 5 MG/ML; UG/ML
INJECTION, SOLUTION EPIDURAL; INTRACAUDAL; PERINEURAL
Status: DISCONTINUED
Start: 2018-09-10 | End: 2018-09-10 | Stop reason: HOSPADM

## 2018-09-10 RX ORDER — MEPERIDINE HYDROCHLORIDE 25 MG/ML
12.5 INJECTION INTRAMUSCULAR; INTRAVENOUS; SUBCUTANEOUS ONCE
Status: DISCONTINUED | OUTPATIENT
Start: 2018-09-10 | End: 2018-09-10 | Stop reason: HOSPADM

## 2018-09-10 RX ORDER — MEPERIDINE HYDROCHLORIDE 25 MG/ML
INJECTION INTRAMUSCULAR; INTRAVENOUS; SUBCUTANEOUS
Status: COMPLETED
Start: 2018-09-10 | End: 2018-09-10

## 2018-09-10 RX ORDER — MEPERIDINE HYDROCHLORIDE 50 MG/ML
50 INJECTION INTRAMUSCULAR; INTRAVENOUS; SUBCUTANEOUS
Status: DISCONTINUED | OUTPATIENT
Start: 2018-09-10 | End: 2018-09-21

## 2018-09-10 RX ORDER — HYDROMORPHONE HYDROCHLORIDE 2 MG/ML
INJECTION, SOLUTION INTRAMUSCULAR; INTRAVENOUS; SUBCUTANEOUS
Status: COMPLETED
Start: 2018-09-10 | End: 2018-09-10

## 2018-09-10 RX ORDER — BUPIVACAINE HYDROCHLORIDE AND EPINEPHRINE 5; 5 MG/ML; UG/ML
INJECTION, SOLUTION EPIDURAL; INTRACAUDAL; PERINEURAL
Status: DISCONTINUED | OUTPATIENT
Start: 2018-09-10 | End: 2018-09-10 | Stop reason: HOSPADM

## 2018-09-10 RX ORDER — SODIUM CHLORIDE 9 MG/ML
1000 INJECTION, SOLUTION INTRAVENOUS ONCE
Status: COMPLETED | OUTPATIENT
Start: 2018-09-10 | End: 2018-09-11

## 2018-09-10 RX ADMIN — FENTANYL CITRATE 50 MCG: 50 INJECTION, SOLUTION INTRAMUSCULAR; INTRAVENOUS at 23:15

## 2018-09-10 RX ADMIN — HYDROMORPHONE HYDROCHLORIDE 0.2 MG: 2 INJECTION INTRAMUSCULAR; INTRAVENOUS; SUBCUTANEOUS at 12:00

## 2018-09-10 RX ADMIN — FENTANYL CITRATE 75 MCG: 50 INJECTION, SOLUTION INTRAMUSCULAR; INTRAVENOUS at 10:45

## 2018-09-10 RX ADMIN — MEPERIDINE HYDROCHLORIDE 12.5 MG: 25 INJECTION INTRAMUSCULAR; INTRAVENOUS; SUBCUTANEOUS at 11:33

## 2018-09-10 RX ADMIN — SODIUM CHLORIDE 1000 ML: 9 INJECTION, SOLUTION INTRAVENOUS at 23:45

## 2018-09-10 RX ADMIN — MEPERIDINE HYDROCHLORIDE 12.5 MG: 25 INJECTION INTRAMUSCULAR; INTRAVENOUS; SUBCUTANEOUS at 11:28

## 2018-09-10 RX ADMIN — HYDROCODONE BITARTRATE AND ACETAMINOPHEN 30 ML: 2.5; 108 SOLUTION ORAL at 11:00

## 2018-09-10 RX ADMIN — HYDROMORPHONE HYDROCHLORIDE 0.2 MG: 2 INJECTION INTRAMUSCULAR; INTRAVENOUS; SUBCUTANEOUS at 13:15

## 2018-09-10 RX ADMIN — HYDROMORPHONE HYDROCHLORIDE 0.2 MG: 2 INJECTION INTRAMUSCULAR; INTRAVENOUS; SUBCUTANEOUS at 12:40

## 2018-09-10 RX ADMIN — FENTANYL CITRATE 75 MCG: 50 INJECTION, SOLUTION INTRAMUSCULAR; INTRAVENOUS at 11:38

## 2018-09-10 ASSESSMENT — PAIN SCALES - GENERAL
PAINLEVEL_OUTOF10: 0
PAINLEVEL_OUTOF10: 5
PAINLEVEL_OUTOF10: 10
PAINLEVEL_OUTOF10: 10
PAINLEVEL_OUTOF10: 5
PAINLEVEL_OUTOF10: 10
PAINLEVEL_OUTOF10: 5
PAINLEVEL_OUTOF10: 5
PAINLEVEL_OUTOF10: 10
PAINLEVEL_OUTOF10: 5
PAINLEVEL_OUTOF10: 8
PAINLEVEL_OUTOF10: 8
PAINLEVEL_OUTOF10: 7
PAINLEVEL_OUTOF10: 8

## 2018-09-10 NOTE — OR NURSING
Pt complaining abd binder is too tight. Called Dr. Rosenbaum to verify ok to loosen, ok per Dr. Rosenbaum. Medicated for pain.

## 2018-09-10 NOTE — OR NURSING
Pt to PACU from OR. Report from anesthesia and OR RN. LMA in place, on blow-by O2 4L. Respirations even and unlabored. VSS. Dressing CDI.

## 2018-09-10 NOTE — DISCHARGE INSTRUCTIONS
ACTIVITY: Rest and take it easy for the first 24 hours.  A responsible adult is recommended to remain with you during that time.  It is normal to feel sleepy.  We encourage you to not do anything that requires balance, judgment or coordination.    MILD FLU-LIKE SYMPTOMS ARE NORMAL. YOU MAY EXPERIENCE GENERALIZED MUSCLE ACHES, THROAT IRRITATION, HEADACHE AND/OR SOME NAUSEA.    FOR 24 HOURS DO NOT:  Drive, operate machinery or run household appliances.  Drink beer or alcoholic beverages.   Make important decisions or sign legal documents.    SPECIAL INSTRUCTIONS: F/u in one week with Dr. Rosenbaum    DIET: To avoid nausea, slowly advance diet as tolerated, avoiding spicy or greasy foods for the first day.  Add more substantial food to your diet according to your physician's instructions.  Babies can be fed formula or breast milk as soon as they are hungry.  INCREASE FLUIDS AND FIBER TO AVOID CONSTIPATION.    FOLLOW-UP APPOINTMENT:  A follow-up appointment should be arranged with your doctor in one week; call to schedule.    You should CALL YOUR PHYSICIAN if you develop:  Fever greater than 101 degrees F.  Pain not relieved by medication, or persistent nausea or vomiting.  Excessive bleeding (blood soaking through dressing) or unexpected drainage from the wound.  Extreme redness or swelling around the incision site, drainage of pus or foul smelling drainage.  Inability to urinate or empty your bladder within 8 hours.  Problems with breathing or chest pain.    You should call 911 if you develop problems with breathing or chest pain.  If you are unable to contact your doctor or surgical center, you should go to the nearest emergency room or urgent care center.    Physician's telephone #: Dr. Rosenbaum [phone]    If any questions arise, call your doctor.  If your doctor is not available, please feel free to call the Surgical Center at [phone].  The Center is open Monday through Friday from 7AM to 7PM.  You can also  call the HEALTH HOTLINE open 24 hours/day, 7 days/week and speak to a nurse at [phone], or toll free at [phone].    A registered nurse may call you a few days after your surgery to see how you are doing after your procedure.    MEDICATIONS: Resume taking daily medication.  Take prescribed pain medication with food.  If no medication is prescribed, you may take non-aspirin pain medication if needed.  PAIN MEDICATION CAN BE VERY CONSTIPATING.  Take a stool softener or laxative such as senokot, pericolace, or milk of magnesia if needed.    Prescription given for pain rx at home.  Last pain medication given at 11:00 am.    If your physician has prescribed pain medication that includes Acetaminophen (Tylenol), do not take additional Acetaminophen (Tylenol) while taking the prescribed medication.    Depression / Suicide Risk    As you are discharged from this [facility] facility, it is important to learn how to keep safe from harming yourself.    Recognize the warning signs:  · Abrupt changes in personality, positive or negative- including increase in energy   · Giving away possessions  · Change in eating patterns- significant weight changes-  positive or negative  · Change in sleeping patterns- unable to sleep or sleeping all the time   · Unwillingness or inability to communicate  · Depression  · Unusual sadness, discouragement and loneliness  · Talk of wanting to die  · Neglect of personal appearance   · Rebelliousness- reckless behavior  · Withdrawal from people/activities they love  · Confusion- inability to concentrate     If you or a loved one observes any of these behaviors or has concerns about self-harm, here's what you can do:  · Talk about it- your feelings and reasons for harming yourself  · Remove any means that you might use to hurt yourself (examples: pills, rope, extension cords, firearm)  · Get professional help from the community (Mental Health, Substance Abuse, psychological  counseling)  · Do not be alone:Call your Safe Contact- someone whom you trust who will be there for you.  · Call your local CRISIS HOTLINE 092-0237 or 915-303-6832  · Call your local Children's Mobile Crisis Response Team [place] (496) 631-2032 or www.[employer].com  · Call the toll free National Suicide Prevention Hotlines   · National Suicide Prevention Lifeline 339-941-RUHA (7092)  · National Hope Line Network 800-SUICIDE (912-8463)

## 2018-09-10 NOTE — OR NURSING
Discharge orders received. Meets discharge criteria at this time. Tolerable pain 5/10 per pt. No nausea. Tolerating PO. On RA. All lines and monitors discontinued. Reviewed discharge paperwork with pt and mother. Discussed diet, activity, medications, follow up care and worsening symptoms. No questions at this time. Pt to be discharged to home via private vehicle. Escorted to car in [place] with CNA and mother.

## 2018-09-10 NOTE — OR SURGEON
Immediate Post OP Note    PreOp Diagnosis: complex wound    PostOp Diagnosis: same    Procedure(s):  SKIN FLAP DELAYED- FOR DIVISION AND INSET FLAP WITH SKIN GRAFT ON ARM - Wound Class: Contaminated    Surgeon(s):  Samson Rosenbaum Jr., M.D.    Anesthesiologist/Type of Anesthesia:  Anesthesiologist: Deepak Garner M.D./General    Surgical Staff:  Circulator: Fe Montes R.N.  Scrub Person: Azeb Persaud    Specimens removed if any:  * No specimens in log *    Estimated Blood Loss: 30CC    Findings: as above    Complications: 0        9/10/2018 10:31 AM Samson Rosenbaum Jr., M.D.

## 2018-09-10 NOTE — OP REPORT
DATE OF SERVICE:  09/10/2018    PREOPERATIVE DIAGNOSIS:  Complex wound of abdomen.    POSTOPERATIVE DIAGNOSIS:  Complex wound of abdomen.    PROCEDURE:  Insetting of arm flap to right abdomen.    SURGEON:  Samson Rosenbaum MD    ANESTHESIOLOGIST:  Deepak Garner MD    INDICATION FOR PROCEDURE:  The patient is a 38-year-old white male who has had   multiple procedures in the past from number of injuries and number of issues   stemming from his renal failure.  He has had a wound on his abdomen.  Two   weeks ago, we placed an arm flap on.  Last night, evidently, the flap had been   doing okay, but the patient managed to move his arm laterally in a rather   forceful manner, but towards the flap off of his abdomen.  We were taking the   patient back today to replace the flap in its recipient bed and inset it.  The   patient was well informed of the risks, benefits and alternatives and   participated in the decision to proceed with surgery.    DESCRIPTION OF PROCEDURE:  Patient was marked preoperatively in the holding   area, taken to the operating room and under general anesthesia, was prepped   and draped over the right upper extremity as well as the right abdomen.  I   began by trimming the edges of the flap to ensure that it was healthy when it   was adequately prepared and the wound recipient bed was also trimmed of excess   growing epithelium and it was cleaned off.  The flap was then inset once   again into the wound using interrupted sutures of 2-0 Prolene, these were   mattress sutures.  I also sewed his arm to his abdominal wall using 2-0   Prolene as well to try to secure the arms as much as I could.  Sterile   dressings were then applied.  The patient tolerated the procedure well, was   taken to the recovery room in stable condition.       ____________________________________     SAMSON ROSENBAUM MD    [facility] / [facility]    DD:  09/10/2018 10:35:33  DT:  09/10/2018 10:50:15    D#:  [id number]  Job#:  [id number]

## 2018-09-10 NOTE — OR NURSING
Report back from LETY Lawrence. Pt resting at this time, appears comfortable, respirations even and unlabored. On 2L oxy mask.

## 2018-09-10 NOTE — OR NURSING
Pt states fentanyl and hydrocodone have not worked for pain for him in the past, asking if he could have Demerol as he was given this for the previous surgery. Called Dr. Garner, new orders received.

## 2018-09-10 NOTE — OR NURSING
1155 Report received from LETY Nguyễn for break coverage. Pt complains of pain 8/10. Discussed pain management and educated patient and family on plan of care. Verbalized understanding. Pt medicated per MAR. I lead patient through breathing exercise and mindfulness.    1230 Pt placed on room air per request. Educated on cough and deep breathing exercises. Pt able to pull IS 1700.    8219 Report back to LETY Nguyễn.

## 2018-09-11 ENCOUNTER — ANTICOAGULATION MONITORING (OUTPATIENT)
Dept: VASCULAR LAB | Facility: MEDICAL CENTER | Age: 38
End: 2018-09-11

## 2018-09-11 DIAGNOSIS — T82.868D THROMBOSIS OF ARTERIOVENOUS GRAFT, SUBSEQUENT ENCOUNTER: ICD-10-CM

## 2018-09-11 PROBLEM — D62 ANEMIA DUE TO ACUTE BLOOD LOSS: Status: ACTIVE | Noted: 2018-09-11

## 2018-09-11 LAB
ABO GROUP BLD: NORMAL
ALBUMIN SERPL BCP-MCNC: 3.4 G/DL (ref 3.2–4.9)
ALBUMIN/GLOB SERPL: 0.9 G/DL
ALP SERPL-CCNC: 123 U/L (ref 30–99)
ALT SERPL-CCNC: 6 U/L (ref 2–50)
ANION GAP SERPL CALC-SCNC: 21 MMOL/L (ref 0–11.9)
ANISOCYTOSIS BLD QL SMEAR: ABNORMAL
AST SERPL-CCNC: 12 U/L (ref 12–45)
BARCODED ABORH UBTYP: 5100
BARCODED PRD CODE UBPRD: NORMAL
BARCODED UNIT NUM UBUNT: NORMAL
BASOPHILS # BLD AUTO: 0 % (ref 0–1.8)
BASOPHILS # BLD AUTO: 0.4 % (ref 0–1.8)
BASOPHILS # BLD: 0 K/UL (ref 0–0.12)
BASOPHILS # BLD: 0.03 K/UL (ref 0–0.12)
BILIRUB SERPL-MCNC: 0.4 MG/DL (ref 0.1–1.5)
BLD GP AB SCN SERPL QL: NORMAL
BUN SERPL-MCNC: 68 MG/DL (ref 8–22)
CALCIUM SERPL-MCNC: 9.5 MG/DL (ref 8.5–10.5)
CHLORIDE SERPL-SCNC: 88 MMOL/L (ref 96–112)
CO2 SERPL-SCNC: 22 MMOL/L (ref 20–33)
COMPONENT R 8504R: NORMAL
CREAT SERPL-MCNC: 9.73 MG/DL (ref 0.5–1.4)
EOSINOPHIL # BLD AUTO: 0 K/UL (ref 0–0.51)
EOSINOPHIL # BLD AUTO: 0.04 K/UL (ref 0–0.51)
EOSINOPHIL NFR BLD: 0 % (ref 0–6.9)
EOSINOPHIL NFR BLD: 0.6 % (ref 0–6.9)
ERYTHROCYTE [DISTWIDTH] IN BLOOD BY AUTOMATED COUNT: 47.8 FL (ref 35.9–50)
ERYTHROCYTE [DISTWIDTH] IN BLOOD BY AUTOMATED COUNT: 54.4 FL (ref 35.9–50)
GLOBULIN SER CALC-MCNC: 3.7 G/DL (ref 1.9–3.5)
GLUCOSE SERPL-MCNC: 123 MG/DL (ref 65–99)
HCT VFR BLD AUTO: 18.3 % (ref 42–52)
HCT VFR BLD AUTO: 20.2 % (ref 42–52)
HCT VFR BLD AUTO: 25.9 % (ref 42–52)
HGB BLD-MCNC: 5.2 G/DL (ref 14–18)
HGB BLD-MCNC: 6.2 G/DL (ref 14–18)
HGB BLD-MCNC: 8.7 G/DL (ref 14–18)
IMM GRANULOCYTES # BLD AUTO: 0.08 K/UL (ref 0–0.11)
IMM GRANULOCYTES NFR BLD AUTO: 1.2 % (ref 0–0.9)
INR BLD: 3.3 (ref 0.9–1.2)
LYMPHOCYTES # BLD AUTO: 0.19 K/UL (ref 1–4.8)
LYMPHOCYTES # BLD AUTO: 0.98 K/UL (ref 1–4.8)
LYMPHOCYTES NFR BLD: 14.2 % (ref 22–41)
LYMPHOCYTES NFR BLD: 2.6 % (ref 22–41)
MAGNESIUM SERPL-MCNC: 2.3 MG/DL (ref 1.5–2.5)
MANUAL DIFF BLD: NORMAL
MCH RBC QN AUTO: 25.2 PG (ref 27–33)
MCH RBC QN AUTO: 29 PG (ref 27–33)
MCHC RBC AUTO-ENTMCNC: 28.4 G/DL (ref 33.7–35.3)
MCHC RBC AUTO-ENTMCNC: 33.6 G/DL (ref 33.7–35.3)
MCV RBC AUTO: 86.3 FL (ref 81.4–97.8)
MCV RBC AUTO: 88.8 FL (ref 81.4–97.8)
MICROCYTES BLD QL SMEAR: ABNORMAL
MONOCYTES # BLD AUTO: 0 K/UL (ref 0–0.85)
MONOCYTES # BLD AUTO: 0.62 K/UL (ref 0–0.85)
MONOCYTES NFR BLD AUTO: 0 % (ref 0–13.4)
MONOCYTES NFR BLD AUTO: 9 % (ref 0–13.4)
MORPHOLOGY BLD-IMP: NORMAL
NEUTROPHILS # BLD AUTO: 5.14 K/UL (ref 1.82–7.42)
NEUTROPHILS # BLD AUTO: 7.21 K/UL (ref 1.82–7.42)
NEUTROPHILS NFR BLD: 74.6 % (ref 44–72)
NEUTROPHILS NFR BLD: 97.4 % (ref 44–72)
NRBC # BLD AUTO: 0 K/UL
NRBC # BLD AUTO: 0 K/UL
NRBC BLD-RTO: 0 /100 WBC
NRBC BLD-RTO: 0 /100 WBC
PHOSPHATE SERPL-MCNC: 9.4 MG/DL (ref 2.5–4.5)
PLATELET # BLD AUTO: 170 K/UL (ref 164–446)
PLATELET # BLD AUTO: 222 K/UL (ref 164–446)
PLATELET BLD QL SMEAR: NORMAL
PMV BLD AUTO: 10 FL (ref 9–12.9)
PMV BLD AUTO: 10.2 FL (ref 9–12.9)
POLYCHROMASIA BLD QL SMEAR: NORMAL
POTASSIUM SERPL-SCNC: 5.9 MMOL/L (ref 3.6–5.5)
PRODUCT TYPE UPROD: NORMAL
PROT SERPL-MCNC: 7.1 G/DL (ref 6–8.2)
RBC # BLD AUTO: 2.06 M/UL (ref 4.7–6.1)
RBC # BLD AUTO: 3 M/UL (ref 4.7–6.1)
RBC BLD AUTO: PRESENT
RH BLD: NORMAL
SODIUM SERPL-SCNC: 131 MMOL/L (ref 135–145)
UNIT STATUS USTAT: NORMAL
WBC # BLD AUTO: 6.9 K/UL (ref 4.8–10.8)
WBC # BLD AUTO: 7.4 K/UL (ref 4.8–10.8)

## 2018-09-11 PROCEDURE — 96376 TX/PRO/DX INJ SAME DRUG ADON: CPT

## 2018-09-11 PROCEDURE — A9270 NON-COVERED ITEM OR SERVICE: HCPCS | Performed by: HOSPITALIST

## 2018-09-11 PROCEDURE — 85014 HEMATOCRIT: CPT

## 2018-09-11 PROCEDURE — A9270 NON-COVERED ITEM OR SERVICE: HCPCS | Performed by: INTERNAL MEDICINE

## 2018-09-11 PROCEDURE — 36430 TRANSFUSION BLD/BLD COMPNT: CPT

## 2018-09-11 PROCEDURE — 86923 COMPATIBILITY TEST ELECTRIC: CPT | Mod: 91

## 2018-09-11 PROCEDURE — 99223 1ST HOSP IP/OBS HIGH 75: CPT | Performed by: HOSPITALIST

## 2018-09-11 PROCEDURE — 770020 HCHG ROOM/CARE - TELE (206)

## 2018-09-11 PROCEDURE — P9016 RBC LEUKOCYTES REDUCED: HCPCS | Mod: 91

## 2018-09-11 PROCEDURE — 93005 ELECTROCARDIOGRAM TRACING: CPT | Performed by: EMERGENCY MEDICINE

## 2018-09-11 PROCEDURE — 5A1D70Z PERFORMANCE OF URINARY FILTRATION, INTERMITTENT, LESS THAN 6 HOURS PER DAY: ICD-10-PCS | Performed by: INTERNAL MEDICINE

## 2018-09-11 PROCEDURE — 30233N1 TRANSFUSION OF NONAUTOLOGOUS RED BLOOD CELLS INTO PERIPHERAL VEIN, PERCUTANEOUS APPROACH: ICD-10-PCS | Performed by: HOSPITALIST

## 2018-09-11 PROCEDURE — 99221 1ST HOSP IP/OBS SF/LOW 40: CPT | Performed by: INTERNAL MEDICINE

## 2018-09-11 PROCEDURE — 700111 HCHG RX REV CODE 636 W/ 250 OVERRIDE (IP)

## 2018-09-11 PROCEDURE — 700111 HCHG RX REV CODE 636 W/ 250 OVERRIDE (IP): Performed by: HOSPITALIST

## 2018-09-11 PROCEDURE — 86850 RBC ANTIBODY SCREEN: CPT

## 2018-09-11 PROCEDURE — 700102 HCHG RX REV CODE 250 W/ 637 OVERRIDE(OP): Performed by: INTERNAL MEDICINE

## 2018-09-11 PROCEDURE — 86901 BLOOD TYPING SEROLOGIC RH(D): CPT

## 2018-09-11 PROCEDURE — 85025 COMPLETE CBC W/AUTO DIFF WBC: CPT

## 2018-09-11 PROCEDURE — 700101 HCHG RX REV CODE 250

## 2018-09-11 PROCEDURE — 700102 HCHG RX REV CODE 250 W/ 637 OVERRIDE(OP): Performed by: HOSPITALIST

## 2018-09-11 PROCEDURE — 86900 BLOOD TYPING SEROLOGIC ABO: CPT

## 2018-09-11 PROCEDURE — 700105 HCHG RX REV CODE 258: Performed by: EMERGENCY MEDICINE

## 2018-09-11 PROCEDURE — 90935 HEMODIALYSIS ONE EVALUATION: CPT

## 2018-09-11 PROCEDURE — 85018 HEMOGLOBIN: CPT

## 2018-09-11 PROCEDURE — 700111 HCHG RX REV CODE 636 W/ 250 OVERRIDE (IP): Performed by: INTERNAL MEDICINE

## 2018-09-11 RX ORDER — HEPARIN SODIUM 1000 [USP'U]/ML
INJECTION, SOLUTION INTRAVENOUS; SUBCUTANEOUS
Status: COMPLETED
Start: 2018-09-11 | End: 2018-09-11

## 2018-09-11 RX ORDER — PROMETHAZINE HYDROCHLORIDE 25 MG/1
12.5-25 SUPPOSITORY RECTAL EVERY 4 HOURS PRN
Status: DISCONTINUED | OUTPATIENT
Start: 2018-09-11 | End: 2018-09-11

## 2018-09-11 RX ORDER — OXYCODONE AND ACETAMINOPHEN 10; 325 MG/1; MG/1
1 TABLET ORAL
Status: ON HOLD | COMMUNITY
End: 2018-09-12

## 2018-09-11 RX ORDER — LEVETIRACETAM 250 MG/1
250 TABLET ORAL 2 TIMES DAILY
Status: DISCONTINUED | OUTPATIENT
Start: 2018-09-11 | End: 2018-09-12 | Stop reason: HOSPADM

## 2018-09-11 RX ORDER — POLYETHYLENE GLYCOL 3350 17 G/17G
1 POWDER, FOR SOLUTION ORAL
Status: DISCONTINUED | OUTPATIENT
Start: 2018-09-11 | End: 2018-09-12 | Stop reason: HOSPADM

## 2018-09-11 RX ORDER — CINACALCET 30 MG/1
30 TABLET, FILM COATED ORAL
Status: DISCONTINUED | OUTPATIENT
Start: 2018-09-12 | End: 2018-09-12 | Stop reason: HOSPADM

## 2018-09-11 RX ORDER — SODIUM CHLORIDE 9 MG/ML
INJECTION, SOLUTION INTRAVENOUS CONTINUOUS
Status: ACTIVE | OUTPATIENT
Start: 2018-09-11 | End: 2018-09-11

## 2018-09-11 RX ORDER — CALCITRIOL 0.25 UG/1
0.75 CAPSULE, LIQUID FILLED ORAL
Status: DISCONTINUED | OUTPATIENT
Start: 2018-09-11 | End: 2018-09-12 | Stop reason: HOSPADM

## 2018-09-11 RX ORDER — SEVELAMER CARBONATE 800 MG/1
4000 TABLET, FILM COATED ORAL
Status: DISCONTINUED | OUTPATIENT
Start: 2018-09-11 | End: 2018-09-12 | Stop reason: HOSPADM

## 2018-09-11 RX ORDER — MIDODRINE HYDROCHLORIDE 5 MG/1
10 TABLET ORAL
Status: DISCONTINUED | OUTPATIENT
Start: 2018-09-11 | End: 2018-09-12 | Stop reason: HOSPADM

## 2018-09-11 RX ORDER — AMOXICILLIN 250 MG
2 CAPSULE ORAL 2 TIMES DAILY
Status: DISCONTINUED | OUTPATIENT
Start: 2018-09-11 | End: 2018-09-12 | Stop reason: HOSPADM

## 2018-09-11 RX ORDER — GABAPENTIN 300 MG/1
600 CAPSULE ORAL
Status: DISCONTINUED | OUTPATIENT
Start: 2018-09-11 | End: 2018-09-12 | Stop reason: HOSPADM

## 2018-09-11 RX ORDER — CINACALCET 30 MG/1
60 TABLET, FILM COATED ORAL
Status: DISCONTINUED | OUTPATIENT
Start: 2018-09-11 | End: 2018-09-12 | Stop reason: HOSPADM

## 2018-09-11 RX ORDER — LIDOCAINE HYDROCHLORIDE 10 MG/ML
INJECTION, SOLUTION INFILTRATION; PERINEURAL
Status: COMPLETED
Start: 2018-09-11 | End: 2018-09-11

## 2018-09-11 RX ORDER — HEPARIN SODIUM 1000 [USP'U]/ML
2000 INJECTION, SOLUTION INTRAVENOUS; SUBCUTANEOUS
Status: COMPLETED | OUTPATIENT
Start: 2018-09-11 | End: 2018-09-11

## 2018-09-11 RX ORDER — ONDANSETRON 4 MG/1
4 TABLET, ORALLY DISINTEGRATING ORAL EVERY 4 HOURS PRN
Status: DISCONTINUED | OUTPATIENT
Start: 2018-09-11 | End: 2018-09-12 | Stop reason: HOSPADM

## 2018-09-11 RX ORDER — ACETAMINOPHEN 325 MG/1
650 TABLET ORAL EVERY 6 HOURS PRN
Status: DISCONTINUED | OUTPATIENT
Start: 2018-09-11 | End: 2018-09-12 | Stop reason: HOSPADM

## 2018-09-11 RX ORDER — HYDROCODONE BITARTRATE AND ACETAMINOPHEN 10; 325 MG/1; MG/1
1-2 TABLET ORAL EVERY 6 HOURS PRN
Status: DISCONTINUED | OUTPATIENT
Start: 2018-09-11 | End: 2018-09-11

## 2018-09-11 RX ORDER — BISACODYL 10 MG
10 SUPPOSITORY, RECTAL RECTAL
Status: DISCONTINUED | OUTPATIENT
Start: 2018-09-11 | End: 2018-09-12 | Stop reason: HOSPADM

## 2018-09-11 RX ORDER — LIDOCAINE HYDROCHLORIDE 10 MG/ML
1 INJECTION, SOLUTION INFILTRATION; PERINEURAL PRN
Status: DISCONTINUED | OUTPATIENT
Start: 2018-09-11 | End: 2018-09-12 | Stop reason: HOSPADM

## 2018-09-11 RX ORDER — PROMETHAZINE HYDROCHLORIDE 25 MG/1
12.5-25 TABLET ORAL EVERY 4 HOURS PRN
Status: DISCONTINUED | OUTPATIENT
Start: 2018-09-11 | End: 2018-09-11

## 2018-09-11 RX ORDER — ONDANSETRON 2 MG/ML
4 INJECTION INTRAMUSCULAR; INTRAVENOUS EVERY 4 HOURS PRN
Status: DISCONTINUED | OUTPATIENT
Start: 2018-09-11 | End: 2018-09-12 | Stop reason: HOSPADM

## 2018-09-11 RX ORDER — CLONIDINE HYDROCHLORIDE 0.1 MG/1
0.1 TABLET ORAL EVERY 6 HOURS PRN
Status: DISCONTINUED | OUTPATIENT
Start: 2018-09-11 | End: 2018-09-12 | Stop reason: HOSPADM

## 2018-09-11 RX ORDER — MEPERIDINE HYDROCHLORIDE 50 MG/ML
25 INJECTION INTRAMUSCULAR; INTRAVENOUS; SUBCUTANEOUS EVERY 4 HOURS PRN
Status: DISCONTINUED | OUTPATIENT
Start: 2018-09-11 | End: 2018-09-11

## 2018-09-11 RX ORDER — CINACALCET 30 MG/1
30-60 TABLET, FILM COATED ORAL DAILY
Status: DISCONTINUED | OUTPATIENT
Start: 2018-09-11 | End: 2018-09-11

## 2018-09-11 RX ORDER — HYDROCODONE BITARTRATE AND ACETAMINOPHEN 10; 325 MG/1; MG/1
2 TABLET ORAL 3 TIMES DAILY PRN
Status: DISCONTINUED | OUTPATIENT
Start: 2018-09-11 | End: 2018-09-12 | Stop reason: HOSPADM

## 2018-09-11 RX ADMIN — LIDOCAINE HYDROCHLORIDE 1 ML: 10 INJECTION, SOLUTION INFILTRATION; PERINEURAL at 16:15

## 2018-09-11 RX ADMIN — HYDROCODONE BITARTRATE AND ACETAMINOPHEN 2 TABLET: 10; 325 TABLET ORAL at 23:23

## 2018-09-11 RX ADMIN — FENTANYL CITRATE 25 MCG: 50 INJECTION, SOLUTION INTRAMUSCULAR; INTRAVENOUS at 15:01

## 2018-09-11 RX ADMIN — SEVELAMER CARBONATE 4000 MG: 800 TABLET, FILM COATED ORAL at 20:54

## 2018-09-11 RX ADMIN — HYDROCODONE BITARTRATE AND ACETAMINOPHEN 2 TABLET: 10; 325 TABLET ORAL at 10:20

## 2018-09-11 RX ADMIN — HYDROCODONE BITARTRATE AND ACETAMINOPHEN 2 TABLET: 10; 325 TABLET ORAL at 19:39

## 2018-09-11 RX ADMIN — LEVETIRACETAM 250 MG: 500 TABLET ORAL at 06:42

## 2018-09-11 RX ADMIN — MEPERIDINE HYDROCHLORIDE 25 MG: 50 INJECTION INTRAMUSCULAR; INTRAVENOUS; SUBCUTANEOUS at 11:29

## 2018-09-11 RX ADMIN — SEVELAMER CARBONATE 4000 MG: 800 TABLET, FILM COATED ORAL at 10:20

## 2018-09-11 RX ADMIN — MIDODRINE HYDROCHLORIDE 10 MG: 5 TABLET ORAL at 15:40

## 2018-09-11 RX ADMIN — GABAPENTIN 600 MG: 300 CAPSULE ORAL at 20:50

## 2018-09-11 RX ADMIN — SODIUM CHLORIDE: 9 INJECTION, SOLUTION INTRAVENOUS at 00:45

## 2018-09-11 RX ADMIN — HEPARIN SODIUM 2000 UNITS: 1000 INJECTION, SOLUTION INTRAVENOUS; SUBCUTANEOUS at 16:15

## 2018-09-11 RX ADMIN — CALCITRIOL 0.75 MCG: 0.25 CAPSULE, LIQUID FILLED ORAL at 20:50

## 2018-09-11 RX ADMIN — MIDODRINE HYDROCHLORIDE 10 MG: 5 TABLET ORAL at 10:23

## 2018-09-11 RX ADMIN — LEVETIRACETAM 250 MG: 500 TABLET ORAL at 20:50

## 2018-09-11 RX ADMIN — FENTANYL CITRATE 25 MCG: 50 INJECTION, SOLUTION INTRAMUSCULAR; INTRAVENOUS at 12:31

## 2018-09-11 ASSESSMENT — ENCOUNTER SYMPTOMS
BRUISES/BLEEDS EASILY: 0
EYES NEGATIVE: 1
PND: 0
BLURRED VISION: 0
PHOTOPHOBIA: 0
WHEEZING: 0
GASTROINTESTINAL NEGATIVE: 1
POLYDIPSIA: 0
TREMORS: 0
FALLS: 0
CONSTITUTIONAL NEGATIVE: 1
WEIGHT LOSS: 0
TINGLING: 0
SHORTNESS OF BREATH: 0
EYE REDNESS: 0
FEVER: 0
WEAKNESS: 0
BLOOD IN STOOL: 0
HEADACHES: 0
HALLUCINATIONS: 0
NECK PAIN: 0
FLANK PAIN: 0
CHILLS: 0
NAUSEA: 0
NERVOUS/ANXIOUS: 0
EYE DISCHARGE: 0
COUGH: 0
SEIZURES: 0
NEUROLOGICAL NEGATIVE: 1
CARDIOVASCULAR NEGATIVE: 1
MUSCULOSKELETAL NEGATIVE: 1
PSYCHIATRIC NEGATIVE: 1
SPEECH CHANGE: 0
ORTHOPNEA: 0
SENSORY CHANGE: 0
SORE THROAT: 0
HEMOPTYSIS: 0
INSOMNIA: 0
STRIDOR: 0
DOUBLE VISION: 0
DIZZINESS: 0
BRUISES/BLEEDS EASILY: 1
DIARRHEA: 0
MEMORY LOSS: 0
CONSTIPATION: 0
PALPITATIONS: 0
FOCAL WEAKNESS: 0
DIAPHORESIS: 0
LOSS OF CONSCIOUSNESS: 0
BACK PAIN: 0
SINUS PAIN: 0
RESPIRATORY NEGATIVE: 1
DEPRESSION: 0
SPUTUM PRODUCTION: 0
EYE PAIN: 0
MYALGIAS: 0
VOMITING: 0
ABDOMINAL PAIN: 0
HEARTBURN: 0
CLAUDICATION: 0

## 2018-09-11 ASSESSMENT — COPD QUESTIONNAIRES
IN THE PAST 12 MONTHS DO YOU DO LESS THAN YOU USED TO BECAUSE OF YOUR BREATHING PROBLEMS: DISAGREE/UNSURE
DURING THE PAST 4 WEEKS HOW MUCH DID YOU FEEL SHORT OF BREATH: NONE/LITTLE OF THE TIME
DO YOU EVER COUGH UP ANY MUCUS OR PHLEGM?: NO/ONLY WITH OCCASIONAL COLDS OR INFECTIONS
COPD SCREENING SCORE: 0
HAVE YOU SMOKED AT LEAST 100 CIGARETTES IN YOUR ENTIRE LIFE: NO/DON'T KNOW

## 2018-09-11 ASSESSMENT — COGNITIVE AND FUNCTIONAL STATUS - GENERAL
MOBILITY SCORE: 24
SUGGESTED CMS G CODE MODIFIER DAILY ACTIVITY: CJ
HELP NEEDED FOR BATHING: A LITTLE
PERSONAL GROOMING: A LITTLE
DAILY ACTIVITIY SCORE: 21
DRESSING REGULAR UPPER BODY CLOTHING: A LITTLE
SUGGESTED CMS G CODE MODIFIER MOBILITY: CH

## 2018-09-11 ASSESSMENT — PAIN SCALES - GENERAL
PAINLEVEL_OUTOF10: 7
PAINLEVEL_OUTOF10: 9
PAINLEVEL_OUTOF10: 7
PAINLEVEL_OUTOF10: 8
PAINLEVEL_OUTOF10: 7

## 2018-09-11 ASSESSMENT — PATIENT HEALTH QUESTIONNAIRE - PHQ9
2. FEELING DOWN, DEPRESSED, IRRITABLE, OR HOPELESS: NOT AT ALL
1. LITTLE INTEREST OR PLEASURE IN DOING THINGS: NOT AT ALL
SUM OF ALL RESPONSES TO PHQ9 QUESTIONS 1 AND 2: 0

## 2018-09-11 ASSESSMENT — LIFESTYLE VARIABLES
EVER_SMOKED: NEVER
SUBSTANCE_ABUSE: 0
ALCOHOL_USE: NO

## 2018-09-11 NOTE — ED NOTES
"Pt family member to bedside. Pt awoke to speak with family and complained that he was not getting any pain medication due to low b/p. Pt states \"my blood pressure is always low\" pt is drowsy and then fall back to sleep  "

## 2018-09-11 NOTE — ED NOTES
Pt wheeled back immediately from triage. ERP at bedside, IV established and blood sent to lab. Pt medicated for pain while surgeon establishes bleeding control. Bleeding controlled at this time.

## 2018-09-11 NOTE — ED NOTES
Pat from Lab called with critical result of Hgb 5.2 at 0020. Critical lab result read back to Pat.   Dr. Love notified of critical lab result at 0021.  Critical lab result read back by Dr. Love. Pat did notify me that this was a preliminary finding and that she was unable to confirm this due to the specimen being taken and spun down before she could confirm it.

## 2018-09-11 NOTE — CONSULTS
DATE OF SERVICE:  09/10/2018    PLASTIC SURGERY CONSULTATION    CHIEF COMPLAINT:  Bleeding from abdominal wall wound.    HISTORY OF PRESENT ILLNESS:  Patient is a 38-year-old white male who is well   known to me.  He had surgery earlier today to replace and inset an arm flap to   his abdominal wall, where he had multiple bleeding varices.  The patient had   called me tonight saying that he was bleeding profusely and concern was that   there was an area that was uncontrolled and I plan to meet him in the   emergency room.    Patient's past medical history is well documented.  He has chronic renal   failure and is on dialysis.  He has had an IVC occlusion.  He has had multiple   surgeries, too many to count for fistula grafts as well as abdominal wound   surgery.  The patient is employed.  He works here at [employer] (formerly [employer]    SOCIAL HISTORY:  He does not smoke.    REVIEW OF SYSTEMS:  Negative for headache, fever, visual disturbances, nausea,   vomiting, chest pain, cough, abdominal pain, hematochezia, melena, or   diarrhea.    PHYSICAL EXAMINATION:  The patient is lying in the gurney.  He does have some   bleeding that was coming from the wound.  I managed to snip away some of the   sutures that were holding his arm to his abdominal wall to allow a better look   at the area.  It appears that he has got small amount of bleeding coming from   the posterior surface of this flap.  I placed some Surgicel in the area with   some small 4 x 4, just one 4 x 4 and had immediate, essentially cessation of   any bleeding.    The patient did have a rather high INR. He is getting labs right now.    We are going to check his labs, get his INR.  We will admit him regardless for   monitoring him to make sure he is not bleeding anymore.  He is going to   require dialysis tomorrow as well, so we are going to have the medicine   doctors probably admit him.  I will follow along just to make sure he is not   bleeding still, but right now we  seemed to have this controlled here in the   emergency room and I am not sure that I am going to actually benefit him   taking him to the operating room.  Main thing will be for pain control as he   is in significant pain, but other than that, we need to make sure that he is   not going to bleed anymore. As soon as he get labs, I would not be surprised   if he needs transfusion.  We will get medicine admit him.       ____________________________________     MD YOVANI LYNNE / RIAZ    DD:  09/10/2018 23:13:15  DT:  09/11/2018 02:51:43    D#:  [id number]  Job#:  [id number]

## 2018-09-11 NOTE — DISCHARGE PLANNING
Outpatient Dialysis Note    Confirmed patient is active at:    [facility]  [address]   [place] [address]      Schedule: Home Hemo    Spoke with Sheila at facility who confirmed.    Forwarded records for review.    Dialysis Coordinator, [facility]  Pina Irwin [phone]

## 2018-09-11 NOTE — ED NOTES
Verbal order for 50mcg Fentanyl IV push STAT.   Order placed med pulled, med admin. ERP stated he put in another order for an additional 50mcg fentanyl push, the rest was drawn and admin to pt with Will Rn. No waste

## 2018-09-11 NOTE — ED NOTES
Report to niels. No bleeding from surgical site since my shift began [id number]. Pt vss. Awaiting room assignment

## 2018-09-11 NOTE — ED NOTES
hospitalist apn to bedside to round on pt and update poc. Alerted need for dialysis today per pt. Emy states will get ahold of nephrology

## 2018-09-11 NOTE — ASSESSMENT & PLAN NOTE
Setting of plastic surgery for pannus removal after multiple scarring from renal transplant surgery X2.    Currently with right arm sutured to site to aid with healing.    Will need support of right arm.  Appreciate any further plastics recommendations.   No further bleeding

## 2018-09-11 NOTE — ASSESSMENT & PLAN NOTE
Will need 2 units PRBC, monitor.  Acute bleeding stopped by plastic surgery.    Monitor wound. Transfuse if needed for hemoglobin less than 7 gm/dL   H/H stable with no further bleeding after transfusion

## 2018-09-11 NOTE — ED PROVIDER NOTES
CHIEF COMPLAINT  Chief Complaint   Patient presents with   • Post Op Bleeding       HPI  Denis De Anda is a 38 y.o. male who presents with bleeding from a surgical site of his right arm and abdomen.    Had operation yesterday to address chronic abdominal wound.  Underwent a procedure to inset arm flap to the right abdomen.  He was discharged at around 2 PM and later on in the evening, had developed bleeding from the surgical site.  Prior to evaluation in the emergency department, the patient states that he had been bleeding for 6 hours from the surgical site.  Dr. Rosenbaum was present in the emergency department to see the patient upon arrival.    It was found that the patient does have a history of hypercoagulable state and is on Coumadin.  He has multiple medical issues including chronic kidney disease and multiple graft failures.  Currently undergoing dialysis through a left upper leg graft site.    Upon arrival, the patient was seen by Dr. Rosenbaum who was able to achieve hemostasis from the surgical site.  The patient did feel slightly weak and had pain to the surgical area.    No fevers, chest pain, trouble breathing, abdominal pain, vomiting.    REVIEW OF SYSTEMS  See HPI for further details. All other systems are negative.     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    SOCIAL HISTORY  Social History     Social History Main Topics   • Smoking status: Never Smoker   • Smokeless tobacco: Never Used   • Alcohol use No   • Drug use: No   • Sexual activity: Yes     Partners: Female       SURGICAL HISTORY   has a past surgical  history that includes mass excision ortho (10/9/08); av fistula creation (11/6/08); arteriogram (3/5/2009); angioplasty balloon (3/5/2009); av fistulogram (3/5/2009); us-kidney transplant (1996, 2001); endarterectomy (11/16/2010); av fistulogram (11/16/2010); cath placement (2/1/2011); angioplasty balloon (2/1/2011); av fistulogram (6/5/2011); cath placement (6/5/2011); av fistula revision (11/3/2011); bone spur excision (12/8/2011); recovery (5/18/2012); incision and drainage general (9/13/2013); debridement (9/13/2013); vein ligation (9/13/2013); recovery (6/13/2014); av fistula revision (9/30/2014); irrigation & debridement general (12/15/2014); av fistula revision (2/8/2015); av fistula revision (2/22/2015); av fistula revision (3/20/2015); inject nerv blck,stellate ganglion (3/24/2015); av fistula creation (4/20/2015); av fistula thrombolysis (Left, 6/3/2015); irrigation & debridement general (Left, 6/3/2015); av fistula thrombolysis (Left, 6/9/2015); av fistula revision (Left, 6/17/2015); irrigation & debridement general (Left, 6/17/2015); recovery (8/7/2015); percut implnt neuroelect,epidural (2/19/2016); percut implnt neuroelect,epidural (2/19/2016); parathyroidectomy (2006); inguinal hernia repair (Bilateral, 2001, 2002); spinal cord stimulator (N/A, 3/25/2016); recovery (7/8/2016); lesion excision general (Right, 7/11/2016); mass excision general (Right, 8/5/2016); cath placement (Right, 9/17/2016); thrombectomy (Left, 9/18/2016); av fistulogram (9/18/2016); thrombectomy (Left, 10/20/2016); incision and drainage general (10/20/2016); irrigation & debridement general (Left, 10/28/2016); flap closure (Left, 11/17/2016); thrombectomy (Left, 1/6/2017); cath placement (Right, 1/6/2017); thrombectomy (Left, 1/5/2017); spinal cord stimulator (N/A, 5/4/2017); abdominoplasty (6/5/2017); irrigation & debridement general (7/31/2017); cath placement (Right, 11/14/2017); av fistula revision (Left, 11/14/2017); wound  "exploration general (2/1/2018); wound closure general (2/19/2018); split thickness skin graft (2/26/2018); thrombectomy (Left, 3/9/2018); cath placement (Right, 3/9/2018); thrombectomy (Left, 4/27/2018); thrombectomy (Left, 4/28/2018); abdominal exploration (6/25/2018); and myocutaneous flap (Right, 8/27/2018).    CURRENT MEDICATIONS  Home Medications    **Home medications have not yet been reviewed for this encounter**         ALLERGIES  Allergies   Allergen Reactions   • Baclofen Unspecified     Total loss of memory, sedation.   RXN=6/2015   • Contrast Media With Iodine [Iodine] Rash     RXN=1/5/2017   • Keflex Rash     RXN=possibly >10 years   • Pcn [Penicillins] Rash     RXN=possibly >10 years ago  Tolerated Zosyn on 2/20/18   • Tape Rash     Paper tape and tegaderm ok  RXN=ongoing       PHYSICAL EXAM  VITAL SIGNS: BP (!) 93/29   Pulse 94   Temp 36.3 °C (97.4 °F)   Resp 18   Ht 1.626 m (5' 4\")   Wt 80.3 kg (177 lb)   SpO2 100%   BMI 30.38 kg/m²   Pulse ox interpretation: I interpret this pulse ox as normal.  Constitutional: Alert in no apparent distress.  HENT: No signs of trauma, Bilateral external ears normal, Nose normal.   Eyes: Pupils are equal and reactive, Conjunctiva pale, Non-icteric.   Neck: Normal range of motion, No tenderness, Supple, No stridor.   Cardiovascular: Regular rate and rhythm.   Thorax & Lungs: Normal breath sounds, No respiratory distress.   Abdomen: Bowel sounds normal, Soft, No tenderness, No peritoneal signs.  Right forearm grafted to the right lateral abdominal wall with bleeding around the surgical site.  No pulsatile bleeding.  Skin: Diffuse pallor, warm, Dry, No erythema, No rash.   Back: No bony tenderness, No CVA tenderness.   Extremities: Intact distal pulses, No edema, No tenderness, No cyanosis  Musculoskeletal:  No major deformities noted.   Neurologic: Alert , Normal motor function and gait, Normal sensory function, No focal deficits noted.       DIAGNOSTIC STUDIES " / PROCEDURES    EKG  9/11/2018 at 1:20 AM  Normal sinus rhythm at 91  MA, QRS, QTC within normal limits  Normal axis  No peaked T waves or other signs of hyperkalemia  No ST elevations or depressions      LABS  Labs Reviewed   CBC WITH DIFFERENTIAL - Abnormal; Notable for the following:        Result Value    RBC 2.06 (*)     Hemoglobin 5.2 (*)     Hematocrit 18.3 (*)     MCH 25.2 (*)     MCHC 28.4 (*)     RDW 54.4 (*)     Neutrophils-Polys 97.40 (*)     Lymphocytes 2.60 (*)     Lymphs (Absolute) 0.19 (*)     All other components within normal limits    Narrative:     Indicate which anticoagulants the patient is on:->COUMADIN   COMP METABOLIC PANEL - Abnormal; Notable for the following:     Sodium 131 (*)     Potassium 5.9 (*)     Chloride 88 (*)     Anion Gap 21.0 (*)     Glucose 123 (*)     Bun 68 (*)     Creatinine 9.73 (*)     Alkaline Phosphatase 123 (*)     Globulin 3.7 (*)     All other components within normal limits    Narrative:     Indicate which anticoagulants the patient is on:->COUMADIN   PROTHROMBIN TIME - Abnormal; Notable for the following:     PT 49.3 (*)     INR 5.42 (*)     All other components within normal limits    Narrative:     Indicate which anticoagulants the patient is on:->COUMADIN   ESTIMATED GFR - Abnormal; Notable for the following:     GFR If  7 (*)     GFR If Non  6 (*)     All other components within normal limits    Narrative:     Indicate which anticoagulants the patient is on:->COUMADIN   PHOSPHORUS - Abnormal; Notable for the following:     Phosphorus 9.4 (*)     All other components within normal limits    Narrative:     Indicate which anticoagulants the patient is on:->COUMADIN   COD (ADULT)   MAGNESIUM    Narrative:     Indicate which anticoagulants the patient is on:->COUMADIN   DIFFERENTIAL MANUAL    Narrative:     Indicate which anticoagulants the patient is on:->COUMADIN   PERIPHERAL SMEAR REVIEW    Narrative:     Indicate which  anticoagulants the patient is on:->COUMADIN   PLATELET ESTIMATE    Narrative:     Indicate which anticoagulants the patient is on:->COUMADIN   MORPHOLOGY    Narrative:     Indicate which anticoagulants the patient is on:->COUMADIN   RELEASE RED BLOOD CELLS-ACTIVE BLEEDING   TRANSFUSE RBC ACTIVE BLEED-NURSING COMMUNICATION       RADIOLOGY  No orders to display       COURSE & MEDICAL DECISION MAKING  Pertinent Labs & Imaging studies reviewed. (See chart for details)  38 y.o. male male presenting with postoperative bleeding from a graft site to his right abdomen.  Had been bleeding about 6 hours prior to arrival.  He is on Coumadin chronically for hypercoagulable states.  He also has a complicated medical history with chronic kidney disease and dialysis from a left upper leg fistula site.    Patient has pain to the surgical site.  Presented with slightly low blood pressure.  He was given IV fluid hydration secondary to his low blood pressure.  Had minimal response upon reevaluation.  Laboratory studies were performed and the patient was found to have hemoglobin of 5.  He was given 2 units of blood, packed red blood cells, to address this.  Platelets are normal.  The patient is supratherapeutic with INR at 5 however no active bleeding at this time as the surgeon, Dr. Rosenbaum, was able to achieve hemostasis.  We will not actively reverse his anticoagulation at this time.  Will monitor bleeding closely.    The patient does have hyperkalemia with a potassium of 5.9.  Given his chronic kidney disease this is not significantly elevated though EKG was ordered for reassurance.  There were no obvious EKG findings to suggest hyperkalemia.  We will continue to monitor and will admit the patient to the hospitalist service to help arrange inpatient dialysis from his left upper leg fistula site.    Dr. Rosenbaum recommending admission for pain control and observation of the bleeding.  Will follow the patient.  Spoke with the hospitalist,  Dr. Owen who agrees to the admission.    The total critical care time on this patient is 35 minutes, resuscitating patient, speaking with admitting physician, and deciphering test results. This 35 minutes is exclusive of separately billable procedures.      FINAL IMPRESSION  1. Acute posthemorrhagic anemia    2. Stage 5 chronic kidney disease on chronic dialysis (HCC)    3. Supratherapeutic INR            Electronically signed by: Cristopher Love, 9/10/2018 10:38 PM

## 2018-09-11 NOTE — ED NOTES
Received report from Amy perdomo. Pt sitting up chair, nad. Provided with meal box to eat. Updated poc.   medicated for pain

## 2018-09-11 NOTE — PROGRESS NOTES
Pt arrived from ED. Report received and monitor placed on pt. MR updated. Shift report received and assessment completed. No family at bedside. Pt indicates pain to arm.  Call light placed within reach, bed in lowest position, and pt educated to call. Will continue to monitor patient.

## 2018-09-11 NOTE — ED TRIAGE NOTES
Pt had surgery with Dr. Rosenbaum here today to have right arm grafted to abd. Pt says he has been bleeding for the last six hours, advised by Dr. Rosenbaum to return to ED for bleeding control.

## 2018-09-11 NOTE — CONSULTS
Consults   Nephrology Inpatient Consultation    Date of Service  9/11/2018    Reason for Consultation  Hyperkalemia, ESRD, assess the need for HD    History of Presenting Illness  38 y.o. male admitted 9/10/2018 with bleeding from flap. He is on HHD and last dialyzed no Saturday. He has been eating low K foods over the weekend to try to reduce the need for dialysis. He has been feeling fine currently, no current bleeding.    Referring Physician  JAE Krishna*    Consulting Physician  Karl Serrato M.D.    Review of Systems  Review of Systems   Constitutional: Negative for chills and fever.   All other systems reviewed and are negative.     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   • Toxic uninodular goiter without mention of thyrotoxic crisis or storm        Surgical History  Past Surgical History:   Procedure Laterality Date   • SKIN FLAP DELAYED Left 9/10/2018    Procedure: SKIN FLAP DELAYED- FOR DIVISION AND INSET FLAP WITH SKIN GRAFT ON ARM;  Surgeon: Samson Rosenbaum Jr., M.D.;  Location: SURGERY SAME DAY [facility];  Service: Plastics   • MYOCUTANEOUS FLAP Right 8/27/2018    Procedure: MYOCUTANEOUS FLAP - RIGHT ARM TO TRUNK;  Surgeon: Samson Rosenbaum Jr., M.D.;  Location: SURGERY [facility];  Service: Plastics   • ABDOMINAL EXPLORATION  6/25/2018    Procedure:  ABDOMINAL EXPLORATION- CONTROL ABDOMINAL BLEEDING;  Surgeon: Samson Rosenbaum Jr., M.D.;  Location: SURGERY [facility];  Service: Plastics   • THROMBECTOMY Left 4/28/2018    Procedure: THROMBECTOMY- Thigh W/ Graft;  Surgeon: Jairo Hopkins M.D.;  Location: SURGERY [facility];  Service: General   • THROMBECTOMY Left 4/27/2018    Procedure: THROMBECTOMY - THIGH GRAFT AND REVISION;  Surgeon: Jairo Hopkins M.D.;  Location: SURGERY [facility];  Service: General   • THROMBECTOMY Left 3/9/2018    Procedure: THROMBECTOMY- THIGH DIALYSIS GRAFT AND REVISION  ;  Surgeon: Jairo Hopkins M.D.;  Location: SURGERY [facility];  Service: General   • CATH PLACEMENT Right 3/9/2018    Procedure: CATH PLACEMENT - REPLACE DIALYSIS CATH RIGHT THIGH;  Surgeon: Jairo Hopkins M.D.;  Location: SURGERY [facility];  Service: General   • SPLIT THICKNESS SKIN GRAFT  2/26/2018    Procedure: SPLIT THICKNESS SKIN GRAFT- TO ABDOMEN;  Surgeon: Samson Rosenbaum Jr., M.D.;  Location: SURGERY [facility];  Service: Plastics   • WOUND CLOSURE GENERAL  2/19/2018    Procedure: WOUND CLOSURE GENERAL-ABDOMINAL WALL HEMORRHAGE;  Surgeon: Jason Robertson M.D.;  Location: SURGERY [facility];  Service: Plastics   • WOUND EXPLORATION GENERAL  2/1/2018    Procedure: WOUND EXPLORATION GENERAL, REPAIR BLEEDING;  Surgeon: Samson Rosenbaum Jr., M.D.;  Location: SURGERY [facility];  Service: Plastics   • CATH PLACEMENT Right 11/14/2017    Procedure: CATH PLACEMENT - PERMA;  Surgeon: Jairo Hopkins M.D.;  Location: SURGERY [facility];  Service: General   • AV FISTULA REVISION Left 11/14/2017    Procedure: AV GRAFT REVISION;  Surgeon: Jairo Hopkins M.D.;  Location: SURGERY [facility];  Service: General   • IRRIGATION & DEBRIDEMENT GENERAL  7/31/2017    Procedure: IRRIGATION & DEBRIDEMENT GENERAL-ABDOMEN;  Surgeon: Samson Rosenbaum Jr., M.D.;  Location: SURGERY [facility];  Service:    •  ABDOMINOPLASTY  6/5/2017    Procedure: ABDOMINOPLASTY - FOR PANNICULECTOMY;  Surgeon: Samson Rosenbaum Jr., M.D.;  Location: [facility];  Service:    • SPINAL CORD STIMULATOR N/A 5/4/2017    Procedure: SPINAL CORD STIMULATOR - EXPLANT;  Surgeon: Bin Pro M.D.;  Location: [facility];  Service:    • THROMBECTOMY Left 1/6/2017    Procedure: THROMBECTOMY-OPEN THROMBECTOMY WITH LEFT THIGH GRAFT;  Surgeon: Jairo Hopkins M.D.;  Location: [facility];  Service:    • CATH PLACEMENT Right 1/6/2017    Procedure: CATH PLACEMENT;  Surgeon: Jairo Hopkins M.D.;  Location: [facility];  Service:    • THROMBECTOMY Left 1/5/2017    Procedure: THROMBECTOMY-THIGH AV LOOP GRAFT AND ANGIOJET;  Surgeon: Jairo Hopkins M.D.;  Location: [facility];  Service:    • FLAP CLOSURE Left 11/17/2016    Procedure: Fasciocutaneous Flap Closure Left Upper Leg;  Surgeon: Samson Rosenbaum Jr., M.D.;  Location: [facility];  Service:    • IRRIGATION & DEBRIDEMENT GENERAL Left 10/28/2016    Procedure: IRRIGATION & DEBRIDEMENT GENERAL THIGH WITH IRRIGATING WOUND VAC PLACEMENT;  Surgeon: Jairo Hopkins M.D.;  Location: [facility];  Service:    • THROMBECTOMY Left 10/20/2016    Procedure: THROMBECTOMY THIGH;  Surgeon: Jairo Hopkins M.D.;  Location: [facility];  Service:    • INCISION AND DRAINAGE GENERAL  10/20/2016    Procedure: INCISION AND DRAINAGE GENERAL HEMATOMA;  Surgeon: Jairo Hopkins M.D.;  Location: [facility];  Service:    • THROMBECTOMY Left 9/18/2016    Procedure: THROMBECTOMY - and fistula revision;  Surgeon: Jairo Hopkins M.D.;  Location: [facility];  Service:    • AV FISTULOGRAM  9/18/2016    Procedure: AV FISTULOGRAM;  Surgeon: Jairo Hopkins M.D.;  Location: [facility];  Service:    • CATH PLACEMENT Right 9/17/2016    Procedure: CATH PLACEMENT - tunneled dialysis  cath placement right femoral ;  Surgeon: Quentin Alicia M.D.;  Location: SURGERY [facility];  Service:    • MASS EXCISION GENERAL Right 8/5/2016    Procedure: MASS EXCISION GENERAL FOR CALCIPHYLAXIS SKIN AND SUBCUTANEOUS TISSUE FOREARM;  Surgeon: Jairo Hopkins M.D.;  Location: SURGERY [facility];  Service:    • LESION EXCISION GENERAL Right 7/11/2016    Procedure: LESION EXCISION GENERAL FOR ARM SKIN;  Surgeon: Jairo Hopkins M.D.;  Location: SURGERY [facility];  Service:    • RECOVERY  7/8/2016    Procedure: IR1-VASCULAR CASE-VIANEY-LEFT THIGH AV GRAFT THROMBOLYSIS WITH TISSUE PLASMINOGEN ACTIVATOR AND ANGIOJET ARTHERECTOMY   ;  Surgeon: Melinda Surgery;  Location: SURGERY PRE-POST PROC UNIT [facility];  Service:    • SPINAL CORD STIMULATOR N/A 3/25/2016    Procedure: SPINAL CORD STIMULATOR;  Surgeon: Bin Pro;  Location: [facility];  Service:    • PB PERCUT IMPLNT NEUROELECT,EPIDURAL  2/19/2016    Procedure: IMPLANT NEUROSTIM EPI ARRAY;  Surgeon: Bin Pro;  Location: [facility];  Service: Pain Management   • PB PERCUT IMPLNT NEUROELECT,EPIDURAL  2/19/2016    Procedure: IMPLANT NEUROSTIM EPI ARRAY;  Surgeon: Bin Pro;  Location: [facility];  Service: Pain Management   • RECOVERY  8/7/2015    Procedure:  VASCULAR CASE VIANEY-RIGHT ILIAC VENOGRAM WITH ANGIOPLASTY, REMOVAL RIGHT FEMORAL TUNNELED DIALYSIS CATHETER  **VRE**;  Surgeon: Melinda Surgery;  Location: SURGERY PRE-POST PROC UNIT [facility];  Service:    • AV FISTULA REVISION Left 6/17/2015    Procedure: AV FISTULA REVISION;  Surgeon: Jairo Hopkins M.D.;  Location: SURGERY [facility];  Service:    • IRRIGATION & DEBRIDEMENT GENERAL Left 6/17/2015    Procedure: IRRIGATION & DEBRIDEMENT GENERAL ARM W/EXPLORATION WOUND & CONTROL BLEEDING;  Surgeon: Jairo Hopkins M.D.;  Location: SURGERY [facility];  Service:    • AV FISTULA THROMBOLYSIS Left 6/9/2015    Procedure: THROMBECTOMY  AV GRAFT THIGH;  Surgeon: Jairo Hopkins M.D.;  Location: SURGERY [facility];  Service:    • AV FISTULA THROMBOLYSIS Left 6/3/2015    Procedure: AV FISTULA THROMBOLYSIS THIGH REPLACEMENT;  Surgeon: Jairo Hopkins M.D.;  Location: SURGERY [facility];  Service:    • IRRIGATION & DEBRIDEMENT GENERAL Left 6/3/2015    Procedure: IRRIGATION & DEBRIDEMENT GENERAL;  Surgeon: Jairo Hopkins M.D.;  Location: SURGERY [facility];  Service:    • AV FISTULA CREATION  4/20/2015    Performed by Jairo Hopkins M.D. at SURGERY [facility]   • PB INJECT NERV BLCK,STELLATE GANGLION  3/24/2015    Performed by Bin Pro at [facility]   • AV FISTULA REVISION  3/20/2015    Performed by Jairo Hopkins M.D. at [facility]   • AV FISTULA REVISION  2/22/2015    Performed by Lurdes Moffett M.D. at SURGERY [facility]   • AV FISTULA REVISION  2/8/2015    Performed by Jairo Hopkins M.D. at SURGERY [facility]   • IRRIGATION & DEBRIDEMENT GENERAL  12/15/2014    Performed by Jairo Hopkins M.D. at SURGERY [facility]   • AV FISTULA REVISION  9/30/2014    Performed by Jairo Hopkins M.D. at SURGERY [facility]   • RECOVERY  6/13/2014    Performed by Ir-Recovery Surgery at [facility] SAME DAY [facility]   • INCISION AND DRAINAGE GENERAL  9/13/2013    Performed by Jairo Hopkins M.D. at SURGERY [facility]   • DEBRIDEMENT  9/13/2013    Performed by Jairo Hopkins M.D. at SURGERY [facility]   • VEIN LIGATION  9/13/2013    Performed by Jairo Hopkins M.D. at SURGERY [facility]   • RECOVERY  5/18/2012    Performed by SURGERY, IR-RECOVERY at [facility]   • BONE SPUR EXCISION  12/8/2011    Performed by BELKIS BREAUX at SURGERY SAME DAY [facility]   • AV FISTULA REVISION  11/3/2011    Performed by JAIRO HOPKINS at SURGERY [facility]   • AV FISTULOGRAM  6/5/2011    Performed by JAIRO HOPKINS at SURGERY [facility]  [place]   • CATH PLACEMENT  6/5/2011    Performed by MARI WINTERS at SURGERY [facility]   • CATH PLACEMENT  2/1/2011    Performed by AMRI WINTERS at SURGERY [facility]   • ANGIOPLASTY BALLOON  2/1/2011    Performed by MARI WINTERS at SURGERY [facility]   • ENDARTERECTOMY  11/16/2010    Performed by MARI WINTERS at SURGERY [facility]   • AV FISTULOGRAM  11/16/2010    Performed by MARI WINTERS at SURGERY [facility]   • ARTERIOGRAM  3/5/2009    Performed by MARI WINTERS at SURGERY [facility]S   • ANGIOPLASTY BALLOON  3/5/2009    Performed by MARI WINTERS at SURGERY [facility]   • AV FISTULOGRAM  3/5/2009    Performed by MARI WINTERS at SURGERY [facility]   • AV FISTULA CREATION  11/6/08    Performed by MARI WINTERS at SURGERY [facility]   • MASS EXCISION ORTHO  10/9/08    Performed by BELKIS BREAUX at SURGERY SAME DAY [facility]   • PARATHYROIDECTOMY  2006   • INGUINAL HERNIA REPAIR Bilateral 2001, 2002   • -KIDNEY TRANSPLANT  1996, 2001    x2       Medications  Current Facility-Administered Medications on File Prior to Encounter   Medication Dose Route Frequency Provider Last Rate Last Dose   • meperidine (DEMEROL) injection 50 mg  50 mg Intravenous Intra-Op Once PRN Samson Rosenbaum Jr., M.D.         Current Outpatient Prescriptions on File Prior to Encounter   Medication Sig Dispense Refill   • warfarin (COUMADIN) 5 MG Tab Take 2.5-5 mg by mouth every day. Pt takes 2.5 mg on Fridays . All other days 5 mg     • gabapentin (NEURONTIN) 300 MG Cap Take 1,200 mg by mouth every bedtime. Pt also has an RX for 600MG, pt takes total of 1,800MG HS     • HYDROcodone/acetaminophen (NORCO)  MG Tab Take 2-6 Tabs by mouth every bedtime.     • midodrine (PROAMATINE) 10 MG tablet Take 1 Tab by mouth 3 times a day, with meals. Can hold dose if blood pressure greater than 120 systolic. 60 Tab 3   • cinacalcet (SENSIPAR) 30  MG Tab Take 30-60 mg by mouth every day. 30 mg ,  ,  , and . Pt takes 60MG on Tue, Thur, and Sat     • levetiracetam (KEPPRA) 250 MG tablet Take 1 Tab by mouth 2 times a day. 60 Tab 3   • gabapentin (NEURONTIN) 600 MG tablet Take 600 mg by mouth every evening. Pt also has an RX for 300MG, pt takes total of 1,800MG HS     • sevelamer carbonate (RENVELA) 800 MG Tab tablet Take 3,200 mg by mouth 3 times a day, with meals. With meals     • calcitRIOL (ROCALTROL) 0.25 MCG CAPS Take 0.75 mcg by mouth every bedtime.         Family History  family history includes Arthritis in his father; Heart Disease in his father; Hypertension in his brother; Lung Disease in his father.    Social History  Social History   Substance Use Topics   • Smoking status: Never Smoker   • Smokeless tobacco: Never Used   • Alcohol use No       Allergies  Allergies   Allergen Reactions   • Baclofen Unspecified     Total loss of memory, sedation.   RXN=2015   • Contrast Media With Iodine [Iodine] Rash     RXN=2017   • Keflex Rash     RXN=possibly >10 years   • Pcn [Penicillins] Rash     RXN=possibly >10 years ago  Tolerated Zosyn on 18   • Tape Rash     Paper tape and tegaderm ok  RXN=ongoing        Physical Exam  Laboratory/Imaging   Hemodynamics  Temp (24hrs), Av.4 °C (97.6 °F), Min:36.1 °C (97 °F), Max:37.2 °C (99 °F)   Temperature: 36.3 °C (97.4 °F)  Pulse  Av.9  Min: 75  Max: 125 Heart Rate (Monitored): (!) 109  Blood Pressure: (!) 92/37 (RN notified), NIBP: (!) 109/33      Respiratory      Respiration: 14, Pulse Oximetry: 90 %             Fluids  Date 18 07 - 18 0659   Shift 7555-6079 0800-1423 3073-4901 24 Hour Total   I  N  T  A  K  E   Blood 700   700    Shift Total 700   700   O  U  T  P  U  T   Shift Total       Weight (kg) 80.8 80.8 80.8 80.8         Nutrition  Orders Placed This Encounter   Procedures   • Diet Order Renal     Standing Status:   Standing     Number of  Occurrences:   1     Order Specific Question:   Diet:     Answer:   Renal [8]       Physical Exam   Constitutional: He is oriented to person, place, and time. He appears well-developed and well-nourished.   HENT:   Head: Normocephalic and atraumatic.   Cardiovascular: Normal rate and regular rhythm.    Pulmonary/Chest: Effort normal and breath sounds normal.   Abdominal:   R arm surgically attached to abdomen   Musculoskeletal: He exhibits no edema or tenderness.   Neurological: He is alert and oriented to person, place, and time.   Skin: Skin is warm and dry.       Recent Labs      09/10/18   2250  09/11/18   0556  09/11/18   1220   WBC  7.4   --   6.9   RBC  2.06*   --   3.00*   HEMOGLOBIN  5.2*  6.2*  8.7*   HEMATOCRIT  18.3*  20.2*  25.9*   MCV  88.8   --   86.3   MCH  25.2*   --   29.0   MCHC  28.4*   --   33.6*   RDW  54.4*   --   47.8   PLATELETCT  222   --   170   MPV  10.2   --   10.0     Recent Labs      09/10/18   0817  09/10/18   2250   SODIUM  130*  131*   POTASSIUM  4.8  5.9*   CHLORIDE  88*  88*   CO2  22  22   GLUCOSE  100*  123*   BUN  54*  68*   CREATININE  8.81*  9.73*   CALCIUM  10.2  9.5     Recent Labs      09/10/18   0817  09/10/18   2250   INR  4.04*  5.42*                  Assessment/Plan     1. ESRD - Normally on HHD, plan HD today, last Sat  2. Hyperkalemia - HD via SSK  3. Hyponatremia, new, likely from relative volume overload, UF as tolerated  4. Anemia - to be transfused

## 2018-09-11 NOTE — PROGRESS NOTES
[facility] Hospitalist Progress Note    Date of Service: 2018    Chief Complaint  38 y.o. male admitted 9/10/2018 with acute blood loss post arm abd surgery    Interval Problem Update   Pt is no longer bleeding.  Renal c/s going for HD today.  Oral midodrine re initiated    Consultants/Specialty  Renal  Plastics    Disposition  Home        Review of Systems   Constitutional: Negative for chills, diaphoresis, fever, malaise/fatigue and weight loss.   HENT: Negative for congestion, ear discharge, ear pain, hearing loss, nosebleeds, sinus pain, sore throat and tinnitus.    Eyes: Negative for blurred vision, double vision, photophobia, pain, discharge and redness.   Respiratory: Negative for cough, hemoptysis, sputum production, shortness of breath, wheezing and stridor.    Cardiovascular: Negative for chest pain, palpitations, orthopnea, claudication, leg swelling and PND.   Gastrointestinal: Negative for abdominal pain, blood in stool, constipation, diarrhea, heartburn, melena, nausea and vomiting.   Genitourinary: Negative for dysuria, flank pain, frequency, hematuria and urgency.   Musculoskeletal: Negative for back pain, falls, joint pain, myalgias and neck pain.   Skin: Negative for itching and rash.   Neurological: Negative for dizziness, tingling, tremors, sensory change, speech change, focal weakness, seizures, loss of consciousness, weakness and headaches.   Endo/Heme/Allergies: Negative for polydipsia. Does not bruise/bleed easily.   Psychiatric/Behavioral: Negative for depression, hallucinations, memory loss, substance abuse and suicidal ideas. The patient is not nervous/anxious and does not have insomnia.       Physical Exam  Laboratory/Imaging   Hemodynamics  Temp (24hrs), Av.4 °C (97.6 °F), Min:36.1 °C (97 °F), Max:37.2 °C (99 °F)   Temperature: 36.3 °C (97.4 °F)  Pulse  Av.9  Min: 75  Max: 125 Heart Rate (Monitored): (!) 109  Blood Pressure: (!) 92/37 (RN notified), NIBP: (!) 109/33       Respiratory      Respiration: 14, Pulse Oximetry: 90 %             Fluids    Intake/Output Summary (Last 24 hours) at 09/11/18 1542  Last data filed at 09/11/18 0945   Gross per 24 hour   Intake             2100 ml   Output                0 ml   Net             2100 ml       Nutrition  Orders Placed This Encounter   Procedures   • Diet Order Renal     Standing Status:   Standing     Number of Occurrences:   1     Order Specific Question:   Diet:     Answer:   Renal [8]     Physical Exam   Constitutional: He is oriented to person, place, and time. He appears well-developed and well-nourished. No distress.   HENT:   Head: Normocephalic and atraumatic.   Nose: Nose normal.   Mouth/Throat: Oropharynx is clear and moist. No oropharyngeal exudate.   Eyes: Pupils are equal, round, and reactive to light. Conjunctivae and EOM are normal. No scleral icterus.   Neck: Normal range of motion. Neck supple. No JVD present. No tracheal deviation present. No thyromegaly present.   Cardiovascular: Normal rate, regular rhythm, normal heart sounds and intact distal pulses.  Exam reveals no gallop and no friction rub.    No murmur heard.  Pulmonary/Chest: Effort normal and breath sounds normal. No stridor. No respiratory distress. He has no wheezes. He has no rales. He exhibits no tenderness.   Abdominal: Soft. Bowel sounds are normal. He exhibits no distension and no mass. There is no tenderness. There is no rebound and no guarding.   Genitourinary: Rectum normal.   Musculoskeletal: Normal range of motion. He exhibits no edema, tenderness or deformity.   Lymphadenopathy:     He has no cervical adenopathy.   Neurological: He is alert and oriented to person, place, and time. No cranial nerve deficit.   Skin: Skin is warm and dry. No rash noted. He is not diaphoretic. No erythema. No pallor.   Right arm attached to right abd wall   Psychiatric: He has a normal mood and affect. His behavior is normal. Judgment and thought content  normal.   Nursing note and vitals reviewed.      Recent Labs      09/10/18   2250  09/11/18   0556  09/11/18   1220   WBC  7.4   --   6.9   RBC  2.06*   --   3.00*   HEMOGLOBIN  5.2*  6.2*  8.7*   HEMATOCRIT  18.3*  20.2*  25.9*   MCV  88.8   --   86.3   MCH  25.2*   --   29.0   MCHC  28.4*   --   33.6*   RDW  54.4*   --   47.8   PLATELETCT  222   --   170   MPV  10.2   --   10.0     Recent Labs      09/10/18   0817  09/10/18   2250   SODIUM  130*  131*   POTASSIUM  4.8  5.9*   CHLORIDE  88*  88*   CO2  22  22   GLUCOSE  100*  123*   BUN  54*  68*   CREATININE  8.81*  9.73*   CALCIUM  10.2  9.5     Recent Labs      09/10/18   0817  09/10/18   2250   INR  4.04*  5.42*                  Assessment/Plan     * Anemia due to acute blood loss   Assessment & Plan    Will need 2 units PRBC, monitor.  Acute bleeding stopped by plastic surgery.    Monitor wound. Transfuse if needed for hemoglobin less than 7 gm/dL   H/H at 8          Hypertension- (present on admission)   Assessment & Plan    Controlled.  Monitor.         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          Non-healing surgical wound- (present on admission)   Assessment & Plan    Setting of plastic surgery for pannus removal after multiple scarring from renal transplant surgery X2.    Currently with right arm sutured to site to aid with healing.    Will need support of right arm.  Appreciate any further plastics recommendations.   No further bleeding        ESRD on hemodialysis (HCC)- (present on admission)   Assessment & Plan    Continue HD as per nephrology   HD today  HHD on MWF after discharge        Normocytic anemia- (present on admission)   Assessment & Plan    Chronic in the setting of ESRD        Renal transplant failure and rejection x 2- (present on admission)   Assessment & Plan    Stable.  On HD via graft left leg/groin           Quality-Core Measures

## 2018-09-11 NOTE — H&P
Hospital Medicine History & Physical Note    Date of Service  9/11/2018    Primary Care Physician  Tony Mcmillan M.D.    Consultants  Plastic Surgery     Code Status  Full code     Chief Complaint  bleeding    History of Presenting Illness  38 y.o. 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  This was to cover and help heal a chronic wound from a prior surgery.  Postsurgically, he had some bleeding, however this became worse, and uncontrolled.  He was subsequently brought back to the emergency department.  He was evaluated at bedside by the plastic surgeon and the bleeding was stopped.  Subsequent laboratory workup showed critical blood loss anemia.  He currently denies any symptoms except for pain in his right arm.  He has no lightheadedness, shortness of breath, chest pain.  He has no other complaints.    Review of Systems  Review of Systems   Constitutional: Negative.    HENT: Negative.    Eyes: Negative.    Respiratory: Negative.    Cardiovascular: Negative.    Gastrointestinal: Negative.    Genitourinary: Negative.    Musculoskeletal: Negative.    Skin: Negative.    Neurological: Negative.    Endo/Heme/Allergies: Bruises/bleeds easily.   Psychiatric/Behavioral: Negative.        Past Medical History   has a past medical history of Arrhythmia; Chronic kidney disease, unspecified; Contracture of palmar fascia; Dialysis; Graft failure due to thrombosis (3/10/2018); Hemorrhagic disorder (HCC); Hypertension; Intra-abdominal varices; Pain; Renal failure; Seizure (HCC); Sleep apnea; Snoring; Superior vena cava obstruction with collaterals; and Toxic uninodular goiter without mention of thyrotoxic crisis or storm. He also has no past  medical history of Encounter for long-term (current) use of other medications.    Surgical History   has a past surgical history that includes mass excision ortho (10/9/08); av fistula creation (11/6/08); arteriogram (3/5/2009); angioplasty balloon (3/5/2009); av fistulogram (3/5/2009); us-kidney transplant (1996, 2001); endarterectomy (11/16/2010); av fistulogram (11/16/2010); cath placement (2/1/2011); angioplasty balloon (2/1/2011); av fistulogram (6/5/2011); cath placement (6/5/2011); av fistula revision (11/3/2011); bone spur excision (12/8/2011); recovery (5/18/2012); incision and drainage general (9/13/2013); debridement (9/13/2013); vein ligation (9/13/2013); recovery (6/13/2014); av fistula revision (9/30/2014); irrigation & debridement general (12/15/2014); av fistula revision (2/8/2015); av fistula revision (2/22/2015); av fistula revision (3/20/2015); pr inject nerv blck,stellate ganglion (3/24/2015); av fistula creation (4/20/2015); av fistula thrombolysis (Left, 6/3/2015); irrigation & debridement general (Left, 6/3/2015); av fistula thrombolysis (Left, 6/9/2015); av fistula revision (Left, 6/17/2015); irrigation & debridement general (Left, 6/17/2015); recovery (8/7/2015); pr percut implnt neuroelect,epidural (2/19/2016); pr percut implnt neuroelect,epidural (2/19/2016); parathyroidectomy (2006); inguinal hernia repair (Bilateral, 2001, 2002); spinal cord stimulator (N/A, 3/25/2016); recovery (7/8/2016); lesion excision general (Right, 7/11/2016); mass excision general (Right, 8/5/2016); cath placement (Right, 9/17/2016); thrombectomy (Left, 9/18/2016); av fistulogram (9/18/2016); thrombectomy (Left, 10/20/2016); incision and drainage general (10/20/2016); irrigation & debridement general (Left, 10/28/2016); flap closure (Left, 11/17/2016); thrombectomy (Left, 1/6/2017); cath placement (Right, 1/6/2017); thrombectomy (Left, 1/5/2017); spinal cord stimulator (N/A, 5/4/2017); abdominoplasty (6/5/2017);  irrigation & debridement general (7/31/2017); cath placement (Right, 11/14/2017); av fistula revision (Left, 11/14/2017); wound exploration general (2/1/2018); wound closure general (2/19/2018); split thickness skin graft (2/26/2018); thrombectomy (Left, 3/9/2018); cath placement (Right, 3/9/2018); thrombectomy (Left, 4/27/2018); thrombectomy (Left, 4/28/2018); abdominal exploration (6/25/2018); and myocutaneous flap (Right, 8/27/2018).     Family History  family history includes Arthritis in his father; Heart Disease in his father; Hypertension in his brother; Lung Disease in his father.     Social History   reports that he has never smoked. He has never used smokeless tobacco. He reports that he does not drink alcohol or use drugs.    Allergies  Allergies   Allergen Reactions   • Baclofen Unspecified     Total loss of memory, sedation.   RXN=6/2015   • Contrast Media With Iodine [Iodine] Rash     RXN=1/5/2017   • Keflex Rash     RXN=possibly >10 years   • Pcn [Penicillins] Rash     RXN=possibly >10 years ago  Tolerated Zosyn on 2/20/18   • Tape Rash     Paper tape and tegaderm ok  RXN=ongoing       Medications  Prior to Admission Medications   Prescriptions Last Dose Informant Patient Reported? Taking?   HYDROcodone/acetaminophen (NORCO)  MG Tab 9/10/2018 at Unknown time Patient Yes No   Sig: Take 1-2 Tabs by mouth every 6 hours as needed.   calcitRIOL (ROCALTROL) 0.25 MCG CAPS 9/9/2018 at Unknown time Patient Yes No   Sig: Take 0.75 mcg by mouth every bedtime.   cinacalcet (SENSIPAR) 30 MG Tab 9/8/2018 Patient Yes No   Sig: Take 30-60 mg by mouth every day. 30 mg Mondays , Wednesdays , and Fridays. All other days 60 mg   gabapentin (NEURONTIN) 300 MG Cap 9/9/2018 at Unknown time Patient Yes No   Sig: Take 1,200 mg by mouth every bedtime. Takes with 600 mg tablet   gabapentin (NEURONTIN) 600 MG tablet 9/9/2018 at Unknown time Patient Yes No   Sig: Take 600 mg by mouth every evening. Takes with 300 mg tablets    levetiracetam (KEPPRA) 250 MG tablet 9/9/2018 at Unknown time Patient No No   Sig: Take 1 Tab by mouth 2 times a day.   midodrine (PROAMATINE) 10 MG tablet  at prn Patient No No   Sig: Take 1 Tab by mouth 3 times a day, with meals. Can hold dose if blood pressure greater than 120 systolic.   oxyCODONE-acetaminophen (PERCOCET) 5-325 MG Tab 9/9/2018 at Unknown time Patient No No   Sig: Take 1-2 Tabs by mouth every four hours as needed for up to 14 days.   sevelamer carbonate (RENVELA) 800 MG Tab tablet 9/9/2018 at Unknown time Patient Yes No   Sig: Take 4,000 mg by mouth 3 times a day, with meals. With meals   warfarin (COUMADIN) 5 MG Tab 9/8/2018 Patient Yes No   Sig: Take 2.5-5 mg by mouth every day. Pt takes 2.5 mg on Fridays . All other days 5 mg      Facility-Administered Medications: None       Physical Exam  Blood Pressure: (!) 80/48   Temperature: 37.2 °C (99 °F)   Pulse: 100   Respiration: 20   Pulse Oximetry: 100 %     Physical Exam   Constitutional: He is oriented to person, place, and time. He appears well-developed and well-nourished. No distress.   HENT:   Head: Normocephalic and atraumatic.   Eyes: Pupils are equal, round, and reactive to light. Conjunctivae are normal.   Neck: Normal range of motion. Neck supple. No tracheal deviation present. No thyromegaly present.   Cardiovascular: Normal rate, regular rhythm and normal heart sounds.  Exam reveals no gallop and no friction rub.    No murmur heard.  Pulmonary/Chest: Effort normal and breath sounds normal. No respiratory distress. He has no wheezes.   Abdominal: Soft. Bowel sounds are normal. He exhibits no distension and no mass. There is no tenderness. There is no rebound and no guarding.   Right arm sewn to left lower quadrant of abdomen, some bleeding surrounding the stitches.  Multiple surgical scars throughout abdomen.   Musculoskeletal: He exhibits tenderness and deformity. He exhibits no edema.   Lymphadenopathy:     He has no cervical  adenopathy.   Neurological: He is alert and oriented to person, place, and time. No cranial nerve deficit.   Skin: Skin is warm and dry. He is not diaphoretic.   Psychiatric: He has a normal mood and affect.   Nursing note and vitals reviewed.      Laboratory:  Recent Labs      09/10/18   2250   WBC  7.4   RBC  2.06*   HEMOGLOBIN  5.2*   HEMATOCRIT  18.3*   MCV  88.8   MCH  25.2*   MCHC  28.4*   RDW  54.4*   PLATELETCT  222   MPV  10.2     Recent Labs      09/10/18   0817   SODIUM  130*   POTASSIUM  4.8   CHLORIDE  88*   CO2  22   GLUCOSE  100*   BUN  54*   CREATININE  8.81*   CALCIUM  10.2     Recent Labs      09/10/18   0817   ALTSGPT  <5   ASTSGOT  15   ALKPHOSPHAT  149*   TBILIRUBIN  0.4   GLUCOSE  100*     Recent Labs      09/10/18   0817  09/10/18   2250   INR  4.04*  5.42*             Lab Results   Component Value Date    TROPONINI <0.01 02/24/2018       Urinalysis:    No results found     Imaging:  No orders to display     No new imaging for review.     Assessment/Plan:  I anticipate this patient will require at least two midnights for appropriate medical management, necessitating inpatient admission.    * Anemia due to acute blood loss   Assessment & Plan    Will need 2 units PRBC, monitor.  Acute bleeding stopped by plastic surgery.  Monitor wound. Transfuse if needed for hemoglobin less than 7 gm/dL          Hypertension- (present on admission)   Assessment & Plan    Controlled.  Monitor.         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          Non-healing surgical wound- (present on admission)   Assessment & Plan    Setting of plastic surgery for pannus removal after multiple scarring from renal transplant surgery X2.  Currently with right arm sutured to site to aid with healing.  Will need support of right arm.  Appreciate any further plastics recommendations.         ESRD on hemodialysis (HCC)- (present on  admission)   Assessment & Plan    Continue HD as per nephrology         Normocytic anemia- (present on admission)   Assessment & Plan    Chronic in the setting of ESRD        Renal transplant failure and rejection x 2- (present on admission)   Assessment & Plan    Stable.  On HD via graft left leg/groin             VTE prophylaxis: SCD

## 2018-09-11 NOTE — ED NOTES
"Pt was medicated for pain. Pt states he wants to be discharged \"i can just do my dialysis at home we are all set up for it can I just go\" updated awaiting rounding md and will make sure they are aware of request. No bleeding from surgical site since my shift started. Have placed pillows for support on right arm to avoid tension to sutures. vss  "

## 2018-09-11 NOTE — ED NOTES
Med rec updated and complete  Allergies reviewed  Pt reports that he self medicates himself on his NORCO, pt takes NORCO 10-325MG 2 tablets at night waits for an hour and takes another 2 tablets, if that does not work he takes another 2 tablets about another hour.    Pt reports that he does not take his NORCO 10-325MG in the day.  Pt ran out of his NORCO and was give PERCOCET 10-325MG.  Pt reports no antibiotics in the last 30 days.  Pt reports no  OTC's.

## 2018-09-11 NOTE — PROGRESS NOTES
"Blood pressure (!) 91/35, pulse 84, temperature 36.4 °C (97.5 °F), resp. rate 14, height 1.626 m (5' 4\"), weight 80.3 kg (177 lb), SpO2 100 %.    I/O last 3 completed shifts:  In: 1400 [Blood:1400]  Out: -   Pt still co pain  No further bleeding  Getting transfusion now  Keep gauze on underside of wound.   Nothing else to do at this time surgically  "

## 2018-09-12 VITALS
SYSTOLIC BLOOD PRESSURE: 99 MMHG | WEIGHT: 178.13 LBS | DIASTOLIC BLOOD PRESSURE: 44 MMHG | HEIGHT: 64 IN | OXYGEN SATURATION: 90 % | TEMPERATURE: 97 F | RESPIRATION RATE: 18 BRPM | BODY MASS INDEX: 30.41 KG/M2 | HEART RATE: 75 BPM

## 2018-09-12 LAB
ANION GAP SERPL CALC-SCNC: 12 MMOL/L (ref 0–11.9)
BASOPHILS # BLD AUTO: 1.2 % (ref 0–1.8)
BASOPHILS # BLD: 0.07 K/UL (ref 0–0.12)
BUN SERPL-MCNC: 43 MG/DL (ref 8–22)
CALCIUM SERPL-MCNC: 9.5 MG/DL (ref 8.5–10.5)
CHLORIDE SERPL-SCNC: 95 MMOL/L (ref 96–112)
CO2 SERPL-SCNC: 32 MMOL/L (ref 20–33)
CREAT SERPL-MCNC: 6.87 MG/DL (ref 0.5–1.4)
EOSINOPHIL # BLD AUTO: 0.15 K/UL (ref 0–0.51)
EOSINOPHIL NFR BLD: 2.5 % (ref 0–6.9)
ERYTHROCYTE [DISTWIDTH] IN BLOOD BY AUTOMATED COUNT: 51.9 FL (ref 35.9–50)
GLUCOSE SERPL-MCNC: 95 MG/DL (ref 65–99)
HCT VFR BLD AUTO: 25 % (ref 42–52)
HGB BLD-MCNC: 7.7 G/DL (ref 14–18)
IMM GRANULOCYTES # BLD AUTO: 0.07 K/UL (ref 0–0.11)
IMM GRANULOCYTES NFR BLD AUTO: 1.2 % (ref 0–0.9)
INR PPP: 3.5 (ref 0.87–1.13)
LV EJECT FRACT  99904: 70
LV EJECT FRACT MOD 2C 99903: 62.74
LV EJECT FRACT MOD 4C 99902: 69.99
LV EJECT FRACT MOD BP 99901: 66.33
LYMPHOCYTES # BLD AUTO: 1.17 K/UL (ref 1–4.8)
LYMPHOCYTES NFR BLD: 19.5 % (ref 22–41)
MCH RBC QN AUTO: 27.4 PG (ref 27–33)
MCHC RBC AUTO-ENTMCNC: 30.8 G/DL (ref 33.7–35.3)
MCV RBC AUTO: 89 FL (ref 81.4–97.8)
MONOCYTES # BLD AUTO: 0.55 K/UL (ref 0–0.85)
MONOCYTES NFR BLD AUTO: 9.2 % (ref 0–13.4)
NEUTROPHILS # BLD AUTO: 4 K/UL (ref 1.82–7.42)
NEUTROPHILS NFR BLD: 66.4 % (ref 44–72)
NRBC # BLD AUTO: 0 K/UL
NRBC BLD-RTO: 0 /100 WBC
PLATELET # BLD AUTO: 185 K/UL (ref 164–446)
PMV BLD AUTO: 9.5 FL (ref 9–12.9)
POTASSIUM SERPL-SCNC: 4.4 MMOL/L (ref 3.6–5.5)
PROTHROMBIN TIME: 34.9 SEC (ref 12–14.6)
RBC # BLD AUTO: 2.81 M/UL (ref 4.7–6.1)
SODIUM SERPL-SCNC: 139 MMOL/L (ref 135–145)
WBC # BLD AUTO: 6 K/UL (ref 4.8–10.8)

## 2018-09-12 PROCEDURE — 90686 IIV4 VACC NO PRSV 0.5 ML IM: CPT | Performed by: INTERNAL MEDICINE

## 2018-09-12 PROCEDURE — 700102 HCHG RX REV CODE 250 W/ 637 OVERRIDE(OP): Performed by: HOSPITALIST

## 2018-09-12 PROCEDURE — 700111 HCHG RX REV CODE 636 W/ 250 OVERRIDE (IP): Performed by: INTERNAL MEDICINE

## 2018-09-12 PROCEDURE — 80048 BASIC METABOLIC PNL TOTAL CA: CPT

## 2018-09-12 PROCEDURE — 85610 PROTHROMBIN TIME: CPT

## 2018-09-12 PROCEDURE — 99239 HOSP IP/OBS DSCHRG MGMT >30: CPT | Performed by: INTERNAL MEDICINE

## 2018-09-12 PROCEDURE — A9270 NON-COVERED ITEM OR SERVICE: HCPCS | Performed by: HOSPITALIST

## 2018-09-12 PROCEDURE — A9270 NON-COVERED ITEM OR SERVICE: HCPCS | Performed by: INTERNAL MEDICINE

## 2018-09-12 PROCEDURE — 36415 COLL VENOUS BLD VENIPUNCTURE: CPT

## 2018-09-12 PROCEDURE — 85025 COMPLETE CBC W/AUTO DIFF WBC: CPT

## 2018-09-12 PROCEDURE — 90471 IMMUNIZATION ADMIN: CPT

## 2018-09-12 PROCEDURE — 700102 HCHG RX REV CODE 250 W/ 637 OVERRIDE(OP): Performed by: INTERNAL MEDICINE

## 2018-09-12 RX ORDER — HYDROCODONE BITARTRATE AND ACETAMINOPHEN 10; 325 MG/1; MG/1
2 TABLET ORAL 3 TIMES DAILY PRN
Qty: 1 TAB | Refills: 0
Start: 2018-09-12 | End: 2018-09-19

## 2018-09-12 RX ADMIN — HYDROCODONE BITARTRATE AND ACETAMINOPHEN 2 TABLET: 10; 325 TABLET ORAL at 05:01

## 2018-09-12 RX ADMIN — MIDODRINE HYDROCHLORIDE 10 MG: 5 TABLET ORAL at 09:49

## 2018-09-12 RX ADMIN — INFLUENZA A VIRUS A/MICHIGAN/45/2015 X-275 (H1N1) ANTIGEN (FORMALDEHYDE INACTIVATED), INFLUENZA A VIRUS A/SINGAPORE/INFIMH-16-0019/2016 IVR-186 (H3N2) ANTIGEN (FORMALDEHYDE INACTIVATED), INFLUENZA B VIRUS B/PHUKET/3073/2013 ANTIGEN (FORMALDEHYDE INACTIVATED), AND INFLUENZA B VIRUS B/MARYLAND/15/2016 BX-69A ANTIGEN (FORMALDEHYDE INACTIVATED) 0.5 ML: 15; 15; 15; 15 INJECTION, SUSPENSION INTRAMUSCULAR at 11:42

## 2018-09-12 RX ADMIN — CINACALCET HYDROCHLORIDE 30 MG: 30 TABLET, COATED ORAL at 05:01

## 2018-09-12 RX ADMIN — LEVETIRACETAM 250 MG: 500 TABLET ORAL at 05:01

## 2018-09-12 RX ADMIN — SEVELAMER CARBONATE 4000 MG: 800 TABLET, FILM COATED ORAL at 09:48

## 2018-09-12 ASSESSMENT — PAIN SCALES - GENERAL
PAINLEVEL_OUTOF10: ASSUMED PAIN PRESENT
PAINLEVEL_OUTOF10: 8

## 2018-09-12 NOTE — RESPIRATORY CARE
COPD EDUCATION by COPD CLINICAL EDUCATOR  9/12/2018 at 6:42 AM by Julissa Tovar     Patient reviewed by COPD education team. Patient does not qualify for COPD program.

## 2018-09-12 NOTE — PROGRESS NOTES
[facility] Progress Note    Hemodialysis treatment ordered today per Dr. Serrato x 3 hours. Treatment initiated at 1615, ended at 1915.     Patient tolerated tx; see paper flow sheet for details.     Net UF 1500 mL.     Needles removed from access site. Dressings applied and sites held x 10 minutes; verified no bleeding. Positive bruit/thrill post tx. Staff RN instructed to monitor AVF for breakthrough bleeding. Should breakthrough bleeding occur staff RN instructed to apply pressure to access sites until bleeding resolved. Notify Dialysis and Nephrologist for follow-up.    Report given to Primary RN.

## 2018-09-12 NOTE — PROGRESS NOTES
"Blood pressure (!) 99/44, pulse 75, temperature 36.1 °C (97 °F), resp. rate 18, height 1.626 m (5' 4\"), weight 80.8 kg (178 lb 2.1 oz), SpO2 90 %.    I/O last 3 completed shifts:  In: 2600 [Blood:2100; Dialysis:500]  Out: 2000 [Dialysis:2000]  Doing well  OK for DC FU my office next wednesday  "

## 2018-09-12 NOTE — PROGRESS NOTES
Bedside report received from night nurse. Assumed care of pt. Pt awake, laying in bed. A/Ox4, VSS. Complaint of pain 8/10. POC reviewed and white board updated. Tele box on. Call light in reach. Bed locked in lowest position with 2 upper bed rails up.

## 2018-09-12 NOTE — DISCHARGE SUMMARY
Discharge Summary    CHIEF COMPLAINT ON ADMISSION  Chief Complaint   Patient presents with   • Post Op Bleeding       Reason for Admission  Bleeding      Admission Date  9/10/2018    CODE STATUS  Full Code    HPI & HOSPITAL COURSE  This is a 38 y.o. male here with bleeding at right arm surgical site    Hypertension  Controlled.  Monitor.     Normocytic anemia  Chronic in the setting of ESRD    ESRD on hemodialysis (HCC)  Continue HD as per nephrology   HD today  HHD on MWF after discharge    Non-healing surgical wound  Setting of plastic surgery for pannus removal after multiple scarring from renal transplant surgery X2.    Currently with right arm sutured to site to aid with healing.    Will need support of right arm.  Appreciate any further plastics recommendations.   No further bleeding    IVC thrombosis (HCC)  With resultant upper body venous engorgement complicating healing.      Anemia due to acute blood loss  Will need 2 units PRBC, monitor.  Acute bleeding stopped by plastic surgery.    Monitor wound. Transfuse if needed for hemoglobin less than 7 gm/dL   H/H stable with no further bleeding after transfusion      Renal transplant failure and rejection x 2  Stable.  On HD via graft left leg/groin     Pain syndrome, chronic  Patient with chronic pain syndrome on home dose Norco           Therefore, he is discharged in good and stable condition to home with close outpatient follow-up.    The patient met 2-midnight criteria for an inpatient stay at the time of discharge.    Discharge Date  9/12/18    FOLLOW UP ITEMS POST DISCHARGE      DISCHARGE DIAGNOSES  Principal Problem:    Anemia due to acute blood loss POA: Unknown  Active Problems:    Hypertension POA: Yes    Renal transplant failure and rejection x 2 POA: Yes    Normocytic anemia POA: Yes    ESRD on hemodialysis (HCC) POA: Yes    Non-healing surgical wound POA: Yes    IVC thrombosis (HCC) POA: Yes  Resolved Problems:    * No resolved hospital problems.  *      FOLLOW UP  Future Appointments  Date Time Provider Department Center   9/13/2018 8:00 AM [facility] EXAM 5 [facility] None     No follow-up provider specified.    MEDICATIONS ON DISCHARGE     Medication List      CHANGE how you take these medications      Instructions   HYDROcodone/acetaminophen  MG Tabs  What changed:  · how much to take  · when to take this  · reasons to take this  Commonly known as:  NORCO   Take 2 Tabs by mouth 3 times a day as needed for up to 7 days.  Dose:  2 Tab        CONTINUE taking these medications      Instructions   calcitRIOL 0.25 MCG Caps  Commonly known as:  ROCALTROL   Take 0.75 mcg by mouth every bedtime.  Dose:  0.75 mcg     cinacalcet 30 MG Tabs  Commonly known as:  SENSIPAR   Take 30-60 mg by mouth every day. 30 mg Sunday, Mondays , Wednesdays , and Fridays. Pt takes 60MG on Tue, Thur, and Sat  Dose:  30-60 mg     * gabapentin 600 MG tablet  Commonly known as:  NEURONTIN   Take 600 mg by mouth every evening. Pt also has an RX for 300MG, pt takes total of 1,800MG HS  Dose:  600 mg     * gabapentin 300 MG Caps  Commonly known as:  NEURONTIN   Take 1,200 mg by mouth every bedtime. Pt also has an RX for 600MG, pt takes total of 1,800MG HS  Dose:  1200 mg     levETIRAcetam 250 MG tablet  Commonly known as:  KEPPRA   Take 1 Tab by mouth 2 times a day.  Dose:  250 mg     midodrine 10 MG tablet  Commonly known as:  PROAMATINE   Take 1 Tab by mouth 3 times a day, with meals. Can hold dose if blood pressure greater than 120 systolic.  Dose:  10 mg     RENVELA 800 MG Tabs tablet  Generic drug:  sevelamer carbonate   Take 3,200 mg by mouth 3 times a day, with meals. With meals  Dose:  3200 mg     warfarin 5 MG Tabs  Commonly known as:  COUMADIN   Take 2.5-5 mg by mouth every day. Pt takes 2.5 mg on Fridays . All other days 5 mg  Dose:  2.5-5 mg        * This list has 2 medication(s) that are the same as other medications prescribed for you. Read the directions carefully, and ask your  doctor or other care provider to review them with you.            STOP taking these medications    oxyCODONE-acetaminophen  MG Tabs  Commonly known as:  PERCOCET-10     oxyCODONE-acetaminophen 5-325 MG Tabs  Commonly known as:  PERCOCET            Allergies  Allergies   Allergen Reactions   • Baclofen Unspecified     Total loss of memory, sedation.   RXN=6/2015   • Contrast Media With Iodine [Iodine] Rash     RXN=1/5/2017   • Keflex Rash     RXN=possibly >10 years   • Pcn [Penicillins] Rash     RXN=possibly >10 years ago  Tolerated Zosyn on 2/20/18   • Tape Rash     Paper tape and tegaderm ok  RXN=ongoing       DIET  Orders Placed This Encounter   Procedures   • Diet Order Renal     Standing Status:   Standing     Number of Occurrences:   1     Order Specific Question:   Diet:     Answer:   Renal [8]       ACTIVITY  As tolerated.  Weight bearing as tolerated    CONSULTATIONS  Renal  Plastics    PROCEDURES      LABORATORY  Lab Results   Component Value Date    SODIUM 139 09/12/2018    POTASSIUM 4.4 09/12/2018    CHLORIDE 95 (L) 09/12/2018    CO2 32 09/12/2018    GLUCOSE 95 09/12/2018    BUN 43 (H) 09/12/2018    CREATININE 6.87 (HH) 09/12/2018    CREATININE 8.2 (HH) 05/06/2009        Lab Results   Component Value Date    WBC 6.0 09/12/2018    HEMOGLOBIN 7.7 (L) 09/12/2018    HEMATOCRIT 25.0 (L) 09/12/2018    PLATELETCT 185 09/12/2018        Total time of the discharge process exceeds 45 minutes.

## 2018-09-12 NOTE — CARE PLAN
Problem: Knowledge Deficit  Goal: Knowledge of disease process/condition, treatment plan, diagnostic tests, and medications will improve  Outcome: PROGRESSING AS EXPECTED  Pt educated on plan of care, medications, pain management, and disease processes. Pt verbalized understanding and was encouraged to ask questions. All questions answered.       Problem: Pain Management  Goal: Pain level will decrease to patient's comfort goal  Outcome: PROGRESSING SLOWER THAN EXPECTED  Pt pain assessed using pain scale and descriptors. Medicated per MAR. Pt complaining of additional pain. Warm blanket and warm pack provided. Pt declined. Pt wants to discuss pain management with MD in rounds. Will communicate to physician.

## 2018-09-12 NOTE — PROGRESS NOTES
No acute events overnight.  Pt reported high levels of pain overnight and maintained blood pressures.   Moderate amounts of serosanguinous drainage from wound site - towel kept underneath to prop up arm and collect drainage, this was changed a few times.    Pt reports he's wanting to discharge home today because he feels he can manage his pain there. Will pass this along to daytime team.    Raeann Crowell RN

## 2018-09-12 NOTE — DISCHARGE INSTRUCTIONS
Discharge Instructions      Discharged to home by car with relative. Discharged via wheelchair, hospital escort: Yes.  Special equipment needed: Not Applicable    Be sure to schedule a follow-up appointment with your primary care doctor or any specialists as instructed.     Discharge Plan:   Diet Plan: Discussed  Activity Level: Discussed  Confirmed Follow up Appointment: No (Comments)  Confirmed Symptoms Management: Discussed  Medication Reconciliation Updated: Yes  Pneumococcal Vaccine Administered/Refused: Not given - Patient refused pneumococcal vaccine  Influenza Vaccine Indication: Indicated: 9 to 64 years of age    I understand that a diet low in cholesterol, fat, and sodium is recommended for good health. Unless I have been given specific instructions below for another diet, I accept this instruction as my diet prescription.   Other diet: Renal    Special Instructions: None    · Is patient discharged on Warfarin / Coumadin?   Yes    You are receiving the drug warfarin. Please understand the importance of monitoring warfarin with scheduled PT/INR blood draws.  Follow-up with a call to your personal Doctor's office in 3 days to schedule a PT/INR. .    IMPORTANT: HOW TO USE THIS INFORMATION:  This is a summary and does NOT have all possible information about this product. This information does not assure that this product is safe, effective, or appropriate for you. This information is not individual medical advice and does not substitute for the advice of your health care professional. Always ask your health care professional for complete information about this product and your specific health needs.      WARFARIN - ORAL (WARF-uh-rin)      COMMON BRAND NAME(S): Coumadin      WARNING:  Warfarin can cause very serious (possibly fatal) bleeding. This is more likely to occur when you first start taking this medication or if you take too much warfarin. To decrease your risk for bleeding, your doctor or other health  "care provider will monitor you closely and check your lab results (INR test) to make sure you are not taking too much warfarin. Keep all medical and laboratory appointments. Tell your doctor right away if you notice any signs of serious bleeding. See also Side Effects section.      USES:  This medication is used to treat blood clots (such as in deep vein thrombosis-DVT or pulmonary embolus-PE) and/or to prevent new clots from forming in your body. Preventing harmful blood clots helps to reduce the risk of a stroke or heart attack. Conditions that increase your risk of developing blood clots include a certain type of irregular heart rhythm (atrial fibrillation), heart valve replacement, recent heart attack, and certain surgeries (such as hip/knee replacement). Warfarin is commonly called a \"blood thinner,\" but the more correct term is \"anticoagulant.\" It helps to keep blood flowing smoothly in your body by decreasing the amount of certain substances (clotting proteins) in your blood.      HOW TO USE:  Read the Medication Guide provided by your pharmacist before you start taking warfarin and each time you get a refill. If you have any questions, ask your doctor or pharmacist. Take this medication by mouth with or without food as directed by your doctor or other health care professional, usually once a day. It is very important to take it exactly as directed. Do not increase the dose, take it more frequently, or stop using it unless directed by your doctor. Dosage is based on your medical condition, laboratory tests (such as INR), and response to treatment. Your doctor or other health care provider will monitor you closely while you are taking this medication to determine the right dose for you. Use this medication regularly to get the most benefit from it. To help you remember, take it at the same time each day. It is important to eat a balanced, consistent diet while taking warfarin. Some foods can affect how " warfarin works in your body and may affect your treatment and dose. Avoid sudden large increases or decreases in your intake of foods high in vitamin K (such as broccoli, cauliflower, cabbage, brussels sprouts, kale, spinach, and other green leafy vegetables, liver, green tea, certain vitamin supplements). If you are trying to lose weight, check with your doctor before you try to go on a diet. Cranberry products may also affect how your warfarin works. Limit the amount of cranberry juice (16 ounces/480 milliliters a day) or other cranberry products you may drink or eat.      SIDE EFFECTS:  Nausea, loss of appetite, or stomach/abdominal pain may occur. If any of these effects persist or worsen, tell your doctor or pharmacist promptly. Remember that your doctor has prescribed this medication because he or she has judged that the benefit to you is greater than the risk of side effects. Many people using this medication do not have serious side effects. This medication can cause serious bleeding if it affects your blood clotting proteins too much (shown by unusually high INR lab results). Even if your doctor stops your medication, this risk of bleeding can continue for up to a week. Tell your doctor right away if you have any signs of serious bleeding, including: unusual pain/swelling/discomfort, unusual/easy bruising, prolonged bleeding from cuts or gums, persistent/frequent nosebleeds, unusually heavy/prolonged menstrual flow, pink/dark urine, coughing up blood, vomit that is bloody or looks like coffee grounds, severe headache, dizziness/fainting, unusual or persistent tiredness/weakness, bloody/black/tarry stools, chest pain, shortness of breath, difficulty swallowing. Tell your doctor right away if any of these unlikely but serious side effects occur: persistent nausea/vomiting, severe stomach/abdominal pain, yellowing eyes/skin. This drug rarely has caused very serious (possibly fatal) problems if its effects lead  to small blood clots (usually at the beginning of treatment). This can lead to severe skin/tissue damage that may require surgery or amputation if left untreated. Patients with certain blood conditions (protein C or S deficiency) may be at greater risk. Get medical help right away if any of these rare but serious side effects occur: painful/red/purplish patches on the skin (such as on the toe, breast, abdomen), change in the amount of urine, vision changes, confusion, slurred speech, weakness on one side of the body. A very serious allergic reaction to this drug is rare. However, get medical help right away if you notice any symptoms of a serious allergic reaction, including: rash, itching/swelling (especially of the face/tongue/throat), severe dizziness, trouble breathing. This is not a complete list of possible side effects. If you notice other effects not listed above, contact your doctor or pharmacist. In the [place] - Call your doctor for medical advice about side effects. You may report side effects to FDA at 6-916-HNF-8165. In [place] - Call your doctor for medical advice about side effects. You may report side effects to [facility] [place] at [phone].      PRECAUTIONS:  Before taking warfarin, tell your doctor or pharmacist if you are allergic to it; or if you have any other allergies. This product may contain inactive ingredients, which can cause allergic reactions or other problems. Talk to your pharmacist for more details. Before using this medication, tell your doctor or pharmacist your medical history, especially of: blood disorders (such as anemia, hemophilia), bleeding problems (such as bleeding of the stomach/intestines, bleeding in the brain), blood vessel disorders (such as aneurysms), recent major injury/surgery, liver disease, alcohol use, mental/mood disorders (including memory problems), frequent falls/injuries. It is important that all your doctors and dentists know that you take warfarin. Before  having surgery or any medical/dental procedures, tell your doctor or dentist that you are taking this medication and about all the products you use (including prescription drugs, nonprescription drugs, and herbal products). Avoid getting injections into the muscles. If you must have an injection into a muscle (for example, a flu shot), it should be given in the arm. This way, it will be easier to check for bleeding and/or apply pressure bandages. This medication may cause stomach bleeding. Daily use of alcohol while using this medicine will increase your risk for stomach bleeding and may also affect how this medication works. Limit or avoid alcoholic beverages. If you have not been eating well, if you have an illness or infection that causes fever, vomiting, or diarrhea for more than 2 days, or if you start using any antibiotic medications, contact your doctor or pharmacist immediately because these conditions can affect how warfarin works. This medication can cause heavy bleeding. To lower the chance of getting cut, bruised, or injured, use great caution with sharp objects like safety razors and nail cutters. Use an electric razor when shaving and a soft toothbrush when brushing your teeth. Avoid activities such as contact sports. If you fall or injure yourself, especially if you hit your head, call your doctor immediately. Your doctor may need to check you. The Food & Drug Administration has stated that generic warfarin products are interchangeable. However, consult your doctor or pharmacist before switching warfarin products. Be careful not to take more than one medication that contains warfarin unless specifically directed by the doctor or health care provider who is monitoring your warfarin treatment. Older adults may be at greater risk for bleeding while using this drug. This medication is not recommended for use during pregnancy because of serious (possibly fatal) harm to an unborn baby. Discuss the use of  "reliable forms of birth control with your doctor. If you become pregnant or think you may be pregnant, tell your doctor immediately. If you are planning pregnancy, discuss a plan for managing your condition with your doctor before you become pregnant. Your doctor may switch the type of medication you use during pregnancy. Very small amounts of this medication may pass into breast milk but is unlikely to harm a nursing infant. Consult your doctor before breast-feeding.      DRUG INTERACTIONS:  Drug interactions may change how your medications work or increase your risk for serious side effects. This document does not contain all possible drug interactions. Keep a list of all the products you use (including prescription/nonprescription drugs and herbal products) and share it with your doctor and pharmacist. Do not start, stop, or change the dosage of any medicines without your doctor's approval. Warfarin interacts with many prescription, nonprescription, vitamin, and herbal products. This includes medications that are applied to the skin or inside the vagina or rectum. The interactions with warfarin usually result in an increase or decrease in the \"blood-thinning\" (anticoagulant) effect. Your doctor or other health care professional should closely monitor you to prevent serious bleeding or clotting problems. While taking warfarin, it is very important to tell your doctor or pharmacist of any changes in medications, vitamins, or herbal products that you are taking. Some products that may interact with this drug include: capecitabine, imatinib, mifepristone. Aspirin, aspirin-like drugs (salicylates), and nonsteroidal anti-inflammatory drugs (NSAIDs such as ibuprofen, naproxen, celecoxib) may have effects similar to warfarin. These drugs may increase the risk of bleeding problems if taken during treatment with warfarin. Carefully check all prescription/nonprescription product labels (including drugs applied to the skin " such as pain-relieving creams) since the products may contain NSAIDs or salicylates. Talk to your doctor about using a different medication (such as acetaminophen) to treat pain/fever. Low-dose aspirin and related drugs (such as clopidogrel, ticlopidine) should be continued if prescribed by your doctor for specific medical reasons such as heart attack or stroke prevention. Consult your doctor or pharmacist for more details. Many herbal products interact with warfarin. Tell your doctor before taking any herbal products, especially bromelains, coenzyme Q10, cranberry, danshen, dong quai, fenugreek, garlic, ginkgo biloba, ginseng, and Kaser's wort, among others. This medication may interfere with a certain laboratory test to measure theophylline levels, possibly causing false test results. Make sure laboratory personnel and all your doctors know you use this drug.      OVERDOSE:  If overdose is suspected, contact a poison control center or emergency room immediately.  residents can call the  National Poison Hotline at 1-427.846.5513. [place] residents can call a provincial poison control center. Symptoms of overdose may include: bloody/black/tarry stools, pink/dark urine, unusual/prolonged bleeding.      NOTES:  Do not share this medication with others. Laboratory and/or medical tests (such as INR, complete blood count) must be performed periodically to monitor your progress or check for side effects. Consult your doctor for more details.      MISSED DOSE:  For the best possible benefit, do not miss any doses. If you do miss a dose and remember on the same day, take it as soon as you remember. If you remember on the next day, skip the missed dose and resume your usual dosing schedule. Do not double the dose to catch up because this could increase your risk for bleeding. Keep a record of missed doses to give to your doctor or pharmacist. Contact your doctor or pharmacist if you miss 2 or more doses in a row.       STORAGE:  Store at room temperature away from light and moisture. Do not store in the bathroom. Keep all medications away from children and pets. Do not flush medications down the toilet or pour them into a drain unless instructed to do so. Properly discard this product when it is  or no longer needed. Consult your pharmacist or local waste disposal company for more details about how to safely discard your product.      MEDICAL ALERT:  Your condition and medication can cause complications in a medical emergency. For information about enrolling in MedicAlert, call 1-316.527.1047 ([place]) or 1-794.756.9365 ([place]).      Information last revised 2010 Copyright(c)  [employer]                 Warfarin   Warfarin is a blood thinner (anticoagulant) medicine. It is used to thin the blood so blood clots do not form. This medicine may cause very bad bleeding. You may also bruise easily while on this medicine. You may also bleed a little longer if you cut yourself.   Before taking warfarin, tell your doctor if:  · You take any other medicine for your heart or blood pressure.  · You are pregnant.  · You plan to get pregnant.  · You are breastfeeding.  · You are allergic to any medicine.  HOME CARE  · Have your blood checked as told by your doctor. Do not miss visits. Blood tests will include the PT and INR tests. These tests measure how long it takes for a clot to form in a blood sample. The test results help your doctor change your dose of warfarin if needed. If you miss your blood test visits, you could have bad bleeding or blood clots.  · Take warfarin exactly as told by your doctor. If you do not, bleeding or blood clots could lead to lasting injury, pain, or disability.  · Take your medicine at the same time every day. Do not miss a dose.  · Tell your doctor about all medicine you take. This includes medicines, vitamins, natural products, or dietary pills (supplements). Certain medicines are not  safe to take while taking warfarin. Taking other medicines while taking warfarin can affect your PT and INR test results.  · Do not start or stop any medicine unless you have been told to do so by your doctor.  · Do not take anything that has aspirin in it unless your doctor says it is okay.  · Eat the same amount of vitamin K foods every day. Vitamin K foods can change how warfarin works and your PT and INR test results.    · High vitamin K foods: Beef liver. Pork liver. Green tea. Broccoli. [place] sprouts. Cauliflower. Chickpeas. Kale. Spinach. Turnip greens.  · Medium vitamin K foods: Chicken liver. Pork tenderloin. Cheddar cheese. Rolled oats. Coffee. Asparagus. Cabbage. Iceberg lettuce.  · Low vitamin K foods: Apples. Butter. Bananas. Skim or 1% milk. Canned pears. White bread. Strawberries. Corn. Tomatoes. Green beans. Eggs. Potatoes. [place]. Pumpkin. Chicken breasts. Ground beef. Oil (except soybean oil).  · Avoid making major changes to your normal diet. Talk to your doctor first before changing your normal diet.  · Do not drink alcohol.  · Tell your doctors and dentists that you are taking warfarin at the beginning of every visit.  GET HELP RIGHT AWAY IF:  · You miss a dose of warfarin. Do not take 2 doses at the same time.  · You have a skin rash.  · You have heavy or unusual bleeding.  · There is blood in your pee (urine) or poop (stool).  · You have side effects from medicine that do not get better after a few days.  MAKE SURE YOU:  · Understand these instructions.  · Will watch your condition.  · Will get help right away if you are not doing well or get worse.  Document Released: 01/20/2012 Document Revised: 06/18/2013 Document Reviewed: 01/20/2012  [employer]® Patient Information ©2014 [employer].    Dialysis Diet  Dialysis is a treatment that you may undergo if you have significant damage to your kidneys. Dialysis replaces some of the work that the kidneys do. One of the jobs that it takes over is  removing wastes, salt, and extra water from your blood. This helps to keep the amount of potassium and other nutrients in your blood at healthy levels.  When you need dialysis, it is important to pay careful attention to your diet. Between dialysis sessions, certain nutrients can build up in your blood and cause you to get sick. Vitamins and minerals are an important part of a healthy diet and should not be avoided entirely. However, it is commonly recommended that you limit your intake of potassium, phosphorus, and sodium. It may also be necessary to restrict other nutrients, such as carbohydrates or fat, if you have other health conditions. Your health care provider or dietitian can help you to determine the amount of these nutrients that is right for you.  WHAT IS MY PLAN?  Your dietitian will help you to design a meal plan that is specific to your needs. Generally, meal plans include:  · Grains, 6-11 servings per day. One serving is equal to 1 slice of bread or ½ cup of cooked rice or pasta.  · Low-potassium vegetables, 2-3 servings per day. One serving is equal to ½ cup.  · Low-potassium fruits, 2-3 servings per day. One serving is equal to ½ cup.  · Meats and other protein sources, 8-11 oz per day.  · Dairy, ½ cup per day.  Your dietitian will provide you with specific instructions about the amount of fluids you can have each day.  WHAT DO I NEED TO KNOW ABOUT A DIALYSIS DIET?  · Limit your intake of potassium. Potassium is found in milk, fruits, and vegetables.  · Limit your intake of phosphorus. Phosphorus is found in milk, cheese, beans, nuts, and carbonated beverages. Avoid whole-grain and high-fiber foods because they contain high amounts of phosphorus.  · Limit your intake of sodium. Foods that are high in sodium include processed and cured meats, ready-made frozen meals, canned vegetables, and salty snack foods. Do not use salt substitutes because they contain potassium.  · If you were instructed to  restrict your fluid intake, follow your health care provider's specific instructions. You may be told to:  ¨ Write down what you drink and any foods you eat that are made mostly from water, such as gelatin and soups.  ¨ Drink from small cups to help control how much you drink.  · Ask your health care provider if you should regularly take an over-the-counter medicine that binds phosphorus, such as antacid products that contain calcium carbonate.  · Take vitamin and mineral supplements only as directed by your health care provider.  · Eat high-quality proteins, such as meat, poultry, fish, and eggs. Limit low-quality plant-based proteins, such as nuts and beans.  · Cut potatoes into small pieces and boil them in unsalted water before you eat them. This can help to remove some potassium from the potato.  · Drain all fluid from cooked vegetables and canned fruits before eating them.  WHAT FOODS CAN I EAT?  Grains  White bread. White rice. Cooked cereal. Unsalted popcorn. Tortillas. Pasta.  Vegetables  Fresh or frozen broccoli, carrots, and green beans. Cabbage. Cauliflower. Celery. Cucumbers. Eggplant. Radishes. Zucchini.  Fruits  Apples. Fresh or frozen berries. Fresh or canned pears, peaches, and pineapple. Grapes. Plums.  Meats and Other Protein Sources  Fresh or frozen beef, pork, chicken, and fish. Eggs. Low-sodium canned tuna or salmon.  Dairy  Cream cheese. Heavy cream. Ricotta cheese.  Beverages  Apple cider. Cranberry juice. Grape juice. Lemonade. Black coffee.  Condiments  Herbs. Spices. Jam and jelly. Honey.  Sweets and Desserts  Sherbet. Cakes. Cookies.  Fats and Oils  Olive oil, canola oil, and safflower oil.  Other  Non-dairy creamer. Non-dairy whipped topping. Homemade broth without salt.  The items listed above may not be a complete list of recommended foods or beverages. Contact your dietitian for more options.   WHAT FOODS ARE NOT RECOMMENDED?  Grains  Whole-grain bread. Whole-grain pasta. High-fiber  cereal.  Vegetables  Potatoes. Beets. Tomatoes. Winter squash and pumpkin. Asparagus. Spinach. Parsnips.  Fruits  Star fruit. Bananas. Oranges. Kiwi. Nectarines. Prunes. Melon. Dried fruit. Avocado.  Meats and Other Protein Sources  Canned, smoked, and cured meats. Packaged luncheon meat. Sardines. Nuts and seeds. Peanut butter. Beans and legumes.  Dairy  Milk. Buttermilk. Yogurt. Cheese and cottage cheese. Processed cheese spreads.  Beverages  Orange juice. Prune juice. Carbonated soft drinks.  Condiments  Salt. Salt substitutes. Soy sauce.  Sweets and Desserts  Ice cream. Chocolate. Candied nuts.  Fats and Oils  Butter. Margarine.  Other  Ready-made frozen meals. Canned soups.  The items listed above may not be a complete list of foods and beverages to avoid. Contact your dietitian for more information.     This information is not intended to replace advice given to you by your health care provider. Make sure you discuss any questions you have with your health care provider.     Document Released: 09/14/2005 Document Revised: 01/08/2016 Document Reviewed: 07/21/2015  [employer] Patient Education ©2016 [employer]    Anemia, Nonspecific  Anemia is a condition in which the concentration of red blood cells or hemoglobin in the blood is below normal. Hemoglobin is a substance in red blood cells that carries oxygen to the tissues of the body. Anemia results in not enough oxygen reaching these tissues.  What are the causes?  Common causes of anemia include:  · Excessive bleeding. Bleeding may be internal or external. This includes excessive bleeding from periods (in women) or from the intestine.  · Poor nutrition.  · Chronic kidney, thyroid, and liver disease.  · Bone marrow disorders that decrease red blood cell production.  · Cancer and treatments for cancer.  · HIV, AIDS, and their treatments.  · Spleen problems that increase red blood cell destruction.  · Blood disorders.  · Excess destruction of red  blood cells due to infection, medicines, and autoimmune disorders.  What are the signs or symptoms?  · Minor weakness.  · Dizziness.  · Headache.  · Palpitations.  · Shortness of breath, especially with exercise.  · Paleness.  · Cold sensitivity.  · Indigestion.  · Nausea.  · Difficulty sleeping.  · Difficulty concentrating.  Symptoms may occur suddenly or they may develop slowly.  How is this diagnosed?  Additional blood tests are often needed. These help your health care provider determine the best treatment. Your health care provider will check your stool for blood and look for other causes of blood loss.  How is this treated?  Treatment varies depending on the cause of the anemia. Treatment can include:  · Supplements of iron, vitamin B12, or folic acid.  · Hormone medicines.  · A blood transfusion. This may be needed if blood loss is severe.  · Hospitalization. This may be needed if there is significant continual blood loss.  · Dietary changes.  · Spleen removal.  Follow these instructions at home:  Keep all follow-up appointments. It often takes many weeks to correct anemia, and having your health care provider check on your condition and your response to treatment is very important.  Get help right away if:  · You develop extreme weakness, shortness of breath, or chest pain.  · You become dizzy or have trouble concentrating.  · You develop heavy vaginal bleeding.  · You develop a rash.  · You have bloody or black, tarry stools.  · You faint.  · You vomit up blood.  · You vomit repeatedly.  · You have abdominal pain.  · You have a fever or persistent symptoms for more than 2-3 days.  · You have a fever and your symptoms suddenly get worse.  · You are dehydrated.  This information is not intended to replace advice given to you by your health care provider. Make sure you discuss any questions you have with your health care provider.  Document Released: 01/25/2006 Document Revised: 05/31/2017 Document Reviewed:  06/13/2014  [employer] Interactive Patient Education © 2017 [employer]    Depression / Suicide Risk    As you are discharged from this [facility] facility, it is important to learn how to keep safe from harming yourself.    Recognize the warning signs:  · Abrupt changes in personality, positive or negative- including increase in energy   · Giving away possessions  · Change in eating patterns- significant weight changes-  positive or negative  · Change in sleeping patterns- unable to sleep or sleeping all the time   · Unwillingness or inability to communicate  · Depression  · Unusual sadness, discouragement and loneliness  · Talk of wanting to die  · Neglect of personal appearance   · Rebelliousness- reckless behavior  · Withdrawal from people/activities they love  · Confusion- inability to concentrate     If you or a loved one observes any of these behaviors or has concerns about self-harm, here's what you can do:  · Talk about it- your feelings and reasons for harming yourself  · Remove any means that you might use to hurt yourself (examples: pills, rope, extension cords, firearm)  · Get professional help from the community (Mental Health, Substance Abuse, psychological counseling)  · Do not be alone:Call your Safe Contact- someone whom you trust who will be there for you.  · Call your local CRISIS HOTLINE 886-5308 or 379-879-7202  · Call your local Children's Mobile Crisis Response Team [place] (363) 148-3276 or www.[employer]  · Call the toll free National Suicide Prevention Hotlines   · National Suicide Prevention Lifeline 462-001-NIDX (2990)  · National Hope Line Network 800-SUICIDE (946-5285)

## 2018-09-12 NOTE — PROGRESS NOTES
Discharge instructions given and discussed, signed copy in chart. Pt verbalized understanding and all questions answered. Patient is Alert & Oriented and discharged home in stable condition via wheelchair escorted by staff. Personal belongings accounted for and given to patient. IV removed and tolerated well. Tele box removed, monitor room notified.

## 2018-09-12 NOTE — PROGRESS NOTES
Writer assumed care of pt for [id number]. Pt returned from dialysis at approximately 1940.  AVF site intact, +bruit/thrill, dressing clean, dry, and intact.    Pt voiced concerns about current pain management isn't strong enough for him, was very upset. Pain management plan established for overnight with pt using available PRNs.    Will continue to monitor and assess.  Raeann Crowell RN

## 2018-09-15 LAB — EKG IMPRESSION: NORMAL

## 2018-09-19 ENCOUNTER — HOSPITAL ENCOUNTER (OUTPATIENT)
Facility: MEDICAL CENTER | Age: 38
End: 2018-09-19
Attending: PLASTIC SURGERY | Admitting: PLASTIC SURGERY
Payer: MEDICARE

## 2018-09-20 ENCOUNTER — APPOINTMENT (OUTPATIENT)
Dept: VASCULAR LAB | Facility: MEDICAL CENTER | Age: 38
End: 2018-09-20
Attending: INTERNAL MEDICINE
Payer: MEDICARE

## 2018-09-21 ENCOUNTER — HOSPITAL ENCOUNTER (INPATIENT)
Facility: MEDICAL CENTER | Age: 38
LOS: 2 days | DRG: 901 | End: 2018-09-24
Attending: EMERGENCY MEDICINE | Admitting: PLASTIC SURGERY
Payer: MEDICARE

## 2018-09-21 DIAGNOSIS — T14.8XXA BLEEDING FROM WOUND: ICD-10-CM

## 2018-09-21 LAB
ABO GROUP BLD: NORMAL
ALBUMIN SERPL BCP-MCNC: 3.7 G/DL (ref 3.2–4.9)
ALBUMIN/GLOB SERPL: 0.9 G/DL
ALP SERPL-CCNC: 169 U/L (ref 30–99)
ALT SERPL-CCNC: 6 U/L (ref 2–50)
ANION GAP SERPL CALC-SCNC: 14 MMOL/L (ref 0–11.9)
APTT PPP: 85.2 SEC (ref 24.7–36)
AST SERPL-CCNC: 13 U/L (ref 12–45)
BARCODED ABORH UBTYP: 5100
BARCODED ABORH UBTYP: 6200
BARCODED ABORH UBTYP: 7300
BARCODED ABORH UBTYP: 9500
BARCODED PRD CODE UBPRD: NORMAL
BARCODED UNIT NUM UBUNT: NORMAL
BASOPHILS # BLD AUTO: 0.6 % (ref 0–1.8)
BASOPHILS # BLD: 0.03 K/UL (ref 0–0.12)
BILIRUB SERPL-MCNC: 0.5 MG/DL (ref 0.1–1.5)
BLD GP AB SCN SERPL QL: NORMAL
BUN SERPL-MCNC: 32 MG/DL (ref 8–22)
CALCIUM SERPL-MCNC: 9.2 MG/DL (ref 8.5–10.5)
CHLORIDE SERPL-SCNC: 89 MMOL/L (ref 96–112)
CO2 SERPL-SCNC: 28 MMOL/L (ref 20–33)
COMPONENT FT 8504FT: NORMAL
COMPONENT R 8504R: NORMAL
CREAT SERPL-MCNC: 5.36 MG/DL (ref 0.5–1.4)
EOSINOPHIL # BLD AUTO: 0.19 K/UL (ref 0–0.51)
EOSINOPHIL NFR BLD: 3.5 % (ref 0–6.9)
ERYTHROCYTE [DISTWIDTH] IN BLOOD BY AUTOMATED COUNT: 55.8 FL (ref 35.9–50)
GLOBULIN SER CALC-MCNC: 4.2 G/DL (ref 1.9–3.5)
GLUCOSE BLD-MCNC: 117 MG/DL (ref 65–99)
GLUCOSE SERPL-MCNC: 112 MG/DL (ref 65–99)
HCT VFR BLD AUTO: 25.6 % (ref 42–52)
HGB BLD-MCNC: 8.1 G/DL (ref 14–18)
IMM GRANULOCYTES # BLD AUTO: 0.03 K/UL (ref 0–0.11)
IMM GRANULOCYTES NFR BLD AUTO: 0.6 % (ref 0–0.9)
INR PPP: 4.13 (ref 0.87–1.13)
LYMPHOCYTES # BLD AUTO: 0.81 K/UL (ref 1–4.8)
LYMPHOCYTES NFR BLD: 15 % (ref 22–41)
MCH RBC QN AUTO: 28.3 PG (ref 27–33)
MCHC RBC AUTO-ENTMCNC: 31.6 G/DL (ref 33.7–35.3)
MCV RBC AUTO: 89.5 FL (ref 81.4–97.8)
MONOCYTES # BLD AUTO: 0.63 K/UL (ref 0–0.85)
MONOCYTES NFR BLD AUTO: 11.6 % (ref 0–13.4)
NEUTROPHILS # BLD AUTO: 3.72 K/UL (ref 1.82–7.42)
NEUTROPHILS NFR BLD: 68.7 % (ref 44–72)
NRBC # BLD AUTO: 0 K/UL
NRBC BLD-RTO: 0 /100 WBC
PLATELET # BLD AUTO: 247 K/UL (ref 164–446)
PMV BLD AUTO: 9.8 FL (ref 9–12.9)
POTASSIUM SERPL-SCNC: 3.8 MMOL/L (ref 3.6–5.5)
PRODUCT TYPE UPROD: NORMAL
PROT SERPL-MCNC: 7.9 G/DL (ref 6–8.2)
PROTHROMBIN TIME: 39.9 SEC (ref 12–14.6)
RBC # BLD AUTO: 2.86 M/UL (ref 4.7–6.1)
RH BLD: NORMAL
SODIUM SERPL-SCNC: 131 MMOL/L (ref 135–145)
UNIT STATUS USTAT: NORMAL
WBC # BLD AUTO: 5.4 K/UL (ref 4.8–10.8)

## 2018-09-21 PROCEDURE — 86901 BLOOD TYPING SEROLOGIC RH(D): CPT

## 2018-09-21 PROCEDURE — 700105 HCHG RX REV CODE 258

## 2018-09-21 PROCEDURE — 85610 PROTHROMBIN TIME: CPT

## 2018-09-21 PROCEDURE — A6404 STERILE GAUZE > 48 SQ IN: HCPCS | Performed by: PLASTIC SURGERY

## 2018-09-21 PROCEDURE — 501445 HCHG STAPLER, SKIN DISP: Performed by: PLASTIC SURGERY

## 2018-09-21 PROCEDURE — 85025 COMPLETE CBC W/AUTO DIFF WBC: CPT

## 2018-09-21 PROCEDURE — A6223 GAUZE >16<=48 NO W/SAL W/O B: HCPCS | Performed by: PLASTIC SURGERY

## 2018-09-21 PROCEDURE — 160029 HCHG SURGERY MINUTES - 1ST 30 MINS LEVEL 4: Performed by: PLASTIC SURGERY

## 2018-09-21 PROCEDURE — A9270 NON-COVERED ITEM OR SERVICE: HCPCS | Performed by: ANESTHESIOLOGY

## 2018-09-21 PROCEDURE — 0HR7X74 REPLACEMENT OF ABDOMEN SKIN WITH AUTOLOGOUS TISSUE SUBSTITUTE, PARTIAL THICKNESS, EXTERNAL APPROACH: ICD-10-PCS | Performed by: PLASTIC SURGERY

## 2018-09-21 PROCEDURE — 700102 HCHG RX REV CODE 250 W/ 637 OVERRIDE(OP): Performed by: ANESTHESIOLOGY

## 2018-09-21 PROCEDURE — 700111 HCHG RX REV CODE 636 W/ 250 OVERRIDE (IP): Performed by: EMERGENCY MEDICINE

## 2018-09-21 PROCEDURE — 500447 HCHG DRESSING, TEGADERM 8X12: Performed by: PLASTIC SURGERY

## 2018-09-21 PROCEDURE — 160048 HCHG OR STATISTICAL LEVEL 1-5: Performed by: PLASTIC SURGERY

## 2018-09-21 PROCEDURE — 86900 BLOOD TYPING SEROLOGIC ABO: CPT

## 2018-09-21 PROCEDURE — 501838 HCHG SUTURE GENERAL: Performed by: PLASTIC SURGERY

## 2018-09-21 PROCEDURE — 160009 HCHG ANES TIME/MIN: Performed by: PLASTIC SURGERY

## 2018-09-21 PROCEDURE — 0JBG0ZZ EXCISION OF RIGHT LOWER ARM SUBCUTANEOUS TISSUE AND FASCIA, OPEN APPROACH: ICD-10-PCS | Performed by: PLASTIC SURGERY

## 2018-09-21 PROCEDURE — A6402 STERILE GAUZE <= 16 SQ IN: HCPCS | Performed by: PLASTIC SURGERY

## 2018-09-21 PROCEDURE — 500423 HCHG DRESSING, ABD COMBINE: Performed by: PLASTIC SURGERY

## 2018-09-21 PROCEDURE — 500440 HCHG DRESSING, STERILE ROLL (KERLIX): Performed by: PLASTIC SURGERY

## 2018-09-21 PROCEDURE — P9017 PLASMA 1 DONOR FRZ W/IN 8 HR: HCPCS | Mod: 91

## 2018-09-21 PROCEDURE — 80053 COMPREHEN METABOLIC PANEL: CPT

## 2018-09-21 PROCEDURE — 36430 TRANSFUSION BLD/BLD COMPNT: CPT

## 2018-09-21 PROCEDURE — 99218 PR INITIAL OBSERVATION CARE,LEVL I: CPT | Performed by: HOSPITALIST

## 2018-09-21 PROCEDURE — 99291 CRITICAL CARE FIRST HOUR: CPT

## 2018-09-21 PROCEDURE — 700111 HCHG RX REV CODE 636 W/ 250 OVERRIDE (IP): Performed by: HOSPITALIST

## 2018-09-21 PROCEDURE — G0378 HOSPITAL OBSERVATION PER HR: HCPCS

## 2018-09-21 PROCEDURE — A9270 NON-COVERED ITEM OR SERVICE: HCPCS | Performed by: HOSPITALIST

## 2018-09-21 PROCEDURE — 0JB80ZZ EXCISION OF ABDOMEN SUBCUTANEOUS TISSUE AND FASCIA, OPEN APPROACH: ICD-10-PCS | Performed by: PLASTIC SURGERY

## 2018-09-21 PROCEDURE — 160041 HCHG SURGERY MINUTES - EA ADDL 1 MIN LEVEL 4: Performed by: PLASTIC SURGERY

## 2018-09-21 PROCEDURE — 82962 GLUCOSE BLOOD TEST: CPT

## 2018-09-21 PROCEDURE — 700101 HCHG RX REV CODE 250

## 2018-09-21 PROCEDURE — 0HRDX74 REPLACEMENT OF RIGHT LOWER ARM SKIN WITH AUTOLOGOUS TISSUE SUBSTITUTE, PARTIAL THICKNESS, EXTERNAL APPROACH: ICD-10-PCS | Performed by: PLASTIC SURGERY

## 2018-09-21 PROCEDURE — 0HBHXZZ EXCISION OF RIGHT UPPER LEG SKIN, EXTERNAL APPROACH: ICD-10-PCS | Performed by: PLASTIC SURGERY

## 2018-09-21 PROCEDURE — 160002 HCHG RECOVERY MINUTES (STAT): Performed by: PLASTIC SURGERY

## 2018-09-21 PROCEDURE — P9016 RBC LEUKOCYTES REDUCED: HCPCS

## 2018-09-21 PROCEDURE — 160036 HCHG PACU - EA ADDL 30 MINS PHASE I: Performed by: PLASTIC SURGERY

## 2018-09-21 PROCEDURE — 30233K1 TRANSFUSION OF NONAUTOLOGOUS FROZEN PLASMA INTO PERIPHERAL VEIN, PERCUTANEOUS APPROACH: ICD-10-PCS | Performed by: EMERGENCY MEDICINE

## 2018-09-21 PROCEDURE — 700111 HCHG RX REV CODE 636 W/ 250 OVERRIDE (IP)

## 2018-09-21 PROCEDURE — 700102 HCHG RX REV CODE 250 W/ 637 OVERRIDE(OP): Performed by: HOSPITALIST

## 2018-09-21 PROCEDURE — 96374 THER/PROPH/DIAG INJ IV PUSH: CPT

## 2018-09-21 PROCEDURE — 85730 THROMBOPLASTIN TIME PARTIAL: CPT

## 2018-09-21 PROCEDURE — 700111 HCHG RX REV CODE 636 W/ 250 OVERRIDE (IP): Performed by: ANESTHESIOLOGY

## 2018-09-21 PROCEDURE — 86850 RBC ANTIBODY SCREEN: CPT

## 2018-09-21 PROCEDURE — 160035 HCHG PACU - 1ST 60 MINS PHASE I: Performed by: PLASTIC SURGERY

## 2018-09-21 RX ORDER — ONDANSETRON 2 MG/ML
4 INJECTION INTRAMUSCULAR; INTRAVENOUS
Status: DISCONTINUED | OUTPATIENT
Start: 2018-09-21 | End: 2018-09-21 | Stop reason: HOSPADM

## 2018-09-21 RX ORDER — SODIUM CHLORIDE 9 MG/ML
INJECTION, SOLUTION INTRAVENOUS CONTINUOUS
Status: ACTIVE | OUTPATIENT
Start: 2018-09-21 | End: 2018-09-22

## 2018-09-21 RX ORDER — HYDROMORPHONE HYDROCHLORIDE 2 MG/ML
0.5 INJECTION, SOLUTION INTRAMUSCULAR; INTRAVENOUS; SUBCUTANEOUS
Status: DISCONTINUED | OUTPATIENT
Start: 2018-09-21 | End: 2018-09-22

## 2018-09-21 RX ORDER — ONDANSETRON 2 MG/ML
4 INJECTION INTRAMUSCULAR; INTRAVENOUS EVERY 4 HOURS PRN
Status: DISCONTINUED | OUTPATIENT
Start: 2018-09-21 | End: 2018-09-24 | Stop reason: HOSPADM

## 2018-09-21 RX ORDER — MEPERIDINE HYDROCHLORIDE 50 MG/ML
INJECTION INTRAMUSCULAR; INTRAVENOUS; SUBCUTANEOUS
Status: COMPLETED
Start: 2018-09-21 | End: 2018-09-21

## 2018-09-21 RX ORDER — OXYCODONE HYDROCHLORIDE 5 MG/1
5 TABLET ORAL
Status: DISCONTINUED | OUTPATIENT
Start: 2018-09-21 | End: 2018-09-22

## 2018-09-21 RX ORDER — MIDODRINE HYDROCHLORIDE 10 MG/1
10 TABLET ORAL 3 TIMES DAILY PRN
Status: ON HOLD | COMMUNITY
End: 2018-09-24

## 2018-09-21 RX ORDER — HYDROMORPHONE HYDROCHLORIDE 2 MG/ML
0.4 INJECTION, SOLUTION INTRAMUSCULAR; INTRAVENOUS; SUBCUTANEOUS
Status: DISCONTINUED | OUTPATIENT
Start: 2018-09-21 | End: 2018-09-21 | Stop reason: HOSPADM

## 2018-09-21 RX ORDER — BUPIVACAINE HYDROCHLORIDE AND EPINEPHRINE 2.5; 5 MG/ML; UG/ML
INJECTION, SOLUTION EPIDURAL; INFILTRATION; INTRACAUDAL; PERINEURAL
Status: DISCONTINUED | OUTPATIENT
Start: 2018-09-21 | End: 2018-09-21 | Stop reason: HOSPADM

## 2018-09-21 RX ORDER — CINACALCET 30 MG/1
60 TABLET, FILM COATED ORAL
Status: DISCONTINUED | OUTPATIENT
Start: 2018-09-22 | End: 2018-09-24 | Stop reason: HOSPADM

## 2018-09-21 RX ORDER — PROMETHAZINE HYDROCHLORIDE 25 MG/1
12.5-25 SUPPOSITORY RECTAL EVERY 4 HOURS PRN
Status: DISCONTINUED | OUTPATIENT
Start: 2018-09-21 | End: 2018-09-24 | Stop reason: HOSPADM

## 2018-09-21 RX ORDER — MEPERIDINE HYDROCHLORIDE 25 MG/ML
25 INJECTION INTRAMUSCULAR; INTRAVENOUS; SUBCUTANEOUS ONCE
Status: DISCONTINUED | OUTPATIENT
Start: 2018-09-21 | End: 2018-09-21

## 2018-09-21 RX ORDER — MEPERIDINE HYDROCHLORIDE 50 MG/ML
25 INJECTION INTRAMUSCULAR; INTRAVENOUS; SUBCUTANEOUS ONCE
Status: COMPLETED | OUTPATIENT
Start: 2018-09-21 | End: 2018-09-21

## 2018-09-21 RX ORDER — OXYCODONE HYDROCHLORIDE 5 MG/1
5 TABLET ORAL
Status: DISCONTINUED | OUTPATIENT
Start: 2018-09-21 | End: 2018-09-21

## 2018-09-21 RX ORDER — PROMETHAZINE HYDROCHLORIDE 25 MG/1
12.5-25 TABLET ORAL EVERY 4 HOURS PRN
Status: DISCONTINUED | OUTPATIENT
Start: 2018-09-21 | End: 2018-09-24 | Stop reason: HOSPADM

## 2018-09-21 RX ORDER — AMOXICILLIN 250 MG
2 CAPSULE ORAL 2 TIMES DAILY
Status: DISCONTINUED | OUTPATIENT
Start: 2018-09-21 | End: 2018-09-24 | Stop reason: HOSPADM

## 2018-09-21 RX ORDER — GLYCOPYRROLATE 0.2 MG/ML
0.2 INJECTION INTRAMUSCULAR; INTRAVENOUS
Status: DISCONTINUED | OUTPATIENT
Start: 2018-09-21 | End: 2018-09-21 | Stop reason: HOSPADM

## 2018-09-21 RX ORDER — POLYETHYLENE GLYCOL 3350 17 G/17G
1 POWDER, FOR SOLUTION ORAL
Status: DISCONTINUED | OUTPATIENT
Start: 2018-09-21 | End: 2018-09-24 | Stop reason: HOSPADM

## 2018-09-21 RX ORDER — OXYCODONE HYDROCHLORIDE 10 MG/1
10 TABLET ORAL
Status: DISCONTINUED | OUTPATIENT
Start: 2018-09-21 | End: 2018-09-22

## 2018-09-21 RX ORDER — LABETALOL HYDROCHLORIDE 5 MG/ML
10 INJECTION, SOLUTION INTRAVENOUS EVERY 4 HOURS PRN
Status: DISCONTINUED | OUTPATIENT
Start: 2018-09-21 | End: 2018-09-24 | Stop reason: HOSPADM

## 2018-09-21 RX ORDER — CHLORHEXIDINE GLUCONATE 40 MG/ML
SOLUTION TOPICAL
Status: DISCONTINUED | OUTPATIENT
Start: 2018-09-21 | End: 2018-09-21 | Stop reason: HOSPADM

## 2018-09-21 RX ORDER — HYDROCODONE BITARTRATE AND ACETAMINOPHEN 10; 325 MG/1; MG/1
5 TABLET ORAL
COMMUNITY
End: 2022-07-19

## 2018-09-21 RX ORDER — HYDROMORPHONE HYDROCHLORIDE 2 MG/ML
0.1 INJECTION, SOLUTION INTRAMUSCULAR; INTRAVENOUS; SUBCUTANEOUS
Status: DISCONTINUED | OUTPATIENT
Start: 2018-09-21 | End: 2018-09-21 | Stop reason: HOSPADM

## 2018-09-21 RX ORDER — SODIUM CHLORIDE 9 MG/ML
INJECTION, SOLUTION INTRAVENOUS CONTINUOUS
Status: DISPENSED | OUTPATIENT
Start: 2018-09-21 | End: 2018-09-22

## 2018-09-21 RX ORDER — HALOPERIDOL 5 MG/ML
1 INJECTION INTRAMUSCULAR
Status: DISCONTINUED | OUTPATIENT
Start: 2018-09-21 | End: 2018-09-21 | Stop reason: HOSPADM

## 2018-09-21 RX ORDER — HYDROMORPHONE HYDROCHLORIDE 2 MG/ML
0.2 INJECTION, SOLUTION INTRAMUSCULAR; INTRAVENOUS; SUBCUTANEOUS
Status: DISCONTINUED | OUTPATIENT
Start: 2018-09-21 | End: 2018-09-21 | Stop reason: HOSPADM

## 2018-09-21 RX ORDER — OXYCODONE HYDROCHLORIDE AND ACETAMINOPHEN 5; 325 MG/1; MG/1
1 TABLET ORAL
Status: COMPLETED | OUTPATIENT
Start: 2018-09-21 | End: 2018-09-21

## 2018-09-21 RX ORDER — ONDANSETRON 4 MG/1
4 TABLET, ORALLY DISINTEGRATING ORAL EVERY 4 HOURS PRN
Status: DISCONTINUED | OUTPATIENT
Start: 2018-09-21 | End: 2018-09-24 | Stop reason: HOSPADM

## 2018-09-21 RX ORDER — DIPHENHYDRAMINE HYDROCHLORIDE 50 MG/ML
12.5 INJECTION INTRAMUSCULAR; INTRAVENOUS
Status: DISCONTINUED | OUTPATIENT
Start: 2018-09-21 | End: 2018-09-21 | Stop reason: HOSPADM

## 2018-09-21 RX ORDER — DEXTROSE MONOHYDRATE 25 G/50ML
25 INJECTION, SOLUTION INTRAVENOUS
Status: DISCONTINUED | OUTPATIENT
Start: 2018-09-21 | End: 2018-09-22

## 2018-09-21 RX ORDER — MEPERIDINE HYDROCHLORIDE 50 MG/ML
50 INJECTION INTRAMUSCULAR; INTRAVENOUS; SUBCUTANEOUS ONCE
Status: DISCONTINUED | OUTPATIENT
Start: 2018-09-21 | End: 2018-09-21

## 2018-09-21 RX ORDER — OXYCODONE HYDROCHLORIDE AND ACETAMINOPHEN 5; 325 MG/1; MG/1
2 TABLET ORAL
Status: COMPLETED | OUTPATIENT
Start: 2018-09-21 | End: 2018-09-21

## 2018-09-21 RX ORDER — GABAPENTIN 300 MG/1
1800 CAPSULE ORAL
Status: DISCONTINUED | OUTPATIENT
Start: 2018-09-21 | End: 2018-09-24 | Stop reason: HOSPADM

## 2018-09-21 RX ORDER — CINACALCET 30 MG/1
30-60 TABLET, FILM COATED ORAL DAILY
Status: DISCONTINUED | OUTPATIENT
Start: 2018-09-22 | End: 2018-09-21

## 2018-09-21 RX ORDER — CINACALCET 30 MG/1
30 TABLET, FILM COATED ORAL
Status: DISCONTINUED | OUTPATIENT
Start: 2018-09-23 | End: 2018-09-24 | Stop reason: HOSPADM

## 2018-09-21 RX ORDER — OXYCODONE HYDROCHLORIDE 5 MG/1
10 TABLET ORAL
Status: DISCONTINUED | OUTPATIENT
Start: 2018-09-21 | End: 2018-09-21

## 2018-09-21 RX ORDER — SEVELAMER CARBONATE 800 MG/1
3200 TABLET, FILM COATED ORAL
Status: DISCONTINUED | OUTPATIENT
Start: 2018-09-21 | End: 2018-09-24 | Stop reason: HOSPADM

## 2018-09-21 RX ORDER — GABAPENTIN 400 MG/1
1200 CAPSULE ORAL
Status: DISCONTINUED | OUTPATIENT
Start: 2018-09-21 | End: 2018-09-21

## 2018-09-21 RX ORDER — NALOXONE HYDROCHLORIDE 0.4 MG/ML
0.1 INJECTION, SOLUTION INTRAMUSCULAR; INTRAVENOUS; SUBCUTANEOUS PRN
Status: DISCONTINUED | OUTPATIENT
Start: 2018-09-21 | End: 2018-09-21 | Stop reason: HOSPADM

## 2018-09-21 RX ORDER — BISACODYL 10 MG
10 SUPPOSITORY, RECTAL RECTAL
Status: DISCONTINUED | OUTPATIENT
Start: 2018-09-21 | End: 2018-09-24 | Stop reason: HOSPADM

## 2018-09-21 RX ORDER — LEVETIRACETAM 250 MG/1
250 TABLET ORAL 2 TIMES DAILY
Status: DISCONTINUED | OUTPATIENT
Start: 2018-09-21 | End: 2018-09-24 | Stop reason: HOSPADM

## 2018-09-21 RX ADMIN — SEVELAMER CARBONATE 3200 MG: 800 TABLET, FILM COATED ORAL at 22:13

## 2018-09-21 RX ADMIN — MEPERIDINE HYDROCHLORIDE 50 MG: 50 INJECTION INTRAMUSCULAR; INTRAVENOUS; SUBCUTANEOUS at 18:43

## 2018-09-21 RX ADMIN — LEVETIRACETAM 250 MG: 250 TABLET ORAL at 23:37

## 2018-09-21 RX ADMIN — HYDROMORPHONE HYDROCHLORIDE 0.4 MG: 2 INJECTION INTRAMUSCULAR; INTRAVENOUS; SUBCUTANEOUS at 20:13

## 2018-09-21 RX ADMIN — OXYCODONE AND ACETAMINOPHEN 2 TABLET: 5; 325 TABLET ORAL at 19:04

## 2018-09-21 RX ADMIN — HYDROMORPHONE HYDROCHLORIDE 0.2 MG: 2 INJECTION, SOLUTION INTRAMUSCULAR; INTRAVENOUS; SUBCUTANEOUS at 19:14

## 2018-09-21 RX ADMIN — HYDROMORPHONE HYDROCHLORIDE 0.2 MG: 2 INJECTION, SOLUTION INTRAMUSCULAR; INTRAVENOUS; SUBCUTANEOUS at 19:40

## 2018-09-21 RX ADMIN — HYDROMORPHONE HYDROCHLORIDE 0.4 MG: 2 INJECTION INTRAMUSCULAR; INTRAVENOUS; SUBCUTANEOUS at 19:30

## 2018-09-21 RX ADMIN — CALCITRIOL 0.75 MCG: 0.5 CAPSULE, LIQUID FILLED ORAL at 21:00

## 2018-09-21 RX ADMIN — FENTANYL CITRATE 25 MCG: 50 INJECTION INTRAMUSCULAR; INTRAVENOUS at 16:27

## 2018-09-21 RX ADMIN — GABAPENTIN 1800 MG: 300 CAPSULE ORAL at 22:14

## 2018-09-21 RX ADMIN — HYDROMORPHONE HYDROCHLORIDE 0.4 MG: 2 INJECTION INTRAMUSCULAR; INTRAVENOUS; SUBCUTANEOUS at 20:18

## 2018-09-21 RX ADMIN — HYDROMORPHONE HYDROCHLORIDE 0.4 MG: 2 INJECTION INTRAMUSCULAR; INTRAVENOUS; SUBCUTANEOUS at 19:03

## 2018-09-21 RX ADMIN — HYDROMORPHONE HYDROCHLORIDE 0.4 MG: 2 INJECTION INTRAMUSCULAR; INTRAVENOUS; SUBCUTANEOUS at 19:08

## 2018-09-21 RX ADMIN — HYDROMORPHONE HYDROCHLORIDE 0.2 MG: 2 INJECTION, SOLUTION INTRAMUSCULAR; INTRAVENOUS; SUBCUTANEOUS at 20:23

## 2018-09-21 RX ADMIN — HYDROMORPHONE HYDROCHLORIDE 0.4 MG: 2 INJECTION INTRAMUSCULAR; INTRAVENOUS; SUBCUTANEOUS at 19:20

## 2018-09-21 RX ADMIN — MEPERIDINE HYDROCHLORIDE 25 MG: 50 INJECTION INTRAMUSCULAR; INTRAVENOUS; SUBCUTANEOUS at 23:37

## 2018-09-21 ASSESSMENT — ENCOUNTER SYMPTOMS
COUGH: 0
PHOTOPHOBIA: 0
WHEEZING: 0
SHORTNESS OF BREATH: 0
DEPRESSION: 0
VOMITING: 0
DIZZINESS: 0
FEVER: 0
ABDOMINAL PAIN: 0
PALPITATIONS: 0
FOCAL WEAKNESS: 0
CHILLS: 0
HEADACHES: 0
MYALGIAS: 0
DIARRHEA: 0
TINGLING: 0
SORE THROAT: 0
NAUSEA: 0

## 2018-09-21 ASSESSMENT — LIFESTYLE VARIABLES
ALCOHOL_USE: NO
EVER_SMOKED: NEVER

## 2018-09-21 ASSESSMENT — COGNITIVE AND FUNCTIONAL STATUS - GENERAL
DAILY ACTIVITIY SCORE: 20
DRESSING REGULAR LOWER BODY CLOTHING: A LITTLE
SUGGESTED CMS G CODE MODIFIER MOBILITY: CH
HELP NEEDED FOR BATHING: A LITTLE
DRESSING REGULAR UPPER BODY CLOTHING: A LITTLE
SUGGESTED CMS G CODE MODIFIER DAILY ACTIVITY: CJ
TOILETING: A LITTLE
MOBILITY SCORE: 24

## 2018-09-21 ASSESSMENT — PAIN SCALES - GENERAL
PAINLEVEL_OUTOF10: 10
PAINLEVEL_OUTOF10: 5
PAINLEVEL_OUTOF10: 7
PAINLEVEL_OUTOF10: 6
PAINLEVEL_OUTOF10: 10
PAINLEVEL_OUTOF10: 4
PAINLEVEL_OUTOF10: 10
PAINLEVEL_OUTOF10: 5
PAINLEVEL_OUTOF10: 6

## 2018-09-21 ASSESSMENT — COPD QUESTIONNAIRES
HAVE YOU SMOKED AT LEAST 100 CIGARETTES IN YOUR ENTIRE LIFE: NO/DON'T KNOW
IN THE PAST 12 MONTHS DO YOU DO LESS THAN YOU USED TO BECAUSE OF YOUR BREATHING PROBLEMS: DISAGREE/UNSURE
DO YOU EVER COUGH UP ANY MUCUS OR PHLEGM?: NO/ONLY WITH OCCASIONAL COLDS OR INFECTIONS
DURING THE PAST 4 WEEKS HOW MUCH DID YOU FEEL SHORT OF BREATH: NONE/LITTLE OF THE TIME
COPD SCREENING SCORE: 0

## 2018-09-21 NOTE — ED NOTES
Pt noted to have seizure like activity lasting about 20 secs.  Dr. Barker at bedside.  Pt noted to be hypotensive.

## 2018-09-21 NOTE — ED NOTES
Med rec complete per pt's family at bedside. [facility].

## 2018-09-21 NOTE — ED NOTES
Report called to Caroline in pre-op. Pt medicated for pain as ordered. Pt aware of plan of care. Awaiting transport

## 2018-09-21 NOTE — ED NOTES
BIBA fr/home due to ruptured varicose vein, followed by Dr. Rosenbaum and states scheduled for surgery today.  Abdominal binder with large amount of bandages applied.

## 2018-09-22 PROBLEM — I95.89 HYPOTENSION, CHRONIC: Status: ACTIVE | Noted: 2018-09-22

## 2018-09-22 PROBLEM — R79.1 SUPRATHERAPEUTIC INR: Status: ACTIVE | Noted: 2018-09-22

## 2018-09-22 LAB
ANION GAP SERPL CALC-SCNC: 12 MMOL/L (ref 0–11.9)
BUN SERPL-MCNC: 44 MG/DL (ref 8–22)
CALCIUM SERPL-MCNC: 8 MG/DL (ref 8.5–10.5)
CHLORIDE SERPL-SCNC: 93 MMOL/L (ref 96–112)
CO2 SERPL-SCNC: 30 MMOL/L (ref 20–33)
CREAT SERPL-MCNC: 6.88 MG/DL (ref 0.5–1.4)
ERYTHROCYTE [DISTWIDTH] IN BLOOD BY AUTOMATED COUNT: 54.6 FL (ref 35.9–50)
GLUCOSE BLD-MCNC: 162 MG/DL (ref 65–99)
GLUCOSE SERPL-MCNC: 164 MG/DL (ref 65–99)
HCT VFR BLD AUTO: 20.5 % (ref 42–52)
HGB BLD-MCNC: 6.4 G/DL (ref 14–18)
HGB BLD-MCNC: 7.1 G/DL (ref 14–18)
HGB BLD-MCNC: 7.4 G/DL (ref 14–18)
INR PPP: 2.95 (ref 0.87–1.13)
MCH RBC QN AUTO: 28.4 PG (ref 27–33)
MCHC RBC AUTO-ENTMCNC: 31.2 G/DL (ref 33.7–35.3)
MCV RBC AUTO: 91.1 FL (ref 81.4–97.8)
PLATELET # BLD AUTO: 171 K/UL (ref 164–446)
PMV BLD AUTO: 9.7 FL (ref 9–12.9)
POTASSIUM SERPL-SCNC: 4.7 MMOL/L (ref 3.6–5.5)
PROTHROMBIN TIME: 30.8 SEC (ref 12–14.6)
RBC # BLD AUTO: 2.25 M/UL (ref 4.7–6.1)
SODIUM SERPL-SCNC: 135 MMOL/L (ref 135–145)
WBC # BLD AUTO: 4.1 K/UL (ref 4.8–10.8)

## 2018-09-22 PROCEDURE — 700102 HCHG RX REV CODE 250 W/ 637 OVERRIDE(OP): Performed by: HOSPITALIST

## 2018-09-22 PROCEDURE — A9270 NON-COVERED ITEM OR SERVICE: HCPCS | Performed by: FAMILY MEDICINE

## 2018-09-22 PROCEDURE — 30233N1 TRANSFUSION OF NONAUTOLOGOUS RED BLOOD CELLS INTO PERIPHERAL VEIN, PERCUTANEOUS APPROACH: ICD-10-PCS | Performed by: EMERGENCY MEDICINE

## 2018-09-22 PROCEDURE — 85027 COMPLETE CBC AUTOMATED: CPT

## 2018-09-22 PROCEDURE — 86923 COMPATIBILITY TEST ELECTRIC: CPT

## 2018-09-22 PROCEDURE — 82962 GLUCOSE BLOOD TEST: CPT

## 2018-09-22 PROCEDURE — 700111 HCHG RX REV CODE 636 W/ 250 OVERRIDE (IP): Performed by: FAMILY MEDICINE

## 2018-09-22 PROCEDURE — 99233 SBSQ HOSP IP/OBS HIGH 50: CPT | Performed by: FAMILY MEDICINE

## 2018-09-22 PROCEDURE — 700111 HCHG RX REV CODE 636 W/ 250 OVERRIDE (IP): Performed by: HOSPITALIST

## 2018-09-22 PROCEDURE — 700102 HCHG RX REV CODE 250 W/ 637 OVERRIDE(OP): Performed by: FAMILY MEDICINE

## 2018-09-22 PROCEDURE — 85610 PROTHROMBIN TIME: CPT

## 2018-09-22 PROCEDURE — 36415 COLL VENOUS BLD VENIPUNCTURE: CPT

## 2018-09-22 PROCEDURE — 36430 TRANSFUSION BLD/BLD COMPNT: CPT

## 2018-09-22 PROCEDURE — 80048 BASIC METABOLIC PNL TOTAL CA: CPT

## 2018-09-22 PROCEDURE — 94760 N-INVAS EAR/PLS OXIMETRY 1: CPT

## 2018-09-22 PROCEDURE — A9270 NON-COVERED ITEM OR SERVICE: HCPCS | Performed by: HOSPITALIST

## 2018-09-22 PROCEDURE — P9016 RBC LEUKOCYTES REDUCED: HCPCS

## 2018-09-22 PROCEDURE — 85018 HEMOGLOBIN: CPT | Mod: 91

## 2018-09-22 PROCEDURE — 770001 HCHG ROOM/CARE - MED/SURG/GYN PRIV*

## 2018-09-22 RX ORDER — HYDROMORPHONE HYDROCHLORIDE 2 MG/ML
1 INJECTION, SOLUTION INTRAMUSCULAR; INTRAVENOUS; SUBCUTANEOUS
Status: DISCONTINUED | OUTPATIENT
Start: 2018-09-22 | End: 2018-09-22

## 2018-09-22 RX ORDER — GLYCOPYRROLATE 0.2 MG/ML
0.2 INJECTION INTRAMUSCULAR; INTRAVENOUS
Status: CANCELLED | OUTPATIENT
Start: 2018-09-21

## 2018-09-22 RX ORDER — OXYCODONE HYDROCHLORIDE 5 MG/1
15 TABLET ORAL
Status: DISCONTINUED | OUTPATIENT
Start: 2018-09-22 | End: 2018-09-24 | Stop reason: HOSPADM

## 2018-09-22 RX ORDER — NALOXONE HYDROCHLORIDE 0.4 MG/ML
0.1 INJECTION, SOLUTION INTRAMUSCULAR; INTRAVENOUS; SUBCUTANEOUS PRN
Status: CANCELLED | OUTPATIENT
Start: 2018-09-21

## 2018-09-22 RX ORDER — OXYCODONE HYDROCHLORIDE AND ACETAMINOPHEN 5; 325 MG/1; MG/1
2 TABLET ORAL
Status: CANCELLED | OUTPATIENT
Start: 2018-09-21

## 2018-09-22 RX ORDER — ONDANSETRON 2 MG/ML
4 INJECTION INTRAMUSCULAR; INTRAVENOUS
Status: CANCELLED | OUTPATIENT
Start: 2018-09-21

## 2018-09-22 RX ORDER — MIDODRINE HYDROCHLORIDE 5 MG/1
10 TABLET ORAL
Status: DISCONTINUED | OUTPATIENT
Start: 2018-09-22 | End: 2018-09-24 | Stop reason: HOSPADM

## 2018-09-22 RX ORDER — HYDROMORPHONE HYDROCHLORIDE 2 MG/ML
1-2 INJECTION, SOLUTION INTRAMUSCULAR; INTRAVENOUS; SUBCUTANEOUS
Status: DISCONTINUED | OUTPATIENT
Start: 2018-09-22 | End: 2018-09-24 | Stop reason: HOSPADM

## 2018-09-22 RX ORDER — SODIUM CHLORIDE 9 MG/ML
INJECTION, SOLUTION INTRAVENOUS CONTINUOUS
Status: CANCELLED | OUTPATIENT
Start: 2018-09-21

## 2018-09-22 RX ORDER — HALOPERIDOL 5 MG/ML
1 INJECTION INTRAMUSCULAR
Status: CANCELLED | OUTPATIENT
Start: 2018-09-21

## 2018-09-22 RX ORDER — OXYCODONE HYDROCHLORIDE AND ACETAMINOPHEN 5; 325 MG/1; MG/1
1 TABLET ORAL
Status: CANCELLED | OUTPATIENT
Start: 2018-09-21

## 2018-09-22 RX ORDER — DIPHENHYDRAMINE HYDROCHLORIDE 50 MG/ML
12.5 INJECTION INTRAMUSCULAR; INTRAVENOUS
Status: CANCELLED | OUTPATIENT
Start: 2018-09-21

## 2018-09-22 RX ORDER — OXYCODONE HYDROCHLORIDE 5 MG/1
15 TABLET ORAL
Status: DISCONTINUED | OUTPATIENT
Start: 2018-09-22 | End: 2018-09-22

## 2018-09-22 RX ADMIN — HYDROMORPHONE HYDROCHLORIDE 0.5 MG: 2 INJECTION, SOLUTION INTRAMUSCULAR; INTRAVENOUS; SUBCUTANEOUS at 06:44

## 2018-09-22 RX ADMIN — OXYCODONE HYDROCHLORIDE 15 MG: 5 TABLET ORAL at 16:52

## 2018-09-22 RX ADMIN — MIDODRINE HYDROCHLORIDE 10 MG: 5 TABLET ORAL at 11:39

## 2018-09-22 RX ADMIN — SEVELAMER CARBONATE 3200 MG: 800 TABLET, FILM COATED ORAL at 08:36

## 2018-09-22 RX ADMIN — HYDROMORPHONE HYDROCHLORIDE 2 MG: 2 INJECTION INTRAMUSCULAR; INTRAVENOUS; SUBCUTANEOUS at 19:48

## 2018-09-22 RX ADMIN — HYDROMORPHONE HYDROCHLORIDE 0.5 MG: 2 INJECTION, SOLUTION INTRAMUSCULAR; INTRAVENOUS; SUBCUTANEOUS at 02:15

## 2018-09-22 RX ADMIN — OXYCODONE HYDROCHLORIDE 10 MG: 10 TABLET ORAL at 05:04

## 2018-09-22 RX ADMIN — OXYCODONE HYDROCHLORIDE 15 MG: 5 TABLET ORAL at 13:05

## 2018-09-22 RX ADMIN — CALCITRIOL 0.75 MCG: 0.5 CAPSULE, LIQUID FILLED ORAL at 21:00

## 2018-09-22 RX ADMIN — HYDROMORPHONE HYDROCHLORIDE 2 MG: 2 INJECTION INTRAMUSCULAR; INTRAVENOUS; SUBCUTANEOUS at 14:58

## 2018-09-22 RX ADMIN — MIDODRINE HYDROCHLORIDE 10 MG: 5 TABLET ORAL at 16:51

## 2018-09-22 RX ADMIN — HYDROMORPHONE HYDROCHLORIDE 2 MG: 2 INJECTION INTRAMUSCULAR; INTRAVENOUS; SUBCUTANEOUS at 11:39

## 2018-09-22 RX ADMIN — HYDROMORPHONE HYDROCHLORIDE 2 MG: 2 INJECTION INTRAMUSCULAR; INTRAVENOUS; SUBCUTANEOUS at 17:39

## 2018-09-22 RX ADMIN — OXYCODONE HYDROCHLORIDE 15 MG: 5 TABLET ORAL at 08:37

## 2018-09-22 RX ADMIN — GABAPENTIN 1800 MG: 300 CAPSULE ORAL at 19:54

## 2018-09-22 RX ADMIN — HYDROMORPHONE HYDROCHLORIDE 1 MG: 2 INJECTION, SOLUTION INTRAMUSCULAR; INTRAVENOUS; SUBCUTANEOUS at 09:45

## 2018-09-22 RX ADMIN — LEVETIRACETAM 250 MG: 250 TABLET ORAL at 19:55

## 2018-09-22 RX ADMIN — CINACALCET HYDROCHLORIDE 60 MG: 30 TABLET, COATED ORAL at 16:51

## 2018-09-22 RX ADMIN — SEVELAMER CARBONATE 3200 MG: 800 TABLET, FILM COATED ORAL at 11:39

## 2018-09-22 RX ADMIN — LEVETIRACETAM 250 MG: 250 TABLET ORAL at 08:37

## 2018-09-22 RX ADMIN — OXYCODONE HYDROCHLORIDE 15 MG: 5 TABLET ORAL at 22:16

## 2018-09-22 RX ADMIN — SEVELAMER CARBONATE 3200 MG: 800 TABLET, FILM COATED ORAL at 16:51

## 2018-09-22 RX ADMIN — INSULIN HUMAN 1 UNITS: 100 INJECTION, SOLUTION PARENTERAL at 06:36

## 2018-09-22 ASSESSMENT — ENCOUNTER SYMPTOMS
FEVER: 0
WHEEZING: 0
VOMITING: 0
NERVOUS/ANXIOUS: 0
WEAKNESS: 0
HEADACHES: 0
ABDOMINAL PAIN: 0
BLURRED VISION: 0
COUGH: 0
DIARRHEA: 0
SORE THROAT: 0
DIZZINESS: 0
CHILLS: 0
NAUSEA: 0
BACK PAIN: 0
SHORTNESS OF BREATH: 0
HEARTBURN: 0
NECK PAIN: 0

## 2018-09-22 ASSESSMENT — LIFESTYLE VARIABLES: EVER_SMOKED: NEVER

## 2018-09-22 ASSESSMENT — COPD QUESTIONNAIRES
DURING THE PAST 4 WEEKS HOW MUCH DID YOU FEEL SHORT OF BREATH: NONE/LITTLE OF THE TIME
COPD SCREENING SCORE: 0
HAVE YOU SMOKED AT LEAST 100 CIGARETTES IN YOUR ENTIRE LIFE: NO/DON'T KNOW
DO YOU EVER COUGH UP ANY MUCUS OR PHLEGM?: NO/ONLY WITH OCCASIONAL COLDS OR INFECTIONS

## 2018-09-22 ASSESSMENT — PAIN SCALES - GENERAL
PAINLEVEL_OUTOF10: 9
PAINLEVEL_OUTOF10: 7
PAINLEVEL_OUTOF10: 8
PAINLEVEL_OUTOF10: 8
PAINLEVEL_OUTOF10: 7
PAINLEVEL_OUTOF10: 8

## 2018-09-22 NOTE — PROGRESS NOTES
POD 1  No events  AVSS  No evidence of bleeding after surgery  Operatives sites look good  A/P-OK to mobilize and OK to resume anticoagulation starting tomorrow.  I discussed with nursing reinforcement of thigh dressing as needed.  I will round tomorrow and then Dr. Rosenbaum will be back on Monday

## 2018-09-22 NOTE — ASSESSMENT & PLAN NOTE
Split skin grafting to right arm as well as abdomen. The total amount of skin grafted area is 80 cm2. 9/21/2018  Pain control

## 2018-09-22 NOTE — CONSULTS
DATE OF SERVICE:  09/22/2018    REQUESTING PHYSICIAN:  Dr. Ferro.    REASON FOR CONSULTATION:  Management of end-stage renal disease, assessing the   need for urgent dialysis.    The patient was seen and examined, medical records were reviewed.    HISTORY OF PRESENT ILLNESS:  The patient is an unfortunate 38-year-old   gentleman who is well known to our service.  He has a history of end-stage   renal disease, undergoes hemodialysis at home  every other day, presented to   the hospital for medical management of postoperative graft surgery, patient   was found to be anemic secondary to active bleeding from his abdomen wall, he   has been admitted, was given a blood transfusion.  We were called to manage   his kidney disease and assist in the need for urgent dialysis.    Again, the patient undergoes hemodialysis at home on every other day schedule.    Patient has no fever, no chills, no nausea, no vomiting.    PAST MEDICAL HISTORY:  Significant for:  1.  End-stage renal disease.  2.  Hypertension.  3.  Anemia.    ALLERGIES:  The list was reviewed.    MEDICATIONS:  Reviewed.    SOCIAL HISTORY:  Patient works full time.    FAMILY HISTORY:  No known renal disease.    REVIEW OF SYSTEMS:  The patient is anxious to go home.  No fever, no chills.    All other review of systems is negative.    PHYSICAL EXAMINATION:  GENERAL:  Patient is in no apparent distress.  VITAL SIGNS:  Showed blood pressure of 106/68, heart rate was 84, respiratory   rate was 17.  HEENT:  Normocephalic, atraumatic.  Sclerae is anicteric.  NECK:  No lymphadenopathy.  CHEST:  Normal.  LUNGS:  Clear to auscultation.  HEART:  S1, S2.  ABDOMEN:  Soft, nontender.  EXTREMITIES:  There is trace lower extremity edema.  SKIN:  There is no ecchymosis  across the abdomen wall.    LABORATORY DATA:  His recent labs were reviewed.    ASSESSMENT AND PLAN:  1.  End-stage renal disease.  The patient has no uremic symptoms.  2.  Anemia.  3.  Hypertension.    PLAN:  1.   There is no acute need for renal replacement therapy today.  We will plan   on tomorrow.  The patient is still in the hospital.  2.  As for the anemia, serial hemoglobin level check.  3.  Renal dose all medications.  4.  Avoid nephrotoxin.  5.  Prognosis is guarded.    Plan discussed in detail with Dr. Ferro as well as Dr. Avery.       ____________________________________     FADI NAJJAR, MD FN / RIAZ    DD:  09/22/2018 14:14:12  DT:  09/22/2018 15:05:54    D#:  [id number]  Job#:  [id number]

## 2018-09-22 NOTE — CARE PLAN
Problem: Infection  Goal: Will remain free from infection  Outcome: PROGRESSING AS EXPECTED  Patient monitored for signs and symptoms of infections.    Problem: Pain Management  Goal: Pain level will decrease to patient's comfort goal  Patient medicated for pain as ordered.

## 2018-09-22 NOTE — PROGRESS NOTES
Hospital Medicine Daily Progress Note    Date of Service  9/22/2018    Chief Complaint  38 y.o. male admitted 9/21/2018 with bleeding from complex wound of right arm and right abdominal areas.    Hospital Course  Admitted with bleeding from a complex wound of right arm and right abdominal area underwent Split skin grafting to right arm as well as abdomen, patient on chronic anticoagulation, INR was supratherapeutic on admission    Interval Problem Update  Complex wound - surgery done, pain not controlled  Anemia -hemoglobin trended down to 6  Supratherapeutic INR -FFP transfused, INR today pending    Consultants/Specialty  Plastic surgery - Walker    Code Status  Full    Disposition  TBD    Review of Systems  Review of Systems   Constitutional: Negative for chills, fever and malaise/fatigue.   HENT: Negative for hearing loss and sore throat.    Eyes: Negative for blurred vision.   Respiratory: Negative for cough, shortness of breath and wheezing.    Cardiovascular: Negative for chest pain and leg swelling.   Gastrointestinal: Negative for abdominal pain, diarrhea, heartburn, nausea and vomiting.   Genitourinary: Negative for dysuria.   Musculoskeletal: Negative for back pain and neck pain.   Skin: Negative for rash.   Neurological: Negative for dizziness, weakness and headaches.   Psychiatric/Behavioral: The patient is not nervous/anxious.         Physical Exam  Temp:  [36.2 °C (97.2 °F)-37.2 °C (98.9 °F)] 37.1 °C (98.7 °F)  Pulse:  [] 103  Resp:  [12-19] 14  BP: ()/(32-82) 87/55    Physical Exam   Constitutional: He is oriented to person, place, and time. He appears well-developed and well-nourished.   HENT:   Head: Normocephalic and atraumatic.   Eyes: Pupils are equal, round, and reactive to light. Conjunctivae are normal.   Neck: No tracheal deviation present. No thyromegaly present.   Cardiovascular: Normal rate and regular rhythm.    Pulmonary/Chest: Effort normal and breath sounds normal.    Abdominal: Soft. Bowel sounds are normal. He exhibits no distension. There is no tenderness.   Musculoskeletal:   Dressing at right arm  Wound at right abdominal area  Graft site at right thigh area   Lymphadenopathy:     He has no cervical adenopathy.   Neurological: He is alert and oriented to person, place, and time.   Nursing note and vitals reviewed.      Fluids    Intake/Output Summary (Last 24 hours) at 09/22/18 1045  Last data filed at 09/22/18 0845   Gross per 24 hour   Intake             1827 ml   Output               50 ml   Net             1777 ml       Laboratory  Recent Labs      09/21/18   1518  09/22/18   0423   WBC  5.4  4.1*   RBC  2.86*  2.25*   HEMOGLOBIN  8.1*  6.4*   HEMATOCRIT  25.6*  20.5*   MCV  89.5  91.1   MCH  28.3  28.4   MCHC  31.6*  31.2*   RDW  55.8*  54.6*   PLATELETCT  247  171   MPV  9.8  9.7     Recent Labs      09/21/18   1518  09/22/18   0423   SODIUM  131*  135   POTASSIUM  3.8  4.7   CHLORIDE  89*  93*   CO2  28  30   GLUCOSE  112*  164*   BUN  32*  44*   CREATININE  5.36*  6.88*   CALCIUM  9.2  8.0*     Recent Labs      09/21/18   1518   APTT  85.2*   INR  4.13*               Imaging  No orders to display        Assessment/Plan  * Status post repair of complex wound- (present on admission)   Assessment & Plan    Split skin grafting to right arm as well as abdomen. The total amount of skin grafted area is 80 cm2. 9/21/2018  Pain control, adjust Oxycodone and IV Dilaudid        Anemia due to acute blood loss- (present on admission)   Assessment & Plan    Transfused 1 u PRBC  Trasfuse 1 u PRBC today  Serial hgb         Hemorrhagic disorder due to circulating anticoagulants (HCC)- (present on admission)   Assessment & Plan    Transfused FFP  Recheck INR        Supratherapeutic INR- (present on admission)   Assessment & Plan    Transfused FFP  Check INR today        Hypotension, chronic- (present on admission)   Assessment & Plan    Resume Midodrine        Chronic  anticoagulation- (present on admission)   Assessment & Plan    Hold Coumadin until cleared by plastic surgery        ESRD (end stage renal disease) (HCC)- (present on admission)   Assessment & Plan    HD        Pain syndrome, chronic- (present on admission)   Assessment & Plan    Judicious pain control        Seizure disorder (CMS-HCC)- (present on admission)   Assessment & Plan    Keppra             VTE prophylaxis: SCD

## 2018-09-22 NOTE — PROGRESS NOTES
Patient alert and oriented x 4.  Arrived on unit via gurney.  2 RNs skin assessment completed.  Skin graft to right side abdomen.  Skin graft to right upper extremity.  Edema to right upper extremity.  Graft donor site right lower extremity.  Dressing to graft donor site noted with  Blood.  AV graft left upper thigh.  Patient stated that he had dialysis 9/21/18.  Requested Demerol. Dr Ferro called.  Pain 6/10.  Bed in lowest and locked position and call light  Within reach.

## 2018-09-22 NOTE — PROGRESS NOTES
RN called MD concerning critical labs. Patient verbalized to RN that he does not want blood, RN informed MD who will come speak to patient at bedside. /59   Pulse 67   Temp 36.3 °C (97.4 °F)   Resp 18   Wt 80 kg (176 lb 5.9 oz)   SpO2 94%   BMI 30.27 kg/m²

## 2018-09-22 NOTE — ED PROVIDER NOTES
ED Provider Note    CHIEF COMPLAINT  Chief Complaint   Patient presents with   • Bleeding/Bruising     abdominal varicose vein that ruptured, tight bandaged applied.         HPI  Denis De Anda is a 38 y.o. male who presents to the emergency department because of heavy bleeding from an abdominal wound.  The patient has a very complex medical history and has a history of bleeding abdominal varicosities which have been extremely difficult to control.  The patient recently underwent a surgical procedure and his right arm is sutured to his abdominal wall.  The patient was actually scheduled to have this revised today at 5:30 PM but he developed bleeding from the area this morning which did resolve on its own but he has been bleeding heavily again since 2 PM.  The family says that they completely soaked to large beach towels with blood at home and he is feeling very dizzy and lightheaded so they called her surgeon and the patient was brought to the emergency department.  The patient did undergo dialysis today and he says a liter and a half of fluid was removed during dialysis.  In addition the patient has been on Coumadin and he did take his dose last night because he did not think that his surgery was scheduled until Monday.    REVIEW OF SYSTEMS no chest pain or difficulty breathing no fever or chills.  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   • Toxic uninodular goiter without mention of thyrotoxic crisis or storm        FAMILY HISTORY  Family History   Problem Relation Age of Onset   • Arthritis Father         RA   • Lung Disease Father    • Heart Disease Father         MI   • Hypertension Brother        SOCIAL HISTORY  Social History     Social History   • Marital status: Single     Spouse name: N/A   • Number of children: N/A   • Years of education: N/A     Occupational History   •  [employer]     Social History Main Topics   • Smoking status: Never Smoker   • Smokeless tobacco: Never Used   • Alcohol use No   • Drug use: No   • Sexual activity: Yes     Partners: Female     Other Topics Concern   • Not on file     Social History Narrative   • No narrative on file       SURGICAL HISTORY  Past Surgical History:   Procedure Laterality Date   • SKIN FLAP DELAYED Left 9/10/2018    Procedure: SKIN FLAP DELAYED- FOR DIVISION AND INSET FLAP WITH SKIN GRAFT ON ARM;  Surgeon: Samson Rosenbaum Jr., M.D.;  Location: SURGERY SAME DAY [facility];  Service: Plastics   • MYOCUTANEOUS FLAP Right 8/27/2018    Procedure: MYOCUTANEOUS FLAP - RIGHT ARM TO TRUNK;  Surgeon: Samson Rosenbaum Jr., M.D.;  Location: SURGERY [facility];  Service: Plastics   • ABDOMINAL EXPLORATION  6/25/2018    Procedure: ABDOMINAL EXPLORATION- CONTROL ABDOMINAL BLEEDING;  Surgeon: Samson Rosenbaum Jr., M.D.;  Location: SURGERY [facility];  Service: Plastics   • THROMBECTOMY Left 4/28/2018    Procedure: THROMBECTOMY- Thigh W/ Graft;  Surgeon: Jairo Hopkins M.D.;  Location: SURGERY [facility];  Service: General   • THROMBECTOMY Left 4/27/2018    Procedure: THROMBECTOMY - THIGH GRAFT AND REVISION;  Surgeon: Jairo Hopkins M.D.;  Location: SURGERY [facility];  Service: General   • THROMBECTOMY Left 3/9/2018    Procedure: THROMBECTOMY- THIGH DIALYSIS GRAFT AND REVISION  ;  Surgeon:  Jairo Hopkins M.D.;  Location: [facility];  Service: General   • CATH PLACEMENT Right 3/9/2018    Procedure: CATH PLACEMENT - REPLACE DIALYSIS CATH RIGHT THIGH;  Surgeon: Jairo Hopkins M.D.;  Location: [facility];  Service: General   • SPLIT THICKNESS SKIN GRAFT  2/26/2018    Procedure: SPLIT THICKNESS SKIN GRAFT- TO ABDOMEN;  Surgeon: Samson Rosenbaum Jr., M.D.;  Location: [facility];  Service: Plastics   • WOUND CLOSURE GENERAL  2/19/2018    Procedure: WOUND CLOSURE GENERAL-ABDOMINAL WALL HEMORRHAGE;  Surgeon: Jason Robertson M.D.;  Location: SURGERY [facility];  Service: Plastics   • WOUND EXPLORATION GENERAL  2/1/2018    Procedure: WOUND EXPLORATION GENERAL, REPAIR BLEEDING;  Surgeon: Samson Rosenbaum Jr., M.D.;  Location: [facility];  Service: Plastics   • CATH PLACEMENT Right 11/14/2017    Procedure: CATH PLACEMENT - PERMA;  Surgeon: Jairo Hopkins M.D.;  Location: [facility];  Service: General   • AV FISTULA REVISION Left 11/14/2017    Procedure: AV GRAFT REVISION;  Surgeon: Jairo Hopkins M.D.;  Location: [facility];  Service: General   • IRRIGATION & DEBRIDEMENT GENERAL  7/31/2017    Procedure: IRRIGATION & DEBRIDEMENT GENERAL-ABDOMEN;  Surgeon: Samson Rosenbaum Jr., M.D.;  Location: [facility];  Service:    • ABDOMINOPLASTY  6/5/2017    Procedure: ABDOMINOPLASTY - FOR PANNICULECTOMY;  Surgeon: Samson Rosenbaum Jr., M.D.;  Location: [facility];  Service:    • SPINAL CORD STIMULATOR N/A 5/4/2017    Procedure: SPINAL CORD STIMULATOR - EXPLANT;  Surgeon: Bin Pro M.D.;  Location: [facility];  Service:    • THROMBECTOMY Left 1/6/2017    Procedure: THROMBECTOMY-OPEN THROMBECTOMY WITH LEFT THIGH GRAFT;  Surgeon: Jairo Hopkins M.D.;  Location: [facility];  Service:    • CATH PLACEMENT Right 1/6/2017    Procedure: CATH PLACEMENT;  Surgeon: Jairo ROWE  EDISON Hopkisn;  Location: [facility];  Service:    • THROMBECTOMY Left 1/5/2017    Procedure: THROMBECTOMY-THIGH AV LOOP GRAFT AND ANGIOJET;  Surgeon: Jairo Hopkins M.D.;  Location: SURGERY [facility];  Service:    • FLAP CLOSURE Left 11/17/2016    Procedure: Fasciocutaneous Flap Closure Left Upper Leg;  Surgeon: Samson Rosenbaum Jr., M.D.;  Location: SURGERY [facility];  Service:    • IRRIGATION & DEBRIDEMENT GENERAL Left 10/28/2016    Procedure: IRRIGATION & DEBRIDEMENT GENERAL THIGH WITH IRRIGATING WOUND VAC PLACEMENT;  Surgeon: Jairo Hopkins M.D.;  Location: SURGERY [facility];  Service:    • THROMBECTOMY Left 10/20/2016    Procedure: THROMBECTOMY THIGH;  Surgeon: Jairo Hopkins M.D.;  Location: SURGERY [facility];  Service:    • INCISION AND DRAINAGE GENERAL  10/20/2016    Procedure: INCISION AND DRAINAGE GENERAL HEMATOMA;  Surgeon: Jairo Hopkins M.D.;  Location: SURGERY [facility];  Service:    • THROMBECTOMY Left 9/18/2016    Procedure: THROMBECTOMY - and fistula revision;  Surgeon: Jairo Hopkins M.D.;  Location: SURGERY [facility];  Service:    • AV FISTULOGRAM  9/18/2016    Procedure: AV FISTULOGRAM;  Surgeon: Jairo Hopkins M.D.;  Location: [facility];  Service:    • CATH PLACEMENT Right 9/17/2016    Procedure: CATH PLACEMENT - tunneled dialysis cath placement right femoral ;  Surgeon: Quentin Alicia M.D.;  Location: SURGERY [facility];  Service:    • MASS EXCISION GENERAL Right 8/5/2016    Procedure: MASS EXCISION GENERAL FOR CALCIPHYLAXIS SKIN AND SUBCUTANEOUS TISSUE FOREARM;  Surgeon: Jairo Hopkins M.D.;  Location: SURGERY [facility];  Service:    • LESION EXCISION GENERAL Right 7/11/2016    Procedure: LESION EXCISION GENERAL FOR ARM SKIN;  Surgeon: Jairo Hopkins M.D.;  Location: SURGERY [facility];  Service:    • RECOVERY  7/8/2016    Procedure: IR1-VASCULAR CASE-HOPKINS-LEFT THIGH AV GRAFT THROMBOLYSIS  WITH TISSUE PLASMINOGEN ACTIVATOR AND ANGIOJET ARTHERECTOMY   ;  Surgeon: Melinda Surgery;  Location: SURGERY PRE-POST PROC UNIT [facility];  Service:    • SPINAL CORD STIMULATOR N/A 3/25/2016    Procedure: SPINAL CORD STIMULATOR;  Surgeon: Bin Pro;  Location: SURGERY [facility];  Service:    • PB PERCUT IMPLNT NEUROELECT,EPIDURAL  2/19/2016    Procedure: IMPLANT NEUROSTIM EPI ARRAY;  Surgeon: Bin Pro;  Location: SURGERY [facility];  Service: Pain Management   • PB PERCUT IMPLNT NEUROELECT,EPIDURAL  2/19/2016    Procedure: IMPLANT NEUROSTIM EPI ARRAY;  Surgeon: Bin Pro;  Location: SURGERY [facility];  Service: Pain Management   • RECOVERY  8/7/2015    Procedure:  VASCULAR CASE VIANEY-RIGHT ILIAC VENOGRAM WITH ANGIOPLASTY, REMOVAL RIGHT FEMORAL TUNNELED DIALYSIS CATHETER  **VRE**;  Surgeon: Melinda Surgery;  Location: SURGERY PRE-POST PROC UNIT [facility];  Service:    • AV FISTULA REVISION Left 6/17/2015    Procedure: AV FISTULA REVISION;  Surgeon: Jairo Hopkins M.D.;  Location: [facility];  Service:    • IRRIGATION & DEBRIDEMENT GENERAL Left 6/17/2015    Procedure: IRRIGATION & DEBRIDEMENT GENERAL ARM W/EXPLORATION WOUND & CONTROL BLEEDING;  Surgeon: Jairo Hopkins M.D.;  Location: [facility];  Service:    • AV FISTULA THROMBOLYSIS Left 6/9/2015    Procedure: THROMBECTOMY AV GRAFT THIGH;  Surgeon: Jairo Hopkins M.D.;  Location: SURGERY [facility];  Service:    • AV FISTULA THROMBOLYSIS Left 6/3/2015    Procedure: AV FISTULA THROMBOLYSIS THIGH REPLACEMENT;  Surgeon: Jairo Hopkins M.D.;  Location: SURGERY [facility];  Service:    • IRRIGATION & DEBRIDEMENT GENERAL Left 6/3/2015    Procedure: IRRIGATION & DEBRIDEMENT GENERAL;  Surgeon: Jairo Hopkins M.D.;  Location: SURGERY [facility];  Service:    • AV FISTULA CREATION  4/20/2015    Performed by Jairo Hopkins M.D. at [facility]   • PB INJECT NERV BLCK,STELLATE  GANGLION  3/24/2015    Performed by Bin Pro at SURGERY [facility]   • AV FISTULA REVISION  3/20/2015    Performed by Jairo Hopkins M.D. at SURGERY [facility]   • AV FISTULA REVISION  2/22/2015    Performed by Lurdes Moffett M.D. at SURGERY [facility]   • AV FISTULA REVISION  2/8/2015    Performed by Jairo Hopkins M.D. at SURGERY [facility]   • IRRIGATION & DEBRIDEMENT GENERAL  12/15/2014    Performed by Jairo Hopkins M.D. at SURGERY [facility]   • AV FISTULA REVISION  9/30/2014    Performed by Jairo Hopkins M.D. at SURGERY [facility]   • RECOVERY  6/13/2014    Performed by [facility] at [facility] SAME DAY [facility] ORS   • INCISION AND DRAINAGE GENERAL  9/13/2013    Performed by Jairo Hopkins M.D. at SURGERY [facility]   • DEBRIDEMENT  9/13/2013    Performed by Jairo Hopkins M.D. at SURGERY [facility]   • VEIN LIGATION  9/13/2013    Performed by Jairo Hopkins M.D. at SURGERY [facility]   • RECOVERY  5/18/2012    Performed by SURGERY, IR-RECOVERY at [facility]   • BONE SPUR EXCISION  12/8/2011    Performed by BELKIS BREAUX at SURGERY SAME DAY [facility]   • AV FISTULA REVISION  11/3/2011    Performed by JAIRO HOPKINS at SURGERY [facility]   • AV FISTULOGRAM  6/5/2011    Performed by JAIRO HOPKINS at SURGERY [facility]   • CATH PLACEMENT  6/5/2011    Performed by JAIRO HOPKINS at SURGERY [facility]   • CATH PLACEMENT  2/1/2011    Performed by JAIRO HOPKINS at SURGERY [facility]   • ANGIOPLASTY BALLOON  2/1/2011    Performed by JAIRO HOPKINS at SURGERY [facility]   • ENDARTERECTOMY  11/16/2010    Performed by JAIRO HOPKINS at SURGERY [facility]   • AV FISTULOGRAM  11/16/2010    Performed by JAIRO HOPKINS at SURGERY [facility]   • ARTERIOGRAM  3/5/2009    Performed by JAIRO HOPKINS at SURGERY [facility] ORS   • ANGIOPLASTY BALLOON  3/5/2009     Performed by MARI WINTERS at SURGERY [facility] ORS   • AV FISTULOGRAM  3/5/2009    Performed by MARI WINTERS at SURGERY [facility] ORS   • AV FISTULA CREATION  11/6/08    Performed by MARI WINTERS at SURGERY [facility] ORS   • MASS EXCISION ORTHO  10/9/08    Performed by BELKIS BREAUX at SURGERY SAME DAY [facility] ORS   • PARATHYROIDECTOMY  2006   • INGUINAL HERNIA REPAIR Bilateral 2001, 2002   • US-KIDNEY TRANSPLANT  1996, 2001    x2       CURRENT MEDICATIONS  Home Medications     Reviewed by Thu Matias R.N. (Registered Nurse) on 09/21/18 at 1736  Med List Status: Complete   Medication Last Dose Status   calcitRIOL (ROCALTROL) 0.25 MCG CAPS 9/20/2018 Active   cinacalcet (SENSIPAR) 30 MG Tab 9/20/2018 Active   gabapentin (NEURONTIN) 300 MG Cap 9/20/2018 Active   gabapentin (NEURONTIN) 600 MG tablet 9/20/2018 Active   HYDROcodone/acetaminophen (NORCO)  MG Tab unknown Active   levetiracetam (KEPPRA) 250 MG tablet 1weekago Active   meperidine (DEMEROL) injection 50 mg  Active   midodrine (PROAMATINE) 10 MG tablet > 1 week Active   midodrine (PROAMATINE) 10 MG tablet unknown Active   NS infusion  Active   NS infusion  Active   sevelamer carbonate (RENVELA) 800 MG Tab tablet 9/20/2018 Active   warfarin (COUMADIN) 5 MG Tab 9/20/2018 Active                ALLERGIES  Allergies   Allergen Reactions   • Baclofen Unspecified     Total loss of memory, sedation.   RXN=6/2015   • Contrast Media With Iodine [Iodine] Rash     RXN=1/5/2017   • Keflex Rash     RXN=possibly >10 years   • Pcn [Penicillins] Rash     RXN=possibly >10 years ago  Tolerated Zosyn on 2/20/18   • Tape Rash     Paper tape and tegaderm ok  RXN=ongoing       PHYSICAL EXAM  VITAL SIGNS: BP (!) 94/69   Pulse (!) 129   Temp 36.8 °C (98.3 °F)   Resp 15   Wt 80 kg (176 lb 5.9 oz)   SpO2 95%   BMI 30.27 kg/m²    Oxygen saturation is interpreted as adequate  Constitutional: Awake and ill-appearing  HENT: Mucous membranes are  dry  Eyes: No erythema or discharge or jaundice  Neck: Trachea midline no JVD  Cardiovascular: Regular tachycardia  Lungs: Clear and equal bilaterally  Abdomen/Back: Soft nondistended.  The patient's right arm is sutured to the abdominal wall and there is a steady welling up of dark blood coming from underneath the arm.  I cannot directly visualize the source of the bleeding because of the arm is sutured to the abdominal wall  Skin: Pale  Musculoskeletal: No acute bony deformity  Neurologic: Awake and verbal    Laboratory  CBC showed white blood cell count of 5.4 the hemoglobin was very low at 8.1 the INR is elevated at 4.13, complete metabolic panel shows an elevated BUN of 32 and creatinine of 5.36 consistent with the patient's history of dialysis dependent renal failure.      MEDICAL DECISION MAKING and DISPOSITION  An IV was established the patient was placed on a cardiac monitor and given supplemental oxygen by nasal cannula.  I was able to immediately discuss the case with his surgeon Dr. Rosenbaum over the telephone and I cannot directly accessed the source of bleeding as described above but the entire area was packed with gauze and then we used an abdominal binder to try and place pressure on the arm against the abdominal wall.  The patient was immediately started on O+ positive blood due to active bleeding and hypotension.  I have also ordered crossmatched blood and FFP.  The patient is going directly to the operating room with Dr. Rosenbaum for further care and to attempt to control the bleeding surgically.  The patient is profoundly ill    IMPRESSION  1.  Active bleeding from abdominal wall wound  2.  Anemia requiring transfusion  3.  Elevated INR secondary to Coumadin therapy  4.  Chronic renal failure requiring dialysis  5.  Critical care time with this patient is 35 minutes         Electronically signed by: Kurtis Barker, 9/21/2018 6:07 PM

## 2018-09-22 NOTE — CONSULTS
Hospital Medicine CONSULT    Date of Service  9/21/2018    Primary Care Physician  Tony Mcmillan M.D.    Chief Complaint  Consult request from Dr. Rosenbaum of plastic surgery for medical management in the postoperative setting    History of Presenting Illness  38 y.o. male who presented on 9/21/2018 with active bleeding from his right abdomen and right arm.  The patient is a rather medically complex individual who carries a known history of end-stage renal disease.  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  He also carries a history of arrhythmia on anticoagulation, sleep apnea, and chronic pain.  He receives dialysis on Monday Wednesday and Fridays and is followed by Dr. Funes of nephrology.  The patient carries a known history of bleeding intra-abdominal varicosities.  He reports that he had a surgery back in June for pannus removal and since then has had recurrent bleeding with wound dehiscence and difficulty with closure.  2 weeks ago, he underwent the initial stages of a skin graft procedure and states that he had his right arm sutured to his right abdomen in an effort to hasten healing and promote fat creation.  When he awoke this morning, his right arm graft was bleeding profusely.  He called his plastic surgeon Dr. Rosenbaum who sent him directly to the emergency room however the patient did decide to give himself a one-time home session of hemodialysis at which point he removed a liter and half.  However, his bleeding worsened and eventually he did call 911 and presented to the hospital where he was immediately taken to the operating room by Dr. Rosenbaum.  Prior to this, the patient states that he is actually feeling quite well, he had no recent fevers, chills, nausea, vomiting, or URIs.  He denies any diarrhea.  Dr. Rosenbaum has requested hospitalist services assistance in managing this  medically complex patient.      Review of Systems  Review of Systems   Constitutional: Negative for chills and fever.   HENT: Negative for congestion and sore throat.    Eyes: Negative for photophobia.   Respiratory: Negative for cough, shortness of breath and wheezing.    Cardiovascular: Negative for chest pain and palpitations.   Gastrointestinal: Negative for abdominal pain, diarrhea, nausea and vomiting.   Genitourinary: Negative for dysuria.   Musculoskeletal: Negative for myalgias.   Skin: Negative.         Postoperative pain on his right arm and right abdomen.   Neurological: Negative for dizziness, tingling, focal weakness and headaches.   Psychiatric/Behavioral: Negative for depression and suicidal ideas.       Past Medical History  Past Medical History:   Diagnosis Date   • Graft failure due to thrombosis 3/10/2018   • Arrhythmia     irregular EKG   • Chronic kidney disease, unspecified    • Contracture of palmar fascia    • Dialysis      hemodialysis at home 3 days a week   • Hemorrhagic disorder (HCC)     on coumadin   • Hypertension    • Intra-abdominal varices    • Pain     Chronic pain   • Renal failure     hemodialysis   • Seizure (HCC)     last seizure 04/1/2013   • Sleep apnea     BIPAP   • Snoring     sleep study done   • Superior vena cava obstruction with collaterals     from calcium deposits per pt's mother; [facility]   • Toxic uninodular goiter without mention of thyrotoxic crisis or storm        Surgical History  Past Surgical History:   Procedure Laterality Date   • SKIN FLAP DELAYED Left 9/10/2018    Procedure: SKIN FLAP DELAYED- FOR DIVISION AND INSET FLAP WITH SKIN GRAFT ON ARM;  Surgeon: Samson Rosenbaum Jr., M.D.;  Location: SURGERY SAME DAY [facility];  Service: Plastics   • MYOCUTANEOUS FLAP Right 8/27/2018    Procedure: MYOCUTANEOUS FLAP - RIGHT ARM TO TRUNK;  Surgeon: Samson Rosenbaum Jr., M.D.;  Location: SURGERY [facility];  Service:  Plastics   • ABDOMINAL EXPLORATION  6/25/2018    Procedure: ABDOMINAL EXPLORATION- CONTROL ABDOMINAL BLEEDING;  Surgeon: Samson Rosenbaum Jr., M.D.;  Location: SURGERY [facility];  Service: Plastics   • THROMBECTOMY Left 4/28/2018    Procedure: THROMBECTOMY- Thigh W/ Graft;  Surgeon: Jairo Hopkins M.D.;  Location: SURGERY [facility];  Service: General   • THROMBECTOMY Left 4/27/2018    Procedure: THROMBECTOMY - THIGH GRAFT AND REVISION;  Surgeon: Jairo Hopkins M.D.;  Location: SURGERY [facility];  Service: General   • THROMBECTOMY Left 3/9/2018    Procedure: THROMBECTOMY- THIGH DIALYSIS GRAFT AND REVISION  ;  Surgeon: Jairo Hopkins M.D.;  Location: SURGERY [facility];  Service: General   • CATH PLACEMENT Right 3/9/2018    Procedure: CATH PLACEMENT - REPLACE DIALYSIS CATH RIGHT THIGH;  Surgeon: Jairo Hopkins M.D.;  Location: [facility];  Service: General   • SPLIT THICKNESS SKIN GRAFT  2/26/2018    Procedure: SPLIT THICKNESS SKIN GRAFT- TO ABDOMEN;  Surgeon: Samson Rosenbaum Jr., M.D.;  Location: SURGERY [facility];  Service: Plastics   • WOUND CLOSURE GENERAL  2/19/2018    Procedure: WOUND CLOSURE GENERAL-ABDOMINAL WALL HEMORRHAGE;  Surgeon: Jason Robertson M.D.;  Location: [facility];  Service: Plastics   • WOUND EXPLORATION GENERAL  2/1/2018    Procedure: WOUND EXPLORATION GENERAL, REPAIR BLEEDING;  Surgeon: Samson Rosenbaum Jr., M.D.;  Location: [facility];  Service: Plastics   • CATH PLACEMENT Right 11/14/2017    Procedure: CATH PLACEMENT - PERMA;  Surgeon: Jairo Hopkins M.D.;  Location: SURGERY [facility];  Service: General   • AV FISTULA REVISION Left 11/14/2017    Procedure: AV GRAFT REVISION;  Surgeon: Jairo Hopkins M.D.;  Location: [facility];  Service: General   • IRRIGATION & DEBRIDEMENT GENERAL  7/31/2017    Procedure: IRRIGATION & DEBRIDEMENT GENERAL-ABDOMEN;  Surgeon: Samson Rosenbaum Jr.,  M.D.;  Location: [facility];  Service:    • ABDOMINOPLASTY  6/5/2017    Procedure: ABDOMINOPLASTY - FOR PANNICULECTOMY;  Surgeon: Samson Rosenbaum Jr., M.D.;  Location: [facility];  Service:    • SPINAL CORD STIMULATOR N/A 5/4/2017    Procedure: SPINAL CORD STIMULATOR - EXPLANT;  Surgeon: Bin Pro M.D.;  Location: [facility];  Service:    • THROMBECTOMY Left 1/6/2017    Procedure: THROMBECTOMY-OPEN THROMBECTOMY WITH LEFT THIGH GRAFT;  Surgeon: Jairo Hopkins M.D.;  Location: [facility];  Service:    • CATH PLACEMENT Right 1/6/2017    Procedure: CATH PLACEMENT;  Surgeon: Jairo Hopkins M.D.;  Location: [facility];  Service:    • THROMBECTOMY Left 1/5/2017    Procedure: THROMBECTOMY-THIGH AV LOOP GRAFT AND ANGIOJET;  Surgeon: Jairo Hopkins M.D.;  Location: [facility];  Service:    • FLAP CLOSURE Left 11/17/2016    Procedure: Fasciocutaneous Flap Closure Left Upper Leg;  Surgeon: Samson Rosenbaum Jr., M.D.;  Location: [facility];  Service:    • IRRIGATION & DEBRIDEMENT GENERAL Left 10/28/2016    Procedure: IRRIGATION & DEBRIDEMENT GENERAL THIGH WITH IRRIGATING WOUND VAC PLACEMENT;  Surgeon: Jairo Hopkins M.D.;  Location: [facility];  Service:    • THROMBECTOMY Left 10/20/2016    Procedure: THROMBECTOMY THIGH;  Surgeon: Jairo Hopkins M.D.;  Location: [facility];  Service:    • INCISION AND DRAINAGE GENERAL  10/20/2016    Procedure: INCISION AND DRAINAGE GENERAL HEMATOMA;  Surgeon: Jairo Hopkins M.D.;  Location: [facility];  Service:    • THROMBECTOMY Left 9/18/2016    Procedure: THROMBECTOMY - and fistula revision;  Surgeon: Jairo Hopkins M.D.;  Location: [facility];  Service:    • AV FISTULOGRAM  9/18/2016    Procedure: AV FISTULOGRAM;  Surgeon: Jairo Hopkins M.D.;  Location: SURGERY [facility];  Service:    • CATH PLACEMENT Right  9/17/2016    Procedure: CATH PLACEMENT - tunneled dialysis cath placement right femoral ;  Surgeon: Quentin Alicia M.D.;  Location: SURGERY [facility];  Service:    • MASS EXCISION GENERAL Right 8/5/2016    Procedure: MASS EXCISION GENERAL FOR CALCIPHYLAXIS SKIN AND SUBCUTANEOUS TISSUE FOREARM;  Surgeon: Jairo Hopkins M.D.;  Location: SURGERY [facility];  Service:    • LESION EXCISION GENERAL Right 7/11/2016    Procedure: LESION EXCISION GENERAL FOR ARM SKIN;  Surgeon: Jairo Hopkins M.D.;  Location: SURGERY [facility];  Service:    • RECOVERY  7/8/2016    Procedure: IR1-VASCULAR CASE-VIANEY-LEFT THIGH AV GRAFT THROMBOLYSIS WITH TISSUE PLASMINOGEN ACTIVATOR AND ANGIOJET ARTHERECTOMY   ;  Surgeon: Melinda Surgery;  [place] [facility]  Service:    • SPINAL CORD STIMULATOR N/A 3/25/2016    Procedure: SPINAL CORD STIMULATOR;  Surgeon: Bin Pro;  Location: SURGERY [facility];  Service:    • PB PERCUT IMPLNT NEUROELECT,EPIDURAL  2/19/2016    Procedure: IMPLANT NEUROSTIM EPI ARRAY;  Surgeon: Bin Pro;  Location: SURGERY [facility];  Service: Pain Management   • PB PERCUT IMPLNT NEUROELECT,EPIDURAL  2/19/2016    Procedure: IMPLANT NEUROSTIM EPI ARRAY;  Surgeon: Bin Pro;  Location: SURGERY [facility];  Service: Pain Management   • RECOVERY  8/7/2015    Procedure:  VASCULAR CASE VIANEY-RIGHT ILIAC VENOGRAM WITH ANGIOPLASTY, REMOVAL RIGHT FEMORAL TUNNELED DIALYSIS CATHETER  **VRE**;  Surgeon: Recoveryonly Surgery;  [place] [facility];  Service:    • AV FISTULA REVISION Left 6/17/2015    Procedure: AV FISTULA REVISION;  Surgeon: Jairo Hopkins M.D.;  Location: SURGERY [facility];  Service:    • IRRIGATION & DEBRIDEMENT GENERAL Left 6/17/2015    Procedure: IRRIGATION & DEBRIDEMENT GENERAL ARM W/EXPLORATION WOUND & CONTROL BLEEDING;  Surgeon: Jairo Hopkins M.D.;  Location: SURGERY [facility];  Service:    • AV  FISTULA THROMBOLYSIS Left 6/9/2015    Procedure: THROMBECTOMY AV GRAFT THIGH;  Surgeon: Jairo Hopkins M.D.;  Location: SURGERY [facility];  Service:    • AV FISTULA THROMBOLYSIS Left 6/3/2015    Procedure: AV FISTULA THROMBOLYSIS THIGH REPLACEMENT;  Surgeon: Jairo Hopkins M.D.;  Location: SURGERY [facility];  Service:    • IRRIGATION & DEBRIDEMENT GENERAL Left 6/3/2015    Procedure: IRRIGATION & DEBRIDEMENT GENERAL;  Surgeon: Jairo Hopkins M.D.;  Location: SURGERY [facility];  Service:    • AV FISTULA CREATION  4/20/2015    Performed by Jairo Hopkins M.D. at SURGERY [facility]   • PB INJECT NERV BLCK,STELLATE GANGLION  3/24/2015    Performed by Bin Pro at [facility]   • AV FISTULA REVISION  3/20/2015    Performed by Jairo Hopkins M.D. at SURGERY [facility]   • AV FISTULA REVISION  2/22/2015    Performed by Lurdes Moffett M.D. at SURGERY [facility]   • AV FISTULA REVISION  2/8/2015    Performed by Jairo Hopkins M.D. at SURGERY [facility]   • IRRIGATION & DEBRIDEMENT GENERAL  12/15/2014    Performed by Jairo Hopkins M.D. at SURGERY [facility]   • AV FISTULA REVISION  9/30/2014    Performed by Jairo Hopkins M.D. at SURGERY [facility]   • RECOVERY  6/13/2014    Performed by [facility] at SURGERY SAME DAY [facility]   • INCISION AND DRAINAGE GENERAL  9/13/2013    Performed by Jairo Hopkins M.D. at SURGERY [facility]   • DEBRIDEMENT  9/13/2013    Performed by Jairo Hopkins M.D. at SURGERY [facility]   • VEIN LIGATION  9/13/2013    Performed by Jairo Hopkins M.D. at SURGERY [facility]   • RECOVERY  5/18/2012    Performed by SURGERY, IR-RECOVERY at [facility]   • BONE SPUR EXCISION  12/8/2011    Performed by BELKIS BREAUX at SURGERY SAME DAY [facility]   • AV FISTULA REVISION  11/3/2011    Performed by JAIRO HOPKINS at SURGERY [facility]   • AV FISTULOGRAM   6/5/2011    Performed by MARI WINTERS at SURGERY [facility]   • CATH PLACEMENT  6/5/2011    Performed by MARI WINTERS at SURGERY [facility]   • CATH PLACEMENT  2/1/2011    Performed by MARI WINTERS at SURGERY [facility]   • ANGIOPLASTY BALLOON  2/1/2011    Performed by MARI WINTERS at SURGERY [facility]   • ENDARTERECTOMY  11/16/2010    Performed by MARI WINTERS at SURGERY [facility]   • AV FISTULOGRAM  11/16/2010    Performed by MARI WINTERS at SURGERY [facility]   • ARTERIOGRAM  3/5/2009    Performed by MARI WINTERS at SURGERY [facility]   • ANGIOPLASTY BALLOON  3/5/2009    Performed by MARI WINTERS at SURGERY [facility]   • AV FISTULOGRAM  3/5/2009    Performed by MARI WINTERS at SURGERY [facility]   • AV FISTULA CREATION  11/6/08    Performed by MARI WINTERS at SURGERY [facility]   • MASS EXCISION ORTHO  10/9/08    Performed by BELKIS BREAUX at SURGERY SAME DAY [facility]   • PARATHYROIDECTOMY  2006   • INGUINAL HERNIA REPAIR Bilateral 2001, 2002   • US-KIDNEY TRANSPLANT  1996, 2001    x2       Family History  Family History   Problem Relation Age of Onset   • Arthritis Father         RA   • Lung Disease Father    • Heart Disease Father         MI   • Hypertension Brother        Social History  Social History   Substance Use Topics   • Smoking status: Never Smoker   • Smokeless tobacco: Never Used   • Alcohol use No       Allergies  Allergies   Allergen Reactions   • Baclofen Unspecified     Total loss of memory, sedation.   RXN=6/2015   • Contrast Media With Iodine [Iodine] Rash     RXN=1/5/2017   • Keflex Rash     RXN=possibly >10 years   • Pcn [Penicillins] Rash     RXN=possibly >10 years ago  Tolerated Zosyn on 2/20/18   • Tape Rash     Paper tape and tegaderm ok  RXN=ongoing       Medications  Current Facility-Administered Medications on File Prior to Encounter   Medication Dose Route Frequency  Provider Last Rate Last Dose   • meperidine (DEMEROL) injection 50 mg  50 mg Intravenous Intra-Op Once PRN Sasmon Rosenbaum Jr., M.D.         Current Outpatient Prescriptions on File Prior to Encounter   Medication Sig Dispense Refill   • warfarin (COUMADIN) 5 MG Tab Take 2.5-5 mg by mouth every day. Pt takes 2.5 mg on    5 mg all other days     • gabapentin (NEURONTIN) 300 MG Cap Take 1,200 mg by mouth every bedtime. Pt also has an RX for 600MG, pt takes total of 1,800MG HS     • midodrine (PROAMATINE) 10 MG tablet Take 1 Tab by mouth 3 times a day, with meals. Can hold dose if blood pressure greater than 120 systolic. 60 Tab 3   • cinacalcet (SENSIPAR) 30 MG Tab Take 30-60 mg by mouth every day. 30 mg ,  ,  , and . Pt takes 60MG on Tue, Thur, and Sat     • levetiracetam (KEPPRA) 250 MG tablet Take 1 Tab by mouth 2 times a day. 60 Tab 3   • gabapentin (NEURONTIN) 600 MG tablet Take 600 mg by mouth every bedtime. Pt also has an RX for 300MG, pt takes total of 1,800MG HS     • sevelamer carbonate (RENVELA) 800 MG Tab tablet Take 3,200 mg by mouth 3 times a day, with meals. With meals     • calcitRIOL (ROCALTROL) 0.25 MCG CAPS Take 0.75 mcg by mouth every bedtime.         Physical Exam  Hemodynamics  Temp (24hrs), Av.8 °C (98.2 °F), Min:36.3 °C (97.3 °F), Max:37.2 °C (98.9 °F)   Temperature: 37.2 °C (98.9 °F)  Pulse  Av.4  Min: 100  Max: 129 Heart Rate (Monitored): (!) 105  Blood Pressure: (!) 94/69, NIBP: 119/41      Respiratory      Respiration: 13, Pulse Oximetry: 100 %             Physical Exam   Constitutional: He is oriented to person, place, and time. No distress.   Chronically ill appearing   HENT:   Head: Normocephalic and atraumatic.   Right Ear: External ear normal.   Left Ear: External ear normal.   Eyes: EOM are normal. Right eye exhibits no discharge. Left eye exhibits no discharge.   Neck: Neck supple. No JVD present.   Cardiovascular: Normal rate,  regular rhythm and normal heart sounds.    Distant secondary to habitus   Pulmonary/Chest: Effort normal. No respiratory distress. He exhibits no tenderness.   Diminished breath sounds   Abdominal: Soft. Bowel sounds are normal. He exhibits no distension. There is no tenderness.   Varicosities across the abdomen and chest   Musculoskeletal: He exhibits no edema.   Neurological: He is alert and oriented to person, place, and time. No cranial nerve deficit.   Skin: Skin is dry. He is not diaphoretic. No erythema.   Jaundice   Psychiatric: He has a normal mood and affect. His behavior is normal.   Nursing note and vitals reviewed.    Capillary refill less than 3 seconds, distal pulses intact    Laboratory:  Recent Labs      09/21/18   1518   WBC  5.4   RBC  2.86*   HEMOGLOBIN  8.1*   HEMATOCRIT  25.6*   MCV  89.5   MCH  28.3   MCHC  31.6*   RDW  55.8*   PLATELETCT  247   MPV  9.8     Recent Labs      09/21/18   1518   SODIUM  131*   POTASSIUM  3.8   CHLORIDE  89*   CO2  28   GLUCOSE  112*   BUN  32*   CREATININE  5.36*   CALCIUM  9.2     Recent Labs      09/21/18   1518   ALTSGPT  6   ASTSGOT  13   ALKPHOSPHAT  169*   TBILIRUBIN  0.5   GLUCOSE  112*     Recent Labs      09/21/18   1518   APTT  85.2*   INR  4.13*             Lab Results   Component Value Date    TROPONINI <0.01 02/24/2018       Imaging  Echocardiogram Comp W/o Cont    Result Date: 9/12/2018  Transthoracic Echo Report Echocardiography Laboratory CONCLUSIONS Normal left ventricular systolic function. Left ventricular ejection fraction is visually estimated to be 70%. Normal diastolic function. Normal inferior vena cava size without inspiratory collapse. Estimated right ventricular systolic pressure  is 34 mmHg. ODALYS APPIAH Exam Date:         09/06/2018                    07:12 Exam Location:     Out Patient Priority:          Routine Ordering Physician:        TORI KUMAR                             ([id number]) Referring Physician:       JOSÉ Hodges  Sonographer:               Alley Renteria RVT [facility] Age:    38     Gender:    M MRN:    [id number] :    1980 BSA:    1.89   Ht (in):    64     Wt (lb):    184 Exam Type:     Complete Indications:     Hypertension ICD Codes:       401.9 CPT Codes:       30209 BP:   88     /   48     HR: Technical Quality:       Technically difficult study -                          adequate information is obtained MEASUREMENTS  (Male / Female) Normal Values 2D ECHO LV Diastolic Diameter PLAX        3.7 cm                4.2 - 5.9 / 3.9 - 5.3 cm LV Systolic Diameter PLAX         2.4 cm                2.1 - 4.0 cm IVS Diastolic Thickness           1 cm                  LVPW Diastolic Thickness          0.96 cm               LVOT Diameter                     2 cm                  Estimated LV Ejection Fraction    70 %                  LV Ejection Fraction MOD BP       66.3 %                >= 55  % LV Ejection Fraction MOD 4C       70 %                  LV Ejection Fraction MOD 2C       62.7 %                DOPPLER AV Peak Velocity                  1.4 m/s               AV Peak Gradient                  7.6 mmHg              AV Mean Gradient                  4 mmHg                LVOT Peak Velocity                1 m/s                 AV Area Cont Eq vti               3 cm²                 Mitral E Point Velocity           0.75 m/s              Mitral E to A Ratio               0.89                  Mitral A Duration                 96.9 ms               MV Pressure Half Time             39.9 ms               MV Area PHT                       5.5 cm²               MV Deceleration Time              138 ms                TV Peak E Velocity                0.57 m/s              TR Peak Velocity                  276 cm/s              PV Peak Velocity                  1.1 m/s               PV Peak Gradient                  4.6 mmHg              MV E' Velocity                    10.3 cm/s             *  Indicates values subject to auto-interpretation LV EF:  70    % FINDINGS Left Ventricle The left ventricle was normal in size and thickness. Normal left ventricular systolic function. Normal regional wall motion. Left ventricular ejection fraction is visually estimated to be 70%. Normal diastolic function. Right Ventricle The right ventricle was normal in size and function. Right Atrium The right atrium is normal in size.  Normal inferior vena cava size without inspiratory collapse. Left Atrium The left atrium is normal in size.  Left atrial volume index is 21 mL/sq m. Mitral Valve Structurally normal mitral valve without significant stenosis or regurgitation. Aortic Valve Structurally normal aortic valve. No aortic stenosis. No aortic insufficiency. Tricuspid Valve Structurally normal tricuspid valve without significant stenosis. Mild tricuspid regurgitation. Estimated right ventricular systolic pressure  is 34 mmHg. Pulmonic Valve The pulmonic valve is not well visualized. No stenosis or regurgitation seen. Pericardium Normal pericardium without effusion.fat pad present. Aorta The aortic root is normal. Kathia Mccoy MD (Electronically Signed) Final Date:     12 September 2018                 17:30        Assessment/Plan:  * Status post repair of complex wound- (present on admission)   Assessment & Plan    Patient is now status post repair of complex bleeding wounds of the abdomen and right arm.  I defer to Dr. Rosenbaum of plastic surgery for postoperative management.  I am continuing to hold the patient's chronic anticoagulation given his active bleeding, once we have clearance from surgery, we will resume his medications.  Continue with symptomatic management for postoperative pain.        ESRD (end stage renal disease) (HCC)- (present on admission)   Assessment & Plan    I have discussed this patient with Dr. Najjar of nephrology, I appreciate his assistance with resuming hemodialysis on an inpatient basis.  Per the  patient, he does do home hemodialysis and removed 1-1/2 L prior to his presentation to the hospital.  Continue home Renvela.        Hemorrhagic disorder due to circulating anticoagulants (HCC)- (present on admission)   Assessment & Plan    As noted above, patient has active bleeding, holding home anticoagulation, once cleared by surgery, we will resume his warfarin.        Seizure disorder (CMS-HCC)- (present on admission)   Assessment & Plan    This is chronic, continue home Keppra and gabapentin, I will place him on seizure precautions.          Thank you for the opportunity to participate in the care of your patient, the hospitalist services will continue to follow

## 2018-09-22 NOTE — OR SURGEON
Immediate Post OP Note    PreOp Diagnosis: complex bleeding wounds abdomen /Right arm    PostOp Diagnosis: same    Procedure(s):  SPLIT THICKNESS SKIN GRAFT - ARM and ABDOMEN - Wound Class: Clean Contaminated    Surgeon(s):  Samson Rosenbaum Jr., M.D.    Anesthesiologist/Type of Anesthesia:  Anesthesiologist: Chandrakant Rios M.D./General    Surgical Staff:  Circulator: Thu Matias R.N.  Scrub Person: Demetria Malone    Specimens removed if any:  * No specimens in log *    Estimated Blood Loss: 20cc intra-operatively    Findings: as above    Complications: 0        9/21/2018 6:22 PM Samson Rosenbaum Jr., M.D.

## 2018-09-22 NOTE — RESPIRATORY CARE
COPD EDUCATION by COPD CLINICAL EDUCATOR  9/22/2018 at 6:56 AM by Julissa Tovar     Patient reviewed by COPD education team. Patient does not qualify for COPD program.

## 2018-09-22 NOTE — PROGRESS NOTES
Darius from Lab called with critical result of HGB 6.4 HCT 20.5 at 0723. Critical lab result read back to Darius.   Dr. Lara notified of critical lab result at 0733.  Critical lab result read back by Dr. Lara.

## 2018-09-22 NOTE — OP REPORT
DATE OF SERVICE:  09/21/2018    PREOPERATIVE DIAGNOSIS:  Complex bleeding wounds from the abdomen and right   arm.    POSTOPERATIVE DIAGNOSIS:  Complex bleeding wounds from the abdomen and right   arm.    PROCEDURE:  Split skin grafting to right arm as well as abdomen.  The total   amount of skin grafted area is 80 cm2.    SURGEON:  Samson Rosenbaum MD    ANESTHESIOLOGIST:  Chandrakant Rios MD    INDICATION FOR PROCEDURE:  The patient is a 38-year-old white male who was   scheduled for a division of the flap on his abdomen from his right arm to his   abdomen today with skin grafting of these areas.  He had been scheduled for   this evening, but began to have excessive bleeding and the case was moved up   to try to get him in because of this bleeding.  He was well informed of the   risks, benefits, and alternatives to the procedure and participated in the   decision to proceed with surgery.    DESCRIPTION OF PROCEDURE:  The patient was marked preoperatively in the   holding area.  He had had extensive bleeding.  He had an INR of 5.  He was   getting some transfusion as well as some FFP as we were trying to get him in   the OR as quickly as possible.  The patient was taken to the operating room,   prepped and draped after induction of general endotracheal anesthesia.  I   began by removing the sutures from the flap that was holding his right arm to   his abdomen.  This revealed bleeding mostly from the right arm.  There was   direct pressure applied to this area and electrocautery, which controlled this   immediately and the right arm wound was then debrided sharply with a 10 blade   and devitalized tissue was removed.  The same thing was performed on the   abdomen.  The abdomen had had history of extensive bleeding from very large   abdominal varices.  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  Nevertheless, when I   had the wounds cleaned and I felt they were ready for skin grafting, a split   skin graft was taken from the patient's right thigh with a Ray dermatome at   12/1000 of an inch thickness.  This was meshed in 1.5:1 ratio.  It was sewn   over the wound on the arm with 4-0 chromic and the skin was likewise placed   over the abdomen and sewn in with 4-0 chromic.  Bolster on the patient was   placed with Xeroform and tie over bolster of 2-0 Prolene, which secured the   graft and the bolster very well on the abdomen.  The graft on the arm was   bolstered with Xeroform bulky gauze wrap and an Ace wrap.  The donor site was   covered with an epinephrine-soaked Telfa and Tegaderm.  Patient tolerated the   procedure well and was taken to the recovery room in stable condition.  He had   lost a fair amount of blood obviously preoperatively and we will want to   admit him.  He is going to come on to the medicine service because he is   likely to need dialysis certainly tomorrow if not later this evening.       ____________________________________     MD YOVANI LYNNE / RIAZ    DD:  09/21/2018 18:29:57  DT:  09/21/2018 19:38:22    D#:  [id number]  Job#:  [id number]

## 2018-09-23 LAB
ANION GAP SERPL CALC-SCNC: 14 MMOL/L (ref 0–11.9)
BASOPHILS # BLD AUTO: 0.9 % (ref 0–1.8)
BASOPHILS # BLD: 0.05 K/UL (ref 0–0.12)
BUN SERPL-MCNC: 55 MG/DL (ref 8–22)
CALCIUM SERPL-MCNC: 7.8 MG/DL (ref 8.5–10.5)
CHLORIDE SERPL-SCNC: 92 MMOL/L (ref 96–112)
CO2 SERPL-SCNC: 29 MMOL/L (ref 20–33)
CREAT SERPL-MCNC: 8.59 MG/DL (ref 0.5–1.4)
EOSINOPHIL # BLD AUTO: 0.18 K/UL (ref 0–0.51)
EOSINOPHIL NFR BLD: 3.3 % (ref 0–6.9)
ERYTHROCYTE [DISTWIDTH] IN BLOOD BY AUTOMATED COUNT: 55.5 FL (ref 35.9–50)
EST. AVERAGE GLUCOSE BLD GHB EST-MCNC: 117 MG/DL
GLUCOSE SERPL-MCNC: 114 MG/DL (ref 65–99)
HBA1C MFR BLD: 5.7 % (ref 0–5.6)
HCT VFR BLD AUTO: 22.1 % (ref 42–52)
HGB BLD-MCNC: 7 G/DL (ref 14–18)
HGB BLD-MCNC: 7.1 G/DL (ref 14–18)
HGB BLD-MCNC: 8.4 G/DL (ref 14–18)
IMM GRANULOCYTES # BLD AUTO: 0.03 K/UL (ref 0–0.11)
IMM GRANULOCYTES NFR BLD AUTO: 0.6 % (ref 0–0.9)
INR PPP: 2.77 (ref 0.87–1.13)
LYMPHOCYTES # BLD AUTO: 1.34 K/UL (ref 1–4.8)
LYMPHOCYTES NFR BLD: 24.7 % (ref 22–41)
MCH RBC QN AUTO: 28.5 PG (ref 27–33)
MCHC RBC AUTO-ENTMCNC: 31.7 G/DL (ref 33.7–35.3)
MCV RBC AUTO: 89.8 FL (ref 81.4–97.8)
MONOCYTES # BLD AUTO: 0.62 K/UL (ref 0–0.85)
MONOCYTES NFR BLD AUTO: 11.4 % (ref 0–13.4)
NEUTROPHILS # BLD AUTO: 3.2 K/UL (ref 1.82–7.42)
NEUTROPHILS NFR BLD: 59.1 % (ref 44–72)
NRBC # BLD AUTO: 0 K/UL
NRBC BLD-RTO: 0 /100 WBC
PLATELET # BLD AUTO: 195 K/UL (ref 164–446)
PMV BLD AUTO: 9.8 FL (ref 9–12.9)
POTASSIUM SERPL-SCNC: 4.2 MMOL/L (ref 3.6–5.5)
PROTHROMBIN TIME: 29.3 SEC (ref 12–14.6)
RBC # BLD AUTO: 2.46 M/UL (ref 4.7–6.1)
SODIUM SERPL-SCNC: 135 MMOL/L (ref 135–145)
WBC # BLD AUTO: 5.4 K/UL (ref 4.8–10.8)

## 2018-09-23 PROCEDURE — 85610 PROTHROMBIN TIME: CPT

## 2018-09-23 PROCEDURE — P9016 RBC LEUKOCYTES REDUCED: HCPCS

## 2018-09-23 PROCEDURE — 700111 HCHG RX REV CODE 636 W/ 250 OVERRIDE (IP): Performed by: FAMILY MEDICINE

## 2018-09-23 PROCEDURE — 90935 HEMODIALYSIS ONE EVALUATION: CPT

## 2018-09-23 PROCEDURE — 700105 HCHG RX REV CODE 258

## 2018-09-23 PROCEDURE — 36430 TRANSFUSION BLD/BLD COMPNT: CPT

## 2018-09-23 PROCEDURE — 86923 COMPATIBILITY TEST ELECTRIC: CPT

## 2018-09-23 PROCEDURE — 80048 BASIC METABOLIC PNL TOTAL CA: CPT

## 2018-09-23 PROCEDURE — 83036 HEMOGLOBIN GLYCOSYLATED A1C: CPT

## 2018-09-23 PROCEDURE — 85025 COMPLETE CBC W/AUTO DIFF WBC: CPT

## 2018-09-23 PROCEDURE — A9270 NON-COVERED ITEM OR SERVICE: HCPCS | Performed by: HOSPITALIST

## 2018-09-23 PROCEDURE — A9270 NON-COVERED ITEM OR SERVICE: HCPCS | Performed by: FAMILY MEDICINE

## 2018-09-23 PROCEDURE — 36415 COLL VENOUS BLD VENIPUNCTURE: CPT

## 2018-09-23 PROCEDURE — 700101 HCHG RX REV CODE 250

## 2018-09-23 PROCEDURE — 700102 HCHG RX REV CODE 250 W/ 637 OVERRIDE(OP): Performed by: HOSPITALIST

## 2018-09-23 PROCEDURE — 85018 HEMOGLOBIN: CPT

## 2018-09-23 PROCEDURE — 770001 HCHG ROOM/CARE - MED/SURG/GYN PRIV*

## 2018-09-23 PROCEDURE — 99233 SBSQ HOSP IP/OBS HIGH 50: CPT | Performed by: FAMILY MEDICINE

## 2018-09-23 PROCEDURE — 5A1D70Z PERFORMANCE OF URINARY FILTRATION, INTERMITTENT, LESS THAN 6 HOURS PER DAY: ICD-10-PCS | Performed by: INTERNAL MEDICINE

## 2018-09-23 PROCEDURE — 90935 HEMODIALYSIS ONE EVALUATION: CPT | Performed by: INTERNAL MEDICINE

## 2018-09-23 PROCEDURE — 700102 HCHG RX REV CODE 250 W/ 637 OVERRIDE(OP): Performed by: FAMILY MEDICINE

## 2018-09-23 RX ORDER — SODIUM CHLORIDE 9 MG/ML
INJECTION, SOLUTION INTRAVENOUS
Status: COMPLETED
Start: 2018-09-23 | End: 2018-09-23

## 2018-09-23 RX ORDER — LIDOCAINE HYDROCHLORIDE 10 MG/ML
INJECTION, SOLUTION INFILTRATION; PERINEURAL
Status: COMPLETED
Start: 2018-09-23 | End: 2018-09-23

## 2018-09-23 RX ADMIN — OXYCODONE HYDROCHLORIDE 15 MG: 5 TABLET ORAL at 17:59

## 2018-09-23 RX ADMIN — HYDROMORPHONE HYDROCHLORIDE 2 MG: 2 INJECTION INTRAMUSCULAR; INTRAVENOUS; SUBCUTANEOUS at 20:12

## 2018-09-23 RX ADMIN — OXYCODONE HYDROCHLORIDE 15 MG: 5 TABLET ORAL at 07:41

## 2018-09-23 RX ADMIN — LEVETIRACETAM 250 MG: 250 TABLET ORAL at 07:42

## 2018-09-23 RX ADMIN — SEVELAMER CARBONATE 3200 MG: 800 TABLET, FILM COATED ORAL at 07:43

## 2018-09-23 RX ADMIN — SEVELAMER CARBONATE 3200 MG: 800 TABLET, FILM COATED ORAL at 13:14

## 2018-09-23 RX ADMIN — MIDODRINE HYDROCHLORIDE 10 MG: 5 TABLET ORAL at 10:57

## 2018-09-23 RX ADMIN — OXYCODONE HYDROCHLORIDE 15 MG: 5 TABLET ORAL at 10:57

## 2018-09-23 RX ADMIN — SEVELAMER CARBONATE 3200 MG: 800 TABLET, FILM COATED ORAL at 17:16

## 2018-09-23 RX ADMIN — LEVETIRACETAM 250 MG: 250 TABLET ORAL at 20:10

## 2018-09-23 RX ADMIN — LIDOCAINE HYDROCHLORIDE: 10 INJECTION, SOLUTION INFILTRATION; PERINEURAL at 09:40

## 2018-09-23 RX ADMIN — MIDODRINE HYDROCHLORIDE 10 MG: 5 TABLET ORAL at 17:16

## 2018-09-23 RX ADMIN — MIDODRINE HYDROCHLORIDE 10 MG: 5 TABLET ORAL at 07:43

## 2018-09-23 RX ADMIN — HYDROMORPHONE HYDROCHLORIDE 2 MG: 2 INJECTION INTRAMUSCULAR; INTRAVENOUS; SUBCUTANEOUS at 13:14

## 2018-09-23 RX ADMIN — CALCITRIOL 0.75 MCG: 0.5 CAPSULE, LIQUID FILLED ORAL at 20:12

## 2018-09-23 RX ADMIN — HYDROMORPHONE HYDROCHLORIDE 2 MG: 2 INJECTION INTRAMUSCULAR; INTRAVENOUS; SUBCUTANEOUS at 08:38

## 2018-09-23 RX ADMIN — GABAPENTIN 1800 MG: 300 CAPSULE ORAL at 20:09

## 2018-09-23 RX ADMIN — HYDROMORPHONE HYDROCHLORIDE 2 MG: 2 INJECTION INTRAMUSCULAR; INTRAVENOUS; SUBCUTANEOUS at 02:02

## 2018-09-23 RX ADMIN — SODIUM CHLORIDE 500 ML: 9 INJECTION, SOLUTION INTRAVENOUS at 15:27

## 2018-09-23 RX ADMIN — HYDROMORPHONE HYDROCHLORIDE 2 MG: 2 INJECTION INTRAMUSCULAR; INTRAVENOUS; SUBCUTANEOUS at 15:42

## 2018-09-23 RX ADMIN — CINACALCET HYDROCHLORIDE 30 MG: 30 TABLET, COATED ORAL at 17:16

## 2018-09-23 RX ADMIN — OXYCODONE HYDROCHLORIDE 15 MG: 5 TABLET ORAL at 14:59

## 2018-09-23 ASSESSMENT — ENCOUNTER SYMPTOMS
HEADACHES: 0
HEARTBURN: 0
NAUSEA: 0
SHORTNESS OF BREATH: 0
NERVOUS/ANXIOUS: 0
VOMITING: 0
WHEEZING: 0
CHILLS: 0
BLURRED VISION: 0
WEAKNESS: 0
SORE THROAT: 0
BACK PAIN: 0
NECK PAIN: 0
DIZZINESS: 0
COUGH: 0
FEVER: 0
DIARRHEA: 0
ABDOMINAL PAIN: 0

## 2018-09-23 ASSESSMENT — PAIN SCALES - GENERAL
PAINLEVEL_OUTOF10: 7
PAINLEVEL_OUTOF10: 7
PAINLEVEL_OUTOF10: 8
PAINLEVEL_OUTOF10: 8
PAINLEVEL_OUTOF10: 9
PAINLEVEL_OUTOF10: 7
PAINLEVEL_OUTOF10: 9
PAINLEVEL_OUTOF10: 8
PAINLEVEL_OUTOF10: 6
PAINLEVEL_OUTOF10: 8
PAINLEVEL_OUTOF10: 8

## 2018-09-23 NOTE — CARE PLAN
Problem: Safety  Goal: Will remain free from falls  Outcome: PROGRESSING AS EXPECTED  Bed in lowest and locked position. Call light within reach.    Problem: Pain Management  Goal: Pain level will decrease to patient's comfort goal  Patient medicated for pain as ordered.

## 2018-09-23 NOTE — PROGRESS NOTES
Pt with ESRD, presented with anemia.  Hyperkalemic this am.  Seen and examined while getting HD.  Doing better

## 2018-09-23 NOTE — PROGRESS NOTES
Pt back to unit from dialysis.  Dressing in place to left thigh AV fistula, CDI. No drainage/bleeding noted.   Pt resting comfortably in bed.   Medicated with PRN pain medication, pt rating pain at 7/10.

## 2018-09-23 NOTE — PROGRESS NOTES
Pt AAOx4.  Rating pain at 9/10, medicated per MAR.  Skin graft sites to right forearm with dressing and right abdomen with dressing.   Donor site right thigh with dressing and sanguinous drainage.   Left thigh AV fistula with bruit and thrill.   Renal diet in place, no c/o n/v.  LBM 9/20, + flatus.  Anuric- dialysis pt.  POC reviewed with pt.  Call light within reach, pt educated to call for assistance as needed.  Hourly rounding in place.

## 2018-09-23 NOTE — CARE PLAN
Problem: Communication  Goal: The ability to communicate needs accurately and effectively will improve  Outcome: PROGRESSING AS EXPECTED  POC reviewed with pt. All questions answered at this time.     Problem: Pain Management  Goal: Pain level will decrease to patient's comfort goal  Outcome: PROGRESSING AS EXPECTED  Pain well controlled with current regimen. Pt educated on regimen and to call for intervention as needed.

## 2018-09-23 NOTE — PROGRESS NOTES
3hr HD started @ 0941 and completed @ 1245,tolerated 2200ml net UF,VSS post HD.L thigh AVG +B/T,cannulation sites covered with DD,CDI,report given to Leyla Parikh RN.

## 2018-09-23 NOTE — PROGRESS NOTES
Patient alert and oriented x 4.  Dressing intact to abdomen.  Ace wrap intact to right upper extremity.  Dressing intact to right lower extremity. Saturated with sanguinous drainage. Dressing re-enforced.  Patient complained of pain 8/10. Medicated for pain as ordered.

## 2018-09-23 NOTE — PROGRESS NOTES
[facility] Medicine Daily Progress Note    Date of Service  9/23/2018    Chief Complaint  38 y.o. male admitted 9/21/2018 with bleeding from complex wound of right arm and right abdominal areas.    Hospital Course  Admitted with bleeding from a complex wound of right arm and right abdominal area underwent Split skin grafting to right arm as well as abdomen, patient on chronic anticoagulation, INR was supratherapeutic on admission    Interval Problem Update  Complex wound - surgery done, pain better controlled  Anemia -hemoglobin 7 after 2 u PRBC  Supratherapeutic INR - FFP transfused, INR today 2.7  Hypotension-chronic, BP better with Midodrine    Consultants/Specialty  Plastic surgery - Walker    Code Status  Full    Disposition  TBD    Review of Systems  Review of Systems   Constitutional: Negative for chills, fever and malaise/fatigue.   HENT: Negative for hearing loss and sore throat.    Eyes: Negative for blurred vision.   Respiratory: Negative for cough, shortness of breath and wheezing.    Cardiovascular: Negative for chest pain and leg swelling.   Gastrointestinal: Negative for abdominal pain, diarrhea, heartburn, nausea and vomiting.   Genitourinary: Negative for dysuria.   Musculoskeletal: Negative for back pain and neck pain.   Skin: Negative for rash.   Neurological: Negative for dizziness, weakness and headaches.   Psychiatric/Behavioral: The patient is not nervous/anxious.         Physical Exam  Temp:  [36.1 °C (97 °F)-36.7 °C (98 °F)] 36.6 °C (97.8 °F)  Pulse:  [67-92] 75  Resp:  [17-18] 17  BP: ()/(59-99) 100/59    Physical Exam   Constitutional: He is oriented to person, place, and time. He appears well-developed and well-nourished.   HENT:   Head: Normocephalic and atraumatic.   Eyes: Pupils are equal, round, and reactive to light. Conjunctivae are normal.   Neck: No tracheal deviation present. No thyromegaly present.   Cardiovascular: Normal rate and regular rhythm.    Pulmonary/Chest: Effort  normal and breath sounds normal.   Abdominal: Soft. Bowel sounds are normal. He exhibits no distension. There is no tenderness.   Musculoskeletal:   Dressing at right arm  Wound at right abdominal area  Graft site at right thigh area   Lymphadenopathy:     He has no cervical adenopathy.   Neurological: He is alert and oriented to person, place, and time.   Nursing note and vitals reviewed.      Fluids    Intake/Output Summary (Last 24 hours) at 09/23/18 1427  Last data filed at 09/23/18 1245   Gross per 24 hour   Intake             1940 ml   Output             2700 ml   Net             -760 ml       Laboratory  Recent Labs      09/21/18   1518  09/22/18   0423   09/22/18   1934  09/23/18   0237  09/23/18   0759   WBC  5.4  4.1*   --    --   5.4   --    RBC  2.86*  2.25*   --    --   2.46*   --    HEMOGLOBIN  8.1*  6.4*   < >  7.4*  7.0*  7.1*   HEMATOCRIT  25.6*  20.5*   --    --   22.1*   --    MCV  89.5  91.1   --    --   89.8   --    MCH  28.3  28.4   --    --   28.5   --    MCHC  31.6*  31.2*   --    --   31.7*   --    RDW  55.8*  54.6*   --    --   55.5*   --    PLATELETCT  247  171   --    --   195   --    MPV  9.8  9.7   --    --   9.8   --     < > = values in this interval not displayed.     Recent Labs      09/21/18 1518 09/22/18   0423  09/23/18   0237   SODIUM  131*  135  135   POTASSIUM  3.8  4.7  4.2   CHLORIDE  89*  93*  92*   CO2  28  30  29   GLUCOSE  112*  164*  114*   BUN  32*  44*  55*   CREATININE  5.36*  6.88*  8.59*   CALCIUM  9.2  8.0*  7.8*     Recent Labs      09/21/18   1518  09/22/18   1225  09/23/18   0237   APTT  85.2*   --    --    INR  4.13*  2.95*  2.77*               Imaging  No orders to display        Assessment/Plan  * Status post repair of complex wound- (present on admission)   Assessment & Plan    Split skin grafting to right arm as well as abdomen. The total amount of skin grafted area is 80 cm2. 9/21/2018  Pain control        Anemia due to acute blood loss- (present on  admission)   Assessment & Plan    Transfused 2 u PRBC  Transfuse 1 u PRBC today  Serial hgb         Hemorrhagic disorder due to circulating anticoagulants (HCC)- (present on admission)   Assessment & Plan    Transfused FFP  follow INR        Supratherapeutic INR- (present on admission)   Assessment & Plan    Transfused FFP          Hypotension, chronic- (present on admission)   Assessment & Plan    Midodrine        Chronic anticoagulation- (present on admission)   Assessment & Plan    Hold Coumadin until cleared by plastic surgery        ESRD (end stage renal disease) (HCC)- (present on admission)   Assessment & Plan    HD        Pain syndrome, chronic- (present on admission)   Assessment & Plan    Judicious pain control        Seizure disorder (CMS-HCC)- (present on admission)   Assessment & Plan    Keppra             VTE prophylaxis: SCD

## 2018-09-24 VITALS
BODY MASS INDEX: 29.92 KG/M2 | WEIGHT: 175.27 LBS | SYSTOLIC BLOOD PRESSURE: 120 MMHG | DIASTOLIC BLOOD PRESSURE: 84 MMHG | HEIGHT: 64 IN | RESPIRATION RATE: 19 BRPM | TEMPERATURE: 98.2 F | HEART RATE: 79 BPM | OXYGEN SATURATION: 98 %

## 2018-09-24 LAB
ANION GAP SERPL CALC-SCNC: 12 MMOL/L (ref 0–11.9)
BASOPHILS # BLD AUTO: 1 % (ref 0–1.8)
BASOPHILS # BLD: 0.07 K/UL (ref 0–0.12)
BUN SERPL-MCNC: 32 MG/DL (ref 8–22)
CALCIUM SERPL-MCNC: 8.9 MG/DL (ref 8.5–10.5)
CHLORIDE SERPL-SCNC: 98 MMOL/L (ref 96–112)
CO2 SERPL-SCNC: 28 MMOL/L (ref 20–33)
CREAT SERPL-MCNC: 6.44 MG/DL (ref 0.5–1.4)
EOSINOPHIL # BLD AUTO: 0.22 K/UL (ref 0–0.51)
EOSINOPHIL NFR BLD: 3.1 % (ref 0–6.9)
ERYTHROCYTE [DISTWIDTH] IN BLOOD BY AUTOMATED COUNT: 55.3 FL (ref 35.9–50)
GLUCOSE SERPL-MCNC: 100 MG/DL (ref 65–99)
HCT VFR BLD AUTO: 26.3 % (ref 42–52)
HGB BLD-MCNC: 8.2 G/DL (ref 14–18)
HGB BLD-MCNC: 8.3 G/DL (ref 14–18)
IMM GRANULOCYTES # BLD AUTO: 0.02 K/UL (ref 0–0.11)
IMM GRANULOCYTES NFR BLD AUTO: 0.3 % (ref 0–0.9)
INR PPP: 2.6 (ref 0.87–1.13)
LYMPHOCYTES # BLD AUTO: 0.96 K/UL (ref 1–4.8)
LYMPHOCYTES NFR BLD: 13.4 % (ref 22–41)
MCH RBC QN AUTO: 28.8 PG (ref 27–33)
MCHC RBC AUTO-ENTMCNC: 31.6 G/DL (ref 33.7–35.3)
MCV RBC AUTO: 91.3 FL (ref 81.4–97.8)
MONOCYTES # BLD AUTO: 0.79 K/UL (ref 0–0.85)
MONOCYTES NFR BLD AUTO: 11 % (ref 0–13.4)
NEUTROPHILS # BLD AUTO: 5.09 K/UL (ref 1.82–7.42)
NEUTROPHILS NFR BLD: 71.2 % (ref 44–72)
NRBC # BLD AUTO: 0 K/UL
NRBC BLD-RTO: 0 /100 WBC
PLATELET # BLD AUTO: 199 K/UL (ref 164–446)
PMV BLD AUTO: 9.1 FL (ref 9–12.9)
POTASSIUM SERPL-SCNC: 4.8 MMOL/L (ref 3.6–5.5)
PROTHROMBIN TIME: 27.9 SEC (ref 12–14.6)
RBC # BLD AUTO: 2.88 M/UL (ref 4.7–6.1)
SODIUM SERPL-SCNC: 138 MMOL/L (ref 135–145)
WBC # BLD AUTO: 7.2 K/UL (ref 4.8–10.8)

## 2018-09-24 PROCEDURE — 36415 COLL VENOUS BLD VENIPUNCTURE: CPT

## 2018-09-24 PROCEDURE — A9270 NON-COVERED ITEM OR SERVICE: HCPCS | Performed by: HOSPITALIST

## 2018-09-24 PROCEDURE — 700111 HCHG RX REV CODE 636 W/ 250 OVERRIDE (IP): Performed by: FAMILY MEDICINE

## 2018-09-24 PROCEDURE — 700102 HCHG RX REV CODE 250 W/ 637 OVERRIDE(OP): Performed by: FAMILY MEDICINE

## 2018-09-24 PROCEDURE — 700102 HCHG RX REV CODE 250 W/ 637 OVERRIDE(OP): Performed by: HOSPITALIST

## 2018-09-24 PROCEDURE — 85018 HEMOGLOBIN: CPT

## 2018-09-24 PROCEDURE — 85610 PROTHROMBIN TIME: CPT

## 2018-09-24 PROCEDURE — 80048 BASIC METABOLIC PNL TOTAL CA: CPT

## 2018-09-24 PROCEDURE — A9270 NON-COVERED ITEM OR SERVICE: HCPCS | Performed by: FAMILY MEDICINE

## 2018-09-24 PROCEDURE — 99239 HOSP IP/OBS DSCHRG MGMT >30: CPT | Performed by: FAMILY MEDICINE

## 2018-09-24 PROCEDURE — 85025 COMPLETE CBC W/AUTO DIFF WBC: CPT

## 2018-09-24 RX ADMIN — OXYCODONE HYDROCHLORIDE 15 MG: 5 TABLET ORAL at 04:38

## 2018-09-24 RX ADMIN — LEVETIRACETAM 250 MG: 250 TABLET ORAL at 07:47

## 2018-09-24 RX ADMIN — OXYCODONE HYDROCHLORIDE 15 MG: 5 TABLET ORAL at 07:45

## 2018-09-24 RX ADMIN — HYDROMORPHONE HYDROCHLORIDE 2 MG: 2 INJECTION INTRAMUSCULAR; INTRAVENOUS; SUBCUTANEOUS at 05:38

## 2018-09-24 RX ADMIN — OXYCODONE HYDROCHLORIDE 15 MG: 5 TABLET ORAL at 01:35

## 2018-09-24 RX ADMIN — MIDODRINE HYDROCHLORIDE 10 MG: 5 TABLET ORAL at 07:44

## 2018-09-24 RX ADMIN — SEVELAMER CARBONATE 3200 MG: 800 TABLET, FILM COATED ORAL at 07:44

## 2018-09-24 ASSESSMENT — PAIN SCALES - GENERAL
PAINLEVEL_OUTOF10: 8
PAINLEVEL_OUTOF10: 8
PAINLEVEL_OUTOF10: 10
PAINLEVEL_OUTOF10: 8
PAINLEVEL_OUTOF10: 10
PAINLEVEL_OUTOF10: 6

## 2018-09-24 NOTE — DISCHARGE SUMMARY
Discharge Summary    CHIEF COMPLAINT ON ADMISSION  Chief Complaint   Patient presents with   • Bleeding/Bruising     abdominal varicose vein that ruptured, tight bandaged applied.         Reason for Admission  Abdominal Pain     Admission Date  9/21/2018    CODE STATUS  Prior    HPI & HOSPITAL COURSE  This is a 38 y.o. male here with bleeding from complex wound of right arm and right abdominal area and underwent Split skin grafting to right arm as well as abdomen, with graft site from the right thigh, done by plastic surgery.  He had anemia and needed transfusion of 2 units of packed red blood cells.  His hemoglobin has been stable since then.  He came in with a supratherapeutic INR and fresh frozen plasma was  Transfused.  His discharge INR is 2.6.  He will follow-up with his plastic surgeon in 2 days to see if he can resume his Coumadin.  His INR is still at goal.  He has history of end-stage renal disease and underwent hemodialysis and was seen by nephrology.  Plastic surgery is clear for discharge.       Therefore, he is discharged in good and stable condition to home with close outpatient follow-up.    The patient met 2-midnight criteria for an inpatient stay at the time of discharge.    Discharge Date  9/24/2018    FOLLOW UP ITEMS POST DISCHARGE  Follow-up with plastic surgery    DISCHARGE DIAGNOSES  Principal Problem:    Status post repair of complex wound POA: Yes  Active Problems:    Hemorrhagic disorder due to circulating anticoagulants (HCC) POA: Yes    Anemia due to acute blood loss POA: Yes    Pain syndrome, chronic POA: Yes    ESRD (end stage renal disease) (HCC) POA: Yes    Chronic anticoagulation POA: Yes    Hypotension, chronic POA: Yes    Supratherapeutic INR POA: Yes    Seizure disorder (CMS-HCC) POA: Yes      Overview: April 2016. [facility]. Workup negative. Patient started on       Keppra 375 mg by mouth daily.  Resolved Problems:    * No resolved hospital problems. *      FOLLOW  UP  Future Appointments  Date Time Provider Department Center   9/26/2018 9:00 AM [facility] EXAM 4 [facility] None     Samson Rosenbaum Jr., M.D.  [address]  [place] [address]  [phone]            MEDICATIONS ON DISCHARGE     Medication List      CHANGE how you take these medications      Instructions   midodrine 10 MG tablet  What changed:  Another medication with the same name was removed. Continue taking this medication, and follow the directions you see here.  Commonly known as:  PROAMATINE   Take 1 Tab by mouth 3 times a day, with meals. Can hold dose if blood pressure greater than 120 systolic.  Dose:  10 mg        CONTINUE taking these medications      Instructions   calcitRIOL 0.25 MCG Caps  Commonly known as:  ROCALTROL   Take 0.75 mcg by mouth every bedtime.  Dose:  0.75 mcg     cinacalcet 30 MG Tabs  Commonly known as:  SENSIPAR   Take 30-60 mg by mouth every day. 30 mg Sunday, Mondays , Wednesdays , and Fridays. Pt takes 60MG on Tue, Thur, and Sat  Dose:  30-60 mg     * gabapentin 600 MG tablet  Commonly known as:  NEURONTIN   Take 600 mg by mouth every bedtime. Pt also has an RX for 300MG, pt takes total of 1,800MG HS  Dose:  600 mg     * gabapentin 300 MG Caps  Commonly known as:  NEURONTIN   Take 1,200 mg by mouth every bedtime. Pt also has an RX for 600MG, pt takes total of 1,800MG HS  Dose:  1200 mg     HYDROcodone/acetaminophen  MG Tabs  Commonly known as:  NORCO   Take 1 Tab by mouth every 8 hours as needed.  Dose:  1 Tab     levETIRAcetam 250 MG tablet  Commonly known as:  KEPPRA   Take 1 Tab by mouth 2 times a day.  Dose:  250 mg     RENVELA 800 MG Tabs tablet  Generic drug:  sevelamer carbonate   Take 3,200 mg by mouth 3 times a day, with meals. With meals  Dose:  3200 mg        * This list has 2 medication(s) that are the same as other medications prescribed for you. Read the directions carefully, and ask your doctor or other care provider to review them with you.            STOP  taking these medications    warfarin 5 MG Tabs  Commonly known as:  COUMADIN            Allergies  Allergies   Allergen Reactions   • Baclofen Unspecified     Total loss of memory, sedation.   RXN=6/2015   • Contrast Media With Iodine [Iodine] Rash     RXN=1/5/2017   • Keflex Rash     RXN=possibly >10 years   • Pcn [Penicillins] Rash     RXN=possibly >10 years ago  Tolerated Zosyn on 2/20/18   • Tape Rash     Paper tape and tegaderm ok  RXN=ongoing       DIET  No orders of the defined types were placed in this encounter.      ACTIVITY  As tolerated.  Weight bearing as tolerated    CONSULTATIONS  Plastic Surgery - Rosenbaum    PROCEDURES  Split skin grafting to right arm as well as abdomen    LABORATORY  Lab Results   Component Value Date    SODIUM 138 09/24/2018    POTASSIUM 4.8 09/24/2018    CHLORIDE 98 09/24/2018    CO2 28 09/24/2018    GLUCOSE 100 (H) 09/24/2018    BUN 32 (H) 09/24/2018    CREATININE 6.44 (HH) 09/24/2018    CREATININE 8.2 (HH) 05/06/2009        Lab Results   Component Value Date    WBC 7.2 09/24/2018    HEMOGLOBIN 8.3 (L) 09/24/2018    HEMOGLOBIN 8.2 (L) 09/24/2018    HEMATOCRIT 26.3 (L) 09/24/2018    PLATELETCT 199 09/24/2018        Total time of the discharge process exceeds 45 minutes.

## 2018-09-24 NOTE — PROGRESS NOTES
POD 3  No events  Operative sites look OK    OK for DC from a plastic surgery standpoint. Pt needs to FU with Dr. Rosenbaum Wednesday

## 2018-09-24 NOTE — CARE PLAN
Problem: Communication  Goal: The ability to communicate needs accurately and effectively will improve  Outcome: PROGRESSING AS EXPECTED  Pt updated on POC. All questions answered at this time.     Problem: Pain Management  Goal: Pain level will decrease to patient's comfort goal  Outcome: PROGRESSING AS EXPECTED  Pt stating pain increased over night. Pt also stating he would like to discharge home today. Pt educated on avoidance of IV pain medication due to the fact that he will only have oral routes available at home. Pt agreeable to trial PO pain medication on today's shift.

## 2018-09-24 NOTE — DISCHARGE INSTRUCTIONS
Discharge Instructions    Discharged to home by car with relative. Discharged via wheelchair, hospital escort: Yes.  Special equipment needed: Not Applicable    Be sure to schedule a follow-up appointment with your primary care doctor or any specialists as instructed.     Discharge Plan:   Influenza Vaccine Indication: Not indicated: Previously immunized this influenza season and > 8 years of age    I understand that a diet low in cholesterol, fat, and sodium is recommended for good health. Unless I have been given specific instructions below for another diet, I accept this instruction as my diet prescription.   Other diet: Renal diet as tolerated.     Special Instructions: None    · Is patient discharged on Warfarin / Coumadin?   No- home medication however MD decision to stop medication at this time.     Skin Grafting, Care After  Refer to this sheet in the next few weeks. These instructions provide you with information about caring for yourself after your procedure. Your health care provider may also give you more specific instructions. Your treatment has been planned according to current medical practices, but problems sometimes occur. Call your health care provider if you have any problems or questions after your procedure.  WHAT TO EXPECT AFTER THE PROCEDURE  After your procedure, it is common to have:  · Pain.  · Swelling.  HOME CARE INSTRUCTIONS  · Take medicines only as directed by your health care provider.  · Follow your health care provider's instructions about:  ¨ Care of your graft and your donor site.  ¨ Bandage (dressing) changes and removal.  ¨ Wound closure removal.  · Check your graft and your donor site every day for signs of infection. Watch for:  ¨ Redness, swelling, or pain.  ¨ Fluid, blood, or pus.  · You may need to wear a supportive bandage or wrap for several weeks.  · Do not take baths, swim, or use a hot tub until your health care provider approves.  · Do not exercise or do any other  activities that could stretch the graft. Ask your health care provider when it will be safe for you to exercise again.  · Keep all follow-up visits as directed by your health care provider. This is important.  SEEK MEDICAL CARE IF:  · You have a fever.  · You have redness, swelling, or pain at your graft or your donor site.  · You have fluid, blood, or pus coming from your graft or your donor site.  · Your pain gets worse.  SEEK IMMEDIATE MEDICAL CARE IF:  · You have chest pain.  · You feel short of breath or you start coughing.  · You feel dizzy.     This information is not intended to replace advice given to you by your health care provider. Make sure you discuss any questions you have with your health care provider.     Document Released: 01/08/2016 Document Reviewed: 01/08/2016  [employer] Interactive Patient Education ©2016 [employer]    Bleeding Varicose Veins  Varicose veins are veins that have become enlarged and twisted. Valves in the veins help return blood from the leg to the heart. If these valves are damaged, blood flows backward and backs up into the veins in the leg near the skin. This causes the veins to become larger because of increased pressure within them. Sometimes these veins bleed.  CAUSES   Factors that can lead to bleeding varicose veins include:  · Thinning of the skin that covers the veins. This skin is stretched as the veins enlarge.  · Weak and thinning walls of the varicose veins. These thin walls are part of the reason why blood is not flowing normally to the heart.  · Having high pressure in the veins. This high pressure occurs because the blood is not flowing freely back up to the heart.  · Injury. Even a small injury to a varicose vein can cause bleeding.  · Open wounds. A sore may develop near a varicose vein and not heal. This makes bleeding more likely.  · Taking medicine that thins the blood. These medicines may include aspirin, anti-inflammatory medicine, and other blood  "thinners.  SIGNS AND SYMPTOMS   If bleeding is on the outside surface of the skin, blood can be seen. Sometimes, the bleeding stays under the skin. If this happens, the blue or purple area will spread beyond the vein. This discoloration may be visible.  DIAGNOSIS   To decide if you have a bleeding varicose vein, your health care provider may:  · Ask about your symptoms. This will include when you first saw bleeding.  · Ask about how long you have had varicose veins and if they cause you problems.  · Ask about your overall health.  · Ask about possible causes, such as recent cuts or if the area near the varicose veins was bumped or injured.  · Examine the skin or leg that concerns you. Your health care provider will probably feel the veins.  · Order imaging tests. These create detailed pictures of the veins.  TREATMENT   The first goal of treating bleeding varicose veins is to stop the bleeding. Then, the aim is to keep any bleeding from happening again. Treatment will depend on the cause of the bleeding and how bad it is. Ask your health care provider about what would be best for you. Options include:  · Raising (elevating) your leg. Lie down with your leg propped up on a pillow or cushion. Your foot should be above the level of your heart.  · Applying pressure to the spot that is bleeding. The bleeding should stop in a short time.  · Wearing elastic stockings that \"compress\" your legs (compression stockings). An elastic bandage may do the same thing.  · Applying an antibiotic cream on sores that are not healing.  · Closing off or surgically removing the bleeding varicose veins with one of the following:  ¨ Sclerotherapy. A solution is injected into the vein to close it off.  ¨ Laser treatment. A laser is used to heat the vein to close it off.  ¨ Radiofrequency vein ablation. An electrical current produced by radio waves is used to close off the vein.  ¨ Phlebectomy. The vein is surgically removed through small " incisions made over the varicose vein.  ¨ Vein ligation and stripping. The vein is surgically removed through incisions made over the varicose vein after the vein has been tied (ligated).  HOME CARE INSTRUCTIONS   · Apply any creams that your health care provider prescribed. Follow the directions carefully.  · Wear compression stockings or any wraps as directed by your health care provider. Make sure you know:  ¨ If you should wear them every day.  ¨ How long you should wear them.  · If veins were removed or closed, a bandage (dressing) will probably cover the area. Make sure you know:  ¨ How often the dressing should be changed.  ¨ Whether the area can get wet.  ¨ When you can leave the skin uncovered.  · Check your skin every day. Look for new sores and signs of bleeding.  · To prevent future bleeding:  ¨ Use extra care in situations where you could cut your legs, such as when shaving or gardening.  ¨ Try to keep your legs elevated as much as possible. Lie down when you can.  SEEK MEDICAL CARE IF:   · Your veins continue to bleed.  · You develop new sores near your varicose veins.  · You have a sore that does not heal or gets bigger.  · You have increased pain in your leg.  · The area around a varicose vein becomes warm, red, or tender to the touch.  · You notice a yellowish fluid that smells bad coming from a spot where there was bleeding.  · You have a fever.  SEEK IMMEDIATE MEDICAL CARE IF:   · You have chest pain or difficulty breathing.  · You have severe leg pain.  This information is not intended to replace advice given to you by your health care provider. Make sure you discuss any questions you have with your health care provider.  Document Released: 05/06/2010 Document Revised: 04/10/2017 Document Reviewed: 04/21/2015  [employer] Interactive Patient Education © 2017 [employer]      Depression / Suicide Risk    As you are discharged from this [facility] facility, it is important to learn how to keep  safe from harming yourself.    Recognize the warning signs:  · Abrupt changes in personality, positive or negative- including increase in energy   · Giving away possessions  · Change in eating patterns- significant weight changes-  positive or negative  · Change in sleeping patterns- unable to sleep or sleeping all the time   · Unwillingness or inability to communicate  · Depression  · Unusual sadness, discouragement and loneliness  · Talk of wanting to die  · Neglect of personal appearance   · Rebelliousness- reckless behavior  · Withdrawal from people/activities they love  · Confusion- inability to concentrate     If you or a loved one observes any of these behaviors or has concerns about self-harm, here's what you can do:  · Talk about it- your feelings and reasons for harming yourself  · Remove any means that you might use to hurt yourself (examples: pills, rope, extension cords, firearm)  · Get professional help from the community (Mental Health, Substance Abuse, psychological counseling)  · Do not be alone:Call your Safe Contact- someone whom you trust who will be there for you.  · Call your local CRISIS HOTLINE 313-5049 or 798-916-6686  · Call your local Children's Mobile Crisis Response Team [place] (747) 188-2894 or www.[employer]  · Call the toll free National Suicide Prevention Hotlines   · National Suicide Prevention Lifeline 938-088-UPIX (6490)  · National Hope Line Network 800-SUICIDE (885-9769)

## 2018-09-24 NOTE — PROGRESS NOTES
Received order for discharge.  Discharge instructions reviewed with pt.  No prescriptions.  PIV removed.  All questions answered.  Pt escorted off unit with staff via wheelchair to discharge home with Dad.

## 2018-09-24 NOTE — PROGRESS NOTES
Pt AAOx4.  Rating pain at 10/10, medicated per MAR.  Skin graft sites to right forearm with dressing and right abdomen with dressing.   Donor site right thigh with dressing and sanguinous drainage.   Left thigh AV fistula with bruit and thrill present, CDI dressing.   Renal diet in place, no c/o n/v.  LBM 9/23, + flatus.  Anuric- dialysis pt.  POC reviewed with pt.  Call light within reach, pt educated to call for assistance as needed.  Hourly rounding in place

## 2018-09-27 ENCOUNTER — ANTICOAGULATION VISIT (OUTPATIENT)
Dept: VASCULAR LAB | Facility: MEDICAL CENTER | Age: 38
End: 2018-09-27
Attending: INTERNAL MEDICINE
Payer: MEDICARE

## 2018-09-27 DIAGNOSIS — D68.318 HEMORRHAGIC DISORDER DUE TO CIRCULATING ANTICOAGULANTS (HCC): ICD-10-CM

## 2018-09-27 LAB
INR BLD: 5.1 (ref 0.9–1.2)
INR PPP: 5.1 (ref 2–3.5)

## 2018-09-27 PROCEDURE — 85610 PROTHROMBIN TIME: CPT

## 2018-09-27 PROCEDURE — 99212 OFFICE O/P EST SF 10 MIN: CPT | Performed by: PHARMACIST

## 2018-09-27 RX ORDER — WARFARIN SODIUM 5 MG/1
2.5-5 TABLET ORAL EVERY EVENING
Qty: 30 TAB | Refills: 5 | Status: SHIPPED | OUTPATIENT
Start: 2018-09-27 | End: 2019-01-26

## 2018-09-27 NOTE — PROGRESS NOTES
Anticoagulation Summary  As of 9/27/2018    INR goal:   2.5-3.5   TTR:   36.8 % (1.3 y)   Today's INR:   5.1!   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 Hopkins      Anticoagulation Care Providers     Provider Role Specialty Phone number    Jairo Hopkins M.D. Referring Surgery [phone]    [facility] Anticoagulation Services Responsible  [phone]        Anticoagulation Patient Findings      HPI:  Denis De Anda seen in clinic today, on anticoagulation therapy with warfarin for AV graft thrombosis  Changes to current medical/health status since last appt: pt was admitted due to bleeding issues and had to have emergent surgery. Warfarin was held while pt was in hospital and then pt self resumed warfarin on Tuesday at 5mg daily.  Denies signs/symptoms of bleeding and/or thrombosis since the last appt.    Denies any interval changes to diet  Denies any interval changes to medications since last appt.   Denies any complications or cost restrictions with current therapy.   BP declined      A/P   INR  is supra-therapeutic.   Will hold warfarin for 2 days and then start a reduced weekly dose. Pt will be hunting, so will see pt back in clinic in one week.    Follow up appointment in 1 week(s).    Maeve Saha, PharmD

## 2018-09-27 NOTE — DOCUMENTATION QUERY
"DOCUMENTATION QUERY    PROVIDERS: Please select “Cosign w/ note”to reply to query.    To better represent the severity of illness of your patient, please review the following information and exercise your independent professional judgment in responding to this query.     Per Operative Report on 9/21, \"the right arm wound was then debrided sharply with a 10 blade and devitalized tissue was removed.  The same thing was performed on the   Abdomen.\"   In order to code this procedure, the debridement must be specified as excisional or non-excisional.     Based upon the clinical findings, risk factors, and treatment, can you please specify excisional or nonexcisional debridement was performed?     Please specify type of debridement performed    ·         Excisional Debridement (Excisional debridement of the skin or subcutaneous tissue is the surgical removal or cutting away of such tissue)     If so please specify:  1.    Deepest level of debridement for right upper/lower arm and abdomen (skin, subcutaneous tissue, fascia, muscle, tendon, joint, bone)         ·         Non-excisional Debridement (Nonoperative brushing, irrigating, scrubbing, or washing of devitalized tissue, necrosis, slough, or foreign material.)                If so please specify:  1.    deepest level of debridement for right upper/lower arm and abdomen (skin, subcutaneous tissue, fascia, muscle, tendon, joint, bone)       ·         Other explanation of clinical findings (please document)  ·         Unable to determine           The medical record reflects the following:   Clinical Findings  Operative Report 9/21/18, Samson Rosenbaum Jr, MD    PROCEDURE:  Split skin grafting to right arm as well as abdomen.  The total amount of skin grafted area is 80 cm2.    ....I began by removing the sutures from the flap that was holding his right arm to   his abdomen.  This revealed bleeding mostly from the right arm.  There was   direct pressure applied to this " area and electrocautery, which controlled this   immediately and the right arm wound was then debrided sharply with a 10 blade   and devitalized tissue was removed.  The same thing was performed on the   abdomen.  The abdomen had had history of extensive bleeding from very large   abdominal varices.  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       Treatment  Split thickness skin graft with debridement of right arm and abdomen   Risk Factors  Abdominal varices, ESRD on hemodialysis     Location within medical record  Operative Report     Thank you,   [email or web address]

## 2018-09-27 NOTE — DOCUMENTATION QUERY
DOCUMENTATION QUERY    PROVIDERS: Please select “Cosign w/ note”to reply to query.    To better represent the severity of illness of your patient, please review the following information and exercise your independent professional judgment in responding to this query.     Per Operative Report on 9/21, split thickness skin graft was performed to the right arm and abdomen.  In order to code this procedure to the arm site, the location of the arm must be further specified to upper arm and/or lower arm.  Based upon the clinical findings, risk factors, and treatment, can the site of Split thickness skin grafting to right arm be further specified?    Please select which applies:    • Split thickness skin graft to right upper arm   • Split thickness skin graft to right lower arm  • Split thickness skin graft to both right upper and lower arm    • Other explanation of clinical findings (please document)  • Unable to determine         The medical record reflects the following:   Clinical Findings  Operative Report 9/21/18, Samson Rosenbaum Jr, MD    PROCEDURE:  Split skin grafting to right arm as well as abdomen.  The total amount of skin grafted area is 80 cm2.     Treatment  Split thickness skin graft to right arm     Risk Factors  Abdominal varices, ESRD on hemodialysis     Location within medical record  Operative Report     Thank you,   [email or web address]

## 2018-10-03 NOTE — DOCUMENTATION QUERY
"DOCUMENTATION QUERY     PROVIDERS: Please select “Cosign w/ note”to reply to query.     To better represent the severity of illness of your patient, please review the following information and exercise your independent professional judgment in responding to this query.      Per query response on 10/3, Excisional debridement is documented without specifying deepest level of debridement.    Per Operative Report on 9/21, \"the right arm wound was then debrided sharply with a 10 blade and devitalized tissue was removed.  The same thing was performed on the Abdomen.\"        Based upon the clinical findings, risk factors, and treatment, please specify the deepest level of excisional debridement performed for the right lower arm and abdomen:      Please specify:      ·         Excisional Debridement             1.    Deepest level of debridement for right lower arm (skin, subcutaneous tissue, fascia, muscle, tendon, joint, bone)  2.    Deepest level of debridement for abdomen (skin, subcutaneous tissue, fascia, muscle, tendon, joint, bone)         ·         Other explanation of clinical findings (please document)  ·         Unable to determine           The medical record reflects the following:   Clinical Findings Attestation signed by Samson Rosenbaum Jr., M.D. at 10/3/2018 1:30 PM   All surgical debridements are EXcisional       Operative Report 9/21/18, Samson Rosenbaum Jr, MD     PROCEDURE:  Split skin grafting to right arm as well as abdomen.  The total amount of skin grafted area is 80 cm2.     ....I began by removing the sutures from the flap that was holding his right arm to   his abdomen.  This revealed bleeding mostly from the right arm.  There was   direct pressure applied to this area and electrocautery, which controlled this   immediately and the right arm wound was then debrided sharply with a 10 blade   and devitalized tissue was removed.  The same thing was performed on the   abdomen.  The abdomen had " had history of extensive bleeding from very large   abdominal varices.  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        Treatment  Split thickness skin graft with debridement of right arm and abdomen   Risk Factors  Abdominal varices, ESRD on hemodialysis      Location within medical record  Operative Report      Thank you,   [email or web address]

## 2018-10-04 ENCOUNTER — ANTICOAGULATION VISIT (OUTPATIENT)
Dept: VASCULAR LAB | Facility: MEDICAL CENTER | Age: 38
End: 2018-10-04
Attending: INTERNAL MEDICINE
Payer: MEDICARE

## 2018-10-04 ENCOUNTER — HOSPITAL ENCOUNTER (OUTPATIENT)
Facility: MEDICAL CENTER | Age: 38
End: 2018-10-04
Attending: PREVENTIVE MEDICINE
Payer: COMMERCIAL

## 2018-10-04 DIAGNOSIS — T82.868S THROMBOSIS OF ARTERIOVENOUS GRAFT, SEQUELA: ICD-10-CM

## 2018-10-04 LAB
HAV IGM SERPL QL IA: NEGATIVE
HBV CORE IGM SER QL: NEGATIVE
HBV SURFACE AG SER QL: NEGATIVE
HCV AB SER QL: NEGATIVE
INR PPP: 2.6 (ref 2–3.5)

## 2018-10-04 PROCEDURE — 99211 OFF/OP EST MAY X REQ PHY/QHP: CPT

## 2018-10-04 PROCEDURE — 85610 PROTHROMBIN TIME: CPT

## 2018-10-04 NOTE — PROGRESS NOTES
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 Hopkins      Anticoagulation Care Providers     Provider Role Specialty Phone number    Jario Hopkins M.D. Referring Surgery [phone]    [facility] Anticoagulation Services Responsible  [phone]        Anticoagulation Patient Findings  Patient Findings     Negatives:   Signs/symptoms of thrombosis, Signs/symptoms of bleeding, Laboratory test error suspected, Change in health, Change in alcohol use, Change in activity, Upcoming invasive procedure, Emergency department visit, Upcoming dental procedure, Missed doses, Extra doses, Change in medications, Change in diet/appetite, Hospital admission, Bruising, Other complaints        History of Present Illness: follow up appointment for chronic anticoagulation with the high risk medication, warfarin for AV graft thrombosis    Last INR was out of range, dosage adjusted: pt is now at goal. Continue current dosing regimen.  Follow up in 1 weeks, to reduce the risk of adverse events related to this high risk medication, warfarin.    Renetta Dunlap, Clinical Pharmacist    Pt declines vitals today

## 2018-10-05 LAB
M TB TUBERC IFN-G BLD QL: NEGATIVE
M TB TUBERC IFN-G/MITOGEN IGNF BLD: -0
M TB TUBERC IGNF/MITOGEN IGNF CONTROL: 50.77 [IU]/ML
MITOGEN IGNF BCKGRD COR BLD-ACNC: 0.01 [IU]/ML

## 2018-10-09 LAB — INR BLD: 2.6 (ref 0.9–1.2)

## 2018-10-11 ENCOUNTER — ANTICOAGULATION VISIT (OUTPATIENT)
Dept: VASCULAR LAB | Facility: MEDICAL CENTER | Age: 38
End: 2018-10-11
Attending: INTERNAL MEDICINE
Payer: MEDICARE

## 2018-10-11 DIAGNOSIS — Z79.01 CHRONIC ANTICOAGULATION: ICD-10-CM

## 2018-10-11 LAB
INR BLD: 2.6 (ref 0.9–1.2)
INR PPP: 2.6 (ref 2–3.5)

## 2018-10-11 PROCEDURE — 99211 OFF/OP EST MAY X REQ PHY/QHP: CPT

## 2018-10-11 PROCEDURE — 85610 PROTHROMBIN TIME: CPT

## 2018-10-11 NOTE — PROGRESS NOTES
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 Hopkins      Anticoagulation Care Providers     Provider Role Specialty Phone number    Jairo Hopkins M.D. Referring Surgery [phone]    [facility] Anticoagulation Services Responsible  [phone]        Anticoagulation Patient Findings      HPI:  Denis Inman Adilson seen in clinic today, on anticoagulation therapy with warfarin for AV graft.   Changes to current medical/health status since last appt: none  Denies signs/symptoms of bleeding and/or thrombosis since the last appt.    Denies any interval changes to diet  Denies any interval changes to medications since last appt.   Denies any complications or cost restrictions with current therapy.   Declines vitals.   Confirmed dosing regimen.     A/P   INR  therapeutic.   Pt is to continue with current warfarin dosing regimen.     Follow up appointment in 1 week(s).    Donald Cedeño, PharmD

## 2018-10-16 NOTE — DOCUMENTATION QUERY
"DOCUMENTATION QUERY     DR. ROSENBAUM, please review this Documentation Query and reply with an addendum. You co-signed only, which does not address the needed clarification from you to be able to code and complete this account.       To better represent the severity of illness of your patient, please review the following information and exercise your independent professional judgment in responding to this query.      Per query response on 10/3, Excisional debridement is documented without specifying deepest level of debridement.    Per Operative Report on 9/21, \"the right arm wound was then debrided sharply with a 10 blade and devitalized tissue was removed.  The same thing was performed on the Abdomen.\"        Based upon the clinical findings, risk factors, and treatment, please specify the deepest level of excisional debridement performed for the right lower arm and abdomen:      Please specify:      ·         Excisional Debridement      1.    Deepest level of debridement for right lower arm (skin, subcutaneous tissue, fascia, muscle, tendon, joint, bone)  2.    Deepest level of debridement for abdomen (skin, subcutaneous tissue, fascia, muscle, tendon, joint, bone)         ·         Other explanation of clinical findings (please document)  ·         Unable to determine           The medical record reflects the following:   Clinical Findings Attestation signed by Samson Rosenbaum Jr., M.D. at 10/3/2018 1:30 PM   All surgical debridements are EXcisional         Operative Report 9/21/18, Samson Rosenbaum Jr, MD     PROCEDURE:  Split skin grafting to right arm as well as abdomen.  The total amount of skin grafted area is 80 cm2.     ....I began by removing the sutures from the flap that was holding his right arm to   his abdomen.  This revealed bleeding mostly from the right arm.  There was   direct pressure applied to this area and electrocautery, which controlled this   immediately and the right arm wound was then " debrided sharply with a 10 blade   and devitalized tissue was removed.  The same thing was performed on the   abdomen.  The abdomen had had history of extensive bleeding from very large   abdominal varices.  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        Treatment  Split thickness skin graft with debridement of right arm and abdomen   Risk Factors  Abdominal varices, ESRD on hemodialysis      Location within medical record  Operative Report      Thank you,   [email or web address]

## 2018-10-18 ENCOUNTER — ANTICOAGULATION VISIT (OUTPATIENT)
Dept: VASCULAR LAB | Facility: MEDICAL CENTER | Age: 38
End: 2018-10-18
Attending: INTERNAL MEDICINE
Payer: MEDICARE

## 2018-10-18 DIAGNOSIS — Z79.01 CHRONIC ANTICOAGULATION: ICD-10-CM

## 2018-10-18 LAB
INR BLD: 2.4 (ref 0.9–1.2)
INR PPP: 2.4 (ref 2–3.5)

## 2018-10-18 PROCEDURE — 99212 OFFICE O/P EST SF 10 MIN: CPT

## 2018-10-18 PROCEDURE — 85610 PROTHROMBIN TIME: CPT

## 2018-10-18 NOTE — PROGRESS NOTES
Anticoagulation Summary  As of 10/18/2018    INR goal:   2.5-3.5   TTR:   37.9 % (1.4 y)   Today's INR:   2.4!   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 Hopkins      Anticoagulation Care Providers     Provider Role Specialty Phone number    Jairo Hopkins M.D. Referring Surgery [phone]    [facility] Anticoagulation Services Responsible  [phone]        Anticoagulation Patient Findings      HPI:  Denis Storm De Anda seen in clinic today, on anticoagulation therapy with warfarin for AV graft thrombosis.   Changes to current medical/health status since last appt: none  Denies signs/symptoms of bleeding and/or thrombosis since the last appt.    Denies any interval changes to diet  Denies any interval changes to medications since last appt.   Denies any complications or cost restrictions with current therapy.   Declines vitals.  Confirmed dosing regimen.     A/P   INR  SUB-therapeutic.   Only slightly out of range, Pt is to continue with current warfarin dosing regimen.     Follow up appointment in 1 week(s).    Donald Cedeño, PharmD

## 2018-10-25 ENCOUNTER — ANTICOAGULATION VISIT (OUTPATIENT)
Dept: VASCULAR LAB | Facility: MEDICAL CENTER | Age: 38
End: 2018-10-25
Attending: INTERNAL MEDICINE
Payer: MEDICARE

## 2018-10-25 DIAGNOSIS — Z79.01 CHRONIC ANTICOAGULATION: ICD-10-CM

## 2018-10-25 LAB
INR BLD: 1.3 (ref 0.9–1.2)
INR PPP: 1.3 (ref 2–3.5)

## 2018-10-25 PROCEDURE — 85610 PROTHROMBIN TIME: CPT

## 2018-10-25 PROCEDURE — 99211 OFF/OP EST MAY X REQ PHY/QHP: CPT

## 2018-11-01 ENCOUNTER — EH NON-PROVIDER (OUTPATIENT)
Dept: OCCUPATIONAL MEDICINE | Facility: CLINIC | Age: 38
End: 2018-11-01

## 2018-11-01 DIAGNOSIS — Z02.89 ENCOUNTER FOR OCCUPATIONAL HEALTH EXAMINATION INVOLVING RESPIRATOR: Primary | ICD-10-CM

## 2018-11-01 PROCEDURE — 94375 RESPIRATORY FLOW VOLUME LOOP: CPT | Performed by: PREVENTIVE MEDICINE

## 2018-11-05 ENCOUNTER — DOCUMENTATION (OUTPATIENT)
Dept: OCCUPATIONAL MEDICINE | Facility: CLINIC | Age: 38
End: 2018-11-05

## 2018-11-05 ENCOUNTER — HOSPITAL ENCOUNTER (OUTPATIENT)
Dept: LAB | Facility: MEDICAL CENTER | Age: 38
End: 2018-11-05
Attending: FAMILY MEDICINE
Payer: MEDICARE

## 2018-11-05 PROCEDURE — 85303 CLOT INHIBIT PROT C ACTIVITY: CPT

## 2018-11-05 PROCEDURE — 85306 CLOT INHIBIT PROT S FREE: CPT

## 2018-11-07 LAB
PROT C ACT/NOR PPP: 94 % (ref 83–168)
PROT S ACT/NOR PPP: 93 % (ref 66–143)

## 2018-11-08 ENCOUNTER — ANTICOAGULATION VISIT (OUTPATIENT)
Dept: VASCULAR LAB | Facility: MEDICAL CENTER | Age: 38
End: 2018-11-08
Attending: INTERNAL MEDICINE
Payer: MEDICARE

## 2018-11-08 DIAGNOSIS — Z79.01 CHRONIC ANTICOAGULATION: ICD-10-CM

## 2018-11-08 LAB — INR PPP: 1.2 (ref 2–3.5)

## 2018-11-08 PROCEDURE — 85610 PROTHROMBIN TIME: CPT

## 2018-11-08 PROCEDURE — 99212 OFFICE O/P EST SF 10 MIN: CPT | Performed by: PHARMACIST

## 2018-11-08 NOTE — PROGRESS NOTES
Anticoagulation Summary  As of 11/8/2018    INR goal:   2.5-3.5   TTR:   36.4 % (1.5 y)   Today's INR:   1.2!   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 Hopkins      Anticoagulation Care Providers     Provider Role Specialty Phone number    Jairo Hopkins M.D. Referring Surgery [phone]    [facility] Anticoagulation Services Responsible  [phone]        Anticoagulation Patient Findings      HPI:  Denis De Anda seen in clinic today, on anticoagulation therapy with warfarin for AV graft thrombosis  Changes to current medical/health status since last appt: pt resumed warfarin on Monday and has been taking 5mg daily.  Denies signs/symptoms of bleeding and/or thrombosis since the last appt.    Denies any interval changes to diet  Denies any interval changes to medications since last appt.   Denies any complications or cost restrictions with current therapy.   BP declined.      A/P   INR  is sub-therapeutic.   Will have pt continue to take an increased dose of 5mg daily    Follow up appointment in 4 days.    Maeve Saha, PharmD

## 2018-11-09 LAB — INR BLD: 1.2 (ref 0.9–1.2)

## 2018-11-12 ENCOUNTER — ANTICOAGULATION VISIT (OUTPATIENT)
Dept: VASCULAR LAB | Facility: MEDICAL CENTER | Age: 38
End: 2018-11-12
Attending: INTERNAL MEDICINE
Payer: MEDICARE

## 2018-11-12 DIAGNOSIS — T82.868S THROMBOSIS OF ARTERIOVENOUS GRAFT, SEQUELA: ICD-10-CM

## 2018-11-12 LAB
INR BLD: 2 (ref 0.9–1.2)
INR PPP: 2 (ref 2–3.5)

## 2018-11-12 PROCEDURE — 85610 PROTHROMBIN TIME: CPT

## 2018-11-12 PROCEDURE — 99211 OFF/OP EST MAY X REQ PHY/QHP: CPT | Performed by: NURSE PRACTITIONER

## 2018-11-12 NOTE — PROGRESS NOTES
Anticoagulation Summary  As of 11/12/2018    INR goal:   2.5-3.5   TTR:   36.1 % (1.5 y)   Today's INR:   2.0!   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 Hopkins      Anticoagulation Care Providers     Provider Role Specialty Phone number    Jairo Hopkins M.D. Referring Surgery [phone]    [facility] Anticoagulation Services Responsible  [phone]        Anticoagulation Patient Findings      HPI:  Denis De Anda seen in clinic today for follow up on anticoagulation therapy in the presence of keeping his AV graft patent for dialysis. He was recently off warfarin for 2 weeks to have Protein C & S levels drawn. Restarted last Monday. Denies any changes to current medical/health status since last appointment. Denies any medication or diet changes. No current symptoms of bleeding or thrombosis reported.    A/P:   INR usbtherapeutic. INR up from 1.2 on Thursday. Pt prefers to resume his usual regimen at this point.    Follow up appointment in 4 days.    Next Appointment: Thursday, November 15, 2018 at 7:30 am.     Hanna LARKIN

## 2018-11-15 ENCOUNTER — ANTICOAGULATION VISIT (OUTPATIENT)
Dept: VASCULAR LAB | Facility: MEDICAL CENTER | Age: 38
End: 2018-11-15
Attending: INTERNAL MEDICINE
Payer: MEDICARE

## 2018-11-15 DIAGNOSIS — T82.868S THROMBOSIS OF ARTERIOVENOUS GRAFT, SEQUELA: ICD-10-CM

## 2018-11-15 LAB — INR PPP: 1.9 (ref 2–3.5)

## 2018-11-15 PROCEDURE — 85610 PROTHROMBIN TIME: CPT

## 2018-11-15 PROCEDURE — 99212 OFFICE O/P EST SF 10 MIN: CPT | Performed by: PHARMACIST

## 2018-11-15 NOTE — PROGRESS NOTES
Anticoagulation Patient Findings    Anticoagulation Summary  As of 11/15/2018    INR goal:   2.5-3.5   TTR:   35.9 % (1.5 y)   Today's INR:   1.9!   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 Hopkins      Anticoagulation Care Providers     Provider Role Specialty Phone number    Jairo Hopkins M.D. Referring Surgery [phone]    [facility] Anticoagulation Services Responsible  [phone]        Anticoagulation Patient Findings      HPI:  Denis Storm De Anda seen in clinic today, on anticoagulation therapy with warfarin for AV graft thrombosis  Changes to current medical/health status since last appt: NONE  Denies signs/symptoms of bleeding and/or thrombosis since the last appt.    Denies any interval changes to diet  Denies any interval changes to medications since last appt.   Denies any complications or cost restrictions with current therapy.   BP recorded in vitals.      A/P   INR  remains sub-therapeutic at 1.9.   Instructed patient to bolus with 7.5mg X 1, then increase weekly warfarin regimen as detailed above.    Follow up appointment in 5 days.    Cricket Mujica, JocelynD

## 2018-11-20 ENCOUNTER — ANTICOAGULATION VISIT (OUTPATIENT)
Dept: VASCULAR LAB | Facility: MEDICAL CENTER | Age: 38
End: 2018-11-20
Attending: INTERNAL MEDICINE
Payer: MEDICARE

## 2018-11-20 DIAGNOSIS — Z79.01 CHRONIC ANTICOAGULATION: ICD-10-CM

## 2018-11-20 LAB
INR BLD: 2 (ref 0.9–1.2)
INR PPP: 2 (ref 2–3.5)

## 2018-11-20 PROCEDURE — 99212 OFFICE O/P EST SF 10 MIN: CPT | Performed by: PHARMACIST

## 2018-11-20 PROCEDURE — 85610 PROTHROMBIN TIME: CPT

## 2018-11-20 NOTE — PROGRESS NOTES
Anticoagulation Summary  As of 11/20/2018    INR goal:   2.5-3.5   TTR:   35.6 % (1.5 y)   Today's INR:   2.0!   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 Hopkins      Anticoagulation Care Providers     Provider Role Specialty Phone number    Jairo Hopkins M.D. Referring Surgery [phone]    [facility] Anticoagulation Services Responsible  [phone]        Anticoagulation Patient Findings      HPI:  Denis De Anda seen in clinic today, on anticoagulation therapy with warfarin for AV graft thrombosis  Changes to current medical/health status since last appt: pt took 5mg daily except for 2.5mg on Sunday  Denies signs/symptoms of bleeding and/or thrombosis since the last appt.    Denies any interval changes to diet  Denies any interval changes to medications since last appt.   Denies any complications or cost restrictions with current therapy.   BP declined    A/P   INR  is sub-therapeutic.   Discussed with pt, he does not want to increase his dose at this time. He wants to wait a week and then see where his INR is as he's fearful it will elevate as it has in the past.     Follow up appointment in 1 week(s).    Maeve Saha, PharmD

## 2018-11-21 LAB — INR BLD: 1.9 (ref 0.9–1.2)

## 2018-11-29 ENCOUNTER — ANTICOAGULATION VISIT (OUTPATIENT)
Dept: VASCULAR LAB | Facility: MEDICAL CENTER | Age: 38
End: 2018-11-29
Attending: INTERNAL MEDICINE
Payer: MEDICARE

## 2018-11-29 DIAGNOSIS — Z79.01 CHRONIC ANTICOAGULATION: ICD-10-CM

## 2018-11-29 LAB — INR PPP: 2.2 (ref 2–3.5)

## 2018-11-29 PROCEDURE — 99212 OFFICE O/P EST SF 10 MIN: CPT

## 2018-11-29 PROCEDURE — 85610 PROTHROMBIN TIME: CPT

## 2018-11-29 NOTE — PROGRESS NOTES
Anticoagulation Summary  As of 11/29/2018    INR goal:   2.5-3.5   TTR:   35.0 % (1.5 y)   Today's INR:   2.2!   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 Hopkins      Anticoagulation Care Providers     Provider Role Specialty Phone number    Jairo Hopkins M.D. Referring Surgery [phone]    [facility] Anticoagulation Services Responsible  [phone]        Anticoagulation Patient Findings      HPI:  Denis Inman Adilson seen in clinic today, on anticoagulation therapy with warfarin for AV graft thrombosis.   Changes to current medical/health status since last appt: none  Denies signs/symptoms of bleeding and/or thrombosis since the last appt.    Denies any interval changes to diet  Denies any interval changes to medications since last appt.   Denies any complications or cost restrictions with current therapy.   Declines vitals.   Confirmed dosing regimen.     A/P   INR  SUB-therapeutic.   Pt has been bleed free for weeks now.  He understands the risk of a low INR, but would like to continue with current regimen.     Follow up appointment in 1 week(s).    Donald Cedeño, PharmD

## 2018-11-30 LAB — INR BLD: 2.2 (ref 0.9–1.2)

## 2018-12-06 ENCOUNTER — ANTICOAGULATION VISIT (OUTPATIENT)
Dept: VASCULAR LAB | Facility: MEDICAL CENTER | Age: 38
End: 2018-12-06
Attending: INTERNAL MEDICINE
Payer: MEDICARE

## 2018-12-06 DIAGNOSIS — T82.868S THROMBOSIS OF ARTERIOVENOUS GRAFT, SEQUELA: ICD-10-CM

## 2018-12-06 LAB
INR BLD: 2.3 (ref 0.9–1.2)
INR PPP: 2.3 (ref 2–3.5)

## 2018-12-06 PROCEDURE — 85610 PROTHROMBIN TIME: CPT

## 2018-12-06 PROCEDURE — 99212 OFFICE O/P EST SF 10 MIN: CPT | Performed by: NURSE PRACTITIONER

## 2018-12-06 NOTE — PROGRESS NOTES
Anticoagulation Summary  As of 12/6/2018    INR goal:   2.5-3.5   TTR:   34.6 % (1.5 y)   Today's INR:   2.3!   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 Hopkins      Anticoagulation Care Providers     Provider Role Specialty Phone number    Jairo Hopkins M.D. Referring Surgery [phone]    [facility] Anticoagulation Services Responsible  [phone]        Anticoagulation Patient Findings      HPI:  Denis De Anda seen in clinic today for follow up on anticoagulation therapy in the presence of dialysis graft for dialysis. Denies any changes to current medical/health status since last appointment. Denies any medication or diet changes. No current symptoms of bleeding or thrombosis reported.    A/P:   INR subtherapeutic. Will increase regimen as outlined on calendar as INR trending low.     Follow up appointment in 1 week(s).    Next Appointment: Thursday, December 13, 2018 at 7:30 am.     Hanna LRAKIN

## 2018-12-20 ENCOUNTER — ANTICOAGULATION VISIT (OUTPATIENT)
Dept: VASCULAR LAB | Facility: MEDICAL CENTER | Age: 38
End: 2018-12-20
Attending: INTERNAL MEDICINE
Payer: MEDICARE

## 2018-12-20 DIAGNOSIS — Z79.01 CHRONIC ANTICOAGULATION: ICD-10-CM

## 2018-12-20 LAB
INR BLD: 1.6 (ref 0.9–1.2)
INR PPP: 1.6 (ref 2–3.5)

## 2018-12-20 PROCEDURE — 99212 OFFICE O/P EST SF 10 MIN: CPT

## 2018-12-20 PROCEDURE — 85610 PROTHROMBIN TIME: CPT

## 2018-12-20 NOTE — PROGRESS NOTES
Anticoagulation Summary  As of 12/20/2018    INR goal:   2.5-3.5   TTR:   33.7 % (1.6 y)   Today's INR:   1.6!   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 Hopkins      Anticoagulation Care Providers     Provider Role Specialty Phone number    Jairo Hopkins M.D. Referring Surgery [phone]    [facility] Anticoagulation Services Responsible  [phone]        Anticoagulation Patient Findings      HPI:  Denis Siegelham seen in clinic today, on anticoagulation therapy with warfarin for AV graft thrombosis.   Changes to current medical/health status since last appt: none  Denies signs/symptoms of bleeding and/or thrombosis since the last appt.    Denies any interval changes to diet  Denies any interval changes to medications since last appt.   Denies any complications or cost restrictions with current therapy.   Declines vitals.  Confirmed dosing regimen.     A/P   INR  SUB-therapeutic.   Begin increased regimen.     Follow up appointment in 1 week(s).    Donald Cedeño, PharmD

## 2018-12-23 ENCOUNTER — HOSPITAL ENCOUNTER (OUTPATIENT)
Facility: MEDICAL CENTER | Age: 38
End: 2018-12-23
Attending: EMERGENCY MEDICINE | Admitting: SURGERY
Payer: MEDICARE

## 2018-12-23 ENCOUNTER — APPOINTMENT (OUTPATIENT)
Dept: RADIOLOGY | Facility: MEDICAL CENTER | Age: 38
End: 2018-12-23
Attending: SURGERY
Payer: MEDICARE

## 2018-12-23 VITALS
OXYGEN SATURATION: 91 % | WEIGHT: 183.42 LBS | TEMPERATURE: 97.6 F | HEIGHT: 64 IN | HEART RATE: 114 BPM | BODY MASS INDEX: 31.31 KG/M2 | SYSTOLIC BLOOD PRESSURE: 111 MMHG | DIASTOLIC BLOOD PRESSURE: 65 MMHG | RESPIRATION RATE: 17 BRPM

## 2018-12-23 DIAGNOSIS — T82.868A: ICD-10-CM

## 2018-12-23 LAB
ANION GAP SERPL CALC-SCNC: 16 MMOL/L (ref 0–11.9)
BUN SERPL-MCNC: 60 MG/DL (ref 8–22)
CALCIUM SERPL-MCNC: 9.5 MG/DL (ref 8.5–10.5)
CHLORIDE SERPL-SCNC: 93 MMOL/L (ref 96–112)
CO2 SERPL-SCNC: 26 MMOL/L (ref 20–33)
CREAT SERPL-MCNC: 8.7 MG/DL (ref 0.5–1.4)
ERYTHROCYTE [DISTWIDTH] IN BLOOD BY AUTOMATED COUNT: 54.1 FL (ref 35.9–50)
GLUCOSE SERPL-MCNC: 82 MG/DL (ref 65–99)
HCT VFR BLD AUTO: 31.2 % (ref 42–52)
HCT VFR BLD CALC: 30 % (ref 42–52)
HGB BLD-MCNC: 10.2 G/DL (ref 14–18)
HGB BLD-MCNC: 9.8 G/DL (ref 14–18)
INR PPP: 1.81 (ref 0.87–1.13)
MCH RBC QN AUTO: 27.8 PG (ref 27–33)
MCHC RBC AUTO-ENTMCNC: 31.4 G/DL (ref 33.7–35.3)
MCV RBC AUTO: 88.6 FL (ref 81.4–97.8)
PLATELET # BLD AUTO: 152 K/UL (ref 164–446)
PMV BLD AUTO: 10 FL (ref 9–12.9)
POTASSIUM BLD-SCNC: 6.4 MMOL/L (ref 3.6–5.5)
POTASSIUM SERPL-SCNC: 6.3 MMOL/L (ref 3.6–5.5)
PROTHROMBIN TIME: 21.1 SEC (ref 12–14.6)
RBC # BLD AUTO: 3.52 M/UL (ref 4.7–6.1)
SODIUM BLD-SCNC: 134 MMOL/L (ref 135–145)
SODIUM SERPL-SCNC: 135 MMOL/L (ref 135–145)
WBC # BLD AUTO: 4.6 K/UL (ref 4.8–10.8)

## 2018-12-23 PROCEDURE — 160035 HCHG PACU - 1ST 60 MINS PHASE I: Performed by: SURGERY

## 2018-12-23 PROCEDURE — C1894 INTRO/SHEATH, NON-LASER: HCPCS | Performed by: SURGERY

## 2018-12-23 PROCEDURE — 501838 HCHG SUTURE GENERAL: Performed by: SURGERY

## 2018-12-23 PROCEDURE — 700101 HCHG RX REV CODE 250

## 2018-12-23 PROCEDURE — 85027 COMPLETE CBC AUTOMATED: CPT

## 2018-12-23 PROCEDURE — 700111 HCHG RX REV CODE 636 W/ 250 OVERRIDE (IP): Performed by: ANESTHESIOLOGY

## 2018-12-23 PROCEDURE — 80048 BASIC METABOLIC PNL TOTAL CA: CPT

## 2018-12-23 PROCEDURE — 160036 HCHG PACU - EA ADDL 30 MINS PHASE I: Performed by: SURGERY

## 2018-12-23 PROCEDURE — 84132 ASSAY OF SERUM POTASSIUM: CPT | Mod: XU

## 2018-12-23 PROCEDURE — C1725 CATH, TRANSLUMIN NON-LASER: HCPCS | Performed by: SURGERY

## 2018-12-23 PROCEDURE — 160029 HCHG SURGERY MINUTES - 1ST 30 MINS LEVEL 4: Performed by: SURGERY

## 2018-12-23 PROCEDURE — 160002 HCHG RECOVERY MINUTES (STAT): Performed by: SURGERY

## 2018-12-23 PROCEDURE — A9270 NON-COVERED ITEM OR SERVICE: HCPCS | Performed by: ANESTHESIOLOGY

## 2018-12-23 PROCEDURE — 85014 HEMATOCRIT: CPT | Mod: XU

## 2018-12-23 PROCEDURE — 85610 PROTHROMBIN TIME: CPT

## 2018-12-23 PROCEDURE — 160041 HCHG SURGERY MINUTES - EA ADDL 1 MIN LEVEL 4: Performed by: SURGERY

## 2018-12-23 PROCEDURE — 700102 HCHG RX REV CODE 250 W/ 637 OVERRIDE(OP)

## 2018-12-23 PROCEDURE — C1769 GUIDE WIRE: HCPCS | Performed by: SURGERY

## 2018-12-23 PROCEDURE — 84295 ASSAY OF SERUM SODIUM: CPT | Mod: XU

## 2018-12-23 PROCEDURE — 99291 CRITICAL CARE FIRST HOUR: CPT

## 2018-12-23 PROCEDURE — A6402 STERILE GAUZE <= 16 SQ IN: HCPCS | Performed by: SURGERY

## 2018-12-23 PROCEDURE — 700111 HCHG RX REV CODE 636 W/ 250 OVERRIDE (IP): Performed by: SURGERY

## 2018-12-23 PROCEDURE — 160048 HCHG OR STATISTICAL LEVEL 1-5: Performed by: SURGERY

## 2018-12-23 PROCEDURE — 501837 HCHG SUTURE CV: Performed by: SURGERY

## 2018-12-23 PROCEDURE — 90935 HEMODIALYSIS ONE EVALUATION: CPT

## 2018-12-23 PROCEDURE — 160009 HCHG ANES TIME/MIN: Performed by: SURGERY

## 2018-12-23 PROCEDURE — 700102 HCHG RX REV CODE 250 W/ 637 OVERRIDE(OP): Performed by: ANESTHESIOLOGY

## 2018-12-23 PROCEDURE — 700111 HCHG RX REV CODE 636 W/ 250 OVERRIDE (IP)

## 2018-12-23 PROCEDURE — G0378 HOSPITAL OBSERVATION PER HR: HCPCS

## 2018-12-23 PROCEDURE — C1757 CATH, THROMBECTOMY/EMBOLECT: HCPCS | Performed by: SURGERY

## 2018-12-23 RX ORDER — HEPARIN SODIUM 1000 [USP'U]/ML
1500 INJECTION, SOLUTION INTRAVENOUS; SUBCUTANEOUS
Status: COMPLETED | OUTPATIENT
Start: 2018-12-23 | End: 2018-12-23

## 2018-12-23 RX ORDER — DIPHENHYDRAMINE HYDROCHLORIDE 50 MG/ML
25 INJECTION INTRAMUSCULAR; INTRAVENOUS
Status: DISCONTINUED | OUTPATIENT
Start: 2018-12-23 | End: 2018-12-23 | Stop reason: HOSPADM

## 2018-12-23 RX ORDER — OXYCODONE HCL 5 MG/5 ML
5 SOLUTION, ORAL ORAL
Status: COMPLETED | OUTPATIENT
Start: 2018-12-23 | End: 2018-12-23

## 2018-12-23 RX ORDER — HEPARIN SODIUM 1000 [USP'U]/ML
INJECTION, SOLUTION INTRAVENOUS; SUBCUTANEOUS
Status: COMPLETED
Start: 2018-12-23 | End: 2018-12-23

## 2018-12-23 RX ORDER — OXYCODONE HCL 5 MG/5 ML
10 SOLUTION, ORAL ORAL
Status: COMPLETED | OUTPATIENT
Start: 2018-12-23 | End: 2018-12-23

## 2018-12-23 RX ORDER — SODIUM CHLORIDE 9 MG/ML
INJECTION, SOLUTION INTRAVENOUS ONCE
Status: DISCONTINUED | OUTPATIENT
Start: 2018-12-23 | End: 2018-12-23 | Stop reason: HOSPADM

## 2018-12-23 RX ORDER — IPRATROPIUM BROMIDE AND ALBUTEROL SULFATE 2.5; .5 MG/3ML; MG/3ML
3 SOLUTION RESPIRATORY (INHALATION)
Status: DISCONTINUED | OUTPATIENT
Start: 2018-12-23 | End: 2018-12-23 | Stop reason: HOSPADM

## 2018-12-23 RX ORDER — HYDROMORPHONE HYDROCHLORIDE 1 MG/ML
0.1 INJECTION, SOLUTION INTRAMUSCULAR; INTRAVENOUS; SUBCUTANEOUS
Status: DISCONTINUED | OUTPATIENT
Start: 2018-12-23 | End: 2018-12-23 | Stop reason: HOSPADM

## 2018-12-23 RX ORDER — HEPARIN SODIUM,PORCINE 1000/ML
VIAL (ML) INJECTION
Status: DISCONTINUED | OUTPATIENT
Start: 2018-12-23 | End: 2018-12-23 | Stop reason: HOSPADM

## 2018-12-23 RX ORDER — BUPIVACAINE HYDROCHLORIDE AND EPINEPHRINE 5; 5 MG/ML; UG/ML
INJECTION, SOLUTION EPIDURAL; INTRACAUDAL; PERINEURAL
Status: DISCONTINUED | OUTPATIENT
Start: 2018-12-23 | End: 2018-12-23 | Stop reason: HOSPADM

## 2018-12-23 RX ORDER — DIPHENHYDRAMINE HYDROCHLORIDE 50 MG/ML
12.5 INJECTION INTRAMUSCULAR; INTRAVENOUS
Status: DISCONTINUED | OUTPATIENT
Start: 2018-12-23 | End: 2018-12-23

## 2018-12-23 RX ORDER — ONDANSETRON 2 MG/ML
4 INJECTION INTRAMUSCULAR; INTRAVENOUS
Status: DISCONTINUED | OUTPATIENT
Start: 2018-12-23 | End: 2018-12-23 | Stop reason: HOSPADM

## 2018-12-23 RX ORDER — HALOPERIDOL 5 MG/ML
1 INJECTION INTRAMUSCULAR
Status: DISCONTINUED | OUTPATIENT
Start: 2018-12-23 | End: 2018-12-23 | Stop reason: HOSPADM

## 2018-12-23 RX ORDER — MAGNESIUM HYDROXIDE 1200 MG/15ML
LIQUID ORAL
Status: COMPLETED | OUTPATIENT
Start: 2018-12-23 | End: 2018-12-23

## 2018-12-23 RX ORDER — HYDROMORPHONE HYDROCHLORIDE 1 MG/ML
0.2 INJECTION, SOLUTION INTRAMUSCULAR; INTRAVENOUS; SUBCUTANEOUS
Status: DISCONTINUED | OUTPATIENT
Start: 2018-12-23 | End: 2018-12-23 | Stop reason: HOSPADM

## 2018-12-23 RX ORDER — HYDROMORPHONE HYDROCHLORIDE 1 MG/ML
0.4 INJECTION, SOLUTION INTRAMUSCULAR; INTRAVENOUS; SUBCUTANEOUS
Status: DISCONTINUED | OUTPATIENT
Start: 2018-12-23 | End: 2018-12-23 | Stop reason: HOSPADM

## 2018-12-23 RX ORDER — SODIUM CHLORIDE, SODIUM LACTATE, POTASSIUM CHLORIDE, CALCIUM CHLORIDE 600; 310; 30; 20 MG/100ML; MG/100ML; MG/100ML; MG/100ML
INJECTION, SOLUTION INTRAVENOUS CONTINUOUS
Status: DISCONTINUED | OUTPATIENT
Start: 2018-12-23 | End: 2018-12-23 | Stop reason: HOSPADM

## 2018-12-23 RX ORDER — IODIXANOL 270 MG/ML
INJECTION, SOLUTION INTRAVASCULAR
Status: DISCONTINUED | OUTPATIENT
Start: 2018-12-23 | End: 2018-12-23 | Stop reason: HOSPADM

## 2018-12-23 RX ADMIN — DIPHENHYDRAMINE HYDROCHLORIDE 12.5 MG: 50 INJECTION INTRAMUSCULAR; INTRAVENOUS at 15:35

## 2018-12-23 RX ADMIN — HEPARIN SODIUM 1500 UNITS: 1000 INJECTION, SOLUTION INTRAVENOUS; SUBCUTANEOUS at 16:25

## 2018-12-23 RX ADMIN — Medication 0.5 ML: at 16:24

## 2018-12-23 RX ADMIN — FENTANYL CITRATE 50 MCG: 50 INJECTION, SOLUTION INTRAMUSCULAR; INTRAVENOUS at 16:09

## 2018-12-23 RX ADMIN — OXYCODONE HYDROCHLORIDE 10 MG: 5 SOLUTION ORAL at 15:10

## 2018-12-23 RX ADMIN — DIPHENHYDRAMINE HYDROCHLORIDE 25 MG: 50 INJECTION INTRAMUSCULAR; INTRAVENOUS at 16:10

## 2018-12-23 ASSESSMENT — PAIN SCALES - GENERAL
PAINLEVEL_OUTOF10: 0
PAINLEVEL_OUTOF10: 0
PAINLEVEL_OUTOF10: 5
PAINLEVEL_OUTOF10: 0
PAINLEVEL_OUTOF10: 0
PAINLEVEL_OUTOF10: 2
PAINLEVEL_OUTOF10: 0

## 2018-12-23 NOTE — OR SURGEON
Immediate Post OP Note    PreOp Diagnosis: Thrombosed left thigh AVG    PostOp Diagnosis: Same    Procedure(s):  AV FISTULA thrombectomy and angioplasty - Wound Class: Clean    Surgeon(s):  Lurdes Moffett M.D.    Anesthesiologist/Type of Anesthesia:  Anesthesiologist: Chandrakant Lowery MD/General    Surgical Staff:  Circulator: Antonio Pierce R.N.  Scrub Person: Khari Steve  Radiology Technologist: Cortez Cornelius    Specimens removed if any:thrombus    Estimated Blood Loss: 100cc    Findings: Venous outfflow issue    Complications: none    [id number]    12/23/2018 3:23 PM Lurdes Moffett M.D.

## 2018-12-23 NOTE — ED PROVIDER NOTES
ED Provider Note    Scribed for Charlie Estevez M.D. by Eitan Siegel. 12/23/2018  12:12 PM    Primary care provider: Tony Mcmillan M.D.  Means of arrival: Walk-in  History obtained from: Patient  History limited by: None    CHIEF COMPLAINT  Chief Complaint   Patient presents with   • Leg Pain     left leg pain since yesterday/ pt reports to have left leg graft.        HPI  Denis De Anda is a 38 y.o. male who presents to the Emergency Department for left leg pain since yesterday. Patient has a painful, obstructed dialysis graft in his left thigh and he is undergoing a repair surgery later today by Dr. Moffett, vascular surgery. He had the graft placed secondary to chronic kidney problems since birth. His last dialysis treatment was 2 days ago and he takes coumadin daily. He has not eaten anything today. Denies   fever, vomiting, cough, rash, nausea, diarrhea, dizziness, palpitations.     Patient denies a history of diabetes, hypertension, DVT, PE or vascular problems.     REVIEW OF SYSTEMS  Pertinent positives include: left leg pain.  Pertinent negatives include: fever, vomiting, cough, rash, nausea, diarrhea, dizziness, palpitations.  10+ systems reviewed and negative.      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   • Toxic uninodular goiter without mention of thyrotoxic crisis or  storm        FAMILY HISTORY  Family History   Problem Relation Age of Onset   • Arthritis Father         RA   • Lung Disease Father    • Heart Disease Father         MI   • Hypertension Brother        SOCIAL HISTORY  Social History   Substance Use Topics   • Smoking status: Never Smoker   • Smokeless tobacco: Never Used   • Alcohol use No     History   Drug Use No       SURGICAL HISTORY  Past Surgical History:   Procedure Laterality Date   • SPLIT THICKNESS SKIN GRAFT Right 9/21/2018    Procedure: SPLIT THICKNESS SKIN GRAFT - ARM TO ABDOMEN;  Surgeon: Samson Rosenbaum Jr., M.D.;  Location: SURGERY [facility];  Service: Plastics   • SKIN FLAP DELAYED Left 9/10/2018    Procedure: SKIN FLAP DELAYED- FOR DIVISION AND INSET FLAP WITH SKIN GRAFT ON ARM;  Surgeon: Samson Rosenbaum Jr., M.D.;  Location: SURGERY SAME DAY [facility];  Service: Plastics   • MYOCUTANEOUS FLAP Right 8/27/2018    Procedure: MYOCUTANEOUS FLAP - RIGHT ARM TO TRUNK;  Surgeon: Samson Rosenbaum Jr., M.D.;  Location: SURGERY [facility];  Service: Plastics   • ABDOMINAL EXPLORATION  6/25/2018    Procedure: ABDOMINAL EXPLORATION- CONTROL ABDOMINAL BLEEDING;  Surgeon: Samson Rosenbaum Jr., M.D.;  Location: [facility];  Service: Plastics   • THROMBECTOMY Left 4/28/2018    Procedure: THROMBECTOMY- Thigh W/ Graft;  Surgeon: Jairo Hopkins M.D.;  Location: [facility];  Service: General   • THROMBECTOMY Left 4/27/2018    Procedure: THROMBECTOMY - THIGH GRAFT AND REVISION;  Surgeon: Jairo Hopkins M.D.;  Location: [facility];  Service: General   • THROMBECTOMY Left 3/9/2018    Procedure: THROMBECTOMY- THIGH DIALYSIS GRAFT AND REVISION  ;  Surgeon: Jairo Hopkins M.D.;  Location: SURGERY [facility];  Service: General   • CATH PLACEMENT Right 3/9/2018    Procedure: CATH PLACEMENT - REPLACE DIALYSIS CATH RIGHT THIGH;  Surgeon: Jairo Hopkins M.D.;  Location: [facility];   Service: General   • SPLIT THICKNESS SKIN GRAFT  2/26/2018    Procedure: SPLIT THICKNESS SKIN GRAFT- TO ABDOMEN;  Surgeon: Samson Rosenbaum Jr., M.D.;  Location: [facility];  Service: Plastics   • WOUND CLOSURE GENERAL  2/19/2018    Procedure: WOUND CLOSURE GENERAL-ABDOMINAL WALL HEMORRHAGE;  Surgeon: Jason Robertson M.D.;  Location: [facility];  Service: Plastics   • WOUND EXPLORATION GENERAL  2/1/2018    Procedure: WOUND EXPLORATION GENERAL, REPAIR BLEEDING;  Surgeon: Samson Rosenbaum Jr., M.D.;  Location: [facility];  Service: Plastics   • CATH PLACEMENT Right 11/14/2017    Procedure: CATH PLACEMENT - PERMA;  Surgeon: Jairo Hopkins M.D.;  Location: [facility];  Service: General   • AV FISTULA REVISION Left 11/14/2017    Procedure: AV GRAFT REVISION;  Surgeon: Jairo Hopkins M.D.;  Location: [facility];  Service: General   • IRRIGATION & DEBRIDEMENT GENERAL  7/31/2017    Procedure: IRRIGATION & DEBRIDEMENT GENERAL-ABDOMEN;  Surgeon: Samson Rosenbaum Jr., M.D.;  Location: [facility];  Service:    • ABDOMINOPLASTY  6/5/2017    Procedure: ABDOMINOPLASTY - FOR PANNICULECTOMY;  Surgeon: Samson Rosenbaum Jr., M.D.;  Location: [facility];  Service:    • SPINAL CORD STIMULATOR N/A 5/4/2017    Procedure: SPINAL CORD STIMULATOR - EXPLANT;  Surgeon: Bin Pro M.D.;  Location: [facility];  Service:    • THROMBECTOMY Left 1/6/2017    Procedure: THROMBECTOMY-OPEN THROMBECTOMY WITH LEFT THIGH GRAFT;  Surgeon: Jairo Hopkins M.D.;  Location: [facility];  Service:    • CATH PLACEMENT Right 1/6/2017    Procedure: CATH PLACEMENT;  Surgeon: Jairo Hopkins M.D.;  Location: [facility];  Service:    • THROMBECTOMY Left 1/5/2017    Procedure: THROMBECTOMY-THIGH AV LOOP GRAFT AND ANGIOJET;  Surgeon: Jairo Hopkins M.D.;  Location: [facility];  Service:    • FLAP CLOSURE  Left 11/17/2016    Procedure: Fasciocutaneous Flap Closure Left Upper Leg;  Surgeon: Samson Rosenbaum Jr., M.D.;  Location: SURGERY [facility];  Service:    • IRRIGATION & DEBRIDEMENT GENERAL Left 10/28/2016    Procedure: IRRIGATION & DEBRIDEMENT GENERAL THIGH WITH IRRIGATING WOUND VAC PLACEMENT;  Surgeon: Jairo Hopkins M.D.;  Location: SURGERY [facility];  Service:    • THROMBECTOMY Left 10/20/2016    Procedure: THROMBECTOMY THIGH;  Surgeon: Jairo Hopkins M.D.;  Location: SURGERY [facility];  Service:    • INCISION AND DRAINAGE GENERAL  10/20/2016    Procedure: INCISION AND DRAINAGE GENERAL HEMATOMA;  Surgeon: Jairo Hopkins M.D.;  Location: SURGERY [facility];  Service:    • THROMBECTOMY Left 9/18/2016    Procedure: THROMBECTOMY - and fistula revision;  Surgeon: Jairo Hopkins M.D.;  Location: SURGERY [facility];  Service:    • AV FISTULOGRAM  9/18/2016    Procedure: AV FISTULOGRAM;  Surgeon: Jairo Hopkins M.D.;  Location: SURGERY [facility];  Service:    • CATH PLACEMENT Right 9/17/2016    Procedure: CATH PLACEMENT - tunneled dialysis cath placement right femoral ;  Surgeon: Quentin Alicia M.D.;  Location: SURGERY [facility];  Service:    • MASS EXCISION GENERAL Right 8/5/2016    Procedure: MASS EXCISION GENERAL FOR CALCIPHYLAXIS SKIN AND SUBCUTANEOUS TISSUE FOREARM;  Surgeon: Jairo Hopkins M.D.;  Location: SURGERY [facility];  Service:    • LESION EXCISION GENERAL Right 7/11/2016    Procedure: LESION EXCISION GENERAL FOR ARM SKIN;  Surgeon: Jairo Hopkins M.D.;  Location: SURGERY [facility];  Service:    • RECOVERY  7/8/2016    Procedure: IR1-VASCULAR CASE-HOPKINS-LEFT THIGH AV GRAFT THROMBOLYSIS WITH TISSUE PLASMINOGEN ACTIVATOR AND ANGIOJET ARTHERECTOMY   ;  Surgeon: Melinda Surgery;  Location: SURGERY PRE-POST PROC UNIT [facility];  Service:    • SPINAL CORD STIMULATOR N/A 3/25/2016    Procedure: SPINAL CORD STIMULATOR;  Surgeon: Bin Pro;   Location: SURGERY [facility];  Service:    • PB PERCUT IMPLNT NEUROELECT,EPIDURAL  2/19/2016    Procedure: IMPLANT NEUROSTIM EPI ARRAY;  Surgeon: Bin Pro;  Location: SURGERY [facility];  Service: Pain Management   • PB PERCUT IMPLNT NEUROELECT,EPIDURAL  2/19/2016    Procedure: IMPLANT NEUROSTIM EPI ARRAY;  Surgeon: Bin Pro;  Location: SURGERY [facility];  Service: Pain Management   • RECOVERY  8/7/2015    Procedure:  VASCULAR CASE VIANEY-RIGHT ILIAC VENOGRAM WITH ANGIOPLASTY, REMOVAL RIGHT FEMORAL TUNNELED DIALYSIS CATHETER  **VRE**;  Surgeon: Melinda Surgery;  Location: SURGERY PRE-POST PROC UNIT [facility];  Service:    • AV FISTULA REVISION Left 6/17/2015    Procedure: AV FISTULA REVISION;  Surgeon: Jairo Hopkins M.D.;  Location: SURGERY [facility];  Service:    • IRRIGATION & DEBRIDEMENT GENERAL Left 6/17/2015    Procedure: IRRIGATION & DEBRIDEMENT GENERAL ARM W/EXPLORATION WOUND & CONTROL BLEEDING;  Surgeon: Jairo Hopkins M.D.;  Location: SURGERY [facility];  Service:    • AV FISTULA THROMBOLYSIS Left 6/9/2015    Procedure: THROMBECTOMY AV GRAFT THIGH;  Surgeon: Jairo Hopkins M.D.;  Location: SURGERY [facility];  Service:    • AV FISTULA THROMBOLYSIS Left 6/3/2015    Procedure: AV FISTULA THROMBOLYSIS THIGH REPLACEMENT;  Surgeon: Jairo Hopkins M.D.;  Location: SURGERY [facility];  Service:    • IRRIGATION & DEBRIDEMENT GENERAL Left 6/3/2015    Procedure: IRRIGATION & DEBRIDEMENT GENERAL;  Surgeon: Jairo Hopkins M.D.;  Location: SURGERY [facility];  Service:    • AV FISTULA CREATION  4/20/2015    Performed by Jairo Hopkins M.D. at [facility]   • PB INJECT NERV BLCK,STELLATE GANGLION  3/24/2015    Performed by Bin Pro at [facility]   • AV FISTULA REVISION  3/20/2015    Performed by Jairo Hopkins M.D. at [facility]   • AV FISTULA REVISION  2/22/2015    Performed by Lurdes Moffett M.D. at  SURGERY [facility] ORS   • AV FISTULA REVISION  2/8/2015    Performed by Jairo Winters M.D. at SURGERY [facility] ORS   • IRRIGATION & DEBRIDEMENT GENERAL  12/15/2014    Performed by Jairo Winters M.D. at SURGERY [facility]   • AV FISTULA REVISION  9/30/2014    Performed by Jairo Winters M.D. at SURGERY [facility]   • RECOVERY  6/13/2014    Performed by [facility] at SURGERY SAME DAY [facility] ORS   • INCISION AND DRAINAGE GENERAL  9/13/2013    Performed by Jairo Winters M.D. at SURGERY [facility] ORS   • DEBRIDEMENT  9/13/2013    Performed by Jairo Winters M.D. at SURGERY [facility]   • VEIN LIGATION  9/13/2013    Performed by Jairo Winters M.D. at SURGERY [facility]   • RECOVERY  5/18/2012    Performed by SURGERY, IR-RECOVERY at SURGERY [facility]   • BONE SPUR EXCISION  12/8/2011    Performed by BELKIS BREAUX at SURGERY SAME DAY [facility]   • AV FISTULA REVISION  11/3/2011    Performed by JAIRO WINTERS at SURGERY [facility] ORS   • AV FISTULOGRAM  6/5/2011    Performed by JAIRO WINTERS at SURGERY [facility]   • CATH PLACEMENT  6/5/2011    Performed by JAIRO WINTERS at SURGERY [facility]   • CATH PLACEMENT  2/1/2011    Performed by JAIRO WINTERS at SURGERY [facility]   • ANGIOPLASTY BALLOON  2/1/2011    Performed by JAIRO WINTERS at SURGERY [facility]   • ENDARTERECTOMY  11/16/2010    Performed by JAIRO WINTERS at SURGERY [facility] ORS   • AV FISTULOGRAM  11/16/2010    Performed by JAIRO WINTERS at SURGERY [facility] ORS   • ARTERIOGRAM  3/5/2009    Performed by JAIRO WINTERS at SURGERY [facility]   • ANGIOPLASTY BALLOON  3/5/2009    Performed by JAIRO WINTERS at SURGERY [facility] ORS   • AV FISTULOGRAM  3/5/2009    Performed by JAIRO WINTERS at SURGERY [facility]   • AV FISTULA CREATION  11/6/08    Performed by JAIRO WINTERS at SURGERY [facility]   • MASS  "EXCISION ORTHO  10/9/08    Performed by BLEKIS BREAUX at SURGERY SAME DAY [facility] ORS   • PARATHYROIDECTOMY  2006   • INGUINAL HERNIA REPAIR Bilateral 2001, 2002   • US-KIDNEY TRANSPLANT  1996, 2001    x2       CURRENT MEDICATIONS    Current Outpatient Prescriptions on File Prior to Encounter   Medication Sig Dispense Refill   • warfarin (COUMADIN) 5 MG Tab Take 0.5-1 Tabs by mouth every evening. 30 Tab 5   • HYDROcodone/acetaminophen (NORCO)  MG Tab Take 1 Tab by mouth every 8 hours as needed.     • gabapentin (NEURONTIN) 300 MG Cap Take 1,200 mg by mouth every bedtime. Pt also has an RX for 600MG, pt takes total of 1,800MG HS     • midodrine (PROAMATINE) 10 MG tablet Take 1 Tab by mouth 3 times a day, with meals. Can hold dose if blood pressure greater than 120 systolic. 60 Tab 3   • cinacalcet (SENSIPAR) 30 MG Tab Take 30-60 mg by mouth every day. 30 mg Sunday, Mondays , Wednesdays , and Fridays. Pt takes 60MG on Tue, Thur, and Sat     • levetiracetam (KEPPRA) 250 MG tablet Take 1 Tab by mouth 2 times a day. 60 Tab 3   • gabapentin (NEURONTIN) 600 MG tablet Take 600 mg by mouth every bedtime. Pt also has an RX for 300MG, pt takes total of 1,800MG HS     • sevelamer carbonate (RENVELA) 800 MG Tab tablet Take 3,200 mg by mouth 3 times a day, with meals. With meals     • calcitRIOL (ROCALTROL) 0.25 MCG CAPS Take 0.75 mcg by mouth every bedtime.        ALLERGIES  Allergies   Allergen Reactions   • Baclofen Unspecified     Total loss of memory, sedation.   RXN=6/2015   • Contrast Media With Iodine [Iodine] Rash     RXN=1/5/2017   • Keflex Rash     RXN=possibly >10 years   • Pcn [Penicillins] Rash     RXN=possibly >10 years ago  Tolerated Zosyn on 2/20/18   • Tape Rash     Paper tape and tegaderm ok  RXN=ongoing       PHYSICAL EXAM  VITAL SIGNS: /76   Pulse (!) 104   Temp 36.6 °C (97.8 °F)   Resp 18   Ht 1.626 m (5' 4\")   Wt 83.2 kg (183 lb 6.8 oz)   SpO2 98%   BMI 31.48 kg/m²   Reviewed and " tachycardic, high normal blood pressure  Constitutional: Well developed, Well nourished, well-appearing.  HENT: Normocephalic, atraumatic, bilateral external ears normal, oropharynx moist, No exudates or erythema.   Eyes: PERRLA, conjunctiva pink, no scleral icterus.   Cardiovascular: Regular rate and rhythm. No murmurs, rubs or gallops.  No palpable thrill left groin AV fistula  Respiratory: Lungs clear to auscultation bilaterally. No wheezes, rales, or rhonchi.   Abdominal:  Abdomen soft, non-tender, non distended. No rebound, or guarding.    Skin: No erythema, no rash.   Genitourinary: No costovertebral angle tenderness.   Musculoskeletal: No edema.   Neurologic: Alert & oriented x 3, cranial nerves 2-12 intact by passive exam.  No focal deficit noted.  Vascular: AV fistula in left groin without palpable thrill, no erythema, minimal tenderness to area  Psychiatric: Affect normal, Judgment normal, Mood normal.     DIFFERENTIAL DIAGNOSIS:  Thrombosed dialysis fistula, DVT, cellulitis, hematoma.    ED COURSE:    12:12 PM - Patient seen and examined at bedside. Informed him that he would be admitted to Dr. Moffett for surgery and he agrees to the plan of care.      MEDICAL DECISION MAKING:  This patient presents with a thrombosed left groin AV fistula for hemodialysis.  This occurred despite Coumadin use.  Dr. Moffett called the patient to the OR for thrombectomy prior to labs being ordered.  At this point there is no evidence of arterial ischemia abscess or cellulitis.    PLAN:  Thrombectomy and revision of AV fistula, dialysis catheter placement as needed    CONDITION: Fair.     DISPOSITION:  Patient will be admitted to Dr. Moffett, vascular surgery, in guarded condition.      FINAL IMPRESSION  1. Thrombosis of dialysis shunt, initial encounter ([facility])          IEitan (Sugey), am scribing for, and in the presence of, Charlie Estevez M.D..    Electronically signed by: Eitan Siegel (Sugey),  12/23/2018    ICharlie M.D. personally performed the services described in this documentation, as scribed by Eitan Siegel in my presence, and it is both accurate and complete. C.    The note accurately reflects work and decisions made by me.  Charlie Estevez  12/23/2018  3:30 PM

## 2018-12-23 NOTE — ED TRIAGE NOTES
Chief Complaint   Patient presents with   • Leg Pain     left leg pain since yesterday/ pt reports to have left leg graft.      Explained to pt triage process, made pt aware to tell this RN/staff of any changes/concerns, pt verbalized understanding of process and instructions given. Pt to MODESTA welch.

## 2018-12-23 NOTE — ED NOTES
Pt amb to rm 36. Pt reports he is having surgery at 1300. Pain to LT upper leg fistula x1 day. CMS intact to LLE.

## 2018-12-23 NOTE — OR NURSING
Patient allergies and NPO status verified. belongings secured. Patient verbalizes understanding of pain scale, expected course of stay and plan of care. Surgical site verified with patient. IV access established. Blood sent to lab. Call light within reach. No further needs at this time; hourly rounding in place.

## 2018-12-23 NOTE — H&P
Surgery General History & Physical Note    Date  12/23/2018    Primary Care Physician  Tony Mcmillan M.D.    CC  Thrombosed left thigh arteriovenous graft.    HPI  This is a 38 y.o. male who called yesterday with his left thigh loop graft without flow.  He last dialyzed Friday.  He has had repetitive failures and may procedures to maintain patency of this graft.  It is usually an outflow stenosis and he requires angioplasty of the venous end to restore patency.  He will require dialysis before discharge.     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   • Toxic uninodular goiter without mention of thyrotoxic crisis or storm        Past Surgical History:   Procedure Laterality Date   • SPLIT THICKNESS SKIN GRAFT Right 9/21/2018    Procedure: SPLIT THICKNESS SKIN GRAFT - ARM TO ABDOMEN;  Surgeon: Samson Rosenbaum Jr., M.D.;  Location: SURGERY [facility];  Service: Plastics   • SKIN FLAP DELAYED Left 9/10/2018    Procedure: SKIN FLAP DELAYED- FOR DIVISION AND INSET FLAP WITH SKIN GRAFT ON ARM;  Surgeon: Samson Rosenbaum Jr., M.D.;  Location: SURGERY SAME DAY [facility];  Service: Plastics   • MYOCUTANEOUS FLAP Right 8/27/2018    Procedure: MYOCUTANEOUS FLAP - RIGHT ARM TO TRUNK;  Surgeon: Samson Rosenbaum Jr., M.D.;  Location: SURGERY [facility];  Service: Plastics   • ABDOMINAL EXPLORATION  6/25/2018     Procedure: ABDOMINAL EXPLORATION- CONTROL ABDOMINAL BLEEDING;  Surgeon: Samson Rosenbaum Jr., M.D.;  Location: SURGERY [facility];  Service: Plastics   • THROMBECTOMY Left 4/28/2018    Procedure: THROMBECTOMY- Thigh W/ Graft;  Surgeon: Jairo Hopkins M.D.;  Location: SURGERY [facility];  Service: General   • THROMBECTOMY Left 4/27/2018    Procedure: THROMBECTOMY - THIGH GRAFT AND REVISION;  Surgeon: Jairo Hopkins M.D.;  Location: SURGERY [facility];  Service: General   • THROMBECTOMY Left 3/9/2018    Procedure: THROMBECTOMY- THIGH DIALYSIS GRAFT AND REVISION  ;  Surgeon: Jairo Hopkins M.D.;  Location: SURGERY [facility];  Service: General   • CATH PLACEMENT Right 3/9/2018    Procedure: CATH PLACEMENT - REPLACE DIALYSIS CATH RIGHT THIGH;  Surgeon: Jairo Hopkins M.D.;  Location: SURGERY [facility];  Service: General   • SPLIT THICKNESS SKIN GRAFT  2/26/2018    Procedure: SPLIT THICKNESS SKIN GRAFT- TO ABDOMEN;  Surgeon: Samson Rosenbaum Jr., M.D.;  Location: SURGERY [facility];  Service: Plastics   • WOUND CLOSURE GENERAL  2/19/2018    Procedure: WOUND CLOSURE GENERAL-ABDOMINAL WALL HEMORRHAGE;  Surgeon: Jason Robertson M.D.;  Location: SURGERY [facility];  Service: Plastics   • WOUND EXPLORATION GENERAL  2/1/2018    Procedure: WOUND EXPLORATION GENERAL, REPAIR BLEEDING;  Surgeon: Samson Rosenbaum Jr., M.D.;  Location: SURGERY [facility];  Service: Plastics   • CATH PLACEMENT Right 11/14/2017    Procedure: CATH PLACEMENT - PERMA;  Surgeon: Jairo Hopkins M.D.;  Location: SURGERY [facility];  Service: General   • AV FISTULA REVISION Left 11/14/2017    Procedure: AV GRAFT REVISION;  Surgeon: Jairo Hopkins M.D.;  Location: SURGERY [facility];  Service: General   • IRRIGATION & DEBRIDEMENT GENERAL  7/31/2017    Procedure: IRRIGATION & DEBRIDEMENT GENERAL-ABDOMEN;  Surgeon: Samson Rosenbaum Jr., M.D.;  Location: [facility];   Service:    • ABDOMINOPLASTY  6/5/2017    Procedure: ABDOMINOPLASTY - FOR PANNICULECTOMY;  Surgeon: Samson Rosenbaum Jr., M.D.;  Location: [facility];  Service:    • SPINAL CORD STIMULATOR N/A 5/4/2017    Procedure: SPINAL CORD STIMULATOR - EXPLANT;  Surgeon: Bin Pro M.D.;  Location: [facility];  Service:    • THROMBECTOMY Left 1/6/2017    Procedure: THROMBECTOMY-OPEN THROMBECTOMY WITH LEFT THIGH GRAFT;  Surgeon: Jairo Hopkins M.D.;  Location: [facility];  Service:    • CATH PLACEMENT Right 1/6/2017    Procedure: CATH PLACEMENT;  Surgeon: Jairo Hopkins M.D.;  Location: [facility];  Service:    • THROMBECTOMY Left 1/5/2017    Procedure: THROMBECTOMY-THIGH AV LOOP GRAFT AND ANGIOJET;  Surgeon: Jairo Hopkins M.D.;  Location: [facility];  Service:    • FLAP CLOSURE Left 11/17/2016    Procedure: Fasciocutaneous Flap Closure Left Upper Leg;  Surgeon: Samson Rosenbaum Jr., M.D.;  Location: [facility];  Service:    • IRRIGATION & DEBRIDEMENT GENERAL Left 10/28/2016    Procedure: IRRIGATION & DEBRIDEMENT GENERAL THIGH WITH IRRIGATING WOUND VAC PLACEMENT;  Surgeon: Jairo Hopkins M.D.;  Location: [facility];  Service:    • THROMBECTOMY Left 10/20/2016    Procedure: THROMBECTOMY THIGH;  Surgeon: Jairo Hopkins M.D.;  Location: [facility];  Service:    • INCISION AND DRAINAGE GENERAL  10/20/2016    Procedure: INCISION AND DRAINAGE GENERAL HEMATOMA;  Surgeon: Jairo Hopkins M.D.;  Location: [facility];  Service:    • THROMBECTOMY Left 9/18/2016    Procedure: THROMBECTOMY - and fistula revision;  Surgeon: Jairo Hopkins M.D.;  Location: [facility];  Service:    • AV FISTULOGRAM  9/18/2016    Procedure: AV FISTULOGRAM;  Surgeon: Jairo Hopkins M.D.;  Location: [facility];  Service:    • CATH PLACEMENT Right 9/17/2016    Procedure: CATH PLACEMENT -  tunneled dialysis cath placement right femoral ;  Surgeon: Quentin Alicia M.D.;  Location: SURGERY [facility];  Service:    • MASS EXCISION GENERAL Right 8/5/2016    Procedure: MASS EXCISION GENERAL FOR CALCIPHYLAXIS SKIN AND SUBCUTANEOUS TISSUE FOREARM;  Surgeon: Jairo Hopkins M.D.;  Location: SURGERY [facility];  Service:    • LESION EXCISION GENERAL Right 7/11/2016    Procedure: LESION EXCISION GENERAL FOR ARM SKIN;  Surgeon: Jairo Hopkins M.D.;  Location: SURGERY [facility];  Service:    • RECOVERY  7/8/2016    Procedure: IR1-VASCULAR CASE-VIANEY-LEFT THIGH AV GRAFT THROMBOLYSIS WITH TISSUE PLASMINOGEN ACTIVATOR AND ANGIOJET ARTHERECTOMY   ;  Surgeon: Melinda Surgery;  Location: SURGERY PRE-POST PROC UNIT [facility];  Service:    • SPINAL CORD STIMULATOR N/A 3/25/2016    Procedure: SPINAL CORD STIMULATOR;  Surgeon: Bin Pro;  Location: [facility];  Service:    • PB PERCUT IMPLNT NEUROELECT,EPIDURAL  2/19/2016    Procedure: IMPLANT NEUROSTIM EPI ARRAY;  Surgeon: Bin Pro;  Location: [facility];  Service: Pain Management   • PB PERCUT IMPLNT NEUROELECT,EPIDURAL  2/19/2016    Procedure: IMPLANT NEUROSTIM EPI ARRAY;  Surgeon: Bin Pro;  Location: [facility];  Service: Pain Management   • RECOVERY  8/7/2015    Procedure:  VASCULAR CASE VIANEY-RIGHT ILIAC VENOGRAM WITH ANGIOPLASTY, REMOVAL RIGHT FEMORAL TUNNELED DIALYSIS CATHETER  **VRE**;  Surgeon: Recoveryonly Surgery;  Location: SURGERY PRE-POST PROC UNIT [facility];  Service:    • AV FISTULA REVISION Left 6/17/2015    Procedure: AV FISTULA REVISION;  Surgeon: Jairo Hopkins M.D.;  Location: SURGERY [facility];  Service:    • IRRIGATION & DEBRIDEMENT GENERAL Left 6/17/2015    Procedure: IRRIGATION & DEBRIDEMENT GENERAL ARM W/EXPLORATION WOUND & CONTROL BLEEDING;  Surgeon: Jairo Hopkins M.D.;  Location: SURGERY [facility];  Service:    • AV FISTULA THROMBOLYSIS Left 6/9/2015     Procedure: THROMBECTOMY AV GRAFT THIGH;  Surgeon: Jairo Hopkins M.D.;  Location: SURGERY [facility];  Service:    • AV FISTULA THROMBOLYSIS Left 6/3/2015    Procedure: AV FISTULA THROMBOLYSIS THIGH REPLACEMENT;  Surgeon: Jairo Hopkins M.D.;  Location: SURGERY [facility];  Service:    • IRRIGATION & DEBRIDEMENT GENERAL Left 6/3/2015    Procedure: IRRIGATION & DEBRIDEMENT GENERAL;  Surgeon: Jairo Hopkins M.D.;  Location: SURGERY [facility];  Service:    • AV FISTULA CREATION  4/20/2015    Performed by Jairo Hopkins M.D. at SURGERY [facility]   • PB INJECT NERV BLCK,STELLATE GANGLION  3/24/2015    Performed by Bin Pro at [facility]   • AV FISTULA REVISION  3/20/2015    Performed by Jairo Hopkins M.D. at SURGERY [facility]   • AV FISTULA REVISION  2/22/2015    Performed by Lurdes Moffett M.D. at SURGERY [facility]   • AV FISTULA REVISION  2/8/2015    Performed by Jairo Hopkins M.D. at SURGERY [facility]   • IRRIGATION & DEBRIDEMENT GENERAL  12/15/2014    Performed by Jairo Hopkins M.D. at SURGERY [facility]   • AV FISTULA REVISION  9/30/2014    Performed by Jairo Hopkins M.D. at SURGERY [facility]   • RECOVERY  6/13/2014    Performed by [facility]Recovery Surgery at [facility] SAME DAY [facility]   • INCISION AND DRAINAGE GENERAL  9/13/2013    Performed by Jairo Hopkins M.D. at SURGERY [facility]   • DEBRIDEMENT  9/13/2013    Performed by Jairo Hopkins M.D. at SURGERY [facility]   • VEIN LIGATION  9/13/2013    Performed by Jairo Hopkins M.D. at SURGERY [facility]   • RECOVERY  5/18/2012    Performed by SURGERY, IR-RECOVERY at [facility]   • BONE SPUR EXCISION  12/8/2011    Performed by BELKIS BREAUX at SURGERY SAME DAY [facility]   • AV FISTULA REVISION  11/3/2011    Performed by JAIRO HOPKINS at SURGERY [facility]   • AV FISTULOGRAM  6/5/2011    Performed by VIANEY  MARI ROWE at SURGERY [facility]   • CATH PLACEMENT  6/5/2011    Performed by MARI WINTERS at SURGERY [facility]   • CATH PLACEMENT  2/1/2011    Performed by MARI WINTERS at SURGERY [facility]   • ANGIOPLASTY BALLOON  2/1/2011    Performed by MARI WINTERS at SURGERY [facility]   • ENDARTERECTOMY  11/16/2010    Performed by MARI WINTERS at SURGERY [facility]   • AV FISTULOGRAM  11/16/2010    Performed by MARI WINTERS at SURGERY [facility]   • ARTERIOGRAM  3/5/2009    Performed by MARI WINTERS at SURGERY [facility]   • ANGIOPLASTY BALLOON  3/5/2009    Performed by MARI WINTERS at SURGERY [facility]   • AV FISTULOGRAM  3/5/2009    Performed by MARI WINTERS at SURGERY [facility]   • AV FISTULA CREATION  11/6/08    Performed by MARI WINTERS at SURGERY [facility]   • MASS EXCISION ORTHO  10/9/08    Performed by BELKIS BREAUX at SURGERY SAME DAY [facility]   • PARATHYROIDECTOMY  2006   • INGUINAL HERNIA REPAIR Bilateral 2001, 2002   • US-KIDNEY TRANSPLANT  1996, 2001    x2       Current Facility-Administered Medications   Medication Dose Route Frequency Provider Last Rate Last Dose   • NS infusion   Intravenous Once Lurdes Moffett M.D.           Social History     Social History   • Marital status: Single     Spouse name: N/A   • Number of children: N/A   • Years of education: N/A     Occupational History   •  [employer]     Social History Main Topics   • Smoking status: Never Smoker   • Smokeless tobacco: Never Used   • Alcohol use No   • Drug use: No   • Sexual activity: Yes     Partners: Female     Other Topics Concern   • Not on file     Social History Narrative   • No narrative on file       Family History   Problem Relation Age of Onset   • Arthritis Father         RA   • Lung Disease Father    • Heart Disease Father         MI   • Hypertension Brother        Allergies  Baclofen; Contrast media with  iodine [iodine]; Keflex; Pcn [penicillins]; and Tape    Review of Systems  Negative except for edema    Physical Exam   Constitutional: He is well-developed, well-nourished, and in no distress.   HENT:   Head: Normocephalic and atraumatic.   Eyes: Pupils are equal, round, and reactive to light.   Neck: Normal range of motion. Neck supple.   Cardiovascular: Normal rate and regular rhythm.    Thrombosed left thigh arteriovenous loop graft   Pulmonary/Chest: Effort normal and breath sounds normal. No respiratory distress.   Abdominal: Soft.   Recent surgical reconstruction.    Musculoskeletal: Normal range of motion. He exhibits edema.   Neurological: He is alert.   Skin: Skin is warm.   Psychiatric: Memory, affect and judgment normal.     Physical Exam   Constitutional: He is well-developed, well-nourished, and in no distress.   HENT:   Head: Normocephalic and atraumatic.   Eyes: Pupils are equal, round, and reactive to light.   Neck: Normal range of motion. Neck supple.   Cardiovascular: Normal rate and regular rhythm.    Thrombosed left thigh arteriovenous loop graft   Pulmonary/Chest: Effort normal and breath sounds normal. No respiratory distress.   Abdominal: Soft.   Recent surgical reconstruction.    Musculoskeletal: Normal range of motion. He exhibits edema.   Neurological: He is alert.   Skin: Skin is warm.   Psychiatric: Memory, affect and judgment normal.     Vital Signs  Blood Pressure: 113/77   Temperature: 36.3 °C (97.3 °F)   Pulse: (!) 104   Respiration: 18   Pulse Oximetry: 94 %       Labs:  Recent Labs      12/23/18   1234   WBC  4.6*   RBC  3.52*   HEMOGLOBIN  9.8*   HEMATOCRIT  31.2*   MCV  88.6   MCH  27.8   MCHC  31.4*   RDW  54.1*   PLATELETCT  152*   MPV  10.0           Assessment/Plan:  I plan left thigh arteriovenous graft thrombectomy, wit likely angioplasty of the venous outflow. I will plan to arrange for dialysis as an observation patient after successful thrombectomy.

## 2018-12-24 NOTE — PROGRESS NOTES
[facility] Services Progress Note    Hemodialysis treatment ordered today per Dr. Serrato x 3 hours. Treatment initiated at 1631, ended at 1931.     Patient tolerated tx; see paper flow sheet for details.     Net UF 1,000 mL per MD order.     Needles removed from access site. Dressings applied and sites held x 10 minutes; verified no bleeding. Positive bruit/thrill post tx. Staff RN to monitor AVG for breakthrough bleeding. Should breakthrough bleeding occur, staff RN to apply pressure to access sites until bleeding resolved. Notify Dialysis and Nephrologist for follow-up.    Report given to Primary RN.

## 2018-12-24 NOTE — DISCHARGE INSTRUCTIONS
Discharge Instructions    Discharged to home by car with relative. Discharged via wheelchair, hospital escort: Yes.  Special equipment needed: Not Applicable    Be sure to schedule a follow-up appointment with your primary care doctor or any specialists as instructed.     Discharge Plan:   Diet Plan: Discussed  Activity Level: Discussed  Confirmed Follow up Appointment: Patient to Call and Schedule Appointment  Confirmed Symptoms Management: Discussed  Medication Reconciliation Updated: Yes  Pneumococcal Vaccine Administered/Refused: Not given - Patient refused pneumococcal vaccine  Influenza Vaccine Indication: Not indicated: Previously immunized this influenza season and > 8 years of age    I understand that a diet low in cholesterol, fat, and sodium is recommended for good health. Unless I have been given specific instructions below for another diet, I accept this instruction as my diet prescription.   Other diet: Advance diet as tolerated    Special Instructions: Follow up with Vascular Surgeon in 1-2 weeks.     · Is patient discharged on Warfarin / Coumadin?   No     Depression / Suicide Risk    As you are discharged from this [facility] facility, it is important to learn how to keep safe from harming yourself.    Recognize the warning signs:  · Abrupt changes in personality, positive or negative- including increase in energy   · Giving away possessions  · Change in eating patterns- significant weight changes-  positive or negative  · Change in sleeping patterns- unable to sleep or sleeping all the time   · Unwillingness or inability to communicate  · Depression  · Unusual sadness, discouragement and loneliness  · Talk of wanting to die  · Neglect of personal appearance   · Rebelliousness- reckless behavior  · Withdrawal from people/activities they love  · Confusion- inability to concentrate     If you or a loved one observes any of these behaviors or has concerns about self-harm, here's what you can  do:  · Talk about it- your feelings and reasons for harming yourself  · Remove any means that you might use to hurt yourself (examples: pills, rope, extension cords, firearm)  · Get professional help from the community (Mental Health, Substance Abuse, psychological counseling)  · Do not be alone:Call your Safe Contact- someone whom you trust who will be there for you.  · Call your local CRISIS HOTLINE 226-0405 or 441-210-9197  · Call your local Children's Mobile Crisis Response Team [place] (905) 833-0582 or www.[employer]  · Call the toll free National Suicide Prevention Hotlines   · National Suicide Prevention Lifeline 206-106-DOBX (7655)  · National Hope Line Network 800-SUICIDE (656-2709)

## 2018-12-24 NOTE — OP REPORT
DATE OF SERVICE:  12/23/2018    PREOPERATIVE DIAGNOSIS:  Thrombosed left thigh arteriovenous graft.    POSTPROCEDURE DIAGNOSIS:  Thrombosed left thigh arteriovenous graft.    PROCEDURES:  1.  Fistula thrombectomy.  2.  Angioplasty of the venous outflow.    SURGEON:  Lurdes Moffett MD    ANESTHESIOLOGIST:  Chandrakant Lowery MD    ANESTHESIA:  General anesthesia.    INDICATIONS:  The patient is a 38-year-old gentleman whose last intervention   was in April of this last year.  He is familiar with fistula thrombectomy and   has had multiple prior procedures.  He presents today having had his last   dialysis on Friday.  Risks and benefits were explained and include bleeding,   infection, and fistula failure.  He understands and agrees to proceed.    DESCRIPTION OF PROCEDURE:  Patient was taken to the operating room and placed   in supine position.  After adequate general anesthesia, the left thigh was   prepped and draped in the usual sterile fashion with Chloraseptic prep, cloth   towels and paper drapes.  I chose an area on the venous side, free from prior   intervention.  It looked like it was a little deep for access anyway.  A   time-out was called and the proposed procedure confirmed with all team   members.    I made an incision on the venous side and dissection was taken down to the   graft.  I encircled the graft with vessel loops.  Patient was given 6000 units   of heparin and this was allowed to circulate.  I made an incision in the   graft with an 11 blade and widened this with Dhaliwal scissors.  There was   calcification lining the graft and I removed what would be required to close   the graft.  Using a #4 Navneet catheter, removed clot from the entire graft.    The arterial limb is quite long and the actual graft is a fairly long loop.    After achieving forward flow from each side, I placed a sheath into the graft   after closing the graftotomy with CV6 suture.  Through a separate stab wound   and using  a 16-gauge Angiocath, I placed a 6-Turkmen sheath.  I was able to   thread a guidewire through the arterial limb and assured patency.  I then   moved the sheath to the other venous side angling it up to the venous loop.    This was quite narrow and I passed a Glidewire through this and used first an   8 mm x 4 cm balloon and then a 10 mm x 4 cm balloon to perform angioplasty of   the venous side of the graft.  The wound was irrigated, and after assuring   patency of the entire loop graft, I removed the sheath and closed the defect   with 6-0 Prolene suture.  The wound was irrigated and gentle pressure held for   hemostasis.  The wound was closed with interrupted 3-0 Vicryl in 2 layers   followed by 4-0 Monocryl in a subcuticular closure.  There were no apparent   complications.  Sponge, needle and instrument counts were correct x2.       ____________________________________     MD ADDIS Shaffer / RIAZ    DD:  12/23/2018 15:55:21  DT:  12/23/2018 16:18:02    D#:  [id number]  Job#:  [id number]

## 2018-12-24 NOTE — PROGRESS NOTES
Pt admitted to CDU at 2012 from HD. Ambulatory, on ra. Denies pain. Left thigh dressing CDI. Roche. No numbness and tingling. Refused snacks and wants to go home now. Mom at bedside. Discharge instructions given. Verbalized understanding and agrees.

## 2018-12-24 NOTE — PROGRESS NOTES
Discharging patient home per Physician order.  Discharged with Mother Veronica.  Demonstrated understanding of discharge instructions, follow up appointments and continue home medications. Ambulating steady gait, pain well controlled, tolerating oral medications, oxygen saturation greater than 90% , tolerating diet.    Follow up appointments discussed. All questions answered. Verbalized understanding and agrees. Belongings with patient at time of discharge.

## 2018-12-27 ENCOUNTER — ANTICOAGULATION VISIT (OUTPATIENT)
Dept: VASCULAR LAB | Facility: MEDICAL CENTER | Age: 38
End: 2018-12-27
Attending: INTERNAL MEDICINE
Payer: MEDICARE

## 2018-12-27 DIAGNOSIS — Z79.01 CHRONIC ANTICOAGULATION: ICD-10-CM

## 2018-12-27 LAB
INR BLD: 2.2 (ref 0.9–1.2)
INR PPP: 2.2 (ref 2–3.5)

## 2018-12-27 PROCEDURE — 85610 PROTHROMBIN TIME: CPT

## 2018-12-27 PROCEDURE — 99212 OFFICE O/P EST SF 10 MIN: CPT

## 2018-12-27 NOTE — PROGRESS NOTES
Anticoagulation Summary  As of 12/27/2018    INR goal:   2.5-3.5   TTR:   33.3 % (1.6 y)   Today's INR:   2.2!   Warfarin maintenance plan:   5 mg (5 mg x 1) every day   Weekly warfarin total:   35 mg   Plan last modified:   Donald Cedeño, PharmD (12/27/2018)   Next INR check:   1/3/2019   Target end date:   Indefinite    Indications    AV graft thrombosis (HCC) [T82.868A]             Anticoagulation Episode Summary     INR check location:       Preferred lab:       Send INR reminders to:       Comments:   INR goal of 2.5 - 3.5 per Dr. Hopkins      Anticoagulation Care Providers     Provider Role Specialty Phone number    Jairo Hopkins M.D. Referring Surgery [phone]    [facility] Anticoagulation Services Responsible  [phone]        Anticoagulation Patient Findings      HPI:  Denis Storm De Anda seen in clinic today, on anticoagulation therapy with warfarin for AV graft thrombosis.  Changes to current medical/health status since last appt: Pt developed a new thrombus with low INR.    Denies signs/symptoms of bleeding and/or thrombosis since the last appt.    Denies any interval changes to diet  Denies any interval changes to medications since last appt.   Denies any complications or cost restrictions with current therapy.   Confirmed dosing regimen.     A/P   INR  SUB-therapeutic.   Discussed bleeding vs thrombotic risks.  Pt is hesitant to increase warfarin dose despite recent thrombus.  Willing to make small adjustment today.    Pt has history significant for bleeding making this difficult to determine bleeding vs clotting risk.  Pt would like to proceed as follows:   Bolus warfarin today and begin increased regimen    Follow up appointment in 1 week(s).    Donald Cedeño, PharmD

## 2019-01-08 ENCOUNTER — ANTICOAGULATION VISIT (OUTPATIENT)
Dept: VASCULAR LAB | Facility: MEDICAL CENTER | Age: 39
End: 2019-01-08
Attending: INTERNAL MEDICINE
Payer: MEDICARE

## 2019-01-08 VITALS
DIASTOLIC BLOOD PRESSURE: 78 MMHG | HEART RATE: 132 BPM | OXYGEN SATURATION: 84 % | RESPIRATION RATE: 24 BRPM | SYSTOLIC BLOOD PRESSURE: 125 MMHG

## 2019-01-08 DIAGNOSIS — T82.868D THROMBOSIS OF ARTERIOVENOUS GRAFT, SUBSEQUENT ENCOUNTER: ICD-10-CM

## 2019-01-08 LAB — INR PPP: 2.4 (ref 2–3.5)

## 2019-01-08 PROCEDURE — 85610 PROTHROMBIN TIME: CPT

## 2019-01-08 PROCEDURE — 99212 OFFICE O/P EST SF 10 MIN: CPT | Performed by: NURSE PRACTITIONER

## 2019-01-08 NOTE — PROGRESS NOTES
Anticoagulation Summary  As of 1/8/2019    INR goal:   2.5-3.5   TTR:   32.6 % (1.6 y)   Today's INR:   2.4!   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 Hopkins      Anticoagulation Care Providers     Provider Role Specialty Phone number    Jairo Hopkins M.D. Referring Surgery [phone]    [facility] Anticoagulation Services Responsible  [phone]        Anticoagulation Patient Findings          Denis De Anda seen in clinic today, is on anticoagulation therapy with warfarin for AV graft.   INR  sub-therapeutic.    Changes to current medical/health status since last appt: none.   Denies signs/symptoms of bleeding and/or thrombosis since the last appt.   Denies any interval changes to diet  Denies any interval changes to medications since last appt.   Denies any complications or cost restrictions with current therapy  Follow up appointment in 1 week(s).      Take 7.5 mg tonight   The patient is on a high risk medication and is sub- therapeutic. This could lead to clot formation or risk of stroke. Therefore this patient requires close monitoring and follow up.          CHEST guidelines recommend frequent INR monitoring at regular intervals (a few days up to a max of 12 weeks) to ensure they are on the proper dose of warfarin and not having any complications from therapy.  INRs can dramatically change over a short time period due to diet, medications, and medical conditions.   The patient instructed to go to the ER for falls with a head injury,  blood in urine or stool or any bleeding that last longer than 20 min.     Mariah SANTIZO  SARA Tavares.

## 2019-01-09 ENCOUNTER — HOSPITAL ENCOUNTER (INPATIENT)
Dept: HOSPITAL 8 - ED | Age: 39
LOS: 9 days | Discharge: HOME | DRG: 314 | End: 2019-01-18
Attending: HOSPITALIST | Admitting: HOSPITALIST
Payer: MEDICARE

## 2019-01-09 ENCOUNTER — OFFICE VISIT (OUTPATIENT)
Dept: URGENT CARE | Facility: PHYSICIAN GROUP | Age: 39
End: 2019-01-09
Payer: MEDICARE

## 2019-01-09 ENCOUNTER — APPOINTMENT (OUTPATIENT)
Dept: RADIOLOGY | Facility: IMAGING CENTER | Age: 39
End: 2019-01-09
Attending: PHYSICIAN ASSISTANT
Payer: MEDICARE

## 2019-01-09 VITALS — BODY MASS INDEX: 30.07 KG/M2 | WEIGHT: 176.15 LBS | HEIGHT: 64 IN

## 2019-01-09 VITALS — DIASTOLIC BLOOD PRESSURE: 76 MMHG | SYSTOLIC BLOOD PRESSURE: 117 MMHG

## 2019-01-09 VITALS
BODY MASS INDEX: 31.76 KG/M2 | HEIGHT: 64 IN | RESPIRATION RATE: 20 BRPM | TEMPERATURE: 101.7 F | SYSTOLIC BLOOD PRESSURE: 118 MMHG | DIASTOLIC BLOOD PRESSURE: 84 MMHG | WEIGHT: 186 LBS | HEART RATE: 138 BPM

## 2019-01-09 VITALS — DIASTOLIC BLOOD PRESSURE: 71 MMHG | SYSTOLIC BLOOD PRESSURE: 107 MMHG

## 2019-01-09 VITALS — SYSTOLIC BLOOD PRESSURE: 126 MMHG | DIASTOLIC BLOOD PRESSURE: 84 MMHG

## 2019-01-09 DIAGNOSIS — E87.1: ICD-10-CM

## 2019-01-09 DIAGNOSIS — G25.81: ICD-10-CM

## 2019-01-09 DIAGNOSIS — A41.02: ICD-10-CM

## 2019-01-09 DIAGNOSIS — D68.59: ICD-10-CM

## 2019-01-09 DIAGNOSIS — Z91.048: ICD-10-CM

## 2019-01-09 DIAGNOSIS — E21.3: ICD-10-CM

## 2019-01-09 DIAGNOSIS — R05.9 COUGH: ICD-10-CM

## 2019-01-09 DIAGNOSIS — Z79.4: ICD-10-CM

## 2019-01-09 DIAGNOSIS — E88.09: ICD-10-CM

## 2019-01-09 DIAGNOSIS — T82.7XXA: Primary | ICD-10-CM

## 2019-01-09 DIAGNOSIS — B96.89: ICD-10-CM

## 2019-01-09 DIAGNOSIS — J81.0: ICD-10-CM
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
## 2019-01-09 DIAGNOSIS — R09.02 HYPOXEMIA: ICD-10-CM
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
## 2019-01-09 DIAGNOSIS — I12.0: ICD-10-CM

## 2019-01-09 DIAGNOSIS — G89.4: ICD-10-CM

## 2019-01-09 DIAGNOSIS — E87.5: ICD-10-CM

## 2019-01-09 DIAGNOSIS — J18.9: ICD-10-CM

## 2019-01-09 DIAGNOSIS — Z94.0: ICD-10-CM

## 2019-01-09 DIAGNOSIS — Z79.2: ICD-10-CM

## 2019-01-09 DIAGNOSIS — T82.848A: ICD-10-CM

## 2019-01-09 DIAGNOSIS — D63.1: ICD-10-CM

## 2019-01-09 DIAGNOSIS — N25.0: ICD-10-CM

## 2019-01-09 DIAGNOSIS — J96.01: ICD-10-CM

## 2019-01-09 DIAGNOSIS — T81.31XA: ICD-10-CM

## 2019-01-09 DIAGNOSIS — N18.6: ICD-10-CM

## 2019-01-09 DIAGNOSIS — Z88.1: ICD-10-CM

## 2019-01-09 DIAGNOSIS — I87.1: ICD-10-CM

## 2019-01-09 DIAGNOSIS — Z87.891: ICD-10-CM

## 2019-01-09 DIAGNOSIS — Z88.0: ICD-10-CM

## 2019-01-09 DIAGNOSIS — Z99.2: ICD-10-CM

## 2019-01-09 DIAGNOSIS — Z88.8: ICD-10-CM

## 2019-01-09 DIAGNOSIS — Z79.01: ICD-10-CM

## 2019-01-09 LAB
ALBUMIN SERPL-MCNC: 3.3 G/DL (ref 3.4–5)
ALP SERPL-CCNC: 203 U/L (ref 45–117)
ALT SERPL-CCNC: 11 U/L (ref 12–78)
ANION GAP SERPL CALC-SCNC: 9 MMOL/L (ref 5–15)
BASOPHILS # BLD AUTO: 0.1 X10^3/UL (ref 0–0.1)
BASOPHILS NFR BLD AUTO: 2 % (ref 0–1)
BILIRUB SERPL-MCNC: 0.7 MG/DL (ref 0.2–1)
CALCIUM SERPL-MCNC: 9 MG/DL (ref 8.5–10.1)
CHLORIDE SERPL-SCNC: 90 MMOL/L (ref 98–107)
CREAT SERPL-MCNC: 9.56 MG/DL (ref 0.7–1.3)
EOSINOPHIL # BLD AUTO: 0.05 X10^3/UL (ref 0–0.4)
EOSINOPHIL NFR BLD AUTO: 1 % (ref 1–7)
ERYTHROCYTE [DISTWIDTH] IN BLOOD BY AUTOMATED COUNT: 21 % (ref 9.4–14.8)
FLUAV+FLUBV AG SPEC QL IA: NORMAL
INR BLD: 2.4 (ref 0.9–1.2)
INR PPP: 2.27 (ref 0.93–1.1)
INT CON NEG: NORMAL
INT CON POS: NORMAL
LYMPHOCYTES # BLD AUTO: 0.68 X10^3/UL (ref 1–3.4)
LYMPHOCYTES NFR BLD AUTO: 11 % (ref 22–44)
MCH RBC QN AUTO: 29.5 PG (ref 27.5–34.5)
MCHC RBC AUTO-ENTMCNC: 34.3 G/DL (ref 33.2–36.2)
MCV RBC AUTO: 86 FL (ref 81–97)
MD: NO
MONOCYTES # BLD AUTO: 0.61 X10^3/UL (ref 0.2–0.8)
MONOCYTES NFR BLD AUTO: 10 % (ref 2–9)
NEUTROPHILS # BLD AUTO: 4.76 X10^3/UL (ref 1.8–6.8)
NEUTROPHILS NFR BLD AUTO: 77 % (ref 42–75)
PLATELET # BLD AUTO: 188 X10^3/UL (ref 130–400)
PMV BLD AUTO: 8.2 FL (ref 7.4–10.4)
PROT SERPL-MCNC: 8.2 G/DL (ref 6.4–8.2)
PROTHROMBIN TIME: 23.3 SECONDS (ref 9.6–11.5)
RBC # BLD AUTO: 2.75 X10^6/UL (ref 4.38–5.82)

## 2019-01-09 PROCEDURE — 87340 HEPATITIS B SURFACE AG IA: CPT

## 2019-01-09 PROCEDURE — 93325 DOPPLER ECHO COLOR FLOW MAPG: CPT

## 2019-01-09 PROCEDURE — 87040 BLOOD CULTURE FOR BACTERIA: CPT

## 2019-01-09 PROCEDURE — C1751 CATH, INF, PER/CENT/MIDLINE: HCPCS

## 2019-01-09 PROCEDURE — 84145 PROCALCITONIN (PCT): CPT

## 2019-01-09 PROCEDURE — 86140 C-REACTIVE PROTEIN: CPT

## 2019-01-09 PROCEDURE — 36415 COLL VENOUS BLD VENIPUNCTURE: CPT

## 2019-01-09 PROCEDURE — 99215 OFFICE O/P EST HI 40 MIN: CPT | Performed by: PHYSICIAN ASSISTANT

## 2019-01-09 PROCEDURE — 99285 EMERGENCY DEPT VISIT HI MDM: CPT

## 2019-01-09 PROCEDURE — 87186 SC STD MICRODIL/AGAR DIL: CPT

## 2019-01-09 PROCEDURE — 83540 ASSAY OF IRON: CPT

## 2019-01-09 PROCEDURE — 93312 ECHO TRANSESOPHAGEAL: CPT

## 2019-01-09 PROCEDURE — 82728 ASSAY OF FERRITIN: CPT

## 2019-01-09 PROCEDURE — 83550 IRON BINDING TEST: CPT

## 2019-01-09 PROCEDURE — 36573 INSJ PICC RS&I 5 YR+: CPT

## 2019-01-09 PROCEDURE — 87147 CULTURE TYPE IMMUNOLOGIC: CPT

## 2019-01-09 PROCEDURE — 80202 ASSAY OF VANCOMYCIN: CPT

## 2019-01-09 PROCEDURE — 87804 INFLUENZA ASSAY W/OPTIC: CPT | Performed by: PHYSICIAN ASSISTANT

## 2019-01-09 PROCEDURE — 80069 RENAL FUNCTION PANEL: CPT

## 2019-01-09 PROCEDURE — 85610 PROTHROMBIN TIME: CPT

## 2019-01-09 PROCEDURE — 86803 HEPATITIS C AB TEST: CPT

## 2019-01-09 PROCEDURE — 93005 ELECTROCARDIOGRAM TRACING: CPT

## 2019-01-09 PROCEDURE — 80048 BASIC METABOLIC PNL TOTAL CA: CPT

## 2019-01-09 PROCEDURE — 93306 TTE W/DOPPLER COMPLETE: CPT

## 2019-01-09 PROCEDURE — 85651 RBC SED RATE NONAUTOMATED: CPT

## 2019-01-09 PROCEDURE — 83605 ASSAY OF LACTIC ACID: CPT

## 2019-01-09 PROCEDURE — 71046 X-RAY EXAM CHEST 2 VIEWS: CPT | Mod: 26 | Performed by: PHYSICIAN ASSISTANT

## 2019-01-09 PROCEDURE — 71045 X-RAY EXAM CHEST 1 VIEW: CPT

## 2019-01-09 PROCEDURE — 80053 COMPREHEN METABOLIC PANEL: CPT

## 2019-01-09 PROCEDURE — 83880 ASSAY OF NATRIURETIC PEPTIDE: CPT

## 2019-01-09 PROCEDURE — 84100 ASSAY OF PHOSPHORUS: CPT

## 2019-01-09 PROCEDURE — 83970 ASSAY OF PARATHORMONE: CPT

## 2019-01-09 PROCEDURE — 96375 TX/PRO/DX INJ NEW DRUG ADDON: CPT

## 2019-01-09 PROCEDURE — 85025 COMPLETE CBC W/AUTO DIFF WBC: CPT

## 2019-01-09 PROCEDURE — 83735 ASSAY OF MAGNESIUM: CPT

## 2019-01-09 PROCEDURE — 86706 HEP B SURFACE ANTIBODY: CPT

## 2019-01-09 PROCEDURE — 87077 CULTURE AEROBIC IDENTIFY: CPT

## 2019-01-09 PROCEDURE — 82306 VITAMIN D 25 HYDROXY: CPT

## 2019-01-09 PROCEDURE — 71250 CT THORAX DX C-: CPT

## 2019-01-09 PROCEDURE — 86704 HEP B CORE ANTIBODY TOTAL: CPT

## 2019-01-09 PROCEDURE — 96374 THER/PROPH/DIAG INJ IV PUSH: CPT

## 2019-01-09 PROCEDURE — 5A1D70Z PERFORMANCE OF URINARY FILTRATION, INTERMITTENT, LESS THAN 6 HOURS PER DAY: ICD-10-PCS | Performed by: HOSPITALIST

## 2019-01-09 PROCEDURE — 82550 ASSAY OF CK (CPK): CPT

## 2019-01-09 RX ADMIN — MORPHINE SULFATE PRN MG: 4 INJECTION INTRAVENOUS at 15:22

## 2019-01-09 RX ADMIN — OXYCODONE HYDROCHLORIDE AND ACETAMINOPHEN PRN TAB: 10; 325 TABLET ORAL at 16:58

## 2019-01-09 RX ADMIN — MORPHINE SULFATE PRN MG: 4 INJECTION INTRAVENOUS at 20:51

## 2019-01-09 RX ADMIN — DOXYCYCLINE SCH MLS/HR: 100 INJECTION, POWDER, LYOPHILIZED, FOR SOLUTION INTRAVENOUS at 16:58

## 2019-01-09 RX ADMIN — MORPHINE SULFATE PRN MG: 4 INJECTION INTRAVENOUS at 14:30

## 2019-01-09 RX ADMIN — DARBEPOETIN ALFA SCH MCG: 60 SOLUTION INTRAVENOUS; SUBCUTANEOUS at 21:42

## 2019-01-09 RX ADMIN — SEVELAMER CARBONATE SCH MG: 800 TABLET, FILM COATED ORAL at 16:58

## 2019-01-09 RX ADMIN — MORPHINE SULFATE PRN MG: 4 INJECTION INTRAVENOUS at 21:43

## 2019-01-09 RX ADMIN — WARFARIN SODIUM SCH MG: 5 TABLET ORAL at 16:58

## 2019-01-09 ASSESSMENT — ENCOUNTER SYMPTOMS
RHINORRHEA: 0
HEMOPTYSIS: 0
HEADACHES: 1
FEVER: 1
SORE THROAT: 0
WHEEZING: 1
COUGH: 1
GASTROINTESTINAL NEGATIVE: 1
PALPITATIONS: 0
SINUS PAIN: 0
MYALGIAS: 0
CHILLS: 1
SWEATS: 1
SHORTNESS OF BREATH: 1

## 2019-01-09 NOTE — NUR
BIB EMS FROM URGENT CARE.  PT WITH C/O 5 DAYS  +SOB, DRY COUGH, RIGHT SIDE C/P 
WITH COUGHING.  PT WITH RA SAT 85%, TEMP 101.0.  PER URGENT CARE FLU SAWB = 
NEGATIVE, AND CHEST XRAY NML.  PT ON DIALYSIS FOR ESRD.  RIGHT FEM A/V FISTULA 
WAS DECLOTTED ON 12/23, BLEEDING AND PAIN CONTINUE. +BRUIT/+THRILL NOTED.  
GAUZE DRESSING IN PLACE PTA, SATURATED WITH DARK RED BLOOD, BUT REMAINS 
OCCLUSSIVE WITH TEGADERM IN PLACE.  CALL LIGHT W/I REACH.  AWAITING PROVIDER.

## 2019-01-09 NOTE — PROGRESS NOTES
Subjective:      Denis De Anda is a 38 y.o. male who presents with Cough (w/ fever x 4 days)            Patient has a myriad of past medical history.  Dialysis patient left femoral fistula with recent revision.  He is on Coumadin, most recent INR 2.4.  Having productive cough, severe shortness of breath, some chest pain.        Cough   This is a new problem. The current episode started in the past 7 days. The problem has been gradually worsening. The problem occurs every few minutes. The cough is productive of sputum. Associated symptoms include chest pain, chills, a fever, headaches, shortness of breath, sweats and wheezing. Pertinent negatives include no ear pain, hemoptysis, myalgias, nasal congestion, postnasal drip, rhinorrhea or sore throat. He has tried OTC cough suppressant for the symptoms. The treatment provided mild relief. His past medical history is significant for bronchitis and pneumonia.       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 He also has no past medical history of Encounter for long-term (current) use of other medications.  MEDS:   Current Outpatient Prescriptions:   •  warfarin (COUMADIN) 5 MG Tab, Take 0.5-1 Tabs by mouth every evening., Disp: 30 Tab, Rfl: 5  •  HYDROcodone/acetaminophen (NORCO)  MG Tab, Take 1 Tab by mouth every 8 hours as needed., Disp: , Rfl:   •  gabapentin (NEURONTIN) 300 MG Cap, Take 1,200 mg by mouth every bedtime. Pt also has an RX for 600MG, pt takes total of 1,800MG HS, Disp: , Rfl:   •  midodrine (PROAMATINE) 10 MG tablet, Take 1 Tab by mouth 3 times a day, with meals. Can hold  dose if blood pressure greater than 120 systolic., Disp: 60 Tab, Rfl: 3  •  cinacalcet (SENSIPAR) 30 MG Tab, Take 30-60 mg by mouth every day. 30 mg Sunday, Mondays , Wednesdays , and Fridays. Pt takes 60MG on Tue, Thur, and Sat, Disp: , Rfl:   •  levetiracetam (KEPPRA) 250 MG tablet, Take 1 Tab by mouth 2 times a day., Disp: 60 Tab, Rfl: 3  •  gabapentin (NEURONTIN) 600 MG tablet, Take 600 mg by mouth every bedtime. Pt also has an RX for 300MG, pt takes total of 1,800MG HS, Disp: , Rfl:   •  sevelamer carbonate (RENVELA) 800 MG Tab tablet, Take 3,200 mg by mouth 3 times a day, with meals. With meals, Disp: , Rfl:   •  calcitRIOL (ROCALTROL) 0.25 MCG CAPS, Take 0.75 mcg by mouth every bedtime., Disp: , Rfl:   ALLERGIES:   Allergies   Allergen Reactions   • Baclofen Unspecified     Total loss of memory, sedation.   RXN=6/2015   • Contrast Media With Iodine [Iodine] Rash     RXN=1/5/2017   • Keflex Rash     RXN=possibly >10 years   • Pcn [Penicillins] Rash     RXN=possibly >10 years ago  Tolerated Zosyn on 2/20/18   • Tape Rash     Paper tape and tegaderm ok  RXN=ongoing     SURGHX:   Past Surgical History:   Procedure Laterality Date   • AV FISTULA REVISION Left 12/23/2018    Procedure: AV FISTULA REVISION;  Surgeon: Lurdes Moffett M.D.;  Location: SURGERY [facility];  Service: General   • SPLIT THICKNESS SKIN GRAFT Right 9/21/2018    Procedure: SPLIT THICKNESS SKIN GRAFT - ARM TO ABDOMEN;  Surgeon: Samson Rosenbaum Jr., M.D.;  Location: SURGERY [facility];  Service: Plastics   • SKIN FLAP DELAYED Left 9/10/2018    Procedure: SKIN FLAP DELAYED- FOR DIVISION AND INSET FLAP WITH SKIN GRAFT ON ARM;  Surgeon: Samson Rosenbaum Jr., M.D.;  Location: SURGERY SAME DAY [facility];  Service: Plastics   • MYOCUTANEOUS FLAP Right 8/27/2018    Procedure: MYOCUTANEOUS FLAP - RIGHT ARM TO TRUNK;  Surgeon: Samson Rosenbaum Jr., M.D.;  Location: SURGERY [facility];  Service: Plastics   • ABDOMINAL  EXPLORATION  6/25/2018    Procedure: ABDOMINAL EXPLORATION- CONTROL ABDOMINAL BLEEDING;  Surgeon: Samson Rosenbaum Jr., M.D.;  Location: SURGERY [facility];  Service: Plastics   • THROMBECTOMY Left 4/28/2018    Procedure: THROMBECTOMY- Thigh W/ Graft;  Surgeon: Jairo Hopkins M.D.;  Location: SURGERY [facility];  Service: General   • THROMBECTOMY Left 4/27/2018    Procedure: THROMBECTOMY - THIGH GRAFT AND REVISION;  Surgeon: Jairo Hopkins M.D.;  Location: SURGERY [facility];  Service: General   • THROMBECTOMY Left 3/9/2018    Procedure: THROMBECTOMY- THIGH DIALYSIS GRAFT AND REVISION  ;  Surgeon: Jairo Hopkins M.D.;  Location: SURGERY [facility];  Service: General   • CATH PLACEMENT Right 3/9/2018    Procedure: CATH PLACEMENT - REPLACE DIALYSIS CATH RIGHT THIGH;  Surgeon: Jairo Hopkins M.D.;  Location: [facility];  Service: General   • SPLIT THICKNESS SKIN GRAFT  2/26/2018    Procedure: SPLIT THICKNESS SKIN GRAFT- TO ABDOMEN;  Surgeon: Samson Rosenbaum Jr., M.D.;  Location: SURGERY [facility];  Service: Plastics   • WOUND CLOSURE GENERAL  2/19/2018    Procedure: WOUND CLOSURE GENERAL-ABDOMINAL WALL HEMORRHAGE;  Surgeon: Jason Robertson M.D.;  Location: [facility];  Service: Plastics   • WOUND EXPLORATION GENERAL  2/1/2018    Procedure: WOUND EXPLORATION GENERAL, REPAIR BLEEDING;  Surgeon: Samson Rosenbaum Jr., M.D.;  Location: [facility];  Service: Plastics   • CATH PLACEMENT Right 11/14/2017    Procedure: CATH PLACEMENT - PERMA;  Surgeon: Jairo Hopkins M.D.;  Location: SURGERY [facility];  Service: General   • AV FISTULA REVISION Left 11/14/2017    Procedure: AV GRAFT REVISION;  Surgeon: Jairo Hopkins M.D.;  Location: SURGERY [facility];  Service: General   • IRRIGATION & DEBRIDEMENT GENERAL  7/31/2017    Procedure: IRRIGATION & DEBRIDEMENT GENERAL-ABDOMEN;  Surgeon: Samson Rosenbaum Jr., M.D.;  Location: SURGERY  [facility];  Service:    • ABDOMINOPLASTY  6/5/2017    Procedure: ABDOMINOPLASTY - FOR PANNICULECTOMY;  Surgeon: Samson Rosenbaum Jr., M.D.;  Location: SURGERY [facility];  Service:    • SPINAL CORD STIMULATOR N/A 5/4/2017    Procedure: SPINAL CORD STIMULATOR - EXPLANT;  Surgeon: Bin Pro M.D.;  Location: [facility];  Service:    • THROMBECTOMY Left 1/6/2017    Procedure: THROMBECTOMY-OPEN THROMBECTOMY WITH LEFT THIGH GRAFT;  Surgeon: Jairo Hopkins M.D.;  Location: [facility];  Service:    • CATH PLACEMENT Right 1/6/2017    Procedure: CATH PLACEMENT;  Surgeon: Jairo Hopkins M.D.;  Location: [facility];  Service:    • THROMBECTOMY Left 1/5/2017    Procedure: THROMBECTOMY-THIGH AV LOOP GRAFT AND ANGIOJET;  Surgeon: Jairo Hopkins M.D.;  Location: [facility];  Service:    • FLAP CLOSURE Left 11/17/2016    Procedure: Fasciocutaneous Flap Closure Left Upper Leg;  Surgeon: Samson Rosenbaum Jr., M.D.;  Location: [facility];  Service:    • IRRIGATION & DEBRIDEMENT GENERAL Left 10/28/2016    Procedure: IRRIGATION & DEBRIDEMENT GENERAL THIGH WITH IRRIGATING WOUND VAC PLACEMENT;  Surgeon: Jairo Hopkins M.D.;  Location: [facility];  Service:    • THROMBECTOMY Left 10/20/2016    Procedure: THROMBECTOMY THIGH;  Surgeon: Jairo Hopkins M.D.;  Location: [facility];  Service:    • INCISION AND DRAINAGE GENERAL  10/20/2016    Procedure: INCISION AND DRAINAGE GENERAL HEMATOMA;  Surgeon: Jairo Hopkins M.D.;  Location: SURGERY [facility];  Service:    • THROMBECTOMY Left 9/18/2016    Procedure: THROMBECTOMY - and fistula revision;  Surgeon: Jairo Hopkins M.D.;  Location: [facility];  Service:    • AV FISTULOGRAM  9/18/2016    Procedure: AV FISTULOGRAM;  Surgeon: Jairo Hopkins M.D.;  Location: [facility];  Service:    • CATH PLACEMENT Right 9/17/2016    Procedure: CATH  PLACEMENT - tunneled dialysis cath placement right femoral ;  Surgeon: Quentin Alicia M.D.;  Location: SURGERY [facility];  Service:    • MASS EXCISION GENERAL Right 8/5/2016    Procedure: MASS EXCISION GENERAL FOR CALCIPHYLAXIS SKIN AND SUBCUTANEOUS TISSUE FOREARM;  Surgeon: Jairo Hopkins M.D.;  Location: SURGERY [facility];  Service:    • LESION EXCISION GENERAL Right 7/11/2016    Procedure: LESION EXCISION GENERAL FOR ARM SKIN;  Surgeon: Jairo Hopkins M.D.;  Location: SURGERY [facility];  Service:    • RECOVERY  7/8/2016    Procedure: IR1-VASCULAR CASE-VIANEY-LEFT THIGH AV GRAFT THROMBOLYSIS WITH TISSUE PLASMINOGEN ACTIVATOR AND ANGIOJET ARTHERECTOMY   ;  Surgeon: Melinda Surgery;  Location: SURGERY PRE-POST PROC UNIT [facility] [place] [place];  Service:    • SPINAL CORD STIMULATOR N/A 3/25/2016    Procedure: SPINAL CORD STIMULATOR;  Surgeon: Bin Pro;  Location: [facility];  Service:    • PB PERCUT IMPLNT NEUROELECT,EPIDURAL  2/19/2016    Procedure: IMPLANT NEUROSTIM EPI ARRAY;  Surgeon: Bin Pro;  [place]  Service: Pain Management   • PB PERCUT IMPLNT NEUROELECT,EPIDURAL  2/19/2016    Procedure: IMPLANT NEUROSTIM EPI ARRAY;  Surgeon: Bin Pro;  [place];  Service: Pain Management   • RECOVERY  8/7/2015    Procedure:  VASCULAR CASE VIANEY-RIGHT ILIAC VENOGRAM WITH ANGIOPLASTY, REMOVAL RIGHT FEMORAL TUNNELED DIALYSIS CATHETER  **VRE**;  Surgeon: Recoveryonly Surgery;  Location: SURGERY PRE-POST PROC UNIT [facility] [place] [place];  Service:    • AV FISTULA REVISION Left 6/17/2015    Procedure: AV FISTULA REVISION;  Surgeon: Jairo Hopkins M.D.;  Location: SURGERY [facility];  Service:    • IRRIGATION & DEBRIDEMENT GENERAL Left 6/17/2015    Procedure: IRRIGATION & DEBRIDEMENT GENERAL ARM W/EXPLORATION WOUND & CONTROL BLEEDING;  Surgeon: Jairo Hopkins M.D.;  Location: SURGERY [facility];  Service:    • AV FISTULA THROMBOLYSIS Left  6/9/2015    Procedure: THROMBECTOMY AV GRAFT THIGH;  Surgeon: Jairo Hopkins M.D.;  Location: SURGERY [facility];  Service:    • AV FISTULA THROMBOLYSIS Left 6/3/2015    Procedure: AV FISTULA THROMBOLYSIS THIGH REPLACEMENT;  Surgeon: Jairo Hopkins M.D.;  Location: SURGERY [facility];  Service:    • IRRIGATION & DEBRIDEMENT GENERAL Left 6/3/2015    Procedure: IRRIGATION & DEBRIDEMENT GENERAL;  Surgeon: Jairo Hopkins M.D.;  Location: SURGERY [facility];  Service:    • AV FISTULA CREATION  4/20/2015    Performed by Jairo Hopkins M.D. at SURGERY [facility]   • PB INJECT NERV BLCK,STELLATE GANGLION  3/24/2015    Performed by Bni Pro at [facility]   • AV FISTULA REVISION  3/20/2015    Performed by Jairo Hopkins M.D. at SURGERY [facility]   • AV FISTULA REVISION  2/22/2015    Performed by Lurdes Moffett M.D. at SURGERY [facility]   • AV FISTULA REVISION  2/8/2015    Performed by Jairo Hopkins M.D. at SURGERY [facility]   • IRRIGATION & DEBRIDEMENT GENERAL  12/15/2014    Performed by Jairo Hopkins M.D. at SURGERY [facility]   • AV FISTULA REVISION  9/30/2014    Performed by Jairo Hopkins M.D. at SURGERY [facility]   • RECOVERY  6/13/2014    Performed by Ir-Recovery Surgery at SURGERY SAME DAY [facility]   • INCISION AND DRAINAGE GENERAL  9/13/2013    Performed by Jairo Hopkins M.D. at SURGERY [facility]   • DEBRIDEMENT  9/13/2013    Performed by Jairo Hopkins M.D. at SURGERY [facility]   • VEIN LIGATION  9/13/2013    Performed by Jairo Hopkins M.D. at SURGERY [facility]   • RECOVERY  5/18/2012    Performed by SURGERY, IR-RECOVERY at [facility]   • BONE SPUR EXCISION  12/8/2011    Performed by BELKIS BREAUX at SURGERY SAME DAY [facility]   • AV FISTULA REVISION  11/3/2011    Performed by JAIRO HOPKINS at SURGERY [facility]   • AV FISTULOGRAM  6/5/2011    Performed by  MARI WINTERS at SURGERY [facility] ORS   • CATH PLACEMENT  6/5/2011    Performed by MARI WINTERS at SURGERY [facility] ORS   • CATH PLACEMENT  2/1/2011    Performed by MARI WINTERS at SURGERY [facility] ORS   • ANGIOPLASTY BALLOON  2/1/2011    Performed by MARI WINTERS at SURGERY [facility] ORS   • ENDARTERECTOMY  11/16/2010    Performed by MARI WINTERS at SURGERY [facility] ORS   • AV FISTULOGRAM  11/16/2010    Performed by MARI WINTERS at SURGERY [facility] ORS   • ARTERIOGRAM  3/5/2009    Performed by MARI WINTERS at SURGERY [facility] ORS   • ANGIOPLASTY BALLOON  3/5/2009    Performed by MARI WINTERS at SURGERY [facility] ORS   • AV FISTULOGRAM  3/5/2009    Performed by MARI WINTERS at SURGERY [facility] ORS   • AV FISTULA CREATION  11/6/08    Performed by MARI WINTERS at SURGERY [facility] ORS   • MASS EXCISION ORTHO  10/9/08    Performed by BELKIS BREAUX at SURGERY SAME DAY [facility] ORS   • PARATHYROIDECTOMY  2006   • INGUINAL HERNIA REPAIR Bilateral 2001, 2002   • US-KIDNEY TRANSPLANT  1996, 2001    x2     SOCHX:  reports that he has never smoked. He has never used smokeless tobacco. He reports that he does not drink alcohol or use drugs.  FH: family history includes Arthritis in his father; Heart Disease in his father; Hypertension in his brother; Lung Disease in his father.      Review of Systems   Constitutional: Positive for chills and fever.   HENT: Negative for congestion, ear pain, postnasal drip, rhinorrhea, sinus pain and sore throat.    Respiratory: Positive for cough, shortness of breath and wheezing. Negative for hemoptysis.    Cardiovascular: Positive for chest pain. Negative for palpitations and leg swelling.   Gastrointestinal: Negative.    Musculoskeletal: Negative for joint pain and myalgias.   Neurological: Positive for headaches.   All other systems reviewed and are negative.      Medications, Allergies, and current  "problem list reviewed today in Epic    Family History   Problem Relation Age of Onset   • Arthritis Father         RA   • Lung Disease Father    • Heart Disease Father         MI   • Hypertension Brother       Objective:     /84 (BP Location: Left arm, Patient Position: Sitting, BP Cuff Size: Adult)   Pulse (!) 138   Temp (!) 38.7 °C (101.7 °F) (Temporal)   Resp 20   Ht 1.626 m (5' 4\")   Wt 84.4 kg (186 lb)   BMI 31.93 kg/m²      Physical Exam   Constitutional: He is oriented to person, place, and time. He appears well-developed and well-nourished. No distress.   HENT:   Head: Normocephalic and atraumatic.   Right Ear: Tympanic membrane and external ear normal.   Left Ear: Tympanic membrane and external ear normal.   Nose: Nose normal.   Mouth/Throat: Oropharynx is clear and moist. No oropharyngeal exudate.   Eyes: Pupils are equal, round, and reactive to light. Conjunctivae and EOM are normal. Right eye exhibits no discharge. Left eye exhibits no discharge.   Neck: Normal range of motion. Neck supple.   Cardiovascular: Normal rate, regular rhythm and normal heart sounds.    No murmur heard.  Pulmonary/Chest: Effort normal. Tachypnea noted. No respiratory distress. He has decreased breath sounds. He has wheezes. He has no rhonchi. He has no rales. He exhibits no tenderness.   Lymphadenopathy:     He has no cervical adenopathy.   Neurological: He is alert and oriented to person, place, and time.   Skin: Skin is warm and dry. He is not diaphoretic.   Psychiatric: He has a normal mood and affect. His behavior is normal. Judgment and thought content normal.   Nursing note and vitals reviewed.              Assessment/Plan:     1. Cough  DX-CHEST-2 VIEWS    POCT Influenza A/B    UC AMA/Refusal of Treatment   2. Hypoxemia  UC AMA/Refusal of Treatment     Rapid flu: Negative  Xray: no acute findings by my read. Radiology review pending.      4-day history of productive cough, shortness of breath with some chest " pain.  On presentation patient is tachypneic, tachycardic with a pulse ox of 82-84% on room air.  He was immediately placed on 3 L of oxygen and is O2 raised to 96%.  A chest x-ray and flu test were completed which showed no acute findings.  Oxygen decreased to 84% on room air after recheck.  He has a fever and some chest pain.  Patient will need to be seen in the ER for further workup and management.  Initially patient declined transfer by EMS, he was going to have his mother drive him however he changed his mind.  [facility] will transport patient to main ER now by ambulance per      Please note that this dictation was created using voice recognition software. I have made every reasonable attempt to correct obvious errors, but I expect that there are errors of grammar and possibly content that I did not discover before finalizing the note.

## 2019-01-10 ENCOUNTER — APPOINTMENT (OUTPATIENT)
Dept: VASCULAR LAB | Facility: MEDICAL CENTER | Age: 39
End: 2019-01-10
Attending: INTERNAL MEDICINE
Payer: MEDICARE

## 2019-01-10 VITALS — SYSTOLIC BLOOD PRESSURE: 92 MMHG | DIASTOLIC BLOOD PRESSURE: 50 MMHG

## 2019-01-10 VITALS — DIASTOLIC BLOOD PRESSURE: 61 MMHG | SYSTOLIC BLOOD PRESSURE: 98 MMHG

## 2019-01-10 VITALS — DIASTOLIC BLOOD PRESSURE: 67 MMHG | SYSTOLIC BLOOD PRESSURE: 103 MMHG

## 2019-01-10 VITALS — SYSTOLIC BLOOD PRESSURE: 101 MMHG | DIASTOLIC BLOOD PRESSURE: 67 MMHG

## 2019-01-10 LAB
ALBUMIN SERPL-MCNC: 3 G/DL (ref 3.4–5)
ANION GAP SERPL CALC-SCNC: 13 MMOL/L (ref 5–15)
BASOPHILS # BLD AUTO: 0.04 X10^3/UL (ref 0–0.1)
BASOPHILS NFR BLD AUTO: 1 % (ref 0–1)
CALCIUM SERPL-MCNC: 8.9 MG/DL (ref 8.5–10.1)
CHLORIDE SERPL-SCNC: 91 MMOL/L (ref 98–107)
CREAT SERPL-MCNC: 8.86 MG/DL (ref 0.7–1.3)
EOSINOPHIL # BLD AUTO: 0.06 X10^3/UL (ref 0–0.4)
EOSINOPHIL NFR BLD AUTO: 1 % (ref 1–7)
ERYTHROCYTE [DISTWIDTH] IN BLOOD BY AUTOMATED COUNT: 21 % (ref 9.4–14.8)
INR PPP: 3.73 (ref 0.93–1.1)
LYMPHOCYTES # BLD AUTO: 0.89 X10^3/UL (ref 1–3.4)
LYMPHOCYTES NFR BLD AUTO: 14 % (ref 22–44)
MCH RBC QN AUTO: 29.1 PG (ref 27.5–34.5)
MCHC RBC AUTO-ENTMCNC: 33.4 G/DL (ref 33.2–36.2)
MCV RBC AUTO: 87.1 FL (ref 81–97)
MD: NO
MONOCYTES # BLD AUTO: 1.14 X10^3/UL (ref 0.2–0.8)
MONOCYTES NFR BLD AUTO: 18 % (ref 2–9)
NEUTROPHILS # BLD AUTO: 4.19 X10^3/UL (ref 1.8–6.8)
NEUTROPHILS NFR BLD AUTO: 66 % (ref 42–75)
PLATELET # BLD AUTO: 184 X10^3/UL (ref 130–400)
PMV BLD AUTO: 7.7 FL (ref 7.4–10.4)
PROTHROMBIN TIME: 37.6 SECONDS (ref 9.6–11.5)
RBC # BLD AUTO: 2.73 X10^6/UL (ref 4.38–5.82)

## 2019-01-10 PROCEDURE — 5A1D70Z PERFORMANCE OF URINARY FILTRATION, INTERMITTENT, LESS THAN 6 HOURS PER DAY: ICD-10-PCS | Performed by: HOSPITALIST

## 2019-01-10 RX ADMIN — CALCITRIOL SCH MCG: 0.25 CAPSULE, LIQUID FILLED ORAL at 08:33

## 2019-01-10 RX ADMIN — CEFTRIAXONE SCH MLS/HR: 1 INJECTION, SOLUTION INTRAVENOUS at 14:00

## 2019-01-10 RX ADMIN — SEVELAMER CARBONATE SCH MG: 800 TABLET, FILM COATED ORAL at 17:38

## 2019-01-10 RX ADMIN — OXYCODONE HYDROCHLORIDE AND ACETAMINOPHEN PRN TAB: 10; 325 TABLET ORAL at 01:36

## 2019-01-10 RX ADMIN — OXYCODONE HYDROCHLORIDE AND ACETAMINOPHEN PRN TAB: 10; 325 TABLET ORAL at 12:02

## 2019-01-10 RX ADMIN — CINACALCET HYDROCHLORIDE SCH MG: 30 TABLET, COATED ORAL at 08:33

## 2019-01-10 RX ADMIN — CEFTRIAXONE SCH MLS/HR: 1 INJECTION, SOLUTION INTRAVENOUS at 01:36

## 2019-01-10 RX ADMIN — SEVELAMER CARBONATE SCH MG: 800 TABLET, FILM COATED ORAL at 07:25

## 2019-01-10 RX ADMIN — DOXYCYCLINE SCH MLS/HR: 100 INJECTION, POWDER, LYOPHILIZED, FOR SOLUTION INTRAVENOUS at 07:25

## 2019-01-10 RX ADMIN — ACETAMINOPHEN PRN MG: 325 TABLET, FILM COATED ORAL at 01:00

## 2019-01-10 RX ADMIN — WARFARIN SODIUM SCH MG: 5 TABLET ORAL at 17:39

## 2019-01-10 RX ADMIN — MORPHINE SULFATE PRN MG: 4 INJECTION INTRAVENOUS at 20:14

## 2019-01-10 RX ADMIN — DOXYCYCLINE SCH MLS/HR: 100 INJECTION, POWDER, LYOPHILIZED, FOR SOLUTION INTRAVENOUS at 19:56

## 2019-01-10 RX ADMIN — SEVELAMER CARBONATE SCH MG: 800 TABLET, FILM COATED ORAL at 12:02

## 2019-01-11 VITALS — DIASTOLIC BLOOD PRESSURE: 76 MMHG | SYSTOLIC BLOOD PRESSURE: 117 MMHG

## 2019-01-11 VITALS — SYSTOLIC BLOOD PRESSURE: 92 MMHG | DIASTOLIC BLOOD PRESSURE: 59 MMHG

## 2019-01-11 VITALS — SYSTOLIC BLOOD PRESSURE: 96 MMHG | DIASTOLIC BLOOD PRESSURE: 60 MMHG

## 2019-01-11 VITALS — DIASTOLIC BLOOD PRESSURE: 79 MMHG | SYSTOLIC BLOOD PRESSURE: 125 MMHG

## 2019-01-11 LAB
% IRON SATURATION: 30 % (ref 20–55)
ALBUMIN SERPL-MCNC: 2.7 G/DL (ref 3.4–5)
ALP SERPL-CCNC: 177 U/L (ref 45–117)
ALT SERPL-CCNC: 10 U/L (ref 12–78)
ANION GAP SERPL CALC-SCNC: 9 MMOL/L (ref 5–15)
BASOPHILS # BLD AUTO: 0.04 X10^3/UL (ref 0–0.1)
BASOPHILS NFR BLD AUTO: 1 % (ref 0–1)
BILIRUB SERPL-MCNC: 0.5 MG/DL (ref 0.2–1)
CALCIUM SERPL-MCNC: 8.7 MG/DL (ref 8.5–10.1)
CHLORIDE SERPL-SCNC: 94 MMOL/L (ref 98–107)
CREAT SERPL-MCNC: 7.42 MG/DL (ref 0.7–1.3)
EOSINOPHIL # BLD AUTO: 0.21 X10^3/UL (ref 0–0.4)
EOSINOPHIL NFR BLD AUTO: 3 % (ref 1–7)
ERYTHROCYTE [DISTWIDTH] IN BLOOD BY AUTOMATED COUNT: 21.3 % (ref 9.4–14.8)
ERYTHROCYTE [SEDIMENTATION RATE] IN BLOOD BY WESTERGREN METHOD: 113 MM/HR (ref 0–10)
HCT (SEDRATE): 20.9 % (ref 39.2–51.8)
INR PPP: 4.89 (ref 0.93–1.1)
IRON LEVEL: 46 MCG/DL (ref 65–175)
LYMPHOCYTES # BLD AUTO: 1.12 X10^3/UL (ref 1–3.4)
LYMPHOCYTES NFR BLD AUTO: 17 % (ref 22–44)
MCH RBC QN AUTO: 29.2 PG (ref 27.5–34.5)
MCHC RBC AUTO-ENTMCNC: 33.7 G/DL (ref 33.2–36.2)
MCV RBC AUTO: 86.6 FL (ref 81–97)
MD: (no result)
MONOCYTES # BLD AUTO: 1.13 X10^3/UL (ref 0.2–0.8)
MONOCYTES NFR BLD AUTO: 17 % (ref 2–9)
NEUTROPHILS # BLD AUTO: 4.32 X10^3/UL (ref 1.8–6.8)
NEUTROPHILS NFR BLD AUTO: 63 % (ref 42–75)
PLATELET # BLD AUTO: 209 X10^3/UL (ref 130–400)
PMV BLD AUTO: 8.1 FL (ref 7.4–10.4)
PROT SERPL-MCNC: 7.3 G/DL (ref 6.4–8.2)
PROTHROMBIN TIME: 48.8 SECONDS (ref 9.6–11.5)
RBC # BLD AUTO: 2.42 X10^6/UL (ref 4.38–5.82)
TIBC SERPL-MCNC: 153 MCG/DL (ref 250–450)

## 2019-01-11 PROCEDURE — 3E10X8Z IRRIGATION OF SKIN AND MUCOUS MEMBRANES USING IRRIGATING SUBSTANCE: ICD-10-PCS | Performed by: SURGERY

## 2019-01-11 RX ADMIN — MORPHINE SULFATE PRN MG: 4 INJECTION INTRAVENOUS at 13:59

## 2019-01-11 RX ADMIN — MORPHINE SULFATE PRN MG: 4 INJECTION INTRAVENOUS at 00:25

## 2019-01-11 RX ADMIN — CEFTRIAXONE SCH MLS/HR: 1 INJECTION, SOLUTION INTRAVENOUS at 13:59

## 2019-01-11 RX ADMIN — DOXYCYCLINE SCH MLS/HR: 100 INJECTION, POWDER, LYOPHILIZED, FOR SOLUTION INTRAVENOUS at 08:42

## 2019-01-11 RX ADMIN — CEFTRIAXONE SCH MLS/HR: 1 INJECTION, SOLUTION INTRAVENOUS at 02:03

## 2019-01-11 RX ADMIN — SEVELAMER CARBONATE SCH MG: 800 TABLET, FILM COATED ORAL at 12:10

## 2019-01-11 RX ADMIN — DOXYCYCLINE SCH MLS/HR: 100 INJECTION, POWDER, LYOPHILIZED, FOR SOLUTION INTRAVENOUS at 22:24

## 2019-01-11 RX ADMIN — CINACALCET HYDROCHLORIDE SCH MG: 30 TABLET, COATED ORAL at 08:43

## 2019-01-11 RX ADMIN — SEVELAMER CARBONATE SCH MG: 800 TABLET, FILM COATED ORAL at 08:43

## 2019-01-11 RX ADMIN — CALCITRIOL SCH MCG: 0.25 CAPSULE, LIQUID FILLED ORAL at 08:43

## 2019-01-11 RX ADMIN — SEVELAMER CARBONATE SCH MG: 800 TABLET, FILM COATED ORAL at 17:06

## 2019-01-11 RX ADMIN — MORPHINE SULFATE PRN MG: 4 INJECTION INTRAVENOUS at 19:43

## 2019-01-11 RX ADMIN — ACETAMINOPHEN PRN MG: 325 TABLET, FILM COATED ORAL at 02:03

## 2019-01-12 VITALS — SYSTOLIC BLOOD PRESSURE: 108 MMHG | DIASTOLIC BLOOD PRESSURE: 73 MMHG

## 2019-01-12 VITALS — DIASTOLIC BLOOD PRESSURE: 74 MMHG | SYSTOLIC BLOOD PRESSURE: 112 MMHG

## 2019-01-12 VITALS — DIASTOLIC BLOOD PRESSURE: 76 MMHG | SYSTOLIC BLOOD PRESSURE: 119 MMHG

## 2019-01-12 VITALS — SYSTOLIC BLOOD PRESSURE: 122 MMHG | DIASTOLIC BLOOD PRESSURE: 78 MMHG

## 2019-01-12 LAB
ANION GAP SERPL CALC-SCNC: 8 MMOL/L (ref 5–15)
BASOPHILS # BLD AUTO: 0.04 X10^3/UL (ref 0–0.1)
BASOPHILS NFR BLD AUTO: 1 % (ref 0–1)
CALCIUM SERPL-MCNC: 8.5 MG/DL (ref 8.5–10.1)
CHLORIDE SERPL-SCNC: 92 MMOL/L (ref 98–107)
CREAT SERPL-MCNC: 9.3 MG/DL (ref 0.7–1.3)
EOSINOPHIL # BLD AUTO: 0.28 X10^3/UL (ref 0–0.4)
EOSINOPHIL NFR BLD AUTO: 5 % (ref 1–7)
ERYTHROCYTE [DISTWIDTH] IN BLOOD BY AUTOMATED COUNT: 20.5 % (ref 9.4–14.8)
INR PPP: 5.26 (ref 0.93–1.1)
LYMPHOCYTES # BLD AUTO: 1.1 X10^3/UL (ref 1–3.4)
LYMPHOCYTES NFR BLD AUTO: 20 % (ref 22–44)
MCH RBC QN AUTO: 29 PG (ref 27.5–34.5)
MCHC RBC AUTO-ENTMCNC: 33.6 G/DL (ref 33.2–36.2)
MCV RBC AUTO: 86.4 FL (ref 81–97)
MD: NO
MONOCYTES # BLD AUTO: 0.64 X10^3/UL (ref 0.2–0.8)
MONOCYTES NFR BLD AUTO: 12 % (ref 2–9)
NEUTROPHILS # BLD AUTO: 3.44 X10^3/UL (ref 1.8–6.8)
NEUTROPHILS NFR BLD AUTO: 63 % (ref 42–75)
PLATELET # BLD AUTO: 228 X10^3/UL (ref 130–400)
PMV BLD AUTO: 7.7 FL (ref 7.4–10.4)
PROTHROMBIN TIME: 52.3 SECONDS (ref 9.6–11.5)
RBC # BLD AUTO: 2.75 X10^6/UL (ref 4.38–5.82)
VANCOMYCIN,RANDOM: 28.1 MCG/ML

## 2019-01-12 RX ADMIN — SEVELAMER CARBONATE SCH MG: 800 TABLET, FILM COATED ORAL at 12:59

## 2019-01-12 RX ADMIN — CALCITRIOL SCH MCG: 0.25 CAPSULE, LIQUID FILLED ORAL at 08:54

## 2019-01-12 RX ADMIN — CEFTRIAXONE SCH MLS/HR: 1 INJECTION, SOLUTION INTRAVENOUS at 00:59

## 2019-01-12 RX ADMIN — MORPHINE SULFATE PRN MG: 4 INJECTION INTRAVENOUS at 00:59

## 2019-01-12 RX ADMIN — SEVELAMER CARBONATE SCH MG: 800 TABLET, FILM COATED ORAL at 08:54

## 2019-01-12 RX ADMIN — SEVELAMER CARBONATE SCH MG: 800 TABLET, FILM COATED ORAL at 18:02

## 2019-01-12 RX ADMIN — CINACALCET HYDROCHLORIDE SCH MG: 30 TABLET, COATED ORAL at 08:54

## 2019-01-12 RX ADMIN — MORPHINE SULFATE PRN MG: 4 INJECTION INTRAVENOUS at 06:08

## 2019-01-12 RX ADMIN — Medication SCH NOTE: at 09:00

## 2019-01-13 VITALS — SYSTOLIC BLOOD PRESSURE: 111 MMHG | DIASTOLIC BLOOD PRESSURE: 72 MMHG

## 2019-01-13 VITALS — DIASTOLIC BLOOD PRESSURE: 90 MMHG | SYSTOLIC BLOOD PRESSURE: 157 MMHG

## 2019-01-13 VITALS — SYSTOLIC BLOOD PRESSURE: 115 MMHG | DIASTOLIC BLOOD PRESSURE: 75 MMHG

## 2019-01-13 LAB
ALBUMIN SERPL-MCNC: 2.7 G/DL (ref 3.4–5)
ANION GAP SERPL CALC-SCNC: 11 MMOL/L (ref 5–15)
BASOPHILS # BLD AUTO: 0.04 X10^3/UL (ref 0–0.1)
BASOPHILS # BLD AUTO: 0.05 X10^3/UL (ref 0–0.1)
BASOPHILS NFR BLD AUTO: 1 % (ref 0–1)
BASOPHILS NFR BLD AUTO: 1 % (ref 0–1)
CALCIUM SERPL-MCNC: 8.4 MG/DL (ref 8.5–10.1)
CHLORIDE SERPL-SCNC: 92 MMOL/L (ref 98–107)
CREAT SERPL-MCNC: 10.6 MG/DL (ref 0.7–1.3)
EOSINOPHIL # BLD AUTO: 0.17 X10^3/UL (ref 0–0.4)
EOSINOPHIL # BLD AUTO: 0.23 X10^3/UL (ref 0–0.4)
EOSINOPHIL NFR BLD AUTO: 4 % (ref 1–7)
EOSINOPHIL NFR BLD AUTO: 5 % (ref 1–7)
ERYTHROCYTE [DISTWIDTH] IN BLOOD BY AUTOMATED COUNT: 20 % (ref 9.4–14.8)
ERYTHROCYTE [DISTWIDTH] IN BLOOD BY AUTOMATED COUNT: 20.7 % (ref 9.4–14.8)
INR PPP: 2.15 (ref 0.93–1.1)
LYMPHOCYTES # BLD AUTO: 0.77 X10^3/UL (ref 1–3.4)
LYMPHOCYTES # BLD AUTO: 1.07 X10^3/UL (ref 1–3.4)
LYMPHOCYTES NFR BLD AUTO: 18 % (ref 22–44)
LYMPHOCYTES NFR BLD AUTO: 24 % (ref 22–44)
MCH RBC QN AUTO: 28 PG (ref 27.5–34.5)
MCH RBC QN AUTO: 28.6 PG (ref 27.5–34.5)
MCHC RBC AUTO-ENTMCNC: 32.4 G/DL (ref 33.2–36.2)
MCHC RBC AUTO-ENTMCNC: 33.7 G/DL (ref 33.2–36.2)
MCV RBC AUTO: 84.8 FL (ref 81–97)
MCV RBC AUTO: 86.3 FL (ref 81–97)
MD: (no result)
MD: NO
MONOCYTES # BLD AUTO: 0.33 X10^3/UL (ref 0.2–0.8)
MONOCYTES # BLD AUTO: 0.42 X10^3/UL (ref 0.2–0.8)
MONOCYTES NFR BLD AUTO: 10 % (ref 2–9)
MONOCYTES NFR BLD AUTO: 7 % (ref 2–9)
NEUTROPHILS # BLD AUTO: 2.61 X10^3/UL (ref 1.8–6.8)
NEUTROPHILS # BLD AUTO: 3.1 X10^3/UL (ref 1.8–6.8)
NEUTROPHILS NFR BLD AUTO: 60 % (ref 42–75)
NEUTROPHILS NFR BLD AUTO: 70 % (ref 42–75)
PLATELET # BLD AUTO: 261 X10^3/UL (ref 130–400)
PLATELET # BLD AUTO: 268 X10^3/UL (ref 130–400)
PMV BLD AUTO: 7.2 FL (ref 7.4–10.4)
PMV BLD AUTO: 7.4 FL (ref 7.4–10.4)
PROTHROMBIN TIME: 22.1 SECONDS (ref 9.6–11.5)
RBC # BLD AUTO: 2.5 X10^6/UL (ref 4.38–5.82)
RBC # BLD AUTO: 2.84 X10^6/UL (ref 4.38–5.82)
VANCOMYCIN,RANDOM: 24 MCG/ML

## 2019-01-13 PROCEDURE — 5A1D70Z PERFORMANCE OF URINARY FILTRATION, INTERMITTENT, LESS THAN 6 HOURS PER DAY: ICD-10-PCS | Performed by: HOSPITALIST

## 2019-01-13 RX ADMIN — SEVELAMER CARBONATE SCH MG: 800 TABLET, FILM COATED ORAL at 17:07

## 2019-01-13 RX ADMIN — OXYCODONE HYDROCHLORIDE AND ACETAMINOPHEN PRN TAB: 10; 325 TABLET ORAL at 19:33

## 2019-01-13 RX ADMIN — SEVELAMER CARBONATE SCH MG: 800 TABLET, FILM COATED ORAL at 12:25

## 2019-01-13 RX ADMIN — CALCITRIOL SCH MCG: 0.25 CAPSULE, LIQUID FILLED ORAL at 08:38

## 2019-01-13 RX ADMIN — CINACALCET HYDROCHLORIDE SCH MG: 30 TABLET, COATED ORAL at 08:38

## 2019-01-13 RX ADMIN — Medication SCH NOTE: at 08:39

## 2019-01-13 RX ADMIN — OXYCODONE HYDROCHLORIDE AND ACETAMINOPHEN PRN TAB: 10; 325 TABLET ORAL at 01:49

## 2019-01-14 VITALS — DIASTOLIC BLOOD PRESSURE: 78 MMHG | SYSTOLIC BLOOD PRESSURE: 131 MMHG

## 2019-01-14 VITALS — SYSTOLIC BLOOD PRESSURE: 111 MMHG | DIASTOLIC BLOOD PRESSURE: 73 MMHG

## 2019-01-14 VITALS — SYSTOLIC BLOOD PRESSURE: 107 MMHG | DIASTOLIC BLOOD PRESSURE: 73 MMHG

## 2019-01-14 VITALS — SYSTOLIC BLOOD PRESSURE: 92 MMHG | DIASTOLIC BLOOD PRESSURE: 62 MMHG

## 2019-01-14 VITALS — SYSTOLIC BLOOD PRESSURE: 129 MMHG | DIASTOLIC BLOOD PRESSURE: 84 MMHG

## 2019-01-14 LAB
<PLATELET ESTIMATE>: ADEQUATE
<PLT MORPHOLOGY>: (no result)
ALBUMIN SERPL-MCNC: 2.6 G/DL (ref 3.4–5)
ANION GAP SERPL CALC-SCNC: 9 MMOL/L (ref 5–15)
ANISOCYTOSIS BLD QL SMEAR: (no result)
BASOPHILS # BLD AUTO: 0.02 X10^3/UL (ref 0–0.1)
BASOPHILS NFR BLD AUTO: 1 % (ref 0–1)
CALCIUM SERPL-MCNC: 8.1 MG/DL (ref 8.5–10.1)
CHLORIDE SERPL-SCNC: 96 MMOL/L (ref 98–107)
CREAT SERPL-MCNC: 7.29 MG/DL (ref 0.7–1.3)
CRP SERPL-MCNC: 7.4 MG/DL (ref 0.02–0.49)
EOSINOPHIL # BLD AUTO: 0.14 X10^3/UL (ref 0–0.4)
EOSINOPHIL NFR BLD AUTO: 4 % (ref 1–7)
ERYTHROCYTE [DISTWIDTH] IN BLOOD BY AUTOMATED COUNT: 20.4 % (ref 9.4–14.8)
ERYTHROCYTE [SEDIMENTATION RATE] IN BLOOD BY WESTERGREN METHOD: 100 MM/HR (ref 0–10)
HCT (SEDRATE): 22.1 % (ref 39.2–51.8)
INR PPP: 1.49 (ref 0.93–1.1)
LYMPHOCYTES # BLD AUTO: 0.92 X10^3/UL (ref 1–3.4)
LYMPHOCYTES NFR BLD AUTO: 25 % (ref 22–44)
MCH RBC QN AUTO: 29 PG (ref 27.5–34.5)
MCHC RBC AUTO-ENTMCNC: 34 G/DL (ref 33.2–36.2)
MCV RBC AUTO: 85.2 FL (ref 81–97)
MD: (no result)
MONOCYTES # BLD AUTO: 0.34 X10^3/UL (ref 0.2–0.8)
MONOCYTES NFR BLD AUTO: 9 % (ref 2–9)
NEUTROPHILS # BLD AUTO: 2.32 X10^3/UL (ref 1.8–6.8)
NEUTROPHILS NFR BLD AUTO: 62 % (ref 42–75)
PLATELET # BLD AUTO: 234 X10^3/UL (ref 130–400)
PMV BLD AUTO: 7 FL (ref 7.4–10.4)
POLYCHROMASIA BLD QL SMEAR: (no result)
PROTHROMBIN TIME: 15.6 SECONDS (ref 9.6–11.5)
RBC # BLD AUTO: 2.6 X10^6/UL (ref 4.38–5.82)
VANCOMYCIN,RANDOM: 17.7 MCG/ML

## 2019-01-14 PROCEDURE — 5A1D70Z PERFORMANCE OF URINARY FILTRATION, INTERMITTENT, LESS THAN 6 HOURS PER DAY: ICD-10-PCS | Performed by: HOSPITALIST

## 2019-01-14 RX ADMIN — CINACALCET HYDROCHLORIDE SCH MG: 30 TABLET, COATED ORAL at 08:41

## 2019-01-14 RX ADMIN — Medication SCH NOTE: at 09:00

## 2019-01-14 RX ADMIN — SEVELAMER CARBONATE SCH MG: 800 TABLET, FILM COATED ORAL at 08:41

## 2019-01-14 RX ADMIN — MORPHINE SULFATE PRN MG: 4 INJECTION INTRAVENOUS at 17:46

## 2019-01-14 RX ADMIN — SEVELAMER CARBONATE SCH MG: 800 TABLET, FILM COATED ORAL at 17:00

## 2019-01-14 RX ADMIN — SEVELAMER CARBONATE SCH MG: 800 TABLET, FILM COATED ORAL at 13:30

## 2019-01-14 RX ADMIN — OXYCODONE HYDROCHLORIDE AND ACETAMINOPHEN PRN TAB: 10; 325 TABLET ORAL at 19:30

## 2019-01-14 RX ADMIN — MORPHINE SULFATE PRN MG: 4 INJECTION INTRAVENOUS at 21:57

## 2019-01-14 RX ADMIN — MORPHINE SULFATE PRN MG: 4 INJECTION INTRAVENOUS at 14:42

## 2019-01-14 RX ADMIN — CALCITRIOL SCH MCG: 0.25 CAPSULE, LIQUID FILLED ORAL at 08:41

## 2019-01-14 RX ADMIN — DAPTOMYCIN SCH MLS/HR: 500 INJECTION, POWDER, LYOPHILIZED, FOR SOLUTION INTRAVENOUS at 14:09

## 2019-01-15 VITALS — DIASTOLIC BLOOD PRESSURE: 73 MMHG | SYSTOLIC BLOOD PRESSURE: 110 MMHG

## 2019-01-15 VITALS — SYSTOLIC BLOOD PRESSURE: 101 MMHG | DIASTOLIC BLOOD PRESSURE: 64 MMHG

## 2019-01-15 VITALS — SYSTOLIC BLOOD PRESSURE: 136 MMHG | DIASTOLIC BLOOD PRESSURE: 55 MMHG

## 2019-01-15 VITALS — SYSTOLIC BLOOD PRESSURE: 138 MMHG | DIASTOLIC BLOOD PRESSURE: 97 MMHG

## 2019-01-15 LAB
% IRON SATURATION: 50 % (ref 20–55)
ANION GAP SERPL CALC-SCNC: 9 MMOL/L (ref 5–15)
BASOPHILS # BLD AUTO: 0.03 X10^3/UL (ref 0–0.1)
BASOPHILS NFR BLD AUTO: 1 % (ref 0–1)
CALCIUM SERPL-MCNC: 9.1 MG/DL (ref 8.5–10.1)
CHLORIDE SERPL-SCNC: 98 MMOL/L (ref 98–107)
CREAT SERPL-MCNC: 5.66 MG/DL (ref 0.7–1.3)
EOSINOPHIL # BLD AUTO: 0.13 X10^3/UL (ref 0–0.4)
EOSINOPHIL NFR BLD AUTO: 3 % (ref 1–7)
ERYTHROCYTE [DISTWIDTH] IN BLOOD BY AUTOMATED COUNT: 19.9 % (ref 9.4–14.8)
INR PPP: 1.36 (ref 0.93–1.1)
IRON LEVEL: 90 MCG/DL (ref 65–175)
LYMPHOCYTES # BLD AUTO: 1.02 X10^3/UL (ref 1–3.4)
LYMPHOCYTES NFR BLD AUTO: 24 % (ref 22–44)
MCH RBC QN AUTO: 29.1 PG (ref 27.5–34.5)
MCHC RBC AUTO-ENTMCNC: 34 G/DL (ref 33.2–36.2)
MCV RBC AUTO: 85.8 FL (ref 81–97)
MD: NO
MONOCYTES # BLD AUTO: 0.32 X10^3/UL (ref 0.2–0.8)
MONOCYTES NFR BLD AUTO: 8 % (ref 2–9)
NEUTROPHILS # BLD AUTO: 2.79 X10^3/UL (ref 1.8–6.8)
NEUTROPHILS NFR BLD AUTO: 65 % (ref 42–75)
PLATELET # BLD AUTO: 227 X10^3/UL (ref 130–400)
PMV BLD AUTO: 7.1 FL (ref 7.4–10.4)
PROTHROMBIN TIME: 14.2 SECONDS (ref 9.6–11.5)
RBC # BLD AUTO: 2.74 X10^6/UL (ref 4.38–5.82)
TIBC SERPL-MCNC: 181 MCG/DL (ref 250–450)

## 2019-01-15 RX ADMIN — SEVELAMER CARBONATE SCH MG: 800 TABLET, FILM COATED ORAL at 15:34

## 2019-01-15 RX ADMIN — MORPHINE SULFATE PRN MG: 4 INJECTION INTRAVENOUS at 13:55

## 2019-01-15 RX ADMIN — Medication SCH NOTE: at 09:07

## 2019-01-15 RX ADMIN — MORPHINE SULFATE PRN MG: 4 INJECTION INTRAVENOUS at 10:15

## 2019-01-15 RX ADMIN — MORPHINE SULFATE PRN MG: 4 INJECTION INTRAVENOUS at 02:55

## 2019-01-15 RX ADMIN — CALCITRIOL SCH MCG: 0.25 CAPSULE, LIQUID FILLED ORAL at 15:42

## 2019-01-15 RX ADMIN — CINACALCET HYDROCHLORIDE SCH MG: 30 TABLET, COATED ORAL at 15:35

## 2019-01-15 RX ADMIN — SEVELAMER CARBONATE SCH MG: 800 TABLET, FILM COATED ORAL at 09:06

## 2019-01-15 RX ADMIN — MORPHINE SULFATE PRN MG: 4 INJECTION INTRAVENOUS at 19:58

## 2019-01-15 RX ADMIN — MORPHINE SULFATE PRN MG: 4 INJECTION INTRAVENOUS at 09:38

## 2019-01-15 RX ADMIN — SEVELAMER CARBONATE SCH MG: 800 TABLET, FILM COATED ORAL at 12:36

## 2019-01-16 VITALS — DIASTOLIC BLOOD PRESSURE: 79 MMHG | SYSTOLIC BLOOD PRESSURE: 125 MMHG

## 2019-01-16 VITALS — DIASTOLIC BLOOD PRESSURE: 72 MMHG | SYSTOLIC BLOOD PRESSURE: 111 MMHG

## 2019-01-16 VITALS — SYSTOLIC BLOOD PRESSURE: 135 MMHG | DIASTOLIC BLOOD PRESSURE: 79 MMHG

## 2019-01-16 LAB
ANION GAP SERPL CALC-SCNC: 11 MMOL/L (ref 5–15)
BASOPHILS # BLD AUTO: 0.05 X10^3/UL (ref 0–0.1)
BASOPHILS NFR BLD AUTO: 1 % (ref 0–1)
CALCIUM SERPL-MCNC: 8.8 MG/DL (ref 8.5–10.1)
CHLORIDE SERPL-SCNC: 98 MMOL/L (ref 98–107)
CREAT SERPL-MCNC: 7.9 MG/DL (ref 0.7–1.3)
EOSINOPHIL # BLD AUTO: 0.24 X10^3/UL (ref 0–0.4)
EOSINOPHIL NFR BLD AUTO: 5 % (ref 1–7)
ERYTHROCYTE [DISTWIDTH] IN BLOOD BY AUTOMATED COUNT: 20.2 % (ref 9.4–14.8)
INR PPP: 1.67 (ref 0.93–1.1)
LYMPHOCYTES # BLD AUTO: 1.04 X10^3/UL (ref 1–3.4)
LYMPHOCYTES NFR BLD AUTO: 21 % (ref 22–44)
MCH RBC QN AUTO: 29.4 PG (ref 27.5–34.5)
MCHC RBC AUTO-ENTMCNC: 34.4 G/DL (ref 33.2–36.2)
MCV RBC AUTO: 85.3 FL (ref 81–97)
MD: NO
MONOCYTES # BLD AUTO: 0.34 X10^3/UL (ref 0.2–0.8)
MONOCYTES NFR BLD AUTO: 7 % (ref 2–9)
NEUTROPHILS # BLD AUTO: 3.28 X10^3/UL (ref 1.8–6.8)
NEUTROPHILS NFR BLD AUTO: 66 % (ref 42–75)
PLATELET # BLD AUTO: 243 X10^3/UL (ref 130–400)
PMV BLD AUTO: 6.3 FL (ref 7.4–10.4)
PROTHROMBIN TIME: 17.4 SECONDS (ref 9.6–11.5)
RBC # BLD AUTO: 2.66 X10^6/UL (ref 4.38–5.82)

## 2019-01-16 PROCEDURE — 5A1D70Z PERFORMANCE OF URINARY FILTRATION, INTERMITTENT, LESS THAN 6 HOURS PER DAY: ICD-10-PCS | Performed by: HOSPITALIST

## 2019-01-16 RX ADMIN — SEVELAMER CARBONATE SCH MG: 800 TABLET, FILM COATED ORAL at 08:00

## 2019-01-16 RX ADMIN — OXYCODONE HYDROCHLORIDE AND ACETAMINOPHEN PRN TAB: 10; 325 TABLET ORAL at 16:19

## 2019-01-16 RX ADMIN — SEVELAMER CARBONATE SCH MG: 800 TABLET, FILM COATED ORAL at 13:13

## 2019-01-16 RX ADMIN — OXYCODONE HYDROCHLORIDE AND ACETAMINOPHEN PRN TAB: 10; 325 TABLET ORAL at 21:18

## 2019-01-16 RX ADMIN — CALCITRIOL SCH MCG: 0.25 CAPSULE, LIQUID FILLED ORAL at 13:14

## 2019-01-16 RX ADMIN — DAPTOMYCIN SCH MLS/HR: 500 INJECTION, POWDER, LYOPHILIZED, FOR SOLUTION INTRAVENOUS at 20:10

## 2019-01-16 RX ADMIN — MORPHINE SULFATE PRN MG: 4 INJECTION INTRAVENOUS at 05:29

## 2019-01-16 RX ADMIN — OXYCODONE HYDROCHLORIDE AND ACETAMINOPHEN PRN TAB: 10; 325 TABLET ORAL at 23:39

## 2019-01-16 RX ADMIN — Medication SCH NOTE: at 08:29

## 2019-01-16 RX ADMIN — DARBEPOETIN ALFA SCH MCG: 60 SOLUTION INTRAVENOUS; SUBCUTANEOUS at 21:18

## 2019-01-16 RX ADMIN — MORPHINE SULFATE PRN MG: 4 INJECTION INTRAVENOUS at 00:23

## 2019-01-16 RX ADMIN — CINACALCET HYDROCHLORIDE SCH MG: 30 TABLET, COATED ORAL at 13:14

## 2019-01-16 RX ADMIN — SEVELAMER CARBONATE SCH MG: 800 TABLET, FILM COATED ORAL at 18:04

## 2019-01-17 VITALS — DIASTOLIC BLOOD PRESSURE: 72 MMHG | SYSTOLIC BLOOD PRESSURE: 123 MMHG

## 2019-01-17 VITALS — DIASTOLIC BLOOD PRESSURE: 72 MMHG | SYSTOLIC BLOOD PRESSURE: 104 MMHG

## 2019-01-17 VITALS — DIASTOLIC BLOOD PRESSURE: 65 MMHG | SYSTOLIC BLOOD PRESSURE: 109 MMHG

## 2019-01-17 LAB
<PLATELET ESTIMATE>: ADEQUATE
<PLT MORPHOLOGY>: (no result)
ANION GAP SERPL CALC-SCNC: 9 MMOL/L (ref 5–15)
ANISOCYTOSIS BLD QL SMEAR: (no result)
BAND#(MANUAL): 0.06 X10^3/UL
BASOPHILS # BLD AUTO: 0.05 X10^3/UL (ref 0–0.1)
BASOPHILS NFR BLD AUTO: 1 % (ref 0–1)
BASOPHILS NFR BLD MANUAL: 2 % (ref 0–1)
BASOS#(MANUAL): 0.12 X10^3/UL (ref 0–0.1)
CALCIUM SERPL-MCNC: 8.8 MG/DL (ref 8.5–10.1)
CHLORIDE SERPL-SCNC: 99 MMOL/L (ref 98–107)
CREAT SERPL-MCNC: 5.81 MG/DL (ref 0.7–1.3)
EOS#(MANUAL): 0.3 X10^3/UL (ref 0–0.4)
EOS% (MANUAL): 5 % (ref 1–7)
EOSINOPHIL # BLD AUTO: 0.34 X10^3/UL (ref 0–0.4)
EOSINOPHIL NFR BLD AUTO: 6 % (ref 1–7)
ERYTHROCYTE [DISTWIDTH] IN BLOOD BY AUTOMATED COUNT: 20.3 % (ref 9.4–14.8)
INR PPP: 2.65 (ref 0.93–1.1)
LYMPH#(MANUAL): 1.32 X10^3/UL (ref 1–3.4)
LYMPHOCYTES # BLD AUTO: 1.31 X10^3/UL (ref 1–3.4)
LYMPHOCYTES NFR BLD AUTO: 22 % (ref 22–44)
LYMPHS% (MANUAL): 22 % (ref 22–44)
MCH RBC QN AUTO: 28.6 PG (ref 27.5–34.5)
MCHC RBC AUTO-ENTMCNC: 32.9 G/DL (ref 33.2–36.2)
MCV RBC AUTO: 86.8 FL (ref 81–97)
MD: YES
MONOCYTES # BLD AUTO: 0.37 X10^3/UL (ref 0.2–0.8)
MONOCYTES NFR BLD AUTO: 6 % (ref 2–9)
MONOS#(MANUAL): 0.3 X10^3/UL (ref 0.3–2.7)
MONOS% (MANUAL): 5 % (ref 2–9)
NEUTROPHILS # BLD AUTO: 3.97 X10^3/UL (ref 1.8–6.8)
NEUTROPHILS NFR BLD AUTO: 66 % (ref 42–75)
NEUTS BAND NFR BLD: 1 % (ref 0–7)
PLATELET # BLD AUTO: 277 X10^3/UL (ref 130–400)
PMV BLD AUTO: 7 FL (ref 7.4–10.4)
POLYCHROMASIA BLD QL SMEAR: (no result)
PROTHROMBIN TIME: 27.1 SECONDS (ref 9.6–11.5)
RBC # BLD AUTO: 3.01 X10^6/UL (ref 4.38–5.82)
SEG#(MANUAL): 3.9 X10^3/UL (ref 1.8–6.8)
SEGS% (MANUAL): 65 % (ref 42–75)

## 2019-01-17 RX ADMIN — CINACALCET HYDROCHLORIDE SCH MG: 30 TABLET, COATED ORAL at 08:41

## 2019-01-17 RX ADMIN — SEVELAMER CARBONATE SCH MG: 800 TABLET, FILM COATED ORAL at 16:47

## 2019-01-17 RX ADMIN — DIPHENHYDRAMINE HYDROCHLORIDE PRN MG: 25 CAPSULE ORAL at 22:03

## 2019-01-17 RX ADMIN — CALCITRIOL SCH MCG: 0.25 CAPSULE, LIQUID FILLED ORAL at 08:41

## 2019-01-17 RX ADMIN — SEVELAMER CARBONATE SCH MG: 800 TABLET, FILM COATED ORAL at 12:00

## 2019-01-17 RX ADMIN — SEVELAMER CARBONATE SCH MG: 800 TABLET, FILM COATED ORAL at 08:00

## 2019-01-17 RX ADMIN — DIPHENHYDRAMINE HYDROCHLORIDE PRN MG: 25 CAPSULE ORAL at 03:46

## 2019-01-17 RX ADMIN — OXYCODONE HYDROCHLORIDE AND ACETAMINOPHEN PRN TAB: 10; 325 TABLET ORAL at 22:12

## 2019-01-17 RX ADMIN — DIPHENHYDRAMINE HYDROCHLORIDE PRN MG: 25 CAPSULE ORAL at 01:28

## 2019-01-17 RX ADMIN — Medication SCH NOTE: at 08:41

## 2019-01-18 VITALS — DIASTOLIC BLOOD PRESSURE: 71 MMHG | SYSTOLIC BLOOD PRESSURE: 103 MMHG

## 2019-01-18 VITALS — SYSTOLIC BLOOD PRESSURE: 166 MMHG | DIASTOLIC BLOOD PRESSURE: 69 MMHG

## 2019-01-18 VITALS — SYSTOLIC BLOOD PRESSURE: 105 MMHG | DIASTOLIC BLOOD PRESSURE: 69 MMHG

## 2019-01-18 LAB
INR PPP: 3.39 (ref 0.93–1.1)
PROTHROMBIN TIME: 34.3 SECONDS (ref 9.6–11.5)

## 2019-01-18 PROCEDURE — B548ZZA ULTRASONOGRAPHY OF SUPERIOR VENA CAVA, GUIDANCE: ICD-10-PCS | Performed by: RADIOLOGY

## 2019-01-18 PROCEDURE — B5181ZA FLUOROSCOPY OF SUPERIOR VENA CAVA USING LOW OSMOLAR CONTRAST, GUIDANCE: ICD-10-PCS | Performed by: RADIOLOGY

## 2019-01-18 PROCEDURE — 02HV33Z INSERTION OF INFUSION DEVICE INTO SUPERIOR VENA CAVA, PERCUTANEOUS APPROACH: ICD-10-PCS | Performed by: RADIOLOGY

## 2019-01-18 PROCEDURE — 5A1D70Z PERFORMANCE OF URINARY FILTRATION, INTERMITTENT, LESS THAN 6 HOURS PER DAY: ICD-10-PCS | Performed by: HOSPITALIST

## 2019-01-18 RX ADMIN — CINACALCET HYDROCHLORIDE SCH MG: 30 TABLET, COATED ORAL at 08:58

## 2019-01-18 RX ADMIN — SEVELAMER CARBONATE SCH MG: 800 TABLET, FILM COATED ORAL at 12:00

## 2019-01-18 RX ADMIN — CALCITRIOL SCH MCG: 0.25 CAPSULE, LIQUID FILLED ORAL at 08:59

## 2019-01-18 RX ADMIN — DAPTOMYCIN SCH MLS/HR: 500 INJECTION, POWDER, LYOPHILIZED, FOR SOLUTION INTRAVENOUS at 17:36

## 2019-01-18 RX ADMIN — Medication SCH NOTE: at 09:00

## 2019-01-18 RX ADMIN — SEVELAMER CARBONATE SCH MG: 800 TABLET, FILM COATED ORAL at 08:00

## 2019-01-18 RX ADMIN — SEVELAMER CARBONATE SCH MG: 800 TABLET, FILM COATED ORAL at 17:00

## 2019-01-21 ENCOUNTER — HOSPITAL ENCOUNTER (OUTPATIENT)
Dept: LAB | Facility: MEDICAL CENTER | Age: 39
End: 2019-01-21
Attending: SURGERY
Payer: MEDICARE

## 2019-01-21 LAB
INR PPP: 3.1 (ref 0.87–1.13)
POTASSIUM SERPL-SCNC: 4.6 MMOL/L (ref 3.6–5.5)
PROTHROMBIN TIME: 32 SEC (ref 12–14.6)

## 2019-01-21 PROCEDURE — 85610 PROTHROMBIN TIME: CPT

## 2019-01-21 PROCEDURE — 84132 ASSAY OF SERUM POTASSIUM: CPT

## 2019-01-22 ENCOUNTER — HOSPITAL ENCOUNTER (OUTPATIENT)
Dept: LAB | Facility: MEDICAL CENTER | Age: 39
End: 2019-01-22
Attending: INTERNAL MEDICINE
Payer: MEDICARE

## 2019-01-22 LAB
ALBUMIN SERPL BCP-MCNC: 3.8 G/DL (ref 3.2–4.9)
ALBUMIN/GLOB SERPL: 1.1 G/DL
ALP SERPL-CCNC: 217 U/L (ref 30–99)
ALT SERPL-CCNC: 25 U/L (ref 2–50)
ANION GAP SERPL CALC-SCNC: 13 MMOL/L (ref 0–11.9)
AST SERPL-CCNC: 22 U/L (ref 12–45)
BASOPHILS # BLD AUTO: 0.9 % (ref 0–1.8)
BASOPHILS # BLD: 0.04 K/UL (ref 0–0.12)
BILIRUB SERPL-MCNC: 0.6 MG/DL (ref 0.1–1.5)
BUN SERPL-MCNC: 39 MG/DL (ref 8–22)
CALCIUM SERPL-MCNC: 9.1 MG/DL (ref 8.5–10.5)
CHLORIDE SERPL-SCNC: 97 MMOL/L (ref 96–112)
CK SERPL-CCNC: 24 U/L (ref 0–154)
CO2 SERPL-SCNC: 28 MMOL/L (ref 20–33)
COMMENT 1642: NORMAL
CREAT SERPL-MCNC: 6.33 MG/DL (ref 0.5–1.4)
CRP SERPL HS-MCNC: 1.87 MG/DL (ref 0–0.75)
EOSINOPHIL # BLD AUTO: 0.19 K/UL (ref 0–0.51)
EOSINOPHIL NFR BLD: 4.1 % (ref 0–6.9)
ERYTHROCYTE [DISTWIDTH] IN BLOOD BY AUTOMATED COUNT: 69.5 FL (ref 35.9–50)
ERYTHROCYTE [SEDIMENTATION RATE] IN BLOOD BY WESTERGREN METHOD: 70 MM/HOUR (ref 0–15)
GLOBULIN SER CALC-MCNC: 3.6 G/DL (ref 1.9–3.5)
GLUCOSE SERPL-MCNC: 137 MG/DL (ref 65–99)
HCT VFR BLD AUTO: 25.4 % (ref 42–52)
HGB BLD-MCNC: 8 G/DL (ref 14–18)
IMM GRANULOCYTES # BLD AUTO: 0.07 K/UL (ref 0–0.11)
IMM GRANULOCYTES NFR BLD AUTO: 1.5 % (ref 0–0.9)
LYMPHOCYTES # BLD AUTO: 1.18 K/UL (ref 1–4.8)
LYMPHOCYTES NFR BLD: 25.4 % (ref 22–41)
MCH RBC QN AUTO: 29.7 PG (ref 27–33)
MCHC RBC AUTO-ENTMCNC: 31.5 G/DL (ref 33.7–35.3)
MCV RBC AUTO: 94.4 FL (ref 81.4–97.8)
MONOCYTES # BLD AUTO: 0.34 K/UL (ref 0–0.85)
MONOCYTES NFR BLD AUTO: 7.3 % (ref 0–13.4)
MORPHOLOGY BLD-IMP: NORMAL
NEUTROPHILS # BLD AUTO: 2.82 K/UL (ref 1.82–7.42)
NEUTROPHILS NFR BLD: 60.8 % (ref 44–72)
NRBC # BLD AUTO: 0 K/UL
NRBC BLD-RTO: 0 /100 WBC
PLATELET # BLD AUTO: 171 K/UL (ref 164–446)
PLATELET BLD QL SMEAR: NORMAL
PMV BLD AUTO: 10.3 FL (ref 9–12.9)
POTASSIUM SERPL-SCNC: 4 MMOL/L (ref 3.6–5.5)
PROT SERPL-MCNC: 7.4 G/DL (ref 6–8.2)
RBC # BLD AUTO: 2.69 M/UL (ref 4.7–6.1)
RBC BLD AUTO: NORMAL
SODIUM SERPL-SCNC: 138 MMOL/L (ref 135–145)
WBC # BLD AUTO: 4.6 K/UL (ref 4.8–10.8)

## 2019-01-22 PROCEDURE — 85025 COMPLETE CBC W/AUTO DIFF WBC: CPT

## 2019-01-22 PROCEDURE — 80053 COMPREHEN METABOLIC PANEL: CPT

## 2019-01-22 PROCEDURE — 85652 RBC SED RATE AUTOMATED: CPT

## 2019-01-22 PROCEDURE — 82550 ASSAY OF CK (CPK): CPT

## 2019-01-22 PROCEDURE — 86140 C-REACTIVE PROTEIN: CPT

## 2019-01-24 ENCOUNTER — APPOINTMENT (OUTPATIENT)
Dept: VASCULAR LAB | Facility: MEDICAL CENTER | Age: 39
End: 2019-01-24
Attending: INTERNAL MEDICINE
Payer: MEDICARE

## 2019-01-25 ENCOUNTER — HOSPITAL ENCOUNTER (OUTPATIENT)
Facility: MEDICAL CENTER | Age: 39
End: 2019-01-25
Attending: SURGERY | Admitting: SURGERY
Payer: MEDICARE

## 2019-01-25 ENCOUNTER — APPOINTMENT (OUTPATIENT)
Dept: RADIOLOGY | Facility: MEDICAL CENTER | Age: 39
End: 2019-01-25
Attending: SURGERY
Payer: MEDICARE

## 2019-01-25 VITALS
DIASTOLIC BLOOD PRESSURE: 64 MMHG | SYSTOLIC BLOOD PRESSURE: 106 MMHG | OXYGEN SATURATION: 97 % | WEIGHT: 184.3 LBS | TEMPERATURE: 96.7 F | HEIGHT: 64 IN | HEART RATE: 82 BPM | BODY MASS INDEX: 31.47 KG/M2 | RESPIRATION RATE: 16 BRPM

## 2019-01-25 LAB
INR PPP: 1.34 (ref 0.87–1.13)
POTASSIUM SERPL-SCNC: 4.9 MMOL/L (ref 3.6–5.5)
PROTHROMBIN TIME: 16.7 SEC (ref 12–14.6)

## 2019-01-25 PROCEDURE — 160029 HCHG SURGERY MINUTES - 1ST 30 MINS LEVEL 4: Performed by: SURGERY

## 2019-01-25 PROCEDURE — 85610 PROTHROMBIN TIME: CPT

## 2019-01-25 PROCEDURE — 160009 HCHG ANES TIME/MIN: Performed by: SURGERY

## 2019-01-25 PROCEDURE — 700101 HCHG RX REV CODE 250

## 2019-01-25 PROCEDURE — 160002 HCHG RECOVERY MINUTES (STAT): Performed by: SURGERY

## 2019-01-25 PROCEDURE — 160025 RECOVERY II MINUTES (STATS): Performed by: SURGERY

## 2019-01-25 PROCEDURE — 501838 HCHG SUTURE GENERAL: Performed by: SURGERY

## 2019-01-25 PROCEDURE — A6402 STERILE GAUZE <= 16 SQ IN: HCPCS | Performed by: SURGERY

## 2019-01-25 PROCEDURE — 700111 HCHG RX REV CODE 636 W/ 250 OVERRIDE (IP)

## 2019-01-25 PROCEDURE — 160035 HCHG PACU - 1ST 60 MINS PHASE I: Performed by: SURGERY

## 2019-01-25 PROCEDURE — 500382 HCHG DRAIN, PENROSE 1X18: Performed by: SURGERY

## 2019-01-25 PROCEDURE — 84132 ASSAY OF SERUM POTASSIUM: CPT

## 2019-01-25 PROCEDURE — 160048 HCHG OR STATISTICAL LEVEL 1-5: Performed by: SURGERY

## 2019-01-25 PROCEDURE — 160046 HCHG PACU - 1ST 60 MINS PHASE II: Performed by: SURGERY

## 2019-01-25 PROCEDURE — 160041 HCHG SURGERY MINUTES - EA ADDL 1 MIN LEVEL 4: Performed by: SURGERY

## 2019-01-25 PROCEDURE — 36415 COLL VENOUS BLD VENIPUNCTURE: CPT

## 2019-01-25 PROCEDURE — 501837 HCHG SUTURE CV: Performed by: SURGERY

## 2019-01-25 PROCEDURE — 110454 HCHG SHELL REV 250: Performed by: SURGERY

## 2019-01-25 RX ORDER — HYDROMORPHONE HYDROCHLORIDE 1 MG/ML
0.2 INJECTION, SOLUTION INTRAMUSCULAR; INTRAVENOUS; SUBCUTANEOUS
Status: DISCONTINUED | OUTPATIENT
Start: 2019-01-25 | End: 2019-01-25 | Stop reason: HOSPADM

## 2019-01-25 RX ORDER — MEPERIDINE HYDROCHLORIDE 25 MG/ML
12.5 INJECTION INTRAMUSCULAR; INTRAVENOUS; SUBCUTANEOUS
Status: DISCONTINUED | OUTPATIENT
Start: 2019-01-25 | End: 2019-01-25 | Stop reason: HOSPADM

## 2019-01-25 RX ORDER — HALOPERIDOL 5 MG/ML
1 INJECTION INTRAMUSCULAR
Status: DISCONTINUED | OUTPATIENT
Start: 2019-01-25 | End: 2019-01-25 | Stop reason: HOSPADM

## 2019-01-25 RX ORDER — DIPHENHYDRAMINE HYDROCHLORIDE 50 MG/ML
12.5 INJECTION INTRAMUSCULAR; INTRAVENOUS
Status: DISCONTINUED | OUTPATIENT
Start: 2019-01-25 | End: 2019-01-25 | Stop reason: HOSPADM

## 2019-01-25 RX ORDER — OXYCODONE HCL 5 MG/5 ML
5 SOLUTION, ORAL ORAL
Status: DISCONTINUED | OUTPATIENT
Start: 2019-01-25 | End: 2019-01-25 | Stop reason: HOSPADM

## 2019-01-25 RX ORDER — HYDROMORPHONE HYDROCHLORIDE 1 MG/ML
0.1 INJECTION, SOLUTION INTRAMUSCULAR; INTRAVENOUS; SUBCUTANEOUS
Status: DISCONTINUED | OUTPATIENT
Start: 2019-01-25 | End: 2019-01-25 | Stop reason: HOSPADM

## 2019-01-25 RX ORDER — HYDROMORPHONE HYDROCHLORIDE 1 MG/ML
0.4 INJECTION, SOLUTION INTRAMUSCULAR; INTRAVENOUS; SUBCUTANEOUS
Status: DISCONTINUED | OUTPATIENT
Start: 2019-01-25 | End: 2019-01-25 | Stop reason: HOSPADM

## 2019-01-25 RX ORDER — OXYCODONE HCL 5 MG/5 ML
10 SOLUTION, ORAL ORAL
Status: DISCONTINUED | OUTPATIENT
Start: 2019-01-25 | End: 2019-01-25 | Stop reason: HOSPADM

## 2019-01-25 RX ORDER — ONDANSETRON 2 MG/ML
4 INJECTION INTRAMUSCULAR; INTRAVENOUS
Status: DISCONTINUED | OUTPATIENT
Start: 2019-01-25 | End: 2019-01-25 | Stop reason: HOSPADM

## 2019-01-25 RX ORDER — SODIUM CHLORIDE, SODIUM LACTATE, POTASSIUM CHLORIDE, CALCIUM CHLORIDE 600; 310; 30; 20 MG/100ML; MG/100ML; MG/100ML; MG/100ML
INJECTION, SOLUTION INTRAVENOUS CONTINUOUS
Status: DISCONTINUED | OUTPATIENT
Start: 2019-01-25 | End: 2019-01-25 | Stop reason: HOSPADM

## 2019-01-25 RX ORDER — BUPIVACAINE HYDROCHLORIDE AND EPINEPHRINE 5; 5 MG/ML; UG/ML
INJECTION, SOLUTION EPIDURAL; INTRACAUDAL; PERINEURAL
Status: DISCONTINUED | OUTPATIENT
Start: 2019-01-25 | End: 2019-01-25 | Stop reason: HOSPADM

## 2019-01-25 RX ORDER — IPRATROPIUM BROMIDE AND ALBUTEROL SULFATE 2.5; .5 MG/3ML; MG/3ML
3 SOLUTION RESPIRATORY (INHALATION)
Status: DISCONTINUED | OUTPATIENT
Start: 2019-01-25 | End: 2019-01-25 | Stop reason: HOSPADM

## 2019-01-25 RX ORDER — CALCIUM CARBONATE 500 MG/1
500 TABLET, CHEWABLE ORAL DAILY
COMMUNITY
End: 2020-02-19

## 2019-01-25 RX ORDER — HEPARIN SODIUM,PORCINE 1000/ML
VIAL (ML) INJECTION
Status: DISCONTINUED | OUTPATIENT
Start: 2019-01-25 | End: 2019-01-25 | Stop reason: HOSPADM

## 2019-01-26 ENCOUNTER — APPOINTMENT (OUTPATIENT)
Dept: RADIOLOGY | Facility: MEDICAL CENTER | Age: 39
End: 2019-01-26
Attending: EMERGENCY MEDICINE
Payer: MEDICARE

## 2019-01-26 ENCOUNTER — APPOINTMENT (OUTPATIENT)
Dept: RADIOLOGY | Facility: MEDICAL CENTER | Age: 39
End: 2019-01-26
Attending: SURGERY
Payer: MEDICARE

## 2019-01-26 ENCOUNTER — HOSPITAL ENCOUNTER (OUTPATIENT)
Facility: MEDICAL CENTER | Age: 39
End: 2019-01-27
Attending: EMERGENCY MEDICINE | Admitting: HOSPITALIST
Payer: MEDICARE

## 2019-01-26 DIAGNOSIS — T82.898A: ICD-10-CM

## 2019-01-26 DIAGNOSIS — N18.6 ESRD (END STAGE RENAL DISEASE) (HCC): ICD-10-CM

## 2019-01-26 DIAGNOSIS — E87.5 HYPERKALEMIA: ICD-10-CM

## 2019-01-26 LAB
ABO GROUP BLD: NORMAL
ALBUMIN SERPL BCP-MCNC: 3.9 G/DL (ref 3.2–4.9)
ALBUMIN/GLOB SERPL: 1 G/DL
ALP SERPL-CCNC: 198 U/L (ref 30–99)
ALT SERPL-CCNC: 16 U/L (ref 2–50)
ANION GAP SERPL CALC-SCNC: 18 MMOL/L (ref 0–11.9)
ANION GAP SERPL CALC-SCNC: 19 MMOL/L (ref 0–11.9)
ANISOCYTOSIS BLD QL SMEAR: ABNORMAL
APTT PPP: 31.3 SEC (ref 24.7–36)
AST SERPL-CCNC: 23 U/L (ref 12–45)
BASOPHILS # BLD AUTO: 0.9 % (ref 0–1.8)
BASOPHILS # BLD: 0.05 K/UL (ref 0–0.12)
BILIRUB SERPL-MCNC: 0.6 MG/DL (ref 0.1–1.5)
BLD GP AB SCN SERPL QL: NORMAL
BUN SERPL-MCNC: 67 MG/DL (ref 8–22)
BUN SERPL-MCNC: 70 MG/DL (ref 8–22)
CALCIUM SERPL-MCNC: 8.3 MG/DL (ref 8.5–10.5)
CALCIUM SERPL-MCNC: 8.5 MG/DL (ref 8.5–10.5)
CHLORIDE SERPL-SCNC: 97 MMOL/L (ref 96–112)
CHLORIDE SERPL-SCNC: 98 MMOL/L (ref 96–112)
CK SERPL-CCNC: 35 U/L (ref 0–154)
CO2 SERPL-SCNC: 20 MMOL/L (ref 20–33)
CO2 SERPL-SCNC: 20 MMOL/L (ref 20–33)
CREAT SERPL-MCNC: 9 MG/DL (ref 0.5–1.4)
CREAT SERPL-MCNC: 9.71 MG/DL (ref 0.5–1.4)
EKG IMPRESSION: NORMAL
EOSINOPHIL # BLD AUTO: 0 K/UL (ref 0–0.51)
EOSINOPHIL NFR BLD: 0 % (ref 0–6.9)
ERYTHROCYTE [DISTWIDTH] IN BLOOD BY AUTOMATED COUNT: 70 FL (ref 35.9–50)
GLOBULIN SER CALC-MCNC: 4 G/DL (ref 1.9–3.5)
GLUCOSE BLD-MCNC: 136 MG/DL (ref 65–99)
GLUCOSE SERPL-MCNC: 226 MG/DL (ref 65–99)
GLUCOSE SERPL-MCNC: 93 MG/DL (ref 65–99)
HCT VFR BLD AUTO: 25 % (ref 42–52)
HGB BLD-MCNC: 7.6 G/DL (ref 14–18)
INR PPP: 1.32 (ref 0.87–1.13)
LYMPHOCYTES # BLD AUTO: 0.48 K/UL (ref 1–4.8)
LYMPHOCYTES NFR BLD: 7.8 % (ref 22–41)
MACROCYTES BLD QL SMEAR: ABNORMAL
MAGNESIUM SERPL-MCNC: 2 MG/DL (ref 1.5–2.5)
MANUAL DIFF BLD: NORMAL
MCH RBC QN AUTO: 29.1 PG (ref 27–33)
MCHC RBC AUTO-ENTMCNC: 30.4 G/DL (ref 33.7–35.3)
MCV RBC AUTO: 95.8 FL (ref 81.4–97.8)
MICROCYTES BLD QL SMEAR: ABNORMAL
MONOCYTES # BLD AUTO: 0.1 K/UL (ref 0–0.85)
MONOCYTES NFR BLD AUTO: 1.7 % (ref 0–13.4)
MORPHOLOGY BLD-IMP: NORMAL
NEUTROPHILS # BLD AUTO: 5.47 K/UL (ref 1.82–7.42)
NEUTROPHILS NFR BLD: 89.6 % (ref 44–72)
NRBC # BLD AUTO: 0 K/UL
NRBC BLD-RTO: 0 /100 WBC
PLATELET # BLD AUTO: 169 K/UL (ref 164–446)
PLATELET BLD QL SMEAR: NORMAL
PMV BLD AUTO: 9.9 FL (ref 9–12.9)
POTASSIUM SERPL-SCNC: 5.7 MMOL/L (ref 3.6–5.5)
POTASSIUM SERPL-SCNC: 6 MMOL/L (ref 3.6–5.5)
PROT SERPL-MCNC: 7.9 G/DL (ref 6–8.2)
PROTHROMBIN TIME: 16.5 SEC (ref 12–14.6)
RBC # BLD AUTO: 2.61 M/UL (ref 4.7–6.1)
RBC BLD AUTO: PRESENT
RH BLD: NORMAL
SMUDGE CELLS BLD QL SMEAR: NORMAL
SODIUM SERPL-SCNC: 136 MMOL/L (ref 135–145)
SODIUM SERPL-SCNC: 136 MMOL/L (ref 135–145)
TSH SERPL DL<=0.005 MIU/L-ACNC: 1.2 UIU/ML (ref 0.38–5.33)
WBC # BLD AUTO: 6.1 K/UL (ref 4.8–10.8)

## 2019-01-26 PROCEDURE — 80074 ACUTE HEPATITIS PANEL: CPT

## 2019-01-26 PROCEDURE — 700111 HCHG RX REV CODE 636 W/ 250 OVERRIDE (IP): Performed by: SURGERY

## 2019-01-26 PROCEDURE — 80053 COMPREHEN METABOLIC PANEL: CPT

## 2019-01-26 PROCEDURE — 82962 GLUCOSE BLOOD TEST: CPT

## 2019-01-26 PROCEDURE — C1725 CATH, TRANSLUMIN NON-LASER: HCPCS | Performed by: SURGERY

## 2019-01-26 PROCEDURE — 700111 HCHG RX REV CODE 636 W/ 250 OVERRIDE (IP)

## 2019-01-26 PROCEDURE — 160036 HCHG PACU - EA ADDL 30 MINS PHASE I: Performed by: SURGERY

## 2019-01-26 PROCEDURE — 85730 THROMBOPLASTIN TIME PARTIAL: CPT

## 2019-01-26 PROCEDURE — G0378 HOSPITAL OBSERVATION PER HR: HCPCS

## 2019-01-26 PROCEDURE — 84132 ASSAY OF SERUM POTASSIUM: CPT

## 2019-01-26 PROCEDURE — 700102 HCHG RX REV CODE 250 W/ 637 OVERRIDE(OP): Performed by: EMERGENCY MEDICINE

## 2019-01-26 PROCEDURE — 96374 THER/PROPH/DIAG INJ IV PUSH: CPT | Mod: XU

## 2019-01-26 PROCEDURE — 84295 ASSAY OF SERUM SODIUM: CPT

## 2019-01-26 PROCEDURE — 71045 X-RAY EXAM CHEST 1 VIEW: CPT

## 2019-01-26 PROCEDURE — 160002 HCHG RECOVERY MINUTES (STAT): Performed by: SURGERY

## 2019-01-26 PROCEDURE — 160048 HCHG OR STATISTICAL LEVEL 1-5: Performed by: SURGERY

## 2019-01-26 PROCEDURE — 85610 PROTHROMBIN TIME: CPT

## 2019-01-26 PROCEDURE — 86706 HEP B SURFACE ANTIBODY: CPT

## 2019-01-26 PROCEDURE — 90935 HEMODIALYSIS ONE EVALUATION: CPT

## 2019-01-26 PROCEDURE — 85027 COMPLETE CBC AUTOMATED: CPT

## 2019-01-26 PROCEDURE — 501838 HCHG SUTURE GENERAL: Performed by: SURGERY

## 2019-01-26 PROCEDURE — C2628 CATHETER, OCCLUSION: HCPCS | Performed by: SURGERY

## 2019-01-26 PROCEDURE — 82947 ASSAY GLUCOSE BLOOD QUANT: CPT

## 2019-01-26 PROCEDURE — 700101 HCHG RX REV CODE 250

## 2019-01-26 PROCEDURE — 85014 HEMATOCRIT: CPT

## 2019-01-26 PROCEDURE — 86850 RBC ANTIBODY SCREEN: CPT

## 2019-01-26 PROCEDURE — 110454 HCHG SHELL REV 250: Performed by: SURGERY

## 2019-01-26 PROCEDURE — 82330 ASSAY OF CALCIUM: CPT

## 2019-01-26 PROCEDURE — 96375 TX/PRO/DX INJ NEW DRUG ADDON: CPT | Mod: XU

## 2019-01-26 PROCEDURE — A6402 STERILE GAUZE <= 16 SQ IN: HCPCS | Performed by: SURGERY

## 2019-01-26 PROCEDURE — 700102 HCHG RX REV CODE 250 W/ 637 OVERRIDE(OP): Performed by: HOSPITALIST

## 2019-01-26 PROCEDURE — 99291 CRITICAL CARE FIRST HOUR: CPT

## 2019-01-26 PROCEDURE — C1757 CATH, THROMBECTOMY/EMBOLECT: HCPCS | Performed by: SURGERY

## 2019-01-26 PROCEDURE — C1768 GRAFT, VASCULAR: HCPCS | Performed by: SURGERY

## 2019-01-26 PROCEDURE — 86900 BLOOD TYPING SEROLOGIC ABO: CPT

## 2019-01-26 PROCEDURE — 500002 HCHG ADHESIVE, DERMABOND: Performed by: SURGERY

## 2019-01-26 PROCEDURE — 94760 N-INVAS EAR/PLS OXIMETRY 1: CPT

## 2019-01-26 PROCEDURE — 80048 BASIC METABOLIC PNL TOTAL CA: CPT

## 2019-01-26 PROCEDURE — 700101 HCHG RX REV CODE 250: Performed by: EMERGENCY MEDICINE

## 2019-01-26 PROCEDURE — 82803 BLOOD GASES ANY COMBINATION: CPT

## 2019-01-26 PROCEDURE — 160009 HCHG ANES TIME/MIN: Performed by: SURGERY

## 2019-01-26 PROCEDURE — 86901 BLOOD TYPING SEROLOGIC RH(D): CPT

## 2019-01-26 PROCEDURE — 84443 ASSAY THYROID STIM HORMONE: CPT

## 2019-01-26 PROCEDURE — 82550 ASSAY OF CK (CPK): CPT

## 2019-01-26 PROCEDURE — 501445 HCHG STAPLER, SKIN DISP: Performed by: SURGERY

## 2019-01-26 PROCEDURE — 160035 HCHG PACU - 1ST 60 MINS PHASE I: Performed by: SURGERY

## 2019-01-26 PROCEDURE — 502240 HCHG MISC OR SUPPLY RC 0272: Performed by: SURGERY

## 2019-01-26 PROCEDURE — 99220 PR INITIAL OBSERVATION CARE,LEVL III: CPT | Performed by: HOSPITALIST

## 2019-01-26 PROCEDURE — A9270 NON-COVERED ITEM OR SERVICE: HCPCS | Performed by: HOSPITALIST

## 2019-01-26 PROCEDURE — 160039 HCHG SURGERY MINUTES - EA ADDL 1 MIN LEVEL 3: Performed by: SURGERY

## 2019-01-26 PROCEDURE — 36415 COLL VENOUS BLD VENIPUNCTURE: CPT

## 2019-01-26 PROCEDURE — 160028 HCHG SURGERY MINUTES - 1ST 30 MINS LEVEL 3: Performed by: SURGERY

## 2019-01-26 PROCEDURE — 93005 ELECTROCARDIOGRAM TRACING: CPT | Mod: XU | Performed by: EMERGENCY MEDICINE

## 2019-01-26 PROCEDURE — 83735 ASSAY OF MAGNESIUM: CPT

## 2019-01-26 PROCEDURE — 99214 OFFICE O/P EST MOD 30 MIN: CPT | Performed by: INTERNAL MEDICINE

## 2019-01-26 PROCEDURE — 85007 BL SMEAR W/DIFF WBC COUNT: CPT

## 2019-01-26 PROCEDURE — C1769 GUIDE WIRE: HCPCS | Performed by: SURGERY

## 2019-01-26 PROCEDURE — 501837 HCHG SUTURE CV: Performed by: SURGERY

## 2019-01-26 DEVICE — IMPLANTABLE DEVICE: Type: IMPLANTABLE DEVICE | Site: GROIN | Status: FUNCTIONAL

## 2019-01-26 RX ORDER — BISACODYL 10 MG
10 SUPPOSITORY, RECTAL RECTAL
Status: DISCONTINUED | OUTPATIENT
Start: 2019-01-26 | End: 2019-01-27 | Stop reason: HOSPADM

## 2019-01-26 RX ORDER — DIPHENHYDRAMINE HYDROCHLORIDE 50 MG/ML
12.5 INJECTION INTRAMUSCULAR; INTRAVENOUS
Status: DISCONTINUED | OUTPATIENT
Start: 2019-01-26 | End: 2019-01-26 | Stop reason: HOSPADM

## 2019-01-26 RX ORDER — OXYCODONE HCL 5 MG/5 ML
10 SOLUTION, ORAL ORAL
Status: DISCONTINUED | OUTPATIENT
Start: 2019-01-26 | End: 2019-01-26 | Stop reason: HOSPADM

## 2019-01-26 RX ORDER — HEPARIN SODIUM 5000 [USP'U]/ML
5000 INJECTION, SOLUTION INTRAVENOUS; SUBCUTANEOUS EVERY 8 HOURS
Status: DISCONTINUED | OUTPATIENT
Start: 2019-01-27 | End: 2019-01-27 | Stop reason: HOSPADM

## 2019-01-26 RX ORDER — AMOXICILLIN 250 MG
2 CAPSULE ORAL 2 TIMES DAILY
Status: DISCONTINUED | OUTPATIENT
Start: 2019-01-26 | End: 2019-01-27 | Stop reason: HOSPADM

## 2019-01-26 RX ORDER — ONDANSETRON 4 MG/1
4 TABLET, ORALLY DISINTEGRATING ORAL EVERY 4 HOURS PRN
Status: DISCONTINUED | OUTPATIENT
Start: 2019-01-26 | End: 2019-01-27 | Stop reason: HOSPADM

## 2019-01-26 RX ORDER — HYDROMORPHONE HYDROCHLORIDE 1 MG/ML
0.1 INJECTION, SOLUTION INTRAMUSCULAR; INTRAVENOUS; SUBCUTANEOUS
Status: DISCONTINUED | OUTPATIENT
Start: 2019-01-26 | End: 2019-01-26 | Stop reason: HOSPADM

## 2019-01-26 RX ORDER — GABAPENTIN 600 MG/1
600 TABLET ORAL
Status: DISCONTINUED | OUTPATIENT
Start: 2019-01-26 | End: 2019-01-26

## 2019-01-26 RX ORDER — SODIUM POLYSTYRENE SULFONATE 15 G/60ML
15 SUSPENSION ORAL; RECTAL ONCE
Status: DISCONTINUED | OUTPATIENT
Start: 2019-01-26 | End: 2019-01-26 | Stop reason: RX

## 2019-01-26 RX ORDER — HALOPERIDOL 5 MG/ML
1 INJECTION INTRAMUSCULAR EVERY 6 HOURS PRN
Status: DISCONTINUED | OUTPATIENT
Start: 2019-01-26 | End: 2019-01-27 | Stop reason: HOSPADM

## 2019-01-26 RX ORDER — DEXTROSE MONOHYDRATE 25 G/50ML
50 INJECTION, SOLUTION INTRAVENOUS ONCE
Status: ACTIVE | OUTPATIENT
Start: 2019-01-26 | End: 2019-01-27

## 2019-01-26 RX ORDER — CINACALCET 30 MG/1
30 TABLET, FILM COATED ORAL
Status: DISCONTINUED | OUTPATIENT
Start: 2019-01-26 | End: 2019-01-27 | Stop reason: HOSPADM

## 2019-01-26 RX ORDER — DEXTROSE MONOHYDRATE 25 G/50ML
25 INJECTION, SOLUTION INTRAVENOUS ONCE
Status: COMPLETED | OUTPATIENT
Start: 2019-01-26 | End: 2019-01-26

## 2019-01-26 RX ORDER — CINACALCET 30 MG/1
60 TABLET, FILM COATED ORAL
Status: DISCONTINUED | OUTPATIENT
Start: 2019-01-27 | End: 2019-01-27 | Stop reason: HOSPADM

## 2019-01-26 RX ORDER — OXYCODONE HCL 5 MG/5 ML
5 SOLUTION, ORAL ORAL
Status: DISCONTINUED | OUTPATIENT
Start: 2019-01-26 | End: 2019-01-26 | Stop reason: HOSPADM

## 2019-01-26 RX ORDER — DOXYCYCLINE HYCLATE 100 MG
100 TABLET ORAL 2 TIMES DAILY
COMMUNITY
Start: 2019-01-19 | End: 2019-05-09

## 2019-01-26 RX ORDER — ONDANSETRON 2 MG/ML
4 INJECTION INTRAMUSCULAR; INTRAVENOUS
Status: DISCONTINUED | OUTPATIENT
Start: 2019-01-26 | End: 2019-01-26 | Stop reason: HOSPADM

## 2019-01-26 RX ORDER — SODIUM POLYSTYRENE SULFONATE 15 G/60ML
30 SUSPENSION ORAL; RECTAL ONCE
Status: DISCONTINUED | OUTPATIENT
Start: 2019-01-26 | End: 2019-01-26 | Stop reason: RX

## 2019-01-26 RX ORDER — HYDROMORPHONE HYDROCHLORIDE 1 MG/ML
0.4 INJECTION, SOLUTION INTRAMUSCULAR; INTRAVENOUS; SUBCUTANEOUS
Status: DISCONTINUED | OUTPATIENT
Start: 2019-01-26 | End: 2019-01-26 | Stop reason: HOSPADM

## 2019-01-26 RX ORDER — POLYETHYLENE GLYCOL 3350 17 G/17G
1 POWDER, FOR SOLUTION ORAL
Status: DISCONTINUED | OUTPATIENT
Start: 2019-01-26 | End: 2019-01-27 | Stop reason: HOSPADM

## 2019-01-26 RX ORDER — ONDANSETRON 2 MG/ML
4 INJECTION INTRAMUSCULAR; INTRAVENOUS EVERY 4 HOURS PRN
Status: DISCONTINUED | OUTPATIENT
Start: 2019-01-26 | End: 2019-01-27 | Stop reason: HOSPADM

## 2019-01-26 RX ORDER — CALCITRIOL 0.25 UG/1
0.75 CAPSULE, LIQUID FILLED ORAL
Status: DISCONTINUED | OUTPATIENT
Start: 2019-01-26 | End: 2019-01-27 | Stop reason: HOSPADM

## 2019-01-26 RX ORDER — HALOPERIDOL 5 MG/ML
1 INJECTION INTRAMUSCULAR
Status: DISCONTINUED | OUTPATIENT
Start: 2019-01-26 | End: 2019-01-26 | Stop reason: HOSPADM

## 2019-01-26 RX ORDER — HYDROCODONE BITARTRATE AND ACETAMINOPHEN 10; 325 MG/1; MG/1
1 TABLET ORAL EVERY 6 HOURS PRN
Status: DISCONTINUED | OUTPATIENT
Start: 2019-01-26 | End: 2019-01-27 | Stop reason: HOSPADM

## 2019-01-26 RX ORDER — CALCIUM CARBONATE 500 MG/1
500 TABLET, CHEWABLE ORAL NIGHTLY
Status: DISCONTINUED | OUTPATIENT
Start: 2019-01-26 | End: 2019-01-27 | Stop reason: HOSPADM

## 2019-01-26 RX ORDER — LIDOCAINE HYDROCHLORIDE 10 MG/ML
INJECTION, SOLUTION INFILTRATION; PERINEURAL
Status: COMPLETED
Start: 2019-01-26 | End: 2019-01-26

## 2019-01-26 RX ORDER — HEPARIN SODIUM 1000 [USP'U]/ML
1500 INJECTION, SOLUTION INTRAVENOUS; SUBCUTANEOUS
Status: DISCONTINUED | OUTPATIENT
Start: 2019-01-26 | End: 2019-01-27 | Stop reason: HOSPADM

## 2019-01-26 RX ORDER — HYDRALAZINE HYDROCHLORIDE 20 MG/ML
5 INJECTION INTRAMUSCULAR; INTRAVENOUS
Status: DISCONTINUED | OUTPATIENT
Start: 2019-01-26 | End: 2019-01-26 | Stop reason: HOSPADM

## 2019-01-26 RX ORDER — DEXAMETHASONE SODIUM PHOSPHATE 4 MG/ML
4 INJECTION, SOLUTION INTRA-ARTICULAR; INTRALESIONAL; INTRAMUSCULAR; INTRAVENOUS; SOFT TISSUE
Status: DISCONTINUED | OUTPATIENT
Start: 2019-01-26 | End: 2019-01-27 | Stop reason: HOSPADM

## 2019-01-26 RX ORDER — DOXYCYCLINE 100 MG/1
100 TABLET ORAL 2 TIMES DAILY
Status: DISCONTINUED | OUTPATIENT
Start: 2019-01-26 | End: 2019-01-27 | Stop reason: HOSPADM

## 2019-01-26 RX ORDER — SCOLOPAMINE TRANSDERMAL SYSTEM 1 MG/1
1 PATCH, EXTENDED RELEASE TRANSDERMAL
Status: DISCONTINUED | OUTPATIENT
Start: 2019-01-26 | End: 2019-01-27 | Stop reason: HOSPADM

## 2019-01-26 RX ORDER — PROMETHAZINE HYDROCHLORIDE 12.5 MG/1
12.5-25 SUPPOSITORY RECTAL EVERY 4 HOURS PRN
Status: DISCONTINUED | OUTPATIENT
Start: 2019-01-26 | End: 2019-01-27 | Stop reason: HOSPADM

## 2019-01-26 RX ORDER — GABAPENTIN 300 MG/1
1800 CAPSULE ORAL
Status: DISCONTINUED | OUTPATIENT
Start: 2019-01-26 | End: 2019-01-27 | Stop reason: HOSPADM

## 2019-01-26 RX ORDER — WARFARIN SODIUM 5 MG/1
5 TABLET ORAL DAILY
COMMUNITY
End: 2019-05-06

## 2019-01-26 RX ORDER — HYDROMORPHONE HYDROCHLORIDE 1 MG/ML
0.2 INJECTION, SOLUTION INTRAMUSCULAR; INTRAVENOUS; SUBCUTANEOUS
Status: DISCONTINUED | OUTPATIENT
Start: 2019-01-26 | End: 2019-01-26 | Stop reason: HOSPADM

## 2019-01-26 RX ORDER — DIPHENHYDRAMINE HYDROCHLORIDE 50 MG/ML
25 INJECTION INTRAMUSCULAR; INTRAVENOUS EVERY 6 HOURS PRN
Status: DISCONTINUED | OUTPATIENT
Start: 2019-01-26 | End: 2019-01-27 | Stop reason: HOSPADM

## 2019-01-26 RX ORDER — BUPIVACAINE HYDROCHLORIDE AND EPINEPHRINE 5; 5 MG/ML; UG/ML
INJECTION, SOLUTION EPIDURAL; INTRACAUDAL; PERINEURAL
Status: DISCONTINUED | OUTPATIENT
Start: 2019-01-26 | End: 2019-01-26 | Stop reason: HOSPADM

## 2019-01-26 RX ORDER — BACITRACIN 50000 [IU]/1
INJECTION, POWDER, FOR SOLUTION INTRAMUSCULAR
Status: DISCONTINUED | OUTPATIENT
Start: 2019-01-26 | End: 2019-01-26 | Stop reason: HOSPADM

## 2019-01-26 RX ORDER — PROMETHAZINE HYDROCHLORIDE 25 MG/1
12.5-25 TABLET ORAL EVERY 4 HOURS PRN
Status: DISCONTINUED | OUTPATIENT
Start: 2019-01-26 | End: 2019-01-27 | Stop reason: HOSPADM

## 2019-01-26 RX ORDER — HEPARIN SODIUM 1000 [USP'U]/ML
INJECTION, SOLUTION INTRAVENOUS; SUBCUTANEOUS
Status: COMPLETED
Start: 2019-01-26 | End: 2019-01-26

## 2019-01-26 RX ADMIN — CALCITRIOL 0.75 MCG: 0.5 CAPSULE, LIQUID FILLED ORAL at 20:30

## 2019-01-26 RX ADMIN — DEXTROSE MONOHYDRATE 50 ML: 500 INJECTION PARENTERAL at 12:40

## 2019-01-26 RX ADMIN — INSULIN HUMAN 10 UNITS: 100 INJECTION, SOLUTION PARENTERAL at 12:39

## 2019-01-26 RX ADMIN — LIDOCAINE HYDROCHLORIDE 1 ML: 10 INJECTION, SOLUTION INFILTRATION; PERINEURAL at 20:50

## 2019-01-26 RX ADMIN — HEPARIN SODIUM 1500 UNITS: 1000 INJECTION, SOLUTION INTRAVENOUS; SUBCUTANEOUS at 20:54

## 2019-01-26 RX ADMIN — Medication 1 ML: at 20:50

## 2019-01-26 RX ADMIN — GABAPENTIN 1800 MG: 300 CAPSULE ORAL at 20:28

## 2019-01-26 RX ADMIN — CINACALCET HYDROCHLORIDE 30 MG: 30 TABLET, COATED ORAL at 20:28

## 2019-01-26 RX ADMIN — ANTACID TABLETS 500 MG: 500 TABLET, CHEWABLE ORAL at 20:28

## 2019-01-26 ASSESSMENT — COGNITIVE AND FUNCTIONAL STATUS - GENERAL
MOBILITY SCORE: 23
SUGGESTED CMS G CODE MODIFIER MOBILITY: CI
DAILY ACTIVITIY SCORE: 24
SUGGESTED CMS G CODE MODIFIER DAILY ACTIVITY: CH
MOVING FROM LYING ON BACK TO SITTING ON SIDE OF FLAT BED: A LITTLE

## 2019-01-26 ASSESSMENT — COPD QUESTIONNAIRES
IN THE PAST 12 MONTHS DO YOU DO LESS THAN YOU USED TO BECAUSE OF YOUR BREATHING PROBLEMS: DISAGREE/UNSURE
DO YOU EVER COUGH UP ANY MUCUS OR PHLEGM?: NO/ONLY WITH OCCASIONAL COLDS OR INFECTIONS
COPD SCREENING SCORE: 0
DURING THE PAST 4 WEEKS HOW MUCH DID YOU FEEL SHORT OF BREATH: NONE/LITTLE OF THE TIME
HAVE YOU SMOKED AT LEAST 100 CIGARETTES IN YOUR ENTIRE LIFE: NO/DON'T KNOW

## 2019-01-26 ASSESSMENT — LIFESTYLE VARIABLES: EVER_SMOKED: NEVER

## 2019-01-26 NOTE — OR NURSING
Pt's VSS; denies N/V; states pain is at tolerable level. Dressing CDI to L groin. D/c orders received..PICC line flushed . Pt changed into clothing with assistance Discharge instructions given; pt and family verbalized understanding and questions answered. Patient states ready to d/c home. No prescriptions given. Pt dc'd in w/c with CNA in stable condition.

## 2019-01-26 NOTE — ED NOTES
Sodium Polystyrene Sulfonate (Kayexalate) 15mg/60ml was ordered but this medication is on drug shortage and is not available order.

## 2019-01-26 NOTE — ED NOTES
hospitalist at bedside. Pt requested to talk to Dr. Hopkins first before he gets treated for elevated potassium of 5.7, erp and hospitalist made aware.

## 2019-01-26 NOTE — OR SURGEON
Immediate Post OP Note    PreOp Diagnosis: Left thigh AV graft bleeding.  PostOp Diagnosis: Same    Procedure(s):  AV FISTULA REVISION - THIGH LOOP GRAFT - Wound Class: Clean    Surgeon(s):  Jairo Hopkins M.D.    Anesthesiologist/Type of Anesthesia:  Anesthesiologist: Mart Cobb M.D.; Chandrakant Lowery/General    Surgical Staff:  Circulator: KENNETH Ragsdale Scrub: June Patricia  Scrub Person: Raul Vega    Specimens removed if any:  * No specimens in log *    Estimated Blood Loss: 200    Findings: bleeding suture line    Complications: None initially apparent        1/25/2019 8:20 PM Jario Hopkins M.D.

## 2019-01-26 NOTE — ED TRIAGE NOTES
Pt dialysis pt. Pt with surgery yesterday from Dr. Hopkins, graft on left thigh failing. Pt has surgery scheduled today, dr. Hopkins told pt to check in through ED.

## 2019-01-26 NOTE — ED NOTES
Fingerstick [id number]  Dr. Hopkins at bedside and informed that kayexalate is not available and discontinued by pharmacist.  Taken to pre-op by transport. Pt aaox4.

## 2019-01-26 NOTE — OR NURSING
The pulse is heard with stethoscope.  Dr Hopkins here and auscultating revised fistula and is satisfied with his assessment.  He says pt to go home tonite.

## 2019-01-26 NOTE — DISCHARGE INSTRUCTIONS
ACTIVITY: Rest and take it easy for the first 24 hours.  A responsible adult is recommended to remain with you during that time.  It is normal to feel sleepy.  We encourage you to not do anything that requires balance, judgment or coordination.    MILD FLU-LIKE SYMPTOMS ARE NORMAL. YOU MAY EXPERIENCE GENERALIZED MUSCLE ACHES, THROAT IRRITATION, HEADACHE AND/OR SOME NAUSEA.    FOR 24 HOURS DO NOT:  Drive, operate machinery or run household appliances.  Drink beer or alcoholic beverages.   Make important decisions or sign legal documents.    SPECIAL INSTRUCTIONS: ***    DIET: To avoid nausea, slowly advance diet as tolerated, avoiding spicy or greasy foods for the first day.  Add more substantial food to your diet according to your physician's instructions.  Babies can be fed formula or breast milk as soon as they are hungry.  INCREASE FLUIDS AND FIBER TO AVOID CONSTIPATION.    SURGICAL DRESSING/BATHING: ***    FOLLOW-UP APPOINTMENT:  A follow-up appointment should be arranged with your doctor in ***; call to schedule.    You should CALL YOUR PHYSICIAN if you develop:  Fever greater than 101 degrees F.  Pain not relieved by medication, or persistent nausea or vomiting.  Excessive bleeding (blood soaking through dressing) or unexpected drainage from the wound.  Extreme redness or swelling around the incision site, drainage of pus or foul smelling drainage.  Inability to urinate or empty your bladder within 8 hours.  Problems with breathing or chest pain.    You should call 911 if you develop problems with breathing or chest pain.  If you are unable to contact your doctor or surgical center, you should go to the nearest emergency room or urgent care center.  Physician's telephone #: [phone]    If any questions arise, call your doctor.  If your doctor is not available, please feel free to call the Surgical Center at {Surgical Dept Numbers:[id number]}.  The Center is open Monday through Friday from 7AM to 7PM.  You can  also call the HEALTH HOTLINE open 24 hours/day, 7 days/week and speak to a nurse at [phone], or toll free at [phone].    A registered nurse may call you a few days after your surgery to see how you are doing after your procedure.    MEDICATIONS: Resume taking daily medication.  Take prescribed pain medication with food.  If no medication is prescribed, you may take non-aspirin pain medication if needed.  PAIN MEDICATION CAN BE VERY CONSTIPATING.  Take a stool softener or laxative such as senokot, pericolace, or milk of magnesia if needed.    Prescription given for ***. No pain medication given in recovery.    If your physician has prescribed pain medication that includes Acetaminophen (Tylenol), do not take additional Acetaminophen (Tylenol) while taking the prescribed medication.    Depression / Suicide Risk    As you are discharged from this [facility] facility, it is important to learn how to keep safe from harming yourself.    Recognize the warning signs:  · Abrupt changes in personality, positive or negative- including increase in energy   · Giving away possessions  · Change in eating patterns- significant weight changes-  positive or negative  · Change in sleeping patterns- unable to sleep or sleeping all the time   · Unwillingness or inability to communicate  · Depression  · Unusual sadness, discouragement and loneliness  · Talk of wanting to die  · Neglect of personal appearance   · Rebelliousness- reckless behavior  · Withdrawal from people/activities they love  · Confusion- inability to concentrate     If you or a loved one observes any of these behaviors or has concerns about self-harm, here's what you can do:  · Talk about it- your feelings and reasons for harming yourself  · Remove any means that you might use to hurt yourself (examples: pills, rope, extension cords, firearm)  · Get professional help from the community (Mental Health, Substance Abuse, psychological counseling)  · Do not  be alone:Call your Safe Contact- someone whom you trust who will be there for you.  · Call your local CRISIS HOTLINE 965-1587 or 561-844-9998  · Call your local Children's Mobile Crisis Response Team [place] (632) 759-4317 or www.Zweemie  · Call the toll free National Suicide Prevention Hotlines   · National Suicide Prevention Lifeline 677-247-AZLG (2630)  · National [employer] Line Network 800-SUICIDE (267-6827)

## 2019-01-26 NOTE — ED PROVIDER NOTES
ED Provider Note    Scribed for Ashok Gannon M.D. by Ondina Alcocer. 1/26/2019  10:10 AM    Primary care provider: Tony Mcmillan M.D.  Means of arrival: Walk-In  History obtained from: Patient  History limited by: None    CHIEF COMPLAINT  Chief Complaint   Patient presents with   • Post-Op Complications     HPI  Denis De Anda is a 38 y.o. male who presents to the Emergency Department with post-op complications. Patient had left thigh graft surgery performed yesterday by Dr. Hopkins however states his thigh graft is failing which he confirmed by listening with a stethoscope. He called Dr. oHpkins last night for this problem and was instructed to come to the ED for follow-up today as he will need dialysis after the surgery he is scheduled for today. Other than feeling a little fluid overload he does not endorse any other complaints. No modifying factors noted. Denies shortness of breath.     REVIEW OF SYSTEMS  Pertinent positives include post-op complications, fluid overload. Pertinent negatives include no shortness of breath. All other systems reviewed and negative.     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    SURGICAL HISTORY   has a past surgical history that includes mass excision ortho (10/9/08); av fistula creation (11/6/08); arteriogram (3/5/2009); angioplasty balloon (3/5/2009); av fistulogram (3/5/2009); us-kidney transplant (1996, 2001); endarterectomy (11/16/2010); av fistulogram (11/16/2010); cath placement (2/1/2011); angioplasty balloon (2/1/2011); av fistulogram (6/5/2011); cath placement  (6/5/2011); av fistula revision (11/3/2011); bone spur excision (12/8/2011); recovery (5/18/2012); incision and drainage general (9/13/2013); debridement (9/13/2013); vein ligation (9/13/2013); recovery (6/13/2014); av fistula revision (9/30/2014); irrigation & debridement general (12/15/2014); av fistula revision (2/8/2015); av fistula revision (2/22/2015); av fistula revision (3/20/2015); inject nerv blck,stellate ganglion (3/24/2015); av fistula creation (4/20/2015); av fistula thrombolysis (Left, 6/3/2015); irrigation & debridement general (Left, 6/3/2015); av fistula thrombolysis (Left, 6/9/2015); av fistula revision (Left, 6/17/2015); irrigation & debridement general (Left, 6/17/2015); recovery (8/7/2015); percut implnt neuroelect,epidural (2/19/2016); percut implnt neuroelect,epidural (2/19/2016); parathyroidectomy (2006); inguinal hernia repair (Bilateral, 2001, 2002); spinal cord stimulator (N/A, 3/25/2016); recovery (7/8/2016); lesion excision general (Right, 7/11/2016); mass excision general (Right, 8/5/2016); cath placement (Right, 9/17/2016); thrombectomy (Left, 9/18/2016); av fistulogram (9/18/2016); thrombectomy (Left, 10/20/2016); incision and drainage general (10/20/2016); irrigation & debridement general (Left, 10/28/2016); flap closure (Left, 11/17/2016); thrombectomy (Left, 1/6/2017); cath placement (Right, 1/6/2017); thrombectomy (Left, 1/5/2017); spinal cord stimulator (N/A, 5/4/2017); abdominoplasty (6/5/2017); irrigation & debridement general (7/31/2017); cath placement (Right, 11/14/2017); av fistula revision (Left, 11/14/2017); wound exploration general (2/1/2018); wound closure general (2/19/2018); split thickness skin graft (2/26/2018); thrombectomy (Left, 3/9/2018); cath placement (Right, 3/9/2018); thrombectomy (Left, 4/27/2018); thrombectomy (Left, 4/28/2018); abdominal exploration (6/25/2018); myocutaneous flap (Right, 8/27/2018); skin flap delayed (Left, 9/10/2018); split thickness  skin graft (Right, 9/21/2018); av fistula revision (Left, 12/23/2018); and av fistula revision (Left, 1/25/2019).    SOCIAL HISTORY  Social History   Substance Use Topics   • Smoking status: Never Smoker   • Smokeless tobacco: Never Used   • Alcohol use No      History   Drug Use No       FAMILY HISTORY  Family History   Problem Relation Age of Onset   • Arthritis Father         RA   • Lung Disease Father    • Heart Disease Father         MI   • Hypertension Brother        CURRENT MEDICATIONS    Current Facility-Administered Medications:   •  [MAR Hold] calcitRIOL (ROCALTROL) capsule 0.75 mcg, 0.75 mcg, Oral, QHS, Sage Ornelas M.D.  •  [MAR Hold] calcium carbonate (TUMS) chewable tab 500 mg, 500 mg, Oral, Nightly, Sage Ornelas M.D.  •  [MAR Hold] cinacalcet (SENSIPAR) tablet 30 mg, 30 mg, Oral, Q48HRS, Sage Ornelas M.D.  •  [MAR Hold] doxycycline monohydrate (ADOXA) tablet 100 mg, 100 mg, Oral, BID, Sage Ornelas M.D.  •  [MAR Hold] gabapentin (NEURONTIN) capsule 1,800 mg, 1,800 mg, Oral, QHS, Sage Ornelas M.D.  •  [MAR Hold] HYDROcodone/acetaminophen (NORCO)  MG per tablet 1 Tab, 1 Tab, Oral, Q6HRS PRN, Sage Ornelas M.D.  •  [MAR Hold] senna-docusate (PERICOLACE or SENOKOT S) 8.6-50 MG per tablet 2 Tab, 2 Tab, Oral, BID **AND** [MAR Hold] polyethylene glycol/lytes (MIRALAX) PACKET 1 Packet, 1 Packet, Oral, QDAY PRN **AND** [MAR Hold] magnesium hydroxide (MILK OF MAGNESIA) suspension 30 mL, 30 mL, Oral, QDAY PRN **AND** [MAR Hold] bisacodyl (DULCOLAX) suppository 10 mg, 10 mg, Rectal, QDAY PRN, Sage Ornelas M.D.  •  [MAR Hold] ondansetron (ZOFRAN) syringe/vial injection 4 mg, 4 mg, Intravenous, Q4HRS PRN, Sage Ornelas M.D.  •  [MAR Hold] ondansetron (ZOFRAN ODT) dispertab 4 mg, 4 mg, Oral, Q4HRS PRN, Sage Ornelas M.D.  •  [MAR Hold] promethazine (PHENERGAN) tablet 12.5-25 mg, 12.5-25 mg, Oral, Q4HRS PRN, Sage Ornelas M.D.  •  [MAR Hold]  "promethazine (PHENERGAN) suppository 12.5-25 mg, 12.5-25 mg, Rectal, Q4HRS PRN, Sage Ornelas M.D.  •  [MAR Hold] prochlorperazine (COMPAZINE) injection 5-10 mg, 5-10 mg, Intravenous, Q4HRS PRN, Sage Ornelas M.D.  •  [MAR Hold] dextrose 50% (D50W) injection 50 mL, 50 mL, Intravenous, Once, Sage Ornelas M.D.  •  [MAR Hold] insulin regular (HUMULIN R) injection 10 Units, 10 Units, Subcutaneous, Once, Sage Ornelas M.D.  •  [MAR Hold] cinacalcet (SENSIPAR) tablet 60 mg, 60 mg, Oral, Q48HRS, Sage Ornelas M.D.  •  hydrocortisone sodium succinate (SOLU-CORTEF) 250 MG injection 250 mg, 250 mg, Intravenous, Once, Jairo Hopkins M.D.  •  heparin in NS Intra-op, , , Intra-Op Once PRN, Jairo Hopkins M.D., 500 mL at 01/26/19 1427  •  bacitracin injection, , , Intra-Op Once PRN, Jairo Hopkins M.D., 50,000 Units at 01/26/19 1558  •  thrombin (THROMBINAR) 5000 UNIT vial, , , Intra-Op Once PRN, Jairo Hopkins M.D., 5,000 Units at 01/26/19 1629  •  iohexol (OMNIPAQUE) 240 mg/mL, , , Intra-Op Once PRN, Jairo Hopkins M.D., 22 mL at 01/26/19 1630  •  bupivacaine-0.5%-epinephrine 1:[id number] PF (MARCAINE/SENSORCAINE) injection, , , Intra-Op Once PRN, Jairo Hopkins M.D., 3 mL at 01/26/19 1646  •  DAPTOmycin 500 mg in NS 50 mL IVPB, 500 mg, Intravenous, Q48HRS, Tab Colon M.D.    ALLERGIES  Allergies   Allergen Reactions   • Baclofen Unspecified     Total loss of memory, sedation.   RXN=6/2015   • Contrast Media With Iodine [Iodine] Rash     RXN=1/5/2017   • Keflex Rash     RXN=possibly >10 years   • Pcn [Penicillins] Rash     RXN=possibly >10 years ago  Tolerated Zosyn on 2/20/18   • Tape Rash     Paper tape and tegaderm ok  RXN=ongoing   • Requip Vomiting       PHYSICAL EXAM  VITAL SIGNS: /77   Pulse 98   Temp 36.7 °C (98 °F) (Temporal)   Resp 16   Ht 1.626 m (5' 4\")   Wt 84.1 kg (185 lb 6.5 oz)   SpO2 100%   BMI 31.83 kg/m²     Constitutional: Well developed, Well " nourished, no distress, Non-toxic appearance.   HENT: Slight facial edema. Normocephalic, Atraumatic, Bilateral external ears normal, Oropharynx moist, No oral exudates.   Eyes: PERRLA, EOMI, Conjunctiva normal, No discharge.   Neck: No tenderness, Supple, No stridor.   Lymphatic: No lymphadenopathy noted.   Cardiovascular: Normal heart rate, Normal rhythm.   Thorax & Lungs: Clear to auscultation bilaterally, No respiratory distress, No wheezing, No crackles.   Abdomen: Soft, No tenderness, No masses, No pulsatile masses.   Skin: Post surgical bandage on left thigh with slight bleeding, non tender. Warm, No erythema, No rash.   Extremities: Multiple old AV fistulas bilateral arms. No edema No cyanosis.   Musculoskeletal: No tenderness to palpation or major deformities noted.  Intact distal pulses  Neurologic: Awake, alert. Moves all extremities spontaneously.  Psychiatric: Affect normal, Judgment normal, Mood normal.       EKG Interpretation  Results for orders placed or performed during the hospital encounter of 19   EKG   Result Value Ref Range    Report       [facility] Emergency Dept.    Test Date:  2019  Pt Name:    ODALYS APPIAH                Department: ER  MRN:        [id number]                      Room:        OR [place]  Gender:     Male                         Technician: [id number]  :        1980                   Requested By:DAO HENLEY  Order #:    [id number]                    Reading MD: DAO HENLEY MD    Measurements  Intervals                                Axis  Rate:       69                           P:          15  DE:         144                          QRS:        39  QRSD:       82                           T:          49  QT:         404  QTc:        433    Interpretive Statements  SINUS RHYTHM  BORDERLINE LOW VOLTAGE IN FRONTAL LEADS  LEAD(S) III WERE NOT USED FOR MORPHOLOGY ANALYSIS  Compared to ECG 2018 01:20:33  T-wave abnormality  no longer present    Electronically Signed On 1- 16:39:19 PST by DAO HENLEY MD           LABS  Labs Reviewed   CBC WITH DIFFERENTIAL - Abnormal; Notable for the following:        Result Value    RBC 2.61 (*)     Hemoglobin 7.6 (*)     Hematocrit 25.0 (*)     MCHC 30.4 (*)     RDW 70.0 (*)     Neutrophils-Polys 89.60 (*)     Lymphocytes 7.80 (*)     Lymphs (Absolute) 0.48 (*)     All other components within normal limits    Narrative:     Indicate which anticoagulants the patient is on:->UNKNOWN   COMP METABOLIC PANEL - Abnormal; Notable for the following:     Potassium 5.7 (*)     Anion Gap 19.0 (*)     Bun 67 (*)     Creatinine 9.00 (*)     Alkaline Phosphatase 198 (*)     Globulin 4.0 (*)     All other components within normal limits    Narrative:     Indicate which anticoagulants the patient is on:->UNKNOWN   PROTHROMBIN TIME - Abnormal; Notable for the following:     PT 16.5 (*)     INR 1.32 (*)     All other components within normal limits    Narrative:     Indicate which anticoagulants the patient is on:->UNKNOWN   ESTIMATED GFR - Abnormal; Notable for the following:     GFR If  8 (*)     GFR If Non  7 (*)     All other components within normal limits    Narrative:     Indicate which anticoagulants the patient is on:->UNKNOWN   ACCU-CHEK GLUCOSE - Abnormal; Notable for the following:     Glucose - Accu-Ck 136 (*)     All other components within normal limits   APTT    Narrative:     Indicate which anticoagulants the patient is on:->UNKNOWN   DIFFERENTIAL MANUAL    Narrative:     Indicate which anticoagulants the patient is on:->UNKNOWN   PERIPHERAL SMEAR REVIEW    Narrative:     Indicate which anticoagulants the patient is on:->UNKNOWN   PLATELET ESTIMATE    Narrative:     Indicate which anticoagulants the patient is on:->UNKNOWN   MORPHOLOGY    Narrative:     Indicate which anticoagulants the patient is on:->UNKNOWN   COD (ADULT)   TSH   MAGNESIUM   BASIC  METABOLIC PANEL   CREATINE KINASE     All labs reviewed by me.    RADIOLOGY  DX-CHEST-PORTABLE (1 VIEW)   Final Result      1.  Cardiac silhouette is mildly enlarged. There is no acute failure.      DX-PORTABLE FLUORO > 1 HOUR    (Results Pending)     The radiologist's interpretation of all radiological studies have been reviewed by me.    COURSE & MEDICAL DECISION MAKING  Nursing notes, VS, PMSFHx reviewed in chart.    Review of past medical records shows the patient has end stage renal disease. He was recently admitted to [facility] and treated with IV antibiotics. He had bleeding from a left thigh loop graft that was oozing and was admitted for revision and repair of left thigh graft.      10:10 AM - Patient seen and examined at bedside. Plan of care was discussed with the patient which includes IV fluids and blood work. Ordered DX-chest, CBC with differential, CMP, prothrombin time, APTT, estimated GFR, differential manual, peripheral smear review, platelet estimate, morphology, and EKG to evaluate his symptoms.     10:38 AM - Paged Dr. Hopkins (Vascular).     11:24 AM - Paged hospitalist to admit.     11:27 AM - Consulted with Dr. Ornelas (Hospitalist) who accepts the patient for admission.     11:37 AM - Consulted with Dr. Hopkins (Vascular) who would like insulin, D50 and Kayexalate prior to surgery.     12:39 PM - Paged Nephrology    1:03 PM - Consulted with Dr. Perales (Nephrology) who is on-call for Dr. Najjar.       Decision Making:  Patient with thrombosed AV fistula, the patient will go to the operating room, laboratory tests show hyperkalemia, no peak T waves on EKG, give the patient D50 and insulin, discussed the case with Dr. Colby Chamorro who will admit the patient the hospital, discussed the case with Dr. Hopkins who will take the patient in the operating room.      DISPOSITION:  Patient will be admitted to Dr. Ornelas (Hospitalist) in guarded condition.        FINAL IMPRESSION  1. ESRD (end stage  renal disease) (HCC)    2. Hyperkalemia    3. Failure of arteriovenous bypass graft (HCC)    4.  Critical care time of 35 minutes     I, Ondina Alcocer (Sugey), am scribing for, and in the presence of, Ashok Gannon M.D..    Electronically signed by: Ondina Alcocer (Sugey), 1/26/2019    IAshok M.D. personally performed the services described in this documentation, as scribed by Ondina Alcocer in my presence, and it is both accurate and complete. C    The note accurately reflects work and decisions made by me.  Ashok Gannon  1/26/2019  5:03 PM

## 2019-01-26 NOTE — OP REPORT
DATE OF SERVICE:  01/25/2019    PREOPERATIVE DIAGNOSIS:  Festering wounds, left thigh involving the   arteriovenous graft with recurrent bleeding episodes.    POSTOPERATIVE DIAGNOSIS:  Festering wounds, left thigh involving the   arteriovenous graft with recurrent bleeding episodes with small hole   identified at the surgical site of the arteriovenous graft creating the   continued and recurrent bleeding symptoms.    OPERATIONS:  Exploration of left thigh AV graft wound with repair of AV graft   and incision and drainage of the old clot within the wound with primary   closure.    SURGEON:  Jairo Hopkins MD    ANESTHESIA:  General anesthesia.    ANESTHESIOLOGIST:  Dr. Chandrakant Lowery and Dr. Mart Cobb.    DESCRIPTION OF PROCEDURE:  After obtaining informed consent, the patient was   placed on the operating table and put under general anesthesia.  I personally   prepped the left thigh where the loop graft dehisced and 2 wounds are present.    I used partially diluted Betadine since the wound on the venous side is loop   was partially opened and I wanted to avoid the toxicity of full strength   Betadine.  I then draped the patient sterilely.  I removed the sutures from   the incision on the anterior thigh that was having recurrent bleeding   episodes.  I looked at with ultrasound and could see a hematoma around the   graft.  I opened the incision and removed some clot and encountered vigorous   bleeding from the suture line in the [place] graft.  We removed the blood   clot.  I used manual control of the bleeding site.  I repaired it with a   figure-of-eight suture CV6 [place].  I then irrigated with bacitracin   solution and removed as much clot as possible.  I closed it tightly as   possible with interrupted 3-0 Vicryl and then subcuticular 4-0 Vicryl and   Dermabond.  The other lesions on the patient's thigh primarily on the left   side were nearly healed.  I could feel a pulse in the fistula and I could  hear   a Doppler signal that was consistent with flow in the proximal aspect of the   arterial end of the fistula.  I concluded that I was not going to attempt to   do a fistulogram at this point in time.  The graft was used yesterday and I am   hopeful that it will continue to work normally.  He is on warfarin that he   has stopped for this surgery and will need to resume that.  Further plans to   follow and depending on his course.  It appears that he is showing some signs   of healing of the anterior thigh and no signs of infection.  He is on chronic   antibiotics and through a short PICC line in his right inner arm for   approximately 6 weeks.       ____________________________________     MD BEN Davis / RIAZ    DD:  01/25/2019 20:24:44  DT:  01/26/2019 00:47:13    D#:  [id number]  Job#:  [id number]

## 2019-01-26 NOTE — PROGRESS NOTES
Patient has thrombosed right thigh AV graft.   Potassium 5.7  Plan thrombectomy.  Has taken Benadryl and will give Hydrocortisone.  Dr. Brower will do anesthesia.

## 2019-01-26 NOTE — ED NOTES
Med rec complete per pt at bedside with family   Allergies reviewed  Pt started IV Dapto through pic line X 6 weeks on 01/22/19  Pt also started a continuous until otherwise ABX of Doxy 100mg twice daily on 01/19/19

## 2019-01-27 ENCOUNTER — APPOINTMENT (OUTPATIENT)
Dept: RADIOLOGY | Facility: MEDICAL CENTER | Age: 39
End: 2019-01-27
Attending: SURGERY
Payer: MEDICARE

## 2019-01-27 VITALS
RESPIRATION RATE: 18 BRPM | OXYGEN SATURATION: 97 % | HEIGHT: 64 IN | DIASTOLIC BLOOD PRESSURE: 66 MMHG | HEART RATE: 88 BPM | SYSTOLIC BLOOD PRESSURE: 101 MMHG | TEMPERATURE: 97.1 F | WEIGHT: 175.27 LBS | BODY MASS INDEX: 29.92 KG/M2

## 2019-01-27 PROBLEM — A49.02 MRSA (METHICILLIN RESISTANT STAPHYLOCOCCUS AUREUS) INFECTION: Status: ACTIVE | Noted: 2019-01-27

## 2019-01-27 LAB
ALBUMIN SERPL BCP-MCNC: 3.3 G/DL (ref 3.2–4.9)
ALBUMIN/GLOB SERPL: 1.1 G/DL
ALP SERPL-CCNC: 168 U/L (ref 30–99)
ALT SERPL-CCNC: 8 U/L (ref 2–50)
ANION GAP SERPL CALC-SCNC: 10 MMOL/L (ref 0–11.9)
AST SERPL-CCNC: 12 U/L (ref 12–45)
BASOPHILS # BLD AUTO: 0.4 % (ref 0–1.8)
BASOPHILS # BLD: 0.02 K/UL (ref 0–0.12)
BILIRUB SERPL-MCNC: 0.4 MG/DL (ref 0.1–1.5)
BUN SERPL-MCNC: 48 MG/DL (ref 8–22)
CALCIUM SERPL-MCNC: 7.6 MG/DL (ref 8.5–10.5)
CHLORIDE SERPL-SCNC: 100 MMOL/L (ref 96–112)
CO2 SERPL-SCNC: 26 MMOL/L (ref 20–33)
CREAT SERPL-MCNC: 6.89 MG/DL (ref 0.5–1.4)
EOSINOPHIL # BLD AUTO: 0 K/UL (ref 0–0.51)
EOSINOPHIL NFR BLD: 0 % (ref 0–6.9)
ERYTHROCYTE [DISTWIDTH] IN BLOOD BY AUTOMATED COUNT: 73 FL (ref 35.9–50)
GLOBULIN SER CALC-MCNC: 3 G/DL (ref 1.9–3.5)
GLUCOSE SERPL-MCNC: 154 MG/DL (ref 65–99)
HAV IGM SERPL QL IA: NEGATIVE
HBV CORE IGM SER QL: NEGATIVE
HBV SURFACE AB SERPL IA-ACNC: >1000 MIU/ML (ref 0–10)
HBV SURFACE AG SER QL: NEGATIVE
HCT VFR BLD AUTO: 21.5 % (ref 42–52)
HCV AB SER QL: NEGATIVE
HGB BLD-MCNC: 6.6 G/DL (ref 14–18)
IMM GRANULOCYTES # BLD AUTO: 0.03 K/UL (ref 0–0.11)
IMM GRANULOCYTES NFR BLD AUTO: 0.6 % (ref 0–0.9)
LYMPHOCYTES # BLD AUTO: 0.51 K/UL (ref 1–4.8)
LYMPHOCYTES NFR BLD: 10.4 % (ref 22–41)
MCH RBC QN AUTO: 29.9 PG (ref 27–33)
MCHC RBC AUTO-ENTMCNC: 30.7 G/DL (ref 33.7–35.3)
MCV RBC AUTO: 97.3 FL (ref 81.4–97.8)
MONOCYTES # BLD AUTO: 0.52 K/UL (ref 0–0.85)
MONOCYTES NFR BLD AUTO: 10.6 % (ref 0–13.4)
NEUTROPHILS # BLD AUTO: 3.82 K/UL (ref 1.82–7.42)
NEUTROPHILS NFR BLD: 78 % (ref 44–72)
NRBC # BLD AUTO: 0 K/UL
NRBC BLD-RTO: 0 /100 WBC
PLATELET # BLD AUTO: 140 K/UL (ref 164–446)
PMV BLD AUTO: 9.7 FL (ref 9–12.9)
POTASSIUM SERPL-SCNC: 4.6 MMOL/L (ref 3.6–5.5)
PROT SERPL-MCNC: 6.3 G/DL (ref 6–8.2)
RBC # BLD AUTO: 2.21 M/UL (ref 4.7–6.1)
SODIUM SERPL-SCNC: 136 MMOL/L (ref 135–145)
WBC # BLD AUTO: 4.9 K/UL (ref 4.8–10.8)

## 2019-01-27 PROCEDURE — 71045 X-RAY EXAM CHEST 1 VIEW: CPT

## 2019-01-27 PROCEDURE — 96375 TX/PRO/DX INJ NEW DRUG ADDON: CPT

## 2019-01-27 PROCEDURE — A9270 NON-COVERED ITEM OR SERVICE: HCPCS | Performed by: HOSPITALIST

## 2019-01-27 PROCEDURE — 99217 PR OBSERVATION CARE DISCHARGE: CPT | Performed by: HOSPITALIST

## 2019-01-27 PROCEDURE — 85025 COMPLETE CBC W/AUTO DIFF WBC: CPT

## 2019-01-27 PROCEDURE — 700111 HCHG RX REV CODE 636 W/ 250 OVERRIDE (IP): Mod: JG | Performed by: HOSPITALIST

## 2019-01-27 PROCEDURE — 99214 OFFICE O/P EST MOD 30 MIN: CPT | Performed by: INTERNAL MEDICINE

## 2019-01-27 PROCEDURE — 700105 HCHG RX REV CODE 258

## 2019-01-27 PROCEDURE — G0378 HOSPITAL OBSERVATION PER HR: HCPCS

## 2019-01-27 PROCEDURE — 700102 HCHG RX REV CODE 250 W/ 637 OVERRIDE(OP): Performed by: HOSPITALIST

## 2019-01-27 PROCEDURE — 80053 COMPREHEN METABOLIC PANEL: CPT

## 2019-01-27 PROCEDURE — 700105 HCHG RX REV CODE 258: Performed by: HOSPITALIST

## 2019-01-27 RX ORDER — SODIUM CHLORIDE 9 MG/ML
INJECTION, SOLUTION INTRAVENOUS
Status: COMPLETED
Start: 2019-01-27 | End: 2019-01-27

## 2019-01-27 RX ADMIN — DAPTOMYCIN 500 MG: 350 INJECTION, POWDER, LYOPHILIZED, FOR SOLUTION INTRAVENOUS at 10:21

## 2019-01-27 RX ADMIN — SODIUM CHLORIDE: 9 INJECTION, SOLUTION INTRAVENOUS at 10:15

## 2019-01-27 RX ADMIN — DOXYCYCLINE 100 MG: 100 TABLET, FILM COATED ORAL at 05:55

## 2019-01-27 RX ADMIN — HYDROCODONE BITARTRATE AND ACETAMINOPHEN 1 TABLET: 10; 325 TABLET ORAL at 10:26

## 2019-01-27 RX ADMIN — HYDROCODONE BITARTRATE AND ACETAMINOPHEN 1 TABLET: 10; 325 TABLET ORAL at 00:26

## 2019-01-27 ASSESSMENT — ENCOUNTER SYMPTOMS
VOMITING: 0
WEIGHT LOSS: 0
SHORTNESS OF BREATH: 0
ORTHOPNEA: 0
CHILLS: 0
FEVER: 0
WHEEZING: 0
HEMOPTYSIS: 0
PALPITATIONS: 0
NAUSEA: 0
COUGH: 0

## 2019-01-27 NOTE — PROGRESS NOTES
Hemodialysis ordered by Dr. Perales. Treatment started at 1/26/19 2054 and ended at 2354. Pt stable, vss, no c/o post tx. See flow sheets for details. Net UF 2.0 L. Report to XIN Andrews RN. Lt fem avg dressing clean, dry, intact.

## 2019-01-27 NOTE — PROGRESS NOTES
Received bedside report from RN, pt care assumed, VSS, pt assessment complete. Pt AAOx4, chronic c/o  pain at this time. No signs of acute distress noted at this time. POC discussed with pt and verbalizes no questions. Pt denies any additional needs at this time. Bed in lowest position, bed alarm on, pt educated on fall risk and verbalized understanding, call light within reach, hourly rounding initiated. In a sinus rhythm. Left femoral graft site is clean and dry with good pulse.

## 2019-01-27 NOTE — OR SURGEON
Immediate Post OP Note    PreOp Diagnosis: Thrombosed left thigh AVgraft  PostOp Diagnosis: same    Procedure(s):  THROMBECTOMY  and angioplasty AV GRAFT - Wound Class: Clean    Surgeon(s):  Jairo Hopkins M.D.    Anesthesiologist/Type of Anesthesia:  Anesthesiologist: Estiven Brower M.D./General    Surgical Staff:  Circulator: Staci Messer R.N.  Scrub Person: SHARYN Lay IV  Radiology Technologist: Cortez Cornelius    Specimens removed if any:  * No specimens in log *    Estimated Blood Loss: 100    Findings: thrombosed and worn out at prior repair site    Complications: None apparent.        1/26/2019 5:52 PM Jairo Hopkins M.D.

## 2019-01-27 NOTE — H&P
DATE OF ADMISSION:  01/26/2019    IDENTIFICATION:  A 38-year-old male.    PRIMARY CARE PHYSICIAN:  Dr. Mcmillan.    INFECTIOUS DISEASE:  Dr. Mcgregor.    VASCULAR SURGERY:  Dr. Hopkins.    CHIEF COMPLAINT:  Inability to dialyze AV graft occlusion in the left thigh.    HISTORY OF PRESENT ILLNESS:  This is a 38-year-old male with end-stage renal   disease and a complex medical history including multiple graft failures and   renal transplant failure.  The patient had a graft revision by Dr. Hopkins   yesterday and was discharged, recently taken off Coumadin for the surgical   intervention, went home and found his graft to re-occlude, the patient was   brought back for further surgical intervention.  The patient does not have   other severe symptoms at this time.  He is chronically recently on daptomycin   through [facility] secondary to the patient's history of MRSA   infection of the graft.  Patient will be admitted for further surgical   intervention, IV antibiotic continuation as well as hemodialysis once the   patient has access.  His potassium is elevated to 5.7.  His chest x-ray shows   some mild cardiomegaly.  He does not have significant EKG changes from his   elevated potassium.  He is here with his wife.  He is a full code.    ALLERGIES:  BACLOFEN, CONTRAST MEDIA, KEFLEX, PENICILLIN, TAPE, AND REQUIP.    MEDICATIONS:  Regimen as follows:    1.  Calcitriol 0.25 mcg 3 tablets daily.  2.  Tums 500 mg daily.  3.  Sensipar 30 mg 1-2 tablets alternating daily.  4.  Daptomycin 500 mg every 48 hours.  5.  Vibramycin 100 mg b.i.d.  6.  Neurontin 300 mg 3 tablets at bedtime.  7.  Neurontin 600 mg, giving him a total of 1800 q.p.m.  8.  Donnelly 10/325 q. 6 hours p.r.n.  9.  Coumadin usually 5 mg, currently on hold for surgery.  He did take a dose   last night.    PAST MEDICAL HISTORY:  Extensive,  1.  End-stage renal disease.  2.  Anemia of chronic disease.  3.  Chronic pain syndrome.  4.  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  5.  Chronic hypercoagulable state, on Coumadin.  6.  Hypertension.  7.  Seizure disorder.  8.  Sleep apnea.  9.  History of superior vena cava obstruction with collaterals.  10.  History of calciphylaxis.  11.  History of abdominal wound, complex with reconstruction plastic surgery   repair.    PAST SURGICAL HISTORY:  1.  Abdominal exploration.  2.  Abdominoplasty.  3.  Multiple angioplasties to his AV fistulas with revisions and recurrent   thrombosis, bone spur excision, prior temporary dialysis catheter placement.  4.  Endarterectomy.  5.  Previous I and D's.  6.  Inguinal hernia repair.  7.  Multiple I and Ds  8.  Myocutaneous flap procedure.  9.  Parathyroidectomy.  10.  History of spinal cord stimulator implantation.  11.  Skin grafting.  12.  Multiple thrombectomies.  13.  History of kidney transplant x2 in 1996 and 2001.    SOCIAL HISTORY:  Patient is a nonsmoker, nondrinker.  Denies drug use.    FAMILY HISTORY:  Abnormal for arthritis, heart disease, and lung disease.    REVIEW OF SYSTEMS:    CONSTITUTIONAL:  He denies any fevers or chills.  HEENT:  No headache, no visual changes.  CARDIOVASCULAR:  No chest pain, palpitation, PND or orthopnea.  LUNGS:  Without cough or sputum production, some mild dyspnea.  GASTROINTESTINAL:  No nausea, vomiting, diarrhea.  GENITOURINARY:  The patient is anuric.  MUSCULOSKELETAL:  No joint pain or back pain out of the ordinary.  NEUROLOGIC:  Without focal weakness, numbness, or tingling out of the   ordinary.  PSYCHIATRIC:  Without complaints.  SKIN:  He had a left thigh graft revision and dressing.    PHYSICAL EXAMINATION:  VITAL SIGNS:  Patient is found to have temperature 36.7, pulse 66,   respirations 16, and blood pressure 115/77.  The patient is saturating 98% on   room air.  GENERAL:  This is a pleasant  male in no acute distress, no acute   respiratory distress.  Well developed, well nourished.  HEENT:  Normocephalic, atraumatic.  Mucous  membranes are moist.  Nasopharynx   is otherwise clear.  NECK:  Stiff range of motion.  No lymphadenopathy or thyromegaly.  CHEST:  Normal bony structures.  LUNGS:  Clear to auscultation anteriorly.  HEART:  Regular rate.  S1 and S2 are normal.  ABDOMEN:  Protuberant.  There is no tenderness, no distention.  Positive bowel   sounds in all 4 quadrants.  No hepatosplenomegaly appreciated.  GENITOURINARY:  Normal external male genitalia.  RECTAL:  Deferred.  MUSCULOSKELETAL:  No clubbing, cyanosis.  Trace edema in lower extremities.    Left upper thigh AV fistula site with a dressing in place.    LABORATORY DATA AND IMAGING:  White cell count is 6, hemoglobin 7.6,   hematocrit 25 and platelet count is 169, neutrophils 89.  Chemistry abnormal   for a potassium of 5.7, BUN of 67, creatinine of 9, alkaline phosphatase 198.    Coagulation with INR of 1.32, glucose recheck 136.  Chest x-ray with some   mild cardiomegaly.    ASSESSMENT AND PLAN:    1.  Left thigh AV fistula failure thrombosis for surgical intervention with   Dr. Hopkins, the patient in the past on Coumadin to prevent re-thrombosis.  The   patient might need to be bridged on heparin relief with vascular surgeon.    Patient will be otherwise admitted to the hospital hopefully with resolution   fast and he can be discharged for outpatient ongoing treatment.  2.  Chronic AV fistula failure.  3.  Methicillin-resistant Staphylococcus aureus bacteremia with a graft   infection on daptomycin.  I informed Dr. Colon for revisiting this and   writing orders for daptomycin as appropriate.  4.  Hyperkalemia.  Hopefully, will resolve with hemodialysis.  We did treat   the patient with D50 and insulin to reverse his elevated potassium   temporarily.  5.  End-stage renal disease, ongoing hemodialysis.  6.  Anemia of chronic disease and likely some acute blood loss.  7.  History of seizures.  8.  Chronic anticoagulation to be restarted once appropriate.  9.  History of IVC  thrombosis.  10.  History of calciphylaxis.  11.  History of extensive abdominal wall surgery and reconstruction, plastic   surgery.    OVERALL PLAN:  The patient at this time is admitted for surgical repair of a   left thigh AV fistula thrombosis with an overall complicated medical   background.  For full further details, please refer to the computer system and   paper chart.  The patient is medically complex, will be admitted to the   surgical status and determined after he is operated on.  He will be able to   discharge in a rather rapid fashion or if he possibly needs to stay longer for   re-anticoagulation.    Time spent 55 minutes.       ____________________________________     MD JAMES LIMON / RIAZ    DD:  01/26/2019 17:11:57  DT:  01/26/2019 17:59:02    D#:  [id number]  Job#:  [id number]

## 2019-01-27 NOTE — PROGRESS NOTES
[facility] Infectious Diseases Note    The patient is on our outpatient IV infusion service for MRSA sepsis secondary to his thrombosed AV graft. I came by to see him but he is in the OR. He needs to be on daptomycin 500 mg every 48 hrs. I will follow up and see him tomorrow when he is out of surgery.

## 2019-01-27 NOTE — CONSULTS
DATE OF SERVICE:  01/26/2019    NEPHROLOGY CONSULTATION    REQUESTING PHYSICIAN:  Ashok Gannon MD    REASON FOR CONSULTATION:  To evaluate and provide dialysis for patient with   end-stage renal disease.    HISTORY OF PRESENT ILLNESS:  The patient is 38-year-old male with end-stage   renal disease, on hemodialysis, has been receiving acute treatments on Monday,   Wednesday, and Friday, last dialysis received on Wednesday, came to the   emergency room for evaluation of left thigh graft, scheduled for surgery.    Currently, he is doing well.  AVG declotted.  The patient denies any fever,   chills.  No chest pain, no shortness of breath.  No nausea or vomiting.    REVIEW OF SYSTEMS:  GENERAL:  No fatigue.  No fever or chills.  HEENT:  No nose bleeds.  No sore throat, no congestion.  NECK:  No neck pain, no stiffness.  RESPIRATORY:  No cough, no hemoptysis, no wheezes.  CARDIOVASCULAR:  No chest pain, no dyspnea, no edema.  GASTROINTESTINAL:  No abdominal pain, no nausea or vomiting.    All other systems reviewed, all negative.    PAST MEDICAL HISTORY:  End-stage renal disease, on hemodialysis; history of   renal transplant and failure; hypertension; seizures; sleep apnea; superior   vena obstruction.    PAST SURGICAL HISTORY:  AV fistula creation, fistula revision, angioplasty,   abdominal wall surgery for calciphylaxis, AV graft creation, dialysis catheter   placements and removals.    SOCIAL HISTORY:  No tobacco, alcohol or drug use.    FAMILY HISTORY:  Arthritis, lung disease, heart disease, and hypertension.    ALLERGIES:  ALLERGIC TO BACLOFEN, CONTRAST MEDIA, KEFLEX, PENICILLIN, TAPE,   REQUIP.    OUTPATIENT MEDICATIONS:  Reviewed.    PHYSICAL EXAMINATION:  VITAL SIGNS:  Blood pressure 113/72, pulse 84, and temperature 36.7.  GENERAL APPEARANCE:  Well-appearing well-nourished male in no acute distress.  HEENT:  Normocephalic, atraumatic.  Pupils equal, round, reactive to light.    Extraocular movements are  intact.  Nares patent.  Oropharynx is clear.  Moist   mucosa.  No erythema or exudates.  NECK:  Supple.  No lymphadenopathy.  No thyromegaly appreciated.  LUNGS:  Clear to auscultation bilaterally.  No rales, wheezes, no rhonchi.  HEART:  Regular rate.  No rub or gallop.  ABDOMEN:  Soft, nontender, nondistended.  Bowel sounds present.  EXTREMITIES:  No cyanosis, clubbing or edema.  AVG site covered with   dressings, no thrill.  NEUROLOGIC:  Alert and oriented x3, no focal deficits.    LABORATORY RESULTS:  Hemoglobin is 7.6.  Sodium is 136, potassium 5.7, CO2 20,   BUN 67, and creatinine 9.    ASSESSMENT AND PLAN:  The patient is a 38-year-old male with end-stage renal   disease, on hemodialysis, admitted for arteriovenous graft revision.  1.  End-stage renal disease.  We will dialyze patient today, then Monday,   Wednesday, Friday.  2.  Electrolytes, elevated potassium, will be corrected with dialysis.  3.  Hypertension.  Blood pressure remains well controlled.  4.  Volume ultrafiltration with hemodialysis as blood pressure tolerates.  5.  Anemia, continue Epogen with dialysis.    RECOMMENDATIONS:  Hemodialysis today, then Monday, Wednesday, Friday.  To   monitor basic metabolic panel and hemoglobin level.  Provide low potassium   diet.  We will follow up patient closely.    Thank you for the consultation.       ____________________________________     MD CR OROZCO / RIAZ    DD:  01/26/2019 17:46:17  DT:  01/26/2019 20:28:45    D#:  [id number]  Job#:  [id number]

## 2019-01-27 NOTE — OR NURSING
1700-The left femoral A-V graft has a palpable thrill, and can be auscultated with stethoscope.The lt ant dorsalis pedis pulse is palpable.    1715-The left femoral A-V graft has a palpable thrill, and can be ausciltated by stethoscope.    1730--The left femoral A-V graft has a palpable thrill, and can be auscultated with stethoscope.The lt ant dorsalis pedis pulse is palpable.    1745-The left femoral A-V graft has a palpable thrill, and can be auscultated with stethoscope.The lt ant dorsalis pedis pulse is palpable.

## 2019-01-27 NOTE — PROGRESS NOTES
Pt arrived back to T-709 with transport. Left femoral AV graft dressing is CDI. No other issues noted.

## 2019-01-27 NOTE — PROGRESS NOTES
Patient was dressed and ready for discharge, informed of Dr Cervantes's decision for blood transfusion and Daptomycin prior to leaving. He refused blood transfusion and agreed to have Dapto. Spoke with Dr Cervantes about his decision.

## 2019-01-27 NOTE — PROGRESS NOTES
Nephrology Daily Progress Note    Date of Service  1/27/2019    Chief Complaint  38 y.o. Male with ESRD/HD admitted 1/26/2019 for AVG revision    Interval Problem Update  Doing well  Completed HD last night -tolerated well  Hyperkalemia -corrected    Review of Systems  Review of Systems   Constitutional: Negative for chills, fever, malaise/fatigue and weight loss.   HENT: Negative.    Respiratory: Negative for cough, hemoptysis, shortness of breath and wheezing.    Cardiovascular: Negative for chest pain, palpitations and orthopnea.   Gastrointestinal: Negative for nausea and vomiting.   Skin: Negative.    All other systems reviewed and are negative.       Physical Exam  Temp:  [36.1 °C (97 °F)-37.3 °C (99.2 °F)] 36.3 °C (97.3 °F)  Pulse:  [66-98] 66  Resp:  [12-18] 16  BP: ()/(33-86) 117/33  SpO2:  [93 %-100 %] 100 %    Physical Exam   Constitutional: He is oriented to person, place, and time. He appears well-developed and well-nourished. No distress.   HENT:   Head: Normocephalic and atraumatic.   Mouth/Throat: Oropharynx is clear and moist.   Eyes: Pupils are equal, round, and reactive to light. Conjunctivae are normal.   Cardiovascular: Normal rate and regular rhythm.  Exam reveals friction rub. Exam reveals no gallop.    Pulmonary/Chest: Effort normal and breath sounds normal. No respiratory distress. He has no wheezes. He has no rales.   Abdominal: Soft. Bowel sounds are normal. He exhibits no distension. There is no tenderness.   Musculoskeletal: He exhibits no edema.   Neurological: He is alert and oriented to person, place, and time. No cranial nerve deficit.   Nursing note and vitals reviewed.      Fluids    Intake/Output Summary (Last 24 hours) at 01/27/19 0740  Last data filed at 01/26/19 2354   Gross per 24 hour   Intake              700 ml   Output             2700 ml   Net            -2000 ml       Laboratory  Recent Labs      01/26/19   1013  01/27/19   0305   WBC  6.1  4.9   RBC  2.61*  2.21*    HEMOGLOBIN  7.6*  6.6*   HEMATOCRIT  25.0*  21.5*   MCV  95.8  97.3   MCH  29.1  29.9   MCHC  30.4*  30.7*   RDW  70.0*  73.0*   PLATELETCT  169  140*   MPV  9.9  9.7     Recent Labs      01/26/19   1013  01/26/19   2000  01/27/19   0305   SODIUM  136  136  136   POTASSIUM  5.7*  6.0*  4.6   CHLORIDE  97  98  100   CO2  20  20  26   GLUCOSE  93  226*  154*   BUN  67*  70*  48*   CREATININE  9.00*  9.71*  6.89*   CALCIUM  8.5  8.3*  7.6*     Recent Labs      01/25/19   1830  01/26/19   1013   APTT   --   31.3   INR  1.34*  1.32*                 Assessment/Plan  1.ESRD/HD -MWF  2.HTN: BP well controlled  3.Electrolytes: hyperkalemia -corrected  4.Anemia: drop in Hb level -to monitor    Recs; HD MWF, renal diet, all meds to renal doses             Daily Hb, BMP

## 2019-01-27 NOTE — DISCHARGE SUMMARY
Discharge Summary    CHIEF COMPLAINT ON ADMISSION  Chief Complaint   Patient presents with   • Post-Op Complications       Reason for Admission  Leg Pain     Admission Date  1/26/2019    CODE STATUS  Full Code    HPI & HOSPITAL COURSE  This is a 38 y.o. male here with inability to dialyze AV graft left thigh due to occlusion of graft.    He has end-stage renal   disease from birth, had closed ureter tubes with renal failure since early childhood and a complex medical history including multiple graft failures and   renal transplant failure x2.  The patient had a graft revision by Dr. Hopkins   yesterday and was discharged, recently taken off Coumadin for the surgical   intervention, went home and found his graft to re-occlude, the patient was   brought back for further surgical intervention.  He is chronically recently on daptomycin   through [facility] secondary to the patient's history of MRSA   infection of the graft and MRSA bacteremia.  Patient was admitted for further surgical   Intervention and thrombectomy of his left thigh AVF, he was successfully dialyzed on 1/26, IV daptomycin given on 1/27 per schedule by ID.    His potassium is elevated to 5.7 and normalized s/p HD, Cr imroved s/p HD. He was noted to have low Hg 6.6, but patient refused blood transfusion and prefers to have epogen with his HD MWF.  Dr. Perales is aware of low Hg.  His VSS were stable, he was dressed and ready for dc after his daptomycin IV given.  He is awaiting another renal  transplant through [facility] once his MRSA infection treatment has completed.  He works as an RN at [employer] and is well versed in follow ups and states he will seek medical attention immediately if necessary.  He will resume his home coumadin, INR 1.23, no bridging with heparin recommended by vascular surgeon.     Therefore, he is discharged in good and stable condition to home with close outpatient follow-up.    The patient recovered more quickly  than originally anticipated.  He is observation status.    Discharge Date  1/27/2019    FOLLOW UP ITEMS POST DISCHARGE  Follow up with ID  as scheduled, continue daptomycin q 48 hours, received dose 1/27/19 while in hospital.  Follow up with HD MWF for juan pablo, Dr. Perales.  Follow up with Dr. Hopkins, vascular surgeon for follow up post aVF graft thrombectomy.    DISCHARGE DIAGNOSES  Active Problems:    Hemorrhagic disorder due to circulating anticoagulants (HCC) POA: Yes    Graft failure due to thrombosis POA: Yes    Anemia due to acute blood loss POA: Yes    MRSA bacteremia POA: Yes    Pain syndrome, chronic POA: Yes    ESRD (end stage renal disease) (HCC) POA: Yes    Chronic anticoagulation POA: Yes    Seizure disorder (CMS-HCC) POA: Yes      Overview: April 2016. [facility]. Workup negative. Patient started on       Keppra 375 mg by mouth daily.    Renal transplant failure and rejection x 2 POA: Yes    MRSA (methicillin resistant Staphylococcus aureus) infection POA: Yes  Resolved Problems:    Hypertension POA: Yes    Hyperkalemia POA: Yes      FOLLOW UP  Future Appointments  Date Time Provider Department Center   1/31/2019 7:45 AM [facility] EXAM 5 [facility]MED None     No follow-up provider specified.    MEDICATIONS ON DISCHARGE     Medication List      CONTINUE taking these medications      Instructions   calcitRIOL 0.25 MCG Caps  Commonly known as:  ROCALTROL   Take 0.75 mcg by mouth every bedtime.  Dose:  0.75 mcg     calcium carbonate 500 MG Chew  Commonly known as:  TUMS   Take 500 mg by mouth every day. Taken with meals  Dose:  500 mg     cinacalcet 30 MG Tabs  Commonly known as:  SENSIPAR   Take 30-60 mg by mouth every day. 30mg one day 60mg the next  Dose:  30-60 mg     DAPTOMYCIN IV   by Intravenous route.     doxycycline 100 MG Tabs  Commonly known as:  VIBRAMYCIN   Take 100 mg by mouth 2 times a day. Continuous until otherwise  Dose:  100 mg     * gabapentin 600 MG tablet  Commonly known as:   NEURONTIN   Take 600 mg by mouth every bedtime. Pt also has an RX for 300MG, pt takes total of 1,800MG HS  Dose:  600 mg     * gabapentin 300 MG Caps  Commonly known as:  NEURONTIN   Take 1,200 mg by mouth every bedtime. Pt also has an RX for 600MG, pt takes total of 1,800MG HS  Dose:  1200 mg     HYDROcodone/acetaminophen  MG Tabs  Commonly known as:  NORCO   Take 1 Tab by mouth every 6 hours as needed.  Dose:  1 Tab     warfarin 5 MG Tabs  Commonly known as:  COUMADIN   Take 5 mg by mouth every day.  Dose:  5 mg        * This list has 2 medication(s) that are the same as other medications prescribed for you. Read the directions carefully, and ask your doctor or other care provider to review them with you.                Allergies  Allergies   Allergen Reactions   • Baclofen Unspecified     Total loss of memory, sedation.   RXN=6/2015   • Contrast Media With Iodine [Iodine] Rash     RXN=1/5/2017   • Keflex Rash     RXN=possibly >10 years   • Pcn [Penicillins] Rash     RXN=possibly >10 years ago  Tolerated Zosyn on 2/20/18   • Tape Rash     Paper tape and tegaderm ok  RXN=ongoing   • Requip Vomiting       DIET  Orders Placed This Encounter   Procedures   • Diet Order Renal     Standing Status:   Standing     Number of Occurrences:   1     Order Specific Question:   Diet:     Answer:   Renal [8]       ACTIVITY  As tolerated.  Weight bearing as tolerated    CONSULTATIONS  JOCELYN Colon  Vascular Dr. Hopkins  Nephrology dr. Perales    PROCEDURES  PREOPERATIVE DIAGNOSIS:  Thrombosed left thigh arteriovenous graft.     POSTOPERATIVE DIAGNOSIS:  Thrombosed left thigh arteriovenous graft.     OPERATIONS:  Left AV graft thrombectomy, balloon angioplasty, venograms and   arteriograms, repair with PTFE, prior thrombectomy segment of AV graft.     SURGEON:  Jairo Hopkins MD     ANESTHESIA:  General.     ANESTHESIOLOGIST:  Estiven Brower MD     DESCRIPTION OF PROCEDURE:  After obtaining informed consent, the patient was    put under anesthesia.  A time-out was performed.  His left thigh dressings   were taken off and the thigh was prepped with Betadine scrub and paint.  I   reopened the prior incision in the medial upper thigh.  The sutured graft   looked satisfactory, but there was no flow.  I cut away the anterior sutures   and found clot.  A #4 Navneet removed clots from the venous end and showed   vigorous backbleeding.  I occluded it with a balloon catheter.  I injected   contrast and did digital subtraction imaging.  I passed a wire and then an   8x40 mm balloon and dilated across the graft venous anastomosis.  It looked   satisfactory.  I left a balloon occlusion catheter in place with heparinized   saline flushes frequently during the case.  The arterial end was harder since   it was a longer distance.  I did a thrombectomy and was able to get some   inflow bleeding.  I used a Martines occlusion catheter and noted that the   arterial end of the graft which is smaller diameter had some irregularity.  I   therefore dilated it with a 4x20 mm balloon.  This improved the inflow   dramatically.  I left heparinized saline within each limb of the graft held in   place by the balloon occlusion catheters.  I then underwent an effort to   repair the entry site for the balloons on the last 2 thrombectomies.  I   trimmed away some graft.  I tried to sew it initially without any additional   work, but found that a patch repair with PTFE helped to maintain the lumen and   gave a much steadier repair.  As I was finishing the sutures on this, I   allowed flow to come through the venous and arterial limbs and pulled up on   the sutures and tied them.  Good flow was detectable by Doppler and good   pulsation.  I irrigated with bacitracin solution.  Closure was with   interrupted 3-0 Vicryl running and subcuticular 4-0 Vicryl.  Dermabond was   applied to the skin.  A silver dressing was applied to the wound.  The patient   tolerated procedure  well and was taken to recovery room where he maintained a   low pitched bruit throughout his fistula.        ____________________________________     MD BEN Davis / RIAZ     DD:  01/26/2019 18:06:25  LABORATORY  Lab Results   Component Value Date    SODIUM 136 01/27/2019    POTASSIUM 4.6 01/27/2019    CHLORIDE 100 01/27/2019    CO2 26 01/27/2019    GLUCOSE 154 (H) 01/27/2019    BUN 48 (H) 01/27/2019    CREATININE 6.89 (HH) 01/27/2019    CREATININE 8.2 (HH) 05/06/2009        Lab Results   Component Value Date    WBC 4.9 01/27/2019    HEMOGLOBIN 6.6 (L) 01/27/2019    HEMATOCRIT 21.5 (L) 01/27/2019    PLATELETCT 140 (L) 01/27/2019        Total time of the discharge process exceeds 40 minutes.

## 2019-01-27 NOTE — PROGRESS NOTES
Patient transported from PACU to Telemetry 7 with RN. Patient connected to telemetry monitor, monitor room notified. Call light within reach. Bed locked and in lowest position. Left femoral AV graft has palpable thrill and bruit present upon auscultation.

## 2019-01-27 NOTE — RESPIRATORY CARE
COPD EDUCATION by COPD CLINICAL EDUCATOR  1/27/2019 at 6:35 AM by Daisy Taylor     Patient reviewed by COPD education team. Patient does not qualify for COPD program.

## 2019-01-27 NOTE — OP REPORT
DATE OF SERVICE:  01/26/2019    PREOPERATIVE DIAGNOSIS:  Thrombosed left thigh arteriovenous graft.    POSTOPERATIVE DIAGNOSIS:  Thrombosed left thigh arteriovenous graft.    OPERATIONS:  Left AV graft thrombectomy, balloon angioplasty, venograms and   arteriograms, repair with PTFE, prior thrombectomy segment of AV graft.    SURGEON:  Jairo Hopkins MD    ANESTHESIA:  General.    ANESTHESIOLOGIST:  Estiven Brower MD    DESCRIPTION OF PROCEDURE:  After obtaining informed consent, the patient was   put under anesthesia.  A time-out was performed.  His left thigh dressings   were taken off and the thigh was prepped with Betadine scrub and paint.  I   reopened the prior incision in the medial upper thigh.  The sutured graft   looked satisfactory, but there was no flow.  I cut away the anterior sutures   and found clot.  A #4 Navneet removed clots from the venous end and showed   vigorous backbleeding.  I occluded it with a balloon catheter.  I injected   contrast and did digital subtraction imaging.  I passed a wire and then an   8x40 mm balloon and dilated across the graft venous anastomosis.  It looked   satisfactory.  I left a balloon occlusion catheter in place with heparinized   saline flushes frequently during the case.  The arterial end was harder since   it was a longer distance.  I did a thrombectomy and was able to get some   inflow bleeding.  I used a Martines occlusion catheter and noted that the   arterial end of the graft which is smaller diameter had some irregularity.  I   therefore dilated it with a 4x20 mm balloon.  This improved the inflow   dramatically.  I left heparinized saline within each limb of the graft held in   place by the balloon occlusion catheters.  I then underwent an effort to   repair the entry site for the balloons on the last 2 thrombectomies.  I   trimmed away some graft.  I tried to sew it initially without any additional   work, but found that a patch repair with PTFE helped  to maintain the lumen and   gave a much steadier repair.  As I was finishing the sutures on this, I   allowed flow to come through the venous and arterial limbs and pulled up on   the sutures and tied them.  Good flow was detectable by Doppler and good   pulsation.  I irrigated with bacitracin solution.  Closure was with   interrupted 3-0 Vicryl running and subcuticular 4-0 Vicryl.  Dermabond was   applied to the skin.  A silver dressing was applied to the wound.  The patient   tolerated procedure well and was taken to recovery room where he maintained a   low pitched bruit throughout his fistula.       ____________________________________     MD BEN Davis / RIAZ    DD:  01/26/2019 18:06:25  DT:  01/26/2019 19:28:07    D#:  [id number]  Job#:  [id number]

## 2019-01-28 LAB
ACTION RANGE TRIGGERED IACRT: YES
BASE EXCESS BLDV CALC-SCNC: -3 MMOL/L (ref -4–3)
BODY TEMPERATURE: ABNORMAL DEGREES
CA-I BLD ISE-SCNC: 1.1 MMOL/L (ref 1.1–1.3)
CO2 BLDV-SCNC: 27 MMOL/L (ref 20–33)
GLUCOSE BLD-MCNC: 85 MG/DL (ref 65–99)
HCO3 BLDV-SCNC: 25.1 MMOL/L (ref 24–28)
HCT VFR BLD CALC: 20 % (ref 42–52)
HGB BLD-MCNC: 6.8 G/DL (ref 14–18)
INST. QUALIFIED PATIENT IIQPT: YES
O2/TOTAL GAS SETTING VFR VENT: 100 %
PCO2 BLDV: 64.5 MMHG (ref 41–51)
PCO2 TEMP ADJ BLDV: 64.5 MMHG (ref 41–51)
PH BLDV: 7.2 [PH] (ref 7.31–7.45)
PH TEMP ADJ BLDV: 7.2 [PH] (ref 7.31–7.45)
POTASSIUM BLD-SCNC: 5.6 MMOL/L (ref 3.6–5.5)
SODIUM BLD-SCNC: 136 MMOL/L (ref 135–145)
SPECIMEN DRAWN FROM PATIENT: ABNORMAL

## 2019-01-29 ENCOUNTER — HOSPITAL ENCOUNTER (OUTPATIENT)
Dept: LAB | Facility: MEDICAL CENTER | Age: 39
End: 2019-01-29
Attending: INTERNAL MEDICINE
Payer: MEDICARE

## 2019-01-29 ENCOUNTER — HOSPITAL ENCOUNTER (OUTPATIENT)
Dept: LAB | Facility: MEDICAL CENTER | Age: 39
End: 2019-01-29
Payer: MEDICARE

## 2019-01-29 LAB
ALBUMIN SERPL BCP-MCNC: 3.7 G/DL (ref 3.2–4.9)
ALBUMIN/GLOB SERPL: 1.1 G/DL
ALP SERPL-CCNC: 177 U/L (ref 30–99)
ALT SERPL-CCNC: 5 U/L (ref 2–50)
AMBIGUOUS DTTM AMBI4: NORMAL
AMBIGUOUS DTTM AMBI4: NORMAL
ANION GAP SERPL CALC-SCNC: 15 MMOL/L (ref 0–11.9)
AST SERPL-CCNC: 18 U/L (ref 12–45)
BASOPHILS # BLD AUTO: 1.3 % (ref 0–1.8)
BASOPHILS # BLD: 0.05 K/UL (ref 0–0.12)
BILIRUB SERPL-MCNC: 0.5 MG/DL (ref 0.1–1.5)
BUN SERPL-MCNC: 48 MG/DL (ref 8–22)
CALCIUM SERPL-MCNC: 8.5 MG/DL (ref 8.5–10.5)
CHLORIDE SERPL-SCNC: 99 MMOL/L (ref 96–112)
CK SERPL-CCNC: 29 U/L (ref 0–154)
CO2 SERPL-SCNC: 27 MMOL/L (ref 20–33)
CREAT SERPL-MCNC: 7.17 MG/DL (ref 0.5–1.4)
CRP SERPL HS-MCNC: 2.18 MG/DL (ref 0–0.75)
EOSINOPHIL # BLD AUTO: 0.21 K/UL (ref 0–0.51)
EOSINOPHIL NFR BLD: 5.6 % (ref 0–6.9)
ERYTHROCYTE [DISTWIDTH] IN BLOOD BY AUTOMATED COUNT: 74.4 FL (ref 35.9–50)
ERYTHROCYTE [SEDIMENTATION RATE] IN BLOOD BY WESTERGREN METHOD: 22 MM/HOUR (ref 0–15)
GLOBULIN SER CALC-MCNC: 3.5 G/DL (ref 1.9–3.5)
GLUCOSE SERPL-MCNC: 96 MG/DL (ref 65–99)
HCT VFR BLD AUTO: 21.8 % (ref 42–52)
HGB BLD-MCNC: 6.6 G/DL (ref 14–18)
IMM GRANULOCYTES # BLD AUTO: 0.02 K/UL (ref 0–0.11)
IMM GRANULOCYTES NFR BLD AUTO: 0.5 % (ref 0–0.9)
LYMPHOCYTES # BLD AUTO: 1.03 K/UL (ref 1–4.8)
LYMPHOCYTES NFR BLD: 27.5 % (ref 22–41)
MCH RBC QN AUTO: 30 PG (ref 27–33)
MCHC RBC AUTO-ENTMCNC: 30.3 G/DL (ref 33.7–35.3)
MCV RBC AUTO: 99.1 FL (ref 81.4–97.8)
MONOCYTES # BLD AUTO: 0.33 K/UL (ref 0–0.85)
MONOCYTES NFR BLD AUTO: 8.8 % (ref 0–13.4)
NEUTROPHILS # BLD AUTO: 2.11 K/UL (ref 1.82–7.42)
NEUTROPHILS NFR BLD: 56.3 % (ref 44–72)
NRBC # BLD AUTO: 0 K/UL
NRBC BLD-RTO: 0 /100 WBC
PLATELET # BLD AUTO: 144 K/UL (ref 164–446)
PMV BLD AUTO: 10.2 FL (ref 9–12.9)
POTASSIUM SERPL-SCNC: 5.1 MMOL/L (ref 3.6–5.5)
PROT SERPL-MCNC: 7.2 G/DL (ref 6–8.2)
RBC # BLD AUTO: 2.2 M/UL (ref 4.7–6.1)
SODIUM SERPL-SCNC: 141 MMOL/L (ref 135–145)
WBC # BLD AUTO: 3.8 K/UL (ref 4.8–10.8)

## 2019-01-29 PROCEDURE — 85025 COMPLETE CBC W/AUTO DIFF WBC: CPT

## 2019-01-29 PROCEDURE — 86780 TREPONEMA PALLIDUM: CPT

## 2019-01-29 PROCEDURE — 86787 VARICELLA-ZOSTER ANTIBODY: CPT | Mod: 91

## 2019-01-29 PROCEDURE — 86635 COCCIDIOIDES ANTIBODY: CPT

## 2019-01-29 PROCEDURE — 80053 COMPREHEN METABOLIC PANEL: CPT

## 2019-01-29 PROCEDURE — 86140 C-REACTIVE PROTEIN: CPT

## 2019-01-29 PROCEDURE — 82550 ASSAY OF CK (CPK): CPT

## 2019-01-29 PROCEDURE — 85652 RBC SED RATE AUTOMATED: CPT

## 2019-01-30 LAB — TREPONEMA PALLIDUM IGG+IGM AB [PRESENCE] IN SERUM OR PLASMA BY IMMUNOASSAY: NON REACTIVE

## 2019-01-31 ENCOUNTER — ANTICOAGULATION VISIT (OUTPATIENT)
Dept: VASCULAR LAB | Facility: MEDICAL CENTER | Age: 39
End: 2019-01-31
Attending: INTERNAL MEDICINE
Payer: MEDICARE

## 2019-01-31 DIAGNOSIS — T82.868D THROMBOSIS OF ARTERIOVENOUS GRAFT, SUBSEQUENT ENCOUNTER: ICD-10-CM

## 2019-01-31 LAB
INR PPP: 1.4 (ref 2–3.5)
VZV IGG SER IA-ACNC: 3519 IV
VZV IGM SER IA-ACNC: 0.42 ISR

## 2019-01-31 PROCEDURE — 99212 OFFICE O/P EST SF 10 MIN: CPT | Performed by: PHARMACIST

## 2019-01-31 PROCEDURE — 85610 PROTHROMBIN TIME: CPT

## 2019-01-31 NOTE — PROGRESS NOTES
Anticoagulation Summary  As of 2019    INR goal:   2.5-3.5   TTR:   33.5 % (1.7 y)   INR used for dosin.4! 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 Hopkins      Anticoagulation Care Providers     Provider Role Specialty Phone number    Jairo Hopkins M.D. Referring Surgery [phone]    [facility] Anticoagulation Services Responsible  [phone]        Anticoagulation Patient Findings      HPI:  Denis De Anda seen in clinic today, on anticoagulation therapy with warfarin for AV graft thrombosis  Changes to current medical/health status since last appt: pt was off warfarin due to procedure, resumed warfarin on  and took 7.5mg x1 and then resumed with 5mg daily  Denies signs/symptoms of bleeding and/or thrombosis since the last appt.    Denies any interval changes to diet  Denies any interval changes to medications since last appt.   Denies any complications or cost restrictions with current therapy.   BP declined.      A/P   INR  is sub-therapeutic.   Will have pt take a boost dose of 10mg x1 and then resume with 5mg daily.    Follow up appointment in 4 day(s).    Maeve Saha, PharmD

## 2019-02-01 LAB — INR BLD: 1.4 (ref 0.9–1.2)

## 2019-02-03 LAB
C IMMITIS IGM SPEC QL IA: 0.3 IV
COCCIDIOIDES AB SPEC QL ID: NORMAL
COCCIDIOIDES AB TITR SER CF: NORMAL {TITER}
COCCIDIOIDES IGG SPEC QL IA: 0.4 IV

## 2019-02-05 ENCOUNTER — HOSPITAL ENCOUNTER (OUTPATIENT)
Dept: LAB | Facility: MEDICAL CENTER | Age: 39
End: 2019-02-05
Attending: INTERNAL MEDICINE
Payer: MEDICARE

## 2019-02-05 LAB
ALBUMIN SERPL BCP-MCNC: 4.1 G/DL (ref 3.2–4.9)
ALBUMIN/GLOB SERPL: 1.1 G/DL
ALP SERPL-CCNC: 196 U/L (ref 30–99)
ALT SERPL-CCNC: 14 U/L (ref 2–50)
ANION GAP SERPL CALC-SCNC: 19 MMOL/L (ref 0–11.9)
ANISOCYTOSIS BLD QL SMEAR: ABNORMAL
AST SERPL-CCNC: 20 U/L (ref 12–45)
BASOPHILS # BLD AUTO: 0.9 % (ref 0–1.8)
BASOPHILS # BLD: 0.03 K/UL (ref 0–0.12)
BILIRUB SERPL-MCNC: 0.5 MG/DL (ref 0.1–1.5)
BUN SERPL-MCNC: 37 MG/DL (ref 8–22)
CALCIUM SERPL-MCNC: 8.3 MG/DL (ref 8.5–10.5)
CHLORIDE SERPL-SCNC: 99 MMOL/L (ref 96–112)
CK SERPL-CCNC: 42 U/L (ref 0–154)
CO2 SERPL-SCNC: 25 MMOL/L (ref 20–33)
COMMENT 1642: NORMAL
CREAT SERPL-MCNC: 7.07 MG/DL (ref 0.5–1.4)
CRP SERPL HS-MCNC: 1.28 MG/DL (ref 0–0.75)
EOSINOPHIL # BLD AUTO: 0.27 K/UL (ref 0–0.51)
EOSINOPHIL NFR BLD: 8.3 % (ref 0–6.9)
ERYTHROCYTE [DISTWIDTH] IN BLOOD BY AUTOMATED COUNT: 73.5 FL (ref 35.9–50)
ERYTHROCYTE [SEDIMENTATION RATE] IN BLOOD BY WESTERGREN METHOD: 74 MM/HOUR (ref 0–15)
GLOBULIN SER CALC-MCNC: 3.7 G/DL (ref 1.9–3.5)
GLUCOSE SERPL-MCNC: 101 MG/DL (ref 65–99)
HCT VFR BLD AUTO: 23.9 % (ref 42–52)
HGB BLD-MCNC: 7.3 G/DL (ref 14–18)
IMM GRANULOCYTES # BLD AUTO: 0.01 K/UL (ref 0–0.11)
IMM GRANULOCYTES NFR BLD AUTO: 0.3 % (ref 0–0.9)
LYMPHOCYTES # BLD AUTO: 0.99 K/UL (ref 1–4.8)
LYMPHOCYTES NFR BLD: 30.6 % (ref 22–41)
MACROCYTES BLD QL SMEAR: ABNORMAL
MCH RBC QN AUTO: 30.8 PG (ref 27–33)
MCHC RBC AUTO-ENTMCNC: 30.5 G/DL (ref 33.7–35.3)
MCV RBC AUTO: 100.8 FL (ref 81.4–97.8)
MONOCYTES # BLD AUTO: 0.34 K/UL (ref 0–0.85)
MONOCYTES NFR BLD AUTO: 10.5 % (ref 0–13.4)
MORPHOLOGY BLD-IMP: NORMAL
NEUTROPHILS # BLD AUTO: 1.6 K/UL (ref 1.82–7.42)
NEUTROPHILS NFR BLD: 49.4 % (ref 44–72)
NRBC # BLD AUTO: 0 K/UL
NRBC BLD-RTO: 0 /100 WBC
PLATELET # BLD AUTO: 155 K/UL (ref 164–446)
PLATELET BLD QL SMEAR: NORMAL
PMV BLD AUTO: 10.1 FL (ref 9–12.9)
POLYCHROMASIA BLD QL SMEAR: NORMAL
POTASSIUM SERPL-SCNC: 4.8 MMOL/L (ref 3.6–5.5)
PROT SERPL-MCNC: 7.8 G/DL (ref 6–8.2)
RBC # BLD AUTO: 2.37 M/UL (ref 4.7–6.1)
RBC BLD AUTO: PRESENT
SODIUM SERPL-SCNC: 143 MMOL/L (ref 135–145)
WBC # BLD AUTO: 3.2 K/UL (ref 4.8–10.8)

## 2019-02-05 PROCEDURE — 86140 C-REACTIVE PROTEIN: CPT

## 2019-02-05 PROCEDURE — 80053 COMPREHEN METABOLIC PANEL: CPT

## 2019-02-05 PROCEDURE — 85025 COMPLETE CBC W/AUTO DIFF WBC: CPT

## 2019-02-05 PROCEDURE — 85652 RBC SED RATE AUTOMATED: CPT

## 2019-02-05 PROCEDURE — 82550 ASSAY OF CK (CPK): CPT

## 2019-02-12 ENCOUNTER — HOSPITAL ENCOUNTER (OUTPATIENT)
Dept: LAB | Facility: MEDICAL CENTER | Age: 39
End: 2019-02-12
Attending: INTERNAL MEDICINE
Payer: MEDICARE

## 2019-02-12 PROCEDURE — 85652 RBC SED RATE AUTOMATED: CPT

## 2019-02-12 PROCEDURE — 82550 ASSAY OF CK (CPK): CPT

## 2019-02-12 PROCEDURE — 80053 COMPREHEN METABOLIC PANEL: CPT

## 2019-02-12 PROCEDURE — 86140 C-REACTIVE PROTEIN: CPT

## 2019-02-12 PROCEDURE — 85025 COMPLETE CBC W/AUTO DIFF WBC: CPT

## 2019-02-13 LAB
ALBUMIN SERPL BCP-MCNC: 4.2 G/DL (ref 3.2–4.9)
ALBUMIN/GLOB SERPL: 1.4 G/DL
ALP SERPL-CCNC: 202 U/L (ref 30–99)
ALT SERPL-CCNC: 15 U/L (ref 2–50)
ANION GAP SERPL CALC-SCNC: 15 MMOL/L (ref 0–11.9)
ANISOCYTOSIS BLD QL SMEAR: ABNORMAL
AST SERPL-CCNC: 20 U/L (ref 12–45)
BASOPHILS # BLD AUTO: 1.6 % (ref 0–1.8)
BASOPHILS # BLD: 0.05 K/UL (ref 0–0.12)
BILIRUB SERPL-MCNC: 0.7 MG/DL (ref 0.1–1.5)
BUN SERPL-MCNC: 30 MG/DL (ref 8–22)
CALCIUM SERPL-MCNC: 8.2 MG/DL (ref 8.5–10.5)
CHLORIDE SERPL-SCNC: 95 MMOL/L (ref 96–112)
CK SERPL-CCNC: 51 U/L (ref 0–154)
CO2 SERPL-SCNC: 29 MMOL/L (ref 20–33)
COMMENT 1642: NORMAL
CREAT SERPL-MCNC: 6.73 MG/DL (ref 0.5–1.4)
CRP SERPL HS-MCNC: 2 MG/DL (ref 0–0.75)
EOSINOPHIL # BLD AUTO: 0.25 K/UL (ref 0–0.51)
EOSINOPHIL NFR BLD: 8 % (ref 0–6.9)
ERYTHROCYTE [DISTWIDTH] IN BLOOD BY AUTOMATED COUNT: 74.1 FL (ref 35.9–50)
ERYTHROCYTE [SEDIMENTATION RATE] IN BLOOD BY WESTERGREN METHOD: 70 MM/HOUR (ref 0–15)
GLOBULIN SER CALC-MCNC: 2.9 G/DL (ref 1.9–3.5)
GLUCOSE SERPL-MCNC: 89 MG/DL (ref 65–99)
HCT VFR BLD AUTO: 23.8 % (ref 42–52)
HGB BLD-MCNC: 7.4 G/DL (ref 14–18)
IMM GRANULOCYTES # BLD AUTO: 0.01 K/UL (ref 0–0.11)
IMM GRANULOCYTES NFR BLD AUTO: 0.3 % (ref 0–0.9)
LYMPHOCYTES # BLD AUTO: 1.06 K/UL (ref 1–4.8)
LYMPHOCYTES NFR BLD: 33.8 % (ref 22–41)
MACROCYTES BLD QL SMEAR: ABNORMAL
MCH RBC QN AUTO: 32 PG (ref 27–33)
MCHC RBC AUTO-ENTMCNC: 31.1 G/DL (ref 33.7–35.3)
MCV RBC AUTO: 103 FL (ref 81.4–97.8)
MICROCYTES BLD QL SMEAR: ABNORMAL
MONOCYTES # BLD AUTO: 0.31 K/UL (ref 0–0.85)
MONOCYTES NFR BLD AUTO: 9.9 % (ref 0–13.4)
MORPHOLOGY BLD-IMP: NORMAL
NEUTROPHILS # BLD AUTO: 1.46 K/UL (ref 1.82–7.42)
NEUTROPHILS NFR BLD: 46.4 % (ref 44–72)
NRBC # BLD AUTO: 0 K/UL
NRBC BLD-RTO: 0 /100 WBC
OVALOCYTES BLD QL SMEAR: NORMAL
PLATELET # BLD AUTO: 143 K/UL (ref 164–446)
PLATELET BLD QL SMEAR: NORMAL
PMV BLD AUTO: 10.3 FL (ref 9–12.9)
POIKILOCYTOSIS BLD QL SMEAR: NORMAL
POTASSIUM SERPL-SCNC: 5.4 MMOL/L (ref 3.6–5.5)
PROT SERPL-MCNC: 7.1 G/DL (ref 6–8.2)
RBC # BLD AUTO: 2.31 M/UL (ref 4.7–6.1)
RBC BLD AUTO: PRESENT
SODIUM SERPL-SCNC: 139 MMOL/L (ref 135–145)
WBC # BLD AUTO: 3.1 K/UL (ref 4.8–10.8)

## 2019-02-19 ENCOUNTER — HOSPITAL ENCOUNTER (OUTPATIENT)
Dept: LAB | Facility: MEDICAL CENTER | Age: 39
End: 2019-02-19
Attending: INTERNAL MEDICINE
Payer: MEDICARE

## 2019-02-19 LAB
ALBUMIN SERPL BCP-MCNC: 4.4 G/DL (ref 3.2–4.9)
ALBUMIN/GLOB SERPL: 1.3 G/DL
ALP SERPL-CCNC: 209 U/L (ref 30–99)
ALT SERPL-CCNC: 19 U/L (ref 2–50)
ANION GAP SERPL CALC-SCNC: 16 MMOL/L (ref 0–11.9)
ANISOCYTOSIS BLD QL SMEAR: ABNORMAL
AST SERPL-CCNC: 25 U/L (ref 12–45)
BASOPHILS # BLD AUTO: 1.5 % (ref 0–1.8)
BASOPHILS # BLD: 0.06 K/UL (ref 0–0.12)
BILIRUB SERPL-MCNC: 0.8 MG/DL (ref 0.1–1.5)
BUN SERPL-MCNC: 32 MG/DL (ref 8–22)
CALCIUM SERPL-MCNC: 8.7 MG/DL (ref 8.5–10.5)
CHLORIDE SERPL-SCNC: 96 MMOL/L (ref 96–112)
CK SERPL-CCNC: 71 U/L (ref 0–154)
CO2 SERPL-SCNC: 28 MMOL/L (ref 20–33)
COMMENT 1642: NORMAL
CREAT SERPL-MCNC: 7.1 MG/DL (ref 0.5–1.4)
CRP SERPL HS-MCNC: 0.99 MG/DL (ref 0–0.75)
EOSINOPHIL # BLD AUTO: 0.43 K/UL (ref 0–0.51)
EOSINOPHIL NFR BLD: 10.9 % (ref 0–6.9)
ERYTHROCYTE [DISTWIDTH] IN BLOOD BY AUTOMATED COUNT: 67.7 FL (ref 35.9–50)
ERYTHROCYTE [SEDIMENTATION RATE] IN BLOOD BY WESTERGREN METHOD: 68 MM/HOUR (ref 0–15)
GLOBULIN SER CALC-MCNC: 3.3 G/DL (ref 1.9–3.5)
GLUCOSE SERPL-MCNC: 105 MG/DL (ref 65–99)
HCT VFR BLD AUTO: 27.3 % (ref 42–52)
HGB BLD-MCNC: 8.7 G/DL (ref 14–18)
IMM GRANULOCYTES # BLD AUTO: 0.02 K/UL (ref 0–0.11)
IMM GRANULOCYTES NFR BLD AUTO: 0.5 % (ref 0–0.9)
LYMPHOCYTES # BLD AUTO: 1.36 K/UL (ref 1–4.8)
LYMPHOCYTES NFR BLD: 34.5 % (ref 22–41)
MCH RBC QN AUTO: 32.5 PG (ref 27–33)
MCHC RBC AUTO-ENTMCNC: 31.9 G/DL (ref 33.7–35.3)
MCV RBC AUTO: 101.9 FL (ref 81.4–97.8)
MONOCYTES # BLD AUTO: 0.4 K/UL (ref 0–0.85)
MONOCYTES NFR BLD AUTO: 10.2 % (ref 0–13.4)
MORPHOLOGY BLD-IMP: NORMAL
NEUTROPHILS # BLD AUTO: 1.67 K/UL (ref 1.82–7.42)
NEUTROPHILS NFR BLD: 42.4 % (ref 44–72)
NRBC # BLD AUTO: 0 K/UL
NRBC BLD-RTO: 0 /100 WBC
OVALOCYTES BLD QL SMEAR: NORMAL
PLATELET # BLD AUTO: 170 K/UL (ref 164–446)
PLATELET BLD QL SMEAR: NORMAL
PMV BLD AUTO: 9.7 FL (ref 9–12.9)
POIKILOCYTOSIS BLD QL SMEAR: NORMAL
POLYCHROMASIA BLD QL SMEAR: NORMAL
POTASSIUM SERPL-SCNC: 5.5 MMOL/L (ref 3.6–5.5)
PROT SERPL-MCNC: 7.7 G/DL (ref 6–8.2)
RBC # BLD AUTO: 2.68 M/UL (ref 4.7–6.1)
RBC BLD AUTO: PRESENT
SODIUM SERPL-SCNC: 140 MMOL/L (ref 135–145)
WBC # BLD AUTO: 3.9 K/UL (ref 4.8–10.8)

## 2019-02-19 PROCEDURE — 82550 ASSAY OF CK (CPK): CPT

## 2019-02-19 PROCEDURE — 85652 RBC SED RATE AUTOMATED: CPT

## 2019-02-19 PROCEDURE — 86140 C-REACTIVE PROTEIN: CPT

## 2019-02-19 PROCEDURE — 85025 COMPLETE CBC W/AUTO DIFF WBC: CPT

## 2019-02-19 PROCEDURE — 80053 COMPREHEN METABOLIC PANEL: CPT

## 2019-02-22 NOTE — PROGRESS NOTES
OP Anticoagulation Service Note    Date: 2019      Anticoagulation Summary  As of 10/25/2018    INR goal:   2.5-3.5   TTR:   37.4 % (1.4 y)   INR used for dosin.3! 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 Hopkins      Anticoagulation Care Providers     Provider Role Specialty Phone number    Jairo Hopkins M.D. Referring Surgery [phone]    [facility] Anticoagulation Services Responsible  [phone]        Anticoagulation Patient Findings      HPI:   Denis De Anda seen in clinic today, on anticoagulation therapy with warfarin (a high risk medication) for AV graft patent for dialysis      Pt is here today to evaluate anticoagulation therapy      Confirmed warfarin dosing regimen, denies missed or extra doses of coumadin.     A/P   INR  sub-therapeutic today  Pt stopping warfarin for 2 weeks to have protein c & s levels drawn. INR close to range will not adjust dose today     Pt educated to contact our clinic with any changes in medications or s/s of bleeding or thrombosis    Follow up appointment in 2 week(s) to reduce risk of adverse events from warfarin   Muriel Khan, Pharm D

## 2019-03-05 ENCOUNTER — ANTICOAGULATION VISIT (OUTPATIENT)
Dept: VASCULAR LAB | Facility: MEDICAL CENTER | Age: 39
End: 2019-03-05
Attending: INTERNAL MEDICINE
Payer: MEDICARE

## 2019-03-05 DIAGNOSIS — Z79.01 LONG TERM (CURRENT) USE OF ANTICOAGULANTS: ICD-10-CM

## 2019-03-05 LAB
INR BLD: 1.6 (ref 0.9–1.2)
INR PPP: 1.6 (ref 2–3.5)

## 2019-03-05 PROCEDURE — 85610 PROTHROMBIN TIME: CPT

## 2019-03-05 PROCEDURE — 99212 OFFICE O/P EST SF 10 MIN: CPT

## 2019-03-05 NOTE — PROGRESS NOTES
Anticoagulation Summary  As of 3/5/2019    INR goal:   2.5-3.5   TTR:   31.8 % (1.8 y)   INR used for dosin.6! 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 Hopkins      Anticoagulation Care Providers     Provider Role Specialty Phone number    Jairo Hopkins M.D. Referring Surgery [phone]    [facility] Anticoagulation Services Responsible  [phone]        Anticoagulation Patient Findings    History of Present Illness: follow up appointment for chronic anticoagulation with the high risk medication, warfarin for AV graft thrombosis.      Pt was recently hospitalized for MRSA at [facility] for 10 days.  Pt is sub therapeutic today. Will bolus dose with 10mg tonight, then increase weekly dose by 15%.  Follow up in 1 weeks, to reduce the risk of adverse events related to this high risk medication, warfarin.    Renetta Dunlap Clinical Pharmacist      Pt declines vitals today

## 2019-03-11 ENCOUNTER — HOSPITAL ENCOUNTER (OUTPATIENT)
Dept: LAB | Facility: MEDICAL CENTER | Age: 39
End: 2019-03-11
Attending: INTERNAL MEDICINE
Payer: MEDICARE

## 2019-03-11 LAB
ALBUMIN SERPL BCP-MCNC: 4.7 G/DL (ref 3.2–4.9)
ALBUMIN/GLOB SERPL: 1.3 G/DL
ALP SERPL-CCNC: 212 U/L (ref 30–99)
ALT SERPL-CCNC: 19 U/L (ref 2–50)
ANION GAP SERPL CALC-SCNC: 20 MMOL/L (ref 0–11.9)
AST SERPL-CCNC: 22 U/L (ref 12–45)
BASOPHILS # BLD AUTO: 1.7 % (ref 0–1.8)
BASOPHILS # BLD: 0.07 K/UL (ref 0–0.12)
BILIRUB SERPL-MCNC: 0.7 MG/DL (ref 0.1–1.5)
BUN SERPL-MCNC: 54 MG/DL (ref 8–22)
CALCIUM SERPL-MCNC: 8.5 MG/DL (ref 8.5–10.5)
CHLORIDE SERPL-SCNC: 96 MMOL/L (ref 96–112)
CO2 SERPL-SCNC: 25 MMOL/L (ref 20–33)
CREAT SERPL-MCNC: 8.6 MG/DL (ref 0.5–1.4)
CRP SERPL HS-MCNC: 0.45 MG/DL (ref 0–0.75)
EOSINOPHIL # BLD AUTO: 0.39 K/UL (ref 0–0.51)
EOSINOPHIL NFR BLD: 9.4 % (ref 0–6.9)
ERYTHROCYTE [DISTWIDTH] IN BLOOD BY AUTOMATED COUNT: 56.2 FL (ref 35.9–50)
ERYTHROCYTE [SEDIMENTATION RATE] IN BLOOD BY WESTERGREN METHOD: 41 MM/HOUR (ref 0–15)
GLOBULIN SER CALC-MCNC: 3.5 G/DL (ref 1.9–3.5)
GLUCOSE SERPL-MCNC: 85 MG/DL (ref 65–99)
HCT VFR BLD AUTO: 31.5 % (ref 42–52)
HGB BLD-MCNC: 9.9 G/DL (ref 14–18)
IMM GRANULOCYTES # BLD AUTO: 0.01 K/UL (ref 0–0.11)
IMM GRANULOCYTES NFR BLD AUTO: 0.2 % (ref 0–0.9)
LYMPHOCYTES # BLD AUTO: 1.67 K/UL (ref 1–4.8)
LYMPHOCYTES NFR BLD: 40.4 % (ref 22–41)
MCH RBC QN AUTO: 32 PG (ref 27–33)
MCHC RBC AUTO-ENTMCNC: 31.4 G/DL (ref 33.7–35.3)
MCV RBC AUTO: 101.9 FL (ref 81.4–97.8)
MONOCYTES # BLD AUTO: 0.33 K/UL (ref 0–0.85)
MONOCYTES NFR BLD AUTO: 8 % (ref 0–13.4)
NEUTROPHILS # BLD AUTO: 1.66 K/UL (ref 1.82–7.42)
NEUTROPHILS NFR BLD: 40.3 % (ref 44–72)
NRBC # BLD AUTO: 0 K/UL
NRBC BLD-RTO: 0 /100 WBC
PLATELET # BLD AUTO: 169 K/UL (ref 164–446)
PMV BLD AUTO: 9.6 FL (ref 9–12.9)
POTASSIUM SERPL-SCNC: 5 MMOL/L (ref 3.6–5.5)
PROT SERPL-MCNC: 8.2 G/DL (ref 6–8.2)
RBC # BLD AUTO: 3.09 M/UL (ref 4.7–6.1)
SODIUM SERPL-SCNC: 141 MMOL/L (ref 135–145)
WBC # BLD AUTO: 4.1 K/UL (ref 4.8–10.8)

## 2019-03-11 PROCEDURE — 85652 RBC SED RATE AUTOMATED: CPT

## 2019-03-11 PROCEDURE — 86140 C-REACTIVE PROTEIN: CPT

## 2019-03-11 PROCEDURE — 85025 COMPLETE CBC W/AUTO DIFF WBC: CPT

## 2019-03-11 PROCEDURE — 80053 COMPREHEN METABOLIC PANEL: CPT

## 2019-03-12 ENCOUNTER — OFFICE VISIT (OUTPATIENT)
Dept: MEDICAL GROUP | Facility: PHYSICIAN GROUP | Age: 39
End: 2019-03-12
Payer: MEDICARE

## 2019-03-12 VITALS
OXYGEN SATURATION: 97 % | SYSTOLIC BLOOD PRESSURE: 110 MMHG | TEMPERATURE: 97.8 F | HEART RATE: 94 BPM | BODY MASS INDEX: 32.1 KG/M2 | HEIGHT: 64 IN | WEIGHT: 188 LBS | DIASTOLIC BLOOD PRESSURE: 64 MMHG

## 2019-03-12 DIAGNOSIS — M79.672 LEFT FOOT PAIN: ICD-10-CM

## 2019-03-12 PROCEDURE — 99213 OFFICE O/P EST LOW 20 MIN: CPT | Performed by: FAMILY MEDICINE

## 2019-03-12 ASSESSMENT — PAIN SCALES - GENERAL: PAINLEVEL: NO PAIN

## 2019-03-12 NOTE — PROGRESS NOTES
CC:The encounter diagnosis was Left foot pain.      HISTORY OF PRESENT ILLNESS: Patient is a 38 y.o. male established patient who presents today to discuss left foot pain.    Left foot pain  New problem to examiner.  Patient presents with left foot pain with insidious onset for the last several months.  Pain presents as feeling like a small ball in his foot roughly around the second MTP joint on the plantar side.  Pain is worse with prolonged periods of walking.  Pain is sharp in nature.  Patient has not tried any over-the-counter remedies to help with this yet.  He is requesting a referral to podiatry today.      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 03/10/2018     Priority: High   • Hemorrhagic disorder due to circulating anticoagulants (formerly [facility]) 02/19/2018     Priority: High   • Status post repair of complex wound 07/31/2017     Priority: High   • Open wound of left thigh 10/28/2016     Priority: High   • Hypotension, chronic 09/22/2018     Priority: Medium   • Supratherapeutic INR 09/22/2018     Priority: Medium   • Chronic anticoagulation 09/18/2016     Priority: Medium   • ESRD (end stage renal disease) (formerly [facility]) 07/08/2016     Priority: Medium   • Pain syndrome, chronic 04/10/2016     Priority: Medium   • MRSA bacteremia 04/07/2016     Priority: Medium   • Hyperparathyroidism (formerly [facility]) 11/15/2016     Priority: Low   • Renal transplant failure and rejection x 2 11/15/2016     Priority: Low   • Seizure disorder (CMS-HCC) 04/05/2016     Priority: Low   • Hyperlipidemia with target LDL less than 100 06/28/2013     Priority: Low   • Left foot pain 03/12/2019   • MRSA (methicillin resistant Staphylococcus aureus) infection 01/27/2019   • RLS (restless legs syndrome) 03/11/2018   • Thrombocytopenia (formerly [facility]) 02/21/2018   • 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   • Wound dehiscence 02/01/2018   • Loss of consciousness (formerly [facility])  11/30/2017   • Sore throat 11/30/2017   • Localized edema 09/21/2017   • Gangrene ([facility]) 07/31/2017   • Intertrigo 06/05/2017   • Acute midline thoracic back pain 04/19/2017   • Chronic kidney disease, stage V ([facility]) 03/28/2017   • AV graft thrombosis ([facility]) 10/20/2016   • Other specified epidermal thickening 08/05/2016   • Dermatofibroma of right upper arm 07/11/2016   • Lethargy 04/09/2016   • Acropachy 03/25/2016   • Non-healing surgical wound 06/22/2015   • Reflex sympathetic dystrophy 03/24/2015   • Hyponatremia 02/23/2015   • ESRD on hemodialysis ([facility]) 02/21/2015   • Normocytic anemia 09/26/2013   • Venous collateral circulation, any site 09/13/2013   • Osteopenia 05/10/2013   • Vertebral compression fracture ([facility]) 05/10/2013   • Sleep apnea 10/03/2011   • Insomnia 05/29/2009      Allergies:Baclofen; Contrast media with iodine [iodine]; Keflex; Pcn [penicillins]; Tape; and Requip    Current Outpatient Prescriptions   Medication Sig Dispense Refill   • doxycycline (VIBRAMYCIN) 100 MG Tab Take 100 mg by mouth 2 times a day. Continuous until otherwise     • warfarin (COUMADIN) 5 MG Tab Take 5 mg by mouth every day.     • calcium carbonate (TUMS) 500 MG Chew Tab Take 500 mg by mouth every day. Taken with meals     • HYDROcodone/acetaminophen (NORCO)  MG Tab Take 1 Tab by mouth every 6 hours as needed.     • gabapentin (NEURONTIN) 300 MG Cap Take 1,200 mg by mouth every bedtime. Pt also has an RX for 600MG, pt takes total of 1,800MG HS     • cinacalcet (SENSIPAR) 30 MG Tab Take 30-60 mg by mouth every day. 30mg one day 60mg the next     • gabapentin (NEURONTIN) 600 MG tablet Take 600 mg by mouth every bedtime. Pt also has an RX for 300MG, pt takes total of 1,800MG HS     • calcitRIOL (ROCALTROL) 0.25 MCG CAPS Take 0.75 mcg by mouth every bedtime.       No current facility-administered medications for this visit.        Social History   Substance Use Topics   • Smoking status: Never Smoker   • Smokeless tobacco:  "Never Used   • Alcohol use No     Social History     Social History Narrative   • No narrative on file       Family History   Problem Relation Age of Onset   • Arthritis Father         RA   • Lung Disease Father    • Heart Disease Father         MI   • Hypertension Brother        Review of Systems:    MSK: No weakness  Skin: No rashes    All remaining systems reviewed and found to be negative, except as stated above.    Exam:    Blood pressure 110/64, pulse 94, temperature 36.6 °C (97.8 °F), height 1.626 m (5' 4\"), weight 85.3 kg (188 lb), SpO2 97 %. Body mass index is 32.27 kg/m².    General:  Well nourished, well developed male in NAD  HENT: Atraumatic, normocephalic  EYES: Extraocular movements intact, pupils equal and reactive to light  NECK: Supple, FROM  CHEST: No deformities, Equal chest expansion  RESP: Unlabored, no stridor or audible wheeze  ABD: Soft, Nontender, Non-Distended  Extremities: No Clubbing, Cyanosis, or Edema  Skin: Warm/dry, without rashes  Neuro: A/O x 4, CN 2-12 Grossly intact, Motor/sensory grossly intact  Psych: Normal behavior, normal affect    Assessment/Plan:  1. Left foot pain  New problem to examiner.  X-ray as below.  Referral to podiatry pending x-ray.  Follow-up if worsening or failing to improve.  - DX-FOOT-2- LEFT; Future    Please note that this dictation was created using voice recognition software. I have worked with consultants from the vendor as well as technical experts from [facility] to optimize the interface. I have made every reasonable attempt to correct obvious errors, but I expect that there are errors of grammar and possibly content that I did not discover before finalizing the note.    "

## 2019-03-12 NOTE — ASSESSMENT & PLAN NOTE
New problem to examiner.  Patient presents with left foot pain with insidious onset for the last several months.  Pain presents as feeling like a small ball in his foot roughly around the second MTP joint on the plantar side.  Pain is worse with prolonged periods of walking.  Pain is sharp in nature.  Patient has not tried any over-the-counter remedies to help with this yet.  He is requesting a referral to podiatry today.

## 2019-03-26 ENCOUNTER — ANTICOAGULATION MONITORING (OUTPATIENT)
Dept: VASCULAR LAB | Facility: MEDICAL CENTER | Age: 39
End: 2019-03-26

## 2019-03-26 NOTE — PROGRESS NOTES
Anticoagulation clinic    Reminder voice message for patient regarding getting INR done ASAP for anticoagulation clinic.     Chriss Keating, PharmD

## 2019-03-27 ENCOUNTER — ANTICOAGULATION VISIT (OUTPATIENT)
Dept: VASCULAR LAB | Facility: MEDICAL CENTER | Age: 39
End: 2019-03-27
Attending: INTERNAL MEDICINE
Payer: MEDICARE

## 2019-03-27 DIAGNOSIS — T82.868S THROMBOSIS OF ARTERIOVENOUS GRAFT, SEQUELA: ICD-10-CM

## 2019-03-27 LAB
INR BLD: 2 (ref 0.9–1.2)
INR PPP: 2 (ref 2–3.5)

## 2019-03-27 PROCEDURE — 85610 PROTHROMBIN TIME: CPT

## 2019-03-27 PROCEDURE — 99212 OFFICE O/P EST SF 10 MIN: CPT | Performed by: NURSE PRACTITIONER

## 2019-03-27 NOTE — PROGRESS NOTES
Anticoagulation Summary  As of 3/27/2019    INR goal:   2.5-3.5   TTR:   30.8 % (1.8 y)   INR used for dosin.0! 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 Hopkins      Anticoagulation Care Providers     Provider Role Specialty Phone number    Jairo Hopkins M.D. Referring Surgery [phone]    [facility] Anticoagulation Services Responsible  [phone]        Anticoagulation Patient Findings      HPI:  Denis De Anda seen in clinic today for follow up on anticoagulation therapy in the presence of AV graft. Denies any changes to current medical/health status since last appointment. Denies any medication or diet changes. No current symptoms of bleeding or thrombosis reported. He is taking 7.5 mg Mon/Fri (not Wed/Sat) as previously charted.    A/P:   INR subtherapeutic. Will increase tonight then continue current regimen.    Follow up appointment in 1 week(s).    Next Appointment:  at 8:30 am.     Hanna LARKIN

## 2019-04-04 ENCOUNTER — ANTICOAGULATION VISIT (OUTPATIENT)
Dept: VASCULAR LAB | Facility: MEDICAL CENTER | Age: 39
End: 2019-04-04
Attending: INTERNAL MEDICINE
Payer: MEDICARE

## 2019-04-04 DIAGNOSIS — T82.868S THROMBOSIS OF ARTERIOVENOUS GRAFT, SEQUELA: ICD-10-CM

## 2019-04-04 LAB — INR PPP: 1.9 (ref 2–3.5)

## 2019-04-04 PROCEDURE — 85610 PROTHROMBIN TIME: CPT

## 2019-04-04 PROCEDURE — 99212 OFFICE O/P EST SF 10 MIN: CPT | Performed by: NURSE PRACTITIONER

## 2019-04-04 NOTE — PROGRESS NOTES
Anticoagulation Summary  As of 2019    INR goal:   2.5-3.5   TTR:   30.4 % (1.9 y)   INR used for dosin.9! 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 Hopkins      Anticoagulation Care Providers     Provider Role Specialty Phone number    Jairo Hopkins M.D. Referring Surgery [phone]    [facility] Anticoagulation Services Responsible  [phone]        Anticoagulation Patient Findings      HPI:  Denis De Anda seen in clinic today for follow up on anticoagulation therapy in the presence of AV graft thrombosis hx. Denies any changes to current medical/health status since last appointment. Denies any medication or diet changes. No current symptoms of bleeding or thrombosis reported. Confirmed dose last week. No missed doses.    A/P:   INR subtherapeutic. Will increase regimen.     Follow up appointment in 1 week as this is the soonest he can return.    Next Appointment:  at 8:30 am.     Hanna LARKIN

## 2019-04-05 LAB — INR BLD: 1.9 (ref 0.9–1.2)

## 2019-04-15 ENCOUNTER — HOSPITAL ENCOUNTER (OUTPATIENT)
Dept: LAB | Facility: MEDICAL CENTER | Age: 39
End: 2019-04-15
Attending: INTERNAL MEDICINE
Payer: MEDICARE

## 2019-04-15 LAB
ALBUMIN SERPL BCP-MCNC: 4.8 G/DL (ref 3.2–4.9)
ALBUMIN/GLOB SERPL: 1.7 G/DL
ALP SERPL-CCNC: 198 U/L (ref 30–99)
ALT SERPL-CCNC: 23 U/L (ref 2–50)
ANION GAP SERPL CALC-SCNC: 20 MMOL/L (ref 0–11.9)
AST SERPL-CCNC: 23 U/L (ref 12–45)
BASOPHILS # BLD AUTO: 1.3 % (ref 0–1.8)
BASOPHILS # BLD: 0.06 K/UL (ref 0–0.12)
BILIRUB SERPL-MCNC: 0.8 MG/DL (ref 0.1–1.5)
BUN SERPL-MCNC: 63 MG/DL (ref 8–22)
CALCIUM SERPL-MCNC: 7.8 MG/DL (ref 8.5–10.5)
CHLORIDE SERPL-SCNC: 93 MMOL/L (ref 96–112)
CO2 SERPL-SCNC: 25 MMOL/L (ref 20–33)
CREAT SERPL-MCNC: 10.06 MG/DL (ref 0.5–1.4)
CRP SERPL HS-MCNC: 1.15 MG/DL (ref 0–0.75)
EOSINOPHIL # BLD AUTO: 0.23 K/UL (ref 0–0.51)
EOSINOPHIL NFR BLD: 5.1 % (ref 0–6.9)
ERYTHROCYTE [DISTWIDTH] IN BLOOD BY AUTOMATED COUNT: 51.7 FL (ref 35.9–50)
ERYTHROCYTE [SEDIMENTATION RATE] IN BLOOD BY WESTERGREN METHOD: 15 MM/HOUR (ref 0–15)
GLOBULIN SER CALC-MCNC: 2.9 G/DL (ref 1.9–3.5)
GLUCOSE SERPL-MCNC: 83 MG/DL (ref 65–99)
HCT VFR BLD AUTO: 33.7 % (ref 42–52)
HGB BLD-MCNC: 11.1 G/DL (ref 14–18)
IMM GRANULOCYTES # BLD AUTO: 0.01 K/UL (ref 0–0.11)
IMM GRANULOCYTES NFR BLD AUTO: 0.2 % (ref 0–0.9)
LYMPHOCYTES # BLD AUTO: 1.25 K/UL (ref 1–4.8)
LYMPHOCYTES NFR BLD: 27.8 % (ref 22–41)
MCH RBC QN AUTO: 32.2 PG (ref 27–33)
MCHC RBC AUTO-ENTMCNC: 32.9 G/DL (ref 33.7–35.3)
MCV RBC AUTO: 97.7 FL (ref 81.4–97.8)
MONOCYTES # BLD AUTO: 0.33 K/UL (ref 0–0.85)
MONOCYTES NFR BLD AUTO: 7.3 % (ref 0–13.4)
NEUTROPHILS # BLD AUTO: 2.61 K/UL (ref 1.82–7.42)
NEUTROPHILS NFR BLD: 58.3 % (ref 44–72)
NRBC # BLD AUTO: 0 K/UL
NRBC BLD-RTO: 0 /100 WBC
PLATELET # BLD AUTO: 155 K/UL (ref 164–446)
PMV BLD AUTO: 9.3 FL (ref 9–12.9)
POTASSIUM SERPL-SCNC: 5.8 MMOL/L (ref 3.6–5.5)
PROT SERPL-MCNC: 7.7 G/DL (ref 6–8.2)
RBC # BLD AUTO: 3.45 M/UL (ref 4.7–6.1)
SODIUM SERPL-SCNC: 138 MMOL/L (ref 135–145)
WBC # BLD AUTO: 4.5 K/UL (ref 4.8–10.8)

## 2019-04-15 PROCEDURE — 80053 COMPREHEN METABOLIC PANEL: CPT

## 2019-04-15 PROCEDURE — 85025 COMPLETE CBC W/AUTO DIFF WBC: CPT

## 2019-04-15 PROCEDURE — 86140 C-REACTIVE PROTEIN: CPT

## 2019-04-15 PROCEDURE — 85652 RBC SED RATE AUTOMATED: CPT

## 2019-04-25 ENCOUNTER — ANTICOAGULATION MONITORING (OUTPATIENT)
Dept: MEDICAL GROUP | Facility: PHYSICIAN GROUP | Age: 39
End: 2019-04-25

## 2019-05-09 ENCOUNTER — ANTICOAGULATION VISIT (OUTPATIENT)
Dept: VASCULAR LAB | Facility: MEDICAL CENTER | Age: 39
End: 2019-05-09
Attending: INTERNAL MEDICINE
Payer: MEDICARE

## 2019-05-09 DIAGNOSIS — Z79.01 CHRONIC ANTICOAGULATION: ICD-10-CM

## 2019-05-09 LAB
INR BLD: 1.8 (ref 0.9–1.2)
INR PPP: 1.8 (ref 2–3.5)

## 2019-05-09 PROCEDURE — 85610 PROTHROMBIN TIME: CPT

## 2019-05-09 PROCEDURE — 99212 OFFICE O/P EST SF 10 MIN: CPT

## 2019-05-09 RX ORDER — WARFARIN SODIUM 5 MG/1
TABLET ORAL
Qty: 180 TAB | Refills: 0 | Status: SHIPPED | OUTPATIENT
Start: 2019-05-09 | End: 2019-12-04 | Stop reason: SDUPTHER

## 2019-05-09 NOTE — PROGRESS NOTES
Anticoagulation Summary  As of 2019    INR goal:   2.5-3.5   TTR:   28.9 % (2 y)   INR used for dosin.80! 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 Hopkins      Anticoagulation Care Providers     Provider Role Specialty Phone number    Jairo Hopkins M.D. Referring Surgery [phone]    [facility] Anticoagulation Services Responsible  [phone]        Anticoagulation Patient Findings  Patient Findings     Positives:   Missed doses    Negatives:   Signs/symptoms of thrombosis, Signs/symptoms of bleeding, Laboratory test error suspected, Change in health, Change in alcohol use, Change in activity, Upcoming invasive procedure, Emergency department visit, Upcoming dental procedure, Extra doses, Change in medications, Change in diet/appetite, Hospital admission, Bruising, Other complaints        History of Present Illness: follow up appointment for chronic anticoagulation with the high risk medication, warfarin for AV graft thrombosis    Last INR was out of range, dosage adjusted: pt remains sub therapeutic today.  He missed the last two days of warfarin because he did not refill his prescription.  New rx ordered at local drug outlet. Will bolus dose with 10mg tonight, then resume current dosing regimen. Follow up in 1 weeks, to reduce the risk of adverse events related to this high risk medication, warfarin.    Renetta Dunlap, Clinical Pharmacist    Pt declines vitals today

## 2019-05-16 ENCOUNTER — ANTICOAGULATION VISIT (OUTPATIENT)
Dept: VASCULAR LAB | Facility: MEDICAL CENTER | Age: 39
End: 2019-05-16
Attending: INTERNAL MEDICINE
Payer: MEDICARE

## 2019-05-16 DIAGNOSIS — T82.868S THROMBOSIS OF ARTERIOVENOUS GRAFT, SEQUELA: ICD-10-CM

## 2019-05-16 LAB
INR BLD: 2.9 (ref 0.9–1.2)
INR PPP: 2.9 (ref 2–3.5)

## 2019-05-16 PROCEDURE — 85610 PROTHROMBIN TIME: CPT

## 2019-05-16 PROCEDURE — 99211 OFF/OP EST MAY X REQ PHY/QHP: CPT | Performed by: NURSE PRACTITIONER

## 2019-05-16 NOTE — PROGRESS NOTES
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 Hopkins      Anticoagulation Care Providers     Provider Role Specialty Phone number    Jairo Hopkins M.D. Referring Surgery [phone]    [facility] Anticoagulation Services Responsible  [phone]        Anticoagulation Patient Findings      HPI:  Denis De Anda seen in clinic today for follow up on anticoagulation therapy in the presence of AV thrombus prevention. Denies any changes to current medical/health status since last appointment. Denies any medication or diet changes. No current symptoms of bleeding or thrombosis reported.    A/P:   INR therapeutic. INR up from 1.8 with loading doses. Will have pt resume his usual regimen.     Follow up appointment in 1 week(s).    Next Appointment: Thursday, May 23, 2019 at 7:30 am.     Hanna LARKIN

## 2019-05-21 ENCOUNTER — HOSPITAL ENCOUNTER (OUTPATIENT)
Dept: LAB | Facility: MEDICAL CENTER | Age: 39
End: 2019-05-21
Attending: INTERNAL MEDICINE
Payer: MEDICARE

## 2019-05-21 LAB
ALBUMIN SERPL BCP-MCNC: 4.8 G/DL (ref 3.2–4.9)
ALBUMIN/GLOB SERPL: 1.3 G/DL
ALP SERPL-CCNC: 221 U/L (ref 30–99)
ALT SERPL-CCNC: 36 U/L (ref 2–50)
ANION GAP SERPL CALC-SCNC: 16 MMOL/L (ref 0–11.9)
AST SERPL-CCNC: 30 U/L (ref 12–45)
BASOPHILS # BLD AUTO: 1.2 % (ref 0–1.8)
BASOPHILS # BLD: 0.05 K/UL (ref 0–0.12)
BILIRUB SERPL-MCNC: 1 MG/DL (ref 0.1–1.5)
BUN SERPL-MCNC: 44 MG/DL (ref 8–22)
CALCIUM SERPL-MCNC: 8.9 MG/DL (ref 8.5–10.5)
CHLORIDE SERPL-SCNC: 93 MMOL/L (ref 96–112)
CO2 SERPL-SCNC: 30 MMOL/L (ref 20–33)
CREAT SERPL-MCNC: 7.15 MG/DL (ref 0.5–1.4)
CRP SERPL HS-MCNC: 1.59 MG/DL (ref 0–0.75)
EOSINOPHIL # BLD AUTO: 0.24 K/UL (ref 0–0.51)
EOSINOPHIL NFR BLD: 5.9 % (ref 0–6.9)
ERYTHROCYTE [DISTWIDTH] IN BLOOD BY AUTOMATED COUNT: 58.4 FL (ref 35.9–50)
ERYTHROCYTE [SEDIMENTATION RATE] IN BLOOD BY WESTERGREN METHOD: 8 MM/HOUR (ref 0–15)
GLOBULIN SER CALC-MCNC: 3.6 G/DL (ref 1.9–3.5)
GLUCOSE SERPL-MCNC: 85 MG/DL (ref 65–99)
HCT VFR BLD AUTO: 40.9 % (ref 42–52)
HGB BLD-MCNC: 13.3 G/DL (ref 14–18)
IMM GRANULOCYTES # BLD AUTO: 0.01 K/UL (ref 0–0.11)
IMM GRANULOCYTES NFR BLD AUTO: 0.2 % (ref 0–0.9)
LYMPHOCYTES # BLD AUTO: 1.25 K/UL (ref 1–4.8)
LYMPHOCYTES NFR BLD: 30.6 % (ref 22–41)
MCH RBC QN AUTO: 32.4 PG (ref 27–33)
MCHC RBC AUTO-ENTMCNC: 32.5 G/DL (ref 33.7–35.3)
MCV RBC AUTO: 99.8 FL (ref 81.4–97.8)
MONOCYTES # BLD AUTO: 0.38 K/UL (ref 0–0.85)
MONOCYTES NFR BLD AUTO: 9.3 % (ref 0–13.4)
NEUTROPHILS # BLD AUTO: 2.16 K/UL (ref 1.82–7.42)
NEUTROPHILS NFR BLD: 52.8 % (ref 44–72)
NRBC # BLD AUTO: 0 K/UL
NRBC BLD-RTO: 0 /100 WBC
PLATELET # BLD AUTO: 156 K/UL (ref 164–446)
PMV BLD AUTO: 9.2 FL (ref 9–12.9)
POTASSIUM SERPL-SCNC: 6.2 MMOL/L (ref 3.6–5.5)
PROT SERPL-MCNC: 8.4 G/DL (ref 6–8.2)
RBC # BLD AUTO: 4.1 M/UL (ref 4.7–6.1)
SODIUM SERPL-SCNC: 139 MMOL/L (ref 135–145)
WBC # BLD AUTO: 4.1 K/UL (ref 4.8–10.8)

## 2019-05-21 PROCEDURE — 86140 C-REACTIVE PROTEIN: CPT

## 2019-05-21 PROCEDURE — 85652 RBC SED RATE AUTOMATED: CPT

## 2019-05-21 PROCEDURE — 85025 COMPLETE CBC W/AUTO DIFF WBC: CPT

## 2019-05-21 PROCEDURE — 80053 COMPREHEN METABOLIC PANEL: CPT

## 2019-05-23 ENCOUNTER — ANTICOAGULATION VISIT (OUTPATIENT)
Dept: VASCULAR LAB | Facility: MEDICAL CENTER | Age: 39
End: 2019-05-23
Attending: INTERNAL MEDICINE
Payer: MEDICARE

## 2019-05-23 DIAGNOSIS — Z79.01 CHRONIC ANTICOAGULATION: ICD-10-CM

## 2019-05-23 LAB — INR PPP: 3.4 (ref 2–3.5)

## 2019-05-23 PROCEDURE — 85610 PROTHROMBIN TIME: CPT

## 2019-05-23 PROCEDURE — 99211 OFF/OP EST MAY X REQ PHY/QHP: CPT | Performed by: PHARMACIST

## 2019-05-23 NOTE — PROGRESS NOTES
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 Hopkins      Anticoagulation Care Providers     Provider Role Specialty Phone number    Jairo Hopkins M.D. Referring Surgery [phone]    [facility] Anticoagulation Services Responsible  [phone]        Anticoagulation Patient Findings      HPI:  Denis De Anda seen in clinic today, on anticoagulation therapy with warfarin for AV graft thrombosis  Changes to current medical/health status since last appt: denies  Denies signs/symptoms of bleeding and/or thrombosis since the last appt.    Denies any interval changes to diet  Denies any interval changes to medications since last appt.   Denies any complications or cost restrictions with current therapy.   BP declined      A/P   INR  is-therapeutic.   Will continue with the same warfarin dosing.     Follow up appointment in 1 week(s).    Maeve Saha, PharmD

## 2019-05-28 LAB — INR BLD: 3.4 (ref 0.9–1.2)

## 2019-05-30 ENCOUNTER — ANTICOAGULATION VISIT (OUTPATIENT)
Dept: VASCULAR LAB | Facility: MEDICAL CENTER | Age: 39
End: 2019-05-30
Attending: INTERNAL MEDICINE
Payer: MEDICARE

## 2019-05-30 DIAGNOSIS — T82.868A THROMBOSIS OF ARTERIOVENOUS GRAFT, INITIAL ENCOUNTER (HCC): ICD-10-CM

## 2019-05-30 LAB
INR BLD: 2.8 (ref 0.9–1.2)
INR PPP: 2.8 (ref 2–3.5)

## 2019-05-30 PROCEDURE — 99211 OFF/OP EST MAY X REQ PHY/QHP: CPT | Performed by: NURSE PRACTITIONER

## 2019-05-30 PROCEDURE — 85610 PROTHROMBIN TIME: CPT

## 2019-05-30 NOTE — PROGRESS NOTES
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 Hopkins      Anticoagulation Care Providers     Provider Role Specialty Phone number    Jairo Hopkins M.D. Referring Surgery [phone]    [facility] Anticoagulation Services Responsible  [phone]        Anticoagulation Patient Findings      HPI:  Denis De Anda seen in clinic today for follow up on anticoagulation therapy in the presence of AV thrombosis prevention. Denies any changes to current medical/health status since last appointment. Denies any medication or diet changes. No current symptoms of bleeding or thrombosis reported.    A/P:   INR therapeutic. Continue current regimen. BP taken at dialysis frequently.    Follow up appointment in 1 week(s).    Next Appointment:  at 7:45 am.     Hanna LARKIN

## 2019-06-18 ENCOUNTER — ANTICOAGULATION MONITORING (OUTPATIENT)
Dept: VASCULAR LAB | Facility: MEDICAL CENTER | Age: 39
End: 2019-06-18

## 2019-06-18 DIAGNOSIS — T82.868A THROMBOSIS OF ARTERIOVENOUS GRAFT, INITIAL ENCOUNTER (HCC): ICD-10-CM

## 2019-06-27 ENCOUNTER — HOSPITAL ENCOUNTER (OUTPATIENT)
Facility: MEDICAL CENTER | Age: 39
End: 2019-06-27
Payer: MEDICARE

## 2019-06-27 ENCOUNTER — ANTICOAGULATION VISIT (OUTPATIENT)
Dept: VASCULAR LAB | Facility: MEDICAL CENTER | Age: 39
End: 2019-06-27
Attending: INTERNAL MEDICINE
Payer: MEDICARE

## 2019-06-27 DIAGNOSIS — T82.868A THROMBOSIS OF ARTERIOVENOUS GRAFT, INITIAL ENCOUNTER (HCC): ICD-10-CM

## 2019-06-27 LAB
BDY FAT % MEASURED: 25.3 %
BP DIAS: 68 MMHG
BP SYS: 105 MMHG
CHOLEST SERPL-MCNC: 155 MG/DL (ref 100–199)
DIABETES HTDIA: NO
EVENT NAME HTEVT: NORMAL
FASTING HTFAS: YES
GLUCOSE SERPL-MCNC: 87 MG/DL (ref 65–99)
HDLC SERPL-MCNC: 22 MG/DL
HYPERTENSION HTHYP: NO
INR PPP: 4.4 (ref 2–3.5)
LDLC SERPL CALC-MCNC: 81 MG/DL
SCREENING LOC CITY HTCIT: NORMAL
SCREENING LOC STATE HTSTA: NORMAL
SCREENING LOCATION HTLOC: NORMAL
SMOKING HTSMO: NO
SUBSCRIBER ID HTSID: NORMAL
TRIGL SERPL-MCNC: 261 MG/DL (ref 0–149)

## 2019-06-27 PROCEDURE — 85610 PROTHROMBIN TIME: CPT

## 2019-06-27 PROCEDURE — 82947 ASSAY GLUCOSE BLOOD QUANT: CPT

## 2019-06-27 PROCEDURE — 99212 OFFICE O/P EST SF 10 MIN: CPT

## 2019-06-27 PROCEDURE — 80061 LIPID PANEL: CPT

## 2019-06-27 PROCEDURE — S5190 WELLNESS ASSESSMENT BY NONPH: HCPCS

## 2019-06-27 NOTE — PROGRESS NOTES
Anticoagulation Summary  As of 2019    INR goal:   2.5-3.5   TTR:   30.9 % (2.1 y)   INR used for dosin.40! 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 Hopkins      Anticoagulation Care Providers     Provider Role Specialty Phone number    Jairo Hopkins M.D. Referring Surgery [phone]    [facility] Anticoagulation Services Responsible  [phone]        Anticoagulation Patient Findings          History of Present Illness: follow up appointment for chronic anticoagulation with the high risk medication, warfarin for AV graft thrombosis    Last INR was at goal,now pt is supra therapeutic.  Will HOLD dose tonight, then resume current dosing regimen. Follow up in 1 weeks, to reduce the risk of adverse events related to this high risk medication, warfarin.    Renetta Dunlap, Clinical Pharmacist      Pt declines vitals

## 2019-06-28 LAB — INR BLD: 4.4 (ref 0.9–1.2)

## 2019-07-02 ENCOUNTER — ANTICOAGULATION VISIT (OUTPATIENT)
Dept: VASCULAR LAB | Facility: MEDICAL CENTER | Age: 39
End: 2019-07-02
Attending: INTERNAL MEDICINE
Payer: MEDICARE

## 2019-07-02 DIAGNOSIS — Z79.01 CHRONIC ANTICOAGULATION: ICD-10-CM

## 2019-07-02 LAB — INR PPP: 3.8 (ref 2–3.5)

## 2019-07-02 PROCEDURE — 99212 OFFICE O/P EST SF 10 MIN: CPT

## 2019-07-02 PROCEDURE — 85610 PROTHROMBIN TIME: CPT

## 2019-07-02 NOTE — PROGRESS NOTES
Anticoagulation Summary  As of 7/2/2019    INR goal:   2.5-3.5   TTR:   30.7 % (2.1 y)   INR used for dosing:   3.80! 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 Hopkins      Anticoagulation Care Providers     Provider Role Specialty Phone number    Jairo Hopkins M.D. Referring Surgery [phone]    [facility] Anticoagulation Services Responsible  [phone]        Anticoagulation Patient Findings      HPI:  Denis De Anda seen in clinic today, on anticoagulation therapy with warfarin for AV graft thrombus.   Changes to current medical/health status since last appt: none  Denies signs/symptoms of bleeding and/or thrombosis since the last appt.    Denies any interval changes to diet  Denies any interval changes to medications since last appt.   Denies any complications or cost restrictions with current therapy.   Declines vitals.    A/P   INR  SUPRA-therapeutic.   Begin reduced regimen.     Follow up appointment in 1 week(s).    Donald Cedeño, PharmD

## 2019-07-03 LAB — INR BLD: 3.8 (ref 0.9–1.2)

## 2019-07-09 ENCOUNTER — ANTICOAGULATION VISIT (OUTPATIENT)
Dept: VASCULAR LAB | Facility: MEDICAL CENTER | Age: 39
End: 2019-07-09
Attending: INTERNAL MEDICINE
Payer: MEDICARE

## 2019-07-09 DIAGNOSIS — Z79.01 CHRONIC ANTICOAGULATION: ICD-10-CM

## 2019-07-09 LAB
INR BLD: 3.2 (ref 0.9–1.2)
INR PPP: 3.2 (ref 2–3.5)

## 2019-07-09 PROCEDURE — 99211 OFF/OP EST MAY X REQ PHY/QHP: CPT

## 2019-07-09 PROCEDURE — 85610 PROTHROMBIN TIME: CPT

## 2019-07-09 NOTE — PROGRESS NOTES
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 Hopkins      Anticoagulation Care Providers     Provider Role Specialty Phone number    Jairo Hopkins M.D. Referring Surgery [phone]    [facility] Anticoagulation Services Responsible  [phone]        Anticoagulation Patient Findings      HPI:  Denis De Anda seen in clinic today, on anticoagulation therapy with warfarin for AV thrombosis.   Changes to current medical/health status since last appt: none  Denies signs/symptoms of bleeding and/or thrombosis since the last appt.    Denies any interval changes to diet  Denies any interval changes to medications since last appt.   Denies any complications or cost restrictions with current therapy.   Declines vitals.     A/P   INR  therapeutic.   Pt is to continue with current warfarin dosing regimen.     Follow up appointment in 2 week(s).    Donald Cedeño, PharmD

## 2019-07-26 NOTE — PROGRESS NOTES
Patient: Kay Dhillon Date: 2019   : 1983 Attending: Jake ROTH MD   35 year old female        Chief Complaint:   Cardiac clearance    Subjective:   Pt any chest pain, palpitations, sob at rest, presley, pnd or fluid retention     Objective:  Rhythm:  No telemetry    Vital Signs  Vital Last Value 24 Hour Range   Temperature 98.7 °F (37.1 °C) Temp  Min: 98 °F (36.7 °C)  Max: 99.1 °F (37.3 °C)   Pulse 83 Pulse  Min: 74  Max: 85   Respiratory 16 Resp  Min: 16  Max: 16   Blood Pressure 124/70 BP  Min: 116/64  Max: 139/74   Pulse Oximetry 99 % SpO2  Min: 98 %  Max: 100 %     Admission Weight  Weight: 70.3 kg    Weight over the past 48 Hours  Patient Vitals for the past 48 hrs:   Weight   19 0650 65.5 kg        Intake/Output Summary (Last 24 hours)  I/O = 1367/1700 (- 4,300)     Laboratory Results  Recent Labs   Lab 19  0517 19  0550 19  0535 19  0549 19  1334 19  0510 19  1754 19  0527  19  1158   SODIUM 141 140 141 142  --  142  --  143  --  145   POTASSIUM 3.8 4.3 4.1 4.0  --  4.4 3.7 3.5   < > 3.1*   CHLORIDE 104 108* 109* 110*  --  113*  --  115*  --  115*   CO2 27 23 24 21  --  22  --  18*  --  19*   BUN 13 13 11 10  --  9  --  9  --  11   CREATININE 0.68 0.69 0.73 0.72  --  0.77  --  0.82  --  0.82   ALBUMIN 4.5 4.2 4.6 4.4  --  4.3  --  3.9  --  4.1   MG  --   --  1.6* 1.3*  --   --   --   --   --  1.6*   AST 44* 45* 41* 36  --  73*  --  47*  --  48*   BILIRUBIN 24.1* 24.4* 26.0* 26.8*  --  28.8*  --  30.9*  --  35.2*   TSH  --   --   --   --  1.197  --   --   --   --   --     < > = values in this interval not displayed.       Recent Labs   Lab 19  0517 19  0656 19  0550 19  0535 19  1413 19  0549 19  1334 19  0510 19  0527 19  1158 19  0525   WBC 2.8*  --  3.8* 3.4*  --  3.8*  --  4.6 3.5*  --  3.1*   HGB 6.3* 7.1* 6.8* 7.3*  --  7.3*  --  8.2* 8.3*  --  8.6*   HCT  Pt aox4. No visible signs of distress. VSS.  Tolerating medication regimen without any signs or symptoms of adverse reactions.  Pt reporting elevated pain at surgical incision sites, medicated per MAR.  On room air, no complaints of SOB.  Tolerating diet without any n/v  Dressings to abdomen is CDI. Dressing to RLE is saturated, but well reinforced.  AV graft to LLE has bruit and thrill present.  Bed locked and in low position.  Call light within reach and able to make all needs be known.  Will continue to monitor.   19.8* 21.6* 21.7* 22.2*  --  22.3*  --  24.0* 25.3*  --  26.5*   PLT 39*  --  38* 36*  --  38*  --  70* 44*  --  45*   PST  --   --   --   --   --   --  99*  --   --   --   --    INR 2.1  --  2.2 2.5 2.2  --  3.2  --  3.0 3.1  --    PT 20.7*  --  21.9* 23.8* 22.1*  --  29.7*  --  28.8* 28.3*  --    PTT  --   --   --   --   --   --  67*  --   --   --   --        Cardiac Enzymes  Recent Labs   Lab 07/22/19  1334   HGBA1C 3.8*       Medications/Infusions:  • cefTRIAXone (ROCEPHIN) IVPB  2,000 mg Intravenous Daily   • ferrous sulfate  325 mg Oral BID WC   • lactulose  20 g Oral TID   • octreotide  100 mcg Subcutaneous TID   • rifAXIMin  550 mg Oral 2 times per day   • folic acid  1 mg Oral Daily   • vitamin - therapeutic multivitamins w/minerals  1 tablet Oral Daily   • thiamine  100 mg Oral Daily   • spironolactone  50 mg Oral Daily   • furosemide  20 mg Oral Daily   • sodium chloride (PF)  2 mL Injection 2 times per day         Imaging/Diagnostics:  Dobutamine stress echo 07.24.2019  No echocardiographic evidence of myocardial ischemia.  Left ventricular cavity only minimally augments with stress.  Small pericardial effusion.    Echo 07.23.2019   Agitated saline was injected through a peripheral vein and DID show evidence of a LATE shunt.  Moderately increased LV cavity size with normal systolic function, EF 57 %.  No regional wall motion abnormalities. Mildly increased LV filling pressure.  Mildly increased right ventricular size with normal systolic function, PASP 41 mmHg.  Tricuspid valve sclerosis. Mild-to-moderate TR.  No pericardial effusion. Large left pleural effusion.  No prior study available for comparison.    Ct of chest  1.  Large left pleural effusion and moderate adjacent left lung  consolidation. Small right pleural effusion.  2.  Partially visualized perihepatic free fluid and nodular hepatic  contour. The liver and abdomen is better evaluated on multiphase CT  performed the same day.  3.   Cardiomegaly.  4.  Cholelithiasis.    Physical Exam  Neurologic:   Patient is awake and appropriate.    Respiratory:   Lungs are clear.    Cardiovascular:   S1S2 clearly audible, no rub, no murmur.  Skin is pink, warm and intact.  No peripheral edema.  No JVD (jugular venous distension).    Gastrointestinal:   Abdomen is rounded and soft, positive BS (bowel sounds)     Assessment/Plan:  · Alcoholic cirrhosis/portal hypertension/ascities (manged by transplant team): pt is being evaluated for liver transplantation, cardiac clearance requested. Echo / dobutamine stress echo reviewed. Will proceed with right heart cath in am. Transplant team to address teg level and treat as needed. Request plt count greater then 50K. Spoke to roberth nelson (transplant team), she is aware of plan/voiced understanding of plan.      · Recent left lower lobe pneumonia (managed by primary team/id): concern for possible pna vs fluid overload noted on review of id consult. Pt is on abx   · Pleural effusions -s/p thoracentesis (managed by pulmonary team): plan for chest tube noted.  · Anemia/thrombocytopenia/coagulopathies (managed by transplant team): recent cbc (decrease in hb)/inr noted.     Will sign off, please call if needed. Thank you    Plan reviewed and discussed with mariela alamo MD.

## 2019-08-06 ENCOUNTER — ANTICOAGULATION MONITORING (OUTPATIENT)
Dept: VASCULAR LAB | Facility: MEDICAL CENTER | Age: 39
End: 2019-08-06

## 2019-08-15 ENCOUNTER — ANTICOAGULATION VISIT (OUTPATIENT)
Dept: VASCULAR LAB | Facility: MEDICAL CENTER | Age: 39
End: 2019-08-15
Attending: INTERNAL MEDICINE
Payer: MEDICARE

## 2019-08-15 DIAGNOSIS — T82.868S THROMBOSIS OF ARTERIOVENOUS GRAFT, SEQUELA: ICD-10-CM

## 2019-08-15 LAB
INR BLD: 4.9 (ref 0.9–1.2)
INR PPP: 4.9 (ref 2–3.5)

## 2019-08-15 PROCEDURE — 85610 PROTHROMBIN TIME: CPT

## 2019-08-15 PROCEDURE — 99212 OFFICE O/P EST SF 10 MIN: CPT | Performed by: NURSE PRACTITIONER

## 2019-08-15 NOTE — PROGRESS NOTES
Anticoagulation Summary  As of 8/15/2019    INR goal:   2.5-3.5   TTR:   30.2 % (2.2 y)   INR used for dosin.90! 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 Hopkins      Anticoagulation Care Providers     Provider Role Specialty Phone number    Jairo Hopkins M.D. Referring Surgery [phone]    [facility] Anticoagulation Services Responsible  [phone]        Anticoagulation Patient Findings      HPI:  Denis De Anda seen in clinic today for follow up on anticoagulation therapy in the presence of AV graft thrombosis prevention.   Denies any changes to current medical/health status since last appointment.   Denies any medication or diet changes.   No current symptoms of bleeding or thrombosis reported.  He is taking 7.5 mg on  only (not M/F as previously charted).    A/P:   INR supratherapeutic.   Will hold one dose then start 5 mg daily.     Follow up appointment in 1 week(s).    Next Appointment: Thursday, 2019 at 7:30 am.    Hanna LARKIN

## 2019-08-22 ENCOUNTER — ANTICOAGULATION VISIT (OUTPATIENT)
Dept: VASCULAR LAB | Facility: MEDICAL CENTER | Age: 39
End: 2019-08-22
Attending: INTERNAL MEDICINE
Payer: MEDICARE

## 2019-08-22 DIAGNOSIS — T82.868S THROMBOSIS OF ARTERIOVENOUS GRAFT, SEQUELA: ICD-10-CM

## 2019-08-22 LAB
INR BLD: 1.7 (ref 0.9–1.2)
INR PPP: 1.7 (ref 2–3.5)

## 2019-08-22 PROCEDURE — 85610 PROTHROMBIN TIME: CPT

## 2019-08-22 PROCEDURE — 99212 OFFICE O/P EST SF 10 MIN: CPT | Performed by: NURSE PRACTITIONER

## 2019-08-22 NOTE — PROGRESS NOTES
Anticoagulation Summary  As of 2019    INR goal:   2.5-3.5   TTR:   30.2 % (2.2 y)   INR used for dosin.70! 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 Hopkins      Anticoagulation Care Providers     Provider Role Specialty Phone number    Jairo Hopkins M.D. Referring Surgery [phone]    [facility] Anticoagulation Services Responsible  [phone]        Anticoagulation Patient Findings      HPI:  Denis Storm De Anda seen in clinic today for follow up on anticoagulation therapy in the presence of AV thrombosis prevention.  Denies any changes to current medical/health status since last appointment.   Denies any medication or diet changes.   No current symptoms of bleeding or thrombosis reported.  He took 5 mg on Monday, not 7.5 mg as previously charted.    A/P:   INR subtherapeutic. INR decreased from 4.9 last week.   Will increase tonight and have pt return to the previously intended regimen.    Follow up appointment on Wednesday.    Next Appointment: 2019 at 8:15 am.    Hanna LARKIN

## 2019-08-23 ENCOUNTER — TELEPHONE (OUTPATIENT)
Dept: MEDICAL GROUP | Facility: PHYSICIAN GROUP | Age: 39
End: 2019-08-23

## 2019-08-23 NOTE — TELEPHONE ENCOUNTER
1. Caller Name: Denis De Anda                                         Call Back Number: [phone] (home)       Patient approves a detailed voicemail message: N\A    2. SPECIFIC Action To Be Taken: Referral Needed to an ENT Pt cannot get back on to the Transplant list with out this    3. Diagnosis/Clinical Reason for Request: Lymph node lump in throat per Pt    4. Specialty & Provider Name/Lab/Imaging Location: ENT     5. Is appointment scheduled for requested order/referral: no    Patient was informed they will receive a return phone call from the office ONLY if there are any questions before processing their request. Advised to call back if they haven't received a call from the referral department in 5 days.

## 2019-08-24 DIAGNOSIS — R59.0 CERVICAL ADENOPATHY: ICD-10-CM

## 2019-08-29 ENCOUNTER — TELEPHONE (OUTPATIENT)
Dept: VASCULAR LAB | Facility: MEDICAL CENTER | Age: 39
End: 2019-08-29

## 2019-09-10 ENCOUNTER — ANTICOAGULATION MONITORING (OUTPATIENT)
Dept: VASCULAR LAB | Facility: MEDICAL CENTER | Age: 39
End: 2019-09-10

## 2019-09-17 ENCOUNTER — TELEPHONE (OUTPATIENT)
Dept: VASCULAR LAB | Facility: MEDICAL CENTER | Age: 39
End: 2019-09-17

## 2019-09-20 ENCOUNTER — ANTICOAGULATION VISIT (OUTPATIENT)
Dept: VASCULAR LAB | Facility: MEDICAL CENTER | Age: 39
End: 2019-09-20
Attending: INTERNAL MEDICINE
Payer: MEDICARE

## 2019-09-20 ENCOUNTER — EH NON-PROVIDER (OUTPATIENT)
Dept: OCCUPATIONAL MEDICINE | Facility: CLINIC | Age: 39
End: 2019-09-20

## 2019-09-20 ENCOUNTER — HOSPITAL ENCOUNTER (OUTPATIENT)
Facility: MEDICAL CENTER | Age: 39
End: 2019-09-20
Attending: NURSE PRACTITIONER
Payer: COMMERCIAL

## 2019-09-20 DIAGNOSIS — Z79.01 CHRONIC ANTICOAGULATION: ICD-10-CM

## 2019-09-20 DIAGNOSIS — Z02.89 VISIT FOR OCCUPATIONAL HEALTH EXAMINATION: ICD-10-CM

## 2019-09-20 LAB
INR BLD: 2.7 (ref 0.9–1.2)
INR PPP: 2.7 (ref 2–3.5)

## 2019-09-20 PROCEDURE — 85610 PROTHROMBIN TIME: CPT

## 2019-09-20 PROCEDURE — 86480 TB TEST CELL IMMUN MEASURE: CPT | Performed by: PREVENTIVE MEDICINE

## 2019-09-20 PROCEDURE — 99212 OFFICE O/P EST SF 10 MIN: CPT | Performed by: PHARMACIST

## 2019-09-20 NOTE — PROGRESS NOTES
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 Hopkins      Anticoagulation Care Providers     Provider Role Specialty Phone number    Jairo Hopkins M.D. Referring Surgery [phone]    [facility] Anticoagulation Services Responsible  [phone]                Anticoagulation Patient Findings      HPI:  Denis De Anda seen in clinic today, on anticoagulation therapy with warfarin for AV graft thrombosis  Changes to current medical/health status since last appt: pt is working to get back on to the [facility] transplant list  Denies signs/symptoms of bleeding and/or thrombosis since the last appt.    Denies any interval changes to diet  Denies any interval changes to medications since last appt.   Denies any complications or cost restrictions with current therapy.   BP declined.      A/P   INR  is-therapeutic.   Will continue with the same warfarin dosing.     Follow up appointment in 2 week(s).    Maeve aSha, PharmD

## 2019-09-23 LAB
GAMMA INTERFERON BACKGROUND BLD IA-ACNC: 0.05 IU/ML
M TB IFN-G BLD-IMP: NEGATIVE
M TB IFN-G CD4+ BCKGRND COR BLD-ACNC: -0.02 IU/ML
MITOGEN IGNF BCKGRD COR BLD-ACNC: >10 IU/ML
QFT TB2 - NIL TBQ2: -0.03 IU/ML

## 2019-09-25 ENCOUNTER — IMMUNIZATION (OUTPATIENT)
Dept: OCCUPATIONAL MEDICINE | Facility: CLINIC | Age: 39
End: 2019-09-25

## 2019-09-25 DIAGNOSIS — Z23 NEED FOR VACCINATION: ICD-10-CM

## 2019-09-25 DIAGNOSIS — R22.30 LOCALIZED SWELLING, MASS, OR LUMP OF UPPER EXTREMITY, UNSPECIFIED LATERALITY: ICD-10-CM

## 2019-09-25 PROCEDURE — 90686 IIV4 VACC NO PRSV 0.5 ML IM: CPT | Performed by: NURSE PRACTITIONER

## 2019-10-10 ENCOUNTER — APPOINTMENT (OUTPATIENT)
Dept: RADIOLOGY | Facility: MEDICAL CENTER | Age: 39
End: 2019-10-10
Attending: INTERNAL MEDICINE
Payer: MEDICARE

## 2019-10-13 ENCOUNTER — DOCUMENTATION (OUTPATIENT)
Dept: OCCUPATIONAL MEDICINE | Facility: CLINIC | Age: 39
End: 2019-10-13

## 2019-10-15 ENCOUNTER — ANTICOAGULATION MONITORING (OUTPATIENT)
Dept: VASCULAR LAB | Facility: MEDICAL CENTER | Age: 39
End: 2019-10-15

## 2019-10-15 DIAGNOSIS — Z79.01 CHRONIC ANTICOAGULATION: ICD-10-CM

## 2019-10-15 DIAGNOSIS — T82.868S THROMBOSIS OF ARTERIOVENOUS GRAFT, SEQUELA: ICD-10-CM

## 2019-10-24 ENCOUNTER — HOSPITAL ENCOUNTER (OUTPATIENT)
Dept: RADIOLOGY | Facility: MEDICAL CENTER | Age: 39
End: 2019-10-24
Attending: INTERNAL MEDICINE
Payer: MEDICARE

## 2019-10-24 ENCOUNTER — HOSPITAL ENCOUNTER (OUTPATIENT)
Dept: RADIOLOGY | Facility: MEDICAL CENTER | Age: 39
End: 2019-10-24

## 2019-10-24 DIAGNOSIS — R22.30 LOCALIZED SWELLING, MASS, OR LUMP OF UPPER EXTREMITY, UNSPECIFIED LATERALITY: ICD-10-CM

## 2019-10-24 PROCEDURE — 71260 CT THORAX DX C+: CPT

## 2019-10-24 PROCEDURE — 700117 HCHG RX CONTRAST REV CODE 255: Performed by: INTERNAL MEDICINE

## 2019-10-24 RX ADMIN — IOHEXOL 75 ML: 350 INJECTION, SOLUTION INTRAVENOUS at 15:38

## 2019-11-14 ENCOUNTER — TELEPHONE (OUTPATIENT)
Dept: VASCULAR LAB | Facility: MEDICAL CENTER | Age: 39
End: 2019-11-14

## 2019-11-15 ENCOUNTER — HOSPITAL ENCOUNTER (OUTPATIENT)
Facility: MEDICAL CENTER | Age: 39
End: 2019-11-15
Attending: NURSE PRACTITIONER
Payer: MEDICARE

## 2019-11-15 ENCOUNTER — ANTICOAGULATION MONITORING (OUTPATIENT)
Dept: VASCULAR LAB | Facility: MEDICAL CENTER | Age: 39
End: 2019-11-15

## 2019-11-15 DIAGNOSIS — Z79.01 CHRONIC ANTICOAGULATION: ICD-10-CM

## 2019-11-15 LAB
INR PPP: 3.7 (ref 0.87–1.13)
PROTHROMBIN TIME: 38.2 SEC (ref 12–14.6)

## 2019-11-15 PROCEDURE — 85610 PROTHROMBIN TIME: CPT

## 2019-11-15 NOTE — TELEPHONE ENCOUNTER
S/with pt. He will get INR drawn at the lab tomorrow morning 11/15, and rescheduled his appt for 2weeks out per pt's request.

## 2019-11-15 NOTE — PROGRESS NOTES
Anticoagulation Summary  As of 11/15/2019    INR goal:   2.5-3.5   TTR:   33.0 % (2.5 y)   INR used for dosing:   3.70! 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 Hopkins      Anticoagulation Care Providers     Provider Role Specialty Phone number    Jairo Hopkins M.D. Referring Surgery [phone]    [facility] Anticoagulation Services Responsible  [phone]        Anticoagulation Patient Findings      Spoke with patient.  INR is SUPRA therapeutic.   Pt denies any unusual s/s of bleeding, bruising, clotting or any changes to diet or medications. Denies any etoh, cranberries, supplements, or illness.   Pt verifies warfarin weekly dosing.     Will have pt take a decreased dose of 2.5mg x1 and then resume his normal dosing.     Repeat INR in 2 week(s).     Maeve Saha, PharmD

## 2019-12-05 RX ORDER — WARFARIN SODIUM 5 MG/1
TABLET ORAL
Qty: 135 TAB | Refills: 0 | Status: SHIPPED | OUTPATIENT
Start: 2019-12-05 | End: 2020-02-19

## 2019-12-11 ENCOUNTER — ANTICOAGULATION MONITORING (OUTPATIENT)
Dept: VASCULAR LAB | Facility: MEDICAL CENTER | Age: 39
End: 2019-12-11

## 2019-12-20 ENCOUNTER — HOSPITAL ENCOUNTER (OUTPATIENT)
Facility: MEDICAL CENTER | Age: 39
End: 2019-12-20
Attending: NURSE PRACTITIONER
Payer: MEDICARE

## 2019-12-20 ENCOUNTER — ANTICOAGULATION MONITORING (OUTPATIENT)
Dept: VASCULAR LAB | Facility: MEDICAL CENTER | Age: 39
End: 2019-12-20

## 2019-12-20 ENCOUNTER — EH NON-PROVIDER (OUTPATIENT)
Dept: OCCUPATIONAL MEDICINE | Facility: CLINIC | Age: 39
End: 2019-12-20

## 2019-12-20 DIAGNOSIS — Z79.01 CHRONIC ANTICOAGULATION: ICD-10-CM

## 2019-12-20 LAB
INR PPP: 3.14 (ref 0.87–1.13)
PROTHROMBIN TIME: 33.5 SEC (ref 12–14.6)

## 2019-12-20 PROCEDURE — 36415 COLL VENOUS BLD VENIPUNCTURE: CPT

## 2019-12-20 PROCEDURE — 85610 PROTHROMBIN TIME: CPT

## 2019-12-20 PROCEDURE — 94010 BREATHING CAPACITY TEST: CPT | Performed by: PREVENTIVE MEDICINE

## 2019-12-20 NOTE — NON-PROVIDER
Pt needs a spirometry, we performed the test twice. Pt could not get a 60 or over result so we need to reschedule him to come back in. The providers are not here to see if he would need to repeat the tests, so I decided to schedule him back again.

## 2019-12-20 NOTE — PROGRESS NOTES
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 Hopkins      Anticoagulation Care Providers     Provider Role Specialty Phone number    Jairo Hopkins M.D. Referring Surgery [phone]    [facility] Anticoagulation Services Responsible  [phone]        Anticoagulation Patient Findings          Left voicemail message to report a  therapeutic INR of 3.14.  Pt to continue with current warfarin dosing regimen. Requested pt contact the clinic for any s/s of unusual bleeding, bruising, clotting or any changes to diet or medication.  Follow up in 4 weeks, to reduce the risk of adverse events related to this high risk medication, warfarin.    Renetta Dunlap, Clinical Pharmacist

## 2020-01-07 ENCOUNTER — EH NON-PROVIDER (OUTPATIENT)
Dept: OCCUPATIONAL MEDICINE | Facility: CLINIC | Age: 40
End: 2020-01-07

## 2020-01-07 DIAGNOSIS — Z02.89 ENCOUNTER FOR OCCUPATIONAL HEALTH EXAMINATION: ICD-10-CM

## 2020-01-07 PROCEDURE — 94375 RESPIRATORY FLOW VOLUME LOOP: CPT | Performed by: PREVENTIVE MEDICINE

## 2020-01-30 ENCOUNTER — HOSPITAL ENCOUNTER (OUTPATIENT)
Facility: MEDICAL CENTER | Age: 40
End: 2020-01-30
Attending: NURSE PRACTITIONER
Payer: MEDICARE

## 2020-01-30 ENCOUNTER — ANTICOAGULATION MONITORING (OUTPATIENT)
Dept: VASCULAR LAB | Facility: MEDICAL CENTER | Age: 40
End: 2020-01-30

## 2020-01-30 DIAGNOSIS — Z79.01 CHRONIC ANTICOAGULATION: ICD-10-CM

## 2020-01-30 LAB
INR PPP: 2.86 (ref 0.87–1.13)
PROTHROMBIN TIME: 31.1 SEC (ref 12–14.6)

## 2020-01-30 PROCEDURE — 85610 PROTHROMBIN TIME: CPT

## 2020-01-30 NOTE — PROGRESS NOTES
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 Hopkins      Anticoagulation Care Providers     Provider Role Specialty Phone number    Jairo Hopkins M.D. Referring Surgery [phone]    [facility] Anticoagulation Services Responsible  [phone]        Anticoagulation Patient Findings      Spoke with patient to report a therapeutic INR.    Pt instructed to continue with current warfarin dosing regimen, confirms dosing.   Pt denies any s/s of bleeding, bruising, clotting or any changes to diet or medication.    Will follow up in 4 week(s).     Maeve Saha, PharmD

## 2020-02-15 ENCOUNTER — HOSPITAL ENCOUNTER (EMERGENCY)
Facility: MEDICAL CENTER | Age: 40
End: 2020-02-15
Attending: EMERGENCY MEDICINE
Payer: MEDICARE

## 2020-02-15 ENCOUNTER — APPOINTMENT (OUTPATIENT)
Dept: RADIOLOGY | Facility: MEDICAL CENTER | Age: 40
End: 2020-02-15
Attending: EMERGENCY MEDICINE
Payer: MEDICARE

## 2020-02-15 VITALS
SYSTOLIC BLOOD PRESSURE: 105 MMHG | DIASTOLIC BLOOD PRESSURE: 67 MMHG | WEIGHT: 194 LBS | OXYGEN SATURATION: 95 % | BODY MASS INDEX: 33.12 KG/M2 | TEMPERATURE: 98.5 F | HEART RATE: 84 BPM | RESPIRATION RATE: 16 BRPM | HEIGHT: 64 IN

## 2020-02-15 DIAGNOSIS — M79.644 PAIN OF FINGER OF RIGHT HAND: ICD-10-CM

## 2020-02-15 PROCEDURE — 73140 X-RAY EXAM OF FINGER(S): CPT | Mod: RT

## 2020-02-15 PROCEDURE — 99284 EMERGENCY DEPT VISIT MOD MDM: CPT

## 2020-02-15 NOTE — ED PROVIDER NOTES
ED Provider Note    CHIEF COMPLAINT  Chief Complaint   Patient presents with   • Digit Pain        HPI  Denis De Anda is a 39 y.o. male who presents to the ED with complaints of right middle finger pain.  The patient states that he injured it back in October of this year.  Since then he has been just having continuous pain to the DIP joint and he has a abrasion or scab on the end that has not healed since that time.  Patient just presents today because he has been having the pain for that period of time just wants it looked at.  Patient has any fevers chills nausea vomiting or any other symptoms presents for evaluation.    REVIEW OF SYSTEMS  See HPI for further details. All other systems are negative.     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   • Toxic uninodular goiter without mention of thyrotoxic crisis or storm        FAMILY HISTORY  Family History   Problem Relation Age of Onset   • Arthritis Father         RA   • Lung Disease Father    • Heart Disease Father         MI   • Hypertension Brother      Patient's family history has been discussed and is been found to be noncontributory to his present illness  SOCIAL HISTORY  Social History     Socioeconomic History   • Marital status: Single     Spouse name: Not on file   • Number of children: Not on file   • Years of education: Not on file    • Highest education level: Not on file   Occupational History   • Occupation:      Employer: [employer]   Social Needs   • Financial resource strain: Not on file   • Food insecurity     Worry: Not on file     Inability: Not on file   • Transportation needs     Medical: Not on file     Non-medical: Not on file   Tobacco Use   • Smoking status: Never Smoker   • Smokeless tobacco: Never Used   Substance and Sexual Activity   • Alcohol use: No   • Drug use: No   • Sexual activity: Yes     Partners: Female   Lifestyle   • Physical activity     Days per week: Not on file     Minutes per session: Not on file   • Stress: Not on file   Relationships   • Social connections     Talks on phone: Not on file     Gets together: Not on file     Attends Yazidi service: Not on file     Active member of club or organization: Not on file     Attends meetings of clubs or organizations: Not on file     Relationship status: Not on file   • Intimate partner violence     Fear of current or ex partner: Not on file     Emotionally abused: Not on file     Physically abused: Not on file     Forced sexual activity: Not on file   Other Topics Concern   • Not on file   Social History Narrative   • Not on file      Tony Mcmillan M.D.The patient denies any significant tobacco, alcohol or drug use        SURGICAL HISTORY  Past Surgical History:   Procedure Laterality Date   • THROMBECTOMY Left 1/26/2019    Procedure: THROMBECTOMY  and angioplasty AV GRAFT;  Surgeon: Jairo Hopkins M.D.;  Location: [facility];  Service: General   • AV FISTULA REVISION Left 1/25/2019    Procedure: AV FISTULA REVISION - THIGH LOOP GRAFT;  Surgeon: Jairo Hopkins M.D.;  Location: [facility];  Service: General   • AV FISTULA REVISION Left 12/23/2018    Procedure: AV FISTULA REVISION;  Surgeon: Lurdes Moffett M.D.;  Location: [facility];  Service: General   • SPLIT THICKNESS SKIN GRAFT Right 9/21/2018     Procedure: SPLIT THICKNESS SKIN GRAFT - ARM TO ABDOMEN;  Surgeon: Samson Rosenbaum Jr., M.D.;  Location: SURGERY [facility];  Service: Plastics   • SKIN FLAP DELAYED Left 9/10/2018    Procedure: SKIN FLAP DELAYED- FOR DIVISION AND INSET FLAP WITH SKIN GRAFT ON ARM;  Surgeon: Samson Rosenbaum Jr., M.D.;  Location: SURGERY SAME DAY [facility];  Service: Plastics   • MYOCUTANEOUS FLAP Right 8/27/2018    Procedure: MYOCUTANEOUS FLAP - RIGHT ARM TO TRUNK;  Surgeon: Samson Rosenbaum Jr., M.D.;  Location: SURGERY [facility];  Service: Plastics   • ABDOMINAL EXPLORATION  6/25/2018    Procedure: ABDOMINAL EXPLORATION- CONTROL ABDOMINAL BLEEDING;  Surgeon: Samson Rosenbaum Jr., M.D.;  Location: SURGERY [facility];  Service: Plastics   • THROMBECTOMY Left 4/28/2018    Procedure: THROMBECTOMY- Thigh W/ Graft;  Surgeon: Jairo Hopkins M.D.;  Location: [facility];  Service: General   • THROMBECTOMY Left 4/27/2018    Procedure: THROMBECTOMY - THIGH GRAFT AND REVISION;  Surgeon: Jairo Hopkins M.D.;  Location: SURGERY [facility];  Service: General   • THROMBECTOMY Left 3/9/2018    Procedure: THROMBECTOMY- THIGH DIALYSIS GRAFT AND REVISION  ;  Surgeon: Jairo Hopkins M.D.;  Location: [facility];  Service: General   • CATH PLACEMENT Right 3/9/2018    Procedure: CATH PLACEMENT - REPLACE DIALYSIS CATH RIGHT THIGH;  Surgeon: Jairo Hopkins M.D.;  Location: SURGERY [facility];  Service: General   • SPLIT THICKNESS SKIN GRAFT  2/26/2018    Procedure: SPLIT THICKNESS SKIN GRAFT- TO ABDOMEN;  Surgeon: Samson Rosenbaum Jr., M.D.;  Location: SURGERY [facility];  Service: Plastics   • WOUND CLOSURE GENERAL  2/19/2018    Procedure: WOUND CLOSURE GENERAL-ABDOMINAL WALL HEMORRHAGE;  Surgeon: Jason Robertson M.D.;  Location: [facility];  Service: Plastics   • WOUND EXPLORATION GENERAL  2/1/2018    Procedure: WOUND EXPLORATION GENERAL, REPAIR BLEEDING;   Surgeon: Samson Rosenbaum Jr., M.D.;  Location: [facility];  Service: Plastics   • CATH PLACEMENT Right 11/14/2017    Procedure: CATH PLACEMENT - PERMA;  Surgeon: Jairo Hopkins M.D.;  Location: [facility];  Service: General   • AV FISTULA REVISION Left 11/14/2017    Procedure: AV GRAFT REVISION;  Surgeon: Jairo Hopkins M.D.;  Location: [facility];  Service: General   • IRRIGATION & DEBRIDEMENT GENERAL  7/31/2017    Procedure: IRRIGATION & DEBRIDEMENT GENERAL-ABDOMEN;  Surgeon: Samson Rosenbaum Jr., M.D.;  Location: [facility];  Service:    • ABDOMINOPLASTY  6/5/2017    Procedure: ABDOMINOPLASTY - FOR PANNICULECTOMY;  Surgeon: Samson Rosenbaum Jr., M.D.;  Location: [facility];  Service:    • SPINAL CORD STIMULATOR N/A 5/4/2017    Procedure: SPINAL CORD STIMULATOR - EXPLANT;  Surgeon: Bin Pro M.D.;  Location: [facility];  Service:    • THROMBECTOMY Left 1/6/2017    Procedure: THROMBECTOMY-OPEN THROMBECTOMY WITH LEFT THIGH GRAFT;  Surgeon: Jairo Hopkins M.D.;  Location: [facility];  Service:    • CATH PLACEMENT Right 1/6/2017    Procedure: CATH PLACEMENT;  Surgeon: Jairo Hopkins M.D.;  Location: [facility];  Service:    • THROMBECTOMY Left 1/5/2017    Procedure: THROMBECTOMY-THIGH AV LOOP GRAFT AND ANGIOJET;  Surgeon: Jairo Hopkins M.D.;  Location: [facility];  Service:    • FLAP CLOSURE Left 11/17/2016    Procedure: Fasciocutaneous Flap Closure Left Upper Leg;  Surgeon: Samson Rosenbaum Jr., M.D.;  Location: [facility];  Service:    • IRRIGATION & DEBRIDEMENT GENERAL Left 10/28/2016    Procedure: IRRIGATION & DEBRIDEMENT GENERAL THIGH WITH IRRIGATING WOUND VAC PLACEMENT;  Surgeon: Jairo Hopkins M.D.;  Location: [facility];  Service:    • THROMBECTOMY Left 10/20/2016    Procedure: THROMBECTOMY THIGH;  Surgeon: Jairo Hopkins M.D.;   Location: SURGERY [facility];  Service:    • INCISION AND DRAINAGE GENERAL  10/20/2016    Procedure: INCISION AND DRAINAGE GENERAL HEMATOMA;  Surgeon: Jairo Hopkins M.D.;  Location: SURGERY [facility];  Service:    • THROMBECTOMY Left 9/18/2016    Procedure: THROMBECTOMY - and fistula revision;  Surgeon: Jairo Hopkins M.D.;  Location: SURGERY [facility];  Service:    • AV FISTULOGRAM  9/18/2016    Procedure: AV FISTULOGRAM;  Surgeon: Jairo Hopkins M.D.;  Location: SURGERY [facility];  Service:    • CATH PLACEMENT Right 9/17/2016    Procedure: CATH PLACEMENT - tunneled dialysis cath placement right femoral ;  Surgeon: Quentin Alicia M.D.;  Location: SURGERY [facility];  Service:    • MASS EXCISION GENERAL Right 8/5/2016    Procedure: MASS EXCISION GENERAL FOR CALCIPHYLAXIS SKIN AND SUBCUTANEOUS TISSUE FOREARM;  Surgeon: Jairo Hopkins M.D.;  Location: SURGERY [facility];  Service:    • LESION EXCISION GENERAL Right 7/11/2016    Procedure: LESION EXCISION GENERAL FOR ARM SKIN;  Surgeon: Jairo Hopkins M.D.;  Location: SURGERY [facility];  Service:    • RECOVERY  7/8/2016    Procedure: IR1-VASCULAR CASE-VIANEY-LEFT THIGH AV GRAFT THROMBOLYSIS WITH TISSUE PLASMINOGEN ACTIVATOR AND ANGIOJET ARTHERECTOMY   ;  Surgeon: Recoveryonmarcello Surgery;  Location: SURGERY PRE-POST [facility] UNIT [facility];  Service:    • SPINAL CORD STIMULATOR N/A 3/25/2016    Procedure: SPINAL CORD STIMULATOR;  Surgeon: Bin Pro;  Location: SURGERY [facility];  Service:    • PB PERCUT IMPLNT NEUROELECT,EPIDURAL  2/19/2016    Procedure: IMPLANT NEUROSTIM EPI ARRAY;  Surgeon: Bin Pro;  Location: SURGERY [facility];  Service: Pain Management   • PB PERCUT IMPLNT NEUROELECT,EPIDURAL  2/19/2016    Procedure: IMPLANT NEUROSTIM EPI ARRAY;  Surgeon: Bin Pro;  Location: SURGERY [facility];  Service: Pain Management   • RECOVERY  8/7/2015    Procedure:  VASCULAR CASE HOPKINS-RIGHT ILIAC  VENOGRAM WITH ANGIOPLASTY, REMOVAL RIGHT FEMORAL TUNNELED DIALYSIS CATHETER  **VRE**;  Surgeon: Melinda Surgery;  Location: SURGERY PRE-POST PROC UNIT [facility];  Service:    • AV FISTULA REVISION Left 6/17/2015    Procedure: AV FISTULA REVISION;  Surgeon: Jairo Hopkins M.D.;  Location: SURGERY [facility];  Service:    • IRRIGATION & DEBRIDEMENT GENERAL Left 6/17/2015    Procedure: IRRIGATION & DEBRIDEMENT GENERAL ARM W/EXPLORATION WOUND & CONTROL BLEEDING;  Surgeon: Jairo Hopkins M.D.;  Location: SURGERY [facility];  Service:    • AV FISTULA THROMBOLYSIS Left 6/9/2015    Procedure: THROMBECTOMY AV GRAFT THIGH;  Surgeon: Jairo Hopkins M.D.;  Location: SURGERY [facility];  Service:    • AV FISTULA THROMBOLYSIS Left 6/3/2015    Procedure: AV FISTULA THROMBOLYSIS THIGH REPLACEMENT;  Surgeon: Jairo Hopkins M.D.;  Location: SURGERY [facility];  Service:    • IRRIGATION & DEBRIDEMENT GENERAL Left 6/3/2015    Procedure: IRRIGATION & DEBRIDEMENT GENERAL;  Surgeon: Jairo Hopkins M.D.;  Location: SURGERY [facility];  Service:    • AV FISTULA CREATION  4/20/2015    Performed by Jairo Hopkins M.D. at SURGERY [facility]   • PB INJECT NERV BLCK,STELLATE GANGLION  3/24/2015    Performed by Bin Pro at [facility]   • AV FISTULA REVISION  3/20/2015    Performed by Jairo Hopkins M.D. at SURGERY [facility]   • AV FISTULA REVISION  2/22/2015    Performed by Lurdes Moffett M.D. at SURGERY [facility]   • AV FISTULA REVISION  2/8/2015    Performed by Jairo Hopkins M.D. at SURGERY [facility]   • IRRIGATION & DEBRIDEMENT GENERAL  12/15/2014    Performed by Jairo Hopkins M.D. at SURGERY [facility]   • AV FISTULA REVISION  9/30/2014    Performed by Jairo Hopkins M.D. at SURGERY [facility]   • RECOVERY  6/13/2014    Performed by Ir-Recovery Surgery at [facility] SAME DAY [facility] ORS   • INCISION AND DRAINAGE GENERAL  9/13/2013     Performed by Jairo Winters M.D. at SURGERY [facility] ORS   • DEBRIDEMENT  9/13/2013    Performed by Jairo Winters M.D. at SURGERY [facility] ORS   • VEIN LIGATION  9/13/2013    Performed by Jairo Winters M.D. at SURGERY [facility] ORS   • RECOVERY  5/18/2012    Performed by SURGERY, IR-RECOVERY at SURGERY [facility] ORS   • BONE SPUR EXCISION  12/8/2011    Performed by BELKIS BREAUX at SURGERY SAME DAY [facility] ORS   • AV FISTULA REVISION  11/3/2011    Performed by JAIRO WINTERS at SURGERY [facility] ORS   • AV FISTULOGRAM  6/5/2011    Performed by JAIRO WINTERS at SURGERY [facility] ORS   • CATH PLACEMENT  6/5/2011    Performed by JAIRO WINTERS at SURGERY [facility] ORS   • CATH PLACEMENT  2/1/2011    Performed by JAIRO WINTERS at SURGERY [facility] ORS   • ANGIOPLASTY BALLOON  2/1/2011    Performed by JAIRO WINTERS at SURGERY [facility] ORS   • ENDARTERECTOMY  11/16/2010    Performed by JAIRO WINTERS at SURGERY [facility] ORS   • AV FISTULOGRAM  11/16/2010    Performed by JAIRO WINTERS at SURGERY [facility] ORS   • ARTERIOGRAM  3/5/2009    Performed by JAIRO WINTERS at SURGERY [facility] ORS   • ANGIOPLASTY BALLOON  3/5/2009    Performed by JAIRO WINTERS at SURGERY [facility] ORS   • AV FISTULOGRAM  3/5/2009    Performed by JAIRO WINTERS at SURGERY [facility] ORS   • AV FISTULA CREATION  11/6/08    Performed by JAIRO WINTERS at SURGERY [facility] ORS   • MASS EXCISION ORTHO  10/9/08    Performed by BELKIS BREAUX at SURGERY SAME DAY [facility] ORS   • PARATHYROIDECTOMY  2006   • INGUINAL HERNIA REPAIR Bilateral 2001, 2002   • US-KIDNEY TRANSPLANT  1996, 2001    x2       CURRENT MEDICATIONS   Home Medications    **Home medications have not yet been reviewed for this encounter**       No current facility-administered medications on file prior to encounter.      Current Outpatient Medications on File Prior to Encounter   Medication  "Sig Dispense Refill   • warfarin (COUMADIN) 5 MG Tab Take one to one and one-half tablets by mouth one time daily or as directed by coumadin clinic 135 Tab 0   • calcium carbonate (TUMS) 500 MG Chew Tab Take 500 mg by mouth every day. Taken with meals     • HYDROcodone/acetaminophen (NORCO)  MG Tab Take 1 Tab by mouth every 6 hours as needed.     • gabapentin (NEURONTIN) 300 MG Cap Take 1,200 mg by mouth every bedtime. Pt also has an RX for 600MG, pt takes total of 1,800MG HS     • cinacalcet (SENSIPAR) 30 MG Tab Take 30-60 mg by mouth every day. 30mg one day 60mg the next     • gabapentin (NEURONTIN) 600 MG tablet Take 600 mg by mouth every bedtime. Pt also has an RX for 300MG, pt takes total of 1,800MG HS     • calcitRIOL (ROCALTROL) 0.25 MCG CAPS Take 0.75 mcg by mouth every bedtime.           ALLERGIES   Allergies   Allergen Reactions   • Baclofen Unspecified     Total loss of memory, sedation.   RXN=6/2015   • Contrast Media With Iodine [Iodine] Rash     RXN=1/5/2017   • Keflex Rash     RXN=possibly >10 years   • Pcn [Penicillins] Rash     RXN=possibly >10 years ago  Tolerated Zosyn on 2/20/18   • Tape Rash     Paper tape and tegaderm ok  RXN=ongoing   • Requip Vomiting       PHYSICAL EXAM  VITAL SIGNS: /67   Pulse 84   Temp 36.9 °C (98.5 °F) (Temporal)   Resp 16   Ht 1.626 m (5' 4\")   Wt 88 kg (194 lb 0.1 oz)   SpO2 95%   BMI 33.30 kg/m²    Pulse Ox interpretation not hypoxic    Constitutional: Well developed, Well nourished, No acute distress, Non-toxic appearance.   Skin: Warm, Dry, No erythema,   Musculoskeletal: Intact distal pulses, no clubbing, no cyanosis, no edema patient is tender about the DIP joint of the middle finger.  There is a scab on the tip of it that is only about 2 mm in diameter.  The tissues otherwise appear well there is no signs of erythema there is no signs of infectious process.  Patient has good flexion-extension of the finger but he does not full extension of the " DIP joint.  Neurologic: Alert & oriented x 3, Normal motor function, Normal sensory function, No focal deficits noted.     RADIOLOGY/PROCEDURES  DX-FINGER(S) 2+ RIGHT   Final Result      1.  No radiographic evidence of acute fracture      2.  Severe osteopenia and some improving second finger soft tissue calcification which indicates there is renal osteodystrophy. This is the likely explanation for middle and distal third phalanx solid periosteal reaction, favored strongly over    osteomyelitis/infection or response to inflammatory arthropathy such as psoriasis. No subacute fracture or focal bony destruction is seen as would be seen with tumor.            COURSE & MEDICAL DECISION MAKING  Pertinent Labs & Imaging studies reviewed. (See chart for details)  Patient does have chronic findings on the x-ray as described above.  I do not feel is osteomyelitis he has no erythema to the area.  He does have this chronic wound at the tip however, this could be an osteophytic type reaction this could also be gouty tophi reaction.  At this point however I do not feel it is osteomyelitis.  I will place the patient into a splint.  I recommended for the patient to follow-up with his hand surgeon that he is seen in the past.  Patient should return as needed.    FINAL IMPRESSION  1. Pain of finger of right hand         The patient will return for new or worsening symptoms and is stable at the time of discharge.    The patient is referred to a primary physician for blood pressure management, diabetic screening, and for all other preventative health concerns.        DISPOSITION:  Patient will be discharged home in stable condition.    FOLLOW UP:  Tony Mcmillan M.D.  [address]  [facility] [address]  [phone]          Cristopher Costello M.D.  [address] [facility] [address]  [phone]            OUTPATIENT MEDICATIONS:  Discharge Medication List as of 2/15/2020  1:10 PM                  Electronically  signed by: Jason Ibrahim M.D., 2/15/2020 12:04 PM

## 2020-02-15 NOTE — ED NOTES
ERP at bedside. Pt agrees with plan of care discussed by ERP. AIDET acknowledged with patient. Evangelina in low position, side rail up for pt safety. Call light within reach. Will continue to monitor.

## 2020-02-15 NOTE — ED TRIAGE NOTES
"Pt C/O right middle finger pain.  He recalls injuring his digit this past October.  The pain has not dissipated completely, and it has escalated progressively since yesterday, with associated swelling for 1.5 months.   Chief Complaint   Patient presents with   • Digit Pain     /65   Pulse (!) 110   Temp 36.9 °C (98.5 °F) (Temporal)   Resp 16   Ht 1.626 m (5' 4\")   Wt 88 kg (194 lb 0.1 oz)   BMI 33.30 kg/m²     "

## 2020-02-15 NOTE — ED NOTES
Splint applied per MD instructions. Pt given discharge instructions. RN answered questions. VSS. Pt ambulated steadily out to [facility].

## 2020-02-19 ENCOUNTER — APPOINTMENT (OUTPATIENT)
Dept: RADIOLOGY | Facility: MEDICAL CENTER | Age: 40
DRG: 252 | End: 2020-02-19
Attending: EMERGENCY MEDICINE
Payer: MEDICARE

## 2020-02-19 ENCOUNTER — HOSPITAL ENCOUNTER (INPATIENT)
Facility: MEDICAL CENTER | Age: 40
LOS: 4 days | DRG: 252 | End: 2020-02-24
Attending: EMERGENCY MEDICINE | Admitting: INTERNAL MEDICINE
Payer: MEDICARE

## 2020-02-19 DIAGNOSIS — T82.838A HEMORRHAGE FROM ARTERIOVENOUS DIALYSIS GRAFT (HCC): ICD-10-CM

## 2020-02-19 DIAGNOSIS — N18.6 ESRD ON HEMODIALYSIS (HCC): ICD-10-CM

## 2020-02-19 DIAGNOSIS — Z99.2 ESRD ON HEMODIALYSIS (HCC): ICD-10-CM

## 2020-02-19 DIAGNOSIS — N18.6 ESRD (END STAGE RENAL DISEASE) (HCC): ICD-10-CM

## 2020-02-19 DIAGNOSIS — R79.1 SUPRATHERAPEUTIC INR: ICD-10-CM

## 2020-02-19 DIAGNOSIS — Z79.01 CHRONIC ANTICOAGULATION: ICD-10-CM

## 2020-02-19 LAB
ANION GAP SERPL CALC-SCNC: 18 MMOL/L (ref 0–11.9)
BASOPHILS # BLD AUTO: 0.7 % (ref 0–1.8)
BASOPHILS # BLD: 0.03 K/UL (ref 0–0.12)
BUN SERPL-MCNC: 56 MG/DL (ref 8–22)
CALCIUM SERPL-MCNC: 9.1 MG/DL (ref 8.5–10.5)
CHLORIDE SERPL-SCNC: 90 MMOL/L (ref 96–112)
CO2 SERPL-SCNC: 26 MMOL/L (ref 20–33)
CREAT SERPL-MCNC: 10.96 MG/DL (ref 0.5–1.4)
EOSINOPHIL # BLD AUTO: 0.11 K/UL (ref 0–0.51)
EOSINOPHIL NFR BLD: 2.6 % (ref 0–6.9)
ERYTHROCYTE [DISTWIDTH] IN BLOOD BY AUTOMATED COUNT: 55.4 FL (ref 35.9–50)
GLUCOSE SERPL-MCNC: 98 MG/DL (ref 65–99)
HCT VFR BLD AUTO: 32.2 % (ref 42–52)
HGB BLD-MCNC: 10.7 G/DL (ref 14–18)
IMM GRANULOCYTES # BLD AUTO: 0.02 K/UL (ref 0–0.11)
IMM GRANULOCYTES NFR BLD AUTO: 0.5 % (ref 0–0.9)
INR PPP: 4.4 (ref 0.87–1.13)
LYMPHOCYTES # BLD AUTO: 0.92 K/UL (ref 1–4.8)
LYMPHOCYTES NFR BLD: 21.5 % (ref 22–41)
MCH RBC QN AUTO: 32.4 PG (ref 27–33)
MCHC RBC AUTO-ENTMCNC: 33.2 G/DL (ref 33.7–35.3)
MCV RBC AUTO: 97.6 FL (ref 81.4–97.8)
MONOCYTES # BLD AUTO: 0.48 K/UL (ref 0–0.85)
MONOCYTES NFR BLD AUTO: 11.2 % (ref 0–13.4)
NEUTROPHILS # BLD AUTO: 2.71 K/UL (ref 1.82–7.42)
NEUTROPHILS NFR BLD: 63.5 % (ref 44–72)
NRBC # BLD AUTO: 0 K/UL
NRBC BLD-RTO: 0 /100 WBC
PLATELET # BLD AUTO: 143 K/UL (ref 164–446)
PMV BLD AUTO: 9.9 FL (ref 9–12.9)
POTASSIUM SERPL-SCNC: 4.9 MMOL/L (ref 3.6–5.5)
PROTHROMBIN TIME: 43.8 SEC (ref 12–14.6)
RBC # BLD AUTO: 3.3 M/UL (ref 4.7–6.1)
SODIUM SERPL-SCNC: 134 MMOL/L (ref 135–145)
WBC # BLD AUTO: 4.3 K/UL (ref 4.8–10.8)

## 2020-02-19 PROCEDURE — 80048 BASIC METABOLIC PNL TOTAL CA: CPT

## 2020-02-19 PROCEDURE — 85025 COMPLETE CBC W/AUTO DIFF WBC: CPT

## 2020-02-19 PROCEDURE — 700102 HCHG RX REV CODE 250 W/ 637 OVERRIDE(OP): Performed by: INTERNAL MEDICINE

## 2020-02-19 PROCEDURE — G0378 HOSPITAL OBSERVATION PER HR: HCPCS

## 2020-02-19 PROCEDURE — 93990 DOPPLER FLOW TESTING: CPT

## 2020-02-19 PROCEDURE — A9270 NON-COVERED ITEM OR SERVICE: HCPCS | Performed by: INTERNAL MEDICINE

## 2020-02-19 PROCEDURE — A9270 NON-COVERED ITEM OR SERVICE: HCPCS | Performed by: EMERGENCY MEDICINE

## 2020-02-19 PROCEDURE — 99285 EMERGENCY DEPT VISIT HI MDM: CPT

## 2020-02-19 PROCEDURE — 85610 PROTHROMBIN TIME: CPT

## 2020-02-19 PROCEDURE — 700102 HCHG RX REV CODE 250 W/ 637 OVERRIDE(OP): Performed by: EMERGENCY MEDICINE

## 2020-02-19 PROCEDURE — 99220 PR INITIAL OBSERVATION CARE,LEVL III: CPT | Performed by: INTERNAL MEDICINE

## 2020-02-19 RX ORDER — PROCHLORPERAZINE EDISYLATE 5 MG/ML
5-10 INJECTION INTRAMUSCULAR; INTRAVENOUS EVERY 4 HOURS PRN
Status: DISCONTINUED | OUTPATIENT
Start: 2020-02-19 | End: 2020-02-24 | Stop reason: HOSPADM

## 2020-02-19 RX ORDER — SEVELAMER CARBONATE 800 MG/1
3200 TABLET, FILM COATED ORAL
COMMUNITY
End: 2021-09-20

## 2020-02-19 RX ORDER — CALCITRIOL 0.25 UG/1
0.75 CAPSULE, LIQUID FILLED ORAL
Status: DISCONTINUED | OUTPATIENT
Start: 2020-02-19 | End: 2020-02-24 | Stop reason: HOSPADM

## 2020-02-19 RX ORDER — ONDANSETRON 4 MG/1
4 TABLET, ORALLY DISINTEGRATING ORAL EVERY 4 HOURS PRN
Status: DISCONTINUED | OUTPATIENT
Start: 2020-02-19 | End: 2020-02-24 | Stop reason: HOSPADM

## 2020-02-19 RX ORDER — ONDANSETRON 2 MG/ML
4 INJECTION INTRAMUSCULAR; INTRAVENOUS EVERY 4 HOURS PRN
Status: DISCONTINUED | OUTPATIENT
Start: 2020-02-19 | End: 2020-02-24 | Stop reason: HOSPADM

## 2020-02-19 RX ORDER — WARFARIN SODIUM 5 MG/1
5-7.5 TABLET ORAL SEE ADMIN INSTRUCTIONS
COMMUNITY
End: 2020-03-02 | Stop reason: SDUPTHER

## 2020-02-19 RX ORDER — CLONIDINE HYDROCHLORIDE 0.1 MG/1
0.1 TABLET ORAL EVERY 6 HOURS PRN
Status: DISCONTINUED | OUTPATIENT
Start: 2020-02-19 | End: 2020-02-24 | Stop reason: HOSPADM

## 2020-02-19 RX ORDER — PROMETHAZINE HYDROCHLORIDE 12.5 MG/1
12.5-25 SUPPOSITORY RECTAL EVERY 4 HOURS PRN
Status: DISCONTINUED | OUTPATIENT
Start: 2020-02-19 | End: 2020-02-24 | Stop reason: HOSPADM

## 2020-02-19 RX ORDER — CINACALCET 30 MG/1
30 TABLET, FILM COATED ORAL
Status: DISCONTINUED | OUTPATIENT
Start: 2020-02-21 | End: 2020-02-24 | Stop reason: HOSPADM

## 2020-02-19 RX ORDER — HYDROCODONE BITARTRATE AND ACETAMINOPHEN 5; 325 MG/1; MG/1
2 TABLET ORAL ONCE
Status: COMPLETED | OUTPATIENT
Start: 2020-02-19 | End: 2020-02-19

## 2020-02-19 RX ORDER — HYDROCODONE BITARTRATE AND ACETAMINOPHEN 10; 325 MG/1; MG/1
1 TABLET ORAL EVERY 6 HOURS PRN
Status: DISCONTINUED | OUTPATIENT
Start: 2020-02-19 | End: 2020-02-24 | Stop reason: HOSPADM

## 2020-02-19 RX ORDER — PROMETHAZINE HYDROCHLORIDE 25 MG/1
12.5-25 TABLET ORAL EVERY 4 HOURS PRN
Status: DISCONTINUED | OUTPATIENT
Start: 2020-02-19 | End: 2020-02-24 | Stop reason: HOSPADM

## 2020-02-19 RX ORDER — POLYETHYLENE GLYCOL 3350 17 G/17G
1 POWDER, FOR SOLUTION ORAL
Status: DISCONTINUED | OUTPATIENT
Start: 2020-02-19 | End: 2020-02-24 | Stop reason: HOSPADM

## 2020-02-19 RX ORDER — ENALAPRILAT 1.25 MG/ML
1.25 INJECTION INTRAVENOUS EVERY 6 HOURS PRN
Status: DISCONTINUED | OUTPATIENT
Start: 2020-02-19 | End: 2020-02-24 | Stop reason: HOSPADM

## 2020-02-19 RX ORDER — ACETAMINOPHEN 325 MG/1
650 TABLET ORAL EVERY 6 HOURS PRN
Status: DISCONTINUED | OUTPATIENT
Start: 2020-02-19 | End: 2020-02-24 | Stop reason: HOSPADM

## 2020-02-19 RX ORDER — AMOXICILLIN 250 MG
2 CAPSULE ORAL 2 TIMES DAILY
Status: DISCONTINUED | OUTPATIENT
Start: 2020-02-19 | End: 2020-02-24 | Stop reason: HOSPADM

## 2020-02-19 RX ORDER — BISACODYL 10 MG
10 SUPPOSITORY, RECTAL RECTAL
Status: DISCONTINUED | OUTPATIENT
Start: 2020-02-19 | End: 2020-02-24 | Stop reason: HOSPADM

## 2020-02-19 RX ORDER — TIZANIDINE 4 MG/1
8 TABLET ORAL
COMMUNITY

## 2020-02-19 RX ORDER — TIZANIDINE 4 MG/1
8 TABLET ORAL EVERY EVENING
Status: DISCONTINUED | OUTPATIENT
Start: 2020-02-19 | End: 2020-02-22

## 2020-02-19 RX ORDER — SEVELAMER CARBONATE 800 MG/1
3200 TABLET, FILM COATED ORAL
Status: DISCONTINUED | OUTPATIENT
Start: 2020-02-20 | End: 2020-02-24 | Stop reason: HOSPADM

## 2020-02-19 RX ADMIN — HYDROCODONE BITARTRATE AND ACETAMINOPHEN 1 TABLET: 10; 325 TABLET ORAL at 23:55

## 2020-02-19 RX ADMIN — CALCITRIOL CAPSULES 0.25 MCG 0.75 MCG: 0.25 CAPSULE ORAL at 23:50

## 2020-02-19 RX ADMIN — TIZANIDINE 8 MG: 4 TABLET ORAL at 23:48

## 2020-02-19 RX ADMIN — GABAPENTIN 1800 MG: 400 CAPSULE ORAL at 23:55

## 2020-02-19 RX ADMIN — HYDROCODONE BITARTRATE AND ACETAMINOPHEN 2 TABLET: 5; 325 TABLET ORAL at 19:04

## 2020-02-19 ASSESSMENT — ENCOUNTER SYMPTOMS
DEPRESSION: 0
PALPITATIONS: 0
FEVER: 0
DIARRHEA: 0
LOSS OF CONSCIOUSNESS: 0
ABDOMINAL PAIN: 0
HEADACHES: 0
COUGH: 0
TINGLING: 0
CHILLS: 0
WEAKNESS: 0
DIZZINESS: 0
SPUTUM PRODUCTION: 0
STRIDOR: 0
NAUSEA: 0
SHORTNESS OF BREATH: 0
CONSTIPATION: 0
VOMITING: 0
FALLS: 0
MYALGIAS: 0

## 2020-02-19 ASSESSMENT — PAIN SCALES - WONG BAKER: WONGBAKER_NUMERICALRESPONSE: HURTS EVEN MORE

## 2020-02-19 NOTE — ED TRIAGE NOTES
Denis De Anda presents to triage reporting bleeding from the site of his dialysis graft x1 hr. Pt reports he was going to dialysis when he noted bleeding from the site. Bleeding controlled at this time.     Pt speaking in full sentences, NAD noted. Pt educated of triage process, placed back in waiting area pending room assignment.

## 2020-02-20 ENCOUNTER — ANESTHESIA EVENT (OUTPATIENT)
Dept: SURGERY | Facility: MEDICAL CENTER | Age: 40
DRG: 252 | End: 2020-02-20
Payer: MEDICARE

## 2020-02-20 ENCOUNTER — APPOINTMENT (OUTPATIENT)
Dept: RADIOLOGY | Facility: MEDICAL CENTER | Age: 40
DRG: 252 | End: 2020-02-20
Attending: INTERNAL MEDICINE
Payer: MEDICARE

## 2020-02-20 ENCOUNTER — APPOINTMENT (OUTPATIENT)
Dept: RADIOLOGY | Facility: MEDICAL CENTER | Age: 40
DRG: 252 | End: 2020-02-20
Attending: SURGERY
Payer: MEDICARE

## 2020-02-20 ENCOUNTER — ANESTHESIA (OUTPATIENT)
Dept: SURGERY | Facility: MEDICAL CENTER | Age: 40
DRG: 252 | End: 2020-02-20
Payer: MEDICARE

## 2020-02-20 PROBLEM — F11.90 CHRONIC, CONTINUOUS USE OF OPIOIDS: Status: ACTIVE | Noted: 2020-02-20

## 2020-02-20 PROBLEM — R57.8 HEMORRHAGIC SHOCK (HCC): Status: ACTIVE | Noted: 2020-02-20

## 2020-02-20 PROBLEM — R78.81 BACTEREMIA: Status: ACTIVE | Noted: 2020-02-20

## 2020-02-20 PROBLEM — T82.838A HEMORRHAGE FROM ARTERIOVENOUS DIALYSIS GRAFT (HCC): Status: ACTIVE | Noted: 2020-02-20

## 2020-02-20 PROBLEM — D61.818 PANCYTOPENIA (HCC): Status: ACTIVE | Noted: 2020-02-20

## 2020-02-20 LAB
ABO GROUP BLD: NORMAL
ANION GAP SERPL CALC-SCNC: 16 MMOL/L (ref 0–11.9)
ANION GAP SERPL CALC-SCNC: 18 MMOL/L (ref 0–11.9)
APTT PPP: 90.8 SEC (ref 24.7–36)
BASOPHILS # BLD AUTO: 0.2 % (ref 0–1.8)
BASOPHILS # BLD: 0.01 K/UL (ref 0–0.12)
BLD GP AB SCN SERPL QL: NORMAL
BUN SERPL-MCNC: 60 MG/DL (ref 8–22)
BUN SERPL-MCNC: 64 MG/DL (ref 8–22)
CALCIUM SERPL-MCNC: 7.3 MG/DL (ref 8.5–10.5)
CALCIUM SERPL-MCNC: 8.1 MG/DL (ref 8.5–10.5)
CFT BLD TEG: 11 MIN (ref 5–10)
CHLORIDE SERPL-SCNC: 94 MMOL/L (ref 96–112)
CHLORIDE SERPL-SCNC: 99 MMOL/L (ref 96–112)
CLOT ANGLE BLD TEG: 63 DEGREES (ref 53–72)
CLOT LYSIS 30M P MA LENFR BLD TEG: 0 % (ref 0–8)
CO2 SERPL-SCNC: 25 MMOL/L (ref 20–33)
CO2 SERPL-SCNC: 25 MMOL/L (ref 20–33)
CREAT SERPL-MCNC: 10.29 MG/DL (ref 0.5–1.4)
CREAT SERPL-MCNC: 11.01 MG/DL (ref 0.5–1.4)
CT.EXTRINSIC BLD ROTEM: 1.9 MIN (ref 1–3)
EOSINOPHIL # BLD AUTO: 0.08 K/UL (ref 0–0.51)
EOSINOPHIL NFR BLD: 1.8 % (ref 0–6.9)
ERYTHROCYTE [DISTWIDTH] IN BLOOD BY AUTOMATED COUNT: 55.5 FL (ref 35.9–50)
ERYTHROCYTE [DISTWIDTH] IN BLOOD BY AUTOMATED COUNT: 57.2 FL (ref 35.9–50)
FIBRINOGEN PPP-MCNC: 472 MG/DL (ref 215–460)
GLUCOSE BLD-MCNC: 92 MG/DL (ref 65–99)
GLUCOSE SERPL-MCNC: 105 MG/DL (ref 65–99)
GLUCOSE SERPL-MCNC: 96 MG/DL (ref 65–99)
HAV IGM SERPL QL IA: NEGATIVE
HBV CORE IGM SER QL: NEGATIVE
HBV SURFACE AB SERPL IA-ACNC: 763.67 MIU/ML (ref 0–10)
HBV SURFACE AG SER QL: NEGATIVE
HCT VFR BLD AUTO: 27.1 % (ref 42–52)
HCT VFR BLD AUTO: 29.3 % (ref 42–52)
HCV AB SER QL: NEGATIVE
HGB BLD-MCNC: 10.1 G/DL (ref 14–18)
HGB BLD-MCNC: 8.6 G/DL (ref 14–18)
HGB BLD-MCNC: 8.7 G/DL (ref 14–18)
HGB BLD-MCNC: 9.7 G/DL (ref 14–18)
IMM GRANULOCYTES # BLD AUTO: 0.01 K/UL (ref 0–0.11)
IMM GRANULOCYTES NFR BLD AUTO: 0.2 % (ref 0–0.9)
INR PPP: 5.11 (ref 0.87–1.13)
INR PPP: 5.24 (ref 0.87–1.13)
LACTATE BLD-SCNC: 1.6 MMOL/L (ref 0.5–2)
LYMPHOCYTES # BLD AUTO: 0.86 K/UL (ref 1–4.8)
LYMPHOCYTES NFR BLD: 19 % (ref 22–41)
MCF BLD TEG: 68.4 MM (ref 50–70)
MCH RBC QN AUTO: 31.5 PG (ref 27–33)
MCH RBC QN AUTO: 32.4 PG (ref 27–33)
MCHC RBC AUTO-ENTMCNC: 31.7 G/DL (ref 33.7–35.3)
MCHC RBC AUTO-ENTMCNC: 33.1 G/DL (ref 33.7–35.3)
MCV RBC AUTO: 98 FL (ref 81.4–97.8)
MCV RBC AUTO: 99.3 FL (ref 81.4–97.8)
MONOCYTES # BLD AUTO: 0.5 K/UL (ref 0–0.85)
MONOCYTES NFR BLD AUTO: 11.1 % (ref 0–13.4)
NEUTROPHILS # BLD AUTO: 3.06 K/UL (ref 1.82–7.42)
NEUTROPHILS NFR BLD: 67.7 % (ref 44–72)
NRBC # BLD AUTO: 0 K/UL
NRBC BLD-RTO: 0 /100 WBC
PA AA BLD-ACNC: 7.5 %
PA ADP BLD-ACNC: 7.3 %
PLATELET # BLD AUTO: 117 K/UL (ref 164–446)
PLATELET # BLD AUTO: 132 K/UL (ref 164–446)
PMV BLD AUTO: 10 FL (ref 9–12.9)
PMV BLD AUTO: 9.8 FL (ref 9–12.9)
POTASSIUM SERPL-SCNC: 4.5 MMOL/L (ref 3.6–5.5)
POTASSIUM SERPL-SCNC: 5.6 MMOL/L (ref 3.6–5.5)
PROTHROMBIN TIME: 49.4 SEC (ref 12–14.6)
PROTHROMBIN TIME: 50.4 SEC (ref 12–14.6)
RBC # BLD AUTO: 2.73 M/UL (ref 4.7–6.1)
RBC # BLD AUTO: 2.99 M/UL (ref 4.7–6.1)
RH BLD: NORMAL
SODIUM SERPL-SCNC: 137 MMOL/L (ref 135–145)
SODIUM SERPL-SCNC: 140 MMOL/L (ref 135–145)
TEG ALGORITHM TGALG: ABNORMAL
VANCOMYCIN SERPL-MCNC: 7 UG/ML
WBC # BLD AUTO: 3.5 K/UL (ref 4.8–10.8)
WBC # BLD AUTO: 4.5 K/UL (ref 4.8–10.8)

## 2020-02-20 PROCEDURE — 160036 HCHG PACU - EA ADDL 30 MINS PHASE I: Performed by: SURGERY

## 2020-02-20 PROCEDURE — 06QN0ZZ REPAIR LEFT FEMORAL VEIN, OPEN APPROACH: ICD-10-PCS | Performed by: SURGERY

## 2020-02-20 PROCEDURE — A9270 NON-COVERED ITEM OR SERVICE: HCPCS | Performed by: INTERNAL MEDICINE

## 2020-02-20 PROCEDURE — 700105 HCHG RX REV CODE 258: Performed by: INTERNAL MEDICINE

## 2020-02-20 PROCEDURE — 87077 CULTURE AEROBIC IDENTIFY: CPT

## 2020-02-20 PROCEDURE — 700111 HCHG RX REV CODE 636 W/ 250 OVERRIDE (IP): Performed by: INTERNAL MEDICINE

## 2020-02-20 PROCEDURE — 99291 CRITICAL CARE FIRST HOUR: CPT | Performed by: INTERNAL MEDICINE

## 2020-02-20 PROCEDURE — C1725 CATH, TRANSLUMIN NON-LASER: HCPCS | Performed by: SURGERY

## 2020-02-20 PROCEDURE — 86901 BLOOD TYPING SEROLOGIC RH(D): CPT

## 2020-02-20 PROCEDURE — 80074 ACUTE HEPATITIS PANEL: CPT

## 2020-02-20 PROCEDURE — 700105 HCHG RX REV CODE 258: Performed by: NURSE PRACTITIONER

## 2020-02-20 PROCEDURE — 86900 BLOOD TYPING SEROLOGIC ABO: CPT

## 2020-02-20 PROCEDURE — C1752 CATH,HEMODIALYSIS,SHORT-TERM: HCPCS

## 2020-02-20 PROCEDURE — 36556 INSERT NON-TUNNEL CV CATH: CPT

## 2020-02-20 PROCEDURE — 96375 TX/PRO/DX INJ NEW DRUG ADDON: CPT

## 2020-02-20 PROCEDURE — 99291 CRITICAL CARE FIRST HOUR: CPT | Mod: 25 | Performed by: INTERNAL MEDICINE

## 2020-02-20 PROCEDURE — 700102 HCHG RX REV CODE 250 W/ 637 OVERRIDE(OP): Performed by: INTERNAL MEDICINE

## 2020-02-20 PROCEDURE — 82962 GLUCOSE BLOOD TEST: CPT

## 2020-02-20 PROCEDURE — 160048 HCHG OR STATISTICAL LEVEL 1-5: Performed by: SURGERY

## 2020-02-20 PROCEDURE — B51B1ZZ FLUOROSCOPY OF RIGHT LOWER EXTREMITY VEINS USING LOW OSMOLAR CONTRAST: ICD-10-PCS | Performed by: SURGERY

## 2020-02-20 PROCEDURE — 700111 HCHG RX REV CODE 636 W/ 250 OVERRIDE (IP): Mod: JG | Performed by: INTERNAL MEDICINE

## 2020-02-20 PROCEDURE — 96376 TX/PRO/DX INJ SAME DRUG ADON: CPT

## 2020-02-20 PROCEDURE — 700105 HCHG RX REV CODE 258: Performed by: ANESTHESIOLOGY

## 2020-02-20 PROCEDURE — 700111 HCHG RX REV CODE 636 W/ 250 OVERRIDE (IP): Performed by: ANESTHESIOLOGY

## 2020-02-20 PROCEDURE — 90935 HEMODIALYSIS ONE EVALUATION: CPT

## 2020-02-20 PROCEDURE — 85730 THROMBOPLASTIN TIME PARTIAL: CPT

## 2020-02-20 PROCEDURE — 85025 COMPLETE CBC W/AUTO DIFF WBC: CPT

## 2020-02-20 PROCEDURE — 501838 HCHG SUTURE GENERAL: Performed by: SURGERY

## 2020-02-20 PROCEDURE — 160002 HCHG RECOVERY MINUTES (STAT): Performed by: SURGERY

## 2020-02-20 PROCEDURE — 502240 HCHG MISC OR SUPPLY RC 0272: Performed by: SURGERY

## 2020-02-20 PROCEDURE — 06HY33Z INSERTION OF INFUSION DEVICE INTO LOWER VEIN, PERCUTANEOUS APPROACH: ICD-10-PCS | Performed by: INTERNAL MEDICINE

## 2020-02-20 PROCEDURE — 83605 ASSAY OF LACTIC ACID: CPT

## 2020-02-20 PROCEDURE — 160029 HCHG SURGERY MINUTES - 1ST 30 MINS LEVEL 4: Performed by: SURGERY

## 2020-02-20 PROCEDURE — 86706 HEP B SURFACE ANTIBODY: CPT

## 2020-02-20 PROCEDURE — 067M3ZZ DILATION OF RIGHT FEMORAL VEIN, PERCUTANEOUS APPROACH: ICD-10-PCS | Performed by: SURGERY

## 2020-02-20 PROCEDURE — 87040 BLOOD CULTURE FOR BACTERIA: CPT | Mod: 91

## 2020-02-20 PROCEDURE — 96365 THER/PROPH/DIAG IV INF INIT: CPT

## 2020-02-20 PROCEDURE — A6222 GAUZE <=16 IN NO W/SAL W/O B: HCPCS | Performed by: SURGERY

## 2020-02-20 PROCEDURE — C1894 INTRO/SHEATH, NON-LASER: HCPCS | Performed by: SURGERY

## 2020-02-20 PROCEDURE — 80048 BASIC METABOLIC PNL TOTAL CA: CPT

## 2020-02-20 PROCEDURE — 85576 BLOOD PLATELET AGGREGATION: CPT | Mod: 91

## 2020-02-20 PROCEDURE — 160009 HCHG ANES TIME/MIN: Performed by: SURGERY

## 2020-02-20 PROCEDURE — 770022 HCHG ROOM/CARE - ICU (200)

## 2020-02-20 PROCEDURE — 700101 HCHG RX REV CODE 250: Performed by: ANESTHESIOLOGY

## 2020-02-20 PROCEDURE — 110454 HCHG SHELL REV 250: Performed by: SURGERY

## 2020-02-20 PROCEDURE — 501837 HCHG SUTURE CV: Performed by: SURGERY

## 2020-02-20 PROCEDURE — 36556 INSERT NON-TUNNEL CV CATH: CPT | Mod: RT | Performed by: INTERNAL MEDICINE

## 2020-02-20 PROCEDURE — 99223 1ST HOSP IP/OBS HIGH 75: CPT | Performed by: INTERNAL MEDICINE

## 2020-02-20 PROCEDURE — 85347 COAGULATION TIME ACTIVATED: CPT

## 2020-02-20 PROCEDURE — 700111 HCHG RX REV CODE 636 W/ 250 OVERRIDE (IP): Performed by: NURSE PRACTITIONER

## 2020-02-20 PROCEDURE — C1769 GUIDE WIRE: HCPCS | Performed by: SURGERY

## 2020-02-20 PROCEDURE — P9047 ALBUMIN (HUMAN), 25%, 50ML: HCPCS | Mod: JG | Performed by: INTERNAL MEDICINE

## 2020-02-20 PROCEDURE — 302128 INFUSION PUMP W/POLE: Performed by: INTERNAL MEDICINE

## 2020-02-20 PROCEDURE — 87150 DNA/RNA AMPLIFIED PROBE: CPT

## 2020-02-20 PROCEDURE — 700111 HCHG RX REV CODE 636 W/ 250 OVERRIDE (IP)

## 2020-02-20 PROCEDURE — 87186 SC STD MICRODIL/AGAR DIL: CPT

## 2020-02-20 PROCEDURE — 86850 RBC ANTIBODY SCREEN: CPT

## 2020-02-20 PROCEDURE — 85027 COMPLETE CBC AUTOMATED: CPT

## 2020-02-20 PROCEDURE — 5A1D70Z PERFORMANCE OF URINARY FILTRATION, INTERMITTENT, LESS THAN 6 HOURS PER DAY: ICD-10-PCS | Performed by: INTERNAL MEDICINE

## 2020-02-20 PROCEDURE — 160035 HCHG PACU - 1ST 60 MINS PHASE I: Performed by: SURGERY

## 2020-02-20 PROCEDURE — 85018 HEMOGLOBIN: CPT

## 2020-02-20 PROCEDURE — 160041 HCHG SURGERY MINUTES - EA ADDL 1 MIN LEVEL 4: Performed by: SURGERY

## 2020-02-20 PROCEDURE — B51W1ZZ FLUOROSCOPY OF DIALYSIS SHUNT/FISTULA USING LOW OSMOLAR CONTRAST: ICD-10-PCS | Performed by: SURGERY

## 2020-02-20 PROCEDURE — 700117 HCHG RX CONTRAST REV CODE 255: Performed by: SURGERY

## 2020-02-20 PROCEDURE — 85610 PROTHROMBIN TIME: CPT

## 2020-02-20 PROCEDURE — 700111 HCHG RX REV CODE 636 W/ 250 OVERRIDE (IP): Performed by: SURGERY

## 2020-02-20 PROCEDURE — 80202 ASSAY OF VANCOMYCIN: CPT

## 2020-02-20 PROCEDURE — 85384 FIBRINOGEN ACTIVITY: CPT

## 2020-02-20 PROCEDURE — 700101 HCHG RX REV CODE 250

## 2020-02-20 RX ORDER — OXYCODONE HCL 5 MG/5 ML
5 SOLUTION, ORAL ORAL
Status: DISCONTINUED | OUTPATIENT
Start: 2020-02-20 | End: 2020-02-20 | Stop reason: HOSPADM

## 2020-02-20 RX ORDER — HYDROMORPHONE HYDROCHLORIDE 1 MG/ML
.5-1 INJECTION, SOLUTION INTRAMUSCULAR; INTRAVENOUS; SUBCUTANEOUS
Status: DISPENSED | OUTPATIENT
Start: 2020-02-20 | End: 2020-02-21

## 2020-02-20 RX ORDER — SODIUM CHLORIDE 9 MG/ML
INJECTION, SOLUTION INTRAVENOUS CONTINUOUS
Status: DISCONTINUED | OUTPATIENT
Start: 2020-02-20 | End: 2020-02-20 | Stop reason: HOSPADM

## 2020-02-20 RX ORDER — HYDROMORPHONE HYDROCHLORIDE 1 MG/ML
0.1 INJECTION, SOLUTION INTRAMUSCULAR; INTRAVENOUS; SUBCUTANEOUS
Status: DISCONTINUED | OUTPATIENT
Start: 2020-02-20 | End: 2020-02-20 | Stop reason: HOSPADM

## 2020-02-20 RX ORDER — OXYCODONE HCL 5 MG/5 ML
10 SOLUTION, ORAL ORAL
Status: DISCONTINUED | OUTPATIENT
Start: 2020-02-20 | End: 2020-02-20 | Stop reason: HOSPADM

## 2020-02-20 RX ORDER — PHENYLEPHRINE HCL IN 0.9% NACL 0.5 MG/5ML
SYRINGE (ML) INTRAVENOUS PRN
Status: DISCONTINUED | OUTPATIENT
Start: 2020-02-20 | End: 2020-02-20 | Stop reason: SURG

## 2020-02-20 RX ORDER — HEPARIN SODIUM 1000 [USP'U]/ML
2800 INJECTION, SOLUTION INTRAVENOUS; SUBCUTANEOUS
Status: COMPLETED | OUTPATIENT
Start: 2020-02-20 | End: 2020-02-21

## 2020-02-20 RX ORDER — DIPHENHYDRAMINE HYDROCHLORIDE 50 MG/ML
INJECTION INTRAMUSCULAR; INTRAVENOUS PRN
Status: DISCONTINUED | OUTPATIENT
Start: 2020-02-20 | End: 2020-02-20 | Stop reason: SURG

## 2020-02-20 RX ORDER — HYDRALAZINE HYDROCHLORIDE 20 MG/ML
5 INJECTION INTRAMUSCULAR; INTRAVENOUS
Status: DISCONTINUED | OUTPATIENT
Start: 2020-02-20 | End: 2020-02-20 | Stop reason: HOSPADM

## 2020-02-20 RX ORDER — IODIXANOL 270 MG/ML
INJECTION, SOLUTION INTRAVASCULAR
Status: DISCONTINUED | OUTPATIENT
Start: 2020-02-20 | End: 2020-02-20 | Stop reason: HOSPADM

## 2020-02-20 RX ORDER — LIDOCAINE HYDROCHLORIDE 20 MG/ML
INJECTION, SOLUTION INFILTRATION; PERINEURAL
Status: COMPLETED
Start: 2020-02-20 | End: 2020-02-20

## 2020-02-20 RX ORDER — HYDROMORPHONE HYDROCHLORIDE 1 MG/ML
0.4 INJECTION, SOLUTION INTRAMUSCULAR; INTRAVENOUS; SUBCUTANEOUS
Status: DISCONTINUED | OUTPATIENT
Start: 2020-02-20 | End: 2020-02-20 | Stop reason: HOSPADM

## 2020-02-20 RX ORDER — MIDAZOLAM HYDROCHLORIDE 1 MG/ML
INJECTION INTRAMUSCULAR; INTRAVENOUS PRN
Status: DISCONTINUED | OUTPATIENT
Start: 2020-02-20 | End: 2020-02-20 | Stop reason: SURG

## 2020-02-20 RX ORDER — KETAMINE HYDROCHLORIDE 50 MG/ML
INJECTION, SOLUTION INTRAMUSCULAR; INTRAVENOUS PRN
Status: DISCONTINUED | OUTPATIENT
Start: 2020-02-20 | End: 2020-02-20 | Stop reason: SURG

## 2020-02-20 RX ORDER — SODIUM CHLORIDE 9 MG/ML
250 INJECTION, SOLUTION INTRAVENOUS
Status: DISCONTINUED | OUTPATIENT
Start: 2020-02-20 | End: 2020-02-24 | Stop reason: HOSPADM

## 2020-02-20 RX ORDER — HYDROMORPHONE HYDROCHLORIDE 1 MG/ML
0.5 INJECTION, SOLUTION INTRAMUSCULAR; INTRAVENOUS; SUBCUTANEOUS ONCE
Status: COMPLETED | OUTPATIENT
Start: 2020-02-20 | End: 2020-02-20

## 2020-02-20 RX ORDER — SODIUM CHLORIDE 9 MG/ML
INJECTION, SOLUTION INTRAVENOUS
Status: DISCONTINUED | OUTPATIENT
Start: 2020-02-20 | End: 2020-02-20 | Stop reason: SURG

## 2020-02-20 RX ORDER — ONDANSETRON 2 MG/ML
4 INJECTION INTRAMUSCULAR; INTRAVENOUS
Status: DISCONTINUED | OUTPATIENT
Start: 2020-02-20 | End: 2020-02-20 | Stop reason: HOSPADM

## 2020-02-20 RX ORDER — DEXAMETHASONE SODIUM PHOSPHATE 4 MG/ML
INJECTION, SOLUTION INTRA-ARTICULAR; INTRALESIONAL; INTRAMUSCULAR; INTRAVENOUS; SOFT TISSUE PRN
Status: DISCONTINUED | OUTPATIENT
Start: 2020-02-20 | End: 2020-02-20 | Stop reason: SURG

## 2020-02-20 RX ORDER — HYDROMORPHONE HYDROCHLORIDE 1 MG/ML
0.2 INJECTION, SOLUTION INTRAMUSCULAR; INTRAVENOUS; SUBCUTANEOUS
Status: DISCONTINUED | OUTPATIENT
Start: 2020-02-20 | End: 2020-02-20 | Stop reason: HOSPADM

## 2020-02-20 RX ORDER — HALOPERIDOL 5 MG/ML
1 INJECTION INTRAMUSCULAR
Status: DISCONTINUED | OUTPATIENT
Start: 2020-02-20 | End: 2020-02-20 | Stop reason: HOSPADM

## 2020-02-20 RX ORDER — SODIUM POLYSTYRENE SULFONATE 15 G/60ML
30 SUSPENSION ORAL; RECTAL ONCE
Status: DISPENSED | OUTPATIENT
Start: 2020-02-20 | End: 2020-02-21

## 2020-02-20 RX ORDER — LABETALOL HYDROCHLORIDE 5 MG/ML
5 INJECTION, SOLUTION INTRAVENOUS
Status: DISCONTINUED | OUTPATIENT
Start: 2020-02-20 | End: 2020-02-20 | Stop reason: HOSPADM

## 2020-02-20 RX ORDER — HEPARIN SODIUM,PORCINE 1000/ML
VIAL (ML) INJECTION
Status: DISCONTINUED | OUTPATIENT
Start: 2020-02-20 | End: 2020-02-20 | Stop reason: HOSPADM

## 2020-02-20 RX ORDER — ALBUMIN (HUMAN) 12.5 G/50ML
12.5 SOLUTION INTRAVENOUS
Status: DISCONTINUED | OUTPATIENT
Start: 2020-02-20 | End: 2020-02-24 | Stop reason: HOSPADM

## 2020-02-20 RX ORDER — HEPARIN SODIUM 1000 [USP'U]/ML
INJECTION, SOLUTION INTRAVENOUS; SUBCUTANEOUS
Status: COMPLETED
Start: 2020-02-20 | End: 2020-02-20

## 2020-02-20 RX ADMIN — HEPARIN SODIUM 2800 UNITS: 1000 INJECTION, SOLUTION INTRAVENOUS; SUBCUTANEOUS at 23:41

## 2020-02-20 RX ADMIN — Medication 100 MCG: at 18:32

## 2020-02-20 RX ADMIN — HYDROCODONE BITARTRATE AND ACETAMINOPHEN 1 TABLET: 10; 325 TABLET ORAL at 15:40

## 2020-02-20 RX ADMIN — DEXAMETHASONE SODIUM PHOSPHATE 8 MG: 4 INJECTION, SOLUTION INTRA-ARTICULAR; INTRALESIONAL; INTRAMUSCULAR; INTRAVENOUS; SOFT TISSUE at 17:37

## 2020-02-20 RX ADMIN — SEVELAMER CARBONATE 3200 MG: 800 TABLET, FILM COATED ORAL at 23:20

## 2020-02-20 RX ADMIN — HYDROCODONE BITARTRATE AND ACETAMINOPHEN 1 TABLET: 10; 325 TABLET ORAL at 08:00

## 2020-02-20 RX ADMIN — Medication 100 MCG: at 17:48

## 2020-02-20 RX ADMIN — HYDROCORTISONE SODIUM SUCCINATE 100 MG: 100 INJECTION, POWDER, FOR SOLUTION INTRAMUSCULAR; INTRAVENOUS at 17:52

## 2020-02-20 RX ADMIN — CALCITRIOL CAPSULES 0.25 MCG 0.75 MCG: 0.25 CAPSULE ORAL at 23:19

## 2020-02-20 RX ADMIN — HYDROMORPHONE HYDROCHLORIDE 0.5 MG: 1 INJECTION, SOLUTION INTRAMUSCULAR; INTRAVENOUS; SUBCUTANEOUS at 10:30

## 2020-02-20 RX ADMIN — Medication 100 MCG: at 19:07

## 2020-02-20 RX ADMIN — PHYTONADIONE 5 MG: 10 INJECTION, EMULSION INTRAMUSCULAR; INTRAVENOUS; SUBCUTANEOUS at 10:19

## 2020-02-20 RX ADMIN — MIDAZOLAM HYDROCHLORIDE 2 MG: 1 INJECTION, SOLUTION INTRAMUSCULAR; INTRAVENOUS at 17:28

## 2020-02-20 RX ADMIN — ACETAMINOPHEN 650 MG: 325 TABLET, FILM COATED ORAL at 23:19

## 2020-02-20 RX ADMIN — DIPHENHYDRAMINE HYDROCHLORIDE 50 MG: 50 INJECTION, SOLUTION INTRAMUSCULAR; INTRAVENOUS at 17:52

## 2020-02-20 RX ADMIN — Medication 100 MCG: at 17:40

## 2020-02-20 RX ADMIN — ONDANSETRON 4 MG: 4 TABLET, ORALLY DISINTEGRATING ORAL at 23:19

## 2020-02-20 RX ADMIN — HYDROMORPHONE HYDROCHLORIDE 1 MG: 1 INJECTION, SOLUTION INTRAMUSCULAR; INTRAVENOUS; SUBCUTANEOUS at 10:17

## 2020-02-20 RX ADMIN — KETAMINE HYDROCHLORIDE 100 MG: 50 INJECTION INTRAMUSCULAR; INTRAVENOUS at 17:52

## 2020-02-20 RX ADMIN — GABAPENTIN 1800 MG: 400 CAPSULE ORAL at 23:21

## 2020-02-20 RX ADMIN — HYDROCODONE BITARTRATE AND ACETAMINOPHEN 1 TABLET: 10; 325 TABLET ORAL at 23:19

## 2020-02-20 RX ADMIN — VANCOMYCIN HYDROCHLORIDE 1300 MG: 500 INJECTION, POWDER, LYOPHILIZED, FOR SOLUTION INTRAVENOUS at 23:25

## 2020-02-20 RX ADMIN — ACETAMINOPHEN 650 MG: 325 TABLET, FILM COATED ORAL at 16:13

## 2020-02-20 RX ADMIN — HYDROMORPHONE HYDROCHLORIDE 1 MG: 1 INJECTION, SOLUTION INTRAMUSCULAR; INTRAVENOUS; SUBCUTANEOUS at 12:58

## 2020-02-20 RX ADMIN — SODIUM CHLORIDE: 9 INJECTION, SOLUTION INTRAVENOUS at 17:28

## 2020-02-20 RX ADMIN — TIZANIDINE 8 MG: 4 TABLET ORAL at 23:19

## 2020-02-20 RX ADMIN — LIDOCAINE HYDROCHLORIDE: 20 INJECTION, SOLUTION INFILTRATION; PERINEURAL at 13:00

## 2020-02-20 RX ADMIN — Medication 100 MCG: at 18:10

## 2020-02-20 RX ADMIN — ALBUMIN (HUMAN) 12.5 G: 5 SOLUTION INTRAVENOUS at 20:47

## 2020-02-20 RX ADMIN — HYDROMORPHONE HYDROCHLORIDE 1 MG: 1 INJECTION, SOLUTION INTRAMUSCULAR; INTRAVENOUS; SUBCUTANEOUS at 12:17

## 2020-02-20 RX ADMIN — PROPOFOL 100 MG: 10 INJECTION, EMULSION INTRAVENOUS at 17:37

## 2020-02-20 RX ADMIN — HYDROMORPHONE HYDROCHLORIDE 1 MG: 1 INJECTION, SOLUTION INTRAMUSCULAR; INTRAVENOUS; SUBCUTANEOUS at 12:53

## 2020-02-20 ASSESSMENT — LIFESTYLE VARIABLES
AVERAGE NUMBER OF DAYS PER WEEK YOU HAVE A DRINK CONTAINING ALCOHOL: 0
HOW MANY TIMES IN THE PAST YEAR HAVE YOU HAD 5 OR MORE DRINKS IN A DAY: 0
CONSUMPTION TOTAL: NEGATIVE
ALCOHOL_USE: NO
EVER_SMOKED: NEVER
EVER HAD A DRINK FIRST THING IN THE MORNING TO STEADY YOUR NERVES TO GET RID OF A HANGOVER: NO
DOES PATIENT WANT TO STOP DRINKING: NO
ON A TYPICAL DAY WHEN YOU DRINK ALCOHOL HOW MANY DRINKS DO YOU HAVE: 0
TOTAL SCORE: 0
HAVE YOU EVER FELT YOU SHOULD CUT DOWN ON YOUR DRINKING: NO
HAVE PEOPLE ANNOYED YOU BY CRITICIZING YOUR DRINKING: NO
TOTAL SCORE: 0
EVER FELT BAD OR GUILTY ABOUT YOUR DRINKING: NO
TOTAL SCORE: 0

## 2020-02-20 ASSESSMENT — ENCOUNTER SYMPTOMS
BRUISES/BLEEDS EASILY: 1
DEPRESSION: 1
HEMOPTYSIS: 0
FEVER: 0
DIAPHORESIS: 1
COUGH: 0
NAUSEA: 0
VOMITING: 0
GASTROINTESTINAL NEGATIVE: 1
CHILLS: 0
SHORTNESS OF BREATH: 0
ABDOMINAL PAIN: 0
WEAKNESS: 1
CHILLS: 1
NERVOUS/ANXIOUS: 1
NERVOUS/ANXIOUS: 0
DIZZINESS: 0
BACK PAIN: 0
FEVER: 1

## 2020-02-20 ASSESSMENT — COGNITIVE AND FUNCTIONAL STATUS - GENERAL
DAILY ACTIVITIY SCORE: 24
WALKING IN HOSPITAL ROOM: A LITTLE
SUGGESTED CMS G CODE MODIFIER MOBILITY: CJ
MOBILITY SCORE: 21
STANDING UP FROM CHAIR USING ARMS: A LITTLE
CLIMB 3 TO 5 STEPS WITH RAILING: A LITTLE
SUGGESTED CMS G CODE MODIFIER DAILY ACTIVITY: CH

## 2020-02-20 ASSESSMENT — PAIN SCALES - GENERAL: PAIN_LEVEL: 0

## 2020-02-20 ASSESSMENT — PAIN SCALES - WONG BAKER
WONGBAKER_NUMERICALRESPONSE: HURTS EVEN MORE
WONGBAKER_NUMERICALRESPONSE: DOESN'T HURT AT ALL

## 2020-02-20 NOTE — PROGRESS NOTES
Patient began actively/pulsitile bleeding. INR now to 5.24.  I have ordered stat IV 10 mg vitamin K.  Rapid response was called and rapid team at bedside.  Patient is hemodynamically stable, alert and oriented x4.  SBP 130s, tachycardia 110s, likely component of anxiety and pain. Significant discomfort with having to apply a manual pressure to his graft.  Dilaudid given.  Dr. Moffett, vascular surgery, at bedside.  Stat labs drawn.  Plan to transfer to either telemetry or ICU per Dr. Moffett recommendations.    CLAY Damon.

## 2020-02-20 NOTE — ASSESSMENT & PLAN NOTE
2/21-arteriovenous graft repair  CBC every 8 x24 hours  Transfuse when Hgb <7. Last Hgb was 10.2

## 2020-02-20 NOTE — PROGRESS NOTES
With low bp, denies dizziness. Informed him that im sandi call hospitalist to give him bolus, refuses iv fluids. He claimed that he's been doing this for 14 years, it does not make sense that he gets fluids then in dialysis they take it out. He gets hypotensive evaristo during dialysis, then bp improves. To continue to monitor.

## 2020-02-20 NOTE — DISCHARGE PLANNING
Outpatient Dialysis Note    Confirmed patient is active at:    [facility]  [address]  [place] [address]       Schedule: Monday, Wednesday, Friday   Time: 2:30 pm    Spoke with Katie at facility who confirmed.    Forwarded records for review.      Pina Irwin- Dialysis Coordinator  [facility] # [phone]

## 2020-02-20 NOTE — ED PROVIDER NOTES
ED Provider Note    CHIEF COMPLAINT  Chief Complaint   Patient presents with   • Vascular Access Problem       HPI  Denis De Anda is a 39 y.o. male with history of CKD who presents with bleeding at the fistula following dialysis.  Patient is on Coumadin for prior graft thrombosis and is followed by the vascular clinic for this.  Patient reports that he noticed a small clot developing over the fistula earlier today and it started bleeding while he was leaving work.  He reports the bleeding was very minimal as he was able to apply direct pressure and stop any severe bleeding.  He had a pressure compressing dressing on it for around 2 to 3 hours prior to being brought back to be seen in [facility].  Patient denies any associated distal leg pain or pain otherwise, he denies any paresthesias.  Patient denies any nausea or vomiting.  He is due for dialysis later today but reports that he does not feel significantly volume overloaded, he denies any lower extremity edema, shortness of breath, weakness, chest pain or other symptoms at this time.  Patient is seen by Dr. Scherer for his kidney disease and Dr. Moffett for his vascular access.    REVIEW OF SYSTEMS  ROS  See HPI for further details. All other systems are negative.     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    SOCIAL HISTORY  Social History     Tobacco Use   • Smoking status: Never Smoker   • Smokeless tobacco: Never Used   Substance and Sexual Activity   • Alcohol use: No   • Drug use: No   • Sexual activity: Yes     Partners: Female       SURGICAL HISTORY   has a  past surgical history that includes mass excision ortho (10/9/08); av fistula creation (11/6/08); arteriogram (3/5/2009); angioplasty balloon (3/5/2009); av fistulogram (3/5/2009); us-kidney transplant (1996, 2001); endarterectomy (11/16/2010); av fistulogram (11/16/2010); cath placement (2/1/2011); angioplasty balloon (2/1/2011); av fistulogram (6/5/2011); cath placement (6/5/2011); av fistula revision (11/3/2011); bone spur excision (12/8/2011); recovery (5/18/2012); incision and drainage general (9/13/2013); debridement (9/13/2013); vein ligation (9/13/2013); recovery (6/13/2014); av fistula revision (9/30/2014); irrigation & debridement general (12/15/2014); av fistula revision (2/8/2015); av fistula revision (2/22/2015); av fistula revision (3/20/2015); inject nerv blck,stellate ganglion (3/24/2015); av fistula creation (4/20/2015); av fistula thrombolysis (Left, 6/3/2015); irrigation & debridement general (Left, 6/3/2015); av fistula thrombolysis (Left, 6/9/2015); av fistula revision (Left, 6/17/2015); irrigation & debridement general (Left, 6/17/2015); recovery (8/7/2015); percut implnt neuroelect,epidural (2/19/2016); percut implnt neuroelect,epidural (2/19/2016); parathyroidectomy (2006); inguinal hernia repair (Bilateral, 2001, 2002); spinal cord stimulator (N/A, 3/25/2016); recovery (7/8/2016); lesion excision general (Right, 7/11/2016); mass excision general (Right, 8/5/2016); cath placement (Right, 9/17/2016); thrombectomy (Left, 9/18/2016); av fistulogram (9/18/2016); thrombectomy (Left, 10/20/2016); incision and drainage general (10/20/2016); irrigation & debridement general (Left, 10/28/2016); flap closure (Left, 11/17/2016); thrombectomy (Left, 1/6/2017); cath placement (Right, 1/6/2017); thrombectomy (Left, 1/5/2017); spinal cord stimulator (N/A, 5/4/2017); abdominoplasty (6/5/2017); irrigation & debridement general (7/31/2017); cath placement (Right, 11/14/2017); av fistula revision (Left,  11/14/2017); wound exploration general (2/1/2018); wound closure general (2/19/2018); split thickness skin graft (2/26/2018); thrombectomy (Left, 3/9/2018); cath placement (Right, 3/9/2018); thrombectomy (Left, 4/27/2018); thrombectomy (Left, 4/28/2018); abdominal exploration (6/25/2018); myocutaneous flap (Right, 8/27/2018); skin flap delayed (Left, 9/10/2018); split thickness skin graft (Right, 9/21/2018); av fistula revision (Left, 12/23/2018); av fistula revision (Left, 1/25/2019); and thrombectomy (Left, 1/26/2019).    CURRENT MEDICATIONS  Home Medications     Reviewed by Schuyler B Collett, R.N. (Registered Nurse) on 02/19/20 at 1350  Med List Status: <None>   Medication Last Dose Status   calcitRIOL (ROCALTROL) 0.25 MCG CAPS  Active   calcium carbonate (TUMS) 500 MG Chew Tab  Active   cinacalcet (SENSIPAR) 30 MG Tab  Active   gabapentin (NEURONTIN) 300 MG Cap  Active   gabapentin (NEURONTIN) 600 MG tablet  Active   HYDROcodone/acetaminophen (NORCO)  MG Tab  Active   warfarin (COUMADIN) 5 MG Tab  Active                ALLERGIES  Allergies   Allergen Reactions   • Baclofen Unspecified     Total loss of memory, sedation.   RXN=6/2015   • Contrast Media With Iodine [Iodine] Rash     RXN=1/5/2017   • Keflex Rash     RXN=possibly >10 years   • Pcn [Penicillins] Rash     RXN=possibly >10 years ago  Tolerated Zosyn on 2/20/18   • Tape Rash     Paper tape and tegaderm ok  RXN=ongoing   • Requip Vomiting       PHYSICAL EXAM  Physical Exam   Constitutional: He is oriented to person, place, and time. He appears well-developed and well-nourished.   Eyes: Conjunctivae are normal.   Neck: Normal range of motion. Neck supple.   Cardiovascular: Normal rate and regular rhythm.   Pulmonary/Chest: Breath sounds normal. No respiratory distress. He has no wheezes. He has no rales. He exhibits no tenderness.   Neurological: He is alert and oriented to person, place, and time.   Skin: Skin is warm.   Complex fistula on  patient's left lower extremity with compression dressing in place, this was immediately removed upon seeing the patient, this revealed a fistula with palpable pulse and thrill and immediately lateral to this an obvious hematoma with very minimal venous oozing, no pulsatile bleeding.  There is no considerable surrounding erythema or induration.  The lesion is nontender.  Distal pulses are 2+.  Tissues are warm well perfused and sensation is intact throughout.   Psychiatric: He has a normal mood and affect. His behavior is normal.     US-HEMODIALYSIS GRAFT DUPLEX COMP LOWER EXTREMITY             Results for orders placed or performed during the hospital encounter of 02/19/20   CBC WITH DIFFERENTIAL   Result Value Ref Range    WBC 4.3 (L) 4.8 - 10.8 K/uL    RBC 3.30 (L) 4.70 - 6.10 M/uL    Hemoglobin 10.7 (L) 14.0 - 18.0 g/dL    Hematocrit 32.2 (L) 42.0 - 52.0 %    MCV 97.6 81.4 - 97.8 fL    MCH 32.4 27.0 - 33.0 pg    MCHC 33.2 (L) 33.7 - 35.3 g/dL    RDW 55.4 (H) 35.9 - 50.0 fL    Platelet Count 143 (L) 164 - 446 K/uL    MPV 9.9 9.0 - 12.9 fL    Neutrophils-Polys 63.50 44.00 - 72.00 %    Lymphocytes 21.50 (L) 22.00 - 41.00 %    Monocytes 11.20 0.00 - 13.40 %    Eosinophils 2.60 0.00 - 6.90 %    Basophils 0.70 0.00 - 1.80 %    Immature Granulocytes 0.50 0.00 - 0.90 %    Nucleated RBC 0.00 /100 WBC    Neutrophils (Absolute) 2.71 1.82 - 7.42 K/uL    Lymphs (Absolute) 0.92 (L) 1.00 - 4.80 K/uL    Monos (Absolute) 0.48 0.00 - 0.85 K/uL    Eos (Absolute) 0.11 0.00 - 0.51 K/uL    Baso (Absolute) 0.03 0.00 - 0.12 K/uL    Immature Granulocytes (abs) 0.02 0.00 - 0.11 K/uL    NRBC (Absolute) 0.00 K/uL   Basic Metabolic Panel   Result Value Ref Range    Sodium 134 (L) 135 - 145 mmol/L    Potassium 4.9 3.6 - 5.5 mmol/L    Chloride 90 (L) 96 - 112 mmol/L    Co2 26 20 - 33 mmol/L    Glucose 98 65 - 99 mg/dL    Bun 56 (H) 8 - 22 mg/dL    Creatinine 10.96 (HH) 0.50 - 1.40 mg/dL    Calcium 9.1 8.5 - 10.5 mg/dL    Anion Gap 18.0 (H) 0.0  - 11.9   PT/INR   Result Value Ref Range    PT 43.8 (H) 12.0 - 14.6 sec    INR 4.40 (H) 0.87 - 1.13   ESTIMATED GFR   Result Value Ref Range    GFR If  6 (A) >60 mL/min/1.73 m 2    GFR If Non African American 5 (A) >60 mL/min/1.73 m 2         COURSE & MEDICAL DECISION MAKING  Pertinent Labs & Imaging studies reviewed. (See chart for details)    Patient with bleeding at the fistula site, fistula does remain to have a thrill but does have a pulse which patient also reports is normal for him.  The bleeding is very minimal, will apply some Surgicel overlying the venous oozing and will not provide any further compression dressings so as to ensure to not cause a thrombosis of the graft.  I will ultrasound the graft to ensure it is still functional and discuss the case with both nephrology and vascular.  I discussed the case with Dr. Moffett, she agrees with the ultrasound and agrees with the Surgicel and to continue non-compression dressings  I discussed the case with patient's nephrologist to reports that the patient is unable to get into [facility] or schedule an appointment for early tomorrow that he should be admitted.  Patient is attempted to contact the [facility] but is unable to schedule appointment, will admit.  Patient remained in good condition potassium is normal.  Labs are consistent with ESRD.  Patient's ultrasound is reassuring.  Patient will be admitted, I discussed the case with hospitalist.      FINAL IMPRESSION  1.  Vascular access problem, bleeding AV fistula      Electronically signed by: Jeff Bowling M.D., 2/19/2020 4:53 PM

## 2020-02-20 NOTE — H&P
Hospital Medicine History & Physical Note    Date of Service  2/19/2020    Primary Care Physician  Tony Mcmillan M.D.    Consultants  Nephrology  Vascular surgery    Code Status  Full    Chief Complaint  Bleeding from his graft for hemodialysis    History of Presenting Illness  39 y.o. male who presented 2/19/2020 with bleeding from his graft for hemodialysis.  Patient he was leaving work today, getting ready go to hemodialysis when he began having bleeding from his left leg graft.  He states on Monday he went to hemodialysis, had a fever so they obtain blood cultures and sent them off.  He states he found out they were positive today, none in our system.  While he was at hemodialysis he was given a one-time dose of vancomycin.  They did note some redness at the graft site.  On Tuesday he began noticing some swelling at the graft sites we put a pressure bandage on it fearful it would bleed.  He does have a history of multiple graft thrombi, is on Coumadin because of this.  Upon arrival, patient was noted to have a supratherapeutic INR.  I did discuss the case including labs and imaging with the ER physician.    Review of Systems  Review of Systems   Constitutional: Negative for chills, fever and malaise/fatigue.   HENT: Negative for congestion.    Respiratory: Negative for cough, sputum production, shortness of breath and stridor.    Cardiovascular: Negative for chest pain, palpitations and leg swelling.   Gastrointestinal: Negative for abdominal pain, constipation, diarrhea, nausea and vomiting.   Genitourinary: Negative for dysuria and urgency.   Musculoskeletal: Negative for falls and myalgias.   Skin:        Bleeding right thigh at graft site   Neurological: Negative for dizziness, tingling, loss of consciousness, weakness and headaches.   Psychiatric/Behavioral: Negative for depression and suicidal ideas.   All other systems reviewed and are negative.      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 He also has no past medical history of Encounter for long-term (current) use of other medications.    Surgical History   has a past surgical history that includes mass excision ortho (10/9/08); av fistula creation (11/6/08); arteriogram (3/5/2009); angioplasty balloon (3/5/2009); av fistulogram (3/5/2009); us-kidney transplant (1996, 2001); endarterectomy (11/16/2010); av fistulogram (11/16/2010); cath placement (2/1/2011); angioplasty balloon (2/1/2011); av fistulogram (6/5/2011); cath placement (6/5/2011); av fistula revision (11/3/2011); bone spur excision (12/8/2011); recovery (5/18/2012); incision and drainage general (9/13/2013); debridement (9/13/2013); vein ligation (9/13/2013); recovery (6/13/2014); av fistula revision (9/30/2014); irrigation & debridement general (12/15/2014); av fistula revision (2/8/2015); av fistula revision (2/22/2015); av fistula revision (3/20/2015); pr inject nerv blck,stellate ganglion (3/24/2015); av fistula creation (4/20/2015); av fistula thrombolysis (Left, 6/3/2015); irrigation & debridement general (Left, 6/3/2015); av fistula thrombolysis (Left, 6/9/2015); av fistula revision (Left, 6/17/2015); irrigation & debridement general (Left, 6/17/2015); recovery (8/7/2015); pr percut implnt neuroelect,epidural (2/19/2016); pr percut implnt neuroelect,epidural (2/19/2016); parathyroidectomy (2006); inguinal hernia repair (Bilateral, 2001, 2002); spinal cord stimulator (N/A, 3/25/2016); recovery (7/8/2016); lesion excision general (Right, 7/11/2016); mass excision general (Right, 8/5/2016); cath  placement (Right, 9/17/2016); thrombectomy (Left, 9/18/2016); av fistulogram (9/18/2016); thrombectomy (Left, 10/20/2016); incision and drainage general (10/20/2016); irrigation & debridement general (Left, 10/28/2016); flap closure (Left, 11/17/2016); thrombectomy (Left, 1/6/2017); cath placement (Right, 1/6/2017); thrombectomy (Left, 1/5/2017); spinal cord stimulator (N/A, 5/4/2017); abdominoplasty (6/5/2017); irrigation & debridement general (7/31/2017); cath placement (Right, 11/14/2017); av fistula revision (Left, 11/14/2017); wound exploration general (2/1/2018); wound closure general (2/19/2018); split thickness skin graft (2/26/2018); thrombectomy (Left, 3/9/2018); cath placement (Right, 3/9/2018); thrombectomy (Left, 4/27/2018); thrombectomy (Left, 4/28/2018); abdominal exploration (6/25/2018); myocutaneous flap (Right, 8/27/2018); skin flap delayed (Left, 9/10/2018); split thickness skin graft (Right, 9/21/2018); av fistula revision (Left, 12/23/2018); av fistula revision (Left, 1/25/2019); and thrombectomy (Left, 1/26/2019).     Family History  family history includes Arthritis in his father; Heart Disease in his father; Hypertension in his brother; Lung Disease in his father.     Social History   reports that he has never smoked. He has never used smokeless tobacco. He reports that he does not drink alcohol or use drugs.    Allergies  Allergies   Allergen Reactions   • Baclofen Unspecified     Total loss of memory, sedation.   RXN=6/2015   • Contrast Media With Iodine [Iodine] Rash     RXN=1/5/2017   • Keflex Rash     RXN=possibly >10 years   • Pcn [Penicillins] Rash     RXN=possibly >10 years ago  Tolerated Zosyn on 2/20/18   • Tape Rash     Paper tape and tegaderm ok  RXN=ongoing   • Requip Vomiting       Medications  Prior to Admission Medications   Prescriptions Last Dose Informant Patient Reported? Taking?   HYDROcodone/acetaminophen (NORCO)  MG Tab 2/19/2020 at PRN Patient Yes No   Sig: Take  1 Tab by mouth every 6 hours as needed for Severe Pain. Indications: Moderate to Moderately Severe Pain   calcitRIOL (ROCALTROL) 0.25 MCG CAPS 2/18/2020 at PM Patient Yes No   Sig: Take 0.75 mcg by mouth every bedtime.   cinacalcet (SENSIPAR) 30 MG Tab 2/19/2020 at AM Patient Yes No   Sig: Take 30 mg by mouth every Monday, Wednesday, and Friday.   gabapentin (NEURONTIN) 300 MG Cap 2/18/2020 at PM Patient Yes No   Sig: Take 1,200 mg by mouth every bedtime. Take with x3 (600mg) Gabapentin for a total nightly dose of = 3000mg  Indications: Neuropathic Pain   gabapentin (NEURONTIN) 600 MG tablet 2/18/2020 at PM Patient Yes No   Sig: Take 1,800 mg by mouth every bedtime. Take with x4 (300mg) Gabapentin for a total nightly dose = 3000mg   sevelamer carbonate (RENVELA) 800 MG Tab tablet 2/19/2020 at AM Patient Yes Yes   Sig: Take 3,200 mg by mouth 3 times a day, with meals.   warfarin (COUMADIN) 5 MG Tab 2/18/2020 at 2130 Patient Yes Yes   Sig: Take 5-7.5 mg by mouth See Admin Instructions. Take 5mg every Sun, Tues, Wed, Thurs, Fri, and Sat.  Take 7.5mg on Monday only.  Taken nightly.      Facility-Administered Medications: None       Physical Exam  Temp:  [36.2 °C (97.2 °F)] 36.2 °C (97.2 °F)  Pulse:  [] 101  Resp:  [16] 16  BP: ()/(59-90) 100/59  SpO2:  [91 %-96 %] 91 %    Physical Exam  Vitals signs and nursing note reviewed.   Constitutional:       General: He is not in acute distress.     Appearance: He is well-developed. He is not toxic-appearing or diaphoretic.   HENT:      Head: Normocephalic and atraumatic.      Right Ear: External ear normal.      Left Ear: External ear normal.      Nose: Nose normal. No congestion or rhinorrhea.      Mouth/Throat:      Mouth: Mucous membranes are moist.      Pharynx: No oropharyngeal exudate.   Eyes:      General: No scleral icterus.        Right eye: No discharge.         Left eye: No discharge.      Extraocular Movements: Extraocular movements intact.   Neck:       Musculoskeletal: Normal range of motion and neck supple. No edema or erythema.      Trachea: No tracheal deviation.   Cardiovascular:      Rate and Rhythm: Normal rate and regular rhythm.      Heart sounds: No murmur. No friction rub. No gallop.    Pulmonary:      Effort: Pulmonary effort is normal. No respiratory distress.      Breath sounds: Normal breath sounds. No stridor. No wheezing or rales.   Chest:      Chest wall: No tenderness.   Abdominal:      General: Bowel sounds are normal. There is no distension.      Palpations: Abdomen is soft.      Tenderness: There is no abdominal tenderness.   Musculoskeletal: Normal range of motion.         General: No tenderness.      Right lower leg: No edema.      Left lower leg: No edema.      Comments: Right leg HD graft with dried blood and bandage in place   Lymphadenopathy:      Cervical: No cervical adenopathy.   Skin:     General: Skin is warm and dry.      Findings: No erythema or rash.   Neurological:      General: No focal deficit present.      Mental Status: He is alert and oriented to person, place, and time.      Cranial Nerves: No cranial nerve deficit.   Psychiatric:         Mood and Affect: Mood normal.         Behavior: Behavior normal.         Thought Content: Thought content normal.         Judgment: Judgment normal.         Laboratory:  Recent Labs     02/19/20  1720   WBC 4.3*   RBC 3.30*   HEMOGLOBIN 10.7*   HEMATOCRIT 32.2*   MCV 97.6   MCH 32.4   MCHC 33.2*   RDW 55.4*   PLATELETCT 143*   MPV 9.9     Recent Labs     02/19/20  1720   SODIUM 134*   POTASSIUM 4.9   CHLORIDE 90*   CO2 26   GLUCOSE 98   BUN 56*   CREATININE 10.96*   CALCIUM 9.1     Recent Labs     02/19/20  1720   GLUCOSE 98     Recent Labs     02/19/20  1720   INR 4.40*     No results for input(s): NTPROBNP in the last 72 hours.      No results for input(s): TROPONINT in the last 72 hours.    Urinalysis:    No results found     Imaging:  US-HEMODIALYSIS GRAFT DUPLEX COMP LOWER EXTREMITY    Final Result            Assessment/Plan:  I anticipate this patient is appropriate for observation status at this time.    Pancytopenia (HCC)- (present on admission)  Assessment & Plan  -Associated with acute bleeding from his left leg AV graft  -Bleeding currently has resolved, monitor for worsening  -Repeat CBC in the morning  -Transfuse as needed    Supratherapeutic INR- (present on admission)  Assessment & Plan  -Significant with an INR greater than 4  -Patient is on Coumadin due to history of thrombosed graft  -Hold Coumadin  -Repeat INR in the morning  -Currently bleeding has stopped, if it restarts, consider FFP or vitamin K however none will be initiated at this time    Chronic pain disorder- (present on admission)  Assessment & Plan  -Continue home meds without increase    ESRD on hemodialysis (HCC)- (present on admission)  Assessment & Plan  -Patient did miss hemodialysis today, generally gets hemodialysis Monday, Wednesday and Friday  -Nephrology has been consulted, await hemodialysis tomorrow  -Patient did have a patent graft noted on ultrasound, no intervention will be required  -He does need close monitoring of his graft site since it will be accessed tomorrow      VTE prophylaxis: SCDs

## 2020-02-20 NOTE — CONSULTS
Nephrology Consultation    Date of Service  2/20/2020    Referring Physician  Solomon Clayton M.D.    Consulting Physician  Raul Londono M.D.    Reason for Consultation  Management of ESRD on HD      History of Presenting Illness  39 y.o. male with ESRD on HD, failed kidney transplant x2, who presented 2/19/2020 with bleeding access.    The patient usually gets dialysis on Monday Wednesday Friday, at [facility] under the care of Dr. Najjar.  The patient had dialysis on Monday, and complained of fevers at that time.  Blood cultures were drawn during dialysis on Monday.  After dialysis, the patient complained that it took a long time for his access site to stop bleeding.  The patient went to his dialysis on Wednesday afternoon, but the access site was bleeding, and he was advised to come into the emergency room.  The access site continued to ooze overnight, and this morning started having more active bleeding.  The patient describes prolonged bleeding after needle withdrawal for some time, and was waiting to get an appointment to see Dr. Moffett as an outpatient.    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  The patient first got a preemptive kidney transplant at age 16 from his aunt, but it failed around age 19, and the patient went on dialysis for the first time.  He was briefly on hemodialysis, then transition to peritoneal dialysis until another transplant around age 21 from his brother.  Unfortunately the second kidney transplant also failed, and the patient has been back on dialysis since 2004.  The patient has failed bilateral upper extremity access, as well as SVC stenosis.  The patient has left lower extremity AV graft, that is required multiple revisions, and placement of interposition grafts.  The patient has not had access placed in his right thigh, due to keeping it open in case he needs temporary dialysis access.  He is anuric.    Review of  Systems  Review of Systems   Constitutional: Negative for fever.   Respiratory: Negative for shortness of breath.    Cardiovascular: Negative for chest pain.   Gastrointestinal: Negative for abdominal pain.   Endo/Heme/Allergies: Bruises/bleeds easily (from RLE AV Graft).   All other systems reviewed and are negative.      Past Medical History  Past Medical History:   Diagnosis Date   • Arrhythmia     irregular EKG   • Chronic kidney disease, unspecified    • Contracture of palmar fascia    • Dialysis      hemodialysis at home 3 days a week   • Graft failure due to thrombosis 3/10/2018   • Hemorrhagic disorder (HCC)     on coumadin   • Hypertension    • Intra-abdominal varices    • Pain     Chronic pain   • Renal failure     hemodialysis   • Seizure (HCC)     last seizure 04/1/2013   • Sleep apnea     BIPAP   • Snoring     sleep study done   • Superior vena cava obstruction with collaterals     from calcium deposits per 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   • Toxic uninodular goiter without mention of thyrotoxic crisis or storm        Surgical History  Past Surgical History:   Procedure Laterality Date   • THROMBECTOMY Left 1/26/2019    Procedure: THROMBECTOMY  and angioplasty AV GRAFT;  Surgeon: Jairo Hopkins M.D.;  Location: [facility];  Service: General   • AV FISTULA REVISION Left 1/25/2019    Procedure: AV FISTULA REVISION - THIGH LOOP GRAFT;  Surgeon: Jairo Hopkins M.D.;  Location: [facility];  Service: General   • AV FISTULA REVISION Left 12/23/2018    Procedure: AV FISTULA REVISION;  Surgeon: Lurdes Moffett M.D.;  Location: [facility];  Service: General   • SPLIT THICKNESS SKIN GRAFT Right 9/21/2018    Procedure: SPLIT THICKNESS SKIN GRAFT - ARM TO ABDOMEN;  Surgeon: Samson Rosenbaum Jr., M.D.;  Location: [facility];  Service: Plastics   • SKIN FLAP DELAYED Left 9/10/2018    Procedure: SKIN FLAP DELAYED- FOR DIVISION AND  INSET FLAP WITH SKIN GRAFT ON ARM;  Surgeon: Samson Rosenbaum Jr., M.D.;  Location: SURGERY SAME DAY [facility];  Service: Plastics   • MYOCUTANEOUS FLAP Right 8/27/2018    Procedure: MYOCUTANEOUS FLAP - RIGHT ARM TO TRUNK;  Surgeon: Samson Rosenbaum Jr., M.D.;  Location: SURGERY [facility];  Service: Plastics   • ABDOMINAL EXPLORATION  6/25/2018    Procedure: ABDOMINAL EXPLORATION- CONTROL ABDOMINAL BLEEDING;  Surgeon: Samson Rosenbaum Jr., M.D.;  Location: SURGERY [facility];  Service: Plastics   • THROMBECTOMY Left 4/28/2018    Procedure: THROMBECTOMY- Thigh W/ Graft;  Surgeon: Jairo Hopkins M.D.;  Location: SURGERY [facility];  Service: General   • THROMBECTOMY Left 4/27/2018    Procedure: THROMBECTOMY - THIGH GRAFT AND REVISION;  Surgeon: Jairo Hopkins M.D.;  Location: SURGERY [facility];  Service: General   • THROMBECTOMY Left 3/9/2018    Procedure: THROMBECTOMY- THIGH DIALYSIS GRAFT AND REVISION  ;  Surgeon: Jairo Hopkins M.D.;  Location: SURGERY [facility];  Service: General   • CATH PLACEMENT Right 3/9/2018    Procedure: CATH PLACEMENT - REPLACE DIALYSIS CATH RIGHT THIGH;  Surgeon: Jairo Hopkins M.D.;  Location: [facility];  Service: General   • SPLIT THICKNESS SKIN GRAFT  2/26/2018    Procedure: SPLIT THICKNESS SKIN GRAFT- TO ABDOMEN;  Surgeon: Samson Rosenbaum Jr., M.D.;  Location: SURGERY [facility];  Service: Plastics   • WOUND CLOSURE GENERAL  2/19/2018    Procedure: WOUND CLOSURE GENERAL-ABDOMINAL WALL HEMORRHAGE;  Surgeon: Jason Robertson M.D.;  Location: SURGERY [facility];  Service: Plastics   • WOUND EXPLORATION GENERAL  2/1/2018    Procedure: WOUND EXPLORATION GENERAL, REPAIR BLEEDING;  Surgeon: Samson Rosenbaum Jr., M.D.;  Location: SURGERY [facility];  Service: Plastics   • CATH PLACEMENT Right 11/14/2017    Procedure: CATH PLACEMENT - PERMA;  Surgeon: Jairo Hopkins M.D.;  Location: [facility];   Service: General   • AV FISTULA REVISION Left 11/14/2017    Procedure: AV GRAFT REVISION;  Surgeon: Jairo Hopkins M.D.;  Location: [facility];  Service: General   • IRRIGATION & DEBRIDEMENT GENERAL  7/31/2017    Procedure: IRRIGATION & DEBRIDEMENT GENERAL-ABDOMEN;  Surgeon: Samson Rosenbaum Jr., M.D.;  Location: [facility];  Service:    • ABDOMINOPLASTY  6/5/2017    Procedure: ABDOMINOPLASTY - FOR PANNICULECTOMY;  Surgeon: Samson Rosenbaum Jr., M.D.;  Location: [facility];  Service:    • SPINAL CORD STIMULATOR N/A 5/4/2017    Procedure: SPINAL CORD STIMULATOR - EXPLANT;  Surgeon: Bin Pro M.D.;  Location: [facility];  Service:    • THROMBECTOMY Left 1/6/2017    Procedure: THROMBECTOMY-OPEN THROMBECTOMY WITH LEFT THIGH GRAFT;  Surgeon: Jairo Hopkins M.D.;  Location: [facility];  Service:    • CATH PLACEMENT Right 1/6/2017    Procedure: CATH PLACEMENT;  Surgeon: Jairo Hopkins M.D.;  Location: [facility];  Service:    • THROMBECTOMY Left 1/5/2017    Procedure: THROMBECTOMY-THIGH AV LOOP GRAFT AND ANGIOJET;  Surgeon: Jairo Hopkins M.D.;  Location: [facility];  Service:    • FLAP CLOSURE Left 11/17/2016    Procedure: Fasciocutaneous Flap Closure Left Upper Leg;  Surgeon: Samson Rosenbaum Jr., M.D.;  Location: [facility];  Service:    • IRRIGATION & DEBRIDEMENT GENERAL Left 10/28/2016    Procedure: IRRIGATION & DEBRIDEMENT GENERAL THIGH WITH IRRIGATING WOUND VAC PLACEMENT;  Surgeon: Jairo Hopkins M.D.;  Location: [facility];  Service:    • THROMBECTOMY Left 10/20/2016    Procedure: THROMBECTOMY THIGH;  Surgeon: Jairo Hopkins M.D.;  Location: [facility];  Service:    • INCISION AND DRAINAGE GENERAL  10/20/2016    Procedure: INCISION AND DRAINAGE GENERAL HEMATOMA;  Surgeon: Jairo Hopkins M.D.;  Location: [facility];  Service:    • THROMBECTOMY  Left 9/18/2016    Procedure: THROMBECTOMY - and fistula revision;  Surgeon: Jairo Hopkins M.D.;  Location: SURGERY [facility];  Service:    • AV FISTULOGRAM  9/18/2016    Procedure: AV FISTULOGRAM;  Surgeon: Jairo Hopkins M.D.;  Location: SURGERY [facility];  Service:    • CATH PLACEMENT Right 9/17/2016    Procedure: CATH PLACEMENT - tunneled dialysis cath placement right femoral ;  Surgeon: Quentin Alicia M.D.;  Location: SURGERY [facility];  Service:    • MASS EXCISION GENERAL Right 8/5/2016    Procedure: MASS EXCISION GENERAL FOR CALCIPHYLAXIS SKIN AND SUBCUTANEOUS TISSUE FOREARM;  Surgeon: Jairo Hopkins M.D.;  Location: SURGERY [facility];  Service:    • LESION EXCISION GENERAL Right 7/11/2016    Procedure: LESION EXCISION GENERAL FOR ARM SKIN;  Surgeon: Jairo Hopkins M.D.;  Location: SURGERY [facility];  Service:    • RECOVERY  7/8/2016    Procedure: IR1-VASCULAR CASE-VIANEY-LEFT THIGH AV GRAFT THROMBOLYSIS WITH TISSUE PLASMINOGEN ACTIVATOR AND ANGIOJET ARTHERECTOMY   ;  Surgeon: Recoveryonly Surgery;  Location: SURGERY PRE-POST PROC UNIT [facility];  Service:    • SPINAL CORD STIMULATOR N/A 3/25/2016    Procedure: SPINAL CORD STIMULATOR;  Surgeon: Bin Pro;  Location: [facility];  Service:    • PB PERCUT IMPLNT NEUROELECT,EPIDURAL  2/19/2016    Procedure: IMPLANT NEUROSTIM EPI ARRAY;  Surgeon: Bin Pro;  Location: [facility];  Service: Pain Management   • PB PERCUT IMPLNT NEUROELECT,EPIDURAL  2/19/2016    Procedure: IMPLANT NEUROSTIM EPI ARRAY;  Surgeon: Bin Pro;  Location: [facility];  Service: Pain Management   • RECOVERY  8/7/2015    Procedure:  VASCULAR CASE VIANEY-RIGHT ILIAC VENOGRAM WITH ANGIOPLASTY, REMOVAL RIGHT FEMORAL TUNNELED DIALYSIS CATHETER  **VRE**;  Surgeon: Recoveryonly Surgery;  Location: SURGERY PRE-POST PROC UNIT [facility];  Service:    • AV FISTULA REVISION Left 6/17/2015    Procedure: AV FISTULA REVISION;   Surgeon: Jairo Hopkins M.D.;  Location: SURGERY [facility];  Service:    • IRRIGATION & DEBRIDEMENT GENERAL Left 6/17/2015    Procedure: IRRIGATION & DEBRIDEMENT GENERAL ARM W/EXPLORATION WOUND & CONTROL BLEEDING;  Surgeon: Jairo Hopkins M.D.;  Location: SURGERY [facility];  Service:    • AV FISTULA THROMBOLYSIS Left 6/9/2015    Procedure: THROMBECTOMY AV GRAFT THIGH;  Surgeon: Jairo Hopkins M.D.;  Location: SURGERY [facility];  Service:    • AV FISTULA THROMBOLYSIS Left 6/3/2015    Procedure: AV FISTULA THROMBOLYSIS THIGH REPLACEMENT;  Surgeon: Jairo Hopkins M.D.;  Location: SURGERY [facility];  Service:    • IRRIGATION & DEBRIDEMENT GENERAL Left 6/3/2015    Procedure: IRRIGATION & DEBRIDEMENT GENERAL;  Surgeon: Jairo Hopkins M.D.;  Location: SURGERY [facility];  Service:    • AV FISTULA CREATION  4/20/2015    Performed by Jairo Hopkins M.D. at SURGERY [facility]   • PB INJECT NERV BLCK,STELLATE GANGLION  3/24/2015    Performed by Bin Pro at [facility]   • AV FISTULA REVISION  3/20/2015    Performed by Jairo Hopkins M.D. at SURGERY [facility]   • AV FISTULA REVISION  2/22/2015    Performed by Lurdes Moffett M.D. at SURGERY [facility]   • AV FISTULA REVISION  2/8/2015    Performed by Jairo Hopkins M.D. at SURGERY [facility]   • IRRIGATION & DEBRIDEMENT GENERAL  12/15/2014    Performed by Jairo Hopkins M.D. at SURGERY [facility]   • AV FISTULA REVISION  9/30/2014    Performed by Jairo Hopkins M.D. at SURGERY [facility]   • RECOVERY  6/13/2014    Performed by Ir-Recovery Surgery at [facility] SAME DAY [facility]   • INCISION AND DRAINAGE GENERAL  9/13/2013    Performed by Jairo Hopkins M.D. at SURGERY [facility]   • DEBRIDEMENT  9/13/2013    Performed by Jairo Hopkins M.D. at SURGERY [facility]   • VEIN LIGATION  9/13/2013    Performed by Jairo Hopkins M.D. at SURGERY [facility]  ORS   • RECOVERY  5/18/2012    Performed by SURGERY, IR-RECOVERY at SURGERY [facility] ORS   • BONE SPUR EXCISION  12/8/2011    Performed by BELKIS BREAUX at SURGERY SAME DAY [facility] ORS   • AV FISTULA REVISION  11/3/2011    Performed by MARI WINTERS at SURGERY [facility] ORS   • AV FISTULOGRAM  6/5/2011    Performed by MARI WINTERS at SURGERY [facility] ORS   • CATH PLACEMENT  6/5/2011    Performed by MARI WINTERS at SURGERY [facility] ORS   • CATH PLACEMENT  2/1/2011    Performed by MARI WINTERS at SURGERY [facility] ORS   • ANGIOPLASTY BALLOON  2/1/2011    Performed by MARI WINTERS at SURGERY [facility] ORS   • ENDARTERECTOMY  11/16/2010    Performed by MARI WINTERS at SURGERY [facility] ORS   • AV FISTULOGRAM  11/16/2010    Performed by MARI WINTERS at SURGERY [facility] ORS   • ARTERIOGRAM  3/5/2009    Performed by MARI WINTERS at SURGERY [facility] ORS   • ANGIOPLASTY BALLOON  3/5/2009    Performed by MARI WINTERS at SURGERY [facility] ORS   • AV FISTULOGRAM  3/5/2009    Performed by MARI WINTERS at SURGERY [facility] ORS   • AV FISTULA CREATION  11/6/08    Performed by MARI WINTERS at SURGERY [facility] ORS   • MASS EXCISION ORTHO  10/9/08    Performed by BELKIS BREAUX at SURGERY SAME DAY [facility] ORS   • PARATHYROIDECTOMY  2006   • INGUINAL HERNIA REPAIR Bilateral 2001, 2002   • US-KIDNEY TRANSPLANT  1996, 2001    x2       Family History  Family History   Problem Relation Age of Onset   • Arthritis Father         RA   • Lung Disease Father    • Heart Disease Father         MI   • Hypertension Brother        Social History  Social History     Socioeconomic History   • Marital status: Single     Spouse name: Not on file   • Number of children: Not on file   • Years of education: Not on file   • Highest education level: Not on file   Occupational History   • Occupation:      Employer: [employer]   Social Needs   • Financial  resource strain: Not on file   • Food insecurity     Worry: Not on file     Inability: Not on file   • Transportation needs     Medical: Not on file     Non-medical: Not on file   Tobacco Use   • Smoking status: Never Smoker   • Smokeless tobacco: Never Used   Substance and Sexual Activity   • Alcohol use: No   • Drug use: No   • Sexual activity: Yes     Partners: Female   Lifestyle   • Physical activity     Days per week: Not on file     Minutes per session: Not on file   • Stress: Not on file   Relationships   • Social connections     Talks on phone: Not on file     Gets together: Not on file     Attends Congregation service: Not on file     Active member of club or organization: Not on file     Attends meetings of clubs or organizations: Not on file     Relationship status: Not on file   • Intimate partner violence     Fear of current or ex partner: Not on file     Emotionally abused: Not on file     Physically abused: Not on file     Forced sexual activity: Not on file   Other Topics Concern   • Not on file   Social History Narrative   • Not on file       Medications  Current Facility-Administered Medications   Medication Dose Route Frequency Provider Last Rate Last Dose   • NS (BOLUS) infusion 250 mL  250 mL Intravenous DIALYSIS PRN Raul Londono M.D.       • albumin human 25% solution 12.5 g  12.5 g Intravenous DIALYSIS PRN Raul Londono M.D.       • lidocaine (XYLOCAINE) 1 % injection 1 mL  1 mL Intradermal PRN Raul Londono M.D.       • epoetin rj (EPOGEN/PROCRIT) injection 4,000 Units  4,000 Units Intravenous TUE+THU+SAT Raul Londono M.D.       • HYDROmorphone pf (DILAUDID) injection 0.5-1 mg  0.5-1 mg Intravenous Q2HRS PRN Lazara Stafford A.P.R.N.   1 mg at 02/20/20 1258   • MD Alert...Vancomycin per Pharmacy   Other PHARMACY TO DOSE Stan Pendleton D.O.       • calcitRIOL (ROCALTROL) capsule 0.75 mcg  0.75 mcg Oral QHS ZONIA LaraOWaldo   0.75 mcg at 02/19/20 9070   • [START ON 2/21/2020] cinacalcet (SENSIPAR)  tablet 30 mg  30 mg Oral MO, WE + FR Denis Lagos D.O.       • HYDROcodone/acetaminophen (NORCO)  MG per tablet 1 Tab  1 Tab Oral Q6HRS PRN Denis Lagos D.O.   1 Tab at 02/20/20 0800   • sevelamer carbonate (RENVELA) tablet 3,200 mg  3,200 mg Oral TID WITH MEALS Denis Lagos D.O.   Stopped at 02/20/20 1130   • senna-docusate (PERICOLACE or SENOKOT S) 8.6-50 MG per tablet 2 Tab  2 Tab Oral BID Denis Lagos D.O.        And   • polyethylene glycol/lytes (MIRALAX) PACKET 1 Packet  1 Packet Oral QDAY PRN Denis Lagos D.O.        And   • magnesium hydroxide (MILK OF MAGNESIA) suspension 30 mL  30 mL Oral QDAY PRN Denis Lagos D.O.        And   • bisacodyl (DULCOLAX) suppository 10 mg  10 mg Rectal QDAY PRN ZONIA LaraO.       • acetaminophen (TYLENOL) tablet 650 mg  650 mg Oral Q6HRS PRN ZONIA LaraO.       • enalaprilat (VASOTEC) injection 1.25 mg  1.25 mg Intravenous Q6HRS PRN ZONIA LaraO.       • cloNIDine (CATAPRES) tablet 0.1 mg  0.1 mg Oral Q6HRS PRN ZONIA LaraO.       • ondansetron (ZOFRAN) syringe/vial injection 4 mg  4 mg Intravenous Q4HRS PRN Denis Lagos D.O.       • ondansetron (ZOFRAN ODT) dispertab 4 mg  4 mg Oral Q4HRS PRN ZONIA LaraO.       • promethazine (PHENERGAN) tablet 12.5-25 mg  12.5-25 mg Oral Q4HRS PRN ZONIA LaraO.       • promethazine (PHENERGAN) suppository 12.5-25 mg  12.5-25 mg Rectal Q4HRS PRN ZONIA LaraO.       • prochlorperazine (COMPAZINE) injection 5-10 mg  5-10 mg Intravenous Q4HRS PRN Denis R Lagos, D.O.       • gabapentin (NEURONTIN) capsule 1,800 mg  1,800 mg Oral Q EVENING ZONIA LaraO.   1,800 mg at 02/19/20 6611   • tizanidine (ZANAFLEX) tablet 8 mg  8 mg Oral Q EVENING ABRHAAM Lara.O.   8 mg at 02/19/20 2348       Allergies  Allergies   Allergen Reactions   • Baclofen Unspecified     Total loss of memory, sedation.   RXN=6/2015   • Contrast Media With Iodine [Iodine] Rash      RXN=1/5/2017   • Keflex Rash     RXN=possibly >10 years   • Pcn [Penicillins] Rash     RXN=possibly >10 years ago  Tolerated Zosyn on 2/20/18   • Tape Rash     Paper tape and tegaderm ok  RXN=ongoing   • Requip Vomiting       Physical Exam  Temp:  [36.4 °C (97.5 °F)-37.3 °C (99.1 °F)] 37.3 °C (99.1 °F)  Pulse:  [] 110  Resp:  [12-30] 24  BP: ()/(46-88) 115/56  SpO2:  [90 %-100 %] 99 %    Physical Exam   Constitutional: He is oriented to person, place, and time. He appears well-developed. No distress.   HENT:   Mouth/Throat: Oropharynx is clear and moist. No oropharyngeal exudate.   Eyes: EOM are normal. No scleral icterus.   Neck: No tracheal deviation present.   Cardiovascular: Normal rate and normal heart sounds.   No murmur heard.  Pulmonary/Chest: Effort normal and breath sounds normal. No stridor. He has no rales.   Abdominal: Soft. He exhibits no distension. There is no abdominal tenderness.   Musculoskeletal: Normal range of motion.         General: No edema.   Neurological: He is alert and oriented to person, place, and time.   Skin: Skin is warm and dry. He is not diaphoretic.   Multiple scars noted on abdomen, arms, and left thigh   Psychiatric:   Anxious mood and affect   Access: LLE thigh AVG with patent bruit.      Fluids  Date 02/20/20 0700 - 02/21/20 0659   Shift 2112-3087 9763-1812 5635-0790 24 Hour Total   INTAKE   IV Piggyback 34.2   34.2   Shift Total 34.2   34.2   OUTPUT   Shift Total       Weight (kg) 88.8 88.8 88.8 88.8       Laboratory  Labs reviewed, pertinent labs below.  Recent Labs     02/19/20  1720 02/20/20  0449 02/20/20  1035   WBC 4.3* 3.5* 4.5*   RBC 3.30* 2.73* 2.99*   HEMOGLOBIN 10.7* 8.6* 9.7*   HEMATOCRIT 32.2* 27.1* 29.3*   MCV 97.6 99.3* 98.0*   MCH 32.4 31.5 32.4   MCHC 33.2* 31.7* 33.1*   RDW 55.4* 57.2* 55.5*   PLATELETCT 143* 117* 132*   MPV 9.9 9.8 10.0     Recent Labs     02/19/20  1720 02/20/20  0449 02/20/20  1035   SODIUM 134* 140 137   POTASSIUM 4.9  4.5 5.6*   CHLORIDE 90* 99 94*   CO2 26 25 25   GLUCOSE 98 105* 96   BUN 56* 60* 64*   CREATININE 10.96* 10.29* 11.01*   CALCIUM 9.1 7.3* 8.1*     Recent Labs     02/19/20  1720 02/20/20  0449 02/20/20  1035   APTT  --   --  90.8*   INR 4.40* 5.24* 5.11*              Imaging reviewed  US-HEMODIALYSIS GRAFT DUPLEX COMP LOWER EXTREMITY   Final Result            Assessment/Plan  39 y.o. male with ESRD on HD, failed kidney transplant x2, who presented 2/19/2020 with bleeding access.    1.  ESRD on hemodialysis Monday Wednesday Friday.  Anuric.  Plan for dialysis toda to make up for missed session yesterday, especially with mildly elevated potassium, as patient is anuric.  Check labs daily.    2.  Access: Left thigh loop AV graft, patent, but complicated by bleeding ulcer/blister.  Patient likely has venous outflow stenosis, and likely needs angiogram angioplasty.  I appreciate Dr. Moffett's assistance.  If dialysis access is required prior to angioplasty, would likely need placement in the right groin/right femoral vein.  Patient is approaching end-stage access    3.  Gram-positive cocci bacteremia.  Blood cultures drawn as an outpatient, and per my records are showing gram-positive cocci, not yet speciated.  Will defer antibiotics to primary team.    4. Anemia of chronic disease. Will order Epogen 3 times weekly with dialysis.  Check CBC daily.    5.  Thrombocytopenia, mild.  Check CBC daily.    6.  Hyperkalemia, worsened today.  This should improve with dialysis.    Following      Raul Londono MD  Nephrology

## 2020-02-20 NOTE — CONSULTS
Critical Care Consultation    Date of consult: 2/20/2020    Referring Physician  Solomon Clayton M.D.    Reason for Consultation  Graft bleeding    History of Presenting Illness  39 y.o. 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 Pt was admitted to [facility] due to bleeding from AV graft. He heard a pop on his way to dialysis on 2/19. At admission, INR 4.4, Hgb 10.7 (baseline 13). Vascular surgery and nephrology were consulted.     Today, pt evidently had pulsatile bleeding from AV graft. Hospital medicine/vascular surgery was called emergently to bedside. Dr. Moffett came to bedside and recommended to place pressure dressing at this time and pt is planned for surgery today.  Vitamin K 5mg IV x1 was given. Vascular surgery prefer not to completely reverse coumadin effect due to potential risk rethrombosis, considering multiple graft failures in the past and this graft could still be salvagable.     Pt's BP is running in 70-100s on the floor. I came to bedside and discussed the situation with Dr. Londono, neph, mother, patient, and Dr. Clayton, [facility] medicinne and nursing staff.   Repeat Hgb was 9.7, platelet 132K, fibrinogen in 400s. TEG is pending. Last INR 4.4    Code Status  Full Code    Review of Systems  Review of Systems   Constitutional: Negative for chills, fever and malaise/fatigue.   Respiratory: Negative for cough and shortness of breath.    Cardiovascular: Negative for chest pain and leg swelling.   Gastrointestinal: Negative for abdominal pain, nausea and vomiting.   Genitourinary: Negative for dysuria and urgency.   Musculoskeletal: Negative for back pain and joint pain.        Mild pressure on left upper thigh graft with pressure dressing in place   Skin: Negative  for rash.   Neurological: Negative for dizziness.   Psychiatric/Behavioral: The patient is not nervous/anxious.    All other systems reviewed and are negative.      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 He also has no past medical history of Encounter for long-term (current) use of other medications.    Surgical History   has a past surgical history that includes mass excision ortho (10/9/08); av fistula creation (11/6/08); arteriogram (3/5/2009); angioplasty balloon (3/5/2009); av fistulogram (3/5/2009); us-kidney transplant (1996, 2001); endarterectomy (11/16/2010); av fistulogram (11/16/2010); cath placement (2/1/2011); angioplasty balloon (2/1/2011); av fistulogram (6/5/2011); cath placement (6/5/2011); av fistula revision (11/3/2011); bone spur excision (12/8/2011); recovery (5/18/2012); incision and drainage general (9/13/2013); debridement (9/13/2013); vein ligation (9/13/2013); recovery (6/13/2014); av fistula revision (9/30/2014); irrigation & debridement general (12/15/2014); av fistula revision (2/8/2015); av fistula revision (2/22/2015); av fistula revision (3/20/2015); pr inject nerv blck,stellate ganglion (3/24/2015); av fistula creation (4/20/2015); av fistula thrombolysis (Left, 6/3/2015); irrigation & debridement general (Left, 6/3/2015); av fistula thrombolysis (Left, 6/9/2015); av fistula revision (Left, 6/17/2015); irrigation & debridement general (Left, 6/17/2015); recovery (8/7/2015); pr percut implnt neuroelect,epidural (2/19/2016); pr percut implnt neuroelect,epidural (2/19/2016); parathyroidectomy  (2006); inguinal hernia repair (Bilateral, 2001, 2002); spinal cord stimulator (N/A, 3/25/2016); recovery (7/8/2016); lesion excision general (Right, 7/11/2016); mass excision general (Right, 8/5/2016); cath placement (Right, 9/17/2016); thrombectomy (Left, 9/18/2016); av fistulogram (9/18/2016); thrombectomy (Left, 10/20/2016); incision and drainage general (10/20/2016); irrigation & debridement general (Left, 10/28/2016); flap closure (Left, 11/17/2016); thrombectomy (Left, 1/6/2017); cath placement (Right, 1/6/2017); thrombectomy (Left, 1/5/2017); spinal cord stimulator (N/A, 5/4/2017); abdominoplasty (6/5/2017); irrigation & debridement general (7/31/2017); cath placement (Right, 11/14/2017); av fistula revision (Left, 11/14/2017); wound exploration general (2/1/2018); wound closure general (2/19/2018); split thickness skin graft (2/26/2018); thrombectomy (Left, 3/9/2018); cath placement (Right, 3/9/2018); thrombectomy (Left, 4/27/2018); thrombectomy (Left, 4/28/2018); abdominal exploration (6/25/2018); myocutaneous flap (Right, 8/27/2018); skin flap delayed (Left, 9/10/2018); split thickness skin graft (Right, 9/21/2018); av fistula revision (Left, 12/23/2018); av fistula revision (Left, 1/25/2019); and thrombectomy (Left, 1/26/2019).    Family History  family history includes Arthritis in his father; Heart Disease in his father; Hypertension in his brother; Lung Disease in his father.    Social History   reports that he has never smoked. He has never used smokeless tobacco. He reports that he does not drink alcohol or use drugs.    Medications  Home Medications     Reviewed by Don Collins (Pharmacy Tech) on 02/19/20 at 2022  Med List Status: Complete   Medication Last Dose Status   calcitRIOL (ROCALTROL) 0.25 MCG CAPS 2/18/2020 Active   cinacalcet (SENSIPAR) 30 MG Tab 2/19/2020 Active   gabapentin (NEURONTIN) 300 MG Cap 2/18/2020 Active   gabapentin (NEURONTIN) 600 MG tablet 2/18/2020 Active    HYDROcodone/acetaminophen (NORCO)  MG Tab 2/19/2020 Active   sevelamer carbonate (RENVELA) 800 MG Tab tablet 2/19/2020 Active   warfarin (COUMADIN) 5 MG Tab 2/18/2020 Active              Current Facility-Administered Medications   Medication Dose Route Frequency Provider Last Rate Last Dose   • NS (BOLUS) infusion 250 mL  250 mL Intravenous DIALYSIS PRN Raul Londono M.D.       • albumin human 25% solution 12.5 g  12.5 g Intravenous DIALYSIS PRN Raul Londono M.D.       • lidocaine (XYLOCAINE) 1 % injection 1 mL  1 mL Intradermal PRN Raul Londono M.D.       • epoetin rj (EPOGEN/PROCRIT) injection 4,000 Units  4,000 Units Intravenous TUE+THU+SAT Raul Londono M.D.       • phytonadione (AQUA-MEPHYTON) 10 mg in NS 50 mL IVPB  10 mg Intravenous Once Lazara Sinhaer, A.P.R.N. 50 mL/hr at 02/20/20 1019 10 mg at 02/20/20 1019   • HYDROmorphone pf (DILAUDID) injection 0.5-1 mg  0.5-1 mg Intravenous Q2HRS PRN Lazara KIRAN Stafford, A.P.R.N.   1 mg at 02/20/20 1017   • cefTRIAXone (ROCEPHIN) 2 g in  mL IVPB  2 g Intravenous Once Solomon Clayton M.D.       • calcitRIOL (ROCALTROL) capsule 0.75 mcg  0.75 mcg Oral QHS Denis Lagos D.O.   0.75 mcg at 02/19/20 2350   • [START ON 2/21/2020] cinacalcet (SENSIPAR) tablet 30 mg  30 mg Oral MO, WE + FR ABRAHAM Lara.O.       • HYDROcodone/acetaminophen (NORCO)  MG per tablet 1 Tab  1 Tab Oral Q6HRS PRN ABRAHAM Lara.O.   1 Tab at 02/20/20 0800   • sevelamer carbonate (RENVELA) tablet 3,200 mg  3,200 mg Oral TID WITH MEALS Denis Lagos D.O.       • senna-docusate (PERICOLACE or SENOKOT S) 8.6-50 MG per tablet 2 Tab  2 Tab Oral BID Denis Lagos D.O.        And   • polyethylene glycol/lytes (MIRALAX) PACKET 1 Packet  1 Packet Oral QDAY PRN Denis Lagos D.O.        And   • magnesium hydroxide (MILK OF MAGNESIA) suspension 30 mL  30 mL Oral QDAY PRN Denis Lagos D.O.        And   • bisacodyl (DULCOLAX) suppository 10 mg  10 mg Rectal QDAY PRN Denis CHAMPION  MARCO A Lagos.       • acetaminophen (TYLENOL) tablet 650 mg  650 mg Oral Q6HRS PRN ZONIA LaraO.       • enalaprilat (VASOTEC) injection 1.25 mg  1.25 mg Intravenous Q6HRS PRN ABRAHAM Lara.O.       • cloNIDine (CATAPRES) tablet 0.1 mg  0.1 mg Oral Q6HRS PRN ABRAHAM Lara.O.       • ondansetron (ZOFRAN) syringe/vial injection 4 mg  4 mg Intravenous Q4HRS PRN ABRAHAM Lara.O.       • ondansetron (ZOFRAN ODT) dispertab 4 mg  4 mg Oral Q4HRS PRN ABRAHAM Lara.O.       • promethazine (PHENERGAN) tablet 12.5-25 mg  12.5-25 mg Oral Q4HRS PRN ABRAHAM Lara.O.       • promethazine (PHENERGAN) suppository 12.5-25 mg  12.5-25 mg Rectal Q4HRS PRN ABRAHAM Lara.O.       • prochlorperazine (COMPAZINE) injection 5-10 mg  5-10 mg Intravenous Q4HRS PRN ABRAHAM Lara.O.       • gabapentin (NEURONTIN) capsule 1,800 mg  1,800 mg Oral Q EVENING ABRAHAM Lara.O.   1,800 mg at 02/19/20 2355   • tizanidine (ZANAFLEX) tablet 8 mg  8 mg Oral Q EVENING ABRAHAM Lara.O.   8 mg at 02/19/20 2348       Allergies  Allergies   Allergen Reactions   • Baclofen Unspecified     Total loss of memory, sedation.   RXN=6/2015   • Contrast Media With Iodine [Iodine] Rash     RXN=1/5/2017   • Keflex Rash     RXN=possibly >10 years   • Pcn [Penicillins] Rash     RXN=possibly >10 years ago  Tolerated Zosyn on 2/20/18   • Tape Rash     Paper tape and tegaderm ok  RXN=ongoing   • Requip Vomiting       Vital Signs last 24 hours  Temp:  [36.2 °C (97.2 °F)-37.1 °C (98.7 °F)] 37.1 °C (98.7 °F)  Pulse:  [] 106  Resp:  [12-30] 28  BP: ()/(46-90) 113/54  SpO2:  [90 %-100 %] 98 %    Physical Exam  Physical Exam  Vitals signs and nursing note reviewed.   Constitutional:       General: He is not in acute distress.     Appearance: He is not ill-appearing, toxic-appearing or diaphoretic.      Comments: Alert, oriented x4.      HENT:      Head: Normocephalic and atraumatic.      Mouth/Throat:      Mouth: Mucous  membranes are moist.   Neck:      Musculoskeletal: Neck supple.   Cardiovascular:      Rate and Rhythm: Normal rate and regular rhythm.      Pulses: Normal pulses.      Heart sounds: Normal heart sounds. No murmur.   Pulmonary:      Effort: Pulmonary effort is normal. No respiratory distress.      Breath sounds: Normal breath sounds. No wheezing or rales.   Abdominal:      General: Bowel sounds are normal. There is no distension.      Palpations: Abdomen is soft.      Tenderness: There is no abdominal tenderness. There is no guarding.      Comments: Areas of scar in lower abdomen bilaterally. Not tender to palpate.    Musculoskeletal:         General: Tenderness present. No swelling.      Comments: Left upper thigh with dressing - appears saturated. Left upper left thigh graft. Tender to palpate   Skin:     General: Skin is warm and dry.      Capillary Refill: Capillary refill takes less than 2 seconds.      Coloration: Skin is not jaundiced.   Neurological:      General: No focal deficit present.      Mental Status: He is alert.      Cranial Nerves: No cranial nerve deficit.   Psychiatric:         Mood and Affect: Mood normal.         Fluids  No intake or output data in the 24 hours ending 02/20/20 1059    Laboratory  Recent Results (from the past 48 hour(s))   CBC WITH DIFFERENTIAL    Collection Time: 02/19/20  5:20 PM   Result Value Ref Range    WBC 4.3 (L) 4.8 - 10.8 K/uL    RBC 3.30 (L) 4.70 - 6.10 M/uL    Hemoglobin 10.7 (L) 14.0 - 18.0 g/dL    Hematocrit 32.2 (L) 42.0 - 52.0 %    MCV 97.6 81.4 - 97.8 fL    MCH 32.4 27.0 - 33.0 pg    MCHC 33.2 (L) 33.7 - 35.3 g/dL    RDW 55.4 (H) 35.9 - 50.0 fL    Platelet Count 143 (L) 164 - 446 K/uL    MPV 9.9 9.0 - 12.9 fL    Neutrophils-Polys 63.50 44.00 - 72.00 %    Lymphocytes 21.50 (L) 22.00 - 41.00 %    Monocytes 11.20 0.00 - 13.40 %    Eosinophils 2.60 0.00 - 6.90 %    Basophils 0.70 0.00 - 1.80 %    Immature Granulocytes 0.50 0.00 - 0.90 %    Nucleated RBC 0.00 /100  WBC    Neutrophils (Absolute) 2.71 1.82 - 7.42 K/uL    Lymphs (Absolute) 0.92 (L) 1.00 - 4.80 K/uL    Monos (Absolute) 0.48 0.00 - 0.85 K/uL    Eos (Absolute) 0.11 0.00 - 0.51 K/uL    Baso (Absolute) 0.03 0.00 - 0.12 K/uL    Immature Granulocytes (abs) 0.02 0.00 - 0.11 K/uL    NRBC (Absolute) 0.00 K/uL   Basic Metabolic Panel    Collection Time: 02/19/20  5:20 PM   Result Value Ref Range    Sodium 134 (L) 135 - 145 mmol/L    Potassium 4.9 3.6 - 5.5 mmol/L    Chloride 90 (L) 96 - 112 mmol/L    Co2 26 20 - 33 mmol/L    Glucose 98 65 - 99 mg/dL    Bun 56 (H) 8 - 22 mg/dL    Creatinine 10.96 (HH) 0.50 - 1.40 mg/dL    Calcium 9.1 8.5 - 10.5 mg/dL    Anion Gap 18.0 (H) 0.0 - 11.9   PT/INR    Collection Time: 02/19/20  5:20 PM   Result Value Ref Range    PT 43.8 (H) 12.0 - 14.6 sec    INR 4.40 (H) 0.87 - 1.13   ESTIMATED GFR    Collection Time: 02/19/20  5:20 PM   Result Value Ref Range    GFR If  6 (A) >60 mL/min/1.73 m 2    GFR If Non African American 5 (A) >60 mL/min/1.73 m 2   CBC without Differential    Collection Time: 02/20/20  4:49 AM   Result Value Ref Range    WBC 3.5 (L) 4.8 - 10.8 K/uL    RBC 2.73 (L) 4.70 - 6.10 M/uL    Hemoglobin 8.6 (L) 14.0 - 18.0 g/dL    Hematocrit 27.1 (L) 42.0 - 52.0 %    MCV 99.3 (H) 81.4 - 97.8 fL    MCH 31.5 27.0 - 33.0 pg    MCHC 31.7 (L) 33.7 - 35.3 g/dL    RDW 57.2 (H) 35.9 - 50.0 fL    Platelet Count 117 (L) 164 - 446 K/uL    MPV 9.8 9.0 - 12.9 fL   Basic Metabolic Panel (BMP)    Collection Time: 02/20/20  4:49 AM   Result Value Ref Range    Sodium 140 135 - 145 mmol/L    Potassium 4.5 3.6 - 5.5 mmol/L    Chloride 99 96 - 112 mmol/L    Co2 25 20 - 33 mmol/L    Glucose 105 (H) 65 - 99 mg/dL    Bun 60 (H) 8 - 22 mg/dL    Creatinine 10.29 (HH) 0.50 - 1.40 mg/dL    Calcium 7.3 (L) 8.5 - 10.5 mg/dL    Anion Gap 16.0 (H) 0.0 - 11.9   Prothrombin Time    Collection Time: 02/20/20  4:49 AM   Result Value Ref Range    PT 50.4 (H) 12.0 - 14.6 sec    INR 5.24 (H) 0.87 - 1.13    ESTIMATED GFR    Collection Time: 02/20/20  4:49 AM   Result Value Ref Range    GFR If  7 (A) >60 mL/min/1.73 m 2    GFR If Non African American 6 (A) >60 mL/min/1.73 m 2   Hepatitis Panel Acute (4 components)    Collection Time: 02/20/20  4:49 AM   Result Value Ref Range    Hepatitis B Surface Antigen Negative Negative    Hepatitis B Cors Ab,IgM Negative Negative    Hepatitis A Virus Ab, IgM Negative Negative    Hepatitis C Antibody Negative Negative   Hep B Surface AB    Collection Time: 02/20/20  4:49 AM   Result Value Ref Range    Hep B Surface Antibody Quant 763.67 (H) 0.00 - 10.00 mIU/mL   LACTIC ACID    Collection Time: 02/20/20 10:45 AM   Result Value Ref Range    Lactic Acid 1.6 0.5 - 2.0 mmol/L       Imaging  US-HEMODIALYSIS GRAFT DUPLEX COMP LOWER EXTREMITY   Final Result          Assessment/Plan  * Hemorrhage from arteriovenous dialysis graft (HCC)  Assessment & Plan  I was reported that pt has pre-existing stenosis in his AV graft, specifically in proximal venous end.  Dr. Moffett, Vascular surgery is planning to take pt to OR today  Pt only has 20G Periphreal IV x1.   Given the bleeding, need better access. In addition, left AV graft may not be salvagable given concern of potential infected graft. Pt has GPC in clusters bacteremia in 2/17 from [facility]. Record from [facility] was reviewed.   Pt will need a temporary acces for dialysis.   I went ahead placed the right fem dialysis cath  CBC Q6H  Transfuse when Hgb <7. Last Hgb was 10.2       Bacteremia  Assessment & Plan  GPC in clusters bacteremia - DDx staph aureus, CONS  Continue vancomycin   Dosing per pharmacy  Blood cultures have been repeated here  Need to follow up cultures at [facility] tomorrow   MRSA nasal screen. If positive, decolonize with mupirocin ointment BID x 5 days    ESRD on hemodialysis (HCC)- (present on admission)  Assessment & Plan  S/p multiple graft failures in the past, currently has AV graft in left upper  thigh  Last dialysis was on 1/17. Repeat K is 5.6 today.   Nephrology was following  Temporary right femoral dialysis cath was placed. Will need dialysis either today after OR    Hyperkalemia  Assessment & Plan  K is 5.6. Nephrology aware  Pt to have dialysis sometime today.   Monitor closely       Discussed patient condition and risk of morbidity and/or mortality with Hospitalist, RN, RT and Pharmacy.      I have assessed and reassessed his respiratory status, blood pressure, hemodynamics, cardiovascular and neurological status.  He is at increased risk for worsening hemodynamics as source of bleeding has not fully controlled. Pt to go to OR today by Dr. Moffett. Spoke with Dr. Moffett. Close monitoring of H/H is warranted.   He is at significant risk for requiring intubation mechanical ventilatory support.  High risk of deterioration and worsening vital organ dysfunction and death without the above critical care interventions.     The patient remains critically ill.  Critical care time = 55 minutes in directly providing and coordinating critical care and extensive data review.  No time overlap and excludes procedures.

## 2020-02-20 NOTE — ED NOTES
Med Rec Updated and Complete per Pt and Family at bedside  Allergies Reviewed  No PO ABX Last 14 days.    Pt states he received IV Vancomycin from where he gets Dialysis on Monday 02/17/20.    Pt reports taking a total of 3000mg of Gabapentin every night.    PharmD notified.    Pt is on a Warfarin Regimen as follows:    Warfarin 5mg tablets    1. Take 5mg on Sun, Tues, Wed, Thurs, Fri, and Sat.  2. Take 7.5mg on Monday only.    Last Dose: 5mg 02/18/20 @ 2130.    Pt denies OTC medication at this time.

## 2020-02-20 NOTE — PROGRESS NOTES
Hospital Medicine Daily Progress Note    Date of Service  2/20/2020    Chief Complaint  Dialysis graft bleed    Hospital Course    Mr. De Anda is a 39-year-old male with PMH significant for ESRD on MWF dialysis, chronic pain on chronic opioid therapy, and chronic anticoagulation on warfarin who presented 2/19/2020.  Patient was on his way to dialysis when he felt a sudden pop in his left thigh where his dialysis graft is.  He immediately noted bleeding.  Code assist was called out in the parking lot and patient was brought to the emergency department.  INR was 4.4 on presentation, Hgb 10.7.  Hemostasis was achieved with manual pressure and compression dressing and he was admitted to the CDU. The following morning patient began actively/pulsatile bleeding from his graft site.  INR at that time was 5.24 and he was given 10 mg IV vitamin K.  Hemoglobin dropped from 10.7-8.6.  He was hemodynamically stable.  Rapid response was called and Dr. Moffett came to bedside and placed sutures.      Interval Problem Update  -Rapid response this morning for active bleeding.  Patient hemodynamically stable.  Transfer to ICU.    Consultants/Specialty  -Vascular surgery  -Nephrology    Code Status  Full    Disposition  TBD    Review of Systems  Review of Systems   Reason unable to perform ROS: Limited due to acuity of condition.   Respiratory: Negative for shortness of breath.    Cardiovascular: Negative for chest pain.   Gastrointestinal: Negative for nausea and vomiting.   Musculoskeletal:        Chronic pain at his baseline    Acute pain LLE graft site     Neurological: Negative for dizziness.   Psychiatric/Behavioral: The patient is nervous/anxious.         Physical Exam  Temp:  [36.2 °C (97.2 °F)-37.1 °C (98.7 °F)] 37.1 °C (98.7 °F)  Pulse:  [] 108  Resp:  [12-30] 22  BP: ()/(46-90) 105/53  SpO2:  [90 %-100 %] 96 %    Physical Exam  Vitals signs and nursing note reviewed. Exam conducted with a chaperone present.    Constitutional:       General: He is in acute distress (Mild to moderate).      Appearance: Normal appearance.      Interventions: Nasal cannula in place.   HENT:      Head: Normocephalic.      Right Ear: Hearing normal.      Left Ear: Hearing normal.      Nose: Nose normal.      Mouth/Throat:      Lips: Pink.      Mouth: Mucous membranes are dry.   Cardiovascular:      Rate and Rhythm: Regular rhythm. Tachycardia present.      Pulses: Decreased pulses.      Heart sounds: Heart sounds are distant.      Arteriovenous access: left arteriovenous access is present.     Comments: LLE graft   (+) thrill (+) bruit  Pulmonary:      Effort: Tachypnea (anxious) and respiratory distress (mild) present.      Breath sounds: Decreased breath sounds present. No wheezing.   Abdominal:      General: Abdomen is protuberant. Bowel sounds are normal.      Palpations: Abdomen is soft.      Tenderness: There is no abdominal tenderness.   Musculoskeletal: Normal range of motion.   Skin:     General: Skin is warm and dry.      Capillary Refill: Capillary refill takes 2 to 3 seconds.   Neurological:      Mental Status: He is alert and oriented to person, place, and time.      Motor: Motor function is intact.   Psychiatric:         Mood and Affect: Mood is anxious.         Behavior: Behavior is cooperative.         Cognition and Memory: Cognition normal.         Judgment: Judgment normal.         Fluids  No intake or output data in the 24 hours ending 02/20/20 1106    Laboratory  Recent Labs     02/19/20  1720 02/20/20  0449 02/20/20  1035   WBC 4.3* 3.5* 4.5*   RBC 3.30* 2.73* 2.99*   HEMOGLOBIN 10.7* 8.6* 9.7*   HEMATOCRIT 32.2* 27.1* 29.3*   MCV 97.6 99.3* 98.0*   MCH 32.4 31.5 32.4   MCHC 33.2* 31.7* 33.1*   RDW 55.4* 57.2* 55.5*   PLATELETCT 143* 117* 132*   MPV 9.9 9.8 10.0     Recent Labs     02/19/20  1720 02/20/20  0449   SODIUM 134* 140   POTASSIUM 4.9 4.5   CHLORIDE 90* 99   CO2 26 25   GLUCOSE 98 105*   BUN 56* 60*   CREATININE  "10.96* 10.29*   CALCIUM 9.1 7.3*     Recent Labs     02/19/20  1720 02/20/20  0449   INR 4.40* 5.24*               Imaging  US-HEMODIALYSIS GRAFT DUPLEX COMP LOWER EXTREMITY   Final Result           Assessment/Plan  Pancytopenia (HCC)- (present on admission)  Assessment & Plan  -Chronic and stable at his baseline    Supratherapeutic INR- (present on admission)  Assessment & Plan  -Last dose warfarin 2/17  -Vitamin K 10 mg x 1 today  -Follow INR    Chronic anticoagulation- (present on admission)  Assessment & Plan  -On warfarin -last dose Monday 2/17  -Supratherapeutic INR  -Vitamin K  -Follow INR    Chronic, continuous use of opioids- (present on admission)  Assessment & Plan  -Blanca Query done  -Please see \"chronic pain disorder\"    Chronic pain disorder- (present on admission)  Assessment & Plan  -On significant outpatient pain medication regimen  -Continue home regimen -he reports taking 4 of his 5 daily allotted doses of narcotics at one time  -Acute on chronic pain in the setting of graft fistula bleed -I have ordered IV Dilaudid x24 hours  -Hold all narcotics for respiratory depression and/or altered mental status    ESRD on hemodialysis (HCC)- (present on admission)  Assessment & Plan  -On MWF dialysis -patient of Dr. Crump   -Nephrology following -[place] for dialysis once graft has been stabilized  -Avoid nephrotoxins  -Renally dose medications as indicated         VTE prophylaxis: Supratherapeutic INR/SCD    Please note that this dictation was created using voice recognition software. I have made every reasonable attempt to correct obvious errors, but there may be errors of grammar and possibly content that I did not discover before finalizing the note.    CLAY Damon.        "

## 2020-02-20 NOTE — PROCEDURES
Central Line Insertion  Date/Time: 2/20/2020 1:15 PM  Performed by: Stan Pendleton D.O.  Authorized by: Stan Pendleton D.O.     Consent:     Consent obtained:  Written    Consent given by:  Patient    Risks discussed:  Arterial puncture, incorrect placement, infection, bleeding, nerve damage and pneumothorax    Alternatives discussed:  No treatment  Pre-procedure details:     Hand hygiene: Hand hygiene performed prior to insertion      Sterile barrier technique: All elements of maximal sterile technique followed      Skin preparation:  2% chlorhexidine    Skin preparation agent: Skin preparation agent completely dried prior to procedure    Sedation:     Sedation type:  Anxiolysis  Anesthesia:     Anesthesia method:  Local infiltration    Local anesthetic:  Lidocaine 1% w/o epi  Procedure details:     Location:  R femoral    Site selection rationale:  SVC stenosis in the past, multiple graft failures    Patient position:  Flat    Procedural supplies:  Triple lumen (Trialysis cath)    Catheter size: 13Fr.    Landmarks identified: yes      Ultrasound guidance: yes      Sterile ultrasound techniques: Sterile gel and sterile probe covers were used      Number of attempts:  1    Successful placement: yes    Post-procedure details:     Post-procedure:  Dressing applied and line sutured    Assessment:  Blood return through all ports and free fluid flow    Patient tolerance of procedure:  Tolerated well, no immediate complications  Comments:      Pt required a lot of pain medications with total 3mg of dilaudid. Ranger local lidocaine.

## 2020-02-20 NOTE — PROGRESS NOTES
I have personally seen and examined / evaluated the patient, discussed the patient's evaluation & management plan with CHAYA Castillo and I have reviewed the note below.     I agree with the findings, history, examination and assessment / plan as listed below with changes/addendum as listed below by me in my addendum / attestation separetely.     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    Per patient, he was walking to his dialysis yesterday when he noticed a pop in his left thigh, subsequently started bleeding, applied pressure code assist was called and patient was brought to the emergency department.  On presentation patient noted to have a supratherapeutic INR of 4.4 hemoglobin of 10.7 with his baseline of 13.3 previously.  Admitted to the CDU, vascular surgery and nephrology consult.     I was called emergently to the bedside for this patient today because of pulsatile bleeding from the AV graft site.  At bedside, site evaluated, concern for underlying hematoma with pulsatile bleeding, overlying wound/ulceration/opening through which patient is noted to have pulsatile bleeding.  Pressure applied personally by me, to dressing change personally by me.  At bedside patient closely monitored from hemodynamic perspective personally by me, meanwhile personally called Dr. Moffett from vascular surgery and requested presents from vascular surgery at bedside immediately.  Rapid  response called, rapid response nurses at bedside.  Lack of adequate IV access, personally placed peripheral IV access for the patient.  Labs personally drawn by me as patient is a difficult IV access.    Dr. Moffett, nephrology team at bedside.  Temporary measures in place per vascular surgery, plan to transfer to the ICU.  Plan to go to the OR later tonight.  Discussed with Dr. Moffett, plan for 5 mg of vitamin K.  Repeat laboratory studies, platelet mapping with teg, type and screen and about reevaluation requested by me.    Patient dropping his blood pressures, critically ill.  Intractable pain, 2 mg of IV Dilaudid administered in my presence while patient monitored by me.    To transfer to the ICU, have discussed the case with Dr Pendleton     Concern for bacteremia dialysis, this cultures are not available for my review, received a dose of vancomycin at dialysis on Monday.  I have updated Dr Pendleton regarding this.     At this time patient's care would be transitioned to ICU team.    This patient is critically ill with a life threatening illness as noted above requiring my direct presence, involvement and maximal preparedness. I have personally spend 60 minutes providing critical care to this patient.  Mother with my interventions, personal presence at bedside and maximal preparedness there was high risk of development of hemorrhagic shock and death from an arterial bleed.  Time spend on critical care excludes time spend on procedures.  Time spent on critical care has been personally spent on me on CDU, T207.      Solomon Clayton M.D.  02/20/20  11:11 AM

## 2020-02-20 NOTE — PROGRESS NOTES
Received from yellow  pod, aox4,  steady on his feet. With minimal bleeding on his left leg hemodialysis  graft.  Denies pain or sob. Call light within reach. Needs attended. Plan of care discussed and understood.

## 2020-02-20 NOTE — CARE PLAN
Problem: Infection  Goal: Will remain free from infection  Outcome: PROGRESSING AS EXPECTED     Problem: Communication  Goal: The ability to communicate needs accurately and effectively will improve  Outcome: PROGRESSING AS EXPECTED     Problem: Safety  Goal: Will remain free from injury  Outcome: PROGRESSING AS EXPECTED  Goal: Will remain free from falls  Outcome: PROGRESSING AS EXPECTED     Problem: Pain Management  Goal: Pain level will decrease to patient's comfort goal  Outcome: PROGRESSING SLOWER THAN EXPECTED

## 2020-02-20 NOTE — PROGRESS NOTES
Pt medicated for 10/10 left leg pain.  Graft site actively bleeding, pressure applied, new dressing placed.

## 2020-02-20 NOTE — RESPIRATORY CARE
COPD EDUCATION by COPD CLINICAL EDUCATOR  2/20/2020 at 7:51 AM by Kristine Balbuena, RRT     Patient reviewed by COPD education team. Patient does not have a history or diagnosis of COPD and is a non-smoker, therefore does not qualify for the COPD program.

## 2020-02-20 NOTE — CARE PLAN
Problem: Pain Management  Goal: Pain level will decrease to patient's comfort goal  Outcome: PROGRESSING AS EXPECTED  Intervention: Follow pain managment plan developed in collaboration with patient and Interdisciplinary Team  Note: Administer prescribed pain meds.     Problem: Infection  Goal: Will remain free from infection  Outcome: PROGRESSING AS EXPECTED  Intervention: Implement standard precautions and perform hand washing before and after patient contact  Note: Practice aseptic techniques.

## 2020-02-20 NOTE — PROGRESS NOTES
Pt medicated for 10/10 pain.  Pt actively bleeding.  Dr and NP at bedside.  Rapid Response called.

## 2020-02-20 NOTE — ANESTHESIA PREPROCEDURE EVALUATION
38 yo for left thigh AV fistula revision  Chronic pain pot  Chronic opiate use  Hgb 9.7  K 5.6  Cr 11.0  INR 5.1  Renal failure - two failed transplants,   SINA  seizures      Relevant Problems   ANESTHESIA   (+) Sleep apnea      NEURO   (+) Seizure disorder (CMS-HCC)      CARDIAC   (+) AV graft thrombosis (HCC)   (+) Hemorrhage from arteriovenous dialysis graft ([facility])   (+) IVC thrombosis (HCC)   (+) Venous collateral circulation, any site         (+) Chronic kidney disease, stage V ([facility])   (+) ESRD (end stage renal disease) ([facility])   (+) ESRD on hemodialysis (HCC)   (+) Renal transplant failure and rejection x 2      Other   (+) Acropachy   (+) Acute midline thoracic back pain   (+) Chronic anticoagulation   (+) Chronic pain disorder   (+) Chronic, continuous use of opioids   (+) Gangrene ([facility])   (+) Hyperlipidemia with target LDL less than 100   (+) Hyperparathyroidism ([facility])   (+) Hyponatremia   (+) Hypotension, chronic   (+) Left foot pain   (+) Lethargy   (+) Localized edema   (+) MRSA (methicillin resistant Staphylococcus aureus) infection   (+) MRSA bacteremia   (+) Normocytic anemia   (+) Open wound of left thigh   (+) Osteopenia   (+) Pancytopenia ([facility])   (+) RLS (restless legs syndrome)   (+) Reflex sympathetic dystrophy   (+) Sore throat   (+) Supratherapeutic INR       Physical Exam    Airway   Mallampati: IV  TM distance: >3 FB  Neck ROM: full       Cardiovascular - normal exam  Rhythm: regular  Rate: normal  (-) murmur     Dental - normal exam         Pulmonary - normal exam  Breath sounds clear to auscultation     Abdominal    Neurological            Anesthesia Plan    ASA 4- EMERGENT   ASA physical status 4 criteria: ESRD not undergoing regularly scheduled dialysisASA physical status emergent criteria: acute hemorrhage    Plan - general       Airway plan will be LMA        Induction: intravenous    Postoperative Plan: Postoperative administration of opioids is intended.    Pertinent diagnostic labs and  testing reviewed    Informed Consent:    Anesthetic plan and risks discussed with patient.    Use of blood products discussed with: patient whom consented to blood products.

## 2020-02-20 NOTE — CODE DOCUMENTATION
Dr. Moffett at bedside to assess bleeding fistula.  MD holding pressure while gathering supplies to suture

## 2020-02-20 NOTE — PROGRESS NOTES
"Pharmacy Kinetics 39 y.o. male on vancomycin day # 4   2020    Received Vancomycin 1g iv loading dose at 13:02 PM on 20 after HD session  No other doses   Provider specified end date: TBD based on blood culture results     Indication for Treatment: GPC bacteremia    Pertinent history per medical record: Admitted on 2020 for ESRD on HD.    Mr. De Anda is a 40 y/o male with ESRD on HD Monday, Wednesday, Friday (followed by [facility]) who presented to the ER for acute bleeding from his left leg AV graft. The patient also reported being febrile during his last dialysis session on , and his dialysis center obtained blood cultures at that time and empirically gave him vancomycin 1g IV. His dialysis blood cultures from 20 were sent to a microbiology lab and resulted as positive for GPC in clusters. Repeat blood cultures were obtained here. A vancomycin random level was ordered STAT (in process).         Other antibiotics: None    Allergies: Baclofen; Contrast media with iodine [iodine]; Keflex; Pcn [penicillins]; Tape; and Requip     List concerns for renal function: ESRD on HD Monday, Wednesday, Friday       Pertinent cultures to date:   2020 Blood culture - GPC (culture obtained at [facility])    MRSA nares swab if pneumonia is a concern (ordered/positive/negative/n-a): ordered    Recent Labs     20  1720 20  0449 20  1035   WBC 4.3* 3.5* 4.5*   NEUTSPOLYS 63.50  --  67.70     Recent Labs     20  1720 20  0449 20  1035   BUN 56* 60* 64*   CREATININE 10.96* 10.29* 11.01*     No results for input(s): VANCOTROUGH, VANCOPEAK, VANCORANDOM in the last 72 hours.No intake or output data in the 24 hours ending 20 1125     /66   Pulse (!) 101   Temp 37.1 °C (98.7 °F)   Resp 17   Ht 1.626 m (5' 4\")   Wt 88.8 kg (195 lb 12.3 oz)   SpO2 99%  Temp (24hrs), Av.6 °C (97.9 °F), Min:36.2 °C (97.2 °F), Max:37.1 °C (98.7 " °F)      A/P   1. Vancomycin dose change: Pulse dosing due to ESRD on HD.   2. Next vancomycin level: Vancomycin random level currently in process.  3. Goal trough: 16-20 mcg/mL  4. Comments: Vancomycin per pharmacy for possible staph bacteremia. Awaiting vancomycin trough level to result. No other dialysis session was performed since 2/17. Will assess whether patient needs an additional dose of vancomycin at this time. Pharmacy will continue to follow and recommend de-escalation of antibiotics as appropriate.       Veronica Almonte, PharmD, BCPS

## 2020-02-20 NOTE — ASSESSMENT & PLAN NOTE
-On MWF dialysis -patient of Dr. Crump   -Nephrology following  -Avoid nephrotoxins  -Renally dose medications as indicated  The patient is anuric

## 2020-02-21 PROBLEM — L98.491 ULCER OF FINGER, LIMITED TO BREAKDOWN OF SKIN (HCC): Status: ACTIVE | Noted: 2020-02-21

## 2020-02-21 LAB
ANION GAP SERPL CALC-SCNC: 14 MMOL/L (ref 0–11.9)
BASOPHILS # BLD AUTO: 0.2 % (ref 0–1.8)
BASOPHILS # BLD: 0.01 K/UL (ref 0–0.12)
BUN SERPL-MCNC: 39 MG/DL (ref 8–22)
CALCIUM SERPL-MCNC: 7.3 MG/DL (ref 8.5–10.5)
CHLORIDE SERPL-SCNC: 101 MMOL/L (ref 96–112)
CO2 SERPL-SCNC: 26 MMOL/L (ref 20–33)
CREAT SERPL-MCNC: 6.87 MG/DL (ref 0.5–1.4)
EOSINOPHIL # BLD AUTO: 0 K/UL (ref 0–0.51)
EOSINOPHIL NFR BLD: 0 % (ref 0–6.9)
ERYTHROCYTE [DISTWIDTH] IN BLOOD BY AUTOMATED COUNT: 56.9 FL (ref 35.9–50)
GLUCOSE SERPL-MCNC: 138 MG/DL (ref 65–99)
HCT VFR BLD AUTO: 24 % (ref 42–52)
HGB BLD-MCNC: 8 G/DL (ref 14–18)
HGB BLD-MCNC: 8.2 G/DL (ref 14–18)
IMM GRANULOCYTES # BLD AUTO: 0.03 K/UL (ref 0–0.11)
IMM GRANULOCYTES NFR BLD AUTO: 0.5 % (ref 0–0.9)
INR PPP: 1.55 (ref 0.87–1.13)
LYMPHOCYTES # BLD AUTO: 0.6 K/UL (ref 1–4.8)
LYMPHOCYTES NFR BLD: 10.4 % (ref 22–41)
MCH RBC QN AUTO: 32.9 PG (ref 27–33)
MCHC RBC AUTO-ENTMCNC: 33.3 G/DL (ref 33.7–35.3)
MCV RBC AUTO: 98.8 FL (ref 81.4–97.8)
MONOCYTES # BLD AUTO: 0.2 K/UL (ref 0–0.85)
MONOCYTES NFR BLD AUTO: 3.5 % (ref 0–13.4)
MRSA DNA SPEC QL NAA+PROBE: NORMAL
NEUTROPHILS # BLD AUTO: 4.95 K/UL (ref 1.82–7.42)
NEUTROPHILS NFR BLD: 85.4 % (ref 44–72)
NRBC # BLD AUTO: 0 K/UL
NRBC BLD-RTO: 0 /100 WBC
PLATELET # BLD AUTO: 107 K/UL (ref 164–446)
PMV BLD AUTO: 10 FL (ref 9–12.9)
POTASSIUM SERPL-SCNC: 5.2 MMOL/L (ref 3.6–5.5)
PROTHROMBIN TIME: 19.1 SEC (ref 12–14.6)
RBC # BLD AUTO: 2.43 M/UL (ref 4.7–6.1)
SIGNIFICANT IND 70042: NORMAL
SITE SITE: NORMAL
SODIUM SERPL-SCNC: 141 MMOL/L (ref 135–145)
SOURCE SOURCE: NORMAL
WBC # BLD AUTO: 5.8 K/UL (ref 4.8–10.8)

## 2020-02-21 PROCEDURE — A9270 NON-COVERED ITEM OR SERVICE: HCPCS | Performed by: INTERNAL MEDICINE

## 2020-02-21 PROCEDURE — 99233 SBSQ HOSP IP/OBS HIGH 50: CPT | Performed by: INTERNAL MEDICINE

## 2020-02-21 PROCEDURE — 80048 BASIC METABOLIC PNL TOTAL CA: CPT

## 2020-02-21 PROCEDURE — 700111 HCHG RX REV CODE 636 W/ 250 OVERRIDE (IP): Performed by: INTERNAL MEDICINE

## 2020-02-21 PROCEDURE — 700111 HCHG RX REV CODE 636 W/ 250 OVERRIDE (IP): Mod: JG | Performed by: INTERNAL MEDICINE

## 2020-02-21 PROCEDURE — 87641 MR-STAPH DNA AMP PROBE: CPT

## 2020-02-21 PROCEDURE — 700102 HCHG RX REV CODE 250 W/ 637 OVERRIDE(OP): Performed by: INTERNAL MEDICINE

## 2020-02-21 PROCEDURE — 90935 HEMODIALYSIS ONE EVALUATION: CPT

## 2020-02-21 PROCEDURE — 5A1D70Z PERFORMANCE OF URINARY FILTRATION, INTERMITTENT, LESS THAN 6 HOURS PER DAY: ICD-10-PCS | Performed by: INTERNAL MEDICINE

## 2020-02-21 PROCEDURE — 85018 HEMOGLOBIN: CPT

## 2020-02-21 PROCEDURE — 770022 HCHG ROOM/CARE - ICU (200)

## 2020-02-21 PROCEDURE — 85610 PROTHROMBIN TIME: CPT

## 2020-02-21 PROCEDURE — 90935 HEMODIALYSIS ONE EVALUATION: CPT | Performed by: INTERNAL MEDICINE

## 2020-02-21 PROCEDURE — P9047 ALBUMIN (HUMAN), 25%, 50ML: HCPCS | Mod: JG | Performed by: INTERNAL MEDICINE

## 2020-02-21 PROCEDURE — 85025 COMPLETE CBC W/AUTO DIFF WBC: CPT

## 2020-02-21 PROCEDURE — 700105 HCHG RX REV CODE 258: Performed by: INTERNAL MEDICINE

## 2020-02-21 RX ORDER — SODIUM CHLORIDE 9 MG/ML
500 INJECTION, SOLUTION INTRAVENOUS ONCE
Status: COMPLETED | OUTPATIENT
Start: 2020-02-21 | End: 2020-02-21

## 2020-02-21 RX ORDER — ALBUMIN (HUMAN) 12.5 G/50ML
50 SOLUTION INTRAVENOUS ONCE
Status: COMPLETED | OUTPATIENT
Start: 2020-02-21 | End: 2020-02-21

## 2020-02-21 RX ORDER — HYDROMORPHONE HYDROCHLORIDE 1 MG/ML
0.5 INJECTION, SOLUTION INTRAMUSCULAR; INTRAVENOUS; SUBCUTANEOUS
Status: DISCONTINUED | OUTPATIENT
Start: 2020-02-21 | End: 2020-02-24 | Stop reason: HOSPADM

## 2020-02-21 RX ADMIN — CINACALCET HYDROCHLORIDE 30 MG: 30 TABLET, FILM COATED ORAL at 11:27

## 2020-02-21 RX ADMIN — SEVELAMER CARBONATE 3200 MG: 800 TABLET, FILM COATED ORAL at 20:11

## 2020-02-21 RX ADMIN — HEPARIN SODIUM 2800 UNITS: 1000 INJECTION, SOLUTION INTRAVENOUS; SUBCUTANEOUS at 13:15

## 2020-02-21 RX ADMIN — HYDROCODONE BITARTRATE AND ACETAMINOPHEN 1 TABLET: 10; 325 TABLET ORAL at 12:55

## 2020-02-21 RX ADMIN — SODIUM CHLORIDE 500 ML: 9 INJECTION, SOLUTION INTRAVENOUS at 23:31

## 2020-02-21 RX ADMIN — HYDROCODONE BITARTRATE AND ACETAMINOPHEN 1 TABLET: 10; 325 TABLET ORAL at 22:07

## 2020-02-21 RX ADMIN — TIZANIDINE 8 MG: 4 TABLET ORAL at 22:07

## 2020-02-21 RX ADMIN — GABAPENTIN 1800 MG: 400 CAPSULE ORAL at 18:21

## 2020-02-21 RX ADMIN — ALBUMIN (HUMAN) 50 G: 5 SOLUTION INTRAVENOUS at 18:58

## 2020-02-21 RX ADMIN — CALCITRIOL CAPSULES 0.25 MCG 0.75 MCG: 0.25 CAPSULE ORAL at 20:10

## 2020-02-21 RX ADMIN — EPOETIN ALFA 4000 UNITS: 4000 SOLUTION INTRAVENOUS; SUBCUTANEOUS at 11:30

## 2020-02-21 RX ADMIN — HYDROMORPHONE HYDROCHLORIDE 0.5 MG: 1 INJECTION, SOLUTION INTRAMUSCULAR; INTRAVENOUS; SUBCUTANEOUS at 20:10

## 2020-02-21 RX ADMIN — HYDROMORPHONE HYDROCHLORIDE 0.5 MG: 1 INJECTION, SOLUTION INTRAMUSCULAR; INTRAVENOUS; SUBCUTANEOUS at 13:42

## 2020-02-21 ASSESSMENT — ENCOUNTER SYMPTOMS
FEVER: 0
EYES NEGATIVE: 1
HEADACHES: 0
PSYCHIATRIC NEGATIVE: 1
NAUSEA: 0
SHORTNESS OF BREATH: 0
CHILLS: 0
ABDOMINAL PAIN: 0
SINUS PAIN: 0
VOMITING: 0
BACK PAIN: 1

## 2020-02-21 NOTE — PROGRESS NOTES
Pharmacy Kinetics Addendum:    39 y.o. male on vancomycin day # 4 2/20/2020    Currently on Vancomycin pulse dosing    - Vancomycin 1g iv loading dose at 13:02 PM on 2/17/20 after HD session    Provider specified end date: TBD based on blood culture results     Indication for Treatment: GPC bacteremia    Recent Labs     02/19/20  1720 02/20/20  0449 02/20/20  1035   BUN 56* 60* 64*   CREATININE 10.96* 10.29* 11.01*     Recent Labs     02/20/20  1330   VANCORANDOM 7.0       A/P   1. Vancomycin dose change: vancomycin 1300 mg X 1 dose on 2/20 in evening   2. Next vancomycin level: 2/22 AM labs (not ordered)  3. Goal trough: 16-20 mcg/mL.  Comments: Current 72 hours random vancomycin level is 7 which is below goal trough of 16-20 mcg/mL. Ordering 15 mg/kg one time dose. Consider ordering ~36 hour random level for 2/22 with AM labs in setting of pulse dosing for HD patient.  Pharmacy will continue to monitor and adjust dosing or recommend de-escalation as appropriate.         Donita Mullins, PharmD, PharmD

## 2020-02-21 NOTE — CARE PLAN
Problem: Pain Management  Goal: Pain level will decrease to patient's comfort goal  Outcome: PROGRESSING AS EXPECTED     Problem: Skin Integrity  Goal: Risk for impaired skin integrity will decrease  Outcome: PROGRESSING AS EXPECTED

## 2020-02-21 NOTE — PROGRESS NOTES
Diagnosis: End-Stage Renal Disease admitted with bleeding LLE AVG site and GPC bacteremia. Patient seen and examined on hemodialysis during treatment. Patient is stable, tolerating hemodialysis. Denies chest pain and shortness of breath. Orders updated as needed. Please refer to flowsheet for full details.    Access: R femoral temp vascath  UF goal: 2L    Plan: Continue HD Monday Wednesday Friday schedule. UF with HD as tolerated. Patient is anuric.  - Access: Left thigh AVG with limited lifespan remaining. Went for angiogram/angioplasty with Dr. Moffett on 2/20/20. Will use temp RLE vascath for now until LLE AVG is cleared for use by surgery.   - GPC bacteremia: Please alert nephrology if long term antibiotics will be required as an outpatient.    Raul Londono MD  Nephrology

## 2020-02-21 NOTE — PROGRESS NOTES
Pt taken to preop via transport on monitor. Medicated with tylenol for 100.4 temperature and norco for pain prior to transport. CHG bath given. Bedside report given to preop RN. Clarified that potassium is high, but pt is a dialysis patient that was not able to receive dialysis today. Also INR is high but Dr. Moffett is aware.

## 2020-02-21 NOTE — CARE PLAN
Problem: Pain Management  Goal: Pain level will decrease to patient's comfort goal  Outcome: PROGRESSING AS EXPECTED     Problem: Infection  Goal: Will remain free from infection  Outcome: PROGRESSING AS EXPECTED     Problem: Communication  Goal: The ability to communicate needs accurately and effectively will improve  Outcome: PROGRESSING AS EXPECTED

## 2020-02-21 NOTE — PROGRESS NOTES
2 RN Skin Check    2 RN skin check complete. With nito Mooney  Devices in place: SCDs and Nasal Cannula.  Skin assessed under devices: yes.  Confirmed pressure ulcers found on: n/a.  New potential pressure ulcers noted on n/a. Wound consult placed N/A.  The following interventions in place Pillows.

## 2020-02-21 NOTE — ANESTHESIA POSTPROCEDURE EVALUATION
Patient: Denis De Anda    Procedure Summary     Date:  02/20/20 Room / Location:  [facility] / SURGERY [facility]    Anesthesia Start:  1728 Anesthesia Stop:  1922    Procedures:       FISTULOGRAM AND ANGIOPLASTY, VENOGRAM THROUGH EXISTING CATHETER. (Left Thigh)      REPAIR BLEEDING SITE LEFT ARTERIOVENOUS GRAFT. (Left Thigh) Diagnosis:  (Bleeding left thigh arteriovenous graft)    Surgeon:  Lurdes Moffett M.D. Responsible Provider:  Leighton Connell M.D.    Anesthesia Type:  general ASA Status:  3 - Emergent          Final Anesthesia Type: general  Last vitals  BP   Blood Pressure: (!) 85/49    Temp   36.8 °C (98.2 °F)    Pulse   Pulse: (!) 104   Resp   (!) 11    SpO2   97 %      Anesthesia Post Evaluation    Patient location during evaluation: PACU  Patient participation: complete - patient participated  Level of consciousness: awake  Pain score: 0    Airway patency: patent  Anesthetic complications: no  Cardiovascular status: hemodynamically stable  Respiratory status: acceptable  Hydration status: euvolemic    PONV: none           Nurse Pain Score: 0 (NPRS)

## 2020-02-21 NOTE — PROGRESS NOTES
HD treatment today per routine order using right femoral temporary dialysis catheter.Treatment tolerated well.Net UF removed 2000 ml.Initial cvc dressing changed and locked with heparin.Report given to primary Rn.

## 2020-02-21 NOTE — ANESTHESIA PROCEDURE NOTES
Airway  Date/Time: 2/20/2020 5:38 PM  Performed by: Leighton Connell M.D.  Authorized by: Leighton Connell M.D.     Location:  OR  Urgency:  Elective  Indications for Airway Management:  Anesthesia  Spontaneous Ventilation: absent    Sedation Level:  Deep  Preoxygenated: Yes    Mask Difficulty Assessment:  1 - vent by mask  Final Airway Type:  Supraglottic airway  Final Supraglottic Airway:  Standard LMA  SGA Size:  3  Number of Attempts at Approach:  1

## 2020-02-21 NOTE — ASSESSMENT & PLAN NOTE
GPC in clusters bacteremia -complicated infection and antibiotic history  Continue vancomycin for now, ID consulted  Dosing per pharmacy  Blood cultures have been repeated here  MRSA nasal screen. If positive, decolonize with mupirocin ointment BID x 5 days

## 2020-02-21 NOTE — PROGRESS NOTES
[facility] Progress Note     Hemodialysis treatment ordered today per Dr. Londono x 3 hours.   Treatment initiated at 2041, ended at 2341.       Albumin 25% 12.5 grams IV x 1 BP support at treatment initiation.   See paper flow sheet for details.      Net UF 2000mL.      Post tx, CVC flushed with saline then locked with heparin 1000 units/mL per designated amount in each wing then clamped and capped.   Aspirate heparin prior to next CVC use.     Report given to Primary RN.

## 2020-02-21 NOTE — OR SURGEON
OP Note    PreOp Diagnosis: Bleeding, infected left thigh AVG    PostOp Diagnosis: Same    Procedure(s):  FISTULOGRAM AND ANGIOPLASTY, VENOGRAM THROUGH EXISTING CATHETER. - Wound Class: Dirty or Infected  REPAIR BLEEDING SITE LEFT ARTERIOVENOUS GRAFT. - Wound Class: Dirty or Infected    Surgeon(s):  Lurdes Moffett M.D.    Anesthesiologist/Type of Anesthesia:  Anesthesiologist: Leighton Connell M.D./General    Surgical Staff:  Circulator: Pascale Ramos R.N.  Relief Circulator: Barbara eReder R.N.  Scrub Person: Cierra Bartholomew  Radiology Technologist: Hernandez Bhakta    Specimens removed if any:clot      Estimated Blood Loss: 200cc    Findings: perforated area on lateral side of graft repaired and tested.  Obviously infected.  Will need a better long term fix.     Complications: none    Description of procedure: Patient was taken to the operating room placed in the supine position.  General anesthesia was initiated with a laryngeal mask airway.  The pressure dressing was removed from the lateral thigh and both thighs as well as the indwelling catheter were prepped with Betadine paint in 2 layers.  A timeout was called and the proposed procedure confirmed with all team members.    I used a micropuncture needle and wire to place a micropuncture sheath and an 018 wire into the loop graft.  The the sheath was used to exchange for a 5 Bengali sheath.  I used this to perform a fistulogram identifying moderately severe stenosis at the venous outflow.  I then passed an 018 wire and use a 7 mm balloon to perform angioplasty throughout the venous loop of the graft.  This improved the flow.  I then tried to use the 7 mm balloon to occlude the area where the bleeding occurred but unfortunately, not surprisingly the balloon burst.  We held pressure at the sheath and I was able to repair the multiply perforated graft with 6-0 and 5-0 Prolene sutures.    I then placed a Glidewire through the loop graft and  exchanged for a 6 Tongan sheath from the 5 Tongan sheath.  I used an 8 mm balloon to perform angioplasty through the entire loop graft as well as pressurized angioplasty at the venous anastomosis.  I performed an outflow image showing no evidence of venous stenosis.  The sheath was removed and an incision placed at its entrance site.  I then cleaned up the skin around the perforation, irrigated the wound and approximated the skin after mobilizing tissue around the graft.  This is only a temporizing maneuver as this area was obviously infected.  I chose not to leave the wide open wound open to air.  The wounds were covered with Xeroform gauze and Tegaderm.    We then exposed the right femoral groin catheter.  Through the venous side, I performed a venogram identifying what appears to be a wide open venous outflow tract in the right groin.  Obviously, the vein just above the femoral vein was not visualized but the appearance of the iliac veins is not 1 of more distal occlusion.  I packed the catheter again with heparin and 1000 units per mill.    Conclusion: I need to talk with Denis about the prospect of a new right thigh loop graft.  Unfortunately, with bacteremia this will need to be cleared before we can proceed with a new graft.    2/20/2020 7:13 PM Lurdes Moffett M.D.

## 2020-02-21 NOTE — PROGRESS NOTES
"Pharmacy Kinetics 39 y.o. male on vancomycin day # 5   2020    Currently on Vancomycin Pulse Dosing   Dosing history:   : Vancomycin 1300 mg IV at 2325   : Vancomycin 1000 mg IV at 1302 at [facility]     Provider specified end date: TBD    Indication for Treatment: Staphylococcal bacteremia    Pertinent history per medical record: Admitted on 2020 for bleeding from his AV fistula. Patient also reports he was noted to be febrile during his dialysis session on , so blood cultures were obtained at the dialysis center and patient was given a dose of vancomycin at the end of his HD session. Cultures returned positive for GPC in clusters, thus, vancomycin therapy was continued. ID consult is pending.    Other antibiotics: None    Allergies: Baclofen; Contrast media with iodine [iodine]; Keflex; Pcn [penicillins]; Tape; and Requip     Concern for renal function includes ESRD on HD.    Pertinent cultures to date:   : peripheral blood cx - Gram positive cocci: Possible Staphylococcus sp.  : R femoral HD line - Staphylococcus aureus (methicillin sensitive) detected by PCR.  : peripheral blood cx X2 (obtained at [facility]) - GPC in clusters     MRSA nares swab if pneumonia is a concern (ordered/positive/negative/n-a): Negative    Recent Labs     20  1720 20  0449 20  1035 20  0348   WBC 4.3* 3.5* 4.5* 5.8   NEUTSPOLYS 63.50  --  67.70 85.40*     Recent Labs     20  1720 20  0449 20  1035 20  0348   BUN 56* 60* 64* 39*   CREATININE 10.96* 10.29* 11.01* 6.87*     Recent Labs     20  1330   VANCORANDOM 7.0       Intake/Output Summary (Last 24 hours) at 2020 1537  Last data filed at 2020 1300  Gross per 24 hour   Intake 1420 ml   Output 4750 ml   Net -3330 ml      BP (!) 89/51   Pulse (!) 110   Temp 36 °C (96.8 °F) (Temporal)   Resp 14   Ht 1.626 m (5' 4\")   Wt 88.8 kg (195 lb 12.3 oz)   SpO2 99%  Temp (24hrs), Av.7 °C (98.1 °F), " Min:35.9 °C (96.7 °F), Max:38.5 °C (101.3 °F)      A/P   1. Vancomycin dose change: No dose indicated  2. Next vancomycin level: Random level 2/22 with AM labs   3. Goal trough: 12-16 mcg/mL  4. Comments: Patient remains on vancomycin for staphylococcal bacteremia, ID consult pending. Patient had dialysis overnight and repeat session today to resume usual MWF schedule. Repeat vancomycin dose given last evening based on vanco level, no repeat dose indicated today. Will plan random vanco level with AM labs to guide repeat dosing for level <16 mcg/mL.    Pharmacy will continue to follow.     Cristy Heck, JocelynD

## 2020-02-21 NOTE — CONSULTS
Date of Service  2/20/2020    Reason For Consult  Bleeding left thigh arteriovenous graft    Requesting Physician   Solomon Clayton MD    Consulting Physician  Lurdes Moffett M.D.    Primary Care Physician  Tony Mcmillan M.D.    Chief Complaint  Bleeding left thigh AVG    History of Present Illness  39 y.o. male who presented 2/19/2020 with  bleeind from a large ulcerated area in his left arteriovenous graft.  Apparently, he has been having problems with prolonged bleeding for the last couple treatments.  He has had multiple revisions to this thigh access and now with what I consider grossly exposed graft material, the graft is clinically infected.     I came to the bedside and held focal pressure on the access site, considered suture repair and settled on Surgicel and a pressure dressing while he is stabilized.      Review of Systems  Review of Systems   Constitutional: Positive for chills, diaphoresis, fever and malaise/fatigue.   Respiratory: Negative for cough, hemoptysis and shortness of breath.    Cardiovascular: Negative for chest pain.   Gastrointestinal: Negative.    Genitourinary: Negative.         Makes little to no urine   Skin:        Open wound on the left lateral thigh with maceration.    Neurological: Positive for weakness.   Psychiatric/Behavioral: Positive for depression.       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   • Toxic uninodular goiter without mention of thyrotoxic crisis or storm        Surgical History  Past Surgical History:   Procedure Laterality Date   • THROMBECTOMY Left 1/26/2019    Procedure: THROMBECTOMY  and angioplasty AV GRAFT;  Surgeon: Jairo Hopkins M.D.;  Location: [facility];  Service: General   • AV FISTULA REVISION Left 1/25/2019    Procedure: AV FISTULA REVISION - THIGH LOOP GRAFT;  Surgeon: Jairo Hopkins M.D.;  Location: [facility];  Service: General   • AV FISTULA REVISION Left 12/23/2018    Procedure: AV FISTULA REVISION;  Surgeon: Lurdes Moffett M.D.;  Location: [facility];  Service: General   • SPLIT THICKNESS SKIN GRAFT Right 9/21/2018    Procedure: SPLIT THICKNESS SKIN GRAFT - ARM TO ABDOMEN;  Surgeon: Samson Rosenbaum Jr., M.D.;  Location: [facility];  Service: Plastics   • SKIN FLAP DELAYED Left 9/10/2018    Procedure: SKIN FLAP DELAYED- FOR DIVISION AND INSET FLAP WITH SKIN GRAFT ON ARM;  Surgeon: Samson Rosenbaum Jr., M.D.;  Location: SURGERY SAME DAY [facility];  Service: Plastics   • MYOCUTANEOUS FLAP Right 8/27/2018    Procedure: MYOCUTANEOUS FLAP - RIGHT ARM TO TRUNK;  Surgeon: Samson Rosenbaum Jr., M.D.;  Location: [facility];  Service: Plastics   • ABDOMINAL EXPLORATION  6/25/2018    Procedure: ABDOMINAL EXPLORATION- CONTROL ABDOMINAL BLEEDING;  Surgeon: Samson Rosenbaum Jr., M.D.;  Location: [facility];  Service: Plastics   • THROMBECTOMY Left 4/28/2018    Procedure: THROMBECTOMY- Thigh W/ Graft;  Surgeon: Jairo Hopkins M.D.;  Location: SURGERY [facility];  Service: General   • THROMBECTOMY Left 4/27/2018    Procedure: THROMBECTOMY - THIGH GRAFT AND REVISION;  Surgeon: Jairo Hopkins M.D.;  Location: [facility];  Service: General   • THROMBECTOMY Left 3/9/2018    Procedure: THROMBECTOMY- THIGH DIALYSIS GRAFT  AND REVISION  ;  Surgeon: Jairo Hopkins M.D.;  Location: [facility];  Service: General   • CATH PLACEMENT Right 3/9/2018    Procedure: CATH PLACEMENT - REPLACE DIALYSIS CATH RIGHT THIGH;  Surgeon: Jairo Hopkins M.D.;  Location: [facility];  Service: General   • SPLIT THICKNESS SKIN GRAFT  2/26/2018    Procedure: SPLIT THICKNESS SKIN GRAFT- TO ABDOMEN;  Surgeon: Samson Rosenbaum Jr., M.D.;  Location: SURGERY [facility];  Service: Plastics   • WOUND CLOSURE GENERAL  2/19/2018    Procedure: WOUND CLOSURE GENERAL-ABDOMINAL WALL HEMORRHAGE;  Surgeon: Jason Robertson M.D.;  Location: SURGERY [facility];  Service: Plastics   • WOUND EXPLORATION GENERAL  2/1/2018    Procedure: WOUND EXPLORATION GENERAL, REPAIR BLEEDING;  Surgeon: Samson Rosenbaum Jr., M.D.;  Location: [facility];  Service: Plastics   • CATH PLACEMENT Right 11/14/2017    Procedure: CATH PLACEMENT - PERMA;  Surgeon: Jairo Hopkins M.D.;  Location: [facility];  Service: General   • AV FISTULA REVISION Left 11/14/2017    Procedure: AV GRAFT REVISION;  Surgeon: Jairo Hopkins M.D.;  Location: [facility];  Service: General   • IRRIGATION & DEBRIDEMENT GENERAL  7/31/2017    Procedure: IRRIGATION & DEBRIDEMENT GENERAL-ABDOMEN;  Surgeon: Samson Rosenbaum Jr., M.D.;  Location: [facility];  Service:    • ABDOMINOPLASTY  6/5/2017    Procedure: ABDOMINOPLASTY - FOR PANNICULECTOMY;  Surgeon: Samson Rosenbaum Jr., M.D.;  Location: [facility];  Service:    • SPINAL CORD STIMULATOR N/A 5/4/2017    Procedure: SPINAL CORD STIMULATOR - EXPLANT;  Surgeon: Bin Pro M.D.;  Location: [facility];  Service:    • THROMBECTOMY Left 1/6/2017    Procedure: THROMBECTOMY-OPEN THROMBECTOMY WITH LEFT THIGH GRAFT;  Surgeon: Jairo Hopkins M.D.;  Location: [facility];  Service:    • CATH PLACEMENT Right 1/6/2017    Procedure: CATH  PLACEMENT;  Surgeon: Jairo Hopkins M.D.;  Location: SURGERY [facility];  Service:    • THROMBECTOMY Left 1/5/2017    Procedure: THROMBECTOMY-THIGH AV LOOP GRAFT AND ANGIOJET;  Surgeon: Jairo Hopkins M.D.;  Location: SURGERY [facility];  Service:    • FLAP CLOSURE Left 11/17/2016    Procedure: Fasciocutaneous Flap Closure Left Upper Leg;  Surgeon: Samson Rosenbaum Jr., M.D.;  Location: SURGERY [facility];  Service:    • IRRIGATION & DEBRIDEMENT GENERAL Left 10/28/2016    Procedure: IRRIGATION & DEBRIDEMENT GENERAL THIGH WITH IRRIGATING WOUND VAC PLACEMENT;  Surgeon: Jairo Hopkins M.D.;  Location: SURGERY [facility];  Service:    • THROMBECTOMY Left 10/20/2016    Procedure: THROMBECTOMY THIGH;  Surgeon: Jairo Hopkins M.D.;  Location: SURGERY [facility];  Service:    • INCISION AND DRAINAGE GENERAL  10/20/2016    Procedure: INCISION AND DRAINAGE GENERAL HEMATOMA;  Surgeon: Jairo Hopkins M.D.;  Location: SURGERY [facility];  Service:    • THROMBECTOMY Left 9/18/2016    Procedure: THROMBECTOMY - and fistula revision;  Surgeon: Jairo Hopkins M.D.;  Location: SURGERY [facility];  Service:    • AV FISTULOGRAM  9/18/2016    Procedure: AV FISTULOGRAM;  Surgeon: Jairo Hopkins M.D.;  Location: SURGERY [facility];  Service:    • CATH PLACEMENT Right 9/17/2016    Procedure: CATH PLACEMENT - tunneled dialysis cath placement right femoral ;  Surgeon: Quentin Alicia M.D.;  Location: SURGERY [facility];  Service:    • MASS EXCISION GENERAL Right 8/5/2016    Procedure: MASS EXCISION GENERAL FOR CALCIPHYLAXIS SKIN AND SUBCUTANEOUS TISSUE FOREARM;  Surgeon: Jairo Hopkins M.D.;  Location: SURGERY [facility];  Service:    • LESION EXCISION GENERAL Right 7/11/2016    Procedure: LESION EXCISION GENERAL FOR ARM SKIN;  Surgeon: Jairo Hopkins M.D.;  Location: SURGERY [facility];  Service:    • RECOVERY  7/8/2016    Procedure: IR1-VASCULAR  CASE-VIANEY-LEFT THIGH AV GRAFT THROMBOLYSIS WITH TISSUE PLASMINOGEN ACTIVATOR AND ANGIOJET ARTHERECTOMY   ;  Surgeon: Melinda Surgery;  Location: SURGERY PRE-POST PROC UNIT [facility];  Service:    • SPINAL CORD STIMULATOR N/A 3/25/2016    Procedure: SPINAL CORD STIMULATOR;  Surgeon: Bin Pro;  Location: SURGERY [facility];  Service:    • PB PERCUT IMPLNT NEUROELECT,EPIDURAL  2/19/2016    Procedure: IMPLANT NEUROSTIM EPI ARRAY;  Surgeon: Bin Pro;  Location: SURGERY [facility];  Service: Pain Management   • PB PERCUT IMPLNT NEUROELECT,EPIDURAL  2/19/2016    Procedure: IMPLANT NEUROSTIM EPI ARRAY;  Surgeon: Bin Pro;  Location: SURGERY [facility];  Service: Pain Management   • RECOVERY  8/7/2015    Procedure:  VASCULAR CASE VIANEY-RIGHT ILIAC VENOGRAM WITH ANGIOPLASTY, REMOVAL RIGHT FEMORAL TUNNELED DIALYSIS CATHETER  **VRE**;  Surgeon: Melinda Surgery;  Location: SURGERY PRE-POST PROC UNIT [facility];  Service:    • AV FISTULA REVISION Left 6/17/2015    Procedure: AV FISTULA REVISION;  Surgeon: Jairo Hopkins M.D.;  Location: [facility];  Service:    • IRRIGATION & DEBRIDEMENT GENERAL Left 6/17/2015    Procedure: IRRIGATION & DEBRIDEMENT GENERAL ARM W/EXPLORATION WOUND & CONTROL BLEEDING;  Surgeon: Jairo Hopkins M.D.;  Location: [facility];  Service:    • AV FISTULA THROMBOLYSIS Left 6/9/2015    Procedure: THROMBECTOMY AV GRAFT THIGH;  Surgeon: Jairo Hopkins M.D.;  Location: SURGERY [facility];  Service:    • AV FISTULA THROMBOLYSIS Left 6/3/2015    Procedure: AV FISTULA THROMBOLYSIS THIGH REPLACEMENT;  Surgeon: Jairo Hopkins M.D.;  Location: SURGERY [facility];  Service:    • IRRIGATION & DEBRIDEMENT GENERAL Left 6/3/2015    Procedure: IRRIGATION & DEBRIDEMENT GENERAL;  Surgeon: Jairo Hopkins M.D.;  Location: SURGERY [facility];  Service:    • AV FISTULA CREATION  4/20/2015    Performed by Jairo Hopkins M.D. at [facility]S   • PB INJECT NERV BLCK,STELLATE GANGLION  3/24/2015    Performed by Bin Pro at SURGERY [facility]   • AV FISTULA REVISION  3/20/2015    Performed by Jairo Hopkins M.D. at SURGERY [facility]   • AV FISTULA REVISION  2/22/2015    Performed by Lurdes Moffett M.D. at SURGERY [facility] ORS   • AV FISTULA REVISION  2/8/2015    Performed by Jairo Hopkins M.D. at SURGERY [facility] ORS   • IRRIGATION & DEBRIDEMENT GENERAL  12/15/2014    Performed by Jairo Hopkins M.D. at SURGERY [facility] ORS   • AV FISTULA REVISION  9/30/2014    Performed by Jairo Hopkins M.D. at SURGERY [facility]   • RECOVERY  6/13/2014    Performed by [facility] Surgery at SURGERY SAME DAY [facility] ORS   • INCISION AND DRAINAGE GENERAL  9/13/2013    Performed by Jairo Hopkins M.D. at SURGERY [facility] ORS   • DEBRIDEMENT  9/13/2013    Performed by Jairo Hopkins M.D. at SURGERY [facility] ORS   • VEIN LIGATION  9/13/2013    Performed by Jairo Hopkins M.D. at SURGERY [facility] ORS   • RECOVERY  5/18/2012    Performed by SURGERY, IR-RECOVERY at [facility] ORS   • BONE SPUR EXCISION  12/8/2011    Performed by BELKIS BREAUX at SURGERY SAME DAY [facility] ORS   • AV FISTULA REVISION  11/3/2011    Performed by JAIRO HOPKINS at SURGERY [facility] ORS   • AV FISTULOGRAM  6/5/2011    Performed by JAIRO HOPKINS at SURGERY [facility] ORS   • CATH PLACEMENT  6/5/2011    Performed by JAIRO HOPKINS at SURGERY [facility] ORS   • CATH PLACEMENT  2/1/2011    Performed by JAIRO HOPKINS at SURGERY [facility] ORS   • ANGIOPLASTY BALLOON  2/1/2011    Performed by JAIRO HOPKINS at SURGERY [facility] ORS   • ENDARTERECTOMY  11/16/2010    Performed by JAIRO HOPKINS at SURGERY [facility] ORS   • AV FISTULOGRAM  11/16/2010    Performed by JAIRO HOPKINS at SURGERY [facility] ORS   • ARTERIOGRAM  3/5/2009    Performed by JAIRO HOPKINS at SURGERY [facility]S   • ANGIOPLASTY BALLOON  3/5/2009    Performed by MARI WINTERS at SURGERY [facility] ORS   • AV FISTULOGRAM  3/5/2009    Performed by MARI WINTERS at SURGERY [facility] ORS   • AV FISTULA CREATION  11/6/08    Performed by MARI WINTERS at SURGERY [facility] ORS   • MASS EXCISION ORTHO  10/9/08    Performed by BELKIS BREAUX at SURGERY SAME DAY [facility] ORS   • PARATHYROIDECTOMY  2006   • INGUINAL HERNIA REPAIR Bilateral 2001, 2002   • US-KIDNEY TRANSPLANT  1996, 2001    x2        Family History  Family History   Problem Relation Age of Onset   • Arthritis Father         RA   • Lung Disease Father    • Heart Disease Father         MI   • Hypertension Brother         Social History  Social History     Socioeconomic History   • Marital status: Single     Spouse name: Not on file   • Number of children: Not on file   • Years of education: Not on file   • Highest education level: Not on file   Occupational History   • Occupation:      Employer: [employer]   Social Needs   • Financial resource strain: Not on file   • Food insecurity     Worry: Not on file     Inability: Not on file   • Transportation needs     Medical: Not on file     Non-medical: Not on file   Tobacco Use   • Smoking status: Never Smoker   • Smokeless tobacco: Never Used   Substance and Sexual Activity   • Alcohol use: No   • Drug use: No   • Sexual activity: Yes     Partners: Female   Lifestyle   • Physical activity     Days per week: Not on file     Minutes per session: Not on file   • Stress: Not on file   Relationships   • Social connections     Talks on phone: Not on file     Gets together: Not on file     Attends Mandaen service: Not on file     Active member of club or organization: Not on file     Attends meetings of clubs or organizations: Not on file     Relationship status: Not on file   • Intimate partner violence     Fear of current or ex partner: Not on file     Emotionally abused: Not on file      Physically abused: Not on file     Forced sexual activity: Not on file   Other Topics Concern   • Not on file   Social History Narrative   • Not on file       Medications  Prior to Admission Medications   Prescriptions Last Dose Informant Patient Reported? Taking?   HYDROcodone/acetaminophen (NORCO)  MG Tab 2020 at PRN Patient Yes No   Sig: Take 1 Tab by mouth every 6 hours as needed for Severe Pain. Claims he takes 4 tabs  at night.  Indications: Moderate to Moderately Severe Pain   Vancomycin HCl (VANCOMYCIN 1000 MG/250ML) 1000 mg 2020 at noon  Yes Yes   Si,000 mg by Intravenous route See Admin Instructions. 3 times a week on Monday, Wednesday, and Friday   calcitRIOL (ROCALTROL) 0.25 MCG CAPS 2020 at PM Patient Yes No   Sig: Take 0.75 mcg by mouth every bedtime.   cinacalcet (SENSIPAR) 30 MG Tab 2020 at AM Patient Yes No   Sig: Take 30 mg by mouth every Monday, Wednesday, and Friday.   gabapentin (NEURONTIN) 300 MG Cap 2020 at PM Patient Yes No   Sig: Take 1,200 mg by mouth every bedtime. Take with x3 (600mg) Gabapentin for a total nightly dose of = 3000mg  Indications: Neuropathic Pain   gabapentin (NEURONTIN) 600 MG tablet 2020 at PM Patient Yes No   Sig: Take 1,800 mg by mouth every bedtime. Take with x4 (300mg) Gabapentin for a total nightly dose = 3000mg   sevelamer carbonate (RENVELA) 800 MG Tab tablet 2020 at AM Patient Yes Yes   Sig: Take 3,200 mg by mouth 3 times a day, with meals.   tizanidine (ZANAFLEX) 4 MG Tab 2020 at Unknown time  Yes Yes   Sig: Take 8 mg by mouth every bedtime.   warfarin (COUMADIN) 5 MG Tab 2020 at 2130 Patient Yes Yes   Sig: Take 5-7.5 mg by mouth See Admin Instructions. Take 5mg every Sun, Tues, Wed, Thurs, Fri, and Sat.  Take 7.5mg on Monday only.  Taken nightly.      Facility-Administered Medications: None       Current Facility-Administered Medications   Medication Dose Route Frequency Provider Last Rate Last Dose      • [MAR Hold] NS (BOLUS) infusion 250 mL  250 mL Intravenous DIALYSIS PRN Raul Londono M.D.       • [MAR Hold] albumin human 25% solution 12.5 g  12.5 g Intravenous DIALYSIS PRN Raul Londono M.D.       • [MAR Hold] lidocaine (XYLOCAINE) 1 % injection 1 mL  1 mL Intradermal PRN Raul Londono M.D.       • [MAR Hold] HYDROmorphone pf (DILAUDID) injection 0.5-1 mg  0.5-1 mg Intravenous Q2HRS PRN Lazara Stafford A.P.R.NWaldo   1 mg at 02/20/20 1258   • [MAR Hold] MD Alert...Vancomycin per Pharmacy   Other PHARMACY TO DOSE Stan Pendleton D.O.       • [MAR Hold] epoetin rj (EPOGEN/PROCRIT) injection 4,000 Units  4,000 Units Intravenous MO, WE + FR Raul Londono M.D.       • [MAR Hold] sodium polystyrene (KAYEXALATE) 15 GM/60ML suspension 30 g  30 g Oral Once Raul Londono M.D.       • [MAR Hold] calcitRIOL (ROCALTROL) capsule 0.75 mcg  0.75 mcg Oral QHS Denis Lagos D.O.   0.75 mcg at 02/19/20 2350   • [MAR Hold] cinacalcet (SENSIPAR) tablet 30 mg  30 mg Oral MO, WE + FR Denis Lagos D.O.       • [MAR Hold] HYDROcodone/acetaminophen (NORCO)  MG per tablet 1 Tab  1 Tab Oral Q6HRS PRN Denis Lagos D.O.   1 Tab at 02/20/20 1540   • [MAR Hold] sevelamer carbonate (RENVELA) tablet 3,200 mg  3,200 mg Oral TID WITH MEALS Denis Lagos D.O.   Stopped at 02/20/20 1130   • [MAR Hold] senna-docusate (PERICOLACE or SENOKOT S) 8.6-50 MG per tablet 2 Tab  2 Tab Oral BID Denis Lagos D.O.        And   • [MAR Hold] polyethylene glycol/lytes (MIRALAX) PACKET 1 Packet  1 Packet Oral QDAY PRN Denis Lagos D.O.        And   • [MAR Hold] magnesium hydroxide (MILK OF MAGNESIA) suspension 30 mL  30 mL Oral QDAY PRN Denis Lagos D.O.        And   • [MAR Hold] bisacodyl (DULCOLAX) suppository 10 mg  10 mg Rectal QDAY PRN Denis Lagos D.O.       • [MAR Hold] acetaminophen (TYLENOL) tablet 650 mg  650 mg Oral Q6HRS PRN Denis Lagos D.O.   650 mg at 02/20/20 1613   • [MAR Hold] enalaprilat (VASOTEC) injection 1.25 mg   1.25 mg Intravenous Q6HRS PRN ABRAHAM Lara.O.       • [MAR Hold] cloNIDine (CATAPRES) tablet 0.1 mg  0.1 mg Oral Q6HRS PRN ABRAHAM Lara.O.       • [MAR Hold] ondansetron (ZOFRAN) syringe/vial injection 4 mg  4 mg Intravenous Q4HRS PRN ABRAHAM Lara.O.       • [MAR Hold] ondansetron (ZOFRAN ODT) dispertab 4 mg  4 mg Oral Q4HRS PRN ABRAHAM Lara.O.       • [MAR Hold] promethazine (PHENERGAN) tablet 12.5-25 mg  12.5-25 mg Oral Q4HRS PRN ABRAHAM Lara.O.       • [MAR Hold] promethazine (PHENERGAN) suppository 12.5-25 mg  12.5-25 mg Rectal Q4HRS PRN ABRAHAM Lara.O.       • [MAR Hold] prochlorperazine (COMPAZINE) injection 5-10 mg  5-10 mg Intravenous Q4HRS PRN ABRAHAM Lraa.O.       • [MAR Hold] gabapentin (NEURONTIN) capsule 1,800 mg  1,800 mg Oral Q EVENING ABRAHAM Lara.O.   1,800 mg at 02/19/20 2355   • [MAR Hold] tizanidine (ZANAFLEX) tablet 8 mg  8 mg Oral Q EVENING ABRAHAM Lara.O.   8 mg at 02/19/20 2348       Allergies  Allergies   Allergen Reactions   • Baclofen Unspecified     Total loss of memory, sedation.   RXN=6/2015   • Contrast Media With Iodine [Iodine] Rash     RXN=1/5/2017   • Keflex Rash     RXN=possibly >10 years   • Pcn [Penicillins] Rash     RXN=possibly >10 years ago  Tolerated Zosyn on 2/20/18   • Tape Rash     Paper tape and tegaderm ok  RXN=ongoing   • Requip Vomiting         Physical Exam  Temp:  [36.4 °C (97.5 °F)-38.5 °C (101.3 °F)] 38.5 °C (101.3 °F)  Pulse:  [] 131  Resp:  [12-30] 18  BP: ()/(43-88) 90/43  SpO2:  [90 %-100 %] 94 %    Wounds: left thigh - 1.5 x 2.0 macerated bloody wound.    General appearance: NAD, conversing appropriately  Psych: Normal affect, mood, judgement  Neuro: CN II-XII grossly intact.   Neck: full range of motion  Lungs: No inspiratory stridor or wheezing  CV: RRR  Abdomen: Soft, NT/ND  Skin: No rashes    Labs Reviewed Today:  Recent Labs     02/19/20  1720 02/20/20  0449 02/20/20  1035 02/20/20  1330    WBC 4.3* 3.5* 4.5*  --    RBC 3.30* 2.73* 2.99*  --    HEMOGLOBIN 10.7* 8.6* 9.7* 10.1*   HEMATOCRIT 32.2* 27.1* 29.3*  --    MCV 97.6 99.3* 98.0*  --    MCH 32.4 31.5 32.4  --    MCHC 33.2* 31.7* 33.1*  --    RDW 55.4* 57.2* 55.5*  --    PLATELETCT 143* 117* 132*  --    MPV 9.9 9.8 10.0  --      Recent Labs     02/19/20  1720 02/20/20  0449 02/20/20  1035   SODIUM 134* 140 137   POTASSIUM 4.9 4.5 5.6*   CHLORIDE 90* 99 94*   CO2 26 25 25   GLUCOSE 98 105* 96   BUN 56* 60* 64*   CREATININE 10.96* 10.29* 11.01*   CALCIUM 9.1 7.3* 8.1*     Recent Labs     02/19/20  1720 02/20/20  0449 02/20/20  1035   GLUCOSE 98 105* 96     Recent Labs     02/19/20  1720 02/20/20  0449 02/20/20  1035   APTT  --   --  90.8*   INR 4.40* 5.24* 5.11*         Urinalysis:    No results found     Imaging Reviewed Today:  I personally reviewed all non-invasive vascular testing including images, x-rays, tracings, arterial waveforms, and duplex exams relevant to this admission. My interpretation is below:    Arterial Duplex: LE Arterial Graft   Duplex Report      Vascular Laboratory   CONCLUSIONS   1) Patent graft without significant stenosis or occlusion.   2) Fluid collection adjacent to graft, appearance most compatible with    hematoma.     Assessment/Plan & Medical Decision-Making    I attempted bedside control of the arteriovenous graft, but this is unreasonable.  I obtained hemostasis until later today, after dialysis when I can control the PTFE deficit, hopefully stop the leak and perform a fistulogram to fix the pulsatile flow, which I assume is from an outflow occlusion.       Lurdes Moffett MD  Vascular Surgeon  [facility]  Office: [phone]

## 2020-02-21 NOTE — PROGRESS NOTES
At 0806, lab notified RN about positive blood cultures from 2/20 blood cultures. Result read back to lab.     Dr. Louis notified of positive blood cultures at 0809. Result read back to RN.

## 2020-02-21 NOTE — PROGRESS NOTES
Critical Care Progress Note    Date of admission  2/19/2020    Chief Complaint  39 y.o. 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 Pt was admitted to [facility] due to bleeding from AV graft. He heard a pop on his way to dialysis on 2/19. At admission, INR 4.4, Hgb 10.7 (baseline 13). Vascular surgery and nephrology were consulted.      Today, pt evidently had pulsatile bleeding from AV graft. Hospital medicine/vascular surgery was called emergently to bedside. Dr. Moffett came to bedside and recommended to place pressure dressing at this time and pt is planned for surgery today.  Vitamin K 5mg IV x1 was given. Vascular surgery prefer not to completely reverse coumadin effect due to potential risk rethrombosis, considering multiple graft failures in the past and this graft could still be salvagable.      Pt's BP is running in 70-100s on the floor. I came to bedside and discussed the situation with Dr. Londono, neph, mother, patient, and Dr. Clayton, hosp medicinne and nursing staff.     Hospital Course    2/20-Dr. Moffett performed fistulogram with angioplasty, repair of bleeding left arteriovenous graft.      Interval Problem Update  Reviewed last 24 hour events:              - Left lower extremity arteriovenous graft repaired              - Tm: Afebrile              - HR: 70s              - SBP: 110-120's              - Neuro: Neuro intact              - GI: NPO              - UOP: HD-2.5 L              - Diaz: No              - Lines: CVC, 2X PIV              - PPx: No GI, No VTE              - Antibiotic Day 5, vancomycin (BCx- +GPC x2 MSSA)    ID consult      Review of Systems  Review of Systems   Constitutional: Negative for chills and fever.   HENT: Negative for  congestion and sinus pain.    Eyes: Negative.    Respiratory: Negative for shortness of breath.    Cardiovascular: Negative for chest pain.   Gastrointestinal: Negative for abdominal pain, nausea and vomiting.   Musculoskeletal: Positive for back pain.        Right finger tip pain.   Neurological: Negative for headaches.   Psychiatric/Behavioral: Negative.    All other systems reviewed and are negative.       Vital Signs for last 24 hours   Temp:  [35.9 °C (96.7 °F)-38.5 °C (101.3 °F)] 36 °C (96.8 °F)  Pulse:  [] 104  Resp:  [5-23] 16  BP: ()/(43-74) 98/51  SpO2:  [88 %-100 %] 99 %    Hemodynamic parameters for last 24 hours       Respiratory Information for the last 24 hours       Physical Exam   Physical Exam  Constitutional:       General: He is not in acute distress.     Appearance: He is ill-appearing. He is not toxic-appearing.   HENT:      Mouth/Throat:      Mouth: Mucous membranes are moist.   Eyes:      Pupils: Pupils are equal, round, and reactive to light.   Neck:      Musculoskeletal: Neck supple.   Cardiovascular:      Rate and Rhythm: Regular rhythm.      Heart sounds: No murmur. No friction rub. No gallop.    Pulmonary:      Effort: Pulmonary effort is normal.      Breath sounds: Normal breath sounds.   Abdominal:      General: Abdomen is flat.      Palpations: Abdomen is soft.   Musculoskeletal:      Comments: Left upper thigh wound is clean dry and intact hematoma is resolving.  There is an ulcer on the tip of the right middle finger just tender to palpation.   Skin:     General: Skin is warm and dry.   Neurological:      Mental Status: He is oriented to person, place, and time.   Psychiatric:         Mood and Affect: Mood normal.         Behavior: Behavior normal.         Medications  Current Facility-Administered Medications   Medication Dose Route Frequency Provider Last Rate Last Dose   • HYDROmorphone pf (DILAUDID) injection 0.5 mg  0.5 mg Intravenous Q2HRS PRN Yusuf Louis M.D.    0.5 mg at 02/21/20 1342   • NS (BOLUS) infusion 250 mL  250 mL Intravenous DIALYSIS PRN Raul Londono M.D.       • albumin human 25% solution 12.5 g  12.5 g Intravenous DIALYSIS PRN Raul Londono M.D. 150 mL/hr at 02/20/20 2047 12.5 g at 02/20/20 2047   • lidocaine (XYLOCAINE) 1 % injection 1 mL  1 mL Intradermal PRN Raul Londono M.D.       • MD Alert...Vancomycin per Pharmacy   Other PHARMACY TO DOSE Stan Pendleton D.O.       • epoetin rj (EPOGEN/PROCRIT) injection 4,000 Units  4,000 Units Intravenous MO, WE + FR Raul Londono M.D.   4,000 Units at 02/21/20 1130   • sodium polystyrene (KAYEXALATE) 15 GM/60ML suspension 30 g  30 g Oral Once Raul Londono M.D.   Stopped at 02/20/20 1545   • calcitRIOL (ROCALTROL) capsule 0.75 mcg  0.75 mcg Oral QHS ZONIA LaraO.   0.75 mcg at 02/20/20 2319   • cinacalcet (SENSIPAR) tablet 30 mg  30 mg Oral MO, WE + FR ZONIA LaraOWaldo   30 mg at 02/21/20 1127   • HYDROcodone/acetaminophen (NORCO)  MG per tablet 1 Tab  1 Tab Oral Q6HRS PRN ZONIA LaraO.   1 Tab at 02/21/20 1255   • sevelamer carbonate (RENVELA) tablet 3,200 mg  3,200 mg Oral TID WITH MEALS ZONIA LaraO.   3,200 mg at 02/20/20 2320   • senna-docusate (PERICOLACE or SENOKOT S) 8.6-50 MG per tablet 2 Tab  2 Tab Oral BID Denis Lagos D.O.        And   • polyethylene glycol/lytes (MIRALAX) PACKET 1 Packet  1 Packet Oral QDAY PRN Denis Lagos D.O.        And   • magnesium hydroxide (MILK OF MAGNESIA) suspension 30 mL  30 mL Oral QDAY PRN Denis Lagos D.O.        And   • bisacodyl (DULCOLAX) suppository 10 mg  10 mg Rectal QDAY PRN ZONIA LaraO.       • acetaminophen (TYLENOL) tablet 650 mg  650 mg Oral Q6HRS PRN ZONIA aLraO.   650 mg at 02/20/20 0539   • enalaprilat (VASOTEC) injection 1.25 mg  1.25 mg Intravenous Q6HRS PRN ZONIA LaraO.       • cloNIDine (CATAPRES) tablet 0.1 mg  0.1 mg Oral Q6HRS PRN ZONIA LaraO.       • ondansetron (ZOFRAN)  syringe/vial injection 4 mg  4 mg Intravenous Q4HRS PRN ZONIA LaraO.       • ondansetron (ZOFRAN ODT) dispertab 4 mg  4 mg Oral Q4HRS PRN ABRAHAM Lara.O.   4 mg at 02/20/20 2319   • promethazine (PHENERGAN) tablet 12.5-25 mg  12.5-25 mg Oral Q4HRS PRN ZONIA LaraO.       • promethazine (PHENERGAN) suppository 12.5-25 mg  12.5-25 mg Rectal Q4HRS PRN ABRAHAM Lara.O.       • prochlorperazine (COMPAZINE) injection 5-10 mg  5-10 mg Intravenous Q4HRS PRN ZONIA LaraO.       • gabapentin (NEURONTIN) capsule 1,800 mg  1,800 mg Oral Q EVENING ABRAHAM Lara.O.   1,800 mg at 02/20/20 2321   • tizanidine (ZANAFLEX) tablet 8 mg  8 mg Oral Q EVENING ABRAHAM Lara.O.   8 mg at 02/20/20 2319       Fluids    Intake/Output Summary (Last 24 hours) at 2/21/2020 1532  Last data filed at 2/21/2020 1300  Gross per 24 hour   Intake 1420 ml   Output 4750 ml   Net -3330 ml       Laboratory          Recent Labs     02/20/20  0449 02/20/20  1035 02/21/20  0348   SODIUM 140 137 141   POTASSIUM 4.5 5.6* 5.2   CHLORIDE 99 94* 101   CO2 25 25 26   BUN 60* 64* 39*   CREATININE 10.29* 11.01* 6.87*   CALCIUM 7.3* 8.1* 7.3*     Recent Labs     02/20/20  0449 02/20/20  1035 02/21/20  0348   GLUCOSE 105* 96 138*     Recent Labs     02/19/20  1720 02/20/20  0449 02/20/20  1035 02/21/20  0348   WBC 4.3* 3.5* 4.5* 5.8   NEUTSPOLYS 63.50  --  67.70 85.40*   LYMPHOCYTES 21.50*  --  19.00* 10.40*   MONOCYTES 11.20  --  11.10 3.50   EOSINOPHILS 2.60  --  1.80 0.00   BASOPHILS 0.70  --  0.20 0.20     Recent Labs     02/20/20  0449 02/20/20  1035  02/20/20  1930 02/21/20  0348 02/21/20  0910   RBC 2.73* 2.99*  --   --  2.43*  --    HEMOGLOBIN 8.6* 9.7*   < > 8.7* 8.0* 8.2*   HEMATOCRIT 27.1* 29.3*  --   --  24.0*  --    PLATELETCT 117* 132*  --   --  107*  --    PROTHROMBTM 50.4* 49.4*  --   --  19.1*  --    APTT  --  90.8*  --   --   --   --    INR 5.24* 5.11*  --   --  1.55*  --     < > = values in this interval  not displayed.       No new images    Assessment/Plan  * Hemorrhage from arteriovenous dialysis graft (HCC)  Assessment & Plan  2/21-arteriovenous graft repair  CBC every 8 x24 hours  Transfuse when Hgb <7. Last Hgb was 10.2       Bacteremia  Assessment & Plan  GPC in clusters bacteremia -complicated infection and antibiotic history  Continue vancomycin for now, ID consulted  Dosing per pharmacy  Blood cultures have been repeated here  MRSA nasal screen. If positive, decolonize with mupirocin ointment BID x 5 days    ESRD on hemodialysis ([facility])- (present on admission)  Assessment & Plan  Hemodialysis per nephrology  Temporary right femoral dialysis cath was placed.     Ulcer of finger, limited to breakdown of skin ([facility])  Assessment & Plan  Suspicious for PAD  Upper extremity duplex ultrasound    Chronic pain disorder- (present on admission)  Assessment & Plan  Multimodal pain management  APAP  Oxycodone  Dilaudid for breakthrough pain, renally dosed    Hyperkalemia  Assessment & Plan  Improved, hemodialysis per nephrology       VTE:  Contraindicated  Ulcer: Not Indicated  Lines: Central Line  Ongoing indication addressed    I have performed a physical exam and reviewed and updated ROS and Plan today (2/21/2020). In review of yesterday's note (2/20/2020), there are no changes except as documented above.     Discussed patient condition and risk of morbidity and/or mortality with RN, RT, Pharmacy, Patient and infectious disease   The patient remains critically ill,  I have assessed and reassessed the blood pressure,  cardiovascular status, labs and response to interventions. This patient remains at high risk for worsening shock and death without the above critical care interventions.

## 2020-02-21 NOTE — ANESTHESIA TIME REPORT
Anesthesia Start and Stop Event Times     Date Time Event    2/20/2020 1619 Ready for Procedure     1728 Anesthesia Start     1922 Anesthesia Stop        Responsible Staff  02/20/20    Name Role Begin End    Leighton Connell M.D. Anesth 1728 1922        Preop Diagnosis (Free Text):  Pre-op Diagnosis     Bleeding left thigh arteriovenous graft        Preop Diagnosis (Codes):    Post op Diagnosis  Left femoral arteriovenous fistula (HCC)      Premium Reason  A. 3PM - 7AM    Comments:

## 2020-02-21 NOTE — ANESTHESIA QCDR
2019 [facility] Clinical Data Registry (for Quality Improvement)     Postoperative nausea/vomiting risk protocol (Adult = 18 yrs and Pediatric 3-17 yrs)- (430 and 463)  General inhalation anesthetic (NOT TIVA) with PONV risk factors: Yes  Provision of anti-emetic therapy with at least 2 different classes of agents: Yes   Patient DID NOT receive anti-emetic therapy and reason is documented in Medical Record:  N/A    Multimodal Pain Management- (477)  Non-emergent surgery AND patient age >= 18: No  Use of Multimodal Pain Management, two or more drugs and/or interventions, NOT including systemic opioids:   Exception: Documented allergy to multiple classes of analgesics:     Smoking Abstinence (404)  Patient is current smoker (cigarette, pipe, e-cig, marijuanna): No  Elective Surgery:   Abstinence instructions provided prior to day of surgery:   Patient abstained from smoking on day of surgery:     Pre-Op Beta-Blocker in Isolated CABG (44)  Isolated CABG AND patient age >= 18: No  Beta-blocker admin within 24 hours of surgical incision:   Exception:of medical reason(s) for not administering beta blocker within 24 hours prior to surgical incision (e.g., not  indicated,other medical reason):     PACU assessment of acute postoperative pain prior to Anesthesia Care End- Applies to Patients Age = 18- (ABG7)  Initial PACU pain score is which of the following: < 7/10  Patient unable to report pain score: N/A    Post-anesthetic transfer of care checklist/protocol to PACU/ICU- (426 and 427)  Upon conclusion of case, patient transferred to which of the following locations: PACU/Non-ICU  Use of transfer checklist/protocol: Yes  Exclusion: Service Performed in Patient Hospital Room (and thus did not require transfer): N/A  Unplanned admission to ICU related to anesthesia service up through end of PACU care- (MD51)  Unplanned admission to ICU (not initially anticipated at anesthesia start time): No

## 2020-02-22 LAB
ANION GAP SERPL CALC-SCNC: 12 MMOL/L (ref 0–11.9)
BUN SERPL-MCNC: 40 MG/DL (ref 8–22)
CALCIUM SERPL-MCNC: 8.2 MG/DL (ref 8.5–10.5)
CHLORIDE SERPL-SCNC: 96 MMOL/L (ref 96–112)
CO2 SERPL-SCNC: 28 MMOL/L (ref 20–33)
CREAT SERPL-MCNC: 5.88 MG/DL (ref 0.5–1.4)
ERYTHROCYTE [DISTWIDTH] IN BLOOD BY AUTOMATED COUNT: 57.4 FL (ref 35.9–50)
GLUCOSE SERPL-MCNC: 125 MG/DL (ref 65–99)
HCT VFR BLD AUTO: 24.2 % (ref 42–52)
HGB BLD-MCNC: 7.7 G/DL (ref 14–18)
MCH RBC QN AUTO: 32 PG (ref 27–33)
MCHC RBC AUTO-ENTMCNC: 31.8 G/DL (ref 33.7–35.3)
MCV RBC AUTO: 100.4 FL (ref 81.4–97.8)
PLATELET # BLD AUTO: 129 K/UL (ref 164–446)
PMV BLD AUTO: 10.1 FL (ref 9–12.9)
POTASSIUM SERPL-SCNC: 4 MMOL/L (ref 3.6–5.5)
RBC # BLD AUTO: 2.41 M/UL (ref 4.7–6.1)
SODIUM SERPL-SCNC: 136 MMOL/L (ref 135–145)
VANCOMYCIN SERPL-MCNC: 20.3 UG/ML
WBC # BLD AUTO: 5.6 K/UL (ref 4.8–10.8)

## 2020-02-22 PROCEDURE — 80202 ASSAY OF VANCOMYCIN: CPT

## 2020-02-22 PROCEDURE — 770001 HCHG ROOM/CARE - MED/SURG/GYN PRIV*

## 2020-02-22 PROCEDURE — 700111 HCHG RX REV CODE 636 W/ 250 OVERRIDE (IP): Performed by: INTERNAL MEDICINE

## 2020-02-22 PROCEDURE — 80048 BASIC METABOLIC PNL TOTAL CA: CPT

## 2020-02-22 PROCEDURE — 99233 SBSQ HOSP IP/OBS HIGH 50: CPT | Performed by: INTERNAL MEDICINE

## 2020-02-22 PROCEDURE — 700102 HCHG RX REV CODE 250 W/ 637 OVERRIDE(OP): Performed by: INTERNAL MEDICINE

## 2020-02-22 PROCEDURE — A9270 NON-COVERED ITEM OR SERVICE: HCPCS | Performed by: INTERNAL MEDICINE

## 2020-02-22 PROCEDURE — 85027 COMPLETE CBC AUTOMATED: CPT

## 2020-02-22 PROCEDURE — 99233 SBSQ HOSP IP/OBS HIGH 50: CPT | Performed by: HOSPITALIST

## 2020-02-22 RX ORDER — TIZANIDINE 4 MG/1
8 TABLET ORAL EVERY EVENING
Status: DISCONTINUED | OUTPATIENT
Start: 2020-02-23 | End: 2020-02-24 | Stop reason: HOSPADM

## 2020-02-22 RX ADMIN — HYDROMORPHONE HYDROCHLORIDE 0.5 MG: 1 INJECTION, SOLUTION INTRAMUSCULAR; INTRAVENOUS; SUBCUTANEOUS at 16:57

## 2020-02-22 RX ADMIN — SEVELAMER CARBONATE 3200 MG: 800 TABLET, FILM COATED ORAL at 13:46

## 2020-02-22 RX ADMIN — SEVELAMER CARBONATE 3200 MG: 800 TABLET, FILM COATED ORAL at 09:29

## 2020-02-22 RX ADMIN — HYDROCODONE BITARTRATE AND ACETAMINOPHEN 1 TABLET: 10; 325 TABLET ORAL at 09:32

## 2020-02-22 RX ADMIN — HYDROCODONE BITARTRATE AND ACETAMINOPHEN 1 TABLET: 10; 325 TABLET ORAL at 21:52

## 2020-02-22 RX ADMIN — HYDROMORPHONE HYDROCHLORIDE 0.5 MG: 1 INJECTION, SOLUTION INTRAMUSCULAR; INTRAVENOUS; SUBCUTANEOUS at 20:25

## 2020-02-22 RX ADMIN — HYDROMORPHONE HYDROCHLORIDE 0.5 MG: 1 INJECTION, SOLUTION INTRAMUSCULAR; INTRAVENOUS; SUBCUTANEOUS at 13:07

## 2020-02-22 RX ADMIN — SEVELAMER CARBONATE 3200 MG: 800 TABLET, FILM COATED ORAL at 17:39

## 2020-02-22 RX ADMIN — HYDROMORPHONE HYDROCHLORIDE 0.5 MG: 1 INJECTION, SOLUTION INTRAMUSCULAR; INTRAVENOUS; SUBCUTANEOUS at 02:10

## 2020-02-22 RX ADMIN — GABAPENTIN 1800 MG: 400 CAPSULE ORAL at 17:39

## 2020-02-22 RX ADMIN — CALCITRIOL CAPSULES 0.25 MCG 0.75 MCG: 0.25 CAPSULE ORAL at 20:25

## 2020-02-22 RX ADMIN — TIZANIDINE 8 MG: 4 TABLET ORAL at 20:24

## 2020-02-22 ASSESSMENT — ENCOUNTER SYMPTOMS
DIZZINESS: 0
RESPIRATORY NEGATIVE: 1
GASTROINTESTINAL NEGATIVE: 1
NERVOUS/ANXIOUS: 1
ABDOMINAL PAIN: 0
CARDIOVASCULAR NEGATIVE: 1
MUSCULOSKELETAL NEGATIVE: 1
FEVER: 0
SHORTNESS OF BREATH: 0
NEUROLOGICAL NEGATIVE: 1
VOMITING: 0
NAUSEA: 0
EYES NEGATIVE: 1
CONSTITUTIONAL NEGATIVE: 1

## 2020-02-22 ASSESSMENT — PATIENT HEALTH QUESTIONNAIRE - PHQ9
1. LITTLE INTEREST OR PLEASURE IN DOING THINGS: NOT AT ALL
SUM OF ALL RESPONSES TO PHQ9 QUESTIONS 1 AND 2: 0

## 2020-02-22 NOTE — PROGRESS NOTES
Progress Note    CC: f/u for left thigh AVG bleed.    Interval History: 39 y.o. male who presented 2020 with bleeding from the left thigh AVG.  It was repaired, but he is bacteremic. Now afebrile, he feels much better.  He also has non healing wounds on the left third finger.  Dialysis catheter in the right groin.  Next dialysis monday.    Review of Systems  Negative for chest pain or SOB  Negative for stroke/TIA    Medications  Prior to Admission Medications   Prescriptions Last Dose Informant Patient Reported? Taking?   HYDROcodone/acetaminophen (NORCO)  MG Tab 2020 at PRN Patient Yes No   Sig: Take 1 Tab by mouth every 6 hours as needed for Severe Pain. Claims he takes 4 tabs  at night.  Indications: Moderate to Moderately Severe Pain   Vancomycin HCl (VANCOMYCIN 1000 MG/250ML) 1000 mg 2020 at noon  Yes Yes   Si,000 mg by Intravenous route See Admin Instructions. 3 times a week on Monday, Wednesday, and Friday   calcitRIOL (ROCALTROL) 0.25 MCG CAPS 2020 at PM Patient Yes No   Sig: Take 0.75 mcg by mouth every bedtime.   cinacalcet (SENSIPAR) 30 MG Tab 2020 at AM Patient Yes No   Sig: Take 30 mg by mouth every Monday, Wednesday, and Friday.   gabapentin (NEURONTIN) 300 MG Cap 2020 at PM Patient Yes No   Sig: Take 1,200 mg by mouth every bedtime. Take with x3 (600mg) Gabapentin for a total nightly dose of = 3000mg  Indications: Neuropathic Pain   gabapentin (NEURONTIN) 600 MG tablet 2020 at PM Patient Yes No   Sig: Take 1,800 mg by mouth every bedtime. Take with x4 (300mg) Gabapentin for a total nightly dose = 3000mg   sevelamer carbonate (RENVELA) 800 MG Tab tablet 2020 at AM Patient Yes Yes   Sig: Take 3,200 mg by mouth 3 times a day, with meals.   tizanidine (ZANAFLEX) 4 MG Tab 2020 at Unknown time  Yes Yes   Sig: Take 8 mg by mouth every bedtime.   warfarin (COUMADIN) 5 MG Tab 2020 at 2130 Patient Yes Yes   Sig: Take 5-7.5 mg by mouth See  Admin Instructions. Take 5mg every Sun, Tues, Wed, Thurs, Fri, and Sat.  Take 7.5mg on Monday only.  Taken nightly.      Facility-Administered Medications: None         Physical Exam  Vitals:    02/22/20 1100   BP: (!) 97/54   Pulse: 87   Resp: (!) 11   Temp:    SpO2: 99%     General appearance: NAD, conversing appropriately  Psych: Normal affect, mood, judgement  Neuro: CN II-XII grossly intact.   Neck: full range of motion  Lungs: No inspiratory stridor or wheezing  CV: RRR  Abdomen: Soft, NT/ND  Skin: No rashes  Psych: Alert, oriented to person, place, time  Right middle finger has the appearance of an ischemic wound.  The left thigh graft is patent with a good thrill.       Labs reviewed today:  Recent Labs     02/20/20 1035 02/21/20 0348 02/21/20  0910 02/22/20  0450   WBC 4.5*  --  5.8  --  5.6   RBC 2.99*  --  2.43*  --  2.41*   HEMOGLOBIN 9.7*   < > 8.0* 8.2* 7.7*   HEMATOCRIT 29.3*  --  24.0*  --  24.2*   MCV 98.0*  --  98.8*  --  100.4*   MCH 32.4  --  32.9  --  32.0   MCHC 33.1*  --  33.3*  --  31.8*   RDW 55.5*  --  56.9*  --  57.4*   PLATELETCT 132*  --  107*  --  129*   MPV 10.0  --  10.0  --  10.1    < > = values in this interval not displayed.     Recent Labs     02/20/20  1035 02/21/20 0348 02/22/20  0450   SODIUM 137 141 136   POTASSIUM 5.6* 5.2 4.0   CHLORIDE 94* 101 96   CO2 25 26 28   GLUCOSE 96 138* 125*   BUN 64* 39* 40*   CREATININE 11.01* 6.87* 5.88*   CALCIUM 8.1* 7.3* 8.2*     Recent Labs     02/20/20  1035 02/21/20 0348 02/22/20  0450   GLUCOSE 96 138* 125*     Recent Labs     02/20/20  0449 02/20/20  1035 02/21/20  0348   APTT  --  90.8*  --    INR 5.24* 5.11* 1.55*       Assessment/Plan & Medical Decision-Making:    I will order an upper extremity duplex exam today.  Before a biopsy for what seems to be most likely an ischemic wound, will start with a non-invasive exam, with an attempt to identify if the wounds are related to poor perfusion.     With regard to the graft, No new  graft can be attempted until we are quite sure the bacteremia has cleared.  Will try to use alternate sites on the left thigh on monday.        Lurdes Moffett MD  Vascular Surgeon  [facility]  Office: [phone]

## 2020-02-22 NOTE — PROGRESS NOTES
Dr. Sandoval notified about soft SBP 81, MAP 60. HD today removed 2L. HD last night removed 2L as well. 50g Albumin 25% ordered.

## 2020-02-22 NOTE — PROGRESS NOTES
POD#1 s/p repair of bleeding AVG    Denis is conversant and uncomfortable.    VSS afeb    Left thigh AVG examined.  No evidence of acitve bleeding with some ecchymosis on xeroform guaze.  Thrill and bruit confirmed.     Impression:  Temporizing maneuver with closure graft defect and soft tissue coverage last night.  This is certainly temporizing and the infected cavity surrounding the bleeding aspect of the cavity will not heal, given the nature of the surrounding tissue.   I mobilized enough tissue to cover the obvious defect for the time being, but recommend creation of a new right thigh AVG versus yet another revision of the left AVG.  The possibility of using the left femoral vein for a tunneled catheter, or, alternate sites in the left AVG was discussed.  At present, there is no immediacy as he is not unstable.     Plan: Alternate access to treat the 3cm area of infected, compromised graft.  Denis needs to discussed with family and ponder options.

## 2020-02-22 NOTE — PROGRESS NOTES
Nephrology Daily Progress Note    Date of Service  2/22/2020    Chief Complaint  39 y.o. male with ESRD on HD, failed kidney transplant x2, who presented 2/19/2020 with bleeding access, found to have MSSA bacteremia.    Interval Problem Update  2/22 -patient had dialysis yesterday with 2 L removed, tolerated well.  Patient seen this morning, denies chest pain, shortness of breath.  Awaiting plan for future vascular access.  Need to make sure bacteremia clears first.    Review of Systems  Review of Systems   Constitutional: Negative for fever.   Respiratory: Negative for shortness of breath.    Cardiovascular: Negative for chest pain.   Gastrointestinal: Negative for abdominal pain.   All other systems reviewed and are negative.       Physical Exam  Temp:  [36.1 °C (97 °F)-36.9 °C (98.5 °F)] 36.5 °C (97.7 °F)  Pulse:  [] 88  Resp:  [8-31] 19  BP: ()/(47-58) 109/56  SpO2:  [91 %-100 %] 98 %    Physical Exam   Constitutional: He is oriented to person, place, and time. He appears well-developed. No distress.   HENT:   Mouth/Throat: Oropharynx is clear and moist. No oropharyngeal exudate.   Eyes: EOM are normal. No scleral icterus.   Neck: No tracheal deviation present.   Cardiovascular: Normal rate and normal heart sounds.   No murmur heard.  Pulmonary/Chest: Effort normal and breath sounds normal. No stridor. He has no rales.   Abdominal: Soft. He exhibits no distension. There is no abdominal tenderness.   Musculoskeletal: Normal range of motion.         General: No edema.   Neurological: He is alert and oriented to person, place, and time.   Skin: Skin is warm and dry. He is not diaphoretic.   Psychiatric: He has a normal mood and affect.   Access: Right femoral temporary dialysis catheter.  Left thigh loop AV graft, with patent bruit, and slight pulsatility      Fluids    Intake/Output Summary (Last 24 hours) at 2/22/2020 1351  Last data filed at 2/22/2020 0800  Gross per 24 hour   Intake 2080 ml   Output  0 ml   Net 2080 ml       Laboratory  Labs reviewed, pertinent labs below.  Recent Labs     02/20/20  1035  02/21/20  0348 02/21/20  0910 02/22/20  0450   WBC 4.5*  --  5.8  --  5.6   RBC 2.99*  --  2.43*  --  2.41*   HEMOGLOBIN 9.7*   < > 8.0* 8.2* 7.7*   HEMATOCRIT 29.3*  --  24.0*  --  24.2*   MCV 98.0*  --  98.8*  --  100.4*   MCH 32.4  --  32.9  --  32.0   MCHC 33.1*  --  33.3*  --  31.8*   RDW 55.5*  --  56.9*  --  57.4*   PLATELETCT 132*  --  107*  --  129*   MPV 10.0  --  10.0  --  10.1    < > = values in this interval not displayed.     Recent Labs     02/20/20  1035 02/21/20  0348 02/22/20  0450   SODIUM 137 141 136   POTASSIUM 5.6* 5.2 4.0   CHLORIDE 94* 101 96   CO2 25 26 28   GLUCOSE 96 138* 125*   BUN 64* 39* 40*   CREATININE 11.01* 6.87* 5.88*   CALCIUM 8.1* 7.3* 8.2*     Recent Labs     02/20/20  0449 02/20/20  1035 02/21/20  0348   APTT  --  90.8*  --    INR 5.24* 5.11* 1.55*           URINALYSIS:  No results found for: COLORURINE, CLARITY, SPECGRAVITY, PHURINE, KETONES, PROTEINURIN, BILIRUBINUR, UROBILU, NITRITE, LEUKESTERAS, OCCULTBLOOD  UPC  No results found for: TOTPROTUR No results found for: CREATININEU      Imaging reviewed  DX-PORTABLE FLUORO > 1 HOUR   Final Result      Portable fluoroscopy utilized for 6 minutes 12 seconds.         INTERPRETING LOCATION: [address] [place] [address] [place], [address]      US-HEMODIALYSIS GRAFT DUPLEX COMP LOWER EXTREMITY   Final Result      US-EXTREMITY ARTERY UPPER UNILAT W/WBI (COMBO)    (Results Pending)   EC-ECHOCARDIOGRAM COMPLETE W/O CONT    (Results Pending)         Current Facility-Administered Medications   Medication Dose Route Frequency Provider Last Rate Last Dose   • HYDROmorphone pf (DILAUDID) injection 0.5 mg  0.5 mg Intravenous Q2HRS PRN Yusuf Louis M.D.   0.5 mg at 02/22/20 1307   • NS (BOLUS) infusion 250 mL  250 mL Intravenous DIALYSIS PRN Raul Londono M.D.       • albumin human 25% solution 12.5 g  12.5 g Intravenous DIALYSIS PRN Raul Londono M.D. 150  mL/hr at 02/20/20 2047 12.5 g at 02/20/20 2047   • lidocaine (XYLOCAINE) 1 % injection 1 mL  1 mL Intradermal PRN Raul Londono M.D.       • MD Alert...Vancomycin per Pharmacy   Other PHARMACY TO DOSE Stan Pendleton D.O.       • epoetin rj (EPOGEN/PROCRIT) injection 4,000 Units  4,000 Units Intravenous MO, WE + FR Raul Londono M.D.   4,000 Units at 02/21/20 1130   • calcitRIOL (ROCALTROL) capsule 0.75 mcg  0.75 mcg Oral QHS ZONIA LaraO.   0.75 mcg at 02/21/20 2010   • cinacalcet (SENSIPAR) tablet 30 mg  30 mg Oral MO, WE + FR ZONIA LaraO.   30 mg at 02/21/20 1127   • HYDROcodone/acetaminophen (NORCO)  MG per tablet 1 Tab  1 Tab Oral Q6HRS PRN ZONIA LaraO.   1 Tab at 02/22/20 0932   • sevelamer carbonate (RENVELA) tablet 3,200 mg  3,200 mg Oral TID WITH MEALS ZONIA LaraOWaldo   3,200 mg at 02/22/20 1346   • senna-docusate (PERICOLACE or SENOKOT S) 8.6-50 MG per tablet 2 Tab  2 Tab Oral BID Denis Lagos D.O.        And   • polyethylene glycol/lytes (MIRALAX) PACKET 1 Packet  1 Packet Oral QDAY PRN Denis Lagos D.O.        And   • magnesium hydroxide (MILK OF MAGNESIA) suspension 30 mL  30 mL Oral QDAY PRN ZONIA LaraOWaldo        And   • bisacodyl (DULCOLAX) suppository 10 mg  10 mg Rectal QDAY PRN ZONIA LaraO.       • acetaminophen (TYLENOL) tablet 650 mg  650 mg Oral Q6HRS PRN ZONIA LaraO.   650 mg at 02/20/20 2319   • enalaprilat (VASOTEC) injection 1.25 mg  1.25 mg Intravenous Q6HRS PRN ZONIA LaraO.       • cloNIDine (CATAPRES) tablet 0.1 mg  0.1 mg Oral Q6HRS PRN Denis Lagos D.O.       • ondansetron (ZOFRAN) syringe/vial injection 4 mg  4 mg Intravenous Q4HRS PRN Denis Lagos D.O.       • ondansetron (ZOFRAN ODT) dispertab 4 mg  4 mg Oral Q4HRS PRN ZONIA LaraO.   4 mg at 02/20/20 7768   • promethazine (PHENERGAN) tablet 12.5-25 mg  12.5-25 mg Oral Q4HRS PRN Denis Lagos D.O.       • promethazine (PHENERGAN) suppository  12.5-25 mg  12.5-25 mg Rectal Q4HRS PRN Denis Lagos D.O.       • prochlorperazine (COMPAZINE) injection 5-10 mg  5-10 mg Intravenous Q4HRS PRN Denis Lagos D.O.       • gabapentin (NEURONTIN) capsule 1,800 mg  1,800 mg Oral Q EVENING ZONIA LaraO.   1,800 mg at 02/21/20 1821   • tizanidine (ZANAFLEX) tablet 8 mg  8 mg Oral Q EVENING ZONIA LaraO.   8 mg at 02/21/20 2207         Assessment/Plan  39 y.o. male with ESRD on HD, failed kidney transplant x2, who presented 2/19/2020 with bleeding access, found to have MSSA bacteremia.     1.  ESRD on hemodialysis Monday Wednesday Friday.  Anuric.  No acute need for dialysis today.  Check labs daily.    2.  Access: Left thigh loop AV graft, patent, complicated by wound over the lateral limb, status post fistulogram and angioplasty on 2/20/2020.  Patient has temporary right femoral catheter.  We will attempt to use left thigh graft with next dialysis on Monday.  I appreciate Dr. Moffett's assistance to help plan future access.    3.    MSSA bacteremia.  Likely from wound over left thigh graft.  Antibiotics per primary team.  Please alert nephrology if long-term antibiotics will be needed in the outpatient dialysis setting.    4. Anemia of chronic disease, worsening.  Continue Epogen 3 times weekly with dialysis.  Check CBC daily.     5.  Thrombocytopenia, slightly improved.  Check CBC daily.     6.  Hyperkalemia, improved with dialysis.  Check labs daily.  Low potassium diet.    7.  Hypertension, controlled.     Following      Raul Londono MD  Nephrology

## 2020-02-22 NOTE — CARE PLAN
Problem: Pain Management  Goal: Pain level will decrease to patient's comfort goal  Outcome: PROGRESSING AS EXPECTED  Note: Pain assessed with each intervention. Medication administered as appropriate.      Problem: Safety  Goal: Will remain free from injury  Outcome: PROGRESSING AS EXPECTED  Note: Patient educated on use of call light. Bed in lowest position. Treaded socks on patient for mobilization.

## 2020-02-22 NOTE — ASSESSMENT & PLAN NOTE
Appears to be positive for MSSA, infection disease consult,  Echocardiogram  Await repeat culture results

## 2020-02-22 NOTE — PROGRESS NOTES
[facility] Medicine Daily Progress Note    Date of Service  2/22/2020    Chief Complaint  Dialysis graft bleed, fevers    Hospital Course    Mr. De Anda is a 39-year-old male with PMH significant for ESRD on MWF dialysis, chronic pain on chronic opioid therapy, and chronic anticoagulation on warfarin who presented 2/19/2020.  Patient was on his way to dialysis when he felt a sudden pop in his left thigh where his dialysis graft is.  He immediately noted bleeding.  Code assist was called out in the parking lot and patient was brought to the emergency department.  INR was 4.4 on presentation, Hgb 10.7.  Hemostasis was achieved with manual pressure and compression dressing and he was admitted to the CDU. The following morning patient began actively/pulsatile bleeding from his graft site.  INR at that time was 5.24 and he was given 10 mg IV vitamin K.  Hemoglobin dropped from 10.7-8.6.  He was hemodynamically stable.  Rapid response was called and Dr. Moffett came to bedside and placed sutures.    The patient has a history of failed kidney transplant x2, he undergoes hemodialysis Monday Wednesday Friday, on Monday he had dialysis and developed a fever, blood cultures were taken the patient was given a dose of vancomycin at that time.  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  The patient is anuric, he does have a skin ulcer over the left area where his fistula is.  The patient developed a small embolic appearing distal fingertip ulcer on his right hand.  Dr. Moffett did revision of his fistula.  Discussed the case with Dr. Cardoso from infectious disease  Interval Problem Update  Patient seen and examined today. ICU Care  Care and plan discussed in IDT/Hot rounds.  Lines and assistive devices reviewed.    Patient tolerating treatment and therapies.  All Data, Medication data reviewed.  Case discussed with nursing as available.  Plan of Care reviewed with patient and  notified of changes.  2/22 the patient feels better today, no fevers, no chills, blood pressure stable, heart rate in the 80s to 70s, on room air, no further bleeding from fistula site, ID consult discussed with Dr. Cardoso    Consultants/Specialty  -Vascular surgery  -Nephrology  Infectious disease  Intensivist  Code Status  Full    Disposition  Stabilized for transfer to medical unit    Review of Systems  Review of Systems   Constitutional: Negative.    HENT: Negative.    Eyes: Negative.    Respiratory: Negative.  Negative for shortness of breath.    Cardiovascular: Negative.  Negative for chest pain.   Gastrointestinal: Negative.  Negative for nausea and vomiting.   Genitourinary: Negative.    Musculoskeletal: Negative.              Skin: Negative.    Neurological: Negative.  Negative for dizziness.   Endo/Heme/Allergies: Negative.    Psychiatric/Behavioral: The patient is nervous/anxious.    All other systems reviewed and are negative.       Physical Exam  Temp:  [36.1 °C (97 °F)-36.9 °C (98.5 °F)] 36.5 °C (97.7 °F)  Pulse:  [] 88  Resp:  [8-31] 19  BP: ()/(47-58) 109/56  SpO2:  [91 %-100 %] 98 %    Physical Exam  Vitals signs and nursing note reviewed. Exam conducted with a chaperone present.   Constitutional:       Appearance: Normal appearance.      Interventions: Nasal cannula in place.   HENT:      Head: Normocephalic.      Right Ear: Hearing normal.      Left Ear: Hearing normal.      Nose: Nose normal.      Mouth/Throat:      Lips: Pink.      Mouth: Mucous membranes are dry.   Cardiovascular:      Rate and Rhythm: Normal rate and regular rhythm.      Heart sounds: Heart sounds are distant.      Arteriovenous access: left arteriovenous access is present.     Comments: LLE graft   (+) thrill (+) bruit  Pulmonary:      Effort: Tachypneic: anxious.      Breath sounds: No wheezing.   Abdominal:      General: Abdomen is protuberant. Bowel sounds are normal.      Palpations: Abdomen is soft.       Tenderness: There is no abdominal tenderness.   Musculoskeletal: Normal range of motion.   Skin:     General: Skin is warm and dry.      Capillary Refill: Capillary refill takes 2 to 3 seconds.      Comments: Right middle fingertip discoloration   Neurological:      Mental Status: He is alert and oriented to person, place, and time.      Motor: Motor function is intact.   Psychiatric:         Mood and Affect: Mood is anxious.         Behavior: Behavior is cooperative.         Cognition and Memory: Cognition normal.         Judgment: Judgment normal.         Fluids    Intake/Output Summary (Last 24 hours) at 2/22/2020 1339  Last data filed at 2/22/2020 0800  Gross per 24 hour   Intake 2080 ml   Output 0 ml   Net 2080 ml       Laboratory  Recent Labs     02/20/20  1035  02/21/20  0348 02/21/20  0910 02/22/20  0450   WBC 4.5*  --  5.8  --  5.6   RBC 2.99*  --  2.43*  --  2.41*   HEMOGLOBIN 9.7*   < > 8.0* 8.2* 7.7*   HEMATOCRIT 29.3*  --  24.0*  --  24.2*   MCV 98.0*  --  98.8*  --  100.4*   MCH 32.4  --  32.9  --  32.0   MCHC 33.1*  --  33.3*  --  31.8*   RDW 55.5*  --  56.9*  --  57.4*   PLATELETCT 132*  --  107*  --  129*   MPV 10.0  --  10.0  --  10.1    < > = values in this interval not displayed.     Recent Labs     02/20/20  1035 02/21/20  0348 02/22/20  0450   SODIUM 137 141 136   POTASSIUM 5.6* 5.2 4.0   CHLORIDE 94* 101 96   CO2 25 26 28   GLUCOSE 96 138* 125*   BUN 64* 39* 40*   CREATININE 11.01* 6.87* 5.88*   CALCIUM 8.1* 7.3* 8.2*     Recent Labs     02/20/20  0449 02/20/20  1035 02/21/20  0348   APTT  --  90.8*  --    INR 5.24* 5.11* 1.55*               Imaging  DX-PORTABLE FLUORO > 1 HOUR   Final Result      Portable fluoroscopy utilized for 6 minutes 12 seconds.         INTERPRETING LOCATION: [address] [place] [address], [id number]      US-HEMODIALYSIS GRAFT DUPLEX COMP LOWER EXTREMITY   Final Result      US-EXTREMITY ARTERY UPPER UNILAT W/WBI (COMBO)    (Results Pending)   EC-ECHOCARDIOGRAM COMPLETE W/O CONT     "(Results Pending)        Assessment/Plan  * Hemorrhage from arteriovenous dialysis graft (HCC)  Assessment & Plan  Status post surgical repair    Bacteremia  Assessment & Plan  Appears to be positive for MSSA, infection disease consult,  Echocardiogram  Await repeat culture results    Pancytopenia (HCC)- (present on admission)  Assessment & Plan  -Chronic and stable at his baseline    Hemorrhagic disorder due to circulating anticoagulants (HCC)- (present on admission)  Assessment & Plan  Resume anticoagulation once stabilized    Supratherapeutic INR- (present on admission)  Assessment & Plan  -Last dose warfarin 2/17  Vitamin K was given initially    Chronic anticoagulation- (present on admission)  Assessment & Plan  -On warfarin -last dose Monday 2/17  -Supratherapeutic INR  -Vitamin K  -Follow INR    ESRD on hemodialysis (HCC)- (present on admission)  Assessment & Plan  -On MWF dialysis -patient of Dr. 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)  Assessment & Plan  On previous chronic anticoagulation    Chronic pain disorder- (present on admission)  Assessment & Plan  -On significant outpatient pain medication regimen      Hyperkalemia  Assessment & Plan  Monitor, hemodialysis     Plan  Continue with IV antibiotics, ID consult  Optimize vascular access  Right upper extremity vascular ultrasound to evaluate for obstruction or lesion explained to patient's digit abnormality  Echocardiogram  Consider restarting anticoagulation once safe to do so per vascular surgery  Medically complex high risk    VTE prophylaxis: " Supratherapeutic INR/SCD    I have performed a physical exam and reviewed and updated ROS and Plan today . In review of yesterday's note , there are no changes except as documented above.   Sage Ornelas M.D.

## 2020-02-22 NOTE — PROGRESS NOTES
"Pharmacy Kinetics 39 y.o. male on vancomycin day # 6   2/22/2020    Currently on Vancomycin Pulse Dosing d/t ESRD on HD  Dosing history:   2/20: Vancomycin 1300 mg IV at 2325   2/17: Vancomycin 1000 mg IV at 1302 at [facility]     Provider specified end date: TBD    Indication for Treatment: Staphylococcal bacteremia    Pertinent history per medical record: Admitted on 2/19/2020 for bleeding from his AV fistula. Patient also reports he was noted to be febrile during his dialysis session on 2/17, so blood cultures were obtained at the dialysis center and patient was given a dose of vancomycin at the end of his HD session. Cultures returned positive for GPC in clusters, thus, vancomycin therapy was continued. ID consulting.    Other antibiotics: None    Allergies: Baclofen; Contrast media with iodine [iodine]; Keflex; Pcn [penicillins]; Tape; and Requip     Concern for renal function includes ESRD on HD.    Pertinent cultures to date:   2/20: peripheral blood cx - Staphylococcus aureus  2/20: R femoral HD line - Staphylococcus aureus (methicillin sensitive) detected by PCR.  2/17: peripheral blood cx X2 (obtained at [facility]) - GPC in clusters     MRSA nares swab if pneumonia is a concern (ordered/positive/negative/n-a): Negative    Recent Labs     02/19/20  1720 02/20/20  0449 02/20/20  1035 02/21/20  0348 02/22/20  0450   WBC 4.3* 3.5* 4.5* 5.8 5.6   NEUTSPOLYS 63.50  --  67.70 85.40*  --      Recent Labs     02/19/20  1720 02/20/20  0449 02/20/20  1035 02/21/20  0348 02/22/20  0450   BUN 56* 60* 64* 39* 40*   CREATININE 10.96* 10.29* 11.01* 6.87* 5.88*     Recent Labs     02/20/20  1330 02/22/20  0450   VANCORANDOM 7.0 20.3       Intake/Output Summary (Last 24 hours) at 2/22/2020 1410  Last data filed at 2/22/2020 0800  Gross per 24 hour   Intake 2080 ml   Output 0 ml   Net 2080 ml      /56   Pulse 88   Temp 36.5 °C (97.7 °F) (Temporal)   Resp 19   Ht 1.626 m (5' 4\")   Wt 73.5 kg (162 lb 0.6 oz)   SpO2 98%  " Temp (24hrs), Av.5 °C (97.7 °F), Min:36.1 °C (97 °F), Max:36.9 °C (98.5 °F)      A/P   1. Vancomycin dose change: No dose indicated  2. Next vancomycin level: Random level  with AM labs  3. Goal trough: 12-16 mcg/mL  4. Comments: Patient remains clinically stable and is cleared for transfer out of ICU. No further report on cultures from [facility], staph aureus x2 sets of blood cultures collected at [facility]. No dialysis planned today, patient's usual HD schedule is MWF. Random vanco level this AM supratherapeutic and no repeat dosing indicated at this time. Will plan repeat level  with AM labs to evaluate for further dosing when level <16 mcg/mL.    Pharmacy will continue to follow.     Cristy Heck, JocelynD

## 2020-02-23 ENCOUNTER — APPOINTMENT (OUTPATIENT)
Dept: RADIOLOGY | Facility: MEDICAL CENTER | Age: 40
DRG: 252 | End: 2020-02-23
Attending: SURGERY
Payer: MEDICARE

## 2020-02-23 LAB
ANION GAP SERPL CALC-SCNC: 15 MMOL/L (ref 0–11.9)
BACTERIA BLD CULT: ABNORMAL
BASOPHILS # BLD AUTO: 0.5 % (ref 0–1.8)
BASOPHILS # BLD: 0.03 K/UL (ref 0–0.12)
BUN SERPL-MCNC: 53 MG/DL (ref 8–22)
CALCIUM SERPL-MCNC: 8.4 MG/DL (ref 8.5–10.5)
CHLORIDE SERPL-SCNC: 97 MMOL/L (ref 96–112)
CO2 SERPL-SCNC: 27 MMOL/L (ref 20–33)
CREAT SERPL-MCNC: 7.88 MG/DL (ref 0.5–1.4)
EOSINOPHIL # BLD AUTO: 0.31 K/UL (ref 0–0.51)
EOSINOPHIL NFR BLD: 5.1 % (ref 0–6.9)
ERYTHROCYTE [DISTWIDTH] IN BLOOD BY AUTOMATED COUNT: 56.6 FL (ref 35.9–50)
GLUCOSE SERPL-MCNC: 112 MG/DL (ref 65–99)
HCT VFR BLD AUTO: 26 % (ref 42–52)
HGB BLD-MCNC: 8.4 G/DL (ref 14–18)
IMM GRANULOCYTES # BLD AUTO: 0.09 K/UL (ref 0–0.11)
IMM GRANULOCYTES NFR BLD AUTO: 1.5 % (ref 0–0.9)
INR PPP: 1.1 (ref 0.87–1.13)
LYMPHOCYTES # BLD AUTO: 1.68 K/UL (ref 1–4.8)
LYMPHOCYTES NFR BLD: 27.6 % (ref 22–41)
MAGNESIUM SERPL-MCNC: 2.4 MG/DL (ref 1.5–2.5)
MCH RBC QN AUTO: 32.1 PG (ref 27–33)
MCHC RBC AUTO-ENTMCNC: 32.3 G/DL (ref 33.7–35.3)
MCV RBC AUTO: 99.2 FL (ref 81.4–97.8)
MONOCYTES # BLD AUTO: 0.54 K/UL (ref 0–0.85)
MONOCYTES NFR BLD AUTO: 8.9 % (ref 0–13.4)
NEUTROPHILS # BLD AUTO: 3.44 K/UL (ref 1.82–7.42)
NEUTROPHILS NFR BLD: 56.4 % (ref 44–72)
NRBC # BLD AUTO: 0 K/UL
NRBC BLD-RTO: 0 /100 WBC
PHOSPHATE SERPL-MCNC: 6.5 MG/DL (ref 2.5–4.5)
PLATELET # BLD AUTO: 141 K/UL (ref 164–446)
PMV BLD AUTO: 9.8 FL (ref 9–12.9)
POTASSIUM SERPL-SCNC: 3.8 MMOL/L (ref 3.6–5.5)
PROTHROMBIN TIME: 14.5 SEC (ref 12–14.6)
RBC # BLD AUTO: 2.62 M/UL (ref 4.7–6.1)
SIGNIFICANT IND 70042: ABNORMAL
SIGNIFICANT IND 70042: ABNORMAL
SITE SITE: ABNORMAL
SITE SITE: ABNORMAL
SODIUM SERPL-SCNC: 139 MMOL/L (ref 135–145)
SOURCE SOURCE: ABNORMAL
SOURCE SOURCE: ABNORMAL
WBC # BLD AUTO: 6.1 K/UL (ref 4.8–10.8)

## 2020-02-23 PROCEDURE — A9270 NON-COVERED ITEM OR SERVICE: HCPCS | Performed by: INTERNAL MEDICINE

## 2020-02-23 PROCEDURE — 700102 HCHG RX REV CODE 250 W/ 637 OVERRIDE(OP): Performed by: INTERNAL MEDICINE

## 2020-02-23 PROCEDURE — 700111 HCHG RX REV CODE 636 W/ 250 OVERRIDE (IP): Performed by: INTERNAL MEDICINE

## 2020-02-23 PROCEDURE — 80048 BASIC METABOLIC PNL TOTAL CA: CPT

## 2020-02-23 PROCEDURE — 83735 ASSAY OF MAGNESIUM: CPT

## 2020-02-23 PROCEDURE — 84100 ASSAY OF PHOSPHORUS: CPT

## 2020-02-23 PROCEDURE — 85610 PROTHROMBIN TIME: CPT

## 2020-02-23 PROCEDURE — 87040 BLOOD CULTURE FOR BACTERIA: CPT

## 2020-02-23 PROCEDURE — 99233 SBSQ HOSP IP/OBS HIGH 50: CPT | Performed by: HOSPITALIST

## 2020-02-23 PROCEDURE — A9270 NON-COVERED ITEM OR SERVICE: HCPCS | Performed by: HOSPITALIST

## 2020-02-23 PROCEDURE — 36415 COLL VENOUS BLD VENIPUNCTURE: CPT

## 2020-02-23 PROCEDURE — 770001 HCHG ROOM/CARE - MED/SURG/GYN PRIV*

## 2020-02-23 PROCEDURE — 93931 UPPER EXTREMITY STUDY: CPT | Mod: RT

## 2020-02-23 PROCEDURE — 85025 COMPLETE CBC W/AUTO DIFF WBC: CPT

## 2020-02-23 PROCEDURE — 700102 HCHG RX REV CODE 250 W/ 637 OVERRIDE(OP): Performed by: HOSPITALIST

## 2020-02-23 PROCEDURE — 99233 SBSQ HOSP IP/OBS HIGH 50: CPT | Performed by: INTERNAL MEDICINE

## 2020-02-23 RX ORDER — WARFARIN SODIUM 4 MG/1
4 TABLET ORAL
Status: COMPLETED | OUTPATIENT
Start: 2020-02-23 | End: 2020-02-23

## 2020-02-23 RX ADMIN — SEVELAMER CARBONATE 3200 MG: 800 TABLET, FILM COATED ORAL at 17:28

## 2020-02-23 RX ADMIN — CALCITRIOL CAPSULES 0.25 MCG 0.75 MCG: 0.25 CAPSULE ORAL at 20:20

## 2020-02-23 RX ADMIN — SEVELAMER CARBONATE 3200 MG: 800 TABLET, FILM COATED ORAL at 12:13

## 2020-02-23 RX ADMIN — HYDROMORPHONE HYDROCHLORIDE 0.5 MG: 1 INJECTION, SOLUTION INTRAMUSCULAR; INTRAVENOUS; SUBCUTANEOUS at 17:26

## 2020-02-23 RX ADMIN — TIZANIDINE 8 MG: 4 TABLET ORAL at 20:20

## 2020-02-23 RX ADMIN — WARFARIN SODIUM 4 MG: 4 TABLET ORAL at 17:28

## 2020-02-23 RX ADMIN — HYDROCODONE BITARTRATE AND ACETAMINOPHEN 1 TABLET: 10; 325 TABLET ORAL at 05:36

## 2020-02-23 RX ADMIN — GABAPENTIN 1800 MG: 400 CAPSULE ORAL at 17:27

## 2020-02-23 RX ADMIN — HYDROMORPHONE HYDROCHLORIDE 0.5 MG: 1 INJECTION, SOLUTION INTRAMUSCULAR; INTRAVENOUS; SUBCUTANEOUS at 01:07

## 2020-02-23 RX ADMIN — HYDROMORPHONE HYDROCHLORIDE 0.5 MG: 1 INJECTION, SOLUTION INTRAMUSCULAR; INTRAVENOUS; SUBCUTANEOUS at 00:05

## 2020-02-23 RX ADMIN — SEVELAMER CARBONATE 3200 MG: 800 TABLET, FILM COATED ORAL at 08:42

## 2020-02-23 RX ADMIN — HYDROMORPHONE HYDROCHLORIDE 0.5 MG: 1 INJECTION, SOLUTION INTRAMUSCULAR; INTRAVENOUS; SUBCUTANEOUS at 08:50

## 2020-02-23 RX ADMIN — HYDROMORPHONE HYDROCHLORIDE 0.5 MG: 1 INJECTION, SOLUTION INTRAMUSCULAR; INTRAVENOUS; SUBCUTANEOUS at 12:07

## 2020-02-23 RX ADMIN — HYDROCODONE BITARTRATE AND ACETAMINOPHEN 1 TABLET: 10; 325 TABLET ORAL at 17:07

## 2020-02-23 ASSESSMENT — ENCOUNTER SYMPTOMS
VOMITING: 0
NERVOUS/ANXIOUS: 1
EYES NEGATIVE: 1
RESPIRATORY NEGATIVE: 1
DIZZINESS: 0
CARDIOVASCULAR NEGATIVE: 1
FEVER: 0
ABDOMINAL PAIN: 0
CONSTITUTIONAL NEGATIVE: 1
SHORTNESS OF BREATH: 0
NAUSEA: 0
GASTROINTESTINAL NEGATIVE: 1
NEUROLOGICAL NEGATIVE: 1
MUSCULOSKELETAL NEGATIVE: 1

## 2020-02-23 NOTE — PROGRESS NOTES
"Pharmacy Kinetics 39 y.o. male on vancomycin day # 7      2020    Currently on Vancomycin Pulse Dosin/17: Vancomycin 1000 mg IVx1:20@1302 at [facility]              : Vancomycin 1300 mg IVx1:20@2325    Provider specified end date: TBD     Indication for Treatment: Staphylococcal bacteremia     Pertinent history per medical record: Admitted on 2020 for bleeding from his AV fistula. Patient also reports he was noted to be febrile during his dialysis session on , so blood cultures were obtained at the dialysis center and patient was given a dose of vancomycin at the end of his HD session. Cultures returned positive for GPC in clusters, thus, vancomycin therapy was continued. ID consulting.     Other antibiotics: None     Allergies: Baclofen; Contrast media with iodine [iodine]; Keflex; Pcn [penicillins]; Tape; and Requip      Concern for renal function includes ESRD on HD.     Pertinent cultures to date:   20:Central line,BLD:MSSA  20:PBCx1:MSSA  20:Nares,Resp:NGTD     MRSA nares swab if pneumonia is a concern (ordered/positive/negative/n-a): Negative    Recent Labs     20  1035 20  0348 20  0450 20  0314   WBC 4.5* 5.8 5.6 6.1   NEUTSPOLYS 67.70 85.40*  --  56.40     Recent Labs     20  1035 20  0348 20  0450 20  0314   BUN 64* 39* 40* 53*   CREATININE 11.01* 6.87* 5.88* 7.88*     Recent Labs     20  1330 20  0450   VANCORANDOM 7.0 20.3       Intake/Output Summary (Last 24 hours) at 2020 0835  Last data filed at 2020 1800  Gross per 24 hour   Intake 540 ml   Output --   Net 540 ml      /43   Pulse 93   Temp 36.4 °C (97.6 °F) (Temporal)   Resp 17   Ht 1.626 m (5' 4\")   Wt 73.5 kg (162 lb 0.6 oz)   SpO2 96%  Temp (24hrs), Av.4 °C (97.6 °F), Min:36.3 °C (97.4 °F), Max:36.6 °C (97.8 °F)      A/P   1. Vancomycin dose change: Pulse dosing   2. Next vancomycin level: 20 w/ AM " labs  3. Goal trough: 12-16 mcg/mL   4. Comments: No leukocytosis, afebrile. Cx MSSA. ESRD on HD, pulse dosing vancomycin. Consider ID consult and abx de escalation secondary to culture result.     Charlie Abreu PharmD BCPS

## 2020-02-23 NOTE — PROGRESS NOTES
2 RN skin check complete with LETY Sharma.   Devices in place : PIV HD dialysis cath.  Skin assessed under devices : yes.  Confirmed pressure ulcers found on NA.  New potential pressure ulcers noted on NA. Wound consult placed NA    The following skin issues noted: right second digit scab noted, right third digit scab noted, picture taken. Scar noted on BUE, ABD, HD dialysis on right thigh. Dressing and three stiches on left thigh noted. Picture taken. Heels are dry and cracking.

## 2020-02-23 NOTE — CARE PLAN
Problem: Pain Management  Goal: Pain level will decrease to patient's comfort goal  Outcome: PROGRESSING AS EXPECTED     PRN pain medication given. Will continue to monitor throughout shift.     Problem: Communication  Goal: The ability to communicate needs accurately and effectively will improve  Outcome: PROGRESSING AS EXPECTED    A&Ox4. Pt able to verbalize needs.

## 2020-02-23 NOTE — CARE PLAN
Problem: Pain Management  Goal: Pain level will decrease to patient's comfort goal  Outcome: PROGRESSING AS EXPECTED  Note: Pt assessed for pain regularly and medicated PRN per MAR.       Problem: Psychosocial Needs:  Goal: Level of anxiety will decrease  Outcome: PROGRESSING AS EXPECTED  Note: Pt was encouraged to voice feelings, therapeutic communication was utilized.

## 2020-02-23 NOTE — CARE PLAN
Problem: Pain Management  Goal: Pain level will decrease to patient's comfort goal  Outcome: PROGRESSING AS EXPECTED  Intervention: Follow pain managment plan developed in collaboration with patient and Interdisciplinary Team  Note: PRN Dilaudid given for pain of left groin     Problem: Communication  Goal: The ability to communicate needs accurately and effectively will improve  Outcome: PROGRESSING AS EXPECTED  Intervention: Rector patient and significant other/support system to call light to alert staff of needs  Note: Oriented pt to floor. Educated pt to call for help.

## 2020-02-23 NOTE — PROGRESS NOTES
Pt resting in bed throughout shift. PRN pain medication given. Update don plan of care, lab draws, medications and diet. Pt verbalized understanding. No other complaints at this time. Ambulates independently. Fall precautions in place. Call bell and bedside table within reach. Hourly rounding completed. Will continue to monitor.

## 2020-02-23 NOTE — PROGRESS NOTES
[facility] Medicine Daily Progress Note    Date of Service  2/23/2020    Chief Complaint  Dialysis graft bleed, fevers    Hospital Course    Mr. De Anda is a 39-year-old male with PMH significant for ESRD on MWF dialysis, chronic pain on chronic opioid therapy, and chronic anticoagulation on warfarin who presented 2/19/2020.  Patient was on his way to dialysis when he felt a sudden pop in his left thigh where his dialysis graft is.  He immediately noted bleeding.  Code assist was called out in the parking lot and patient was brought to the emergency department.  INR was 4.4 on presentation, Hgb 10.7.  Hemostasis was achieved with manual pressure and compression dressing and he was admitted to the CDU. The following morning patient began actively/pulsatile bleeding from his graft site.  INR at that time was 5.24 and he was given 10 mg IV vitamin K.  Hemoglobin dropped from 10.7-8.6.  He was hemodynamically stable.  Rapid response was called and Dr. Moffett came to bedside and placed sutures.    The patient has a history of failed kidney transplant x2, he undergoes hemodialysis Monday Wednesday Friday, on Monday he had dialysis and developed a fever, blood cultures were taken the patient was given a dose of vancomycin at that time.  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  The patient is anuric, he does have a skin ulcer over the left area where his fistula is.  The patient developed a small embolic appearing distal fingertip ulcer on his right hand.  Dr. Moffett did revision of his fistula.  Discussed the case with Dr. Cardoso from infectious disease  Interval Problem Update  Patient seen and examined today. ICU Care  Care and plan discussed in IDT/Hot rounds.  Lines and assistive devices reviewed.    Patient tolerating treatment and therapies.  All Data, Medication data reviewed.  Case discussed with nursing as available.  Plan of Care reviewed with patient and  notified of changes.  2/22 the patient feels better today, no fevers, no chills, blood pressure stable, heart rate in the 80s to 70s, on room air, no further bleeding from fistula site, ID consult discussed with Dr. Cardoso  2/23 the patient feels much better today, no fevers, no dyspnea, no increasing pain, denies nausea vomiting, infectious disease evaluation and nephrology follow-up noted, awaiting echocardiogram as well as right upper extremity vascular study.    Consultants/Specialty  -Vascular surgery  -Nephrology  Infectious disease  Intensivist/signed off  Code Status  Full    Disposition  Home with ongoing dialysis and antibiotic treatment with close follow-up with vascular surgery    Review of Systems  Review of Systems   Constitutional: Negative.    HENT: Negative.    Eyes: Negative.    Respiratory: Negative.  Negative for shortness of breath.    Cardiovascular: Negative.  Negative for chest pain.   Gastrointestinal: Negative.  Negative for nausea and vomiting.   Genitourinary: Negative.    Musculoskeletal: Negative.              Skin: Negative.    Neurological: Negative.  Negative for dizziness.   Endo/Heme/Allergies: Negative.    Psychiatric/Behavioral: The patient is nervous/anxious.    All other systems reviewed and are negative.       Physical Exam  Temp:  [36.3 °C (97.4 °F)-36.6 °C (97.8 °F)] 36.4 °C (97.6 °F)  Pulse:  [86-97] 93  Resp:  [15-18] 17  BP: ()/(38-56) 102/43  SpO2:  [90 %-96 %] 96 %    Physical Exam  Vitals signs and nursing note reviewed. Exam conducted with a chaperone present.   Constitutional:       Appearance: Normal appearance.      Interventions: Nasal cannula in place.   HENT:      Head: Normocephalic.      Right Ear: Hearing normal.      Left Ear: Hearing normal.      Nose: Nose normal.      Mouth/Throat:      Lips: Pink.      Mouth: Mucous membranes are dry.   Cardiovascular:      Rate and Rhythm: Normal rate and regular rhythm.      Heart sounds: Heart sounds are  distant.      Arteriovenous access: left arteriovenous access is present.     Comments: LLE graft   (+) thrill (+) bruit  Pulmonary:      Effort: Tachypneic: anxious.      Breath sounds: No wheezing.   Abdominal:      General: Abdomen is protuberant. Bowel sounds are normal.      Palpations: Abdomen is soft.      Tenderness: There is no abdominal tenderness.   Musculoskeletal: Normal range of motion.   Skin:     General: Skin is warm and dry.      Capillary Refill: Capillary refill takes 2 to 3 seconds.      Comments: Right middle fingertip discoloration   Neurological:      Mental Status: He is alert and oriented to person, place, and time.      Motor: Motor function is intact.   Psychiatric:         Mood and Affect: Mood is anxious.         Behavior: Behavior is cooperative.         Cognition and Memory: Cognition normal.         Judgment: Judgment normal.         Fluids    Intake/Output Summary (Last 24 hours) at 2/23/2020 1445  Last data filed at 2/23/2020 1400  Gross per 24 hour   Intake 900 ml   Output --   Net 900 ml       Laboratory  Recent Labs     02/21/20 0348 02/21/20  0910 02/22/20  0450 02/23/20  0314   WBC 5.8  --  5.6 6.1   RBC 2.43*  --  2.41* 2.62*   HEMOGLOBIN 8.0* 8.2* 7.7* 8.4*   HEMATOCRIT 24.0*  --  24.2* 26.0*   MCV 98.8*  --  100.4* 99.2*   MCH 32.9  --  32.0 32.1   MCHC 33.3*  --  31.8* 32.3*   RDW 56.9*  --  57.4* 56.6*   PLATELETCT 107*  --  129* 141*   MPV 10.0  --  10.1 9.8     Recent Labs     02/21/20 0348 02/22/20  0450 02/23/20  0314   SODIUM 141 136 139   POTASSIUM 5.2 4.0 3.8   CHLORIDE 101 96 97   CO2 26 28 27   GLUCOSE 138* 125* 112*   BUN 39* 40* 53*   CREATININE 6.87* 5.88* 7.88*   CALCIUM 7.3* 8.2* 8.4*     Recent Labs     02/21/20 0348 02/23/20  0314   INR 1.55* 1.10               Imaging  DX-PORTABLE FLUORO > 1 HOUR   Final Result      Portable fluoroscopy utilized for 6 minutes 12 seconds.         INTERPRETING LOCATION: [facility], [id number]      US-HEMODIALYSIS  "GRAFT DUPLEX COMP LOWER EXTREMITY   Final Result      EC-ECHOCARDIOGRAM COMPLETE W/O CONT    (Results Pending)   US-EXTREMITY ARTERY UPPER UNILAT RIGHT    (Results Pending)        Assessment/Plan  * Hemorrhage from arteriovenous dialysis graft (HCC)  Assessment & Plan  Status post surgical repair    Bacteremia  Assessment & Plan  Appears to be positive for MSSA, infection disease consult,  Echocardiogram  Await repeat culture results    Pancytopenia (HCC)- (present on admission)  Assessment & Plan  -Chronic and stable at his baseline    Hemorrhagic disorder due to circulating anticoagulants (HCC)- (present on admission)  Assessment & Plan  Resume anticoagulation once stabilized    Supratherapeutic INR- (present on admission)  Assessment & Plan  -Last dose warfarin 2/17  Vitamin K was given initially    Chronic anticoagulation- (present on admission)  Assessment & Plan  -On warfarin -last dose Monday 2/17  -Supratherapeutic INR  -Vitamin K  -Follow INR    ESRD on hemodialysis (HCC)- (present on admission)  Assessment & Plan  -On MWF dialysis -patient of Dr. 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)  Assessment & Plan  On previous chronic anticoagulation    Chronic pain disorder- (present on admission)  Assessment & Plan  -On significant outpatient pain medication regimen      Hyperkalemia  Assessment & Plan  Monitor, hemodialysis     Plan  Continue with IV antibiotics, ID consult appreciated   optimize vascular access as per vascular surgery  Right upper extremity vascular ultrasound to evaluate for obstruction or lesion " explained to patient's digit abnormality  Echocardiogram, pending  Restarting anticoagulation  Medically complex high risk    VTE prophylaxis: Supratherapeutic INR/SCD    I have performed a physical exam and reviewed and updated ROS and Plan today . In review of yesterday's note , there are no changes except as documented above.   Sage Ornelas M.D.

## 2020-02-23 NOTE — PROGRESS NOTES
Nephrology Daily Progress Note    Date of Service  2/23/2020    Chief Complaint  39 y.o. male with ESRD on HD, failed kidney transplant x2, who presented 2/19/2020 with bleeding access, found to have MSSA bacteremia.    Interval Problem Update  2/22 -patient had dialysis yesterday with 2 L removed, tolerated well.  Patient seen this morning, denies chest pain, shortness of breath.  Awaiting plan for future vascular access.  Need to make sure bacteremia clears first.  2/23-transferred out of ICU yesterday.  Denies chest pain, shortness of breath.  Feeling well.    Review of Systems  Review of Systems   Constitutional: Negative for fever.   Respiratory: Negative for shortness of breath.    Cardiovascular: Negative for chest pain.   Gastrointestinal: Negative for abdominal pain.   All other systems reviewed and are negative.       Physical Exam  Temp:  [36.3 °C (97.4 °F)-36.6 °C (97.8 °F)] 36.4 °C (97.6 °F)  Pulse:  [86-97] 93  Resp:  [15-18] 17  BP: ()/(38-56) 102/43  SpO2:  [90 %-96 %] 96 %    Physical Exam   Constitutional: He is oriented to person, place, and time. He appears well-developed. No distress.   HENT:   Mouth/Throat: Oropharynx is clear and moist. No oropharyngeal exudate.   Eyes: EOM are normal. No scleral icterus.   Neck: No tracheal deviation present.   Cardiovascular: Normal rate and normal heart sounds.   No murmur heard.  Pulmonary/Chest: Effort normal and breath sounds normal. No stridor. He has no rales.   Abdominal: Soft. He exhibits no distension. There is no abdominal tenderness.   Musculoskeletal: Normal range of motion.         General: No edema.   Neurological: He is alert and oriented to person, place, and time.   Skin: Skin is warm and dry. He is not diaphoretic.   Psychiatric: He has a normal mood and affect.   Access: Right femoral temporary dialysis catheter.  Left thigh loop AV graft, with patent bruit        Fluids    Intake/Output Summary (Last 24 hours) at 2/23/2020  1259  Last data filed at 2/22/2020 1800  Gross per 24 hour   Intake 540 ml   Output --   Net 540 ml       Laboratory  Labs reviewed, pertinent labs below.  Recent Labs     02/21/20 0348 02/21/20  0910 02/22/20  0450 02/23/20  0314   WBC 5.8  --  5.6 6.1   RBC 2.43*  --  2.41* 2.62*   HEMOGLOBIN 8.0* 8.2* 7.7* 8.4*   HEMATOCRIT 24.0*  --  24.2* 26.0*   MCV 98.8*  --  100.4* 99.2*   MCH 32.9  --  32.0 32.1   MCHC 33.3*  --  31.8* 32.3*   RDW 56.9*  --  57.4* 56.6*   PLATELETCT 107*  --  129* 141*   MPV 10.0  --  10.1 9.8     Recent Labs     02/21/20  0348 02/22/20  0450 02/23/20  0314   SODIUM 141 136 139   POTASSIUM 5.2 4.0 3.8   CHLORIDE 101 96 97   CO2 26 28 27   GLUCOSE 138* 125* 112*   BUN 39* 40* 53*   CREATININE 6.87* 5.88* 7.88*   CALCIUM 7.3* 8.2* 8.4*     Recent Labs     02/21/20 0348 02/23/20  0314   INR 1.55* 1.10               Imaging reviewed  DX-PORTABLE FLUORO > 1 HOUR   Final Result      Portable fluoroscopy utilized for 6 minutes 12 seconds.         INTERPRETING LOCATION: [address] [place] [address], [id number]      US-HEMODIALYSIS GRAFT DUPLEX COMP LOWER EXTREMITY   Final Result      EC-ECHOCARDIOGRAM COMPLETE W/O CONT    (Results Pending)   US-EXTREMITY ARTERY UPPER UNILAT RIGHT    (Results Pending)         Current Facility-Administered Medications   Medication Dose Route Frequency Provider Last Rate Last Dose   • tizanidine (ZANAFLEX) tablet 8 mg  8 mg Oral Q EVENING Denis Lagos D.O.       • HYDROmorphone pf (DILAUDID) injection 0.5 mg  0.5 mg Intravenous Q2HRS PRN Yusuf Louis M.D.   0.5 mg at 02/23/20 1207   • NS (BOLUS) infusion 250 mL  250 mL Intravenous DIALYSIS PRN Raul Londono M.D.       • albumin human 25% solution 12.5 g  12.5 g Intravenous DIALYSIS PRN Raul Londono M.D. 150 mL/hr at 02/20/20 2047 12.5 g at 02/20/20 2047   • lidocaine (XYLOCAINE) 1 % injection 1 mL  1 mL Intradermal PRN Raul Londono M.D.       • MD Alert...Vancomycin per Pharmacy   Other PHARMACY TO DOSE Stan Pendleton D.O.        • epoetin rj (EPOGEN/PROCRIT) injection 4,000 Units  4,000 Units Intravenous MO, WE + FR Raul Londono M.D.   4,000 Units at 02/21/20 1130   • calcitRIOL (ROCALTROL) capsule 0.75 mcg  0.75 mcg Oral QHS ZONIA LaraOWaldo   0.75 mcg at 02/22/20 2025   • cinacalcet (SENSIPAR) tablet 30 mg  30 mg Oral MO, WE + FR ZONIA LaraO.   30 mg at 02/21/20 1127   • HYDROcodone/acetaminophen (NORCO)  MG per tablet 1 Tab  1 Tab Oral Q6HRS PRN ZONIA LaraO.   1 Tab at 02/23/20 0536   • sevelamer carbonate (RENVELA) tablet 3,200 mg  3,200 mg Oral TID WITH MEALS ZONIA LaraOWaldo   3,200 mg at 02/23/20 1213   • senna-docusate (PERICOLACE or SENOKOT S) 8.6-50 MG per tablet 2 Tab  2 Tab Oral BID Denis Lagos D.O.        And   • polyethylene glycol/lytes (MIRALAX) PACKET 1 Packet  1 Packet Oral QDAY PRN Denis Lagos D.O.        And   • magnesium hydroxide (MILK OF MAGNESIA) suspension 30 mL  30 mL Oral QDAY PRN ZONIA LaraO.        And   • bisacodyl (DULCOLAX) suppository 10 mg  10 mg Rectal QDAY PRN ZONIA LaraO.       • acetaminophen (TYLENOL) tablet 650 mg  650 mg Oral Q6HRS PRN ZONIA LaraO.   650 mg at 02/20/20 2319   • enalaprilat (VASOTEC) injection 1.25 mg  1.25 mg Intravenous Q6HRS PRN Denis Lagos D.O.       • cloNIDine (CATAPRES) tablet 0.1 mg  0.1 mg Oral Q6HRS PRN ZONIA LaraO.       • ondansetron (ZOFRAN) syringe/vial injection 4 mg  4 mg Intravenous Q4HRS PRN ZONIA LaraO.       • ondansetron (ZOFRAN ODT) dispertab 4 mg  4 mg Oral Q4HRS PRN Denis Lagos D.O.   4 mg at 02/20/20 2319   • promethazine (PHENERGAN) tablet 12.5-25 mg  12.5-25 mg Oral Q4HRS PRN Denis Lagos D.O.       • promethazine (PHENERGAN) suppository 12.5-25 mg  12.5-25 mg Rectal Q4HRS PRN Denis Lagos D.O.       • prochlorperazine (COMPAZINE) injection 5-10 mg  5-10 mg Intravenous Q4HRS PRN Denis Lagos D.O.       • gabapentin (NEURONTIN) capsule 1,800 mg   1,800 mg Oral Q EVENING Denis Lagos D.O.   1,800 mg at 02/22/20 1739         Assessment/Plan  39 y.o. male with ESRD on HD, failed kidney transplant x2, who presented 2/19/2020 with bleeding access, found to have MSSA bacteremia.     1.  ESRD on hemodialysis Monday Wednesday Friday.    Anuric.  No need for dialysis today.  Plan for dialysis tomorrow.  Check labs daily.    2.  Access: Left thigh loop AV graft, patent, but complicated by wound over the lateral limb, status post fistulogram and angioplasty on 2/20/2020.  Will attempt to use left leg AV graft with dialysis tomorrow, and only use right femoral temporary catheter if unable to use AV graft.  I appreciate Dr. Moffett's assistance to help plan future access.    3.    MSSA bacteremia.  Likely from wound over left thigh graft.  Antibiotics per primary team.  Anticipate patient might need long-term cefazolin, but please alert nephrology if outpatient antibiotics will be needed to be dosed after dialysis.    4. Anemia of chronic disease, stable.  Continue Epogen 3 times weekly with dialysis.  Check CBC daily.     5.  Thrombocytopenia, slightly improved.  Check CBC daily.     6.  Hyperkalemia, resolved.  Check labs daily.    7.  Hypertension, controlled.    8.  Hyperphosphatemia, continue Renvela 3 times daily with meals.    9.  Secondary hyperparathyroidism of CKD.  Continue calcitriol in the evening, and cinacalcet 3 times a week.      Following      Raul Londono MD  Nephrology

## 2020-02-23 NOTE — PROGRESS NOTES
Pt arrived at floor with transport, A/Ox4, discussed plan of care. Pt on room air. PRN pain meds given. Pt requested a private room. Notified charge RN. Pt moved to private room.  Educated pt about renal diet. Pt complains left groin pain PRN Dilaudid given. Pt BP was 97/56. Rechecked BP: It was 117/38.      All needs met at this time. Bed in lowest position, treaded socks on, personal belongings and call light within reach, instructed to call for any assistance, hourly rounding in place.

## 2020-02-23 NOTE — CONSULTS
DATE OF SERVICE:  02/23/2020    INFECTIOUS DISEASE CONSULTATION    REFERRING PHYSICIAN:  Sage Ornelas MD    REASON FOR CONSULTATION:  Staphylococcal bacteremia and sepsis.    HISTORY OF PRESENT ILLNESS:  The patient is a 39-year-old white male with   end-stage renal disease, on hemodialysis, who has had recurrent difficulties   with his AV fistula.  After multiple failures of upper extremity AV fistulae   and superior vena cava obstruction in 2010.  He underwent a left arteriovenous   graft in the left thigh in 2014.  He required revision of that graft in   06/2015, and thrombectomy and debridement of the left thigh in 10/2016   followed by a fasciocutaneous flap closure in 11/2016, revision of the graft   in 11/2017, thrombectomy in 03/2018 and 04/2018, revision of the AV graft in   12/2018, and thrombectomy with revision of the graft in 01/2019.    On 02/17, he had a fever in dialysis and blood cultures were drawn, which were   sent to the laboratory in [place].  They were subsequently reported positive   for an oxacillin-sensitive Staphylococcus aureus.  On 02/19, while on the way   to dialysis, he developed spontaneous bleeding from an ulcer on the lateral   aspect of the left thigh graft.  He was admitted and placed on vancomycin per   pharmacy.  Angioplasty was performed on 02/20 repairing perforation of the   lateral aspect of the graft.  Stenosis of the venous outflow was noted and   underwent a balloon angioplasty.  There was obvious evidence of infection of   the graft material.  He was given a temporary right femoral hemodialysis   catheter.  He has been afebrile since admission.  His WBC was 4300 and lactic   acid 1.6.  His heart rate has settled down from 100 to 83, although it was up   to 101 the next day.  His heart rate has been normal for the last 24 hours.    Repeat blood cultures from 02/20 are still positive for the   oxacillin-sensitive Staphylococcus aureus.    PAST SURGICAL HISTORY:  1.   Multiple AV graft placements and revisions as described above.  2.  Parathyroidectomy 2006.  3.  Bilateral inguinal herniorrhaphies 2001 and 2002.  4.  Abdominoplasty 2018, complicated by polymicrobial infection including   Klebsiella bacteremia and local infection with Enterococcus faecalis,   Pseudomonas aeruginosa, and E. coli requiring multiple debridements followed   by a myocutaneous flap graft to the right lower quadrant and flank and   subsequent split thickness skin graft.  5.  Resection of calciphylactic tissue from the right forearm 07/2016 and   08/2016.  6.  Spinal cord stimulator placed in 05/2017.    ALLERGIES:  Patient had a drug eruptions on CEPHALEXIN AND PENICILLIN more   than 10 years ago, but tolerated piperacillin/tazobactam in 2018.    MEDICAL ILLNESSES:  1.  Seizure disorder in the past, but his last seizure was in 2013 and he is   no longer on anticonvulsant therapy.  2.  Palmar contractures.  3.  Essential hypertension first noted approximately 10 years ago.  He seems   to be no longer on antihypertensive medication, however.  4.  Varices of the anterior chest.  Since his superior vena cava obstruction   of 2010.  5.  Obstructive sleep apnea for which he uses BiPAP.  6.  A nodular goiter was noted during an admission to [facility] in 02/2019.  7.  Pneumonia on approximately 5 occasions, the last in 02/2019.  8.  No history of hepatitis, peptic ulcer disease, nephrolithiasis, gout,   asthma, myocardial infarction, CVA, heart murmur, or rheumatic fever.    IMMUNIZATIONS:  Patient received the polysaccharide pneumococcal vaccine in   2013 and Prevnar 13 in 02/2018.  His last influenza vaccine was 09/2018.  He   apparently did not receive it in 2019.    MEDICATIONS:  1.  Calcitriol 0.75 mcg p.o. at bedtime.  2.  Sensipar tablet 30 mg 3 times weekly.  3.  Epoetin rj 4000 units intravenously 3 times weekly.  4.  Gabapentin 1800 mg at bedtime.  5.  Vancomycin per pharmacy.  6.  Senna docusate 2  tablets b.i.d.  7.  Sevelamer carbonate 3200 mg t.i.d. with meals.  8.  Tizanidine 8 mg at bedtime.    FAMILY HISTORY:  No family history of tuberculosis.    SOCIAL HISTORY:  Patient was born and raised in [place].  Has lived in   [facility] and the [facility], but has lived in [place] since 1994.  He never .  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  He has never smoked or used   recreational drugs and rarely drinks alcohol.  Except for a cruise through the   [place].  No foreign travel.  He has 2 dogs, but no exotic pets.  His   brother has sheep and horses, but he avoids the sheep.  His primary care   physician is Dr. Tony Mcmillan.    PHYSICAL EXAMINATION:  VITAL SIGNS:  Temperature is 97.6, pulse 93, respirations 17, and /43.    Weight is 73.5 kg with a BMI of 27.8.  GENERAL:  Patient is a well-developed, well-nourished, middle-aged white male   in no acute distress.  He is of [facility] complexion with no rash.  HEENT:  Normocephalic with no temporal wasting.  He does have male pattern   alopecia.  Eyes:  Pupils are equal, round, and reactive.  No conjunctival   injection or petechia.  Mouth:  No intraoral thrush, ulcers or nodules.  NECK:  Supple, with no lymphadenopathy.  CHEST:  Prominent varices over the anterior chest.  LUNGS:  Clear to auscultation with no wheezes, rales, or rhonchi.  HEART:  Regular rhythm.  ABDOMEN:  Slightly protuberant, soft, and nontender with large dense scar over   the right flank.  EXTREMITIES:  There is a temporary dialysis catheter in the right femoral   area.  He has no cyanosis, clubbing, or edema.  The left thigh is swollen and   slightly tender with a dressing over the bleeding site that is not disturbed.    He has an acral ischemic ulcer on the right third finger with old thrombosed   fistulae and scars in both upper arms.    LABORATORY FINDINGS:  WBC is 6100, hemoglobin 8.4, and platelets 141,000.    Sodium is 139, potassium 3.8, chloride 97, CO2 of 27, glucose  112, BUN 53,   creatinine 7.88, phosphorus 6.5, and magnesium 2.4.  Vancomycin trough yesterday was 20.3, up from 7.0 on 02/20.    Hepatitis A, IgM antibody negative.  Hepatitis B surface antibody is strongly   positive.  Hepatitis B surface antigen and core antibody negative.  Hepatitis   C antibody negative.  Incidentally, he had negative coccidioides serologies in 2019.  He also had a   strongly positive zoster antibody that year and a negative QuantiFERON TB Gold   test done in 2018.  As noted, 2 blood cultures on 02/20 are positive for oxacillin-sensitive   Staphylococcus aureus; it is sensitive to clindamycin, trimethoprim sulfa, and   tetracycline. Nasal screen for MRSA is negative.    DIAGNOSTIC DATA:  Admission chest x-ray is clear.    IMPRESSION:  1.  Oxacillin-sensitive Staphylococcus aureus infection of left arteriovenous   left thigh graft.  Note that he has had a previous infection with   methicillin-sensitive Staphylococcus aureus in this graft for which we treated   him in 2016.  2.  End-stage renal disease secondary to obstructive uropathy and congenital   urethral valves; status post failure of living related donor transplants at   age 16 and age 19.  3.  Questionable PENICILLIN ALLERGY, IgG mediated if present.  4.  Essential hypertension.  5.  Superior vena cava syndrome.  6.  Dyslipidemia.  7.  Status post subtotal parathyroidectomy.  8.  Question of nodular goiter.  9.  History of seizure disorder, off anticoagulation.  10.  Ischemic ulcer of the right third finger.  11.  History of calciphylaxis.  12.  History of frequent pneumonia.    RECOMMENDATIONS:  1.  Continue IV daptomycin for a minimum of 4 weeks following negative blood   cultures.  2.  Repeat blood cultures until negative.  3.  ESR and C-reactive protein.  4.  Echocardiogram.  5.  Since placement of a fresh AV fistula in the right thigh is under   consideration, complete extirpation of the left AV graft would facilitate   elimination  of infection.  In fact, once he has completed his month of IV   vancomycin, he should be on oral doxycycline until that can be accomplished.    Managing hemodialysis during healing of a fresh graft is understandably a   considerable technical difficulty.       ____________________________________     F WENDY GALLO MD    FM / NTS    DD:  02/23/2020 10:02:19  DT:  02/23/2020 11:52:16    D#:  [id number]  Job#:  [id number]    cc: Raul Londono MD, JANNETH WAHL MD, Lurdes Moffett MD, QUYNH HERNANDEZ MD

## 2020-02-24 ENCOUNTER — APPOINTMENT (OUTPATIENT)
Dept: CARDIOLOGY | Facility: MEDICAL CENTER | Age: 40
DRG: 252 | End: 2020-02-24
Attending: HOSPITALIST
Payer: MEDICARE

## 2020-02-24 VITALS
DIASTOLIC BLOOD PRESSURE: 62 MMHG | RESPIRATION RATE: 18 BRPM | HEART RATE: 102 BPM | SYSTOLIC BLOOD PRESSURE: 93 MMHG | TEMPERATURE: 98.3 F | HEIGHT: 64 IN | OXYGEN SATURATION: 91 % | WEIGHT: 162.04 LBS | BODY MASS INDEX: 27.66 KG/M2

## 2020-02-24 PROBLEM — I82.220 IVC THROMBOSIS (HCC): Status: RESOLVED | Noted: 2018-02-20 | Resolved: 2020-02-24

## 2020-02-24 PROBLEM — R79.1 SUPRATHERAPEUTIC INR: Status: RESOLVED | Noted: 2018-09-22 | Resolved: 2020-02-24

## 2020-02-24 PROBLEM — T82.838A HEMORRHAGE FROM ARTERIOVENOUS DIALYSIS GRAFT (HCC): Status: RESOLVED | Noted: 2020-02-20 | Resolved: 2020-02-24

## 2020-02-24 LAB
ANION GAP SERPL CALC-SCNC: 15 MMOL/L (ref 0–11.9)
BUN SERPL-MCNC: 66 MG/DL (ref 8–22)
CALCIUM SERPL-MCNC: 8.3 MG/DL (ref 8.5–10.5)
CHLORIDE SERPL-SCNC: 97 MMOL/L (ref 96–112)
CO2 SERPL-SCNC: 25 MMOL/L (ref 20–33)
CREAT SERPL-MCNC: 9.78 MG/DL (ref 0.5–1.4)
CRP SERPL HS-MCNC: 4.55 MG/DL (ref 0–0.75)
ERYTHROCYTE [DISTWIDTH] IN BLOOD BY AUTOMATED COUNT: 53.2 FL (ref 35.9–50)
ERYTHROCYTE [SEDIMENTATION RATE] IN BLOOD BY WESTERGREN METHOD: 13 MM/HOUR (ref 0–15)
GLUCOSE SERPL-MCNC: 119 MG/DL (ref 65–99)
HCT VFR BLD AUTO: 24.4 % (ref 42–52)
HGB BLD-MCNC: 8 G/DL (ref 14–18)
INR PPP: 1.2 (ref 0.87–1.13)
LV EJECT FRACT  99904: 70
MCH RBC QN AUTO: 32 PG (ref 27–33)
MCHC RBC AUTO-ENTMCNC: 32.8 G/DL (ref 33.7–35.3)
MCV RBC AUTO: 97.6 FL (ref 81.4–97.8)
PLATELET # BLD AUTO: 153 K/UL (ref 164–446)
PMV BLD AUTO: 9.6 FL (ref 9–12.9)
POTASSIUM SERPL-SCNC: 3.7 MMOL/L (ref 3.6–5.5)
PROTHROMBIN TIME: 15.5 SEC (ref 12–14.6)
RBC # BLD AUTO: 2.5 M/UL (ref 4.7–6.1)
SODIUM SERPL-SCNC: 137 MMOL/L (ref 135–145)
VANCOMYCIN SERPL-MCNC: 16.5 UG/ML
WBC # BLD AUTO: 4.7 K/UL (ref 4.8–10.8)

## 2020-02-24 PROCEDURE — 5A1D70Z PERFORMANCE OF URINARY FILTRATION, INTERMITTENT, LESS THAN 6 HOURS PER DAY: ICD-10-PCS | Performed by: INTERNAL MEDICINE

## 2020-02-24 PROCEDURE — 700111 HCHG RX REV CODE 636 W/ 250 OVERRIDE (IP)

## 2020-02-24 PROCEDURE — 90935 HEMODIALYSIS ONE EVALUATION: CPT

## 2020-02-24 PROCEDURE — 93306 TTE W/DOPPLER COMPLETE: CPT | Mod: 26 | Performed by: INTERNAL MEDICINE

## 2020-02-24 PROCEDURE — 80202 ASSAY OF VANCOMYCIN: CPT

## 2020-02-24 PROCEDURE — 700111 HCHG RX REV CODE 636 W/ 250 OVERRIDE (IP): Mod: JG | Performed by: INTERNAL MEDICINE

## 2020-02-24 PROCEDURE — 36415 COLL VENOUS BLD VENIPUNCTURE: CPT

## 2020-02-24 PROCEDURE — 93306 TTE W/DOPPLER COMPLETE: CPT

## 2020-02-24 PROCEDURE — A9270 NON-COVERED ITEM OR SERVICE: HCPCS | Performed by: INTERNAL MEDICINE

## 2020-02-24 PROCEDURE — 85610 PROTHROMBIN TIME: CPT

## 2020-02-24 PROCEDURE — A9270 NON-COVERED ITEM OR SERVICE: HCPCS | Performed by: HOSPITALIST

## 2020-02-24 PROCEDURE — 700102 HCHG RX REV CODE 250 W/ 637 OVERRIDE(OP): Performed by: HOSPITALIST

## 2020-02-24 PROCEDURE — 85027 COMPLETE CBC AUTOMATED: CPT

## 2020-02-24 PROCEDURE — 90935 HEMODIALYSIS ONE EVALUATION: CPT | Performed by: INTERNAL MEDICINE

## 2020-02-24 PROCEDURE — 85652 RBC SED RATE AUTOMATED: CPT

## 2020-02-24 PROCEDURE — 80048 BASIC METABOLIC PNL TOTAL CA: CPT

## 2020-02-24 PROCEDURE — 99239 HOSP IP/OBS DSCHRG MGMT >30: CPT | Performed by: HOSPITALIST

## 2020-02-24 PROCEDURE — 700111 HCHG RX REV CODE 636 W/ 250 OVERRIDE (IP): Performed by: INTERNAL MEDICINE

## 2020-02-24 PROCEDURE — 86140 C-REACTIVE PROTEIN: CPT

## 2020-02-24 PROCEDURE — 700102 HCHG RX REV CODE 250 W/ 637 OVERRIDE(OP): Performed by: INTERNAL MEDICINE

## 2020-02-24 RX ORDER — HEPARIN SODIUM 1000 [USP'U]/ML
INJECTION, SOLUTION INTRAVENOUS; SUBCUTANEOUS
Status: COMPLETED
Start: 2020-02-24 | End: 2020-02-24

## 2020-02-24 RX ORDER — CEFAZOLIN SODIUM 2 G/100ML
2 INJECTION, SOLUTION INTRAVENOUS ONCE
Qty: 100 ML | Refills: 0 | Status: SHIPPED | OUTPATIENT
Start: 2020-02-24 | End: 2020-02-24

## 2020-02-24 RX ORDER — CEFAZOLIN SODIUM 2 G/100ML
2 INJECTION, SOLUTION INTRAVENOUS ONCE
Status: COMPLETED | OUTPATIENT
Start: 2020-02-24 | End: 2020-02-24

## 2020-02-24 RX ORDER — WARFARIN SODIUM 5 MG/1
5 TABLET ORAL DAILY
Status: DISCONTINUED | OUTPATIENT
Start: 2020-02-24 | End: 2020-02-24 | Stop reason: HOSPADM

## 2020-02-24 RX ORDER — HEPARIN SODIUM 1000 [USP'U]/ML
2800 INJECTION, SOLUTION INTRAVENOUS; SUBCUTANEOUS ONCE
Status: COMPLETED | OUTPATIENT
Start: 2020-02-24 | End: 2020-02-24

## 2020-02-24 RX ADMIN — SEVELAMER CARBONATE 3200 MG: 800 TABLET, FILM COATED ORAL at 12:43

## 2020-02-24 RX ADMIN — HYDROCODONE BITARTRATE AND ACETAMINOPHEN 1 TABLET: 10; 325 TABLET ORAL at 10:13

## 2020-02-24 RX ADMIN — CINACALCET HYDROCHLORIDE 30 MG: 30 TABLET, FILM COATED ORAL at 10:13

## 2020-02-24 RX ADMIN — WARFARIN SODIUM 5 MG: 5 TABLET ORAL at 17:09

## 2020-02-24 RX ADMIN — HYDROCODONE BITARTRATE AND ACETAMINOPHEN 1 TABLET: 10; 325 TABLET ORAL at 03:03

## 2020-02-24 RX ADMIN — HYDROMORPHONE HYDROCHLORIDE 0.5 MG: 1 INJECTION, SOLUTION INTRAMUSCULAR; INTRAVENOUS; SUBCUTANEOUS at 04:42

## 2020-02-24 RX ADMIN — HYDROMORPHONE HYDROCHLORIDE 0.5 MG: 1 INJECTION, SOLUTION INTRAMUSCULAR; INTRAVENOUS; SUBCUTANEOUS at 13:08

## 2020-02-24 RX ADMIN — HYDROMORPHONE HYDROCHLORIDE 0.5 MG: 1 INJECTION, SOLUTION INTRAMUSCULAR; INTRAVENOUS; SUBCUTANEOUS at 08:06

## 2020-02-24 RX ADMIN — HYDROMORPHONE HYDROCHLORIDE 0.5 MG: 1 INJECTION, SOLUTION INTRAMUSCULAR; INTRAVENOUS; SUBCUTANEOUS at 00:18

## 2020-02-24 RX ADMIN — HEPARIN SODIUM 2800 UNITS: 1000 INJECTION, SOLUTION INTRAVENOUS; SUBCUTANEOUS at 08:40

## 2020-02-24 RX ADMIN — GABAPENTIN 1800 MG: 400 CAPSULE ORAL at 17:09

## 2020-02-24 RX ADMIN — EPOETIN ALFA 4000 UNITS: 4000 SOLUTION INTRAVENOUS; SUBCUTANEOUS at 08:10

## 2020-02-24 RX ADMIN — CEFAZOLIN SODIUM 2 G: 2 INJECTION, SOLUTION INTRAVENOUS at 17:54

## 2020-02-24 RX ADMIN — HYDROCODONE BITARTRATE AND ACETAMINOPHEN 1 TABLET: 10; 325 TABLET ORAL at 16:08

## 2020-02-24 ASSESSMENT — ENCOUNTER SYMPTOMS
CHILLS: 0
COUGH: 0
SHORTNESS OF BREATH: 0
VOMITING: 0
NAUSEA: 0
ABDOMINAL PAIN: 0
MYALGIAS: 0
DIARRHEA: 0
CONSTIPATION: 0
FEVER: 0

## 2020-02-24 NOTE — PROGRESS NOTES
Right dialysis catheter removed at 1315 with a sterile dressing placed with constant pressure applied for 20 minutes. Patient tolerated the removal without any bleeding noted. The patient denies any numbness or tingling to right leg.

## 2020-02-24 NOTE — CARE PLAN
Problem: Pain Management  Goal: Pain level will decrease to patient's comfort goal  Outcome: PROGRESSING AS EXPECTED  Note: Utilize PRN pain medications to control pain.      Problem: Infection  Goal: Will remain free from infection  Outcome: PROGRESSING AS EXPECTED  Note: Patient to get antibiotics as ordered.

## 2020-02-24 NOTE — CARE PLAN
Problem: Communication  Goal: The ability to communicate needs accurately and effectively will improve  Outcome: PROGRESSING AS EXPECTED    A&Ox4. Pt able to verbalize needs.      Problem: Knowledge Deficit  Goal: Knowledge of disease process/condition, treatment plan, diagnostic tests, and medications will improve  Outcome: PROGRESSING AS EXPECTED    Updated on plan of care and medications. Pt verbalized understanding.

## 2020-02-24 NOTE — PROGRESS NOTES
Inpatient Anticoagulation Service Note    Date: 2/23/2020    Reason for Anticoagulation: Other (Comments)(Graft Thrombosis)   Target INR: 2.5 to 3.5(Per MD Ornelas)         Hemoglobin Value: (!) 8.4  Hematocrit Value: (!) 26  Lab Platelet Value: (!) 141    INR from last 7 days     Date/Time INR Value    02/23/20 0314  1.1    02/21/20 0348  (!) 1.55    02/20/20 1035  (!) 5.11    02/20/20 0449  (!) 5.24    02/19/20 1720  (!) 4.4        Dose from last 7 days     Date/Time Dose (mg)    02/23/20 1500  4        Average Dose (mg): (Reported warfarin home dose:7.5mg Mon, 5 mg AOD. )  Bridge Therapy: No     Reversal Agent Administered: Vit K 10 mg iv x1 : 02/20/20    Comments:  38 y/o M admitted on 02/19/20 for graft site bleeding. 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 INR supra therapeutic on admit , vit K given (Vit K 10mg iv x1 :02/20/20). Vascular has evaluated patient, possible follow up procedure pending abx completion. ID following patient for MSSA bacteremia. DDI noted, currently on abx.  INR currently below goal. Will cautiously restart warfarin.     Plan:  Warfarin 4 mg po tonight , INR in AM.   Education Material Provided?: No(Chronic warfarin therapy)  Pharmacist suggested discharge dosing: Warfarin 4-5 mg po daily , follow up with anticoagulation clinic after discharge.      Charlie Abreu, PharmD, BCPS

## 2020-02-24 NOTE — PROGRESS NOTES
Pt resting in bed throughout shift. PRN pain medication given for pain. Updated on plan of care and medications. Pt verbalized understanding. No other complaints at this time. Fall precautions in place. Ambulates independently. Call bell and bedside table within reach. Hourly rounding completed. Will continue to monitor.

## 2020-02-24 NOTE — PROGRESS NOTES
Inpatient Anticoagulation Service Note    Date: 2/24/2020    Reason for Anticoagulation: Other (Comments)(Graft Thrombosis)   Target INR: 2.5 to 3.5(Per MD Ornelas)         Hemoglobin Value: (!) 8  Hematocrit Value: (!) 24.4  Lab Platelet Value: (!) 153    INR from last 7 days     Date/Time INR Value    02/24/20 0252  (!) 1.2    02/23/20 0314  1.1    02/21/20 0348  (!) 1.55    02/20/20 1035  (!) 5.11    02/20/20 0449  (!) 5.24    02/19/20 1720  (!) 4.4        Dose from last 7 days     Date/Time Dose (mg)    02/24/20 1420  5    02/23/20 1500  4        Average Dose (mg): (Reported warfarin home dose:7.5mg Mon, 5 mg AOD. )  Significant Interactions: Antibiotics  Bridge Therapy: No    Reversal Agent Administered: Vitamin K  Intravenous(02/20/20)  Comments: Warfarin resumed from home for h/o graft thrombosis with reported INR goal of 2.5-3.5.  Admitted with graft site bleeding and a supratherapeutic INR.  No further bleeding noted and okay to resume warfarin.  Minimal INR movement after one dose of warfarin as expected.  No new DDI.  Trialing a reduced home regimen due to above reasons.  INR in AM.    Plan:  Warfarin 5mg.  INR in AM.  Education Material Provided?: No(Chronic warfarin therapy)   Pharmacist suggested discharge dosing: Warfarin 5mg daily with follow up within 3 days of discharge.     Samson Stafford, JocelynD

## 2020-02-24 NOTE — PROGRESS NOTES
HD treatment today per routine order using left thigh AVG ,successfully cannulated without problems-run at 400 BFR.Net UF removed 2500 ml.Epogen given.Report given to primary Rn.

## 2020-02-24 NOTE — PROGRESS NOTES
Bedside report received. Pt assessed for pain regularly and medicated PRN per MAR for 7/10 pain located on right Leg where perma cath. Pt was able to ambulate around room and was steady. POC discussed with pt; all questions answered at this time.

## 2020-02-24 NOTE — PROGRESS NOTES
"Pharmacy Kinetics 39 y.o. male on vancomycin day # 8 2020    Currently on Vancomycin Pulse Dosin/17: Vancomycin 1000 mg IVx1:20@1302 at [facility]              : Vancomycin 1300 mg IVx1:20@2325     Provider specified end date: TBD     Indication for Treatment: Staphylococcal bacteremia     Pertinent history per medical record: Admitted on 2020 for bleeding from his AV fistula. Patient also reports he was noted to be febrile during his dialysis session on , so blood cultures were obtained at the dialysis center and patient was given a dose of vancomycin at the end of his HD session. Cultures returned positive for GPC in clusters, thus, vancomycin therapy was continued. ID consulting.     Other antibiotics: None     Allergies: Baclofen; Contrast media with iodine [iodine]; Keflex; Pcn [penicillins]; Tape; and Requip      Concern for renal function includes ESRD on HD.     Pertinent cultures to date:   20:Central line,BLD:MSSA  20:PBCx1:MSSA  20:Nares,Resp:NGTD     MRSA nares swab if pneumonia is a concern (ordered/positive/negative/n-a): Negative    Recent Labs     20  0450 20  0314 20  0252   WBC 5.6 6.1 4.7*   NEUTSPOLYS  --  56.40  --      Recent Labs     20  0450 20  0314 20  0252   BUN 40* 53* 66*   CREATININE 5.88* 7.88* 9.78*     Recent Labs     20  0450 20  0252   VANCORANDOM 20.3 16.5       Intake/Output Summary (Last 24 hours) at 2020 1414  Last data filed at 2020 0900  Gross per 24 hour   Intake --   Output 2500 ml   Net -2500 ml      /59   Pulse 84   Temp 36.3 °C (97.4 °F) (Temporal)   Resp 16   Ht 1.626 m (5' 4\")   Wt 73.5 kg (162 lb 0.6 oz)   SpO2 97%  Temp (24hrs), Av.6 °C (97.8 °F), Min:36.3 °C (97.4 °F), Max:36.7 °C (98 °F)      A/P   1. Vancomycin dose change: yes; pulse dose  at 0600 1500mg (20mg/kg) once  2. Next vancomycin level: 4 days after dose  3. Goal " trough: 12-16 mcg/mL  4. Comments: Cultures resulting for MSSA; Vancomycin maintained by ID.  Will bolus dose tomorrow AM with anticipated repeat trough ~ 4 days after that.  Will continue to follow for appropriate de-escalation for current culture results.    Samson Stafford, PharmD, BCPS

## 2020-02-24 NOTE — PROGRESS NOTES
Pt with ESRD, presented with bacteremia.  Pt is doing better.  Seen and examined while getting HD.  Remove right femoral hemodialysis catheter  Okay to discharge from nephrology standpoint will follow ID recommendation  Discussed with Dr. Soriano

## 2020-02-25 NOTE — PROGRESS NOTES
Infectious Disease Progress Note    Author: Tab Colon M.D. Date & Time created: 2/24/2020  4:28 PM     Vancomycin 2/20 - present    Interval History:  Past 24 hrs reviewed with RN.    Labs Reviewed, Medications Reviewed, Radiology Reviewed, Wound Reviewed, Fluids Reviewed and GI Nutrition Reviewed    Review of Systems:  Review of Systems   Constitutional: Negative for chills and fever.   Respiratory: Negative for cough and shortness of breath.    Cardiovascular: Negative for chest pain.   Gastrointestinal: Negative for abdominal pain, constipation, diarrhea, nausea and vomiting.   Musculoskeletal: Negative for myalgias.   Skin: Negative for itching and rash.       Physical Exam:  Physical Exam  Vitals signs and nursing note reviewed.   Constitutional:       General: He is not in acute distress.     Appearance: Normal appearance. He is not ill-appearing, toxic-appearing or diaphoretic.   HENT:      Head: Normocephalic and atraumatic.   Cardiovascular:      Rate and Rhythm: Normal rate and regular rhythm.      Heart sounds: No murmur. No gallop.    Pulmonary:      Effort: No respiratory distress.      Breath sounds: No wheezing or rhonchi.   Abdominal:      General: Abdomen is flat. There is no distension.      Tenderness: There is no abdominal tenderness. There is no guarding.   Musculoskeletal:      Comments: No significant groin tenderness and wound left groin dressed.   Skin:     General: Skin is warm and dry.   Neurological:      General: No focal deficit present.      Mental Status: He is oriented to person, place, and time.   Psychiatric:         Mood and Affect: Mood normal.         Behavior: Behavior normal.         Thought Content: Thought content normal.         Labs:  Recent Results (from the past 24 hour(s))   CBC WITHOUT DIFFERENTIAL    Collection Time: 02/24/20  2:52 AM   Result Value Ref Range    WBC 4.7 (L) 4.8 - 10.8 K/uL    RBC 2.50 (L) 4.70 - 6.10 M/uL    Hemoglobin 8.0 (L) 14.0 - 18.0 g/dL      Hematocrit 24.4 (L) 42.0 - 52.0 %    MCV 97.6 81.4 - 97.8 fL    MCH 32.0 27.0 - 33.0 pg    MCHC 32.8 (L) 33.7 - 35.3 g/dL    RDW 53.2 (H) 35.9 - 50.0 fL    Platelet Count 153 (L) 164 - 446 K/uL    MPV 9.6 9.0 - 12.9 fL   Basic Metabolic Panel    Collection Time: 02/24/20  2:52 AM   Result Value Ref Range    Sodium 137 135 - 145 mmol/L    Potassium 3.7 3.6 - 5.5 mmol/L    Chloride 97 96 - 112 mmol/L    Co2 25 20 - 33 mmol/L    Glucose 119 (H) 65 - 99 mg/dL    Bun 66 (H) 8 - 22 mg/dL    Creatinine 9.78 (HH) 0.50 - 1.40 mg/dL    Calcium 8.3 (L) 8.5 - 10.5 mg/dL    Anion Gap 15.0 (H) 0.0 - 11.9   Prothrombin Time    Collection Time: 02/24/20  2:52 AM   Result Value Ref Range    PT 15.5 (H) 12.0 - 14.6 sec    INR 1.20 (H) 0.87 - 1.13   VANCOMYCIN TIMED (RANDOM) LEVEL    Collection Time: 02/24/20  2:52 AM   Result Value Ref Range    Vancomycin Unknown Level 16.5 ug/mL   CRP QUANTITIVE (NON-CARDIAC)    Collection Time: 02/24/20  2:52 AM   Result Value Ref Range    Stat C-Reactive Protein 4.55 (H) 0.00 - 0.75 mg/dL   Sed Rate    Collection Time: 02/24/20  2:52 AM   Result Value Ref Range    Sed Rate Westergren 13 0 - 15 mm/hour   ESTIMATED GFR    Collection Time: 02/24/20  2:52 AM   Result Value Ref Range    GFR If  7 (A) >60 mL/min/1.73 m 2    GFR If Non African American 6 (A) >60 mL/min/1.73 m 2   EC-ECHOCARDIOGRAM COMPLETE W/O CONT    Collection Time: 02/24/20 12:55 PM   Result Value Ref Range    Left Ventrical Ejection Fraction 70      Results     Procedure Component Value Units Date/Time    BLOOD CULTURE [[id number]] Collected:  02/23/20 1544    Order Status:  Completed Specimen:  Blood from Peripheral Updated:  02/24/20 0622     Significant Indicator NEG     Source BLD     Site PERIPHERAL     Culture Result No Growth  Note: Blood cultures are incubated for 5 days and  are monitored continuously.Positive blood cultures  are called to the RN and reported as soon as  they are identified.      Narrative:  "      Per Hospital Policy: Only change Specimen Src: to \"Line\" if  specified by physician order.  Left Wrist    BLOOD CULTURE [[id number]] Collected:  02/23/20 1544    Order Status:  Completed Specimen:  Blood from Peripheral Updated:  02/24/20 0622     Significant Indicator NEG     Source BLD     Site PERIPHERAL     Culture Result No Growth  Note: Blood cultures are incubated for 5 days and  are monitored continuously.Positive blood cultures  are called to the RN and reported as soon as  they are identified.      Narrative:       Per Hospital Policy: Only change Specimen Src: to \"Line\" if  specified by physician order.  Right Wrist    BLOOD CULTURE [[id number]]  (Abnormal) Collected:  02/20/20 1500    Order Status:  Completed Specimen:  Blood from Peripheral Updated:  02/23/20 0831     Significant Indicator POS     Source BLD     Site PERIPHERAL     Culture Result Growth detected by Bactec instrument. 02/21/2020  09:28      Staphylococcus aureus  See previous culture for sensitivity report.      Narrative:       CALL  Bender  [facility] tel. [phone],  CALLED  [facility] tel. [phone] 02/21/2020, 09:30, RB PERF. RESULTS CALLED TO: RN  [id number]  Collected By:[id number] BROWN BIGGS  Per Hospital Policy: Only change Specimen Src: to \"Line\" if  specified by physician order.  Right Forearm/Arm    BLOOD CULTURE [[id number]]  (Abnormal)  (Susceptibility) Collected:  02/20/20 1500    Order Status:  Completed Specimen:  Blood from Peripheral Updated:  02/23/20 0831     Significant Indicator POS     Source BLD     Site Central Line     Culture Result Growth detected by Bactec instrument. 02/21/2020  08:05  Staphylococcus aureus (methicillin sensitive)  detected by PCR.        Staphylococcus aureus    Narrative:       CALL  Bender  [facility] tel. [phone],  CALLED  [facility] tel. [phone] 02/21/2020, 08:09, RB PERF. RESULTS CALLED TO:  [facility]  Collected By:[id number] BROWN BIGGS  Per Hospital Policy: Only change Specimen Src: to \"Line\" " if  specified by physician order.  Right Femoral Central    Susceptibility     Staphylococcus aureus (1)     Antibiotic Interpretation Microscan Method Status    Azithromycin Resistant >4 mcg/mL CM Final    Clindamycin Sensitive <=0.5 mcg/mL CM Final    Cefazolin Sensitive <=8 mcg/mL CM Final    Ceftaroline Sensitive <=0.5 mcg/mL CM Final    Daptomycin Sensitive <=1 mcg/mL CM Final    Ampicillin/sulbactam Sensitive <=8/4 mcg/mL CM Final    Erythromycin Resistant >4 mcg/mL CM Final    Vancomycin Sensitive 1 mcg/mL CM Final    Oxacillin Sensitive 1 mcg/mL CM Final    Penicillin Resistant >8 mcg/mL CM Final    Pip/Tazobactam Sensitive <=4 mcg/mL CM Final    Trimeth/Sulfa Sensitive <=0.5/9.5 mcg/mL CM Final    Tetracycline Sensitive <=4 mcg/mL CM Final                   MRSA By PCR (Amp) [[id number]] Collected:  20 1030    Order Status:  Completed Specimen:  Respirate from Nares Updated:  20 1534     Significant Indicator NEG     Source RESP     Site NARES     MRSA PCR Negative for MRSA by PCR.    Narrative:       Collected By:[id number] PRISCILLA ROWE  Collected By:[id number] PRISCILLA ROWE        Hemodynamics:  Temp (24hrs), Av.6 °C (97.9 °F), Min:36.3 °C (97.4 °F), Max:36.8 °C (98.3 °F)  Temperature: 36.8 °C (98.3 °F)  Pulse  Av.2  Min: 57  Max: 131   Blood Pressure: (!) 93/62     Peripheral IV 20 20 G Right;Upper Upper arm (Active)   Site Assessment Clean;Dry;Intact 2020  8:15 AM   Dressing Type Transparent 2020  8:15 AM   Line Status Saline locked;Flushed;Scrubbed the hub prior to access 2020  8:15 AM   Dressing Status Clean;Dry;Intact 2020  8:15 AM   Dressing Intervention N/A 2020  8:15 AM   Date Primary Tubing Changed 20 10:00 PM   NEXT Primary Tubing Change  20 10:00 PM   Infiltration Grading ([facility], [facility]) 0 2020  9:00 PM   Phlebitis Scale ([facility] Only) 0 2020  9:00 PM     Wound:  @WOUNDLDA(4)@      Fluids:  Intake/Output       02/22/20 0700 - 02/23/20 0659 02/23/20 0700 - 02/24/20 0659 02/24/20 0700 - 02/25/20 0659      [id number] 3296-5341 Total 7064-6336 0438-1892 Total 5134-6558 6163-4001 Total       Intake    P.O.  840  -- 840  660  -- 660  480  -- 480    P.O. 840 -- 840 660 -- 660 480 -- 480    Total Intake 840 -- 840 660 -- 660 480 -- 480       Output    Urine  --  -- --  --  -- --  --  -- --    Number of Times Voided -- 2 x 2 x 2 x -- 2 x -- -- --    Dialysis  --  -- --  --  -- --  2500  -- 2500    Dialysis Output (Dialysis Input / Output) -- -- -- -- -- -- 2500 -- 2500    Total Output -- -- -- -- -- -- 2500 -- 2500       Net I/O     840 -- 840 660 -- 660 -2020 -- -2020           GI/Nutrition:  Orders Placed This Encounter   Procedures   • Diet Order Renal     Standing Status:   Standing     Number of Occurrences:   1     Order Specific Question:   Diet:     Answer:   Renal [8]     Medications:  Current Facility-Administered Medications   Medication Last Dose   • [START ON 2/25/2020] vancomycin (VANCOCIN) 1,500 mg in  mL IVPB     • warfarin (COUMADIN) tablet 5 mg     • MD Alert...Warfarin per Pharmacy     • tizanidine (ZANAFLEX) tablet 8 mg 8 mg at 02/23/20 2020   • HYDROmorphone pf (DILAUDID) injection 0.5 mg 0.5 mg at 02/24/20 1308   • NS (BOLUS) infusion 250 mL     • albumin human 25% solution 12.5 g 12.5 g at 02/20/20 2047   • lidocaine (XYLOCAINE) 1 % injection 1 mL     • MD Alert...Vancomycin per Pharmacy     • epoetin rj (EPOGEN/PROCRIT) injection 4,000 Units 4,000 Units at 02/24/20 0810   • calcitRIOL (ROCALTROL) capsule 0.75 mcg 0.75 mcg at 02/23/20 2020   • cinacalcet (SENSIPAR) tablet 30 mg 30 mg at 02/24/20 1013   • HYDROcodone/acetaminophen (NORCO)  MG per tablet 1 Tab 1 Tab at 02/24/20 1608   • sevelamer carbonate (RENVELA) tablet 3,200 mg 3,200 mg at 02/24/20 1243   • senna-docusate (PERICOLACE or SENOKOT S) 8.6-50 MG per tablet 2 Tab      And   • polyethylene glycol/lytes  (MIRALAX) PACKET 1 Packet      And   • magnesium hydroxide (MILK OF MAGNESIA) suspension 30 mL      And   • bisacodyl (DULCOLAX) suppository 10 mg     • acetaminophen (TYLENOL) tablet 650 mg 650 mg at 02/20/20 2319   • enalaprilat (VASOTEC) injection 1.25 mg     • cloNIDine (CATAPRES) tablet 0.1 mg     • ondansetron (ZOFRAN) syringe/vial injection 4 mg     • ondansetron (ZOFRAN ODT) dispertab 4 mg 4 mg at 02/20/20 2319   • promethazine (PHENERGAN) tablet 12.5-25 mg     • promethazine (PHENERGAN) suppository 12.5-25 mg     • prochlorperazine (COMPAZINE) injection 5-10 mg     • gabapentin (NEURONTIN) capsule 1,800 mg 1,800 mg at 02/23/20 1727     Medical Decision Making, by Problem:  Active Hospital Problems    Diagnosis   • *Hemorrhage from arteriovenous dialysis graft ([facility]) [T82.838A]   • Pancytopenia ([facility]) [D61.818]   • Bacteremia [R78.81]   • Hemorrhagic disorder due to circulating anticoagulants ([facility]) [D68.318]   • Supratherapeutic INR [R79.1]   • Chronic anticoagulation [Z79.01]   • ESRD on hemodialysis ([facility]) [N18.6, Z99.2]   • Ulcer of finger, limited to breakdown of skin ([facility]) [L98.491]   • Chronic, continuous use of opioids [F11.90]   • 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  Oxacillin-sensitive Staphylococcus aureus infection of left arteriovenous   left thigh graft.  Note that he has had a previous infection with   methicillin-sensitive Staphylococcus aureus in this graft for which we treated   him in 2016.  2.  End-stage renal disease secondary to obstructive uropathy and congenital   urethral valves; status post failure of living related donor transplants at   age 16 and age 19.  3.  Questionable PENICILLIN ALLERGY, IgG mediated if present.  4.  Essential hypertension.  5.  Superior vena cava syndrome.  6.  Dyslipidemia.  7.  Status post subtotal parathyroidectomy.  8.  Question of nodular goiter.  9.  History of seizure disorder, off  anticoagulation.  10.  Ischemic ulcer of the right third finger.  11.  History of calciphylaxis.  12.  History of frequent pneumonia.     RECOMMENDATIONS:  1.  Continue IV cefazolin 2 gm Monday and Wednesday and 3 gm on Friday for a minimum of 4 weeks following negative blood   cultures.  2.  Repeat blood cultures until negative.  3.  ESR and C-reactive protein.  4.  Echocardiogram.  5.  Since placement of a fresh AV fistula in the right thigh is under   consideration, complete extirpation of the left AV graft would facilitate   elimination of infection.  In fact, once he has completed his month of IV   cefazolin, he should be on oral doxycycline until that can be accomplished.    Managing hemodialysis during healing of a fresh graft is understandably a   considerable technical difficulty.    Case reviewed at great length with patient mother, daniel RN, Dr. Ornelas and Dr. Najjar > 60 min on floor with > 55% of time face to face and 100% of time in direct patient care/counseling/consulting with new antibiotic orders today.

## 2020-02-25 NOTE — DISCHARGE SUMMARY
Discharge Summary    CHIEF COMPLAINT ON ADMISSION  Chief Complaint   Patient presents with   • Vascular Access Problem       Reason for Admission  Dialysis Port Bleeding     Admission Date  2/19/2020    CODE STATUS  Full Code    HPI & HOSPITAL COURSE  This is a 39 y.o. male here with history of end-stage renal disease, undergoing dialysis Monday Wednesday Friday, suffers from chronic pain as well as history of multiple thrombotic events being on chronic anticoagulation in form of Coumadin, the patient presented after a febrile episode during dialysis and was admitted where he had a acute severe bleeding episode from his left thigh graft vascular access.  Dr. Moffett from vascular surgery was called to the case and repaired the bleeding structure, he also was found to have a right fingertip ischemic and also appearing lesion and a right upper extremity ultrasound was performed, there was no significant stenosis found.  The patient is diagnosed now with additional MSSA bacteremia, infectious disease was consulted and recommended 4-week treatment of IV antibiotics in form of either Ancef or vancomycin.  The case discussed with Dr. Najjar from nephrology to arrange antibiotic IV administration during dialysis sessions.  The patient was initially reversed in terms of his anticoagulation with vitamin K, is currently being restarted on Coumadin.  The left graft was without clot and was functioning well.  The patient is time is otherwise medically stable for discharge and ongoing outpatient hemodialysis as well as antibiotic administration under care of Dr. [facility]    Therefore, he is discharged in fair and stable condition to home with close outpatient follow-up.    The patient met 2-midnight criteria for an inpatient stay at the time of discharge.    Discharge Date  2/24/2020    FOLLOW UP ITEMS POST DISCHARGE  Ongoing antibiotic treatment under the care of Dr. Uribe and vanna from infectious  disease  Ongoing hemodialysis  Follow-up with Dr. Moffett for discussion on future graft removal and new dialysis access creation of possible    DISCHARGE DIAGNOSES  Principal Problem (Resolved):    Hemorrhage from arteriovenous dialysis graft ([facility]) POA: Unknown  Active Problems:    Hemorrhagic disorder due to circulating anticoagulants ([facility]) POA: Yes    Pancytopenia ([facility]CC) POA: Yes    Bacteremia POA: Unknown    ESRD on hemodialysis ([facility]) POA: Yes    Chronic anticoagulation POA: Yes    Chronic pain disorder POA: Yes    Chronic, continuous use of opioids POA: Yes    Ulcer of finger, limited to breakdown of skin ([facility]CC) POA: Yes  Resolved Problems:    Supratherapeutic INR POA: Yes    Hyperkalemia POA: Unknown    IVC thrombosis ([facility]) POA: Yes      FOLLOW UP  Hemodialysis with [facility], Monday Wednesday Friday  Follow-up with nephrology, vascular surgery, infectious disease as outlined  MEDICATIONS ON DISCHARGE     Medication List      START taking these medications      Instructions   ceFAZolin in dextrose 2 g/100mL IVPB  Commonly known as:  ANCEF   100 mL by Intravenous route Once for 1 dose.  Dose:  2 g        CONTINUE taking these medications      Instructions   calcitRIOL 0.25 MCG Caps  Commonly known as:  ROCALTROL   Take 0.75 mcg by mouth every bedtime.  Dose:  0.75 mcg     cinacalcet 30 MG Tabs  Commonly known as:  SENSIPAR   Take 30 mg by mouth every Monday, Wednesday, and Friday.  Dose:  30 mg     * gabapentin 600 MG tablet  Commonly known as:  NEURONTIN   Take 1,800 mg by mouth every bedtime. Take with x4 (300mg) Gabapentin for a total nightly dose = 3000mg  Dose:  1,800 mg     * gabapentin 300 MG Caps  Commonly known as:  NEURONTIN   Take 1,200 mg by mouth every bedtime. Take with x3 (600mg) Gabapentin for a total nightly dose of = 3000mg  Indications: Neuropathic Pain  Dose:  1,200 mg     HYDROcodone/acetaminophen  MG Tabs  Commonly known as:  NORCO   Take 1 Tab by mouth every 6 hours as needed for Severe  Pain. Claims he takes 4 tabs  at night.  Indications: Moderate to Moderately Severe Pain  Dose:  1 Tab     Renvela 800 MG Tabs tablet  Generic drug:  sevelamer carbonate   Take 3,200 mg by mouth 3 times a day, with meals.  Dose:  3,200 mg     tizanidine 4 MG Tabs  Commonly known as:  ZANAFLEX   Take 8 mg by mouth every bedtime.  Dose:  8 mg     warfarin 5 MG Tabs  Commonly known as:  COUMADIN   Take 5-7.5 mg by mouth See Admin Instructions. Take 5mg every Sun, Tues, Wed, Thurs, Fri, and Sat.  Take 7.5mg on Monday only.  Taken nightly.  Dose:  5-7.5 mg         * This list has 2 medication(s) that are the same as other medications prescribed for you. Read the directions carefully, and ask your doctor or other care provider to review them with you.            STOP taking these medications    vancomycin 1000 mg/250mL 1000 mg             Allergies  Allergies   Allergen Reactions   • Baclofen Unspecified     Total loss of memory, sedation.   RXN=6/2015   • Contrast Media With Iodine [Iodine] Rash     RXN=1/5/2017   • Keflex Rash     RXN=possibly >10 years   • Pcn [Penicillins] Rash     RXN=possibly >10 years ago  Tolerated Zosyn on 2/20/18   • Tape Rash     Paper tape and tegaderm ok  RXN=ongoing   • Requip Vomiting       DIET  Orders Placed This Encounter   Procedures   • Diet Order Renal     Standing Status:   Standing     Number of Occurrences:   1     Order Specific Question:   Diet:     Answer:   Renal [8]       ACTIVITY  As tolerated.  Weight bearing as tolerated    CONSULTATIONS  Nephrology  Vascular surgery  Infectious disease    PROCEDURES  Left thigh graft fistula repair    LABORATORY  Lab Results   Component Value Date    SODIUM 137 02/24/2020    POTASSIUM 3.7 02/24/2020    CHLORIDE 97 02/24/2020    CO2 25 02/24/2020    GLUCOSE 119 (H) 02/24/2020    BUN 66 (H) 02/24/2020    CREATININE 9.78 (HH) 02/24/2020    CREATININE 8.2 (HH) 05/06/2009        Lab Results   Component Value Date    WBC 4.7 (L) 02/24/2020     HEMOGLOBIN 8.0 (L) 02/24/2020    HEMATOCRIT 24.4 (L) 02/24/2020    PLATELETCT 153 (L) 02/24/2020        Total time of the discharge process exceeds 55 minutes.

## 2020-02-25 NOTE — DISCHARGE INSTRUCTIONS
Discharge Instructions    Discharged to home by car with relative. Discharged via wheelchair, hospital escort: Yes.  Special equipment needed: Not Applicable    Be sure to schedule a follow-up appointment with your primary care doctor or any specialists as instructed.     Discharge Plan:   Activity Level: Discussed  Confirmed Symptoms Management: Discussed  Medication Reconciliation Updated: Yes  Influenza Vaccine Indication: Not indicated: Previously immunized this influenza season and > 8 years of age    I understand that a diet low in cholesterol, fat, and sodium is recommended for good health. Unless I have been given specific instructions below for another diet, I accept this instruction as my diet prescription.       Special Instructions: None    · Is patient discharged on Warfarin / Coumadin?   Yes    You are receiving the drug warfarin. Please understand the importance of monitoring warfarin with scheduled PT/INR blood draws.  Follow-up with a call to your personal Doctor's office in 3 days to schedule a PT/INR. .    IMPORTANT: HOW TO USE THIS INFORMATION:  This is a summary and does NOT have all possible information about this product. This information does not assure that this product is safe, effective, or appropriate for you. This information is not individual medical advice and does not substitute for the advice of your health care professional. Always ask your health care professional for complete information about this product and your specific health needs.      WARFARIN - ORAL (WARF-uh-rin)      COMMON BRAND NAME(S): Coumadin      WARNING:  Warfarin can cause very serious (possibly fatal) bleeding. This is more likely to occur when you first start taking this medication or if you take too much warfarin. To decrease your risk for bleeding, your doctor or other health care provider will monitor you closely and check your lab results (INR test) to make sure you are not taking too much warfarin. Keep  "all medical and laboratory appointments. Tell your doctor right away if you notice any signs of serious bleeding. See also Side Effects section.      USES:  This medication is used to treat blood clots (such as in deep vein thrombosis-DVT or pulmonary embolus-PE) and/or to prevent new clots from forming in your body. Preventing harmful blood clots helps to reduce the risk of a stroke or heart attack. Conditions that increase your risk of developing blood clots include a certain type of irregular heart rhythm (atrial fibrillation), heart valve replacement, recent heart attack, and certain surgeries (such as hip/knee replacement). Warfarin is commonly called a \"blood thinner,\" but the more correct term is \"anticoagulant.\" It helps to keep blood flowing smoothly in your body by decreasing the amount of certain substances (clotting proteins) in your blood.      HOW TO USE:  Read the Medication Guide provided by your pharmacist before you start taking warfarin and each time you get a refill. If you have any questions, ask your doctor or pharmacist. Take this medication by mouth with or without food as directed by your doctor or other health care professional, usually once a day. It is very important to take it exactly as directed. Do not increase the dose, take it more frequently, or stop using it unless directed by your doctor. Dosage is based on your medical condition, laboratory tests (such as INR), and response to treatment. Your doctor or other health care provider will monitor you closely while you are taking this medication to determine the right dose for you. Use this medication regularly to get the most benefit from it. To help you remember, take it at the same time each day. It is important to eat a balanced, consistent diet while taking warfarin. Some foods can affect how warfarin works in your body and may affect your treatment and dose. Avoid sudden large increases or decreases in your intake of foods high " in vitamin K (such as broccoli, cauliflower, cabbage, brussels sprouts, kale, spinach, and other green leafy vegetables, liver, green tea, certain vitamin supplements). If you are trying to lose weight, check with your doctor before you try to go on a diet. Cranberry products may also affect how your warfarin works. Limit the amount of cranberry juice (16 ounces/480 milliliters a day) or other cranberry products you may drink or eat.      SIDE EFFECTS:  Nausea, loss of appetite, or stomach/abdominal pain may occur. If any of these effects persist or worsen, tell your doctor or pharmacist promptly. Remember that your doctor has prescribed this medication because he or she has judged that the benefit to you is greater than the risk of side effects. Many people using this medication do not have serious side effects. This medication can cause serious bleeding if it affects your blood clotting proteins too much (shown by unusually high INR lab results). Even if your doctor stops your medication, this risk of bleeding can continue for up to a week. Tell your doctor right away if you have any signs of serious bleeding, including: unusual pain/swelling/discomfort, unusual/easy bruising, prolonged bleeding from cuts or gums, persistent/frequent nosebleeds, unusually heavy/prolonged menstrual flow, pink/dark urine, coughing up blood, vomit that is bloody or looks like coffee grounds, severe headache, dizziness/fainting, unusual or persistent tiredness/weakness, bloody/black/tarry stools, chest pain, shortness of breath, difficulty swallowing. Tell your doctor right away if any of these unlikely but serious side effects occur: persistent nausea/vomiting, severe stomach/abdominal pain, yellowing eyes/skin. This drug rarely has caused very serious (possibly fatal) problems if its effects lead to small blood clots (usually at the beginning of treatment). This can lead to severe skin/tissue damage that may require surgery or  amputation if left untreated. Patients with certain blood conditions (protein C or S deficiency) may be at greater risk. Get medical help right away if any of these rare but serious side effects occur: painful/red/purplish patches on the skin (such as on the toe, breast, abdomen), change in the amount of urine, vision changes, confusion, slurred speech, weakness on one side of the body. A very serious allergic reaction to this drug is rare. However, get medical help right away if you notice any symptoms of a serious allergic reaction, including: rash, itching/swelling (especially of the face/tongue/throat), severe dizziness, trouble breathing. This is not a complete list of possible side effects. If you notice other effects not listed above, contact your doctor or pharmacist. In the [place] - Call your doctor for medical advice about side effects. You may report side effects to FDA at 5-746-AVU-1132. In [place] - Call your doctor for medical advice about side effects. You may report side effects to [facility] [place] at 1-694.757.2007.      PRECAUTIONS:  Before taking warfarin, tell your doctor or pharmacist if you are allergic to it; or if you have any other allergies. This product may contain inactive ingredients, which can cause allergic reactions or other problems. Talk to your pharmacist for more details. Before using this medication, tell your doctor or pharmacist your medical history, especially of: blood disorders (such as anemia, hemophilia), bleeding problems (such as bleeding of the stomach/intestines, bleeding in the brain), blood vessel disorders (such as aneurysms), recent major injury/surgery, liver disease, alcohol use, mental/mood disorders (including memory problems), frequent falls/injuries. It is important that all your doctors and dentists know that you take warfarin. Before having surgery or any medical/dental procedures, tell your doctor or dentist that you are taking this medication and about all the  products you use (including prescription drugs, nonprescription drugs, and herbal products). Avoid getting injections into the muscles. If you must have an injection into a muscle (for example, a flu shot), it should be given in the arm. This way, it will be easier to check for bleeding and/or apply pressure bandages. This medication may cause stomach bleeding. Daily use of alcohol while using this medicine will increase your risk for stomach bleeding and may also affect how this medication works. Limit or avoid alcoholic beverages. If you have not been eating well, if you have an illness or infection that causes fever, vomiting, or diarrhea for more than 2 days, or if you start using any antibiotic medications, contact your doctor or pharmacist immediately because these conditions can affect how warfarin works. This medication can cause heavy bleeding. To lower the chance of getting cut, bruised, or injured, use great caution with sharp objects like safety razors and nail cutters. Use an electric razor when shaving and a soft toothbrush when brushing your teeth. Avoid activities such as contact sports. If you fall or injure yourself, especially if you hit your head, call your doctor immediately. Your doctor may need to check you. The Food & Drug Administration has stated that generic warfarin products are interchangeable. However, consult your doctor or pharmacist before switching warfarin products. Be careful not to take more than one medication that contains warfarin unless specifically directed by the doctor or health care provider who is monitoring your warfarin treatment. Older adults may be at greater risk for bleeding while using this drug. This medication is not recommended for use during pregnancy because of serious (possibly fatal) harm to an unborn baby. Discuss the use of reliable forms of birth control with your doctor. If you become pregnant or think you may be pregnant, tell your doctor immediately.  "If you are planning pregnancy, discuss a plan for managing your condition with your doctor before you become pregnant. Your doctor may switch the type of medication you use during pregnancy. Very small amounts of this medication may pass into breast milk but is unlikely to harm a nursing infant. Consult your doctor before breast-feeding.      DRUG INTERACTIONS:  Drug interactions may change how your medications work or increase your risk for serious side effects. This document does not contain all possible drug interactions. Keep a list of all the products you use (including prescription/nonprescription drugs and herbal products) and share it with your doctor and pharmacist. Do not start, stop, or change the dosage of any medicines without your doctor's approval. Warfarin interacts with many prescription, nonprescription, vitamin, and herbal products. This includes medications that are applied to the skin or inside the vagina or rectum. The interactions with warfarin usually result in an increase or decrease in the \"blood-thinning\" (anticoagulant) effect. Your doctor or other health care professional should closely monitor you to prevent serious bleeding or clotting problems. While taking warfarin, it is very important to tell your doctor or pharmacist of any changes in medications, vitamins, or herbal products that you are taking. Some products that may interact with this drug include: capecitabine, imatinib, mifepristone. Aspirin, aspirin-like drugs (salicylates), and nonsteroidal anti-inflammatory drugs (NSAIDs such as ibuprofen, naproxen, celecoxib) may have effects similar to warfarin. These drugs may increase the risk of bleeding problems if taken during treatment with warfarin. Carefully check all prescription/nonprescription product labels (including drugs applied to the skin such as pain-relieving creams) since the products may contain NSAIDs or salicylates. Talk to your doctor about using a different " medication (such as acetaminophen) to treat pain/fever. Low-dose aspirin and related drugs (such as clopidogrel, ticlopidine) should be continued if prescribed by your doctor for specific medical reasons such as heart attack or stroke prevention. Consult your doctor or pharmacist for more details. Many herbal products interact with warfarin. Tell your doctor before taking any herbal products, especially bromelains, coenzyme Q10, cranberry, danshen, dong quai, fenugreek, garlic, ginkgo biloba, ginseng, and [place] wort, among others. This medication may interfere with a certain laboratory test to measure theophylline levels, possibly causing false test results. Make sure laboratory personnel and all your doctors know you use this drug.      OVERDOSE:  If overdose is suspected, contact a poison control center or emergency room immediately. [place] residents can call the [place] National Poison Hotline at 1-374.642.8143. [place] residents can call a provincial poison control center. Symptoms of overdose may include: bloody/black/tarry stools, pink/dark urine, unusual/prolonged bleeding.      NOTES:  Do not share this medication with others. Laboratory and/or medical tests (such as INR, complete blood count) must be performed periodically to monitor your progress or check for side effects. Consult your doctor for more details.      MISSED DOSE:  For the best possible benefit, do not miss any doses. If you do miss a dose and remember on the same day, take it as soon as you remember. If you remember on the next day, skip the missed dose and resume your usual dosing schedule. Do not double the dose to catch up because this could increase your risk for bleeding. Keep a record of missed doses to give to your doctor or pharmacist. Contact your doctor or pharmacist if you miss 2 or more doses in a row.      STORAGE:  Store at room temperature away from light and moisture. Do not store in the bathroom. Keep all medications away from  children and pets. Do not flush medications down the toilet or pour them into a drain unless instructed to do so. Properly discard this product when it is  or no longer needed. Consult your pharmacist or local waste disposal company for more details about how to safely discard your product.      MEDICAL ALERT:  Your condition and medication can cause complications in a medical emergency. For information about enrolling in MedicAlert, call 1-684.453.5179 ([place]) or 1-429.315.4952 ([place]).      Information last revised 2010 Copyright(c)  [employer]             Depression / Suicide Risk    As you are discharged from this [facility] facility, it is important to learn how to keep safe from harming yourself.    Recognize the warning signs:  · Abrupt changes in personality, positive or negative- including increase in energy   · Giving away possessions  · Change in eating patterns- significant weight changes-  positive or negative  · Change in sleeping patterns- unable to sleep or sleeping all the time   · Unwillingness or inability to communicate  · Depression  · Unusual sadness, discouragement and loneliness  · Talk of wanting to die  · Neglect of personal appearance   · Rebelliousness- reckless behavior  · Withdrawal from people/activities they love  · Confusion- inability to concentrate     If you or a loved one observes any of these behaviors or has concerns about self-harm, here's what you can do:  · Talk about it- your feelings and reasons for harming yourself  · Remove any means that you might use to hurt yourself (examples: pills, rope, extension cords, firearm)  · Get professional help from the community (Mental Health, Substance Abuse, psychological counseling)  · Do not be alone:Call your Safe Contact- someone whom you trust who will be there for you.  · Call your local CRISIS HOTLINE 024-8875 or 904-193-9111  · Call your local Children's Mobile Crisis Response Team [place]  (558) 115-7050 or www.[employer]  · Call the toll free National Suicide Prevention Hotlines   · National Suicide Prevention Lifeline 492-226-XABM (3954)  · National [employer] Line Network 800-SUICIDE (177-5453)

## 2020-02-26 ENCOUNTER — TELEPHONE (OUTPATIENT)
Dept: NEPHROLOGY | Facility: MEDICAL CENTER | Age: 40
End: 2020-02-26

## 2020-02-26 NOTE — TELEPHONE ENCOUNTER
Patient called asking if you could please give him a letter stating that he is okay to return to work tomorrow 2/27/2020 w/o restrictions. His job will not let him work w/o a letter.    Thank you

## 2020-02-28 LAB
BACTERIA BLD CULT: NORMAL
BACTERIA BLD CULT: NORMAL
SIGNIFICANT IND 70042: NORMAL
SIGNIFICANT IND 70042: NORMAL
SITE SITE: NORMAL
SITE SITE: NORMAL
SOURCE SOURCE: NORMAL
SOURCE SOURCE: NORMAL

## 2020-03-02 ENCOUNTER — HOSPITAL ENCOUNTER (EMERGENCY)
Facility: MEDICAL CENTER | Age: 40
End: 2020-03-02
Attending: EMERGENCY MEDICINE
Payer: MEDICARE

## 2020-03-02 ENCOUNTER — HOSPITAL ENCOUNTER (OUTPATIENT)
Facility: MEDICAL CENTER | Age: 40
End: 2020-03-02
Attending: NURSE PRACTITIONER
Payer: MEDICARE

## 2020-03-02 ENCOUNTER — ANTICOAGULATION MONITORING (OUTPATIENT)
Dept: MEDICAL GROUP | Facility: PHYSICIAN GROUP | Age: 40
End: 2020-03-02

## 2020-03-02 VITALS
DIASTOLIC BLOOD PRESSURE: 40 MMHG | HEART RATE: 111 BPM | BODY MASS INDEX: 28.23 KG/M2 | HEIGHT: 64 IN | SYSTOLIC BLOOD PRESSURE: 137 MMHG | WEIGHT: 165.34 LBS | OXYGEN SATURATION: 97 % | RESPIRATION RATE: 16 BRPM | TEMPERATURE: 97.3 F

## 2020-03-02 DIAGNOSIS — Z79.01 CHRONIC ANTICOAGULATION: ICD-10-CM

## 2020-03-02 DIAGNOSIS — R58 BLEEDING: ICD-10-CM

## 2020-03-02 LAB
INR PPP: 2.1 (ref 0.87–1.13)
PROTHROMBIN TIME: 24.3 SEC (ref 12–14.6)

## 2020-03-02 PROCEDURE — 85610 PROTHROMBIN TIME: CPT

## 2020-03-02 PROCEDURE — 99284 EMERGENCY DEPT VISIT MOD MDM: CPT

## 2020-03-02 RX ORDER — WARFARIN SODIUM 5 MG/1
5-7.5 TABLET ORAL DAILY
Qty: 135 TAB | Refills: 1 | Status: SHIPPED | OUTPATIENT
Start: 2020-03-02 | End: 2020-07-29 | Stop reason: SDUPTHER

## 2020-03-02 ASSESSMENT — FIBROSIS 4 INDEX: FIB4 SCORE: 1.24

## 2020-03-02 NOTE — ED PROVIDER NOTES
ED Provider Note    Scribed for Tab Coe M.D. by Amandeep Doan. 3/2/2020, 2:37 PM.    Primary care provider: Tony Mcmillan M.D.  Means of arrival: EMS  History obtained from: patient, EMS  History limited by: none    CHIEF COMPLAINT  Chief Complaint   Patient presents with   • Sent by MD     Pt was going into dialysis and felt bleeding from fistula site after coughing.        HPI  Denis De Anda is a 39 y.o. male who presents to the Emergency Department as a transfer from his primary for evaluation of bleeding from fistula site. Patient reports that he was going to dialysis this morning when he felt bleeding from his fistula site after coughing. He arrived at the dialysis center and was transferred to the ED for evaluation, prior to receiving treatment. Patient reports a recent surgical revisions of the site 10 days ago and is on a course of Ancef.     REVIEW OF SYSTEMS  Pertinent positives include bleeding from left leg. Pertinent negatives include no weakness, dizziness.   See HPI for further details.     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    SURGICAL HISTORY   has a past surgical history that includes mass excision ortho (10/9/08); av fistula creation (11/6/08); arteriogram (3/5/2009); angioplasty balloon (3/5/2009); av fistulogram (3/5/2009); us-kidney transplant (1996, 2001); endarterectomy (11/16/2010); av fistulogram (11/16/2010); cath placement (2/1/2011); angioplasty balloon (2/1/2011); av fistulogram (6/5/2011); cath placement (6/5/2011); av fistula revision (11/3/2011); bone spur  excision (12/8/2011); recovery (5/18/2012); incision and drainage general (9/13/2013); debridement (9/13/2013); vein ligation (9/13/2013); recovery (6/13/2014); av fistula revision (9/30/2014); irrigation & debridement general (12/15/2014); av fistula revision (2/8/2015); av fistula revision (2/22/2015); av fistula revision (3/20/2015); inject nerv blck,stellate ganglion (3/24/2015); av fistula creation (4/20/2015); av fistula thrombolysis (Left, 6/3/2015); irrigation & debridement general (Left, 6/3/2015); av fistula thrombolysis (Left, 6/9/2015); av fistula revision (Left, 6/17/2015); irrigation & debridement general (Left, 6/17/2015); recovery (8/7/2015); percut implnt neuroelect,epidural (2/19/2016); percut implnt neuroelect,epidural (2/19/2016); parathyroidectomy (2006); inguinal hernia repair (Bilateral, 2001, 2002); spinal cord stimulator (N/A, 3/25/2016); recovery (7/8/2016); lesion excision general (Right, 7/11/2016); mass excision general (Right, 8/5/2016); cath placement (Right, 9/17/2016); thrombectomy (Left, 9/18/2016); av fistulogram (9/18/2016); thrombectomy (Left, 10/20/2016); incision and drainage general (10/20/2016); irrigation & debridement general (Left, 10/28/2016); flap closure (Left, 11/17/2016); thrombectomy (Left, 1/6/2017); cath placement (Right, 1/6/2017); thrombectomy (Left, 1/5/2017); spinal cord stimulator (N/A, 5/4/2017); abdominoplasty (6/5/2017); irrigation & debridement general (7/31/2017); cath placement (Right, 11/14/2017); av fistula revision (Left, 11/14/2017); wound exploration general (2/1/2018); wound closure general (2/19/2018); split thickness skin graft (2/26/2018); thrombectomy (Left, 3/9/2018); cath placement (Right, 3/9/2018); thrombectomy (Left, 4/27/2018); thrombectomy (Left, 4/28/2018); abdominal exploration (6/25/2018); myocutaneous flap (Right, 8/27/2018); skin flap delayed (Left, 9/10/2018); split thickness skin graft (Right, 9/21/2018); av fistula revision  "(Left, 12/23/2018); av fistula revision (Left, 1/25/2019); thrombectomy (Left, 1/26/2019); intro cath dialysis circuit dx angrph fluor s&i (Left, 2/20/2020); and av fistula revision (Left, 2/20/2020).    SOCIAL HISTORY  Social History     Tobacco Use   • Smoking status: Never Smoker   • Smokeless tobacco: Never Used   Substance Use Topics   • Alcohol use: No   • Drug use: No      Social History     Substance and Sexual Activity   Drug Use No       FAMILY HISTORY  Family History   Problem Relation Age of Onset   • Arthritis Father         RA   • Lung Disease Father    • Heart Disease Father         MI   • Hypertension Brother        CURRENT MEDICATIONS  Home Medications     Reviewed by Liu Vences R.N. (Registered Nurse) on 03/02/20 at 1416  Med List Status: Partial   Medication Last Dose Status   calcitRIOL (ROCALTROL) 0.25 MCG CAPS  Active   cinacalcet (SENSIPAR) 30 MG Tab  Active   gabapentin (NEURONTIN) 300 MG Cap  Active   gabapentin (NEURONTIN) 600 MG tablet  Active   HYDROcodone/acetaminophen (NORCO)  MG Tab  Active   sevelamer carbonate (RENVELA) 800 MG Tab tablet  Active   tizanidine (ZANAFLEX) 4 MG Tab  Active   warfarin (COUMADIN) 5 MG Tab  Active                ALLERGIES  Allergies   Allergen Reactions   • Baclofen Unspecified     Total loss of memory, sedation.   RXN=6/2015   • Contrast Media With Iodine [Iodine] Rash     RXN=1/5/2017   • Keflex Rash     RXN=possibly >10 years   • Pcn [Penicillins] Rash     RXN=possibly >10 years ago  Tolerated Zosyn on 2/20/18   • Tape Rash     Paper tape and tegaderm ok  RXN=ongoing   • Requip Vomiting       PHYSICAL EXAM  VITAL SIGNS: /71   Pulse (!) 117   Temp 36.3 °C (97.3 °F) (Temporal)   Resp 16   Ht 1.626 m (5' 4\")   Wt 75 kg (165 lb 5.5 oz)   SpO2 99%   BMI 28.38 kg/m²   Vitals reviewed.  Constitutional: Alert in no apparent distress.  HENT: No signs of trauma, Bilateral external ears normal, Nose normal.   Eyes: Pupils are equal and " reactive, Conjunctiva normal, Non-icteric.   Neck: Normal range of motion, No tenderness, Supple, No stridor.   Lymphatic: No lymphadenopathy noted.   Skin: Warm, Dry, No erythema, No rash.   Back: No bony tenderness, No CVA tenderness.   Extremities: Intact distal pulses, No edema, No tenderness, No cyanosis. Small wound to anterior left thigh. Sutures in place. No active bleeding.   Musculoskeletal: Good range of motion in all major joints. No tenderness to palpation or major deformities noted.   Neurologic: Alert , Normal motor function, Normal sensory function, No focal deficits noted.   Psychiatric: Affect normal, Judgment normal, Mood normal.     DIAGNOSTIC STUDIES / PROCEDURES    LABS  Labs Reviewed - No data to display   All labs reviewed by me.    RADIOLOGY  No orders to display   The radiologist's interpretation of all radiological studies have been reviewed by me.    COURSE & MEDICAL DECISION MAKING  Nursing notes, VS, PMSFHx reviewed in chart.    2:37 PM - Patient seen and examined at bedside.    2:56 PM - Paged Dr. Moffett (vascular surgery).     3:00 PM - I discussed the patient's case and the above findings with Dr. Moffett's Medical Assisdtant (vascular) who would like me to discharge the patient from a vascular surgery perspective. There is currently no bleeding, I will order surgicel and bandage.     3:54 PM - I spoke with Dr. Ruben Evangelista (nephrology) on for Dr. Najjar, the patient will go to [facility] for dialysis tomorrow morning.     The patient will return for new or worsening symptoms and is stable at the time of discharge.    The patient is referred to his primary physician for blood pressure management, diabetic screening, and for all other preventative health concerns.    DISPOSITION:  Patient will be discharged home in stable condition.    FOLLOW UP:  [facility], Emergency Dept  [address] [address] [phone]    If symptoms worsen    [facility]  DIALYSIS [facility]  [address] [id number] [phone]    tomorrow morning as scheduled    Tony Mcmillan M.D.  [address]  [address]  [place] [address] [phone]      As needed      OUTPATIENT MEDICATIONS:  New Prescriptions    No medications on file     FINAL IMPRESSION  1. Bleeding          IAmandeep (Scribe), am scribing for, and in the presence of, Tab Coe M.D..    Electronically signed by: Amandeep Doan (Scribe), 3/2/2020    ITab M.D. personally performed the services described in this documentation, as scribed by Amandeep Doan in my presence, and it is both accurate and complete. E    The note accurately reflects work and decisions made by me.  Tab Coe M.D.  3/2/2020  3:55 PM

## 2020-03-02 NOTE — DISCHARGE INSTRUCTIONS
Vascular Access for Hemodialysis  A vascular access is a connection between two blood vessels that allows blood to be easily removed from the body and returned to the body during hemodialysis. Hemodialysis is a procedure in which a machine outside of the body filters the blood. There are three types of vascular accesses:   · Arteriovenous fistula. This is a connection between an artery and a vein (usually in the arm) that is made by sewing them together. Blood in the artery flows directly into the vein, causing it to get larger over time. This makes it easier for the vein to be used for hemodialysis. An arteriovenous fistula takes 1-6 months to develop after surgery.    · Arteriovenous graft. This is a connection between an artery and a vein in the arm that is made with a tube. An arteriovenous graft can be used within 2-3 weeks of surgery.    · Venous catheter. This is a thin, flexible tube that is placed in a large vein (usually in the neck, chest, or groin). A venous catheter for hemodialysis contains two tubes that come out of the skin. A venous catheter can be used right away. It is usually used as a temporary access if you need hemodialysis before a fistula or graft has developed. It may also be used as a permanent access if a fistula or graft cannot be created.  WHICH TYPE OF ACCESS IS BEST FOR ME?  The type of access that is best for you depends on the size and strength of your veins.   A fistula is usually the preferred type of access. It can last several years and is less likely than the other types of accesses to become infected or to cause blood clots within a blood vessel (thrombosis). However, a fistula is not an option for everyone. If your veins are not the right size, a graft may be used instead. Grafts require you to have strong veins. If your veins are not strong enough for a graft, a catheter may be used. Catheters are more likely than fistulas and grafts to become infected or to have thrombosis.  "  Sometimes, only one type of access is an option. Your health care provider will help you determine which type of access is best for you.   HOW IS A VASCULAR ACCESS USED?  The way the access is used depends on the type of access:   · If the access is a fistula or graft, two needles are inserted through the skin into the access before each hemodialysis session. Blood leaves the body through one of the needles and travels through a tube to the hemodialysis machine (dialyzer). It then flows through another tube and returns to the body through the second needle.    · If the access is a catheter, one tube is connected directly to the tube that leads to the dialyzer and the other is connected to a tube that leads away from the dialyzer. Blood leaves the body through one tube and returns to the body through the other.    WHAT KIND OF PROBLEMS CAN OCCUR WITH VASCULAR ACCESSES?  · Blood clots within a blood vessel (thrombosis). Thrombosis can lead to a narrowing of a blood vessel or tube (stenosis). If thrombosis occurs frequently, another access site may be created as a backup.    · Infection.    These problems are most likely to occur with a venous catheter and least likely to occur with an arteriovenous fistula.   HOW DO I CARE FOR MY VASCULAR ACCESS?  Wear a medical alert bracelet. This tells health care providers that you are a dialysis patient in the case of an emergency and allows them to care for your veins appropriately. If you have a graft or fistula:   · A \"bruit\" is a noise that is heard with a stethoscope and a \"thrill\" is a vibration felt over the graft or fistula. The presence of the bruit and thrill indicates that the access is working. You will be taught to feel for the thrill each day. If this is not felt, the access may be clotted. Call your health care provider.    · You may use the arm where your vascular access is located freely after the site heals. Keep the following in mind:    ¨ Avoid pressure on " the arm.    ¨ Avoid lifting heavy objects with the arm.    ¨ Avoid sleeping on the arm.    ¨ Avoid wearing tight-sleeved shirts or jewelry around the graft or fistula.    · Do not allow blood pressure monitoring or needle punctures on the side where the graft or fistula is located.    · With permission from your health care provider, you may do exercises to help with blood flow through a fistula. These exercises involve squeezing a rubber ball or other soft objects as instructed.  SEEK MEDICAL CARE IF:   · Chills develop.    · You have an oral temperature above 102° F (38.9° C).  · Swelling around the graft or fistula gets worse.    · New pain develops.    · Pus or other fluid (drainage) is seen at the vascular access site.    · Skin redness or red streaking is seen on the skin around, above, or below the vascular access.  SEEK IMMEDIATE MEDICAL CARE IF:   · Pain, numbness, or an unusual pale skin color develops in the hand on the side of your fistula.    · Dizziness or weakness develops that you have not had before.    · The vascular access has bleeding that cannot be easily controlled.  This information is not intended to replace advice given to you by your health care provider. Make sure you discuss any questions you have with your health care provider.  Document Released: 03/09/2004 Document Revised: 01/08/2016 Document Reviewed: 05/06/2014  Elsevier Interactive Patient Education © 2017 [employer]

## 2020-03-02 NOTE — ED TRIAGE NOTES
Chief Complaint   Patient presents with   • Sent by MD     Pt was going into dialysis and felt bleeding from fistula site after coughing.

## 2020-03-03 ENCOUNTER — ANTICOAGULATION MONITORING (OUTPATIENT)
Dept: VASCULAR LAB | Facility: MEDICAL CENTER | Age: 40
End: 2020-03-03

## 2020-03-03 NOTE — PROGRESS NOTES
Due for a INR, placed order today, he had blood work done for other reasons so I called the lab to see if they could get an INR as well.    Called lab to test INR at [phone]

## 2020-03-03 NOTE — PROGRESS NOTES
OP Telephone Anticoagulation Service Note    Date: 3/3/2020      Anticoagulation Summary  As of 3/3/2020    INR goal:   2.5-3.5   TTR:   37.0 % (2.7 y)   INR used for dosin.10! 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 Hopkins      Anticoagulation Care Providers     Provider Role Specialty Phone number    Jairo Hopkins M.D. Referring Surgery [phone]    [facility] Responsible  [phone]        Anticoagulation Patient Findings  Patient Findings     Positives:   Signs/symptoms of bleeding, Change in medications    Negatives:   Signs/symptoms of thrombosis, Laboratory test error suspected, Change in health, Change in alcohol use, Change in activity, Upcoming invasive procedure, Emergency department visit, Upcoming dental procedure, Missed doses, Extra doses, Change in diet/appetite, Hospital admission, Bruising, Other complaints            Plan: Patient is SUB-therapeutic today. Confirmed dosing regimen. Patient denies any changes in medications or diet. Patient went to the ER yesterday for another bleeding episode at fistula site. Patient states it is under control now.  Patient started IV Ancef 2g on  and  and 3g on  X 4 weeks (has 3 weeks left).  Instructed patient to call clinic if any unusual bleeding or bruising occurs. Will continue dosing as outlined. Will follow-up with patient in 1 week(s).     Ericka Kaur, Pharmacy Intern

## 2020-03-03 NOTE — ED NOTES
Pt discharge to home.  Pt provided with discharge instructions.  Pt verbalized understanding, all questions answered.  VSS upon DC. Pt steady on feet upon DC.

## 2020-03-10 ENCOUNTER — HOSPITAL ENCOUNTER (OUTPATIENT)
Facility: MEDICAL CENTER | Age: 40
End: 2020-03-10
Attending: SURGERY | Admitting: SURGERY
Payer: MEDICARE

## 2020-03-12 ENCOUNTER — HOSPITAL ENCOUNTER (OUTPATIENT)
Facility: MEDICAL CENTER | Age: 40
End: 2020-03-12
Payer: MEDICARE

## 2020-03-12 ENCOUNTER — HOSPITAL ENCOUNTER (OUTPATIENT)
Facility: MEDICAL CENTER | Age: 40
End: 2020-03-14
Attending: HOSPITALIST | Admitting: HOSPITALIST
Payer: MEDICARE

## 2020-03-12 LAB
ANION GAP SERPL CALC-SCNC: 17 MMOL/L (ref 7–16)
BUN SERPL-MCNC: 22 MG/DL (ref 8–22)
CALCIUM SERPL-MCNC: 9 MG/DL (ref 8.5–10.5)
CHLORIDE SERPL-SCNC: 90 MMOL/L (ref 96–112)
CO2 SERPL-SCNC: 28 MMOL/L (ref 20–33)
CREAT SERPL-MCNC: 7.39 MG/DL (ref 0.5–1.4)
ERYTHROCYTE [DISTWIDTH] IN BLOOD BY AUTOMATED COUNT: 54.6 FL (ref 35.9–50)
GLUCOSE SERPL-MCNC: 103 MG/DL (ref 65–99)
HCT VFR BLD AUTO: 28.7 % (ref 42–52)
HGB BLD-MCNC: 9.1 G/DL (ref 14–18)
INR PPP: 3.18 (ref 0.87–1.13)
MCH RBC QN AUTO: 31.3 PG (ref 27–33)
MCHC RBC AUTO-ENTMCNC: 31.7 G/DL (ref 33.7–35.3)
MCV RBC AUTO: 98.6 FL (ref 81.4–97.8)
PLATELET # BLD AUTO: 148 K/UL (ref 164–446)
PMV BLD AUTO: 9.4 FL (ref 9–12.9)
POTASSIUM SERPL-SCNC: 3.9 MMOL/L (ref 3.6–5.5)
PROTHROMBIN TIME: 33.8 SEC (ref 12–14.6)
RBC # BLD AUTO: 2.91 M/UL (ref 4.7–6.1)
SODIUM SERPL-SCNC: 135 MMOL/L (ref 135–145)
WBC # BLD AUTO: 3.1 K/UL (ref 4.8–10.8)

## 2020-03-12 PROCEDURE — 99220 PR INITIAL OBSERVATION CARE,LEVL III: CPT | Mod: AI | Performed by: HOSPITALIST

## 2020-03-12 PROCEDURE — 96365 THER/PROPH/DIAG IV INF INIT: CPT

## 2020-03-12 PROCEDURE — 85027 COMPLETE CBC AUTOMATED: CPT

## 2020-03-12 PROCEDURE — 700102 HCHG RX REV CODE 250 W/ 637 OVERRIDE(OP): Performed by: HOSPITALIST

## 2020-03-12 PROCEDURE — 80048 BASIC METABOLIC PNL TOTAL CA: CPT

## 2020-03-12 PROCEDURE — 700111 HCHG RX REV CODE 636 W/ 250 OVERRIDE (IP): Performed by: HOSPITALIST

## 2020-03-12 PROCEDURE — 85610 PROTHROMBIN TIME: CPT

## 2020-03-12 PROCEDURE — A9270 NON-COVERED ITEM OR SERVICE: HCPCS | Performed by: HOSPITALIST

## 2020-03-12 PROCEDURE — 700105 HCHG RX REV CODE 258: Performed by: HOSPITALIST

## 2020-03-12 PROCEDURE — G0378 HOSPITAL OBSERVATION PER HR: HCPCS

## 2020-03-12 PROCEDURE — 36415 COLL VENOUS BLD VENIPUNCTURE: CPT

## 2020-03-12 RX ORDER — PROCHLORPERAZINE EDISYLATE 5 MG/ML
5-10 INJECTION INTRAMUSCULAR; INTRAVENOUS EVERY 4 HOURS PRN
Status: DISCONTINUED | OUTPATIENT
Start: 2020-03-12 | End: 2020-03-14 | Stop reason: HOSPADM

## 2020-03-12 RX ORDER — HYDROCODONE BITARTRATE AND ACETAMINOPHEN 5; 325 MG/1; MG/1
2 TABLET ORAL EVERY 6 HOURS PRN
Status: DISCONTINUED | OUTPATIENT
Start: 2020-03-12 | End: 2020-03-14 | Stop reason: HOSPADM

## 2020-03-12 RX ORDER — ONDANSETRON 4 MG/1
4 TABLET, ORALLY DISINTEGRATING ORAL EVERY 4 HOURS PRN
Status: DISCONTINUED | OUTPATIENT
Start: 2020-03-12 | End: 2020-03-14 | Stop reason: HOSPADM

## 2020-03-12 RX ORDER — BISACODYL 10 MG
10 SUPPOSITORY, RECTAL RECTAL
Status: DISCONTINUED | OUTPATIENT
Start: 2020-03-12 | End: 2020-03-14 | Stop reason: HOSPADM

## 2020-03-12 RX ORDER — GABAPENTIN 300 MG/1
3000 CAPSULE ORAL
Status: DISCONTINUED | OUTPATIENT
Start: 2020-03-12 | End: 2020-03-14 | Stop reason: HOSPADM

## 2020-03-12 RX ORDER — CALCITRIOL 0.25 UG/1
0.75 CAPSULE, LIQUID FILLED ORAL
Status: DISCONTINUED | OUTPATIENT
Start: 2020-03-12 | End: 2020-03-14 | Stop reason: HOSPADM

## 2020-03-12 RX ORDER — ONDANSETRON 2 MG/ML
4 INJECTION INTRAMUSCULAR; INTRAVENOUS EVERY 4 HOURS PRN
Status: DISCONTINUED | OUTPATIENT
Start: 2020-03-12 | End: 2020-03-14 | Stop reason: HOSPADM

## 2020-03-12 RX ORDER — AMOXICILLIN 250 MG
2 CAPSULE ORAL 2 TIMES DAILY
Status: DISCONTINUED | OUTPATIENT
Start: 2020-03-12 | End: 2020-03-14 | Stop reason: HOSPADM

## 2020-03-12 RX ORDER — PROMETHAZINE HYDROCHLORIDE 25 MG/1
12.5-25 TABLET ORAL EVERY 4 HOURS PRN
Status: DISCONTINUED | OUTPATIENT
Start: 2020-03-12 | End: 2020-03-14 | Stop reason: HOSPADM

## 2020-03-12 RX ORDER — POLYETHYLENE GLYCOL 3350 17 G/17G
1 POWDER, FOR SOLUTION ORAL
Status: DISCONTINUED | OUTPATIENT
Start: 2020-03-12 | End: 2020-03-14 | Stop reason: HOSPADM

## 2020-03-12 RX ORDER — SEVELAMER CARBONATE 800 MG/1
3200 TABLET, FILM COATED ORAL
Status: DISCONTINUED | OUTPATIENT
Start: 2020-03-12 | End: 2020-03-14 | Stop reason: HOSPADM

## 2020-03-12 RX ORDER — TIZANIDINE 4 MG/1
8 TABLET ORAL
Status: DISCONTINUED | OUTPATIENT
Start: 2020-03-12 | End: 2020-03-14 | Stop reason: HOSPADM

## 2020-03-12 RX ORDER — CINACALCET 30 MG/1
30 TABLET, FILM COATED ORAL
Status: DISCONTINUED | OUTPATIENT
Start: 2020-03-13 | End: 2020-03-14 | Stop reason: HOSPADM

## 2020-03-12 RX ORDER — PROMETHAZINE HYDROCHLORIDE 25 MG/1
12.5-25 SUPPOSITORY RECTAL EVERY 4 HOURS PRN
Status: DISCONTINUED | OUTPATIENT
Start: 2020-03-12 | End: 2020-03-14 | Stop reason: HOSPADM

## 2020-03-12 RX ORDER — ACETAMINOPHEN 325 MG/1
650 TABLET ORAL EVERY 6 HOURS PRN
Status: DISCONTINUED | OUTPATIENT
Start: 2020-03-12 | End: 2020-03-14 | Stop reason: HOSPADM

## 2020-03-12 RX ORDER — HYDROCODONE BITARTRATE AND ACETAMINOPHEN 10; 325 MG/1; MG/1
1 TABLET ORAL EVERY 6 HOURS PRN
Status: DISCONTINUED | OUTPATIENT
Start: 2020-03-12 | End: 2020-03-12

## 2020-03-12 RX ORDER — SODIUM CHLORIDE 9 MG/ML
INJECTION, SOLUTION INTRAVENOUS CONTINUOUS
Status: DISCONTINUED | OUTPATIENT
Start: 2020-03-12 | End: 2020-03-14 | Stop reason: HOSPADM

## 2020-03-12 RX ORDER — HYDROCODONE BITARTRATE AND ACETAMINOPHEN 10; 325 MG/1; MG/1
4 TABLET ORAL NIGHTLY PRN
Status: DISCONTINUED | OUTPATIENT
Start: 2020-03-12 | End: 2020-03-14 | Stop reason: HOSPADM

## 2020-03-12 RX ADMIN — CALCITRIOL CAPSULES 0.25 MCG 0.75 MCG: 0.25 CAPSULE ORAL at 20:56

## 2020-03-12 RX ADMIN — PHYTONADIONE 10 MG: 10 INJECTION, EMULSION INTRAMUSCULAR; INTRAVENOUS; SUBCUTANEOUS at 23:28

## 2020-03-12 RX ADMIN — TIZANIDINE 8 MG: 4 TABLET ORAL at 20:57

## 2020-03-12 RX ADMIN — SODIUM CHLORIDE: 9 INJECTION, SOLUTION INTRAVENOUS at 23:28

## 2020-03-12 RX ADMIN — GABAPENTIN 3000 MG: 300 CAPSULE ORAL at 20:55

## 2020-03-12 RX ADMIN — HYDROCODONE BITARTRATE AND ACETAMINOPHEN 4 TABLET: 10; 325 TABLET ORAL at 20:56

## 2020-03-12 ASSESSMENT — ENCOUNTER SYMPTOMS
LOSS OF CONSCIOUSNESS: 0
ABDOMINAL PAIN: 0
HEADACHES: 0
SHORTNESS OF BREATH: 0
CHILLS: 0
PALPITATIONS: 0
VOMITING: 0
BLURRED VISION: 0
BACK PAIN: 0
FEVER: 0
DIZZINESS: 0
DOUBLE VISION: 0
NAUSEA: 0
COUGH: 0
SORE THROAT: 0
DIARRHEA: 0

## 2020-03-12 ASSESSMENT — FIBROSIS 4 INDEX: FIB4 SCORE: 1.24

## 2020-03-12 NOTE — OR NURSING
COVID-19 Pre-surgery screenin. Do you have an undiagnosed respiratory illness or symptoms such as coughing or sneezing? Cough (Yes/No)  a. Onset of Sx -x 2 days. Jasmine from Dr Pinto office notified.  b. Acute vs. chronic respiratory illness- acute    2. Do you have an unexplained fever greater than 100.4 degrees Fahrenheit or 38 degrees Celsius?     no (Yes/No)    3. Have you had direct exposure to a patient who tested positive for Covid-19?    no (Yes/No)    4. Have you traveled within the last 14 days to [place], [place], [place], [place], or [place]?    no (Yes/No)    Informed of 1 visitor

## 2020-03-12 NOTE — PROGRESS NOTES
40yo with ESRD and failed transplant.  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    Admitting for new graft placement tomorrow hs.    Will need Nephrology consultation

## 2020-03-12 NOTE — PROGRESS NOTES
Received direct admit transfer request from [facility].  Sending Physician: Dr. Moffett  Diagnosis:  ESRD  Patient accepted by: Dr. Willams  Patient coming via: POV  ETA: [phone]  Nursing to notify Direct Admit On-Call Hospitalist and release orders when patient arrives.

## 2020-03-13 ENCOUNTER — ANESTHESIA EVENT (OUTPATIENT)
Dept: SURGERY | Facility: MEDICAL CENTER | Age: 40
End: 2020-03-13
Payer: MEDICARE

## 2020-03-13 ENCOUNTER — ANESTHESIA (OUTPATIENT)
Dept: SURGERY | Facility: MEDICAL CENTER | Age: 40
End: 2020-03-13
Payer: MEDICARE

## 2020-03-13 LAB
ABO GROUP BLD: NORMAL
BLD GP AB SCN SERPL QL: NORMAL
ERYTHROCYTE [DISTWIDTH] IN BLOOD BY AUTOMATED COUNT: 55.4 FL (ref 35.9–50)
ERYTHROCYTE [DISTWIDTH] IN BLOOD BY AUTOMATED COUNT: 57.5 FL (ref 35.9–50)
HCT VFR BLD AUTO: 28.1 % (ref 42–52)
HCT VFR BLD AUTO: 30.2 % (ref 42–52)
HGB BLD-MCNC: 9.3 G/DL (ref 14–18)
HGB BLD-MCNC: 9.6 G/DL (ref 14–18)
INR PPP: 1.84 (ref 0.87–1.13)
MCH RBC QN AUTO: 32.3 PG (ref 27–33)
MCH RBC QN AUTO: 32.9 PG (ref 27–33)
MCHC RBC AUTO-ENTMCNC: 31.8 G/DL (ref 33.7–35.3)
MCHC RBC AUTO-ENTMCNC: 33.1 G/DL (ref 33.7–35.3)
MCV RBC AUTO: 101.7 FL (ref 81.4–97.8)
MCV RBC AUTO: 99.3 FL (ref 81.4–97.8)
PLATELET # BLD AUTO: 208 K/UL (ref 164–446)
PLATELET # BLD AUTO: 227 K/UL (ref 164–446)
PMV BLD AUTO: 9.4 FL (ref 9–12.9)
PMV BLD AUTO: 9.5 FL (ref 9–12.9)
PROTHROMBIN TIME: 21.8 SEC (ref 12–14.6)
RBC # BLD AUTO: 2.83 M/UL (ref 4.7–6.1)
RBC # BLD AUTO: 2.97 M/UL (ref 4.7–6.1)
RH BLD: NORMAL
WBC # BLD AUTO: 6.7 K/UL (ref 4.8–10.8)
WBC # BLD AUTO: 9.9 K/UL (ref 4.8–10.8)

## 2020-03-13 PROCEDURE — 700101 HCHG RX REV CODE 250: Performed by: SURGERY

## 2020-03-13 PROCEDURE — A9270 NON-COVERED ITEM OR SERVICE: HCPCS | Performed by: ANESTHESIOLOGY

## 2020-03-13 PROCEDURE — 160036 HCHG PACU - EA ADDL 30 MINS PHASE I: Performed by: SURGERY

## 2020-03-13 PROCEDURE — 85610 PROTHROMBIN TIME: CPT

## 2020-03-13 PROCEDURE — 500445 HCHG HEMOSTAT, SURGICEL 4X8: Performed by: SURGERY

## 2020-03-13 PROCEDURE — A6402 STERILE GAUZE <= 16 SQ IN: HCPCS | Performed by: SURGERY

## 2020-03-13 PROCEDURE — 86901 BLOOD TYPING SEROLOGIC RH(D): CPT

## 2020-03-13 PROCEDURE — A9270 NON-COVERED ITEM OR SERVICE: HCPCS | Performed by: HOSPITALIST

## 2020-03-13 PROCEDURE — 160048 HCHG OR STATISTICAL LEVEL 1-5: Performed by: SURGERY

## 2020-03-13 PROCEDURE — 160035 HCHG PACU - 1ST 60 MINS PHASE I: Performed by: SURGERY

## 2020-03-13 PROCEDURE — C1768 GRAFT, VASCULAR: HCPCS | Performed by: SURGERY

## 2020-03-13 PROCEDURE — 99214 OFFICE O/P EST MOD 30 MIN: CPT | Performed by: INTERNAL MEDICINE

## 2020-03-13 PROCEDURE — 160009 HCHG ANES TIME/MIN: Performed by: SURGERY

## 2020-03-13 PROCEDURE — 85027 COMPLETE CBC AUTOMATED: CPT | Mod: 91

## 2020-03-13 PROCEDURE — 700101 HCHG RX REV CODE 250: Performed by: ANESTHESIOLOGY

## 2020-03-13 PROCEDURE — 160002 HCHG RECOVERY MINUTES (STAT): Performed by: SURGERY

## 2020-03-13 PROCEDURE — 160029 HCHG SURGERY MINUTES - 1ST 30 MINS LEVEL 4: Performed by: SURGERY

## 2020-03-13 PROCEDURE — 99226 PR SUBSEQUENT OBSERVATION CARE,LEVEL III: CPT | Performed by: HOSPITALIST

## 2020-03-13 PROCEDURE — 700105 HCHG RX REV CODE 258: Performed by: ANESTHESIOLOGY

## 2020-03-13 PROCEDURE — 501445 HCHG STAPLER, SKIN DISP: Performed by: SURGERY

## 2020-03-13 PROCEDURE — G0378 HOSPITAL OBSERVATION PER HR: HCPCS

## 2020-03-13 PROCEDURE — 501838 HCHG SUTURE GENERAL: Performed by: SURGERY

## 2020-03-13 PROCEDURE — 700102 HCHG RX REV CODE 250 W/ 637 OVERRIDE(OP): Performed by: ANESTHESIOLOGY

## 2020-03-13 PROCEDURE — 700102 HCHG RX REV CODE 250 W/ 637 OVERRIDE(OP): Performed by: HOSPITALIST

## 2020-03-13 PROCEDURE — 160041 HCHG SURGERY MINUTES - EA ADDL 1 MIN LEVEL 4: Performed by: SURGERY

## 2020-03-13 PROCEDURE — 36415 COLL VENOUS BLD VENIPUNCTURE: CPT

## 2020-03-13 PROCEDURE — 700105 HCHG RX REV CODE 258: Performed by: HOSPITALIST

## 2020-03-13 PROCEDURE — 501837 HCHG SUTURE CV: Performed by: SURGERY

## 2020-03-13 PROCEDURE — 90935 HEMODIALYSIS ONE EVALUATION: CPT

## 2020-03-13 PROCEDURE — 86900 BLOOD TYPING SEROLOGIC ABO: CPT

## 2020-03-13 PROCEDURE — 86850 RBC ANTIBODY SCREEN: CPT

## 2020-03-13 PROCEDURE — 700111 HCHG RX REV CODE 636 W/ 250 OVERRIDE (IP): Performed by: ANESTHESIOLOGY

## 2020-03-13 PROCEDURE — 700111 HCHG RX REV CODE 636 W/ 250 OVERRIDE (IP): Performed by: SURGERY

## 2020-03-13 PROCEDURE — 96375 TX/PRO/DX INJ NEW DRUG ADDON: CPT | Mod: XU

## 2020-03-13 PROCEDURE — 700111 HCHG RX REV CODE 636 W/ 250 OVERRIDE (IP): Mod: JG

## 2020-03-13 DEVICE — GRAFT GOR STRCH 4-7X45CM: Type: IMPLANTABLE DEVICE | Site: FEMUR | Status: FUNCTIONAL

## 2020-03-13 RX ORDER — DEXAMETHASONE SODIUM PHOSPHATE 4 MG/ML
INJECTION, SOLUTION INTRA-ARTICULAR; INTRALESIONAL; INTRAMUSCULAR; INTRAVENOUS; SOFT TISSUE PRN
Status: DISCONTINUED | OUTPATIENT
Start: 2020-03-13 | End: 2020-03-13 | Stop reason: SURG

## 2020-03-13 RX ORDER — HEPARIN SODIUM,PORCINE 1000/ML
VIAL (ML) INJECTION
Status: DISCONTINUED | OUTPATIENT
Start: 2020-03-13 | End: 2020-03-13 | Stop reason: HOSPADM

## 2020-03-13 RX ORDER — SODIUM CHLORIDE 9 MG/ML
INJECTION, SOLUTION INTRAVENOUS CONTINUOUS
Status: DISCONTINUED | OUTPATIENT
Start: 2020-03-13 | End: 2020-03-13 | Stop reason: HOSPADM

## 2020-03-13 RX ORDER — KETAMINE HYDROCHLORIDE 50 MG/ML
INJECTION, SOLUTION INTRAMUSCULAR; INTRAVENOUS PRN
Status: DISCONTINUED | OUTPATIENT
Start: 2020-03-13 | End: 2020-03-13 | Stop reason: SURG

## 2020-03-13 RX ORDER — PROTAMINE SULFATE 10 MG/ML
INJECTION, SOLUTION INTRAVENOUS PRN
Status: DISCONTINUED | OUTPATIENT
Start: 2020-03-13 | End: 2020-03-13 | Stop reason: SURG

## 2020-03-13 RX ORDER — LIDOCAINE HYDROCHLORIDE 10 MG/ML
INJECTION, SOLUTION INFILTRATION; PERINEURAL
Status: DISPENSED
Start: 2020-03-13 | End: 2020-03-13

## 2020-03-13 RX ORDER — ONDANSETRON 2 MG/ML
INJECTION INTRAMUSCULAR; INTRAVENOUS PRN
Status: DISCONTINUED | OUTPATIENT
Start: 2020-03-13 | End: 2020-03-13 | Stop reason: SURG

## 2020-03-13 RX ORDER — ONDANSETRON 2 MG/ML
4 INJECTION INTRAMUSCULAR; INTRAVENOUS
Status: DISCONTINUED | OUTPATIENT
Start: 2020-03-13 | End: 2020-03-13 | Stop reason: HOSPADM

## 2020-03-13 RX ORDER — LIDOCAINE HYDROCHLORIDE 20 MG/ML
INJECTION, SOLUTION EPIDURAL; INFILTRATION; INTRACAUDAL; PERINEURAL PRN
Status: DISCONTINUED | OUTPATIENT
Start: 2020-03-13 | End: 2020-03-13 | Stop reason: SURG

## 2020-03-13 RX ORDER — BUPIVACAINE HYDROCHLORIDE AND EPINEPHRINE 5; 5 MG/ML; UG/ML
INJECTION, SOLUTION EPIDURAL; INTRACAUDAL; PERINEURAL
Status: DISCONTINUED | OUTPATIENT
Start: 2020-03-13 | End: 2020-03-13 | Stop reason: HOSPADM

## 2020-03-13 RX ORDER — OXYCODONE HCL 5 MG/5 ML
5 SOLUTION, ORAL ORAL
Status: COMPLETED | OUTPATIENT
Start: 2020-03-13 | End: 2020-03-13

## 2020-03-13 RX ORDER — HYDRALAZINE HYDROCHLORIDE 20 MG/ML
5 INJECTION INTRAMUSCULAR; INTRAVENOUS
Status: DISCONTINUED | OUTPATIENT
Start: 2020-03-13 | End: 2020-03-13 | Stop reason: HOSPADM

## 2020-03-13 RX ORDER — DIPHENHYDRAMINE HYDROCHLORIDE 50 MG/ML
12.5 INJECTION INTRAMUSCULAR; INTRAVENOUS
Status: DISCONTINUED | OUTPATIENT
Start: 2020-03-13 | End: 2020-03-13 | Stop reason: HOSPADM

## 2020-03-13 RX ORDER — HEPARIN SODIUM 1000 [USP'U]/ML
INJECTION, SOLUTION INTRAVENOUS; SUBCUTANEOUS PRN
Status: DISCONTINUED | OUTPATIENT
Start: 2020-03-13 | End: 2020-03-13 | Stop reason: SURG

## 2020-03-13 RX ORDER — HALOPERIDOL 5 MG/ML
1 INJECTION INTRAMUSCULAR
Status: DISCONTINUED | OUTPATIENT
Start: 2020-03-13 | End: 2020-03-13 | Stop reason: HOSPADM

## 2020-03-13 RX ORDER — OXYCODONE HCL 5 MG/5 ML
10 SOLUTION, ORAL ORAL
Status: COMPLETED | OUTPATIENT
Start: 2020-03-13 | End: 2020-03-13

## 2020-03-13 RX ORDER — HYDROMORPHONE HYDROCHLORIDE 1 MG/ML
0.5 INJECTION, SOLUTION INTRAMUSCULAR; INTRAVENOUS; SUBCUTANEOUS
Status: DISCONTINUED | OUTPATIENT
Start: 2020-03-13 | End: 2020-03-14

## 2020-03-13 RX ORDER — CEFAZOLIN SODIUM 1 G/3ML
INJECTION, POWDER, FOR SOLUTION INTRAMUSCULAR; INTRAVENOUS PRN
Status: DISCONTINUED | OUTPATIENT
Start: 2020-03-13 | End: 2020-03-13 | Stop reason: SURG

## 2020-03-13 RX ADMIN — PHENYLEPHRINE HYDROCHLORIDE 50 MCG/MIN: 10 INJECTION INTRAVENOUS at 16:34

## 2020-03-13 RX ADMIN — KETAMINE HYDROCHLORIDE 25 MG: 50 INJECTION INTRAMUSCULAR; INTRAVENOUS at 18:46

## 2020-03-13 RX ADMIN — HEPARIN SODIUM 5000 UNITS: 1000 INJECTION, SOLUTION INTRAVENOUS; SUBCUTANEOUS at 18:15

## 2020-03-13 RX ADMIN — FENTANYL CITRATE 25 MCG: 50 INJECTION, SOLUTION INTRAMUSCULAR; INTRAVENOUS at 19:39

## 2020-03-13 RX ADMIN — EPOETIN ALFA 4000 UNITS: 4000 SOLUTION INTRAVENOUS; SUBCUTANEOUS at 11:24

## 2020-03-13 RX ADMIN — PROPOFOL 250 MG: 10 INJECTION, EMULSION INTRAVENOUS at 16:27

## 2020-03-13 RX ADMIN — CALCITRIOL CAPSULES 0.25 MCG 0.75 MCG: 0.25 CAPSULE ORAL at 22:25

## 2020-03-13 RX ADMIN — FENTANYL CITRATE 50 MCG: 50 INJECTION, SOLUTION INTRAMUSCULAR; INTRAVENOUS at 19:05

## 2020-03-13 RX ADMIN — SODIUM CHLORIDE: 9 INJECTION, SOLUTION INTRAVENOUS at 18:26

## 2020-03-13 RX ADMIN — LIDOCAINE HYDROCHLORIDE 90 MG: 20 INJECTION, SOLUTION EPIDURAL; INFILTRATION; INTRACAUDAL at 16:27

## 2020-03-13 RX ADMIN — GABAPENTIN 3000 MG: 300 CAPSULE ORAL at 22:22

## 2020-03-13 RX ADMIN — HYDROCODONE BITARTRATE AND ACETAMINOPHEN 2 TABLET: 5; 325 TABLET ORAL at 08:41

## 2020-03-13 RX ADMIN — KETAMINE HYDROCHLORIDE 25 MG: 50 INJECTION INTRAMUSCULAR; INTRAVENOUS at 17:45

## 2020-03-13 RX ADMIN — CEFAZOLIN 2 G: 330 INJECTION, POWDER, FOR SOLUTION INTRAMUSCULAR; INTRAVENOUS at 20:06

## 2020-03-13 RX ADMIN — KETAMINE HYDROCHLORIDE 50 MG: 50 INJECTION INTRAMUSCULAR; INTRAVENOUS at 16:27

## 2020-03-13 RX ADMIN — DEXAMETHASONE SODIUM PHOSPHATE 8 MG: 4 INJECTION, SOLUTION INTRA-ARTICULAR; INTRALESIONAL; INTRAMUSCULAR; INTRAVENOUS; SOFT TISSUE at 16:34

## 2020-03-13 RX ADMIN — HEPARIN SODIUM 2000 UNITS: 1000 INJECTION, SOLUTION INTRAVENOUS; SUBCUTANEOUS at 19:02

## 2020-03-13 RX ADMIN — FENTANYL CITRATE 25 MCG: 50 INJECTION, SOLUTION INTRAMUSCULAR; INTRAVENOUS at 17:34

## 2020-03-13 RX ADMIN — TIZANIDINE 8 MG: 4 TABLET ORAL at 22:25

## 2020-03-13 RX ADMIN — FENTANYL CITRATE 25 MCG: 50 INJECTION, SOLUTION INTRAMUSCULAR; INTRAVENOUS at 19:35

## 2020-03-13 RX ADMIN — PROTAMINE SULFATE 30 MG: 10 INJECTION, SOLUTION INTRAVENOUS at 19:20

## 2020-03-13 RX ADMIN — CINACALCET HYDROCHLORIDE 30 MG: 30 TABLET, FILM COATED ORAL at 13:32

## 2020-03-13 RX ADMIN — FENTANYL CITRATE 25 MCG: 50 INJECTION, SOLUTION INTRAMUSCULAR; INTRAVENOUS at 17:27

## 2020-03-13 RX ADMIN — ONDANSETRON 4 MG: 2 INJECTION INTRAMUSCULAR; INTRAVENOUS at 19:27

## 2020-03-13 RX ADMIN — FENTANYL CITRATE 50 MCG: 50 INJECTION, SOLUTION INTRAMUSCULAR; INTRAVENOUS at 17:45

## 2020-03-13 RX ADMIN — FENTANYL CITRATE 50 MCG: 50 INJECTION, SOLUTION INTRAMUSCULAR; INTRAVENOUS at 16:43

## 2020-03-13 RX ADMIN — OXYCODONE HYDROCHLORIDE 10 MG: 5 SOLUTION ORAL at 20:55

## 2020-03-13 RX ADMIN — KETAMINE HYDROCHLORIDE 25 MG: 50 INJECTION INTRAMUSCULAR; INTRAVENOUS at 17:27

## 2020-03-13 RX ADMIN — FENTANYL CITRATE 100 MCG: 50 INJECTION, SOLUTION INTRAMUSCULAR; INTRAVENOUS at 16:27

## 2020-03-13 RX ADMIN — CEFAZOLIN 2 G: 330 INJECTION, POWDER, FOR SOLUTION INTRAMUSCULAR; INTRAVENOUS at 16:23

## 2020-03-13 ASSESSMENT — COGNITIVE AND FUNCTIONAL STATUS - GENERAL
MOBILITY SCORE: 24
SUGGESTED CMS G CODE MODIFIER DAILY ACTIVITY: CH
MOBILITY SCORE: 24
SUGGESTED CMS G CODE MODIFIER MOBILITY: CH
SUGGESTED CMS G CODE MODIFIER DAILY ACTIVITY: CH
SUGGESTED CMS G CODE MODIFIER MOBILITY: CH
DAILY ACTIVITIY SCORE: 24
DAILY ACTIVITIY SCORE: 24

## 2020-03-13 ASSESSMENT — PAIN SCALES - GENERAL: PAIN_LEVEL: 3

## 2020-03-13 ASSESSMENT — ENCOUNTER SYMPTOMS
LOSS OF CONSCIOUSNESS: 0
DIZZINESS: 0
BACK PAIN: 0
FOCAL WEAKNESS: 0
HEMOPTYSIS: 0
SHORTNESS OF BREATH: 0
DIAPHORESIS: 0
PALPITATIONS: 0
DIARRHEA: 0
SPUTUM PRODUCTION: 0
NAUSEA: 0
WHEEZING: 0
CHILLS: 0
CLAUDICATION: 0
ABDOMINAL PAIN: 0
COUGH: 0
EYE PAIN: 0
SPEECH CHANGE: 0
MYALGIAS: 0
SORE THROAT: 0
HEADACHES: 0
DEPRESSION: 0
VOMITING: 0
FEVER: 0
EYE DISCHARGE: 0
CONSTIPATION: 0
NECK PAIN: 0
SENSORY CHANGE: 0
BRUISES/BLEEDS EASILY: 0
WEAKNESS: 0

## 2020-03-13 ASSESSMENT — LIFESTYLE VARIABLES
ON A TYPICAL DAY WHEN YOU DRINK ALCOHOL HOW MANY DRINKS DO YOU HAVE: 0
TOTAL SCORE: 0
HOW MANY TIMES IN THE PAST YEAR HAVE YOU HAD 5 OR MORE DRINKS IN A DAY: 0
SUBSTANCE_ABUSE: 0
EVER HAD A DRINK FIRST THING IN THE MORNING TO STEADY YOUR NERVES TO GET RID OF A HANGOVER: NO
TOTAL SCORE: 0
HAVE PEOPLE ANNOYED YOU BY CRITICIZING YOUR DRINKING: NO
EVER FELT BAD OR GUILTY ABOUT YOUR DRINKING: NO
EVER_SMOKED: NEVER
CONSUMPTION TOTAL: NEGATIVE
ALCOHOL_USE: NO
HAVE YOU EVER FELT YOU SHOULD CUT DOWN ON YOUR DRINKING: NO
DOES PATIENT WANT TO STOP DRINKING: NO
TOTAL SCORE: 0
AVERAGE NUMBER OF DAYS PER WEEK YOU HAVE A DRINK CONTAINING ALCOHOL: 0

## 2020-03-13 ASSESSMENT — COPD QUESTIONNAIRES
IN THE PAST 12 MONTHS DO YOU DO LESS THAN YOU USED TO BECAUSE OF YOUR BREATHING PROBLEMS: DISAGREE/UNSURE
COPD SCREENING SCORE: 0
HAVE YOU SMOKED AT LEAST 100 CIGARETTES IN YOUR ENTIRE LIFE: NO/DON'T KNOW
DO YOU EVER COUGH UP ANY MUCUS OR PHLEGM?: NO/ONLY WITH OCCASIONAL COLDS OR INFECTIONS
DURING THE PAST 4 WEEKS HOW MUCH DID YOU FEEL SHORT OF BREATH: NONE/LITTLE OF THE TIME

## 2020-03-13 NOTE — DISCHARGE PLANNING
Outpatient Dialysis Note    Confirmed patient is active at:    [facility]  [address]  [place] [address]     Schedule: Monday, Wednesday, Friday  Time: 2:30pm    Spoke with Jennifer at facility who confirmed.    Forwarded records for review.    Alejandra Ornelas- Dialysis Coordinator  [facility] # [phone]

## 2020-03-13 NOTE — ASSESSMENT & PLAN NOTE
Patient is maintained on Coumadin for his history of SVC thrombosis.  Continue per pharmacy protocol

## 2020-03-13 NOTE — H&P
Hospital Medicine History & Physical Note    Date of Service  3/12/2020    Primary Care Physician  Tony Mcmillan M.D.    Consultants  Vascular surgery Dr. Lurdes Moffett  Nephrology Dr. Najjar    Code Status  Full    Chief Complaint  Vascular access    History of Presenting Illness  39 y.o. male who is quite complex previous medical history of her young gentleman.  He has had 2 previous renal transplants both of which have failed and has been on dialysis now for over a decade.  He has a history of superior vena cava thrombosis and therefore all of his dialysis access is through his a thighs.  He has a graft currently in the left upper thigh which is creating problems, and he was sent from Dr. Moffett's office for admission and planned creation of a graft in the right upper thigh tomorrow.  He is normally dialyzed on Monday Wednesday Friday and is followed by Dr. Najjar.    Review of Systems  Review of Systems   Constitutional: Negative for chills and fever.   HENT: Negative for nosebleeds and sore throat.    Eyes: Negative for blurred vision and double vision.   Respiratory: Negative for cough and shortness of breath.    Cardiovascular: Negative for chest pain, palpitations and leg swelling.   Gastrointestinal: Negative for abdominal pain, diarrhea, nausea and vomiting.   Genitourinary: Negative for dysuria and urgency.   Musculoskeletal: Negative for back pain.        Restless leg syndrome   Skin: Negative for rash.   Neurological: Negative for dizziness, loss of consciousness and headaches.       Past Medical History   has a past medical history of Arrhythmia, Chronic kidney disease, unspecified, Contracture of palmar fascia, Dialysis, Graft failure due to thrombosis (3/10/2018), Hemorrhagic disorder ([facility]), Hypertension, Intra-abdominal varices, Pain, Renal failure, Seizure (HCC), Sleep apnea, Snoring, Superior vena cava obstruction with collaterals, and Toxic uninodular goiter without mention of  thyrotoxic crisis or storm.    Surgical History   has a past surgical history that includes mass excision ortho (10/9/08); av fistula creation (11/6/08); arteriogram (3/5/2009); angioplasty balloon (3/5/2009); av fistulogram (3/5/2009); us-kidney transplant (1996, 2001); endarterectomy (11/16/2010); av fistulogram (11/16/2010); cath placement (2/1/2011); angioplasty balloon (2/1/2011); av fistulogram (6/5/2011); cath placement (6/5/2011); av fistula revision (11/3/2011); bone spur excision (12/8/2011); recovery (5/18/2012); incision and drainage general (9/13/2013); debridement (9/13/2013); vein ligation (9/13/2013); recovery (6/13/2014); av fistula revision (9/30/2014); irrigation & debridement general (12/15/2014); av fistula revision (2/8/2015); av fistula revision (2/22/2015); av fistula revision (3/20/2015); pr inject nerv blck,stellate ganglion (3/24/2015); av fistula creation (4/20/2015); av fistula thrombolysis (Left, 6/3/2015); irrigation & debridement general (Left, 6/3/2015); av fistula thrombolysis (Left, 6/9/2015); av fistula revision (Left, 6/17/2015); irrigation & debridement general (Left, 6/17/2015); recovery (8/7/2015); pr percut implnt neuroelect,epidural (2/19/2016); pr percut implnt neuroelect,epidural (2/19/2016); parathyroidectomy (2006); inguinal hernia repair (Bilateral, 2001, 2002); spinal cord stimulator (N/A, 3/25/2016); recovery (7/8/2016); lesion excision general (Right, 7/11/2016); mass excision general (Right, 8/5/2016); cath placement (Right, 9/17/2016); thrombectomy (Left, 9/18/2016); av fistulogram (9/18/2016); thrombectomy (Left, 10/20/2016); incision and drainage general (10/20/2016); irrigation & debridement general (Left, 10/28/2016); flap closure (Left, 11/17/2016); thrombectomy (Left, 1/6/2017); cath placement (Right, 1/6/2017); thrombectomy (Left, 1/5/2017); spinal cord stimulator (N/A, 5/4/2017); abdominoplasty (6/5/2017); irrigation & debridement general (7/31/2017); cath  placement (Right, 11/14/2017); av fistula revision (Left, 11/14/2017); wound exploration general (2/1/2018); wound closure general (2/19/2018); split thickness skin graft (2/26/2018); thrombectomy (Left, 3/9/2018); cath placement (Right, 3/9/2018); thrombectomy (Left, 4/27/2018); thrombectomy (Left, 4/28/2018); abdominal exploration (6/25/2018); myocutaneous flap (Right, 8/27/2018); skin flap delayed (Left, 9/10/2018); split thickness skin graft (Right, 9/21/2018); av fistula revision (Left, 12/23/2018); av fistula revision (Left, 1/25/2019); thrombectomy (Left, 1/26/2019); pr intro cath dialysis circuit dx angrph fluor s&i (Left, 2/20/2020); and av fistula revision (Left, 2/20/2020).     Family History  family history includes Arthritis in his father; Heart Disease in his father; Hypertension in his brother; Lung Disease in his father.     Social History   reports that he has never smoked. He has never used smokeless tobacco. He reports that he does not drink alcohol or use drugs.    Allergies  Allergies   Allergen Reactions   • Baclofen Unspecified     Total loss of memory, sedation.   RXN=6/2015   • Contrast Media With Iodine [Iodine] Rash     RXN=1/5/2017   • Keflex Rash     RXN=possibly >10 years   • Pcn [Penicillins] Rash     RXN=possibly >10 years ago  Tolerated Zosyn on 2/20/18   • Tape Rash     Paper tape and tegaderm ok  RXN=ongoing   • Requip Vomiting       Medications  Prior to Admission Medications   Prescriptions Last Dose Informant Patient Reported? Taking?   HYDROcodone/acetaminophen (NORCO)  MG Tab 3/11/2020 at 2000 Patient Yes No   Sig: Take 1 Tab by mouth every 6 hours as needed for Severe Pain. Claims he takes 4 tabs  at night.  Indications: Moderate to Moderately Severe Pain   calcitRIOL (ROCALTROL) 0.25 MCG CAPS 3/11/2020 Patient Yes No   Sig: Take 0.75 mcg by mouth every bedtime.   cinacalcet (SENSIPAR) 30 MG Tab 3/11/2020 Patient Yes No   Sig: Take 30 mg by mouth every Monday,  Wednesday, and Friday.   gabapentin (NEURONTIN) 300 MG Cap 3/11/2020 at 2000 Patient Yes No   Sig: Take 1,200 mg by mouth every bedtime. Take with x3 (600mg) Gabapentin for a total nightly dose of = 3000mg  Indications: Neuropathic Pain   gabapentin (NEURONTIN) 600 MG tablet 3/11/2020 at 2000 Patient Yes No   Sig: Take 1,800 mg by mouth every bedtime. Take with x4 (300mg) Gabapentin for a total nightly dose = 3000mg   sevelamer carbonate (RENVELA) 800 MG Tab tablet 3/11/2020 at 2000 Patient Yes No   Sig: Take 3,200 mg by mouth 3 times a day, with meals.   tizanidine (ZANAFLEX) 4 MG Tab 3/11/2020 at 2000  Yes No   Sig: Take 8 mg by mouth every bedtime.   warfarin (COUMADIN) 5 MG Tab 3/10/2020 at 2000  No No   Sig: Take 1-1.5 Tabs by mouth every day.   Patient taking differently: Take 5 mg by mouth every day.      Facility-Administered Medications: None       Physical Exam  Temp:  [36.9 °C (98.5 °F)] 36.9 °C (98.5 °F)  Pulse:  [93] 93  Resp:  [16] 16  BP: (98)/(61) 98/61    Physical Exam  Constitutional:       General: He is not in acute distress.     Appearance: He is well-developed. He is not diaphoretic.   HENT:      Head: Normocephalic and atraumatic.   Eyes:      Conjunctiva/sclera: Conjunctivae normal.   Neck:      Vascular: No JVD.   Cardiovascular:      Rate and Rhythm: Normal rate.      Heart sounds: Murmur present. No gallop.       Comments: Engorged veins are noted throughout the chest and abdomen  Pulmonary:      Effort: Pulmonary effort is normal. No respiratory distress.      Breath sounds: No stridor. No wheezing or rales.   Abdominal:      Palpations: Abdomen is soft.      Tenderness: There is no abdominal tenderness. There is no guarding or rebound.   Musculoskeletal:         General: No tenderness.      Right lower leg: Edema present.      Left lower leg: Edema present.      Comments: Graft with thrill is noted in the left upper thigh   Skin:     General: Skin is warm and dry.      Findings: No rash.    Neurological:      Mental Status: He is oriented to person, place, and time. Mental status is at baseline.   Psychiatric:         Mood and Affect: Mood normal.         Thought Content: Thought content normal.         Laboratory:          No results for input(s): ALTSGPT, ASTSGOT, ALKPHOSPHAT, TBILIRUBIN, DBILIRUBIN, GAMMAGT, AMYLASE, LIPASE, ALB, PREALBUMIN, GLUCOSE in the last 72 hours.      No results for input(s): NTPROBNP in the last 72 hours.      No results for input(s): TROPONINT in the last 72 hours.    Urinalysis:    No results found     Imaging:  No orders to display         Assessment/Plan:  I anticipate this patient will require at least two midnights for appropriate medical management, necessitating inpatient admission.    Chronic anticoagulation- (present on admission)  Assessment & Plan  Patient is maintained on Coumadin for his history of SVC thrombosis.  Continue per pharmacy protocol    Chronic kidney disease, stage V (HCC)- (present on admission)  Assessment & Plan  Patient has an graft in the left upper thigh which is having difficulty with persistent bleeding.  He is admitted tonight for planned graft creation in the right upper thigh by Dr. Moffett.  Dr. Scherer has been consulted for hemodialysis    Reflex sympathetic dystrophy- (present on admission)  Assessment & Plan  Continue the patient's baseline medication regimen which is 40 mg of hydrocodone nightly to allow him to sleep, with an occasional dose of 10 mg during the daytime for breakthrough pain we will also continue his gabapentin    Seizure disorder (CMS-HCC)- (present on admission)  Assessment & Plan  Currently stable on no medications      VTE prophylaxis: Warfarin

## 2020-03-13 NOTE — ASSESSMENT & PLAN NOTE
Continue the patient's baseline medication regimen which is 40 mg of hydrocodone nightly to allow him to sleep, with an occasional dose of 10 mg during the daytime for breakthrough pain we will also continue his gabapentin.

## 2020-03-13 NOTE — RESPIRATORY CARE
COPD EDUCATION by COPD CLINICAL EDUCATOR  3/13/2020 at 7:48 AM by Belia Taylor, RRT     Patient reviewed by COPD education team. Patient does not have a history or diagnosis of COPD and is a non-smoker, therefore does not qualify for the COPD program.

## 2020-03-13 NOTE — ASSESSMENT & PLAN NOTE
Patient has an graft in the left upper thigh which is having difficulty with persistent bleeding.  He is admitted tonight for planned graft creation in the right upper thigh by Dr. Moffett.  Dr. Scherer has been consulted for hemodialysis

## 2020-03-13 NOTE — CONSULTS
DATE OF SERVICE:  03/13/2020    REQUESTING PHYSICIAN:  Dr. Lindsay.      REASON FOR CONSULTATION:  Management of chronic kidney disease, assessing the   need for dialysis.      Patient seen and examined, medical record reviewed.      HISTORY OF PRESENT ILLNESS:  The patient is a pleasant 39-year-old gentleman   who is very well known to my service.  He has a history of longstanding   hypertension, chronic kidney disease, undergoes hemodialysis on 3 times weekly   at [facility], presented to the hospital with hemodialysis access   problem.  Patient has been having prolonged bleeding at his left thigh AV   graft.  Patient has been admitted.  He is scheduled to undergo angioplasty   later this afternoon.  We were called to manage his kidney disease, assessing   the need for dialysis.      Patient has no fever, no chills, no shortness of breath.  His last dialysis   was on 03/11/2020.      PAST MEDICAL HISTORY:  Significant for:    1.  Hypertension.    2.  End-stage renal disease.    3.  Peripheral vascular disease.      ALLERGIES:  The list was reviewed.      SOCIAL HISTORY:  Patient has no smoking history.  He works full time.      FAMILY HISTORY:  Positive for heart disease in his father.      MEDICATIONS:  Reviewed.      REVIEW OF SYSTEMS:  All systems were reviewed and they were negative except   outlined in history of present illness.      PHYSICAL EXAMINATION:    GENERAL:  Patient is in no apparent distress.    VITAL SIGNS:  Showed blood pressure of 95/65, heart rate was 83, respiratory   rate was 14.    HEENT:  Normocephalic, atraumatic.  Sclerae is anicteric.  Pupils are   reactive.  Nose is normal.  Mucous membranes moist.    NECK:  No lymphadenopathy, no JVD, no thyroid mass.    CHEST:  Normal.    LUNGS:  Clear to auscultation.    HEART:  S1, S2.    ABDOMEN:  Soft, nontender, no hepatosplenomegaly.    EXTREMITIES:  There is trace lower extremity edema.  Patient had a left thigh   AV graft with  good thrill.      LABORATORY DATA:  His recent labs from yesterday were reviewed.      ASSESSMENT:    1.  End-stage renal disease.    2.  Anemia.    3.  Hemodialysis access malfunction.    4.  Hypotension.      PLAN:    1.  We will plan on dialysis today to manage his uremia.    2.  Erythropoietin with dialysis.    3.  Plan for angiogram and angioplasty for AV graft later this afternoon.    4.  Renal diet.    5.  Renal dose all medications.    6.  Daily labs.      Plan discussed in detail with Dr. Lindsay as well as Dr. Moffett who I   appreciate her help.       ____________________________________     FADI NAJJAR, MD FN / RIAZ    DD:  03/13/2020 11:54:21  DT:  03/13/2020 14:08:57    D#:  [id number]  Job#:  [id number]

## 2020-03-13 NOTE — PROGRESS NOTES
[facility] Medicine Daily Progress Note    Date of Service  3/13/2020    Chief Complaint  39 y.o. male admitted 3/12/2020 with bleeding from left thigh avf.    Hospital Course    This 39-year-old male who has 2 previous failed renal transplants, longstanding chronic kidney disease on current hemodialysis 3 times weekly at [facility], thrombosed superior vena cava on Coumadin phonically, works at [employer], chronic pain syndrome, presents to the emergency room for left thigh AV fistula bleeding during hemodialysis on Wednesday.  The patient is still able to access for dialysis on the left thigh AV fistula.  He is followed long-term by Dr. Moffett who plans to create a new fistula on the right thigh and repair the left thigh AV fistula from bleeding.  He has been n.p.o. since this morning he did get vitamin K last night with INR 1.8.  Coumadin has been held for the last 3 days.*        Consultants/Specialty  Dr. Betina Gutierres    Code Status  full    Disposition  Home once AVF graft created on right thigh and repair of left thigh avf graft.    Review of Systems  Review of Systems   Constitutional: Negative for chills, diaphoresis, fever and malaise/fatigue.   HENT: Negative for congestion and sore throat.    Eyes: Negative for pain and discharge.   Respiratory: Negative for cough, hemoptysis, sputum production, shortness of breath and wheezing.    Cardiovascular: Negative for chest pain, palpitations, claudication and leg swelling.   Gastrointestinal: Negative for abdominal pain, constipation, diarrhea, melena, nausea and vomiting.   Genitourinary: Negative for dysuria, frequency and urgency.   Musculoskeletal: Negative for back pain, joint pain, myalgias and neck pain.   Skin: Negative for itching and rash.   Neurological: Negative for dizziness, sensory change, speech change, focal weakness, loss of consciousness, weakness and headaches.   Endo/Heme/Allergies: Does not bruise/bleed easily.   Psychiatric/Behavioral:  Negative for depression, substance abuse and suicidal ideas.        Physical Exam  Temp:  [36.6 °C (97.8 °F)-36.7 °C (98.1 °F)] 36.7 °C (98.1 °F)  Pulse:  [75-96] 96  Resp:  [14-16] 14  BP: ()/(54-67) 103/65  SpO2:  [93 %-98 %] 96 %    Physical Exam  Constitutional:       General: He is not in acute distress.     Appearance: He is not diaphoretic.   HENT:      Head: Normocephalic and atraumatic.      Mouth/Throat:      Pharynx: No oropharyngeal exudate.   Eyes:      General: No scleral icterus.        Right eye: No discharge.         Left eye: No discharge.      Conjunctiva/sclera: Conjunctivae normal.      Pupils: Pupils are equal, round, and reactive to light.   Neck:      Musculoskeletal: Normal range of motion and neck supple.      Thyroid: No thyromegaly.      Vascular: No JVD.      Trachea: No tracheal deviation.   Cardiovascular:      Rate and Rhythm: Normal rate and regular rhythm.      Heart sounds: Normal heart sounds. No murmur. No friction rub. No gallop.    Pulmonary:      Effort: Pulmonary effort is normal. No respiratory distress.      Breath sounds: Normal breath sounds. No wheezing or rales.   Chest:      Chest wall: No tenderness.   Abdominal:      General: Bowel sounds are normal. There is no distension.      Palpations: Abdomen is soft. There is no mass.      Tenderness: There is no abdominal tenderness. There is no guarding or rebound.   Musculoskeletal: Normal range of motion.         General: No tenderness.      Comments: Bleeding controlled at left thigh AVF, able to access for HD this a.m.   Lymphadenopathy:      Cervical: No cervical adenopathy.   Skin:     General: Skin is warm and dry.      Findings: No erythema or rash.   Neurological:      Mental Status: He is alert and oriented to person, place, and time.      Cranial Nerves: No cranial nerve deficit.      Motor: No abnormal muscle tone.   Psychiatric:         Behavior: Behavior normal.         Thought Content: Thought content  normal.         Judgment: Judgment normal.         Fluids    Intake/Output Summary (Last 24 hours) at 3/13/2020 1818  Last data filed at 3/13/2020 1808  Gross per 24 hour   Intake 500 ml   Output 3200 ml   Net -2700 ml       Laboratory  Recent Labs     03/12/20 1937   WBC 3.1*   RBC 2.91*   HEMOGLOBIN 9.1*   HEMATOCRIT 28.7*   MCV 98.6*   MCH 31.3   MCHC 31.7*   RDW 54.6*   PLATELETCT 148*   MPV 9.4     Recent Labs     03/12/20 1937   SODIUM 135   POTASSIUM 3.9   CHLORIDE 90*   CO2 28   GLUCOSE 103*   BUN 22   CREATININE 7.39*   CALCIUM 9.0     Recent Labs     03/12/20 2043 03/13/20  0446   INR 3.18* 1.84*               Imaging  No orders to display        Assessment/Plan  Hemorrhagic disorder due to circulating anticoagulants (HCC)- (present on admission)  Assessment & Plan  On chronic Coumadin due to SVC thrombosis.  3/12/2020 vitamin K administered 10 mg.  INR 1.84.  3/13: Dr. Moffett to take for AV fistula creation right thigh and repair left bleeding AV fistula.  Can give FFP in OR if needed.    Open wound of left thigh- (present on admission)  Assessment & Plan  Patient had bleeding at his left AV fistula site during hemodialysis on 3/11/2020.  repair of bleeding site in OR by Dr. Moffett on 3/13/2020 creation of a new right thigh AV fistula at the same time.  Holding Coumadin since 3/11/2020.  Vitamin K given 3/12/2020.  IV Dilaudid ordered for pain control.    Chronic anticoagulation- (present on admission)  Assessment & Plan  Patient is maintained on Coumadin for his history of SVC thrombosis.  Continue per pharmacy protocol    Chronic kidney disease, stage V (HCC)- (present on admission)  Assessment & Plan  Patient has an graft in the left upper thigh which is having difficulty with persistent bleeding.  He is admitted tonight for planned graft creation in the right upper thigh by Dr. Moffett.  Dr. Scherer has been consulted for hemodialysis    Reflex sympathetic dystrophy- (present on  admission)  Assessment & Plan  Continue the patient's baseline medication regimen which is 40 mg of hydrocodone nightly to allow him to sleep, with an occasional dose of 10 mg during the daytime for breakthrough pain we will also continue his gabapentin.    Seizure disorder (CMS-HCC)- (present on admission)  Assessment & Plan  Currently stable on no medications       VTE prophylaxis: coumadin, held for avf graft creation.

## 2020-03-13 NOTE — PROGRESS NOTES
Transport taking patient to dialysis. Patient is A/Ox4. Ambulates independently. PIV; SL. All needs are met.     Daisy Elliott R.N.

## 2020-03-13 NOTE — ANESTHESIA PROCEDURE NOTES
Airway  Date/Time: 3/13/2020 4:28 PM  Performed by: Enrike Jaimes M.D.  Authorized by: Enrike Jaimes M.D.     Location:  OR  Urgency:  Elective  Indications for Airway Management:  Anesthesia  Spontaneous Ventilation: absent    Sedation Level:  Deep  Preoxygenated: Yes    Patient Position:  Sniffing  Final Airway Type:  Supraglottic airway  Final Supraglottic Airway:  Standard LMA  SGA Size:  4  Number of Attempts at Approach:  1   Atraumatic insertion, good seal

## 2020-03-13 NOTE — ANESTHESIA PREPROCEDURE EVALUATION
ESRD, last dialysis: today, chronic anticoagulation (INR reviewed), prior anesthetic records reviewed, hx of seizures (last seizure: ), SINA (BiPAP), chronic pain and chronic opioid use (Norco). TTE from earlier this year reviewed.    Relevant Problems   ANESTHESIA   (+) Sleep apnea      NEURO   (+) Seizure disorder (CMS-HCC)      CARDIAC   (+) AV graft thrombosis (HCC)   (+) Venous collateral circulation, any site         (+) Chronic kidney disease, stage V (HCC)   (+) ESRD (end stage renal disease) (HCC)   (+) ESRD on hemodialysis (HCC)   (+) Renal transplant failure and rejection x 2       Physical Exam    Airway   Mallampati: II  TM distance: >3 FB  Neck ROM: full       Cardiovascular - normal exam  Rhythm: regular  Rate: normal  (-) murmur     Dental - normal exam         Pulmonary - normal exam  Breath sounds clear to auscultation     Abdominal    Neurological - normal exam                 Anesthesia Plan    ASA 3   ASA physical status 3 criteria: ESRD undergoing regularly scheduled dialysis    Plan - general       Airway plan will be LMA        Induction: intravenous    Postoperative Plan: Postoperative administration of opioids is intended.    Pertinent diagnostic labs and testing reviewed    Informed Consent:    Anesthetic plan and risks discussed with patient.    Use of blood products discussed with: patient whom consented to blood products.

## 2020-03-14 VITALS
HEIGHT: 64 IN | TEMPERATURE: 98.4 F | WEIGHT: 193.12 LBS | OXYGEN SATURATION: 94 % | HEART RATE: 91 BPM | BODY MASS INDEX: 32.97 KG/M2 | DIASTOLIC BLOOD PRESSURE: 55 MMHG | RESPIRATION RATE: 18 BRPM | SYSTOLIC BLOOD PRESSURE: 96 MMHG

## 2020-03-14 LAB
ERYTHROCYTE [DISTWIDTH] IN BLOOD BY AUTOMATED COUNT: 57.9 FL (ref 35.9–50)
HCT VFR BLD AUTO: 26.2 % (ref 42–52)
HGB BLD-MCNC: 8.1 G/DL (ref 14–18)
MCH RBC QN AUTO: 31.5 PG (ref 27–33)
MCHC RBC AUTO-ENTMCNC: 30.9 G/DL (ref 33.7–35.3)
MCV RBC AUTO: 101.9 FL (ref 81.4–97.8)
PLATELET # BLD AUTO: 143 K/UL (ref 164–446)
PMV BLD AUTO: 9.4 FL (ref 9–12.9)
RBC # BLD AUTO: 2.57 M/UL (ref 4.7–6.1)
WBC # BLD AUTO: 5.3 K/UL (ref 4.8–10.8)

## 2020-03-14 PROCEDURE — 700111 HCHG RX REV CODE 636 W/ 250 OVERRIDE (IP): Performed by: HOSPITALIST

## 2020-03-14 PROCEDURE — A9270 NON-COVERED ITEM OR SERVICE: HCPCS | Performed by: HOSPITALIST

## 2020-03-14 PROCEDURE — 99217 PR OBSERVATION CARE DISCHARGE: CPT | Performed by: HOSPITALIST

## 2020-03-14 PROCEDURE — G0378 HOSPITAL OBSERVATION PER HR: HCPCS

## 2020-03-14 PROCEDURE — 700102 HCHG RX REV CODE 250 W/ 637 OVERRIDE(OP): Performed by: HOSPITALIST

## 2020-03-14 RX ORDER — HYDROMORPHONE HYDROCHLORIDE 1 MG/ML
1 INJECTION, SOLUTION INTRAMUSCULAR; INTRAVENOUS; SUBCUTANEOUS
Status: DISCONTINUED | OUTPATIENT
Start: 2020-03-14 | End: 2020-03-14 | Stop reason: HOSPADM

## 2020-03-14 RX ORDER — GABAPENTIN 300 MG/1
3000 CAPSULE ORAL
Qty: 300 CAP | Refills: 0 | Status: SHIPPED | OUTPATIENT
Start: 2020-03-14 | End: 2020-08-31

## 2020-03-14 RX ADMIN — HYDROCODONE BITARTRATE AND ACETAMINOPHEN 2 TABLET: 10; 325 TABLET ORAL at 00:03

## 2020-03-14 RX ADMIN — HYDROMORPHONE HYDROCHLORIDE 1 MG: 1 INJECTION, SOLUTION INTRAMUSCULAR; INTRAVENOUS; SUBCUTANEOUS at 09:56

## 2020-03-14 RX ADMIN — SEVELAMER CARBONATE 3200 MG: 800 TABLET, FILM COATED ORAL at 08:39

## 2020-03-14 NOTE — OR NURSING
Patient's mom, Veronica, called per patient request. Patient's family updated via telephone number listed on chart. Patient's family updated on current POC and patient status. All questions and concerns addressed.

## 2020-03-14 NOTE — ANESTHESIA QCDR
2019 [facility] Clinical Data Registry (for Quality Improvement)     Postoperative nausea/vomiting risk protocol (Adult = 18 yrs and Pediatric 3-17 yrs)- (430 and 463)  General inhalation anesthetic (NOT TIVA) with PONV risk factors: Yes  Provision of anti-emetic therapy with at least 2 different classes of agents: Yes   Patient DID NOT receive anti-emetic therapy and reason is documented in Medical Record:  N/A    Multimodal Pain Management- (477)  Non-emergent surgery AND patient age >= 18: Yes  Use of Multimodal Pain Management, two or more drugs and/or interventions, NOT including systemic opioids: Yes  Exception: Documented allergy to multiple classes of analgesics: N/A    Smoking Abstinence (404)  Patient is current smoker (cigarette, pipe, e-cig, marijuanna): No  Elective Surgery:   Abstinence instructions provided prior to day of surgery:   Patient abstained from smoking on day of surgery:     Pre-Op Beta-Blocker in Isolated CABG (44)  Isolated CABG AND patient age >= 18: No  Beta-blocker admin within 24 hours of surgical incision:   Exception:of medical reason(s) for not administering beta blocker within 24 hours prior to surgical incision (e.g., not  indicated,other medical reason):     PACU assessment of acute postoperative pain prior to Anesthesia Care End- Applies to Patients Age = 18- (ABG7)  Initial PACU pain score is which of the following: < 7/10  Patient unable to report pain score: N/A    Post-anesthetic transfer of care checklist/protocol to PACU/ICU- (426 and 427)  Upon conclusion of case, patient transferred to which of the following locations: PACU/Non-ICU  Use of transfer checklist/protocol:   Exclusion: Service Performed in Patient Hospital Room (and thus did not require transfer):   Unplanned admission to ICU related to anesthesia service up through end of PACU care- (MD51)  Unplanned admission to ICU (not initially anticipated at anesthesia start time): No

## 2020-03-14 NOTE — CARE PLAN
Problem: Bowel/Gastric:  Goal: Normal bowel function is maintained or improved  Outcome: PROGRESSING AS EXPECTED  Note: Patient reporting bowel movement on 3/13/20       Problem: Safety  Goal: Will remain free from falls  3/13/2020 2328 by Ynes Lay, R.N.  Outcome: PROGRESSING AS EXPECTED  3/13/2020 2327 by Ynes Lay R.N.  Note: No fall risk, bed alarm not indicated - educated patient on calling for assistance if weak or dizzy. Patient agreeable.

## 2020-03-14 NOTE — ASSESSMENT & PLAN NOTE
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  3/12/2020 vitamin K administered 10 mg.  INR 1.84.  3/13: Dr. Moffett to take for AV fistula creation right thigh and repair left bleeding AV fistula.  Can give FFP in OR if needed.

## 2020-03-14 NOTE — DISCHARGE INSTRUCTIONS
Discharge Instructions    Discharged to home by car with relative. Discharged via walking, hospital escort: Refused.  Special equipment needed: Not Applicable    Be sure to schedule a follow-up appointment with your primary care doctor or any specialists as instructed.     Discharge Plan:   Diet Plan: Discussed  Activity Level: Discussed  Confirmed Follow up Appointment: Patient to Call and Schedule Appointment  Confirmed Symptoms Management: Discussed  Medication Reconciliation Updated: Yes  Influenza Vaccine Indication: Patient Refuses    I understand that a diet low in cholesterol, fat, and sodium is recommended for good health. Unless I have been given specific instructions below for another diet, I accept this instruction as my diet prescription.   Other diet: renal      Special Instructions: None    · Is patient discharged on Warfarin / Coumadin?   No     Depression / Suicide Risk    As you are discharged from this [facility] facility, it is important to learn how to keep safe from harming yourself.    Recognize the warning signs:  · Abrupt changes in personality, positive or negative- including increase in energy   · Giving away possessions  · Change in eating patterns- significant weight changes-  positive or negative  · Change in sleeping patterns- unable to sleep or sleeping all the time   · Unwillingness or inability to communicate  · Depression  · Unusual sadness, discouragement and loneliness  · Talk of wanting to die  · Neglect of personal appearance   · Rebelliousness- reckless behavior  · Withdrawal from people/activities they love  · Confusion- inability to concentrate     If you or a loved one observes any of these behaviors or has concerns about self-harm, here's what you can do:  · Talk about it- your feelings and reasons for harming yourself  · Remove any means that you might use to hurt yourself (examples: pills, rope, extension cords, firearm)  · Get professional help from the community  (Mental Health, Substance Abuse, psychological counseling)  · Do not be alone:Call your Safe Contact- someone whom you trust who will be there for you.  · Call your local CRISIS HOTLINE 201-9809 or 961-603-4143  · Call your local Children's Mobile Crisis Response Team [place] (264) 631-2329 or www.Povo  · Call the toll free National Suicide Prevention Hotlines   · National Suicide Prevention Lifeline 791-969-UXKK (1294)  · National Hope Line Network 800-SUICIDE (837-3624)    Follow up with [facility]ERTO.   Call 2548 for follow up with Dr. Moffett in 1-2 weeks for recheck.   Resume home coumadin    Infection Control in the Home  If you have an infection or you are taking care of someone who has an infection, it is important to know how to keep the infection from spreading. Follow these guidelines to help stop the spread of infection, and talk to your health care provider.  HOW ARE INFECTIONS SPREAD?  In order for an infection to spread, the following must be present:  · A germ. This may be a virus, bacteria, fungus, or parasite.  · A place for the germ to live. This may be:  ¨ On or in a person, animal, plant, or food.  ¨ In soil or water.  ¨ On surfaces, such as a door handle.  · A susceptible host. This is a person or animal who does not have resistance (immunity) to the germ.  · A way for the germ to enter the host. This may occur by:  ¨ Direct contact. This may happen by making contact--such as shaking hands or hugging--with an infected person or animal. Some germs can also travel through the air and spread to you if an infected person coughs or sneezes on you or near you.  ¨ Indirect contact. This is when the germ enters the host through contact with an infected object. Examples include eating contaminated food, drinking contaminated water, or touching a contaminated surface with your hands and then touching your face, nose, or mouth soon after that.  HOW CAN I HELP TO PREVENT INFECTION FROM  SPREADING?  There are several things that you can do to help prevent infection from spreading.  Hand Washing  It is very important to wash your hands correctly, following these steps:  2. Wet your hands with clean, running water.  3. Apply soap to your hands. Liquid soap is better than bar soap.  4. Rub your hands together quickly to create lather.  5. Keep rubbing your hands together for at least 20 seconds. Thoroughly scrub all parts of your hands, including under your fingernails and between your fingers.  6. Rinse your hands with clean, running water until all of the soap is gone.  7. Dry your hands with an air dryer or a clean paper or cloth towel, or let your hands air-dry. Do not use your clothing or a soiled towel to dry your hands.  8. If you are in a public restroom, use your towel to turn off the water faucet and to open the bathroom door.  Make sure to wash your hands:  · Before:  ¨ Visiting a baby or anyone with a weakened or lowered defense (immune) system.  ¨ Putting in and taking out any contact lenses.  · After:  ¨ Working or playing outside.  ¨ Touching an animal or its toys or leash.  ¨ Handling livestock.  ¨ Using the bathroom or helping a child or adult to use the bathroom.  ¨ Using household  or toxic chemicals.  ¨ Touching or taking out the garbage.  ¨ Touching anything dirty around your home.  ¨ Handling soiled clothes or rags.  ¨ Taking care of a sick child. This includes touching used tissues, toys, and clothes.  ¨ Sneezing, coughing, or blowing your nose.  ¨ Using public transportation.  ¨ Shaking hands.  ¨ Using a phone, including your mobile phone.  ¨ Touching money.  · Before and after:  ¨ Preparing food.  ¨ Preparing a bottle for a baby.  ¨ Feeding a baby or a young child.  ¨ Eating.  ¨ Visiting or taking care of someone who is sick.  ¨ Changing a diaper.  ¨ Changing a bandage (dressing) or taking care of an injury or wound.  ¨ Giving or taking medicine.  Taking Care of Your  Home  · Make sure that you have enough cleaning supplies at all times. These include:  ¨ Disinfectants.  ¨ Reusable cleaning cloths. Wash these after each use.  ¨ Paper towels.  ¨ Utility gloves. Replace your gloves if they are cracked or torn or if they start to peel.  · Use bleach safely. Never mix it with other cleaning products, especially those that contain ammonia. This mixture can create a dangerous gas that may be deadly.  · Take care of your cleaning supplies. Toilet brushes, mops, and sponges can breed germs. Soak them in bleach and water for 5 minutes after each use.  · Do not pour used mop water down the sink. Pour it down the toilet instead.  · Maintain proper ventilation in your home.  · If you have a pet, ensure that your pet stays clean. Do not let people with weak immune systems touch bird droppings, fish tank water, or a litter box.  ¨ If you have a cat, be sure to change the litter every day.  · In the bathroom, make sure you:  ¨ Provide liquid soap.  ¨ Change towels and washcloths frequently. Avoid sharing towels and washcloths.  ¨ Change toothbrushes often and store them separately in a clean, dry place.  ¨ Disinfect the toilet.  ¨ Clean the tub, shower, and sink with standard cleaning products.    ¨ Mop the floor with a standard .  ¨ Do not share personal items, such as razors, toothbrushes, drinking glasses, deodorant, goodwin, brushes, towels, and washcloths.    · In the kitchen, make sure you:  ¨ Store food carefully.  ¨ Refrigerate leftovers promptly in covered containers.  ¨ Throw out stale or spoiled food.  ¨ Clean the inside of your refrigerator each week.  ¨ Keep your refrigerator set at 40°F (4°C) or less, and set your freezer at 0°F (-18°C) or less.  ¨ Thaw foods in the refrigerator or microwave, not at room temperature.  ¨ Serve foods at the proper temperature. Do not eat raw meat. Make sure it is cooked to the appropriate temperature. Cook eggs until they are firm.  ¨ Wash  "fruits and vegetables under running water.  ¨ Use separate cutting boards, plates, and utensils for raw foods and cooked foods.  ¨ Keep work surfaces clean.  ¨ Use a clean spoon each time you sample food while cooking.  ¨ Wash your dishes in hot, soapy water. Air-dry your dishes or use a .  ¨ Do not share forks, cups, or spoons during meals.  · Wear gloves if laundry is visibly soiled.  · Change linens each week or whenever they are soiled.  · Do not shake soiled linens. Doing that may send germs into the air. Put dressings, sanitary or incontinence pads, diapers, and gloves in plastic garbage bags for disposal.     This information is not intended to replace advice given to you by your health care provider. Make sure you discuss any questions you have with your health care provider.     Document Released: 09/26/2009 Document Revised: 01/08/2016 Document Reviewed: 08/20/2015  Peeractive Interactive Patient Education ©2016 Elsevier Inc.    AV Fistula, Care After  Refer to this sheet in the next few weeks. These instructions provide you with information on caring for yourself after your procedure. Your caregiver may also give you more specific instructions. Your treatment has been planned according to current medical practices, but problems sometimes occur. Call your caregiver if you have any problems or questions after your procedure.  HOME CARE INSTRUCTIONS   · Do not drive a car or take public transportation alone.  · Do not drink alcohol.  · Only take medicine that has been prescribed by your caregiver.  · Do not sign important papers or make important decisions.  · Have a responsible person with you.  · Ask your caregiver to show you how to check your access at home for a vibration (called a \"thrill\") or for a sound (called a \"bruit\" pronounced brew-ee).  · Your vein will need time to enlarge and mature so needles can be inserted for dialysis. Follow your caregiver's instructions about what you need to do " to make this happen.  · Keep dressings clean and dry.  · Keep the arm elevated above your heart. Use a pillow.  · Rest.  · Use the arm as usual for all activities.  · Have the stitches or tape closures removed in 10 to 14 days, or as directed by your caregiver.  · Do not sleep or lie on the area of the fistula or that arm. This may decrease or stop the blood flow through your fistula.  · Do not allow blood pressures to be taken on this arm.  · Do not allow blood drawing to be done from the graft.  · Do not wear tight clothing around the access site or on the arm.  · Avoid lifting heavy objects with the arm that has the fistula.  · Do not use creams or lotions over the access site.  SEEK MEDICAL CARE IF:   · You have a fever.  · You have swelling around the fistula that gets worse, or you have new pain.  · You have unusual bleeding at the fistula site or from any other area.  · You have pus or other drainage at the fistula site.  · You have skin redness or red streaking on the skin around, above, or below the fistula site.  · Your access site feels warm.  · You have any flu-like symptoms.  SEEK IMMEDIATE MEDICAL CARE IF:   · You have pain, numbness, or an unusual pale skin on the hand or on the side of your fistula.  · You have dizziness or weakness that you have not had before.  · You have shortness of breath.  · You have chest pain.  · Your fistula disconnects or breaks, and there is bleeding that cannot be easily controlled.  Call for local emergency medical help. Do not try to drive yourself to the hospital.  MAKE SURE YOU  · Understand these instructions.  · Will watch your condition.  · Will get help right away if you are not doing well or get worse.     This information is not intended to replace advice given to you by your health care provider. Make sure you discuss any questions you have with your health care provider.     Document Released: 12/18/2006 Document Revised: 01/08/2016 Document Reviewed:  06/06/2012  Elsevier Interactive Patient Education ©2016 Elsevier Inc.

## 2020-03-14 NOTE — ANESTHESIA TIME REPORT
Anesthesia Start and Stop Event Times     Date Time Event    3/13/2020 1612 Ready for Procedure     1623 Anesthesia Start     2038 Anesthesia Stop        Responsible Staff  03/13/20    Name Role Begin End    Enrike Jaimes M.D. Anesth 1623 2038        Preop Diagnosis (Free Text):  Pre-op Diagnosis     END STAGE RENAL DISEASE, DEPENDENCE ON RENAL DISLYSIS        Preop Diagnosis (Codes):    Post op Diagnosis  End stage renal failure on dialysis (HCC)      Premium Reason  A. 3PM - 7AM    Comments:

## 2020-03-14 NOTE — ANESTHESIA POSTPROCEDURE EVALUATION
Patient: Denis De Anda    Procedure Summary     Date:  03/13/20 Room / Location:  [facility] OR 08 / SURGERY [facility]    Anesthesia Start:  1623 Anesthesia Stop:  2038    Procedure:  CREATION, AV FISTULA- THIGH AND EXPLORATION OF LEFT THIGH AV GRAFT (Right Thigh) Diagnosis:  (END STAGE RENAL DISEASE, DEPENDENCE ON RENAL DISLYSIS)    Surgeon:  Lurdes Moffett M.D. Responsible Provider:  Enrike Jaimes M.D.    Anesthesia Type:  general ASA Status:  3          Final Anesthesia Type: general  Last vitals  BP   Blood Pressure: (!) 95/58    Temp   36.7 °C (98.1 °F)    Pulse   Pulse: (!) 113   Resp   12    SpO2   97 %      Anesthesia Post Evaluation    Patient location during evaluation: PACU  Patient participation: complete - patient participated  Level of consciousness: awake and alert  Pain score: 3    Airway patency: patent  Anesthetic complications: no  Cardiovascular status: hemodynamically stable  Respiratory status: acceptable  Hydration status: euvolemic    PONV: none           Nurse Pain Score: 0 (NPRS)

## 2020-03-14 NOTE — PROGRESS NOTES
Received report and assumed care of patient at 1900. Reviewed chart and completed assessment. Patient had right thigh fistula done in surgery yesterday, dressings are CDI. PIV in RFA - infusing and LFA SL. Patient is up self, no fall risk, bed alarm not indicated. Dialysis M, W, F. Medicated per MAR. All needs met.

## 2020-03-14 NOTE — ASSESSMENT & PLAN NOTE
Patient had bleeding at his left AV fistula site during hemodialysis on 3/11/2020.  repair of bleeding site in OR by Dr. Moffett on 3/13/2020 creation of a new right thigh AV fistula at the same time.  Holding Coumadin since 3/11/2020.  Vitamin K given 3/12/2020.  IV Dilaudid ordered for pain control.

## 2020-03-14 NOTE — PROGRESS NOTES
Pt discharged home with mother. Discharged instructions given. PIV's removed. Pt requested to have Dilaudid before he went home. MD eckert with pt requests.

## 2020-03-14 NOTE — DISCHARGE SUMMARY
Discharge Summary    CHIEF COMPLAINT ON ADMISSION  Bleeding left thigh AVF    Reason for Admission  End Stage Renal Disease     Admission Date  3/12/2020    CODE STATUS  Full Code    HPI & HOSPITAL COURSE  This is a 39 y.o. male here with spurting bleeding during his HD access of left thigh fistula on 3/11/20.   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  The patient is still able to access for dialysis on the left thigh AV fistula.  He is followed long-term by Dr. Moffett who plans to create a new fistula on the right thigh and repair the left thigh AV fistula from bleeding.  He has been n.p.o. since this morning he did get vitamin K last night with INR 1.8.  Coumadin has been held for the last 3 days.*       Patient tolerated new creation of right thigh AVF with Dr. Moffett on 3/13.  No complications.  The bleeding site on his left thigh AVF has subsided with pressure bandage.  No further bleeding since 3/11 when pressure was applied.  Tolerated HD with left thigh AVF on 3/13 w/o bleeding or complications.  Plan to use left thigh AVF until right thigh AVF has matured in 3 months for use.  Spoke with Dr. Moffett who agrees that patient can be discharged.  Verified that patient has been taking 3000mg neurontin for several years without any adverse reactions.  He is to continue his home dose of 3000mg qhs.    Therefore, he is discharged in good and stable condition to home with close outpatient follow-up.    The patient met 2-midnight criteria for an inpatient stay at the time of discharge.    Discharge Date  3/14/20    FOLLOW UP ITEMS POST DISCHARGE  Follow up with HD MWF as scheduled.  Follow up with   [facility] in 1-2 weeks for fistula recheck.  Follow up with [facility] clinic as scheduled.    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 [facility]. Workup negative. Patient started on       Keppra 375 mg by mouth daily.    Hyperparathyroidism (HCC) POA: Yes    Renal transplant failure and rejection x 2 POA: Yes  Resolved Problems:    * No resolved hospital problems. *      FOLLOW UP  No future appointments.  No follow-up provider specified.    MEDICATIONS ON DISCHARGE     Medication List      CHANGE how you take these medications      Instructions   gabapentin 300 MG Caps  What changed:    · how much to take  · additional instructions  · Another medication with the same name was removed. Continue taking this medication, and follow the directions you see here.  Commonly known as:  NEURONTIN   Take 10 Caps by mouth every bedtime. Indications: Neuropathic Pain  Dose:  3,000 mg     warfarin 5 MG Tabs  What changed:  how much to take  Commonly known as:  COUMADIN   Doctor's comments:  This rx was submitted by a pharmacist working under a collaborative practice agreement.  Take 1-1.5 Tabs by mouth every day.  Dose:  5-7.5 mg        CONTINUE taking these medications      Instructions   calcitRIOL 0.25 MCG Caps  Commonly known as:  ROCALTROL   Take 0.75 mcg by mouth every bedtime.  Dose:  0.75 mcg     cinacalcet 30 MG Tabs  Commonly known as:  SENSIPAR   Take 30 mg by mouth every Monday, Wednesday, and Friday.  Dose:  30 mg     HYDROcodone/acetaminophen  MG Tabs  Commonly known as:  NORCO   Take 1 Tab by mouth  every 6 hours as needed for Severe Pain. Claims he takes 4 tabs  at night.  Indications: Moderate to Moderately Severe Pain  Dose:  1 Tab     Renvela 800 MG Tabs tablet  Generic drug:  sevelamer carbonate   Take 3,200 mg by mouth 3 times a day, with meals.  Dose:  3,200 mg     tizanidine 4 MG Tabs  Commonly known as:  ZANAFLEX   Take 8 mg by mouth every bedtime.  Dose:  8 mg            Allergies  Allergies   Allergen Reactions   • Baclofen Unspecified     Total loss of memory, sedation.   RXN=6/2015   • Contrast Media With Iodine [Iodine] Rash     RXN=1/5/2017   • Keflex Rash     RXN=possibly >10 years   • Pcn [Penicillins] Rash     RXN=possibly >10 years ago  Tolerated Zosyn on 2/20/18   • Tape Rash     Paper tape and tegaderm ok  RXN=ongoing   • Requip Vomiting       DIET  Orders Placed This Encounter   Procedures   • Diet Order Renal     Standing Status:   Standing     Number of Occurrences:   1     Order Specific Question:   Diet:     Answer:   Renal [8]       ACTIVITY  As tolerated.  Weight bearing as tolerated    CONSULTATIONS  Dr. Moffett, spoke with Dr. Moffett on 3/14 for dc clearance  Dr. Gutierres    PROCEDURES  Right thigh AVF creation on 3/13/20 by Dr. Moffett    LABORATORY  Lab Results   Component Value Date    SODIUM 135 03/12/2020    POTASSIUM 3.9 03/12/2020    CHLORIDE 90 (L) 03/12/2020    CO2 28 03/12/2020    GLUCOSE 103 (H) 03/12/2020    BUN 22 03/12/2020    CREATININE 7.39 (HH) 03/12/2020    CREATININE 8.2 (HH) 05/06/2009        Lab Results   Component Value Date    WBC 5.3 03/13/2020    HEMOGLOBIN 8.1 (L) 03/13/2020    HEMATOCRIT 26.2 (L) 03/13/2020    PLATELETCT 143 (L) 03/13/2020        Total time of the discharge process exceeds 36 minutes.

## 2020-03-14 NOTE — PROGRESS NOTES
Patient being transported down to OR at this time. Report has been given. Mother is going with patient. A/Ox4. All needs are met at this time.      Daisy Elliott R.N.

## 2020-03-14 NOTE — OR SURGEON
OP Note    PreOp Diagnosis: Bleeding, infected left thigh AVG    PostOp Diagnosis: Same    Procedure(s):  CREATION, AV Graft- RIGHT THIGH    EXPLORATION OF LEFT THIGH AV GRAFT and repair of bleeding site- Wound Class: Clean    Surgeon(s):  Lurdes Moffett M.D.    Anesthesiologist/Type of Anesthesia:  Anesthesiologist: Enrike Jaimes M.D./General    Surgical Staff:  Circulator: Agapito Lange R.N.; Meka Collier R.N.  Scrub Person: Tamera Singh; Ashley Paige    Specimens removed if any:    Estimated Blood Loss: 750cc    Findings: Abundant scar in the right groin    Complications: none    [id number]    3/13/2020 8:35 PM Lurdes Moffett M.D.

## 2020-03-15 NOTE — OP REPORT
DATE OF SERVICE:  03/13/2020    PREOPERATIVE DIAGNOSIS:  Bleeding, infected left thigh arteriovenous graft.    POSTPROCEDURE DIAGNOSIS:  Bleeding, infected left thigh arteriovenous graft.    PROCEDURE:  Creation of right superficial femoral artery to left common   femoral vein arteriovenous graft.    SURGEON:  Lurdes Moffett MD    ANESTHESIOLOGIST:  Enrike Jaimes MD    ANESTHESIA:  General.    INDICATIONS:  The patient is a 39-year-old gentleman with longstanding renal   failure and 2 failed transplants.  He presented a couple of weeks ago with   bleeding from the left thigh.  There is an area that has abundant perforation   and I did my best to repair this, temporizing him to allow for maturation of a   new graft in the opposite leg.  He presents today for creation of that new   graft.  In addition, I planned to again temporize our situation by exploring   the graft on the right leg.  Risks and benefits were explained and include   bleeding, infection, graft thrombosis, and failure.  He understands and agrees   to proceed.    DESCRIPTION OF PROCEDURE:  The patient was taken to the operating room and   placed in supine position.  After adequate general anesthesia, the groins and   upper thighs were prepped in the usual sterile fashion with Chloraseptic prep,   cloth towels, and paper drapes.  Ioban was used as a protective skin   covering.  Local anesthesia was given in the right leg over the groin incision   as well as in the proposed tract of the arteriovenous graft.  The dissection   was then taken down through the subcutaneous tissue and I identified the   saphenous vein, a fairly small structure.  This was ligated distally.  I then   tracked greater saphenous vein up into the common femoral vein.  Extending up   into the lower abdominal wall, there were multiple collaterals identified in   the subcutaneous tissue.  This caused more blood loss than I certainly had   expected.  I ligated these  multiple times with a combination of Prolene as   well as 3-0 Vicryl to cease the bleeding.  I dissected down to the common   femoral vein and was able to get control medially and laterally, but not   circumferentially.  There was too much scar tissue to feel safe extending   circumferential and posterior control.  With some difficulty, I then   identified the arterial structures, again difficult due to venous collaterals   and scar tissue from prior venous catheters.  The common femoral, superficial   femoral, and profunda femoris arteries were identified with vessel loops.  The   patient then had a 4-7 mm graft tracked through the subcutaneous tissue.    Heparin was given and allowed to circulate.  I created the venous anastomosis   first, creating the anastomosis over the most proximal saphenous issa onto the   common femoral vein.  I created at least a 2 cm venous anastomosis and   completed this with 6-0 Prolene suture.  I allowed flow through the venous   anastomosis, but clamped the graft itself.    I then created the arterial anastomosis with an approximate 5 mm arterial   anastomosis completing this with 6-0 Prolene suture.  Flow through the graft   was obtained and hemostasis obtained as well.  Protamine was given in a small   dose to assist with this process.  The wound was then approximated with   interrupted 3-0 Vicryl followed by 4-0 Monocryl in a subcuticular fashion.    I then turned my attention to the other thigh.  I investigated the wound and   removed clot from the subcutaneous tissue.  I made an incision over the   bleeding site and identified active graft blood flow.  Pressure was held at   the arterial inflow, which allowed interrupted 6-0 Prolene suture to be used   to try and assist with repair of this area.  I used a bit of Surgicel, forming   a buttress against arterial bleeding and hopefully, assisting with the   antibacterial action of this substance.  This took a fair amount of time and  I   lost about 200 mL of blood at least during this procedure.  The wound was   closed with interrupted 3-0 Vicryl and 3-0 nylon in an inverted vertical   mattress fashion.  I then covered this with a sterile occlusive dressing.  In   the recovery room,  the right thigh arteriovenous graft was obviously patent   with a good thrill and there was no evidence of blood on the dressing, in the   left thigh.  Sponge, needle and instrument counts were correct x2.    ESTIMATED BLOOD LOSS:  Approximately 750 mL.             ____________________________________     MD ADDIS Shaffer / NTS    DD:  03/14/2020 14:35:57  DT:  03/14/2020 19:34:52    D#:  [id number]  Job#:  [id number]

## 2020-04-01 NOTE — DOCUMENTATION QUERY
Critical access hospital                                                                       Query Response Note      PATIENT:               ODALYS APPIAH  ACCT #:                  [id number]  MRN:                     [id number]  :                      1980  ADMIT DATE:       2020 4:50 PM  DISCH DATE:        2020 6:57 PM  RESPONDING  PROVIDER #:        [id number]           QUERY TEXT:    Consult dated  documents Staphylococcal bacteremia and sepsis. The remainder of the chart documents bacteremia.    Based on clinical findings risk factors and treatment, can Staphylococcal sepsis be further clarified as       The patient's Clinical Indicators include:    Consult   Staphylococcal bacteremia and sepsis.     Remainder of chart bacteremia    Vitals on admission:  Temp 36.2 C/ 97.2    RR 16  WBC 4.3    Treatment:  IV Daptomycin with transition to oral doxycycline upon completion of IV Vanco  Options provided:   -- Ruled in   -- Ruled out   -- Unable to determine      Query created by: Debbie Logn on 2020 12:54 PM    RESPONSE TEXT:    Ruled in          Electronically signed by:  QUYNH HERNANDEZ MD 2020 12:58 PM

## 2020-04-02 ENCOUNTER — TELEPHONE (OUTPATIENT)
Dept: VASCULAR LAB | Facility: MEDICAL CENTER | Age: 40
End: 2020-04-02

## 2020-04-07 ENCOUNTER — ANTICOAGULATION MONITORING (OUTPATIENT)
Dept: VASCULAR LAB | Facility: MEDICAL CENTER | Age: 40
End: 2020-04-07

## 2020-04-07 ENCOUNTER — HOSPITAL ENCOUNTER (OUTPATIENT)
Facility: MEDICAL CENTER | Age: 40
End: 2020-04-07
Attending: NURSE PRACTITIONER
Payer: MEDICARE

## 2020-04-07 DIAGNOSIS — Z79.01 CHRONIC ANTICOAGULATION: ICD-10-CM

## 2020-04-07 LAB
INR PPP: 5.5 (ref 0.87–1.13)
PROTHROMBIN TIME: 52.4 SEC (ref 12–14.6)

## 2020-04-07 PROCEDURE — 36415 COLL VENOUS BLD VENIPUNCTURE: CPT

## 2020-04-07 PROCEDURE — 85610 PROTHROMBIN TIME: CPT

## 2020-04-07 NOTE — PROGRESS NOTES
Anticoagulation Summary  As of 2020    INR goal:   2.5-3.5   TTR:   37.0 % (2.8 y)   INR used for dosin.50! 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 Hopkins      Anticoagulation Care Providers     Provider Role Specialty Phone number    Jairo Hopkins M.D. Referring Surgery [phone]    [facility] Anticoagulation Services Responsible  [phone]        Anticoagulation Patient Findings          Spoke with Denis to report a supra therapeutic INR of 5.5.  Pt has been non compliant with follow up.  Pt is now supra therapeutic today.  Pt self held yesterday.  Will hold two more days, then recheck in 1 week  Renetta Dunlap, Clinical Pharmacist, CDE, CACP

## 2020-04-08 ENCOUNTER — HOSPITAL ENCOUNTER (OUTPATIENT)
Facility: MEDICAL CENTER | Age: 40
End: 2020-04-08
Attending: SURGERY
Payer: MEDICARE

## 2020-04-08 LAB
BASOPHILS # BLD AUTO: 1.2 % (ref 0–1.8)
BASOPHILS # BLD: 0.04 K/UL (ref 0–0.12)
EOSINOPHIL # BLD AUTO: 0.14 K/UL (ref 0–0.51)
EOSINOPHIL NFR BLD: 4.1 % (ref 0–6.9)
ERYTHROCYTE [DISTWIDTH] IN BLOOD BY AUTOMATED COUNT: 53.2 FL (ref 35.9–50)
HCT VFR BLD AUTO: 23.7 % (ref 42–52)
HGB BLD-MCNC: 7.6 G/DL (ref 14–18)
IMM GRANULOCYTES # BLD AUTO: 0.01 K/UL (ref 0–0.11)
IMM GRANULOCYTES NFR BLD AUTO: 0.3 % (ref 0–0.9)
LYMPHOCYTES # BLD AUTO: 1.03 K/UL (ref 1–4.8)
LYMPHOCYTES NFR BLD: 29.9 % (ref 22–41)
MCH RBC QN AUTO: 31 PG (ref 27–33)
MCHC RBC AUTO-ENTMCNC: 32.1 G/DL (ref 33.7–35.3)
MCV RBC AUTO: 96.7 FL (ref 81.4–97.8)
MONOCYTES # BLD AUTO: 0.26 K/UL (ref 0–0.85)
MONOCYTES NFR BLD AUTO: 7.5 % (ref 0–13.4)
NEUTROPHILS # BLD AUTO: 1.97 K/UL (ref 1.82–7.42)
NEUTROPHILS NFR BLD: 57 % (ref 44–72)
NRBC # BLD AUTO: 0 K/UL
NRBC BLD-RTO: 0 /100 WBC
PLATELET # BLD AUTO: 174 K/UL (ref 164–446)
PMV BLD AUTO: 9.6 FL (ref 9–12.9)
RBC # BLD AUTO: 2.45 M/UL (ref 4.7–6.1)
WBC # BLD AUTO: 3.5 K/UL (ref 4.8–10.8)

## 2020-04-08 PROCEDURE — 85025 COMPLETE CBC W/AUTO DIFF WBC: CPT

## 2020-04-09 ENCOUNTER — HOSPITAL ENCOUNTER (OUTPATIENT)
Facility: MEDICAL CENTER | Age: 40
End: 2020-04-09
Attending: SURGERY
Payer: MEDICARE

## 2020-04-09 PROCEDURE — 87205 SMEAR GRAM STAIN: CPT

## 2020-04-09 PROCEDURE — 87077 CULTURE AEROBIC IDENTIFY: CPT

## 2020-04-09 PROCEDURE — 87186 SC STD MICRODIL/AGAR DIL: CPT

## 2020-04-09 PROCEDURE — 87070 CULTURE OTHR SPECIMN AEROBIC: CPT

## 2020-04-10 LAB
GRAM STN SPEC: NORMAL
SIGNIFICANT IND 70042: NORMAL
SITE SITE: NORMAL
SOURCE SOURCE: NORMAL

## 2020-04-11 LAB
BACTERIA WND AEROBE CULT: ABNORMAL
SIGNIFICANT IND 70042: ABNORMAL
SITE SITE: ABNORMAL
SOURCE SOURCE: ABNORMAL

## 2020-04-14 ENCOUNTER — HOSPITAL ENCOUNTER (OUTPATIENT)
Facility: MEDICAL CENTER | Age: 40
End: 2020-04-14
Attending: NURSE PRACTITIONER
Payer: MEDICARE

## 2020-04-14 ENCOUNTER — ANTICOAGULATION MONITORING (OUTPATIENT)
Dept: VASCULAR LAB | Facility: MEDICAL CENTER | Age: 40
End: 2020-04-14

## 2020-04-14 DIAGNOSIS — Z79.01 CHRONIC ANTICOAGULATION: ICD-10-CM

## 2020-04-14 LAB
INR PPP: 1.73 (ref 0.87–1.13)
PROTHROMBIN TIME: 20.8 SEC (ref 12–14.6)

## 2020-04-14 PROCEDURE — 85610 PROTHROMBIN TIME: CPT

## 2020-04-14 NOTE — PROGRESS NOTES
Anticoagulation Summary  As of 2020    INR goal:   2.5-3.5   TTR:   37.0 % (2.9 y)   INR used for dosin.73! 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 Hopkins      Anticoagulation Care Providers     Provider Role Specialty Phone number    Jairo Hopkins M.D. Referring Surgery [phone]    [facility] Responsible  [phone]        Anticoagulation Patient Findings    Spoke to patient on the phone.   INR  sub-therapeutic.   Denies signs/symptoms of bleeding and/or thrombosis.   Denies changes to diet or medications.   Follow up appointment in 1 week(s).    Continue weekly warfarin dose as noted per pt       Chriss Keating, PharmD, MS, BCACP, LCC    This note was created using voice recognition software (Dragon). The accuracy of the dictation is limited by the abilities of the software. I have reviewed the note prior to signing, however some errors in grammar and context are still possible. If you have any questions related to this note please do not hesitate to contact our office.

## 2020-04-20 ENCOUNTER — HOSPITAL ENCOUNTER (OUTPATIENT)
Facility: MEDICAL CENTER | Age: 40
DRG: 252 | End: 2020-04-20
Payer: MEDICARE

## 2020-04-20 ENCOUNTER — ANESTHESIA (OUTPATIENT)
Dept: SURGERY | Facility: MEDICAL CENTER | Age: 40
DRG: 252 | End: 2020-04-20
Payer: MEDICARE

## 2020-04-20 ENCOUNTER — HOSPITAL ENCOUNTER (INPATIENT)
Facility: MEDICAL CENTER | Age: 40
LOS: 2 days | DRG: 252 | End: 2020-04-22
Attending: SURGERY | Admitting: SURGERY
Payer: MEDICARE

## 2020-04-20 ENCOUNTER — ANESTHESIA EVENT (OUTPATIENT)
Dept: SURGERY | Facility: MEDICAL CENTER | Age: 40
DRG: 252 | End: 2020-04-20
Payer: MEDICARE

## 2020-04-20 PROBLEM — D63.1 ANEMIA IN CHRONIC KIDNEY DISEASE (CKD): Status: ACTIVE | Noted: 2018-09-11

## 2020-04-20 PROBLEM — N18.9 ANEMIA IN CHRONIC KIDNEY DISEASE (CKD): Status: ACTIVE | Noted: 2018-09-11

## 2020-04-20 LAB
ABO GROUP BLD: NORMAL
ACTION RANGE TRIGGERED IACRT: YES
ALBUMIN SERPL BCP-MCNC: 4.7 G/DL (ref 3.2–4.9)
ALBUMIN/GLOB SERPL: 1.2 G/DL
ALP SERPL-CCNC: 302 U/L (ref 30–99)
ALT SERPL-CCNC: 12 U/L (ref 2–50)
ANION GAP SERPL CALC-SCNC: 24 MMOL/L (ref 7–16)
AST SERPL-CCNC: 15 U/L (ref 12–45)
BASE EXCESS BLDV CALC-SCNC: 2 MMOL/L (ref -4–3)
BASOPHILS # BLD AUTO: 1 % (ref 0–1.8)
BASOPHILS # BLD: 0.05 K/UL (ref 0–0.12)
BILIRUB SERPL-MCNC: 0.6 MG/DL (ref 0.1–1.5)
BLD GP AB SCN SERPL QL: NORMAL
BODY TEMPERATURE: ABNORMAL DEGREES
BUN SERPL-MCNC: 52 MG/DL (ref 8–22)
CA-I BLD ISE-SCNC: 1.03 MMOL/L (ref 1.1–1.3)
CALCIUM SERPL-MCNC: 9.1 MG/DL (ref 8.5–10.5)
CHLORIDE SERPL-SCNC: 92 MMOL/L (ref 96–112)
CO2 BLDV-SCNC: 29 MMOL/L (ref 20–33)
CO2 SERPL-SCNC: 24 MMOL/L (ref 20–33)
CREAT SERPL-MCNC: 10.51 MG/DL (ref 0.5–1.4)
EOSINOPHIL # BLD AUTO: 0.2 K/UL (ref 0–0.51)
EOSINOPHIL NFR BLD: 4.2 % (ref 0–6.9)
ERYTHROCYTE [DISTWIDTH] IN BLOOD BY AUTOMATED COUNT: 55.6 FL (ref 35.9–50)
GLOBULIN SER CALC-MCNC: 3.8 G/DL (ref 1.9–3.5)
GLUCOSE BLD-MCNC: 99 MG/DL (ref 65–99)
GLUCOSE SERPL-MCNC: 91 MG/DL (ref 65–99)
HCO3 BLDV-SCNC: 27.4 MMOL/L (ref 24–28)
HCT VFR BLD AUTO: 28.8 % (ref 42–52)
HCT VFR BLD CALC: 25 % (ref 42–52)
HGB BLD-MCNC: 8.5 G/DL (ref 14–18)
HGB BLD-MCNC: 9 G/DL (ref 14–18)
HOROWITZ INDEX BLDV+IHG-RTO: 114 MM[HG]
IMM GRANULOCYTES # BLD AUTO: 0.01 K/UL (ref 0–0.11)
IMM GRANULOCYTES NFR BLD AUTO: 0.2 % (ref 0–0.9)
INR PPP: 2.68 (ref 0.87–1.13)
INST. QUALIFIED PATIENT IIQPT: YES
LYMPHOCYTES # BLD AUTO: 1.61 K/UL (ref 1–4.8)
LYMPHOCYTES NFR BLD: 33.5 % (ref 22–41)
MCH RBC QN AUTO: 31 PG (ref 27–33)
MCHC RBC AUTO-ENTMCNC: 31.3 G/DL (ref 33.7–35.3)
MCV RBC AUTO: 99.3 FL (ref 81.4–97.8)
MONOCYTES # BLD AUTO: 0.31 K/UL (ref 0–0.85)
MONOCYTES NFR BLD AUTO: 6.4 % (ref 0–13.4)
NEUTROPHILS # BLD AUTO: 2.63 K/UL (ref 1.82–7.42)
NEUTROPHILS NFR BLD: 54.7 % (ref 44–72)
NRBC # BLD AUTO: 0 K/UL
NRBC BLD-RTO: 0 /100 WBC
O2/TOTAL GAS SETTING VFR VENT: 50 %
PCO2 BLDV: 47.9 MMHG (ref 41–51)
PH BLDV: 7.37 [PH] (ref 7.31–7.45)
PLATELET # BLD AUTO: 182 K/UL (ref 164–446)
PMV BLD AUTO: 9.4 FL (ref 9–12.9)
PO2 BLDV: 57 MMHG (ref 25–40)
POTASSIUM BLD-SCNC: 7 MMOL/L (ref 3.6–5.5)
POTASSIUM SERPL-SCNC: 5.6 MMOL/L (ref 3.6–5.5)
PROT SERPL-MCNC: 8.5 G/DL (ref 6–8.2)
PROTHROMBIN TIME: 29.5 SEC (ref 12–14.6)
RBC # BLD AUTO: 2.9 M/UL (ref 4.7–6.1)
RH BLD: NORMAL
SAO2 % BLDV: 88 %
SODIUM BLD-SCNC: 137 MMOL/L (ref 135–145)
SODIUM SERPL-SCNC: 140 MMOL/L (ref 135–145)
SPECIMEN DRAWN FROM PATIENT: ABNORMAL
WBC # BLD AUTO: 4.8 K/UL (ref 4.8–10.8)

## 2020-04-20 PROCEDURE — 87040 BLOOD CULTURE FOR BACTERIA: CPT

## 2020-04-20 PROCEDURE — 99222 1ST HOSP IP/OBS MODERATE 55: CPT | Performed by: INTERNAL MEDICINE

## 2020-04-20 PROCEDURE — 84295 ASSAY OF SERUM SODIUM: CPT

## 2020-04-20 PROCEDURE — 160028 HCHG SURGERY MINUTES - 1ST 30 MINS LEVEL 3: Performed by: SURGERY

## 2020-04-20 PROCEDURE — 85610 PROTHROMBIN TIME: CPT

## 2020-04-20 PROCEDURE — 160002 HCHG RECOVERY MINUTES (STAT): Performed by: SURGERY

## 2020-04-20 PROCEDURE — 700111 HCHG RX REV CODE 636 W/ 250 OVERRIDE (IP)

## 2020-04-20 PROCEDURE — 85014 HEMATOCRIT: CPT

## 2020-04-20 PROCEDURE — 700105 HCHG RX REV CODE 258: Performed by: ANESTHESIOLOGY

## 2020-04-20 PROCEDURE — 87075 CULTR BACTERIA EXCEPT BLOOD: CPT

## 2020-04-20 PROCEDURE — 87070 CULTURE OTHR SPECIMN AEROBIC: CPT

## 2020-04-20 PROCEDURE — 770006 HCHG ROOM/CARE - MED/SURG/GYN SEMI*

## 2020-04-20 PROCEDURE — 700101 HCHG RX REV CODE 250: Performed by: ANESTHESIOLOGY

## 2020-04-20 PROCEDURE — 99223 1ST HOSP IP/OBS HIGH 75: CPT | Mod: AI | Performed by: FAMILY MEDICINE

## 2020-04-20 PROCEDURE — 160035 HCHG PACU - 1ST 60 MINS PHASE I: Performed by: SURGERY

## 2020-04-20 PROCEDURE — 87205 SMEAR GRAM STAIN: CPT

## 2020-04-20 PROCEDURE — 700101 HCHG RX REV CODE 250: Performed by: SURGERY

## 2020-04-20 PROCEDURE — 82803 BLOOD GASES ANY COMBINATION: CPT

## 2020-04-20 PROCEDURE — A6407 PACKING STRIPS, NON-IMPREG: HCPCS | Performed by: SURGERY

## 2020-04-20 PROCEDURE — 82330 ASSAY OF CALCIUM: CPT

## 2020-04-20 PROCEDURE — 160048 HCHG OR STATISTICAL LEVEL 1-5: Performed by: SURGERY

## 2020-04-20 PROCEDURE — 86900 BLOOD TYPING SEROLOGIC ABO: CPT

## 2020-04-20 PROCEDURE — 86850 RBC ANTIBODY SCREEN: CPT

## 2020-04-20 PROCEDURE — 160039 HCHG SURGERY MINUTES - EA ADDL 1 MIN LEVEL 3: Performed by: SURGERY

## 2020-04-20 PROCEDURE — 82947 ASSAY GLUCOSE BLOOD QUANT: CPT

## 2020-04-20 PROCEDURE — 06PY0JZ REMOVAL OF SYNTHETIC SUBSTITUTE FROM LOWER VEIN, OPEN APPROACH: ICD-10-PCS | Performed by: SURGERY

## 2020-04-20 PROCEDURE — 36415 COLL VENOUS BLD VENIPUNCTURE: CPT

## 2020-04-20 PROCEDURE — 501838 HCHG SUTURE GENERAL: Performed by: SURGERY

## 2020-04-20 PROCEDURE — 85025 COMPLETE CBC W/AUTO DIFF WBC: CPT

## 2020-04-20 PROCEDURE — 110454 HCHG SHELL REV 250: Performed by: SURGERY

## 2020-04-20 PROCEDURE — 700111 HCHG RX REV CODE 636 W/ 250 OVERRIDE (IP): Performed by: ANESTHESIOLOGY

## 2020-04-20 PROCEDURE — 84132 ASSAY OF SERUM POTASSIUM: CPT

## 2020-04-20 PROCEDURE — A6402 STERILE GAUZE <= 16 SQ IN: HCPCS | Performed by: SURGERY

## 2020-04-20 PROCEDURE — 04PY0JZ REMOVAL OF SYNTHETIC SUBSTITUTE FROM LOWER ARTERY, OPEN APPROACH: ICD-10-PCS | Performed by: SURGERY

## 2020-04-20 PROCEDURE — 80053 COMPREHEN METABOLIC PANEL: CPT

## 2020-04-20 PROCEDURE — 160009 HCHG ANES TIME/MIN: Performed by: SURGERY

## 2020-04-20 PROCEDURE — 86901 BLOOD TYPING SEROLOGIC RH(D): CPT

## 2020-04-20 RX ORDER — ONDANSETRON 2 MG/ML
4 INJECTION INTRAMUSCULAR; INTRAVENOUS
Status: DISCONTINUED | OUTPATIENT
Start: 2020-04-20 | End: 2020-04-20 | Stop reason: HOSPADM

## 2020-04-20 RX ORDER — HALOPERIDOL 5 MG/ML
1 INJECTION INTRAMUSCULAR
Status: DISCONTINUED | OUTPATIENT
Start: 2020-04-20 | End: 2020-04-20 | Stop reason: HOSPADM

## 2020-04-20 RX ORDER — SEVELAMER CARBONATE 800 MG/1
3200 TABLET, FILM COATED ORAL
Status: DISCONTINUED | OUTPATIENT
Start: 2020-04-20 | End: 2020-04-22 | Stop reason: HOSPADM

## 2020-04-20 RX ORDER — METOPROLOL TARTRATE 1 MG/ML
1 INJECTION, SOLUTION INTRAVENOUS
Status: DISCONTINUED | OUTPATIENT
Start: 2020-04-20 | End: 2020-04-20 | Stop reason: HOSPADM

## 2020-04-20 RX ORDER — ONDANSETRON 4 MG/1
4 TABLET, ORALLY DISINTEGRATING ORAL EVERY 4 HOURS PRN
Status: DISCONTINUED | OUTPATIENT
Start: 2020-04-20 | End: 2020-04-22 | Stop reason: HOSPADM

## 2020-04-20 RX ORDER — MAGNESIUM HYDROXIDE 1200 MG/15ML
LIQUID ORAL
Status: COMPLETED | OUTPATIENT
Start: 2020-04-20 | End: 2020-04-20

## 2020-04-20 RX ORDER — LABETALOL HYDROCHLORIDE 5 MG/ML
5 INJECTION, SOLUTION INTRAVENOUS
Status: DISCONTINUED | OUTPATIENT
Start: 2020-04-20 | End: 2020-04-20 | Stop reason: HOSPADM

## 2020-04-20 RX ORDER — DEXAMETHASONE SODIUM PHOSPHATE 4 MG/ML
INJECTION, SOLUTION INTRA-ARTICULAR; INTRALESIONAL; INTRAMUSCULAR; INTRAVENOUS; SOFT TISSUE PRN
Status: DISCONTINUED | OUTPATIENT
Start: 2020-04-20 | End: 2020-04-20 | Stop reason: SURG

## 2020-04-20 RX ORDER — SODIUM CHLORIDE 9 MG/ML
INJECTION, SOLUTION INTRAVENOUS
Status: DISCONTINUED | OUTPATIENT
Start: 2020-04-20 | End: 2020-04-20 | Stop reason: SURG

## 2020-04-20 RX ORDER — SODIUM CHLORIDE, SODIUM LACTATE, POTASSIUM CHLORIDE, CALCIUM CHLORIDE 600; 310; 30; 20 MG/100ML; MG/100ML; MG/100ML; MG/100ML
INJECTION, SOLUTION INTRAVENOUS CONTINUOUS
Status: DISCONTINUED | OUTPATIENT
Start: 2020-04-20 | End: 2020-04-20 | Stop reason: HOSPADM

## 2020-04-20 RX ORDER — CALCIUM CHLORIDE 100 MG/ML
INJECTION INTRAVENOUS; INTRAVENTRICULAR PRN
Status: DISCONTINUED | OUTPATIENT
Start: 2020-04-20 | End: 2020-04-20 | Stop reason: SURG

## 2020-04-20 RX ORDER — PROTAMINE SULFATE 10 MG/ML
INJECTION, SOLUTION INTRAVENOUS PRN
Status: DISCONTINUED | OUTPATIENT
Start: 2020-04-20 | End: 2020-04-20 | Stop reason: SURG

## 2020-04-20 RX ORDER — HYDROMORPHONE HYDROCHLORIDE 1 MG/ML
0.4 INJECTION, SOLUTION INTRAMUSCULAR; INTRAVENOUS; SUBCUTANEOUS
Status: DISCONTINUED | OUTPATIENT
Start: 2020-04-20 | End: 2020-04-20 | Stop reason: HOSPADM

## 2020-04-20 RX ORDER — OXYCODONE HCL 5 MG/5 ML
10 SOLUTION, ORAL ORAL
Status: DISCONTINUED | OUTPATIENT
Start: 2020-04-20 | End: 2020-04-20 | Stop reason: HOSPADM

## 2020-04-20 RX ORDER — OXYCODONE HCL 5 MG/5 ML
5 SOLUTION, ORAL ORAL
Status: DISCONTINUED | OUTPATIENT
Start: 2020-04-20 | End: 2020-04-20 | Stop reason: HOSPADM

## 2020-04-20 RX ORDER — ONDANSETRON 2 MG/ML
4 INJECTION INTRAMUSCULAR; INTRAVENOUS EVERY 4 HOURS PRN
Status: DISCONTINUED | OUTPATIENT
Start: 2020-04-20 | End: 2020-04-22 | Stop reason: HOSPADM

## 2020-04-20 RX ORDER — MEPERIDINE HYDROCHLORIDE 25 MG/ML
12.5 INJECTION INTRAMUSCULAR; INTRAVENOUS; SUBCUTANEOUS
Status: DISCONTINUED | OUTPATIENT
Start: 2020-04-20 | End: 2020-04-20 | Stop reason: HOSPADM

## 2020-04-20 RX ORDER — BISACODYL 10 MG
10 SUPPOSITORY, RECTAL RECTAL
Status: DISCONTINUED | OUTPATIENT
Start: 2020-04-20 | End: 2020-04-22 | Stop reason: HOSPADM

## 2020-04-20 RX ORDER — PROMETHAZINE HYDROCHLORIDE 25 MG/1
12.5-25 TABLET ORAL EVERY 4 HOURS PRN
Status: DISCONTINUED | OUTPATIENT
Start: 2020-04-20 | End: 2020-04-22 | Stop reason: HOSPADM

## 2020-04-20 RX ORDER — CINACALCET 30 MG/1
30 TABLET, FILM COATED ORAL
Status: DISCONTINUED | OUTPATIENT
Start: 2020-04-22 | End: 2020-04-22 | Stop reason: HOSPADM

## 2020-04-20 RX ORDER — DIPHENHYDRAMINE HYDROCHLORIDE 50 MG/ML
12.5 INJECTION INTRAMUSCULAR; INTRAVENOUS
Status: DISCONTINUED | OUTPATIENT
Start: 2020-04-20 | End: 2020-04-20 | Stop reason: HOSPADM

## 2020-04-20 RX ORDER — CEFAZOLIN SODIUM 1 G/3ML
INJECTION, POWDER, FOR SOLUTION INTRAMUSCULAR; INTRAVENOUS PRN
Status: DISCONTINUED | OUTPATIENT
Start: 2020-04-20 | End: 2020-04-20 | Stop reason: SURG

## 2020-04-20 RX ORDER — ACETAMINOPHEN 325 MG/1
650 TABLET ORAL EVERY 6 HOURS PRN
Status: DISCONTINUED | OUTPATIENT
Start: 2020-04-20 | End: 2020-04-22 | Stop reason: HOSPADM

## 2020-04-20 RX ORDER — POLYETHYLENE GLYCOL 3350 17 G/17G
1 POWDER, FOR SOLUTION ORAL
Status: DISCONTINUED | OUTPATIENT
Start: 2020-04-20 | End: 2020-04-22 | Stop reason: HOSPADM

## 2020-04-20 RX ORDER — TIZANIDINE 4 MG/1
8 TABLET ORAL
Status: DISCONTINUED | OUTPATIENT
Start: 2020-04-20 | End: 2020-04-22 | Stop reason: HOSPADM

## 2020-04-20 RX ORDER — AMOXICILLIN 250 MG
2 CAPSULE ORAL 2 TIMES DAILY
Status: DISCONTINUED | OUTPATIENT
Start: 2020-04-20 | End: 2020-04-22 | Stop reason: HOSPADM

## 2020-04-20 RX ORDER — HEPARIN SODIUM 1000 [USP'U]/ML
INJECTION, SOLUTION INTRAVENOUS; SUBCUTANEOUS PRN
Status: DISCONTINUED | OUTPATIENT
Start: 2020-04-20 | End: 2020-04-20 | Stop reason: SURG

## 2020-04-20 RX ORDER — ONDANSETRON 2 MG/ML
INJECTION INTRAMUSCULAR; INTRAVENOUS PRN
Status: DISCONTINUED | OUTPATIENT
Start: 2020-04-20 | End: 2020-04-20 | Stop reason: SURG

## 2020-04-20 RX ORDER — PHENYLEPHRINE HYDROCHLORIDE 10 MG/ML
INJECTION, SOLUTION INTRAMUSCULAR; INTRAVENOUS; SUBCUTANEOUS PRN
Status: DISCONTINUED | OUTPATIENT
Start: 2020-04-20 | End: 2020-04-20 | Stop reason: SURG

## 2020-04-20 RX ORDER — HYDRALAZINE HYDROCHLORIDE 20 MG/ML
5 INJECTION INTRAMUSCULAR; INTRAVENOUS
Status: DISCONTINUED | OUTPATIENT
Start: 2020-04-20 | End: 2020-04-20 | Stop reason: HOSPADM

## 2020-04-20 RX ORDER — HYDROMORPHONE HYDROCHLORIDE 1 MG/ML
0.2 INJECTION, SOLUTION INTRAMUSCULAR; INTRAVENOUS; SUBCUTANEOUS
Status: DISCONTINUED | OUTPATIENT
Start: 2020-04-20 | End: 2020-04-20 | Stop reason: HOSPADM

## 2020-04-20 RX ORDER — GABAPENTIN 300 MG/1
300 CAPSULE ORAL
Status: DISCONTINUED | OUTPATIENT
Start: 2020-04-20 | End: 2020-04-22

## 2020-04-20 RX ORDER — PROCHLORPERAZINE EDISYLATE 5 MG/ML
5-10 INJECTION INTRAMUSCULAR; INTRAVENOUS EVERY 4 HOURS PRN
Status: DISCONTINUED | OUTPATIENT
Start: 2020-04-20 | End: 2020-04-22 | Stop reason: HOSPADM

## 2020-04-20 RX ORDER — PROMETHAZINE HYDROCHLORIDE 25 MG/1
12.5-25 SUPPOSITORY RECTAL EVERY 4 HOURS PRN
Status: DISCONTINUED | OUTPATIENT
Start: 2020-04-20 | End: 2020-04-22 | Stop reason: HOSPADM

## 2020-04-20 RX ORDER — HYDROMORPHONE HYDROCHLORIDE 1 MG/ML
0.1 INJECTION, SOLUTION INTRAMUSCULAR; INTRAVENOUS; SUBCUTANEOUS
Status: DISCONTINUED | OUTPATIENT
Start: 2020-04-20 | End: 2020-04-20 | Stop reason: HOSPADM

## 2020-04-20 RX ORDER — HYDROCODONE BITARTRATE AND ACETAMINOPHEN 10; 325 MG/1; MG/1
1 TABLET ORAL EVERY 4 HOURS PRN
Status: DISCONTINUED | OUTPATIENT
Start: 2020-04-20 | End: 2020-04-22 | Stop reason: HOSPADM

## 2020-04-20 RX ADMIN — PROPOFOL 200 MG: 10 INJECTION, EMULSION INTRAVENOUS at 18:39

## 2020-04-20 RX ADMIN — PROTAMINE SULFATE 50 MG: 10 INJECTION, SOLUTION INTRAVENOUS at 20:31

## 2020-04-20 RX ADMIN — ROCURONIUM BROMIDE 50 MG: 10 INJECTION, SOLUTION INTRAVENOUS at 18:39

## 2020-04-20 RX ADMIN — CALCIUM CHLORIDE 1 G: 100 INJECTION INTRAVENOUS; INTRAVENTRICULAR at 20:14

## 2020-04-20 RX ADMIN — LIDOCAINE HYDROCHLORIDE 40 MG: 20 INJECTION, SOLUTION INTRAVENOUS at 18:39

## 2020-04-20 RX ADMIN — SUGAMMADEX 200 MG: 100 INJECTION, SOLUTION INTRAVENOUS at 20:58

## 2020-04-20 RX ADMIN — ROCURONIUM BROMIDE 25 MG: 10 INJECTION, SOLUTION INTRAVENOUS at 19:34

## 2020-04-20 RX ADMIN — FENTANYL CITRATE 25 MCG: 50 INJECTION, SOLUTION INTRAMUSCULAR; INTRAVENOUS at 18:49

## 2020-04-20 RX ADMIN — DEXAMETHASONE SODIUM PHOSPHATE 4 MG: 4 INJECTION, SOLUTION INTRA-ARTICULAR; INTRALESIONAL; INTRAMUSCULAR; INTRAVENOUS; SOFT TISSUE at 18:39

## 2020-04-20 RX ADMIN — HEPARIN SODIUM 5000 UNITS: 1000 INJECTION, SOLUTION INTRAVENOUS; SUBCUTANEOUS at 19:31

## 2020-04-20 RX ADMIN — FENTANYL CITRATE 50 MCG: 0.05 INJECTION, SOLUTION INTRAMUSCULAR; INTRAVENOUS at 21:21

## 2020-04-20 RX ADMIN — ROCURONIUM BROMIDE 25 MG: 10 INJECTION, SOLUTION INTRAVENOUS at 19:44

## 2020-04-20 RX ADMIN — SODIUM CHLORIDE: 9 INJECTION, SOLUTION INTRAVENOUS at 18:34

## 2020-04-20 RX ADMIN — FENTANYL CITRATE 50 MCG: 50 INJECTION, SOLUTION INTRAMUSCULAR; INTRAVENOUS at 20:35

## 2020-04-20 RX ADMIN — FENTANYL CITRATE 25 MCG: 50 INJECTION, SOLUTION INTRAMUSCULAR; INTRAVENOUS at 18:54

## 2020-04-20 RX ADMIN — FENTANYL CITRATE 25 MCG: 50 INJECTION, SOLUTION INTRAMUSCULAR; INTRAVENOUS at 19:14

## 2020-04-20 RX ADMIN — PHENYLEPHRINE HYDROCHLORIDE 100 MCG: 10 INJECTION INTRAVENOUS at 19:54

## 2020-04-20 RX ADMIN — CEFAZOLIN 2 G: 330 INJECTION, POWDER, FOR SOLUTION INTRAMUSCULAR; INTRAVENOUS at 18:39

## 2020-04-20 RX ADMIN — FENTANYL CITRATE 50 MCG: 50 INJECTION, SOLUTION INTRAMUSCULAR; INTRAVENOUS at 20:58

## 2020-04-20 RX ADMIN — FENTANYL CITRATE 25 MCG: 50 INJECTION, SOLUTION INTRAMUSCULAR; INTRAVENOUS at 19:34

## 2020-04-20 RX ADMIN — PHENYLEPHRINE HYDROCHLORIDE 100 MCG: 10 INJECTION INTRAVENOUS at 19:29

## 2020-04-20 RX ADMIN — FENTANYL CITRATE 50 MCG: 0.05 INJECTION, SOLUTION INTRAMUSCULAR; INTRAVENOUS at 21:14

## 2020-04-20 RX ADMIN — ONDANSETRON 4 MG: 2 INJECTION INTRAMUSCULAR; INTRAVENOUS at 20:44

## 2020-04-20 ASSESSMENT — LIFESTYLE VARIABLES
EVER HAD A DRINK FIRST THING IN THE MORNING TO STEADY YOUR NERVES TO GET RID OF A HANGOVER: NO
CONSUMPTION TOTAL: NEGATIVE
HAVE YOU EVER FELT YOU SHOULD CUT DOWN ON YOUR DRINKING: NO
TOTAL SCORE: 0
ALCOHOL_USE: NO
HOW MANY TIMES IN THE PAST YEAR HAVE YOU HAD 5 OR MORE DRINKS IN A DAY: 0
TOTAL SCORE: 0
EVER FELT BAD OR GUILTY ABOUT YOUR DRINKING: NO
EVER_SMOKED: NEVER
HAVE PEOPLE ANNOYED YOU BY CRITICIZING YOUR DRINKING: NO
AVERAGE NUMBER OF DAYS PER WEEK YOU HAVE A DRINK CONTAINING ALCOHOL: 0
TOTAL SCORE: 0
ON A TYPICAL DAY WHEN YOU DRINK ALCOHOL HOW MANY DRINKS DO YOU HAVE: 0

## 2020-04-20 ASSESSMENT — ENCOUNTER SYMPTOMS
MYALGIAS: 0
WEAKNESS: 0
COUGH: 0
VOMITING: 0
NECK PAIN: 0
NERVOUS/ANXIOUS: 0
SHORTNESS OF BREATH: 0
SPEECH CHANGE: 0
ABDOMINAL PAIN: 0
NAUSEA: 0
FEVER: 0
DIARRHEA: 0
SENSORY CHANGE: 0
HEARTBURN: 0
BACK PAIN: 0
HEADACHES: 0
BLURRED VISION: 0
PALPITATIONS: 0
DIZZINESS: 0
CHILLS: 0
DIAPHORESIS: 0
FLANK PAIN: 0
SORE THROAT: 0
WHEEZING: 0
FOCAL WEAKNESS: 0

## 2020-04-20 ASSESSMENT — COGNITIVE AND FUNCTIONAL STATUS - GENERAL
SUGGESTED CMS G CODE MODIFIER MOBILITY: CH
DAILY ACTIVITIY SCORE: 24
MOBILITY SCORE: 24
SUGGESTED CMS G CODE MODIFIER DAILY ACTIVITY: CH

## 2020-04-20 ASSESSMENT — COPD QUESTIONNAIRES
DURING THE PAST 4 WEEKS HOW MUCH DID YOU FEEL SHORT OF BREATH: NONE/LITTLE OF THE TIME
DO YOU EVER COUGH UP ANY MUCUS OR PHLEGM?: NO/ONLY WITH OCCASIONAL COLDS OR INFECTIONS
HAVE YOU SMOKED AT LEAST 100 CIGARETTES IN YOUR ENTIRE LIFE: NO/DON'T KNOW
COPD SCREENING SCORE: 0
IN THE PAST 12 MONTHS DO YOU DO LESS THAN YOU USED TO BECAUSE OF YOUR BREATHING PROBLEMS: DISAGREE/UNSURE

## 2020-04-20 ASSESSMENT — FIBROSIS 4 INDEX: FIB4 SCORE: 1.09

## 2020-04-20 NOTE — PROGRESS NOTES
Transfer Center:    Received MD office to Direct Admit transfer request from [facility] @ [phone]  Sending Physician:  NATHALY Jennings for Dr. Moffett   Specialist consulted:  Dr. Moffett   Diagnosis:  Right thigh open wound, non healing and ESRD  Patient accepted by:  Dr. Foster     Patient coming via:  Private vehicle  ETA:  TBD room availability   Medical records from sending facility scanned into Media tab.  Nursing to notify Direct Admit On-Call hospitalist and Specialist when patient arrives.     Plan:  Transfer Center to assist if needed.

## 2020-04-20 NOTE — PROGRESS NOTES
D/W NATHALY Prakash for Dr. Moffett  40yo male (works at [employer], [employer]) with chronic groin wound, recent wound vac removal  Dr. Moffett recently put in right thigh AV graft because left had infefted segment  New site on right, she had debrided a couple weeks ago, SNAP (small wound vac); has been getting those changed twice a week  Vanco day 8  with HD and cipro day 6; cx ecoli and MSSA  Sees Najjar for dialysis  Earline plans on taking graft out; will do surgery tonight but he is very dialysis dependent (K and phos levels), so will depend on labs  Also on coumadin  NPO, hasn't eaten since last night  Dialysis patient, HD due today  Note will be scanned into media tab by Caroline Prakash  Afebrile, VSS    Call Dr. Moffett when patient arrives  Call Dr. Najjar when patient arrives  Will place inpt admission order for surgical floor.

## 2020-04-21 PROBLEM — T82.590A AV GRAFT MALFUNCTION (HCC): Status: ACTIVE | Noted: 2020-04-21

## 2020-04-21 LAB
ALBUMIN SERPL BCP-MCNC: 3.9 G/DL (ref 3.2–4.9)
ALBUMIN/GLOB SERPL: 1.2 G/DL
ALP SERPL-CCNC: 246 U/L (ref 30–99)
ALT SERPL-CCNC: 8 U/L (ref 2–50)
ANION GAP SERPL CALC-SCNC: 19 MMOL/L (ref 7–16)
AST SERPL-CCNC: 10 U/L (ref 12–45)
BASOPHILS # BLD AUTO: 0.4 % (ref 0–1.8)
BASOPHILS # BLD: 0.02 K/UL (ref 0–0.12)
BILIRUB SERPL-MCNC: 0.4 MG/DL (ref 0.1–1.5)
BUN SERPL-MCNC: 58 MG/DL (ref 8–22)
CALCIUM SERPL-MCNC: 7.5 MG/DL (ref 8.5–10.5)
CHLORIDE SERPL-SCNC: 92 MMOL/L (ref 96–112)
CO2 SERPL-SCNC: 21 MMOL/L (ref 20–33)
CREAT SERPL-MCNC: 10.67 MG/DL (ref 0.5–1.4)
EOSINOPHIL # BLD AUTO: 0 K/UL (ref 0–0.51)
EOSINOPHIL NFR BLD: 0 % (ref 0–6.9)
ERYTHROCYTE [DISTWIDTH] IN BLOOD BY AUTOMATED COUNT: 55.4 FL (ref 35.9–50)
GLOBULIN SER CALC-MCNC: 3.3 G/DL (ref 1.9–3.5)
GLUCOSE SERPL-MCNC: 141 MG/DL (ref 65–99)
GRAM STN SPEC: NORMAL
HCT VFR BLD AUTO: 25 % (ref 42–52)
HGB BLD-MCNC: 7.9 G/DL (ref 14–18)
IMM GRANULOCYTES # BLD AUTO: 0.04 K/UL (ref 0–0.11)
IMM GRANULOCYTES NFR BLD AUTO: 0.9 % (ref 0–0.9)
LYMPHOCYTES # BLD AUTO: 0.59 K/UL (ref 1–4.8)
LYMPHOCYTES NFR BLD: 13.1 % (ref 22–41)
MCH RBC QN AUTO: 30.7 PG (ref 27–33)
MCHC RBC AUTO-ENTMCNC: 31.6 G/DL (ref 33.7–35.3)
MCV RBC AUTO: 97.3 FL (ref 81.4–97.8)
MONOCYTES # BLD AUTO: 0.16 K/UL (ref 0–0.85)
MONOCYTES NFR BLD AUTO: 3.5 % (ref 0–13.4)
NEUTROPHILS # BLD AUTO: 3.7 K/UL (ref 1.82–7.42)
NEUTROPHILS NFR BLD: 82.1 % (ref 44–72)
NRBC # BLD AUTO: 0 K/UL
NRBC BLD-RTO: 0 /100 WBC
PLATELET # BLD AUTO: 140 K/UL (ref 164–446)
PMV BLD AUTO: 9.5 FL (ref 9–12.9)
POTASSIUM SERPL-SCNC: 7.7 MMOL/L (ref 3.6–5.5)
PROT SERPL-MCNC: 7.2 G/DL (ref 6–8.2)
RBC # BLD AUTO: 2.57 M/UL (ref 4.7–6.1)
SIGNIFICANT IND 70042: NORMAL
SITE SITE: NORMAL
SODIUM SERPL-SCNC: 132 MMOL/L (ref 135–145)
SOURCE SOURCE: NORMAL
WBC # BLD AUTO: 4.5 K/UL (ref 4.8–10.8)

## 2020-04-21 PROCEDURE — A9270 NON-COVERED ITEM OR SERVICE: HCPCS | Performed by: FAMILY MEDICINE

## 2020-04-21 PROCEDURE — 85025 COMPLETE CBC W/AUTO DIFF WBC: CPT

## 2020-04-21 PROCEDURE — 36415 COLL VENOUS BLD VENIPUNCTURE: CPT

## 2020-04-21 PROCEDURE — 700111 HCHG RX REV CODE 636 W/ 250 OVERRIDE (IP): Performed by: INTERNAL MEDICINE

## 2020-04-21 PROCEDURE — 90935 HEMODIALYSIS ONE EVALUATION: CPT | Performed by: INTERNAL MEDICINE

## 2020-04-21 PROCEDURE — 90935 HEMODIALYSIS ONE EVALUATION: CPT

## 2020-04-21 PROCEDURE — 5A1D70Z PERFORMANCE OF URINARY FILTRATION, INTERMITTENT, LESS THAN 6 HOURS PER DAY: ICD-10-PCS | Performed by: INTERNAL MEDICINE

## 2020-04-21 PROCEDURE — 80053 COMPREHEN METABOLIC PANEL: CPT

## 2020-04-21 PROCEDURE — 700102 HCHG RX REV CODE 250 W/ 637 OVERRIDE(OP): Performed by: FAMILY MEDICINE

## 2020-04-21 PROCEDURE — 770001 HCHG ROOM/CARE - MED/SURG/GYN PRIV*

## 2020-04-21 PROCEDURE — 99233 SBSQ HOSP IP/OBS HIGH 50: CPT | Performed by: INTERNAL MEDICINE

## 2020-04-21 RX ORDER — CEFAZOLIN SODIUM 2 G/100ML
2 INJECTION, SOLUTION INTRAVENOUS
Status: DISCONTINUED | OUTPATIENT
Start: 2020-04-22 | End: 2020-04-22 | Stop reason: HOSPADM

## 2020-04-21 RX ORDER — HYDROMORPHONE HYDROCHLORIDE 1 MG/ML
0.5 INJECTION, SOLUTION INTRAMUSCULAR; INTRAVENOUS; SUBCUTANEOUS
Status: DISCONTINUED | OUTPATIENT
Start: 2020-04-21 | End: 2020-04-22

## 2020-04-21 RX ORDER — HYDROMORPHONE HYDROCHLORIDE 1 MG/ML
1 INJECTION, SOLUTION INTRAMUSCULAR; INTRAVENOUS; SUBCUTANEOUS EVERY 4 HOURS PRN
Status: DISCONTINUED | OUTPATIENT
Start: 2020-04-21 | End: 2020-04-21

## 2020-04-21 RX ORDER — CIPROFLOXACIN 500 MG/1
500 TABLET, FILM COATED ORAL DAILY
Status: ON HOLD | COMMUNITY
Start: 2020-04-14 | End: 2020-04-22

## 2020-04-21 RX ORDER — HYDROMORPHONE HYDROCHLORIDE 1 MG/ML
1 INJECTION, SOLUTION INTRAMUSCULAR; INTRAVENOUS; SUBCUTANEOUS
Status: DISCONTINUED | OUTPATIENT
Start: 2020-04-21 | End: 2020-04-21

## 2020-04-21 RX ADMIN — HYDROCODONE BITARTRATE AND ACETAMINOPHEN 1 TABLET: 10; 325 TABLET ORAL at 05:21

## 2020-04-21 RX ADMIN — SEVELAMER CARBONATE 3200 MG: 800 TABLET, FILM COATED ORAL at 18:22

## 2020-04-21 RX ADMIN — HYDROCODONE BITARTRATE AND ACETAMINOPHEN 1 TABLET: 10; 325 TABLET ORAL at 23:42

## 2020-04-21 RX ADMIN — SEVELAMER CARBONATE 3200 MG: 800 TABLET, FILM COATED ORAL at 13:14

## 2020-04-21 RX ADMIN — HYDROMORPHONE HYDROCHLORIDE 0.5 MG: 1 INJECTION, SOLUTION INTRAMUSCULAR; INTRAVENOUS; SUBCUTANEOUS at 14:27

## 2020-04-21 RX ADMIN — TIZANIDINE 8 MG: 4 TABLET ORAL at 20:56

## 2020-04-21 RX ADMIN — HYDROCODONE BITARTRATE AND ACETAMINOPHEN 1 TABLET: 10; 325 TABLET ORAL at 00:16

## 2020-04-21 RX ADMIN — HYDROCODONE BITARTRATE AND ACETAMINOPHEN 1 TABLET: 10; 325 TABLET ORAL at 18:26

## 2020-04-21 RX ADMIN — HYDROMORPHONE HYDROCHLORIDE 0.5 MG: 1 INJECTION, SOLUTION INTRAMUSCULAR; INTRAVENOUS; SUBCUTANEOUS at 20:57

## 2020-04-21 RX ADMIN — GABAPENTIN 300 MG: 300 CAPSULE ORAL at 20:56

## 2020-04-21 RX ADMIN — HYDROCODONE BITARTRATE AND ACETAMINOPHEN 1 TABLET: 10; 325 TABLET ORAL at 10:00

## 2020-04-21 RX ADMIN — CALCITRIOL CAPSULES 0.25 MCG 0.75 MCG: 0.25 CAPSULE ORAL at 20:57

## 2020-04-21 RX ADMIN — SENNOSIDES AND DOCUSATE SODIUM 2 TABLET: 8.6; 5 TABLET ORAL at 05:21

## 2020-04-21 ASSESSMENT — ENCOUNTER SYMPTOMS
RESPIRATORY NEGATIVE: 1
CARDIOVASCULAR NEGATIVE: 1
CONSTITUTIONAL NEGATIVE: 1
GASTROINTESTINAL NEGATIVE: 1
NEUROLOGICAL NEGATIVE: 1
EYES NEGATIVE: 1
COUGH: 0
PSYCHIATRIC NEGATIVE: 1

## 2020-04-21 NOTE — ASSESSMENT & PLAN NOTE
Hold Coumadin  Resume Coumadin when cleared by surgery  Start heparin drip when cleared by surgery    4/21: Pt still with significant bleeding at surgical site on RLE . Continue to hold AC  4/22: surgical site still oozing, will continue to hold AC

## 2020-04-21 NOTE — PROGRESS NOTES
Nephrology/Hemodialysis note    Patient with ESRD/HD admitted for RLE  AVG infection  Seen and examined during dialysis  Tolerates well  VS stable  Lab results reviewed -noticed K 7.7 -correcting with low K bath  Please see dialysis flow sheet for details  Next HD tomorrow

## 2020-04-21 NOTE — CONSULTS
DATE OF SERVICE:  04/20/2020    ADDENDUM    NEPHROLOGY CONSULTATION    ASSESSMENT AND PLAN:  The patient is a 39-year-old male with end-stage renal   disease, on hemodialysis and multiple medical problems admitted for right   lower extremity thigh arteriovenous graft revision.    1.  End-stage renal disease.  We will continue dialysis either today if need   emergent treatment depends on the potassium level or first in the morning.    2.  Electrolytes.  Renal panel pending.  We will follow up and make decision   depends on the electrolytes levels.    3.  Volume.  We will ultrafiltrate patient with hemodialysis as blood pressure   tolerates.    4.  Hypertension.  Blood pressure remains well controlled.    5.  Anemia.  Hemoglobin level slightly below goal.  We will add Epogen with   dialysis.      RECOMMENDATIONS:    1.  Depends on the renal panel results.  If necessary, we will dialyze patient   emergently tonight.  Otherwise, we will dialyze tomorrow, then continue   Monday, Wednesday, and Friday.  2.  To provide renal diet.    3.  To monitor basic metabolic panel, hemoglobin level daily to adjust all   medications to renal doses.  4.  We will follow up patient closely.      Thank you for the consult.       ____________________________________     MD CR OROZCO / RIAZ    DD:  04/20/2020 17:27:51  DT:  04/20/2020 18:25:08    D#:  [id number]  Job#:  [id number]

## 2020-04-21 NOTE — PROGRESS NOTES
Pt alert and oriented.  Complaints of slight pain, meds given.  Denies nausea.  Incisions on right thigh with dressings, CDI.  VSS.

## 2020-04-21 NOTE — H&P
Hospital Medicine History & Physical Note    Date of Service  4/20/2020    Primary Care Physician  Tony Mcmillan M.D.    Code Status  Full Code    Chief Complaint  No chief complaint on file.      History of Presenting Illness  39 y.o. male who presented 4/20/2020 with open wound at right groin area.  Patient has known history of end-stage renal disease on hemodialysis.  He had bleeding issues with his left thigh AV fistula about a month ago, he underwent AV fistula creation creation at right thigh.  However he has had problems with wound healing, he required a wound VAC at the right thigh area.  Recently the wound VAC was removed he still has an open wound, culture was done recently which showed MSSA and E. coli.  He was placed on IV vancomycin to be given during dialysis was ciprofloxacin.  He was seen at the vascular surgery clinic today and was directly admitted for the nonhealing wound of the right thigh.  He denies any fever chills, cough or congestion, chest pain, palpitation shortness of breath diaphoresis.  He denies having any abdominal pain nausea vomiting or diarrhea.    Review of Systems  Review of Systems   Constitutional: Negative for chills, diaphoresis, fever and malaise/fatigue.   HENT: Negative for congestion, hearing loss and sore throat.    Eyes: Negative for blurred vision.   Respiratory: Negative for cough, shortness of breath and wheezing.    Cardiovascular: Positive for leg swelling. Negative for chest pain and palpitations.   Gastrointestinal: Negative for abdominal pain, diarrhea, heartburn, nausea and vomiting.   Genitourinary: Negative for dysuria, flank pain and hematuria.   Musculoskeletal: Negative for back pain, joint pain, myalgias and neck pain.   Skin: Negative for rash.   Neurological: Negative for dizziness, sensory change, speech change, focal weakness, weakness and headaches.   Psychiatric/Behavioral: The patient is not nervous/anxious.        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    Surgical History   has a past surgical history that includes mass excision ortho (10/9/08); av fistula creation (11/6/08); arteriogram (3/5/2009); angioplasty balloon (3/5/2009); av fistulogram (3/5/2009); us-kidney transplant (1996, 2001); endarterectomy (11/16/2010); av fistulogram (11/16/2010); cath placement (2/1/2011); angioplasty balloon (2/1/2011); av fistulogram (6/5/2011); cath placement (6/5/2011); av fistula revision (11/3/2011); bone spur excision (12/8/2011); recovery (5/18/2012); incision and drainage general (9/13/2013); debridement (9/13/2013); vein ligation (9/13/2013); recovery (6/13/2014); av fistula revision (9/30/2014); irrigation & debridement general (12/15/2014); av fistula revision (2/8/2015); av fistula revision (2/22/2015); av fistula revision (3/20/2015); pr inject nerv blck,stellate ganglion (3/24/2015); av fistula creation (4/20/2015); av fistula thrombolysis (Left, 6/3/2015); irrigation & debridement general (Left, 6/3/2015); av fistula thrombolysis (Left, 6/9/2015); av fistula revision (Left, 6/17/2015); irrigation & debridement general (Left, 6/17/2015); recovery (8/7/2015); pr percut implnt neuroelect,epidural (2/19/2016); pr percut implnt neuroelect,epidural (2/19/2016); parathyroidectomy (2006); inguinal hernia repair (Bilateral, 2001, 2002); spinal cord stimulator (N/A, 3/25/2016); recovery (7/8/2016); lesion excision general (Right, 7/11/2016); mass excision general (Right, 8/5/2016); cath placement (Right, 9/17/2016); thrombectomy (Left, 9/18/2016); av fistulogram  (9/18/2016); thrombectomy (Left, 10/20/2016); incision and drainage general (10/20/2016); irrigation & debridement general (Left, 10/28/2016); flap closure (Left, 11/17/2016); thrombectomy (Left, 1/6/2017); cath placement (Right, 1/6/2017); thrombectomy (Left, 1/5/2017); spinal cord stimulator (N/A, 5/4/2017); abdominoplasty (6/5/2017); irrigation & debridement general (7/31/2017); cath placement (Right, 11/14/2017); av fistula revision (Left, 11/14/2017); wound exploration general (2/1/2018); wound closure general (2/19/2018); split thickness skin graft (2/26/2018); thrombectomy (Left, 3/9/2018); cath placement (Right, 3/9/2018); thrombectomy (Left, 4/27/2018); thrombectomy (Left, 4/28/2018); abdominal exploration (6/25/2018); myocutaneous flap (Right, 8/27/2018); skin flap delayed (Left, 9/10/2018); split thickness skin graft (Right, 9/21/2018); av fistula revision (Left, 12/23/2018); av fistula revision (Left, 1/25/2019); thrombectomy (Left, 1/26/2019); pr intro cath dialysis circuit dx angrph fluor s&i (Left, 2/20/2020); av fistula revision (Left, 2/20/2020); and av fistula creation (Right, 3/13/2020).     Family History  family history includes Arthritis in his father; Heart Disease in his father; Hypertension in his brother; Lung Disease in his father.     Social History   reports that he has never smoked. He has never used smokeless tobacco. He reports that he does not drink alcohol or use drugs.    Allergies  Allergies   Allergen Reactions   • Baclofen Unspecified     Total loss of memory, sedation.   RXN=6/2015   • Contrast Media With Iodine [Iodine] Rash     RXN=1/5/2017   • Keflex Rash     RXN=possibly >10 years   • Pcn [Penicillins] Rash     RXN=possibly >10 years ago  Tolerated Zosyn on 2/20/18   • Tape Rash     Paper tape and tegaderm ok  RXN=ongoing   • Requip Vomiting       Medications  Prior to Admission Medications   Prescriptions Last Dose Informant Patient Reported? Taking?    HYDROcodone/acetaminophen (NORCO)  MG Tab 4/19/2020 at pm Patient Yes No   Sig: Take 1 Tab by mouth every 6 hours as needed for Severe Pain. Claims he takes 4 tabs  at night.  Indications: Moderate to Moderately Severe Pain   calcitRIOL (ROCALTROL) 0.25 MCG CAPS 4/19/2020 at pm Patient Yes No   Sig: Take 0.75 mcg by mouth every bedtime.   cinacalcet (SENSIPAR) 30 MG Tab 4/17/2020 at pm Patient Yes No   Sig: Take 30 mg by mouth every Monday, Wednesday, and Friday.   gabapentin (NEURONTIN) 300 MG Cap 4/19/2020 at pm  No No   Sig: Take 10 Caps by mouth every bedtime. Indications: Neuropathic Pain   sevelamer carbonate (RENVELA) 800 MG Tab tablet 4/19/2020 at pm Patient Yes No   Sig: Take 3,200 mg by mouth 3 times a day, with meals.   tizanidine (ZANAFLEX) 4 MG Tab   Yes No   Sig: Take 8 mg by mouth every bedtime.   warfarin (COUMADIN) 5 MG Tab 4/19/2020  No No   Sig: Take 1-1.5 Tabs by mouth every day.   Patient taking differently: Take 5 mg by mouth every day.      Facility-Administered Medications: None       Physical Exam       Physical Exam  Vitals signs and nursing note reviewed.   Constitutional:       Appearance: He is obese.   HENT:      Head: Normocephalic and atraumatic.      Nose: No congestion.      Mouth/Throat:      Mouth: Mucous membranes are moist.   Eyes:      Conjunctiva/sclera: Conjunctivae normal.      Pupils: Pupils are equal, round, and reactive to light.   Neck:      Musculoskeletal: No muscular tenderness.   Cardiovascular:      Rate and Rhythm: Normal rate and regular rhythm.   Pulmonary:      Effort: Pulmonary effort is normal.      Breath sounds: Normal breath sounds.   Abdominal:      General: Bowel sounds are normal. There is no distension.      Palpations: Abdomen is soft.      Tenderness: There is no abdominal tenderness. There is no guarding or rebound.   Musculoskeletal:      Right lower leg: Edema present.      Left lower leg: Edema present.   Lymphadenopathy:       Cervical: No cervical adenopathy.   Skin:     Findings: Wound (Right groin) present.   Neurological:      Mental Status: He is alert.         Laboratory:  Recent Labs     04/20/20  1652   WBC 4.8   RBC 2.90*   HEMOGLOBIN 9.0*   HEMATOCRIT 28.8*   MCV 99.3*   MCH 31.0   MCHC 31.3*   RDW 55.6*   PLATELETCT 182   MPV 9.4     Recent Labs     04/20/20  1652   SODIUM 140   POTASSIUM 5.6*   CHLORIDE 92*   CO2 24   GLUCOSE 91   BUN 52*   CREATININE 10.51*   CALCIUM 9.1     Recent Labs     04/20/20  1652   ALTSGPT 12   ASTSGOT 15   ALKPHOSPHAT 302*   TBILIRUBIN 0.6   GLUCOSE 91     Recent Labs     04/20/20  1652   INR 2.68*     No results for input(s): NTPROBNP in the last 72 hours.      No results for input(s): TROPONINT in the last 72 hours.    Imaging:  No orders to display         Assessment/Plan:      * Non-healing open wound of right groin- (present on admission)  Assessment & Plan  Surgery following  N.p.o.  Resume vancomycin and ciprofloxacin  Check blood cultures  Will need ID consult    Anemia in chronic kidney disease (CKD)- (present on admission)  Assessment & Plan  Follow CBC    Chronic pain disorder- (present on admission)  Assessment & Plan  Continue Neurontin, Norco, tizanidine    Chronic anticoagulation- (present on admission)  Assessment & Plan  Hold Coumadin  Resume Coumadin when cleared by surgery  Start heparin drip when cleared by surgery      ESRD on hemodialysis (HCC)- (present on admission)  Assessment & Plan  Nephrology consulted

## 2020-04-21 NOTE — ASSESSMENT & PLAN NOTE
Continue Neurontin, Norco, tizanidine    4/21: Added dilaudid for acute on chronic pain   4/22: titrated pain meds, can resume home pain meds on d/c and f/u with pain mgmt

## 2020-04-21 NOTE — DISCHARGE PLANNING
Outpatient Dialysis Note    Gave IV ABX order to Dr. Perales who will call HD clinic and give verbal orders.    Pina Irwin- Dialysis Coordinator  [facility] Ph# [phone]

## 2020-04-21 NOTE — CARE PLAN
Problem: Communication  Goal: The ability to communicate needs accurately and effectively will improve  Outcome: PROGRESSING AS EXPECTED  Education provided on importance of using call light to alert staff of patient needs. Pt demonstrates understanding by using call light appropriately.      Problem: Pain Management  Goal: Pain level will decrease to patient's comfort goal  Outcome: PROGRESSING AS EXPECTED   Pain assessed and medicated per MAR.

## 2020-04-21 NOTE — ANESTHESIA TIME REPORT
Anesthesia Start and Stop Event Times     Date Time Event    4/20/2020 1813 Ready for Procedure     1834 Anesthesia Start     2108 Anesthesia Stop        Responsible Staff  04/20/20    Name Role Begin End    Michael Aguilar M.D. Anesth 1834 2108        Preop Diagnosis (Free Text):  Pre-op Diagnosis     Infected right thigh graft        Preop Diagnosis (Codes):    Post op Diagnosis  Infected prosthetic vascular graft, initial encounter ([facility])      Premium Reason  A. 3PM - 7AM    Comments:

## 2020-04-21 NOTE — CONSULTS
DATE OF SERVICE:  04/21/2020    INFECTIOUS DISEASE CONSULTATION    CHIEF COMPLAINT:  Right groin infection.    REFERRING PROVIDER:  José Denise MD    REASON FOR CONSULTATION:  Acute right AV graft infection.    HISTORY OF PRESENT ILLNESS:  The patient is a 39-year-old gentleman well known   to our infectious disease service with multiple infections in the past   treated by us.  Most recently, he was admitted at [facility] in   February for a left AV graft infection, which we were consulted for and for   management.  At that time, he had a staphylococcal infection that was   oxacillin sensitive.  With our recommendation, the patient underwent 4 weeks   of IV antibiotic course with cefazolin 2 g after each dialysis for a total of   4 weeks, ending around 03/24/2020.  He had angioplasty performed during that   admission on 02/20 for repair of this perforation of the lateral aspect of his   graft at that time.  In retrospect, he has had multiple complications with   his AV graft.  He had multiple failures of his AV fistula with a complicated   superior vena cava obstruction in 2010.  He then underwent a left AV graft in   2014, required revision in 06/2015, and thrombectomy and debridement of the   left graft in 10/2016.  He had another revision in 11/2017 followed by   thrombectomy in 03/2018 and another revision in 04/2018.  This was then   followed by further revision in 12/2018 and thrombectomy and revision in   01/2019.  Since his completion of IV antibiotics with cefazolin, for some   reason, the patient was not on chronic suppression therapy nor did he have a   followup with our infectious disease service.  The patient, in preparation to   transfer his AV graft to the right, underwent creation of a new right thigh AV   graft by Dr. Moffett on 03/13.  Unfortunately, in the past 2 weeks, the   patient had a nonhealing ulcer that was concerning for a graft site infection.    The patient was  prescribed ciprofloxacin and intermittently was given   vancomycin after his dialysis intermittently on his dialysis days.  Despite   this, the patient continued to have dehiscence prompting admission on 04/20,   in which the patient had explantation of his right AV graft.  Unfortunately,   there was a residual segment that remained due to complication with bleeding.    Cultures at this time are no growth from yesterday's operative cultures.  His   recent debridement on 04/09 is growing MSSA that is oxacillin-sensitive as   well as E. coli with pansensitivity.  At this time, infectious disease   consultation prompted for the management of his recurrent AV graft infection.    REVIEW OF SYSTEMS:  Fourteen-point review of system was obtained and is   negative except for the HPI.    PAST MEDICAL HISTORY:  Failed renal transplant x2, multiple AV graft revisions   and debridements, recent left AV graft infection with MSSA in 02/2020.    SURGICAL HISTORY:  1.  Spinal cord stimulator in 05/2017.  2.  Multiple revisions of his AV grafts.  3.  Failed renal transplant x2.  4.  Bilateral inguinal herniorrhaphies in 2001 and 2002.  5.  Parathyroidectomy in 2006.    ALLERGIES:  PENICILLIN CAUSES DRUG RASH, but tolerates Zosyn and can tolerate   cephalexin and cefazolin.    SOCIAL HISTORY:  Born and raised in [place], lived in [facility], but   lives in [place] since 1994.  Never .  He works as a  at   [employer].  No alcohol, drug, or tobacco abuse.    CURRENT MEDICATIONS:  Norco, warfarin, Sensipar, calcitriol, Renvela,   tizanidine, recently on vancomycin and ciprofloxacin for the past 2 weeks.    PHYSICAL EXAMINATION:  VITAL SIGNS:  Temperature 36.3, pulse 84, oxygen 100% on 2 L, blood pressure   92/66.  GENERAL:  The patient is pleasant, calm, without acute distress.  HEENT:  Head is normocephalic, atraumatic.  Oral mucous membranes moist.  Oral   dentition is intact.  Eyes:  PERRLA.  No scleral icterus or  conjunctival   injection.  CARDIOVASCULAR:  Regular rate and rhythm.  S1, S2.  No murmurs.  RESPIRATORY:  Lungs are clear to auscultation bilaterally.  GASTROINTESTINAL:  Abdomen is soft, nondistended, nontender.  INTEGUMENTARY:  Left AV graft sutures are intact, no induration.  Right AV   graft surgical site is with dressing enforced.  MUSCULOSKELETAL:  Trace peripheral edema in lower extremities.  PSYCHIATRIC:  He is alert and oriented x3.  NEUROLOGIC:  No focal deficits noted.    LABORATORY DATA:  WBC 4800, hemoglobin 9, platelet count 182.  Sodium 140,   potassium 5.6, creatinine is 10.51.  Blood cultures, no growth to date, 04/20;   right groin culture, 04/20, no growth, no organisms and Gram stain; 04/09,   right groin AV graft site, Staph aureus and E. coli.    IMPRESSION:  1.  Acute right arteriovenous graft infection with methicillin-sensitive   Staphylococcus aureus and Escherichia coli.  2.  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  3.  End-stage renal disease, on Monday, Wednesday, and Friday hemodialysis.  4.  Failed renal transplant.  5.  Postsurgical anemia.    RECOMMENDATION:  This is a 39-year-old male with recurrent AV graft infection,   who was recently treated for a left AV graft infection with MSSA in 02/2020   completing a 4-week IV course of cefazolin.  Unfortunately, he was not on any   chronic suppression and did not have a followup with our infectious disease.    Now, he has acute graft site infection of his new AV graft in March, now   growing polymicrobial as well with MSSA and E. coli.  Ultimately, the patient   is chronically infected with Staph.  He will need, after an acute intravenous   course, 6-week course of IV antibiotics.  He will require chronic suppression   lifelong.  1.  Start cefazolin 2 g IV post dialysis on Monday and Wednesday followed by 3   g on Friday.  2.  He will require 6 weeks of IV antibiotics.  3.  After  completion of his IV antibiotics, he will require Keflex lifelong   suppression 500 mg daily.  4.  Further surgical plan in regards to his chronic infected graft will be   determined because at this time, his left AV graft is chronically infected.    Despite removal of his right AV graft, he still has a segment left in his   right groin that is presumably infected too.    Eighty minutes were spent with 50% face-to-face care.  Discussed the plan in   detail with the patient, hospitalist, and Dr. Lurdes Moffett and his treatment   plan and approach.  We will also update case management on this plan as well.    Thank you for this consult.       ____________________________________     DO OSCAR Mora / RIAZ    DD:  04/21/2020 11:41:57  DT:  04/21/2020 12:32:59    D#:  [id number]  Job#:  [id number]

## 2020-04-21 NOTE — ANESTHESIA PREPROCEDURE EVALUATION
Relevant Problems   ANESTHESIA   (+) Sleep apnea      CARDIAC   (+) AV graft thrombosis (HCC)         (+) Chronic kidney disease, stage V (HCC)   (+) ESRD on hemodialysis (HCC)      Other   (+) Anemia in chronic kidney disease (CKD)   (+) Chronic anticoagulation   (+) Chronic pain disorder   (+) Non-healing open wound of right groin       Physical Exam    Airway   Mallampati: II  TM distance: <3 FB  Neck ROM: full       Cardiovascular - normal exam  Rhythm: regular  Rate: normal  (-) murmur     Dental - normal exam         Pulmonary - normal exam  Breath sounds clear to auscultation     Abdominal    Neurological - normal exam                 Anesthesia Plan    ASA 3- EMERGENT   ASA physical status 3 criteria: ESRD undergoing regularly scheduled dialysisASA physical status emergent criteria: acutely contaminated wound or identified infection source    Plan - general       Airway plan will be ETT        Induction: intravenous      Pertinent diagnostic labs and testing reviewed    Informed Consent:    Anesthetic plan and risks discussed with patient.    Use of blood products discussed with: patient whom.

## 2020-04-21 NOTE — CONSULTS
DATE OF SERVICE:  04/20/2020    NEPHROLOGY CONSULTATION    REQUESTING PHYSICIAN:  Lurdes Moffett MD    REASON FOR CONSULTATION:  To evaluate and provide dialysis for patient with   end-stage renal disease.      HISTORY OF PRESENT ILLNESS:  The patient is a 39-year-old male with long-term   history of end-stage renal disease, status post failed renal transplant with   multiple AV fistula creations.  Currently, the patient has a left thigh graft   and also newly created right thigh graft, which appeared to be infected and   the patient was admitted for graft revision possible removal, recent   antibiotics vancomycin and also had VAC wound.  Currently, the patient is   doing well.  Denies any fever or chills.  No nausea or vomiting, no shortness   of breath or chest pain.  Last dialysis completed on Friday.  He is on Monday,   Wednesday, and Friday schedule at [facility].      REVIEW OF SYSTEMS:  GENERAL:  No fatigue.  No fever or chills.  HEENT:  No nosebleeds, no sinus pain, no sore throat.  NECK:  No pain, no stiffness.  RESPIRATORY:  No cough, no hemoptysis, no wheezes, no shortness of breath.  CARDIOVASCULAR:  No chest pain, no palpitations, no dyspnea.  GASTROINTESTINAL:  No nausea, vomiting, diarrhea.    All other systems reviewed, all negative except history of present illness.      PAST MEDICAL HISTORY:  1.  End-stage renal disease on hemodialysis, history of FSGS,  history of   failed renal transplant x2.  2.  Hypertension.  3.  Epilepsy.    PAST SURGICAL HISTORY:  Kidney transplant x2, hernia repair x2, peritoneal   dialysis catheter and removal, thyroid surgery, parathyroid surgery, multiple   fistulograms, angioplasties on AV fistulas, skin graft placement.      SOCIAL HISTORY:  No alcohol, no drugs, no tobacco.  Works in [employer] as a [employer].    FAMILY HISTORY:  No history of kidney disease.  His brother was a kidney   donor.      ALLERGIES:  ALLERGIC TO BACLOFEN, PENICILLIN, IODINE, KEFLEX.       OUTPATIENT MEDICATIONS:  Reviewed in the chart.    PHYSICAL EXAMINATION:  VITAL SIGNS:  Blood pressure 104/60, heart rate 92, temperature 97.6 Celsius,   weight 198 pounds.    GENERAL:  Well-developed, well-nourished male in no acute distress.  HEENT:  Normocephalic, atraumatic.  Pupils equal, round, reactive to light.    Extraocular movement intact.  Nares patent.  Oropharynx clear with moist   mucosa.  No erythema or exudate.  NECK:  Supple, no lymphadenopathy, no thyromegaly appreciated.  Full range of   motion.    LUNGS:  Clear to auscultation bilaterally.  No wheezes, no rhonchi.    HEART:  Regular rate.  No rub or gallop.    ABDOMEN:  Soft, nontender, and nondistended.  Bowel sounds present.    EXTREMITIES:  Left lower extremities, no cyanosis, no edema.  AV graft with   good thrill.  Right lower extremities with right thigh edema.  Mild tenderness   to palpation.    NEUROLOGIC:  Alert and oriented x3, no focal deficit.  Cranial nerves II-XII   grossly intact.      LABORATORY RESULTS:  Hemoglobin level 9.0, platelets 182.  INR 2.6.  Renal   panel pending.          DICTATION ENDS ABRUPTLY...       ____________________________________     MD CR OROZCO / RIAZ    DD:  04/20/2020 17:25:21  DT:  04/20/2020 17:40:23    D#:  [id number]  Job#:  [id number]

## 2020-04-21 NOTE — ASSESSMENT & PLAN NOTE
Nephrology consulted  S/p renal transplant x 2  Pt has failed grafts in 3 extremities. LLE is only working one now   4/21: K 7.7 today, d/w dr Perales , pt getting HD this AM   4/22: K improved, HD again today to get him back on schedule

## 2020-04-21 NOTE — DISCHARGE PLANNING
Anticipated Discharge Disposition: Home with Outpatient Dialysis and IV ABX.    Action: Dr. Olga Lidia Richey, ID provided order for IV ABX X 6 Weeks, LSW notified Jessica Dialysis Coordinator via phone.    Barriers to Discharge: None.    Plan: Home with Outpatient Dialysis and IV ABX, pending medical clearance.

## 2020-04-21 NOTE — ANESTHESIA PROCEDURE NOTES
Peripheral IV  Date/Time: 4/20/2020 8:10 AM  Performed by: Michael Aguilar M.D.  Authorized by: Michael Aguilar M.D.     Size:  18 G  Laterality:  Left  Site Prep:  Alcohol  Technique:  Direct puncture  Attempts:  1

## 2020-04-21 NOTE — PROGRESS NOTES
Pt arrived on floor at 1600. Admitting hospitalist, Dr. Moffett, and Dr. Perales notified.     Pt AA&Ox4. RA. Denies SOB. Reporting minimal pain to R groin. Anuric. LBM 4/20 PTA. Pt up self, no assistance required. Scheduled for OR at 1830. POC discussed. Pt calls appropriately.

## 2020-04-21 NOTE — ANESTHESIA PROCEDURE NOTES
Airway  Date/Time: 4/20/2020 6:40 PM  Performed by: Michael Aguilar M.D.  Authorized by: Michael Aguilar M.D.     Location:  OR  Urgency:  Elective  Difficult Airway: No    Indications for Airway Management:  Anesthesia      Spontaneous Ventilation: absent    Sedation Level:  Deep  Preoxygenated: Yes    Patient Position:  Sniffing  Final Airway Type:  Endotracheal airway  Final Endotracheal Airway:  ETT  Cuffed: Yes    Technique Used for Successful ETT Placement:  Direct laryngoscopy  Devices/Methods Used in Placement:  Intubating stylet  Insertion Site:  Oral  Blade Type:  Antoine  Laryngoscope Blade/Videolaryngoscope Blade Size:  3  ETT Size (mm):  7.0  Measured from:  Teeth  ETT to Teeth (cm):  22  Placement Verified by: auscultation and capnometry    Cormack-Lehane Classification:  Grade I - full view of glottis  Number of Attempts at Approach:  1

## 2020-04-21 NOTE — ASSESSMENT & PLAN NOTE
Failed R Leg AV graft  X 1 month   Surgery following  N.p.o.  Resume vancomycin and ciprofloxacin  Check blood cultures  Previously wound has grown  MSSA and E. Coli., treated       4/21: s/p Removal of right femoral loop graft, medial thigh last night, by sx. Pt c/o of pain to wound, states at baseline he takes 40mg of home opioid QHS prior to sleep. Has history of VRE it the past. Not currently on Abx, cultures in lab cooking. F/u ID consult if abx warranted, d/w Tran ID team, BCx NGTD,     4/22: ID consult appreciated, pt will be on IV abx after HD x 6 weeks. Then will need life long suppression. Pt stable for discharge from a medical/renal/infection standpoint

## 2020-04-21 NOTE — OP REPORT
DATE OF SERVICE:  04/20/2020    PREOPERATIVE DIAGNOSIS:  Infected right femoral loop graft.    POSTPROCEDURE DIAGNOSIS:  Infected right femoral loop graft.    PROCEDURE:  Removal of right femoral loop graft, medial thigh.    SURGEON:  Lurdes Moffett MD    ANESTHESIOLOGIST:  Michael Aguilar MD     INDICATIONS:  The patient is a 39-year-old gentleman who has had 2 failed   transplants and multiple failed accesses.  He had superior vena caval   occlusion and presented over a month ago with a left thigh infection.  A right   thigh graft was created after assuring that blood cultures were negative.    Unfortunately, today upon changing the dressing, the right thigh graft is   exposed.  He is brought to the operating room to remove the graft.  Risks and   benefits were explained and include bleeding, infection, arteriovenous   thrombosis.  He understands and agrees to proceed.    DESCRIPTION OF PROCEDURE:  The patient was taken to the operating room and   placed in supine position.  After adequate general anesthesia, the right groin   and thigh were prepped in the usual sterile fashion with Chloraseptic prep,   cloth towels and paper drapes.  The groin incision was opened and taken down   to the exposed area of the graft.  Surprisingly, the arterial and venous   anastomoses were densely adherent.  The surrounding tissues were like   concrete.  I was able to achieve some plane around the artery, but not enough   to gain good control.  I dissected around the vein and unfortunately, created   a venotomy.  This required 6-0 Prolene suture to remove.    I then made a counterincision at the distal thigh and identified the graft   itself.  It was densely adherent to the surrounding tissues.  Using a   counterincision, I was able to remove the medial aspect of the graft, but the   lateral aspect again was densely adherent and I felt it was not in the   patient's best interest to remove this.  I then dissected down around the    artery and I tried with difficulty to get enough dissection around the artery   to remove every bit of graft.  I then went medially and identified the heel of   the venous anastomosis.  I tried to dissect down over the saphenous vein, but   could not identify the vein at all.  I lifted up on the vein and placed a   DeBakey clamp underneath the venous anastomosis.  All but a tiny bit of the   most proximal venous graft was able to be removed.  The patient was given 5000   units of heparin and this was allowed to circulate.  Using the DeBakey clamp   to gain control, I removed the majority of the plastic and oversewed this   venotomy with 5-0 Prolene suture.  A separate repair was performed of the vein   using 6-0 Prolene suture.  I then left a small rim of graft on the artery as   well using a Satinsky clamp to control the arterial flow and 6-0 Prolene   suture to repair the artery.    It was at this point, I decided to abandon further attempts to remove the rest   of the graft from the lateral aspect of the thigh.  I can always come back at   a later date and dissect around this difficult part of the adherent graft.    The wounds were closed with interrupted 3-0 Vicryl followed by wet-to-dry   dressings with Nu Gauze.  They were covered with dry sterile gauze.  There   were no apparent complications.  Estimated blood loss of approximately 550 mL.    Sponge, needle and instrument counts were correct x2.       ____________________________________     MD ADDIS Shaffer / RIAZ    DD:  04/20/2020 23:24:40  DT:  04/21/2020 02:03:08    D#:  [id number]  Job#:  [id number]

## 2020-04-21 NOTE — ANESTHESIA QCDR
2019 [facility] Clinical Data Registry (for Quality Improvement)     Postoperative nausea/vomiting risk protocol (Adult = 18 yrs and Pediatric 3-17 yrs)- (430 and 463)  General inhalation anesthetic (NOT TIVA) with PONV risk factors: Yes  Provision of anti-emetic therapy with at least 2 different classes of agents: Yes   Patient DID NOT receive anti-emetic therapy and reason is documented in Medical Record:  N/A    Multimodal Pain Management- (477)  Non-emergent surgery AND patient age >= 18: No  Use of Multimodal Pain Management, two or more drugs and/or interventions, NOT including systemic opioids:   Exception: Documented allergy to multiple classes of analgesics:     Smoking Abstinence (404)  Patient is current smoker (cigarette, pipe, e-cig, marijuanna): No  Elective Surgery:   Abstinence instructions provided prior to day of surgery:   Patient abstained from smoking on day of surgery:     Pre-Op Beta-Blocker in Isolated CABG (44)  Isolated CABG AND patient age >= 18: No  Beta-blocker admin within 24 hours of surgical incision:   Exception:of medical reason(s) for not administering beta blocker within 24 hours prior to surgical incision (e.g., not  indicated,other medical reason):     PACU assessment of acute postoperative pain prior to Anesthesia Care End- Applies to Patients Age = 18- (ABG7)  Initial PACU pain score is which of the following: < 7/10  Patient unable to report pain score: N/A    Post-anesthetic transfer of care checklist/protocol to PACU/ICU- (426 and 427)  Upon conclusion of case, patient transferred to which of the following locations: PACU/Non-ICU  Use of transfer checklist/protocol: Yes  Exclusion: Service Performed in Patient Hospital Room (and thus did not require transfer): N/A  Unplanned admission to ICU related to anesthesia service up through end of PACU care- (MD51)  Unplanned admission to ICU (not initially anticipated at anesthesia start time): No

## 2020-04-21 NOTE — ANESTHESIA POSTPROCEDURE EVALUATION
Patient: Denis De Anda    Procedure Summary     Date:  04/20/20 Room / Location:  [facility] OR 09 / SURGERY [facility]    Anesthesia Start:  1834 Anesthesia Stop:  2108    Procedure:  INCISION AND DRAINAGE, REMOVAL INFECTED THIGH GRAFT (Right Thigh) Diagnosis:  (Infected right thigh graft)    Surgeon:  Lurdes Moffett M.D. Responsible Provider:  Michael Aguilar M.D.    Anesthesia Type:  general ASA Status:  3 - Emergent          Final Anesthesia Type: general  Last vitals  BP   Blood Pressure: (!) 92/66(RN Notified)    Temp   36.3 °C (97.4 °F)    Pulse   Pulse: 84   Resp   17    SpO2   100 %      Anesthesia Post Evaluation    Patient location during evaluation: PACU  Patient participation: complete - patient participated  Level of consciousness: awake and alert    Airway patency: patent  Anesthetic complications: no  Cardiovascular status: hemodynamically stable  Respiratory status: acceptable  Hydration status: euvolemic    PONV: none           Nurse Pain Score: 4 (NPRS)

## 2020-04-21 NOTE — DIETARY
NUTRITION SERVICES - Alert received for newly identified wound. Pt with infected right femoral loop graft.  Removal of right femoral loop graft 4/20.  No wound team consult pending, no nutrition recommendations at this time. RD will monitor per dept policy.

## 2020-04-21 NOTE — ASSESSMENT & PLAN NOTE
Follow CBC    4/21: Hb dropped from 9 to 7.9, has acute post op bleeding/oozing. Continue to trend. Most convenient would be to transfuse with HD if needed   4/22: Hb 7.1 will transfuse 1 prbc, d/w nephro, pt is not on transplant list

## 2020-04-21 NOTE — PROGRESS NOTES
Progress Note    POD #1 s/p explant of infected R femoral loop graft.    Doing well this am. Pain marginally controlled. He is on chronic narcotics at home, asking for increased meds.    Vitals:    04/21/20 1100   BP: (!) 98/66   Pulse: 100   Resp: 18   Temp: 36.4 °C (97.5 °F)   SpO2: 98%     NAD  Packing changed. Groing and thigh incision clean    A/P  39M w/ ESRD and multiple prior failed accesses. Currently has a patent L thigh loop graft. POD #1 s/p explant of infected R thigh loop graft.    Cont daily packing and abx    Charlie Gilliam MD  Vascular Surgeon  [facility]  Office: [phone]

## 2020-04-21 NOTE — DISCHARGE PLANNING
Outpatient Dialysis Note    Confirmed patient is active at:    [facility]  [address]  [place] [address]    Schedule: Monday, Wednesday, Friday  Time: 2:15 pm    Spoke with Katie at facility who confirmed.    Forwarded records for review.      Pina Irwin- Dialysis Coordinator  [facility] # [phone]

## 2020-04-21 NOTE — PROGRESS NOTES
[facility] Medicine Daily Progress Note    Date of Service  4/21/2020    Chief Complaint  39 y.o. male admitted 4/20/2020 with non healing surgical wound to [facility]     Hospital Course    39 y.o. male who presented 4/20/2020 with open wound at right groin area.  Patient has known history of end-stage renal disease on hemodialysis.  He had bleeding issues with his left thigh AV fistula about a month ago, he underwent AV fistula creation creation at right thigh.  However he has had problems with wound healing, he required a wound VAC at the right thigh area.  Recently the wound VAC was removed he still has an open wound, culture was done recently which showed MSSA and E. coli.  He was placed on IV vancomycin to be given during dialysis was ciprofloxacin.  He was seen at the vascular surgery clinic and was directly admitted for the nonhealing wound of the right thigh.  He underwent emergent Removal of right femoral loop graft, medial thigh the night of 4/20/20. It is suspected to be infected.       Interval Problem Update  Pt seen and examinedin HD unit this AM getting HD via LLE graft. Pt c/o bleeding at surgical sight. Surgical wound site is still oozing bright red blood.   Pt c/o of surgical site pain. Will titrate meds. D/w ID who will eval   D/w nephro today     Consultants/Specialty  ID  VSx  Nephro     Code Status  Full    Disposition  Hopefully home when stable     Review of Systems  Review of Systems   Constitutional: Negative.    HENT: Negative.    Eyes: Negative.    Respiratory: Negative.  Negative for cough.    Cardiovascular: Negative.    Gastrointestinal: Negative.    Genitourinary: Negative.    Musculoskeletal:        Leg pain     Skin: Negative.    Neurological: Negative.    Endo/Heme/Allergies: Negative.    Psychiatric/Behavioral: Negative.         Physical Exam  Temp:  [36.1 °C (97 °F)-37.1 °C (98.7 °F)] 36.3 °C (97.4 °F)  Pulse:  [64-98] 84  Resp:  [16-18] 17  BP: ()/(54-72) 92/66  SpO2:  [90 %-100  %] 100 %    Physical Exam  Constitutional:       Appearance: Normal appearance. He is obese.   HENT:      Head: Normocephalic and atraumatic.   Eyes:      Pupils: Pupils are equal, round, and reactive to light.   Cardiovascular:      Rate and Rhythm: Normal rate and regular rhythm.   Pulmonary:      Effort: Pulmonary effort is normal.      Breath sounds: Normal breath sounds.   Abdominal:      General: There is no distension.      Palpations: Abdomen is soft.      Tenderness: There is no abdominal tenderness.   Musculoskeletal:      Comments: Failed avf in both UE    Failed bleeding graft in RLE s/p surgical intervention, oozing bright read blood    Getting HD access via LLE at this time    Skin:     General: Skin is warm.   Neurological:      General: No focal deficit present.      Mental Status: He is alert and oriented to person, place, and time.   Psychiatric:         Mood and Affect: Mood normal.         Fluids    Intake/Output Summary (Last 24 hours) at 4/21/2020 1022  Last data filed at 4/21/2020 0600  Gross per 24 hour   Intake 720 ml   Output 550 ml   Net 170 ml       Laboratory  Recent Labs     04/20/20  1652 04/21/20  0644   WBC 4.8 4.5*   RBC 2.90* 2.57*   HEMOGLOBIN 9.0* 7.9*   HEMATOCRIT 28.8* 25.0*   MCV 99.3* 97.3   MCH 31.0 30.7   MCHC 31.3* 31.6*   RDW 55.6* 55.4*   PLATELETCT 182 140*   MPV 9.4 9.5     Recent Labs     04/20/20  1652 04/21/20  0644   SODIUM 140 132*   POTASSIUM 5.6* 7.7*   CHLORIDE 92* 92*   CO2 24 21   GLUCOSE 91 141*   BUN 52* 58*   CREATININE 10.51* 10.67*   CALCIUM 9.1 7.5*     Recent Labs     04/20/20  1652   INR 2.68*               Imaging  No orders to display        Assessment/Plan  * AV graft malfunction (HCC)- (present on admission)  Assessment & Plan  See other problems  RLE s/p removal 4/21    Non-healing open wound of right groin- (present on admission)  Assessment & Plan  Failed R Leg AV graft   Surgery following  N.p.o.  Resume vancomycin and ciprofloxacin  Check  blood cultures    4/21: s/p Removal of right femoral loop graft, medial thigh last night, by sx. Pt c/o of pain to wound, states at baseline he takes 40mg of home opioid QHS prior to sleep. Has history of VRE it the past. Not currently on Abx, cultures in lab cooking. F/u ID consult if abx warranted, d/w [facility] ID team,     Anemia in chronic kidney disease (CKD)- (present on admission)  Assessment & Plan  Follow CBC    4/21: Hb dropped from 9 to 7.9, has acute post op bleeding/oozing. Continue to trend. Most convenient would be to transfuse with HD if needed     Chronic pain disorder- (present on admission)  Assessment & Plan  Continue Neurontin, Norco, tizanidine    4/21: Added dilaudid for acute on chronic pain     Chronic anticoagulation- (present on admission)  Assessment & Plan  Hold Coumadin  Resume Coumadin when cleared by surgery  Start heparin drip when cleared by surgery    4/21: Pt still with significant bleeding at surgical site on RLE . Continue to hold AC      ESRD on hemodialysis (HCC)- (present on admission)  Assessment & Plan  Nephrology consulted  Pt has failed grafts in 3 extremities. LLE is only working one now   4/21: K 7.7 today, d/w dr Perales , pt getting HD this AM          VTE prophylaxis: SCD    Labs reviewed and ordered  D/w consultant  Complexity high

## 2020-04-21 NOTE — PROGRESS NOTES
3hr HD started @ 0737 and completed @ 1038,tolerated 2880ml net UF,VSS.L thigh AVG patent,cannulation sites covered with DD,CDI,report given to Bairon Wilder RN.

## 2020-04-22 VITALS
WEIGHT: 195.11 LBS | SYSTOLIC BLOOD PRESSURE: 97 MMHG | HEART RATE: 92 BPM | TEMPERATURE: 97.7 F | RESPIRATION RATE: 18 BRPM | OXYGEN SATURATION: 94 % | DIASTOLIC BLOOD PRESSURE: 57 MMHG | HEIGHT: 64 IN | BODY MASS INDEX: 33.31 KG/M2

## 2020-04-22 LAB
ANION GAP SERPL CALC-SCNC: 17 MMOL/L (ref 7–16)
BUN SERPL-MCNC: 35 MG/DL (ref 8–22)
CALCIUM SERPL-MCNC: 8 MG/DL (ref 8.5–10.5)
CHLORIDE SERPL-SCNC: 92 MMOL/L (ref 96–112)
CO2 SERPL-SCNC: 28 MMOL/L (ref 20–33)
CREAT SERPL-MCNC: 7.15 MG/DL (ref 0.5–1.4)
ERYTHROCYTE [DISTWIDTH] IN BLOOD BY AUTOMATED COUNT: 55.9 FL (ref 35.9–50)
GLUCOSE SERPL-MCNC: 104 MG/DL (ref 65–99)
HCT VFR BLD AUTO: 21.6 % (ref 42–52)
HGB BLD-MCNC: 7.1 G/DL (ref 14–18)
MCH RBC QN AUTO: 32.3 PG (ref 27–33)
MCHC RBC AUTO-ENTMCNC: 32.9 G/DL (ref 33.7–35.3)
MCV RBC AUTO: 98.2 FL (ref 81.4–97.8)
PLATELET # BLD AUTO: 149 K/UL (ref 164–446)
PMV BLD AUTO: 9.4 FL (ref 9–12.9)
POTASSIUM SERPL-SCNC: 5.1 MMOL/L (ref 3.6–5.5)
RBC # BLD AUTO: 2.2 M/UL (ref 4.7–6.1)
SODIUM SERPL-SCNC: 137 MMOL/L (ref 135–145)
WBC # BLD AUTO: 4.5 K/UL (ref 4.8–10.8)

## 2020-04-22 PROCEDURE — A6212 FOAM DRG <=16 SQ IN W/BORDER: HCPCS | Performed by: INTERNAL MEDICINE

## 2020-04-22 PROCEDURE — 700101 HCHG RX REV CODE 250

## 2020-04-22 PROCEDURE — 90935 HEMODIALYSIS ONE EVALUATION: CPT

## 2020-04-22 PROCEDURE — 700102 HCHG RX REV CODE 250 W/ 637 OVERRIDE(OP): Performed by: FAMILY MEDICINE

## 2020-04-22 PROCEDURE — 85027 COMPLETE CBC AUTOMATED: CPT

## 2020-04-22 PROCEDURE — 80048 BASIC METABOLIC PNL TOTAL CA: CPT

## 2020-04-22 PROCEDURE — 90935 HEMODIALYSIS ONE EVALUATION: CPT | Performed by: INTERNAL MEDICINE

## 2020-04-22 PROCEDURE — 99239 HOSP IP/OBS DSCHRG MGMT >30: CPT | Performed by: INTERNAL MEDICINE

## 2020-04-22 PROCEDURE — 700111 HCHG RX REV CODE 636 W/ 250 OVERRIDE (IP): Performed by: INTERNAL MEDICINE

## 2020-04-22 PROCEDURE — 5A1D70Z PERFORMANCE OF URINARY FILTRATION, INTERMITTENT, LESS THAN 6 HOURS PER DAY: ICD-10-PCS | Performed by: INTERNAL MEDICINE

## 2020-04-22 PROCEDURE — 36415 COLL VENOUS BLD VENIPUNCTURE: CPT

## 2020-04-22 PROCEDURE — A9270 NON-COVERED ITEM OR SERVICE: HCPCS | Performed by: FAMILY MEDICINE

## 2020-04-22 RX ORDER — LIDOCAINE HYDROCHLORIDE 10 MG/ML
INJECTION, SOLUTION INFILTRATION; PERINEURAL
Status: COMPLETED
Start: 2020-04-22 | End: 2020-04-22

## 2020-04-22 RX ORDER — HYDROMORPHONE HYDROCHLORIDE 1 MG/ML
1 INJECTION, SOLUTION INTRAMUSCULAR; INTRAVENOUS; SUBCUTANEOUS EVERY 4 HOURS PRN
Status: DISCONTINUED | OUTPATIENT
Start: 2020-04-22 | End: 2020-04-22 | Stop reason: HOSPADM

## 2020-04-22 RX ORDER — GABAPENTIN 100 MG/1
200 CAPSULE ORAL 3 TIMES DAILY
Status: DISCONTINUED | OUTPATIENT
Start: 2020-04-22 | End: 2020-04-22 | Stop reason: HOSPADM

## 2020-04-22 RX ORDER — SODIUM CHLORIDE 9 MG/ML
INJECTION, SOLUTION INTRAVENOUS CONTINUOUS
Status: DISCONTINUED | OUTPATIENT
Start: 2020-04-22 | End: 2020-04-22 | Stop reason: HOSPADM

## 2020-04-22 RX ORDER — CEFAZOLIN SODIUM 2 G/100ML
2 INJECTION, SOLUTION INTRAVENOUS
Qty: 100 ML | Refills: 0
Start: 2020-04-25 | End: 2020-05-03

## 2020-04-22 RX ADMIN — HYDROMORPHONE HYDROCHLORIDE 0.5 MG: 1 INJECTION, SOLUTION INTRAMUSCULAR; INTRAVENOUS; SUBCUTANEOUS at 01:27

## 2020-04-22 RX ADMIN — HYDROCODONE BITARTRATE AND ACETAMINOPHEN 1 TABLET: 10; 325 TABLET ORAL at 17:55

## 2020-04-22 RX ADMIN — HYDROCODONE BITARTRATE AND ACETAMINOPHEN 1 TABLET: 10; 325 TABLET ORAL at 15:51

## 2020-04-22 RX ADMIN — Medication 1 ML: at 07:29

## 2020-04-22 RX ADMIN — CINACALCET HYDROCHLORIDE 30 MG: 30 TABLET, FILM COATED ORAL at 09:31

## 2020-04-22 RX ADMIN — HYDROCODONE BITARTRATE AND ACETAMINOPHEN 1 TABLET: 10; 325 TABLET ORAL at 10:51

## 2020-04-22 RX ADMIN — HYDROCODONE BITARTRATE AND ACETAMINOPHEN 1 TABLET: 10; 325 TABLET ORAL at 06:34

## 2020-04-22 RX ADMIN — LIDOCAINE HYDROCHLORIDE 1 ML: 10 INJECTION, SOLUTION INFILTRATION; PERINEURAL at 07:29

## 2020-04-22 RX ADMIN — HYDROMORPHONE HYDROCHLORIDE 1 MG: 1 INJECTION, SOLUTION INTRAMUSCULAR; INTRAVENOUS; SUBCUTANEOUS at 13:04

## 2020-04-22 RX ADMIN — SEVELAMER CARBONATE 3200 MG: 800 TABLET, FILM COATED ORAL at 09:31

## 2020-04-22 RX ADMIN — SEVELAMER CARBONATE 3200 MG: 800 TABLET, FILM COATED ORAL at 13:04

## 2020-04-22 ASSESSMENT — ENCOUNTER SYMPTOMS
PSYCHIATRIC NEGATIVE: 1
CHILLS: 0
WHEEZING: 0
WEIGHT LOSS: 0
ABDOMINAL PAIN: 0
EYES NEGATIVE: 1
RESPIRATORY NEGATIVE: 1
GASTROINTESTINAL NEGATIVE: 1
SINUS PAIN: 0
DIARRHEA: 0
NAUSEA: 0
FEVER: 0
SHORTNESS OF BREATH: 0
COUGH: 0
NEUROLOGICAL NEGATIVE: 1
CARDIOVASCULAR NEGATIVE: 1
ORTHOPNEA: 0
VOMITING: 0
EYE REDNESS: 0
PALPITATIONS: 0
HEMOPTYSIS: 0
CONSTITUTIONAL NEGATIVE: 1

## 2020-04-22 NOTE — PROGRESS NOTES
[facility] Medicine Daily Progress Note    Date of Service  4/22/2020    Chief Complaint  39 y.o. male admitted 4/20/2020 with infected av grafts    Hospital Course    39 y.o. male who presented 4/20/2020 with open wound at right groin area.  Patient has known history of end-stage renal disease on hemodialysis.  He had bleeding issues with his left thigh AV fistula about a month ago, he underwent AV fistula creation creation at right thigh.  However he has had problems with wound healing, he required a wound VAC at the right thigh area.  Recently the wound VAC was removed he still has an open wound, culture was done recently which showed MSSA and E. coli.  He was placed on IV vancomycin to be given during dialysis was ciprofloxacin.  He was seen at the vascular surgery clinic and was directly admitted for the nonhealing wound of the right thigh.  He underwent emergent Removal of right femoral loop graft, medial thigh the night of 4/20/20. 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 ID has recommended 2 weeks of IV abx. Pts hb dropped to 7.1 post procedure, and transfused 1 prbc.         Interval Problem Update  Pt seen in HD suite, still c/o of exacerbation of chronic pain. Will further titrate meds. Pt still oozzing blood from RLE surgical site and hb has dropped to 7.1. As per pt he is not on any transplant list. D/w nephro. Will transfuse 1prbc today, awaiting VSx f/u for further dc clearance     Consultants/Specialty  ID  Vsx  Nephro     Code Status  full    Disposition   home when cleared by Vsx     Review of Systems  Review of Systems   Constitutional: Negative.    HENT: Negative.    Eyes: Negative for redness.   Respiratory: Negative.    Cardiovascular: Negative.    Gastrointestinal: Negative.    Genitourinary: Negative.    Musculoskeletal: Positive for joint pain.   Skin: Negative.    Neurological: Negative.    Endo/Heme/Allergies: Negative.    Psychiatric/Behavioral: Negative.          Physical Exam  Temp:  [36.1 °C (97 °F)-37.2 °C (99 °F)] 36.9 °C (98.5 °F)  Pulse:  [78-96] 78  Resp:  [16-18] 18  BP: ()/(48-79) 121/79  SpO2:  [93 %-96 %] 93 %    Physical Exam  Constitutional:       General: He is not in acute distress.     Appearance: Normal appearance. He is obese. He is not ill-appearing, toxic-appearing or diaphoretic.      Comments: Diffuse surgical scars all over body, chronically ill    HENT:      Head: Normocephalic and atraumatic.   Eyes:      Extraocular Movements: Extraocular movements intact.      Pupils: Pupils are equal, round, and reactive to light.   Neck:      Musculoskeletal: Normal range of motion and neck supple.   Cardiovascular:      Rate and Rhythm: Regular rhythm.      Comments: Venous dilation of chest wall vessels from chronic hd  Pulmonary:      Effort: Pulmonary effort is normal.      Breath sounds: Normal breath sounds.   Abdominal:      General: Abdomen is flat.      Palpations: Abdomen is soft.   Musculoskeletal:      Comments: avf in all 4 ext, only LLE working, RLE dressing at surgical site somewhat soaked in blood    Skin:     General: Skin is warm and dry.   Neurological:      General: No focal deficit present.      Mental Status: He is alert and oriented to person, place, and time.   Psychiatric:         Mood and Affect: Mood normal.         Behavior: Behavior normal.         Fluids    Intake/Output Summary (Last 24 hours) at 4/22/2020 1220  Last data filed at 4/22/2020 1050  Gross per 24 hour   Intake 1100 ml   Output 2700 ml   Net -1600 ml       Laboratory  Recent Labs     04/20/20  1652 04/21/20  0644 04/22/20  0135   WBC 4.8 4.5* 4.5*   RBC 2.90* 2.57* 2.20*   HEMOGLOBIN 9.0* 7.9* 7.1*   HEMATOCRIT 28.8* 25.0* 21.6*   MCV 99.3* 97.3 98.2*   MCH 31.0 30.7 32.3   MCHC 31.3* 31.6* 32.9*   RDW 55.6* 55.4* 55.9*   PLATELETCT 182 140* 149*   MPV 9.4 9.5 9.4     Recent Labs     04/20/20  1652 04/21/20  0644 04/22/20  0135   SODIUM 140 132* 137    POTASSIUM 5.6* 7.7* 5.1   CHLORIDE 92* 92* 92*   CO2 24 21 28   GLUCOSE 91 141* 104*   BUN 52* 58* 35*   CREATININE 10.51* 10.67* 7.15*   CALCIUM 9.1 7.5* 8.0*     Recent Labs     04/20/20  1652   INR 2.68*               Imaging       Assessment/Plan  * AV graft malfunction (HCC)- (present on admission)  Assessment & Plan  See other problems  RLE s/p removal 4/21      Non-healing open wound of right groin- (present on admission)  Assessment & Plan  Failed R Leg AV graft  X 1 month   Surgery following  N.p.o.  Resume vancomycin and ciprofloxacin  Check blood cultures  Previously wound has grown  MSSA and E. Coli., treated       4/21: s/p Removal of right femoral loop graft, medial thigh last night, by sx. Pt c/o of pain to wound, states at baseline he takes 40mg of home opioid QHS prior to sleep. Has history of VRE it the past. Not currently on Abx, cultures in lab cooking. F/u ID consult if abx warranted, d/w [facility] ID team, BCx NGTD,     4/22: ID consult appreciated, pt will be on IV abx after HD x 6 weeks. Then will need life long suppression. Pt stable for discharge from a medical/renal/infection standpoint     Anemia in chronic kidney disease (CKD)- (present on admission)  Assessment & Plan  Follow CBC    4/21: Hb dropped from 9 to 7.9, has acute post op bleeding/oozing. Continue to trend. Most convenient would be to transfuse with HD if needed   4/22: Hb 7.1 will transfuse 1 prbc, d/w nephro, pt is not on transplant list     Chronic pain disorder- (present on admission)  Assessment & Plan  Continue Neurontin, Norco, tizanidine    4/21: Added dilaudid for acute on chronic pain   4/22: titrated pain meds, can resume home pain meds on d/c and f/u with pain mgmt     Chronic anticoagulation- (present on admission)  Assessment & Plan  Hold Coumadin  Resume Coumadin when cleared by surgery  Start heparin drip when cleared by surgery    4/21: Pt still with significant bleeding at surgical site on RLE . Continue to  hold AC  4/22: surgical site still oozing, will continue to hold AC       ESRD on hemodialysis (HCC)- (present on admission)  Assessment & Plan  Nephrology consulted  S/p renal transplant x 2  Pt has failed grafts in 3 extremities. LLE is only working one now   4/21: K 7.7 today, d/w dr Perales , pt getting HD this AM   4/22: K improved, HD again today to get him back on schedule     Acute blood loss anemia- (present on admission)  Assessment & Plan  See other anemia  2/2 surgical procedure          VTE prophylaxis: contraindicated

## 2020-04-22 NOTE — CARE PLAN
Problem: Safety  Goal: Will remain free from injury  Outcome: PROGRESSING AS EXPECTED  Note: Patient free from accidental injury at this time. Safety precautions in place. Hourly rounding in place.

## 2020-04-22 NOTE — RESPIRATORY CARE
COPD EDUCATION by COPD CLINICAL EDUCATOR  4/21/2020 at 5:10 PM by Polina Hammonds, RRT     Patient reviewed by COPD education team. Per pt he Patient not have a history or diagnosis of COPD and is a non-smoker, therefore does not qualify for the COPD program.

## 2020-04-22 NOTE — WOUND TEAM
[facility]  Inpatient Services  Initial Wound and Skin Care Evaluation    Admission Date: 4/20/2020     Last order of IP CONSULT TO WOUND CARE was found on 4/22/2020 from Hospital Encounter on 4/20/2020     HPI, PMH, SH: Reviewed    Unit where seen by Wound Team: [id number]     WOUND CONSULT/FOLLOW UP RELATED TO:  Right Groin and Right Thigh     Self Report / Pain Level:  Pre-medicated with PO Norco       OBJECTIVE:  Pt standing at bedside fully dressed waiting for discharge    WOUND TYPE, LOCATION, CHARACTERISTICS (Pressure Injuries: location, stage, POA or date identified)                  Wound 04/20/20 Full Thickness Wound Groin;Thigh Right open surgical  (Active)   Site Assessment Red;Pink    Periwound Assessment Edema    Margins Attached edges    Closure Secondary intention    Drainage Amount Small    Drainage Description Serosanguineous    Treatments Cleansed;Site care    Wound Cleansing Approved Wound Cleanser    Periwound Protectant Not Applicable    Dressing Cleansing/Solutions Not Applicable    Dressing Options Hydrofiber Silver;Silicone Adhesive Foam    Dressing Changed New    Dressing Status Clean;Dry;Intact    Dressing Change/Treatment Frequency Every 48 hrs, and As Needed    NEXT Dressing Change/Treatment Date 04/24/20    NEXT Weekly Photo (Inpatient Only) 04/29/20    Non-staged Wound Description Full thickness    Wound Length (cm) 0.9 cm    Wound Width (cm) 0.8 cm    Wound Depth (cm) 1.2 cm    Wound Surface Area (cm^2) 0.72 cm^2    Wound Volume (cm^3) 0.86 cm^3    Wound Bed Slough (%) 0 %    Wound Bed Eschar (%) 0 %    Tunneling (cm) 0 cm    Undermining (cm) 0 cm    WOUND NURSE ONLY - Time Spent with Patient (mins) 60    Number of days: 2      Vascular:    SAMMI:   No results found.      Lab Values:    Lab Results   Component Value Date/Time    WBC 4.5 (L) 04/22/2020 01:35 AM    RBC 2.20 (L) 04/22/2020 01:35 AM    HEMOGLOBIN 7.1 (L) 04/22/2020 01:35 AM    HEMATOCRIT 21.6 (L) 04/22/2020  01:35 AM    CREACTPROT 4.55 (H) 02/24/2020 02:52 AM    SEDRATEWES 13 02/24/2020 02:52 AM    HBA1C 5.7 (H) 09/23/2018 02:37 AM      Culture Results show:  No results found for this or any previous visit (from the past 720 hour(s)).      INTERVENTIONS BY WOUND TEAM:  Chart reviewed. This RN up to assess patient. Discussed with bedside RN and Dr. Moffett. Per Dr. Moffett pts mother will change dressing at home until pt follow up appointment in outpatient wound clinic. Discussed wounds with Dr. Moffett. Supplies gathered. This RN in to assess patient. Pt pleasant and agreeable. Outer dressing and strip packing removed from both wounds. Wounds cleansed with wound cleanser and gauze. Measurements and pictures obtained. Bria-wounds prepped with no sting skin barrier. Each wound packed with one piece of aquacel ag. Right groin wound then covered with a piece of nonadhesive foam and hypafix tape. Right thigh wound covered with adhesive foam. Discussed dressing changes with pt. Pt states he will be able to change every 48hrs and prn until follow up appointment.     Interdisciplinary consultation: Patient, Bedside RN (Maximo), Dr. Moffett    EVALUATION: Pt is a patient well known to [facility] who presented underwent surgical intervention by Dr. Moffett. 2 small wounds remain present to the right groin and the right anterior thigh. The wounds are small and should heal quickly with outpatient wound clinic follow up. Aquacel Ag Hydrofiber applied to manage bioburden, absorb exudate, and maintain a moist wound environment without laterally wicking exudate therefore reducing bria-wound maceration.    Goals: Steady decrease in wound area and depth weekly.    NURSING PLAN OF CARE ORDERS (X):    Dressing changes: See Dressing Care orders: X  Skin care: See Skin Care orders:   Rectal tube care: See Rectal Tube Care orders:   Other orders:    RSKIN:   CURRENTLY IN PLACE (X), APPLIED THIS VISIT (A),  ORDERED (O):   Q shift Michael:  X  Q shift pressure point assessments:  X  Pressure redistribution mattress  X          Low Airloss          Bariatric FREDY         Bariatric foam           Heel float boots     Heel Silicone dressing        Float Heels off Bed with Pillows               Barrier wipes         Barrier Cream         Barrier paste          Sacral silicone dressing         Silicone O2 tubing  NA on room air       Anchorfast         Cannula fixation Device (Tender )          Gray Foam Ear protectors           Trach with Optifoam split foam                 Waffle cushion        Waffle Overlay         Rectal tube or BMS    Purwick/Condom Cath          Antifungal tx      Interdry          Reposition q 2 hours  Self mobile      Up to chair        Ambulate      PT/OT        Dietician        Diabetes Education      PO  X   TF     TPN     NPO   # days   Other        WOUND TEAM PLAN OF CARE:   Dressing changes by wound team:          Follow up 1-2 times weekly:               Follow up 3 times weekly:                NPWT change 3 times weekly:     Follow up as needed:  X     Other (explain):     Anticipated discharge plans:   LTACH:        SNF/Rehab:                  Home Care:           Outpatient Wound Center:  X, mom to do dressing change every 48hrs until wound clinic appointment          Self Care:

## 2020-04-22 NOTE — DISCHARGE PLANNING
Care Transition Team Assessment    LSW spoke with the patient via phone.  The patient verified the demographic information in the Facesheet.    The patient indicates he lives at home with his wife.  The patient is established at [facility].      The patient is aware, the IV ABX will be administered at [facility].  At this time, the anticipated discharge disposition is home with resumptions of dialysis.    Information Source  Orientation : Oriented x 4  Information Given By: Patient  Informant's Name: (Denis)  Who is responsible for making decisions for patient? : Patient    Readmission Evaluation  Is this a readmission?: No    Elopement Risk  Legal Hold: No  Ambulatory or Self Mobile in Wheelchair: Yes  Disoriented: No  Psychiatric Symptoms: None  History of Wandering: No  Elopement this Admit: No  Vocalizing Wanting to Leave: No  Displays Behaviors, Body Language Wanting to Leave: No-Not at Risk for Elopement  Elopement Risk: Not at Risk for Elopement    Interdisciplinary Discharge Planning  Does Admitting Nurse Feel This Could be a Complex Discharge?: No  Primary Care Physician: Tony Mcmillan  Lives with - Patient's Self Care Capacity: Parents  Patient or legal guardian wants to designate a caregiver (see row info): No  Support Systems: Family Member(s)  Housing / Facility: [address]  Do You Take your Prescribed Medications Regularly: Yes  Able to Return to Previous ADL's: Yes  Mobility Issues: No  Prior Services: Other (Comments)(dialysis)  Patient Expects to be Discharged to:: home  Assistance Needed: No  Durable Medical Equipment: Not Applicable    Discharge Preparedness  What is your plan after discharge?: Home with help  What are your discharge supports?: Spouse         Finances  Financial Barriers to Discharge: No  Prescription Coverage: Yes    Vision / Hearing Impairment  Vision Impairment : Yes  Right Eye Vision: Wears Glasses  Left Eye Vision: Wears Glasses  Hearing Impairment : No          Advance Directive  Advance Directive?: None  Advance Directive offered?: AD Booklet refused    Domestic Abuse  Have you ever been the victim of abuse or violence?: No  Physical Abuse or Sexual Abuse: No  Verbal Abuse or Emotional Abuse: No  Possible Abuse Reported to:: Not Applicable    Psychological Assessment  History of Substance Abuse: None  History of Psychiatric Problems: No    Discharge Risks or Barriers  Discharge risks or barriers?: No    Anticipated Discharge Information  Anticipated discharge disposition: Dialysis, Home

## 2020-04-22 NOTE — PROGRESS NOTES
AA&Ox4.   RA. Denies SOB.  Reported pain medicated per MAR.   Right groin and right thigh dressing CDI.  Dressing to left thigh AV graft CDI.  Tolerating renal diet. Denies N/V.  Pt anuric.  LBM 4/20.   Pt upself with steady gait.   All needs met at this time. Call light within reach. Pt calls appropriately.

## 2020-04-22 NOTE — PROGRESS NOTES
Nephrology Daily Progress Note    Date of Service  4/22/2020    Chief Complaint  39 y.o. male with ESRD/HD, admitted 4/20/2020 with RLE AVG infection -s/p removal    Interval Problem Update  4/22 doing well, no complaints  RLE wound site a little oozing  Anemia -drop in Hb level -scheduled blood transfusion  Seen and examined during dialysis treatment -tolerates well    Review of Systems  Review of Systems   Constitutional: Negative for chills, fever, malaise/fatigue and weight loss.   HENT: Negative for congestion, hearing loss and sinus pain.    Eyes: Negative.    Respiratory: Negative for cough, hemoptysis, shortness of breath and wheezing.    Cardiovascular: Negative for chest pain, palpitations, orthopnea and leg swelling.   Gastrointestinal: Negative for abdominal pain, diarrhea, nausea and vomiting.   Skin: Negative.    All other systems reviewed and are negative.       Physical Exam  Temp:  [36.1 °C (97 °F)-37.2 °C (99 °F)] 36.9 °C (98.5 °F)  Pulse:  [78-96] 78  Resp:  [16-18] 18  BP: ()/(48-79) 121/79  SpO2:  [93 %-96 %] 93 %    Physical Exam  Constitutional:       General: He is not in acute distress.     Appearance: He is well-developed. He is not diaphoretic.   HENT:      Head: Normocephalic and atraumatic.      Nose: Nose normal.      Mouth/Throat:      Mouth: Mucous membranes are moist.      Pharynx: Oropharynx is clear.   Eyes:      Pupils: Pupils are equal, round, and reactive to light.   Neck:      Musculoskeletal: Normal range of motion and neck supple.   Cardiovascular:      Rate and Rhythm: Normal rate and regular rhythm.      Heart sounds: No friction rub. No gallop.    Pulmonary:      Effort: Pulmonary effort is normal. No respiratory distress.      Breath sounds: Normal breath sounds. No wheezing, rhonchi or rales.   Musculoskeletal:      Right lower leg: No edema.      Left lower leg: No edema.   Skin:     General: Skin is warm.      Coloration: Skin is not jaundiced.      Findings: No  erythema or rash.      Comments: RLE - wound site oozing a little of blood   Neurological:      General: No focal deficit present.      Mental Status: He is alert and oriented to person, place, and time.      Cranial Nerves: No cranial nerve deficit.      Coordination: Coordination normal.   Psychiatric:         Mood and Affect: Mood normal.         Behavior: Behavior normal.         Thought Content: Thought content normal.         Judgment: Judgment normal.         Fluids    Intake/Output Summary (Last 24 hours) at 4/22/2020 1109  Last data filed at 4/22/2020 1034  Gross per 24 hour   Intake 980 ml   Output 2700 ml   Net -1720 ml       Laboratory  Recent Labs     04/20/20  1652 04/21/20  0644 04/22/20  0135   WBC 4.8 4.5* 4.5*   RBC 2.90* 2.57* 2.20*   HEMOGLOBIN 9.0* 7.9* 7.1*   HEMATOCRIT 28.8* 25.0* 21.6*   MCV 99.3* 97.3 98.2*   MCH 31.0 30.7 32.3   MCHC 31.3* 31.6* 32.9*   RDW 55.6* 55.4* 55.9*   PLATELETCT 182 140* 149*   MPV 9.4 9.5 9.4     Recent Labs     04/20/20  1652 04/21/20  0644 04/22/20  0135   SODIUM 140 132* 137   POTASSIUM 5.6* 7.7* 5.1   CHLORIDE 92* 92* 92*   CO2 24 21 28   GLUCOSE 91 141* 104*   BUN 52* 58* 35*   CREATININE 10.51* 10.67* 7.15*   CALCIUM 9.1 7.5* 8.0*     Recent Labs     04/20/20  1652   INR 2.68*     No results for input(s): NTPROBNP in the last 72 hours.            Assessment/Plan  1.ESRD/HD -MWF -please see dialysis flow sheet for details  2.HTN: BP well controlled  3.Electrolytes:hyperkalemia corrected with HD  4.Anemia: drop in Hb level -scheduled blood transfusion  5.S/p RLE infected graft removal -on Ancef with HD 2 gm 2gm 3 gm till June 2 nd  6.Volume: UF with HD 2-3 L    Recs: continue current treatment             HD MWF             Renal diet             All meds to renal doses

## 2020-04-22 NOTE — PROGRESS NOTES
Patient received from dialysis.  Assessment complete.  A&O x 4. Patient calls appropriately.  Patient ambulates to self.   Patient has 7/10 pain. Medicated per MAR.  Denies N&V. Tolerating diet.  Surgical incision to R leg, and groin area, old drainage present.  Fistula site to L thigh area, dressing intact  - void anuric  Patient denies SOB.  Review plan with of care with patient. Call light and personal belongings with in reach. Hourly rounding in place. All needs met at this time.

## 2020-04-22 NOTE — PROGRESS NOTES
Infectious Disease Progress Note    Author: Nesha Benjamin M.D. Date & Time created: 4/22/2020  2:36 PM    Interval History:  Pt in his street clothes   4/20: Surgery Right groin.  4/22: Outpatient abx for HD center have been arranged by Dr Perales.  Pending okay from Dr Moffett.  Pt would like to go home today if possible.  Labs Reviewed and Medications Reviewed    Review of Systems:  Review of Systems   All other systems reviewed and are negative.      Physical Exam:  Physical Exam  Cardiovascular:      Rate and Rhythm: Normal rate and regular rhythm.   Pulmonary:      Effort: Pulmonary effort is normal.      Breath sounds: Normal breath sounds.   Abdominal:      General: Bowel sounds are normal.   Musculoskeletal:      Comments: Edema of feet.  Surgical site not examined.   Neurological:      Mental Status: He is alert.         Labs:  Recent Results (from the past 24 hour(s))   Basic Metabolic Panel    Collection Time: 04/22/20  1:35 AM   Result Value Ref Range    Sodium 137 135 - 145 mmol/L    Potassium 5.1 3.6 - 5.5 mmol/L    Chloride 92 (L) 96 - 112 mmol/L    Co2 28 20 - 33 mmol/L    Glucose 104 (H) 65 - 99 mg/dL    Bun 35 (H) 8 - 22 mg/dL    Creatinine 7.15 (HH) 0.50 - 1.40 mg/dL    Calcium 8.0 (L) 8.5 - 10.5 mg/dL    Anion Gap 17.0 (H) 7.0 - 16.0   CBC WITHOUT DIFFERENTIAL    Collection Time: 04/22/20  1:35 AM   Result Value Ref Range    WBC 4.5 (L) 4.8 - 10.8 K/uL    RBC 2.20 (L) 4.70 - 6.10 M/uL    Hemoglobin 7.1 (L) 14.0 - 18.0 g/dL    Hematocrit 21.6 (L) 42.0 - 52.0 %    MCV 98.2 (H) 81.4 - 97.8 fL    MCH 32.3 27.0 - 33.0 pg    MCHC 32.9 (L) 33.7 - 35.3 g/dL    RDW 55.9 (H) 35.9 - 50.0 fL    Platelet Count 149 (L) 164 - 446 K/uL    MPV 9.4 9.0 - 12.9 fL   ESTIMATED GFR    Collection Time: 04/22/20  1:35 AM   Result Value Ref Range    GFR If African American 10 (A) >60 mL/min/1.73 m 2    GFR If Non African American 9 (A) >60 mL/min/1.73 m 2     Results     Procedure Component Value Units Date/Time    CULTURE  "WOUND W/ GRAM STAIN [[id number]] Collected:  04/20/20 1907    Order Status:  Completed Specimen:  Wound Updated:  04/22/20 1249     Significant Indicator NEG     Source WND     Site Right Groin Graft     Culture Result Rare growth mixed skin chan.     Gram Stain Result No organisms seen.    Narrative:       Surgery - swabs received    Anaerobic Culture [[id number]] Collected:  04/20/20 1907    Order Status:  Completed Specimen:  Wound Updated:  04/22/20 1249     Significant Indicator NEG     Source WND     Site Right Groin Graft     Culture Result Culture in progress.    Narrative:       Surgery - swabs received    BLOOD CULTURE [[id number]] Collected:  04/20/20 1652    Order Status:  Completed Specimen:  Blood from Peripheral Updated:  04/21/20 0610     Significant Indicator NEG     Source BLD     Site PERIPHERAL     Culture Result No Growth  Note: Blood cultures are incubated for 5 days and  are monitored continuously.Positive blood cultures  are called to the RN and reported as soon as  they are identified.      Narrative:       Per Hospital Policy: Only change Specimen Src: to \"Line\" if  specified by physician order.  Right AC    BLOOD CULTURE [[id number]] Collected:  04/20/20 1652    Order Status:  Completed Specimen:  Blood from Peripheral Updated:  04/21/20 0610     Significant Indicator NEG     Source BLD     Site PERIPHERAL     Culture Result No Growth  Note: Blood cultures are incubated for 5 days and  are monitored continuously.Positive blood cultures  are called to the RN and reported as soon as  they are identified.      Narrative:       Per Hospital Policy: Only change Specimen Src: to \"Line\" if  specified by physician order.  Left Wrist    GRAM STAIN [[id number]] Collected:  04/20/20 1907    Order Status:  Completed Specimen:  Wound Updated:  04/21/20 0150     Significant Indicator .     Source WND     Site Right Groin Graft     Gram Stain Result No organisms seen.    Narrative:       Surgery - swabs " received        Hemodynamics:  Temp (24hrs), Av.7 °C (98.1 °F), Min:36.1 °C (97 °F), Max:37.2 °C (99 °F)  Temperature: 36.5 °C (97.7 °F)  Pulse  Av.6  Min: 64  Max: 100   Blood Pressure: (!) 97/57     Peripheral IV 20 22 G Right Hand (Active)   Site Assessment Clean;Dry;Intact 2020 11:00 AM   Dressing Type Occlusive;Transparent 2020 11:00 AM   Line Status Scrubbed the hub prior to access;Flushed;Saline locked 2020 11:00 AM   Dressing Status Clean;Dry;Intact 2020 11:00 AM   Dressing Intervention N/A 2020 11:00 AM   [place] 0 2020 11:00 AM   Phlebitis Scale ([facility] Only) 0 2020 11:00 AM       Peripheral IV 20 (Active)   Site Assessment Clean;Dry;Intact 2020 11:00 AM   Dressing Type Occlusive;Transparent 2020 11:00 AM   Line Status Scrubbed the hub prior to access;Flushed;Saline locked 2020 11:00 AM   Dressing Status Clean;Dry;Intact 2020 11:00 AM   Infiltration Grading [place] [place] 0 2020 11:00 AM   [facility]bitis [facility]) 0 2020 11:00 AM     Wound:  @WOUNDLDA(4)@     Fluids:  Intake/Output       20 07 - 20 0659 20 07 - 20 0659 20 07 - 20 0659      3576-0008 1825-7665 Total 6067-6261 6140-3795 Total 5731-8685 8777-0009 Total       Intake    P.O.  --  120 120  --  480 480  120  -- 120    P.O. -- 120 120 -- 480 480 120 -- 120    I.V.  --  600 600  --  -- --  --  -- --    Volume (mL) (NS infusion) -- 600 600 -- -- -- -- -- --    Dialysis  --  -- --  500  -- 500  500  -- 500    Dialysis Input (Dialysis Input / Output) -- -- -- 500 -- 500 500 -- 500    Total Intake -- 720 720 500 480 980 620 -- 620       Output    Urine  --  0 0  --  -- --  --  -- --    Urine Void (mL) -- 0 0 -- -- -- -- -- --    Dialysis  --  -- --  3382  -- 9722  2700  -- 2700    Dialysis Output (Dialysis Input / Output) -- -- -- 3382 -- 3382 2700 -- 2700    Stool  --  -- --  --  -- --  --  --  --    Number of Times Stooled -- 1 x 1 x -- -- -- -- -- --    Blood  --  550 550  --  -- --  --  -- --    Est. Blood Loss -- 550 550 -- -- -- -- -- --    Total Output --  -- 3382 2700 -- 2700       Net I/O     -- 170 170 -2882 480 -2402 -2080 -- -2080           GI/Nutrition:  Orders Placed This Encounter   Procedures   • Diet Order Renal     Standing Status:   Standing     Number of Occurrences:   1     Order Specific Question:   Diet:     Answer:   Renal [8]     Medications:  Current Facility-Administered Medications   Medication Last Dose   • HYDROmorphone pf (DILAUDID) injection 1 mg 1 mg at 04/22/20 1304   • gabapentin (NEURONTIN) capsule 200 mg     • NS infusion Stopped at 04/22/20 1230   • lidocaine (XYLOCAINE) 1 % injection 1 mL 1 mL at 04/22/20 0729   • ceFAZolin in dextrose (ANCEF) IVPB premix 2 g     • [START ON 4/24/2020] ceFAZolin (ANCEF) 3 g in  mL IVPB     • calcitRIOL (ROCALTROL) capsule 0.75 mcg 0.75 mcg at 04/21/20 2057   • cinacalcet (SENSIPAR) tablet 30 mg 30 mg at 04/22/20 0931   • HYDROcodone/acetaminophen (NORCO)  MG per tablet 1 Tab 1 Tab at 04/22/20 1051   • sevelamer carbonate (RENVELA) tablet 3,200 mg 3,200 mg at 04/22/20 1304   • tizanidine (ZANAFLEX) tablet 8 mg 8 mg at 04/21/20 2056   • senna-docusate (PERICOLACE or SENOKOT S) 8.6-50 MG per tablet 2 Tab 2 Tab at 04/21/20 0521    And   • polyethylene glycol/lytes (MIRALAX) PACKET 1 Packet      And   • magnesium hydroxide (MILK OF MAGNESIA) suspension 30 mL      And   • bisacodyl (DULCOLAX) suppository 10 mg     • acetaminophen (TYLENOL) tablet 650 mg     • ondansetron (ZOFRAN) syringe/vial injection 4 mg     • ondansetron (ZOFRAN ODT) dispertab 4 mg     • promethazine (PHENERGAN) tablet 12.5-25 mg     • promethazine (PHENERGAN) suppository 12.5-25 mg     • prochlorperazine (COMPAZINE) injection 5-10 mg       Medical Decision Making, by Problem:  Active Hospital Problems    Diagnosis   • *AV graft malfunction (HCC)  [T82.590A]   • Non-healing open wound of right groin [S31.103A]   • Anemia in chronic kidney disease (CKD) [N18.9, D63.1]   • Chronic pain disorder [G89.4]   • Chronic anticoagulation [Z79.01]   • ESRD on hemodialysis (HCC) [N18.6, Z99.2]   • Acute blood loss anemia [D62]     Acute right groin graft infection: S aureus and E coli  ESRD  Chroric left graft infection  Failed renal transplant  Post surgical anemia    Plan:  Outpt therapy arranged through HD center.  Will be on cefazolin post HD 2/2/3 gm on MWF.  Willl need six weeks from the time of surgery.  Target date is 6/1.  He will then need to be on lifelong oral suppression with Keflex 500 mg po daily.  Critical that pt follow up with ID at the end of May so that we can follow on oral suppression.  Failed to follow up last time.    Discussed with pt and CM  Patient was seen for 45 minutes face to face of which > 50% of appointment time was spent on counseling and coordination of care regarding the above.

## 2020-04-22 NOTE — PROGRESS NOTES
Progress Note    POD#2  S/p removal of right thigh graft    VSS afeb    Right thigh dressings intact.  Left thigh AVG patent.      Imp:  Going to ask wound care to initiate dressings and hopefully supply Denis something so he can change the dressings over the weekend, then initiate care at [facility] for next week.  May be appropriate for a SNAP, but I think it's too small and superficial.     Plan:  OK for discharge after wound change.  I would like to see him in 1-2 weeks.    Lurdes Moffett MD  Vascular Surgeon  [facility]  Office: [phone]

## 2020-04-22 NOTE — PROGRESS NOTES
3hr HD started @ 0734 and completed @ 1034,tolerated 2200ml net UF.VSS,L thigh AVG + B/T,cannulation sites covered with DD,CDI,report given to Maximo Cardoso RN.

## 2020-04-23 LAB
BACTERIA SPEC ANAEROBE CULT: NORMAL
BACTERIA WND AEROBE CULT: NORMAL
GRAM STN SPEC: NORMAL
SIGNIFICANT IND 70042: NORMAL
SIGNIFICANT IND 70042: NORMAL
SITE SITE: NORMAL
SITE SITE: NORMAL
SOURCE SOURCE: NORMAL
SOURCE SOURCE: NORMAL

## 2020-04-23 NOTE — DISCHARGE SUMMARY
Discharge Summary    CHIEF COMPLAINT ON ADMISSION  No chief complaint on file.      Reason for Admission  Non-healing open wound of right gr*     Admission Date  4/20/2020    CODE STATUS  Full Code    HPI & HOSPITAL COURSE  This is a 39 y.o. male here with Infected RLE AV Graft     39 y.o. male who presented 4/20/2020 with open wound at right groin area.  Patient has known history of end-stage renal disease on hemodialysis.  He had bleeding issues with his left thigh AV fistula about a month ago, he underwent AV fistula creation creation at right thigh.  However he has had problems with wound healing, he required a wound VAC at the right thigh area.  Recently the wound VAC was removed he still has an open wound, culture was done recently which showed MSSA and E. coli.  He was placed on IV vancomycin to be given during dialysis was ciprofloxacin.  He was seen at the vascular surgery clinic and was directly admitted for the nonhealing wound of the right thigh.  He underwent emergent Removal of right femoral loop graft, medial thigh the night of 4/20/20. 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 ID has recommended 6 weeks of IV abx. To terminate 6/1/2020. He will get 2/2/3g MWF of Cefazolin with HD. ID and [facility] have arranged this.  Pts hb dropped to 7.1 post procedure, pt declined prbc transfusion.  Pt was evaluated by VSx and felt stable to be dc home on 4/22/2020.  He should resume coumadin without loading dose or bridge since he still has oozing of blood at surgical site and he was already chronically on coumadin     Therefore, he is discharged in fair and stable condition to home with close outpatient follow-up.    The patient met 2-midnight criteria for an inpatient stay at the time of discharge.    Discharge Date  4/22/20    FOLLOW UP ITEMS POST DISCHARGE  F/u with VSx and wound care   F/u with HD to resume on regular schedule with jeanette   F/u ID   Trend hb   Resume coumadin  and trend INR as oupt       DISCHARGE DIAGNOSES  Principal Problem:    AV graft malfunction (HCC) POA: Yes  Active Problems:    Non-healing open wound of right groin POA: Yes    ESRD on hemodialysis (HCC) POA: Yes    Chronic anticoagulation POA: Yes    Chronic pain disorder POA: Yes      Overview: IMO load March 2020    Anemia in chronic kidney disease (CKD) POA: Yes    Acute blood loss anemia POA: Yes  Resolved Problems:    * No resolved hospital problems. *      FOLLOW UP  No future appointments.  Tony Mcmillan M.D.  [address]  [address]  [facility] [id number] [phone]    Schedule an appointment as soon as possible for a visit in 1 week      Dee Perales M.D.  [address]  [address]  [facility] [address] [phone]    Schedule an appointment as soon as possible for a visit in 2 days      Lurdes Moffett M.D.  [address]  [facility] [address] [phone]    Schedule an appointment as soon as possible for a visit in 1 week        MEDICATIONS ON DISCHARGE     Medication List      START taking these medications      Instructions   ceFAZolin in dextrose 2 g/100mL IVPB  Start taking on:  April 25, 2020  Commonly known as:  ANCEF   Doctor's comments:  Every Monday and Wednesday with dialysis  100 mL by Intravenous route BID 2 DAYS A WEEK for 8 days.  Dose:  2 g     NS SOLN 100 mL with ceFAZolin 10 GM SOLR 3 g  Start taking on:  April 24, 2020   3 g by Intravenous route every Friday.  Dose:  3 g        CHANGE how you take these medications      Instructions   warfarin 5 MG Tabs  What changed:  how much to take  Commonly known as:  COUMADIN   Doctor's comments:  This rx was submitted by a pharmacist working under a collaborative practice agreement.  Take 1-1.5 Tabs by mouth every day.  Dose:  5-7.5 mg        CONTINUE taking these medications      Instructions   calcitRIOL 0.25 MCG Caps  Commonly known as:  ROCALTROL   Take 0.75 mcg by mouth every bedtime.  Dose:  0.75 mcg     cinacalcet 30 MG  Tabs  Commonly known as:  SENSIPAR   Take 30 mg by mouth every Monday, Wednesday, and Friday.  Dose:  30 mg     gabapentin 300 MG Caps  Commonly known as:  NEURONTIN   Take 10 Caps by mouth every bedtime. Indications: Neuropathic Pain  Dose:  3,000 mg     HYDROcodone/acetaminophen  MG Tabs  Commonly known as:  NORCO   Take 1 Tab by mouth every 6 hours as needed for Severe Pain. Claims he takes 4 tabs  at night.  Indications: Moderate to Moderately Severe Pain  Dose:  1 Tab     Renvela 800 MG Tabs tablet  Generic drug:  sevelamer carbonate   Take 3,200 mg by mouth 3 times a day, with meals.  Dose:  3,200 mg     tizanidine 4 MG Tabs  Commonly known as:  ZANAFLEX   Take 8 mg by mouth every bedtime.  Dose:  8 mg        STOP taking these medications    ciprofloxacin 500 MG Tabs  Commonly known as:  CIPRO            Allergies  Allergies   Allergen Reactions   • Baclofen Unspecified     Total loss of memory, sedation.   RXN=6/2015   • Contrast Media With Iodine [Iodine] Rash     RXN=1/5/2017   • Keflex Rash     RXN=possibly >10 years, tolerates cefazolin 4/20/20   • Pcn [Penicillins] Rash     RXN=possibly >10 years ago  Tolerated Zosyn on 2/20/18   • Tape Rash     Paper tape and tegaderm ok  RXN=ongoing   • Requip Vomiting       DIET  Orders Placed This Encounter   Procedures   • Diet Order Renal     Standing Status:   Standing     Number of Occurrences:   1     Order Specific Question:   Diet:     Answer:   Renal [8]       ACTIVITY  As tolerated.  Weight bearing as tolerated    CONSULTATIONS  ID  Nephro  VSx    PROCEDURES  RLE AV Graft removal     LABORATORY  Lab Results   Component Value Date    SODIUM 137 04/22/2020    POTASSIUM 5.1 04/22/2020    CHLORIDE 92 (L) 04/22/2020    CO2 28 04/22/2020    GLUCOSE 104 (H) 04/22/2020    BUN 35 (H) 04/22/2020    CREATININE 7.15 (HH) 04/22/2020    CREATININE 8.2 (HH) 05/06/2009        Lab Results   Component Value Date    WBC 4.5 (L) 04/22/2020    HEMOGLOBIN 7.1 (L)  04/22/2020    HEMATOCRIT 21.6 (L) 04/22/2020    PLATELETCT 149 (L) 04/22/2020        Total time of the discharge process exceeds 30 minutes.

## 2020-04-23 NOTE — PROGRESS NOTES
Patient discharged to home with wound care supplies. All lines removed. Discharge instructions and prescription reviewed and understanding verbalized. Patient states they will fill prescriptions. Patient states they will return to emergency if discussed symptoms arise. Patient left via wheelchair and CNA escort. Medications from drawer removed and sent back to pharmacy or disposed of per protocol. Chart given to unit clerk.

## 2020-04-23 NOTE — DISCHARGE INSTRUCTIONS
Discharge Instructions    Discharged to home by car with relative. Discharged via walking, hospital escort: Yes.  Special equipment needed: Not Applicable    Be sure to schedule a follow-up appointment with your primary care doctor or any specialists as instructed.     Discharge Plan:   Diet Plan: Discussed  Activity Level: Discussed  Confirmed Follow up Appointment: Patient to Call and Schedule Appointment  Confirmed Symptoms Management: Discussed  Medication Reconciliation Updated: Yes  Influenza Vaccine Indication: Not indicated: Previously immunized this influenza season and > 8 years of age    I understand that a diet low in cholesterol, fat, and sodium is recommended for good health. Unless I have been given specific instructions below for another diet, I accept this instruction as my diet prescription.   Other diet: Renal    Special Instructions: None    · Is patient discharged on Warfarin / Coumadin?   No     Depression / Suicide Risk    As you are discharged from this [facility], it is important to learn how to keep safe from harming yourself.    Recognize the warning signs:  · Abrupt changes in personality, positive or negative- including increase in energy   · Giving away possessions  · Change in eating patterns- significant weight changes-  positive or negative  · Change in sleeping patterns- unable to sleep or sleeping all the time   · Unwillingness or inability to communicate  · Depression  · Unusual sadness, discouragement and loneliness  · Talk of wanting to die  · Neglect of personal appearance   · Rebelliousness- reckless behavior  · Withdrawal from people/activities they love  · Confusion- inability to concentrate     If you or a loved one observes any of these behaviors or has concerns about self-harm, here's what you can do:  · Talk about it- your feelings and reasons for harming yourself  · Remove any means that you might use to hurt yourself (examples: pills, rope, extension  cords, firearm)  · Get professional help from the community (Mental Health, Substance Abuse, psychological counseling)  · Do not be alone:Call your Safe Contact- someone whom you trust who will be there for you.  · Call your local CRISIS HOTLINE 625-0153 or 220-134-9626  · Call your local Children's Mobile Crisis Response Team [place] (626) 553-0409 or www.[employer]  · Call the toll free National Suicide Prevention Hotlines   · National Suicide Prevention Lifeline 624-088-XQAD (7296)  · [place] Line Network 800-SUICIDE (872-2438)      Surgical Site Infection  A surgical site infection can occur after surgery. It happens in the part of the body where the surgery took place. Most patients who have surgery do not develop an infection.   SYMPTOMS  · Redness and pain around the surgical site.  · Drainage of cloudy fluid from the wound.  · Fever.  PREVENTION  Before the procedure:  · Tell your caregiver about any medical problems you may have. Health problems such as allergies, diabetes, and obesity could affect your surgery and treatment.  · Quit smoking. Patients who smoke get more infections. Talk to your caregiver about how you can quit before your surgery.  · Do not shave near the area where you will have surgery. Shaving with a razor can irritate your skin and make it easier to get an infection. Your caregiver may use an electric clipper to remove some of your hair immediately before surgery.  · Ask if you will get antibiotic medicine. In most cases, you should get antibiotics within 60 minutes before surgery. Antibiotics should be stopped within 24 hours after surgery.  · Your caregivers will clean their hands and arms up to their elbows with an antiseptic agent just before the surgery. Your caregivers will also clean their hands with soap and water or an alcohol-based hand rub before and after caring for each patient.  · Your caregivers will wear hair covers, masks, gowns, and gloves during  surgery to keep the surgery area clean.  · Your caregivers will clean the skin at the surgery site with a soap that kills germs.  After the procedure:  · Make sure your caregivers clean their hands before examining you. They may use soap and water or an alcohol-based hand rub. If you do not see your caregivers clean their hands, ask them to do so.  · Make sure family and friends who visit you do not touch the surgical wound or dressings.  · Ask family and friends to clean their hands with soap and water or an alcohol-based hand rub before and after visiting you.  · Make sure you know how to care for your wound before you leave the hospital. Your caregiver will tell you how to take care of your wound.  · Make sure you know whom to contact if you have questions or problems after you get home.  TREATMENT  Most surgical site infections can be treated with antibiotics. Sometimes, patients need another surgery to treat the infection.  HOME CARE INSTRUCTIONS  Always clean your hands before and after caring for your wound.  SEEK IMMEDIATE MEDICAL CARE IF:  You have any symptoms of an infection, such as drainage, fever, or redness and pain at the surgery site.  Document Released: 03/14/2012 Document Revised: 03/11/2013 Document Reviewed: 03/14/2012  [employer]® Patient Information ©2014 [employer].

## 2020-04-28 LAB — INR PPP: 2.8 (ref 2–3.5)

## 2020-04-29 ENCOUNTER — ANTICOAGULATION MONITORING (OUTPATIENT)
Dept: VASCULAR LAB | Facility: MEDICAL CENTER | Age: 40
End: 2020-04-29

## 2020-04-29 NOTE — PROGRESS NOTES
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 Hopkins      Anticoagulation Care Providers     Provider Role Specialty Phone number    Jairo Hopkins M.D. Referring Surgery [phone]    [facility] Anticoagulation Services Responsible  [phone]        Anticoagulation Patient Findings          Left voicemail message to report a  therapeutic INR of 2.8.  Pt to continue with current warfarin dosing regimen. Requested pt contact the clinic for any s/s of unusual bleeding, bruising, clotting or any changes to diet or medication.  Follow up in 2 weeks, to reduce the risk of adverse events related to this high risk medication, warfarin.    Renetta Dunlap, Clinical Pharmacist

## 2020-04-30 LAB
INR PPP: 2.8 (ref 0.8–1.2)
PROTHROMBIN TIME: 27.1 SEC (ref 9.1–12)

## 2020-05-04 ENCOUNTER — APPOINTMENT (OUTPATIENT)
Dept: WOUND CARE | Facility: MEDICAL CENTER | Age: 40
End: 2020-05-04
Attending: SURGERY
Payer: MEDICARE

## 2020-05-06 ENCOUNTER — HOSPITAL ENCOUNTER (OUTPATIENT)
Facility: MEDICAL CENTER | Age: 40
End: 2020-05-06
Attending: INTERNAL MEDICINE
Payer: MEDICARE

## 2020-05-06 LAB
ALBUMIN SERPL BCP-MCNC: 4.4 G/DL (ref 3.2–4.9)
ALBUMIN/GLOB SERPL: 1.2 G/DL
ALP SERPL-CCNC: 288 U/L (ref 30–99)
ALT SERPL-CCNC: <5 U/L (ref 2–50)
ANION GAP SERPL CALC-SCNC: 19 MMOL/L (ref 7–16)
AST SERPL-CCNC: 12 U/L (ref 12–45)
BASOPHILS # BLD AUTO: 1.1 % (ref 0–1.8)
BASOPHILS # BLD: 0.04 K/UL (ref 0–0.12)
BILIRUB SERPL-MCNC: 0.3 MG/DL (ref 0.1–1.5)
BUN SERPL-MCNC: 40 MG/DL (ref 8–22)
CALCIUM SERPL-MCNC: 8.8 MG/DL (ref 8.5–10.5)
CHLORIDE SERPL-SCNC: 88 MMOL/L (ref 96–112)
CO2 SERPL-SCNC: 28 MMOL/L (ref 20–33)
CREAT SERPL-MCNC: 9.09 MG/DL (ref 0.5–1.4)
CRP SERPL HS-MCNC: 1.22 MG/DL (ref 0–0.75)
EOSINOPHIL # BLD AUTO: 0.25 K/UL (ref 0–0.51)
EOSINOPHIL NFR BLD: 6.9 % (ref 0–6.9)
ERYTHROCYTE [DISTWIDTH] IN BLOOD BY AUTOMATED COUNT: 57.9 FL (ref 35.9–50)
ERYTHROCYTE [SEDIMENTATION RATE] IN BLOOD BY WESTERGREN METHOD: 86 MM/HOUR (ref 0–15)
GLOBULIN SER CALC-MCNC: 3.6 G/DL (ref 1.9–3.5)
GLUCOSE SERPL-MCNC: 110 MG/DL (ref 65–99)
HCT VFR BLD AUTO: 27.1 % (ref 42–52)
HGB BLD-MCNC: 8.4 G/DL (ref 14–18)
IMM GRANULOCYTES # BLD AUTO: 0.01 K/UL (ref 0–0.11)
IMM GRANULOCYTES NFR BLD AUTO: 0.3 % (ref 0–0.9)
LYMPHOCYTES # BLD AUTO: 1.08 K/UL (ref 1–4.8)
LYMPHOCYTES NFR BLD: 29.8 % (ref 22–41)
MCH RBC QN AUTO: 31.3 PG (ref 27–33)
MCHC RBC AUTO-ENTMCNC: 31 G/DL (ref 33.7–35.3)
MCV RBC AUTO: 101.1 FL (ref 81.4–97.8)
MONOCYTES # BLD AUTO: 0.24 K/UL (ref 0–0.85)
MONOCYTES NFR BLD AUTO: 6.6 % (ref 0–13.4)
NEUTROPHILS # BLD AUTO: 2.01 K/UL (ref 1.82–7.42)
NEUTROPHILS NFR BLD: 55.3 % (ref 44–72)
NRBC # BLD AUTO: 0 K/UL
NRBC BLD-RTO: 0 /100 WBC
PLATELET # BLD AUTO: 162 K/UL (ref 164–446)
PMV BLD AUTO: 9 FL (ref 9–12.9)
POTASSIUM SERPL-SCNC: 5.4 MMOL/L (ref 3.6–5.5)
PROT SERPL-MCNC: 8 G/DL (ref 6–8.2)
RBC # BLD AUTO: 2.68 M/UL (ref 4.7–6.1)
SODIUM SERPL-SCNC: 135 MMOL/L (ref 135–145)
WBC # BLD AUTO: 3.6 K/UL (ref 4.8–10.8)

## 2020-05-06 PROCEDURE — 85025 COMPLETE CBC W/AUTO DIFF WBC: CPT

## 2020-05-06 PROCEDURE — 86140 C-REACTIVE PROTEIN: CPT

## 2020-05-06 PROCEDURE — 85652 RBC SED RATE AUTOMATED: CPT

## 2020-05-06 PROCEDURE — 80053 COMPREHEN METABOLIC PANEL: CPT

## 2020-05-13 DIAGNOSIS — Z99.2 ESRD ON HEMODIALYSIS (HCC): ICD-10-CM

## 2020-05-13 DIAGNOSIS — N25.81 HYPERPARATHYROIDISM DUE TO RENAL INSUFFICIENCY (HCC): ICD-10-CM

## 2020-05-13 DIAGNOSIS — N18.6 ESRD ON HEMODIALYSIS (HCC): ICD-10-CM

## 2020-05-15 ENCOUNTER — HOSPITAL ENCOUNTER (OUTPATIENT)
Facility: MEDICAL CENTER | Age: 40
End: 2020-05-15
Attending: INTERNAL MEDICINE
Payer: MEDICARE

## 2020-05-15 LAB
ALBUMIN SERPL BCP-MCNC: 4.5 G/DL (ref 3.2–4.9)
ALBUMIN/GLOB SERPL: 1.3 G/DL
ALP SERPL-CCNC: 300 U/L (ref 30–99)
ALT SERPL-CCNC: <5 U/L (ref 2–50)
ANION GAP SERPL CALC-SCNC: 20 MMOL/L (ref 7–16)
AST SERPL-CCNC: 14 U/L (ref 12–45)
BASOPHILS # BLD AUTO: 1.4 % (ref 0–1.8)
BASOPHILS # BLD: 0.05 K/UL (ref 0–0.12)
BILIRUB SERPL-MCNC: 0.3 MG/DL (ref 0.1–1.5)
BUN SERPL-MCNC: 31 MG/DL (ref 8–22)
CALCIUM SERPL-MCNC: 9.4 MG/DL (ref 8.5–10.5)
CHLORIDE SERPL-SCNC: 91 MMOL/L (ref 96–112)
CO2 SERPL-SCNC: 28 MMOL/L (ref 20–33)
CREAT SERPL-MCNC: 7.83 MG/DL (ref 0.5–1.4)
CRP SERPL HS-MCNC: 0.95 MG/DL (ref 0–0.75)
EOSINOPHIL # BLD AUTO: 0.22 K/UL (ref 0–0.51)
EOSINOPHIL NFR BLD: 6.3 % (ref 0–6.9)
ERYTHROCYTE [DISTWIDTH] IN BLOOD BY AUTOMATED COUNT: 56.9 FL (ref 35.9–50)
ERYTHROCYTE [SEDIMENTATION RATE] IN BLOOD BY WESTERGREN METHOD: 70 MM/HOUR (ref 0–15)
GLOBULIN SER CALC-MCNC: 3.5 G/DL (ref 1.9–3.5)
GLUCOSE SERPL-MCNC: 103 MG/DL (ref 65–99)
HCT VFR BLD AUTO: 32.3 % (ref 42–52)
HGB BLD-MCNC: 10.2 G/DL (ref 14–18)
IMM GRANULOCYTES # BLD AUTO: 0.02 K/UL (ref 0–0.11)
IMM GRANULOCYTES NFR BLD AUTO: 0.6 % (ref 0–0.9)
LYMPHOCYTES # BLD AUTO: 1.25 K/UL (ref 1–4.8)
LYMPHOCYTES NFR BLD: 35.6 % (ref 22–41)
MCH RBC QN AUTO: 31.2 PG (ref 27–33)
MCHC RBC AUTO-ENTMCNC: 31.5 G/DL (ref 33.7–35.3)
MCV RBC AUTO: 99.1 FL (ref 81.4–97.8)
MONOCYTES # BLD AUTO: 0.35 K/UL (ref 0–0.85)
MONOCYTES NFR BLD AUTO: 10 % (ref 0–13.4)
NEUTROPHILS # BLD AUTO: 1.62 K/UL (ref 1.82–7.42)
NEUTROPHILS NFR BLD: 46.1 % (ref 44–72)
NRBC # BLD AUTO: 0 K/UL
NRBC BLD-RTO: 0 /100 WBC
PLATELET # BLD AUTO: 163 K/UL (ref 164–446)
PMV BLD AUTO: 9.2 FL (ref 9–12.9)
POTASSIUM SERPL-SCNC: 5 MMOL/L (ref 3.6–5.5)
PROT SERPL-MCNC: 8 G/DL (ref 6–8.2)
RBC # BLD AUTO: 3.27 M/UL (ref 4.7–6.1)
SODIUM SERPL-SCNC: 139 MMOL/L (ref 135–145)
WBC # BLD AUTO: 3.5 K/UL (ref 4.8–10.8)

## 2020-05-15 PROCEDURE — 85025 COMPLETE CBC W/AUTO DIFF WBC: CPT

## 2020-05-15 PROCEDURE — 85652 RBC SED RATE AUTOMATED: CPT

## 2020-05-15 PROCEDURE — 86140 C-REACTIVE PROTEIN: CPT

## 2020-05-15 PROCEDURE — 80053 COMPREHEN METABOLIC PANEL: CPT

## 2020-05-28 ENCOUNTER — HOSPITAL ENCOUNTER (OUTPATIENT)
Facility: MEDICAL CENTER | Age: 40
End: 2020-05-28
Attending: INTERNAL MEDICINE
Payer: MEDICARE

## 2020-05-28 LAB
ALBUMIN SERPL BCP-MCNC: 4.6 G/DL (ref 3.2–4.9)
ALBUMIN/GLOB SERPL: 1.3 G/DL
ALP SERPL-CCNC: 271 U/L (ref 30–99)
ALT SERPL-CCNC: <5 U/L (ref 2–50)
ANION GAP SERPL CALC-SCNC: 17 MMOL/L (ref 7–16)
AST SERPL-CCNC: 13 U/L (ref 12–45)
BASOPHILS # BLD AUTO: 1.3 % (ref 0–1.8)
BASOPHILS # BLD: 0.04 K/UL (ref 0–0.12)
BILIRUB SERPL-MCNC: 0.3 MG/DL (ref 0.1–1.5)
BUN SERPL-MCNC: 26 MG/DL (ref 8–22)
CALCIUM SERPL-MCNC: 9 MG/DL (ref 8.5–10.5)
CHLORIDE SERPL-SCNC: 87 MMOL/L (ref 96–112)
CO2 SERPL-SCNC: 29 MMOL/L (ref 20–33)
CREAT SERPL-MCNC: 5.82 MG/DL (ref 0.5–1.4)
CRP SERPL HS-MCNC: 1.98 MG/DL (ref 0–0.75)
EOSINOPHIL # BLD AUTO: 0.09 K/UL (ref 0–0.51)
EOSINOPHIL NFR BLD: 2.8 % (ref 0–6.9)
ERYTHROCYTE [DISTWIDTH] IN BLOOD BY AUTOMATED COUNT: 54.6 FL (ref 35.9–50)
ERYTHROCYTE [SEDIMENTATION RATE] IN BLOOD BY WESTERGREN METHOD: 85 MM/HOUR (ref 0–15)
GLOBULIN SER CALC-MCNC: 3.5 G/DL (ref 1.9–3.5)
GLUCOSE SERPL-MCNC: 90 MG/DL (ref 65–99)
HCT VFR BLD AUTO: 35.1 % (ref 42–52)
HGB BLD-MCNC: 11 G/DL (ref 14–18)
IMM GRANULOCYTES # BLD AUTO: 0.01 K/UL (ref 0–0.11)
IMM GRANULOCYTES NFR BLD AUTO: 0.3 % (ref 0–0.9)
LYMPHOCYTES # BLD AUTO: 1.07 K/UL (ref 1–4.8)
LYMPHOCYTES NFR BLD: 33.5 % (ref 22–41)
MCH RBC QN AUTO: 30.3 PG (ref 27–33)
MCHC RBC AUTO-ENTMCNC: 31.3 G/DL (ref 33.7–35.3)
MCV RBC AUTO: 96.7 FL (ref 81.4–97.8)
MONOCYTES # BLD AUTO: 0.38 K/UL (ref 0–0.85)
MONOCYTES NFR BLD AUTO: 11.9 % (ref 0–13.4)
NEUTROPHILS # BLD AUTO: 1.6 K/UL (ref 1.82–7.42)
NEUTROPHILS NFR BLD: 50.2 % (ref 44–72)
NRBC # BLD AUTO: 0 K/UL
NRBC BLD-RTO: 0 /100 WBC
PLATELET # BLD AUTO: 157 K/UL (ref 164–446)
PMV BLD AUTO: 9.4 FL (ref 9–12.9)
POTASSIUM SERPL-SCNC: 4.4 MMOL/L (ref 3.6–5.5)
PROT SERPL-MCNC: 8.1 G/DL (ref 6–8.2)
RBC # BLD AUTO: 3.63 M/UL (ref 4.7–6.1)
SODIUM SERPL-SCNC: 133 MMOL/L (ref 135–145)
WBC # BLD AUTO: 3.2 K/UL (ref 4.8–10.8)

## 2020-05-28 PROCEDURE — 85652 RBC SED RATE AUTOMATED: CPT

## 2020-05-28 PROCEDURE — 86140 C-REACTIVE PROTEIN: CPT

## 2020-05-28 PROCEDURE — 36415 COLL VENOUS BLD VENIPUNCTURE: CPT

## 2020-05-28 PROCEDURE — 85025 COMPLETE CBC W/AUTO DIFF WBC: CPT

## 2020-05-28 PROCEDURE — 80053 COMPREHEN METABOLIC PANEL: CPT

## 2020-06-03 ENCOUNTER — ANTICOAGULATION MONITORING (OUTPATIENT)
Dept: VASCULAR LAB | Facility: MEDICAL CENTER | Age: 40
End: 2020-06-03

## 2020-06-03 ENCOUNTER — HOSPITAL ENCOUNTER (OUTPATIENT)
Facility: MEDICAL CENTER | Age: 40
End: 2020-06-03
Attending: NURSE PRACTITIONER
Payer: MEDICARE

## 2020-06-03 ENCOUNTER — HOSPITAL ENCOUNTER (OUTPATIENT)
Facility: MEDICAL CENTER | Age: 40
End: 2020-06-03
Attending: INTERNAL MEDICINE
Payer: MEDICARE

## 2020-06-03 DIAGNOSIS — Z79.01 CHRONIC ANTICOAGULATION: ICD-10-CM

## 2020-06-03 LAB
ALBUMIN SERPL BCP-MCNC: 4.6 G/DL (ref 3.2–4.9)
ALBUMIN/GLOB SERPL: 1.3 G/DL
ALP SERPL-CCNC: 243 U/L (ref 30–99)
ALT SERPL-CCNC: <5 U/L (ref 2–50)
ANION GAP SERPL CALC-SCNC: 22 MMOL/L (ref 7–16)
AST SERPL-CCNC: 11 U/L (ref 12–45)
BASOPHILS # BLD AUTO: 0.8 % (ref 0–1.8)
BASOPHILS # BLD: 0.03 K/UL (ref 0–0.12)
BILIRUB SERPL-MCNC: 0.3 MG/DL (ref 0.1–1.5)
BUN SERPL-MCNC: 53 MG/DL (ref 8–22)
CALCIUM SERPL-MCNC: 9.3 MG/DL (ref 8.5–10.5)
CHLORIDE SERPL-SCNC: 89 MMOL/L (ref 96–112)
CO2 SERPL-SCNC: 25 MMOL/L (ref 20–33)
CREAT SERPL-MCNC: 10.08 MG/DL (ref 0.5–1.4)
CRP SERPL HS-MCNC: 1.7 MG/DL (ref 0–0.75)
EOSINOPHIL # BLD AUTO: 0.19 K/UL (ref 0–0.51)
EOSINOPHIL NFR BLD: 5.1 % (ref 0–6.9)
ERYTHROCYTE [DISTWIDTH] IN BLOOD BY AUTOMATED COUNT: 54 FL (ref 35.9–50)
ERYTHROCYTE [SEDIMENTATION RATE] IN BLOOD BY WESTERGREN METHOD: 60 MM/HOUR (ref 0–15)
GLOBULIN SER CALC-MCNC: 3.5 G/DL (ref 1.9–3.5)
GLUCOSE SERPL-MCNC: 86 MG/DL (ref 65–99)
HCT VFR BLD AUTO: 34.6 % (ref 42–52)
HGB BLD-MCNC: 11 G/DL (ref 14–18)
IMM GRANULOCYTES # BLD AUTO: 0.01 K/UL (ref 0–0.11)
IMM GRANULOCYTES NFR BLD AUTO: 0.3 % (ref 0–0.9)
INR PPP: 3.52 (ref 0.87–1.13)
LYMPHOCYTES # BLD AUTO: 1.14 K/UL (ref 1–4.8)
LYMPHOCYTES NFR BLD: 30.9 % (ref 22–41)
MCH RBC QN AUTO: 30.9 PG (ref 27–33)
MCHC RBC AUTO-ENTMCNC: 31.8 G/DL (ref 33.7–35.3)
MCV RBC AUTO: 97.2 FL (ref 81.4–97.8)
MONOCYTES # BLD AUTO: 0.3 K/UL (ref 0–0.85)
MONOCYTES NFR BLD AUTO: 8.1 % (ref 0–13.4)
NEUTROPHILS # BLD AUTO: 2.02 K/UL (ref 1.82–7.42)
NEUTROPHILS NFR BLD: 54.8 % (ref 44–72)
NRBC # BLD AUTO: 0 K/UL
NRBC BLD-RTO: 0 /100 WBC
PLATELET # BLD AUTO: 151 K/UL (ref 164–446)
PMV BLD AUTO: 9.1 FL (ref 9–12.9)
POTASSIUM SERPL-SCNC: 5.6 MMOL/L (ref 3.6–5.5)
PROT SERPL-MCNC: 8.1 G/DL (ref 6–8.2)
PROTHROMBIN TIME: 36.6 SEC (ref 12–14.6)
RBC # BLD AUTO: 3.56 M/UL (ref 4.7–6.1)
SODIUM SERPL-SCNC: 136 MMOL/L (ref 135–145)
WBC # BLD AUTO: 3.7 K/UL (ref 4.8–10.8)

## 2020-06-03 PROCEDURE — 85652 RBC SED RATE AUTOMATED: CPT

## 2020-06-03 PROCEDURE — 85610 PROTHROMBIN TIME: CPT

## 2020-06-03 PROCEDURE — 85025 COMPLETE CBC W/AUTO DIFF WBC: CPT

## 2020-06-03 PROCEDURE — 80053 COMPREHEN METABOLIC PANEL: CPT

## 2020-06-03 PROCEDURE — 86140 C-REACTIVE PROTEIN: CPT

## 2020-06-03 NOTE — PROGRESS NOTES
Anticoagulation Summary  As of 6/3/2020    INR goal:   2.5-3.5   TTR:   39.0 % (3 y)   INR used for dosing:   3.52! 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 Hopkins      Anticoagulation Care Providers     Provider Role Specialty Phone number    Jairo Hopkins M.D. Referring Surgery [phone]    [facility] Anticoagulation Services Responsible  [phone]        Anticoagulation Patient Findings      Spoke with patient to report a therapeutic INR.    Pt instructed to continue with current warfarin dosing regimen, confirms dosing.   Pt denies any s/s of bleeding, bruising, clotting or any changes to diet or medication.    Will follow up in 4 week(s).     Evelin Garcia, PharmD

## 2020-06-08 ENCOUNTER — APPOINTMENT (OUTPATIENT)
Dept: ADMISSIONS | Facility: MEDICAL CENTER | Age: 40
End: 2020-06-08
Attending: SURGERY
Payer: MEDICARE

## 2020-06-09 ENCOUNTER — HOSPITAL ENCOUNTER (OUTPATIENT)
Dept: RADIOLOGY | Facility: MEDICAL CENTER | Age: 40
End: 2020-06-09
Attending: SURGERY
Payer: MEDICARE

## 2020-06-09 PROCEDURE — 71045 X-RAY EXAM CHEST 1 VIEW: CPT

## 2020-06-11 ENCOUNTER — HOSPITAL ENCOUNTER (OUTPATIENT)
Facility: MEDICAL CENTER | Age: 40
End: 2020-06-11
Attending: SURGERY | Admitting: SURGERY
Payer: MEDICARE

## 2020-06-11 ENCOUNTER — APPOINTMENT (OUTPATIENT)
Dept: RADIOLOGY | Facility: MEDICAL CENTER | Age: 40
End: 2020-06-11
Attending: SURGERY
Payer: MEDICARE

## 2020-06-11 VITALS
RESPIRATION RATE: 18 BRPM | HEART RATE: 79 BPM | TEMPERATURE: 97.6 F | OXYGEN SATURATION: 96 % | SYSTOLIC BLOOD PRESSURE: 122 MMHG | DIASTOLIC BLOOD PRESSURE: 45 MMHG | WEIGHT: 192.9 LBS | BODY MASS INDEX: 32.93 KG/M2 | HEIGHT: 64 IN

## 2020-06-11 DIAGNOSIS — N18.6 END STAGE RENAL DISEASE (HCC): ICD-10-CM

## 2020-06-11 DIAGNOSIS — Z99.2 DEPENDENCE ON RENAL DIALYSIS (HCC): ICD-10-CM

## 2020-06-11 PROCEDURE — 700111 HCHG RX REV CODE 636 W/ 250 OVERRIDE (IP)

## 2020-06-11 PROCEDURE — 99153 MOD SED SAME PHYS/QHP EA: CPT

## 2020-06-11 PROCEDURE — 700111 HCHG RX REV CODE 636 W/ 250 OVERRIDE (IP): Performed by: SURGERY

## 2020-06-11 PROCEDURE — 700117 HCHG RX CONTRAST REV CODE 255: Performed by: SURGERY

## 2020-06-11 PROCEDURE — 160002 HCHG RECOVERY MINUTES (STAT)

## 2020-06-11 RX ORDER — DIPHENHYDRAMINE HYDROCHLORIDE 50 MG/ML
50 INJECTION INTRAMUSCULAR; INTRAVENOUS ONCE
Status: COMPLETED | OUTPATIENT
Start: 2020-06-11 | End: 2020-06-11

## 2020-06-11 RX ORDER — MIDAZOLAM HYDROCHLORIDE 1 MG/ML
INJECTION INTRAMUSCULAR; INTRAVENOUS
Status: COMPLETED
Start: 2020-06-11 | End: 2020-06-11

## 2020-06-11 RX ORDER — MIDAZOLAM HYDROCHLORIDE 1 MG/ML
.5-2 INJECTION INTRAMUSCULAR; INTRAVENOUS PRN
Status: DISCONTINUED | OUTPATIENT
Start: 2020-06-11 | End: 2020-06-11 | Stop reason: HOSPADM

## 2020-06-11 RX ORDER — IODIXANOL 270 MG/ML
22 INJECTION, SOLUTION INTRAVASCULAR ONCE
Status: COMPLETED | OUTPATIENT
Start: 2020-06-11 | End: 2020-06-11

## 2020-06-11 RX ORDER — CEPHALEXIN 500 MG/1
500 CAPSULE ORAL
COMMUNITY
End: 2021-09-20

## 2020-06-11 RX ORDER — SODIUM CHLORIDE 9 MG/ML
500 INJECTION, SOLUTION INTRAVENOUS
Status: DISCONTINUED | OUTPATIENT
Start: 2020-06-11 | End: 2020-06-11 | Stop reason: HOSPADM

## 2020-06-11 RX ORDER — DIPHENHYDRAMINE HYDROCHLORIDE 50 MG/ML
INJECTION INTRAMUSCULAR; INTRAVENOUS
Status: COMPLETED
Start: 2020-06-11 | End: 2020-06-11

## 2020-06-11 RX ADMIN — MIDAZOLAM HYDROCHLORIDE 1 MG: 1 INJECTION, SOLUTION INTRAMUSCULAR; INTRAVENOUS at 15:54

## 2020-06-11 RX ADMIN — FENTANYL CITRATE 50 MCG: 50 INJECTION INTRAMUSCULAR; INTRAVENOUS at 15:44

## 2020-06-11 RX ADMIN — DIPHENHYDRAMINE HYDROCHLORIDE 50 MG: 50 INJECTION INTRAMUSCULAR; INTRAVENOUS at 15:44

## 2020-06-11 RX ADMIN — MIDAZOLAM HYDROCHLORIDE 1 MG: 1 INJECTION, SOLUTION INTRAMUSCULAR; INTRAVENOUS at 15:44

## 2020-06-11 RX ADMIN — MIDAZOLAM HYDROCHLORIDE 1 MG: 1 INJECTION, SOLUTION INTRAMUSCULAR; INTRAVENOUS at 15:49

## 2020-06-11 RX ADMIN — IODIXANOL 22 ML: 270 INJECTION, SOLUTION INTRAVASCULAR at 16:26

## 2020-06-11 RX ADMIN — MIDAZOLAM HYDROCHLORIDE 1 MG: 1 INJECTION, SOLUTION INTRAMUSCULAR; INTRAVENOUS at 15:57

## 2020-06-11 RX ADMIN — HYDROCORTISONE SODIUM SUCCINATE 200 MG: 100 INJECTION, POWDER, FOR SOLUTION INTRAMUSCULAR; INTRAVENOUS at 15:44

## 2020-06-11 ASSESSMENT — FIBROSIS 4 INDEX: FIB4 SCORE: 1.3

## 2020-06-11 NOTE — PROGRESS NOTES
IR RN note:     Left thigh fistulogram with angioplasty by MD Moffett       Lab orders not done and medication administration route discussed with MD. Sedation given per provider direction. Patient appeared to tolerate procedure, patient awake and talking post procedure.    Report given to LETY Ayala. Pt transported to PACU via IR RN, then transferred care.

## 2020-06-11 NOTE — OR NURSING
1632 Patient arrived from IR s/p left thigh fistulogram with angioplasty dressing clean dry intact. Patient fully awake, denies pain, denies nausea. Vss.   1700 Criteria met to discharge patient home.  1704 discharge instructions given to patient, patient verbalize understanding of the orders, reviewed activity, worsening symptoms, follow up, medications, dressing care.   1715 Patient  escorted via w/c with all his personal belongings.

## 2020-06-11 NOTE — OR SURGEON
Immediate Post OP Note    PreOp Diagnosis: Left thigh fistula dysfunction    PostOp Diagnosis: Same    Procedure(s):Ultrasound guided placement if 5Fr. Sheath x 2  LEFT THIGH FISTULOGRAM and angioplasty    Surgeon(s):SHARYN Moffett MD    Anesthesiologist/Type of Anesthesia:Timothy Hernandez RN; Conscious sedation    Estimated Blood Loss: Minimal    Findings: Mild luminal irregularity on the venous end    Complications: none    [id number]    6/11/2020 4:17 PM Lurdes Moffett M.D.

## 2020-06-11 NOTE — DISCHARGE INSTRUCTIONS
ACTIVITY: Rest and take it easy for the first 24 hours.  A responsible adult is recommended to remain with you during that time.  It is normal to feel sleepy.  We encourage you to not do anything that requires balance, judgment or coordination.    MILD FLU-LIKE SYMPTOMS ARE NORMAL. YOU MAY EXPERIENCE GENERALIZED MUSCLE ACHES, THROAT IRRITATION, HEADACHE AND/OR SOME NAUSEA.    FOR 24 HOURS DO NOT:  Drive, operate machinery or run household appliances.  Drink beer or alcoholic beverages.   Make important decisions or sign legal documents.    SPECIAL INSTRUCTIONS:   Fistulogram  Introduction  A fistulogram is an X-ray test to look inside the site where your blood is removed and returned to your body during dialysis (dialysis fistula). Your fistula allows easy and effective access, but sometimes problems can occur, such as narrowing or blockages. A narrowing or blockage can reduce the effectiveness of dialysis. A fistulogram helps to detect these problems. It also lets your health care provider know of any additional treatment you may need.  Tell a health care provider about:  · Any allergies you have.  · All medicines you are taking, including vitamins, herbs, eye drops, creams, and over-the-counter medicines.  · Any problems you or family members have had with anesthetic medicines.  · Any reactions you have had to contrast dye.  · Any blood disorders you have.  · Any surgeries you have had.  · Any medical conditions you have.  · Pain, redness, or swelling in your limb with the dialysis fistula.  · Any bleeding from your dialysis fistula.  · Any of the following in the part of your body where the dialysis fistula leads:  ¨ Numbness.  ¨ Tingling.  ¨ Cold feeling.  ¨ Bluish or pale white in color.  What are the risks?  Generally, a fistulogram is a safe procedure. However, problems can occur and include:  · Bleeding.  · Infection.  · A blood clot in the dialysis fistula.  · A blood clot that travels to your lungs  (pulmonary embolism).  What happens before the procedure?  · Your health care provider may do some blood or urine tests or both. These will help your health care provider learn how well your kidneys and liver are working and how well your blood clots.  · Ask your health care provider about:  ¨ Changing or stopping your regular medicines. This is especially important if you are taking diabetes medicines or blood thinners.  ¨ Taking medicines such as aspirin and ibuprofen. These medicines can thin your blood. Do not take these medicines before your procedure if your health care provider asks you not to.  · Do not eat or drink anything after midnight on the night before the procedure or as directed by your health care provider.  · Ask your health care provider if you will be able to go home after the procedure. Some people stay overnight in the hospital.  · Plan to have someone take you home after the procedure.  What happens during the procedure?  · A needle will be inserted into your arm. It will be used to give you medicine. Medicines given may include:  ¨ Numbing medicine (local anesthetic) to numb an area on the limb with your fistula.  ¨ A medicine to help you relax (sedative).  ¨ A medicine that makes you fall asleep (general anesthetic).  · When your skin is numb, a needle will be inserted into your vein.  · A thin, flexible tube (catheter) will be inserted into the needle and guided to the narrowed area.  · Dye (contrast material) will be injected into the catheter. As the contrast material passes through your body, you may feel warm.  · X-rays will be taken. The contrast material will show where any narrowing or blockages are.  · A guide wire and balloon-tipped catheter will be advanced to the blockage site.  · The balloon will be inflated for a short period of time. The balloon may be inflated several times at this site, or the balloon may be moved to other sites.  · If your health care provider determines  the clot will be best treated by a clot-dissolving medicine (thrombolysis), this medicine can be delivered through the catheter instead of using the balloon.  · A mechanical device at the end of the catheter can also be used to break up the clot (thrombectomy).  · At the end of the procedure, the catheter will be removed.  · In some cases, the catheter site will be closed with a stitch or two.  · Pressure will be applied to stop any bleeding, and your skin will be covered with a bandage (dressing).  What happens after the procedure?  · You will stay in a recovery area until the medicines have worn off.  · You will stay in bed for several hours.  · As you begin to feel better, you will be offered ice and beverages. You may be given food.  · Your health care provider will check your blood pressure and pulse and watch your access site.  · When you can walk, drink, eat, and use the bathroom, you may be discharged.  This information is not intended to replace advice given to you by your health care provider. Make sure you discuss any questions you have with your health care provider.  Document Released: 05/04/2015 Document Revised: 05/25/2017 Document Reviewed: 02/06/2015  © 2017 [employer]      DIET: To avoid nausea, slowly advance diet as tolerated, avoiding spicy or greasy foods for the first day.  Add more substantial food to your diet according to your physician's instructions.  Babies can be fed formula or breast milk as soon as they are hungry.  INCREASE FLUIDS AND FIBER TO AVOID CONSTIPATION.    SURGICAL DRESSING/BATHING: keep dressing clean dry intact until dialysis tomorrow.     FOLLOW-UP APPOINTMENT:  A follow-up appointment should be arranged with your doctor in [id number]; call to schedule.    You should CALL YOUR PHYSICIAN if you develop:  Fever greater than 101 degrees F.  Pain not relieved by medication, or persistent nausea or vomiting.  Excessive bleeding (blood soaking through dressing) or unexpected  drainage from the wound.  Extreme redness or swelling around the incision site, drainage of pus or foul smelling drainage.  Inability to urinate or empty your bladder within 8 hours.  Problems with breathing or chest pain.    You should call 911 if you develop problems with breathing or chest pain.  If you are unable to contact your doctor or surgical center, you should go to the nearest emergency room or urgent care center.  Physician's telephone #: [phone]    If any questions arise, call your doctor.  If your doctor is not available, please feel free to call the Surgical Center at [phone].  The Center is open Monday through Friday from 7AM to 7PM.  You can also call the HEALTH HOTLINE open 24 hours/day, 7 days/week and speak to a nurse at [phone], or toll free at [phone].    A registered nurse may call you a few days after your surgery to see how you are doing after your procedure.    MEDICATIONS: Resume taking daily medication.  Take prescribed pain medication with food.  If no medication is prescribed, you may take non-aspirin pain medication if needed.  PAIN MEDICATION CAN BE VERY CONSTIPATING.  Take a stool softener or laxative such as senokot, pericolace, or milk of magnesia if needed.    If your physician has prescribed pain medication that includes Acetaminophen (Tylenol), do not take additional Acetaminophen (Tylenol) while taking the prescribed medication.    Depression / Suicide Risk    As you are discharged from this [facility] [place] [facility] facility, it is important to learn how to keep safe from harming yourself.    Recognize the warning signs:  · Abrupt changes in personality, positive or negative- including increase in energy   · Giving away possessions  · Change in eating patterns- significant weight changes-  positive or negative  · Change in sleeping patterns- unable to sleep or sleeping all the time   · Unwillingness or inability to communicate  · Depression  · Unusual sadness,  discouragement and loneliness  · Talk of wanting to die  · Neglect of personal appearance   · Rebelliousness- reckless behavior  · Withdrawal from people/activities they love  · Confusion- inability to concentrate     If you or a loved one observes any of these behaviors or has concerns about self-harm, here's what you can do:  · Talk about it- your feelings and reasons for harming yourself  · Remove any means that you might use to hurt yourself (examples: pills, rope, extension cords, firearm)  · Get professional help from the community (Mental Health, Substance Abuse, psychological counseling)  · Do not be alone:Call your Safe Contact- someone whom you trust who will be there for you.  · Call your local CRISIS HOTLINE 571-3473 or 572-474-9092  · Call your local Children's Mobile Crisis Response Team [place] (367) 868-2598 or www.[employer]  · Call the toll free National Suicide Prevention Hotlines   · National Suicide Prevention Lifeline 739-576-SMET (4219)  · National Hope Line Network 800-SUICIDE (273-9098)

## 2020-06-12 NOTE — OP REPORT
DATE OF SERVICE:  06/11/2020    PREOPERATIVE DIAGNOSIS:  Left thigh fistula bleeding.    POSTPROCEDURE DIAGNOSES:  Left thigh fistula bleeding.    PROCEDURE:  Left thigh fistulogram and angioplasty.    SURGEON:  Lurdes Moffett MD    ANESTHESIOLOGIST:  Timothy Hernandez RN    ANESTHESIA:  Conscious sedation.    INDICATIONS:  The patient is a 39-year-old gentleman who has concerns that his   fistula may have stenoses.  He is brought to the angio suite to identify and   treat areas of luminal irregularity.  Risks and benefits were explained and   include bleeding, infection, arteriovenous thrombosis, and infection.  He   understands and agrees to proceed.    DESCRIPTION OF PROCEDURE:  The patient was taken to the angio suite, placed in   the supine position.  After adequate comfort measures were taken, local   anesthesia was given under ultrasound guidance.  A micropuncture needle, 0.018   wire and micropuncture sheath were used to exchange for a 5-Mohawk sheath.    The patient was then given sedation through the initial sheath placement.    A second sheath was placed for the arterial limb.  This was placed in similar   fashion using ultrasound guidance.  Through the arterial limb, I placed a   Glidewire and an angled glide catheter.  This was used to perform an angiogram   and image the entire right thigh graft.  No evidence of stenosis in the   arterial limb was identified.  The Glidewire was then advanced through the   venous sheath and a 7 mm x 8 cm balloon was used to perform angioplasty of the   entire outflow tract.  I then performed a venogram of the left thigh and   inferior vena cava, which is patent.    The sheaths were removed and gentle pressure held for easy hemostasis.  No   evidence of complications.       ____________________________________     MD ADDIS Shaffer / NTS    DD:  06/11/2020 16:23:28  DT:  06/11/2020 19:45:21    D#:  [id number]  Job#:  [id number]

## 2020-06-29 ENCOUNTER — HOSPITAL ENCOUNTER (OUTPATIENT)
Facility: MEDICAL CENTER | Age: 40
End: 2020-06-29
Attending: INTERNAL MEDICINE
Payer: MEDICARE

## 2020-06-29 LAB
ALBUMIN SERPL BCP-MCNC: 4.6 G/DL (ref 3.2–4.9)
ALBUMIN/GLOB SERPL: 1.4 G/DL
ALP SERPL-CCNC: 210 U/L (ref 30–99)
ALT SERPL-CCNC: 9 U/L (ref 2–50)
ANION GAP SERPL CALC-SCNC: 24 MMOL/L (ref 7–16)
AST SERPL-CCNC: 15 U/L (ref 12–45)
BASOPHILS # BLD AUTO: 2.2 % (ref 0–1.8)
BASOPHILS # BLD: 0.09 K/UL (ref 0–0.12)
BILIRUB SERPL-MCNC: 0.4 MG/DL (ref 0.1–1.5)
BUN SERPL-MCNC: 61 MG/DL (ref 8–22)
CALCIUM SERPL-MCNC: 8.4 MG/DL (ref 8.5–10.5)
CHLORIDE SERPL-SCNC: 87 MMOL/L (ref 96–112)
CO2 SERPL-SCNC: 23 MMOL/L (ref 20–33)
CREAT SERPL-MCNC: 10.32 MG/DL (ref 0.5–1.4)
CRP SERPL HS-MCNC: 1.18 MG/DL (ref 0–0.75)
EOSINOPHIL # BLD AUTO: 0.16 K/UL (ref 0–0.51)
EOSINOPHIL NFR BLD: 3.9 % (ref 0–6.9)
ERYTHROCYTE [DISTWIDTH] IN BLOOD BY AUTOMATED COUNT: 51.5 FL (ref 35.9–50)
ERYTHROCYTE [SEDIMENTATION RATE] IN BLOOD BY WESTERGREN METHOD: 35 MM/HOUR (ref 0–15)
GLOBULIN SER CALC-MCNC: 3.3 G/DL (ref 1.9–3.5)
GLUCOSE SERPL-MCNC: 79 MG/DL (ref 65–99)
HCT VFR BLD AUTO: 35.8 % (ref 42–52)
HGB BLD-MCNC: 11.7 G/DL (ref 14–18)
IMM GRANULOCYTES # BLD AUTO: 0.02 K/UL (ref 0–0.11)
IMM GRANULOCYTES NFR BLD AUTO: 0.5 % (ref 0–0.9)
LYMPHOCYTES # BLD AUTO: 1.26 K/UL (ref 1–4.8)
LYMPHOCYTES NFR BLD: 30.4 % (ref 22–41)
MCH RBC QN AUTO: 30.5 PG (ref 27–33)
MCHC RBC AUTO-ENTMCNC: 32.7 G/DL (ref 33.7–35.3)
MCV RBC AUTO: 93.5 FL (ref 81.4–97.8)
MONOCYTES # BLD AUTO: 0.36 K/UL (ref 0–0.85)
MONOCYTES NFR BLD AUTO: 8.7 % (ref 0–13.4)
NEUTROPHILS # BLD AUTO: 2.26 K/UL (ref 1.82–7.42)
NEUTROPHILS NFR BLD: 54.3 % (ref 44–72)
NRBC # BLD AUTO: 0 K/UL
NRBC BLD-RTO: 0 /100 WBC
PLATELET # BLD AUTO: 146 K/UL (ref 164–446)
PMV BLD AUTO: 9.1 FL (ref 9–12.9)
POTASSIUM SERPL-SCNC: 6.3 MMOL/L (ref 3.6–5.5)
PROT SERPL-MCNC: 7.9 G/DL (ref 6–8.2)
RBC # BLD AUTO: 3.83 M/UL (ref 4.7–6.1)
SODIUM SERPL-SCNC: 134 MMOL/L (ref 135–145)
WBC # BLD AUTO: 4.2 K/UL (ref 4.8–10.8)

## 2020-06-29 PROCEDURE — 85652 RBC SED RATE AUTOMATED: CPT

## 2020-06-29 PROCEDURE — 86140 C-REACTIVE PROTEIN: CPT

## 2020-06-29 PROCEDURE — 85025 COMPLETE CBC W/AUTO DIFF WBC: CPT

## 2020-06-29 PROCEDURE — 80053 COMPREHEN METABOLIC PANEL: CPT

## 2020-07-07 ENCOUNTER — TELEPHONE (OUTPATIENT)
Dept: NEPHROLOGY | Facility: MEDICAL CENTER | Age: 40
End: 2020-07-07

## 2020-07-07 DIAGNOSIS — Z79.01 CHRONIC ANTICOAGULATION: ICD-10-CM

## 2020-07-13 ENCOUNTER — HOSPITAL ENCOUNTER (OUTPATIENT)
Dept: RADIOLOGY | Facility: MEDICAL CENTER | Age: 40
End: 2020-07-13
Attending: SURGERY
Payer: MEDICARE

## 2020-07-13 DIAGNOSIS — N25.81 SECONDARY HYPERPARATHYROIDISM OF RENAL ORIGIN (HCC): ICD-10-CM

## 2020-07-13 PROCEDURE — 700117 HCHG RX CONTRAST REV CODE 255: Performed by: SURGERY

## 2020-07-13 PROCEDURE — 70498 CT ANGIOGRAPHY NECK: CPT

## 2020-07-13 RX ADMIN — IOHEXOL 100 ML: 350 INJECTION, SOLUTION INTRAVENOUS at 08:57

## 2020-07-16 ENCOUNTER — HOSPITAL ENCOUNTER (OUTPATIENT)
Facility: MEDICAL CENTER | Age: 40
End: 2020-07-16
Attending: NURSE PRACTITIONER
Payer: MEDICARE

## 2020-07-16 ENCOUNTER — ANTICOAGULATION MONITORING (OUTPATIENT)
Dept: VASCULAR LAB | Facility: MEDICAL CENTER | Age: 40
End: 2020-07-16

## 2020-07-16 DIAGNOSIS — Z79.01 CHRONIC ANTICOAGULATION: ICD-10-CM

## 2020-07-16 LAB
INR PPP: 3.93 (ref 0.87–1.13)
PROTHROMBIN TIME: 39.7 SEC (ref 12–14.6)

## 2020-07-16 PROCEDURE — 85610 PROTHROMBIN TIME: CPT

## 2020-07-16 NOTE — PROGRESS NOTES
Anticoagulation Summary  As of 7/16/2020    INR goal:   2.5-3.5   TTR:   37.5 % (3.1 y)   INR used for dosing:   3.93! 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 Hopkins      Anticoagulation Care Providers     Provider Role Specialty Phone number    Jairo Hopkins M.D. Referring Surgery [phone]    [facility] Responsible  [phone]        Anticoagulation Patient Findings      INR  supra-therapeutic.   Left a voice message for the patient, asked patient to please call the anticoagulation clinic if they have any signs/symptoms of bleeding and/or thrombosis or any changes to diet or medications.    Follow up appointment in 2 week(s)    2.5mg today then continue weekly warfarin dose as noted      Chriss Keating, PharmD, MS, BCACP, LCC    This note was created using voice recognition software (Dragon). The accuracy of the dictation is limited by the abilities of the software. I have reviewed the note prior to signing, however some errors in grammar and context are still possible. If you have any questions related to this note please do not hesitate to contact our office.

## 2020-07-29 ENCOUNTER — ANTICOAGULATION MONITORING (OUTPATIENT)
Dept: VASCULAR LAB | Facility: MEDICAL CENTER | Age: 40
End: 2020-07-29

## 2020-07-29 ENCOUNTER — HOSPITAL ENCOUNTER (OUTPATIENT)
Facility: MEDICAL CENTER | Age: 40
End: 2020-07-29
Attending: NURSE PRACTITIONER
Payer: MEDICARE

## 2020-07-29 DIAGNOSIS — Z79.01 CHRONIC ANTICOAGULATION: ICD-10-CM

## 2020-07-29 LAB
INR PPP: 2.55 (ref 0.87–1.13)
PROTHROMBIN TIME: 28.2 SEC (ref 12–14.6)

## 2020-07-29 PROCEDURE — 36415 COLL VENOUS BLD VENIPUNCTURE: CPT

## 2020-07-29 PROCEDURE — 85610 PROTHROMBIN TIME: CPT

## 2020-07-29 RX ORDER — WARFARIN SODIUM 5 MG/1
5 TABLET ORAL DAILY
Qty: 90 TAB | Refills: 1 | Status: SHIPPED | OUTPATIENT
Start: 2020-07-29 | End: 2020-08-31

## 2020-07-30 NOTE — PROGRESS NOTES
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 Hopkins      Anticoagulation Care Providers     Provider Role Specialty Phone number    Jairo Hopkins M.D. Referring Surgery [phone]    [facility] Anticoagulation Services Responsible  [phone]        Anticoagulation Patient Findings      Spoke with patient to report a therapeutic INR.    Pt instructed to continue with current warfarin dosing regimen, confirms dosing.   Pt denies any s/s of bleeding, bruising, clotting or any changes to diet or medication.    Will follow up in 4 week(s).     Evelin Garcia, PharmD

## 2020-08-04 ENCOUNTER — HOSPITAL ENCOUNTER (OUTPATIENT)
Facility: MEDICAL CENTER | Age: 40
End: 2020-08-04
Attending: INTERNAL MEDICINE
Payer: MEDICARE

## 2020-08-04 LAB
ALBUMIN SERPL BCP-MCNC: 4.7 G/DL (ref 3.2–4.9)
ALBUMIN/GLOB SERPL: 1.3 G/DL
ALP SERPL-CCNC: 217 U/L (ref 30–99)
ALT SERPL-CCNC: 10 U/L (ref 2–50)
ANION GAP SERPL CALC-SCNC: 21 MMOL/L (ref 7–16)
AST SERPL-CCNC: 10 U/L (ref 12–45)
BASOPHILS # BLD AUTO: 1.1 % (ref 0–1.8)
BASOPHILS # BLD: 0.04 K/UL (ref 0–0.12)
BILIRUB SERPL-MCNC: 0.6 MG/DL (ref 0.1–1.5)
BUN SERPL-MCNC: 46 MG/DL (ref 8–22)
CALCIUM SERPL-MCNC: 8.6 MG/DL (ref 8.5–10.5)
CHLORIDE SERPL-SCNC: 89 MMOL/L (ref 96–112)
CO2 SERPL-SCNC: 26 MMOL/L (ref 20–33)
CREAT SERPL-MCNC: 7.52 MG/DL (ref 0.5–1.4)
CRP SERPL HS-MCNC: 2.07 MG/DL (ref 0–0.75)
EOSINOPHIL # BLD AUTO: 0.09 K/UL (ref 0–0.51)
EOSINOPHIL NFR BLD: 2.4 % (ref 0–6.9)
ERYTHROCYTE [DISTWIDTH] IN BLOOD BY AUTOMATED COUNT: 54.5 FL (ref 35.9–50)
ERYTHROCYTE [SEDIMENTATION RATE] IN BLOOD BY WESTERGREN METHOD: 67 MM/HOUR (ref 0–15)
GLOBULIN SER CALC-MCNC: 3.5 G/DL (ref 1.9–3.5)
GLUCOSE SERPL-MCNC: 102 MG/DL (ref 65–99)
HCT VFR BLD AUTO: 32.8 % (ref 42–52)
HGB BLD-MCNC: 10.7 G/DL (ref 14–18)
IMM GRANULOCYTES # BLD AUTO: 0.01 K/UL (ref 0–0.11)
IMM GRANULOCYTES NFR BLD AUTO: 0.3 % (ref 0–0.9)
LYMPHOCYTES # BLD AUTO: 1.04 K/UL (ref 1–4.8)
LYMPHOCYTES NFR BLD: 27.4 % (ref 22–41)
MCH RBC QN AUTO: 30.9 PG (ref 27–33)
MCHC RBC AUTO-ENTMCNC: 32.6 G/DL (ref 33.7–35.3)
MCV RBC AUTO: 94.8 FL (ref 81.4–97.8)
MONOCYTES # BLD AUTO: 0.3 K/UL (ref 0–0.85)
MONOCYTES NFR BLD AUTO: 7.9 % (ref 0–13.4)
NEUTROPHILS # BLD AUTO: 2.31 K/UL (ref 1.82–7.42)
NEUTROPHILS NFR BLD: 60.9 % (ref 44–72)
NRBC # BLD AUTO: 0 K/UL
NRBC BLD-RTO: 0 /100 WBC
PLATELET # BLD AUTO: 160 K/UL (ref 164–446)
PMV BLD AUTO: 9.1 FL (ref 9–12.9)
POTASSIUM SERPL-SCNC: 5.4 MMOL/L (ref 3.6–5.5)
PROT SERPL-MCNC: 8.2 G/DL (ref 6–8.2)
RBC # BLD AUTO: 3.46 M/UL (ref 4.7–6.1)
SODIUM SERPL-SCNC: 136 MMOL/L (ref 135–145)
WBC # BLD AUTO: 3.8 K/UL (ref 4.8–10.8)

## 2020-08-04 PROCEDURE — 36415 COLL VENOUS BLD VENIPUNCTURE: CPT

## 2020-08-04 PROCEDURE — 85025 COMPLETE CBC W/AUTO DIFF WBC: CPT

## 2020-08-04 PROCEDURE — 85652 RBC SED RATE AUTOMATED: CPT

## 2020-08-04 PROCEDURE — 80053 COMPREHEN METABOLIC PANEL: CPT

## 2020-08-04 PROCEDURE — 86140 C-REACTIVE PROTEIN: CPT

## 2020-08-05 ENCOUNTER — TELEPHONE (OUTPATIENT)
Dept: NEPHROLOGY | Facility: MEDICAL CENTER | Age: 40
End: 2020-08-05

## 2020-08-05 NOTE — TELEPHONE ENCOUNTER
Patient called to ask if he can get a test order for covid-19 test, patient going to travel next week for family emergency   Please let me know if you can order   Thank you

## 2020-08-31 ENCOUNTER — APPOINTMENT (OUTPATIENT)
Dept: RADIOLOGY | Facility: MEDICAL CENTER | Age: 40
DRG: 871 | End: 2020-08-31
Attending: EMERGENCY MEDICINE
Payer: MEDICARE

## 2020-08-31 ENCOUNTER — HOSPITAL ENCOUNTER (INPATIENT)
Facility: MEDICAL CENTER | Age: 40
LOS: 8 days | DRG: 871 | End: 2020-09-08
Attending: EMERGENCY MEDICINE | Admitting: INTERNAL MEDICINE
Payer: MEDICARE

## 2020-08-31 DIAGNOSIS — U07.1 COVID-19: ICD-10-CM

## 2020-08-31 DIAGNOSIS — R09.02 HYPOXIA: ICD-10-CM

## 2020-08-31 DIAGNOSIS — R19.7 DIARRHEA, UNSPECIFIED TYPE: ICD-10-CM

## 2020-08-31 DIAGNOSIS — U07.1 COVID-19 VIRUS DETECTED: ICD-10-CM

## 2020-08-31 DIAGNOSIS — T82.868S THROMBOSIS OF ARTERIOVENOUS GRAFT, SEQUELA: ICD-10-CM

## 2020-08-31 DIAGNOSIS — R65.10 SIRS (SYSTEMIC INFLAMMATORY RESPONSE SYNDROME) (HCC): ICD-10-CM

## 2020-08-31 PROBLEM — A09 DIARRHEA OF INFECTIOUS ORIGIN: Status: ACTIVE | Noted: 2020-08-31

## 2020-08-31 PROBLEM — I95.9 SEPSIS ASSOCIATED HYPOTENSION (HCC): Status: ACTIVE | Noted: 2018-02-20

## 2020-08-31 PROBLEM — J12.82 PNEUMONIA DUE TO COVID-19 VIRUS: Status: ACTIVE | Noted: 2020-08-31

## 2020-08-31 PROBLEM — A41.9 SEPSIS ASSOCIATED HYPOTENSION (HCC): Status: ACTIVE | Noted: 2018-02-20

## 2020-08-31 LAB
ABO GROUP BLD: NORMAL
ALBUMIN SERPL BCP-MCNC: 4.2 G/DL (ref 3.2–4.9)
ALBUMIN/GLOB SERPL: 1.2 G/DL
ALP SERPL-CCNC: 189 U/L (ref 30–99)
ALT SERPL-CCNC: 14 U/L (ref 2–50)
ANION GAP SERPL CALC-SCNC: 19 MMOL/L (ref 7–16)
AST SERPL-CCNC: 33 U/L (ref 12–45)
BARCODED ABORH UBTYP: 9500
BARCODED PRD CODE UBPRD: NORMAL
BARCODED UNIT NUM UBUNT: NORMAL
BASOPHILS # BLD AUTO: 0 % (ref 0–1.8)
BASOPHILS # BLD: 0 K/UL (ref 0–0.12)
BILIRUB SERPL-MCNC: 0.5 MG/DL (ref 0.1–1.5)
BLD GP AB SCN SERPL QL: NORMAL
BUN SERPL-MCNC: 28 MG/DL (ref 8–22)
CALCIUM SERPL-MCNC: 8.2 MG/DL (ref 8.4–10.2)
CHLORIDE SERPL-SCNC: 89 MMOL/L (ref 96–112)
CO2 SERPL-SCNC: 27 MMOL/L (ref 20–33)
COMPONENT F 8504F: NORMAL
COVID ORDER STATUS COVID19: NORMAL
CREAT SERPL-MCNC: 6.08 MG/DL (ref 0.5–1.4)
CRP SERPL HS-MCNC: 13.61 MG/DL (ref 0–0.75)
EOSINOPHIL # BLD AUTO: 0 K/UL (ref 0–0.51)
EOSINOPHIL NFR BLD: 0 % (ref 0–6.9)
ERYTHROCYTE [DISTWIDTH] IN BLOOD BY AUTOMATED COUNT: 57.6 FL (ref 35.9–50)
GLOBULIN SER CALC-MCNC: 3.6 G/DL (ref 1.9–3.5)
GLUCOSE SERPL-MCNC: 95 MG/DL (ref 65–99)
HCT VFR BLD AUTO: 27.3 % (ref 42–52)
HGB BLD-MCNC: 9.1 G/DL (ref 14–18)
IMM GRANULOCYTES # BLD AUTO: 0.03 K/UL (ref 0–0.11)
IMM GRANULOCYTES NFR BLD AUTO: 1.3 % (ref 0–0.9)
INR PPP: 3.27 (ref 0.87–1.13)
LACTATE BLD-SCNC: 1.7 MMOL/L (ref 0.5–2)
LACTATE BLD-SCNC: 1.8 MMOL/L (ref 0.5–2)
LACTATE BLD-SCNC: 3.7 MMOL/L (ref 0.5–2)
LYMPHOCYTES # BLD AUTO: 0.38 K/UL (ref 1–4.8)
LYMPHOCYTES NFR BLD: 16.8 % (ref 22–41)
MCH RBC QN AUTO: 30.8 PG (ref 27–33)
MCHC RBC AUTO-ENTMCNC: 33.3 G/DL (ref 33.7–35.3)
MCV RBC AUTO: 92.5 FL (ref 81.4–97.8)
MONOCYTES # BLD AUTO: 0.19 K/UL (ref 0–0.85)
MONOCYTES NFR BLD AUTO: 8.4 % (ref 0–13.4)
NEUTROPHILS # BLD AUTO: 1.66 K/UL (ref 1.82–7.42)
NEUTROPHILS NFR BLD: 73.5 % (ref 44–72)
NRBC # BLD AUTO: 0 K/UL
NRBC BLD-RTO: 0 /100 WBC
PLATELET # BLD AUTO: 137 K/UL (ref 164–446)
PMV BLD AUTO: 9.9 FL (ref 9–12.9)
POTASSIUM SERPL-SCNC: 4.3 MMOL/L (ref 3.6–5.5)
PROCALCITONIN SERPL-MCNC: 8.1 NG/ML
PRODUCT TYPE UPROD: NORMAL
PROT SERPL-MCNC: 7.8 G/DL (ref 6–8.2)
PROTHROMBIN TIME: 32.8 SEC (ref 12–14.6)
RBC # BLD AUTO: 2.95 M/UL (ref 4.7–6.1)
RH BLD: NORMAL
SARS-COV-2 RNA RESP QL NAA+PROBE: DETECTED
SODIUM SERPL-SCNC: 135 MMOL/L (ref 135–145)
SPECIMEN SOURCE: ABNORMAL
UFH PPP CHRO-ACNC: <0.1 IU/ML
UNIT STATUS USTAT: NORMAL
WBC # BLD AUTO: 2.3 K/UL (ref 4.8–10.8)

## 2020-08-31 PROCEDURE — U0003 INFECTIOUS AGENT DETECTION BY NUCLEIC ACID (DNA OR RNA); SEVERE ACUTE RESPIRATORY SYNDROME CORONAVIRUS 2 (SARS-COV-2) (CORONAVIRUS DISEASE [COVID-19]), AMPLIFIED PROBE TECHNIQUE, MAKING USE OF HIGH THROUGHPUT TECHNOLOGIES AS DESCRIBED BY CMS-2020-01-R: HCPCS

## 2020-08-31 PROCEDURE — 700105 HCHG RX REV CODE 258: Performed by: EMERGENCY MEDICINE

## 2020-08-31 PROCEDURE — C9803 HOPD COVID-19 SPEC COLLECT: HCPCS | Performed by: EMERGENCY MEDICINE

## 2020-08-31 PROCEDURE — 36430 TRANSFUSION BLD/BLD COMPNT: CPT

## 2020-08-31 PROCEDURE — 85610 PROTHROMBIN TIME: CPT

## 2020-08-31 PROCEDURE — 84145 PROCALCITONIN (PCT): CPT

## 2020-08-31 PROCEDURE — 86850 RBC ANTIBODY SCREEN: CPT

## 2020-08-31 PROCEDURE — 770022 HCHG ROOM/CARE - ICU (200)

## 2020-08-31 PROCEDURE — 94760 N-INVAS EAR/PLS OXIMETRY 1: CPT

## 2020-08-31 PROCEDURE — 700105 HCHG RX REV CODE 258: Performed by: INTERNAL MEDICINE

## 2020-08-31 PROCEDURE — 83605 ASSAY OF LACTIC ACID: CPT

## 2020-08-31 PROCEDURE — 700111 HCHG RX REV CODE 636 W/ 250 OVERRIDE (IP): Performed by: INTERNAL MEDICINE

## 2020-08-31 PROCEDURE — 85520 HEPARIN ASSAY: CPT

## 2020-08-31 PROCEDURE — 80053 COMPREHEN METABOLIC PANEL: CPT

## 2020-08-31 PROCEDURE — 85025 COMPLETE CBC W/AUTO DIFF WBC: CPT

## 2020-08-31 PROCEDURE — 700102 HCHG RX REV CODE 250 W/ 637 OVERRIDE(OP): Performed by: INTERNAL MEDICINE

## 2020-08-31 PROCEDURE — 87040 BLOOD CULTURE FOR BACTERIA: CPT | Mod: 91

## 2020-08-31 PROCEDURE — 71045 X-RAY EXAM CHEST 1 VIEW: CPT

## 2020-08-31 PROCEDURE — 99291 CRITICAL CARE FIRST HOUR: CPT

## 2020-08-31 PROCEDURE — 700101 HCHG RX REV CODE 250: Performed by: INTERNAL MEDICINE

## 2020-08-31 PROCEDURE — 86900 BLOOD TYPING SEROLOGIC ABO: CPT

## 2020-08-31 PROCEDURE — 3E033XZ INTRODUCTION OF VASOPRESSOR INTO PERIPHERAL VEIN, PERCUTANEOUS APPROACH: ICD-10-PCS | Performed by: INTERNAL MEDICINE

## 2020-08-31 PROCEDURE — 86140 C-REACTIVE PROTEIN: CPT

## 2020-08-31 PROCEDURE — A9270 NON-COVERED ITEM OR SERVICE: HCPCS | Performed by: INTERNAL MEDICINE

## 2020-08-31 PROCEDURE — 86901 BLOOD TYPING SEROLOGIC RH(D): CPT

## 2020-08-31 PROCEDURE — P9017 PLASMA 1 DONOR FRZ W/IN 8 HR: HCPCS

## 2020-08-31 PROCEDURE — 99291 CRITICAL CARE FIRST HOUR: CPT | Performed by: INTERNAL MEDICINE

## 2020-08-31 PROCEDURE — 96365 THER/PROPH/DIAG IV INF INIT: CPT

## 2020-08-31 RX ORDER — CALCITRIOL 0.25 UG/1
0.75 CAPSULE, LIQUID FILLED ORAL
Status: DISCONTINUED | OUTPATIENT
Start: 2020-08-31 | End: 2020-09-08 | Stop reason: HOSPADM

## 2020-08-31 RX ORDER — BISACODYL 10 MG
10 SUPPOSITORY, RECTAL RECTAL
Status: DISCONTINUED | OUTPATIENT
Start: 2020-08-31 | End: 2020-08-31

## 2020-08-31 RX ORDER — POLYETHYLENE GLYCOL 3350 17 G/17G
1 POWDER, FOR SOLUTION ORAL
Status: DISCONTINUED | OUTPATIENT
Start: 2020-08-31 | End: 2020-08-31

## 2020-08-31 RX ORDER — HYDROCODONE BITARTRATE AND ACETAMINOPHEN 10; 325 MG/1; MG/1
3 TABLET ORAL
Status: DISCONTINUED | OUTPATIENT
Start: 2020-08-31 | End: 2020-09-08 | Stop reason: HOSPADM

## 2020-08-31 RX ORDER — CEPHALEXIN 250 MG/1
500 CAPSULE ORAL
Status: DISCONTINUED | OUTPATIENT
Start: 2020-09-02 | End: 2020-09-08 | Stop reason: HOSPADM

## 2020-08-31 RX ORDER — CINACALCET 30 MG/1
30 TABLET, FILM COATED ORAL
Status: DISCONTINUED | OUTPATIENT
Start: 2020-09-02 | End: 2020-09-04

## 2020-08-31 RX ORDER — DEXAMETHASONE 4 MG/1
6 TABLET ORAL DAILY
Status: DISCONTINUED | OUTPATIENT
Start: 2020-08-31 | End: 2020-09-08 | Stop reason: HOSPADM

## 2020-08-31 RX ORDER — GABAPENTIN 600 MG/1
1800 TABLET ORAL
Status: ON HOLD | COMMUNITY
Start: 2020-07-09 | End: 2020-09-07

## 2020-08-31 RX ORDER — AMOXICILLIN 250 MG
2 CAPSULE ORAL 2 TIMES DAILY
Status: DISCONTINUED | OUTPATIENT
Start: 2020-08-31 | End: 2020-08-31

## 2020-08-31 RX ORDER — GABAPENTIN 300 MG/1
1200 CAPSULE ORAL
COMMUNITY
End: 2021-09-20

## 2020-08-31 RX ORDER — SODIUM CHLORIDE, SODIUM LACTATE, POTASSIUM CHLORIDE, CALCIUM CHLORIDE 600; 310; 30; 20 MG/100ML; MG/100ML; MG/100ML; MG/100ML
2000 INJECTION, SOLUTION INTRAVENOUS CONTINUOUS
Status: DISCONTINUED | OUTPATIENT
Start: 2020-08-31 | End: 2020-08-31

## 2020-08-31 RX ORDER — GABAPENTIN 400 MG/1
2400 CAPSULE ORAL NIGHTLY
Status: DISCONTINUED | OUTPATIENT
Start: 2020-08-31 | End: 2020-09-08 | Stop reason: HOSPADM

## 2020-08-31 RX ORDER — SODIUM CHLORIDE 9 MG/ML
30 INJECTION, SOLUTION INTRAVENOUS ONCE
Status: COMPLETED | OUTPATIENT
Start: 2020-08-31 | End: 2020-08-31

## 2020-08-31 RX ORDER — HEPARIN SODIUM 5000 [USP'U]/100ML
0-25 INJECTION, SOLUTION INTRAVENOUS CONTINUOUS
Status: DISCONTINUED | OUTPATIENT
Start: 2020-08-31 | End: 2020-09-02

## 2020-08-31 RX ORDER — WARFARIN SODIUM 5 MG/1
5 TABLET ORAL EVERY EVENING
Status: ON HOLD | COMMUNITY
End: 2020-09-08 | Stop reason: SDUPTHER

## 2020-08-31 RX ORDER — GABAPENTIN 300 MG/1
600 CAPSULE ORAL
Status: DISCONTINUED | OUTPATIENT
Start: 2020-08-31 | End: 2020-09-08 | Stop reason: HOSPADM

## 2020-08-31 RX ORDER — SEVELAMER CARBONATE 800 MG/1
3200 TABLET, FILM COATED ORAL
Status: DISCONTINUED | OUTPATIENT
Start: 2020-08-31 | End: 2020-09-08 | Stop reason: HOSPADM

## 2020-08-31 RX ORDER — TIZANIDINE 4 MG/1
8 TABLET ORAL
Status: DISCONTINUED | OUTPATIENT
Start: 2020-08-31 | End: 2020-09-08 | Stop reason: HOSPADM

## 2020-08-31 RX ADMIN — SEVELAMER CARBONATE 3200 MG: 800 TABLET, FILM COATED ORAL at 17:27

## 2020-08-31 RX ADMIN — HYDROCODONE BITARTRATE AND ACETAMINOPHEN 3 TABLET: 10; 325 TABLET ORAL at 20:01

## 2020-08-31 RX ADMIN — SODIUM CHLORIDE 1000 ML: 9 INJECTION, SOLUTION INTRAVENOUS at 12:35

## 2020-08-31 RX ADMIN — CALCITRIOL CAPSULES 0.25 MCG 0.75 MCG: 0.25 CAPSULE ORAL at 20:02

## 2020-08-31 RX ADMIN — GABAPENTIN 2400 MG: 400 CAPSULE ORAL at 20:00

## 2020-08-31 RX ADMIN — HEPARIN SODIUM 18 UNITS/KG/HR: 5000 INJECTION, SOLUTION INTRAVENOUS at 18:15

## 2020-08-31 RX ADMIN — NOREPINEPHRINE BITARTRATE 2 MCG/MIN: 1 INJECTION INTRAVENOUS at 15:21

## 2020-08-31 RX ADMIN — DEXAMETHASONE 6 MG: 4 TABLET ORAL at 17:26

## 2020-08-31 RX ADMIN — TIZANIDINE 8 MG: 4 TABLET ORAL at 20:02

## 2020-08-31 RX ADMIN — GABAPENTIN 600 MG: 300 CAPSULE ORAL at 20:01

## 2020-08-31 ASSESSMENT — COGNITIVE AND FUNCTIONAL STATUS - GENERAL
SUGGESTED CMS G CODE MODIFIER DAILY ACTIVITY: CH
MOBILITY SCORE: 24
SUGGESTED CMS G CODE MODIFIER MOBILITY: CH
DAILY ACTIVITIY SCORE: 24

## 2020-08-31 ASSESSMENT — ENCOUNTER SYMPTOMS
PSYCHIATRIC NEGATIVE: 1
CHILLS: 1
WEAKNESS: 1
CARDIOVASCULAR NEGATIVE: 1
MUSCULOSKELETAL NEGATIVE: 1
DIARRHEA: 1
SPUTUM PRODUCTION: 0
SHORTNESS OF BREATH: 1
COUGH: 1
EYES NEGATIVE: 1

## 2020-08-31 ASSESSMENT — LIFESTYLE VARIABLES
TOTAL SCORE: 0
AVERAGE NUMBER OF DAYS PER WEEK YOU HAVE A DRINK CONTAINING ALCOHOL: 0
TOTAL SCORE: 0
TOTAL SCORE: 0
HAVE YOU EVER FELT YOU SHOULD CUT DOWN ON YOUR DRINKING: NO
ALCOHOL_USE: NO
CONSUMPTION TOTAL: NEGATIVE
EVER HAD A DRINK FIRST THING IN THE MORNING TO STEADY YOUR NERVES TO GET RID OF A HANGOVER: NO
EVER_SMOKED: NEVER
ON A TYPICAL DAY WHEN YOU DRINK ALCOHOL HOW MANY DRINKS DO YOU HAVE: 0
HOW MANY TIMES IN THE PAST YEAR HAVE YOU HAD 5 OR MORE DRINKS IN A DAY: 0
HAVE PEOPLE ANNOYED YOU BY CRITICIZING YOUR DRINKING: NO
EVER FELT BAD OR GUILTY ABOUT YOUR DRINKING: NO

## 2020-08-31 ASSESSMENT — FIBROSIS 4 INDEX
FIB4 SCORE: 2.51
FIB4 SCORE: 2.51
FIB4 SCORE: 0.77

## 2020-08-31 NOTE — ED PROVIDER NOTES
ED Provider Note    ED Provider Note    Primary care provider: Tony Mcmillan M.D.  Means of arrival: Walk-in  History obtained from: Patient    CHIEF COMPLAINT  Chief Complaint   Patient presents with   • Diarrhea     x 1 week   • Shaking     overnight     Seen at 12:02 PM.   HPI  Denis De Anda is a 40 y.o. male who presents to the Emergency Department several days of shaking chills and voluminous diarrhea.  The patient essentially has had a bed to bathroom existence, he is scared that even if he coughs or passes flatus he will have a diarrheal accident.  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    He also notes a dry nonproductive cough for the past few days as well.  He has some lightheadedness but no syncope.  He denies any headache, chest pain, shortness of breath, nausea, vomiting, abdominal pain, numbness or weakness.  He denies any black or bloody stools though his stool is normally dark from iron supplementation.  He went to hemodialysis today and did have dialysis they did not remove any fluids because he was hypotensive.    REVIEW OF SYSTEMS  See HPI,   Remainder of ROS negative.     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    SURGICAL HISTORY   has a past surgical history that includes mass excision ortho (10/9/08); av fistula creation (11/6/08); arteriogram (3/5/2009); angioplasty balloon (3/5/2009); av fistulogram (3/5/2009); us-kidney transplant (1996, 2001); endarterectomy (11/16/2010); av fistulogram (11/16/2010); cath placement (2/1/2011); angioplasty balloon (2/1/2011); av  fistulogram (6/5/2011); cath placement (6/5/2011); av fistula revision (11/3/2011); recovery (5/18/2012); incision and drainage general (9/13/2013); debridement (9/13/2013); vein ligation (9/13/2013); recovery (6/13/2014); av fistula revision (9/30/2014); irrigation & debridement general (12/15/2014); av fistula revision (2/8/2015); av fistula revision (2/22/2015); av fistula revision (3/20/2015); inject nerv blck,stellate ganglion (3/24/2015); av fistula creation (4/20/2015); av fistula thrombolysis (Left, 6/3/2015); irrigation & debridement general (Left, 6/3/2015); av fistula thrombolysis (Left, 6/9/2015); av fistula revision (Left, 6/17/2015); irrigation & debridement general (Left, 6/17/2015); recovery (8/7/2015); percut implnt neuroelect,epidural (2/19/2016); percut implnt neuroelect,epidural (2/19/2016); parathyroidectomy (2006); spinal cord stimulator (N/A, 3/25/2016); recovery (7/8/2016); lesion excision general (Right, 7/11/2016); mass excision general (Right, 8/5/2016); cath placement (Right, 9/17/2016); thrombectomy (Left, 9/18/2016); av fistulogram (9/18/2016); thrombectomy (Left, 10/20/2016); incision and drainage general (10/20/2016); irrigation & debridement general (Left, 10/28/2016); flap closure (Left, 11/17/2016); thrombectomy (Left, 1/6/2017); cath placement (Right, 1/6/2017); thrombectomy (Left, 1/5/2017); spinal cord stimulator (N/A, 5/4/2017); abdominoplasty (6/5/2017); irrigation & debridement general (7/31/2017); cath placement (Right, 11/14/2017); av fistula revision (Left, 11/14/2017); wound exploration general (2/1/2018); wound closure general (2/19/2018); split thickness skin graft (2/26/2018); thrombectomy (Left, 3/9/2018); cath placement (Right, 3/9/2018); thrombectomy (Left, 4/27/2018); thrombectomy (Left, 4/28/2018); abdominal exploration (6/25/2018); myocutaneous flap (Right, 8/27/2018); skin flap delayed (Left, 9/10/2018); split thickness skin graft (Right, 9/21/2018); av fistula  "revision (Left, 12/23/2018); av fistula revision (Left, 1/25/2019); thrombectomy (Left, 1/26/2019); intro cath dialysis circuit dx angrph fluor s&i (Left, 2/20/2020); av fistula revision (Left, 2/20/2020); av fistula creation (Right, 3/13/2020); incision and drainage general (Right, 4/20/2020); inguinal hernia repair (Bilateral, 2001, 2002); and bone spur excision (12/8/2011).    SOCIAL HISTORY  Social History     Tobacco Use   • Smoking status: Never Smoker   • Smokeless tobacco: Never Used   Substance Use Topics   • Alcohol use: No   • Drug use: No      Social History     Substance and Sexual Activity   Drug Use No       FAMILY HISTORY  Family History   Problem Relation Age of Onset   • Arthritis Father         RA   • Lung Disease Father    • Heart Disease Father         MI   • Hypertension Brother        CURRENT MEDICATIONS  Reviewed.  See Encounter Summary.     ALLERGIES  Allergies   Allergen Reactions   • Baclofen Unspecified     Total loss of memory, sedation.   RXN=6/2015   • Contrast Media With Iodine [Iodine] Rash     RXN=1/5/2017   • Pcn [Penicillins] Rash     RXN=possibly >10 years ago  Tolerated Zosyn on 2/20/18   • Tape Rash     Paper tape and tegaderm ok  RXN=ongoing   • Requip Vomiting       PHYSICAL EXAM  VITAL SIGNS: BP (!) 86/57   Pulse (!) 107   Temp (!) 38.4 °C (101.2 °F) (Temporal)   Resp (!) 25   Ht 1.626 m (5' 4\")   Wt 88 kg (194 lb 0.1 oz)   SpO2 96%   BMI 33.30 kg/m²   Constitutional: Awake, alert in no apparent distress.  Ill-appearing, rigors noted.  HENT: Normocephalic, Bilateral external ears normal. Nose normal.  Dry mucosal membranes.  Eyes: Conjunctiva normal, non-icteric, EOMI.    Thorax & Lungs: Dry cough noted, borderline tachypneic.  Breath sounds quiet.  Cardiovascular: Tachycardic, no murmurs, rubs or gallops. Bilateral pulses symmetrical.   Abdomen:  Soft, nontender, nondistended, normal active bowel sounds.   :    Skin: Visualized skin is  Dry, No erythema, No rash. "   Musculoskeletal:   No cyanosis, clubbing or edema. No leg asymmetry.   Neurologic: Alert, Grossly non-focal.   Psychiatric: Normal affect, Normal mood  Lymphatic:  No cervical LAD        RADIOLOGY  DX-CHEST-PORTABLE (1 VIEW)   Final Result      Bilateral interstitial and airspace opacities are most suggestive of atypical infection. Edema is considered less likely.            COURSE & MEDICAL DECISION MAKING  Pertinent Labs & Imaging studies reviewed. (See chart for details)    Differential diagnoses include but are not limited to: C. difficile colitis, viral syndrome, pneumonia, Covid-19    12:02 PM - Medical record reviewed, patient seen and examined at bedside.    12:13 PM  IV fluids will be administered due to clinical signs of dehydration/tachycardia/hypotension/possible sepsis.      Following IV fluids the patient has slightly improved systolic blood pressure, currently 90.  He will only receive 1 L of the 30 cc/kg bolus due to Covid-19 and end-stage renal disease.    Case discussed with intensivist who will evaluate the patient for admission.  The patient has increased oxygen demand, currently saturating in the mid 90s on 4 L nasal cannula.    Decision Making:  This is a 40 y.o. year old male who presents with a chief complaint of diarrhea.  The patient presents hypotensive and tachycardic.  Differential as above.  He was noted to have a oxygen requirement.  Chest x-ray shows multifocal pneumonia, Covid-19 swab is positive.  The patient was not given antibiotics or a full 30 cc/kg bolus due to Covid-19 being positive.  He unfortunately is very high risk for complications of this virus.  Currently not requiring levo fed with maps over 65.        CRITICAL CARE  The very real possibilty of a deterioration of this patient's condition required the highest level of my preparedness for sudden, emergent intervention.  I provided critical care services, which included medication orders, frequent reevaluations of the  patient's condition and response to treatment, ordering and reviewing test results, and discussing the case with various consultants.  The critical care time associated with the care of the patient was 40 minutes. Review chart for interventions. This time is exclusive of any other billable procedures.       FINAL IMPRESSION  1. COVID-19 virus detected    2. Hypoxia    3. SIRS (systemic inflammatory response syndrome) (HCC)    4. Diarrhea, unspecified type

## 2020-08-31 NOTE — PROGRESS NOTES
Report from ER RN POC and assessment discussed. Pt admitted on O2 5 liters and Levophed running at 2mcq/kg/min. MAP 63. Levophed titrated. Assessment completed. VSS at this time pt states minimal pain chronic d/t restless leg syndrome. 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 Pt requests to speak to . Pt Covid+ in isolation per protocol. Unable to palpate pulses to extremities d/t multiple failed fistulas. Pt currently have left thigh AV fistula with dressing intact. Pt anuric Pt now On O2 4 liters. Informed Talisha RT pt uses cpap at [facility].

## 2020-08-31 NOTE — ASSESSMENT & PLAN NOTE
B/l pna  Elevated procalcitonin but also known chronic graft infection  Leukopenia, crp 13.6 on admission  8/31 plasma  8/31 dexamethasone  With esrd do not think he is a candidate for remdesivir  On 2 l NC not worsening but hasnt improved  Cough main symptoms

## 2020-08-31 NOTE — ED NOTES
Med Rec complete per phone interview with pt's mother  Allergies Reviewed  Pt takes KEFLEX every Monday, Wednesday and Friday (no known stop date)     Pt takes WARFARIN   5 mg every day of the week      Pt takes a total of 3000 mg of GABAPENTIN every night.    Pt takes x3 NORCO 10/325 mg every night.

## 2020-08-31 NOTE — LETTER
9/9/2020               Denis Siegelham  [address]  [place] [address]        Dear Denis (MR#[id number]),      This letter is to inform you that your COVID-19 test result is POSITIVE.  This means that the virus that causes COVID-19 was found in your sample.      A review of your test during your recent visit requires that we notify you of the following:    Your nasal swab was POSITIVE for the novel coronavirus (COVID-19).     The Health Department will be in contact with you soon.      You are encouraged to continue to quarantine and self-isolate according to the CDC guidance unless otherwise directed by the Health Department.  Per the CDC, you should continue to quarantine until: At least 3 days (72 hours) have passed since your fever resolved without the use of fever-reducing medications and you had improvement in your cough and shortness of breath.  You should also remain quarantined until at least 10 days have passed since your symptoms first appeared.    Once any symptoms have resolved, it may be possible to donate plasma to help others that are currently ill with COVID-19. To learn more and apply, please contact the  at [phone] or via e-mail at [email or web address].    For any further questions regarding COVID-19, please contact the [facility] at [phone].  Thank you for your cooperation in the matter.      Sincerely,      The [facility] Care Team

## 2020-08-31 NOTE — ED TRIAGE NOTES
Pt to triage in hospital w/c with his mom for   Chief Complaint   Patient presents with   • Diarrhea     x 1 week   • Shaking     overnight     Pt reports diarhea x 1 week. Pt just came from dialysis and was sent to the ER. They did not remove any fluid as his blood pressure was too low to do so. Sepsis score of 3, reports he is a hard stick and needs to get warmed up for a vein to pop up. Agreed to 5 minutes in the sun and then we would start and IV to draw labs.

## 2020-08-31 NOTE — H&P
Pulmonary History & Physical Note    Date of Service  8/31/2020    Primary Care Physician  Tony Mcmillan M.D.    Consultants  none    Code Status  Full Code    Chief Complaint  Chief Complaint   Patient presents with   • Diarrhea     x 1 week   • Shaking     overnight       History of Presenting Illness  40 y.o. male who presented 8/31/2020 with diarrhea and shaking chills. Found to be COVID + with b/l infiltrates on cxr.  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   HENT: Negative.    Eyes: Negative.    Respiratory: Positive for cough and shortness of breath. Negative for sputum production.    Cardiovascular: Negative.    Gastrointestinal: Positive for diarrhea.   Genitourinary: Negative.    Musculoskeletal: Negative.    Skin: Negative.    Neurological: Positive for weakness.   Endo/Heme/Allergies: Negative.    Psychiatric/Behavioral: Negative.        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    Surgical History   has a past surgical history that includes mass excision ortho (10/9/08); av fistula creation (11/6/08); arteriogram  (3/5/2009); angioplasty balloon (3/5/2009); av fistulogram (3/5/2009); us-kidney transplant (1996, 2001); endarterectomy (11/16/2010); av fistulogram (11/16/2010); cath placement (2/1/2011); angioplasty balloon (2/1/2011); av fistulogram (6/5/2011); cath placement (6/5/2011); av fistula revision (11/3/2011); recovery (5/18/2012); incision and drainage general (9/13/2013); debridement (9/13/2013); vein ligation (9/13/2013); recovery (6/13/2014); av fistula revision (9/30/2014); irrigation & debridement general (12/15/2014); av fistula revision (2/8/2015); av fistula revision (2/22/2015); av fistula revision (3/20/2015); pr inject nerv blck,stellate ganglion (3/24/2015); av fistula creation (4/20/2015); av fistula thrombolysis (Left, 6/3/2015); irrigation & debridement general (Left, 6/3/2015); av fistula thrombolysis (Left, 6/9/2015); av fistula revision (Left, 6/17/2015); irrigation & debridement general (Left, 6/17/2015); recovery (8/7/2015); pr percut implnt neuroelect,epidural (2/19/2016); pr percut implnt neuroelect,epidural (2/19/2016); parathyroidectomy (2006); spinal cord stimulator (N/A, 3/25/2016); recovery (7/8/2016); lesion excision general (Right, 7/11/2016); mass excision general (Right, 8/5/2016); cath placement (Right, 9/17/2016); thrombectomy (Left, 9/18/2016); av fistulogram (9/18/2016); thrombectomy (Left, 10/20/2016); incision and drainage general (10/20/2016); irrigation & debridement general (Left, 10/28/2016); flap closure (Left, 11/17/2016); thrombectomy (Left, 1/6/2017); cath placement (Right, 1/6/2017); thrombectomy (Left, 1/5/2017); spinal cord stimulator (N/A, 5/4/2017); abdominoplasty (6/5/2017); irrigation & debridement general (7/31/2017); cath placement (Right, 11/14/2017); av fistula revision (Left, 11/14/2017); wound exploration general (2/1/2018); wound closure general (2/19/2018); split thickness skin graft (2/26/2018); thrombectomy (Left, 3/9/2018); cath placement (Right,  3/9/2018); thrombectomy (Left, 4/27/2018); thrombectomy (Left, 4/28/2018); abdominal exploration (6/25/2018); myocutaneous flap (Right, 8/27/2018); skin flap delayed (Left, 9/10/2018); split thickness skin graft (Right, 9/21/2018); av fistula revision (Left, 12/23/2018); av fistula revision (Left, 1/25/2019); thrombectomy (Left, 1/26/2019); pr intro cath dialysis circuit dx angrph fluor s&i (Left, 2/20/2020); av fistula revision (Left, 2/20/2020); av fistula creation (Right, 3/13/2020); incision and drainage general (Right, 4/20/2020); inguinal hernia repair (Bilateral, 2001, 2002); and bone spur excision (12/8/2011).     Family History  family history includes Arthritis in his father; Heart Disease in his father; Hypertension in his brother; Lung Disease in his father.     Social History   reports that he has never smoked. He has never used smokeless tobacco. He reports that he does not drink alcohol or use drugs.    Allergies  Allergies   Allergen Reactions   • Baclofen Unspecified     Total loss of memory, sedation.   RXN=6/2015   • Contrast Media With Iodine [Iodine] Rash     RXN=1/5/2017   • Pcn [Penicillins] Rash     RXN=possibly >10 years ago  Tolerated Zosyn on 2/20/18   • Tape Rash     Paper tape and tegaderm ok  RXN=ongoing   • Requip Vomiting       Medications  Prior to Admission Medications   Prescriptions Last Dose Informant Patient Reported? Taking?   HYDROcodone/acetaminophen (NORCO)  MG Tab 8/30/2020 at PM Family Member Yes No   Sig: Take 3 Tabs by mouth every bedtime. 3 TABLETS = DOSE  Indications: Moderate to Moderately Severe Pain   calcitRIOL (ROCALTROL) 0.25 MCG CAPS 8/30/2020 at PM Family Member Yes No   Sig: Take 0.75 mcg by mouth every bedtime. 3 capsules = 0.75 mcg   cephALEXin (KEFLEX) 500 MG Cap 8/28/2020 at PM Family Member Yes No   Sig: Take 500 mg by mouth every Monday, Wednesday, and Friday. No stop date   cinacalcet (SENSIPAR) 30 MG Tab 8/28/2020 at PM Family Member Yes No    Sig: Take 30 mg by mouth every Monday, Wednesday, and Friday.   gabapentin (NEURONTIN) 300 MG Cap 8/30/2020 at PM Family Member Yes Yes   Sig: Take 1,200 mg by mouth every bedtime. Takes 1800 mg and 1200 mg every night, for a total dose of 3000 mg.   gabapentin (NEURONTIN) 600 MG tablet 8/30/2020 at PM Family Member Yes No   Sig: Take 1,800 mg by mouth every bedtime. Takes 1800 mg and 1200 mg every night for a total dose of 3000 mg   sevelamer carbonate (RENVELA) 800 MG Tab tablet 8/30/2020 at PM Family Member Yes No   Sig: Take 3,200 mg by mouth 3 times a day, with meals. 3 tablets = 3200 mg   tizanidine (ZANAFLEX) 4 MG Tab 8/30/2020 at PM Family Member Yes No   Sig: Take 8 mg by mouth every bedtime. 2 tablets = 8 mg   warfarin (COUMADIN) 5 MG Tab 8/30/2020 at PM Family Member Yes Yes   Sig: Take 5 mg by mouth every evening. 5 mg every day of the week       Facility-Administered Medications: None       Physical Exam  Temp:  [37 °C (98.6 °F)-39.2 °C (102.5 °F)] 39.2 °C (102.5 °F)  Pulse:  [] 96  Resp:  [14-28] 14  BP: ()/(42-93) 92/54  SpO2:  [64 %-100 %] 96 %    Physical Exam  Constitutional:       Appearance: He is ill-appearing.   HENT:      Head: Normocephalic and atraumatic.      Mouth/Throat:      Mouth: Mucous membranes are moist.   Eyes:      Pupils: Pupils are equal, round, and reactive to light.   Neck:      Musculoskeletal: Normal range of motion and neck supple.   Cardiovascular:      Rate and Rhythm: Tachycardia present.   Pulmonary:      Effort: Pulmonary effort is normal.      Breath sounds: Normal breath sounds.   Abdominal:      General: Bowel sounds are normal. There is distension.      Palpations: Abdomen is soft.   Musculoskeletal:      Comments: Fistula left thigh   Neurological:      General: No focal deficit present.   Psychiatric:         Mood and Affect: Mood normal.         Behavior: Behavior normal.         Thought Content: Thought content normal.         Judgment: Judgment  normal.         Laboratory:  Recent Labs     08/31/20  1237   WBC 2.3*   RBC 2.95*   HEMOGLOBIN 9.1*   HEMATOCRIT 27.3*   MCV 92.5   MCH 30.8   MCHC 33.3*   RDW 57.6*   PLATELETCT 137*   MPV 9.9     Recent Labs     08/31/20  1237   SODIUM 135   POTASSIUM 4.3   CHLORIDE 89*   CO2 27   GLUCOSE 95   BUN 28*   CREATININE 6.08*   CALCIUM 8.2*     Recent Labs     08/31/20  1237   ALTSGPT 14   ASTSGOT 33   ALKPHOSPHAT 189*   TBILIRUBIN 0.5   GLUCOSE 95         No results for input(s): NTPROBNP in the last 72 hours.      No results for input(s): TROPONINT in the last 72 hours.    Imaging:  DX-CHEST-PORTABLE (1 VIEW)   Final Result      Bilateral interstitial and airspace opacities are most suggestive of atypical infection. Edema is considered less likely.        CXR personally reviewed and shows b/l patch infiltrates    Assessment/Plan:  I anticipate this patient will require at least two midnights for appropriate medical management, necessitating inpatient admission.    Sepsis associated hypotension (HCC)- (present on admission)  Assessment & Plan  This is Sepsis Present on admission  SIRS criteria identified on my evaluation include: Fever, with temperature greater than 101 deg F  Source is lungs secondary to covid 19  Sepsis protocol not initiated due to covid 19 pneumonia and need to keep lungs dry  While organ dysfunction may be noted elsewhere in this problem list or in the chart, degree of organ dysfunction does not meet CMS criteria for severe sepsis    pts wbc low at 2.3  1 liter fluid given and BP 96 systolic so no further fluid given        Diarrhea of infectious origin- (present on admission)  Assessment & Plan  At this time seems to have resolved  Likely due to covid  Pending c diff as chronically on keflex    Pneumonia due to COVID-19 virus  Assessment & Plan  B/l pna  Elevated procalcitonin but also known chronic graft infection  Leukopenia, crp pending  Was on coumadin so iNR 2.55 and so ddimer not sent and  "being transitioned to heparin    5 days of symptoms on 4 l of oxygen will start dexamethasone 6 mg qday * 10 days or till discharge  Candidate for plasma    \"I addressed all of the requirements below. Please note that these are not [facility]-specific requirements, but rather FDA requirements for administration of EUA plasma to patients. Please let me know if you have any questions.   Patient criteria:  · Severe COVID disease defined as patients with SpO2 <= 94% on room air or requiring supplemental oxygen or mechanical ventilation    Patient education/counseling:    · I provided them the “Fact Sheet for Patients and Parents/Caregivers”   · I informed them of therapeutic alternatives to plasma and the risks and benefits of those alternatives  · I informed them that they have the option to accept or refuse plasma  · I informed them that plasma  is not FDA approved, but that it is authorized for use under emergency by the FDA  · I informed them of the known risks and benefits of plasma and discussed the extent to which such risks and benefits are unknown  All information provided and communicated was consistent with the “Fact Sheet for Patients and Parents/Caregivers”.   Reporting: Report all suspected or confirmed adverse drug events or medication errors during and 7 days after the treatment course to Daisy Gannon or Gogo Araujo; they will report them to the FDA      With esrd do not think he is a candidate for remdesivir    End stage renal disease (HCC)  Assessment & Plan  Dialysis M/w/F  Received dialysis today but fluid was not removed due to hypotension    Full code    CC time managing acute hypoxic resp failure and hypotension 34 mins which does not include procedures  "

## 2020-09-01 LAB
ALBUMIN SERPL BCP-MCNC: 3 G/DL (ref 3.2–4.9)
ALBUMIN SERPL BCP-MCNC: 3.8 G/DL (ref 3.2–4.9)
ALBUMIN/GLOB SERPL: 1 G/DL
ALBUMIN/GLOB SERPL: 1 G/DL
ALP SERPL-CCNC: 129 U/L (ref 30–99)
ALP SERPL-CCNC: 165 U/L (ref 30–99)
ALT SERPL-CCNC: 10 U/L (ref 2–50)
ALT SERPL-CCNC: 13 U/L (ref 2–50)
ANION GAP SERPL CALC-SCNC: 17 MMOL/L (ref 7–16)
ANION GAP SERPL CALC-SCNC: 17 MMOL/L (ref 7–16)
AST SERPL-CCNC: 33 U/L (ref 12–45)
AST SERPL-CCNC: 39 U/L (ref 12–45)
BASOPHILS # BLD AUTO: 0 % (ref 0–1.8)
BASOPHILS # BLD AUTO: 0.2 % (ref 0–1.8)
BASOPHILS # BLD: 0 K/UL (ref 0–0.12)
BASOPHILS # BLD: 0.01 K/UL (ref 0–0.12)
BILIRUB SERPL-MCNC: 0.3 MG/DL (ref 0.1–1.5)
BILIRUB SERPL-MCNC: 0.4 MG/DL (ref 0.1–1.5)
BUN SERPL-MCNC: 35 MG/DL (ref 8–22)
BUN SERPL-MCNC: 44 MG/DL (ref 8–22)
CALCIUM SERPL-MCNC: 5.9 MG/DL (ref 8.4–10.2)
CALCIUM SERPL-MCNC: 7.4 MG/DL (ref 8.4–10.2)
CHLORIDE SERPL-SCNC: 88 MMOL/L (ref 96–112)
CHLORIDE SERPL-SCNC: 99 MMOL/L (ref 96–112)
CO2 SERPL-SCNC: 21 MMOL/L (ref 20–33)
CO2 SERPL-SCNC: 28 MMOL/L (ref 20–33)
CREAT SERPL-MCNC: 6.56 MG/DL (ref 0.5–1.4)
CREAT SERPL-MCNC: 7.8 MG/DL (ref 0.5–1.4)
EOSINOPHIL # BLD AUTO: 0 K/UL (ref 0–0.51)
EOSINOPHIL # BLD AUTO: 0 K/UL (ref 0–0.51)
EOSINOPHIL NFR BLD: 0 % (ref 0–6.9)
EOSINOPHIL NFR BLD: 0 % (ref 0–6.9)
ERYTHROCYTE [DISTWIDTH] IN BLOOD BY AUTOMATED COUNT: 56.6 FL (ref 35.9–50)
ERYTHROCYTE [DISTWIDTH] IN BLOOD BY AUTOMATED COUNT: 57.4 FL (ref 35.9–50)
GLOBULIN SER CALC-MCNC: 2.9 G/DL (ref 1.9–3.5)
GLOBULIN SER CALC-MCNC: 3.7 G/DL (ref 1.9–3.5)
GLUCOSE SERPL-MCNC: 172 MG/DL (ref 65–99)
GLUCOSE SERPL-MCNC: 198 MG/DL (ref 65–99)
HCT VFR BLD AUTO: 20.5 % (ref 42–52)
HCT VFR BLD AUTO: 28.7 % (ref 42–52)
HGB BLD-MCNC: 6.7 G/DL (ref 14–18)
HGB BLD-MCNC: 9.7 G/DL (ref 14–18)
IMM GRANULOCYTES # BLD AUTO: 0.06 K/UL (ref 0–0.11)
IMM GRANULOCYTES # BLD AUTO: 0.09 K/UL (ref 0–0.11)
IMM GRANULOCYTES NFR BLD AUTO: 1.9 % (ref 0–0.9)
IMM GRANULOCYTES NFR BLD AUTO: 2.1 % (ref 0–0.9)
INR PPP: 3.59 (ref 0.87–1.13)
LYMPHOCYTES # BLD AUTO: 0.54 K/UL (ref 1–4.8)
LYMPHOCYTES # BLD AUTO: 0.95 K/UL (ref 1–4.8)
LYMPHOCYTES NFR BLD: 17.1 % (ref 22–41)
LYMPHOCYTES NFR BLD: 22.5 % (ref 22–41)
MCH RBC QN AUTO: 30.6 PG (ref 27–33)
MCH RBC QN AUTO: 31 PG (ref 27–33)
MCHC RBC AUTO-ENTMCNC: 32.7 G/DL (ref 33.7–35.3)
MCHC RBC AUTO-ENTMCNC: 33.9 G/DL (ref 33.7–35.3)
MCV RBC AUTO: 91.4 FL (ref 81.4–97.8)
MCV RBC AUTO: 93.6 FL (ref 81.4–97.8)
MONOCYTES # BLD AUTO: 0.18 K/UL (ref 0–0.85)
MONOCYTES # BLD AUTO: 0.33 K/UL (ref 0–0.85)
MONOCYTES NFR BLD AUTO: 5.7 % (ref 0–13.4)
MONOCYTES NFR BLD AUTO: 7.8 % (ref 0–13.4)
NEUTROPHILS # BLD AUTO: 2.38 K/UL (ref 1.82–7.42)
NEUTROPHILS # BLD AUTO: 2.85 K/UL (ref 1.82–7.42)
NEUTROPHILS NFR BLD: 67.4 % (ref 44–72)
NEUTROPHILS NFR BLD: 75.3 % (ref 44–72)
NRBC # BLD AUTO: 0 K/UL
NRBC # BLD AUTO: 0 K/UL
NRBC BLD-RTO: 0 /100 WBC
NRBC BLD-RTO: 0 /100 WBC
PLATELET # BLD AUTO: 109 K/UL (ref 164–446)
PLATELET # BLD AUTO: 157 K/UL (ref 164–446)
PMV BLD AUTO: 9.4 FL (ref 9–12.9)
PMV BLD AUTO: 9.9 FL (ref 9–12.9)
POTASSIUM SERPL-SCNC: 4.3 MMOL/L (ref 3.6–5.5)
POTASSIUM SERPL-SCNC: 5.3 MMOL/L (ref 3.6–5.5)
PROT SERPL-MCNC: 5.9 G/DL (ref 6–8.2)
PROT SERPL-MCNC: 7.5 G/DL (ref 6–8.2)
PROTHROMBIN TIME: 35.3 SEC (ref 12–14.6)
RBC # BLD AUTO: 2.19 M/UL (ref 4.7–6.1)
RBC # BLD AUTO: 3.13 M/UL (ref 4.7–6.1)
SODIUM SERPL-SCNC: 133 MMOL/L (ref 135–145)
SODIUM SERPL-SCNC: 137 MMOL/L (ref 135–145)
UFH PPP CHRO-ACNC: 0.29 IU/ML
UFH PPP CHRO-ACNC: 0.54 IU/ML
UFH PPP CHRO-ACNC: 0.68 IU/ML
WBC # BLD AUTO: 3.2 K/UL (ref 4.8–10.8)
WBC # BLD AUTO: 4.2 K/UL (ref 4.8–10.8)

## 2020-09-01 PROCEDURE — 700102 HCHG RX REV CODE 250 W/ 637 OVERRIDE(OP): Performed by: HOSPITALIST

## 2020-09-01 PROCEDURE — A9270 NON-COVERED ITEM OR SERVICE: HCPCS | Performed by: INTERNAL MEDICINE

## 2020-09-01 PROCEDURE — 700102 HCHG RX REV CODE 250 W/ 637 OVERRIDE(OP): Performed by: INTERNAL MEDICINE

## 2020-09-01 PROCEDURE — A9270 NON-COVERED ITEM OR SERVICE: HCPCS | Performed by: HOSPITALIST

## 2020-09-01 PROCEDURE — 80053 COMPREHEN METABOLIC PANEL: CPT | Mod: 91

## 2020-09-01 PROCEDURE — 94660 CPAP INITIATION&MGMT: CPT

## 2020-09-01 PROCEDURE — 99223 1ST HOSP IP/OBS HIGH 75: CPT | Performed by: INTERNAL MEDICINE

## 2020-09-01 PROCEDURE — 700111 HCHG RX REV CODE 636 W/ 250 OVERRIDE (IP): Performed by: HOSPITALIST

## 2020-09-01 PROCEDURE — 99291 CRITICAL CARE FIRST HOUR: CPT | Performed by: INTERNAL MEDICINE

## 2020-09-01 PROCEDURE — 85610 PROTHROMBIN TIME: CPT

## 2020-09-01 PROCEDURE — 85025 COMPLETE CBC W/AUTO DIFF WBC: CPT

## 2020-09-01 PROCEDURE — 770022 HCHG ROOM/CARE - ICU (200)

## 2020-09-01 PROCEDURE — 94760 N-INVAS EAR/PLS OXIMETRY 1: CPT

## 2020-09-01 PROCEDURE — 700111 HCHG RX REV CODE 636 W/ 250 OVERRIDE (IP): Performed by: INTERNAL MEDICINE

## 2020-09-01 PROCEDURE — 85520 HEPARIN ASSAY: CPT

## 2020-09-01 RX ORDER — LORAZEPAM 2 MG/ML
0.5 INJECTION INTRAMUSCULAR ONCE
Status: COMPLETED | OUTPATIENT
Start: 2020-09-01 | End: 2020-09-01

## 2020-09-01 RX ORDER — BENZONATATE 100 MG/1
100 CAPSULE ORAL 3 TIMES DAILY PRN
Status: DISCONTINUED | OUTPATIENT
Start: 2020-09-01 | End: 2020-09-08 | Stop reason: HOSPADM

## 2020-09-01 RX ORDER — ALBUMIN (HUMAN) 12.5 G/50ML
12.5 SOLUTION INTRAVENOUS
Status: DISCONTINUED | OUTPATIENT
Start: 2020-09-01 | End: 2020-09-08 | Stop reason: HOSPADM

## 2020-09-01 RX ORDER — SODIUM CHLORIDE 9 MG/ML
250 INJECTION, SOLUTION INTRAVENOUS
Status: DISCONTINUED | OUTPATIENT
Start: 2020-09-01 | End: 2020-09-08 | Stop reason: HOSPADM

## 2020-09-01 RX ORDER — MIDODRINE HYDROCHLORIDE 5 MG/1
5 TABLET ORAL
Status: DISCONTINUED | OUTPATIENT
Start: 2020-09-01 | End: 2020-09-03

## 2020-09-01 RX ADMIN — DEXAMETHASONE 6 MG: 4 TABLET ORAL at 05:59

## 2020-09-01 RX ADMIN — GABAPENTIN 2400 MG: 400 CAPSULE ORAL at 20:33

## 2020-09-01 RX ADMIN — SEVELAMER CARBONATE 3200 MG: 800 TABLET, FILM COATED ORAL at 11:13

## 2020-09-01 RX ADMIN — SEVELAMER CARBONATE 3200 MG: 800 TABLET, FILM COATED ORAL at 16:58

## 2020-09-01 RX ADMIN — BENZOCAINE AND MENTHOL, UNSPECIFIED FORM 1 LOZENGE: 15; 3.6 LOZENGE ORAL at 20:33

## 2020-09-01 RX ADMIN — HEPARIN SODIUM 20 UNITS/KG/HR: 5000 INJECTION, SOLUTION INTRAVENOUS at 11:11

## 2020-09-01 RX ADMIN — GABAPENTIN 600 MG: 300 CAPSULE ORAL at 20:34

## 2020-09-01 RX ADMIN — TIZANIDINE 8 MG: 4 TABLET ORAL at 20:33

## 2020-09-01 RX ADMIN — CALCITRIOL CAPSULES 0.25 MCG 0.75 MCG: 0.25 CAPSULE ORAL at 20:32

## 2020-09-01 RX ADMIN — MIDODRINE HYDROCHLORIDE 5 MG: 5 TABLET ORAL at 11:13

## 2020-09-01 RX ADMIN — HYDROCODONE BITARTRATE AND ACETAMINOPHEN 3 TABLET: 10; 325 TABLET ORAL at 20:32

## 2020-09-01 RX ADMIN — LORAZEPAM 0.5 MG: 2 INJECTION INTRAMUSCULAR; INTRAVENOUS at 22:47

## 2020-09-01 RX ADMIN — GUAIFENESIN 200 MG: 100 SOLUTION ORAL at 21:56

## 2020-09-01 RX ADMIN — MIDODRINE HYDROCHLORIDE 5 MG: 5 TABLET ORAL at 16:58

## 2020-09-01 RX ADMIN — BENZOCAINE AND MENTHOL, UNSPECIFIED FORM 1 LOZENGE: 15; 3.6 LOZENGE ORAL at 13:25

## 2020-09-01 ASSESSMENT — ENCOUNTER SYMPTOMS
MUSCULOSKELETAL NEGATIVE: 1
COUGH: 1
ABDOMINAL PAIN: 0
EYES NEGATIVE: 1
SHORTNESS OF BREATH: 0
PSYCHIATRIC NEGATIVE: 1
GASTROINTESTINAL NEGATIVE: 1
FEVER: 0
DIARRHEA: 1
CARDIOVASCULAR NEGATIVE: 1
WEAKNESS: 1

## 2020-09-01 NOTE — PROGRESS NOTES
Report received from NOC, Pt resting calmly ,HR SR 50-60,02 at 2 L oxy mask, Levophed and heparin gtts verified, Pt A/O X4, denies pain, POC reviewed, Pt refusing breakfast this a.m, call light placed within reach.

## 2020-09-01 NOTE — ASSESSMENT & PLAN NOTE
At this time seems to have resolved  Likely due to covid  Unable to get specimen for  c diff as has not stooled. Concern for c. Diff is due to him being  chronically on keflex - formed stool

## 2020-09-01 NOTE — DISCHARGE PLANNING
Outpatient Dialysis Note    Due to Positive Covid 19 result patient will be placed at the [facility] for outpatient HD when discharged at:     [facility]  [address] [place] [address]    Schedule: Tuesday, Thursday, Saturday  Time: TBD upon discharged     Confirmed patient is active at:    [facility]   [address]  [place] [address]    Schedule: Monday, Wednesday, Friday   Time: 06:15am     Spoke with Jennifer at facility who confirmed.    Patient is seen by Dr. Najjar  In HD clinic.    Forwarded records for review.    Alejandra Ornelas- Dialysis Coordinator  [facility] # [phone]

## 2020-09-01 NOTE — PROGRESS NOTES
Reported to Dr Davalos Pt having small drops [place] red blood with coughing episodes. Pt on Heparin gtt. MD ordered Lozenges. Discussed with Dr Davalos plan for Levophed gtt, Currently infusing through peripheral line without any s/s of redness or phlebitis, Dr Davalos aware, Levophed gtt at 5 mcg/min, P.O midodrine started , plan to titrate and d/c Levophed gtt this shift. Pt resting  Calmly in stable condition. Covid Positive isolation precautions in place and followed per protocol.

## 2020-09-01 NOTE — PROGRESS NOTES
Critical Care Progress Note    Date of admission  8/31/2020    Chief Complaint  40 y.o. male admitted 8/31/2020 with cough and diarrhea    Hospital Course    40 y.o. male who presented 8/31/2020 with diarrhea and shaking chills. Found to be COVID + with b/l infiltrates on cxr.  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  Pertinent data for today    Cardiac:  On levophed 5 mcgs  Pulm:  2 liters sats 97 RA 88%  Neuro:  Intact  Heme:  20 units/hr of heparin   I/O:  Po fluid - no urine - diarrhea has resolved  ID:[id number] - wbc improved  Plasma 8/31  Dexamethasone 8/31  Keflex to suppress chronic graft infection  GI/endo:  Eating, calcium is low  Labs/Imaging:  reveiwed  Lines:  peripheral  Mobility:  OOB to chair  Symptoms: feels better still cough      Review of Systems  Review of Systems   Constitutional: Positive for malaise/fatigue.   HENT: Negative.    Eyes: Negative.    Respiratory: Positive for cough.    Cardiovascular: Negative.    Gastrointestinal: Negative.    Genitourinary: Negative.    Musculoskeletal: Negative.    Skin: Negative.    Neurological: Positive for weakness.   Endo/Heme/Allergies: Negative.    Psychiatric/Behavioral: Negative.         Vital Signs for last 24 hours   Temp:  [35.5 °C (95.9 °F)-39.2 °C (102.5 °F)] 36 °C (96.8 °F)  Pulse:  [] 56  Resp:  [11-64] 13  BP: ()/(42-93) 88/57  SpO2:  [64 %-100 %] 98 %     Physical Exam    Physical Exam  Constitutional:       Appearance: He is ill-appearing.   HENT:      Head: Normocephalic and atraumatic.      Mouth/Throat:      Mouth: Mucous membranes are moist.   Eyes:      Extraocular Movements: Extraocular movements intact.      Pupils: Pupils are equal, round, and reactive to light.   Neck:      Musculoskeletal: Normal range of motion.   Cardiovascular:      Rate and Rhythm: Normal rate.   Pulmonary:      Effort: Pulmonary effort is normal.      Breath sounds: Normal breath sounds.   Abdominal:      General: Bowel sounds are normal. There is distension.      Palpations: Abdomen is soft.   Musculoskeletal: Normal range of motion.   Skin:     General: Skin is warm and dry.   Neurological:      General: No focal deficit present.      Mental Status: He is alert.   Psychiatric:         Mood and Affect: Mood normal.         Behavior: Behavior normal.         Thought Content: Thought content normal.         Judgment: Judgment normal.         Medications  Current Facility-Administered Medications   Medication Dose Route Frequency Provider Last Rate Last Dose   • NS (BOLUS) infusion 250 mL  250 mL Intravenous DIALYSIS PRN Raul Londono M.D.       • albumin human 25% solution 12.5 g  12.5 g Intravenous DIALYSIS PRN Raul Londono M.D.       • lidocaine (XYLOCAINE) 1 % injection 1 mL  1 mL Intradermal PRN Raul Londono M.D.       • [START ON 9/2/2020] epoetin (Retacrit) injection (Dialysis Use Only) 4,000 Units  4,000 Units Intravenous MO, WE + FR Raul Londono M.D.       • midodrine (PROAMATINE) tablet 5 mg  5 mg Oral TID WITH MEALS Bolivar Paz M.D.   5 mg at 09/01/20 1113   • Pharmacy Consult Request  1 Each Other PHARMACY TO DOSE Ej Nagel M.D.       • norepinephrine (LEVOPHED) 8 mg in  mL Infusion  0.5-30 mcg/min Intravenous Continuous Bolivar Paz M.D. 9.4 mL/hr at 09/01/20 1021 5 mcg/min at 09/01/20 1021    And   • vasopressin (VASOSTRICT) 20 Units in  mL Infusion  0.03  Units/min Intravenous Continuous Bolivar Paz M.D.   Stopped at 08/31/20 1500   • HYDROcodone/acetaminophen (NORCO)  MG per tablet 3 Tab  3 Tab Oral QHS Bolivar Paz M.D.   3 Tab at 08/31/20 2001   • sevelamer carbonate (RENVELA) tablet 3,200 mg  3,200 mg Oral TID WITH MEALS Bolivar Paz M.D.   3,200 mg at 09/01/20 1113   • tizanidine (ZANAFLEX) tablet 8 mg  8 mg Oral QHS Bolivar Paz M.D.   8 mg at 08/31/20 2002   • calcitRIOL (ROCALTROL) capsule 0.75 mcg  0.75 mcg Oral QHS Bolivar Paz M.D.   0.75 mcg at 08/31/20 2002   • [Held by provider] cinacalcet (SENSIPAR) tablet 30 mg  30 mg Oral MO, WE + FR Bolivar Paz M.D.       • [START ON 9/2/2020] cephALEXin (KEFLEX) capsule 500 mg  500 mg Oral MO, WE + FR Bolivar Paz M.D.       • gabapentin (NEURONTIN) capsule 2,400 mg  2,400 mg Oral Nightly Bolivar Paz M.D.   2,400 mg at 08/31/20 2000    And   • gabapentin (NEURONTIN) capsule 600 mg  600 mg Oral QHS Bolivar Paz M.D.   600 mg at 08/31/20 2001   • dexamethasone (DECADRON) tablet 6 mg  6 mg Oral DAILY Bolivar Paz M.D.   6 mg at 09/01/20 0559   • heparin infusion 25,000 units in 500 mL 0.45% NACL  0-25 Units/kg/hr (Adjusted) Intravenous Continuous Bolivar Paz M.D. 28.3 mL/hr at 09/01/20 1111 20 Units/kg/hr at 09/01/20 1111       Fluids    Intake/Output Summary (Last 24 hours) at 9/1/2020 1125  Last data filed at 9/1/2020 0800  Gross per 24 hour   Intake 2299.37 ml   Output 0 ml   Net 2299.37 ml       Laboratory          Recent Labs     08/31/20  1237 09/01/20  0430 09/01/20  0600   SODIUM 135 137 133*   POTASSIUM 4.3 4.3 5.3   CHLORIDE 89* 99 88*   CO2 27 21 28   BUN 28* 35* 44*   CREATININE 6.08* 6.56* 7.80*   CALCIUM 8.2* 5.9* 7.4*     Recent Labs     08/31/20  1237 09/01/20  0430 09/01/20  0600   ALTSGPT 14 10 13   ASTSGOT 33 33 39   ALKPHOSPHAT 189* 129* 165*   TBILIRUBIN 0.5 0.3 0.4   GLUCOSE 95 172* 198*      Recent Labs     08/31/20  1237 09/01/20  0430 09/01/20  0600   WBC 2.3* 3.2* 4.2*   NEUTSPOLYS 73.50* 75.30* 67.40   LYMPHOCYTES 16.80* 17.10* 22.50   MONOCYTES 8.40 5.70 7.80   EOSINOPHILS 0.00 0.00 0.00   BASOPHILS 0.00 0.00 0.20   ASTSGOT 33 33 39   ALTSGPT 14 10 13   ALKPHOSPHAT 189* 129* 165*   TBILIRUBIN 0.5 0.3 0.4     Recent Labs     08/31/20  1237 08/31/20  1800 09/01/20  0430 09/01/20  0600   RBC 2.95*  --  2.19* 3.13*   HEMOGLOBIN 9.1*  --  6.7* 9.7*   HEMATOCRIT 27.3*  --  20.5* 28.7*   PLATELETCT 137*  --  109* 157*   PROTHROMBTM  --  32.8*  --   --    INR  --  3.27*  --   --        Imaging  No new imaging    Assessment/Plan  Sepsis associated hypotension (HCC)- (present on admission)  Assessment & Plan  This is Sepsis Present on admission  SIRS criteria identified on my evaluation include: Fever, with temperature greater than 101 deg F  Source is lungs secondary to covid 19  Sepsis protocol not initiated due to covid 19 pneumonia and need to keep lungs dry  While organ dysfunction may be noted elsewhere in this problem list or in the chart, degree of organ dysfunction does not meet CMS criteria for severe sepsis    pts wbc low at 2.3  1 liter fluid given and BP 96 systolic so no further fluid given    9/1: required pressors on levophed 5 mcgs, adding midodrine. Wbc improved. afebrile        Diarrhea of infectious origin- (present on admission)  Assessment & Plan  At this time seems to have resolved  Likely due to covid  Unable to get specimen for  c diff as has not stooled. Concern for c. Diff is due to him being  chronically on keflex    Pneumonia due to COVID-19 virus  Assessment & Plan  B/l pna  Elevated procalcitonin but also known chronic graft infection  Leukopenia, crp 13.6 on admission  8/31 plasma  8/31 dexamethasone  With esrd do not think he is a candidate for remdesivir  On 2 l NC not worsening    Hypocalcemia  Assessment & Plan  Will hold sensipar    End stage renal disease  ([facility])  Assessment & Plan  Dialysis M/w/F  Received dialysis today but fluid was not removed due to hypotension  Will contact dialysis in am     Reassess this pm if able to wean off levophed will restart coumadin   He has difficult vascular access and worried about using coumadin if we need to have any interventions    VTE:  Heparin  Ulcer: Not Indicated  Lines: None    I have performed a physical exam and reviewed and updated ROS and Plan today (9/1/2020). In review of yesterday's note (8/31/2020), there are no changes except as documented above.     Discussed patient condition and risk of morbidity and/or mortality with RN, RT, Pharmacy, Charge nurse / hot rounds and Patient  The patient remains critically ill.  Critical care time = 32 minutes in directly providing and coordinating critical care and extensive data review.  No time overlap and excludes procedures.

## 2020-09-01 NOTE — FLOWSHEET NOTE
08/31/20 2044   Events/Summary/Plan   Events/Summary/Plan Patient states family will bring in home unit, on continuous monitoring   Skin Inspection Respiratory Device   (oxymask w/straps above ears toward crown of head)   Vital Signs   $ Pulse Oximetry (Spot Check) Yes   Respiratory Assessment   Level of Consciousness Alert   Chest Exam   Work Of Breathing / Effort Mild   Breath Sounds   RUL Breath Sounds Clear   RML Breath Sounds Diminished   RLL Breath Sounds Diminished   ANGEL Breath Sounds Clear   LLL Breath Sounds Diminished   Secretions   Cough Dry;Strong   How Sputum Obtained Spontaneous   Oxygen   O2 (LPM) 4   O2 Delivery Device Oxymask   Non-Invasive Ventilation [employer]   Nocturnal CPAP or BIPAP   (pt having home unit brought to hospital)

## 2020-09-01 NOTE — ASSESSMENT & PLAN NOTE
This is Sepsis Present on admission  SIRS criteria identified on my evaluation include: Fever, with temperature greater than 101 deg F  Source is lungs secondary to covid 19  Sepsis protocol not initiated due to covid 19 pneumonia and need to keep lungs dry  While organ dysfunction may be noted elsewhere in this problem list or in the chart, degree of organ dysfunction does not meet CMS criteria for severe sepsis    pts wbc low at 2.3  1 liter fluid given and BP 96 systolic so no further fluid given    9/1: required pressors on levophed 5 mcgs, adding midodrine. Wbc improved. Afebrile  9/2: afebrile and still needing levophed 4 mcgs  Off levophed that pm

## 2020-09-02 LAB
INR PPP: 4.94 (ref 0.87–1.13)
PROTHROMBIN TIME: 45.4 SEC (ref 12–14.6)
UFH PPP CHRO-ACNC: 0.66 IU/ML

## 2020-09-02 PROCEDURE — 90935 HEMODIALYSIS ONE EVALUATION: CPT

## 2020-09-02 PROCEDURE — 700111 HCHG RX REV CODE 636 W/ 250 OVERRIDE (IP): Performed by: INTERNAL MEDICINE

## 2020-09-02 PROCEDURE — 94660 CPAP INITIATION&MGMT: CPT

## 2020-09-02 PROCEDURE — 99233 SBSQ HOSP IP/OBS HIGH 50: CPT | Performed by: INTERNAL MEDICINE

## 2020-09-02 PROCEDURE — A9270 NON-COVERED ITEM OR SERVICE: HCPCS | Performed by: INTERNAL MEDICINE

## 2020-09-02 PROCEDURE — 85610 PROTHROMBIN TIME: CPT

## 2020-09-02 PROCEDURE — 700101 HCHG RX REV CODE 250: Performed by: INTERNAL MEDICINE

## 2020-09-02 PROCEDURE — 700102 HCHG RX REV CODE 250 W/ 637 OVERRIDE(OP): Performed by: INTERNAL MEDICINE

## 2020-09-02 PROCEDURE — 700105 HCHG RX REV CODE 258: Performed by: INTERNAL MEDICINE

## 2020-09-02 PROCEDURE — 94760 N-INVAS EAR/PLS OXIMETRY 1: CPT

## 2020-09-02 PROCEDURE — 85520 HEPARIN ASSAY: CPT

## 2020-09-02 PROCEDURE — 99291 CRITICAL CARE FIRST HOUR: CPT | Performed by: INTERNAL MEDICINE

## 2020-09-02 PROCEDURE — 5A1D70Z PERFORMANCE OF URINARY FILTRATION, INTERMITTENT, LESS THAN 6 HOURS PER DAY: ICD-10-PCS | Performed by: INTERNAL MEDICINE

## 2020-09-02 PROCEDURE — 770022 HCHG ROOM/CARE - ICU (200)

## 2020-09-02 PROCEDURE — 700102 HCHG RX REV CODE 250 W/ 637 OVERRIDE(OP): Performed by: HOSPITALIST

## 2020-09-02 PROCEDURE — A9270 NON-COVERED ITEM OR SERVICE: HCPCS | Performed by: HOSPITALIST

## 2020-09-02 RX ORDER — HYDROCODONE BITARTRATE AND ACETAMINOPHEN 5; 325 MG/1; MG/1
1-2 TABLET ORAL EVERY 6 HOURS PRN
Status: DISCONTINUED | OUTPATIENT
Start: 2020-09-02 | End: 2020-09-08 | Stop reason: HOSPADM

## 2020-09-02 RX ADMIN — HYDROCODONE BITARTRATE AND ACETAMINOPHEN 3 TABLET: 10; 325 TABLET ORAL at 20:58

## 2020-09-02 RX ADMIN — BENZOCAINE AND MENTHOL, UNSPECIFIED FORM 1 LOZENGE: 15; 3.6 LOZENGE ORAL at 04:37

## 2020-09-02 RX ADMIN — GABAPENTIN 600 MG: 300 CAPSULE ORAL at 20:59

## 2020-09-02 RX ADMIN — CALCITRIOL CAPSULES 0.25 MCG 0.75 MCG: 0.25 CAPSULE ORAL at 20:58

## 2020-09-02 RX ADMIN — HYDROCODONE BITARTRATE AND ACETAMINOPHEN 2 TABLET: 5; 325 TABLET ORAL at 15:07

## 2020-09-02 RX ADMIN — MIDODRINE HYDROCHLORIDE 5 MG: 5 TABLET ORAL at 07:19

## 2020-09-02 RX ADMIN — BENZONATATE 100 MG: 100 CAPSULE ORAL at 11:30

## 2020-09-02 RX ADMIN — CEPHALEXIN 500 MG: 250 CAPSULE ORAL at 08:50

## 2020-09-02 RX ADMIN — GABAPENTIN 2400 MG: 400 CAPSULE ORAL at 20:57

## 2020-09-02 RX ADMIN — GUAIFENESIN 200 MG: 100 SOLUTION ORAL at 04:37

## 2020-09-02 RX ADMIN — GUAIFENESIN 200 MG: 100 SOLUTION ORAL at 20:00

## 2020-09-02 RX ADMIN — MIDODRINE HYDROCHLORIDE 5 MG: 5 TABLET ORAL at 16:53

## 2020-09-02 RX ADMIN — TIZANIDINE 8 MG: 4 TABLET ORAL at 20:58

## 2020-09-02 RX ADMIN — LIDOCAINE HYDROCHLORIDE 1 ML: 10 INJECTION, SOLUTION INFILTRATION; PERINEURAL at 20:00

## 2020-09-02 RX ADMIN — HEPARIN SODIUM 20 UNITS/KG/HR: 5000 INJECTION, SOLUTION INTRAVENOUS at 05:57

## 2020-09-02 RX ADMIN — BENZOCAINE AND MENTHOL, UNSPECIFIED FORM 1 LOZENGE: 15; 3.6 LOZENGE ORAL at 14:59

## 2020-09-02 RX ADMIN — DEXAMETHASONE 6 MG: 4 TABLET ORAL at 05:05

## 2020-09-02 RX ADMIN — BENZONATATE 100 MG: 100 CAPSULE ORAL at 04:37

## 2020-09-02 RX ADMIN — GUAIFENESIN 200 MG: 100 SOLUTION ORAL at 14:59

## 2020-09-02 RX ADMIN — NOREPINEPHRINE BITARTRATE 4 MCG/MIN: 1 INJECTION INTRAVENOUS at 04:37

## 2020-09-02 RX ADMIN — SEVELAMER CARBONATE 3200 MG: 800 TABLET, FILM COATED ORAL at 11:30

## 2020-09-02 RX ADMIN — MIDODRINE HYDROCHLORIDE 5 MG: 5 TABLET ORAL at 11:30

## 2020-09-02 RX ADMIN — SEVELAMER CARBONATE 3200 MG: 800 TABLET, FILM COATED ORAL at 16:53

## 2020-09-02 RX ADMIN — BENZOCAINE AND MENTHOL, UNSPECIFIED FORM 1 LOZENGE: 15; 3.6 LOZENGE ORAL at 11:31

## 2020-09-02 RX ADMIN — EPOETIN ALFA-EPBX 4000 UNITS: 4000 INJECTION, SOLUTION INTRAVENOUS; SUBCUTANEOUS at 22:35

## 2020-09-02 RX ADMIN — BENZONATATE 100 MG: 100 CAPSULE ORAL at 18:16

## 2020-09-02 RX ADMIN — GUAIFENESIN 200 MG: 100 SOLUTION ORAL at 10:20

## 2020-09-02 ASSESSMENT — ENCOUNTER SYMPTOMS
CARDIOVASCULAR NEGATIVE: 1
MUSCULOSKELETAL NEGATIVE: 1
SHORTNESS OF BREATH: 0
EYES NEGATIVE: 1
COUGH: 1
WEAKNESS: 1
PSYCHIATRIC NEGATIVE: 1
ABDOMINAL PAIN: 0
FEVER: 0
GASTROINTESTINAL NEGATIVE: 1
HEMOPTYSIS: 1

## 2020-09-02 ASSESSMENT — PAIN DESCRIPTION - PAIN TYPE: TYPE: ACUTE PAIN

## 2020-09-02 NOTE — PROGRESS NOTES
Pt resting in bed on home CPAP machine, Pt requesting to keep on and sleep at this time, Pt A/O X4,midodrine given PO as scheduled, Levophed gtt at 4 mcg/min for BP support. POC reviewed, Bed in low position and call light within reach.

## 2020-09-02 NOTE — CARE PLAN
Problem: Knowledge Deficit  Goal: Knowledge of disease process/condition, treatment plan, diagnostic tests, and medications will improve  Outcome: PROGRESSING AS EXPECTED  Intervention: Assess knowledge level of disease process/condition, treatment plan, diagnostic tests, and medications  Note: Discussed Covid diagnosis with Pt , reviewed all treatments, oxygen, medications and symptoms. Questions answered. Pt VU. ICU routines and POC for the day reviewed.     Problem: Respiratory:  Goal: Respiratory status will improve  Outcome: PROGRESSING AS EXPECTED  Intervention: Administer and titrate oxygen therapy  Note: Pt doing well on 2 L oxymask, encouraged to TCDB, Cough medicines given as ordered by MD with good results.

## 2020-09-02 NOTE — PROGRESS NOTES
Critical Care Progress Note    Date of admission  8/31/2020    Chief Complaint  40 y.o. male admitted 8/31/2020 with cough and diarrhea    Hospital Course    40 y.o. male who presented 8/31/2020 with diarrhea and shaking chills. Found to be COVID + with b/l infiltrates on cxr.  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  Pertinent data for today    Cardiac:  On levophed 4 mcgs  Pulm:  2 liters sats 97 RA 88%  Neuro:  Intact  Heme:  20 units/hr of heparin but INR climbing so will stop and now pt with hemoptysis  I/O:  Po fluid - no urine - diarrhea has resolved  ID:[id number] [id number]   Plasma 8/31  Dexamethasone 8/31  Keflex to suppress chronic graft infection  GI/endo:  Eating, calcium is low  Labs/Imaging:  reveiwed  Lines:  peripheral  Mobility:  OOB to chair  Symptoms:cough      Review of Systems  Review of Systems   Constitutional: Positive for malaise/fatigue.   HENT: Negative.    Eyes: Negative.    Respiratory: Positive for cough.    Cardiovascular: Negative.    Gastrointestinal: Negative.    Genitourinary: Negative.    Musculoskeletal: Negative.    Skin: Negative.    Neurological: Positive for weakness.   Endo/Heme/Allergies: Negative.    Psychiatric/Behavioral: Negative.         Vital Signs for last 24 hours   Temp:  [36.1 °C (96.9 °F)-37.5 °C (99.5 °F)] 36.6 °C (97.9 °F)  Pulse:  [48-89] 69  Resp:  [9-38] 18  BP: ()/(39-69) 99/63  SpO2:  [84 %-100 %] 97 %      Physical Exam   Physical Exam  Constitutional:       Appearance: He is ill-appearing.   HENT:      Head: Normocephalic and atraumatic.      Mouth/Throat:      Mouth: Mucous membranes are moist.   Eyes:      Extraocular Movements: Extraocular movements intact.      Pupils: Pupils are equal, round, and reactive to light.   Neck:      Musculoskeletal: Normal range of motion.   Cardiovascular:      Rate and Rhythm: Normal rate.   Pulmonary:      Effort: Pulmonary effort is normal.      Breath sounds: Normal breath sounds.   Abdominal:      General: Bowel sounds are normal. There is distension.      Palpations: Abdomen is soft.   Musculoskeletal: Normal range of motion.   Skin:     General: Skin is warm and dry.   Neurological:      General: No focal deficit present.      Mental Status: He is alert.   Psychiatric:         Mood and Affect: Mood normal.         Behavior: Behavior normal.         Thought Content: Thought content normal.         Judgment: Judgment normal.         Medications  Current Facility-Administered Medications   Medication Dose Route Frequency Provider Last Rate Last Dose   • NS (BOLUS) infusion 250 mL  250 mL Intravenous DIALYSIS PRN Raul Londono M.D.       • albumin human 25% solution 12.5 g  12.5 g Intravenous DIALYSIS PRN Raul Londono M.D.       • lidocaine (XYLOCAINE) 1 % injection 1 mL  1 mL Intradermal PRN Raul Londono M.D.       • epoetin (Retacrit) injection (Dialysis Use Only) 4,000 Units  4,000 Units Intravenous MO, WE + FR Raul Londono M.D.       • midodrine (PROAMATINE) tablet 5 mg  5 mg Oral TID WITH MEALS Bolivar Paz M.D.   5 mg at 09/02/20 1130   • benzocaine-menthol (CEPACOL) lozenge 1 Lozenge  1 Lozenge Mouth/Throat Q2HRS PRN Bolivar Paz M.D.   1 Lozenge at 09/02/20 1131   • benzonatate (TESSALON) capsule 100 mg  100 mg Oral TID PRN Polly William M.D.   100 mg at 09/02/20 1130   • guaiFENesin (ROBITUSSIN) 100 MG/5ML solution 200 mg  10 mL Oral Q4HRS PRN  Polly William M.D.   200 mg at 09/02/20 1020   • Pharmacy Consult Request  1 Each Other PHARMACY TO DOSE Ej Nagel M.D.       • norepinephrine (LEVOPHED) 8 mg in  mL Infusion  0.5-30 mcg/min Intravenous Continuous Bolivar Paz M.D. 7.5 mL/hr at 09/02/20 0437 4 mcg/min at 09/02/20 0437    And   • vasopressin (VASOSTRICT) 20 Units in  mL Infusion  0.03 Units/min Intravenous Continuous Bolivar Paz M.D.   Stopped at 08/31/20 1500   • HYDROcodone/acetaminophen (NORCO)  MG per tablet 3 Tab  3 Tab Oral QHS Bolivar Paz M.D.   3 Tab at 09/01/20 2032   • sevelamer carbonate (RENVELA) tablet 3,200 mg  3,200 mg Oral TID WITH MEALS Bolivar Paz M.D.   3,200 mg at 09/02/20 1130   • tizanidine (ZANAFLEX) tablet 8 mg  8 mg Oral QHS Bolivar Paz M.D.   8 mg at 09/01/20 2033   • calcitRIOL (ROCALTROL) capsule 0.75 mcg  0.75 mcg Oral QHS Bolivar Paz M.D.   0.75 mcg at 09/01/20 2032   • [Held by provider] cinacalcet (SENSIPAR) tablet 30 mg  30 mg Oral MO, WE + FR Bolivar Paz M.D.       • cephALEXin (KEFLEX) capsule 500 mg  500 mg Oral MO, WE + FR Bolivar Paz M.D.   500 mg at 09/02/20 0850   • gabapentin (NEURONTIN) capsule 2,400 mg  2,400 mg Oral Nightly Bolivar Paz M.D.   2,400 mg at 09/01/20 2033    And   • gabapentin (NEURONTIN) capsule 600 mg  600 mg Oral QHS Bolivar Paz M.D.   600 mg at 09/01/20 2034   • dexamethasone (DECADRON) tablet 6 mg  6 mg Oral DAILY Bolivar Paz M.D.   6 mg at 09/02/20 0505       Fluids    Intake/Output Summary (Last 24 hours) at 9/2/2020 1343  Last data filed at 9/2/2020 1200  Gross per 24 hour   Intake 1112.63 ml   Output --   Net 1112.63 ml       Laboratory          Recent Labs     08/31/20  1237 09/01/20  0430 09/01/20  0600   SODIUM 135 137 133*   POTASSIUM 4.3 4.3 5.3   CHLORIDE 89* 99 88*   CO2 27 21 28   BUN 28* 35* 44*   CREATININE 6.08* 6.56* 7.80*   CALCIUM  8.2* 5.9* 7.4*     Recent Labs     08/31/20  1237 09/01/20  0430 09/01/20  0600   ALTSGPT 14 10 13   ASTSGOT 33 33 39   ALKPHOSPHAT 189* 129* 165*   TBILIRUBIN 0.5 0.3 0.4   GLUCOSE 95 172* 198*     Recent Labs     08/31/20  1237 09/01/20  0430 09/01/20  0600   WBC 2.3* 3.2* 4.2*   NEUTSPOLYS 73.50* 75.30* 67.40   LYMPHOCYTES 16.80* 17.10* 22.50   MONOCYTES 8.40 5.70 7.80   EOSINOPHILS 0.00 0.00 0.00   BASOPHILS 0.00 0.00 0.20   ASTSGOT 33 33 39   ALTSGPT 14 10 13   ALKPHOSPHAT 189* 129* 165*   TBILIRUBIN 0.5 0.3 0.4     Recent Labs     08/31/20  1237 08/31/20  1800 09/01/20  0430 09/01/20  0600 09/01/20  1140 09/02/20  0421   RBC 2.95*  --  2.19* 3.13*  --   --    HEMOGLOBIN 9.1*  --  6.7* 9.7*  --   --    HEMATOCRIT 27.3*  --  20.5* 28.7*  --   --    PLATELETCT 137*  --  109* 157*  --   --    PROTHROMBTM  --  32.8*  --   --  35.3* 45.4*   INR  --  3.27*  --   --  3.59* 4.94*       Imaging  No new imaging    Assessment/Plan  Sepsis associated hypotension (HCC)- (present on admission)  Assessment & Plan  This is Sepsis Present on admission  SIRS criteria identified on my evaluation include: Fever, with temperature greater than 101 deg F  Source is lungs secondary to covid 19  Sepsis protocol not initiated due to covid 19 pneumonia and need to keep lungs dry  While organ dysfunction may be noted elsewhere in this problem list or in the chart, degree of organ dysfunction does not meet CMS criteria for severe sepsis    pts wbc low at 2.3  1 liter fluid given and BP 96 systolic so no further fluid given    9/1: required pressors on levophed 5 mcgs, adding midodrine. Wbc improved. Afebrile  9/2: afebrile and still needing levophed 4 mcgs         Diarrhea of infectious origin- (present on admission)  Assessment & Plan  At this time seems to have resolved  Likely due to covid  Unable to get specimen for  c diff as has not stooled. Concern for c. Diff is due to him being  chronically on keflex    Pneumonia due to COVID-19  virus  Assessment & Plan  B/l pna  Elevated procalcitonin but also known chronic graft infection  Leukopenia, crp 13.6 on admission  8/31 plasma  8/31 dexamethasone  With esrd do not think he is a candidate for remdesivir  On 2 l NC not worsening but hasnt improved  Assess after dialysis    Hypocalcemia  Assessment & Plan  Will hold sensipar    End stage renal disease (HCC)  Assessment & Plan  Dialysis M/w/F  Received dialysis on monday  D/w Dr. Londono - dialysis today     INR elevated so heparin being stopped    VTE:  Heparin  Ulcer: Not Indicated  Lines: None    I have performed a physical exam and reviewed and updated ROS and Plan today (9/2/2020). In review of yesterday's note (9/1/2020), there are no changes except as documented above.     Discussed patient condition and risk of morbidity and/or mortality with RN, RT, Pharmacy, Charge nurse / hot rounds and Patient  The patient remains critically ill.  Critical care time = 38 minutes in directly providing and coordinating critical care and extensive data review.  No time overlap and excludes procedures.

## 2020-09-02 NOTE — PROGRESS NOTES
Nephrology Daily Progress Note    Date of Service  9/2/2020    Chief Complaint  40 y.o. 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    Interval Problem Update  9/2 -patient remains in ICU on low-dose norepinephrine.  Patient complains of some hemoptysis.  Patient denies chest pain, shortness of breath.    Review of Systems  Review of Systems   Constitutional: Negative for fever.   Respiratory: Positive for hemoptysis. Negative for shortness of breath.    Cardiovascular: Negative for chest pain.   Gastrointestinal: Negative for abdominal pain.   All other systems reviewed and are negative.       Physical Exam  Temp:  [36.1 °C (96.9 °F)-37.5 °C (99.5 °F)] 36.6 °C (97.9 °F)  Pulse:  [44-89] 44  Resp:  [9-38] 27  BP: ()/(39-69) 92/68  SpO2:  [80 %-100 %] 80 %    Physical Exam   Constitutional: He is oriented to person, place, and time. He appears well-developed. No distress.   HENT:   Mouth/Throat: Oropharynx is clear and moist. No oropharyngeal exudate.   Eyes: EOM are normal. No scleral icterus.   Neck: No tracheal deviation present.   Cardiovascular: Normal rate and normal heart sounds.   No murmur heard.  Pulmonary/Chest: Effort normal and breath sounds normal. No stridor. He has no rales.   Abdominal: Soft. He exhibits no distension. There is no abdominal tenderness.   Musculoskeletal: Normal range of motion.         General: No edema.   Neurological: He is alert and oriented to person, place, and time.   Skin: Skin is warm and dry. He is not diaphoretic.   Psychiatric: He has a normal mood and affect.       Fluids    Intake/Output Summary (Last 24 hours) at 9/2/2020 1552  Last data filed at 9/2/2020 1500  Gross per 24 hour   Intake 1159 ml   Output --   Net 1159 ml       Laboratory  Labs reviewed, pertinent labs below.  Recent Labs     08/31/20  1237 09/01/20  0430 09/01/20  0600   WBC 2.3*  3.2* 4.2*   RBC 2.95* 2.19* 3.13*   HEMOGLOBIN 9.1* 6.7* 9.7*   HEMATOCRIT 27.3* 20.5* 28.7*   MCV 92.5 93.6 91.4   MCH 30.8 30.6 31.0   MCHC 33.3* 32.7* 33.9   RDW 57.6* 57.4* 56.6*   PLATELETCT 137* 109* 157*   MPV 9.9 9.9 9.4     Recent Labs     08/31/20  1237 09/01/20  0430 09/01/20  0600   SODIUM 135 137 133*   POTASSIUM 4.3 4.3 5.3   CHLORIDE 89* 99 88*   CO2 27 21 28   GLUCOSE 95 172* 198*   BUN 28* 35* 44*   CREATININE 6.08* 6.56* 7.80*   CALCIUM 8.2* 5.9* 7.4*     Recent Labs     08/31/20  1800 09/01/20  1140 09/02/20  0421   INR 3.27* 3.59* 4.94*           URINALYSIS:  No results found for: COLORURINE, CLARITY, SPECGRAVITY, PHURINE, KETONES, PROTEINURIN, BILIRUBINUR, UROBILU, NITRITE, LEUKESTERAS, OCCULTBLOOD  UPC  No results found for: TOTPROTUR No results found for: CREATININEU      Imaging reviewed  DX-CHEST-PORTABLE (1 VIEW)   Final Result      Bilateral interstitial and airspace opacities are most suggestive of atypical infection. Edema is considered less likely.            Current Facility-Administered Medications   Medication Dose Route Frequency Provider Last Rate Last Dose   • HYDROcodone-acetaminophen (NORCO) 5-325 MG per tablet 1-2 Tab  1-2 Tab Oral Q6HRS PRN Bolivar Paz M.D.   2 Tab at 09/02/20 1507   • NS (BOLUS) infusion 250 mL  250 mL Intravenous DIALYSIS PRN Raul Londono M.D.       • albumin human 25% solution 12.5 g  12.5 g Intravenous DIALYSIS PRN Raul Londono M.D.       • lidocaine (XYLOCAINE) 1 % injection 1 mL  1 mL Intradermal PRN Raul Londono M.D.       • epoetin (Retacrit) injection (Dialysis Use Only) 4,000 Units  4,000 Units Intravenous MO, WE + FR Raul Londono M.D.       • midodrine (PROAMATINE) tablet 5 mg  5 mg Oral TID WITH MEALS Bolivar Paz M.D.   5 mg at 09/02/20 1130   • benzocaine-menthol (CEPACOL) lozenge 1 Lozenge  1 Lozenge Mouth/Throat Q2HRS PRN Bolivar Paz M.D.   1 Lozenge at 09/02/20 6223   • benzonatate (TESSALON) capsule 100 mg  100 mg  Oral TID PRN Polly William M.D.   100 mg at 09/02/20 1130   • guaiFENesin (ROBITUSSIN) 100 MG/5ML solution 200 mg  10 mL Oral Q4HRS PRN Polly William M.D.   200 mg at 09/02/20 1459   • Pharmacy Consult Request  1 Each Other PHARMACY TO DOSE Ej Nagel M.D.       • norepinephrine (LEVOPHED) 8 mg in  mL Infusion  0.5-30 mcg/min Intravenous Continuous Bolivar Paz M.D. 7.5 mL/hr at 09/02/20 0437 4 mcg/min at 09/02/20 0437    And   • vasopressin (VASOSTRICT) 20 Units in  mL Infusion  0.03 Units/min Intravenous Continuous Bolivar Paz M.D.   Stopped at 08/31/20 1500   • HYDROcodone/acetaminophen (NORCO)  MG per tablet 3 Tab  3 Tab Oral QHS Bolivar Paz M.D.   3 Tab at 09/01/20 2032   • sevelamer carbonate (RENVELA) tablet 3,200 mg  3,200 mg Oral TID WITH MEALS Bolivar Paz M.D.   3,200 mg at 09/02/20 1130   • tizanidine (ZANAFLEX) tablet 8 mg  8 mg Oral QHS Bolivar Paz M.D.   8 mg at 09/01/20 2033   • calcitRIOL (ROCALTROL) capsule 0.75 mcg  0.75 mcg Oral QHS Bolivar Paz M.D.   0.75 mcg at 09/01/20 2032   • [Held by provider] cinacalcet (SENSIPAR) tablet 30 mg  30 mg Oral MO, WE + FR Bolivar Paz M.D.       • cephALEXin (KEFLEX) capsule 500 mg  500 mg Oral MO, WE + FR Bolivar Paz M.D.   500 mg at 09/02/20 0850   • gabapentin (NEURONTIN) capsule 2,400 mg  2,400 mg Oral Nightly Bolivar Paz M.D.   2,400 mg at 09/01/20 2033    And   • gabapentin (NEURONTIN) capsule 600 mg  600 mg Oral QHS Bolivar Paz M.D.   600 mg at 09/01/20 2034   • dexamethasone (DECADRON) tablet 6 mg  6 mg Oral DAILY Bolivar Paz M.D.   6 mg at 09/02/20 0505         Assessment/Plan  40 y.o. 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     1. ESRD on HD Monday Wednesday Friday, anuric.  Plan for  dialysis today.  Check labs daily.     2. Access: left thigh AVG, with history of skin breakdown.  Stable.  Avoid canulating in skin breakdown areas. Patient has history of SVC stenosis, right femoral vasculature open in case patient needs central line.      3. COVID-19 pneumonia.  Treatment per primary team.  Remains on nasal cannula for now.     4. Severe sepsis from covid-19 pneumonia, currently on pressors.  Right femoral vasculature open for central line if needed.  Pressors and antibiotics per primary team.     5. Anemia of chronic disease.  Continue Epogen thrice weekly with HD.  Check CBC daily.     6. Thrombocytopenia, improving.  Unclear etiology.  Check CBC daily.     7. Hyponatremia, noted on labs yesterday.  Limit hypotonic fluids.  Check labs daily.    Discussed with Dr. Susannah Londono MD  Nephrology

## 2020-09-02 NOTE — PROGRESS NOTES
Lost 20 g IV access to right AC, Levophed gtt at 3 mcg/min is infusing through 22 g in right hand. Pt has HX of vascular access difficulties. 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 Dr Davalos assessed Pt's fingers and perfusion. MD ok to continue Levophed through IV. Will attempt US IV at this time.

## 2020-09-02 NOTE — CONSULTS
Nephrology Consultation    Date of Service  9/1/2020    Referring Physician  JAE Green.*    Consulting Physician  Raul Londono M.D.    Reason for Consultation  Management of ESRD on HD      History of Presenting Illness  40 y.o. 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    He was feeling unwell last week, with cough. He denied SOB, but also had diarrhea every day to the point of anal pain. He has been doing HD on MWF schedule regularly, and left at target weight yesterday. But he was feeling so unwell with fatigue and cough that his mother decided to bring him in to the hospital.    Re: ESRD, he has been back on HD since 2004. He has a history of SVC stenosis and dialyzes via left thigh AV graft, which has unfortunately had skin breakdown in the past. He has right femoral vasculature that is kept free from AV access in case he needs central lines. He is anuric.     Review of Systems  Review of Systems   Constitutional: Negative for fever.   Respiratory: Positive for cough. Negative for shortness of breath.    Cardiovascular: Negative for chest pain.   Gastrointestinal: Positive for diarrhea. Negative for abdominal pain.   All other systems reviewed and are negative.      Past Medical History  Past Medical History:   Diagnosis Date   • Arrhythmia     irregular EKG   • Chronic kidney disease, unspecified    • Contracture of palmar fascia    • Dialysis      hemodialysis at home 3 days a week   • Graft failure due to thrombosis 3/10/2018   • Hemorrhagic disorder (HCC)     on coumadin   • Hypertension    • Intra-abdominal varices    • Pain     Chronic pain   • Renal failure     hemodialysis   • Seizure (HCC)     last seizure 04/1/2013   • Sleep apnea     BIPAP   • Snoring     sleep study done   • Superior vena cava obstruction with collaterals     from calcium deposits per pt's mother;   [facility]   • Toxic uninodular goiter without mention of thyrotoxic crisis or storm        Surgical History  Past Surgical History:   Procedure Laterality Date   • INCISION AND DRAINAGE GENERAL Right 4/20/2020    Procedure: INCISION AND DRAINAGE, REMOVAL INFECTED THIGH GRAFT;  Surgeon: Lurdes Moffett M.D.;  Location: [facility];  Service: General   • AV FISTULA CREATION Right 3/13/2020    Procedure: CREATION, AV FISTULA- THIGH AND EXPLORATION OF LEFT THIGH AV GRAFT;  Surgeon: Lurdes Moffett M.D.;  Location: [facility];  Service: General   • DE INTRO CATH DIALYSIS CIRCUIT DX ANGRPH FLUOR S&I Left 2/20/2020    Procedure: FISTULOGRAM AND ANGIOPLASTY, VENOGRAM THROUGH EXISTING CATHETER.;  Surgeon: Lurdes Moffett M.D.;  Location: [facility];  Service: General   • AV FISTULA REVISION Left 2/20/2020    Procedure: REPAIR BLEEDING SITE LEFT ARTERIOVENOUS GRAFT.;  Surgeon: Lurdes Moffett M.D.;  Location: [facility];  Service: General   • THROMBECTOMY Left 1/26/2019    Procedure: THROMBECTOMY  and angioplasty AV GRAFT;  Surgeon: Jairo Hopkins M.D.;  Location: [facility];  Service: General   • AV FISTULA REVISION Left 1/25/2019    Procedure: AV FISTULA REVISION - THIGH LOOP GRAFT;  Surgeon: Jairo Hopkins M.D.;  Location: [facility];  Service: General   • AV FISTULA REVISION Left 12/23/2018    Procedure: AV FISTULA REVISION;  Surgeon: Lurdes Moffett M.D.;  Location: [facility];  Service: General   • SPLIT THICKNESS SKIN GRAFT Right 9/21/2018    Procedure: SPLIT THICKNESS SKIN GRAFT - ARM TO ABDOMEN;  Surgeon: Samson Rosenbaum Jr., M.D.;  Location: [facility];  Service: Plastics   • SKIN FLAP DELAYED Left 9/10/2018    Procedure: SKIN FLAP DELAYED- FOR DIVISION AND INSET FLAP WITH SKIN GRAFT ON ARM;  Surgeon: Samson Rosenbaum Jr., M.D.;  Location: SURGERY SAME DAY  [facility];  Service: Plastics   • MYOCUTANEOUS FLAP Right 8/27/2018    Procedure: MYOCUTANEOUS FLAP - RIGHT ARM TO TRUNK;  Surgeon: Samson Rosenbaum Jr., M.D.;  Location: SURGERY [facility];  Service: Plastics   • ABDOMINAL EXPLORATION  6/25/2018    Procedure: ABDOMINAL EXPLORATION- CONTROL ABDOMINAL BLEEDING;  Surgeon: Samson Rosenbaum Jr., M.D.;  Location: SURGERY [facility];  Service: Plastics   • THROMBECTOMY Left 4/28/2018    Procedure: THROMBECTOMY- Thigh W/ Graft;  Surgeon: Jairo Hopkins M.D.;  Location: SURGERY [facility];  Service: General   • THROMBECTOMY Left 4/27/2018    Procedure: THROMBECTOMY - THIGH GRAFT AND REVISION;  Surgeon: Jairo Hopkins M.D.;  Location: [facility];  Service: General   • THROMBECTOMY Left 3/9/2018    Procedure: THROMBECTOMY- THIGH DIALYSIS GRAFT AND REVISION  ;  Surgeon: Jairo Hopkins M.D.;  Location: SURGERY [facility];  Service: General   • CATH PLACEMENT Right 3/9/2018    Procedure: CATH PLACEMENT - REPLACE DIALYSIS CATH RIGHT THIGH;  Surgeon: Jairo Hopkins M.D.;  Location: [facility];  Service: General   • SPLIT THICKNESS SKIN GRAFT  2/26/2018    Procedure: SPLIT THICKNESS SKIN GRAFT- TO ABDOMEN;  Surgeon: Samson Rosenbaum Jr., M.D.;  Location: [facility];  Service: Plastics   • WOUND CLOSURE GENERAL  2/19/2018    Procedure: WOUND CLOSURE GENERAL-ABDOMINAL WALL HEMORRHAGE;  Surgeon: Jason Robertson M.D.;  Location: SURGERY [facility];  Service: Plastics   • WOUND EXPLORATION GENERAL  2/1/2018    Procedure: WOUND EXPLORATION GENERAL, REPAIR BLEEDING;  Surgeon: Samson Rosenbaum Jr., M.D.;  Location: SURGERY [facility];  Service: Plastics   • CATH PLACEMENT Right 11/14/2017    Procedure: CATH PLACEMENT - PERMA;  Surgeon: Jairo Hopkins M.D.;  Location: SURGERY [facility];  Service: General   • AV FISTULA REVISION Left 11/14/2017    Procedure: AV GRAFT REVISION;  Surgeon:  Jairo Hopkins M.D.;  Location: [facility];  Service: General   • IRRIGATION & DEBRIDEMENT GENERAL  7/31/2017    Procedure: IRRIGATION & DEBRIDEMENT GENERAL-ABDOMEN;  Surgeon: Samson Rosenbaum Jr., M.D.;  Location: [facility];  Service:    • ABDOMINOPLASTY  6/5/2017    Procedure: ABDOMINOPLASTY - FOR PANNICULECTOMY;  Surgeon: Samson Rosenbaum Jr., M.D.;  Location: [facility];  Service:    • SPINAL CORD STIMULATOR N/A 5/4/2017    Procedure: SPINAL CORD STIMULATOR - EXPLANT;  Surgeon: Bin Pro M.D.;  Location: [facility];  Service:    • THROMBECTOMY Left 1/6/2017    Procedure: THROMBECTOMY-OPEN THROMBECTOMY WITH LEFT THIGH GRAFT;  Surgeon: Jairo Hopkins M.D.;  Location: [facility];  Service:    • CATH PLACEMENT Right 1/6/2017    Procedure: CATH PLACEMENT;  Surgeon: Jairo Hopkins M.D.;  Location: [facility];  Service:    • THROMBECTOMY Left 1/5/2017    Procedure: THROMBECTOMY-THIGH AV LOOP GRAFT AND ANGIOJET;  Surgeon: Jairo Hopkins M.D.;  Location: [facility];  Service:    • FLAP CLOSURE Left 11/17/2016    Procedure: Fasciocutaneous Flap Closure Left Upper Leg;  Surgeon: Samson Rosenbaum Jr., M.D.;  Location: [facility];  Service:    • IRRIGATION & DEBRIDEMENT GENERAL Left 10/28/2016    Procedure: IRRIGATION & DEBRIDEMENT GENERAL THIGH WITH IRRIGATING WOUND VAC PLACEMENT;  Surgeon: Jairo Hopkins M.D.;  Location: [facility];  Service:    • THROMBECTOMY Left 10/20/2016    Procedure: THROMBECTOMY THIGH;  Surgeon: Jairo Hopkins M.D.;  Location: [facility];  Service:    • INCISION AND DRAINAGE GENERAL  10/20/2016    Procedure: INCISION AND DRAINAGE GENERAL HEMATOMA;  Surgeon: Jairo Hopkins M.D.;  Location: [facility];  Service:    • THROMBECTOMY Left 9/18/2016    Procedure: THROMBECTOMY - and fistula revision;  Surgeon: Jairo Hopkins M.D.;   Location: SURGERY [facility];  Service:    • AV FISTULOGRAM  9/18/2016    Procedure: AV FISTULOGRAM;  Surgeon: Jairo Hopkins M.D.;  Location: SURGERY [facility];  Service:    • CATH PLACEMENT Right 9/17/2016    Procedure: CATH PLACEMENT - tunneled dialysis cath placement right femoral ;  Surgeon: Quentin Alicia M.D.;  Location: SURGERY [facility];  Service:    • MASS EXCISION GENERAL Right 8/5/2016    Procedure: MASS EXCISION GENERAL FOR CALCIPHYLAXIS SKIN AND SUBCUTANEOUS TISSUE FOREARM;  Surgeon: Jairo Hopkins M.D.;  Location: SURGERY [facility];  Service:    • LESION EXCISION GENERAL Right 7/11/2016    Procedure: LESION EXCISION GENERAL FOR ARM SKIN;  Surgeon: Jairo Hopkins M.D.;  Location: SURGERY [facility];  Service:    • RECOVERY  7/8/2016    Procedure: IR1-VASCULAR CASE-VIANEY-LEFT THIGH AV GRAFT THROMBOLYSIS WITH TISSUE PLASMINOGEN ACTIVATOR AND ANGIOJET ARTHERECTOMY   ;  Surgeon: Melinda Surgery;  Location: SURGERY PRE-POST PROC UNIT [facility];  Service:    • SPINAL CORD STIMULATOR N/A 3/25/2016    Procedure: SPINAL CORD STIMULATOR;  Surgeon: Bin Pro;  Location: SURGERY [facility];  Service:    • PB PERCUT IMPLNT NEUROELECT,EPIDURAL  2/19/2016    Procedure: IMPLANT NEUROSTIM EPI ARRAY;  Surgeon: Bin Pro;  Location: SURGERY [facility];  Service: Pain Management   • PB PERCUT IMPLNT NEUROELECT,EPIDURAL  2/19/2016    Procedure: IMPLANT NEUROSTIM EPI ARRAY;  Surgeon: Bin Pro;  Location: SURGERY [facility];  Service: Pain Management   • RECOVERY  8/7/2015    Procedure:  VASCULAR CASE VIANEY-RIGHT ILIAC VENOGRAM WITH ANGIOPLASTY, REMOVAL RIGHT FEMORAL TUNNELED DIALYSIS CATHETER  **VRE**;  Surgeon: Melinda Surgery;  Location: SURGERY PRE-POST PROC UNIT [facility];  Service:    • AV FISTULA REVISION Left 6/17/2015    Procedure: AV FISTULA REVISION;  Surgeon: Jairo Hopkins M.D.;  Location: SURGERY [facility];  Service:    • IRRIGATION &  DEBRIDEMENT GENERAL Left 6/17/2015    Procedure: IRRIGATION & DEBRIDEMENT GENERAL ARM W/EXPLORATION WOUND & CONTROL BLEEDING;  Surgeon: Jairo Hopkins M.D.;  Location: SURGERY [facility];  Service:    • AV FISTULA THROMBOLYSIS Left 6/9/2015    Procedure: THROMBECTOMY AV GRAFT THIGH;  Surgeon: Jairo Hopkins M.D.;  Location: SURGERY [facility];  Service:    • AV FISTULA THROMBOLYSIS Left 6/3/2015    Procedure: AV FISTULA THROMBOLYSIS THIGH REPLACEMENT;  Surgeon: Jairo Hopkins M.D.;  Location: SURGERY [facility];  Service:    • IRRIGATION & DEBRIDEMENT GENERAL Left 6/3/2015    Procedure: IRRIGATION & DEBRIDEMENT GENERAL;  Surgeon: Jairo Hopkins M.D.;  Location: SURGERY [facility];  Service:    • AV FISTULA CREATION  4/20/2015    Performed by Jairo Hopkins M.D. at [facility]   • PB INJECT NERV BLCK,STELLATE GANGLION  3/24/2015    Performed by Bin Pro at [facility]   • AV FISTULA REVISION  3/20/2015    Performed by Jairo Hopkins M.D. at SURGERY [facility]   • AV FISTULA REVISION  2/22/2015    Performed by Lurdes Moffett M.D. at SURGERY [facility]   • AV FISTULA REVISION  2/8/2015    Performed by Jairo Hopkins M.D. at [facility]   • IRRIGATION & DEBRIDEMENT GENERAL  12/15/2014    Performed by Jairo Hopkins M.D. at SURGERY [facility]   • AV FISTULA REVISION  9/30/2014    Performed by Jairo Hopkins M.D. at SURGERY [facility]   • RECOVERY  6/13/2014    Performed by [facility] DAY [facility]   • INCISION AND DRAINAGE GENERAL  9/13/2013    Performed by Jairo Hopkins M.D. at SURGERY [facility]   • DEBRIDEMENT  9/13/2013    Performed by Jairo Hopkins M.D. at [facility]   • VEIN LIGATION  9/13/2013    Performed by Jairo Hopkins M.D. at SURGERY [facility]   • RECOVERY  5/18/2012    Performed by SURGERY, IR-RECOVERY at [facility]   •  BONE SPUR EXCISION  12/8/2011    Performed by BELKIS BREAUX at SURGERY SAME DAY [facility] ORS   • AV FISTULA REVISION  11/3/2011    Performed by MARI WINTERS at SURGERY [facility] ORS   • AV FISTULOGRAM  6/5/2011    Performed by MARI WINTERS at SURGERY [facility]   • CATH PLACEMENT  6/5/2011    Performed by MARI WINTERS at SURGERY [facility]   • CATH PLACEMENT  2/1/2011    Performed by MARI WINTERS at SURGERY [facility]   • ANGIOPLASTY BALLOON  2/1/2011    Performed by MARI WINTERS at SURGERY [facility]   • ENDARTERECTOMY  11/16/2010    Performed by MARI WINTERS at SURGERY [facility]   • AV FISTULOGRAM  11/16/2010    Performed by MARI WINTERS at SURGERY [facility]   • ARTERIOGRAM  3/5/2009    Performed by MARI WINTERS at SURGERY [facility]S   • ANGIOPLASTY BALLOON  3/5/2009    Performed by MARI WINTERS at SURGERY [facility] ORS   • AV FISTULOGRAM  3/5/2009    Performed by MARI WINTERS at SURGERY [facility] OR[facility]   • AV FISTULA CREATION  11/6/08    Performed by MARI WINTERS at SURGERY [facility] ORS   • MASS EXCISION ORTHO  10/9/08    Performed by BELKIS BREAUX at SURGERY SAME DAY [facility] OR[facility]   • PARATHYROIDECTOMY  2006   • INGUINAL HERNIA REPAIR Bilateral 2001, 2002   • US-KIDNEY TRANSPLANT  1996, 2001    x2       Family History  Family History   Problem Relation Age of Onset   • Arthritis Father         RA   • Lung Disease Father    • Heart Disease Father         MI   • Hypertension Brother        Social History  Social History     Socioeconomic History   • Marital status: Single     Spouse name: Not on file   • Number of children: Not on file   • Years of education: Not on file   • Highest education level: Not on file   Occupational History   • Occupation:      Employer: [employer]   Social Needs   • Financial resource strain: Not on file   • Food insecurity     Worry: Not on file     Inability: Not on  file   • Transportation needs     Medical: Not on file     Non-medical: Not on file   Tobacco Use   • Smoking status: Never Smoker   • Smokeless tobacco: Never Used   Substance and Sexual Activity   • Alcohol use: No   • Drug use: No   • Sexual activity: Yes     Partners: Female   Lifestyle   • Physical activity     Days per week: Not on file     Minutes per session: Not on file   • Stress: Not on file   Relationships   • Social connections     Talks on phone: Not on file     Gets together: Not on file     Attends Yarsanism service: Not on file     Active member of club or organization: Not on file     Attends meetings of clubs or organizations: Not on file     Relationship status: Not on file   • Intimate partner violence     Fear of current or ex partner: Not on file     Emotionally abused: Not on file     Physically abused: Not on file     Forced sexual activity: Not on file   Other Topics Concern   • Primary/coprimary nurse & associates Not Asked   • Family contact information Not Asked   • OK to release patient information to the following Not Asked   • Patient preferred routine/Privacy concerns Not Asked   • Patient likes and dislikes Not Asked   • Participating In Research Study Not Asked   • Miscellaneous Not Asked   Social History Narrative   • Not on file       Medications  Current Facility-Administered Medications   Medication Dose Route Frequency Provider Last Rate Last Dose   • NS (BOLUS) infusion 250 mL  250 mL Intravenous DIALYSIS PRN Raul Londono M.D.       • albumin human 25% solution 12.5 g  12.5 g Intravenous DIALYSIS PRN Raul Londono M.D.       • lidocaine (XYLOCAINE) 1 % injection 1 mL  1 mL Intradermal PRN Raul Londono M.D.       • [START ON 9/2/2020] epoetin (Retacrit) injection (Dialysis Use Only) 4,000 Units  4,000 Units Intravenous MO, WE + FR Raul Londono M.D.       • midodrine (PROAMATINE) tablet 5 mg  5 mg Oral TID WITH MEALS Bolivar Paz M.D.   5 mg at 09/01/20 1658   •  benzocaine-menthol (CEPACOL) lozenge 1 Lozenge  1 Lozenge Mouth/Throat Q2HRS PRN Bolivar Paz M.D.   1 Lozenge at 09/01/20 1325   • Pharmacy Consult Request  1 Each Other PHARMACY TO DOSE Ej Nagel M.D.       • norepinephrine (LEVOPHED) 8 mg in  mL Infusion  0.5-30 mcg/min Intravenous Continuous Bolivar Paz M.D. 3.8 mL/hr at 09/01/20 1700 2 mcg/min at 09/01/20 1700    And   • vasopressin (VASOSTRICT) 20 Units in  mL Infusion  0.03 Units/min Intravenous Continuous Bolivar Paz M.D.   Stopped at 08/31/20 1500   • HYDROcodone/acetaminophen (NORCO)  MG per tablet 3 Tab  3 Tab Oral QHS Bolivar Paz M.D.   3 Tab at 08/31/20 2001   • sevelamer carbonate (RENVELA) tablet 3,200 mg  3,200 mg Oral TID WITH MEALS Bolivar Paz M.D.   3,200 mg at 09/01/20 1658   • tizanidine (ZANAFLEX) tablet 8 mg  8 mg Oral QHS Bolivar Paz M.D.   8 mg at 08/31/20 2002   • calcitRIOL (ROCALTROL) capsule 0.75 mcg  0.75 mcg Oral QHS Bolivar Paz M.D.   0.75 mcg at 08/31/20 2002   • [Held by provider] cinacalcet (SENSIPAR) tablet 30 mg  30 mg Oral MO, WE + FR Bolivar Paz M.D.       • [START ON 9/2/2020] cephALEXin (KEFLEX) capsule 500 mg  500 mg Oral MO, WE + FR Bolivar Paz M.D.       • gabapentin (NEURONTIN) capsule 2,400 mg  2,400 mg Oral Nightly Bolivar Paz M.D.   2,400 mg at 08/31/20 2000    And   • gabapentin (NEURONTIN) capsule 600 mg  600 mg Oral QHS Bolivar Paz M.D.   600 mg at 08/31/20 2001   • dexamethasone (DECADRON) tablet 6 mg  6 mg Oral DAILY Bolivar Paz M.D.   6 mg at 09/01/20 0559   • heparin infusion 25,000 units in 500 mL 0.45% NACL  0-25 Units/kg/hr (Adjusted) Intravenous Continuous Bolivar Paz M.D. 28.3 mL/hr at 09/01/20 1318 20 Units/kg/hr at 09/01/20 1318       Allergies  Allergies   Allergen Reactions   • Baclofen Unspecified     Total loss of memory, sedation.   RXN=6/2015   •  Contrast Media With Iodine [Iodine] Rash     RXN=1/5/2017   • Pcn [Penicillins] Rash     RXN=possibly >10 years ago  Tolerated Zosyn on 2/20/18   • Tape Rash     Paper tape and tegaderm ok  RXN=ongoing   • Requip Vomiting       Physical Exam  Temp:  [35.5 °C (95.9 °F)-38.6 °C (101.4 °F)] 36.1 °C (96.9 °F)  Pulse:  [] 51  Resp:  [11-64] 19  BP: ()/(45-82) 93/60  SpO2:  [95 %-100 %] 100 %    Physical Exam   Constitutional: He is oriented to person, place, and time. He appears well-developed. No distress.   HENT:   Mouth/Throat: Oropharynx is clear and moist. No oropharyngeal exudate.   Eyes: EOM are normal. No scleral icterus.   Neck: No tracheal deviation present.   Cardiovascular: Normal rate and normal heart sounds.   No murmur heard.  Pulmonary/Chest: Effort normal and breath sounds normal. No stridor. He has no rales.   Abdominal: Soft. He exhibits no distension. There is no abdominal tenderness.   Musculoskeletal: Normal range of motion.         General: No edema.   Neurological: He is alert and oriented to person, place, and time.   Skin: Skin is warm and dry. He is not diaphoretic.   Psychiatric: He has a normal mood and affect.   Access: left thigh AVG with +bruit.      Fluids  Date 09/01/20 0700 - 09/02/20 0659   Shift 5822-2295 6559-2881 5502-1764 24 Hour Total   INTAKE   P.O. 120 60  180   I.V. 298.2 67.8  366   Shift Total 418.2 127.8  546   OUTPUT   Urine 0   0   Shift Total 0   0   Weight (kg) 83.3 83.3 83.3 83.3       Laboratory  Labs reviewed, pertinent labs below.  Recent Labs     08/31/20  1237 09/01/20  0430 09/01/20  0600   WBC 2.3* 3.2* 4.2*   RBC 2.95* 2.19* 3.13*   HEMOGLOBIN 9.1* 6.7* 9.7*   HEMATOCRIT 27.3* 20.5* 28.7*   MCV 92.5 93.6 91.4   MCH 30.8 30.6 31.0   MCHC 33.3* 32.7* 33.9   RDW 57.6* 57.4* 56.6*   PLATELETCT 137* 109* 157*   MPV 9.9 9.9 9.4     Recent Labs     08/31/20  1237 09/01/20  0430 09/01/20  0600   SODIUM 135 137 133*   POTASSIUM 4.3 4.3 5.3   CHLORIDE 89* 99  88*   CO2 27 21 28   GLUCOSE 95 172* 198*   BUN 28* 35* 44*   CREATININE 6.08* 6.56* 7.80*   CALCIUM 8.2* 5.9* 7.4*     Recent Labs     08/31/20  1800 09/01/20  1140   INR 3.27* 3.59*            URINALYSIS:  No results found for: COLORURINE, CLARITY, SPECGRAVITY, PHURINE, KETONES, PROTEINURIN, BILIRUBINUR, UROBILU, NITRITE, LEUKESTERAS, OCCULTBLOOD  UPC  No results found for: TOTPROTUR No results found for: CREATININEU    Imaging reviewed  DX-CHEST-PORTABLE (1 VIEW)   Final Result      Bilateral interstitial and airspace opacities are most suggestive of atypical infection. Edema is considered less likely.            Assessment/Plan  40 y.o. 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    1. ESRD on HD Monday Wednesday Friday, anuric. No acute need for HD today. Plan for HD tomorrow. Check labs daily.     2. Access: left thigh AVG, with history of skin breakdown. Avoid canulating in skin breakdown areas. Patient has history of SVC stenosis, right femoral vasculature open in case patient needs central line.     3. COVID-19 pneumonia. Treatment per primary team.     4. Severe sepsis from covid-19 pneumonia, currently on pressors. Pressors and antibiotics per primary team.     5. Anemia of chronic disease. Order epogen thrice weekly with HD. Check CBC daily.     6. Thrombocytopenia, noted on admission. Unclear etiology. Check CBC daily.     7. Hyponatremia, due to excess hypotonic fluid intake. Limit hypotonic fluids.  Check labs daily.        Raul Londono MD  Nephrology

## 2020-09-03 LAB
ALBUMIN SERPL BCP-MCNC: 3.6 G/DL (ref 3.2–4.9)
ALBUMIN/GLOB SERPL: 1 G/DL
ALP SERPL-CCNC: 151 U/L (ref 30–99)
ALT SERPL-CCNC: 11 U/L (ref 2–50)
ANION GAP SERPL CALC-SCNC: 18 MMOL/L (ref 7–16)
AST SERPL-CCNC: 39 U/L (ref 12–45)
BASOPHILS # BLD AUTO: 0 % (ref 0–1.8)
BASOPHILS # BLD: 0 K/UL (ref 0–0.12)
BILIRUB SERPL-MCNC: 0.4 MG/DL (ref 0.1–1.5)
BUN SERPL-MCNC: 41 MG/DL (ref 8–22)
CALCIUM SERPL-MCNC: 7.6 MG/DL (ref 8.4–10.2)
CHLORIDE SERPL-SCNC: 93 MMOL/L (ref 96–112)
CO2 SERPL-SCNC: 26 MMOL/L (ref 20–33)
CREAT SERPL-MCNC: 5.9 MG/DL (ref 0.5–1.4)
EOSINOPHIL # BLD AUTO: 0 K/UL (ref 0–0.51)
EOSINOPHIL NFR BLD: 0 % (ref 0–6.9)
ERYTHROCYTE [DISTWIDTH] IN BLOOD BY AUTOMATED COUNT: 57.1 FL (ref 35.9–50)
GLOBULIN SER CALC-MCNC: 3.7 G/DL (ref 1.9–3.5)
GLUCOSE SERPL-MCNC: 116 MG/DL (ref 65–99)
HCT VFR BLD AUTO: 23.5 % (ref 42–52)
HCT VFR BLD AUTO: 23.6 % (ref 42–52)
HGB BLD-MCNC: 7.8 G/DL (ref 14–18)
HGB BLD-MCNC: 7.8 G/DL (ref 14–18)
HYPOCHROMIA BLD QL SMEAR: ABNORMAL
INR PPP: 6.16 (ref 0.87–1.13)
LYMPHOCYTES # BLD AUTO: 0.83 K/UL (ref 1–4.8)
LYMPHOCYTES NFR BLD: 18 % (ref 22–41)
MANUAL DIFF BLD: NORMAL
MCH RBC QN AUTO: 30.7 PG (ref 27–33)
MCHC RBC AUTO-ENTMCNC: 33.1 G/DL (ref 33.7–35.3)
MCV RBC AUTO: 92.9 FL (ref 81.4–97.8)
MONOCYTES # BLD AUTO: 0.18 K/UL (ref 0–0.85)
MONOCYTES NFR BLD AUTO: 4 % (ref 0–13.4)
MYELOCYTES NFR BLD MANUAL: 2 %
NEUTROPHILS # BLD AUTO: 3.5 K/UL (ref 1.82–7.42)
NEUTROPHILS NFR BLD: 69 % (ref 44–72)
NEUTS BAND NFR BLD MANUAL: 7 % (ref 0–10)
NRBC # BLD AUTO: 0 K/UL
NRBC BLD-RTO: 0 /100 WBC
PLATELET # BLD AUTO: 158 K/UL (ref 164–446)
PLATELET BLD QL SMEAR: NORMAL
PMV BLD AUTO: 10.2 FL (ref 9–12.9)
POTASSIUM SERPL-SCNC: 4.6 MMOL/L (ref 3.6–5.5)
PROT SERPL-MCNC: 7.3 G/DL (ref 6–8.2)
PROTHROMBIN TIME: 54 SEC (ref 12–14.6)
RBC # BLD AUTO: 2.54 M/UL (ref 4.7–6.1)
RBC BLD AUTO: PRESENT
SODIUM SERPL-SCNC: 137 MMOL/L (ref 135–145)
VARIANT LYMPHS BLD QL SMEAR: NORMAL
WBC # BLD AUTO: 4.6 K/UL (ref 4.8–10.8)

## 2020-09-03 PROCEDURE — 700102 HCHG RX REV CODE 250 W/ 637 OVERRIDE(OP): Performed by: HOSPITALIST

## 2020-09-03 PROCEDURE — 85007 BL SMEAR W/DIFF WBC COUNT: CPT

## 2020-09-03 PROCEDURE — 85610 PROTHROMBIN TIME: CPT

## 2020-09-03 PROCEDURE — 85014 HEMATOCRIT: CPT

## 2020-09-03 PROCEDURE — 770020 HCHG ROOM/CARE - TELE (206)

## 2020-09-03 PROCEDURE — 99233 SBSQ HOSP IP/OBS HIGH 50: CPT | Performed by: INTERNAL MEDICINE

## 2020-09-03 PROCEDURE — 94760 N-INVAS EAR/PLS OXIMETRY 1: CPT

## 2020-09-03 PROCEDURE — A9270 NON-COVERED ITEM OR SERVICE: HCPCS | Performed by: INTERNAL MEDICINE

## 2020-09-03 PROCEDURE — 80053 COMPREHEN METABOLIC PANEL: CPT

## 2020-09-03 PROCEDURE — 85027 COMPLETE CBC AUTOMATED: CPT

## 2020-09-03 PROCEDURE — 700102 HCHG RX REV CODE 250 W/ 637 OVERRIDE(OP): Performed by: INTERNAL MEDICINE

## 2020-09-03 PROCEDURE — A9270 NON-COVERED ITEM OR SERVICE: HCPCS | Performed by: HOSPITALIST

## 2020-09-03 PROCEDURE — 85018 HEMOGLOBIN: CPT

## 2020-09-03 RX ADMIN — HYDROCODONE BITARTRATE AND ACETAMINOPHEN 2 TABLET: 5; 325 TABLET ORAL at 12:30

## 2020-09-03 RX ADMIN — CALCITRIOL CAPSULES 0.25 MCG 0.75 MCG: 0.25 CAPSULE ORAL at 21:28

## 2020-09-03 RX ADMIN — TIZANIDINE 8 MG: 4 TABLET ORAL at 21:29

## 2020-09-03 RX ADMIN — SEVELAMER CARBONATE 3200 MG: 800 TABLET, FILM COATED ORAL at 12:30

## 2020-09-03 RX ADMIN — BENZONATATE 100 MG: 100 CAPSULE ORAL at 18:29

## 2020-09-03 RX ADMIN — BENZOCAINE AND MENTHOL, UNSPECIFIED FORM 1 LOZENGE: 15; 3.6 LOZENGE ORAL at 00:29

## 2020-09-03 RX ADMIN — SEVELAMER CARBONATE 3200 MG: 800 TABLET, FILM COATED ORAL at 18:29

## 2020-09-03 RX ADMIN — BENZONATATE 100 MG: 100 CAPSULE ORAL at 05:01

## 2020-09-03 RX ADMIN — HYDROCODONE BITARTRATE AND ACETAMINOPHEN 2 TABLET: 5; 325 TABLET ORAL at 18:34

## 2020-09-03 RX ADMIN — DEXAMETHASONE 6 MG: 4 TABLET ORAL at 05:00

## 2020-09-03 RX ADMIN — HYDROCODONE BITARTRATE AND ACETAMINOPHEN 3 TABLET: 10; 325 TABLET ORAL at 21:28

## 2020-09-03 RX ADMIN — HYDROCODONE BITARTRATE AND ACETAMINOPHEN 2 TABLET: 5; 325 TABLET ORAL at 05:00

## 2020-09-03 RX ADMIN — GUAIFENESIN 200 MG: 100 SOLUTION ORAL at 22:23

## 2020-09-03 RX ADMIN — GUAIFENESIN 200 MG: 100 SOLUTION ORAL at 10:30

## 2020-09-03 RX ADMIN — GABAPENTIN 600 MG: 300 CAPSULE ORAL at 21:29

## 2020-09-03 RX ADMIN — GABAPENTIN 2400 MG: 400 CAPSULE ORAL at 21:29

## 2020-09-03 RX ADMIN — GUAIFENESIN 200 MG: 100 SOLUTION ORAL at 04:51

## 2020-09-03 RX ADMIN — GUAIFENESIN 200 MG: 100 SOLUTION ORAL at 00:29

## 2020-09-03 ASSESSMENT — ENCOUNTER SYMPTOMS
SHORTNESS OF BREATH: 0
COUGH: 1
WEAKNESS: 1
MUSCULOSKELETAL NEGATIVE: 1
EYES NEGATIVE: 1
CARDIOVASCULAR NEGATIVE: 1
PSYCHIATRIC NEGATIVE: 1
ABDOMINAL PAIN: 0
GASTROINTESTINAL NEGATIVE: 1
FEVER: 0

## 2020-09-03 ASSESSMENT — PAIN DESCRIPTION - PAIN TYPE
TYPE: ACUTE PAIN;CHRONIC PAIN
TYPE: CHRONIC PAIN;ACUTE PAIN
TYPE: CHRONIC PAIN
TYPE: CHRONIC PAIN;ACUTE PAIN

## 2020-09-03 ASSESSMENT — FIBROSIS 4 INDEX: FIB4 SCORE: 2.69

## 2020-09-03 NOTE — PROGRESS NOTES
Critical Care Progress Note    Date of admission  8/31/2020    Chief Complaint  40 y.o. male admitted 8/31/2020 with cough and diarrhea    Hospital Course    40 y.o. male who presented 8/31/2020 with diarrhea and shaking chills. Found to be COVID + with b/l infiltrates on cxr.  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   HENT: Negative.    Eyes: Negative.    Respiratory: Positive for cough.    Cardiovascular: Negative.    Gastrointestinal: Negative.    Genitourinary: Negative.    Musculoskeletal: Negative.    Skin: Negative.    Neurological: Positive for weakness.   Endo/Heme/Allergies: Negative.    Psychiatric/Behavioral: Negative.         Vital Signs for last 24 hours   Temp:  [35.7 °C (96.3 °F)-36.5 °C (97.7 °F)] 36.1 °C (97 °F)  Pulse:  [49-90] 72  Resp:  [10-57] 17  BP:  (100-164)/(43-80) 134/72  SpO2:  [67 %-100 %] 99 %     Physical Exam   Physical Exam  Constitutional:       Appearance: He is ill-appearing.   HENT:      Head: Normocephalic and atraumatic.      Mouth/Throat:      Mouth: Mucous membranes are moist.   Eyes:      Extraocular Movements: Extraocular movements intact.      Pupils: Pupils are equal, round, and reactive to light.   Neck:      Musculoskeletal: Normal range of motion.   Cardiovascular:      Rate and Rhythm: Normal rate.   Pulmonary:      Effort: Pulmonary effort is normal.      Breath sounds: Normal breath sounds.   Abdominal:      General: Bowel sounds are normal. There is distension.      Palpations: Abdomen is soft.   Musculoskeletal: Normal range of motion.   Skin:     General: Skin is warm and dry.   Neurological:      General: No focal deficit present.      Mental Status: He is alert.   Psychiatric:         Mood and Affect: Mood normal.         Behavior: Behavior normal.         Thought Content: Thought content normal.         Judgment: Judgment normal.         Medications  Current Facility-Administered Medications   Medication Dose Route Frequency Provider Last Rate Last Dose   • HYDROcodone-acetaminophen (NORCO) 5-325 MG per tablet 1-2 Tab  1-2 Tab Oral Q6HRS PRN Bolivar Paz M.D.   2 Tab at 09/03/20 1230   • NS (BOLUS) infusion 250 mL  250 mL Intravenous DIALYSIS PRN Raul Londono M.D.       • albumin human 25% solution 12.5 g  12.5 g Intravenous DIALYSIS PRN Raul Londono M.D.       • lidocaine (XYLOCAINE) 1 % injection 1 mL  1 mL Intradermal PRN Raul Londono M.D.   1 mL at 09/02/20 2000   • epoetin (Retacrit) injection (Dialysis Use Only) 4,000 Units  4,000 Units Intravenous MO, WE + FR Raul Londono M.D.   4,000 Units at 09/02/20 2235   • benzocaine-menthol (CEPACOL) lozenge 1 Lozenge  1 Lozenge Mouth/Throat Q2HRS PRN Bolivar Paz M.D.   1 Lozenge at 09/03/20 0029   • benzonatate (TESSALON) capsule 100 mg  100 mg Oral TID PRN Polly  EDISON William   100 mg at 09/03/20 0501   • guaiFENesin (ROBITUSSIN) 100 MG/5ML solution 200 mg  10 mL Oral Q4HRS PRN Polly William M.D.   200 mg at 09/03/20 1030   • Pharmacy Consult Request  1 Each Other PHARMACY TO DOSE Ej Nagel M.D.       • norepinephrine (LEVOPHED) 8 mg in  mL Infusion  0.5-30 mcg/min Intravenous Continuous Bolivar Paz M.D.   Stopped at 09/02/20 1647    And   • vasopressin (VASOSTRICT) 20 Units in  mL Infusion  0.03 Units/min Intravenous Continuous Bolivar Paz M.D.   Stopped at 08/31/20 1500   • HYDROcodone/acetaminophen (NORCO)  MG per tablet 3 Tab  3 Tab Oral QHS Bolivar Paz M.D.   3 Tab at 09/02/20 2058   • sevelamer carbonate (RENVELA) tablet 3,200 mg  3,200 mg Oral TID WITH MEALS Bolivar Paz M.D.   3,200 mg at 09/03/20 1230   • tizanidine (ZANAFLEX) tablet 8 mg  8 mg Oral QHS Bolivar Paz M.D.   8 mg at 09/02/20 2058   • calcitRIOL (ROCALTROL) capsule 0.75 mcg  0.75 mcg Oral QHS Bolivar Paz M.D.   0.75 mcg at 09/02/20 2058   • [Held by provider] cinacalcet (SENSIPAR) tablet 30 mg  30 mg Oral MO, WE + FR Bolivar Paz M.D.       • cephALEXin (KEFLEX) capsule 500 mg  500 mg Oral MO, WE + FR Bolivar Paz M.D.   500 mg at 09/02/20 0850   • gabapentin (NEURONTIN) capsule 2,400 mg  2,400 mg Oral Nightly Bolivar Paz M.D.   2,400 mg at 09/02/20 2057    And   • gabapentin (NEURONTIN) capsule 600 mg  600 mg Oral QHS Bolivar Paz M.D.   600 mg at 09/02/20 2059   • dexamethasone (DECADRON) tablet 6 mg  6 mg Oral DAILY Bolivar Paz M.D.   6 mg at 09/03/20 0500       Fluids    Intake/Output Summary (Last 24 hours) at 9/3/2020 1614  Last data filed at 9/3/2020 1200  Gross per 24 hour   Intake 689.59 ml   Output 2300 ml   Net -1610.41 ml       Laboratory          Recent Labs     09/01/20  0430 09/01/20  0600 09/03/20  0435   SODIUM 137 133* 137   POTASSIUM 4.3  5.3 4.6   CHLORIDE 99 88* 93*   CO2 21 28 26   BUN 35* 44* 41*   CREATININE 6.56* 7.80* 5.90*   CALCIUM 5.9* 7.4* 7.6*     Recent Labs     09/01/20  0430 09/01/20  0600 09/03/20  0435   ALTSGPT 10 13 11   ASTSGOT 33 39 39   ALKPHOSPHAT 129* 165* 151*   TBILIRUBIN 0.3 0.4 0.4   GLUCOSE 172* 198* 116*     Recent Labs     09/01/20  0430 09/01/20  0600 09/03/20  0435   WBC 3.2* 4.2* 4.6*   NEUTSPOLYS 75.30* 67.40 69.00   LYMPHOCYTES 17.10* 22.50 18.00*   MONOCYTES 5.70 7.80 4.00   EOSINOPHILS 0.00 0.00 0.00   BASOPHILS 0.00 0.20 0.00   ASTSGOT 33 39 39   ALTSGPT 10 13 11   ALKPHOSPHAT 129* 165* 151*   TBILIRUBIN 0.3 0.4 0.4     Recent Labs     09/01/20  0430 09/01/20  0600 09/01/20  1140 09/02/20  0421 09/03/20  0435 09/03/20  1025   RBC 2.19* 3.13*  --   --  2.54*  --    HEMOGLOBIN 6.7* 9.7*  --   --  7.8* 7.8*   HEMATOCRIT 20.5* 28.7*  --   --  23.6* 23.5*   PLATELETCT 109* 157*  --   --  158*  --    PROTHROMBTM  --   --  35.3* 45.4* 54.0*  --    INR  --   --  3.59* 4.94* 6.16*  --        Imaging  No new imaging    Assessment/Plan  Sepsis associated hypotension (HCC)- (present on admission)  Assessment & Plan  This is Sepsis Present on admission  SIRS criteria identified on my evaluation include: Fever, with temperature greater than 101 deg F  Source is lungs secondary to covid 19  Sepsis protocol not initiated due to covid 19 pneumonia and need to keep lungs dry  While organ dysfunction may be noted elsewhere in this problem list or in the chart, degree of organ dysfunction does not meet CMS criteria for severe sepsis    pts wbc low at 2.3  1 liter fluid given and BP 96 systolic so no further fluid given    9/1: required pressors on levophed 5 mcgs, adding midodrine. Wbc improved. Afebrile  9/2: afebrile and still needing levophed 4 mcgs  Off levophed that pm        Elevated INR- (present on admission)  Assessment & Plan  > 6  No obvious signs of bleeding but H and H down -- trend q 8    Re: INR was on coumadin held  since admission transiently on heparin till INR was >4  Reason for coumadin is that he clots of his grafts recurrently  INR is elevated due to not eating much and also dexamethasone can cause elevation    Diarrhea of infectious origin- (present on admission)  Assessment & Plan  At this time seems to have resolved  Likely due to covid  Unable to get specimen for  c diff as has not stooled. Concern for c. Diff is due to him being  chronically on keflex - formed stool    Pneumonia due to COVID-19 virus  Assessment & Plan  B/l pna  Elevated procalcitonin but also known chronic graft infection  Leukopenia, crp 13.6 on admission  8/31 plasma  8/31 dexamethasone  With esrd do not think he is a candidate for remdesivir  On 2 l NC not worsening but hasnt improved  Cough main symptoms    Hypocalcemia  Assessment & Plan  Will hold sensipar    End stage renal disease (HCC)  Assessment & Plan  Dialysis M/w/F     At this time stable for the floor as off pressors   2l NC  H and H q 8    VTE:   contraindicated  Ulcer: Not Indicated  Lines: None    I have performed a physical exam and reviewed and updated ROS and Plan today (9/3/2020). In review of yesterday's note (9/2/2020), there are no changes except as documented above.

## 2020-09-03 NOTE — PROGRESS NOTES
[id number] - report from Jessica DAVIDSON.  Pt resting comfortably in bed.  A&O x 4, drowsy.  Up to transfer to [facility] self, steady on feet.  Fall precautions in effect.  Pt calls approp for asst.  C/o chronic pain, declines med intervention at this time. C/o cont sob oe, breathing tx & O2 per protocol ongoing. Denies dizziness, nausea. See flowsheet for assess.  poc reviewed with pt, pt vu, all questions answered.  Tele = sr,  Vss.    Refused AM meal, states normally does not eat breakfast.

## 2020-09-03 NOTE — FLOWSHEET NOTE
09/03/20 0300   Events/Summary/Plan   Events/Summary/Plan continuous cpap/bipap(home unit) w/10LPM bleed-in   Location nasal mask   Protective Device   (home unit)   Vital Signs   $ Pulse Oximetry (Spot Check) Yes   Respiratory Assessment   Level of Consciousness   (asleep)   Non-Invasive Ventilation [facility]   Nocturnal CPAP or BIPAP BIPAP - Home Unit   FiO2 or LPM 10

## 2020-09-03 NOTE — PROGRESS NOTES
Levophed gtt stopped, /72, Cuff on left lower leg. Pt back on own home CPAP machine, discussed pain medication with Dr Davalos, new orders received. Pt hs c/o back and chest pain from severe coughing. Medicated per MD orders see MAR. MD aware unable to obtain US guided IV access, Pt has 22 g in right hand. Pt resting calmly in no acute distress. Call light within reach.

## 2020-09-03 NOTE — ASSESSMENT & PLAN NOTE
> 6  No obvious signs of bleeding but H and H down -- trend q 8    Re: INR was on coumadin held since admission transiently on heparin till INR was >4  Reason for coumadin is that he clots of his grafts recurrently  INR is elevated due to not eating much and also dexamethasone can cause elevation

## 2020-09-03 NOTE — DIETARY
"Nutrition services: Day 3 of admit.  Denis De Anda is a 40 y.o. male with admitting DX of Acute Respiratory disease due to COVID-19 virus.    Poor PO intake noted per chart review.    Assessment:  Height: 162.6 cm (5' 4\")  Weight: 84 kg (185 lb 3 oz)  Body mass index is 31.79 kg/m²., BMI classification: Overweight  Diet/Intake: Renal. Recorded PO intake 0% to 25-50% since admit.    Evaluation:   1. Pt is at risk for poor appetite due to COVID-19 diagnosis. Noted recorded PO intake has been poor at 0% to 25-50% of meals.  2. Discussed with MD and obtained order for supplement. As pt with history of ESRD on HD and on Renal diet, will provide Boost Glucose Control with meals for lower K+ supplement.  3. Admit nutrition screen was negative for unplanned weight loss or poor PO intake PTA.  4. No skin breakdown noted.  5. Medications include Renvela    Malnutrition Risk: Criteria not met, but pt at risk with poor PO intake.    Recommendations/Plan:  1. Boost Glucose Control with meals.   2. Encourage intake of meals and supplements.  3. Document intake of all meals and supplements as % taken in ADL's to provide interdisciplinary communication across all shifts.   4. Monitor weight.  5. Nutrition rep will continue to see patient for ongoing meal and snack preferences.     RD following.          "

## 2020-09-03 NOTE — PROGRESS NOTES
[facility] Services Progress Note    Hemodialysis treatment ordered today per Dr. Londono x 3 hours.   Treatment initiated at 2010.     Labile BP otherwise tolerated tx without need for BP support; see paper flow sheet for details.     Net UF 2,000 mL.     Needles removed from access site. Dressings applied and sites held x 15-20 minutes; verified no bleeding. Positive bruit/thrill post tx.   Staff RN to monitor AVF/G for breakthrough bleeding. Should breakthrough bleeding occur, staff RN to apply pressure to access sites until bleeding resolved.   Notify Dialysis and Nephrologist for follow-up.    Report given to Primary RN.

## 2020-09-04 PROBLEM — J96.01 ACUTE RESPIRATORY FAILURE WITH HYPOXIA (HCC): Status: ACTIVE | Noted: 2020-09-04

## 2020-09-04 LAB
ALBUMIN SERPL BCP-MCNC: 3.6 G/DL (ref 3.2–4.9)
ALBUMIN/GLOB SERPL: 1.1 G/DL
ALP SERPL-CCNC: 134 U/L (ref 30–99)
ALT SERPL-CCNC: 9 U/L (ref 2–50)
ANION GAP SERPL CALC-SCNC: 16 MMOL/L (ref 7–16)
AST SERPL-CCNC: 27 U/L (ref 12–45)
BASOPHILS # BLD AUTO: 0.2 % (ref 0–1.8)
BASOPHILS # BLD: 0.01 K/UL (ref 0–0.12)
BILIRUB SERPL-MCNC: 0.3 MG/DL (ref 0.1–1.5)
BUN SERPL-MCNC: 54 MG/DL (ref 8–22)
CALCIUM SERPL-MCNC: 7 MG/DL (ref 8.4–10.2)
CHLORIDE SERPL-SCNC: 89 MMOL/L (ref 96–112)
CO2 SERPL-SCNC: 30 MMOL/L (ref 20–33)
CREAT SERPL-MCNC: 7.53 MG/DL (ref 0.5–1.4)
EOSINOPHIL # BLD AUTO: 0.01 K/UL (ref 0–0.51)
EOSINOPHIL NFR BLD: 0.2 % (ref 0–6.9)
ERYTHROCYTE [DISTWIDTH] IN BLOOD BY AUTOMATED COUNT: 57.3 FL (ref 35.9–50)
GLOBULIN SER CALC-MCNC: 3.4 G/DL (ref 1.9–3.5)
GLUCOSE SERPL-MCNC: 130 MG/DL (ref 65–99)
HCT VFR BLD AUTO: 24.3 % (ref 42–52)
HCT VFR BLD AUTO: 24.3 % (ref 42–52)
HCT VFR BLD AUTO: 25.8 % (ref 42–52)
HCT VFR BLD AUTO: 25.8 % (ref 42–52)
HGB BLD-MCNC: 8 G/DL (ref 14–18)
HGB BLD-MCNC: 8 G/DL (ref 14–18)
HGB BLD-MCNC: 8.3 G/DL (ref 14–18)
HGB BLD-MCNC: 8.4 G/DL (ref 14–18)
IMM GRANULOCYTES # BLD AUTO: 0.18 K/UL (ref 0–0.11)
IMM GRANULOCYTES NFR BLD AUTO: 3.2 % (ref 0–0.9)
INR PPP: 9.51 (ref 0.87–1.13)
LYMPHOCYTES # BLD AUTO: 0.95 K/UL (ref 1–4.8)
LYMPHOCYTES NFR BLD: 17.1 % (ref 22–41)
MCH RBC QN AUTO: 30.7 PG (ref 27–33)
MCHC RBC AUTO-ENTMCNC: 32.7 G/DL (ref 33.7–35.3)
MCV RBC AUTO: 93.9 FL (ref 81.4–97.8)
MONOCYTES # BLD AUTO: 0.45 K/UL (ref 0–0.85)
MONOCYTES NFR BLD AUTO: 8.1 % (ref 0–13.4)
NEUTROPHILS # BLD AUTO: 3.96 K/UL (ref 1.82–7.42)
NEUTROPHILS NFR BLD: 71.2 % (ref 44–72)
NRBC # BLD AUTO: 0.02 K/UL
NRBC BLD-RTO: 0.4 /100 WBC
PLATELET # BLD AUTO: 185 K/UL (ref 164–446)
PMV BLD AUTO: 9.4 FL (ref 9–12.9)
POTASSIUM SERPL-SCNC: 5.3 MMOL/L (ref 3.6–5.5)
PROT SERPL-MCNC: 7 G/DL (ref 6–8.2)
PROTHROMBIN TIME: 76 SEC (ref 12–14.6)
RBC # BLD AUTO: 2.61 M/UL (ref 4.7–6.1)
SODIUM SERPL-SCNC: 135 MMOL/L (ref 135–145)
WBC # BLD AUTO: 5.6 K/UL (ref 4.8–10.8)

## 2020-09-04 PROCEDURE — 700111 HCHG RX REV CODE 636 W/ 250 OVERRIDE (IP): Performed by: INTERNAL MEDICINE

## 2020-09-04 PROCEDURE — A9270 NON-COVERED ITEM OR SERVICE: HCPCS | Performed by: HOSPITALIST

## 2020-09-04 PROCEDURE — 80053 COMPREHEN METABOLIC PANEL: CPT

## 2020-09-04 PROCEDURE — 770021 HCHG ROOM/CARE - ISO PRIVATE

## 2020-09-04 PROCEDURE — A9270 NON-COVERED ITEM OR SERVICE: HCPCS | Performed by: INTERNAL MEDICINE

## 2020-09-04 PROCEDURE — 94660 CPAP INITIATION&MGMT: CPT

## 2020-09-04 PROCEDURE — 85025 COMPLETE CBC W/AUTO DIFF WBC: CPT

## 2020-09-04 PROCEDURE — 700101 HCHG RX REV CODE 250: Performed by: INTERNAL MEDICINE

## 2020-09-04 PROCEDURE — 85018 HEMOGLOBIN: CPT | Mod: 91

## 2020-09-04 PROCEDURE — 99233 SBSQ HOSP IP/OBS HIGH 50: CPT | Performed by: HOSPITALIST

## 2020-09-04 PROCEDURE — 700102 HCHG RX REV CODE 250 W/ 637 OVERRIDE(OP): Performed by: HOSPITALIST

## 2020-09-04 PROCEDURE — 90935 HEMODIALYSIS ONE EVALUATION: CPT

## 2020-09-04 PROCEDURE — 5A1D70Z PERFORMANCE OF URINARY FILTRATION, INTERMITTENT, LESS THAN 6 HOURS PER DAY: ICD-10-PCS | Performed by: INTERNAL MEDICINE

## 2020-09-04 PROCEDURE — 700111 HCHG RX REV CODE 636 W/ 250 OVERRIDE (IP): Performed by: HOSPITALIST

## 2020-09-04 PROCEDURE — 700102 HCHG RX REV CODE 250 W/ 637 OVERRIDE(OP): Performed by: INTERNAL MEDICINE

## 2020-09-04 PROCEDURE — 700105 HCHG RX REV CODE 258: Performed by: HOSPITALIST

## 2020-09-04 PROCEDURE — 85014 HEMATOCRIT: CPT

## 2020-09-04 PROCEDURE — 85610 PROTHROMBIN TIME: CPT

## 2020-09-04 RX ORDER — VITAMIN B COMPLEX
1000 TABLET ORAL DAILY
Status: DISCONTINUED | OUTPATIENT
Start: 2020-09-04 | End: 2020-09-08 | Stop reason: HOSPADM

## 2020-09-04 RX ORDER — PHYTONADIONE 5 MG/1
10 TABLET ORAL ONCE
Status: COMPLETED | OUTPATIENT
Start: 2020-09-04 | End: 2020-09-04

## 2020-09-04 RX ORDER — BUDESONIDE AND FORMOTEROL FUMARATE DIHYDRATE 80; 4.5 UG/1; UG/1
2 AEROSOL RESPIRATORY (INHALATION)
Status: DISCONTINUED | OUTPATIENT
Start: 2020-09-04 | End: 2020-09-05

## 2020-09-04 RX ORDER — ALBUTEROL SULFATE 90 UG/1
2 AEROSOL, METERED RESPIRATORY (INHALATION)
Status: DISCONTINUED | OUTPATIENT
Start: 2020-09-04 | End: 2020-09-05

## 2020-09-04 RX ORDER — GUAIFENESIN 600 MG/1
600 TABLET, EXTENDED RELEASE ORAL EVERY 12 HOURS
Status: DISCONTINUED | OUTPATIENT
Start: 2020-09-04 | End: 2020-09-08 | Stop reason: HOSPADM

## 2020-09-04 RX ORDER — ALPRAZOLAM 0.25 MG/1
0.25 TABLET ORAL NIGHTLY PRN
Status: DISCONTINUED | OUTPATIENT
Start: 2020-09-04 | End: 2020-09-08 | Stop reason: HOSPADM

## 2020-09-04 RX ORDER — ZINC SULFATE 50(220)MG
220 CAPSULE ORAL DAILY
Status: DISCONTINUED | OUTPATIENT
Start: 2020-09-04 | End: 2020-09-08 | Stop reason: HOSPADM

## 2020-09-04 RX ORDER — ASCORBIC ACID 500 MG
2000 TABLET ORAL DAILY
Status: DISCONTINUED | OUTPATIENT
Start: 2020-09-04 | End: 2020-09-08 | Stop reason: HOSPADM

## 2020-09-04 RX ADMIN — BENZONATATE 100 MG: 100 CAPSULE ORAL at 05:27

## 2020-09-04 RX ADMIN — LIDOCAINE HYDROCHLORIDE 1 ML: 10 INJECTION, SOLUTION INFILTRATION; PERINEURAL at 12:45

## 2020-09-04 RX ADMIN — PHYTONADIONE 10 MG: 5 TABLET ORAL at 10:20

## 2020-09-04 RX ADMIN — CALCITRIOL CAPSULES 0.25 MCG 0.75 MCG: 0.25 CAPSULE ORAL at 21:23

## 2020-09-04 RX ADMIN — TIZANIDINE 8 MG: 4 TABLET ORAL at 21:23

## 2020-09-04 RX ADMIN — BENZONATATE 100 MG: 100 CAPSULE ORAL at 17:53

## 2020-09-04 RX ADMIN — GABAPENTIN 600 MG: 300 CAPSULE ORAL at 21:23

## 2020-09-04 RX ADMIN — Medication 1000 UNITS: at 10:21

## 2020-09-04 RX ADMIN — SEVELAMER CARBONATE 3200 MG: 800 TABLET, FILM COATED ORAL at 08:03

## 2020-09-04 RX ADMIN — EPOETIN ALFA-EPBX 4000 UNITS: 4000 INJECTION, SOLUTION INTRAVENOUS; SUBCUTANEOUS at 13:30

## 2020-09-04 RX ADMIN — ALBUTEROL SULFATE 2 PUFF: 90 AEROSOL, METERED RESPIRATORY (INHALATION) at 19:52

## 2020-09-04 RX ADMIN — OXYCODONE HYDROCHLORIDE AND ACETAMINOPHEN 2000 MG: 500 TABLET ORAL at 10:18

## 2020-09-04 RX ADMIN — HYDROCODONE BITARTRATE AND ACETAMINOPHEN 3 TABLET: 10; 325 TABLET ORAL at 21:23

## 2020-09-04 RX ADMIN — ALBUTEROL SULFATE 2 PUFF: 90 AEROSOL, METERED RESPIRATORY (INHALATION) at 10:23

## 2020-09-04 RX ADMIN — GUAIFENESIN 200 MG: 100 SOLUTION ORAL at 21:23

## 2020-09-04 RX ADMIN — ZINC SULFATE 220 MG (50 MG) CAPSULE 220 MG: CAPSULE at 10:21

## 2020-09-04 RX ADMIN — GUAIFENESIN 600 MG: 600 TABLET, EXTENDED RELEASE ORAL at 10:21

## 2020-09-04 RX ADMIN — CEPHALEXIN 500 MG: 250 CAPSULE ORAL at 08:17

## 2020-09-04 RX ADMIN — DEXAMETHASONE 6 MG: 4 TABLET ORAL at 05:27

## 2020-09-04 RX ADMIN — CALCIUM GLUCONATE 1 G: 98 INJECTION, SOLUTION INTRAVENOUS at 17:35

## 2020-09-04 RX ADMIN — GABAPENTIN 2400 MG: 400 CAPSULE ORAL at 21:22

## 2020-09-04 RX ADMIN — BUDESONIDE AND FORMOTEROL FUMARATE DIHYDRATE 2 PUFF: 80; 4.5 AEROSOL RESPIRATORY (INHALATION) at 10:31

## 2020-09-04 RX ADMIN — GUAIFENESIN 600 MG: 600 TABLET, EXTENDED RELEASE ORAL at 17:38

## 2020-09-04 RX ADMIN — SEVELAMER CARBONATE 3200 MG: 800 TABLET, FILM COATED ORAL at 17:47

## 2020-09-04 RX ADMIN — ALBUTEROL SULFATE 2 PUFF: 90 AEROSOL, METERED RESPIRATORY (INHALATION) at 16:12

## 2020-09-04 RX ADMIN — BUDESONIDE AND FORMOTEROL FUMARATE DIHYDRATE 2 PUFF: 80; 4.5 AEROSOL RESPIRATORY (INHALATION) at 19:52

## 2020-09-04 RX ADMIN — HYDROCODONE BITARTRATE AND ACETAMINOPHEN 2 TABLET: 5; 325 TABLET ORAL at 08:16

## 2020-09-04 RX ADMIN — SEVELAMER CARBONATE 3200 MG: 800 TABLET, FILM COATED ORAL at 11:48

## 2020-09-04 ASSESSMENT — ENCOUNTER SYMPTOMS
EYES NEGATIVE: 1
WEAKNESS: 1
GASTROINTESTINAL NEGATIVE: 1
PSYCHIATRIC NEGATIVE: 1
COUGH: 1
MUSCULOSKELETAL NEGATIVE: 1
CARDIOVASCULAR NEGATIVE: 1

## 2020-09-04 ASSESSMENT — PAIN DESCRIPTION - PAIN TYPE: TYPE: ACUTE PAIN

## 2020-09-04 NOTE — PROGRESS NOTES
Dr Gallo called d/t concern for INR 9.51. no s/sx of bleed. x1 dose of vit k to be ordered. States no need for INR recheck this afternoon.

## 2020-09-04 NOTE — PROGRESS NOTES
Dialysis RN here to do patient's dialysis. He was notified of patient's high INR this morning & concern for risk of bleeding voiced to Dr Gallo by this RN, and Dr Gallo stating ok to proceed.

## 2020-09-04 NOTE — CARE PLAN
Problem: Safety  Goal: Will remain free from injury  Outcome: PROGRESSING AS EXPECTED  Note: Patient will remain free from falls/injuries this shift. Safety measures in place. Pt not attempting to get oob at this time.     Problem: Knowledge Deficit  Goal: Knowledge of disease process/condition, treatment plan, diagnostic tests, and medications will improve  Outcome: PROGRESSING AS EXPECTED  Note: Patient will be cooperative with cares this shift. Plan of care discussed with patient. Questions answered, verbalizes understanding.

## 2020-09-04 NOTE — PROGRESS NOTES
[facility] Medicine Daily Progress Note    Date of Service  9/4/2020    Chief Complaint  40 y.o. male admitted 8/31/2020 with past medical history of end-stage renal disease, spinal cord stimulator, patient was found to have COVID for 5 days.  Symptoms included diarrhea, chills, body aches.  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  Patient underwent dialysis session but did not completed due to hypotension.    Hospital Course    Upon presentation to the hospital he was on 4 L of oxygen.  Chest x-ray found bilateral multifocal infiltrates.  On arrival patient was found to have pancytopenia.  He was started on IV Decadron.  Patient was not started on Lovenox since INR was supratherapeutic.  Patient was found to be hypotensive and placed on levophed.      Interval Problem Update  9/4: We will continue IV Decadron. Blood pressure has improved. We will give a dose of oral vitamin K since INR was supratherapeutic.  I have also have ordered inhalers including albuterol and Symbicort.    Consultants/Specialty  Nephrology    Code Status  Full Code    Disposition  TBD    Review of Systems  Review of Systems   Constitutional: Positive for malaise/fatigue.   HENT: Negative.    Eyes: Negative.    Respiratory: Positive for cough.    Cardiovascular: Negative.    Gastrointestinal: Negative.    Genitourinary: Negative.    Musculoskeletal: Negative.    Skin: Negative.    Neurological: Positive for weakness.   Endo/Heme/Allergies: Negative.    Psychiatric/Behavioral: Negative.         Physical Exam  Temp:  [36.3 °C (97.4 °F)-36.5 °C (97.7 °F)] 36.4 °C (97.6 °F)  Pulse:  [70-85] 73  Resp:  [16-20] 19  BP: (139-157)/(48-64) 151/48  SpO2:  [92 %-100 %] 100 %    Physical Exam  Constitutional:       Appearance: He is ill-appearing.   HENT:      Head: Normocephalic and atraumatic.      Mouth/Throat:      Mouth: Mucous membranes are moist.   Eyes:      Extraocular Movements: Extraocular movements intact.      Pupils:  Pupils are equal, round, and reactive to light.   Neck:      Musculoskeletal: Normal range of motion.   Cardiovascular:      Rate and Rhythm: Normal rate.   Pulmonary:      Effort: Respiratory distress present.      Breath sounds: Rales present.   Abdominal:      General: Bowel sounds are normal. There is no distension.      Palpations: Abdomen is soft.   Musculoskeletal: Normal range of motion.   Skin:     General: Skin is warm and dry.   Neurological:      General: No focal deficit present.      Mental Status: He is alert.   Psychiatric:         Mood and Affect: Mood normal.         Behavior: Behavior normal.         Thought Content: Thought content normal.         Judgment: Judgment normal.         Fluids    Intake/Output Summary (Last 24 hours) at 9/4/2020 1617  Last data filed at 9/4/2020 1324  Gross per 24 hour   Intake 620 ml   Output --   Net 620 ml       Laboratory  Recent Labs     09/03/20  0435 09/03/20  1025 09/04/20  0021 09/04/20  0850   WBC 4.6*  --  5.6  --    RBC 2.54*  --  2.61*  --    HEMOGLOBIN 7.8* 7.8* 8.0*  8.0* 8.3*   HEMATOCRIT 23.6* 23.5* 24.3*  24.3* 25.8*   MCV 92.9  --  93.9  --    MCH 30.7  --  30.7  --    MCHC 33.1*  --  32.7*  --    RDW 57.1*  --  57.3*  --    PLATELETCT 158*  --  185  --    MPV 10.2  --  9.4  --      Recent Labs     09/03/20  0435 09/04/20  0021   SODIUM 137 135   POTASSIUM 4.6 5.3   CHLORIDE 93* 89*   CO2 26 30   GLUCOSE 116* 130*   BUN 41* 54*   CREATININE 5.90* 7.53*   CALCIUM 7.6* 7.0*     Recent Labs     09/02/20  0421 09/03/20  0435 09/04/20  0021   INR 4.94* 6.16* 9.51*               Imaging  DX-CHEST-PORTABLE (1 VIEW)   Final Result      Bilateral interstitial and airspace opacities are most suggestive of atypical infection. Edema is considered less likely.           Assessment/Plan  * Pneumonia due to COVID-19 virus  Assessment & Plan  Monitor O2 status closely  Prone positioning and frequent change position  ISS and ambulation aggressively  Vitamin C,  zinc, vitamin D  Inflammatory markers every 48 hours  Not a candidate for remdesivir      Elevated INR- (present on admission)  Assessment & Plan  Without evidence of bleeding  Give 1 dose of oral vitamin K    Acute respiratory failure with hypoxia (HCC)  Assessment & Plan  On 10 L of o2     End stage renal disease (HCC)  Assessment & Plan  Monitor BMP and assess response  Avoid IV contrast/nephrotoxins/NSAIDs  Dose adjust meds for decreased GFR  Patient underwent one session of dialysis today         VTE prophylaxis: Hold for supratheraputic INR

## 2020-09-04 NOTE — ASSESSMENT & PLAN NOTE
Monitor O2 status closely  Prone positioning and frequent change position  ISS and ambulation aggressively  Vitamin C, zinc, vitamin D  Inflammatory markers every 48 hours  Not a candidate for remdesivir

## 2020-09-04 NOTE — DISCHARGE PLANNING
Outpatient Dialysis Note    HD chair time for Cohort facility:    [facility]  [address] [place] [address]    Schedule: Tuesday, Thursday, Saturday  Time: 11:45 am    If patient is discharged over the weekend, patient will need to call HD clinic at ph# [phone], to confirm HD chair time.     HD clinic stated they have very specific rules for COVID+ patients: Patient must remain in vehicle and call the HD clinic and a RN will come out to the car to assess and escort the patient into the HD unit.    Notified Jose Elias ext [phone].        Pina Irwin- Dialysis Coordinator  [facility] # [phone]

## 2020-09-04 NOTE — PROGRESS NOTES
Lab called with critical result of INR 9.51, PT 76 at 0110. Critical lab result read back.  Dr. Alfaro notified of critical lab result at 0115.  Critical lab result read back by Dr. Alfaro. Additionally, Dr Alfaro updated on pt status, stable VS, no obvious signs bleeding.

## 2020-09-04 NOTE — DISCHARGE PLANNING
D/c needs currently unknown at this time. Pt positive for Covid 19. Pt lives with spouse and currently established with [facility] dialysis. CM team following for d/c planning needs.     Care Transition Team Assessment    Information Source  Information Given By: Patient  Who is responsible for making decisions for patient? : Patient    Readmission Evaluation  Is this a readmission?: Yes - unplanned readmission    Elopement Risk  Legal Hold: No  Ambulatory or Self Mobile in Wheelchair: Yes  Disoriented: No  Psychiatric Symptoms: None  History of Wandering: No  Elopement this Admit: No  Vocalizing Wanting to Leave: No  Displays Behaviors, Body Language Wanting to Leave: No-Not at Risk for Elopement  Elopement Risk: Not at Risk for Elopement    Interdisciplinary Discharge Planning  Patient or legal guardian wants to designate a caregiver: Yes  Caregiver name: Veronica  Caregiver relationship to patient: mom  Caregiver contact info: [phone]  [place] for Release of Health Information has been completed: Yes    Discharge Preparedness  What is your plan after discharge?: Uncertain - pending medical team collaboration  What are your discharge supports?: Spouse  Prior Functional Level: Ambulatory, Independent with Activities of Daily Living, Independent with Medication Management    Functional Assesment  Prior Functional Level: Ambulatory, Independent with Activities of Daily Living, Independent with Medication Management    Finances  Financial Barriers to Discharge: No  Prescription Coverage: Yes    Vision / Hearing Impairment  Vision Impairment : Yes  Right Eye Vision: Wears Glasses  Left Eye Vision: Wears Glasses  Hearing Impairment : No         Advance Directive  Advance Directive?: None    Domestic Abuse  Have you ever been the victim of abuse or violence?: No  Physical Abuse or Sexual Abuse: No  Verbal Abuse or Emotional Abuse: No  Possible Abuse/Neglect Reported to:: Not Applicable         Discharge Risks  or Barriers  Discharge risks or barriers?: Complex medical needs  Patient risk factors: Complex medical needs    Anticipated Discharge Information  Discharge Disposition: Admit to IP to this hosp (09)

## 2020-09-04 NOTE — ASSESSMENT & PLAN NOTE
Monitor BMP and assess response  Avoid IV contrast/nephrotoxins/NSAIDs  Dose adjust meds for decreased GFR  Patient underwent one session of dialysis today

## 2020-09-04 NOTE — PROGRESS NOTES
2 RN skin check complete.   Devices in place oxymask.  Skin assessed under devices yes.  Confirmed pressure ulcers found on n/a.  New potential pressure ulcers noted on n/a. Wound consult placed n/a.  The following interventions in place: Pt able to turn self, ambulation and turns encouraged.     Skin is CDI. Blanching redness on bridge of nose from oxymask.

## 2020-09-04 NOTE — PROGRESS NOTES
Diagnosis: End-Stage Renal Disease admitted with covid 19 pneumonia. Patient seen and examined on hemodialysis during treatment. Patient is stable, tolerating hemodialysis. Denies chest pain and shortness of breath. Orders updated as needed. Please refer to flowsheet for full details.    Access: left thigh graft  UF goal: 2L    Plan: Continue HD thrice weekly. Consider HD tomorrow, Sunday, or Monday as patient will be transitioning to Tuesday Thursday Saturday schedule as part of COVID cohort.     Raul Londono MD  Nephrology

## 2020-09-04 NOTE — CARE PLAN
Problem: Communication  Goal: The ability to communicate needs accurately and effectively will improve  Outcome: PROGRESSING AS EXPECTED  Note: AO4 able to verbalize needs clearly. Demonstrates appropriate use of call light.        Problem: Safety  Goal: Will remain free from falls  Outcome: PROGRESSING AS EXPECTED  Intervention: Assess risk factors for falls  Flowsheets  Taken 9/3/2020 2336  Fall Risk: Risk to Fall -  0 - 1 point  History of fall: 0  Mobility Status Assessment: 0-Ambulates & Transfers Independently. No Assistance Required  Risk for Injury-Any positive answers results in the pt being at high risk for fall related injury: (coagulopathy not related to anticoagulation use) Coagulation:  Therapeutic anticoagulation use (NOT prophylaxis)  Taken 9/3/2020 2200  Pt Calls for Assistance: Yes  Intervention: Implement fall precautions  Flowsheets (Taken 9/3/2020 2338)  Environmental Precautions:   Personal Belongings, Wastebasket, Call Bell etc. in Easy Reach   Transferred to Stronger Side   Report Given to Other Health Care Providers Regarding Fall Risk   Bed in Low Position   Communication Sign for Patients & Families   Mobility Assessed & Appropriate Sign Placed  Note: Refuses non-slip socks     Problem: Knowledge Deficit  Goal: Knowledge of disease process/condition, treatment plan, diagnostic tests, and medications will improve  Outcome: PROGRESSING AS EXPECTED  Note:   Review plan of care including IV and medications, labs and tests, and discharge planning. Take extra time to reiterate to patients that they may ask questions at any time and should always let staff know if they are having difficulty breathing, pain or any discomfort at any time.

## 2020-09-04 NOTE — PROGRESS NOTES
Nephrology Daily Progress Note    Date of Service  9/3/2020    Chief Complaint  40 y.o. 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    Interval Problem Update  9/2 -patient remains in ICU on low-dose norepinephrine.  Patient complains of some hemoptysis.  Patient denies chest pain, shortness of breath.  9/3-patient had dialysis yesterday with 2 L removed.  No issues with dialysis yesterday.  Patient weaned off pressors overnight.  Patient seen this afternoon, denies chest pain, shortness of breath.    Review of Systems  Review of Systems   Constitutional: Negative for fever.   Respiratory: Negative for shortness of breath.    Cardiovascular: Negative for chest pain.   Gastrointestinal: Negative for abdominal pain.   All other systems reviewed and are negative.       Physical Exam  Temp:  [35.7 °C (96.3 °F)-36.5 °C (97.7 °F)] 36.4 °C (97.6 °F)  Pulse:  [54-90] 72  Resp:  [10-57] 17  BP: (100-164)/(43-80) 134/72  SpO2:  [67 %-100 %] 99 %    Physical Exam   Constitutional: He is oriented to person, place, and time. He appears well-developed. No distress.   HENT:   Mouth/Throat: Oropharynx is clear and moist. No oropharyngeal exudate.   Eyes: EOM are normal. No scleral icterus.   Neck: No tracheal deviation present.   Cardiovascular: Normal rate and normal heart sounds.   No murmur heard.  Pulmonary/Chest: Effort normal and breath sounds normal. No stridor. He has no rales.   Dilated superficial chest wall veins noted, suggesting underlying SVC stenosis   Abdominal: Soft. He exhibits no distension. There is no abdominal tenderness.   Musculoskeletal: Normal range of motion.         General: No edema.   Neurological: He is alert and oriented to person, place, and time.   Skin: Skin is warm and dry. He is not diaphoretic.   Psychiatric: He has a normal mood and affect.   Access: Left thigh AV graft, with  patent thrill    Fluids    Intake/Output Summary (Last 24 hours) at 9/3/2020 1706  Last data filed at 9/3/2020 1200  Gross per 24 hour   Intake 660 ml   Output 2300 ml   Net -1640 ml       Laboratory  Labs reviewed, pertinent labs below.  Recent Labs     09/01/20  0430 09/01/20  0600 09/03/20  0435 09/03/20  1025   WBC 3.2* 4.2* 4.6*  --    RBC 2.19* 3.13* 2.54*  --    HEMOGLOBIN 6.7* 9.7* 7.8* 7.8*   HEMATOCRIT 20.5* 28.7* 23.6* 23.5*   MCV 93.6 91.4 92.9  --    MCH 30.6 31.0 30.7  --    MCHC 32.7* 33.9 33.1*  --    RDW 57.4* 56.6* 57.1*  --    PLATELETCT 109* 157* 158*  --    MPV 9.9 9.4 10.2  --      Recent Labs     09/01/20  0430 09/01/20  0600 09/03/20  0435   SODIUM 137 133* 137   POTASSIUM 4.3 5.3 4.6   CHLORIDE 99 88* 93*   CO2 21 28 26   GLUCOSE 172* 198* 116*   BUN 35* 44* 41*   CREATININE 6.56* 7.80* 5.90*   CALCIUM 5.9* 7.4* 7.6*     Recent Labs     09/01/20  1140 09/02/20  0421 09/03/20  0435   INR 3.59* 4.94* 6.16*           URINALYSIS:  No results found for: COLORURINE, CLARITY, SPECGRAVITY, PHURINE, KETONES, PROTEINURIN, BILIRUBINUR, UROBILU, NITRITE, LEUKESTERAS, OCCULTBLOOD  UPC  No results found for: TOTPROTUR No results found for: CREATININEU      Imaging reviewed  DX-CHEST-PORTABLE (1 VIEW)   Final Result      Bilateral interstitial and airspace opacities are most suggestive of atypical infection. Edema is considered less likely.            Current Facility-Administered Medications   Medication Dose Route Frequency Provider Last Rate Last Dose   • HYDROcodone-acetaminophen (NORCO) 5-325 MG per tablet 1-2 Tab  1-2 Tab Oral Q6HRS SERGEI Paz M.D.   2 Tab at 09/03/20 1230   • NS (BOLUS) infusion 250 mL  250 mL Intravenous DIALYSIS PRN Raul Londono M.D.       • albumin human 25% solution 12.5 g  12.5 g Intravenous DIALYSIS PRN Raul Londono M.D.       • lidocaine (XYLOCAINE) 1 % injection 1 mL  1 mL Intradermal PRN Raul Londono M.D.   1 mL at 09/02/20 2000   • epoetin (Retacrit) injection  (Dialysis Use Only) 4,000 Units  4,000 Units Intravenous MO, WE + FR Raul Londono M.D.   4,000 Units at 09/02/20 2235   • benzocaine-menthol (CEPACOL) lozenge 1 Lozenge  1 Lozenge Mouth/Throat Q2HRS PRN Bolivar Paz M.D.   1 Lozenge at 09/03/20 0029   • benzonatate (TESSALON) capsule 100 mg  100 mg Oral TID PRN Polly William M.D.   100 mg at 09/03/20 0501   • guaiFENesin (ROBITUSSIN) 100 MG/5ML solution 200 mg  10 mL Oral Q4HRS PRN Polly William M.D.   200 mg at 09/03/20 1030   • Pharmacy Consult Request  1 Each Other PHARMACY TO DOSE Ej Nagel M.D.       • norepinephrine (LEVOPHED) 8 mg in  mL Infusion  0.5-30 mcg/min Intravenous Continuous Bolivar Paz M.D.   Stopped at 09/02/20 1647    And   • vasopressin (VASOSTRICT) 20 Units in  mL Infusion  0.03 Units/min Intravenous Continuous Bolivar Paz M.D.   Stopped at 08/31/20 1500   • HYDROcodone/acetaminophen (NORCO)  MG per tablet 3 Tab  3 Tab Oral QHS Bolivar Paz M.D.   3 Tab at 09/02/20 2058   • sevelamer carbonate (RENVELA) tablet 3,200 mg  3,200 mg Oral TID WITH MEALS Bolivar Paz M.D.   3,200 mg at 09/03/20 1230   • tizanidine (ZANAFLEX) tablet 8 mg  8 mg Oral QHS Bolivar Paz M.D.   8 mg at 09/02/20 2058   • calcitRIOL (ROCALTROL) capsule 0.75 mcg  0.75 mcg Oral QHS Bolivar Paz M.D.   0.75 mcg at 09/02/20 2058   • [Held by provider] cinacalcet (SENSIPAR) tablet 30 mg  30 mg Oral MO, WE + FR Bolivar Paz M.D.       • cephALEXin (KEFLEX) capsule 500 mg  500 mg Oral MO, WE + FR Bolivar Paz M.D.   500 mg at 09/02/20 0850   • gabapentin (NEURONTIN) capsule 2,400 mg  2,400 mg Oral Nightly Bolivar Paz M.D.   2,400 mg at 09/02/20 2057    And   • gabapentin (NEURONTIN) capsule 600 mg  600 mg Oral QHS Bolivar Paz M.D.   600 mg at 09/02/20 2059   • dexamethasone (DECADRON) tablet 6 mg  6 mg Oral DAILY Bolivar Paz M.D.    6 mg at 09/03/20 0500         Assessment/Plan  40 y.o. 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     1. ESRD on HD Monday Wednesday Friday, anuric.  No acute need for dialysis today.  Plan for dialysis tomorrow.  Check labs daily.     2. Access: left thigh AVG, with history of skin breakdown.  Stable.  Avoid canulating in skin breakdown areas. Patient has history of SVC stenosis, right femoral vasculature open in case patient needs central line.      3. COVID-19 pneumonia.  Currently controlled with supplemental oxygen by facemask.  Further treatment per primary team.     4. Severe sepsis from covid-19 pneumonia, improved, now off pressors.  Right femoral vasculature is open for central line if needed.     5. Anemia of chronic disease.  Worsening.  Unclear etiology.  Continue Epogen thrice weekly with dialysis.  Check CBC daily.     6. Thrombocytopenia, mildly improving, but persistent.  Unclear etiology.  Check CBC daily.     7. Hyponatremia, resolved.  Limit hypotonic fluids.  Check labs daily.      Raul Londono MD  Nephrology

## 2020-09-05 LAB
ALBUMIN SERPL BCP-MCNC: 3.8 G/DL (ref 3.2–4.9)
ALBUMIN/GLOB SERPL: 1 G/DL
ALP SERPL-CCNC: 148 U/L (ref 30–99)
ALT SERPL-CCNC: 9 U/L (ref 2–50)
ANION GAP SERPL CALC-SCNC: 20 MMOL/L (ref 7–16)
AST SERPL-CCNC: 25 U/L (ref 12–45)
BACTERIA BLD CULT: NORMAL
BACTERIA BLD CULT: NORMAL
BASOPHILS # BLD AUTO: 0 % (ref 0–1.8)
BASOPHILS # BLD: 0 K/UL (ref 0–0.12)
BILIRUB SERPL-MCNC: 0.5 MG/DL (ref 0.1–1.5)
BUN SERPL-MCNC: 38 MG/DL (ref 8–22)
CALCIUM SERPL-MCNC: 8 MG/DL (ref 8.4–10.2)
CHLORIDE SERPL-SCNC: 91 MMOL/L (ref 96–112)
CO2 SERPL-SCNC: 26 MMOL/L (ref 20–33)
CREAT SERPL-MCNC: 5.63 MG/DL (ref 0.5–1.4)
EOSINOPHIL # BLD AUTO: 0 K/UL (ref 0–0.51)
EOSINOPHIL NFR BLD: 0 % (ref 0–6.9)
ERYTHROCYTE [DISTWIDTH] IN BLOOD BY AUTOMATED COUNT: 60.2 FL (ref 35.9–50)
GLOBULIN SER CALC-MCNC: 3.7 G/DL (ref 1.9–3.5)
GLUCOSE SERPL-MCNC: 119 MG/DL (ref 65–99)
HCT VFR BLD AUTO: 25 % (ref 42–52)
HCT VFR BLD AUTO: 25 % (ref 42–52)
HCT VFR BLD AUTO: 26.5 % (ref 42–52)
HGB BLD-MCNC: 8.1 G/DL (ref 14–18)
HGB BLD-MCNC: 8.1 G/DL (ref 14–18)
HGB BLD-MCNC: 8.4 G/DL (ref 14–18)
INR PPP: 4.04 (ref 0.87–1.13)
LYMPHOCYTES # BLD AUTO: 1.24 K/UL (ref 1–4.8)
LYMPHOCYTES NFR BLD: 18 % (ref 22–41)
MANUAL DIFF BLD: NORMAL
MCH RBC QN AUTO: 30.4 PG (ref 27–33)
MCHC RBC AUTO-ENTMCNC: 31.3 G/DL (ref 33.7–35.3)
MCV RBC AUTO: 97.3 FL (ref 81.4–97.8)
MONOCYTES # BLD AUTO: 0.41 K/UL (ref 0–0.85)
MONOCYTES NFR BLD AUTO: 6 % (ref 0–13.4)
NEUTROPHILS # BLD AUTO: 5.24 K/UL (ref 1.82–7.42)
NEUTROPHILS NFR BLD: 73 % (ref 44–72)
NEUTS BAND NFR BLD MANUAL: 3 % (ref 0–10)
NRBC # BLD AUTO: 0.02 K/UL
NRBC BLD-RTO: 0.3 /100 WBC
PLATELET # BLD AUTO: 222 K/UL (ref 164–446)
PLATELET BLD QL SMEAR: NORMAL
PMV BLD AUTO: 9.8 FL (ref 9–12.9)
POLYCHROMASIA BLD QL SMEAR: NORMAL
POTASSIUM SERPL-SCNC: 4.7 MMOL/L (ref 3.6–5.5)
PROT SERPL-MCNC: 7.5 G/DL (ref 6–8.2)
PROTHROMBIN TIME: 38.7 SEC (ref 12–14.6)
RBC # BLD AUTO: 2.63 M/UL (ref 4.7–6.1)
RBC BLD AUTO: PRESENT
SIGNIFICANT IND 70042: NORMAL
SIGNIFICANT IND 70042: NORMAL
SITE SITE: NORMAL
SITE SITE: NORMAL
SODIUM SERPL-SCNC: 137 MMOL/L (ref 135–145)
SOURCE SOURCE: NORMAL
SOURCE SOURCE: NORMAL
VARIANT LYMPHS BLD QL SMEAR: NORMAL
WBC # BLD AUTO: 6.9 K/UL (ref 4.8–10.8)

## 2020-09-05 PROCEDURE — 85018 HEMOGLOBIN: CPT

## 2020-09-05 PROCEDURE — 85007 BL SMEAR W/DIFF WBC COUNT: CPT

## 2020-09-05 PROCEDURE — 99232 SBSQ HOSP IP/OBS MODERATE 35: CPT | Performed by: INTERNAL MEDICINE

## 2020-09-05 PROCEDURE — 94660 CPAP INITIATION&MGMT: CPT

## 2020-09-05 PROCEDURE — 700102 HCHG RX REV CODE 250 W/ 637 OVERRIDE(OP): Performed by: HOSPITALIST

## 2020-09-05 PROCEDURE — 85014 HEMATOCRIT: CPT

## 2020-09-05 PROCEDURE — 85027 COMPLETE CBC AUTOMATED: CPT

## 2020-09-05 PROCEDURE — 770021 HCHG ROOM/CARE - ISO PRIVATE

## 2020-09-05 PROCEDURE — A9270 NON-COVERED ITEM OR SERVICE: HCPCS | Performed by: HOSPITALIST

## 2020-09-05 PROCEDURE — 80053 COMPREHEN METABOLIC PANEL: CPT

## 2020-09-05 PROCEDURE — 700102 HCHG RX REV CODE 250 W/ 637 OVERRIDE(OP): Performed by: INTERNAL MEDICINE

## 2020-09-05 PROCEDURE — 85610 PROTHROMBIN TIME: CPT

## 2020-09-05 PROCEDURE — 94640 AIRWAY INHALATION TREATMENT: CPT

## 2020-09-05 PROCEDURE — A9270 NON-COVERED ITEM OR SERVICE: HCPCS | Performed by: INTERNAL MEDICINE

## 2020-09-05 RX ORDER — ALBUTEROL SULFATE 90 UG/1
2 AEROSOL, METERED RESPIRATORY (INHALATION) EVERY 4 HOURS
Status: DISCONTINUED | OUTPATIENT
Start: 2020-09-05 | End: 2020-09-08 | Stop reason: HOSPADM

## 2020-09-05 RX ORDER — ZOLPIDEM TARTRATE 5 MG/1
2.5 TABLET ORAL ONCE
Status: COMPLETED | OUTPATIENT
Start: 2020-09-05 | End: 2020-09-05

## 2020-09-05 RX ORDER — BUDESONIDE AND FORMOTEROL FUMARATE DIHYDRATE 80; 4.5 UG/1; UG/1
2 AEROSOL RESPIRATORY (INHALATION) 2 TIMES DAILY
Status: DISCONTINUED | OUTPATIENT
Start: 2020-09-06 | End: 2020-09-08 | Stop reason: HOSPADM

## 2020-09-05 RX ADMIN — HYDROCODONE BITARTRATE AND ACETAMINOPHEN 3 TABLET: 10; 325 TABLET ORAL at 21:30

## 2020-09-05 RX ADMIN — SEVELAMER CARBONATE 3200 MG: 800 TABLET, FILM COATED ORAL at 12:11

## 2020-09-05 RX ADMIN — CALCITRIOL CAPSULES 0.25 MCG 0.75 MCG: 0.25 CAPSULE ORAL at 21:31

## 2020-09-05 RX ADMIN — TIZANIDINE 8 MG: 4 TABLET ORAL at 21:31

## 2020-09-05 RX ADMIN — SEVELAMER CARBONATE 3200 MG: 800 TABLET, FILM COATED ORAL at 07:43

## 2020-09-05 RX ADMIN — ALBUTEROL SULFATE 2 PUFF: 90 AEROSOL, METERED RESPIRATORY (INHALATION) at 21:46

## 2020-09-05 RX ADMIN — ALBUTEROL SULFATE 2 PUFF: 90 AEROSOL, METERED RESPIRATORY (INHALATION) at 15:19

## 2020-09-05 RX ADMIN — BUDESONIDE AND FORMOTEROL FUMARATE DIHYDRATE 2 PUFF: 80; 4.5 AEROSOL RESPIRATORY (INHALATION) at 08:07

## 2020-09-05 RX ADMIN — GABAPENTIN 600 MG: 300 CAPSULE ORAL at 21:30

## 2020-09-05 RX ADMIN — BENZONATATE 100 MG: 100 CAPSULE ORAL at 18:03

## 2020-09-05 RX ADMIN — ALBUTEROL SULFATE 2 PUFF: 90 AEROSOL, METERED RESPIRATORY (INHALATION) at 06:16

## 2020-09-05 RX ADMIN — GABAPENTIN 2400 MG: 400 CAPSULE ORAL at 21:31

## 2020-09-05 RX ADMIN — ALPRAZOLAM 0.25 MG: 0.25 TABLET ORAL at 00:12

## 2020-09-05 RX ADMIN — ZOLPIDEM TARTRATE 2.5 MG: 5 TABLET ORAL at 22:48

## 2020-09-05 RX ADMIN — GUAIFENESIN 600 MG: 600 TABLET, EXTENDED RELEASE ORAL at 06:15

## 2020-09-05 RX ADMIN — GUAIFENESIN 600 MG: 600 TABLET, EXTENDED RELEASE ORAL at 18:03

## 2020-09-05 RX ADMIN — Medication 1000 UNITS: at 06:15

## 2020-09-05 RX ADMIN — ZINC SULFATE 220 MG (50 MG) CAPSULE 220 MG: CAPSULE at 06:15

## 2020-09-05 RX ADMIN — BENZONATATE 100 MG: 100 CAPSULE ORAL at 06:15

## 2020-09-05 RX ADMIN — HYDROCODONE BITARTRATE AND ACETAMINOPHEN 2 TABLET: 5; 325 TABLET ORAL at 04:03

## 2020-09-05 RX ADMIN — ALBUTEROL SULFATE 2 PUFF: 90 AEROSOL, METERED RESPIRATORY (INHALATION) at 04:04

## 2020-09-05 RX ADMIN — SEVELAMER CARBONATE 3200 MG: 800 TABLET, FILM COATED ORAL at 18:02

## 2020-09-05 RX ADMIN — ALBUTEROL SULFATE 2 PUFF: 90 AEROSOL, METERED RESPIRATORY (INHALATION) at 18:34

## 2020-09-05 RX ADMIN — DEXAMETHASONE 6 MG: 4 TABLET ORAL at 06:15

## 2020-09-05 RX ADMIN — ALBUTEROL SULFATE 2 PUFF: 90 AEROSOL, METERED RESPIRATORY (INHALATION) at 11:25

## 2020-09-05 RX ADMIN — ALBUTEROL SULFATE 2 PUFF: 90 AEROSOL, METERED RESPIRATORY (INHALATION) at 00:13

## 2020-09-05 RX ADMIN — BUDESONIDE AND FORMOTEROL FUMARATE DIHYDRATE 2 PUFF: 80; 4.5 AEROSOL RESPIRATORY (INHALATION) at 18:35

## 2020-09-05 RX ADMIN — OXYCODONE HYDROCHLORIDE AND ACETAMINOPHEN 2000 MG: 500 TABLET ORAL at 06:15

## 2020-09-05 ASSESSMENT — COGNITIVE AND FUNCTIONAL STATUS - GENERAL
SUGGESTED CMS G CODE MODIFIER DAILY ACTIVITY: CH
MOBILITY SCORE: 24
DAILY ACTIVITIY SCORE: 24
SUGGESTED CMS G CODE MODIFIER MOBILITY: CH

## 2020-09-05 ASSESSMENT — ENCOUNTER SYMPTOMS
SHORTNESS OF BREATH: 0
FEVER: 0
ABDOMINAL PAIN: 0

## 2020-09-05 NOTE — CARE PLAN
Problem: Nutritional:  Goal: Achieve adequate nutritional intake  Description: Patient will consume >50% of meals  Outcome: PROGRESSING SLOWER THAN EXPECTED     Poor PO continues (<25-50%). Boost GC TID with meals, trial Boost Breeze with dinner. RD following.

## 2020-09-05 NOTE — FLOWSHEET NOTE
09/04/20 2319   [facility]   Nocturnal CPAP or BIPAP BIPAP - Home Unit   $ System Evaluation Yes   Settings (If Known) 7   FiO2 or LPM 10

## 2020-09-05 NOTE — PROGRESS NOTES
Nephrology Daily Progress Note    Date of Service  9/5/2020    Chief Complaint  40 y.o. 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    Interval Problem Update  9/2 -patient remains in ICU on low-dose norepinephrine.  Patient complains of some hemoptysis.  Patient denies chest pain, shortness of breath.  9/3-patient had dialysis yesterday with 2 L removed.  No issues with dialysis yesterday.  Patient weaned off pressors overnight.  Patient seen this afternoon, denies chest pain, shortness of breath.  9/5 -patient had dialysis yesterday with 2 L removed.  Oxygenation improving today.  Patient denies chest pain, shortness of breath.    Review of Systems  Review of Systems   Constitutional: Negative for fever.   Respiratory: Negative for shortness of breath.    Cardiovascular: Negative for chest pain.   Gastrointestinal: Negative for abdominal pain.   All other systems reviewed and are negative.       Physical Exam  Temp:  [36.4 °C (97.5 °F)-36.9 °C (98.4 °F)] 36.4 °C (97.5 °F)  Pulse:  [] 99  Resp:  [18-20] 18  BP: (106-149)/(41-77) 129/41  SpO2:  [91 %-100 %] 100 %    Physical Exam   Constitutional: He is oriented to person, place, and time. He appears well-developed. No distress.   HENT:   Mouth/Throat: Oropharynx is clear and moist. No oropharyngeal exudate.   Eyes: EOM are normal. No scleral icterus.   Neck: No tracheal deviation present.   Cardiovascular: Normal rate and normal heart sounds.   No murmur heard.  Pulmonary/Chest: Effort normal and breath sounds normal. No stridor. He has no rales.   Abdominal: Soft. He exhibits no distension. There is no abdominal tenderness.   Musculoskeletal: Normal range of motion.         General: No edema.   Neurological: He is alert and oriented to person, place, and time.   Skin: Skin is warm and dry. He is not diaphoretic.   Psychiatric: He has a normal  mood and affect.   Access: Left thigh AV graft, with patent bruit    Fluids    Intake/Output Summary (Last 24 hours) at 9/5/2020 1539  Last data filed at 9/4/2020 1800  Gross per 24 hour   Intake 840 ml   Output 2500 ml   Net -1660 ml       Laboratory  Labs reviewed, pertinent labs below.  Recent Labs     09/03/20 0435 09/04/20 0021 09/04/20  1700 09/05/20  0143 09/05/20  1235   WBC 4.6*  --  5.6  --   --  6.9  --    RBC 2.54*  --  2.61*  --   --  2.63*  --    HEMOGLOBIN 7.8*   < > 8.0*  8.0*   < > 8.4* 8.1*  8.1* 8.4*   HEMATOCRIT 23.6*   < > 24.3*  24.3*   < > 25.8* 25.0*  25.0* 26.5*   MCV 92.9  --  93.9  --   --  97.3  --    MCH 30.7  --  30.7  --   --  30.4  --    MCHC 33.1*  --  32.7*  --   --  31.3*  --    RDW 57.1*  --  57.3*  --   --  60.2*  --    PLATELETCT 158*  --  185  --   --  222  --    MPV 10.2  --  9.4  --   --  9.8  --     < > = values in this interval not displayed.     Recent Labs     09/03/20 0435 09/04/20 0021 09/05/20  0143   SODIUM 137 135 137   POTASSIUM 4.6 5.3 4.7   CHLORIDE 93* 89* 91*   CO2 26 30 26   GLUCOSE 116* 130* 119*   BUN 41* 54* 38*   CREATININE 5.90* 7.53* 5.63*   CALCIUM 7.6* 7.0* 8.0*     Recent Labs     09/03/20 0435 09/04/20  0021 09/05/20  0143   INR 6.16* 9.51* 4.04*           URINALYSIS:  No results found for: COLORURINE, CLARITY, SPECGRAVITY, PHURINE, KETONES, PROTEINURIN, BILIRUBINUR, UROBILU, NITRITE, LEUKESTERAS, OCCULTBLOOD  UPC  No results found for: TOTPROTUR No results found for: CREATININEU      Imaging reviewed  DX-CHEST-PORTABLE (1 VIEW)   Final Result      Bilateral interstitial and airspace opacities are most suggestive of atypical infection. Edema is considered less likely.            Current Facility-Administered Medications   Medication Dose Route Frequency Provider Last Rate Last Dose   • albuterol inhaler 2 Puff  2 Puff Inhalation Q4HRS (RT) Jonn Gallo M.D.   2 Puff at 09/05/20 1519   • budesonide-formoterol (SYMBICORT) 80-4.5 MCG/ACT  inhaler 2 Puff  2 Puff Inhalation BID (RT) Jonn Gallo M.D.   2 Puff at 09/05/20 0807   • ascorbic acid tablet 2,000 mg  2,000 mg Oral DAILY Jonn Gallo M.D.   2,000 mg at 09/05/20 0615   • vitamin D (cholecalciferol) tablet 1,000 Units  1,000 Units Oral DAILY Jonn Gallo M.D.   1,000 Units at 09/05/20 0615   • zinc sulfate (ZINCATE) capsule 220 mg  220 mg Oral DAILY Jonn Gallo M.D.   220 mg at 09/05/20 0615   • guaiFENesin ER (MUCINEX) tablet 600 mg  600 mg Oral Q12HRS Jonn Gallo M.D.   600 mg at 09/05/20 0615   • ALPRAZolam (XANAX) tablet 0.25 mg  0.25 mg Oral HS PRN Polly William M.D.   0.25 mg at 09/05/20 0012   • HYDROcodone-acetaminophen (NORCO) 5-325 MG per tablet 1-2 Tab  1-2 Tab Oral Q6HRS PRN Bolivar Paz M.D.   2 Tab at 09/05/20 0403   • NS (BOLUS) infusion 250 mL  250 mL Intravenous DIALYSIS PRN Raul Londono M.D.       • albumin human 25% solution 12.5 g  12.5 g Intravenous DIALYSIS PRN Raul Londono M.D.       • lidocaine (XYLOCAINE) 1 % injection 1 mL  1 mL Intradermal PRN Raul Londono M.D.   1 mL at 09/04/20 1245   • epoetin (Retacrit) injection (Dialysis Use Only) 4,000 Units  4,000 Units Intravenous MO, WE + FR Raul Londono M.D.   4,000 Units at 09/04/20 1330   • benzocaine-menthol (CEPACOL) lozenge 1 Lozenge  1 Lozenge Mouth/Throat Q2HRS PRN Bolivar Paz M.D.   1 Lozenge at 09/03/20 0029   • benzonatate (TESSALON) capsule 100 mg  100 mg Oral TID PRN Polly William M.D.   100 mg at 09/05/20 0615   • guaiFENesin (ROBITUSSIN) 100 MG/5ML solution 200 mg  10 mL Oral Q4HRS PRN Polly William M.D.   200 mg at 09/04/20 2123   • HYDROcodone/acetaminophen (NORCO)  MG per tablet 3 Tab  3 Tab Oral QHS Bolivar Paz M.D.   3 Tab at 09/04/20 2123   • sevelamer carbonate (RENVELA) tablet 3,200 mg  3,200 mg Oral TID WITH MEALS Bolivar Paz M.D.   3,200 mg at 09/05/20 1211   • tizanidine (ZANAFLEX) tablet 8 mg  8 mg Oral QHS [facility]  EDISON Paz   8 mg at 09/04/20 2123   • calcitRIOL (ROCALTROL) capsule 0.75 mcg  0.75 mcg Oral QHS Bolivar Paz M.D.   0.75 mcg at 09/04/20 2123   • cephALEXin (KEFLEX) capsule 500 mg  500 mg Oral MO, WE + FR Bolivar Paz M.D.   500 mg at 09/04/20 0817   • gabapentin (NEURONTIN) capsule 2,400 mg  2,400 mg Oral Nightly Bolivar Paz M.D.   2,400 mg at 09/04/20 2122    And   • gabapentin (NEURONTIN) capsule 600 mg  600 mg Oral QHS Bolivar Paz M.D.   600 mg at 09/04/20 2123   • dexamethasone (DECADRON) tablet 6 mg  6 mg Oral DAILY Bolivar Paz M.D.   6 mg at 09/05/20 0615         Assessment/Plan  40 y.o. 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     1. ESRD on HD Monday Wednesday Friday, anuric.  No acute need for dialysis today.  Monitor daily for dialysis needs.  Likely plan on dialysis again on Monday, then transition to Tuesday Thursday Saturday schedule as part of ago heart clinic at [facility].  Check labs daily.     2. Access: left thigh AVG, with history of skin breakdown.  Stable.  Avoid cannulation the skin breakdown areas.  Patient has a history of SVC stenosis, his right femoral vasculature is open in case the patient needs a central line.     3. COVID-19 pneumonia.  Oxygenation improving.  Further treatment per primary team.     5. Anemia of chronic disease.  Slightly worsening.  Unclear etiology.  Continue Epogen 3 times weekly with dialysis.  Check CBC daily.     6. Thrombocytopenia, resolved.  Check CBC daily.      Raul Londono MD  Nephrology

## 2020-09-05 NOTE — PROGRESS NOTES
Hemodialysis done today, started @ 1307 and ended @ 1611 with net UF= 2000ml. AVG on L femoral site held for approx 25 mins.after needle removed, Verified no bleeding with LETY Albert. See flow sheet for details.

## 2020-09-05 NOTE — PROGRESS NOTES
Dr Gallo notified of increase in O2 requirements. Albuterol & symbicort ordered as well as other vitamin supplements.

## 2020-09-05 NOTE — CARE PLAN
Problem: Safety  Goal: Will remain free from injury  Outcome: PROGRESSING AS EXPECTED  Note: Patient will remain free from falls/injuries this shift. Safety measures in place. Pt calls appropriately.      Problem: Knowledge Deficit  Goal: Knowledge of disease process/condition, treatment plan, diagnostic tests, and medications will improve  Outcome: PROGRESSING AS EXPECTED  Note: Patient will be cooperative with cares. Plan of care discussed with patient. Verbalizes understanding.

## 2020-09-05 NOTE — CARE PLAN
Problem: Communication  Goal: The ability to communicate needs accurately and effectively will improve  Outcome: PROGRESSING AS EXPECTED  Note: AO4 able to verbalize needs clearly. Demonstrates appropriate use of call light.        Problem: Safety  Goal: Will remain free from falls  Outcome: PROGRESSING AS EXPECTED  Intervention: Assess risk factors for falls  Flowsheets  Taken 9/5/2020 0443  Fall Risk: Risk to Fall -  0 - 1 point  History of fall: 0  Mobility Status Assessment: 1-1 Healthcare Provider Required for Assistance with Ambulation & Transfer  Risk for Injury-Any positive answers results in the pt being at high risk for fall related injury: Not Applicable  Taken 9/4/2020 2000  Pt Calls for Assistance: Yes  Intervention: Implement fall precautions  Flowsheets  Taken 9/5/2020 0443  IV Pole on Same Side of Bed as Bathroom: Yes  Taken 9/4/2020 2000  Environmental Precautions: (refuses non slip socks)   Personal Belongings, Wastebasket, Call Bell etc. in Easy Reach   Transferred to Stronger Side   Bed in Low Position   Communication Sign for Patients & Families   Mobility Assessed & Appropriate Sign Placed   Report Given to Other Health Care Providers Regarding Fall Risk     Problem: Knowledge Deficit  Goal: Knowledge of disease process/condition, treatment plan, diagnostic tests, and medications will improve  Outcome: PROGRESSING AS EXPECTED  Note:   Review plan of care including IV and medications, labs and tests, and discharge planning. Take extra time to reiterate to patients that they may ask questions at any time and should always let staff know if they are having difficulty breathing, pain or any discomfort at any time.

## 2020-09-05 NOTE — PROGRESS NOTES
Teresa from [facility] called to try and get in touch with patient. Patient notified she had been calling and states he will return her phone call. Phone provided.

## 2020-09-06 LAB
ALBUMIN SERPL BCP-MCNC: 3.9 G/DL (ref 3.2–4.9)
ALBUMIN/GLOB SERPL: 1 G/DL
ALP SERPL-CCNC: 148 U/L (ref 30–99)
ALT SERPL-CCNC: 11 U/L (ref 2–50)
ANION GAP SERPL CALC-SCNC: 21 MMOL/L (ref 7–16)
ANISOCYTOSIS BLD QL SMEAR: ABNORMAL
AST SERPL-CCNC: 24 U/L (ref 12–45)
BASOPHILS # BLD AUTO: 0 % (ref 0–1.8)
BASOPHILS # BLD: 0 K/UL (ref 0–0.12)
BILIRUB SERPL-MCNC: 0.4 MG/DL (ref 0.1–1.5)
BUN SERPL-MCNC: 61 MG/DL (ref 8–22)
CALCIUM SERPL-MCNC: 7.7 MG/DL (ref 8.4–10.2)
CHLORIDE SERPL-SCNC: 93 MMOL/L (ref 96–112)
CO2 SERPL-SCNC: 24 MMOL/L (ref 20–33)
CREAT SERPL-MCNC: 7.8 MG/DL (ref 0.5–1.4)
EOSINOPHIL # BLD AUTO: 0 K/UL (ref 0–0.51)
EOSINOPHIL NFR BLD: 0 % (ref 0–6.9)
ERYTHROCYTE [DISTWIDTH] IN BLOOD BY AUTOMATED COUNT: 59.1 FL (ref 35.9–50)
GLOBULIN SER CALC-MCNC: 3.8 G/DL (ref 1.9–3.5)
GLUCOSE SERPL-MCNC: 125 MG/DL (ref 65–99)
HCT VFR BLD AUTO: 25.5 % (ref 42–52)
HGB BLD-MCNC: 8 G/DL (ref 14–18)
INR PPP: 3.9 (ref 0.87–1.13)
LYMPHOCYTES # BLD AUTO: 0.86 K/UL (ref 1–4.8)
LYMPHOCYTES NFR BLD: 16 % (ref 22–41)
MACROCYTES BLD QL SMEAR: ABNORMAL
MANUAL DIFF BLD: NORMAL
MCH RBC QN AUTO: 30.3 PG (ref 27–33)
MCHC RBC AUTO-ENTMCNC: 31.4 G/DL (ref 33.7–35.3)
MCV RBC AUTO: 96.6 FL (ref 81.4–97.8)
METAMYELOCYTES NFR BLD MANUAL: 4 %
MONOCYTES # BLD AUTO: 0.22 K/UL (ref 0–0.85)
MONOCYTES NFR BLD AUTO: 4 % (ref 0–13.4)
MYELOCYTES NFR BLD MANUAL: 1 %
NEUTROPHILS # BLD AUTO: 4.05 K/UL (ref 1.82–7.42)
NEUTROPHILS NFR BLD: 70 % (ref 44–72)
NEUTS BAND NFR BLD MANUAL: 5 % (ref 0–10)
NRBC # BLD AUTO: 0 K/UL
NRBC BLD-RTO: 0 /100 WBC
PLATELET # BLD AUTO: 219 K/UL (ref 164–446)
PLATELET BLD QL SMEAR: NORMAL
PMV BLD AUTO: 9.5 FL (ref 9–12.9)
POLYCHROMASIA BLD QL SMEAR: NORMAL
POTASSIUM SERPL-SCNC: 4.4 MMOL/L (ref 3.6–5.5)
PROT SERPL-MCNC: 7.7 G/DL (ref 6–8.2)
PROTHROMBIN TIME: 37.7 SEC (ref 12–14.6)
RBC # BLD AUTO: 2.64 M/UL (ref 4.7–6.1)
RBC BLD AUTO: PRESENT
SODIUM SERPL-SCNC: 138 MMOL/L (ref 135–145)
VARIANT LYMPHS BLD QL SMEAR: NORMAL
WBC # BLD AUTO: 5.4 K/UL (ref 4.8–10.8)

## 2020-09-06 PROCEDURE — A9270 NON-COVERED ITEM OR SERVICE: HCPCS | Performed by: HOSPITALIST

## 2020-09-06 PROCEDURE — 85027 COMPLETE CBC AUTOMATED: CPT

## 2020-09-06 PROCEDURE — 99232 SBSQ HOSP IP/OBS MODERATE 35: CPT | Performed by: HOSPITALIST

## 2020-09-06 PROCEDURE — 85007 BL SMEAR W/DIFF WBC COUNT: CPT

## 2020-09-06 PROCEDURE — 99233 SBSQ HOSP IP/OBS HIGH 50: CPT | Performed by: INTERNAL MEDICINE

## 2020-09-06 PROCEDURE — 80053 COMPREHEN METABOLIC PANEL: CPT

## 2020-09-06 PROCEDURE — 700102 HCHG RX REV CODE 250 W/ 637 OVERRIDE(OP): Performed by: HOSPITALIST

## 2020-09-06 PROCEDURE — 770021 HCHG ROOM/CARE - ISO PRIVATE

## 2020-09-06 PROCEDURE — A9270 NON-COVERED ITEM OR SERVICE: HCPCS | Performed by: INTERNAL MEDICINE

## 2020-09-06 PROCEDURE — 700102 HCHG RX REV CODE 250 W/ 637 OVERRIDE(OP): Performed by: INTERNAL MEDICINE

## 2020-09-06 PROCEDURE — 85610 PROTHROMBIN TIME: CPT

## 2020-09-06 RX ORDER — HYDRALAZINE HYDROCHLORIDE 20 MG/ML
10 INJECTION INTRAMUSCULAR; INTRAVENOUS EVERY 6 HOURS PRN
Status: DISCONTINUED | OUTPATIENT
Start: 2020-09-06 | End: 2020-09-08 | Stop reason: HOSPADM

## 2020-09-06 RX ORDER — AMLODIPINE BESYLATE 5 MG/1
5 TABLET ORAL
Status: DISCONTINUED | OUTPATIENT
Start: 2020-09-06 | End: 2020-09-08 | Stop reason: HOSPADM

## 2020-09-06 RX ADMIN — OXYCODONE HYDROCHLORIDE AND ACETAMINOPHEN 2000 MG: 500 TABLET ORAL at 05:05

## 2020-09-06 RX ADMIN — ALBUTEROL SULFATE 2 PUFF: 90 AEROSOL, METERED RESPIRATORY (INHALATION) at 05:06

## 2020-09-06 RX ADMIN — GABAPENTIN 2400 MG: 400 CAPSULE ORAL at 21:21

## 2020-09-06 RX ADMIN — CALCITRIOL CAPSULES 0.25 MCG 0.75 MCG: 0.25 CAPSULE ORAL at 21:21

## 2020-09-06 RX ADMIN — GUAIFENESIN 600 MG: 600 TABLET, EXTENDED RELEASE ORAL at 05:06

## 2020-09-06 RX ADMIN — SEVELAMER CARBONATE 3200 MG: 800 TABLET, FILM COATED ORAL at 18:23

## 2020-09-06 RX ADMIN — HYDROCODONE BITARTRATE AND ACETAMINOPHEN 3 TABLET: 10; 325 TABLET ORAL at 21:20

## 2020-09-06 RX ADMIN — BENZONATATE 100 MG: 100 CAPSULE ORAL at 21:21

## 2020-09-06 RX ADMIN — ALBUTEROL SULFATE 2 PUFF: 90 AEROSOL, METERED RESPIRATORY (INHALATION) at 11:57

## 2020-09-06 RX ADMIN — GABAPENTIN 600 MG: 300 CAPSULE ORAL at 21:20

## 2020-09-06 RX ADMIN — ALBUTEROL SULFATE 2 PUFF: 90 AEROSOL, METERED RESPIRATORY (INHALATION) at 18:27

## 2020-09-06 RX ADMIN — GUAIFENESIN 600 MG: 600 TABLET, EXTENDED RELEASE ORAL at 18:23

## 2020-09-06 RX ADMIN — SEVELAMER CARBONATE 3200 MG: 800 TABLET, FILM COATED ORAL at 08:33

## 2020-09-06 RX ADMIN — SEVELAMER CARBONATE 3200 MG: 800 TABLET, FILM COATED ORAL at 11:58

## 2020-09-06 RX ADMIN — ALBUTEROL SULFATE 2 PUFF: 90 AEROSOL, METERED RESPIRATORY (INHALATION) at 15:11

## 2020-09-06 RX ADMIN — ZINC SULFATE 220 MG (50 MG) CAPSULE 220 MG: CAPSULE at 05:06

## 2020-09-06 RX ADMIN — ALBUTEROL SULFATE 2 PUFF: 90 AEROSOL, METERED RESPIRATORY (INHALATION) at 21:23

## 2020-09-06 RX ADMIN — GUAIFENESIN 200 MG: 100 SOLUTION ORAL at 12:19

## 2020-09-06 RX ADMIN — DEXAMETHASONE 6 MG: 4 TABLET ORAL at 05:06

## 2020-09-06 RX ADMIN — GUAIFENESIN 200 MG: 100 SOLUTION ORAL at 21:19

## 2020-09-06 RX ADMIN — BUDESONIDE AND FORMOTEROL FUMARATE DIHYDRATE 2 PUFF: 80; 4.5 AEROSOL RESPIRATORY (INHALATION) at 18:27

## 2020-09-06 RX ADMIN — BENZONATATE 100 MG: 100 CAPSULE ORAL at 08:33

## 2020-09-06 RX ADMIN — TIZANIDINE 8 MG: 4 TABLET ORAL at 21:21

## 2020-09-06 RX ADMIN — Medication 1000 UNITS: at 05:06

## 2020-09-06 RX ADMIN — ALBUTEROL SULFATE 2 PUFF: 90 AEROSOL, METERED RESPIRATORY (INHALATION) at 01:18

## 2020-09-06 RX ADMIN — BUDESONIDE AND FORMOTEROL FUMARATE DIHYDRATE 2 PUFF: 80; 4.5 AEROSOL RESPIRATORY (INHALATION) at 05:06

## 2020-09-06 ASSESSMENT — ENCOUNTER SYMPTOMS
EYES NEGATIVE: 1
ABDOMINAL PAIN: 0
FEVER: 0
PSYCHIATRIC NEGATIVE: 1
MUSCULOSKELETAL NEGATIVE: 1
GASTROINTESTINAL NEGATIVE: 1
SHORTNESS OF BREATH: 0
CARDIOVASCULAR NEGATIVE: 1
COUGH: 1
WEAKNESS: 1

## 2020-09-06 ASSESSMENT — PAIN DESCRIPTION - PAIN TYPE: TYPE: ACUTE PAIN

## 2020-09-06 NOTE — PROGRESS NOTES
Nephrology Daily Progress Note    Date of Service  9/6/2020    Chief Complaint  40 y.o. 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    Interval Problem Update  9/2 -patient remains in ICU on low-dose norepinephrine.  Patient complains of some hemoptysis.  Patient denies chest pain, shortness of breath.  9/3-patient had dialysis yesterday with 2 L removed.  No issues with dialysis yesterday.  Patient weaned off pressors overnight.  Patient seen this afternoon, denies chest pain, shortness of breath.  9/5 -patient had dialysis yesterday with 2 L removed.  Oxygenation improving today.  Patient denies chest pain, shortness of breath.  9/6 -patient remains on supplemental oxygen, with 4 L/min.  Patient denies chest pain, shortness of breath.  Hoping to go home soon.    Review of Systems  Review of Systems   Constitutional: Negative for fever.   Respiratory: Negative for shortness of breath.    Cardiovascular: Negative for chest pain.   Gastrointestinal: Negative for abdominal pain.   All other systems reviewed and are negative.       Physical Exam  Temp:  [36.2 °C (97.2 °F)-36.5 °C (97.7 °F)] 36.3 °C (97.3 °F)  Pulse:  [] 104  Resp:  [18] 18  BP: ()/(37-72) 114/72  SpO2:  [95 %-100 %] 95 %    Physical Exam   Constitutional: He is oriented to person, place, and time. He appears well-developed. No distress.   HENT:   Mouth/Throat: Oropharynx is clear and moist. No oropharyngeal exudate.   Eyes: EOM are normal. No scleral icterus.   Neck: No tracheal deviation present.   Cardiovascular: Normal rate and normal heart sounds.   No murmur heard.  Pulmonary/Chest: Effort normal and breath sounds normal. No stridor. He has no rales.   Abdominal: Soft. He exhibits no distension. There is no abdominal tenderness.   Musculoskeletal: Normal range of motion.         General: No edema.   Neurological: He is  alert and oriented to person, place, and time.   Skin: Skin is warm and dry. He is not diaphoretic.   Psychiatric: He has a normal mood and affect.   Access: Left thigh AV graft, with patent bruit    Fluids    Intake/Output Summary (Last 24 hours) at 9/6/2020 1517  Last data filed at 9/5/2020 1900  Gross per 24 hour   Intake 480 ml   Output --   Net 480 ml       Laboratory  Labs reviewed, pertinent labs below.  Recent Labs     09/04/20  0021  09/05/20  0143 09/05/20  1235 09/06/20  0541   WBC 5.6  --  6.9  --  5.4   RBC 2.61*  --  2.63*  --  2.64*   HEMOGLOBIN 8.0*  8.0*   < > 8.1*  8.1* 8.4* 8.0*   HEMATOCRIT 24.3*  24.3*   < > 25.0*  25.0* 26.5* 25.5*   MCV 93.9  --  97.3  --  96.6   MCH 30.7  --  30.4  --  30.3   MCHC 32.7*  --  31.3*  --  31.4*   RDW 57.3*  --  60.2*  --  59.1*   PLATELETCT 185  --  222  --  219   MPV 9.4  --  9.8  --  9.5    < > = values in this interval not displayed.     Recent Labs     09/04/20  0021 09/05/20  0143 09/06/20  0541   SODIUM 135 137 138   POTASSIUM 5.3 4.7 4.4   CHLORIDE 89* 91* 93*   CO2 30 26 24   GLUCOSE 130* 119* 125*   BUN 54* 38* 61*   CREATININE 7.53* 5.63* 7.80*   CALCIUM 7.0* 8.0* 7.7*     Recent Labs     09/04/20  0021 09/05/20  0143 09/06/20  0111   INR 9.51* 4.04* 3.90*           URINALYSIS:  No results found for: COLORURINE, CLARITY, SPECGRAVITY, PHURINE, KETONES, PROTEINURIN, BILIRUBINUR, UROBILU, NITRITE, LEUKESTERAS, OCCULTBLOOD  UPC  No results found for: TOTPROTUR No results found for: CREATININEU      Imaging reviewed  DX-CHEST-PORTABLE (1 VIEW)   Final Result      Bilateral interstitial and airspace opacities are most suggestive of atypical infection. Edema is considered less likely.            Current Facility-Administered Medications   Medication Dose Route Frequency Provider Last Rate Last Dose   • hydrALAZINE (APRESOLINE) injection 10 mg  10 mg Intravenous Q6HRS PRN Jonn Gallo M.D.       • amLODIPine (NORVASC) tablet 5 mg  5 mg Oral Q DAY Jonn  EDISON Gallo       • MD Alert...Warfarin per Pharmacy   Other PHARMACY TO DOSE Jonn Gallo M.D.       • albuterol inhaler 2 Puff  2 Puff Inhalation Q4HRS Jonn Gallo M.D.   2 Puff at 09/06/20 1511   • budesonide-formoterol (SYMBICORT) 80-4.5 MCG/ACT inhaler 2 Puff  2 Puff Inhalation BID Jonn Gallo M.D.   2 Puff at 09/06/20 0506   • ascorbic acid tablet 2,000 mg  2,000 mg Oral DAILY Jonn Gallo M.D.   2,000 mg at 09/06/20 0505   • vitamin D (cholecalciferol) tablet 1,000 Units  1,000 Units Oral DAILY Jonn Gallo M.D.   1,000 Units at 09/06/20 0506   • zinc sulfate (ZINCATE) capsule 220 mg  220 mg Oral DAILY Jonn Gallo M.D.   220 mg at 09/06/20 0506   • guaiFENesin ER (MUCINEX) tablet 600 mg  600 mg Oral Q12HRS Jonn Gallo M.D.   600 mg at 09/06/20 0506   • ALPRAZolam (XANAX) tablet 0.25 mg  0.25 mg Oral HS PRN Polly William M.D.   0.25 mg at 09/05/20 0012   • HYDROcodone-acetaminophen (NORCO) 5-325 MG per tablet 1-2 Tab  1-2 Tab Oral Q6HRS PRN Bolivar Paz M.D.   2 Tab at 09/05/20 0403   • NS (BOLUS) infusion 250 mL  250 mL Intravenous DIALYSIS PRN Raul Londono M.D.       • albumin human 25% solution 12.5 g  12.5 g Intravenous DIALYSIS PRN Raul Londono M.D.       • lidocaine (XYLOCAINE) 1 % injection 1 mL  1 mL Intradermal PRN Raul Londono M.D.   1 mL at 09/04/20 1245   • epoetin (Retacrit) injection (Dialysis Use Only) 4,000 Units  4,000 Units Intravenous MO, WE + FR Raul Londono M.D.   4,000 Units at 09/04/20 1330   • benzocaine-menthol (CEPACOL) lozenge 1 Lozenge  1 Lozenge Mouth/Throat Q2HRS PRN Bolivar Paz M.D.   1 Lozenge at 09/03/20 0029   • benzonatate (TESSALON) capsule 100 mg  100 mg Oral TID PRN Polly William M.D.   100 mg at 09/06/20 0833   • guaiFENesin (ROBITUSSIN) 100 MG/5ML solution 200 mg  10 mL Oral Q4HRS PRN Polly William M.D.   200 mg at 09/06/20 1219   • HYDROcodone/acetaminophen (NORCO)  MG per tablet 3 Tab  3 Tab Oral QHS Bolivar  EDISON Paz   3 Tab at 09/05/20 2130   • sevelamer carbonate (RENVELA) tablet 3,200 mg  3,200 mg Oral TID WITH MEALS Bolivar Paz M.D.   3,200 mg at 09/06/20 1158   • tizanidine (ZANAFLEX) tablet 8 mg  8 mg Oral QHS Bolivar Paz M.D.   8 mg at 09/05/20 2131   • calcitRIOL (ROCALTROL) capsule 0.75 mcg  0.75 mcg Oral QHS Bolivar Paz M.D.   0.75 mcg at 09/05/20 2131   • cephALEXin (KEFLEX) capsule 500 mg  500 mg Oral MO, WE + FR Bolivar Paz M.D.   500 mg at 09/04/20 0817   • gabapentin (NEURONTIN) capsule 2,400 mg  2,400 mg Oral Nightly Bolivar Paz M.D.   2,400 mg at 09/05/20 2131    And   • gabapentin (NEURONTIN) capsule 600 mg  600 mg Oral QHS Bolivar Paz M.D.   600 mg at 09/05/20 2130   • dexamethasone (DECADRON) tablet 6 mg  6 mg Oral DAILY Bolivar Paz M.D.   6 mg at 09/06/20 0506         Assessment/Plan  40 y.o. 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     1. ESRD on HD Monday Wednesday Friday, anuric.  No acute need for dialysis today.  Plan for dialysis tomorrow, then transition to Tuesday Thursday Saturday schedule given patient will be on a TTS schedule as part of the COVID cohort.  Check labs daily.     2. Access: left thigh AVG, with history of skin breakdown.  Stable.  Avoid cannulation in the breakdown areas of skin.  Patient has a history of SVC stenosis, but his right femoral vasculature is open in case the patient needs a central line.     3. COVID-19 pneumonia.  Patient still requires supplemental oxygen.  Continue care per primary team.    4.  Hypertension, controlled.  Plan on ultrafiltration with dialysis as tolerated.    5. Anemia of chronic disease.  Worsening.  Continue Epogen thrice weekly with dialysis.  Check CBC daily.      Raul Londono MD  Nephrology

## 2020-09-06 NOTE — CARE PLAN
Problem: Knowledge Deficit  Goal: Knowledge of disease process/condition, treatment plan, diagnostic tests, and medications will improve  Outcome: PROGRESSING AS EXPECTED  Note: Updated pt on plan of care, no needs a this time.      Problem: Respiratory:  Goal: Respiratory status will improve  Outcome: PROGRESSING AS EXPECTED  Note: Pt reports SOB, pt is on 5liters face mask. Pt has consistent wet cough. Medicated with benzonotate per MAR. Discussed with MD will continue to monitor.

## 2020-09-06 NOTE — PROGRESS NOTES
Inpatient Anticoagulation Service Note    Date: 9/6/2020    Reason for Anticoagulation: Other (Comments)   Target INR: 2.5 to 3.5         Hemoglobin Value: (!) 8  Hematocrit Value: (!) 25.5  Lab Platelet Value: 219    INR from last 7 days     Date/Time INR Value    09/06/20 0111  (!) 3.9    09/05/20 0143  (!) 4.04    09/04/20 0021  (!) 9.51    09/03/20 0435  (!) 6.16    09/02/20 0421  (!) 4.94    09/01/20 1140  (!) 3.59    08/31/20 1800  (!) 3.27        Dose from last 7 days     Date/Time Dose (mg)    09/06/20 1000  0        Significant Interactions: Antibiotics, Corticosteroids  Bridge Therapy: No (If less than 5 days and overlap therapy discontinued -- document reason (i.e. Bleed Risk))    (If still on overlap therapy, if No -- document reason (i.e. Bleed Risk))    Comments: for AV graft thrombosis    Plan:  Will hold warfarin tonight for INR of 3.9     Pharmacist suggested discharge dosing: Resume home regimen of 5 mg po daily.     Mehdi Pierce, [facility]

## 2020-09-06 NOTE — CARE PLAN
Problem: Communication  Goal: The ability to communicate needs accurately and effectively will improve  Outcome: PROGRESSING AS EXPECTED  Educate patient on use of call light for assistance.     Problem: Psychosocial Needs:  Goal: Level of anxiety will decrease  Outcome: PROGRESSING AS EXPECTED   Administer anti-anxiety medications as needed. Assess and communicate with patient if any other needs can be met to help with anxiety.

## 2020-09-06 NOTE — PROGRESS NOTES
1845: Report received by Monica DAVIDSON. Plan of care discussed. Safety measures in place, call light in reach.    2040: CNA reports patient BP at 100/37. This RN assessed patient. Patient is asymptomatic. Hx of abnormal BP readings due to only being able to use right calf due to history of failed skin grafts/fistulas on bilateral arm. Patient also requesting something to help sleep. MD notified of BP, continue to monitor, notify if symptomatic. One time dose ordered to help with sleep. Monitor mentation.    2126: Assessment complete. No complaints or concerns at this time.

## 2020-09-06 NOTE — FLOWSHEET NOTE
09/05/20 1848   Non-Invasive [facility]   Nocturnal CPAP or BIPAP BIPAP - Home Unit   $ System Evaluation Yes   Settings (If Known) 7   FiO2 or LPM 5

## 2020-09-06 NOTE — PROGRESS NOTES
[facility] Medicine Daily Progress Note    Date of Service  9/6/2020    Chief Complaint  40 y.o. male admitted 8/31/2020 with past medical history of end-stage renal disease, spinal cord stimulator, patient was found to have COVID for 5 days.  Symptoms included diarrhea, chills, body aches.  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  Patient underwent dialysis session but did not completed due to hypotension.    Hospital Course    Upon presentation to the hospital he was on 4 L of oxygen.  Chest x-ray found bilateral multifocal infiltrates.  On arrival patient was found to have pancytopenia.  He was started on IV Decadron.  Patient was not started on Lovenox since INR was supratherapeutic.  Patient was found to be hypotensive and placed on levophed.      Interval Problem Update  9/4: We will continue IV Decadron. Blood pressure has improved. We will give a dose of oral vitamin K since INR was supratherapeutic.  I have also have ordered inhalers including albuterol and Symbicort.  9/6: Overall improvement in patient's condition today.  He will get a dialysis session tomorrow.  He is now down to 4 L of oxygen.  Continue current management.  Consultants/Specialty  Nephrology    Code Status  Full Code    Disposition  TBD    Review of Systems  Review of Systems   Constitutional: Positive for malaise/fatigue.   HENT: Negative.    Eyes: Negative.    Respiratory: Positive for cough.    Cardiovascular: Negative.    Gastrointestinal: Negative.    Genitourinary: Negative.    Musculoskeletal: Negative.    Skin: Negative.    Neurological: Positive for weakness.   Endo/Heme/Allergies: Negative.    Psychiatric/Behavioral: Negative.         Physical Exam  Temp:  [36.2 °C (97.2 °F)-36.5 °C (97.7 °F)] 36.3 °C (97.3 °F)  Pulse:  [] 104  Resp:  [18] 18  BP: ()/(37-72) 114/72  SpO2:  [95 %-100 %] 95 %    Physical Exam  Constitutional:       Appearance: He is ill-appearing.   HENT:      Head: Normocephalic  and atraumatic.      Mouth/Throat:      Mouth: Mucous membranes are moist.   Eyes:      Extraocular Movements: Extraocular movements intact.      Pupils: Pupils are equal, round, and reactive to light.   Neck:      Musculoskeletal: Normal range of motion.   Cardiovascular:      Rate and Rhythm: Normal rate.   Pulmonary:      Effort: Respiratory distress present.      Breath sounds: Rales present.   Abdominal:      General: Bowel sounds are normal. There is no distension.      Palpations: Abdomen is soft.   Musculoskeletal: Normal range of motion.   Skin:     General: Skin is warm and dry.   Neurological:      General: No focal deficit present.      Mental Status: He is alert.   Psychiatric:         Mood and Affect: Mood normal.         Behavior: Behavior normal.         Thought Content: Thought content normal.         Judgment: Judgment normal.         Fluids    Intake/Output Summary (Last 24 hours) at 9/6/2020 1631  Last data filed at 9/5/2020 1900  Gross per 24 hour   Intake 240 ml   Output --   Net 240 ml       Laboratory  Recent Labs     09/04/20  0021  09/05/20  0143 09/05/20  1235 09/06/20  0541   WBC 5.6  --  6.9  --  5.4   RBC 2.61*  --  2.63*  --  2.64*   HEMOGLOBIN 8.0*  8.0*   < > 8.1*  8.1* 8.4* 8.0*   HEMATOCRIT 24.3*  24.3*   < > 25.0*  25.0* 26.5* 25.5*   MCV 93.9  --  97.3  --  96.6   MCH 30.7  --  30.4  --  30.3   MCHC 32.7*  --  31.3*  --  31.4*   RDW 57.3*  --  60.2*  --  59.1*   PLATELETCT 185  --  222  --  219   MPV 9.4  --  9.8  --  9.5    < > = values in this interval not displayed.     Recent Labs     09/04/20  0021 09/05/20  0143 09/06/20  0541   SODIUM 135 137 138   POTASSIUM 5.3 4.7 4.4   CHLORIDE 89* 91* 93*   CO2 30 26 24   GLUCOSE 130* 119* 125*   BUN 54* 38* 61*   CREATININE 7.53* 5.63* 7.80*   CALCIUM 7.0* 8.0* 7.7*     Recent Labs     09/04/20  0021 09/05/20  0143 09/06/20  0111   INR 9.51* 4.04* 3.90*               Imaging  DX-CHEST-PORTABLE (1 VIEW)   Final Result       Bilateral interstitial and airspace opacities are most suggestive of atypical infection. Edema is considered less likely.           Assessment/Plan  * Pneumonia due to COVID-19 virus  Assessment & Plan  Monitor O2 status closely  Prone positioning and frequent change position  ISS and ambulation aggressively  Vitamin C, zinc, vitamin D  Inflammatory markers every 48 hours  Not a candidate for remdesivir      Elevated INR- (present on admission)  Assessment & Plan  Restart warfarin with a goal of 2.5-3.5    Acute respiratory failure with hypoxia (HCC)  Assessment & Plan  On 10 L of o2     End stage renal disease (HCC)  Assessment & Plan  Monitor BMP and assess response  Avoid IV contrast/nephrotoxins/NSAIDs  Dose adjust meds for decreased GFR  Patient underwent one session of dialysis today         VTE prophylaxis: Hold for supratheraputic INR

## 2020-09-07 LAB
ALBUMIN SERPL BCP-MCNC: 4 G/DL (ref 3.2–4.9)
ALBUMIN/GLOB SERPL: 1.1 G/DL
ALP SERPL-CCNC: 146 U/L (ref 30–99)
ALT SERPL-CCNC: 13 U/L (ref 2–50)
ANION GAP SERPL CALC-SCNC: 21 MMOL/L (ref 7–16)
AST SERPL-CCNC: 28 U/L (ref 12–45)
BASOPHILS # BLD AUTO: 0.1 % (ref 0–1.8)
BASOPHILS # BLD: 0.01 K/UL (ref 0–0.12)
BILIRUB SERPL-MCNC: 0.4 MG/DL (ref 0.1–1.5)
BUN SERPL-MCNC: 78 MG/DL (ref 8–22)
CALCIUM SERPL-MCNC: 7.6 MG/DL (ref 8.4–10.2)
CHLORIDE SERPL-SCNC: 89 MMOL/L (ref 96–112)
CO2 SERPL-SCNC: 24 MMOL/L (ref 20–33)
CREAT SERPL-MCNC: 8.95 MG/DL (ref 0.5–1.4)
EOSINOPHIL # BLD AUTO: 0.02 K/UL (ref 0–0.51)
EOSINOPHIL NFR BLD: 0.3 % (ref 0–6.9)
ERYTHROCYTE [DISTWIDTH] IN BLOOD BY AUTOMATED COUNT: 56 FL (ref 35.9–50)
GLOBULIN SER CALC-MCNC: 3.5 G/DL (ref 1.9–3.5)
GLUCOSE SERPL-MCNC: 109 MG/DL (ref 65–99)
HCT VFR BLD AUTO: 23.7 % (ref 42–52)
HGB BLD-MCNC: 7.8 G/DL (ref 14–18)
IMM GRANULOCYTES # BLD AUTO: 0.46 K/UL (ref 0–0.11)
IMM GRANULOCYTES NFR BLD AUTO: 6.4 % (ref 0–0.9)
INR PPP: 5.84 (ref 0.87–1.13)
LYMPHOCYTES # BLD AUTO: 1 K/UL (ref 1–4.8)
LYMPHOCYTES NFR BLD: 13.9 % (ref 22–41)
MCH RBC QN AUTO: 30.7 PG (ref 27–33)
MCHC RBC AUTO-ENTMCNC: 32.9 G/DL (ref 33.7–35.3)
MCV RBC AUTO: 93.3 FL (ref 81.4–97.8)
MONOCYTES # BLD AUTO: 0.44 K/UL (ref 0–0.85)
MONOCYTES NFR BLD AUTO: 6.1 % (ref 0–13.4)
NEUTROPHILS # BLD AUTO: 5.25 K/UL (ref 1.82–7.42)
NEUTROPHILS NFR BLD: 73.2 % (ref 44–72)
NRBC # BLD AUTO: 0 K/UL
NRBC BLD-RTO: 0 /100 WBC
PLATELET # BLD AUTO: 222 K/UL (ref 164–446)
PMV BLD AUTO: 9 FL (ref 9–12.9)
POTASSIUM SERPL-SCNC: 4.4 MMOL/L (ref 3.6–5.5)
PROT SERPL-MCNC: 7.5 G/DL (ref 6–8.2)
PROTHROMBIN TIME: 51.8 SEC (ref 12–14.6)
RBC # BLD AUTO: 2.54 M/UL (ref 4.7–6.1)
SODIUM SERPL-SCNC: 134 MMOL/L (ref 135–145)
WBC # BLD AUTO: 7.2 K/UL (ref 4.8–10.8)

## 2020-09-07 PROCEDURE — 85025 COMPLETE CBC W/AUTO DIFF WBC: CPT

## 2020-09-07 PROCEDURE — 99232 SBSQ HOSP IP/OBS MODERATE 35: CPT | Performed by: INTERNAL MEDICINE

## 2020-09-07 PROCEDURE — A9270 NON-COVERED ITEM OR SERVICE: HCPCS | Performed by: HOSPITALIST

## 2020-09-07 PROCEDURE — 90935 HEMODIALYSIS ONE EVALUATION: CPT

## 2020-09-07 PROCEDURE — 94660 CPAP INITIATION&MGMT: CPT

## 2020-09-07 PROCEDURE — 5A1D70Z PERFORMANCE OF URINARY FILTRATION, INTERMITTENT, LESS THAN 6 HOURS PER DAY: ICD-10-PCS | Performed by: INTERNAL MEDICINE

## 2020-09-07 PROCEDURE — 700102 HCHG RX REV CODE 250 W/ 637 OVERRIDE(OP): Performed by: HOSPITALIST

## 2020-09-07 PROCEDURE — 99232 SBSQ HOSP IP/OBS MODERATE 35: CPT | Performed by: HOSPITALIST

## 2020-09-07 PROCEDURE — 700111 HCHG RX REV CODE 636 W/ 250 OVERRIDE (IP): Performed by: INTERNAL MEDICINE

## 2020-09-07 PROCEDURE — 80053 COMPREHEN METABOLIC PANEL: CPT

## 2020-09-07 PROCEDURE — 700102 HCHG RX REV CODE 250 W/ 637 OVERRIDE(OP): Performed by: INTERNAL MEDICINE

## 2020-09-07 PROCEDURE — 85610 PROTHROMBIN TIME: CPT

## 2020-09-07 PROCEDURE — A9270 NON-COVERED ITEM OR SERVICE: HCPCS | Performed by: INTERNAL MEDICINE

## 2020-09-07 PROCEDURE — 770021 HCHG ROOM/CARE - ISO PRIVATE

## 2020-09-07 RX ORDER — DEXAMETHASONE 6 MG/1
6 TABLET ORAL DAILY
Qty: 10 TAB | Refills: 0 | Status: SHIPPED | OUTPATIENT
Start: 2020-09-08 | End: 2020-09-09

## 2020-09-07 RX ORDER — ALBUTEROL SULFATE 90 UG/1
2 AEROSOL, METERED RESPIRATORY (INHALATION) EVERY 4 HOURS
Qty: 8.5 G | Refills: 0 | Status: SHIPPED | OUTPATIENT
Start: 2020-09-07 | End: 2021-09-20

## 2020-09-07 RX ADMIN — HYDROCODONE BITARTRATE AND ACETAMINOPHEN 2 TABLET: 5; 325 TABLET ORAL at 06:11

## 2020-09-07 RX ADMIN — ALBUTEROL SULFATE 2 PUFF: 90 AEROSOL, METERED RESPIRATORY (INHALATION) at 20:18

## 2020-09-07 RX ADMIN — ALBUTEROL SULFATE 2 PUFF: 90 AEROSOL, METERED RESPIRATORY (INHALATION) at 08:49

## 2020-09-07 RX ADMIN — ALBUTEROL SULFATE 2 PUFF: 90 AEROSOL, METERED RESPIRATORY (INHALATION) at 06:11

## 2020-09-07 RX ADMIN — ZINC SULFATE 220 MG (50 MG) CAPSULE 220 MG: CAPSULE at 06:11

## 2020-09-07 RX ADMIN — BUDESONIDE AND FORMOTEROL FUMARATE DIHYDRATE 2 PUFF: 80; 4.5 AEROSOL RESPIRATORY (INHALATION) at 06:12

## 2020-09-07 RX ADMIN — EPOETIN ALFA-EPBX 4000 UNITS: 4000 INJECTION, SOLUTION INTRAVENOUS; SUBCUTANEOUS at 07:00

## 2020-09-07 RX ADMIN — Medication 1000 UNITS: at 06:11

## 2020-09-07 RX ADMIN — CALCITRIOL CAPSULES 0.25 MCG 0.75 MCG: 0.25 CAPSULE ORAL at 20:17

## 2020-09-07 RX ADMIN — CEPHALEXIN 500 MG: 250 CAPSULE ORAL at 08:49

## 2020-09-07 RX ADMIN — DEXAMETHASONE 6 MG: 4 TABLET ORAL at 06:11

## 2020-09-07 RX ADMIN — BENZONATATE 100 MG: 100 CAPSULE ORAL at 20:14

## 2020-09-07 RX ADMIN — ALPRAZOLAM 0.25 MG: 0.25 TABLET ORAL at 22:52

## 2020-09-07 RX ADMIN — GABAPENTIN 2400 MG: 400 CAPSULE ORAL at 20:17

## 2020-09-07 RX ADMIN — ALBUTEROL SULFATE 2 PUFF: 90 AEROSOL, METERED RESPIRATORY (INHALATION) at 23:56

## 2020-09-07 RX ADMIN — HYDROCODONE BITARTRATE AND ACETAMINOPHEN 3 TABLET: 10; 325 TABLET ORAL at 20:17

## 2020-09-07 RX ADMIN — ALBUTEROL SULFATE 2 PUFF: 90 AEROSOL, METERED RESPIRATORY (INHALATION) at 12:00

## 2020-09-07 RX ADMIN — GABAPENTIN 600 MG: 300 CAPSULE ORAL at 20:17

## 2020-09-07 RX ADMIN — OXYCODONE HYDROCHLORIDE AND ACETAMINOPHEN 2000 MG: 500 TABLET ORAL at 06:11

## 2020-09-07 RX ADMIN — TIZANIDINE 8 MG: 4 TABLET ORAL at 20:17

## 2020-09-07 RX ADMIN — GUAIFENESIN 600 MG: 600 TABLET, EXTENDED RELEASE ORAL at 06:11

## 2020-09-07 RX ADMIN — GUAIFENESIN 200 MG: 100 SOLUTION ORAL at 20:15

## 2020-09-07 RX ADMIN — SEVELAMER CARBONATE 3200 MG: 800 TABLET, FILM COATED ORAL at 12:00

## 2020-09-07 RX ADMIN — ALBUTEROL SULFATE 2 PUFF: 90 AEROSOL, METERED RESPIRATORY (INHALATION) at 01:55

## 2020-09-07 RX ADMIN — ALPRAZOLAM 0.25 MG: 0.25 TABLET ORAL at 00:11

## 2020-09-07 ASSESSMENT — PAIN DESCRIPTION - PAIN TYPE: TYPE: ACUTE PAIN;CHRONIC PAIN

## 2020-09-07 ASSESSMENT — ENCOUNTER SYMPTOMS
COUGH: 0
VOMITING: 0
NAUSEA: 0
CHILLS: 0
FEVER: 0
SHORTNESS OF BREATH: 0

## 2020-09-07 NOTE — DISCHARGE PLANNING
Called [facility] to f/u on referral. Spoke with Rosa M who states referral is under review. Rosa M notified that O2 is needed for hospital discharge.

## 2020-09-07 NOTE — PROGRESS NOTES
HD treatment today per routine order using left thigh AVG.Treatment tolerated well.BP dropped in the last hour of treatment ,UFG adjusted.Net UF removed 2500 ml.Report given to primary Rn.

## 2020-09-07 NOTE — DISCHARGE PLANNING
Agency/Facility Name: [facility]   Outcome: Referal resent.    @1253  Received Choice form at 1250  Agency/Facility Name: [facility]  Referral sent per Choice form @ 5536

## 2020-09-07 NOTE — FACE TO FACE
"Face to Face Note  -  Durable Medical Equipment    Jonn Gallo M.D. - NPI: [id number]  I certify that this patient is under my care and that they had a durable medical equipment(DME)face to face encounter by myself that meets the physician DME face-to-face encounter requirements with this patient on:    Date of encounter:   Patient:                    MRN:                       YOB: 2019  Denis De Anda  [id number]  1980     The encounter with the patient was in whole, or in part, for the following medical condition, which is the primary reason for durable medical equipment:  Other - Covid    I certify that, based on my findings, the following durable medical equipment is medically necessary:  Oxygen.    HOME O2 Saturation Measurements:(Values must be present for Home Oxygen orders)  Room air sat at rest: 83  Room air sat with amb: 80  With liters of O2: 3, O2 sat at rest with O2: 97  With Liters of O2: 3, O2 sat with amb with O2 : 93  Is the patient mobile?: Yes    My Clinical findings support the need for the above equipment due to:  Hypoxia    Supporting Symptoms: The patient requires supplemental oxygen, as the following interventions have been tried with limited or no improvement: \"Bronchodilators and/or steroid inhalers, \"Oral and/or IV steroids, \"Ambulation with oximetry and \"Incentive spirometry    "

## 2020-09-07 NOTE — DISCHARGE PLANNING
Called [facility] to f/u on pt's referral. Spoke with Robert who states they didn't receive referral yet and requested it be faxed to [phone]    Referral faxed by [facility]

## 2020-09-07 NOTE — CARE PLAN
Problem: Knowledge Deficit  Goal: Knowledge of disease process/condition, treatment plan, diagnostic tests, and medications will improve  Outcome: PROGRESSING AS EXPECTED  Intervention: Assess knowledge level of disease process/condition, treatment plan, diagnostic tests, and medications  Note: Patient's knowledge of plan of care, diagnostics, and medications regularly assessed. RN answers patient questions appropriately, education provided. Patient verbalizes better understanding of their condition.       Problem: Discharge Barriers/Planning  Goal: Patient's continuum of care needs will be met  Outcome: PROGRESSING AS EXPECTED  Intervention: Assess potential discharge barriers on admission and throughout hospital stay  Note: Oxygen saturations when ambulating uploaded into [employer]. MD made aware. Home oxygen to be requested.

## 2020-09-07 NOTE — DIETARY
Nutrition Services: Update   Day 7 of admit.  Denis De Anda is a 40 y.o. male with admitting DX of Acute respiratory disease due to COVID-19 virus    ESRD on HD. Pt is currently on Renal diet. PO generally 25-50%, % Boost Breeze (9/5). Weight trend down, likely d/t fluid shifts with hemodialysis.    Malnutrition Risk: Increased risk d/t PO <50% since admit (x6 days), COVID 19    Recommendations/Plan:  1. Change supplements to Boost Breeze TID with meals   2. Encourage intake of meals  3. Document intake of all meals as % taken in ADL's to provide interdisciplinary communication across all shifts.   4. Monitor weight.  5. Nutrition rep will continue to see patient for ongoing meal and snack preferences.    RD following

## 2020-09-07 NOTE — PROGRESS NOTES
Inpatient Anticoagulation Service Note    Date: 2020    Reason for Anticoagulation: Other (Comments)   Target INR: 2.5 to 3.5    Hemoglobin Value: (!) 7.8  Hematocrit Value: (!) 23.7  Lab Platelet Value: 222    INR from last 7 days     Date/Time INR Value    20 0521  (!) 5.84    20 0111  (!) 3.9    20 0143  (!) 4.04    20 0021  (!) 9.51    20 0435  (!) 6.16    20 0421  (!) 4.94    20 1140  (!) 3.59    20 1800  (!) 3.27        Dose from last 7 days     Date/Time Dose (mg)    20 0800  0    20 1000  0        Significant Interactions: Antibiotics, Corticosteroids  Bridge Therapy: No    Comments: for AV graft thrombosis    Plan:  Hold warfarin for elevated INR 5.84  Education Material Provided?: No  Pharmacist suggested discharge dosin - 2.5 mg daily, to be determined, follow up with Anticoagulation Clinic     Jocelyn SamaniegoD

## 2020-09-07 NOTE — CARE PLAN
Problem: Communication  Goal: The ability to communicate needs accurately and effectively will improve  Outcome: PROGRESSING AS EXPECTED  Educate patient on use of call light for assistance.      Problem: Respiratory:  Goal: Respiratory status will improve  Outcome: PROGRESSING AS EXPECTED   Patient on 3L with Bipap. PRN cough medicine given to help reduce discomfort and aid in mucus expectorant.

## 2020-09-07 NOTE — PROGRESS NOTES
1845: Report received by Luiza DAVIDSON. Plan of care discussed. Safety measures in place, call light in reach.    2100: Assessment complete. Complains of chest discomfort from cough and congestion, see MAR for intervention.

## 2020-09-07 NOTE — DISCHARGE PLANNING
Anticipated Discharge Disposition: Home    Action: Called pt to obtain choice for DME. Pt states he's on service with [facility] for his bipap.   Choice form completed and faxed to [facility]    Barriers to Discharge: DME acceptance    Plan: F/U with [facility]

## 2020-09-08 ENCOUNTER — PATIENT OUTREACH (OUTPATIENT)
Dept: HEALTH INFORMATION MANAGEMENT | Facility: OTHER | Age: 40
End: 2020-09-08

## 2020-09-08 VITALS
OXYGEN SATURATION: 98 % | TEMPERATURE: 98 F | HEART RATE: 89 BPM | WEIGHT: 185.19 LBS | DIASTOLIC BLOOD PRESSURE: 66 MMHG | RESPIRATION RATE: 20 BRPM | BODY MASS INDEX: 31.62 KG/M2 | HEIGHT: 64 IN | SYSTOLIC BLOOD PRESSURE: 155 MMHG

## 2020-09-08 LAB
ALBUMIN SERPL BCP-MCNC: 4.1 G/DL (ref 3.2–4.9)
ALBUMIN/GLOB SERPL: 1.1 G/DL
ALP SERPL-CCNC: 147 U/L (ref 30–99)
ALT SERPL-CCNC: 11 U/L (ref 2–50)
ANION GAP SERPL CALC-SCNC: 22 MMOL/L (ref 7–16)
ANISOCYTOSIS BLD QL SMEAR: ABNORMAL
AST SERPL-CCNC: 27 U/L (ref 12–45)
BASOPHILS # BLD AUTO: 0 % (ref 0–1.8)
BASOPHILS # BLD: 0 K/UL (ref 0–0.12)
BILIRUB SERPL-MCNC: 0.5 MG/DL (ref 0.1–1.5)
BUN SERPL-MCNC: 52 MG/DL (ref 8–22)
CALCIUM SERPL-MCNC: 8 MG/DL (ref 8.4–10.2)
CHLORIDE SERPL-SCNC: 89 MMOL/L (ref 96–112)
CO2 SERPL-SCNC: 26 MMOL/L (ref 20–33)
CREAT SERPL-MCNC: 6.9 MG/DL (ref 0.5–1.4)
EOSINOPHIL # BLD AUTO: 0 K/UL (ref 0–0.51)
EOSINOPHIL NFR BLD: 0 % (ref 0–6.9)
ERYTHROCYTE [DISTWIDTH] IN BLOOD BY AUTOMATED COUNT: 58.3 FL (ref 35.9–50)
GLOBULIN SER CALC-MCNC: 3.9 G/DL (ref 1.9–3.5)
GLUCOSE SERPL-MCNC: 105 MG/DL (ref 65–99)
HCT VFR BLD AUTO: 27.3 % (ref 42–52)
HGB BLD-MCNC: 8.9 G/DL (ref 14–18)
HYPOCHROMIA BLD QL SMEAR: ABNORMAL
INR PPP: 7.21 (ref 0.87–1.13)
LYMPHOCYTES # BLD AUTO: 1.2 K/UL (ref 1–4.8)
LYMPHOCYTES NFR BLD: 15 % (ref 22–41)
MANUAL DIFF BLD: NORMAL
MCH RBC QN AUTO: 31.3 PG (ref 27–33)
MCHC RBC AUTO-ENTMCNC: 32.6 G/DL (ref 33.7–35.3)
MCV RBC AUTO: 96.1 FL (ref 81.4–97.8)
MONOCYTES # BLD AUTO: 0.4 K/UL (ref 0–0.85)
MONOCYTES NFR BLD AUTO: 5 % (ref 0–13.4)
MYELOCYTES NFR BLD MANUAL: 1 %
NEUTROPHILS # BLD AUTO: 6.32 K/UL (ref 1.82–7.42)
NEUTROPHILS NFR BLD: 79 % (ref 44–72)
NRBC # BLD AUTO: 0.02 K/UL
NRBC BLD-RTO: 0.2 /100 WBC
PLATELET # BLD AUTO: 226 K/UL (ref 164–446)
PLATELET BLD QL SMEAR: NORMAL
PMV BLD AUTO: 9.2 FL (ref 9–12.9)
POTASSIUM SERPL-SCNC: 4.6 MMOL/L (ref 3.6–5.5)
PROT SERPL-MCNC: 8 G/DL (ref 6–8.2)
PROTHROMBIN TIME: 61.1 SEC (ref 12–14.6)
RBC # BLD AUTO: 2.84 M/UL (ref 4.7–6.1)
RBC BLD AUTO: PRESENT
SODIUM SERPL-SCNC: 137 MMOL/L (ref 135–145)
WBC # BLD AUTO: 8 K/UL (ref 4.8–10.8)

## 2020-09-08 PROCEDURE — A9270 NON-COVERED ITEM OR SERVICE: HCPCS | Performed by: HOSPITALIST

## 2020-09-08 PROCEDURE — A9270 NON-COVERED ITEM OR SERVICE: HCPCS | Performed by: INTERNAL MEDICINE

## 2020-09-08 PROCEDURE — 99232 SBSQ HOSP IP/OBS MODERATE 35: CPT | Performed by: INTERNAL MEDICINE

## 2020-09-08 PROCEDURE — 85007 BL SMEAR W/DIFF WBC COUNT: CPT

## 2020-09-08 PROCEDURE — 700102 HCHG RX REV CODE 250 W/ 637 OVERRIDE(OP): Performed by: INTERNAL MEDICINE

## 2020-09-08 PROCEDURE — 700102 HCHG RX REV CODE 250 W/ 637 OVERRIDE(OP): Performed by: HOSPITALIST

## 2020-09-08 PROCEDURE — 99239 HOSP IP/OBS DSCHRG MGMT >30: CPT | Performed by: INTERNAL MEDICINE

## 2020-09-08 PROCEDURE — 85027 COMPLETE CBC AUTOMATED: CPT

## 2020-09-08 PROCEDURE — 85610 PROTHROMBIN TIME: CPT

## 2020-09-08 PROCEDURE — 80053 COMPREHEN METABOLIC PANEL: CPT

## 2020-09-08 RX ORDER — WARFARIN SODIUM 5 MG/1
5 TABLET ORAL EVERY EVENING
Qty: 30 TAB | Refills: 3 | Status: SHIPPED | OUTPATIENT
Start: 2020-09-11 | End: 2022-07-19

## 2020-09-08 RX ORDER — PHYTONADIONE 5 MG/1
10 TABLET ORAL ONCE
Status: COMPLETED | OUTPATIENT
Start: 2020-09-08 | End: 2020-09-08

## 2020-09-08 RX ADMIN — Medication 1000 UNITS: at 05:27

## 2020-09-08 RX ADMIN — ALBUTEROL SULFATE 2 PUFF: 90 AEROSOL, METERED RESPIRATORY (INHALATION) at 11:06

## 2020-09-08 RX ADMIN — ALBUTEROL SULFATE 2 PUFF: 90 AEROSOL, METERED RESPIRATORY (INHALATION) at 08:11

## 2020-09-08 RX ADMIN — PHYTONADIONE 10 MG: 5 TABLET ORAL at 13:34

## 2020-09-08 RX ADMIN — DEXAMETHASONE 6 MG: 4 TABLET ORAL at 05:27

## 2020-09-08 RX ADMIN — OXYCODONE HYDROCHLORIDE AND ACETAMINOPHEN 2000 MG: 500 TABLET ORAL at 05:27

## 2020-09-08 RX ADMIN — SEVELAMER CARBONATE 3200 MG: 800 TABLET, FILM COATED ORAL at 08:12

## 2020-09-08 RX ADMIN — GUAIFENESIN 600 MG: 600 TABLET, EXTENDED RELEASE ORAL at 05:27

## 2020-09-08 RX ADMIN — HYDROCODONE BITARTRATE AND ACETAMINOPHEN 2 TABLET: 5; 325 TABLET ORAL at 03:21

## 2020-09-08 RX ADMIN — ALBUTEROL SULFATE 2 PUFF: 90 AEROSOL, METERED RESPIRATORY (INHALATION) at 03:22

## 2020-09-08 RX ADMIN — ZINC SULFATE 220 MG (50 MG) CAPSULE 220 MG: CAPSULE at 05:27

## 2020-09-08 RX ADMIN — BUDESONIDE AND FORMOTEROL FUMARATE DIHYDRATE 2 PUFF: 80; 4.5 AEROSOL RESPIRATORY (INHALATION) at 05:29

## 2020-09-08 RX ADMIN — AMLODIPINE BESYLATE 5 MG: 5 TABLET ORAL at 05:26

## 2020-09-08 ASSESSMENT — ENCOUNTER SYMPTOMS
CARDIOVASCULAR NEGATIVE: 1
GASTROINTESTINAL NEGATIVE: 1
FEVER: 0
WEAKNESS: 1
SHORTNESS OF BREATH: 0
VOMITING: 0
CHILLS: 0
PSYCHIATRIC NEGATIVE: 1
EYES NEGATIVE: 1
MUSCULOSKELETAL NEGATIVE: 1
COUGH: 0
NAUSEA: 0
COUGH: 1

## 2020-09-08 ASSESSMENT — PAIN DESCRIPTION - PAIN TYPE
TYPE: ACUTE PAIN;CHRONIC PAIN

## 2020-09-08 NOTE — PROGRESS NOTES
Teresa from Lab called with critical result of PT 31.1/INR 7.21 at 0718. Critical lab result read back to Teresa.   Dr. Rider notified of critical lab result at 0830.  Critical lab result read back by Dr. Rider.

## 2020-09-08 NOTE — DISCHARGE PLANNING
Received Choice form at 0805  Agency[facility]Facility Name: [facility]   Referral sent per Choice form at 0835.       @1010  Agency/Facility Name: [facility]   Spoke To: Nataliia  Outcome: Per Nataliia pt is accepted and O2 will be delivered once pt is contacted and co-pay is received and address verified.       @1031  Agency/Facility Name: [facility]   Spoke To: Nataliia   Outcome: Per Nataliia all attempts to contact pt have not been successful. Nataliia states she has tried both bedside and cell phone provided, contact information provided for pt to call;     Nataliia with [facility]   [phone]       @9602  Agency/Facility Name: [facility]   Spoke To: Gina   Outcome: Per Gina pt is accepted and tanks are out for delivery, should arrive in the early afternoon.

## 2020-09-08 NOTE — DISCHARGE PLANNING
LSW received update on pts 02. LSW will try and send referral to [facility] since pts insurance is [facility].

## 2020-09-08 NOTE — PROGRESS NOTES
Inpatient Anticoagulation Service Note    Date: 2020  Reason for Anticoagulation: Other (Comments)           Hemoglobin Value: (!) 8.9  Hematocrit Value: (!) 27.3  Lab Platelet Value: 226  Target INR: 2.5 to 3.5    INR from last 7 days     Date/Time INR Value    20 0648  (!) 7.21    20 0521  (!) 5.84    20 0111  (!) 3.9    20 0143  (!) 4.04    20 0021  (!) 9.51    20 0435  (!) 6.16    20 0421  (!) 4.94    20 1140  (!) 3.59        Dose from last 7 days     Date/Time Dose (mg)    20 0600  0    20 0800  0    20 1000  0        Significant Interactions: Antibiotics, Corticosteroids  Bridge Therapy: No (If less than 5 days and overlap therapy discontinued -- document reason (i.e. Bleed Risk))    (If still on overlap therapy, if No -- document reason (i.e. Bleed Risk))    Comments: for AV graft thrombosis    Plan:  Hold warfarin dose today for supratherapeutic INR of 7.21  Education Material Provided?: No  Pharmacist suggested discharge dosin-2.5 mg daily. To be determined. Follow up with anticoagulation mo Tavarez, Pharmacy Intern  Laquita CHAMPION. Ph.

## 2020-09-08 NOTE — PROGRESS NOTES
[facility] Medicine Daily Progress Note    Date of Service  9/8/2020    Chief Complaint  40 y.o. male admitted 8/31/2020 with past medical history of end-stage renal disease, spinal cord stimulator, patient was found to have COVID for 5 days.  Symptoms included diarrhea, chills, body aches.  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  Patient underwent dialysis session but did not completed due to hypotension.    Hospital Course    Upon presentation to the hospital he was on 4 L of oxygen.  Chest x-ray found bilateral multifocal infiltrates.  On arrival patient was found to have pancytopenia.  He was started on IV Decadron.  Patient was not started on Lovenox since INR was supratherapeutic.  Patient was found to be hypotensive and placed on levophed.      Interval Problem Update  9/4: We will continue IV Decadron. Blood pressure has improved. We will give a dose of oral vitamin K since INR was supratherapeutic.  I have also have ordered inhalers including albuterol and Symbicort.  9/6: Overall improvement in patient's condition today.  He will get a dialysis session tomorrow.  He is now down to 4 L of oxygen.  Continue current management.  9/7: Overall improvement patient symptoms.  Patient's INR still elevated and will need to be monitored as an outpatient.  Dialysis outpatient is also set up.  Patient can be discharged once he receives oxygen.  Consultants/Specialty  Nephrology    Code Status  Full Code    Disposition  TBD    Review of Systems  Review of Systems   Constitutional: Positive for malaise/fatigue.   HENT: Negative.    Eyes: Negative.    Respiratory: Positive for cough.    Cardiovascular: Negative.    Gastrointestinal: Negative.    Genitourinary: Negative.    Musculoskeletal: Negative.    Skin: Negative.    Neurological: Positive for weakness.   Endo/Heme/Allergies: Negative.    Psychiatric/Behavioral: Negative.         Physical Exam  Temp:  [36.3 °C (97.3 °F)-37.4 °C (99.4 °F)] 36.3  °C (97.3 °F)  Pulse:  [85-98] 92  Resp:  [18] 18  BP: (103-128)/(52-61) 128/61  SpO2:  [97 %-100 %] 100 %    Physical Exam  Constitutional:       Appearance: He is ill-appearing.   HENT:      Head: Normocephalic and atraumatic.      Mouth/Throat:      Mouth: Mucous membranes are moist.   Eyes:      Extraocular Movements: Extraocular movements intact.      Pupils: Pupils are equal, round, and reactive to light.   Neck:      Musculoskeletal: Normal range of motion.   Cardiovascular:      Rate and Rhythm: Normal rate.   Pulmonary:      Effort: Respiratory distress present.      Breath sounds: Rales present.   Abdominal:      General: Bowel sounds are normal. There is no distension.      Palpations: Abdomen is soft.   Musculoskeletal: Normal range of motion.   Skin:     General: Skin is warm and dry.   Neurological:      General: No focal deficit present.      Mental Status: He is alert.   Psychiatric:         Mood and Affect: Mood normal.         Behavior: Behavior normal.         Thought Content: Thought content normal.         Judgment: Judgment normal.         Fluids    Intake/Output Summary (Last 24 hours) at 9/8/2020 0005  Last data filed at 9/7/2020 0900  Gross per 24 hour   Intake 180 ml   Output 2500 ml   Net -2320 ml       Laboratory  Recent Labs     09/05/20  0143 09/05/20  1235 09/06/20  0541 09/07/20  0521   WBC 6.9  --  5.4 7.2   RBC 2.63*  --  2.64* 2.54*   HEMOGLOBIN 8.1*  8.1* 8.4* 8.0* 7.8*   HEMATOCRIT 25.0*  25.0* 26.5* 25.5* 23.7*   MCV 97.3  --  96.6 93.3   MCH 30.4  --  30.3 30.7   MCHC 31.3*  --  31.4* 32.9*   RDW 60.2*  --  59.1* 56.0*   PLATELETCT 222  --  219 222   MPV 9.8  --  9.5 9.0     Recent Labs     09/05/20  0143 09/06/20  0541 09/07/20  0521   SODIUM 137 138 134*   POTASSIUM 4.7 4.4 4.4   CHLORIDE 91* 93* 89*   CO2 26 24 24   GLUCOSE 119* 125* 109*   BUN 38* 61* 78*   CREATININE 5.63* 7.80* 8.95*   CALCIUM 8.0* 7.7* 7.6*     Recent Labs     09/05/20  0143 09/06/20  0119  09/07/20  0521   INR 4.04* 3.90* 5.84*               Imaging  DX-CHEST-PORTABLE (1 VIEW)   Final Result      Bilateral interstitial and airspace opacities are most suggestive of atypical infection. Edema is considered less likely.           Assessment/Plan  * Pneumonia due to COVID-19 virus  Assessment & Plan  Monitor O2 status closely  Prone positioning and frequent change position  ISS and ambulation aggressively  Vitamin C, zinc, vitamin D  Inflammatory markers every 48 hours  Not a candidate for remdesivir      Elevated INR- (present on admission)  Assessment & Plan  Restart warfarin with a goal of 2.5-3.5    Acute respiratory failure with hypoxia (HCC)  Assessment & Plan  On 10 L of o2     End stage renal disease (HCC)  Assessment & Plan  Monitor BMP and assess response  Avoid IV contrast/nephrotoxins/NSAIDs  Dose adjust meds for decreased GFR  Patient underwent one session of dialysis today         VTE prophylaxis: Hold for supratheraputic INR

## 2020-09-08 NOTE — PROGRESS NOTES
"Received phone call at approximately 1645 regarding patient acceptance for home oxygen through [employer] from [employer] representative. Oxygen to be delivered to both patient's home and hospital room for discharge this evening. MD Gallo made aware, discharge order received.    Full discharge reconciliation and review completed with patient, IV removed. At 1730, alerted by Charge RN that oxygen delivery would likely not be viable this evening (see previous notes from [facility]). Patient updated, patient very upset. Both myself and patient attempted to call [place] at [employer], no answer. Patient refusing evening medications at this time. \"Get me the hell home.\" Patient consoled by this RN. Patient now requesting an oxygen tank to be loaned from hospital for discharge. Explained to patient that this was not protocol, patient verbalized understanding. Updated MD of potential for patient staying overnight if patient is not accepted and supplied oxygen for safe discharge home.  "

## 2020-09-08 NOTE — DISCHARGE SUMMARY
Discharge Summary    CHIEF COMPLAINT ON ADMISSION  Chief Complaint   Patient presents with   • Diarrhea     x 1 week   • Shaking     overnight       Reason for Admission  Cough, Diarrhea     Admission Date  8/31/2020    CODE STATUS  Full Code    HPI & HOSPITAL COURSE  This is a 40 y.o. male here with cough and diarrhea and ESRD on HD.  He presented to the ED for evaluation and found to be COVID positive.  He has been on steroids and will complete the 10 day course.  He received HD on Monday and is transitioning to TTS schedule.  He was going to receive HD outpatient today but given delay in discharge, inpatient HD offered.  Patient declined and would rather wait until HD on Thursday as outpatient.  INR also significantly elevated due to COVID, coumadin on hold and patient instructed to follow up with anticoagulation clinic for further INR monitoring.  He has no active bleeding.  Given INR 7.21 today, oral 10mg vitamin K given prior to discharge home.        Therefore, he is discharged in good and stable condition to home with close outpatient follow-up.    The patient met 2-midnight criteria for an inpatient stay at the time of discharge.    Discharge Date      FOLLOW UP ITEMS POST DISCHARGE  PCP, nephrology, anticoagulation clinic.    DISCHARGE DIAGNOSES  Principal Problem:    Pneumonia due to COVID-19 virus POA: Unknown  Active Problems:    Sepsis associated hypotension (HCC) POA: Yes    Elevated INR POA: Yes    End stage renal disease (HCC) POA: Unknown    Hypocalcemia POA: No    Diarrhea of infectious origin POA: Yes    Acute respiratory failure with hypoxia (HCC) POA: Unknown  Resolved Problems:    * No resolved hospital problems. *      FOLLOW UP  No future appointments.  Tony Mcmillan M.D.  [address] [address]  [phone]    Schedule an appointment as soon as possible for a visit in 1 week        MEDICATIONS ON DISCHARGE     Medication List      START taking these medications       Instructions   albuterol 108 (90 Base) MCG/ACT Aers inhalation aerosol   Inhale 2 Puffs by mouth every 4 hours.  Dose: 2 Puff     dexamethasone 6 MG Tabs  Commonly known as: DECADRON   Take 1 Tab by mouth every day for 1 day.  Dose: 6 mg        CHANGE how you take these medications      Instructions   gabapentin 300 MG Caps  What changed: Another medication with the same name was removed. Continue taking this medication, and follow the directions you see here.  Commonly known as: NEURONTIN   Take 1,200 mg by mouth every bedtime. Takes 1800 mg and 1200 mg every night, for a total dose of 3000 mg.  Dose: 1,200 mg     warfarin 5 MG Tabs  Start taking on: September 11, 2020  What changed: These instructions start on September 11, 2020. If you are unsure what to do until then, ask your doctor or other care provider.  Commonly known as: COUMADIN   Take 1 Tab by mouth every evening. 5 mg every day of the week  Dose: 5 mg        CONTINUE taking these medications      Instructions   calcitRIOL 0.25 MCG Caps  Commonly known as: ROCALTROL   Take 0.75 mcg by mouth every bedtime. 3 capsules = 0.75 mcg  Dose: 0.75 mcg     cephALEXin 500 MG Caps  Commonly known as: KEFLEX   Take 500 mg by mouth every Monday, Wednesday, and Friday. No stop date  Dose: 500 mg     HYDROcodone/acetaminophen  MG Tabs  Commonly known as: NORCO   Take 3 Tabs by mouth every bedtime. 3 TABLETS = DOSE  Indications: Moderate to Moderately Severe Pain  Dose: 3 Tab     Renvela 800 MG Tabs tablet  Generic drug: sevelamer carbonate   Take 3,200 mg by mouth 3 times a day, with meals. 3 tablets = 3200 mg  Dose: 3,200 mg     tizanidine 4 MG Tabs  Commonly known as: ZANAFLEX   Take 8 mg by mouth every bedtime. 2 tablets = 8 mg  Dose: 8 mg        STOP taking these medications    cinacalcet 30 MG Tabs  Commonly known as: SENSIPAR            Allergies  Allergies   Allergen Reactions   • Baclofen Unspecified     Total loss of memory, sedation.   RXN=6/2015   •  Contrast Media With Iodine [Iodine] Rash     RXN=1/5/2017   • Pcn [Penicillins] Rash     RXN=possibly >10 years ago  Tolerated Zosyn on 2/20/18   • Tape Rash     Paper tape and tegaderm ok  RXN=ongoing   • Requip Vomiting       DIET  Orders Placed This Encounter   Procedures   • Diet Order Renal     Standing Status:   Standing     Number of Occurrences:   1     Order Specific Question:   Diet:     Answer:   Renal [8]       ACTIVITY  As tolerated.  Weight bearing as tolerated    CONSULTATIONS  [facility] - nephro  [place] - critical care    PROCEDURES  none    LABORATORY  Lab Results   Component Value Date    SODIUM 137 09/08/2020    POTASSIUM 4.6 09/08/2020    CHLORIDE 89 (L) 09/08/2020    CO2 26 09/08/2020    GLUCOSE 105 (H) 09/08/2020    BUN 52 (H) 09/08/2020    CREATININE 6.90 (HH) 09/08/2020    CREATININE 8.2 (HH) 05/06/2009        Lab Results   Component Value Date    WBC 8.0 09/08/2020    HEMOGLOBIN 8.9 (L) 09/08/2020    HEMATOCRIT 27.3 (L) 09/08/2020    PLATELETCT 226 09/08/2020        Total time of the discharge process exceeds 35 minutes.

## 2020-09-08 NOTE — PROGRESS NOTES
[facility] Services Note:    0715: This RN called the [facility] ph# [phone] regarding patient's chair time today. I spoke to Shai DAVIDSON and confirmed to me that they can accommodate patient on Thursday instead due to some papers that needs to do first. Patient should be in the HD clinic at 1130 AM.     0745: This RN communicated this information to Delma DAVIDSON about the plan that patient will have HD today bedside.    0805: This RN received a call from the patient and refusing to have dialysis today. This RN explained the plan but still patient refusing to do HD and prefer to be out of the hospital. Per patient, he will be fine until Thursday.   Delma DAVIDSON  [place] the conversation.    Dr. Najjar notified.

## 2020-09-08 NOTE — DISCHARGE INSTRUCTIONS
Discharge Instructions    Discharged to home by car with relative. Discharged via wheelchair, hospital escort: Yes.  Special equipment needed: Not Applicable    Be sure to schedule a follow-up appointment with your primary care doctor or any specialists as instructed.     Discharge Plan:   Diet Plan: Discussed  Activity Level: Discussed  Confirmed Follow up Appointment: Patient to Call and Schedule Appointment  Confirmed Symptoms Management: Discussed  Medication Reconciliation Updated: Yes    I understand that a diet low in cholesterol, fat, and sodium is recommended for good health. Unless I have been given specific instructions below for another diet, I accept this instruction as my diet prescription.   Other diet: Renal    Special Instructions: None    · Is patient discharged on Warfarin / Coumadin?   No       COVID-19  COVID-19 is a respiratory infection that is caused by a virus called severe acute respiratory syndrome coronavirus 2 (SARS-CoV-2). The disease is also known as coronavirus disease or novel coronavirus. In some people, the virus may not cause any symptoms. In others, it may cause a serious infection. The infection can get worse quickly and can lead to complications, such as:  · Pneumonia, or infection of the lungs.  · Acute respiratory distress syndrome or ARDS. This is fluid build-up in the lungs.  · Acute respiratory failure. This is a condition in which there is not enough oxygen passing from the lungs to the body.  · Sepsis or septic shock. This is a serious bodily reaction to an infection.  · Blood clotting problems.  · Secondary infections due to bacteria or fungus.  The virus that causes COVID-19 is contagious. This means that it can spread from person to person through droplets from coughs and sneezes (respiratory secretions).  What are the causes?  This illness is caused by a virus. You may catch the virus by:  · Breathing in droplets from an infected person's cough or sneeze.  · Touching  something, like a table or a doorknob, that was exposed to the virus (contaminated) and then touching your mouth, nose, or eyes.  What increases the risk?  Risk for infection  You are more likely to be infected with this virus if you:  · Live in or travel to an area with a COVID-19 outbreak.  · Come in contact with a sick person who recently traveled to an area with a COVID-19 outbreak.  · Provide care for or live with a person who is infected with COVID-19.  Risk for serious illness  You are more likely to become seriously ill from the virus if you:  · Are 65 years of age or older.  · Have a long-term disease that lowers your body's ability to fight infection (immunocompromised).  · Live in a nursing home or long-term care facility.  · Have a long-term (chronic) disease such as:  ? Chronic lung disease, including chronic obstructive pulmonary disease or asthma  ? Heart disease.  ? Diabetes.  ? Chronic kidney disease.  ? Liver disease.  · Are obese.  What are the signs or symptoms?  Symptoms of this condition can range from mild to severe. Symptoms may appear any time from 2 to 14 days after being exposed to the virus. They include:  · A fever.  · A cough.  · Difficulty breathing.  · Chills.  · Muscle pains.  · A sore throat.  · Loss of taste or smell.  Some people may also have stomach problems, such as nausea, vomiting, or diarrhea.  Other people may not have any symptoms of COVID-19.  How is this diagnosed?  This condition may be diagnosed based on:  · Your signs and symptoms, especially if:  ? You live in an area with a COVID-19 outbreak.  ? You recently traveled to or from an area where the virus is common.  ? You provide care for or live with a person who was diagnosed with COVID-19.  · A physical exam.  · Lab tests, which may include:  ? A nasal swab to take a sample of fluid from your nose.  ? A throat swab to take a sample of fluid from your throat.  ? A sample of mucus from your lungs (sputum).  ? Blood  tests.  · Imaging tests, which may include, X-rays, CT scan, or ultrasound.  How is this treated?  At present, there is no medicine to treat COVID-19. Medicines that treat other diseases are being used on a trial basis to see if they are effective against COVID-19.  Your health care provider will talk with you about ways to treat your symptoms. For most people, the infection is mild and can be managed at home with rest, fluids, and over-the-counter medicines.  Treatment for a serious infection usually takes places in a hospital intensive care unit (ICU). It may include one or more of the following treatments. These treatments are given until your symptoms improve.  · Receiving fluids and medicines through an IV.  · Supplemental oxygen. Extra oxygen is given through a tube in the nose, a face mask, or a issa.  · Positioning you to lie on your stomach (prone position). This makes it easier for oxygen to get into the lungs.  · Continuous positive airway pressure (CPAP) or bi-level positive airway pressure (BPAP) machine. This treatment uses mild air pressure to keep the airways open. A tube that is connected to a motor delivers oxygen to the body.  · Ventilator. This treatment moves air into and out of the lungs by using a tube that is placed in your windpipe.  · Tracheostomy. This is a procedure to create a hole in the neck so that a breathing tube can be inserted.  · Extracorporeal membrane oxygenation (ECMO). This procedure gives the lungs a chance to recover by taking over the functions of the heart and lungs. It supplies oxygen to the body and removes carbon dioxide.  Follow these instructions at home:  Lifestyle  · If you are sick, stay home except to get medical care. Your health care provider will tell you how long to stay home. Call your health care provider before you go for medical care.  · Rest at home as told by your health care provider.  · Do not use any products that contain nicotine or tobacco, such as  cigarettes, e-cigarettes, and chewing tobacco. If you need help quitting, ask your health care provider.  · Return to your normal activities as told by your health care provider. Ask your health care provider what activities are safe for you.  General instructions  · Take over-the-counter and prescription medicines only as told by your health care provider.  · Drink enough fluid to keep your urine pale yellow.  · Keep all follow-up visits as told by your health care provider. This is important.  How is this prevented?    There is no vaccine to help prevent COVID-19 infection. However, there are steps you can take to protect yourself and others from this virus.  To protect yourself:   · Do not travel to areas where COVID-19 is a risk. The areas where COVID-19 is reported change often. To identify high-risk areas and travel restrictions, check the [facility] travel website: wwwnc.cdc.gov/travel/notices  · If you live in, or must travel to, an area where COVID-19 is a risk, take precautions to avoid infection.  ? Stay away from people who are sick.  ? Wash your hands often with soap and water for 20 seconds. If soap and water are not available, use an alcohol-based hand .  ? Avoid touching your mouth, face, eyes, or nose.  ? Avoid going out in public, follow guidance from your state and local health authorities.  ? If you must go out in public, wear a cloth face covering or face mask.  ? Disinfect objects and surfaces that are frequently touched every day. This may include:  § Counters and tables.  § Doorknobs and light switches.  § Sinks and faucets.  § Electronics, such as phones, remote controls, keyboards, computers, and tablets.  To protect others:  If you have symptoms of COVID-19, take steps to prevent the virus from spreading to others.  · If you think you have a COVID-19 infection, contact your health care provider right away. Tell your health care team that you think you may have a COVID-19  infection.  · Stay home. Leave your house only to seek medical care. Do not use public transport.  · Do not travel while you are sick.  · Wash your hands often with soap and water for 20 seconds. If soap and water are not available, use alcohol-based hand .  · Stay away from other members of your household. Let healthy household members care for children and pets, if possible. If you have to care for children or pets, wash your hands often and wear a mask. If possible, stay in your own room, separate from others. Use a different bathroom.  · Make sure that all people in your household wash their hands well and often.  · Cough or sneeze into a tissue or your sleeve or elbow. Do not cough or sneeze into your hand or into the air.  · Wear a cloth face covering or face mask.  Where to find more information  · Centers for Disease Control and Prevention: www.cdc.gov/coronavirus/2019-ncov/index.html  · World Health Organization: www.who.int/health-topics/coronavirus  Contact a health care provider if:  · You live in or have traveled to an area where COVID-19 is a risk and you have symptoms of the infection.  · You have had contact with someone who has COVID-19 and you have symptoms of the infection.  Get help right away if:  · You have trouble breathing.  · You have pain or pressure in your chest.  · You have confusion.  · You have bluish lips and fingernails.  · You have difficulty waking from sleep.  · You have symptoms that get worse.  These symptoms may represent a serious problem that is an emergency. Do not wait to see if the symptoms will go away. Get medical help right away. Call your local emergency services (911 in the [place] Do not drive yourself to the hospital. Let the emergency medical personnel know if you think you have COVID-19.  Summary  · COVID-19 is a respiratory infection that is caused by a virus. It is also known as coronavirus disease or novel coronavirus. It can cause serious infections,  such as pneumonia, acute respiratory distress syndrome, acute respiratory failure, or sepsis.  · The virus that causes COVID-19 is contagious. This means that it can spread from person to person through droplets from coughs and sneezes.  · You are more likely to develop a serious illness if you are 65 years of age or older, have a weak immunity, live in a nursing home, or have chronic disease.  · There is no medicine to treat COVID-19. Your health care provider will talk with you about ways to treat your symptoms.  · Take steps to protect yourself and others from infection. Wash your hands often and disinfect objects and surfaces that are frequently touched every day. Stay away from people who are sick and wear a mask if you are sick.  This information is not intended to replace advice given to you by your health care provider. Make sure you discuss any questions you have with your health care provider.  Document Released: 01/23/2020 Document Revised: 05/14/2020 Document Reviewed: 01/23/2020  [employer] Patient Education © 2020 [employer]    Dexamethasone tablets  What is this medicine?  DEXAMETHASONE (dex a METH a sone) is a corticosteroid. It is commonly used to treat inflammation of the skin, joints, lungs, and other organs. Common conditions treated include asthma, allergies, and arthritis. It is also used for other conditions, such as blood disorders and diseases of the adrenal glands.  This medicine may be used for other purposes; ask your health care provider or pharmacist if you have questions.  COMMON BRAND NAME(S): CUSHINGS SYNDROME DIAGNOSTIC, Decadron, Dexabliss, DexPak Jr TaperPak, DexPak TaperPak, Dxevo, HiDex, TaperDex, Zema-Jaydon, ZoDex, ZonaCort 11 Day, ZonaCort 7 Day  What should I tell my health care provider before I take this medicine?  They need to know if you have any of these conditions:  · Cushing's syndrome  · diabetes  · glaucoma  · heart problems or disease  · high blood  pressure  · infection like herpes, measles, tuberculosis, or chickenpox  · kidney disease  · liver disease  · mental problems  · myasthenia gravis  · osteoporosis  · previous heart attack  · seizures  · stomach, ulcer or intestine disease including colitis and diverticulitis  · thyroid problem  · an unusual or allergic reaction to dexamethasone, corticosteroids, other medicines, lactose, foods, dyes, or preservatives  · pregnant or trying to get pregnant  · breast-feeding  How should I use this medicine?  Take this medicine by mouth with a drink of water. Follow the directions on the prescription label. Take it with food or milk to avoid stomach upset. If you are taking this medicine once a day, take it in the morning. Do not take more medicine than you are told to take. Do not suddenly stop taking your medicine because you may develop a severe reaction. Your doctor will tell you how much medicine to take. If your doctor wants you to stop the medicine, the dose may be slowly lowered over time to avoid any side effects.  Talk to your pediatrician regarding the use of this medicine in children. Special care may be needed.  Patients over 65 years old may have a stronger reaction and need a smaller dose.  Overdosage: If you think you have taken too much of this medicine contact a poison control center or emergency room at once.  NOTE: This medicine is only for you. Do not share this medicine with others.  What if I miss a dose?  If you miss a dose, take it as soon as you can. If it is almost time for your next dose, talk to your doctor or health care professional. You may need to miss a dose or take an extra dose. Do not take double or extra doses without advice.  What may interact with this medicine?  Do not take this medicine with any of the following medications:  · mifepristone, RU-486  · vaccines  This medicine may also interact with the following medications:  · amphotericin B  · antibiotics like clarithromycin,  erythromycin, and troleandomycin  · aspirin and aspirin-like drugs  · barbiturates like phenobarbital  · carbamazepine  · cholestyramine  · cholinesterase inhibitors like donepezil, galantamine, rivastigmine, and tacrine  · cyclosporine  · digoxin  · diuretics  · ephedrine  · female hormones, like estrogens or progestins and birth control pills  · indinavir  · isoniazid  · ketoconazole  · medicines for diabetes  · medicines that improve muscle tone or strength for conditions like myasthenia gravis  · NSAIDs, medicines for pain and inflammation, like ibuprofen or naproxen  · phenytoin  · rifampin  · thalidomide  · warfarin  This list may not describe all possible interactions. Give your health care provider a list of all the medicines, herbs, non-prescription drugs, or dietary supplements you use. Also tell them if you smoke, drink alcohol, or use illegal drugs. Some items may interact with your medicine.  What should I watch for while using this medicine?  Visit your doctor or health care professional for regular checks on your progress. If you are taking this medicine over a prolonged period, carry an identification card with your name and address, the type and dose of your medicine, and your doctor's name and address.  This medicine may increase your risk of getting an infection. Stay away from people who are sick. Tell your doctor or health care professional if you are around anyone with measles or chickenpox.  If you are going to have surgery, tell your doctor or health care professional that you have taken this medicine within the last twelve months.  Ask your doctor or health care professional about your diet. You may need to lower the amount of salt you eat.  This medicine may increase blood sugar. Ask your healthcare provider if changes in diet or medicines are needed if you have diabetes.  What side effects may I notice from receiving this medicine?  Side effects that you should report to your doctor or  health care professional as soon as possible:  · allergic reactions like skin rash, itching or hives, swelling of the face, lips, or tongue  · fever, sore throat, sneezing, cough, or other signs of infection, wounds that will not heal  · mental depression, mood swings, mistaken feelings of self importance or of being mistreated  · pain in hips, back, ribs, arms, shoulders, or legs  · redness, blistering, peeling or loosening of the skin, including inside the mouth  ·   signs and symptoms of high blood sugar such as being more thirsty or hungry or having to urinate more than normal. You may also feel very tired or have blurry vision.  · trouble passing urine  · swelling of feet or lower legs  · unusual bleeding or bruising  Side effects that usually do not require medical attention (report to your doctor or health care professional if they continue or are bothersome):  · headache  · nausea, vomiting  · skin problems, acne, thin and shiny skin  · weight gain  This list may not describe all possible side effects. Call your doctor for medical advice about side effects. You may report side effects to FDA at 2-032-IWB-1923.  Where should I keep my medicine?  Keep out of the reach of children.  Store at room temperature between 20 and 25 degrees C (68 and 77 degrees F). Protect from light. Throw away any unused medicine after the expiration date.  NOTE: This sheet is a summary. It may not cover all possible information. If you have questions about this medicine, talk to your doctor, pharmacist, or health care provider.  © 2020 [employer] (2019-09-18 10:58:53)        Depression / Suicide Risk    As you are discharged from this [facility] facility, it is important to learn how to keep safe from harming yourself.    Recognize the warning signs:  · Abrupt changes in personality, positive or negative- including increase in energy   · Giving away possessions  · Change in eating patterns- significant weight  changes-  positive or negative  · Change in sleeping patterns- unable to sleep or sleeping all the time   · Unwillingness or inability to communicate  · Depression  · Unusual sadness, discouragement and loneliness  · Talk of wanting to die  · Neglect of personal appearance   · Rebelliousness- reckless behavior  · Withdrawal from people/activities they love  · Confusion- inability to concentrate     If you or a loved one observes any of these behaviors or has concerns about self-harm, here's what you can do:  · Talk about it- your feelings and reasons for harming yourself  · Remove any means that you might use to hurt yourself (examples: pills, rope, extension cords, firearm)  · Get professional help from the community (Mental Health, Substance Abuse, psychological counseling)  · Do not be alone:Call your Safe Contact- someone whom you trust who will be there for you.  · Call your local CRISIS HOTLINE 200-0799 or 764-407-9000  · Call your local Children's Mobile Crisis Response Team [place] (080) 404-3650 or www.[employer]  · Call the toll free National Suicide Prevention Hotlines   · National Suicide Prevention Lifeline 136-907-STLG (4967)  · National Hope Line Network 800-SUICIDE (095-3883)

## 2020-09-08 NOTE — PROGRESS NOTES
Nephrology Daily Progress Note    Date of Service  9/8/2020    Chief Complaint  40 y.o. 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    Interval Problem Update  9/2 -patient remains in ICU on low-dose norepinephrine.  Patient complains of some hemoptysis.  Patient denies chest pain, shortness of breath.  9/3-patient had dialysis yesterday with 2 L removed.  No issues with dialysis yesterday.  Patient weaned off pressors overnight.  Patient seen this afternoon, denies chest pain, shortness of breath.  9/5 -patient had dialysis yesterday with 2 L removed.  Oxygenation improving today.  Patient denies chest pain, shortness of breath.  9/6 -patient remains on supplemental oxygen, with 4 L/min.  Patient denies chest pain, shortness of breath.  Hoping to go home soon.  9/7 pt is doing better, anxious to go home, had HD earlier today  9/8 pt is doing better, anxious to go home  Refused HD earlier today  Review of Systems  Review of Systems   Constitutional: Negative for chills, fever and malaise/fatigue.   Respiratory: Negative for cough and shortness of breath.    Cardiovascular: Negative for chest pain and leg swelling.   Gastrointestinal: Negative for nausea and vomiting.   Genitourinary: Negative for dysuria, frequency and urgency.        Physical Exam  Temp:  [36.3 °C (97.3 °F)-36.7 °C (98 °F)] 36.7 °C (98 °F)  Pulse:  [89-92] 89  Resp:  [18-20] 20  BP: (128-155)/(61-66) 155/66  SpO2:  [98 %-100 %] 98 %    Physical Exam  Vitals signs and nursing note reviewed.   Constitutional:       General: He is not in acute distress.     Appearance: He is not ill-appearing.   HENT:      Head: Normocephalic and atraumatic.      Right Ear: External ear normal.      Left Ear: External ear normal.      Nose: Nose normal.   Eyes:      General:         Right eye: No discharge.         Left eye: No discharge.       Conjunctiva/sclera: Conjunctivae normal.   Cardiovascular:      Rate and Rhythm: Normal rate and regular rhythm.      Heart sounds: No murmur.   Pulmonary:      Effort: Pulmonary effort is normal. No respiratory distress.      Breath sounds: Normal breath sounds. No wheezing.   Musculoskeletal:         General: No tenderness or deformity.      Right lower leg: No edema.      Left lower leg: No edema.   Skin:     General: Skin is warm.   Neurological:      General: No focal deficit present.      Mental Status: He is alert and oriented to person, place, and time.   Psychiatric:         Mood and Affect: Mood normal.         Behavior: Behavior normal.     Access: Left thigh AV graft, with patent bruit    Fluids    Intake/Output Summary (Last 24 hours) at 9/8/2020 1331  Last data filed at 9/8/2020 0304  Gross per 24 hour   Intake 90 ml   Output --   Net 90 ml       Laboratory  Labs reviewed, pertinent labs below.  Recent Labs     09/06/20  0541 09/07/20  0521 09/08/20  0648   WBC 5.4 7.2 8.0   RBC 2.64* 2.54* 2.84*   HEMOGLOBIN 8.0* 7.8* 8.9*   HEMATOCRIT 25.5* 23.7* 27.3*   MCV 96.6 93.3 96.1   MCH 30.3 30.7 31.3   MCHC 31.4* 32.9* 32.6*   RDW 59.1* 56.0* 58.3*   PLATELETCT 219 222 226   MPV 9.5 9.0 9.2     Recent Labs     09/06/20  0541 09/07/20  0521 09/08/20  0648   SODIUM 138 134* 137   POTASSIUM 4.4 4.4 4.6   CHLORIDE 93* 89* 89*   CO2 24 24 26   GLUCOSE 125* 109* 105*   BUN 61* 78* 52*   CREATININE 7.80* 8.95* 6.90*   CALCIUM 7.7* 7.6* 8.0*     Recent Labs     09/06/20  0111 09/07/20  0521 09/08/20  0648   INR 3.90* 5.84* 7.21*           URINALYSIS:  No results found for: COLORURINE, CLARITY, SPECGRAVITY, PHURINE, KETONES, PROTEINURIN, BILIRUBINUR, UROBILU, NITRITE, LEUKESTERAS, OCCULTBLOOD  UPC  No results found for: TOTPROTUR No results found for: CREATININEU      Imaging reviewed  DX-CHEST-PORTABLE (1 VIEW)   Final Result      Bilateral interstitial and airspace opacities are most suggestive of atypical  infection. Edema is considered less likely.            Current Facility-Administered Medications   Medication Dose Route Frequency Provider Last Rate Last Dose   • phytonadione (MEPHYTON) tablet 10 mg  10 mg Oral Once Gina Rider D.O.       • hydrALAZINE (APRESOLINE) injection 10 mg  10 mg Intravenous Q6HRS PRN Jonn Gallo M.D.       • amLODIPine (NORVASC) tablet 5 mg  5 mg Oral Q DAY Jonn Gallo M.D.   5 mg at 09/08/20 0526   • MD Alert...Warfarin per Pharmacy   Other PHARMACY TO DOSE Jonn Gallo M.D.       • albuterol inhaler 2 Puff  2 Puff Inhalation Q4HRS Jonn Gallo M.D.   2 Puff at 09/08/20 1106   • budesonide-formoterol (SYMBICORT) 80-4.5 MCG/ACT inhaler 2 Puff  2 Puff Inhalation BID Jonn Gallo M.D.   2 Puff at 09/08/20 0529   • ascorbic acid tablet 2,000 mg  2,000 mg Oral DAILY Jonn Gallo M.D.   2,000 mg at 09/08/20 0527   • vitamin D (cholecalciferol) tablet 1,000 Units  1,000 Units Oral DAILY Jonn Gallo M.D.   1,000 Units at 09/08/20 0527   • zinc sulfate (ZINCATE) capsule 220 mg  220 mg Oral DAILY Jonn Gallo M.D.   220 mg at 09/08/20 0527   • guaiFENesin ER (MUCINEX) tablet 600 mg  600 mg Oral Q12HRS Jonn Gallo M.D.   600 mg at 09/08/20 0527   • ALPRAZolam (XANAX) tablet 0.25 mg  0.25 mg Oral HS PRN Polly William M.D.   0.25 mg at 09/07/20 2252   • HYDROcodone-acetaminophen (NORCO) 5-325 MG per tablet 1-2 Tab  1-2 Tab Oral Q6HRS PRN Bolivar Paz M.D.   2 Tab at 09/08/20 0321   • NS (BOLUS) infusion 250 mL  250 mL Intravenous DIALYSIS PRN Raul Londono M.D.       • albumin human 25% solution 12.5 g  12.5 g Intravenous DIALYSIS PRN Raul Londono M.D.       • lidocaine (XYLOCAINE) 1 % injection 1 mL  1 mL Intradermal PRN Raul Londono M.D.   1 mL at 09/04/20 1245   • epoetin (Retacrit) injection (Dialysis Use Only) 4,000 Units  4,000 Units Intravenous MO, WE + FR Raul Londono M.D.   4,000 Units at 09/07/20 0700   • benzocaine-menthol (CEPACOL) lozenge 1 Lozenge  1  Lozenge Mouth/Throat Q2HRS PRN Bolivar Paz M.D.   1 Lozenge at 09/03/20 0029   • benzonatate (TESSALON) capsule 100 mg  100 mg Oral TID PRN Polly William M.D.   100 mg at 09/07/20 2014   • guaiFENesin (ROBITUSSIN) 100 MG/5ML solution 200 mg  10 mL Oral Q4HRS PRN Polly William M.D.   200 mg at 09/07/20 2015   • HYDROcodone/acetaminophen (NORCO)  MG per tablet 3 Tab  3 Tab Oral QHS Bolivar Paz M.D.   3 Tab at 09/07/20 2017   • sevelamer carbonate (RENVELA) tablet 3,200 mg  3,200 mg Oral TID WITH MEALS Bolivar Paz M.D.   3,200 mg at 09/08/20 0812   • tizanidine (ZANAFLEX) tablet 8 mg  8 mg Oral QHS Bolivar Paz M.D.   8 mg at 09/07/20 2017   • calcitRIOL (ROCALTROL) capsule 0.75 mcg  0.75 mcg Oral QHS Bolivar Paz M.D.   0.75 mcg at 09/07/20 2017   • cephALEXin (KEFLEX) capsule 500 mg  500 mg Oral MO, WE + FR Bolivar Paz M.D.   500 mg at 09/07/20 0849   • gabapentin (NEURONTIN) capsule 2,400 mg  2,400 mg Oral Nightly Bolivar Paz M.D.   2,400 mg at 09/07/20 2017    And   • gabapentin (NEURONTIN) capsule 600 mg  600 mg Oral QHS Bolivar Paz M.D.   600 mg at 09/07/20 2017   • dexamethasone (DECADRON) tablet 6 mg  6 mg Oral DAILY Bolivar Paz M.D.   6 mg at 09/08/20 0527         Assessment/Plan  40 y.o. 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     1. ESRD on HD   2. Access: left thigh AVG  3. COVID-19 pneumonia.:better  4.  Hypertension: controlled.  .  5. Anemia of chronic disease.  HD as outpt  Pt was advised to watch fluid intake  Renal diet  Daily labs  Renal dose all meds  Avoid nephrotoxins  Prognosis guarded.

## 2020-09-08 NOTE — DISCHARGE PLANNING
Received call from on call  [place] with [facility] who states he's unable to take pt's referral. Michoacano states that since pt has [facility] as secondary and they're not contracted he's unable to accept. Notified Michoacano that pt has Medicare as his primary and is on service for his bipap through [facility]. Michoacano states he's not allowed to accept any HTH pt's and therefore still needs to decline. He states that it's very likely pt got his bipap several years ago and already owns it. He states pt will have to go through [facility] to get full cost of O2 covered. Asked Michoacano if he can deliver O2 if pt agrees to pay co pay, however Michoacano declined stating he's instructed to not accept any HTH pt's.

## 2020-09-08 NOTE — PROGRESS NOTES
1845: Report received by Delma DAVIDSON. Plan of care discussed. Safety measures in place, call light in reach. Patient very upset about not being discharged today. Charge and NAM updated on situation.    1920: NAM in room to discuss situation with patient. Patient requesting to leave. NAM discussed with patient that it would be AMA and that patient is at a very high risk for desaturation and hypoxia which would result in a return to the hospital very quickly. Patient also concerned about his dialysis schedule, as it was supposed to switch to Tuesday, Thursday, Saturday this week, and does not know when his appointment is. Discussed with patient that we will have all that information for him tomorrow as soon as possible, as it is not likely to get it tonight due to time and being a holiday. After 15-20 minutes, patient was more calm, and understanding, but still requesting to be discharged tomorrow morning. Again, it was reiterated to the patient that there could be no promises, but his concerns would be expedited in the morning.    2000: Assessment complete. Complaining of chest discomfort from cough, see MAR for intervention. No other concerns at this time.

## 2020-09-22 NOTE — DOCUMENTATION QUERY
[facility]                                                                       Query Response Note      PATIENT:               ODALYS APPIAH  ACCT #:                  [id number]  MRN:                     [id number]  :                      1980  ADMIT DATE:       2020 11:43 AM  DISCH DATE:        2020 1:44 PM  RESPONDING  PROVIDER #:        [id number]           QUERY TEXT:      Sepsis associated  hypotension, treated with pressors, is documented in the Medical Record.  Please further specify this documentation.    NOTE:  If an appropriate response is not listed below, please respond with a new note.        The patient's Clinical Indicators include:  Patient is a 41 y/o male admitted with sepsis due to COVID pneumonia.  Patient was hypotensive (SBP < 90) on presentation requiring vasopressor.     H&P,  PN - sepsis associated hypotension    ED - Pt had dialysis prior to admission but no fluid was removed due to hypotension    DC Summary:  Sepsis associated hypotension  Options provided:   -- Sepsis with hypotension without septic shock   -- Sepsis with septic shock   -- Unable to determine      Query created by: Myrtle Lorenzo on 2020 4:22 AM    RESPONSE TEXT:    Sepsis with septic shock          Electronically signed by:  TIMOTHY HENRY DO 2020 7:45 AM

## 2020-10-16 ENCOUNTER — TELEPHONE (OUTPATIENT)
Dept: VASCULAR LAB | Facility: MEDICAL CENTER | Age: 40
End: 2020-10-16

## 2020-10-16 DIAGNOSIS — Z79.01 CHRONIC ANTICOAGULATION: ICD-10-CM

## 2020-12-08 ENCOUNTER — ANTICOAGULATION MONITORING (OUTPATIENT)
Dept: VASCULAR LAB | Facility: MEDICAL CENTER | Age: 40
End: 2020-12-08

## 2020-12-08 ENCOUNTER — TELEPHONE (OUTPATIENT)
Dept: VASCULAR LAB | Facility: MEDICAL CENTER | Age: 40
End: 2020-12-08

## 2020-12-08 DIAGNOSIS — T82.868S THROMBOSIS OF ARTERIOVENOUS GRAFT, SEQUELA: ICD-10-CM

## 2020-12-08 NOTE — PROGRESS NOTES
Discharged from [facility].  Pt managed by PCP in [facility] Filter, Clinical Pharmacist, CDE, CACP

## 2020-12-08 NOTE — TELEPHONE ENCOUNTER
Discharged from [facility].  Pt is managed by PCP in [facility].  Renetta Dunlap, Clinical Pharmacist, CDE, CACP

## 2021-05-04 NOTE — ASSESSMENT & PLAN NOTE
Resume anticoagulation once stabilized   Virtual Regular Visit      Assessment/Plan:    Problem List Items Addressed This Visit     None      Visit Diagnoses     Depression, unspecified depression type    -  Primary       stable, with new medication, discussed issues concerns at length, much improved from past, tolerating current medication without any ill effect and/or side effect will continue same schedule follow-up approximately 2 months, instructed to call for any issues concerns         Reason for visit is   Chief Complaint   Patient presents with    Virtual Regular Visit        Encounter provider PITA Rizzo    Provider located at [facility] [address] [facility]  [address] [place] [address] [id number] [phone]      Recent Visits  No visits were found meeting these conditions  Showing recent visits within past 7 days and meeting all other requirements     Today's Visits  Date Type Provider Dept   05/04/21 Telemedicine PITA Rizzo [address] today's visits and meeting all other requirements     Future Appointments  No visits were found meeting these conditions  Showing future appointments within next 150 days and meeting all other requirements        The patient was identified by name and date of birth  Robson Burr was informed that this is a telemedicine visit and that the visit is being conducted through [address] [place] [address] Now and patient was informed that this is a secure, HIPAA-compliant platform  He agrees to proceed     My office door was closed  No one else was in the room  He acknowledged consent and understanding of privacy and security of the video platform  The patient has agreed to participate and understands they can discontinue the visit at any time  Patient is aware this is a billable service  Subjective  Robson Burr is a 44 y o  male           Med follow-up new start secondary anxiety/depression   doing much better comparatively   medication Effexor without any issue in regard to side effects   thoughts more controlled mood even presently at gym enjoying self   denies h/s/t/p/       Past Medical History:   Diagnosis Date    Atypical chest pain     Hyperlipidemia     last assessed 1/17/17       No past surgical history on file  Current Outpatient Medications   Medication Sig Dispense Refill    aspirin (ECOTRIN LOW STRENGTH) 81 mg EC tablet Take 1 tablet (81 mg total) by mouth daily 90 tablet 1    atorvastatin (LIPITOR) 20 mg tablet Take 1 tablet (20 mg total) by mouth daily at bedtime 90 tablet 3    lisinopril (ZESTRIL) 10 mg tablet TAKE 1 TABLET BY MOUTH EVERY DAY (Patient not taking: Reported on 2/16/2021) 90 tablet 0    lisinopril (ZESTRIL) 20 mg tablet Take 1 tablet (20 mg total) by mouth daily 90 tablet 1    Multiple Vitamin (MULTIVITAMIN) tablet Take 1 tablet by mouth daily      Omega-3 Fatty Acids (OMEGA 3 500 PO) Take by mouth daily      rosuvastatin (CRESTOR) 5 mg tablet Take 10 mg by mouth daily      triamcinolone (KENALOG) 0 1 % cream Apply topically 2 (two) times a day 30 g 0    varenicline (CHANTIX CAROLE) 0 5 MG X 11 & 1 MG X 42 tablet Take one 0 5 mg tablet by mouth once daily for 3 days, then one 0 5 mg tablet by mouth twice daily for 4 days, then one 1 mg tablet by mouth twice daily  53 tablet 0    venlafaxine (EFFEXOR-XR) 37 5 mg 24 hr capsule Take 1 capsule (37 5 mg total) by mouth daily with breakfast 30 capsule 3    zolpidem (AMBIEN) 5 mg tablet Take 1 tablet (5 mg total) by mouth daily at bedtime as needed for sleep 30 tablet 0     No current facility-administered medications for this visit  No Known Allergies    Review of Systems   Constitutional: Negative for appetite change, chills, fever and unexpected weight change     HENT: Negative for congestion, dental problem, ear pain, hearing loss, postnasal drip, rhinorrhea, sinus pressure, sinus pain, sneezing, sore throat, tinnitus and voice change  Eyes: Negative for visual disturbance  Respiratory: Negative for apnea, cough, chest tightness and shortness of breath  Cardiovascular: Negative for chest pain, palpitations and leg swelling  Gastrointestinal: Negative for abdominal pain, blood in stool, constipation, diarrhea, nausea and vomiting  Endocrine: Negative for cold intolerance, heat intolerance, polydipsia, polyphagia and polyuria  Genitourinary: Negative for decreased urine volume, difficulty urinating, dysuria, frequency and hematuria  Musculoskeletal: Negative for arthralgias, back pain, gait problem, joint swelling and myalgias  Skin: Negative for color change, rash and wound  Allergic/Immunologic: Negative for environmental allergies and food allergies  Neurological: Negative for dizziness, syncope, weakness, light-headedness, numbness and headaches  Hematological: Negative for adenopathy  Does not bruise/bleed easily  Psychiatric/Behavioral: Negative for sleep disturbance and suicidal ideas  The patient is not nervous/anxious  Video Exam    There were no vitals filed for this visit  Physical Exam  Constitutional:       General: He is not in acute distress  Appearance: He is not ill-appearing  Comments: Presently in gym    Pulmonary:      Effort: Pulmonary effort is normal  No respiratory distress  Psychiatric:         Mood and Affect: Mood normal          Behavior: Behavior normal          Thought Content: Thought content normal          Judgment: Judgment normal           I spent 20 minutes directly with the patient during this visit      VIRTUAL VISIT DISCLAIMER    Soha King acknowledges that he has consented to an online visit or consultation   He understands that the online visit is based solely on information provided by him, and that, in the absence of a face-to-face physical evaluation by the physician, the diagnosis he receives is both limited and provisional in terms of accuracy and completeness  This is not intended to replace a full medical face-to-face evaluation by the physician  Rakan Courtney understands and accepts these terms

## 2021-06-29 ENCOUNTER — HOSPITAL ENCOUNTER (OUTPATIENT)
Dept: LAB | Facility: MEDICAL CENTER | Age: 41
End: 2021-06-29
Payer: MEDICARE

## 2021-06-29 PROCEDURE — 36415 COLL VENOUS BLD VENIPUNCTURE: CPT

## 2021-06-29 PROCEDURE — 85610 PROTHROMBIN TIME: CPT

## 2021-06-30 LAB
INR PPP: 2.67 (ref 0.87–1.13)
PROTHROMBIN TIME: 27.6 SEC (ref 12–14.6)

## 2021-07-02 ENCOUNTER — APPOINTMENT (OUTPATIENT)
Dept: URGENT CARE | Facility: PHYSICIAN GROUP | Age: 41
End: 2021-07-02
Payer: MEDICARE

## 2021-07-19 ENCOUNTER — HOSPITAL ENCOUNTER (OUTPATIENT)
Dept: LAB | Facility: MEDICAL CENTER | Age: 41
End: 2021-07-19
Attending: INTERNAL MEDICINE
Payer: MEDICARE

## 2021-07-19 LAB
INR PPP: 4.75 (ref 0.87–1.13)
PROTHROMBIN TIME: 43.2 SEC (ref 12–14.6)

## 2021-07-19 PROCEDURE — 36415 COLL VENOUS BLD VENIPUNCTURE: CPT

## 2021-07-19 PROCEDURE — 85610 PROTHROMBIN TIME: CPT

## 2021-07-22 ENCOUNTER — HOSPITAL ENCOUNTER (OUTPATIENT)
Dept: LAB | Facility: MEDICAL CENTER | Age: 41
End: 2021-07-22
Attending: INTERNAL MEDICINE
Payer: MEDICARE

## 2021-07-22 LAB
INR PPP: 2.12 (ref 0.87–1.13)
PROTHROMBIN TIME: 23.1 SEC (ref 12–14.6)

## 2021-07-22 PROCEDURE — 85610 PROTHROMBIN TIME: CPT

## 2021-07-22 PROCEDURE — 36415 COLL VENOUS BLD VENIPUNCTURE: CPT

## 2021-09-20 ENCOUNTER — APPOINTMENT (OUTPATIENT)
Dept: RADIOLOGY | Facility: IMAGING CENTER | Age: 41
End: 2021-09-20
Attending: FAMILY MEDICINE
Payer: MEDICARE

## 2021-09-20 ENCOUNTER — OFFICE VISIT (OUTPATIENT)
Dept: URGENT CARE | Facility: PHYSICIAN GROUP | Age: 41
End: 2021-09-20
Payer: MEDICARE

## 2021-09-20 VITALS
OXYGEN SATURATION: 92 % | HEART RATE: 102 BPM | DIASTOLIC BLOOD PRESSURE: 62 MMHG | SYSTOLIC BLOOD PRESSURE: 96 MMHG | BODY MASS INDEX: 32.44 KG/M2 | WEIGHT: 189 LBS | TEMPERATURE: 97.3 F

## 2021-09-20 DIAGNOSIS — R10.31 RIGHT GROIN PAIN: ICD-10-CM

## 2021-09-20 DIAGNOSIS — R05.9 COUGH: ICD-10-CM

## 2021-09-20 DIAGNOSIS — R05.3 CHRONIC COUGH: ICD-10-CM

## 2021-09-20 DIAGNOSIS — Z86.19 HISTORY OF COCCIDIOIDOMYCOSIS: ICD-10-CM

## 2021-09-20 PROCEDURE — 99214 OFFICE O/P EST MOD 30 MIN: CPT | Performed by: FAMILY MEDICINE

## 2021-09-20 PROCEDURE — 71046 X-RAY EXAM CHEST 2 VIEWS: CPT | Mod: TC,FY | Performed by: FAMILY MEDICINE

## 2021-09-20 RX ORDER — NALOXONE HYDROCHLORIDE 4 MG/.1ML
1 SPRAY NASAL PRN
COMMUNITY
Start: 2021-04-22

## 2021-09-20 RX ORDER — GABAPENTIN 600 MG/1
3000 TABLET ORAL
Status: ON HOLD | COMMUNITY
End: 2022-07-21

## 2021-09-20 RX ORDER — CINACALCET 30 MG/1
30 TABLET, FILM COATED ORAL
COMMUNITY
End: 2021-09-20

## 2021-09-20 RX ORDER — CINACALCET 60 MG/1
1 TABLET, FILM COATED ORAL SEE ADMIN INSTRUCTIONS
COMMUNITY
Start: 2021-05-14 | End: 2022-07-19

## 2021-09-20 RX ORDER — DIPHENHYDRAMINE HCL 25 MG
TABLET ORAL
COMMUNITY
Start: 2021-06-09 | End: 2021-10-10

## 2021-09-20 RX ORDER — SEVELAMER CARBONATE 800 MG/1
3200 TABLET, FILM COATED ORAL
COMMUNITY
Start: 2020-10-12 | End: 2021-09-20

## 2021-09-20 RX ORDER — CALCITRIOL 0.5 UG/1
CAPSULE, LIQUID FILLED ORAL
COMMUNITY
Start: 2021-05-14 | End: 2021-10-10

## 2021-09-20 RX ORDER — LANTHANUM CARBONATE 750 MG/1
1 TABLET, CHEWABLE ORAL
COMMUNITY
Start: 2021-01-13 | End: 2021-09-20

## 2021-09-20 RX ORDER — HYDROCODONE BITARTRATE AND ACETAMINOPHEN 10; 325 MG/1; MG/1
1 TABLET ORAL
COMMUNITY
Start: 2021-04-22 | End: 2021-09-20

## 2021-09-20 RX ORDER — DOXYCYCLINE HYCLATE 100 MG
100 TABLET ORAL
COMMUNITY
Start: 2021-05-14 | End: 2021-09-20

## 2021-09-20 RX ORDER — GABAPENTIN 300 MG/1
CAPSULE ORAL
COMMUNITY
Start: 2020-10-12 | End: 2021-09-20

## 2021-09-20 RX ORDER — CEPHALEXIN 500 MG/1
CAPSULE ORAL
COMMUNITY
Start: 2020-10-12 | End: 2021-09-20

## 2021-09-20 RX ORDER — WARFARIN SODIUM 7.5 MG/1
1 TABLET ORAL
COMMUNITY
Start: 2021-04-22 | End: 2021-09-20

## 2021-09-20 RX ORDER — CALCIUM CARBONATE 500 MG/1
1000 TABLET, CHEWABLE ORAL 2 TIMES DAILY PRN
COMMUNITY
Start: 2021-06-25 | End: 2022-07-19

## 2021-09-20 RX ORDER — WARFARIN SODIUM 5 MG/1
5 TABLET ORAL DAILY
COMMUNITY
Start: 2020-10-12 | End: 2021-09-20

## 2021-09-20 RX ORDER — BENZONATATE 100 MG/1
100 CAPSULE ORAL
COMMUNITY
Start: 2021-09-01 | End: 2021-09-20

## 2021-09-20 RX ORDER — RIFAMPIN 300 MG/1
CAPSULE ORAL
COMMUNITY
Start: 2021-04-22 | End: 2021-09-20

## 2021-09-20 RX ORDER — GENTAMICIN SULFATE 1 MG/G
1 CREAM TOPICAL DAILY
COMMUNITY
Start: 2021-05-17

## 2021-09-20 RX ORDER — CALCITRIOL 0.25 UG/1
0.75 CAPSULE, LIQUID FILLED ORAL DAILY
COMMUNITY

## 2021-09-20 RX ORDER — PREDNISONE 20 MG/1
TABLET ORAL
Qty: 5 TABLET | Refills: 0 | Status: SHIPPED | OUTPATIENT
Start: 2021-09-20 | End: 2021-10-10

## 2021-09-20 RX ORDER — DOXYCYCLINE HYCLATE 100 MG/1
100 CAPSULE ORAL
COMMUNITY
Start: 2021-06-10 | End: 2021-09-20

## 2021-09-20 RX ORDER — TUBERCULIN PURIFIED PROTEIN DERIVATIVE 5 [IU]/.1ML
0.1 INJECTION, SOLUTION INTRADERMAL
COMMUNITY
Start: 2020-10-07 | End: 2021-09-20

## 2021-09-20 RX ORDER — TIZANIDINE 4 MG/1
8 TABLET ORAL DAILY
COMMUNITY
End: 2021-09-20

## 2021-09-20 RX ORDER — HYDROCODONE BITARTRATE AND ACETAMINOPHEN 5; 325 MG/1; MG/1
3 TABLET ORAL
COMMUNITY
Start: 2020-10-12 | End: 2021-09-20

## 2021-09-20 RX ORDER — FOLIC ACID/VIT B COMPLEX AND C 0.8 MG
1 TABLET ORAL
COMMUNITY
Start: 2021-06-18

## 2021-09-20 RX ORDER — SEVELAMER CARBONATE FOR ORAL SUSPENSION 800 MG/1
800 POWDER, FOR SUSPENSION ORAL
COMMUNITY
End: 2021-10-10

## 2021-09-20 RX ORDER — COVID-19 MOLECULAR TEST ASSAY
KIT MISCELLANEOUS
COMMUNITY
Start: 2021-05-11 | End: 2021-09-20

## 2021-09-20 ASSESSMENT — FIBROSIS 4 INDEX: FIB4 SCORE: 1.44

## 2021-09-20 NOTE — PROGRESS NOTES
Chief Complaint:    Chief Complaint   Patient presents with   • Pelvic Pain     R side, x2-3days        History of Present Illness:    Mom present and gives some history. Patient has pain in right groin that started on 9/16/21 for no particular reason. It is not really tender to palpation, but hurts more when he goes to step up or coughs. He is on Warfarin, last INR was 3.6, his target range is 2.5-3.5. He is on dialysis. He has been coughing since July 2021 and was told he had Valley Fever / Coccidioidomycosis and should eventually get better on his own. He is still coughing often. Tessalon 100 mg has not helped his cough. He had positive Covid test on 8/31/2020 and abnormal CXR, these were reviewed in chart. He has had blood clots in grafts despite being on Warfarin.      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   • Toxic uninodular goiter without mention of thyrotoxic crisis or storm      Past Surgical History:    Past Surgical History:   Procedure Laterality Date   • INCISION AND DRAINAGE GENERAL Right 4/20/2020    Procedure: INCISION AND DRAINAGE, REMOVAL INFECTED THIGH GRAFT;  Surgeon: Lurdes Moffett M.D.;  Location: SURGERY [facility];  Service: General   • AV FISTULA CREATION Right 3/13/2020    Procedure: CREATION, AV FISTULA- THIGH AND EXPLORATION OF LEFT THIGH AV GRAFT;  Surgeon:  Lurdes Moffett M.D.;  Location: SURGERY [facility];  Service: General   • IA INTRO CATH DIALYSIS CIRCUIT DX ANGRPH FLUOR S&I Left 2/20/2020    Procedure: FISTULOGRAM AND ANGIOPLASTY, VENOGRAM THROUGH EXISTING CATHETER.;  Surgeon: Lurdes Moffett M.D.;  Location: [facility];  Service: General   • AV FISTULA REVISION Left 2/20/2020    Procedure: REPAIR BLEEDING SITE LEFT ARTERIOVENOUS GRAFT.;  Surgeon: Lurdes Moffett M.D.;  Location: SURGERY [facility];  Service: General   • THROMBECTOMY Left 1/26/2019    Procedure: THROMBECTOMY  and angioplasty AV GRAFT;  Surgeon: Jairo Hopkins M.D.;  Location: [facility];  Service: General   • AV FISTULA REVISION Left 1/25/2019    Procedure: AV FISTULA REVISION - THIGH LOOP GRAFT;  Surgeon: Jairo Hopkins M.D.;  Location: [facility];  Service: General   • AV FISTULA REVISION Left 12/23/2018    Procedure: AV FISTULA REVISION;  Surgeon: Lurdes Moffett M.D.;  Location: [facility];  Service: General   • SPLIT THICKNESS SKIN GRAFT Right 9/21/2018    Procedure: SPLIT THICKNESS SKIN GRAFT - ARM TO ABDOMEN;  Surgeon: Samson Rosenbaum Jr., M.D.;  Location: [facility];  Service: Plastics   • SKIN FLAP DELAYED Left 9/10/2018    Procedure: SKIN FLAP DELAYED- FOR DIVISION AND INSET FLAP WITH SKIN GRAFT ON ARM;  Surgeon: Samson Rosenbaum Jr., M.D.;  Location: SURGERY [facility] DAY [facility];  Service: Plastics   • MYOCUTANEOUS FLAP Right 8/27/2018    Procedure: MYOCUTANEOUS FLAP - RIGHT ARM TO TRUNK;  Surgeon: Samson Rosenbaum Jr., M.D.;  Location: SURGERY [facility];  Service: Plastics   • ABDOMINAL EXPLORATION  6/25/2018    Procedure: ABDOMINAL EXPLORATION- CONTROL ABDOMINAL BLEEDING;  Surgeon: Samson Rosenbaum Jr., M.D.;  Location: SURGERY [facility];  Service: Plastics   • THROMBECTOMY Left 4/28/2018    Procedure: THROMBECTOMY- Thigh W/ Graft;  Surgeon: Jairo Hopkins M.D.;   Location: SURGERY [facility];  Service: General   • THROMBECTOMY Left 4/27/2018    Procedure: THROMBECTOMY - THIGH GRAFT AND REVISION;  Surgeon: Jairo Hopkins M.D.;  Location: SURGERY [facility];  Service: General   • THROMBECTOMY Left 3/9/2018    Procedure: THROMBECTOMY- THIGH DIALYSIS GRAFT AND REVISION  ;  Surgeon: Jairo Hopkins M.D.;  Location: SURGERY [facility];  Service: General   • CATH PLACEMENT Right 3/9/2018    Procedure: CATH PLACEMENT - REPLACE DIALYSIS CATH RIGHT THIGH;  Surgeon: Jairo Hopkins M.D.;  Location: SURGERY [facility];  Service: General   • SPLIT THICKNESS SKIN GRAFT  2/26/2018    Procedure: SPLIT THICKNESS SKIN GRAFT- TO ABDOMEN;  Surgeon: Samson Rosenbaum Jr., M.D.;  Location: SURGERY [facility];  Service: Plastics   • WOUND CLOSURE GENERAL  2/19/2018    Procedure: WOUND CLOSURE GENERAL-ABDOMINAL WALL HEMORRHAGE;  Surgeon: Jason Robertson M.D.;  Location: SURGERY [facility];  Service: Plastics   • WOUND EXPLORATION GENERAL  2/1/2018    Procedure: WOUND EXPLORATION GENERAL, REPAIR BLEEDING;  Surgeon: Samson Rosenbaum Jr., M.D.;  Location: SURGERY [facility];  Service: Plastics   • CATH PLACEMENT Right 11/14/2017    Procedure: CATH PLACEMENT - PERMA;  Surgeon: Jairo Hopkins M.D.;  Location: SURGERY [facility];  Service: General   • AV FISTULA REVISION Left 11/14/2017    Procedure: AV GRAFT REVISION;  Surgeon: Jairo Hopkins M.D.;  Location: SURGERY [facility];  Service: General   • IRRIGATION & DEBRIDEMENT GENERAL  7/31/2017    Procedure: IRRIGATION & DEBRIDEMENT GENERAL-ABDOMEN;  Surgeon: Samson Rosenbaum Jr., M.D.;  Location: SURGERY [facility];  Service:    • ABDOMINOPLASTY  6/5/2017    Procedure: ABDOMINOPLASTY - FOR PANNICULECTOMY;  Surgeon: Samson Rosenbaum Jr., M.D.;  Location: SURGERY [facility];  Service:    • SPINAL CORD STIMULATOR N/A 5/4/2017    Procedure: SPINAL CORD STIMULATOR - EXPLANT;  Surgeon:  Bin Pro M.D.;  Location: [facility];  Service:    • THROMBECTOMY Left 1/6/2017    Procedure: THROMBECTOMY-OPEN THROMBECTOMY WITH LEFT THIGH GRAFT;  Surgeon: Jairo Hopkins M.D.;  Location: [facility];  Service:    • CATH PLACEMENT Right 1/6/2017    Procedure: CATH PLACEMENT;  Surgeon: Jairo Hopkins M.D.;  Location: [facility];  Service:    • THROMBECTOMY Left 1/5/2017    Procedure: THROMBECTOMY-THIGH AV LOOP GRAFT AND ANGIOJET;  Surgeon: Jairo Hopkins M.D.;  Location: [facility];  Service:    • FLAP CLOSURE Left 11/17/2016    Procedure: Fasciocutaneous Flap Closure Left Upper Leg;  Surgeon: Samson Rosenbaum Jr., M.D.;  Location: [facility];  Service:    • IRRIGATION & DEBRIDEMENT GENERAL Left 10/28/2016    Procedure: IRRIGATION & DEBRIDEMENT GENERAL THIGH WITH IRRIGATING WOUND VAC PLACEMENT;  Surgeon: Jairo Hopkins M.D.;  Location: [facility];  Service:    • THROMBECTOMY Left 10/20/2016    Procedure: THROMBECTOMY THIGH;  Surgeon: Jairo Hopkins M.D.;  Location: [facility];  Service:    • INCISION AND DRAINAGE GENERAL  10/20/2016    Procedure: INCISION AND DRAINAGE GENERAL HEMATOMA;  Surgeon: Jairo Hopkins M.D.;  Location: [facility];  Service:    • THROMBECTOMY Left 9/18/2016    Procedure: THROMBECTOMY - and fistula revision;  Surgeon: Jairo Hopkins M.D.;  Location: [facility];  Service:    • AV FISTULOGRAM  9/18/2016    Procedure: AV FISTULOGRAM;  Surgeon: Jairo Hopkins M.D.;  Location: [facility];  Service:    • CATH PLACEMENT Right 9/17/2016    Procedure: CATH PLACEMENT - tunneled dialysis cath placement right femoral ;  Surgeon: Quentin Alicia M.D.;  Location: [facility];  Service:    • MASS EXCISION GENERAL Right 8/5/2016    Procedure: MASS EXCISION GENERAL FOR CALCIPHYLAXIS SKIN AND SUBCUTANEOUS TISSUE FOREARM;  Surgeon: Jairo ROWE  EDISON Hopkins;  Location: [facility];  Service:    • LESION EXCISION GENERAL Right 7/11/2016    Procedure: LESION EXCISION GENERAL FOR ARM SKIN;  Surgeon: Jairo Hopkins M.D.;  Location: [facility];  Service:    • RECOVERY  7/8/2016    Procedure: IR1-VASCULAR CASE-VIANEY-LEFT THIGH AV GRAFT THROMBOLYSIS WITH TISSUE PLASMINOGEN ACTIVATOR AND ANGIOJET ARTHERECTOMY   ;  Surgeon: Melinda Surgery;  Location: SURGERY PRE-POST PROC UNIT [facility];  Service:    • SPINAL CORD STIMULATOR N/A 3/25/2016    Procedure: SPINAL CORD STIMULATOR;  Surgeon: Bin Pro;  Location: SURGERY [facility];  Service:    • PB PERCUT IMPLNT NEUROELECT,EPIDURAL  2/19/2016    Procedure: IMPLANT NEUROSTIM EPI ARRAY;  Surgeon: Bin Pro;  Location: SURGERY [facility];  Service: Pain Management   • PB PERCUT IMPLNT NEUROELECT,EPIDURAL  2/19/2016    Procedure: IMPLANT NEUROSTIM EPI ARRAY;  Surgeon: Bin Pro;  Location: SURGERY [facility];  Service: Pain Management   • RECOVERY  8/7/2015    Procedure:  VASCULAR CASE VIANEY-RIGHT ILIAC VENOGRAM WITH ANGIOPLASTY, REMOVAL RIGHT FEMORAL TUNNELED DIALYSIS CATHETER  **VRE**;  Surgeon: Recoveryonly Surgery;  Location: SURGERY PRE-POST PROC UNIT [facility];  Service:    • AV FISTULA REVISION Left 6/17/2015    Procedure: AV FISTULA REVISION;  Surgeon: Jairo Hopkins M.D.;  Location: [facility];  Service:    • IRRIGATION & DEBRIDEMENT GENERAL Left 6/17/2015    Procedure: IRRIGATION & DEBRIDEMENT GENERAL ARM W/EXPLORATION WOUND & CONTROL BLEEDING;  Surgeon: Jairo Hopkins M.D.;  Location: [facility];  Service:    • AV FISTULA THROMBOLYSIS Left 6/9/2015    Procedure: THROMBECTOMY AV GRAFT THIGH;  Surgeon: Jairo Hopkins M.D.;  Location: SURGERY [facility];  Service:    • AV FISTULA THROMBOLYSIS Left 6/3/2015    Procedure: AV FISTULA THROMBOLYSIS THIGH REPLACEMENT;  Surgeon: Jairo Hopkins M.D.;  Location: [facility];  Service:    • IRRIGATION & DEBRIDEMENT GENERAL Left 6/3/2015    Procedure: IRRIGATION & DEBRIDEMENT GENERAL;  Surgeon: Jairo Hopkins M.D.;  Location: [facility];  Service:    • AV FISTULA CREATION  4/20/2015    Performed by Jairo Hopkins M.D. at SURGERY [facility]   • PB INJECT NERV BLCK,STELLATE GANGLION  3/24/2015    Performed by Bin Pro at [facility]   • AV FISTULA REVISION  3/20/2015    Performed by Jairo Hopkins M.D. at SURGERY [facility]   • AV FISTULA REVISION  2/22/2015    Performed by Lurdes Moffett M.D. at SURGERY [facility]   • AV FISTULA REVISION  2/8/2015    Performed by Jairo Hopkins M.D. at SURGERY [facility]   • IRRIGATION & DEBRIDEMENT GENERAL  12/15/2014    Performed by Jairo Hopkins M.D. at SURGERY [facility]   • AV FISTULA REVISION  9/30/2014    Performed by Jairo Hopkins M.D. at SURGERY [facility]   • RECOVERY  6/13/2014    Performed by [facility]Rec[facility] Surgery at [facility] SAME DAY [facility]   • INCISION AND DRAINAGE GENERAL  9/13/2013    Performed by Jairo Hopkins M.D. at SURGERY [facility]   • DEBRIDEMENT  9/13/2013    Performed by Jairo Hopkins M.D. at [facility]   • VEIN LIGATION  9/13/2013    Performed by Jairo Hopkins M.D. at SURGERY [facility]   • RECOVERY  5/18/2012    Performed by SURGERY, IR-RECOVERY at [facility]   • BONE SPUR EXCISION  12/8/2011    Performed by BELKIS BREAUX at SURGERY SAME DAY [facility]   • AV FISTULA REVISION  11/3/2011    Performed by JAIRO HOPKINS at SURGERY [facility]   • AV FISTULOGRAM  6/5/2011    Performed by JAIRO HOPKINS at SURGERY [facility]   • CATH PLACEMENT  6/5/2011    Performed by JAIRO HOPKINS at SURGERY [facility]   • CATH PLACEMENT  2/1/2011    Performed by JAIRO HOPKINS at SURGERY [facility]   • ANGIOPLASTY BALLOON  2/1/2011    Performed by JAIRO HOPKINS at  SURGERY [facility]S   • ENDARTERECTOMY  11/16/2010    Performed by MARI WINTERS at SURGERY [facility]   • AV FISTULOGRAM  11/16/2010    Performed by MARI WINTERS at SURGERY [facility]   • ARTERIOGRAM  3/5/2009    Performed by MARI WINTERS at SURGERY [facility]S   • ANGIOPLASTY BALLOON  3/5/2009    Performed by MARI WINTERS at SURGERY [facility]   • AV FISTULOGRAM  3/5/2009    Performed by MARI WINTERS at SURGERY [facility]   • AV FISTULA CREATION  11/6/08    Performed by MARI WINTERS at SURGERY [facility]S   • MASS EXCISION ORTHO  10/9/08    Performed by BELKIS BREAUX at SURGERY SAME DAY [facility]S   • PARATHYROIDECTOMY  2006   • INGUINAL HERNIA REPAIR Bilateral 2001, 2002   • US-KIDNEY TRANSPLANT  1996, 2001    x2     Social History:    Social History     Socioeconomic History   • Marital status: Single     Spouse name: Not on file   • Number of children: Not on file   • Years of education: Not on file   • Highest education level: Not on file   Occupational History   • Occupation:      Employer: [employer]   Tobacco Use   • Smoking status: Never Smoker   • Smokeless tobacco: Never Used   Vaping Use   • Vaping Use: Never used   Substance and Sexual Activity   • Alcohol use: No   • Drug use: No   • Sexual activity: Yes     Partners: Female   Other Topics Concern   • Primary/coprimary nurse & associates Not Asked   • Family contact information Not Asked   • OK to release patient information to the following Not Asked   • Patient preferred routine/Privacy concerns Not Asked   • Patient likes and dislikes Not Asked   • Participating In Research Study Not Asked   • Miscellaneous Not Asked   Social History Narrative   • Not on file     Social Determinants of Health     Financial Resource Strain:    • Difficulty of Paying Living Expenses:    Food Insecurity:    • Worried About Running Out of Food in the Last Year:    • Ran Out of Food in the Last  Year:    Transportation Needs:    • Lack of Transportation (Medical):    • Lack of Transportation (Non-Medical):    Physical Activity:    • Days of Exercise per Week:    • Minutes of Exercise per Session:    Stress:    • Feeling of Stress :    Social Connections:    • Frequency of Communication with Friends and Family:    • Frequency of Social Gatherings with Friends and Family:    • Attends Buddhist Services:    • Active Member of Clubs or Organizations:    • Attends Club or Organization Meetings:    • Marital Status:    Intimate Partner Violence:    • Fear of Current or Ex-Partner:    • Emotionally Abused:    • Physically Abused:    • Sexually Abused:      Family History:    Family History   Problem Relation Age of Onset   • Arthritis Father         RA   • Lung Disease Father    • Heart Disease Father         MI   • Hypertension Brother      Medications:    Current Outpatient Medications on File Prior to Visit   Medication Sig Dispense Refill   • B Complex-C-Folic Acid (DIALYVITE 800) 0.8 MG Tab   1 tab(s) PO qDay, # 100 tab(s), Refill(s) 0     • calcium carbonate (TUMS) 500 MG Chew Tab Chew 1 Tablet.     • Cinacalcet HCl 60 MG Tab Take 1 Tablet by mouth.     • diphenhydrAMINE (WAL-DRYL ALLERGY) 25 MG Tab TAKE 2 TABLETS BY MOUTH ONCE FOR 1 DOSE AS DIRECTED ONE HOUR BEFORE TO IV CONTRAST     • gentamicin (GARAMYCIN) 0.1 % cream APPLY TO EXIT SITE DAILY     • Methoxy PEG-Epoetin Beta (MIRCERA INJ) Inject 150 mcg under the skin.     • Naloxone (NARCAN) 4 MG/0.1ML Liquid Administer 1 mg into affected nostril(S).     • calcitRIOL (ROCALTROL) 0.25 MCG Cap Take 0.75 mcg by mouth every day.     • calcitRIOL (ROCALTROL) 0.5 MCG Cap Take  by mouth.     • gabapentin (NEURONTIN) 600 MG tablet Take 600 mg by mouth.     • Sevelamer Carbonate 0.8 g Pack Take 800 mg by mouth.     • warfarin (COUMADIN) 5 MG Tab Take 1 Tab by mouth every evening. 5 mg every day of the week 30 Tab 3   • tizanidine (ZANAFLEX) 4 MG Tab Take 8 mg by  mouth every bedtime. 2 tablets = 8 mg     • HYDROcodone/acetaminophen (NORCO)  MG Tab Take 3 Tabs by mouth every bedtime. 3 TABLETS = DOSE  Indications: Moderate to Moderately Severe Pain       No current facility-administered medications on file prior to visit.     Allergies:    Allergies   Allergen Reactions   • Baclofen Unspecified     Total loss of memory, sedation.   RXN=6/2015   • Chlorhexidine Itching and Rash   • Contrast Media With Iodine [Iodine] Rash     RXN=1/5/2017   • Pcn [Penicillins] Rash     RXN=possibly >10 years ago  Tolerated Zosyn on 2/20/18   • Tape Rash     Paper tape and tegaderm ok  RXN=ongoing   • Cephalexin Rash   • Cephalosporins    • Requip Vomiting   • Ropinirole Vomiting     Requip     • Diagnostic X-Ray Materials Rash     Patient has a history of an IV contrast reaction. Patient instructed to take prednisone 50 mg by mouth 13 hours, 7 hours and 1 hour prior to IV contrast injection and  Diphenhydramine 50 mg by mouth 1 hour prior to IV contrast injection. Prescription faxed to pharmacy of patient's choice. Patient reports that he has not had a reaction following the pretreatment.       Vitals:    Vitals:    09/20/21 1255   BP: (!) 96/62   Pulse: (!) 102   Temp: 36.3 °C (97.3 °F)   TempSrc: Temporal   SpO2: 92%   Weight: 85.7 kg (189 lb)       Physical Exam:    Constitutional: Vital signs reviewed. Appears well-developed and well-nourished. Occl cough. No acute distress.   Eyes: Sclera white, conjunctivae clear.   ENT: External ears normal. Hearing normal.  Neck: Neck supple.   Cardiovascular: Regular rate and rhythm. No murmur. 1+ edema in both lower legs, equal.  Pulmonary/Chest: Respirations non-labored. Clear to auscultation bilaterally.  Abdomen: No right inguinal hernia.   male: Scrotum normal.   Lymph: Right inguinal nodes without tenderness or enlargement.  Musculoskeletal: Tender in right groin area with right hip flexion and right hip adduction. No significant  tenderness in right groin with right hip abduction or right hip extension. No significant tenderness to palpation in right groin. No muscular atrophy or weakness.  Neurological: Alert and oriented to person, place, and time. Muscle tone normal. Coordination normal. Light touch and sensation normal.   Skin: No rashes or lesions. Warm, dry, normal turgor.  Psychiatric: Normal mood and affect. Behavior is normal. Judgment and thought content normal.       Diagnostics:    DX-CHEST-2 VIEWS  Order: [id number]  Status:  Final result   Visible to patient:  No (scheduled for 2021 11:40 AM) Next appt:  None Dx:  Cough   0 Result Notes  Details    Reading Physician Reading Date Result Priority   Chris Alcazar M.D.  [phone] 2021 Urgent Care   Narrative & Impression     2021 1:26 PM     HISTORY/REASON FOR EXAM:  Cough; Room 2. Cough since 2021. Diagnosed with Valley Fever.        TECHNIQUE/EXAM DESCRIPTION AND NUMBER OF VIEWS:  Two views of the chest.     COMPARISON:  1 view chest 2020     FINDINGS:  LUNGS: The lungs are clear.  Previously present airspace process have resolved.     HEART and MEDIASTINUM: normal in size. There is calcification of the SVC, shown on prior CT scans.     Pleura: There are no pleural effusion or pneumothoraces.     Osseous structures: No significant bony abnormality.        IMPRESSION:     1.  No acute cardiopulmonary abnormality identified.     2.  Resolution of previously present airspace process        I personally reviewed the images. Images and Rad report reviewed with them and copy of report to them.      Medical Decision Makin. Chronic cough  - DX-CHEST-2 VIEWS; Future  - predniSONE (DELTASONE) 20 MG Tab; 1 TAB BY MOUTH ONCE A DAY ONLY IF NEEDED FOR COUGH OR TO HELP INFLAMMATION CAUSING PAIN. TAKE WITH FOOD.  Dispense: 5 Tablet; Refill: 0    2. History of coccidioidomycosis  - DX-CHEST-2 VIEWS; Future    3. Right groin pain  - predniSONE (DELTASONE) 20 MG  Tab; 1 TAB BY MOUTH ONCE A DAY ONLY IF NEEDED FOR COUGH OR TO HELP INFLAMMATION CAUSING PAIN. TAKE WITH FOOD.  Dispense: 5 Tablet; Refill: 0      Discussed with them DDX, management options, and risks, benefits, and alternatives to treatment plan agreed upon.    Mom present and gives some history. Patient has pain in right groin that started on 9/16/21 for no particular reason. It is not really tender to palpation, but hurts more when he goes to step up or coughs. He is on Warfarin, last INR was 3.6, his target range is 2.5-3.5. He is on dialysis. He has been coughing since July 2021 and was told he had Valley Fever / Coccidioidomycosis and should eventually get better on his own. He is still coughing often. Tessalon 100 mg has not helped his cough. He had positive Covid test on 8/31/2020 and abnormal CXR, these were reviewed in chart. He has had blood clots in grafts despite being on Warfarin.    Tender in right groin area with right hip flexion and right hip adduction. No significant tenderness in right groin with right hip abduction or right hip extension. No significant tenderness to palpation in right groin. No muscular atrophy or weakness. 1+ edema in both lower legs, equal.    CXR today shows: No acute cardiopulmonary abnormality identified. Resolution of previously present airspace process.    ? etiology of cough. I do not strongly feel he needs treatment for Coccidioidomycosis at this time.    Tessalon did not help his cough. He is on Hydrocodone 10 mg so I do not want to use narcotic cough med. He got some OTC Delsym for cough which he will try.    Suspect MSK inflammation as cause of right groin pain.    Due to being on dialysis, will avoid NSAIDs for anti-inflammatory.    Thus, offered cautious Prednisone for anti-inflammatory due to being on Warfarin. They are agreeable.    Advised if cough is due to any allergy component, the Prednisone may help.    Agreeable to medication prescribed.    Advised CT  chest imaging or US right leg to rule out blood clot (we do not have either imaging modality here) may be needed if symptoms worsen or persist.    Discussed expected course of duration, time for improvement, and to seek follow-up in Emergency Room, urgent care, or with PCP if getting worse at any time or not improving within expected time frame.

## 2021-09-21 NOTE — OP REPORT
DATE OF SERVICE:  02/19/2018    PREOPERATIVE DIAGNOSES:  1.  Bleeding abdominal wall secondary to esophageal varices.  2.  Necrotic skin and subcutaneous tissue of abdominal wall.    POSTOPERATIVE DIAGNOSES:  1.  Bleeding abdominal wall secondary to esophageal varices.  2.  Necrotic skin and subcutaneous tissue of abdominal wall.    PROCEDURES PERFORMED:  1.  Suture ligation of abdominal wall varices for control of acute hemorrhage.  2.  Debridement of skin and subcutaneous tissue of abdominal wall.    SURGEON:  Jason Robertson MD    ASSISTANT:  None.    ANESTHESIA:  Charlie Schroeder MD    OPERATIVE INDICATIONS:  A 37-year-old gentleman.  A patient of Dr. Samson Rosenbaum Jr.  The patient has had a wound of his lower abdominal wall secondary   to erosion of the skin from abdominal wall varices.  These intermittently   bleed.  Three weeks ago, the patient was brought to the operating room for   acute hemorrhage from his abdominal wall.  He has recurrent hemorrhage and I   was called this morning with this condition.  The patient understood the   risks, benefits, alternatives to proceeding to the operating room and agreed   to proceed with surgery.    OPERATIVE DESCRIPTION:  After induction of general endotracheal anesthesia,   the patient's abdomen was prepped and draped sterilely.  The patient had a   number of old sutures from his previous surgery, which were removed.  There   were 2, maybe 3 places where there was active hemorrhage from rupture of the   abdominal wall varices.  These were treated with figure-of-eight sutures using   0 Vicryl.  This resulted in hemostasis and no more active bleeding.  There   was some necrotic skin and subcutaneous tissue of the abdominal wall.  This   was sharply debrided using a scalpel.  Debridement went down to the level of   the subcutaneous tissue and the abdominal wall.  The area of debridement was   approximately 2x8 cm in size.  Bleeding from this was treated with    electrocautery.  A few minutes were allowed to elapse to make sure there were   no other areas of bleeding.  The wound once hemostatic was treated with   antibiotic ointment, Xeroform gauze, ABD and an abdominal binder to place some   pressure.  The patient was extubated and taken to recovery room in good   condition.       ____________________________________     MD BLAYNE BILLY / RIAZ    DD:  02/19/2018 15:02:59  DT:  02/19/2018 15:23:16    D#:  [id number]  Job#:  [id number]   [employer]

## 2021-10-10 ENCOUNTER — HOSPITAL ENCOUNTER (EMERGENCY)
Facility: MEDICAL CENTER | Age: 41
End: 2021-10-10
Attending: EMERGENCY MEDICINE
Payer: MEDICARE

## 2021-10-10 ENCOUNTER — APPOINTMENT (OUTPATIENT)
Dept: RADIOLOGY | Facility: MEDICAL CENTER | Age: 41
End: 2021-10-10
Attending: EMERGENCY MEDICINE
Payer: MEDICARE

## 2021-10-10 VITALS
SYSTOLIC BLOOD PRESSURE: 109 MMHG | TEMPERATURE: 97.2 F | HEART RATE: 79 BPM | DIASTOLIC BLOOD PRESSURE: 73 MMHG | RESPIRATION RATE: 20 BRPM | OXYGEN SATURATION: 98 %

## 2021-10-10 DIAGNOSIS — R10.31 RIGHT GROIN PAIN: ICD-10-CM

## 2021-10-10 DIAGNOSIS — N18.6 ESRD (END STAGE RENAL DISEASE) (HCC): ICD-10-CM

## 2021-10-10 LAB
ALBUMIN SERPL BCP-MCNC: 3.4 G/DL (ref 3.2–4.9)
ALBUMIN/GLOB SERPL: 1 G/DL
ALP SERPL-CCNC: 160 U/L (ref 30–99)
ALT SERPL-CCNC: 11 U/L (ref 2–50)
ANION GAP SERPL CALC-SCNC: 23 MMOL/L (ref 7–16)
AST SERPL-CCNC: 13 U/L (ref 12–45)
BASOPHILS # BLD AUTO: 1 % (ref 0–1.8)
BASOPHILS # BLD: 0.05 K/UL (ref 0–0.12)
BILIRUB SERPL-MCNC: 0.2 MG/DL (ref 0.1–1.5)
BUN SERPL-MCNC: 53 MG/DL (ref 8–22)
CALCIUM SERPL-MCNC: 8.4 MG/DL (ref 8.4–10.2)
CHLORIDE SERPL-SCNC: 93 MMOL/L (ref 96–112)
CK SERPL-CCNC: 150 U/L (ref 0–154)
CO2 SERPL-SCNC: 22 MMOL/L (ref 20–33)
CREAT SERPL-MCNC: 12.95 MG/DL (ref 0.5–1.4)
EOSINOPHIL # BLD AUTO: 0.13 K/UL (ref 0–0.51)
EOSINOPHIL NFR BLD: 2.5 % (ref 0–6.9)
ERYTHROCYTE [DISTWIDTH] IN BLOOD BY AUTOMATED COUNT: 60.8 FL (ref 35.9–50)
GLOBULIN SER CALC-MCNC: 3.5 G/DL (ref 1.9–3.5)
GLUCOSE SERPL-MCNC: 92 MG/DL (ref 65–99)
HCT VFR BLD AUTO: 28.5 % (ref 42–52)
HGB BLD-MCNC: 9.2 G/DL (ref 14–18)
IMM GRANULOCYTES # BLD AUTO: 0.02 K/UL (ref 0–0.11)
IMM GRANULOCYTES NFR BLD AUTO: 0.4 % (ref 0–0.9)
LACTATE BLD-SCNC: 1.6 MMOL/L (ref 0.5–2)
LIPASE SERPL-CCNC: 40 U/L (ref 7–58)
LYMPHOCYTES # BLD AUTO: 1.01 K/UL (ref 1–4.8)
LYMPHOCYTES NFR BLD: 19.3 % (ref 22–41)
MAGNESIUM SERPL-MCNC: 2.2 MG/DL (ref 1.5–2.5)
MCH RBC QN AUTO: 32.5 PG (ref 27–33)
MCHC RBC AUTO-ENTMCNC: 32.3 G/DL (ref 33.7–35.3)
MCV RBC AUTO: 100.7 FL (ref 81.4–97.8)
MONOCYTES # BLD AUTO: 0.46 K/UL (ref 0–0.85)
MONOCYTES NFR BLD AUTO: 8.8 % (ref 0–13.4)
NEUTROPHILS # BLD AUTO: 3.55 K/UL (ref 1.82–7.42)
NEUTROPHILS NFR BLD: 68 % (ref 44–72)
NRBC # BLD AUTO: 0 K/UL
NRBC BLD-RTO: 0 /100 WBC
PHOSPHATE SERPL-MCNC: 11.1 MG/DL (ref 2.5–4.5)
PLATELET # BLD AUTO: 210 K/UL (ref 164–446)
PMV BLD AUTO: 8.9 FL (ref 9–12.9)
POTASSIUM SERPL-SCNC: 4.6 MMOL/L (ref 3.6–5.5)
PROT SERPL-MCNC: 6.9 G/DL (ref 6–8.2)
RBC # BLD AUTO: 2.83 M/UL (ref 4.7–6.1)
SODIUM SERPL-SCNC: 138 MMOL/L (ref 135–145)
WBC # BLD AUTO: 5.2 K/UL (ref 4.8–10.8)

## 2021-10-10 PROCEDURE — 82550 ASSAY OF CK (CPK): CPT

## 2021-10-10 PROCEDURE — 80053 COMPREHEN METABOLIC PANEL: CPT

## 2021-10-10 PROCEDURE — 83605 ASSAY OF LACTIC ACID: CPT

## 2021-10-10 PROCEDURE — 74176 CT ABD & PELVIS W/O CONTRAST: CPT | Mod: ME

## 2021-10-10 PROCEDURE — 99283 EMERGENCY DEPT VISIT LOW MDM: CPT | Mod: 25

## 2021-10-10 PROCEDURE — 83690 ASSAY OF LIPASE: CPT

## 2021-10-10 PROCEDURE — 85025 COMPLETE CBC W/AUTO DIFF WBC: CPT

## 2021-10-10 PROCEDURE — 84100 ASSAY OF PHOSPHORUS: CPT

## 2021-10-10 PROCEDURE — 83735 ASSAY OF MAGNESIUM: CPT

## 2021-10-10 RX ORDER — HYDROMORPHONE HYDROCHLORIDE 1 MG/ML
1 INJECTION, SOLUTION INTRAMUSCULAR; INTRAVENOUS; SUBCUTANEOUS ONCE
Status: CANCELLED | OUTPATIENT
Start: 2021-10-10 | End: 2021-10-10

## 2021-10-10 RX ORDER — SEVELAMER HYDROCHLORIDE 800 MG/1
3200 TABLET, FILM COATED ORAL
Status: SHIPPED | COMMUNITY
End: 2022-07-19

## 2021-10-10 RX ORDER — DIPHENHYDRAMINE HCL 25 MG
50 TABLET ORAL
COMMUNITY

## 2021-10-10 ASSESSMENT — PAIN DESCRIPTION - DESCRIPTORS: DESCRIPTORS: ACHING;SHARP;BURNING

## 2021-10-10 NOTE — ED NOTES
Dc instructions reviewed with pt. To f/u with [facility] to establish pcp, return for worsening s/s

## 2021-10-10 NOTE — ED NOTES
Cricket from Lab called with critical result of phosphorus 11.1 at 1416. Critical lab result read back to Cricket.   Dr. Carroll notified of critical lab result at 1418.  Critical lab result read back by Dr. Carroll.    Primary RN, Pascale, aware of result

## 2021-10-10 NOTE — ED PROVIDER NOTES
ED Provider Note    CHIEF COMPLAINT  Chief Complaint   Patient presents with   • Groin Pain   • Cough       HPI    Primary care provider: Not on file  Means of arrival: POV/Walk-in  History obtained from: Patient and Mother  History limited by: Nothing    Denis De Anda is a 41 y.o. male who presents with right groin pain.  Onset several months ago constant and worsening.  No falls or injuries or trauma.  No bulges.  Been seen by several doctors for this without obvious etiology.  He was most recently seen in urgent care several days ago, and told that he should probably be evaluated with a CT or MRI.  He is medically complicated with a long history of dialysis and end-stage renal disease.  Denies any falls or injuries or trauma.  No lifting episodes.  Denies fevers or chills or abdominal pain or vomiting.  Does not make any urine.  Does peritoneal dialysis nightly was compliant recently.  [place] but has already set up with nephrology.  No known close Covid contacts.    REVIEW OF SYSTEMS  Constitutional: Negative for fever or chills.   HENT: Negative for rhinorrhea or sore throat.    Eyes: Negative for vision changes or discharge.   Respiratory: Negative for cough or shortness of breath.    Cardiovascular: Negative for chest pain or palpitations.   Gastrointestinal: Negative for nausea, vomiting, or abdominal pain.   Genitourinary: Positive for right-sided groin pain negative for flank pain.  Musculoskeletal: Negative for extremity pain or swelling.  Skin: Negative for itching or rash.   Neurological: Negative for sensory or motor changes.   See HPI for further details. All other systems are negative.     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    PAST FAMILY HISTORY  Family History   Problem Relation Age of Onset   • Arthritis Father         RA   • Lung Disease Father    • Heart Disease Father         MI   • Hypertension Brother        SOCIAL HISTORY  Social History     Tobacco Use   • Smoking status: Never Smoker   • Smokeless tobacco: Never Used   Vaping Use   • Vaping Use: Never used   Substance and Sexual Activity   • Alcohol use: No   • Drug use: No   • Sexual activity: Yes     Partners: Female       SURGICAL HISTORY   has a past surgical history that includes mass excision ortho (10/9/08); av fistula creation (11/6/08); arteriogram (3/5/2009); angioplasty balloon (3/5/2009); av fistulogram (3/5/2009); us-kidney transplant (1996, 2001); endarterectomy (11/16/2010); av fistulogram (11/16/2010); cath placement (2/1/2011); angioplasty balloon (2/1/2011); av fistulogram (6/5/2011); cath placement (6/5/2011); av fistula revision (11/3/2011); recovery (5/18/2012); incision and drainage general (9/13/2013); debridement (9/13/2013); vein ligation (9/13/2013); recovery (6/13/2014); av fistula revision (9/30/2014); irrigation & debridement general (12/15/2014); av fistula revision (2/8/2015); av fistula revision (2/22/2015); av fistula revision (3/20/2015); inject nerv blck,stellate ganglion (3/24/2015); av fistula creation (4/20/2015); av fistula thrombolysis (Left, 6/3/2015); irrigation & debridement general (Left, 6/3/2015); av fistula thrombolysis (Left, 6/9/2015); av fistula revision (Left, 6/17/2015); irrigation & debridement general (Left, 6/17/2015); recovery (8/7/2015); percut implnt neuroelect,epidural (2/19/2016); percut implnt neuroelect,epidural (2/19/2016); parathyroidectomy (2006); spinal cord stimulator (N/A, 3/25/2016); recovery (7/8/2016); lesion excision general (Right, 7/11/2016);  mass excision general (Right, 8/5/2016); cath placement (Right, 9/17/2016); thrombectomy (Left, 9/18/2016); av fistulogram (9/18/2016); thrombectomy (Left, 10/20/2016); incision and drainage general (10/20/2016); irrigation & debridement general (Left, 10/28/2016); flap closure (Left, 11/17/2016); thrombectomy (Left, 1/6/2017); cath placement (Right, 1/6/2017); thrombectomy (Left, 1/5/2017); spinal cord stimulator (N/A, 5/4/2017); abdominoplasty (6/5/2017); irrigation & debridement general (7/31/2017); cath placement (Right, 11/14/2017); av fistula revision (Left, 11/14/2017); wound exploration general (2/1/2018); wound closure general (2/19/2018); split thickness skin graft (2/26/2018); thrombectomy (Left, 3/9/2018); cath placement (Right, 3/9/2018); thrombectomy (Left, 4/27/2018); thrombectomy (Left, 4/28/2018); abdominal exploration (6/25/2018); myocutaneous flap (Right, 8/27/2018); skin flap delayed (Left, 9/10/2018); split thickness skin graft (Right, 9/21/2018); av fistula revision (Left, 12/23/2018); av fistula revision (Left, 1/25/2019); thrombectomy (Left, 1/26/2019); intro cath dialysis circuit dx angrph fluor s&i (Left, 2/20/2020); av fistula revision (Left, 2/20/2020); av fistula creation (Right, 3/13/2020); incision and drainage general (Right, 4/20/2020); inguinal hernia repair (Bilateral, 2001, 2002); and bone spur excision (12/8/2011).    CURRENT MEDICATIONS  Home Medications     Reviewed by Don Mckinnon (Pharmacy Tech) on 10/10/21 at 1440  Med List Status: Complete   Medication Last Dose Status   B Complex-C-Folic Acid (DIALYVITE 800) 0.8 MG Tab 10/9/2021 Active   calcitRIOL (ROCALTROL) 0.25 MCG Cap > 2 weeks Active   calcium carbonate (TUMS) 500 MG Chew Tab >1 week Active   Cinacalcet HCl 60 MG Tab >1 week Active   diphenhydrAMINE (BENADRYL) 25 MG Tab 10/9/2021 Active   gabapentin (NEURONTIN) 600 MG tablet 10/9/2021 Active   gentamicin (GARAMYCIN) 0.1 % cream 10/10/2021 Active    HYDROcodone/acetaminophen (NORCO)  MG Tab 10/9/2021 Active   Methoxy PEG-Epoetin Beta (MIRCERA INJ) > 3 weeks Active   Naloxone (NARCAN) 4 MG/0.1ML Liquid > Never used Active   sevelamer (RENAGEL) 800 MG Tab 10/9/2021 Active   tizanidine (ZANAFLEX) 4 MG Tab 10/9/2021 Active   warfarin (COUMADIN) 5 MG Tab 10/9/2021 Active                ALLERGIES  Allergies   Allergen Reactions   • Baclofen Unspecified     Total loss of memory, sedation.   RXN=6/2015   • Chlorhexidine Itching and Rash   • Contrast Media With Iodine [Iodine] Rash     RXN=1/5/2017   • Pcn [Penicillins] Rash     RXN=possibly >10 years ago  Tolerated Zosyn on 2/20/18   • Tape Rash     Paper tape and tegaderm ok  RXN=ongoing   • Cephalexin Rash   • Cephalosporins Hives   • Requip Vomiting   • Ropinirole Vomiting     Requip     • Diagnostic X-Ray Materials Rash     Patient has a history of an IV contrast reaction. Patient instructed to take prednisone 50 mg by mouth 13 hours, 7 hours and 1 hour prior to IV contrast injection and  Diphenhydramine 50 mg by mouth 1 hour prior to IV contrast injection. Prescription faxed to pharmacy of patient's choice. Patient reports that he has not had a reaction following the pretreatment.       PHYSICAL EXAM  VITAL SIGNS: /73   Pulse 79   Temp 36.2 °C (97.2 °F) (Temporal)   Resp 20   SpO2 98%    Pulse ox interpretation: On room air, I interpret this pulse ox as normal.  Constitutional: Chronically ill appearing for age sitting up in mild distress.  HEENT: Normocephalic, atraumatic. Posterior pharynx clear, mucous membranes dry.  Eyes:  EOMI. Normal sclerae.  Neck: Supple, nontender.  Chest/Pulmonary: Slightly diminished to ausculation bilaterally, no wheezes or rhonchi.  Cardiovascular: Regular rate and rhythm, 2 out of 6 systolic murmur.   Abdomen: Soft, nontender; no rebound, guarding, or masses.  : No bulge but there is tenderness over the right inguinal canal, normal testicular lie.  Back: No CVA  or midline tenderness.   Musculoskeletal: No deformity or edema.  Neuro: Alert, no focal weakness or asymmetry.  Psych: Normal mood and affect.  Skin: No rashes, warm and dry.      DIAGNOSTIC STUDIES / PROCEDURES    LABS & EKG  Results for orders placed or performed during the hospital encounter of 10/10/21   CBC WITH DIFFERENTIAL   Result Value Ref Range    WBC 5.2 4.8 - 10.8 K/uL    RBC 2.83 (L) 4.70 - 6.10 M/uL    Hemoglobin 9.2 (L) 14.0 - 18.0 g/dL    Hematocrit 28.5 (L) 42.0 - 52.0 %    .7 (H) 81.4 - 97.8 fL    MCH 32.5 27.0 - 33.0 pg    MCHC 32.3 (L) 33.7 - 35.3 g/dL    RDW 60.8 (H) 35.9 - 50.0 fL    Platelet Count 210 164 - 446 K/uL    MPV 8.9 (L) 9.0 - 12.9 fL    Neutrophils-Polys 68.00 44.00 - 72.00 %    Lymphocytes 19.30 (L) 22.00 - 41.00 %    Monocytes 8.80 0.00 - 13.40 %    Eosinophils 2.50 0.00 - 6.90 %    Basophils 1.00 0.00 - 1.80 %    Immature Granulocytes 0.40 0.00 - 0.90 %    Nucleated RBC 0.00 /100 WBC    Neutrophils (Absolute) 3.55 1.82 - 7.42 K/uL    Lymphs (Absolute) 1.01 1.00 - 4.80 K/uL    Monos (Absolute) 0.46 0.00 - 0.85 K/uL    Eos (Absolute) 0.13 0.00 - 0.51 K/uL    Baso (Absolute) 0.05 0.00 - 0.12 K/uL    Immature Granulocytes (abs) 0.02 0.00 - 0.11 K/uL    NRBC (Absolute) 0.00 K/uL   CMP   Result Value Ref Range    Sodium 138 135 - 145 mmol/L    Potassium 4.6 3.6 - 5.5 mmol/L    Chloride 93 (L) 96 - 112 mmol/L    Co2 22 20 - 33 mmol/L    Anion Gap 23.0 (H) 7.0 - 16.0    Glucose 92 65 - 99 mg/dL    Bun 53 (H) 8 - 22 mg/dL    Creatinine 12.95 (HH) 0.50 - 1.40 mg/dL    Calcium 8.4 8.4 - 10.2 mg/dL    AST(SGOT) 13 12 - 45 U/L    ALT(SGPT) 11 2 - 50 U/L    Alkaline Phosphatase 160 (H) 30 - 99 U/L    Total Bilirubin 0.2 0.1 - 1.5 mg/dL    Albumin 3.4 3.2 - 4.9 g/dL    Total Protein 6.9 6.0 - 8.2 g/dL    Globulin 3.5 1.9 - 3.5 g/dL    A-G Ratio 1.0 g/dL   LIPASE   Result Value Ref Range    Lipase 40 7 - 58 U/L   MAGNESIUM   Result Value Ref Range    Magnesium 2.2 1.5 - 2.5 mg/dL    PHOSPHORUS   Result Value Ref Range    Phosphorus 11.1 (HH) 2.5 - 4.5 mg/dL   CREATINE KINASE   Result Value Ref Range    CPK Total 150 0 - 154 U/L   LACTIC ACID   Result Value Ref Range    Lactic Acid 1.6 0.5 - 2.0 mmol/L   ESTIMATED GFR   Result Value Ref Range    GFR If African American 5 (A) >60 mL/min/1.73 m 2    GFR If Non African American 4 (A) >60 mL/min/1.73 m 2       RADIOLOGY  CT-RENAL COLIC EVALUATION(A/P W/O)   Final Result         1.  Atrophic left kidney.      2.  Absent right kidney.      3.  Calcified right renal transplant in the right lower quadrant again noted consistent with rejection.      4.  Splenomegaly.          COURSE & MEDICAL DECISION MAKING    This is a 41 y.o. male who presents with right groin pain.    Differential Diagnosis includes but is not limited to:  Hernia, obstruction, infection, electrolyte abnormality, strain    ED Course:  41-year-old male with complicated medical history with above complaints.  Plan labs and imaging given significant past medical history but nothing obvious on external physical examination.    Thankfully work-up today reassuring labs are concordant with end-stage renal disease the patient does PD at night at home and has this set up.  Nothing surgical or acute on CT.  Plan discharge follow-up with PCP return if any worse.    Medications - No data to display    FINAL IMPRESSION  1. Right groin pain    2. ESRD (end stage renal disease) ([facility])        PRESCRIPTIONS  Discharge Medication List as of 10/10/2021  3:03 PM          FOLLOW UP  [facility], Emergency Dept  [address]  [facility] [address] [phone]  Today  If you have ANY new or worse symptoms!    [facility]  [address] [place] [id number] [phone]  Schedule an appointment as soon as possible for a visit in 2 days  for recheck and routine health care      -DISCHARGE-       Results, exam findings, clinical impression,  presumed diagnosis, treatment options, and strict return precautions were discussed with the patient and mother, and they verbalized understanding, agreed with, and appreciated the plan of care.    Pertinent Labs & Imaging studies reviewed and verified by myself, as well as nursing notes and the patient's past medical, family, and social histories (See chart for details).    The patient is referred to a primary physician for blood pressure management, diabetic screening, and for all other preventative health concerns.     Portions of this record were made with voice recognition software.  Despite my review, spelling/grammar/context errors may still remain.  Interpretation of this chart should be taken in this context.    Electronically signed by Sukhwinder Carroll M.D. on 10/12/2021 at 3:30 PM.

## 2021-10-10 NOTE — ED NOTES
saline lock with blood draw attempted by this rn-unsuccessful. U/s guided by another rn with blood draw. Pt to ct upon completion of same

## 2021-10-10 NOTE — ED TRIAGE NOTES
"Presents accompanied by his spouse.  He C/O groin pain recurring for the past 3 weeks worsening for the past few days. He denies any obvious traumatic events. He also C/O non productive cough for the past 2 months.  He reports being on a kidney transplant list.\"   Chief Complaint   Patient presents with   • Groin Pain   • Cough     /82   Pulse 99   Temp 36.1 °C (97 °F) (Temporal)   Resp 20   SpO2 98%   Has this patient been vaccinated for COVID YES    "

## 2021-10-10 NOTE — DISCHARGE INSTRUCTIONS
You were seen and evaluated in the Emergency Department at [facility] for:     Right groin pain.    You had the following tests and studies:    Thankfully work-up today is reassuring we do not see a dangerous or life-threatening cause of your symptoms.    You received the following medications:    Pain medication.    You received the following prescriptions:    Continue home meds.  ----------------------------    Please make sure to follow up with:    Primary care provider for recheck and routine care, but if having worsening pain or fevers or lumps or bumps or any other concerning new symptoms come back to the hospital right away.    Good luck, we hope you get better soon!  ----------------------------    We always encourage patients to return IMMEDIATELY if they have:  Increased or changing pain, passing out, fevers over 100.4 (taken in your mouth or rectally) for more than 2 days, redness or swelling of skin or tissues, feeling like your heart is beating fast, chest pain that is new or worsening, trouble breathing, feeling like your throat is closing up and can not breath, inability to walk, weakness of any part of your body, new dizziness, severe bleeding that won't stop from any part of your body, if you can't eat or drink, or if you have any other concerns.   If you feel worse, please know that you can always return with any questions, concerns, worse symptoms, or you are feeling unsafe. We certainly cannot say for sure that we have ruled out every illness or dangerous disease, but we feel that at this specific time, your exam, tests, and vital signs like heart rate and blood pressure are safe for discharge.

## 2021-10-10 NOTE — ED NOTES
Med rec updated and complete  Allergies reviewed  Interviewed pt with mother at bedside with permission from pt.  Pt reports that he has not taken any PREDNISONE in the last 30 days   Pt reports that he takes GABAPENTIN 600MG 5 capsules (3,000 MG) at bedtime and NORCO 10-325MG 5 tablets at bedtime.    No current facility-administered medications on file prior to encounter.     Current Outpatient Medications on File Prior to Encounter   Medication Sig Dispense Refill   • diphenhydrAMINE (BENADRYL) 25 MG Tab Take 100 mg by mouth at bedtime.     • sevelamer (RENAGEL) 800 MG Tab Take 3,200 mg by mouth 3 times a day with meals.     • B Complex-C-Folic Acid (DIALYVITE 800) 0.8 MG Tab Take 1 Tablet by mouth at bedtime.     • calcium carbonate (TUMS) 500 MG Chew Tab Chew 1,000 mg 2 times a day as needed (For heartburn).     • Cinacalcet HCl 60 MG Tab Take 1 Tablet by mouth see administration instructions. Pt takes on Mon, Wed, and Fri     • gentamicin (GARAMYCIN) 0.1 % cream Apply 1 Application topically every day.     • Methoxy PEG-Epoetin Beta (MIRCERA INJ) Inject 150 mcg under the skin Q30 DAYS.     • Naloxone (NARCAN) 4 MG/0.1ML Liquid Administer 1 mg into affected nostril(S) as needed. Indications: Opioid Overdose     • calcitRIOL (ROCALTROL) 0.25 MCG Cap Take 0.75 mcg by mouth every day.     • gabapentin (NEURONTIN) 600 MG tablet Take 3,000 mg by mouth at bedtime. Pt reports he takes 5 capsule (3,000 mg)     • warfarin (COUMADIN) 5 MG Tab Take 1 Tab by mouth every evening. 5 mg every day of the week (Patient taking differently: Take 2.5-5 mg by mouth every evening. Pt takes 2.5MG on Tue  5MG all other days) 30 Tab 3   • tizanidine (ZANAFLEX) 4 MG Tab Take 8 mg by mouth every bedtime. 2 tablets = 8 mg     • HYDROcodone/acetaminophen (NORCO)  MG Tab Take 5 Tablets by mouth at bedtime. Indications: Moderate to Moderately Severe Pain

## 2021-10-12 ENCOUNTER — HOSPITAL ENCOUNTER (OUTPATIENT)
Dept: LAB | Facility: MEDICAL CENTER | Age: 41
End: 2021-10-12
Payer: MEDICARE

## 2021-10-12 LAB
INR PPP: 2.33 (ref 0.87–1.13)
PROTHROMBIN TIME: 24.8 SEC (ref 12–14.6)

## 2021-10-12 PROCEDURE — 85610 PROTHROMBIN TIME: CPT

## 2021-10-12 PROCEDURE — 36415 COLL VENOUS BLD VENIPUNCTURE: CPT

## 2022-06-04 ENCOUNTER — APPOINTMENT (OUTPATIENT)
Dept: RADIOLOGY | Facility: MEDICAL CENTER | Age: 42
End: 2022-06-04
Attending: EMERGENCY MEDICINE
Payer: MEDICARE

## 2022-06-04 ENCOUNTER — HOSPITAL ENCOUNTER (EMERGENCY)
Facility: MEDICAL CENTER | Age: 42
End: 2022-06-04
Attending: EMERGENCY MEDICINE
Payer: MEDICARE

## 2022-06-04 VITALS
OXYGEN SATURATION: 96 % | DIASTOLIC BLOOD PRESSURE: 63 MMHG | BODY MASS INDEX: 30.37 KG/M2 | HEIGHT: 64 IN | TEMPERATURE: 96.8 F | SYSTOLIC BLOOD PRESSURE: 95 MMHG | RESPIRATION RATE: 18 BRPM | HEART RATE: 112 BPM | WEIGHT: 177.91 LBS
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
## 2022-06-04 DIAGNOSIS — S43.402A SPRAIN OF LEFT SHOULDER, UNSPECIFIED SHOULDER SPRAIN TYPE, INITIAL ENCOUNTER: ICD-10-CM

## 2022-06-04 PROCEDURE — 73030 X-RAY EXAM OF SHOULDER: CPT | Mod: LT

## 2022-06-04 PROCEDURE — 99284 EMERGENCY DEPT VISIT MOD MDM: CPT

## 2022-06-04 PROCEDURE — 73110 X-RAY EXAM OF WRIST: CPT | Mod: LT

## 2022-06-04 ASSESSMENT — FIBROSIS 4 INDEX: FIB4 SCORE: 0.75

## 2022-06-04 ASSESSMENT — PAIN DESCRIPTION - PAIN TYPE: TYPE: ACUTE PAIN

## 2022-06-05 NOTE — ED NOTES
Pt and wife were provided d/c instructions to include f/u and home care. Pt states he is not from [place] but will f/u in [place] with his orthopedist there. Pt and wife verbalized understanding of d/c instructions and left ambulatory with a steady gait.

## 2022-06-05 NOTE — ED PROVIDER NOTES
"ED Provider Note    Scribed for Sarah Rodriguez M.D. by Sejal Tracy. 6/4/2022  9:07 PM    Primary care provider: Pcp Pt States None  Means of arrival: Walk in   History obtained from: Patient  History limited by: None    CHIEF COMPLAINT  Chief Complaint   Patient presents with   • Fall     Reports \"my hands are fucked up, so I was trying to get myself into the trailer and I couldn't , so I fell backwards\". Reports L shoulder pain. Denies LOC or head injury. Denies blood thinners.    • Shoulder Pain       HPI  Denis De Anda is a 41 y.o. male who presents to the Emergency Department for evaluation of fall onset today. Patient reports that his mother was trying to help him get into a trailer when she lost balance and he fell backwards. He did not hit his head and there was no loss of consciousness. He fell on his left shoulder and left wrist. He admits to associated symptoms of left shoulder pain and left hand pain but denies knee pain or neck pain. No alleviating factors were reported. Patient is not on blood thinners. He has been on dialysis for 16 years and receives it peritoneally.      REVIEW OF SYSTEMS    Neuro: no weakness, numbness neurovacularly intact distal to injury   Musculoskeletal: Left shoulder pain. Left hand pain. no swelling, deformity, or joint swelling. No knee pain.   Endocrine: no fevers, sweating,   SKIN: no rash, erythema, or contusions     See history of present illness.     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    SURGICAL HISTORY   has a past surgical history that includes mass excision " ortho (10/9/08); av fistula creation (11/6/08); arteriogram (3/5/2009); angioplasty balloon (3/5/2009); av fistulogram (3/5/2009); us-kidney transplant (1996, 2001); endarterectomy (11/16/2010); av fistulogram (11/16/2010); cath placement (2/1/2011); angioplasty balloon (2/1/2011); av fistulogram (6/5/2011); cath placement (6/5/2011); av fistula revision (11/3/2011); recovery (5/18/2012); incision and drainage general (9/13/2013); debridement (9/13/2013); vein ligation (9/13/2013); recovery (6/13/2014); av fistula revision (9/30/2014); irrigation & debridement general (12/15/2014); av fistula revision (2/8/2015); av fistula revision (2/22/2015); av fistula revision (3/20/2015); inject nerv blck,stellate ganglion (3/24/2015); av fistula creation (4/20/2015); av fistula thrombolysis (Left, 6/3/2015); irrigation & debridement general (Left, 6/3/2015); av fistula thrombolysis (Left, 6/9/2015); av fistula revision (Left, 6/17/2015); irrigation & debridement general (Left, 6/17/2015); recovery (8/7/2015); percut implnt neuroelect,epidural (2/19/2016); percut implnt neuroelect,epidural (2/19/2016); parathyroidectomy (2006); spinal cord stimulator (N/A, 3/25/2016); recovery (7/8/2016); lesion excision general (Right, 7/11/2016); mass excision general (Right, 8/5/2016); cath placement (Right, 9/17/2016); thrombectomy (Left, 9/18/2016); av fistulogram (9/18/2016); thrombectomy (Left, 10/20/2016); incision and drainage general (10/20/2016); irrigation & debridement general (Left, 10/28/2016); flap closure (Left, 11/17/2016); thrombectomy (Left, 1/6/2017); cath placement (Right, 1/6/2017); thrombectomy (Left, 1/5/2017); spinal cord stimulator (N/A, 5/4/2017); abdominoplasty (6/5/2017); irrigation & debridement general (7/31/2017); cath placement (Right, 11/14/2017); av fistula revision (Left, 11/14/2017); wound exploration general (2/1/2018); wound closure general (2/19/2018); split thickness skin graft (2/26/2018);  thrombectomy (Left, 3/9/2018); cath placement (Right, 3/9/2018); thrombectomy (Left, 4/27/2018); thrombectomy (Left, 4/28/2018); abdominal exploration (6/25/2018); myocutaneous flap (Right, 8/27/2018); skin flap delayed (Left, 9/10/2018); split thickness skin graft (Right, 9/21/2018); av fistula revision (Left, 12/23/2018); av fistula revision (Left, 1/25/2019); thrombectomy (Left, 1/26/2019); intro cath dialysis circuit dx angrph fluor s&i (Left, 2/20/2020); av fistula revision (Left, 2/20/2020); av fistula creation (Right, 3/13/2020); incision and drainage general (Right, 4/20/2020); inguinal hernia repair (Bilateral, 2001, 2002); and bone spur excision (12/8/2011).    SOCIAL HISTORY  Social History     Tobacco Use   • Smoking status: Never Smoker   • Smokeless tobacco: Never Used   Vaping Use   • Vaping Use: Never used   Substance Use Topics   • Alcohol use: No   • Drug use: No      Social History     Substance and Sexual Activity   Drug Use No       FAMILY HISTORY  Family History   Problem Relation Age of Onset   • Arthritis Father         RA   • Lung Disease Father    • Heart Disease Father         MI   • Hypertension Brother        CURRENT MEDICATIONS  Current Outpatient Medications:   •  diphenhydrAMINE (BENADRYL) 25 MG Tab, Take 100 mg by mouth at bedtime., Disp: , Rfl:   •  sevelamer (RENAGEL) 800 MG Tab, Take 3,200 mg by mouth 3 times a day with meals., Disp: , Rfl:   •  B Complex-C-Folic Acid (DIALYVITE 800) 0.8 MG Tab, Take 1 Tablet by mouth at bedtime., Disp: , Rfl:   •  calcium carbonate (TUMS) 500 MG Chew Tab, Chew 1,000 mg 2 times a day as needed (For heartburn)., Disp: , Rfl:   •  Cinacalcet HCl 60 MG Tab, Take 1 Tablet by mouth see administration instructions. Pt takes on Mon, Wed, and Fri, Disp: , Rfl:   •  gentamicin (GARAMYCIN) 0.1 % cream, Apply 1 Application topically every day., Disp: , Rfl:   •  Methoxy PEG-Epoetin Beta (MIRCERA INJ), Inject 150 mcg under the skin Q30 DAYS., Disp: , Rfl:   •  " Naloxone (NARCAN) 4 MG/0.1ML Liquid, Administer 1 mg into affected nostril(S) as needed. Indications: Opioid Overdose, Disp: , Rfl:   •  calcitRIOL (ROCALTROL) 0.25 MCG Cap, Take 0.75 mcg by mouth every day., Disp: , Rfl:   •  gabapentin (NEURONTIN) 600 MG tablet, Take 3,000 mg by mouth at bedtime. Pt reports he takes 5 capsule (3,000 mg), Disp: , Rfl:   •  warfarin (COUMADIN) 5 MG Tab, Take 1 Tab by mouth every evening. 5 mg every day of the week (Patient taking differently: Take 2.5-5 mg by mouth every evening. Pt takes 2.5MG on Tue 5MG all other days), Disp: 30 Tab, Rfl: 3  •  tizanidine (ZANAFLEX) 4 MG Tab, Take 8 mg by mouth every bedtime. 2 tablets = 8 mg, Disp: , Rfl:   •  HYDROcodone/acetaminophen (NORCO)  MG Tab, Take 5 Tablets by mouth at bedtime. Indications: Moderate to Moderately Severe Pain, Disp: , Rfl:       ALLERGIES  Allergies   Allergen Reactions   • Baclofen Unspecified     Total loss of memory, sedation.   RXN=6/2015   • Chlorhexidine Itching and Rash   • Contrast Media With Iodine [Iodine] Rash     RXN=1/5/2017   • Pcn [Penicillins] Rash     RXN=possibly >10 years ago  Tolerated Zosyn on 2/20/18   • Tape Rash     Paper tape and tegaderm ok  RXN=ongoing   • Cephalexin Rash   • Cephalosporins Hives   • Requip Vomiting   • Ropinirole Vomiting     Requip     • Diagnostic X-Ray Materials Rash     Patient has a history of an IV contrast reaction. Patient instructed to take prednisone 50 mg by mouth 13 hours, 7 hours and 1 hour prior to IV contrast injection and  Diphenhydramine 50 mg by mouth 1 hour prior to IV contrast injection. Prescription faxed to pharmacy of patient's choice. Patient reports that he has not had a reaction following the pretreatment.       PHYSICAL EXAM  VITAL SIGNS: /67   Pulse (!) 122   Temp 36.9 °C (98.4 °F) (Temporal)   Resp 20   Ht 1.626 m (5' 4\")   Wt 80.7 kg (177 lb 14.6 oz)   SpO2 96%   BMI 30.54 kg/m²     Constitutional: No distress  Skin: Warm, dry, " no erythema.   Musculoskeletal: Diffuse tenderness to left shoulder. Decreased range of motion secondary to pain. No obvious contusions, step offs, or crepitus. Poor blood flow chronically to bilateral hands. Tenderness to left wrist with no contusion.  Vascular: warm to touch. Decreased capillary refill   Neurologic: distally neurovascularly intact  Psychiatric: Affect normal      DIAGNOSTIC STUDIES/PROCEDURES    RADIOLOGY  DX-SHOULDER 2+ LEFT   Final Result      1.  No acute fracture.      2.  Severe osteopenia.      DX-WRIST-COMPLETE 3+ LEFT   Final Result      No acute fracture is identified. If pain persists, recommend repeat imaging in 7-10 days        The radiologist's interpretation of all radiological studies have been reviewed by me.    COURSE & MEDICAL DECISION MAKING  Nursing notes, VS, PMSFHx reviewed in chart.    9:07 PM - Patient seen and examined at bedside. Ordered DX-wrist and DX-shoulder to evaluate his symptoms. Differentials include but are not limited to Fracture of shoulder vs Dislocation vs Strain.     10:06 PM the patient does not have any acute fractures of his shoulder or wrist.  I will discharge him home and advised him to take anti-inflammatories ice down his shoulder and wrist and perform gentle range of motion exercises 3 times a day.  He should follow-up with orthopedics for recheck if he is not feeling he is improving in the next week.    The patient is referred to a primary physician for blood pressure management, diabetic screening, and for all other preventative health concerns.        DISPOSITION:  Patient will be discharged home in stable condition.    FOLLOW UP:  Pastor Boyle M.D.  [address] [place] [address]  [phone]    Call in 2 days  to establish care, As needed, If symptoms worsen      OUTPATIENT MEDICATIONS:  New Prescriptions    No medications on file       FINAL IMPRESSION  1. Fall, initial encounter    2. Sprain of left shoulder, unspecified shoulder  sprain type, initial encounter          Sejal SANCHEZ (Scribe), am scribing for, and in the presence of, Sarah Rodriguez M.D..    Electronically signed by: Sejal Tracy (Stephaniibdeena), 6/4/2022    Sarah SANCHEZ M.D. personally performed the services described in this documentation, as scribed by Sejal Tracy in my presence, and it is both accurate and complete.    The note accurately reflects work and decisions made by me.  Sarah Rodriguez M.D.  6/4/2022  10:07 PM

## 2022-07-19 ENCOUNTER — APPOINTMENT (OUTPATIENT)
Dept: RADIOLOGY | Facility: MEDICAL CENTER | Age: 42
DRG: 682 | End: 2022-07-19
Attending: EMERGENCY MEDICINE
Payer: MEDICARE

## 2022-07-19 ENCOUNTER — HOSPITAL ENCOUNTER (INPATIENT)
Facility: MEDICAL CENTER | Age: 42
LOS: 2 days | DRG: 682 | End: 2022-07-21
Attending: EMERGENCY MEDICINE | Admitting: HOSPITALIST
Payer: MEDICARE

## 2022-07-19 DIAGNOSIS — R79.89 ELEVATED LACTIC ACID LEVEL: ICD-10-CM

## 2022-07-19 DIAGNOSIS — A41.9 SEPSIS, DUE TO UNSPECIFIED ORGANISM, UNSPECIFIED WHETHER ACUTE ORGAN DYSFUNCTION PRESENT (HCC): ICD-10-CM

## 2022-07-19 DIAGNOSIS — N18.6 ESRD (END STAGE RENAL DISEASE) (HCC): ICD-10-CM

## 2022-07-19 DIAGNOSIS — R41.0 CONFUSION: ICD-10-CM

## 2022-07-19 DIAGNOSIS — N18.9 CHRONIC RENAL FAILURE, UNSPECIFIED CKD STAGE: ICD-10-CM

## 2022-07-19 DIAGNOSIS — Z99.2 PERITONEAL DIALYSIS CATHETER IN PLACE (HCC): ICD-10-CM

## 2022-07-19 DIAGNOSIS — I96 FINGER NECROSIS (HCC): ICD-10-CM

## 2022-07-19 PROBLEM — R40.4 ALTERED LEVEL OF CONSCIOUSNESS: Status: ACTIVE | Noted: 2022-07-19

## 2022-07-19 PROBLEM — E86.0 DEHYDRATION: Status: ACTIVE | Noted: 2022-07-19

## 2022-07-19 PROBLEM — E87.20 LACTIC ACIDOSIS: Status: ACTIVE | Noted: 2022-07-19

## 2022-07-19 LAB
ALBUMIN SERPL BCP-MCNC: 3.3 G/DL (ref 3.2–4.9)
ALBUMIN/GLOB SERPL: 0.9 G/DL
ALP SERPL-CCNC: 196 U/L (ref 30–99)
ALT SERPL-CCNC: <5 U/L (ref 2–50)
ANION GAP SERPL CALC-SCNC: 20 MMOL/L (ref 7–16)
AST SERPL-CCNC: 6 U/L (ref 12–45)
BASOPHILS # BLD AUTO: 0.4 % (ref 0–1.8)
BASOPHILS # BLD: 0.04 K/UL (ref 0–0.12)
BILIRUB SERPL-MCNC: 0.4 MG/DL (ref 0.1–1.5)
BUN SERPL-MCNC: 42 MG/DL (ref 8–22)
CALCIUM SERPL-MCNC: 10 MG/DL (ref 8.5–10.5)
CHLORIDE SERPL-SCNC: 91 MMOL/L (ref 96–112)
CO2 SERPL-SCNC: 24 MMOL/L (ref 20–33)
CREAT SERPL-MCNC: 7.92 MG/DL (ref 0.5–1.4)
EOSINOPHIL # BLD AUTO: 0.04 K/UL (ref 0–0.51)
EOSINOPHIL NFR BLD: 0.4 % (ref 0–6.9)
ERYTHROCYTE [DISTWIDTH] IN BLOOD BY AUTOMATED COUNT: 56.9 FL (ref 35.9–50)
GFR SERPLBLD CREATININE-BSD FMLA CKD-EPI: 8 ML/MIN/1.73 M 2
GLOBULIN SER CALC-MCNC: 3.7 G/DL (ref 1.9–3.5)
GLUCOSE SERPL-MCNC: 126 MG/DL (ref 65–99)
HCT VFR BLD AUTO: 35.9 % (ref 42–52)
HGB BLD-MCNC: 11.6 G/DL (ref 14–18)
IMM GRANULOCYTES # BLD AUTO: 0.1 K/UL (ref 0–0.11)
IMM GRANULOCYTES NFR BLD AUTO: 1 % (ref 0–0.9)
LACTATE SERPL-SCNC: 1.5 MMOL/L (ref 0.5–2)
LACTATE SERPL-SCNC: 2.8 MMOL/L (ref 0.5–2)
LYMPHOCYTES # BLD AUTO: 1.23 K/UL (ref 1–4.8)
LYMPHOCYTES NFR BLD: 11.8 % (ref 22–41)
MAGNESIUM SERPL-MCNC: 1.6 MG/DL (ref 1.5–2.5)
MCH RBC QN AUTO: 30.2 PG (ref 27–33)
MCHC RBC AUTO-ENTMCNC: 32.3 G/DL (ref 33.7–35.3)
MCV RBC AUTO: 93.5 FL (ref 81.4–97.8)
MONOCYTES # BLD AUTO: 0.59 K/UL (ref 0–0.85)
MONOCYTES NFR BLD AUTO: 5.7 % (ref 0–13.4)
NEUTROPHILS # BLD AUTO: 8.42 K/UL (ref 1.82–7.42)
NEUTROPHILS NFR BLD: 80.7 % (ref 44–72)
NRBC # BLD AUTO: 0 K/UL
NRBC BLD-RTO: 0 /100 WBC
PLATELET # BLD AUTO: 278 K/UL (ref 164–446)
PMV BLD AUTO: 9.2 FL (ref 9–12.9)
POTASSIUM SERPL-SCNC: 3.1 MMOL/L (ref 3.6–5.5)
PROT SERPL-MCNC: 7 G/DL (ref 6–8.2)
RBC # BLD AUTO: 3.84 M/UL (ref 4.7–6.1)
SODIUM SERPL-SCNC: 135 MMOL/L (ref 135–145)
WBC # BLD AUTO: 10.4 K/UL (ref 4.8–10.8)

## 2022-07-19 PROCEDURE — 99285 EMERGENCY DEPT VISIT HI MDM: CPT

## 2022-07-19 PROCEDURE — 3E1M39Z IRRIGATION OF PERITONEAL CAVITY USING DIALYSATE, PERCUTANEOUS APPROACH: ICD-10-PCS | Performed by: STUDENT IN AN ORGANIZED HEALTH CARE EDUCATION/TRAINING PROGRAM

## 2022-07-19 PROCEDURE — 96375 TX/PRO/DX INJ NEW DRUG ADDON: CPT

## 2022-07-19 PROCEDURE — 36415 COLL VENOUS BLD VENIPUNCTURE: CPT

## 2022-07-19 PROCEDURE — 89051 BODY FLUID CELL COUNT: CPT

## 2022-07-19 PROCEDURE — 700111 HCHG RX REV CODE 636 W/ 250 OVERRIDE (IP): Performed by: EMERGENCY MEDICINE

## 2022-07-19 PROCEDURE — 700102 HCHG RX REV CODE 250 W/ 637 OVERRIDE(OP): Performed by: HOSPITALIST

## 2022-07-19 PROCEDURE — 87040 BLOOD CULTURE FOR BACTERIA: CPT | Mod: 91

## 2022-07-19 PROCEDURE — 71045 X-RAY EXAM CHEST 1 VIEW: CPT

## 2022-07-19 PROCEDURE — 96365 THER/PROPH/DIAG IV INF INIT: CPT

## 2022-07-19 PROCEDURE — 90945 DIALYSIS ONE EVALUATION: CPT

## 2022-07-19 PROCEDURE — 85025 COMPLETE CBC W/AUTO DIFF WBC: CPT

## 2022-07-19 PROCEDURE — 83605 ASSAY OF LACTIC ACID: CPT

## 2022-07-19 PROCEDURE — 80053 COMPREHEN METABOLIC PANEL: CPT

## 2022-07-19 PROCEDURE — 99223 1ST HOSP IP/OBS HIGH 75: CPT | Performed by: HOSPITALIST

## 2022-07-19 PROCEDURE — 770001 HCHG ROOM/CARE - MED/SURG/GYN PRIV*

## 2022-07-19 PROCEDURE — 700105 HCHG RX REV CODE 258: Performed by: EMERGENCY MEDICINE

## 2022-07-19 PROCEDURE — 96366 THER/PROPH/DIAG IV INF ADDON: CPT

## 2022-07-19 PROCEDURE — 700105 HCHG RX REV CODE 258: Performed by: HOSPITALIST

## 2022-07-19 PROCEDURE — A9270 NON-COVERED ITEM OR SERVICE: HCPCS | Performed by: HOSPITALIST

## 2022-07-19 PROCEDURE — 83735 ASSAY OF MAGNESIUM: CPT

## 2022-07-19 RX ORDER — OXYCODONE HYDROCHLORIDE 10 MG/1
10 TABLET ORAL EVERY 4 HOURS PRN
COMMUNITY

## 2022-07-19 RX ORDER — DOXYCYCLINE HYCLATE 100 MG
100 TABLET ORAL DAILY
COMMUNITY
Start: 2022-04-05

## 2022-07-19 RX ORDER — CALCITRIOL 0.25 UG/1
0.75 CAPSULE, LIQUID FILLED ORAL DAILY
Status: DISCONTINUED | OUTPATIENT
Start: 2022-07-19 | End: 2022-07-21 | Stop reason: HOSPADM

## 2022-07-19 RX ORDER — FERRIC CITRATE 210 MG/1
3 TABLET, COATED ORAL
COMMUNITY

## 2022-07-19 RX ORDER — DOXYCYCLINE 100 MG/1
100 TABLET ORAL DAILY
Status: DISCONTINUED | OUTPATIENT
Start: 2022-07-19 | End: 2022-07-21 | Stop reason: HOSPADM

## 2022-07-19 RX ORDER — CEFTRIAXONE 2 G/1
2 INJECTION, POWDER, FOR SOLUTION INTRAMUSCULAR; INTRAVENOUS ONCE
Status: COMPLETED | OUTPATIENT
Start: 2022-07-19 | End: 2022-07-19

## 2022-07-19 RX ORDER — ONDANSETRON 2 MG/ML
4 INJECTION INTRAMUSCULAR; INTRAVENOUS ONCE
Status: COMPLETED | OUTPATIENT
Start: 2022-07-19 | End: 2022-07-19

## 2022-07-19 RX ORDER — TIZANIDINE 4 MG/1
8 TABLET ORAL
Status: DISCONTINUED | OUTPATIENT
Start: 2022-07-19 | End: 2022-07-20

## 2022-07-19 RX ORDER — AMOXICILLIN 250 MG
2 CAPSULE ORAL 2 TIMES DAILY
Status: DISCONTINUED | OUTPATIENT
Start: 2022-07-19 | End: 2022-07-21 | Stop reason: HOSPADM

## 2022-07-19 RX ORDER — ACETAMINOPHEN 325 MG/1
650 TABLET ORAL EVERY 6 HOURS PRN
Status: DISCONTINUED | OUTPATIENT
Start: 2022-07-19 | End: 2022-07-21 | Stop reason: HOSPADM

## 2022-07-19 RX ORDER — SODIUM CHLORIDE, SODIUM LACTATE, POTASSIUM CHLORIDE, AND CALCIUM CHLORIDE .6; .31; .03; .02 G/100ML; G/100ML; G/100ML; G/100ML
30 INJECTION, SOLUTION INTRAVENOUS ONCE
Status: COMPLETED | OUTPATIENT
Start: 2022-07-19 | End: 2022-07-19

## 2022-07-19 RX ORDER — LEVOFLOXACIN 5 MG/ML
750 INJECTION, SOLUTION INTRAVENOUS ONCE
Status: DISCONTINUED | OUTPATIENT
Start: 2022-07-19 | End: 2022-07-19

## 2022-07-19 RX ORDER — HEPARIN SODIUM 5000 [USP'U]/ML
5000 INJECTION, SOLUTION INTRAVENOUS; SUBCUTANEOUS EVERY 8 HOURS
Status: DISCONTINUED | OUTPATIENT
Start: 2022-07-19 | End: 2022-07-21 | Stop reason: HOSPADM

## 2022-07-19 RX ORDER — PSEUDOEPHEDRINE HCL 30 MG
100 TABLET ORAL DAILY
COMMUNITY
Start: 2022-03-10

## 2022-07-19 RX ORDER — OXYCODONE HYDROCHLORIDE 10 MG/1
10 TABLET ORAL EVERY 4 HOURS PRN
Status: DISCONTINUED | OUTPATIENT
Start: 2022-07-19 | End: 2022-07-21 | Stop reason: HOSPADM

## 2022-07-19 RX ORDER — LIDOCAINE HYDROCHLORIDE 20 MG/ML
20 INJECTION, SOLUTION INFILTRATION; PERINEURAL PRN
COMMUNITY
Start: 2022-06-03

## 2022-07-19 RX ORDER — DIPHENHYDRAMINE HCL 25 MG
50 TABLET ORAL
Status: DISCONTINUED | OUTPATIENT
Start: 2022-07-19 | End: 2022-07-21 | Stop reason: HOSPADM

## 2022-07-19 RX ORDER — POLYETHYLENE GLYCOL 3350 17 G/17G
1 POWDER, FOR SOLUTION ORAL
Status: DISCONTINUED | OUTPATIENT
Start: 2022-07-19 | End: 2022-07-21 | Stop reason: HOSPADM

## 2022-07-19 RX ORDER — LIDOCAINE 50 MG/G
1 OINTMENT TOPICAL PRN
COMMUNITY
Start: 2022-02-28

## 2022-07-19 RX ORDER — MORPHINE SULFATE 4 MG/ML
2 INJECTION INTRAVENOUS ONCE
Status: COMPLETED | OUTPATIENT
Start: 2022-07-19 | End: 2022-07-19

## 2022-07-19 RX ORDER — SODIUM CHLORIDE 9 MG/ML
INJECTION, SOLUTION INTRAVENOUS CONTINUOUS
Status: DISCONTINUED | OUTPATIENT
Start: 2022-07-19 | End: 2022-07-20

## 2022-07-19 RX ORDER — BISACODYL 10 MG
10 SUPPOSITORY, RECTAL RECTAL
Status: DISCONTINUED | OUTPATIENT
Start: 2022-07-19 | End: 2022-07-21 | Stop reason: HOSPADM

## 2022-07-19 RX ADMIN — TIZANIDINE 8 MG: 4 TABLET ORAL at 22:41

## 2022-07-19 RX ADMIN — OXYCODONE HYDROCHLORIDE 10 MG: 10 TABLET ORAL at 18:53

## 2022-07-19 RX ADMIN — ONDANSETRON 4 MG: 2 INJECTION, SOLUTION INTRAMUSCULAR; INTRAVENOUS at 20:29

## 2022-07-19 RX ADMIN — SODIUM CHLORIDE: 9 INJECTION, SOLUTION INTRAVENOUS at 18:27

## 2022-07-19 RX ADMIN — SODIUM CHLORIDE, POTASSIUM CHLORIDE, SODIUM LACTATE AND CALCIUM CHLORIDE 1776 ML: 600; 310; 30; 20 INJECTION, SOLUTION INTRAVENOUS at 15:16

## 2022-07-19 RX ADMIN — VANCOMYCIN HYDROCHLORIDE 2000 MG: 500 INJECTION, POWDER, LYOPHILIZED, FOR SOLUTION INTRAVENOUS at 15:16

## 2022-07-19 RX ADMIN — CEFTRIAXONE SODIUM 2 G: 2 INJECTION, POWDER, FOR SOLUTION INTRAMUSCULAR; INTRAVENOUS at 15:16

## 2022-07-19 RX ADMIN — DIPHENHYDRAMINE HYDROCHLORIDE 50 MG: 25 TABLET ORAL at 20:29

## 2022-07-19 RX ADMIN — CALCITRIOL 0.75 MCG: 0.5 CAPSULE, LIQUID FILLED ORAL at 18:26

## 2022-07-19 RX ADMIN — SENNOSIDES AND DOCUSATE SODIUM 2 TABLET: 50; 8.6 TABLET ORAL at 18:26

## 2022-07-19 RX ADMIN — DOXYCYCLINE 100 MG: 100 TABLET, FILM COATED ORAL at 18:26

## 2022-07-19 RX ADMIN — MORPHINE SULFATE 2 MG: 4 INJECTION INTRAVENOUS at 20:28

## 2022-07-19 ASSESSMENT — ENCOUNTER SYMPTOMS
BLURRED VISION: 0
SORE THROAT: 0
HEARTBURN: 0
COUGH: 0
HEADACHES: 0
FEVER: 0
DOUBLE VISION: 0
PALPITATIONS: 0
DIZZINESS: 0
ABDOMINAL PAIN: 0
FALLS: 0
LOSS OF CONSCIOUSNESS: 0
BACK PAIN: 0
CHILLS: 0
SHORTNESS OF BREATH: 0
NAUSEA: 0
SPUTUM PRODUCTION: 0

## 2022-07-19 ASSESSMENT — FIBROSIS 4 INDEX: FIB4 SCORE: 0.75

## 2022-07-19 ASSESSMENT — PAIN DESCRIPTION - PAIN TYPE: TYPE: CHRONIC PAIN

## 2022-07-19 NOTE — ED PROVIDER NOTES
"ED Provider Note    Scribed for Jorge A Wong M.D. by Colton Fishman. 7/19/2022  2:13 PM    Primary care provider: Pcp Pt States None  Means of arrival: Walk-In  History obtained from: Patient  History limited by: None    CHIEF COMPLAINT  Chief Complaint   Patient presents with   • ALOC     Pt lives with family, state that he has been altered x2 days, worse today, normally GCS 15. Pt gets daily peritoneal dialysis. Right hand looks poorly perfused - dusky. Radial pulses equal bilaterally. Fingertips necrotic. No cough/cold/nausea/diarrhea. Had dialysis this AM.       HPI  Denis De Anda is a 41 y.o. male who presents to the Emergency Department for evaluation of acute altered level of consciousness onset two days ago. Per wife, patient has not been at baseline for the past two days. Wife states that patient has been more confused and less responsive, staring \"blankly in space.\" Wife states that patient's symptoms are worse today, prompting her to present to the ED for further evaluation. Denies any fevers, cough, nausea, vomiting, abdominal pain,or diarrhea. No alleviating or exacerbating factors reported. Patient gets peritoneal dialysis at night daily. Drug allergies to Penicillins, Cephalexin, Cephalosporins, Requip, and Ropinirole.     REVIEW OF SYSTEMS  Pertinent negatives include no fevers, cough, nausea, vomiting, abdominal pain, or diarrhea. As above, all other systems reviewed and are negative.   See HPI for further details.     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    SURGICAL HISTORY   has a past surgical " history that includes mass excision ortho (10/9/08); av fistula creation (11/6/08); arteriogram (3/5/2009); angioplasty balloon (3/5/2009); av fistulogram (3/5/2009); us-kidney transplant (1996, 2001); endarterectomy (11/16/2010); av fistulogram (11/16/2010); cath placement (2/1/2011); angioplasty balloon (2/1/2011); av fistulogram (6/5/2011); cath placement (6/5/2011); av fistula revision (11/3/2011); recovery (5/18/2012); incision and drainage general (9/13/2013); debridement (9/13/2013); vein ligation (9/13/2013); recovery (6/13/2014); av fistula revision (9/30/2014); irrigation & debridement general (12/15/2014); av fistula revision (2/8/2015); av fistula revision (2/22/2015); av fistula revision (3/20/2015); inject nerv blck,stellate ganglion (3/24/2015); av fistula creation (4/20/2015); av fistula thrombolysis (Left, 6/3/2015); irrigation & debridement general (Left, 6/3/2015); av fistula thrombolysis (Left, 6/9/2015); av fistula revision (Left, 6/17/2015); irrigation & debridement general (Left, 6/17/2015); recovery (8/7/2015); percut implnt neuroelect,epidural (2/19/2016); percut implnt neuroelect,epidural (2/19/2016); parathyroidectomy (2006); spinal cord stimulator (N/A, 3/25/2016); recovery (7/8/2016); lesion excision general (Right, 7/11/2016); mass excision general (Right, 8/5/2016); cath placement (Right, 9/17/2016); thrombectomy (Left, 9/18/2016); av fistulogram (9/18/2016); thrombectomy (Left, 10/20/2016); incision and drainage general (10/20/2016); irrigation & debridement general (Left, 10/28/2016); flap closure (Left, 11/17/2016); thrombectomy (Left, 1/6/2017); cath placement (Right, 1/6/2017); thrombectomy (Left, 1/5/2017); spinal cord stimulator (N/A, 5/4/2017); abdominoplasty (6/5/2017); irrigation & debridement general (7/31/2017); cath placement (Right, 11/14/2017); av fistula revision (Left, 11/14/2017); wound exploration general (2/1/2018); wound closure general (2/19/2018); split thickness  skin graft (2/26/2018); thrombectomy (Left, 3/9/2018); cath placement (Right, 3/9/2018); thrombectomy (Left, 4/27/2018); thrombectomy (Left, 4/28/2018); abdominal exploration (6/25/2018); myocutaneous flap (Right, 8/27/2018); skin flap delayed (Left, 9/10/2018); split thickness skin graft (Right, 9/21/2018); av fistula revision (Left, 12/23/2018); av fistula revision (Left, 1/25/2019); thrombectomy (Left, 1/26/2019); intro cath dialysis circuit dx angrph fluor s&i (Left, 2/20/2020); av fistula revision (Left, 2/20/2020); av fistula creation (Right, 3/13/2020); incision and drainage general (Right, 4/20/2020); inguinal hernia repair (Bilateral, 2001, 2002); and bone spur excision (12/8/2011).    SOCIAL HISTORY  Social History     Tobacco Use   • Smoking status: Never Smoker   • Smokeless tobacco: Never Used   Vaping Use   • Vaping Use: Never used   Substance Use Topics   • Alcohol use: No   • Drug use: No      Social History     Substance and Sexual Activity   Drug Use No       FAMILY HISTORY  Family History   Problem Relation Age of Onset   • Arthritis Father         RA   • Lung Disease Father    • Heart Disease Father         MI   • Hypertension Brother        CURRENT MEDICATIONS  Current Outpatient Medications   Medication Instructions   • B Complex-C-Folic Acid (DIALYVITE 800) 0.8 MG Tab 1 Tablet, Oral, EVERY BEDTIME   • calcitRIOL (ROCALTROL) 0.75 mcg, Oral, DAILY   • calcium carbonate (TUMS) 1,000 mg, Oral, 2 TIMES DAILY PRN   • Cinacalcet HCl 60 MG Tab 1 Tablet, Oral, SEE ADMIN INSTRUCTIONS, Pt takes on Mon, Wed, and Fri   • diphenhydrAMINE (BENADRYL) 100 mg, Oral, EVERY BEDTIME   • gabapentin (NEURONTIN) 3,000 mg, Oral, EVERY BEDTIME, Pt reports he takes 5 capsule (3,000 mg)   • gentamicin (GARAMYCIN) 0.1 % cream 1 Application, Topical, DAILY   • HYDROcodone/acetaminophen (NORCO)  MG Tab 5 Tablets, Oral, EVERY BEDTIME   • Methoxy PEG-Epoetin Beta (MIRCERA INJ) 150 mcg, Subcutaneous, EVERY 30 DAYS   •  "Narcan 1 mg, Nasal, PRN   • sevelamer (RENAGEL) 3,200 mg, Oral, 3 TIMES DAILY WITH MEALS   • tizanidine (ZANAFLEX) 8 mg, Oral, EVERY BEDTIME, 2 tablets = 8 mg   • warfarin (COUMADIN) 5 mg, Oral, EVERY EVENING, 5 mg every day of the week      ALLERGIES  Allergies   Allergen Reactions   • Baclofen Unspecified     Total loss of memory, sedation.   RXN=6/2015   • Chlorhexidine Itching and Rash   • Contrast Media With Iodine [Iodine] Rash     RXN=1/5/2017   • Pcn [Penicillins] Rash     RXN=possibly >10 years ago  Tolerated Zosyn on 2/20/18   • Tape Rash     Paper tape and tegaderm ok  RXN=ongoing   • Cephalexin Rash   • Cephalosporins Hives   • Requip Vomiting   • Ropinirole Vomiting     Requip     • Diagnostic X-Ray Materials Rash     Patient has a history of an IV contrast reaction. Patient instructed to take prednisone 50 mg by mouth 13 hours, 7 hours and 1 hour prior to IV contrast injection and  Diphenhydramine 50 mg by mouth 1 hour prior to IV contrast injection. Prescription faxed to pharmacy of patient's choice. Patient reports that he has not had a reaction following the pretreatment.       PHYSICAL EXAM  VITAL SIGNS: BP (!) 73/53   Pulse (!) 127   Temp 36.4 °C (97.5 °F) (Temporal)   Resp 16   Ht 1.626 m (5' 4\")   Wt 80.3 kg (177 lb)   SpO2 97%   BMI 30.38 kg/m²     Constitutional: Well developed, Well nourished, No acute distress, Non-toxic appearance.   HENT: Normocephalic, Atraumatic, Bilateral external ears normal, Oropharynx is clear mucous membranes are moist. No oral exudates or nasal discharge.   Eyes: Pupils are equal round and reactive, EOMI, Conjunctiva normal, No discharge.   Neck: Normal range of motion, No tenderness, Supple, No stridor. No meningismus.  Lymphatic: No lymphadenopathy noted.   Cardiovascular: Tachycardic, Regular rhythm without murmur rub or gallop.  Thorax & Lungs: Clear breath sounds bilaterally without wheezes, rhonchi or rales. There is no chest wall tenderness. "   Abdomen: Soft non-tender non-distended. There is no rebound or guarding. No organomegaly is appreciated. Bowel sounds are normal. Dialysis catheter present.   Skin: There is discoloration, atrophy, and distal necrosis with right hand greater than left. There is distal necrosis to the thumb and index finger of the right hand, no necrosis in the left hand.   Back: No CVA or spinal tenderness.   Extremities: Intact distal pulses, No edema, No tenderness, No cyanosis, No clubbing. Capillary refill is less than 2 seconds.  Musculoskeletal: Good range of motion in all major joints. No tenderness to palpation or major deformities noted.   Neurologic: Alert & oriented x 3, Normal motor function, Normal sensory function, No focal deficits noted. Reflexes are normal.  Psychiatric: Affect normal, Judgment normal, Mood normal. There is no suicidal ideation or patient reported hallucinations.     DIAGNOSTIC STUDIES / PROCEDURES    LABS  Labs Reviewed   LACTIC ACID - Abnormal; Notable for the following components:       Result Value    Lactic Acid 2.8 (*)     All other components within normal limits   CBC WITH DIFFERENTIAL - Abnormal; Notable for the following components:    RBC 3.84 (*)     Hemoglobin 11.6 (*)     Hematocrit 35.9 (*)     MCHC 32.3 (*)     RDW 56.9 (*)     Neutrophils-Polys 80.70 (*)     Lymphocytes 11.80 (*)     Immature Granulocytes 1.00 (*)     Neutrophils (Absolute) 8.42 (*)     All other components within normal limits   COMP METABOLIC PANEL - Abnormal; Notable for the following components:    Potassium 3.1 (*)     Chloride 91 (*)     Anion Gap 20.0 (*)     Glucose 126 (*)     Bun 42 (*)     Creatinine 7.92 (*)     AST(SGOT) 6 (*)     Alkaline Phosphatase 196 (*)     Globulin 3.7 (*)     All other components within normal limits   ESTIMATED GFR - Abnormal; Notable for the following components:    GFR (CKD-EPI) 8 (*)     All other components within normal limits   LACTIC ACID   LACTIC ACID   URINALYSIS  "  URINE CULTURE(NEW)   BLOOD CULTURE    Narrative:     Per Hospital Policy: Only change Specimen Src: to \"Line\" if  specified by physician order.   BLOOD CULTURE    Narrative:     Per Hospital Policy: Only change Specimen Src: to \"Line\" if  specified by physician order.      All labs reviewed by me.    RADIOLOGY  DX-CHEST-PORTABLE (1 VIEW)   Final Result      Mild interstitial prominence which could be related to hypoinflation, edema or atypical infection. No significant pleural effusion.        The radiologist's interpretation of all radiological studies have been reviewed by me.    COURSE & MEDICAL DECISION MAKING  Nursing notes, VS, PMSFHx reviewed in chart.    2:13 PM Patient seen and examined at bedside. Wife informs me that the patient always has low blood pressure. After my exam, I explained to the wife and patient the plan of care, which includes obtaining lab work and imaging for further evaluation. Ordered for DX-chest, lactic acid, CBC with diff, CMP, UA, Urine culture, and blood culutre to evaluate.     The patient's blood pressure has been running persistently low but his wife tells me that he always has hypotension and a good day is somewhere around 85-90 systolic.  He is often in the low 80s baseline    2:30 PM - Patient will be treated with Rocephin 2 g, lactated ringers infusion bolus, and Vancocin 2000 mg in  mL.  This is for unknown source    4:05 PM - Lactic acid 2.8.  Patient does not make any substantial amounts of urine but we will try to get a urine sample.  At this point source is still unclear.  Chest x-ray is unremarkable.  He does have mildly low hemoglobin at 11.6 likely of renal disease.  BUN and creatinine are 42 and 7.92 respectively with an anion gap of 20 and potassium of 3.1.  Repeat lactate is still pending.    4:10 PM - Patient was reevaluated at bedside. Patient will be treated with Zofran 4 mg and morphine 2 g for his symptoms.  His pressure dropped again and we aborted " on morphine.  Discussed lab and radiology results with the patient and wife, and informed them of the plan for admission. Patient and wife understand and verbalize agreement to plan of care.     4:20 PM - Paged Nephology and Hospitalist     4:35 PM - Nephrology responded. I discussed the patient's case and the above findings with Dr. Cox (Nephrology) who will try and get the patient dialyzed tonight.     HYDRATION: Based on the patient's presentation of Sepsis the patient was given IV fluids. IV Hydration was used because oral hydration was not adequate alone. Upon recheck following hydration, the patient was improved.    CRITICAL CARE  The very real possibilty of a deterioration of this patient's condition required the highest level of my preparedness for sudden, emergent intervention.  I provided critical care services, which included medication orders, frequent reevaluations of the patient's condition and response to treatment, ordering and reviewing test results, and discussing the case with various consultants.  The critical care time associated with the care of the patient was 35 minutes. Review chart for interventions. This time is exclusive of any other billable procedures.     DISPOSITION:  Patient will be hospitalized by [facility] Hospitalist in guarded condition.     FINAL IMPRESSION  1. Confusion    2. Sepsis, due to unspecified organism, unspecified whether acute organ dysfunction present (HCC)    3. Elevated lactic acid level    4. Chronic renal failure, unspecified CKD stage    5. Peritoneal dialysis catheter in place (HCC)    6.     The critical care time associated with the care of the patient was 35 minutes.     Colton SANCHEZ (Sugey), am scribing for, and in the presence of, Jorge A Wong M.D..    Electronically signed by: Colton Valle), 7/19/2022    Jorge A SANCHEZ M.D. personally performed the services described in this documentation, as scribed by Colton Fishman in my  presence, and it is both accurate and complete.    The note accurately reflects work and decisions made by me.  Jorge A Wong M.D.  7/19/2022  5:09 PM

## 2022-07-19 NOTE — H&P
"Hospital Medicine History & Physical Note    Date of Service  7/19/2022    Primary Care Physician  Pcp Pt States None    Consultants  nephrology    Specialist Names: Dr. Cox    Code Status  Prior    Chief Complaint  Chief Complaint   Patient presents with   • ALOC     Pt lives with family, state that he has been altered x2 days, worse today, normally GCS 15. Pt gets daily peritoneal dialysis. Right hand looks poorly perfused - dusky. Radial pulses equal bilaterally. Fingertips necrotic. No cough/cold/nausea/diarrhea. Had dialysis this AM.       History of Presenting Illness  Denis De Anda is a 41 y.o. male with PMHx ESRD on peritoneal dialysis who presented 7/19/2022 with altered mental status for the last 2 days.  Per patient's mother at bedside over the last 2 days he has been episodes of \"spacing out\" and has been very sleepy.  He has had minimal oral intake over the last 2 days.  Upon my evaluation patient has received IV fluids and appears to be back to his baseline.  Patient is answering all of my questions appropriately and able to tell me about himself and his current trip to [place] and all that entails.    In Er pt noted to be hypotensive however patient's wife states that this is his normal blood pressure.  Patient given IV fluids per sepsis protocol and empirically given Rocephin and vancomycin.    I discussed the plan of care with patient, family and ERP.    Review of Systems  Review of Systems   Constitutional: Negative for chills and fever.   HENT: Negative for congestion, hearing loss and sore throat.    Eyes: Negative for blurred vision and double vision.   Respiratory: Negative for cough, sputum production and shortness of breath.    Cardiovascular: Negative for chest pain and palpitations.   Gastrointestinal: Negative for abdominal pain, heartburn and nausea.   Genitourinary: Negative for dysuria and urgency.   Musculoskeletal: Negative for back pain and falls.   Skin: Negative for itching " and rash.   Neurological: Negative for dizziness, loss of consciousness and headaches.   All other systems reviewed and are negative.      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    Surgical History   has a past surgical history that includes mass excision ortho (10/9/08); av fistula creation (11/6/08); arteriogram (3/5/2009); angioplasty balloon (3/5/2009); av fistulogram (3/5/2009); us-kidney transplant (1996, 2001); endarterectomy (11/16/2010); av fistulogram (11/16/2010); cath placement (2/1/2011); angioplasty balloon (2/1/2011); av fistulogram (6/5/2011); cath placement (6/5/2011); av fistula revision (11/3/2011); recovery (5/18/2012); incision and drainage general (9/13/2013); debridement (9/13/2013); vein ligation (9/13/2013); recovery (6/13/2014); av fistula revision (9/30/2014); irrigation & debridement general (12/15/2014); av fistula revision (2/8/2015); av fistula revision (2/22/2015); av fistula revision (3/20/2015); pr inject nerv blck,stellate ganglion (3/24/2015); av fistula creation (4/20/2015); av fistula thrombolysis (Left, 6/3/2015); irrigation & debridement general (Left, 6/3/2015); av fistula thrombolysis (Left, 6/9/2015); av fistula revision (Left, 6/17/2015); irrigation & debridement general (Left, 6/17/2015); recovery (8/7/2015); pr percut implnt neuroelect,epidural (2/19/2016); pr percut implnt neuroelect,epidural (2/19/2016); parathyroidectomy (2006); spinal cord stimulator (N/A, 3/25/2016); recovery (7/8/2016); lesion excision general (Right, 7/11/2016); mass excision general (Right, 8/5/2016); cath  placement (Right, 9/17/2016); thrombectomy (Left, 9/18/2016); av fistulogram (9/18/2016); thrombectomy (Left, 10/20/2016); incision and drainage general (10/20/2016); irrigation & debridement general (Left, 10/28/2016); flap closure (Left, 11/17/2016); thrombectomy (Left, 1/6/2017); cath placement (Right, 1/6/2017); thrombectomy (Left, 1/5/2017); spinal cord stimulator (N/A, 5/4/2017); abdominoplasty (6/5/2017); irrigation & debridement general (7/31/2017); cath placement (Right, 11/14/2017); av fistula revision (Left, 11/14/2017); wound exploration general (2/1/2018); wound closure general (2/19/2018); split thickness skin graft (2/26/2018); thrombectomy (Left, 3/9/2018); cath placement (Right, 3/9/2018); thrombectomy (Left, 4/27/2018); thrombectomy (Left, 4/28/2018); abdominal exploration (6/25/2018); myocutaneous flap (Right, 8/27/2018); skin flap delayed (Left, 9/10/2018); split thickness skin graft (Right, 9/21/2018); av fistula revision (Left, 12/23/2018); av fistula revision (Left, 1/25/2019); thrombectomy (Left, 1/26/2019); pr intro cath dialysis circuit dx angrph fluor s&i (Left, 2/20/2020); av fistula revision (Left, 2/20/2020); av fistula creation (Right, 3/13/2020); incision and drainage general (Right, 4/20/2020); inguinal hernia repair (Bilateral, 2001, 2002); and bone spur excision (12/8/2011).     Family History  family history includes Arthritis in his father; Heart Disease in his father; Hypertension in his brother; Lung Disease in his father.   Family history reviewed with patient. There is no family history that is pertinent to the chief complaint.     Social History   reports that he has never smoked. He has never used smokeless tobacco. He reports that he does not drink alcohol and does not use drugs.    Allergies  Allergies   Allergen Reactions   • Baclofen Unspecified     Total loss of memory, sedation.   RXN=6/2015   • Chlorhexidine Itching and Rash   • Contrast Media With Iodine [Iodine]  Rash     RXN=1/5/2017   • Pcn [Penicillins] Rash     RXN=possibly >10 years ago  Tolerated Zosyn on 2/20/18   • Tape Rash     Paper tape and tegaderm ok  RXN=ongoing   • Cephalexin Rash   • Cephalosporins Hives   • Requip Vomiting   • Ropinirole Vomiting     Requip     • Diagnostic X-Ray Materials Rash     Patient has a history of an IV contrast reaction. Patient instructed to take prednisone 50 mg by mouth 13 hours, 7 hours and 1 hour prior to IV contrast injection and  Diphenhydramine 50 mg by mouth 1 hour prior to IV contrast injection. Prescription faxed to pharmacy of patient's choice. Patient reports that he has not had a reaction following the pretreatment.       Medications  Prior to Admission Medications   Prescriptions Last Dose Informant Patient Reported? Taking?   B Complex-C-Folic Acid (DIALYVITE 800) 0.8 MG Tab 7/18/2022 at PM Family Member Yes No   Sig: Take 1 Tablet by mouth at bedtime.   Ferric Citrate (AURYXIA) 1  MG(Fe) Tab 7/18/2022 at PM Family Member Yes Yes   Sig: Take 3 g by mouth 3 times a day with meals.   Methoxy PEG-Epoetin Beta (MIRCERA INJ) 7/2/2022 at UNK Family Member Yes No   Sig: Inject 150 mcg under the skin every 14 days.   Naloxone (NARCAN) 4 MG/0.1ML Liquid PRN at PRN Family Member Yes No   Sig: Administer 1 mg into affected nostril(S) as needed. Indications: Opioid Overdose   calcitRIOL (ROCALTROL) 0.25 MCG Cap 7/18/2022 at PM Family Member Yes No   Sig: Take 0.75 mcg by mouth every day.   diphenhydrAMINE (BENADRYL) 25 MG Tab 7/18/2022 at PM Family Member Yes No   Sig: Take 125 mg by mouth at bedtime.   docusate sodium 100 MG Cap 7/18/2022 at PM Family Member Yes Yes   Sig: Take 100 mg by mouth every day.   doxycycline (VIBRAMYCIN) 100 MG Tab 7/18/2022 at PM Family Member Yes Yes   Sig: Take 100 mg by mouth every day.   gabapentin (NEURONTIN) 600 MG tablet 7/18/2022 at PM Family Member Yes No   Sig: Take 3,000 mg by mouth at bedtime. Pt reports he takes 5 capsule (3,000  mg)   gentamicin (GARAMYCIN) 0.1 % cream 7/19/2022 at AM Family Member Yes No   Sig: Apply 1 Application topically every day.   lidocaine (XYLOCAINE) 2 % Solution FEW DAYS AGO at UNK Family Member Yes Yes   Sig: Apply 20 mL topically as needed. Apply to the top of wound   lidocaine (XYLOCAINE) 5 % Ointment FEW DAYS AGO at UNK Family Member Yes Yes   Sig: Apply 1 Each topically as needed (Apply to wound).   oxyCODONE immediate release (ROXICODONE) 10 MG immediate release tablet FEW DAYS AGO at UNK Family Member Yes Yes   Sig: Take 10 mg by mouth every four hours as needed for Moderate Pain.   tizanidine (ZANAFLEX) 4 MG Tab 7/18/2022 at PM Family Member Yes No   Sig: Take 8 mg by mouth every bedtime. 2 tablets = 8 mg      Facility-Administered Medications: None       Physical Exam  Temp:  [36.4 °C (97.5 °F)] 36.4 °C (97.5 °F)  Pulse:  [] 105  Resp:  [11-16] 14  BP: (61-89)/(31-56) 64/32  SpO2:  [91 %-99 %] 96 %  Blood Pressure: (!) 89/43   Temperature: 36.4 °C (97.5 °F)   Pulse: (!) 107   Respiration: 12   Pulse Oximetry: 94 %       Physical Exam  Vitals and nursing note reviewed.   Constitutional:       Appearance: Normal appearance.   HENT:      Head: Normocephalic and atraumatic.      Right Ear: External ear normal.      Left Ear: External ear normal.      Nose: Nose normal.      Mouth/Throat:      Mouth: Mucous membranes are moist.      Pharynx: Oropharynx is clear.   Eyes:      Extraocular Movements: Extraocular movements intact.      Pupils: Pupils are equal, round, and reactive to light.   Cardiovascular:      Rate and Rhythm: Normal rate and regular rhythm.      Heart sounds: No murmur heard.  Pulmonary:      Effort: Pulmonary effort is normal. No respiratory distress.      Breath sounds: Normal breath sounds.   Abdominal:      General: Abdomen is flat. Bowel sounds are normal. There is no distension.      Palpations: Abdomen is soft.      Tenderness: There is no abdominal tenderness.      Comments:  Peritoneal dialysis catheter +   Musculoskeletal:      Cervical back: Normal range of motion and neck supple.      Right lower leg: No edema.      Left lower leg: No edema.      Comments: Chronic erythema and necrosis of digits of right hand   Skin:     General: Skin is warm and dry.   Neurological:      General: No focal deficit present.      Mental Status: He is alert and oriented to person, place, and time.   Psychiatric:         Mood and Affect: Mood normal.         Behavior: Behavior normal.         Laboratory:  Recent Labs     07/19/22  1420   WBC 10.4   RBC 3.84*   HEMOGLOBIN 11.6*   HEMATOCRIT 35.9*   MCV 93.5   MCH 30.2   MCHC 32.3*   RDW 56.9*   PLATELETCT 278   MPV 9.2     Recent Labs     07/19/22  1420   SODIUM 135   POTASSIUM 3.1*   CHLORIDE 91*   CO2 24   GLUCOSE 126*   BUN 42*   CREATININE 7.92*   CALCIUM 10.0     Recent Labs     07/19/22  1420   ALTSGPT <5   ASTSGOT 6*   ALKPHOSPHAT 196*   TBILIRUBIN 0.4   GLUCOSE 126*         No results for input(s): NTPROBNP in the last 72 hours.      No results for input(s): TROPONINT in the last 72 hours.    Imaging:  DX-CHEST-PORTABLE (1 VIEW)   Final Result      Mild interstitial prominence which could be related to hypoinflation, edema or atypical infection. No significant pleural effusion.          X-Ray:  I have personally reviewed the images and compared with prior images.    Assessment/Plan:  Justification for Admission Status  I anticipate this patient will require at least two midnights for appropriate medical management, necessitating inpatient admission because patient has intermittent ALOC and may require further invbestigation to rule out infection    Lactic acidosis  Assessment & Plan  2.8  Will repeat and trend    Altered level of consciousness  Assessment & Plan  Given patient's poor oral intake this may be due to dehydration  This appears to have resolved after receiving IV fluids  Patient did receive IV vancomycin and Rocephin while in ER  however patient does not seem to have any signs or symptoms of infection    End stage renal disease (HCC)- (present on admission)  Assessment & Plan  On PD  nephro following      VTE prophylaxis: heparin ppx

## 2022-07-19 NOTE — ED NOTES
Rounded on patient. Patient resting in bed. No signs of distress noted. Equal chest rise and fall. No further needs at this time. Call light within reach. Mother at bedside.

## 2022-07-19 NOTE — ED NOTES
Patient to room from [facility] in wheelchair. Changed into gown, connected to monitor. Agree with triage note. Charted for ERP. Call light within reach. Mother at bedside.     Dr. Wong at bedside. RN x 2 at bedside. IV access obtained by this RN using ultrasound guidance. Blood drawn and sent to lab. Second set of blood cultures drawn by Cheryl DAVIDSON.

## 2022-07-19 NOTE — ED TRIAGE NOTES
Denis De Anda  41 y.o.  male  Chief Complaint   Patient presents with   • ALOC     Pt lives with family, state that he has been altered x2 days, worse today, normally GCS 15. Pt gets daily peritoneal dialysis. Right hand looks poorly perfused - dusky. Radial pulses equal bilaterally. Fingertips necrotic. No cough/cold/nausea/diarrhea. Had dialysis this AM.      in triage. Direct to red 4.

## 2022-07-20 PROBLEM — I95.9 HYPOTENSION: Status: ACTIVE | Noted: 2018-09-22

## 2022-07-20 PROBLEM — E87.6 HYPOKALEMIA: Status: ACTIVE | Noted: 2022-07-20

## 2022-07-20 LAB
25(OH)D3 SERPL-MCNC: <6 NG/ML (ref 30–100)
ANION GAP SERPL CALC-SCNC: 16 MMOL/L (ref 7–16)
APPEARANCE FLD: CLEAR
BODY FLD TYPE: NORMAL
BUN SERPL-MCNC: 42 MG/DL (ref 8–22)
CALCIUM SERPL-MCNC: 9.4 MG/DL (ref 8.5–10.5)
CHLORIDE SERPL-SCNC: 93 MMOL/L (ref 96–112)
CO2 SERPL-SCNC: 23 MMOL/L (ref 20–33)
COLOR FLD: NORMAL
CREAT SERPL-MCNC: 7.85 MG/DL (ref 0.5–1.4)
CSF COMMENTS 1658: NORMAL
EOSINOPHIL NFR FLD: 2 %
ERYTHROCYTE [DISTWIDTH] IN BLOOD BY AUTOMATED COUNT: 58.3 FL (ref 35.9–50)
GFR SERPLBLD CREATININE-BSD FMLA CKD-EPI: 8 ML/MIN/1.73 M 2
GLUCOSE SERPL-MCNC: 120 MG/DL (ref 65–99)
HAV IGM SERPL QL IA: NORMAL
HBV CORE IGM SER QL: NORMAL
HBV SURFACE AB SERPL IA-ACNC: 2045 MIU/ML (ref 0–10)
HBV SURFACE AG SER QL: NORMAL
HCT VFR BLD AUTO: 31.7 % (ref 42–52)
HCV AB SER QL: NORMAL
HGB BLD-MCNC: 10.3 G/DL (ref 14–18)
LYMPHOCYTES NFR FLD: 12 %
MCH RBC QN AUTO: 30.7 PG (ref 27–33)
MCHC RBC AUTO-ENTMCNC: 32.5 G/DL (ref 33.7–35.3)
MCV RBC AUTO: 94.6 FL (ref 81.4–97.8)
MONONUC CELLS NFR FLD: 72 %
NEUTROPHILS NFR FLD: 14 %
PLATELET # BLD AUTO: 208 K/UL (ref 164–446)
PMV BLD AUTO: 9.2 FL (ref 9–12.9)
POTASSIUM SERPL-SCNC: 2.9 MMOL/L (ref 3.6–5.5)
RBC # BLD AUTO: 3.35 M/UL (ref 4.7–6.1)
RBC # FLD: <2000 CELLS/UL
SODIUM SERPL-SCNC: 132 MMOL/L (ref 135–145)
WBC # BLD AUTO: 6.5 K/UL (ref 4.8–10.8)
WBC # FLD: 11 CELLS/UL

## 2022-07-20 PROCEDURE — 90945 DIALYSIS ONE EVALUATION: CPT

## 2022-07-20 PROCEDURE — 700111 HCHG RX REV CODE 636 W/ 250 OVERRIDE (IP): Performed by: HOSPITALIST

## 2022-07-20 PROCEDURE — 700105 HCHG RX REV CODE 258: Performed by: HOSPITALIST

## 2022-07-20 PROCEDURE — 700102 HCHG RX REV CODE 250 W/ 637 OVERRIDE(OP): Performed by: HOSPITALIST

## 2022-07-20 PROCEDURE — 700102 HCHG RX REV CODE 250 W/ 637 OVERRIDE(OP): Performed by: NURSE PRACTITIONER

## 2022-07-20 PROCEDURE — 36415 COLL VENOUS BLD VENIPUNCTURE: CPT

## 2022-07-20 PROCEDURE — 80048 BASIC METABOLIC PNL TOTAL CA: CPT

## 2022-07-20 PROCEDURE — 85027 COMPLETE CBC AUTOMATED: CPT

## 2022-07-20 PROCEDURE — A9270 NON-COVERED ITEM OR SERVICE: HCPCS | Performed by: STUDENT IN AN ORGANIZED HEALTH CARE EDUCATION/TRAINING PROGRAM

## 2022-07-20 PROCEDURE — 86706 HEP B SURFACE ANTIBODY: CPT

## 2022-07-20 PROCEDURE — 96372 THER/PROPH/DIAG INJ SC/IM: CPT

## 2022-07-20 PROCEDURE — 700102 HCHG RX REV CODE 250 W/ 637 OVERRIDE(OP): Performed by: STUDENT IN AN ORGANIZED HEALTH CARE EDUCATION/TRAINING PROGRAM

## 2022-07-20 PROCEDURE — 99233 SBSQ HOSP IP/OBS HIGH 50: CPT | Performed by: HOSPITALIST

## 2022-07-20 PROCEDURE — A9270 NON-COVERED ITEM OR SERVICE: HCPCS | Performed by: HOSPITALIST

## 2022-07-20 PROCEDURE — A9270 NON-COVERED ITEM OR SERVICE: HCPCS | Performed by: NURSE PRACTITIONER

## 2022-07-20 PROCEDURE — 82306 VITAMIN D 25 HYDROXY: CPT

## 2022-07-20 PROCEDURE — 770001 HCHG ROOM/CARE - MED/SURG/GYN PRIV*

## 2022-07-20 PROCEDURE — 96366 THER/PROPH/DIAG IV INF ADDON: CPT

## 2022-07-20 PROCEDURE — 96367 TX/PROPH/DG ADDL SEQ IV INF: CPT

## 2022-07-20 PROCEDURE — 80074 ACUTE HEPATITIS PANEL: CPT

## 2022-07-20 RX ORDER — MIDODRINE HYDROCHLORIDE 5 MG/1
10 TABLET ORAL ONCE
Status: COMPLETED | OUTPATIENT
Start: 2022-07-20 | End: 2022-07-20

## 2022-07-20 RX ORDER — POTASSIUM CHLORIDE 20 MEQ/1
40 TABLET, EXTENDED RELEASE ORAL ONCE
Status: COMPLETED | OUTPATIENT
Start: 2022-07-20 | End: 2022-07-20

## 2022-07-20 RX ORDER — OXYCODONE HYDROCHLORIDE 5 MG/1
5 TABLET ORAL ONCE
Status: COMPLETED | OUTPATIENT
Start: 2022-07-20 | End: 2022-07-20

## 2022-07-20 RX ORDER — SEVELAMER CARBONATE 800 MG/1
2400 TABLET, FILM COATED ORAL
Status: DISCONTINUED | OUTPATIENT
Start: 2022-07-20 | End: 2022-07-21 | Stop reason: HOSPADM

## 2022-07-20 RX ORDER — TIZANIDINE 4 MG/1
4 TABLET ORAL ONCE
Status: COMPLETED | OUTPATIENT
Start: 2022-07-20 | End: 2022-07-20

## 2022-07-20 RX ADMIN — MIDODRINE HYDROCHLORIDE 10 MG: 5 TABLET ORAL at 00:47

## 2022-07-20 RX ADMIN — HEPARIN SODIUM 5000 UNITS: 5000 INJECTION, SOLUTION INTRAVENOUS; SUBCUTANEOUS at 14:33

## 2022-07-20 RX ADMIN — HEPARIN SODIUM 5000 UNITS: 5000 INJECTION, SOLUTION INTRAVENOUS; SUBCUTANEOUS at 06:25

## 2022-07-20 RX ADMIN — POTASSIUM CHLORIDE 40 MEQ: 1500 TABLET, EXTENDED RELEASE ORAL at 08:54

## 2022-07-20 RX ADMIN — CALCITRIOL 0.75 MCG: 0.5 CAPSULE, LIQUID FILLED ORAL at 18:16

## 2022-07-20 RX ADMIN — OXYCODONE HYDROCHLORIDE 10 MG: 10 TABLET ORAL at 11:20

## 2022-07-20 RX ADMIN — OXYCODONE 5 MG: 5 TABLET ORAL at 22:03

## 2022-07-20 RX ADMIN — HEPARIN SODIUM 5000 UNITS: 5000 INJECTION, SOLUTION INTRAVENOUS; SUBCUTANEOUS at 20:37

## 2022-07-20 RX ADMIN — DOXYCYCLINE 100 MG: 100 TABLET, FILM COATED ORAL at 18:17

## 2022-07-20 RX ADMIN — TIZANIDINE 4 MG: 4 TABLET ORAL at 22:04

## 2022-07-20 RX ADMIN — SENNOSIDES AND DOCUSATE SODIUM 1 TABLET: 50; 8.6 TABLET ORAL at 06:24

## 2022-07-20 RX ADMIN — OXYCODONE HYDROCHLORIDE 10 MG: 10 TABLET ORAL at 20:37

## 2022-07-20 RX ADMIN — OXYCODONE HYDROCHLORIDE 10 MG: 10 TABLET ORAL at 15:56

## 2022-07-20 RX ADMIN — MAGNESIUM SULFATE HEPTAHYDRATE 3 G: 500 INJECTION, SOLUTION INTRAMUSCULAR; INTRAVENOUS at 08:46

## 2022-07-20 RX ADMIN — DIPHENHYDRAMINE HYDROCHLORIDE 50 MG: 25 TABLET ORAL at 20:36

## 2022-07-20 RX ADMIN — SENNOSIDES AND DOCUSATE SODIUM 2 TABLET: 50; 8.6 TABLET ORAL at 18:17

## 2022-07-20 ASSESSMENT — ENCOUNTER SYMPTOMS
BLURRED VISION: 0
NEUROLOGICAL NEGATIVE: 1
PSYCHIATRIC NEGATIVE: 1
MYALGIAS: 0
ABDOMINAL PAIN: 0
FEVER: 0
VOMITING: 0
DIAPHORESIS: 0
MUSCULOSKELETAL NEGATIVE: 1
WEAKNESS: 0
GASTROINTESTINAL NEGATIVE: 1
COUGH: 0
DEPRESSION: 0
CONSTITUTIONAL NEGATIVE: 1
DIZZINESS: 0
PALPITATIONS: 0
NAUSEA: 0
WEIGHT LOSS: 0
BRUISES/BLEEDS EASILY: 0
FOCAL WEAKNESS: 0
SHORTNESS OF BREATH: 0
HEADACHES: 0
EYES NEGATIVE: 1
RESPIRATORY NEGATIVE: 1
CARDIOVASCULAR NEGATIVE: 1
CHILLS: 0
SORE THROAT: 0

## 2022-07-20 ASSESSMENT — COGNITIVE AND FUNCTIONAL STATUS - GENERAL
SUGGESTED CMS G CODE MODIFIER MOBILITY: CJ
DAILY ACTIVITIY SCORE: 21
STANDING UP FROM CHAIR USING ARMS: A LITTLE
CLIMB 3 TO 5 STEPS WITH RAILING: A LITTLE
HELP NEEDED FOR BATHING: A LITTLE
SUGGESTED CMS G CODE MODIFIER DAILY ACTIVITY: CJ
MOBILITY SCORE: 21
DRESSING REGULAR UPPER BODY CLOTHING: A LITTLE
WALKING IN HOSPITAL ROOM: A LITTLE
DRESSING REGULAR LOWER BODY CLOTHING: A LITTLE

## 2022-07-20 ASSESSMENT — PATIENT HEALTH QUESTIONNAIRE - PHQ9
2. FEELING DOWN, DEPRESSED, IRRITABLE, OR HOPELESS: NOT AT ALL
SUM OF ALL RESPONSES TO PHQ9 QUESTIONS 1 AND 2: 0
1. LITTLE INTEREST OR PLEASURE IN DOING THINGS: NOT AT ALL

## 2022-07-20 ASSESSMENT — PAIN DESCRIPTION - PAIN TYPE
TYPE: CHRONIC PAIN

## 2022-07-20 ASSESSMENT — LIFESTYLE VARIABLES
CONSUMPTION TOTAL: NEGATIVE
EVER FELT BAD OR GUILTY ABOUT YOUR DRINKING: NO
TOTAL SCORE: 0
SUBSTANCE_ABUSE: 0
EVER HAD A DRINK FIRST THING IN THE MORNING TO STEADY YOUR NERVES TO GET RID OF A HANGOVER: NO
HOW MANY TIMES IN THE PAST YEAR HAVE YOU HAD 5 OR MORE DRINKS IN A DAY: 0
ALCOHOL_USE: NO
TOTAL SCORE: 0
AVERAGE NUMBER OF DAYS PER WEEK YOU HAVE A DRINK CONTAINING ALCOHOL: 0
TOTAL SCORE: 0
ON A TYPICAL DAY WHEN YOU DRINK ALCOHOL HOW MANY DRINKS DO YOU HAVE: 0
HAVE YOU EVER FELT YOU SHOULD CUT DOWN ON YOUR DRINKING: NO
HAVE PEOPLE ANNOYED YOU BY CRITICIZING YOUR DRINKING: NO

## 2022-07-20 NOTE — ED NOTES
During PD pt was found to have SBP in 80s, SBP rechecked and pt continued to have SBP in the 70-80s; pt also feeling lightheaded and pale. On call dialysis number called to update on pt, per oncall dialysis to contact hospitalist for medication, if pt continues to be symptomatic w/ medication and low BP, stop dialysis. On call hospitalist called for orders and updates, new orders placed. Pt medicated for low BP. Pt educated on medication. Pt verbalized understanding. On call dialysis RN updated on pt.

## 2022-07-20 NOTE — PROGRESS NOTES
Ordered to drain effluent. Aseptically slowly draining with clear, no fibrin effluent. Removed 2 Liters. Dressing clean, dry and intact. Gave report to diann Chavarria RN.

## 2022-07-20 NOTE — ED NOTES
Pt resting in gurney, respirations even and unlabored. Dialysis RN left, pt skin warm, pink, and dry.

## 2022-07-20 NOTE — ASSESSMENT & PLAN NOTE
Reportedly occurring PD  Query occult infection  Hypotension contributing  Query uremia  following  Patient did receive IV vancomycin and Rocephin while in ER however patient does not seem to have any signs or symptoms of infection

## 2022-07-20 NOTE — CONSULTS
".  [facility] -  CONSULTATION NOTE               Author: SHAUNA Barnett Date & Time: 7/20/2022  1:34 PM       REASON FOR CONSULTATION:   - Inpatient hemodialysis management.    CHIEF COMPLAINT:   -  \"Decreased LOC \"    HISTORY OF PRESENT ILLNESS:    41 y.o. male with PMHx ESRD, 2 failed kidney transplants on peritoneal dialysis who presented 7/19/2022 with altered mental status. The patients mother called EMS as the last 2 days he has been episodes of \"spacing out\" and has been very sleepy.  He has had poor oral intake over the last few days.    Pt is visiting from [facility], he is under the care of Nephrologist Dr. Vallejo.  Pt states he was recently diagnose with calciphylaxis and it was highly recommended that he start HD/sodium thiosulfate. Pt declines recommendation at this time, states he has plans to attend [facility] in the [facility], then he and his mother will drive to [place] as he has an appt with [facility] on Tuesday. He plans to return to [place] and start on HD/calciphylaxis protocol as soon as he gets back.  I had a lengthy conversation with patient regarding the ramifications of delaying calciphylaxis treatment including death, patient states he is aware \"Dr. Vallejo told me the same thing\" but he continues to decline HD at this time.     He is anuric. No F/C/N/V/CP/SOB.  No melena, hematochezia, hematemesis.  No HA, visual changes, or abdominal pain. He says he feels good now.    In the ED he was found to be hypotensive however patient's BP tends to run low normally. Patient given IV fluids per sepsis protocol and empirically given Rocephin and vancomycin.      He has a history of SVC stenosis and he previously dialyzed via left thigh AV graft when he was on HD but this has since been removed. His right femoral vasculature is being kept free from AV Graft access placement in case he needs a CVC.     REVIEW OF SYSTEMS:    10 point ROS was performed and is as " per HPI or otherwise negative    PAST MEDICAL HISTORY:   • Arrhythmia       irregular EKG   • Chronic kidney disease, unspecified     • Contracture of palmar fascia     • Dialysis        hemodialysis at home 3 days a week   • Graft failure due to thrombosis 3/10/2018   • Hemorrhagic disorder (HCC)       on coumadin   • Hypertension     • Intra-abdominal varices     • Pain       Chronic pain   • Renal failure       hemodialysis   • Seizure (HCC)       last seizure 04/1/2013   • Sleep apnea       BIPAP   • Snoring       sleep study done   • Superior vena cava obstruction with collaterals       from calcium deposits per pt's mother; Dr Hopkins [facility]   • Toxic uninodular goiter without mention of thyrotoxic crisis or storm          Surgical History   has a past surgical history that includes mass excision ortho (10/9/08); av fistula creation (11/6/08); arteriogram (3/5/2009); angioplasty balloon (3/5/2009); av fistulogram (3/5/2009); us-kidney transplant (1996, 2001); endarterectomy (11/16/2010); av fistulogram (11/16/2010); cath placement (2/1/2011); angioplasty balloon (2/1/2011); av fistulogram (6/5/2011); cath placement (6/5/2011); av fistula revision (11/3/2011); recovery (5/18/2012); incision and drainage general (9/13/2013); debridement (9/13/2013); vein ligation (9/13/2013); recovery (6/13/2014); av fistula revision (9/30/2014); irrigation & debridement general (12/15/2014); av fistula revision (2/8/2015); av fistula revision (2/22/2015); av fistula revision (3/20/2015); pr inject nerv blck,stellate ganglion (3/24/2015); av fistula creation (4/20/2015); av fistula thrombolysis (Left, 6/3/2015); irrigation & debridement general (Left, 6/3/2015); av fistula thrombolysis (Left, 6/9/2015); av fistula revision (Left, 6/17/2015); irrigation & debridement general (Left, 6/17/2015); recovery (8/7/2015); pr percut implnt neuroelect,epidural (2/19/2016); pr percut implnt neuroelect,epidural  (2/19/2016); parathyroidectomy (2006); spinal cord stimulator (N/A, 3/25/2016); recovery (7/8/2016); lesion excision general (Right, 7/11/2016); mass excision general (Right, 8/5/2016); cath placement (Right, 9/17/2016); thrombectomy (Left, 9/18/2016); av fistulogram (9/18/2016); thrombectomy (Left, 10/20/2016); incision and drainage general (10/20/2016); irrigation & debridement general (Left, 10/28/2016); flap closure (Left, 11/17/2016); thrombectomy (Left, 1/6/2017); cath placement (Right, 1/6/2017); thrombectomy (Left, 1/5/2017); spinal cord stimulator (N/A, 5/4/2017); abdominoplasty (6/5/2017); irrigation & debridement general (7/31/2017); cath placement (Right, 11/14/2017); av fistula revision (Left, 11/14/2017); wound exploration general (2/1/2018); wound closure general (2/19/2018); split thickness skin graft (2/26/2018); thrombectomy (Left, 3/9/2018); cath placement (Right, 3/9/2018); thrombectomy (Left, 4/27/2018); thrombectomy (Left, 4/28/2018); abdominal exploration (6/25/2018); myocutaneous flap (Right, 8/27/2018); skin flap delayed (Left, 9/10/2018); split thickness skin graft (Right, 9/21/2018); av fistula revision (Left, 12/23/2018); av fistula revision (Left, 1/25/2019); thrombectomy (Left, 1/26/2019); pr intro cath dialysis circuit dx angrph fluor s&i (Left, 2/20/2020); av fistula revision (Left, 2/20/2020); av fistula creation (Right, 3/13/2020); incision and drainage general (Right, 4/20/2020); inguinal hernia repair (Bilateral, 2001, 2002); and bone spur excision (12/8/2011).      Family History  family history includes Arthritis in his father; Heart Disease in his father; Hypertension in his brother; Lung Disease in his father.   Family history reviewed with patient. There is no family history that is pertinent to the chief complaint.      Social History   reports that he has never smoked. He has never used smokeless tobacco. He reports that he does not drink alcohol and does not use  "drugs.       HOME MEDICATIONS:   - Reviewed and documented in chart    LABORATORY STUDIES:   - Reviewed and documented in chart    ALLERGIES:  Baclofen, Chlorhexidine, Contrast media with iodine [iodine], Pcn [penicillins], Tape, Cephalexin, Requip, Ropinirole, and Diagnostic x-ray materials    VS:  /58   Pulse 99   Temp 36.4 °C (97.5 °F) (Temporal)   Resp 18   Ht 1.626 m (5' 4\")   Wt 80.3 kg (177 lb)   SpO2 96%   BMI 30.38 kg/m²   Physical Exam  Constitutional:       General: He is not in acute distress.     Appearance: He is ill-appearing.   HENT:      Head: Normocephalic.   Eyes:      Pupils: Pupils are equal, round, and reactive to light.   Cardiovascular:      Rate and Rhythm: Normal rate and regular rhythm.      Pulses: Normal pulses.      Heart sounds: No murmur heard.  Pulmonary:      Effort: Pulmonary effort is normal. No respiratory distress.      Breath sounds: No wheezing or rales.   Abdominal:      General: Bowel sounds are normal. There is no distension.      Palpations: Abdomen is soft.      Tenderness: There is no abdominal tenderness.      Comments: PD catheter   Musculoskeletal:         General: No swelling.      Cervical back: Normal range of motion.      Right lower leg: No edema.      Left lower leg: No edema.      Comments: Chronic erythema and necrosis of digits of right hand    Skin:     General: Skin is warm and dry.   Neurological:      Mental Status: He is alert.            FLUID BALANCE:  In: 2276.1   Out: 867     LABS:  Recent Labs     07/19/22  1420 07/20/22  0322   SODIUM 135 132*   POTASSIUM 3.1* 2.9*   CHLORIDE 91* 93*   CO2 24 23   GLUCOSE 126* 120*   BUN 42* 42*   CREATININE 7.92* 7.85*   CALCIUM 10.0 9.4        IMAGING:  - All imaging reviewed from admission to present day    IMPRESSION:  # ESRD  - On nightly CCPD  - Pts last dwell ICO (not available here)    - Has PD cath, no access for HD   # Hypotension  - Goal BP < 140/90, keep MAP>65  - At goal  # Anemia of " CKD  - Goal Hgb 10-11  - At goal,  - On Mircera 150mcg SQ [id number]  # CKD-MBD  - PTH pen  - Vit D pen  - Ca 9.4  - PO4 pen  - On 3g of Auryxia TID w/ meals outpt  # Altered LOC  - On sepsis protocol per primary team  - Pt is on 10x recommended dose of Gabapentin which could have played a role on AMS.   # Restless Leg Syndrome  - Currently on 3g of Gabapentin daily    PLAN:  - Highly recommend starting HD and sodium thiosulfate, pt declines at this time.  - Resume Nightly CCPD   - UF as tolerated  - Keep Map >65  - No dietary protein restrictions  - Dose all meds per ESRD/ PD  - Transfuse for Hgb <7  - Check PTH, Vit D, PO4  - Consider adding Sensipar   - IV antibiotics per Primary Team   - Renal Diet  - Pt needs to be weaned down of Gabapentin. Maximum recommended dose for ESRD is   300mg QD.   - Start Sevelamer 3 tabs TID w/ meals  - Check PD fluid culture and cell count.   - Check iron panel and ferritin   - Check Mg level in AM  - Replace electrolytes prn     Thank you for the consultation!

## 2022-07-20 NOTE — ED NOTES
Dialysis called about pt room aassignment, called RTOC, per RTOC pt probably will not get a room assigned today. Dialysis RN updated, per Dialysis RN pt will receive PD tomorrow instead d/t room placement.

## 2022-07-20 NOTE — ED NOTES
Medicated patient per MAR. Blood drawn and sent to lab. Patient denies further needs. Call light within reach.

## 2022-07-20 NOTE — PROGRESS NOTES
Connected aseptically to CCPD cycler @ 1640 with 1.5% solution. PD cath. Intact. Dressing changed this AM per patient. Report obtained, in-service provided to primary LETY Funes. See flow sheet for details.

## 2022-07-20 NOTE — ED NOTES
Dialysis RN gave in-service about PD machine to this RN. Pt resting in [facility]. Fluid cell collected by Dialysis RN.

## 2022-07-20 NOTE — PROGRESS NOTES
4 Eyes Skin Assessment Completed by LETY Dumont and LETY Payne.    Head WDL  Ears WDL  Nose WDL  Mouth WDL  Neck WDL  Breast/Chest WDL  Shoulder Blades WDL  Spine WDL  (R) Arm/Elbow/Hand Abrasion and Discoloration, hand dusky and cool  (L) Arm/Elbow/Hand Abrasion on finger  Abdomen Scar  Groin WDL  Scrotum/Coccyx/Buttocks WDL  (R) Leg WDL  (L) Leg WDL  (R) Heel/Foot/Toe WDL  (L) Heel/Foot/Toe WDL          Devices In Places Nasal Cannula      Interventions In Place Pressure Redistribution Mattress    Possible Skin Injury No    Pictures Uploaded Into Epic N/A  Wound Consult Placed N/A  RN Wound Prevention Protocol Ordered No

## 2022-07-20 NOTE — PROGRESS NOTES
[facility] Services Progress Note     [facility] was disconnected aseptically from PD Cycler at 0215 am per Dr. Cox, I.     12:20AM: PCN called, through [employer] exchange,  to inform on call PDRN that pt's SBP is at 70s to 80s. Pt c/o feeling lightheaded.     Updated Dr. Cox about the patient's BP and symptoms, ordered to d/c the PD tx.    02:15AM: pt was disconnected on Dwell 2/4, SBP at 80s even with a dose of Midodrine that was ordered by the hospitalist. BP went up to 104/51 mmHg after disconnection. Pt A/O x 4 with episodes of confusion.     Effluent clear and yellow, no signs of bleeding/ fibrin clots.   No alarms on the machine was noted or reported before disconnection.  Dsg was changed     See e-flowsheet for details    24 hour UF= 867 ml (I. Drain= 2266 ml + Total UF= -1399 ml - Last fill = 0)      Report given to Simona Anthony RN.

## 2022-07-20 NOTE — PROGRESS NOTES
[facility] Services Progress Note     [facility] ordered by Dr. ZAIN Cox. Connected aseptically to PD Cycler at 2215 for 9 hours using 1.5% PD solution.     Pt A/O x 4 (-) SOB (-) bleeding (-) chest pain (-) pedal edema c/o right hand and shoulder pain. PCN aware   PD exit site was not assessed, pt refused dsg change. Said that his mom will bring his supplies and will change the dsg tomorrow instead. Report will be given to the disconnecting PD nurse. Current dsg is C/D/I. Specimen for the fluid cell count was collected and was given to the primary RN     Education provided to primary RN regarding troubleshooting.      Report given to RADHA Farrar RN.     Call [facility]/On Call at [phone] for further assistance.

## 2022-07-20 NOTE — ASSESSMENT & PLAN NOTE
Likely multifactorial  Initially dehydration may have contributed  Suspect adrenal fatigue - will check cortisol  Improving with midodrine  Occult sepsis could also be contributing - given iv abx in ER, due to renal function still in system  Cultures pending  following

## 2022-07-20 NOTE — PROGRESS NOTES
"Hospital Medicine Daily Progress Note    Date of Service  7/20/2022    Chief Complaint  Denis De Anda is a 41 y.o. male admitted 7/19/2022 with     Hospital Course  Patient is a 41 year old male with history of ESRD on peritoneal dialysis who presented 7/19/2022 with altered mental status for the last 2 days.  Per patient's mother at bedside over the last 2 days he has been episodes of \"spacing out\" and has been very sleepy.  He has had minimal oral intake over the last 2 days.  Upon my evaluation patient has received IV fluids and appears to be back to his baseline.  Patient is answering all of my questions appropriately and able to tell me about himself and his current trip to [place] and all that entails.     In ER pt noted to be hypotensive however patient's wife states that this is his normal blood pressure.  Patient given IV fluids per sepsis protocol and empirically given Rocephin and vancomycin.    Interval Problem Update  Seen in er, mother at bedside, pt feeling much better but his mother reports that patient had another \"episode\" of confusion last night during PD - patient does not recall this and his mother reports it resolved when PD stopped. K also low this am. No dizziness. Ros otherwise negative, hypotension improving.     I have discussed this patient's plan of care and discharge plan at IDT rounds today with Case Management, Nursing, Nursing leadership, and other members of the IDT team.    Consultants/Specialty  Nephrology    Code Status  Full Code    Disposition  Patient is not medically cleared for discharge.   Anticipate discharge to be determined  I have placed the appropriate orders for post-discharge needs.    Review of Systems  Review of Systems   Constitutional: Negative.  Negative for chills, diaphoresis, fever, malaise/fatigue and weight loss.   HENT: Negative.  Negative for sore throat.    Eyes: Negative.  Negative for blurred vision.   Respiratory: Negative.  Negative for cough and " shortness of breath.    Cardiovascular: Negative.  Negative for chest pain, palpitations and leg swelling.   Gastrointestinal: Negative.  Negative for abdominal pain, nausea and vomiting.   Genitourinary: Negative.  Negative for dysuria.   Musculoskeletal: Negative.  Negative for myalgias.   Skin: Negative.  Negative for itching and rash.   Neurological: Negative.  Negative for dizziness, focal weakness, weakness and headaches.   Endo/Heme/Allergies: Negative.  Does not bruise/bleed easily.   Psychiatric/Behavioral: Negative.  Negative for depression, substance abuse and suicidal ideas.   All other systems reviewed and are negative.       Physical Exam  Temp:  [36.3 °C (97.3 °F)] 36.3 °C (97.3 °F)  Pulse:  [] 93  Resp:  [12-19] 17  BP: ()/(32-67) 100/58  SpO2:  [82 %-100 %] 96 %    Physical Exam  Vitals and nursing note reviewed. Exam conducted with a chaperone present.   Constitutional:       General: He is not in acute distress.     Appearance: Normal appearance. He is not diaphoretic.   HENT:      Head: Normocephalic.      Nose: Nose normal.      Mouth/Throat:      Mouth: Mucous membranes are moist.   Eyes:      Pupils: Pupils are equal, round, and reactive to light.   Cardiovascular:      Rate and Rhythm: Normal rate and regular rhythm.      Pulses: Normal pulses.      Heart sounds: Normal heart sounds.   Pulmonary:      Effort: Pulmonary effort is normal.      Breath sounds: Normal breath sounds.   Abdominal:      General: Abdomen is flat. Bowel sounds are normal.      Palpations: Abdomen is soft.      Comments: Pd cath cdi   Musculoskeletal:         General: No swelling or deformity. Normal range of motion.   Skin:     General: Skin is warm and dry.      Capillary Refill: Capillary refill takes less than 2 seconds.   Neurological:      General: No focal deficit present.      Mental Status: He is alert and oriented to person, place, and time.      Cranial Nerves: No cranial nerve deficit.    Psychiatric:         Mood and Affect: Mood normal.         Behavior: Behavior normal.         Fluids    Intake/Output Summary (Last 24 hours) at 7/20/2022 1555  Last data filed at 7/20/2022 1105  Gross per 24 hour   Intake 2276.1 ml   Output 2867 ml   Net -590.9 ml       Laboratory  Recent Labs     07/19/22  1420 07/20/22  0322   WBC 10.4 6.5   RBC 3.84* 3.35*   HEMOGLOBIN 11.6* 10.3*   HEMATOCRIT 35.9* 31.7*   MCV 93.5 94.6   MCH 30.2 30.7   MCHC 32.3* 32.5*   RDW 56.9* 58.3*   PLATELETCT 278 208   MPV 9.2 9.2     Recent Labs     07/19/22  1420 07/20/22  0322   SODIUM 135 132*   POTASSIUM 3.1* 2.9*   CHLORIDE 91* 93*   CO2 24 23   GLUCOSE 126* 120*   BUN 42* 42*   CREATININE 7.92* 7.85*   CALCIUM 10.0 9.4                   Imaging  DX-CHEST-PORTABLE (1 VIEW)   Final Result      Mild interstitial prominence which could be related to hypoinflation, edema or atypical infection. No significant pleural effusion.           Assessment/Plan  Hypokalemia  Assessment & Plan  Likely due to low mag - replacing both  following    Lactic acidosis  Assessment & Plan  improving    Altered level of consciousness  Assessment & Plan  Reportedly occurring PD  Query occult infection  Hypotension contributing  Query uremia  following  Patient did receive IV vancomycin and Rocephin while in ER however patient does not seem to have any signs or symptoms of infection    Hypotension  Assessment & Plan  Likely multifactorial  Initially dehydration may have contributed  Suspect adrenal fatigue - will check cortisol  Improving with midodrine  Occult sepsis could also be contributing - given iv abx in ER, due to renal function still in system  Cultures pending  following    End stage renal disease (HCC)- (present on admission)  Assessment & Plan  On PD  nephro following       VTE prophylaxis: heparin ppx    I have performed a physical exam and reviewed and updated ROS and Plan today (7/20/2022). In review of yesterday's note (7/19/2022),  there are no changes except as documented above.

## 2022-07-21 VITALS
HEIGHT: 64 IN | HEART RATE: 61 BPM | TEMPERATURE: 100.4 F | RESPIRATION RATE: 18 BRPM | WEIGHT: 177 LBS | DIASTOLIC BLOOD PRESSURE: 74 MMHG | BODY MASS INDEX: 30.22 KG/M2 | SYSTOLIC BLOOD PRESSURE: 110 MMHG | OXYGEN SATURATION: 98 %

## 2022-07-21 LAB
ALBUMIN SERPL BCP-MCNC: 2.1 G/DL (ref 3.2–4.9)
ALBUMIN/GLOB SERPL: 0.6 G/DL
ALP SERPL-CCNC: 168 U/L (ref 30–99)
ALT SERPL-CCNC: 5 U/L (ref 2–50)
AMMONIA PLAS-SCNC: 14 UMOL/L (ref 11–45)
ANION GAP SERPL CALC-SCNC: 19 MMOL/L (ref 7–16)
AST SERPL-CCNC: 11 U/L (ref 12–45)
BASOPHILS # BLD AUTO: 0.5 % (ref 0–1.8)
BASOPHILS # BLD: 0.05 K/UL (ref 0–0.12)
BILIRUB SERPL-MCNC: 0.3 MG/DL (ref 0.1–1.5)
BUN SERPL-MCNC: 40 MG/DL (ref 8–22)
CA-I SERPL-SCNC: 1.2 MMOL/L (ref 1.1–1.3)
CALCIUM SERPL-MCNC: 9.5 MG/DL (ref 8.5–10.5)
CHLORIDE SERPL-SCNC: 92 MMOL/L (ref 96–112)
CO2 SERPL-SCNC: 21 MMOL/L (ref 20–33)
CORTIS SERPL-MCNC: 16.2 UG/DL (ref 0–23)
CREAT SERPL-MCNC: 7.53 MG/DL (ref 0.5–1.4)
EOSINOPHIL # BLD AUTO: 0.14 K/UL (ref 0–0.51)
EOSINOPHIL NFR BLD: 1.4 % (ref 0–6.9)
ERYTHROCYTE [DISTWIDTH] IN BLOOD BY AUTOMATED COUNT: 58.3 FL (ref 35.9–50)
FERRITIN SERPL-MCNC: 747 NG/ML (ref 22–322)
GFR SERPLBLD CREATININE-BSD FMLA CKD-EPI: 9 ML/MIN/1.73 M 2
GLOBULIN SER CALC-MCNC: 3.6 G/DL (ref 1.9–3.5)
GLUCOSE SERPL-MCNC: 118 MG/DL (ref 65–99)
HCT VFR BLD AUTO: 28.9 % (ref 42–52)
HGB BLD-MCNC: 9.3 G/DL (ref 14–18)
IMM GRANULOCYTES # BLD AUTO: 0.12 K/UL (ref 0–0.11)
IMM GRANULOCYTES NFR BLD AUTO: 1.2 % (ref 0–0.9)
IRON SATN MFR SERPL: ABNORMAL % (ref 15–55)
IRON SERPL-MCNC: 83 UG/DL (ref 50–180)
LYMPHOCYTES # BLD AUTO: 1.14 K/UL (ref 1–4.8)
LYMPHOCYTES NFR BLD: 11.6 % (ref 22–41)
MAGNESIUM SERPL-MCNC: 1.9 MG/DL (ref 1.5–2.5)
MCH RBC QN AUTO: 30.7 PG (ref 27–33)
MCHC RBC AUTO-ENTMCNC: 32.2 G/DL (ref 33.7–35.3)
MCV RBC AUTO: 95.4 FL (ref 81.4–97.8)
MONOCYTES # BLD AUTO: 0.63 K/UL (ref 0–0.85)
MONOCYTES NFR BLD AUTO: 6.4 % (ref 0–13.4)
NEUTROPHILS # BLD AUTO: 7.77 K/UL (ref 1.82–7.42)
NEUTROPHILS NFR BLD: 78.9 % (ref 44–72)
NRBC # BLD AUTO: 0 K/UL
NRBC BLD-RTO: 0 /100 WBC
PHOSPHATE SERPL-MCNC: 6.4 MG/DL (ref 2.5–4.5)
PLATELET # BLD AUTO: 286 K/UL (ref 164–446)
PMV BLD AUTO: 9 FL (ref 9–12.9)
POTASSIUM SERPL-SCNC: 3.3 MMOL/L (ref 3.6–5.5)
PROT SERPL-MCNC: 5.7 G/DL (ref 6–8.2)
PTH-INTACT SERPL-MCNC: 240 PG/ML (ref 14–72)
RBC # BLD AUTO: 3.03 M/UL (ref 4.7–6.1)
SODIUM SERPL-SCNC: 132 MMOL/L (ref 135–145)
TIBC SERPL-MCNC: 104 UG/DL (ref 250–450)
UIBC SERPL-MCNC: 21 UG/DL (ref 110–370)
WBC # BLD AUTO: 9.9 K/UL (ref 4.8–10.8)

## 2022-07-21 PROCEDURE — 99239 HOSP IP/OBS DSCHRG MGMT >30: CPT | Performed by: HOSPITALIST

## 2022-07-21 PROCEDURE — A9270 NON-COVERED ITEM OR SERVICE: HCPCS | Performed by: HOSPITALIST

## 2022-07-21 PROCEDURE — 700111 HCHG RX REV CODE 636 W/ 250 OVERRIDE (IP): Performed by: HOSPITALIST

## 2022-07-21 PROCEDURE — 83540 ASSAY OF IRON: CPT

## 2022-07-21 PROCEDURE — 84100 ASSAY OF PHOSPHORUS: CPT

## 2022-07-21 PROCEDURE — 83970 ASSAY OF PARATHORMONE: CPT

## 2022-07-21 PROCEDURE — 83550 IRON BINDING TEST: CPT

## 2022-07-21 PROCEDURE — 90945 DIALYSIS ONE EVALUATION: CPT

## 2022-07-21 PROCEDURE — 83735 ASSAY OF MAGNESIUM: CPT

## 2022-07-21 PROCEDURE — 82330 ASSAY OF CALCIUM: CPT

## 2022-07-21 PROCEDURE — 80053 COMPREHEN METABOLIC PANEL: CPT

## 2022-07-21 PROCEDURE — 700102 HCHG RX REV CODE 250 W/ 637 OVERRIDE(OP): Performed by: NURSE PRACTITIONER

## 2022-07-21 PROCEDURE — 85025 COMPLETE CBC W/AUTO DIFF WBC: CPT

## 2022-07-21 PROCEDURE — 700102 HCHG RX REV CODE 250 W/ 637 OVERRIDE(OP): Performed by: HOSPITALIST

## 2022-07-21 PROCEDURE — 82533 TOTAL CORTISOL: CPT

## 2022-07-21 PROCEDURE — A9270 NON-COVERED ITEM OR SERVICE: HCPCS | Performed by: NURSE PRACTITIONER

## 2022-07-21 PROCEDURE — 82728 ASSAY OF FERRITIN: CPT

## 2022-07-21 PROCEDURE — 82140 ASSAY OF AMMONIA: CPT

## 2022-07-21 PROCEDURE — 36415 COLL VENOUS BLD VENIPUNCTURE: CPT

## 2022-07-21 RX ORDER — SEVELAMER CARBONATE 800 MG/1
2400 TABLET, FILM COATED ORAL
Qty: 270 TABLET | Refills: 2 | Status: SHIPPED | OUTPATIENT
Start: 2022-07-21

## 2022-07-21 RX ADMIN — HEPARIN SODIUM 5000 UNITS: 5000 INJECTION, SOLUTION INTRAVENOUS; SUBCUTANEOUS at 04:44

## 2022-07-21 RX ADMIN — SEVELAMER CARBONATE 2400 MG: 800 TABLET, FILM COATED ORAL at 08:36

## 2022-07-21 RX ADMIN — SENNOSIDES AND DOCUSATE SODIUM 2 TABLET: 50; 8.6 TABLET ORAL at 04:44

## 2022-07-21 NOTE — DISCHARGE PLANNING
Outpatient Dialysis Note    Confirmed patient is active at:    [address] [place] [address]    Schedule: PD    Spoke with Lety at facility who confirmed.    Forwarded records for review.    Alejandra Ornelas- Senior Dialysis Coordinator Ph# [phone]  [facility]

## 2022-07-21 NOTE — PROGRESS NOTES
Pt has been discharged to home, dc paperwork discussed and pt has verbalized understanding, PIV removed without complication, pt has left with all belongings and escorted via wheelchair to his ride.

## 2022-07-21 NOTE — CARE PLAN
The patient is Stable - Low risk of patient condition declining or worsening    Shift Goals  Clinical Goals: pain management  Patient Goals: pain management, rest    Progress made toward(s) clinical / shift goals:      Pain managed by medication and providing comfort. Bedside nursing care give. Pt was able to sleep and rest during the shift.      Problem: Pain - Standard  Goal: Alleviation of pain or a reduction in pain to the patient’s comfort goal  7/21/2022 0149 by Pascale Finnegan RWaldoN.  Outcome: Progressing  7/21/2022 0148 by Pascale Finnegan R.N.  Outcome: Progressing     Problem: Knowledge Deficit - Standard  Goal: Patient and family/care givers will demonstrate understanding of plan of care, disease process/condition, diagnostic tests and medications  7/21/2022 0149 by Pasacle Finnegan R.N.  Outcome: Progressing  7/21/2022 0148 by Pascale Finnegan R.N.  Outcome: Progressing     Problem: Nutrition  Goal: Patient's nutritional and fluid intake will be adequate or improve  Outcome: Progressing     Problem: Fluid Volume  Goal: Fluid volume balance will be maintained  Outcome: Progressing

## 2022-07-21 NOTE — DISCHARGE INSTRUCTIONS
Discharge Instructions    Discharged to home by car with relative. Discharged via walking, hospital escort: Refused.  Special equipment needed: Not Applicable    Be sure to schedule a follow-up appointment with your primary care doctor or any specialists as instructed.     Discharge Plan:   Diet Plan: Discussed  Activity Level: Discussed  Confirmed Follow up Appointment: Patient to Call and Schedule Appointment  Confirmed Symptoms Management: Discussed  Medication Reconciliation Updated: Yes    I understand that a diet low in cholesterol, fat, and sodium is recommended for good health. Unless I have been given specific instructions below for another diet, I accept this instruction as my diet prescription.   Other diet: renal    Special Instructions: None    -Is this patient being discharged with medication to prevent blood clots?  No    Is patient discharged on Warfarin / Coumadin?   No

## 2022-07-21 NOTE — PROGRESS NOTES
[facility]PD treatment aseptically disconnected at 0515.Effluent clear,yellow,no fibrin.PD cath dressing changed. /33.Report given to primary Rn.    24 H UF = 191 ml ( 165 ml + 26 ml - 0 ml )

## 2022-07-21 NOTE — DISCHARGE SUMMARY
"Discharge Summary    CHIEF COMPLAINT ON ADMISSION  Chief Complaint   Patient presents with   • ALOC     Pt lives with family, state that he has been altered x2 days, worse today, normally GCS 15. Pt gets daily peritoneal dialysis. Right hand looks poorly perfused - dusky. Radial pulses equal bilaterally. Fingertips necrotic. No cough/cold/nausea/diarrhea. Had dialysis this AM.       Reason for Admission  Other     Admission Date  7/19/2022    CODE STATUS  Full Code    HPI & HOSPITAL COURSE  Per HPI:  \"Patient is a 41 year old male with history of ESRD on peritoneal dialysis who presented 7/19/2022 with altered mental status for the last 2 days.  Per patient's mother at bedside over the last 2 days he has been episodes of \"spacing out\" and has been very sleepy.  He has had minimal oral intake over the last 2 days.\"    Patient admitted seen by nephrology, they again are recommending patient transition to hemodialysis however patient is refusing.  PD catheter site uncomplicated no tenderness recent fevers.  No WBC elevation but does have a left shift.  Continuing doxycycline.  Patient reports that his \"spacing out\" has resolved he thinks was related to some dehydration.  Patient is adamant to be discharged today he would like to leave the hospital to attend [place].  He also tells me he will bring supplies to do 2-hour PD sessions while there over the weekend.  Discussed with nephrology, no compelling reason to keep patient in hospital and will be discharged.    Therefore, he is discharged in fair and stable condition to home with close outpatient follow-up.    The patient met 2-midnight criteria for an inpatient stay at the time of discharge.    Discharge Date  7/21/2022    FOLLOW UP ITEMS POST DISCHARGE  Continue home medications, Jessi added  Follow-up primary care physician and nephrology  Encourage weaning of gabapentin, patient with high-dose gabapentin at night    DISCHARGE DIAGNOSES  Principal " Problem:    Dehydration POA: Yes  Active Problems:    End stage renal disease (HCC) POA: Yes    Hypotension POA: Unknown      Overview: IMO load March 2020    Altered level of consciousness POA: Unknown    Lactic acidosis POA: Unknown    Hypokalemia POA: Unknown  Resolved Problems:    * No resolved hospital problems. *      FOLLOW UP  No future appointments.  No follow-up provider specified.    MEDICATIONS ON DISCHARGE     Medication List      START taking these medications      Instructions   sevelamer carbonate 800 MG Tabs tablet  Commonly known as: RENVELA   Take 3 Tablets by mouth 3 times a day with meals.  Dose: 2,400 mg        CONTINUE taking these medications      Instructions   Auryxia 1  MG(Fe) Tabs  Generic drug: Ferric Citrate   Take 3 g by mouth 3 times a day with meals.  Dose: 3 g     calcitRIOL 0.25 MCG Caps  Commonly known as: ROCALTROL   Take 0.75 mcg by mouth every day.  Dose: 0.75 mcg     Dialyvite 800 0.8 MG Tabs   Take 1 Tablet by mouth at bedtime.  Dose: 1 Tablet     diphenhydrAMINE 25 MG Tabs  Commonly known as: BENADRYL   Take 50 mg by mouth at bedtime.  Dose: 50 mg     docusate sodium 100 MG Caps   Take 100 mg by mouth every day.  Dose: 100 mg     doxycycline 100 MG Tabs  Commonly known as: VIBRAMYCIN   Take 100 mg by mouth every day.  Dose: 100 mg     gentamicin 0.1 % cream  Commonly known as: GARAMYCIN   Apply 1 Application topically every day.  Dose: 1 Application     lidocaine 2 % Soln  Commonly known as: XYLOCAINE   Apply 20 mL topically as needed. Apply to the top of wound  Dose: 20 mL     lidocaine 5 % Oint  Commonly known as: XYLOCAINE   Apply 1 Each topically as needed (Apply to wound).  Dose: 1 Each     MIRCERA INJ   Inject 150 mcg under the skin every 14 days.  Dose: 150 mcg     Narcan 4 MG/0.1ML Liqd  Generic drug: Naloxone   Administer 1 mg into affected nostril(S) as needed. Indications: Opioid Overdose  Dose: 1 mg     oxyCODONE immediate release 10 MG immediate release  tablet  Commonly known as: ROXICODONE   Take 10 mg by mouth every four hours as needed for Moderate Pain.  Dose: 10 mg     tizanidine 4 MG Tabs  Commonly known as: ZANAFLEX   Take 8 mg by mouth every bedtime. 2 tablets = 8 mg  Dose: 8 mg        STOP taking these medications    gabapentin 600 MG tablet  Commonly known as: NEURONTIN            Allergies  Allergies   Allergen Reactions   • Baclofen Unspecified     Total loss of memory, sedation.   RXN=6/2015   • Chlorhexidine Itching and Rash   • Contrast Media With Iodine [Iodine] Rash     RXN=1/5/2017   • Pcn [Penicillins] Rash     RXN=possibly >10 years ago  Tolerated Zosyn on 2/20/18    Historically tolerated cefazolin (2020) and ceftriaxone (2022)     • Tape Rash     Paper tape and tegaderm ok  RXN=ongoing   • Cephalexin Rash     Historically tolerated cefazolin (2020) and ceftriaxone (2022)   • Requip Vomiting   • Ropinirole Vomiting     Requip     • Diagnostic X-Ray Materials Rash     Patient has a history of an IV contrast reaction. Patient instructed to take prednisone 50 mg by mouth 13 hours, 7 hours and 1 hour prior to IV contrast injection and  Diphenhydramine 50 mg by mouth 1 hour prior to IV contrast injection. Prescription faxed to pharmacy of patient's choice. Patient reports that he has not had a reaction following the pretreatment.       DIET  Orders Placed This Encounter   Procedures   • Diet Order Diet: Renal     Standing Status:   Standing     Number of Occurrences:   1     Order Specific Question:   Diet:     Answer:   Renal [8]       ACTIVITY  As tolerated.  Weight bearing as tolerated    CONSULTATIONS  Nephrology    PROCEDURES  Peritoneal dialysis    LABORATORY  Lab Results   Component Value Date    SODIUM 132 (L) 07/21/2022    POTASSIUM 3.3 (L) 07/21/2022    CHLORIDE 92 (L) 07/21/2022    CO2 21 07/21/2022    GLUCOSE 118 (H) 07/21/2022    BUN 40 (H) 07/21/2022    CREATININE 7.53 (HH) 07/21/2022    CREATININE 8.2 (HH) 05/06/2009        Lab  Results   Component Value Date    WBC 9.9 07/21/2022    HEMOGLOBIN 9.3 (L) 07/21/2022    HEMATOCRIT 28.9 (L) 07/21/2022    PLATELETCT 286 07/21/2022        Total time of the discharge process exceeds 41 minutes.

## 2022-07-21 NOTE — PROGRESS NOTES
Received report from dayshift RN, assumed care of patient at change of shift. Patient is A&Ox4, on RA, not in respiratory distress. Complains of pain 7/10 on R elbow and hand. Assessment completed and POC discussed. Bed in lowest position and call light within reach.

## 2022-07-25 NOTE — DOCUMENTATION QUERY
[facility]                                                                       Query Response Note      PATIENT:               ODALYS APPIAH  ACCT #:                  [id number]  MRN:                     [id number]  :                      1980  ADMIT DATE:       2022 2:00 PM  DISCH DATE:        2022 12:10 PM  RESPONDING  PROVIDER #:        [id number]           QUERY TEXT:    Altered mental status is documented in the Medical Record.  Can a more specific diagnosis be provided?    NOTE:  If the appropriate response is not listed below, please respond with a new note.    The patient's Clinical Indicators include:    ED: Altered x2 days, worse today. Daily peritoneal dialysis.   H&P: ALOC may be due to dehydration. ESRD; Lactic Acidosis  BC's x 2: NEG; BUN: 42; Cr: 7.92; GFR (CKD-EPI): 8; LA: 2.8; A.0      Nephrology: 10x recommended dose Gabapentin could play role AMS  HM PN: Hypokalemia; hypotension - occult sepsis could be contributing    Treatment: IVF bolus/infusion; midodrine; potassium; dialysis; abx; lab testing  Risk Factors: ESRD; 10x rec dose gabapentin; hypotension; acidosis; dehydration    Thank You,  Nu Combs RN  Clinical    Connect via [employer]  Options provided:   -- Acute metabolic encephalopathy   -- Acute toxic encephalopathy   -- Acute encephalopathy due to other medical condition, (please specify other medical condition)   -- Delirium due to general medical condition   -- Delirium due to multiple etiologies   -- Delirium due to other known physiological condition, (please specify the known physiological condition)   -- Other explanation, (please specify other explanation)   -- Unable to determine      Query created by: Nu Combs on 2022 2:58 PM    RESPONSE TEXT:    Acute metabolic encephalopathy          Electronically signed by:  SHERIF MERIDA MD  7/25/2022 2:20 PM

## 2022-10-10 NOTE — OP REPORT
DATE OF SERVICE:  01/05/2017    PREOPERATIVE DIAGNOSIS:  Stage V kidney disease with acutely thrombosed left   thigh arteriovenous loop graft.    POSTOPERATIVE DIAGNOSIS:  Stage V kidney disease with acutely thrombosed left   thigh arteriovenous loop graft.    OPERATIONS:  1.  Insertion of antegrade and retrograde sheath in the left thigh AV loop   graft.  2.  TPA thrombolysis delivered by AngioJet using 8 mg alteplase.  3.  Balloon angioplasty 7 mm through the entire graft and 5 mm at the arterial   anastomosis and 8 mm at the venous anastomosis.  4.  Fistulograms.    SURGEON:  Jairo Hopkins M.D.    ANESTHESIA:  Local anesthesia of 1% Xylocaine.    DESCRIPTION OF PROCEDURE:  After obtaining informed consent, the patient was   put on the angiography table.  A timeout was performed.  We prepped both the   left groin and thigh and the right groin and thigh and the lower abdomen in   case he needed a temporary dialysis catheter placed in his right groin.  He   has a complex history as this is available in the medical records.  I examined   the AV graft with ultrasound and initially placed the sheath directed toward   the arterial end.  I placed it in the medial loop of the graft directed   distally.  The technique was using ultrasound guidance, local anesthesia, and   a micropuncture needle, wire, and dilator.  I then placed a 6-[place] 5.5 cm   long sheath.  A small amount of contrast revealed the thrombus filling the   lumen.  I passed a Glidewire easily through the graft.  I had to rotate it   some to get it through the arterial anastomosis.  I then took the AngioJet and   passed it over the guidewire and pulse-sprayed approximately 50 mL of TPA   from the anastomosis down through the distal loop of the graft.  I then placed   a second sheath.  This was located in the graft where it was looping from   left to right basically at the distal end of the loop in the thigh and just   medial to a previous thrombectomy  lov 08/30/2022  Nov 10/18/2022    Lipids  08/14/2022 in epic   Pulse    Labs 08/16/2022  Sodium  142      Potassium  3.9     Creatinine  1.52 High        Okay to refill lisinopril? Labs not reviewed at last ov   scar.  The sheath was placed with the micro   puncture technique and then a Glidewire.  I noticed that now, I was able to   aspirate blood from the sheath.  I instilled TPA in the venous and using   approximately 33 mL of TPA solution for a total of approximately 8 mg of TPA   for the entire graft.  At this point in time, it was apparent that the graft   was lysing quickly.  I did a venous outflow fistulogram and could see some   residual thrombi in the venous return loop.  A 7 mm x 40 mm standard   angioplasty balloon was passed over the wire and inflated to 10 atmospheres   successively up through the entire venous outflow side of the fistula.    Imaging showed dramatic improvement and the anastomosis looked widely patent.    I wanted to make sure of this, so I dilated it with an 8 mm balloon to 15   atmospheres as I did previously with good success.  Next, I passed a glide   catheter up the arterial and imaged the arterial half of the fistula and this   showed that it looked patent and normal from the anastomosis to the mid   lateral left thigh, where the prior graft infection was located and it was   moderately irregular  at that location with possibly some thrombi.  Some degree   of stenosis was also present at that location as well as some slight curvature   of the graft.  At the distal loop of the graft, there was some residual   thrombus.  I proceeded to inflate the 7 mm balloon through this entire   segment.  I inflated the balloon, which was a [place] balloon up to 10   atmospheres.  This improved the appearance of the arterial inflow and the   strictured area, where the old graft infection was and that looked widely   patent with rapid flow.  I proceeded to use a 5 mm balloon at the arterial   anastomosis, although it looked good.  I wanted to confirm that it was widely   patent.  I did an imaging study showing minimal residual, but passed to 7 mm   balloon again anyway trying to make sure that the  arterial side of the graft   was widely patent and unlikely to rethrombose.  At the completion, I was very   satisfied that we had rapid flow through the graft with no significant defects   and no other abnormalities.  I removed both sheaths and personally held   pressure for approximately 5 minutes with good hemostasis achieved.  Gauze and   Tegaderm dressings were applied.  Approximately half hour later, I could hear   a bruit in the left groin area consistent with patency.  Blood loss was less   than 20 mL.  The patient had a pruritic reaction to the contrast and we gave   him 100 mg of hydrocortisone and another 25 mg of Benadryl.  We had   premedicated him only with 50 mg of Benadryl.  He was taken to the [facility] to be   observed with pruritus and erythema of the skin secondary to contrast.       ____________________________________     MD BEN NUGENT / RIAZ    DD:  01/05/2017 19:47:50  DT:  01/05/2017 20:37:25    D#:  [id number]  Job#:  [id number]

## 2022-11-08 ENCOUNTER — PATIENT MESSAGE (OUTPATIENT)
Dept: HEALTH INFORMATION MANAGEMENT | Facility: OTHER | Age: 42
End: 2022-11-08

## 2022-12-08 NOTE — PROGRESS NOTES
3hr HD started @ 0957 and completed @ 1258,tx well tolerated,VSS,net UF = 2000ml.R femoral AVG + B/T,cannulation sites covered with DD,CDI,report given to Daisy Elliott RN.   DISCHARGE

## 2023-01-03 NOTE — PROGRESS NOTES
Report to [facility].   Olanzapine Pregnancy And Lactation Text: This medication is pregnancy category C.   There are no adequate and well controlled trials with olanzapine in pregnant females.  Olanzapine should be used during pregnancy only if the potential benefit justifies the potential risk to the fetus.   In a study in lactating healthy women, olanzapine was excreted in breast milk.  It is recommended that women taking olanzapine should not breast feed.

## 2023-02-21 NOTE — PROGRESS NOTES
Pt discharged to home. Discharge instructions provided to pt. Pt verbalizes understanding. Pt states all questions have been answered. Signed copy in chart. Prescriptions sent to preferred pharmacy. Pt states that all personal belongings are in possession. Pt off unit via wheelchair, escorted by RN and CNA. Home oxygen and concentrator delivered to bedside prior to discharge.      no

## 2023-11-08 ENCOUNTER — APPOINTMENT (OUTPATIENT)
Dept: LAB | Facility: MEDICAL CENTER | Age: 43
End: 2023-11-08
Payer: MEDICARE

## 2024-06-19 ENCOUNTER — APPOINTMENT (OUTPATIENT)
Dept: RADIOLOGY | Facility: MEDICAL CENTER | Age: 44
End: 2024-06-19
Attending: EMERGENCY MEDICINE
Payer: MEDICARE

## 2024-06-19 ENCOUNTER — HOSPITAL ENCOUNTER (EMERGENCY)
Facility: MEDICAL CENTER | Age: 44
End: 2024-06-20
Attending: EMERGENCY MEDICINE
Payer: MEDICARE

## 2024-06-19 VITALS
WEIGHT: 173.06 LBS | DIASTOLIC BLOOD PRESSURE: 58 MMHG | SYSTOLIC BLOOD PRESSURE: 94 MMHG | HEART RATE: 100 BPM | BODY MASS INDEX: 29.71 KG/M2 | OXYGEN SATURATION: 97 % | TEMPERATURE: 97.3 F | RESPIRATION RATE: 16 BRPM

## 2024-06-19 DIAGNOSIS — H54.62 VISION LOSS, LEFT EYE: ICD-10-CM

## 2024-06-19 LAB
ALBUMIN SERPL BCP-MCNC: 4.7 G/DL (ref 3.2–4.9)
ALBUMIN/GLOB SERPL: 1.3 G/DL
ALP SERPL-CCNC: 256 U/L (ref 30–99)
ALT SERPL-CCNC: 31 U/L (ref 2–50)
ANION GAP SERPL CALC-SCNC: 17 MMOL/L (ref 7–16)
AST SERPL-CCNC: 31 U/L (ref 12–45)
BASOPHILS # BLD AUTO: 0.6 % (ref 0–1.8)
BASOPHILS # BLD: 0.04 K/UL (ref 0–0.12)
BILIRUB SERPL-MCNC: 0.6 MG/DL (ref 0.1–1.5)
BUN SERPL-MCNC: 15 MG/DL (ref 8–22)
CALCIUM ALBUM COR SERPL-MCNC: 9 MG/DL (ref 8.5–10.5)
CALCIUM SERPL-MCNC: 9.6 MG/DL (ref 8.4–10.2)
CHLORIDE SERPL-SCNC: 92 MMOL/L (ref 96–112)
CO2 SERPL-SCNC: 27 MMOL/L (ref 20–33)
CREAT SERPL-MCNC: 4.05 MG/DL (ref 0.5–1.4)
EOSINOPHIL # BLD AUTO: 0.09 K/UL (ref 0–0.51)
EOSINOPHIL NFR BLD: 1.4 % (ref 0–6.9)
ERYTHROCYTE [DISTWIDTH] IN BLOOD BY AUTOMATED COUNT: 62.4 FL (ref 35.9–50)
GFR SERPLBLD CREATININE-BSD FMLA CKD-EPI: 18 ML/MIN/1.73 M 2
GLOBULIN SER CALC-MCNC: 3.6 G/DL (ref 1.9–3.5)
GLUCOSE SERPL-MCNC: 104 MG/DL (ref 65–99)
HCT VFR BLD AUTO: 34 % (ref 42–52)
HGB BLD-MCNC: 11.3 G/DL (ref 14–18)
IMM GRANULOCYTES # BLD AUTO: 0.04 K/UL (ref 0–0.11)
IMM GRANULOCYTES NFR BLD AUTO: 0.6 % (ref 0–0.9)
LYMPHOCYTES # BLD AUTO: 1.32 K/UL (ref 1–4.8)
LYMPHOCYTES NFR BLD: 20.8 % (ref 22–41)
MCH RBC QN AUTO: 32.8 PG (ref 27–33)
MCHC RBC AUTO-ENTMCNC: 33.2 G/DL (ref 32.3–36.5)
MCV RBC AUTO: 98.6 FL (ref 81.4–97.8)
MONOCYTES # BLD AUTO: 0.35 K/UL (ref 0–0.85)
MONOCYTES NFR BLD AUTO: 5.5 % (ref 0–13.4)
NEUTROPHILS # BLD AUTO: 4.5 K/UL (ref 1.82–7.42)
NEUTROPHILS NFR BLD: 71.1 % (ref 44–72)
NRBC # BLD AUTO: 0 K/UL
NRBC BLD-RTO: 0 /100 WBC (ref 0–0.2)
PLATELET # BLD AUTO: 171 K/UL (ref 164–446)
PMV BLD AUTO: 10 FL (ref 9–12.9)
POTASSIUM SERPL-SCNC: 4.4 MMOL/L (ref 3.6–5.5)
PROT SERPL-MCNC: 8.3 G/DL (ref 6–8.2)
RBC # BLD AUTO: 3.45 M/UL (ref 4.7–6.1)
SODIUM SERPL-SCNC: 136 MMOL/L (ref 135–145)
WBC # BLD AUTO: 6.3 K/UL (ref 4.8–10.8)

## 2024-06-19 PROCEDURE — 70543 MRI ORBT/FAC/NCK W/O &W/DYE: CPT

## 2024-06-19 PROCEDURE — 80053 COMPREHEN METABOLIC PANEL: CPT

## 2024-06-19 PROCEDURE — 85025 COMPLETE CBC W/AUTO DIFF WBC: CPT

## 2024-06-19 PROCEDURE — 99284 EMERGENCY DEPT VISIT MOD MDM: CPT

## 2024-06-19 PROCEDURE — 700117 HCHG RX CONTRAST REV CODE 255: Mod: JZ | Performed by: EMERGENCY MEDICINE

## 2024-06-19 PROCEDURE — 36415 COLL VENOUS BLD VENIPUNCTURE: CPT

## 2024-06-19 PROCEDURE — 70553 MRI BRAIN STEM W/O & W/DYE: CPT

## 2024-06-19 PROCEDURE — A9579 GAD-BASE MR CONTRAST NOS,1ML: HCPCS | Mod: JZ | Performed by: EMERGENCY MEDICINE

## 2024-06-19 RX ADMIN — GADOTERIDOL 15 ML: 279.3 INJECTION, SOLUTION INTRAVENOUS at 20:03

## 2024-06-19 ASSESSMENT — FIBROSIS 4 INDEX: FIB4 SCORE: 0.72

## 2024-06-19 ASSESSMENT — PAIN DESCRIPTION - PAIN TYPE: TYPE: ACUTE PAIN

## 2024-06-19 NOTE — ED TRIAGE NOTES
Chief Complaint   Patient presents with    Loss of Vision      Sudden onset of loss of vision on left eye, 2 days ago. Pt was seen at opthalmology who sent him here to rule out optic neuritis, or get a CT or MRI.

## 2024-06-20 PROCEDURE — 99284 EMERGENCY DEPT VISIT MOD MDM: CPT

## 2024-06-20 PROCEDURE — 36415 COLL VENOUS BLD VENIPUNCTURE: CPT

## 2024-06-20 NOTE — ED PROVIDER NOTES
ED Provider Note    CHIEF COMPLAINT  Chief Complaint   Patient presents with    Loss of Vision       EXTERNAL RECORDS REVIEWED  Outpatient Notes from [facility] earlier today, results as below    HPI/ROS  LIMITATION TO HISTORY   Select: : None  OUTSIDE HISTORIAN(S):  none    Denis De Anda is a 43 y.o. male who presents with vision loss.  Patient reports that he had painless loss of vision in his left eye 2 days ago, states he thinks he woke up with it and was his whole visual field.  He reports no preceding flashes or floaters.  He reports no eye pain.  He reports no headache, did have a headache a few days prior but this resolved on its own.  He reports no focal weakness numbness or tingling.  No fevers or chills, cough, congestion, chest pain or shortness of breath, no palpitations or syncope.    Patient was seen in Dr. 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    SURGICAL HISTORY   has a past surgical history that includes mass excision ortho (10/9/08); av fistula creation (11/6/08); arteriogram (3/5/2009); angioplasty balloon (3/5/2009); av fistulogram (3/5/2009); us-kidney transplant (1996, 2001); endarterectomy (11/16/2010); av fistulogram (11/16/2010); cath placement (2/1/2011); angioplasty balloon (2/1/2011); av fistulogram (6/5/2011); cath placement (6/5/2011); av fistula revision (11/3/2011); recovery (5/18/2012); incision and drainage general  (9/13/2013); debridement (9/13/2013); vein ligation (9/13/2013); recovery (6/13/2014); av fistula revision (9/30/2014); irrigation & debridement general (12/15/2014); av fistula revision (2/8/2015); av fistula revision (2/22/2015); av fistula revision (3/20/2015); inject nerv blck,stellate ganglion (3/24/2015); av fistula creation (4/20/2015); av fistula thrombolysis (Left, 6/3/2015); irrigation & debridement general (Left, 6/3/2015); av fistula thrombolysis (Left, 6/9/2015); av fistula revision (Left, 6/17/2015); irrigation & debridement general (Left, 6/17/2015); recovery (8/7/2015); percut implnt neuroelect,epidural (2/19/2016); percut implnt neuroelect,epidural (2/19/2016); parathyroidectomy (2006); spinal cord stimulator (N/A, 3/25/2016); recovery (7/8/2016); lesion excision general (Right, 7/11/2016); mass excision general (Right, 8/5/2016); cath placement (Right, 9/17/2016); thrombectomy (Left, 9/18/2016); av fistulogram (9/18/2016); thrombectomy (Left, 10/20/2016); incision and drainage general (10/20/2016); irrigation & debridement general (Left, 10/28/2016); flap closure (Left, 11/17/2016); thrombectomy (Left, 1/6/2017); cath placement (Right, 1/6/2017); thrombectomy (Left, 1/5/2017); spinal cord stimulator (N/A, 5/4/2017); abdominoplasty (6/5/2017); irrigation & debridement general (7/31/2017); cath placement (Right, 11/14/2017); av fistula revision (Left, 11/14/2017); wound exploration general (2/1/2018); wound closure general (2/19/2018); split thickness skin graft (2/26/2018); thrombectomy (Left, 3/9/2018); cath placement (Right, 3/9/2018); thrombectomy (Left, 4/27/2018); thrombectomy (Left, 4/28/2018); abdominal exploration (6/25/2018); myocutaneous flap (Right, 8/27/2018); skin flap delayed (Left, 9/10/2018); split thickness skin graft (Right, 9/21/2018); av fistula revision (Left, 12/23/2018); av fistula revision (Left, 1/25/2019); thrombectomy (Left, 1/26/2019); intro cath dialysis circuit dx  angrph fluor s&i (Left, 2/20/2020); av fistula revision (Left, 2/20/2020); av fistula creation (Right, 3/13/2020); incision and drainage general (Right, 4/20/2020); inguinal hernia repair (Bilateral, 2001, 2002); and bone spur excision (12/8/2011).    FAMILY HISTORY  Family History   Problem Relation Age of Onset    Arthritis Father         RA    Lung Disease Father     Heart Disease Father         MI    Hypertension Brother        SOCIAL HISTORY  Social History     Tobacco Use    Smoking status: Never    Smokeless tobacco: Never   Vaping Use    Vaping status: Never Used   Substance and Sexual Activity    Alcohol use: No    Drug use: No    Sexual activity: Yes     Partners: Female       CURRENT MEDICATIONS  Home Medications    **Home medications have not yet been reviewed for this encounter**       Audit from Redirected Encounters    **Home medications have not yet been reviewed for this encounter**         ALLERGIES  Allergies   Allergen Reactions    Baclofen Unspecified     Total loss of memory, sedation.   RXN=6/2015    Chlorhexidine Itching and Rash    Contrast Media With Iodine [Iodine] Rash     RXN=1/5/2017    Pcn [Penicillins] Rash     RXN=possibly >10 years ago  Tolerated Zosyn on 2/20/18    Historically tolerated cefazolin (2020) and ceftriaxone (2022)      Tape Rash     Paper tape and tegaderm ok  RXN=ongoing    Cephalexin Rash     Historically tolerated cefazolin (2020) and ceftriaxone (2022)    Requip Vomiting    Ropinirole Vomiting     Requip      Diagnostic X-Ray Materials Rash     Patient has a history of an IV contrast reaction. Patient instructed to take prednisone 50 mg by mouth 13 hours, 7 hours and 1 hour prior to IV contrast injection and  Diphenhydramine 50 mg by mouth 1 hour prior to IV contrast injection. Prescription faxed to pharmacy of patient's choice. Patient reports that he has not had a reaction following the pretreatment.       PHYSICAL EXAM  VITAL SIGNS: BP 94/58   Pulse 100   Temp  36.3 °C (97.3 °F) (Temporal)   Resp 16   Wt 78.5 kg (173 lb 1 oz)   SpO2 97%   BMI 29.71 kg/m²      Pulse ox interpretation: I interpret this pulse ox as normal.  Constitutional: Alert in no apparent distress.  HENT: Normocephalic, Atraumatic, Bilateral external ears normal. Nose normal.  There is no facial tenderness or swelling noted  Eyes: Pupils are dilated conjunctiva normal, non-icteric.   Heart: Regular rate and rythm, no murmurs.    Lungs: Clear to auscultation bilaterally.  Abd: soft, NTTP, no mass, no pulsatile mass, non-distended, no rebound or guarding  MSK: Amputation over the right upper extremity  Skin: Warm, Dry, No erythema, No rash.   Neurologic: Alert, Grossly non-focal.   Psychiatric: Affect normal, Judgment normal, Mood normal, Appears appropriate                 EKG/LABS  Labs Reviewed   CBC WITH DIFFERENTIAL - Abnormal; Notable for the following components:       Result Value    RBC 3.45 (*)     Hemoglobin 11.3 (*)     Hematocrit 34.0 (*)     MCV 98.6 (*)     RDW 62.4 (*)     Lymphocytes 20.80 (*)     All other components within normal limits   COMP METABOLIC PANEL - Abnormal; Notable for the following components:    Chloride 92 (*)     Anion Gap 17.0 (*)     Glucose 104 (*)     Creatinine 4.05 (*)     Alkaline Phosphatase 256 (*)     Total Protein 8.3 (*)     Globulin 3.6 (*)     All other components within normal limits   ESTIMATED GFR - Abnormal; Notable for the following components:    GFR (CKD-EPI) 18 (*)     All other components within normal limits       I have independently interpreted this EKG    RADIOLOGY/PROCEDURES   I have independently interpreted the diagnostic imaging associated with this visit and am waiting the final reading from the radiologist.   My preliminary interpretation is as follows: No obvious mass on brain MRI    Radiologist interpretation:  MR-BRAIN-WITH & W/O   Final Result      1.  No acute appearing abnormality detected. No acute infarct detected.   2.  Tiny  bilateral chronic lacunar infarcts in the cerebellum, one on each side.      MR-ORBITS,FACE,NECK-WITH&W/O & SEQUENCES    (Results Pending)       COURSE & MEDICAL DECISION MAKING    ASSESSMENT, COURSE AND PLAN  Care Narrative: 5:48 PM  Patient is evaluated the bedside and chart is reviewed, he is overall well-appearing, no headaches or focal neurologic deficits other than the vision loss.  He does have a history of end-stage renal disease and is dialyzed Monday Wednesday Friday, I have ordered for MRI of his brain and orbits to evaluate.  Further ophthalmologic level exam is deferred at this time as he just had dilated exam performed from ophthalmologist  In review of these notes, vision was 20/100 on the eye 20/50 on the right and 2050 with both, there was no APD noted, normal anterior exam, normal intraocular pressure, normal anterior chamber, optic disc on the left showed edema    Patient is reevaluated, updated on results thus far, pending MRI results    Patient is reevaluated again and updated on results thus far of brain MRI, still pending orbits, he states he is hungry but states he does not need anything to eat    ED OBS: Yes; I am placing the patient in to an observation status due to a diagnostic uncertainty as well as therapeutic intensity. Patient placed in observation status at 548 PM, 6/19/2024.     Observation plan is as follows: Diagnostic evaluation as above               PROBLEMS MANAGED  # Vision loss.  Patient is pending MRI to evaluate for potential optic neuritis.  MRI brain shows no acute findings.  He has already been referred to see neuro-ophthalmology from Dr. Bender without any obvious findings of pathology on his anterior and dilated exam    Additional medical problems  # End-stage renal disease on dialysis    DISPOSITION AND DISCUSSIONS  I have discussed management of the patient with the following physicians and THEA's: Dr. Lopez for ED observation pending MRI results    Patient admitted to  ED observation status     FINAL DIAGNOSIS  1. Vision loss, left eye           Electronically signed by: Jeff Nagy M.D., 6/19/2024 5:47 PM

## 2024-06-20 NOTE — DISCHARGE INSTRUCTIONS
We will have a radiologist look over your MRI again tomorrow and call you if there are any abnormalities that were missed.  Your MRI on preliminary read does appear normal

## 2024-06-20 NOTE — DISCHARGE SUMMARY
ED Observation Discharge Summary    Patient:Denis De Anda  Patient : 1980  Patient MRN: [id number]  Patient PCP: Pcp Pt States None    Admit Date: 2024  Discharge Date and Time: 24 12:41 AM  Discharge Diagnosis:   1. Vision loss, left eye Acute           Discharge Attending: Donita Lopez M.D.  Discharge Service: ED Observation    ED Course  Denis is a 43 y.o. male who was evaluated at [facility] for vision loss of his left eye.  He was referred from outpatient optometry for MRI to evaluate for optic neuritis.  Patient was seen and evaluated by my colleague, Dr. Nagy who performed a full history and physical exam.  Patient was endorsed to me pending results of MRI.  Unfortunately there was significant delays to obtaining the read on the MRI of the orbit due to incomplete images being uploaded.  I was able to speak with our radiologist, Dr. De Dios who reviewed the MRI.  He said although it was somewhat limited due to missing images it did appear to him grossly normal without findings of optic neuritis.  I therefore do think it safe to discharge the patient at this time.  The report will be read formally tomorrow once we have all the images uploaded.  I explained this all to the patient and his wife and they are agreeable with this plan. He was discharged in good condition.     Discharge Exam:  BP 94/58   Pulse 100   Temp 36.3 °C (97.3 °F) (Temporal)   Resp 16   Wt 78.5 kg (173 lb 1 oz)   SpO2 97%   BMI 29.71 kg/m² .    Constitutional: Awake and alert. Nontoxic  HENT:  Grossly normal  Eyes: Grossly normal  Neck: Normal range of motion  Cardiovascular: Normal heart rate   Thorax & Lungs: No respiratory distress  Abdomen: Nontender  Skin:  No pathologic rash.   Extremities: Well perfused  Psychiatric: Affect normal    Labs  Results for orders placed or performed during the hospital encounter of 24   CBC WITH DIFFERENTIAL   Result Value Ref Range    WBC 6.3 4.8 - 10.8 K/uL    RBC  3.45 (L) 4.70 - 6.10 M/uL    Hemoglobin 11.3 (L) 14.0 - 18.0 g/dL    Hematocrit 34.0 (L) 42.0 - 52.0 %    MCV 98.6 (H) 81.4 - 97.8 fL    MCH 32.8 27.0 - 33.0 pg    MCHC 33.2 32.3 - 36.5 g/dL    RDW 62.4 (H) 35.9 - 50.0 fL    Platelet Count 171 164 - 446 K/uL    MPV 10.0 9.0 - 12.9 fL    Neutrophils-Polys 71.10 44.00 - 72.00 %    Lymphocytes 20.80 (L) 22.00 - 41.00 %    Monocytes 5.50 0.00 - 13.40 %    Eosinophils 1.40 0.00 - 6.90 %    Basophils 0.60 0.00 - 1.80 %    Immature Granulocytes 0.60 0.00 - 0.90 %    Nucleated RBC 0.00 0.00 - 0.20 /100 WBC    Neutrophils (Absolute) 4.50 1.82 - 7.42 K/uL    Lymphs (Absolute) 1.32 1.00 - 4.80 K/uL    Monos (Absolute) 0.35 0.00 - 0.85 K/uL    Eos (Absolute) 0.09 0.00 - 0.51 K/uL    Baso (Absolute) 0.04 0.00 - 0.12 K/uL    Immature Granulocytes (abs) 0.04 0.00 - 0.11 K/uL    NRBC (Absolute) 0.00 K/uL   COMP METABOLIC PANEL   Result Value Ref Range    Sodium 136 135 - 145 mmol/L    Potassium 4.4 3.6 - 5.5 mmol/L    Chloride 92 (L) 96 - 112 mmol/L    Co2 27 20 - 33 mmol/L    Anion Gap 17.0 (H) 7.0 - 16.0    Glucose 104 (H) 65 - 99 mg/dL    Bun 15 8 - 22 mg/dL    Creatinine 4.05 (H) 0.50 - 1.40 mg/dL    Calcium 9.6 8.4 - 10.2 mg/dL    Correct Calcium 9.0 8.5 - 10.5 mg/dL    AST(SGOT) 31 12 - 45 U/L    ALT(SGPT) 31 2 - 50 U/L    Alkaline Phosphatase 256 (H) 30 - 99 U/L    Total Bilirubin 0.6 0.1 - 1.5 mg/dL    Albumin 4.7 3.2 - 4.9 g/dL    Total Protein 8.3 (H) 6.0 - 8.2 g/dL    Globulin 3.6 (H) 1.9 - 3.5 g/dL    A-G Ratio 1.3 g/dL   ESTIMATED GFR   Result Value Ref Range    GFR (CKD-EPI) 18 (A) >60 mL/min/1.73 m 2       Radiology  MR-BRAIN-WITH & W/O   Final Result      1.  No acute appearing abnormality detected. No acute infarct detected.   2.  Tiny bilateral chronic lacunar infarcts in the cerebellum, one on each side.      MR-ORBITS,FACE,NECK-WITH&W/O & SEQUENCES    (Results Pending)       Medications:   New Prescriptions    No medications on file         Upon Reevaluation,  the patient's condition has: Improved; and will be discharged.    Patient discharged from ED Observation status at 12:43 AM 6/20/2023    Total time spent on this ED Observation discharge encounter is > 30 Minutes    Electronically signed by: Donita Lopez M.D., 6/20/2024 12:41 AM

## 2024-06-20 NOTE — ED NOTES
Visual acuity test administered with results of left eye 20/70, right eye 20/50, and both eyes 20/50.

## 2024-06-20 NOTE — ED NOTES
Per FILM room, they are having technical  difficulty with transferring the images over and they are working on a solution.

## 2024-06-25 ENCOUNTER — APPOINTMENT (OUTPATIENT)
Dept: RADIOLOGY | Facility: MEDICAL CENTER | Age: 44
End: 2024-06-25
Attending: EMERGENCY MEDICINE
Payer: MEDICARE

## 2024-06-25 ENCOUNTER — HOSPITAL ENCOUNTER (INPATIENT)
Facility: MEDICAL CENTER | Age: 44
LOS: 1 days | End: 2024-06-26
Attending: EMERGENCY MEDICINE | Admitting: INTERNAL MEDICINE
Payer: MEDICARE

## 2024-06-25 ENCOUNTER — OFFICE VISIT (OUTPATIENT)
Dept: OPHTHALMOLOGY | Facility: MEDICAL CENTER | Age: 44
End: 2024-06-25
Payer: MEDICARE

## 2024-06-25 DIAGNOSIS — N18.5 CHRONIC KIDNEY DISEASE, STAGE V (HCC): ICD-10-CM

## 2024-06-25 DIAGNOSIS — H53.139 SUDDEN VISUAL LOSS, UNSPECIFIED LATERALITY: ICD-10-CM

## 2024-06-25 DIAGNOSIS — N18.6 ESRD (END STAGE RENAL DISEASE) (HCC): ICD-10-CM

## 2024-06-25 DIAGNOSIS — H34.12 CENTRAL RETINAL ARTERY OCCLUSION OF LEFT EYE: ICD-10-CM

## 2024-06-25 DIAGNOSIS — N18.6 END STAGE RENAL DISEASE (HCC): ICD-10-CM

## 2024-06-25 DIAGNOSIS — H34.13 CENTRAL RETINAL ARTERY OCCLUSION OF BOTH EYES: ICD-10-CM

## 2024-06-25 PROBLEM — S58.111A: Status: ACTIVE | Noted: 2024-06-25

## 2024-06-25 PROBLEM — I73.9 PAD (PERIPHERAL ARTERY DISEASE) (HCC): Status: ACTIVE | Noted: 2024-06-25

## 2024-06-25 LAB
ALBUMIN SERPL BCP-MCNC: 4.1 G/DL (ref 3.2–4.9)
ALBUMIN/GLOB SERPL: 1.4 G/DL
ALP SERPL-CCNC: 206 U/L (ref 30–99)
ALT SERPL-CCNC: 22 U/L (ref 2–50)
ANION GAP SERPL CALC-SCNC: 15 MMOL/L (ref 7–16)
AST SERPL-CCNC: 19 U/L (ref 12–45)
BASOPHILS # BLD AUTO: 0.8 % (ref 0–1.8)
BASOPHILS # BLD: 0.05 K/UL (ref 0–0.12)
BILIRUB SERPL-MCNC: 0.4 MG/DL (ref 0.1–1.5)
BUN SERPL-MCNC: 25 MG/DL (ref 8–22)
CALCIUM ALBUM COR SERPL-MCNC: 9.4 MG/DL (ref 8.5–10.5)
CALCIUM SERPL-MCNC: 9.5 MG/DL (ref 8.5–10.5)
CHLORIDE SERPL-SCNC: 100 MMOL/L (ref 96–112)
CO2 SERPL-SCNC: 27 MMOL/L (ref 20–33)
CREAT SERPL-MCNC: 6.2 MG/DL (ref 0.5–1.4)
EKG IMPRESSION: NORMAL
EOSINOPHIL # BLD AUTO: 0.09 K/UL (ref 0–0.51)
EOSINOPHIL NFR BLD: 1.4 % (ref 0–6.9)
ERYTHROCYTE [DISTWIDTH] IN BLOOD BY AUTOMATED COUNT: 62.6 FL (ref 35.9–50)
GFR SERPLBLD CREATININE-BSD FMLA CKD-EPI: 11 ML/MIN/1.73 M 2
GLOBULIN SER CALC-MCNC: 3 G/DL (ref 1.9–3.5)
GLUCOSE SERPL-MCNC: 90 MG/DL (ref 65–99)
HCT VFR BLD AUTO: 29.2 % (ref 42–52)
HGB BLD-MCNC: 9.6 G/DL (ref 14–18)
IMM GRANULOCYTES # BLD AUTO: 0.03 K/UL (ref 0–0.11)
IMM GRANULOCYTES NFR BLD AUTO: 0.5 % (ref 0–0.9)
LYMPHOCYTES # BLD AUTO: 1.55 K/UL (ref 1–4.8)
LYMPHOCYTES NFR BLD: 24.2 % (ref 22–41)
MCH RBC QN AUTO: 33 PG (ref 27–33)
MCHC RBC AUTO-ENTMCNC: 32.9 G/DL (ref 32.3–36.5)
MCV RBC AUTO: 100.3 FL (ref 81.4–97.8)
MONOCYTES # BLD AUTO: 0.34 K/UL (ref 0–0.85)
MONOCYTES NFR BLD AUTO: 5.3 % (ref 0–13.4)
NEUTROPHILS # BLD AUTO: 4.34 K/UL (ref 1.82–7.42)
NEUTROPHILS NFR BLD: 67.8 % (ref 44–72)
NRBC # BLD AUTO: 0 K/UL
NRBC BLD-RTO: 0 /100 WBC (ref 0–0.2)
PLATELET # BLD AUTO: 143 K/UL (ref 164–446)
PMV BLD AUTO: 10.1 FL (ref 9–12.9)
POTASSIUM SERPL-SCNC: 4.6 MMOL/L (ref 3.6–5.5)
PROT SERPL-MCNC: 7.1 G/DL (ref 6–8.2)
RBC # BLD AUTO: 2.91 M/UL (ref 4.7–6.1)
SODIUM SERPL-SCNC: 142 MMOL/L (ref 135–145)
WBC # BLD AUTO: 6.4 K/UL (ref 4.8–10.8)

## 2024-06-25 PROCEDURE — A9270 NON-COVERED ITEM OR SERVICE: HCPCS

## 2024-06-25 PROCEDURE — 99285 EMERGENCY DEPT VISIT HI MDM: CPT

## 2024-06-25 PROCEDURE — 80053 COMPREHEN METABOLIC PANEL: CPT

## 2024-06-25 PROCEDURE — 99223 1ST HOSP IP/OBS HIGH 75: CPT | Mod: GC,AI | Performed by: INTERNAL MEDICINE

## 2024-06-25 PROCEDURE — 99222 1ST HOSP IP/OBS MODERATE 55: CPT | Performed by: INTERNAL MEDICINE

## 2024-06-25 PROCEDURE — 99222 1ST HOSP IP/OBS MODERATE 55: CPT | Performed by: NURSE PRACTITIONER

## 2024-06-25 PROCEDURE — 770020 HCHG ROOM/CARE - TELE (206)

## 2024-06-25 PROCEDURE — 96375 TX/PRO/DX INJ NEW DRUG ADDON: CPT

## 2024-06-25 PROCEDURE — 85025 COMPLETE CBC W/AUTO DIFF WBC: CPT

## 2024-06-25 PROCEDURE — 70498 CT ANGIOGRAPHY NECK: CPT

## 2024-06-25 PROCEDURE — 700117 HCHG RX CONTRAST REV CODE 255: Performed by: EMERGENCY MEDICINE

## 2024-06-25 PROCEDURE — 700111 HCHG RX REV CODE 636 W/ 250 OVERRIDE (IP): Mod: JZ | Performed by: EMERGENCY MEDICINE

## 2024-06-25 PROCEDURE — 96374 THER/PROPH/DIAG INJ IV PUSH: CPT

## 2024-06-25 PROCEDURE — 36415 COLL VENOUS BLD VENIPUNCTURE: CPT

## 2024-06-25 PROCEDURE — 93005 ELECTROCARDIOGRAM TRACING: CPT | Performed by: EMERGENCY MEDICINE

## 2024-06-25 PROCEDURE — 70496 CT ANGIOGRAPHY HEAD: CPT

## 2024-06-25 PROCEDURE — 700102 HCHG RX REV CODE 250 W/ 637 OVERRIDE(OP)

## 2024-06-25 RX ORDER — OXYCODONE HCL 10 MG/1
10 TABLET, FILM COATED, EXTENDED RELEASE ORAL EVERY 12 HOURS
Status: DISCONTINUED | OUTPATIENT
Start: 2024-06-25 | End: 2024-06-26 | Stop reason: HOSPADM

## 2024-06-25 RX ORDER — HEPARIN SODIUM 5000 [USP'U]/ML
5000 INJECTION, SOLUTION INTRAVENOUS; SUBCUTANEOUS EVERY 8 HOURS
Status: DISCONTINUED | OUTPATIENT
Start: 2024-06-25 | End: 2024-06-26 | Stop reason: HOSPADM

## 2024-06-25 RX ORDER — OXYCODONE HYDROCHLORIDE 15 MG/1
30 TABLET ORAL
Status: DISCONTINUED | OUTPATIENT
Start: 2024-06-25 | End: 2024-06-26 | Stop reason: HOSPADM

## 2024-06-25 RX ORDER — OXYCODONE HYDROCHLORIDE 15 MG/1
30 TABLET ORAL
Status: DISCONTINUED | OUTPATIENT
Start: 2024-06-25 | End: 2024-06-25

## 2024-06-25 RX ORDER — MIRTAZAPINE 15 MG/1
15 TABLET, FILM COATED ORAL NIGHTLY
Status: DISCONTINUED | OUTPATIENT
Start: 2024-06-25 | End: 2024-06-26 | Stop reason: HOSPADM

## 2024-06-25 RX ORDER — DOXYCYCLINE 100 MG/1
100 TABLET ORAL EVERY 12 HOURS
Status: DISCONTINUED | OUTPATIENT
Start: 2024-06-25 | End: 2024-06-26 | Stop reason: HOSPADM

## 2024-06-25 RX ORDER — LABETALOL HYDROCHLORIDE 5 MG/ML
10 INJECTION, SOLUTION INTRAVENOUS EVERY 4 HOURS PRN
Status: DISCONTINUED | OUTPATIENT
Start: 2024-06-25 | End: 2024-06-26 | Stop reason: HOSPADM

## 2024-06-25 RX ORDER — OXYCODONE 13.5 MG/1
13.5 CAPSULE, EXTENDED RELEASE ORAL 2 TIMES DAILY
COMMUNITY
Start: 2024-05-28

## 2024-06-25 RX ORDER — DIPHENHYDRAMINE HYDROCHLORIDE 50 MG/ML
25 INJECTION INTRAMUSCULAR; INTRAVENOUS ONCE
Status: COMPLETED | OUTPATIENT
Start: 2024-06-25 | End: 2024-06-25

## 2024-06-25 RX ORDER — FOLIC ACID/VIT B COMPLEX AND C 0.8 MG
1 TABLET ORAL DAILY
Status: DISCONTINUED | OUTPATIENT
Start: 2024-06-26 | End: 2024-06-25

## 2024-06-25 RX ORDER — SEVELAMER CARBONATE 800 MG/1
800 TABLET, FILM COATED ORAL
Status: DISCONTINUED | OUTPATIENT
Start: 2024-06-25 | End: 2024-06-26 | Stop reason: HOSPADM

## 2024-06-25 RX ORDER — POLYETHYLENE GLYCOL 3350 17 G/17G
1 POWDER, FOR SOLUTION ORAL
Status: DISCONTINUED | OUTPATIENT
Start: 2024-06-25 | End: 2024-06-26 | Stop reason: HOSPADM

## 2024-06-25 RX ORDER — MIRTAZAPINE 15 MG/1
15 TABLET, FILM COATED ORAL NIGHTLY
COMMUNITY

## 2024-06-25 RX ORDER — METHYLPREDNISOLONE SODIUM SUCCINATE 125 MG/2ML
62.5 INJECTION, POWDER, LYOPHILIZED, FOR SOLUTION INTRAMUSCULAR; INTRAVENOUS ONCE
Status: COMPLETED | OUTPATIENT
Start: 2024-06-25 | End: 2024-06-25

## 2024-06-25 RX ADMIN — DIPHENHYDRAMINE HYDROCHLORIDE 25 MG: 50 INJECTION, SOLUTION INTRAMUSCULAR; INTRAVENOUS at 14:00

## 2024-06-25 RX ADMIN — METHYLPREDNISOLONE SODIUM SUCCINATE 62.5 MG: 125 INJECTION, POWDER, FOR SOLUTION INTRAMUSCULAR; INTRAVENOUS at 13:59

## 2024-06-25 RX ADMIN — TIZANIDINE 8 MG: 4 TABLET ORAL at 21:43

## 2024-06-25 RX ADMIN — OXYCODONE HYDROCHLORIDE 30 MG: 15 TABLET ORAL at 22:35

## 2024-06-25 RX ADMIN — DOXYCYCLINE 100 MG: 100 TABLET, FILM COATED ORAL at 21:42

## 2024-06-25 RX ADMIN — MIRTAZAPINE 15 MG: 15 TABLET, FILM COATED ORAL at 21:42

## 2024-06-25 RX ADMIN — IOHEXOL 80 ML: 350 INJECTION, SOLUTION INTRAVENOUS at 16:00

## 2024-06-25 SDOH — ECONOMIC STABILITY: TRANSPORTATION INSECURITY
IN THE PAST 12 MONTHS, HAS LACK OF RELIABLE TRANSPORTATION KEPT YOU FROM MEDICAL APPOINTMENTS, MEETINGS, WORK OR FROM GETTING THINGS NEEDED FOR DAILY LIVING?: NO

## 2024-06-25 SDOH — ECONOMIC STABILITY: TRANSPORTATION INSECURITY
IN THE PAST 12 MONTHS, HAS THE LACK OF TRANSPORTATION KEPT YOU FROM MEDICAL APPOINTMENTS OR FROM GETTING MEDICATIONS?: NO

## 2024-06-25 ASSESSMENT — SOCIAL DETERMINANTS OF HEALTH (SDOH)
WITHIN THE LAST YEAR, HAVE TO BEEN RAPED OR FORCED TO HAVE ANY KIND OF SEXUAL ACTIVITY BY YOUR PARTNER OR EX-PARTNER?: NO
WITHIN THE PAST 12 MONTHS, YOU WORRIED THAT YOUR FOOD WOULD RUN OUT BEFORE YOU GOT THE MONEY TO BUY MORE: NEVER TRUE
WITHIN THE PAST 12 MONTHS, THE FOOD YOU BOUGHT JUST DIDN'T LAST AND YOU DIDN'T HAVE MONEY TO GET MORE: NEVER TRUE
WITHIN THE LAST YEAR, HAVE YOU BEEN KICKED, HIT, SLAPPED, OR OTHERWISE PHYSICALLY HURT BY YOUR PARTNER OR EX-PARTNER?: NO
WITHIN THE LAST YEAR, HAVE YOU BEEN HUMILIATED OR EMOTIONALLY ABUSED IN OTHER WAYS BY YOUR PARTNER OR EX-PARTNER?: NO
WITHIN THE LAST YEAR, HAVE YOU BEEN AFRAID OF YOUR PARTNER OR EX-PARTNER?: NO
IN THE PAST 12 MONTHS, HAS THE ELECTRIC, GAS, OIL, OR WATER COMPANY THREATENED TO SHUT OFF SERVICE IN YOUR HOME?: NO

## 2024-06-25 ASSESSMENT — ENCOUNTER SYMPTOMS
NAUSEA: 0
CHILLS: 0
MYALGIAS: 0
BLURRED VISION: 1
HEMOPTYSIS: 0
HEARTBURN: 0
PALPITATIONS: 0
VOMITING: 0
SPUTUM PRODUCTION: 0
FEVER: 0
ORTHOPNEA: 0
BACK PAIN: 0
COUGH: 0
WEIGHT LOSS: 0
HEADACHES: 1
NECK PAIN: 0
ABDOMINAL PAIN: 0

## 2024-06-25 ASSESSMENT — VISUAL ACUITY
OS_SC: NLP
OD_SC: 20/50+1
OD_SC: J5
METHOD: SNELLEN - LINEAR
OD_CC: 20/40

## 2024-06-25 ASSESSMENT — REFRACTION_MANIFEST
OS_CYLINDER: +0.75
OD_CYLINDER: +1.50
OS_SPHERE: -0.25
OD_SPHERE: -1.75
METHOD_AUTOREFRACTION: 1
OS_AXIS: 010
OD_AXIS: 009

## 2024-06-25 ASSESSMENT — CONF VISUAL FIELD
OS_SUPERIOR_NASAL_RESTRICTION: 1
OS_INFERIOR_TEMPORAL_RESTRICTION: 1
OS_SUPERIOR_TEMPORAL_RESTRICTION: 1
OS_INFERIOR_NASAL_RESTRICTION: 1

## 2024-06-25 ASSESSMENT — TONOMETRY
OS_IOP_MMHG: 11
OD_IOP_MMHG: 10

## 2024-06-25 ASSESSMENT — LIFESTYLE VARIABLES
TOTAL SCORE: 0
ON A TYPICAL DAY WHEN YOU DRINK ALCOHOL HOW MANY DRINKS DO YOU HAVE: 0
HAVE PEOPLE ANNOYED YOU BY CRITICIZING YOUR DRINKING: NO
AVERAGE NUMBER OF DAYS PER WEEK YOU HAVE A DRINK CONTAINING ALCOHOL: 0
EVER FELT BAD OR GUILTY ABOUT YOUR DRINKING: NO
HAVE YOU EVER FELT YOU SHOULD CUT DOWN ON YOUR DRINKING: NO
DOES PATIENT WANT TO STOP DRINKING: NO
TOTAL SCORE: 0
TOTAL SCORE: 0
ALCOHOL_USE: NO
EVER HAD A DRINK FIRST THING IN THE MORNING TO STEADY YOUR NERVES TO GET RID OF A HANGOVER: NO
HOW MANY TIMES IN THE PAST YEAR HAVE YOU HAD 5 OR MORE DRINKS IN A DAY: 0
CONSUMPTION TOTAL: NEGATIVE

## 2024-06-25 ASSESSMENT — SLIT LAMP EXAM - LIDS
COMMENTS: NORMAL
COMMENTS: NORMAL

## 2024-06-25 ASSESSMENT — FIBROSIS 4 INDEX
FIB4 SCORE: 1.37
FIB4 SCORE: 1.19
FIB4 SCORE: 1.19

## 2024-06-25 ASSESSMENT — PAIN DESCRIPTION - PAIN TYPE
TYPE: ACUTE PAIN
TYPE: ACUTE PAIN

## 2024-06-25 ASSESSMENT — REFRACTION_WEARINGRX
SPECS_TYPE: SVL
OS_AXIS: 180
OD_AXIS: 175
OD_SPHERE: -1.75
OS_CYLINDER: +0.50
OS_SPHERE: -1.50
OD_CYLINDER: +0.75

## 2024-06-25 ASSESSMENT — EXTERNAL EXAM - RIGHT EYE: OD_EXAM: NORMAL

## 2024-06-25 ASSESSMENT — EXTERNAL EXAM - LEFT EYE: OS_EXAM: NORMAL

## 2024-06-25 ASSESSMENT — PATIENT HEALTH QUESTIONNAIRE - PHQ9
1. LITTLE INTEREST OR PLEASURE IN DOING THINGS: NOT AT ALL
SUM OF ALL RESPONSES TO PHQ9 QUESTIONS 1 AND 2: 0
2. FEELING DOWN, DEPRESSED, IRRITABLE, OR HOPELESS: NOT AT ALL

## 2024-06-25 NOTE — CONSULTS
Chief Complaint   Patient presents with    Visual Problems         Neurology Initial Consult H&P  Vascular Neurology Service, [facility]    History of present illness:  Denis De Anda is a 43 y.o. male with a PMHx of ESRD on HD, arrhythmia, hypertension, thyroid goiter, and remote seizures who presented 06/25/2024 by prompting of ophthalmology with a 10-day history of bilateral vision loss, with complete vision loss of the left eye and lower visual field loss of the right eye. Patient was recently seen at [facility] on 06/19 for the above mentioned complaints and a MRI brain was obtained which was negative for acute infarction. The patient presented to the ophthalmologist today and was directed here for stroke work up. On exam the patient reports no lateralizing symptoms and states the visual acuity is unchanged since his initial work up at [facility]. Vascular Neurology has been consulted for further evaluation of the above.    Referring Provider: Lencho Goyal M.D. has consulted Neurology for further evaluation    Past medical history:   Past Medical History:   Diagnosis Date    Arrhythmia     irregular EKG    Chronic kidney disease, unspecified     Contracture of palmar fascia     Dialysis      hemodialysis at home 3 days a week    Graft failure due to thrombosis 3/10/2018    Hemorrhagic disorder (HCC)     on coumadin    Hypertension     Intra-abdominal varices     Pain     Chronic pain    Renal failure     hemodialysis    Seizure (HCC)     last seizure 04/1/2013    Sleep apnea     BIPAP    Snoring     sleep study done    Superior vena cava obstruction with collaterals     from calcium deposits per 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    Toxic uninodular goiter without mention of thyrotoxic crisis or storm        Past surgical history:   Past Surgical History:   Procedure Laterality Date    INCISION AND DRAINAGE GENERAL Right 4/20/2020    Procedure: INCISION AND  DRAINAGE, REMOVAL INFECTED THIGH GRAFT;  Surgeon: Lurdes Moffett M.D.;  Location: SURGERY [facility];  Service: General    AV FISTULA CREATION Right 3/13/2020    Procedure: CREATION, AV FISTULA- THIGH AND EXPLORATION OF LEFT THIGH AV GRAFT;  Surgeon: Lurdes Moffett M.D.;  Location: SURGERY [facility];  Service: General    MA INTRO CATH DIALYSIS CIRCUIT DX ANGRPH FLUOR S&I Left 2/20/2020    Procedure: FISTULOGRAM AND ANGIOPLASTY, VENOGRAM THROUGH EXISTING CATHETER.;  Surgeon: Lurdes Moffett M.D.;  Location: SURGERY [facility];  Service: General    AV FISTULA REVISION Left 2/20/2020    Procedure: REPAIR BLEEDING SITE LEFT ARTERIOVENOUS GRAFT.;  Surgeon: Lurdes Moffett M.D.;  Location: [facility];  Service: General    THROMBECTOMY Left 1/26/2019    Procedure: THROMBECTOMY  and angioplasty AV GRAFT;  Surgeon: Jairo Hopkins M.D.;  Location: SURGERY [facility];  Service: General    AV FISTULA REVISION Left 1/25/2019    Procedure: AV FISTULA REVISION - THIGH LOOP GRAFT;  Surgeon: Jairo Hopkins M.D.;  Location: SURGERY [facility];  Service: General    AV FISTULA REVISION Left 12/23/2018    Procedure: AV FISTULA REVISION;  Surgeon: Lurdes Moffett M.D.;  Location: [facility];  Service: General    SPLIT THICKNESS SKIN GRAFT Right 9/21/2018    Procedure: SPLIT THICKNESS SKIN GRAFT - ARM TO ABDOMEN;  Surgeon: Samson Rosenbaum Jr., M.D.;  Location: SURGERY [facility];  Service: Plastics    SKIN FLAP DELAYED Left 9/10/2018    Procedure: SKIN FLAP DELAYED- FOR DIVISION AND INSET FLAP WITH SKIN GRAFT ON ARM;  Surgeon: Samson Rosenbaum Jr., M.D.;  Location: SURGERY SAME DAY [facility];  Service: Plastics    MYOCUTANEOUS FLAP Right 8/27/2018    Procedure: MYOCUTANEOUS FLAP - RIGHT ARM TO TRUNK;  Surgeon: Samson Rosenbaum Jr., M.D.;  Location: SURGERY [facility];  Service: Plastics    ABDOMINAL EXPLORATION  6/25/2018    Procedure: ABDOMINAL  EXPLORATION- CONTROL ABDOMINAL BLEEDING;  Surgeon: Samson Rosenbaum Jr., M.D.;  Location: SURGERY [facility];  Service: Plastics    THROMBECTOMY Left 4/28/2018    Procedure: THROMBECTOMY- Thigh W/ Graft;  Surgeon: Jairo Hopkins M.D.;  Location: SURGERY [facility];  Service: General    THROMBECTOMY Left 4/27/2018    Procedure: THROMBECTOMY - THIGH GRAFT AND REVISION;  Surgeon: Jairo Hopkins M.D.;  Location: SURGERY [facility];  Service: General    THROMBECTOMY Left 3/9/2018    Procedure: THROMBECTOMY- THIGH DIALYSIS GRAFT AND REVISION  ;  Surgeon: Jairo Hopkins M.D.;  Location: SURGERY [facility];  Service: General    CATH PLACEMENT Right 3/9/2018    Procedure: CATH PLACEMENT - REPLACE DIALYSIS CATH RIGHT THIGH;  Surgeon: Jairo Hopkins M.D.;  Location: SURGERY [facility];  Service: General    SPLIT THICKNESS SKIN GRAFT  2/26/2018    Procedure: SPLIT THICKNESS SKIN GRAFT- TO ABDOMEN;  Surgeon: Samson Rosenbaum Jr., M.D.;  Location: SURGERY [facility];  Service: Plastics    WOUND CLOSURE GENERAL  2/19/2018    Procedure: WOUND CLOSURE GENERAL-ABDOMINAL WALL HEMORRHAGE;  Surgeon: Jason Robertson M.D.;  Location: SURGERY [facility];  Service: Plastics    WOUND EXPLORATION GENERAL  2/1/2018    Procedure: WOUND EXPLORATION GENERAL, REPAIR BLEEDING;  Surgeon: Samson Rosenbaum Jr., M.D.;  Location: SURGERY [facility];  Service: Plastics    CATH PLACEMENT Right 11/14/2017    Procedure: CATH PLACEMENT - PERMA;  Surgeon: Jairo Hopkins M.D.;  Location: SURGERY [facility];  Service: General    AV FISTULA REVISION Left 11/14/2017    Procedure: AV GRAFT REVISION;  Surgeon: Jairo Hopkins M.D.;  Location: SURGERY [facility];  Service: General    IRRIGATION & DEBRIDEMENT GENERAL  7/31/2017    Procedure: IRRIGATION & DEBRIDEMENT GENERAL-ABDOMEN;  Surgeon: Samson Rosenbaum Jr., M.D.;  Location: SURGERY [facility];  Service:     ABDOMINOPLASTY  6/5/2017     Procedure: ABDOMINOPLASTY - FOR PANNICULECTOMY;  Surgeon: Samson Rosebnaum Jr., M.D.;  Location: [facility];  Service:     SPINAL CORD STIMULATOR N/A 5/4/2017    Procedure: SPINAL CORD STIMULATOR - EXPLANT;  Surgeon: Bin Pro M.D.;  Location: [facility];  Service:     THROMBECTOMY Left 1/6/2017    Procedure: THROMBECTOMY-OPEN THROMBECTOMY WITH LEFT THIGH GRAFT;  Surgeon: Jairo Hopkins M.D.;  Location: [facility];  Service:     CATH PLACEMENT Right 1/6/2017    Procedure: CATH PLACEMENT;  Surgeon: Jairo Hopkins M.D.;  Location: [facility];  Service:     THROMBECTOMY Left 1/5/2017    Procedure: THROMBECTOMY-THIGH AV LOOP GRAFT AND ANGIOJET;  Surgeon: Jairo Hopkins M.D.;  Location: [facility];  Service:     FLAP CLOSURE Left 11/17/2016    Procedure: Fasciocutaneous Flap Closure Left Upper Leg;  Surgeon: Samson Rosenbaum Jr., M.D.;  Location: [facility];  Service:     IRRIGATION & DEBRIDEMENT GENERAL Left 10/28/2016    Procedure: IRRIGATION & DEBRIDEMENT GENERAL THIGH WITH IRRIGATING WOUND VAC PLACEMENT;  Surgeon: Jairo Hopkins M.D.;  Location: [facility];  Service:     THROMBECTOMY Left 10/20/2016    Procedure: THROMBECTOMY THIGH;  Surgeon: Jairo Hopkins M.D.;  Location: [facility];  Service:     INCISION AND DRAINAGE GENERAL  10/20/2016    Procedure: INCISION AND DRAINAGE GENERAL HEMATOMA;  Surgeon: Jairo Hopkins M.D.;  Location: [facility];  Service:     THROMBECTOMY Left 9/18/2016    Procedure: THROMBECTOMY - and fistula revision;  Surgeon: Jairo Hopkins M.D.;  Location: [facility];  Service:     AV FISTULOGRAM  9/18/2016    Procedure: AV FISTULOGRAM;  Surgeon: Jairo Hopkins M.D.;  Location: [facility];  Service:     CATH PLACEMENT Right 9/17/2016    Procedure: CATH PLACEMENT - tunneled dialysis cath placement right femoral ;   Surgeon: Quentin Alicia M.D.;  Location: SURGERY [facility];  Service:     MASS EXCISION GENERAL Right 8/5/2016    Procedure: MASS EXCISION GENERAL FOR CALCIPHYLAXIS SKIN AND SUBCUTANEOUS TISSUE FOREARM;  Surgeon: Jairo Hopkins M.D.;  Location: SURGERY [facility];  Service:     LESION EXCISION GENERAL Right 7/11/2016    Procedure: LESION EXCISION GENERAL FOR ARM SKIN;  Surgeon: Jairo Hopkins M.D.;  Location: SURGERY [facility];  Service:     RECOVERY  7/8/2016    Procedure: IR1-VASCULAR CASE-VIANEY-LEFT THIGH AV GRAFT THROMBOLYSIS WITH TISSUE PLASMINOGEN ACTIVATOR AND ANGIOJET ARTHERECTOMY   ;  Surgeon: Melinda Surgery;  Location: SURGERY PRE-POST PROC UNIT [facility];  Service:     SPINAL CORD STIMULATOR N/A 3/25/2016    Procedure: SPINAL CORD STIMULATOR;  Surgeon: Bin Pro;  Location: SURGERY [facility];  Service:     ME PERCUT IMPLNT NEUROELECT,EPIDURAL  2/19/2016    Procedure: IMPLANT NEUROSTIM EPI ARRAY;  Surgeon: Bin Pro;  Location: SURGERY [facility];  Service: Pain Management    ME PERCUT IMPLNT NEUROELECT,EPIDURAL  2/19/2016    Procedure: IMPLANT NEUROSTIM EPI ARRAY;  Surgeon: Bin Pro;  Location: SURGERY [facility];  Service: Pain Management    RECOVERY  8/7/2015    Procedure:  VASCULAR CASE VIANEY-RIGHT ILIAC VENOGRAM WITH ANGIOPLASTY, REMOVAL RIGHT FEMORAL TUNNELED DIALYSIS CATHETER  **VRE**;  Surgeon: Melinda Surgery;  Location: SURGERY PRE-POST PROC UNIT [facility];  Service:     AV FISTULA REVISION Left 6/17/2015    Procedure: AV FISTULA REVISION;  Surgeon: Jairo Hopkins M.D.;  Location: SURGERY [facility];  Service:     IRRIGATION & DEBRIDEMENT GENERAL Left 6/17/2015    Procedure: IRRIGATION & DEBRIDEMENT GENERAL ARM W/EXPLORATION WOUND & CONTROL BLEEDING;  Surgeon: Jairo Hopkins M.D.;  Location: SURGERY [facility];  Service:     AV FISTULA THROMBOLYSIS Left 6/9/2015    Procedure: THROMBECTOMY AV GRAFT THIGH;  Surgeon: Jairo ROWE  EDISON Hopkins;  Location: SURGERY [facility];  Service:     AV FISTULA THROMBOLYSIS Left 6/3/2015    Procedure: AV FISTULA THROMBOLYSIS THIGH REPLACEMENT;  Surgeon: Jairo Hopkins M.D.;  Location: SURGERY [facility];  Service:     IRRIGATION & DEBRIDEMENT GENERAL Left 6/3/2015    Procedure: IRRIGATION & DEBRIDEMENT GENERAL;  Surgeon: Jairo Hopkins M.D.;  Location: SURGERY [facility];  Service:     AV FISTULA CREATION  4/20/2015    Performed by Jairo Hopkins M.D. at SURGERY [facility]    PB INJECT NERV BLCK,STELLATE GANGLION  3/24/2015    Performed by Bin Pro at SURGERY [facility]    AV FISTULA REVISION  3/20/2015    Performed by Jairo Hopkins M.D. at SURGERY [facility]    AV FISTULA REVISION  2/22/2015    Performed by Lurdes Moffett M.D. at SURGERY [facility]    AV FISTULA REVISION  2/8/2015    Performed by Jairo Hopkins M.D. at SURGERY [facility]    IRRIGATION & DEBRIDEMENT GENERAL  12/15/2014    Performed by Jairo Hopkins M.D. at SURGERY [facility]    AV FISTULA REVISION  9/30/2014    Performed by Jairo Hopkins M.D. at SURGERY [facility]    RECOVERY  6/13/2014    Performed by [facility] at SURGERY SAME DAY [facility]    INCISION AND DRAINAGE GENERAL  9/13/2013    Performed by Jairo Hopkins M.D. at SURGERY [facility]    DEBRIDEMENT  9/13/2013    Performed by Jairo Hopkins M.D. at SURGERY [facility]    VEIN LIGATION  9/13/2013    Performed by Jairo Hopkins M.D. at SURGERY [facility]    RECOVERY  5/18/2012    Performed by SURGERY, IR-RECOVERY at SURGERY [facility]    BONE SPUR EXCISION  12/8/2011    Performed by BELKIS BREAUX at SURGERY SAME DAY [facility]    AV FISTULA REVISION  11/3/2011    Performed by JAIRO HOPKINS at SURGERY [facility]    AV FISTULOGRAM  6/5/2011    Performed by JAIRO HOPKINS at SURGERY [facility]    CATH PLACEMENT  6/5/2011    Performed by  MARI WINTERS at SURGERY [facility] ORS    CATH PLACEMENT  2/1/2011    Performed by MARI WINTERS at SURGERY [facility] ORS    ANGIOPLASTY BALLOON  2/1/2011    Performed by MARI WINTERS at SURGERY [facility] ORS    ENDARTERECTOMY  11/16/2010    Performed by MARI WINTERS at SURGERY [facility] ORS    AV FISTULOGRAM  11/16/2010    Performed by MARI WINTERS at SURGERY [facility] ORS    ARTERIOGRAM  3/5/2009    Performed by MARI WINTERS at SURGERY [facility] ORS    ANGIOPLASTY BALLOON  3/5/2009    Performed by MARI WINTERS at SURGERY [facility] ORS    AV FISTULOGRAM  3/5/2009    Performed by MARI WINTERS at SURGERY [facility]S    AV FISTULA CREATION  11/6/08    Performed by MARI WINTERS at SURGERY [facility] ORS    MASS EXCISION ORTHO  10/9/08    Performed by BELKIS BREAUX at SURGERY SAME DAY [facility]    PARATHYROIDECTOMY  2006    INGUINAL HERNIA REPAIR Bilateral 2001, 2002    US-KIDNEY TRANSPLANT  1996, 2001    x2       Family history:   Family History   Problem Relation Age of Onset    Arthritis Father         RA    Lung Disease Father     Heart Disease Father         MI    Hypertension Brother        Social history:   Social History     Socioeconomic History    Marital status: Single     Spouse name: Not on file    Number of children: Not on file    Years of education: Not on file    Highest education level: Not on file   Occupational History    Occupation:      Employer: [employer]   Tobacco Use    Smoking status: Never    Smokeless tobacco: Never   Vaping Use    Vaping status: Never Used   Substance and Sexual Activity    Alcohol use: No    Drug use: No    Sexual activity: Yes     Partners: Female   Other Topics Concern    Primary/coprimary nurse & associates Not Asked    Family contact information Not Asked    OK to release patient information to the following Not Asked    Patient preferred routine/Privacy concerns Not Asked    Patient likes  and dislikes Not Asked    Participating In Research Study Not Asked    Miscellaneous Not Asked   Social History Narrative    Not on file     Social Determinants of Health     Financial Resource Strain: Low Risk  (8/7/2023)    Received from [facility]    Overall Financial Resource Strain (CARDIA)     Difficulty of Paying Living Expenses: Not hard at all   Food Insecurity: No Food Insecurity (8/7/2023)    Received from [facility]    Hunger Vital Sign     Worried About Running Out of Food in the Last Year: Never true     Ran Out of Food in the Last Year: Never true   Transportation Needs: No Transportation Needs (8/7/2023)    Received from [facility]    PRAPARE - Transportation     Lack of Transportation (Medical): No     Lack of Transportation (Non-Medical): No   Physical Activity: Insufficiently Active (8/7/2023)    Received from [facility]    Exercise Vital Sign     Days of Exercise per Week: 3 days     Minutes of Exercise per Session: 20 min   Stress: Stress Concern Present (7/24/2022)    Received from [facility]    [employer] - Occupational Stress Questionnaire     Feeling of Stress : Rather much   Social Connections: Socially Isolated (7/24/2022)    Received from [facility]    Social Connection and Isolation Panel [NHANES]     Frequency of Communication with Friends and Family: More than three times a week     Frequency of Social Gatherings with Friends and Family: Once a week     Attends Caodaism Services: Never     Active Member of Clubs or Organizations: No     Attends Club or Organization Meetings: Never     Marital Status: Never    Intimate Partner Violence: Not At Risk (8/7/2023)    Received from [facility]    Humiliation, Afraid, Rape, and Kick questionnaire     Fear of Current or Ex-Partner: No     Emotionally Abused: No     Physically Abused: No     Sexually Abused: No   Housing Stability: Low Risk  (8/7/2023)    Received from [facility]    Housing Stability      What is your living situation today?: I have a steady place to live       Current medications:   No current facility-administered medications for this encounter.     Current Outpatient Medications   Medication    sevelamer carbonate (RENVELA) 800 MG Tab tablet    docusate sodium 100 MG Cap    doxycycline (VIBRAMYCIN) 100 MG Tab    lidocaine (XYLOCAINE) 5 % Ointment    lidocaine (XYLOCAINE) 2 % Solution    oxyCODONE immediate release (ROXICODONE) 10 MG immediate release tablet    Ferric Citrate (AURYXIA) 1  MG(Fe) Tab    diphenhydrAMINE (BENADRYL) 25 MG Tab    B Complex-C-Folic Acid (DIALYVITE 800) 0.8 MG Tab    gentamicin (GARAMYCIN) 0.1 % cream    Methoxy PEG-Epoetin Beta (MIRCERA INJ)    Naloxone (NARCAN) 4 MG/0.1ML Liquid    calcitRIOL (ROCALTROL) 0.25 MCG Cap    tizanidine (ZANAFLEX) 4 MG Tab       Medication Allergy:  Allergies   Allergen Reactions    Baclofen Unspecified     Total loss of memory, sedation.   RXN=6/2015    Chlorhexidine Itching and Rash    Contrast Media With Iodine [Iodine] Rash     RXN=1/5/2017    Pcn [Penicillins] Rash     RXN=possibly >10 years ago  Tolerated Zosyn on 2/20/18    Historically tolerated cefazolin (2020) and ceftriaxone (2022)      Tape Rash     Paper tape and tegaderm ok  RXN=ongoing    Cephalexin Rash     Historically tolerated cefazolin (2020) and ceftriaxone (2022)    Requip Vomiting    Ropinirole Vomiting     Requip      Diagnostic X-Ray Materials Rash     Patient has a history of an IV contrast reaction. Patient instructed to take prednisone 50 mg by mouth 13 hours, 7 hours and 1 hour prior to IV contrast injection and  Diphenhydramine 50 mg by mouth 1 hour prior to IV contrast injection. Prescription faxed to pharmacy of patient's choice. Patient reports that he has not had a reaction following the pretreatment.       Review of systems:   Constitutional: denies fever, night sweats, weight loss.   Eyes: denies acute vision change, eye pain or secretion.   Ears,  Nose, Mouth, Throat: denies nasal secretion, nasal bleeding, difficulty swallowing, hearing loss, tinnitus, vertigo, ear pain, acute dental problems, oral ulcers or lesions.   Endocrine: denies recent weight changes, heat or cold intolerance, polyuria, polydypsia, polyphagia,abnormal hair growth.  Cardiovascular: denies new onset of chest pain, palpitations, syncope, or dyspnea of exertion.  Pulmonary: denies shortness of breath, new onset of cough, hemoptysis, wheezing, chest pain or flu-like symptoms.   GI: denies nausea, vomiting, diarrhea, GI bleeding, change in appetite, abdominal pain, and change in bowel habits.  : denies dysuria, urinary incontinence, hematuria.  Heme/oncology: denies history of easy bruising or bleeding. No history of cancer, DVTor PE.  Allergy/immunology: denies hives/urticaria, or itching.   Dermatologic: denies new rash, or new skin lesions.  Musculoskeletal:denies joint swelling or pain, muscle pain, neck and back pain.   Neurologic: denies headaches, reports bilateral visual acuity loss, denies facial droopiness, muscle weakness (focal or generalized), paresthesias, anesthesia, ataxia, change in speech or language, memory loss, abnormal movements, seizures, loss of consciousness, or episodes of confusion.   Psychiatric: denies symptoms of depression, anxiety, hallucinations, mood swings or changes, suicidal or homicidal thoughts.     Physical examination:   Vitals:    06/25/24 1116 06/25/24 1148 06/25/24 1203 06/25/24 1212   BP:   108/49    Pulse: 100 93 94 (!) 102   Resp: 12 13 18 16   Temp:       TempSrc:       SpO2: 100% 100% 95% 94%   Weight:         General: Patient in no acute distress, pleasant and cooperative.  HEENT: Normocephalic, no signs of acute trauma.   Neck: supple, no meningeal signs or carotid bruits. There is normal range of motion. No tenderness on exam.   Chest: clear to auscultation. No cough.   CV: RRR, no murmurs.   Skin: no signs of acute rashes or trauma.    Musculoskeletal: joints exhibit full range of motion, without any pain to palpation. There are no signs of joint or muscle swelling. There is no tenderness to deep palpation of muscles.   Psychiatric: No hallucinatory behavior. Denies symptoms of depression or suicidal ideation. Mood and affect appear normal on exam.     NEUROLOGICAL EXAM:   Mental status, orientation: Awake, alert and fully oriented.   Speech and language: speech is clear and fluent. The patient is able to name, repeat and comprehend.   Memory: There is intact recollection of recent and remote events.   Cranial nerve exam: Pupils are 3-4 mm bilaterally and sluggish reactivity to light and accommodation. Complete loss of visual acuity of left eye and partial loss of visual acuity in right eye. There is no nystagmus on primary or secondary gaze. Intact full EOM in all directions of gaze. Face appears symmetric. Sensation in the face is intact to light touch. Uvula is midline. Tongue is midline. Shoulder shrug is intact bilaterally.   Motor exam: Strength is 5/5 in all extremities. Tone is normal.   Sensory exam reveals normal sense of light touch, proprioception, vibration and pinprick in all extremities.   Deep tendon reflexes:  2+ throughout. Plantar responses are flexor. There is no clonus.   Coordination: shows a normal finger-nose-finger. No ataxia noted  Gait: Not assessed due to patient preference    NIHSS: National Institutes of Health Stroke Scale    [0] 1a:Level of Consciousness    0-alert 1-drowsy   2-stupor   3-coma  [0] 1b:LOC Questions                  0-both  1-one      2-neither  [0] 1c:LOC Commands                   0-both  1-one      2-neither  [0] 2: Best Gaze                     0-nl    1-partial  2-forced  [3] 3: Visual Fields                   0-nl    1-partial  2-complete 3-bilat  [0] 4: Facial Paresis                0-nl    1-minor    2-partial  3-full  MOTOR                       0-nl  [0] 5: Right Arm            1-drift  [0] 6: Left Arm             2-some effort vs gravity  [0] 7: Right Leg           3-no effort vs gravity  [0] 8: Left Leg             4-no movement                             x-untestable  [0] 9: Limb Ataxia                    0-abs   1-1_limb   2-2+_limbs       x-untestable  [0] 10:Sensory                        0-nl    1-partial  2-dense  [0] 11:Best Language/Aphasia         0-nl    1-mild/mod 2-severe   3-mute  [0] 12:Dysarthria                     0-nl    1-mild/mod 2-severe       x-untestable  [0] 13:Neglect/Inattention            0-none  1-partial  2-complete  [3] TOTAL    NIHSS Time: 2024 1214    ANCILLARY DATA REVIEWED:     Lab Data Review:  Recent Results (from the past 24 hour(s))   EKG (NOW)    Collection Time: 24 11:40 AM   Result Value Ref Range    Report       [facility] Emergency Dept.    Test Date:  2024  Pt Name:    ODALYS APPIAH                Department: ER  MRN:        [id number]                      Room:       [facility]  Gender:     Male                         Technician: [id number]  :        1980                   Requested By:MOHINI WINTERS  Order #:    [id number]                    Reading MD:    Measurements  Intervals                                Axis  Rate:       90                           P:          19  OK:         126                          QRS:        48  QRSD:       97                           T:          44  QT:         354  QTc:        433    Interpretive Statements  Sinus rhythm  Multiple ventricular premature complexes  Low voltage, extremity and precordial leads  Abnormal R-wave progression, early transition  Compared to ECG 2020 10:41:58  Ventricular premature complex(es) now present  Low QRS voltage now present     CBC WITH DIFFERENTIAL    Collection Time: 24 12:08 PM   Result Value Ref Range    WBC 6.4 4.8 - 10.8 K/uL    RBC 2.91 (L) 4.70 - 6.10 M/uL    Hemoglobin 9.6 (L) 14.0 - 18.0 g/dL    Hematocrit 29.2 (L)  42.0 - 52.0 %    .3 (H) 81.4 - 97.8 fL    MCH 33.0 27.0 - 33.0 pg    MCHC 32.9 32.3 - 36.5 g/dL    RDW 62.6 (H) 35.9 - 50.0 fL    Platelet Count 143 (L) 164 - 446 K/uL    MPV 10.1 9.0 - 12.9 fL    Neutrophils-Polys 67.80 44.00 - 72.00 %    Lymphocytes 24.20 22.00 - 41.00 %    Monocytes 5.30 0.00 - 13.40 %    Eosinophils 1.40 0.00 - 6.90 %    Basophils 0.80 0.00 - 1.80 %    Immature Granulocytes 0.50 0.00 - 0.90 %    Nucleated RBC 0.00 0.00 - 0.20 /100 WBC    Neutrophils (Absolute) 4.34 1.82 - 7.42 K/uL    Lymphs (Absolute) 1.55 1.00 - 4.80 K/uL    Monos (Absolute) 0.34 0.00 - 0.85 K/uL    Eos (Absolute) 0.09 0.00 - 0.51 K/uL    Baso (Absolute) 0.05 0.00 - 0.12 K/uL    Immature Granulocytes (abs) 0.03 0.00 - 0.11 K/uL    NRBC (Absolute) 0.00 K/uL   COMP METABOLIC PANEL    Collection Time: 06/25/24 12:08 PM   Result Value Ref Range    Sodium 142 135 - 145 mmol/L    Potassium 4.6 3.6 - 5.5 mmol/L    Chloride 100 96 - 112 mmol/L    Co2 27 20 - 33 mmol/L    Anion Gap 15.0 7.0 - 16.0    Glucose 90 65 - 99 mg/dL    Bun 25 (H) 8 - 22 mg/dL    Creatinine 6.20 (HH) 0.50 - 1.40 mg/dL    Calcium 9.5 8.5 - 10.5 mg/dL    Correct Calcium 9.4 8.5 - 10.5 mg/dL    AST(SGOT) 19 12 - 45 U/L    ALT(SGPT) 22 2 - 50 U/L    Alkaline Phosphatase 206 (H) 30 - 99 U/L    Total Bilirubin 0.4 0.1 - 1.5 mg/dL    Albumin 4.1 3.2 - 4.9 g/dL    Total Protein 7.1 6.0 - 8.2 g/dL    Globulin 3.0 1.9 - 3.5 g/dL    A-G Ratio 1.4 g/dL   ESTIMATED GFR    Collection Time: 06/25/24 12:08 PM   Result Value Ref Range    GFR (CKD-EPI) 11 (A) >60 mL/min/1.73 m 2       Labs reviewed by me.     No intake/output data recorded.    Imaging reviewed by me:     CT-CTA HEAD WITH & W/O-POST PROCESS    (Results Pending)   CT-CTA NECK WITH & W/O-POST PROCESSING    (Results Pending)       ASSESSMENT AND PLAN:    Assessment and Plan:     43 y.o. male with multiple vascular risk factors who presented frm the ophthalmologists office for bilateral visual acuity loss.  Patient reports symptoms started ~10 days ago with complete vision loss of the left eye and lower visual field loss in the right eye. The patient was seen at [facility] on 06/19 where an MRI brain was negative for acute infarcts, demonstrated remote bilateral cerebellar infarcts. Patient subsequently presented to ophthalmology who was concerned for vascular occlusions. On presentation the patient has no lateralizing symptoms. Low likelihood of bilateral retinal artery occlusions simultaneously. Will obtain CTA head and neck to evaluate vasculature, although in the setting of a negative MRI brain since these symptoms have started there likely is not a role for Vascular Neurology at this time. If CTA head and neck is unremarkable recommend evaluating for other etiologies for vision loss. Would recommend second neuro-ophthalmology evaluation. In setting of vascular risk factors, would recommend secondary stroke risk factor optimization with Lipid panel, and HgbA1c check. Normotensive blood pressure goal 110-130/60-80. Please reconsult vascular neurology with acute findings.       Case reviewed and plan created with Dr. Josiah Stinson, Vascular Neurology. Please call with any questions      CLAY Mcpherson.  Vascular Neurology, [facility]

## 2024-06-25 NOTE — ED PROVIDER NOTES
ED Provider Note    CHIEF COMPLAINT  Chief Complaint   Patient presents with    Visual Problems       EXTERNAL RECORDS REVIEWED  Reviewed outpatient ophthalmology note.  Reviewed previous ED visit and previous MRI.    HPI/ROS  LIMITATION TO HISTORY   Select: : None  OUTSIDE HISTORIAN(S):  None    Denis De Anda is a 43 y.o. male who presents to the emergency department sent in from ophthalmology for evaluation of visual loss believed to be vascular in origin.  Patient was recently in the ED and visual loss.  This is felt to be possibly central.  MRI was obtained which showed old strokes nothing new.  He went to the ophthalmologist today and was found to have retinal artery occlusion and other arterial pathology.  He was sent in for an MRA of the blood vessels in the head and neck.  He denies any focal numbness ting weakness or headache.  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  Denies any other acute concerns or complaints.  Last dialysis was yesterday.    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    SURGICAL HISTORY   has a past surgical history that includes mass excision ortho (10/9/08); av fistula creation (11/6/08); arteriogram (3/5/2009); angioplasty balloon (3/5/2009); av fistulogram (3/5/2009); us-kidney transplant (1996, 2001); endarterectomy (11/16/2010); av fistulogram (11/16/2010); cath placement (2/1/2011); angioplasty balloon (2/1/2011); av fistulogram (6/5/2011); cath placement (6/5/2011); av fistula revision (11/3/2011);  recovery (5/18/2012); incision and drainage general (9/13/2013); debridement (9/13/2013); vein ligation (9/13/2013); recovery (6/13/2014); av fistula revision (9/30/2014); irrigation & debridement general (12/15/2014); av fistula revision (2/8/2015); av fistula revision (2/22/2015); av fistula revision (3/20/2015); inject nerv blck,stellate ganglion (3/24/2015); av fistula creation (4/20/2015); av fistula thrombolysis (Left, 6/3/2015); irrigation & debridement general (Left, 6/3/2015); av fistula thrombolysis (Left, 6/9/2015); av fistula revision (Left, 6/17/2015); irrigation & debridement general (Left, 6/17/2015); recovery (8/7/2015); percut implnt neuroelect,epidural (2/19/2016); percut implnt neuroelect,epidural (2/19/2016); parathyroidectomy (2006); spinal cord stimulator (N/A, 3/25/2016); recovery (7/8/2016); lesion excision general (Right, 7/11/2016); mass excision general (Right, 8/5/2016); cath placement (Right, 9/17/2016); thrombectomy (Left, 9/18/2016); av fistulogram (9/18/2016); thrombectomy (Left, 10/20/2016); incision and drainage general (10/20/2016); irrigation & debridement general (Left, 10/28/2016); flap closure (Left, 11/17/2016); thrombectomy (Left, 1/6/2017); cath placement (Right, 1/6/2017); thrombectomy (Left, 1/5/2017); spinal cord stimulator (N/A, 5/4/2017); abdominoplasty (6/5/2017); irrigation & debridement general (7/31/2017); cath placement (Right, 11/14/2017); av fistula revision (Left, 11/14/2017); wound exploration general (2/1/2018); wound closure general (2/19/2018); split thickness skin graft (2/26/2018); thrombectomy (Left, 3/9/2018); cath placement (Right, 3/9/2018); thrombectomy (Left, 4/27/2018); thrombectomy (Left, 4/28/2018); abdominal exploration (6/25/2018); myocutaneous flap (Right, 8/27/2018); skin flap delayed (Left, 9/10/2018); split thickness skin graft (Right, 9/21/2018); av fistula revision (Left, 12/23/2018); av fistula revision (Left, 1/25/2019); thrombectomy  (Left, 1/26/2019); intro cath dialysis circuit dx angrph fluor s&i (Left, 2/20/2020); av fistula revision (Left, 2/20/2020); av fistula creation (Right, 3/13/2020); incision and drainage general (Right, 4/20/2020); inguinal hernia repair (Bilateral, 2001, 2002); and bone spur excision (12/8/2011).    FAMILY HISTORY  Family History   Problem Relation Age of Onset    Arthritis Father         RA    Lung Disease Father     Heart Disease Father         MI    Hypertension Brother        SOCIAL HISTORY  Social History     Tobacco Use    Smoking status: Never    Smokeless tobacco: Never   Vaping Use    Vaping status: Never Used   Substance and Sexual Activity    Alcohol use: No    Drug use: No    Sexual activity: Yes     Partners: Female       CURRENT MEDICATIONS  Home Medications       Reviewed by Don Massey (Pharmacy Tech) on 06/25/24 at 1352  Med List Status: Complete     Medication Last Dose Status   B Complex-C-Folic Acid (DIALYVITE 800) 0.8 MG Tab 6/25/2024 Active   DIPHENHYDRAMINE HCL PO 6/24/2024 Active   doxycycline (VIBRAMYCIN) 100 MG Tab 6/25/2024 Active   Ferric Citrate (AURYXIA) 1  MG(Fe) Tab 6/24/2024 Active   Methoxy PEG-Epoetin Beta 30 MCG/0.3ML Solution Prefilled Syringe 6/24/2024 Active   mirtazapine (REMERON) 15 MG Tab 6/24/2024 Active   Naloxone (NARCAN) 4 MG/0.1ML Liquid ON HAND Active   oxyCODONE immediate release (ROXICODONE) 10 MG immediate release tablet 6/23/2024 Active   tizanidine (ZANAFLEX) 4 MG Tab 6/24/2024 Active   XTAMPZA ER 13.5 MG Capsule Extended Release 12 hour Abuse-Deterrent 6/25/2024 Active                  Audit from Redirected Encounters    **Home medications have not yet been reviewed for this encounter**         ALLERGIES  Allergies   Allergen Reactions    Baclofen Unspecified     Total loss of memory, sedation.   RXN=6/2015    Chlorhexidine Itching and Rash    Contrast Media With Iodine [Iodine] Rash     RXN=1/5/2017    Pcn [Penicillins] Rash     RXN=possibly >10  years ago  Tolerated Zosyn on 2/20/18    Historically tolerated cefazolin (2020) and ceftriaxone (2022)      Tape Rash     Paper tape and tegaderm ok  RXN=ongoing    Cephalexin Rash     Historically tolerated cefazolin (2020) and ceftriaxone (2022)    Requip Vomiting    Ropinirole Vomiting     Requip      Diagnostic X-Ray Materials Rash     Patient has a history of an IV contrast reaction. Patient instructed to take prednisone 50 mg by mouth 13 hours, 7 hours and 1 hour prior to IV contrast injection and  Diphenhydramine 50 mg by mouth 1 hour prior to IV contrast injection. Prescription faxed to pharmacy of patient's choice. Patient reports that he has not had a reaction following the pretreatment.       PHYSICAL EXAM  VITAL SIGNS: BP (!) 144/58   Pulse 100   Temp 36.2 °C (97.2 °F) (Temporal)   Resp 12   Wt 79.8 kg (175 lb 14.8 oz)   SpO2 100%   BMI 30.20 kg/m²    Constitutional: Awake alert chronic ill-appearing no acute distress.  HENT: Normocephalic, Atraumatic,  Eyes: PERRL, EOMI, Conjunctiva normal, No discharge.   Neck: Normal range of motion, No tenderness, Supple, No stridor.   Cardiovascular: Normal heart rate, Normal rhythm, No murmurs,  Thorax & Lungs: Normal breath sounds, No respiratory distress, No wheezing, No chest tenderness.   Abdomen: Bowel sounds normal, Soft, No tenderness  Skin: Warm, Dry, No erythema, No rash.   Back: No tenderness, No CVA tenderness.   Musculoskeletal: Good range of motion in all major joints.  Neurologic: Alert, No focal deficits noted.   Psychiatric: Affect normal      EKG/LABS  Results for orders placed or performed during the hospital encounter of 06/25/24   CBC WITH DIFFERENTIAL   Result Value Ref Range    WBC 6.4 4.8 - 10.8 K/uL    RBC 2.91 (L) 4.70 - 6.10 M/uL    Hemoglobin 9.6 (L) 14.0 - 18.0 g/dL    Hematocrit 29.2 (L) 42.0 - 52.0 %    .3 (H) 81.4 - 97.8 fL    MCH 33.0 27.0 - 33.0 pg    MCHC 32.9 32.3 - 36.5 g/dL    RDW 62.6 (H) 35.9 - 50.0 fL     Platelet Count 143 (L) 164 - 446 K/uL    MPV 10.1 9.0 - 12.9 fL    Neutrophils-Polys 67.80 44.00 - 72.00 %    Lymphocytes 24.20 22.00 - 41.00 %    Monocytes 5.30 0.00 - 13.40 %    Eosinophils 1.40 0.00 - 6.90 %    Basophils 0.80 0.00 - 1.80 %    Immature Granulocytes 0.50 0.00 - 0.90 %    Nucleated RBC 0.00 0.00 - 0.20 /100 WBC    Neutrophils (Absolute) 4.34 1.82 - 7.42 K/uL    Lymphs (Absolute) 1.55 1.00 - 4.80 K/uL    Monos (Absolute) 0.34 0.00 - 0.85 K/uL    Eos (Absolute) 0.09 0.00 - 0.51 K/uL    Baso (Absolute) 0.05 0.00 - 0.12 K/uL    Immature Granulocytes (abs) 0.03 0.00 - 0.11 K/uL    NRBC (Absolute) 0.00 K/uL   COMP METABOLIC PANEL   Result Value Ref Range    Sodium 142 135 - 145 mmol/L    Potassium 4.6 3.6 - 5.5 mmol/L    Chloride 100 96 - 112 mmol/L    Co2 27 20 - 33 mmol/L    Anion Gap 15.0 7.0 - 16.0    Glucose 90 65 - 99 mg/dL    Bun 25 (H) 8 - 22 mg/dL    Creatinine 6.20 (HH) 0.50 - 1.40 mg/dL    Calcium 9.5 8.5 - 10.5 mg/dL    Correct Calcium 9.4 8.5 - 10.5 mg/dL    AST(SGOT) 19 12 - 45 U/L    ALT(SGPT) 22 2 - 50 U/L    Alkaline Phosphatase 206 (H) 30 - 99 U/L    Total Bilirubin 0.4 0.1 - 1.5 mg/dL    Albumin 4.1 3.2 - 4.9 g/dL    Total Protein 7.1 6.0 - 8.2 g/dL    Globulin 3.0 1.9 - 3.5 g/dL    A-G Ratio 1.4 g/dL   ESTIMATED GFR   Result Value Ref Range    GFR (CKD-EPI) 11 (A) >60 mL/min/1.73 m 2   EKG (NOW)   Result Value Ref Range    Report       [facility] Emergency Dept.    Test Date:  2024  Pt Name:    ODALYS APPIAH                Department: ER  MRN:        [id number]                      Room:        19  Gender:     Male                         Technician: [id number]  :        1980                   Requested By:MOHINI WINTERS  Order #:    [id number]                    Reading MD: MOHINI WINTERS. AMD    Measurements  Intervals                                Axis  Rate:       90                           P:          19  ID:         [id number]                           QRS:        48  QRSD:       97                           T:          44  QT:         354  QTc:        433    Interpretive Statements  Sinus rhythm  Multiple ventricular premature complexes  Low voltage, extremity and precordial leads  Abnormal R-wave progression, early transition  Compared to ECG 06/09/2020 10:41:58  Ventricular premature complex(es) now present    Electronically Signed On 06- 15:39:49 PDT by MOHINI WINTERS. AMD        I have independently interpreted this EKG    RADIOLOGY/PROCEDURES   I have independently interpreted the diagnostic imaging associated with this visit and am waiting the final reading from the radiologist.       Radiologist interpretation:  CT-CTA NECK WITH & W/O-POST PROCESSING   Final Result      1.  Mild atherosclerotic plaquing of the anterior and posterior circulation without evidence of significant stenosis.      CT-CTA HEAD WITH & W/O-POST PROCESS   Final Result      CT angiogram of the [place] within normal limits.          COURSE & MEDICAL DECISION MAKING    ASSESSMENT, COURSE AND PLAN  Care Narrative:       43-year-old male with end-stage renal disease and complicated vascular disease presents to the ED for an evaluation of progressive visual loss.  Seen recently in [facility] had an MRI that did not show a stroke.  Seen today by the ophthalmologist and sent in for further workup for central retinal artery occlusion and branch artery occlusion.    The patient has had progressive visual loss.  Denies any acute change.  This is been going on for some time.  I have reviewed the ophthalmology note and requested an MRA of the neck and the brain evaluating for possible embolic etiology of central retinal artery occlusion or calciphylaxis as a complication of dialysis.    The patient was seen and examined.  His vision has not acutely changed.  I reviewed his results.  I have reviewed ophthalmology notes.    I have communicated with the ophthalmologist.  CTA is  adequate.    The patient has no stroke causing this.  Ophthalmology requested neurology evaluation.  I did discuss this with Dr. Stinson on-call for neurology.  He said there is no evidence of stroke.  I do agree that he has no evidence of stroke.  He did not think there is much further intervention.  Specific question for the vascular neurology team is would he benefit from an antiplatelet lesion.  Although he does not have a stroke he certainly could have an embolic etiology for central mental artery occlusion and is he needs an embolic evaluation.    Ordered CTAs.  He will need an echo.  I spoke with Dr. Perales on-call for nephrology.  She thought this could be related to calciphylaxis but feels embolic and other more common pathology needs to be excluded first.  Patient may benefit from antiplatelet or anticoagulation therapy as well.    At this point the patient needs further workup and has an echocardiogram as well as the CTAs which are as above to exclude embolic pathology as a cause of her vision.  He is blind in 1 eye and has had a insult to the other eye and only has partial vision left.  Therefore I think it is important to perform an expeditious workup for embolic phenomenon because of his vision threatening.  Patient will be hospitalized for embolic workup.  Care transferred at that time.          DISPOSITION AND DISCUSSIONS  I have discussed management of the patient with the following physicians and THEA's: Ophthalmology, nephrology, neurology, hospitalist.        FINAL DIAGNOSIS  1. Central retinal artery occlusion of both eyes    2. End stage renal disease (HCC)    3. Sudden visual loss, unspecified laterality    4.  Possible embolic lesion to       Electronically signed by: Lencho Goyal M.D., 6/25/2024 11:46 AM

## 2024-06-25 NOTE — CONSULTS
DATE OF SERVICE:  06/25/2024     NEPHROLOGY CONSULTATION     REQUESTING PHYSICIAN:  Lencho Goyal MD     REASON FOR CONSULTATION:  To evaluate and provide dialysis for patient with   end-stage renal disease, admitted with vision disturbances.     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    He was seen by ophthalmologist Terri Dumont, now with progression of vision,   seen by neuroophthalmologist, sent for CT angio to rule out stroke.    Currently, the patient in the emergency room, awaiting CT angio, doing well,   no complaints, no shortness of breath.  No nausea, vomiting.     Medical history significant for end-stage renal disease, on hemodialysis,   status post renal transplant failure x2.     REVIEW OF SYSTEMS:  GENERAL:  No fatigue, malaise.  No fever or chills.  HEENT:  No nosebleeds, no sore throat, no sinus pain.  EYE:  Vision loss, complete on the left and portion of the right eye.  NECK:  No pain, no stiffness.  RESPIRATORY:  No shortness of breath, no cough, hemoptysis.  CARDIOVASCULAR:  No edema, chest pain, palpitations.  GASTROINTESTINAL:  No abdominal pain, no nausea, vomiting, diarrhea.     All other systems reviewed, all negative.     PAST MEDICAL HISTORY:  End-stage renal disease, on hemodialysis;   calciphylaxis; seizures; sleep apnea; vena cava obstruction; thyroid disease;   arrhythmias; hypertension.     PAST SURGICAL HISTORY:  Renal transplant, AV fistula creation, tunneled   dialysis catheter placement, abdominal exploration, spinal cord stimulator,   right upper arm amputation.     FAMILY HISTORY:  Lung disease, heart disease, hypertension.     SOCIAL HISTORY:  No tobacco, alcohol or drug use.     ALLERGIES:   ALLERGIC TO BACLOFEN, CHLORHEXIDINE, CONTRAST MEDIA TAPE,   CEPHALEXIN, REQUIP.     PHYSICAL EXAMINATION:  VITAL SIGNS:  Blood pressure 120/80, heart rate 99, temperature 36.2 Celsius.  GENERAL APPEARANCE:  Well-developed, well-nourished male in no acute distress.  HEENT:  Normocephalic, atraumatic.  Pupils equal, round, reactive to light.    Extraocular movement intact.  Nares patent.  Oropharynx clear, moist mucosa,   no erythema or exudate.  NECK:  Supple.  No lymphadenopathy, no thyromegaly appreciated.  LUNGS:  Clear to auscultation bilaterally.  No rales, wheezes, no rhonchi.  HEART:  Regular rhythm, no rub or gallop.  ABDOMEN:  Soft, nontender, nondistended.  Bowel sounds present.  No palpable   mass.  EXTREMITIES:  No cyanosis, no clubbing, no edema.  NEUROLOGIC:  Alert, oriented x3, no focal deficit.  Cranial nerves II-XII   grossly intact.  SKIN:  Warm, dry.  No erythema or rash.     LABORATORY RESULTS:  Reviewed, hemoglobin 9.6.  Sodium 142, potassium 4.6, BUN   25 and creatinine 6.2.     ASSESSMENT AND PLAN:  The patient is a very pleasant 43 years old male with   multiple medical problems, end-stage renal disease, on hemodialysis, admitted   with vision loss to rule out stroke.  1.  End-stage renal disease.  We will continue dialysis per patient's schedule   Monday, Wednesday and Friday.  2.  Electrolytes remains well controlled.  3.  Hypertension.  Blood pressure well controlled.  4.  Volume, well controlled.  5.  Anemia, noticed drop in hemoglobin level, to monitor closely.     RECOMMENDATIONS:  1.  Hemodialysis on Monday, Wednesday, Friday.  No need for emergent dialysis   today.  2.  Monitor hemoglobin level, basic metabolic panel, to provide renal diet.    Adjust all medications to renal doses.  We will follow the patient closely.     Thank you for the consult.  Above plan was discussed with Dr. Lencho Goyal.        ______________________________  MD CR OROZCO/LACY    DD:  06/25/2024  15:15  DT:  06/25/2024 15:37    Job#:  [id number]

## 2024-06-25 NOTE — H&P
[facility] Internal Medicine History & Physical Note    Date of Service  6/25/2024    [facility] Team: R IM Green Team   Attending: Kristian Su M.d.  Senior Resident: Dr. Weiss  Intern:  Dr. Duggan  Contact Number: [phone]    Primary Care Physician  Pcp Pt States None    Consultants  nephrology, neurology, and ophthalmology    Code Status  Full Code    Chief Complaint  Chief Complaint   Patient presents with    Visual Problems       History of Presenting Illness (HPI):   Denis De Anda is a 43 y.o. male who presented 6/25/2024 with painless vision loss progressively worsen for around 10 days.Patient was in the ED 6/19 for the same reason, MRI was negative for acute brain or orbit change, patient did have tiny bilateral chronic lacunar infarcts in the cerebellum, one on each side on the MRI. After that, patient feels the vision loss is progressively worsen, today he lost the entire left side vision, and right eye can only have upper half vision field. He is evaluated by the ophthalmologist  today, is found bilateral optic disc edema, right superior retinal whitening, with right eye branch retinal artery occlusion and left eye central retinal artery occlusion. So he is sent to the ED again for stroke work up.  Neurologist is consulted in the ED, suggest CTA head and neck, which return with normal limits.  Patient denies any associated symptoms at this moment, such as headache, eye pains, nausea,vomiting, weakness or other focal neuroglial change.  CBC shows anemia, Hb 9.6, chem panel shows increased Cr 6.2, potassium normal at 4.6. Patient has history of ESRD, needs dialysis regularly, nephrologist Dr. Yoon sees him in the ED, suggest no need for emergent dialysis. Therefore, patient is admitted to the floor for further workup to explore the reason that causing him bilateral vision loss.     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     I discussed the plan of care with patient, family, and ED Dr. Goyal.      Review of Systems  Review of Systems   Constitutional:  Negative for chills, fever and weight loss.   HENT:  Negative for ear discharge, ear pain, hearing loss and tinnitus.    Eyes:         Positive for vision loss    Respiratory:  Negative for cough, hemoptysis and sputum production.    Cardiovascular:  Negative for chest pain, palpitations and orthopnea.   Gastrointestinal:  Negative for abdominal pain, heartburn, nausea and vomiting.   Genitourinary:  Negative for dysuria and urgency.   Musculoskeletal:  Negative for back pain, myalgias and neck pain.   Skin:  Negative for itching and rash.       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    Surgical History   has a past surgical history that includes mass excision ortho (10/9/08); av fistula creation (11/6/08); arteriogram (3/5/2009); angioplasty balloon (3/5/2009); av fistulogram (3/5/2009); us-kidney transplant (1996, 2001); endarterectomy (11/16/2010); av fistulogram (11/16/2010); cath placement (2/1/2011); angioplasty balloon (2/1/2011); av fistulogram (6/5/2011); cath placement (6/5/2011); av fistula revision (11/3/2011); recovery (5/18/2012); incision and drainage general (9/13/2013); debridement (9/13/2013); vein ligation (9/13/2013); recovery (6/13/2014); av fistula revision (9/30/2014); irrigation & debridement general (12/15/2014); av fistula revision (2/8/2015); av fistula revision (2/22/2015); av fistula revision (3/20/2015); pr inject nerv blck,stellate  ganglion (3/24/2015); av fistula creation (4/20/2015); av fistula thrombolysis (Left, 6/3/2015); irrigation & debridement general (Left, 6/3/2015); av fistula thrombolysis (Left, 6/9/2015); av fistula revision (Left, 6/17/2015); irrigation & debridement general (Left, 6/17/2015); recovery (8/7/2015); pr percut implnt neuroelect,epidural (2/19/2016); pr percut implnt neuroelect,epidural (2/19/2016); parathyroidectomy (2006); spinal cord stimulator (N/A, 3/25/2016); recovery (7/8/2016); lesion excision general (Right, 7/11/2016); mass excision general (Right, 8/5/2016); cath placement (Right, 9/17/2016); thrombectomy (Left, 9/18/2016); av fistulogram (9/18/2016); thrombectomy (Left, 10/20/2016); incision and drainage general (10/20/2016); irrigation & debridement general (Left, 10/28/2016); flap closure (Left, 11/17/2016); thrombectomy (Left, 1/6/2017); cath placement (Right, 1/6/2017); thrombectomy (Left, 1/5/2017); spinal cord stimulator (N/A, 5/4/2017); abdominoplasty (6/5/2017); irrigation & debridement general (7/31/2017); cath placement (Right, 11/14/2017); av fistula revision (Left, 11/14/2017); wound exploration general (2/1/2018); wound closure general (2/19/2018); split thickness skin graft (2/26/2018); thrombectomy (Left, 3/9/2018); cath placement (Right, 3/9/2018); thrombectomy (Left, 4/27/2018); thrombectomy (Left, 4/28/2018); abdominal exploration (6/25/2018); myocutaneous flap (Right, 8/27/2018); skin flap delayed (Left, 9/10/2018); split thickness skin graft (Right, 9/21/2018); av fistula revision (Left, 12/23/2018); av fistula revision (Left, 1/25/2019); thrombectomy (Left, 1/26/2019); pr intro cath dialysis circuit dx angrph fluor s&i (Left, 2/20/2020); av fistula revision (Left, 2/20/2020); av fistula creation (Right, 3/13/2020); incision and drainage general (Right, 4/20/2020); inguinal hernia repair (Bilateral, 2001, 2002); and bone spur excision (12/8/2011).     Family History  family history  includes Arthritis in his father; Heart Disease in his father; Hypertension in his brother; Lung Disease in his father.   Family history reviewed with patient.     Social History  Tobacco: Never  Alcohol: Nver  Recreational drugs (illegal or prescription): Never   Primary Care Provider: Reviewed No       Allergies  Allergies   Allergen Reactions    Baclofen Unspecified     Total loss of memory, sedation.   RXN=6/2015    Chlorhexidine Itching and Rash    Contrast Media With Iodine [Iodine] Rash     RXN=1/5/2017    Pcn [Penicillins] Rash     RXN=possibly >10 years ago  Tolerated Zosyn on 2/20/18    Historically tolerated cefazolin (2020) and ceftriaxone (2022)      Tape Rash     Paper tape and tegaderm ok  RXN=ongoing    Cephalexin Rash     Historically tolerated cefazolin (2020) and ceftriaxone (2022)    Requip Vomiting    Ropinirole Vomiting     Requip      Diagnostic X-Ray Materials Rash     Patient has a history of an IV contrast reaction. Patient instructed to take prednisone 50 mg by mouth 13 hours, 7 hours and 1 hour prior to IV contrast injection and  Diphenhydramine 50 mg by mouth 1 hour prior to IV contrast injection. Prescription faxed to pharmacy of patient's choice. Patient reports that he has not had a reaction following the pretreatment.       Medications  Prior to Admission Medications   Prescriptions Last Dose Informant Patient Reported? Taking?   B Complex-C-Folic Acid (DIALYVITE 800) 0.8 MG Tab 6/25/2024 at AM Patient Yes Yes   Sig: Take 1 Tablet by mouth every day.   DIPHENHYDRAMINE HCL PO 6/24/2024 at HS Patient Yes Yes   Sig: Take 2 Tablets by mouth at bedtime.   Ferric Citrate (AURYXIA) 1  MG(Fe) Tab 6/24/2024 at PM Patient Yes Yes   Sig: Take 3 g by mouth 3 times a day with meals. 3 gm = 3 tabs   Methoxy PEG-Epoetin Beta 30 MCG/0.3ML Solution Prefilled Syringe 6/24/2024 at K Patient, Other Facility Yes Yes   Sig: Inject 30 mcg under the skin every 14 days.   Naloxone (NARCAN) 4 MG/0.1ML  Liquid ON HAND at PRN Patient Yes No   Sig: Administer 1 mg into affected nostril(S) as needed. Indications: Opioid Overdose   XTAMPZA ER 13.5 MG Capsule Extended Release 12 hour Abuse-Deterrent 6/25/2024 at AM Patient Yes Yes   Sig: Take 13.5 mg by mouth 2 times a day.   doxycycline (VIBRAMYCIN) 100 MG Tab 6/25/2024 at AM Patient Yes Yes   Sig: Take 100 mg by mouth 2 times a day.   mirtazapine (REMERON) 15 MG Tab 6/24/2024 at PM Patient Yes Yes   Sig: Take 15 mg by mouth every evening.   oxyCODONE immediate release (ROXICODONE) 10 MG immediate release tablet 6/23/2024 at HS Patient Yes No   Sig: Take 30 mg by mouth at bedtime. 30 mg = 3 tabs   tizanidine (ZANAFLEX) 4 MG Tab 6/24/2024 at HS Patient Yes Yes   Sig: Take 8 mg by mouth every bedtime. 2 tablets = 8 mg      Facility-Administered Medications: None       Physical Exam  Temp:  [36.2 °C (97.2 °F)-36.5 °C (97.7 °F)] 36.5 °C (97.7 °F)  Pulse:  [] 98  Resp:  [12-20] 16  BP: ()/(49-89) 157/89  SpO2:  [90 %-100 %] 97 %  Blood Pressure: 128/60   Temperature: 36.2 °C (97.2 °F)   Pulse: 99   Respiration: 14   Pulse Oximetry: 93 %       Physical Exam  Constitutional:       General: He is not in acute distress.     Appearance: He is not ill-appearing.   HENT:      Head: Normocephalic.      Nose: Nose normal.   Eyes:      General:         Right eye: No discharge.         Left eye: No discharge.      Extraocular Movements: Extraocular movements intact.   Cardiovascular:      Rate and Rhythm: Normal rate and regular rhythm.   Pulmonary:      Effort: Pulmonary effort is normal.      Breath sounds: Normal breath sounds.   Abdominal:      General: Abdomen is flat. There is no distension.      Tenderness: There is no abdominal tenderness.   Musculoskeletal:         General: No swelling or tenderness.      Cervical back: Normal range of motion and neck supple.   Skin:     General: Skin is warm and dry.   Neurological:      General: No focal deficit present.       "Mental Status: He is alert and oriented to person, place, and time.      Comments: Speech is clear and fluent, goal directed. Is able to name, repeat and comprehend. Pupils are equal size about 3mm, left pupil has no direct or consensual pupillary reaction, right pupil is slowly/slugish reactive to direct light reaction but consensual light reflex is absent. Extra ocular movements intact. Visual field is lost in L eye completely (\"black\") and in the R inferior temporal and nasal visual fields. Facial and body symmetry. Strength 5 out of 5 in upper and lower extremities. Sensitivity intact. Normal deep tendon reflexes. Normal tone.    Psychiatric:         Mood and Affect: Mood normal.         Laboratory:  Recent Labs     06/25/24  1208   WBC 6.4   RBC 2.91*   HEMOGLOBIN 9.6*   HEMATOCRIT 29.2*   .3*   MCH 33.0   MCHC 32.9   RDW 62.6*   PLATELETCT 143*   MPV 10.1     Recent Labs     06/25/24  1208   SODIUM 142   POTASSIUM 4.6   CHLORIDE 100   CO2 27   GLUCOSE 90   BUN 25*   CREATININE 6.20*   CALCIUM 9.5     Recent Labs     06/25/24  1208   ALTSGPT 22   ASTSGOT 19   ALKPHOSPHAT 206*   TBILIRUBIN 0.4   GLUCOSE 90         No results for input(s): \"NTPROBNP\" in the last 72 hours.      No results for input(s): \"TROPONINT\" in the last 72 hours.    Imaging:  CT-CTA NECK WITH & W/O-POST PROCESSING   Final Result      1.  Mild atherosclerotic plaquing of the anterior and posterior circulation without evidence of significant stenosis.      CT-CTA HEAD WITH & W/O-POST PROCESS   Final Result      CT angiogram of the [place] within normal limits.      EC-ECHOCARDIOGRAM COMPLETE W/ CONT    (Results Pending)       X-Ray:  I have personally reviewed the images and compared with prior images.  EKG:  I have personally reviewed the images and compared with prior images.    Assessment/Plan:  Problem Representation: I anticipate this patient will require at least two midnights for appropriate medical management, " necessitating inpatient admission because acute vision loss     Patient will need a Telemetry bed on MEDICAL service .  The need is secondary to acute vision loss .    * Acute loss of vision- (present on admission)  Assessment & Plan  Duration for 10 days, getting worse, has done MRI/CTA without positive findings about stroke.  Other differentials include embolism or eye intrinsic diease, but patient denies eye pain, no eye discharge, 6/25 eye exam finding bilateral optic edema, normal pressure, not support glaucoma or eye infection disease.   - Admit to Floor  - Neuro Check Q4h  - Pending Ophthalmology consult  - Neurology is following    - Repeat Echo with bubble      PAD (peripheral artery disease) ([facility])  Assessment & Plan  CTA neck shows mild atherosclerotic plaquing of the anterior and posterior circulation without evidence of significant stenosis.   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   - Pain management     ESRD (end stage renal disease) on dialysis ([facility])- (present on admission)  Assessment & Plan  Needs regular HD, follow with .  No need for urgent dialysis.  - Continue Regular MWF HD  - Monitor CBC and CMP  - Monitor phos and K  - Renal diet, 2L fluid restriction  - I/O   - Daily weight   - Renal dose medication  - Nephrology is following           VTE prophylaxis: SCDs/TEDs and heparin ppx

## 2024-06-25 NOTE — ED TRIAGE NOTES
.  Chief Complaint   Patient presents with    Visual Problems      Pt to triage via wheel chair. Pt has vision loss in left eye x 1.5 weeks, starting to lose vision in right eye. Pt was seen by ophthalmology sent for MRI. Pt sent by ophthalmology today and sent to ED for possible stroke. Pt has had vision changes x 1.5 weeks.    Pt educated on triage process and returned to [facility]. Charge RN aware.

## 2024-06-25 NOTE — ASSESSMENT & PLAN NOTE
6/25/2024-extremely complex patient with end-stage renal disease status post 2 failed kidney transplants.  History of calciphylaxis with left finger and right arm amputation.  Had acute vision loss especially in the left eye approximately 10 days ago.  Went to optometry who noticed eyes optic nerve swelling.  Was initially referred to [facility] by renal however they do not take his insurance.  Ended up going to [facility] emergency room where MRI scan was otherwise unremarkable except for 2 old remote chronic lacunar infarcts.  On examination today he has a dense superior arcuate defect of the right eye.  He has no light perception in the left eye.  On examination of the right eye the superior optic nerve is elevated.  There is a retinal whitening off the superior arcade.  In the left eye there is a diffuse nonhemorrhagic ischemic swelling of the left optic nerve.  There is box car of the venous vessels.  OCT demonstrates thinning of the inner retina.  Given his vasculopathic risk factors calciphylaxis this all suggest bilateral arterial occlusive disease.  In the right eye this is consistent with a branch retinal artery occlusion.  In the left eye this is consistent with a central retinal artery occlusion.  Thus given the bilaterality the concern is that this is a premonition for further stroke.  He did have a recent echo that was unremarkable.  However we will send him to the emergency room for a MR angiogram of the head and neck as well as to be consulted by stroke neurology.  His nephrologist Dr. Perales is aware as well.

## 2024-06-25 NOTE — SENIOR ADMIT NOTE
Senior Admission Note    CC: Vision loss     HPI: Mr. 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 He reports that on father's day he first experience vision loss in his left eye in the inferior temporal field and progressed to upper visual field loss, followed by inferior field loss in the right eye and feels like now it continues to progress to the superior visual field in the right.   Denies prior or associated symptoms such as headache, blurry vision or eye pain. He is visiting from [place], used to live in [place] for many years.    He had an outpatient MRI with and without contrast done on 6/19 that showed no acute abnormalities or ischemia, tiny bilateral chronic lacunar infarcts in the cerebellum bilaterally.  MRI of the orbits/face/neck did not identify acute abnormalities either. He was seen by ophthalmology today, fundus exam revealed superior retinal whitening with optic disc edema in the right eye, optic disc edema with nonhemorrhagic ischemic swelling of the left optic nerve - consistent with a branch or retinal artery occlusion in the right eye and central retinal artery occlusion in the left eye, per their notes. They recommended ED transfer for angiogram and neurology evaluation.     Neurology evaluated in this ER, recommending CT H&N angio and a second ophthalmology eval.     CTA head and neck shows angiogram of the [place] within normal limits and mild atherosclerotic plaques in the anterior and posterior circulation without evidence of significant stenosis.    ROS:   Denies lightheadedness, dizziness, chest pain, shortness of breath, abdominal pain, N/V, urinary symptoms, he is anuric, leg swelling.  Denies alcohol consumption.     Physical exam:     General:  "Awake, alert and oriented.  Head and Neck: NC/AT, EOMI, no scleral icterus or conjunctival pallor, no nasal discharge. Neck supple, no LADs.   CV: Rhythmic heart sounds, no murmurs, gallops or rubs.   Pulm: Chest expansion is symmetrical, no crackles, rales, rhonchi, or wheezing.  GI: Flat, non distended, soft.  Skin: Warm, no rashes, no lesions.  MSK: Normal ROM. No lower extremity edema. Dialysis catheter in right inguinal area.   Neuro: Patient is alert and oriented x4. Speech is clear and fluent, goal directed. Is able to name, repeat and comprehend. Pupils are equal size about 3mm, left pupil has no direct or consensual pupillary reaction, right pupil is slowly/slugish reactive to direct light reaction but consensual light reflex is absent. Extra ocular movements intact. Visual field is lost in L eye completely (\"black\") and in the R inferior temporal and nasal visual fields. Facial and body symmetry. Strength 5 out of 5 in upper and lower extremities. Sensitivity intact. Normal deep tendon reflexes. Normal tone.   Psych: Appropriate mood and affect.      Assessment and plan:     #Acute progressive bilateral vision loss   #Bilateral papilledema   MRI wwo (after symptoms onset) was negative for stroke, mass or lesions. CT angio is negative for retinal artery occlusion, which would be unlikely to happen bilaterally. No associated symptoms such as eye pain or headache suggestive of intracranial hypertension or CSVT. Needs further workup.   - Inpatient ophthalmology evaluation for recommendations in workup and management   - Check RPR/syphilis, HIV, HSV for papillitis/neuroretinitis w/u, other infectious causes include Lyme or Toxomasma - no epidemiological exposure. MS and Sarcoidosis lesions not seen in neuroimaging. May consider vasculitis w/u depending on clinical trajectory   - No obstruction seen in neuroimaging, low suspicion of embolic process    #PAD  Atherosclerosis (non occlusive) seen in imaging, along " with prior lacunar strokes.   - We will discuss statin and aspirin therapy     #ESRD  Appears euvolemic, no major metabolic/electrolyte derangements.   - Nephrology on consult for inpatient dialysis     Delilah ROBLES PGY-2  Internal Medicine [employer]

## 2024-06-25 NOTE — PROGRESS NOTES
Peds/Neuro Ophthalmology:   Tino Garcia M.D.    Date & Time note created:    6/25/2024   10:04 AM     Referring MD / APRN:  Pcp Pt States None, No att. providers found    Patient ID:  Name:             Denis De Anda   YOB: 1980  Age:                 43 y.o.  male   MRN:               [id number]    Chief Complaint/Reason for Visit:     Other (Optic nerve edema and vision loss left eye)      History of Present Illness:    Denis De Anda is a 43 y.o. male   Patient referred through the ER for optic nerve swelling and sudden vision loss left eye.Vision loss last Sunday with a previous headache.Lower half of vision right eye and complete block left eye.MRI done last week.    Other  Associated symptoms include headaches.       Review of Systems:  Review of Systems   Eyes:  Positive for blurred vision.   Neurological:  Positive for headaches.   All other systems reviewed and are negative.      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    Toxic uninodular goiter without mention of thyrotoxic crisis or storm        Past Surgical History:  Past Surgical History:   Procedure Laterality Date    INCISION AND DRAINAGE GENERAL Right 4/20/2020    Procedure: INCISION AND DRAINAGE, REMOVAL INFECTED THIGH GRAFT;  Surgeon: Lurdes Moffett M.D.;  Location: SURGERY [facility];  Service:  General    AV FISTULA CREATION Right 3/13/2020    Procedure: CREATION, AV FISTULA- THIGH AND EXPLORATION OF LEFT THIGH AV GRAFT;  Surgeon: Lurdes Moffett M.D.;  [place] [facility]  Service: General    NH INTRO CATH DIALYSIS CIRCUIT DX ANGRPH FLUOR S&I Left 2/20/2020    Procedure: FISTULOGRAM AND ANGIOPLASTY, VENOGRAM THROUGH EXISTING CATHETER.;  Surgeon: Lurdes Moffett M.D.;  [place] [facility]  Service: General    AV FISTULA REVISION Left 2/20/2020    Procedure: REPAIR BLEEDING SITE LEFT ARTERIOVENOUS GRAFT.;  Surgeon: Lurdes Moffett M.D.;  Location: [facility];  Service: General    THROMBECTOMY Left 1/26/2019    Procedure: THROMBECTOMY  and angioplasty AV GRAFT;  Surgeon: Jairo Hopkins M.D.;  Location: [facility];  Service: General    AV FISTULA REVISION Left 1/25/2019    Procedure: AV FISTULA REVISION - THIGH LOOP GRAFT;  Surgeon: Jairo Hopkins M.D.;  Location: [facility];  Service: General    AV FISTULA REVISION Left 12/23/2018    Procedure: AV FISTULA REVISION;  Surgeon: Lurdes Moffett M.D.;  Location: [facility];  Service: General    SPLIT THICKNESS SKIN GRAFT Right 9/21/2018    Procedure: SPLIT THICKNESS SKIN GRAFT - ARM TO ABDOMEN;  Surgeon: Samson Rosenbaum Jr., M.D.;  Location: [facility];  Service: Plastics    SKIN FLAP DELAYED Left 9/10/2018    Procedure: SKIN FLAP DELAYED- FOR DIVISION AND INSET FLAP WITH SKIN GRAFT ON ARM;  Surgeon: Samson Rosenbaum Jr., M.D.;  Location: SURGERY SAME DAY [facility];  Service: Plastics    MYOCUTANEOUS FLAP Right 8/27/2018    Procedure: MYOCUTANEOUS FLAP - RIGHT ARM TO TRUNK;  Surgeon: Samson Rosenbaum Jr., M.D.;  [place] [facility]  Service: Plastics    ABDOMINAL EXPLORATION  6/25/2018    Procedure: ABDOMINAL EXPLORATION- CONTROL ABDOMINAL BLEEDING;  Surgeon: Samson Rosenbaum Jr., M.D.;  Location: [facility];   Service: Plastics    THROMBECTOMY Left 4/28/2018    Procedure: THROMBECTOMY- Thigh W/ Graft;  Surgeon: Jairo Hopkins M.D.;  Location: SURGERY [facility];  Service: General    THROMBECTOMY Left 4/27/2018    Procedure: THROMBECTOMY - THIGH GRAFT AND REVISION;  Surgeon: Jairo Hopkins M.D.;  Location: SURGERY [facility];  Service: General    THROMBECTOMY Left 3/9/2018    Procedure: THROMBECTOMY- THIGH DIALYSIS GRAFT AND REVISION  ;  Surgeon: Jairo Hopkins M.D.;  Location: SURGERY [facility];  Service: General    CATH PLACEMENT Right 3/9/2018    Procedure: CATH PLACEMENT - REPLACE DIALYSIS CATH RIGHT THIGH;  Surgeon: Jairo Hopkins M.D.;  Location: [facility];  Service: General    SPLIT THICKNESS SKIN GRAFT  2/26/2018    Procedure: SPLIT THICKNESS SKIN GRAFT- TO ABDOMEN;  Surgeon: Samson Rosenbaum Jr., M.D.;  Location: [facility];  Service: Plastics    WOUND CLOSURE GENERAL  2/19/2018    Procedure: WOUND CLOSURE GENERAL-ABDOMINAL WALL HEMORRHAGE;  Surgeon: Jason Robertson M.D.;  Location: [facility];  Service: Plastics    WOUND EXPLORATION GENERAL  2/1/2018    Procedure: WOUND EXPLORATION GENERAL, REPAIR BLEEDING;  Surgeon: Samson Rosenbaum Jr., M.D.;  Location: [facility];  Service: Plastics    CATH PLACEMENT Right 11/14/2017    Procedure: CATH PLACEMENT - PERMA;  Surgeon: Jairo Hopkins M.D.;  Location: [facility];  Service: General    AV FISTULA REVISION Left 11/14/2017    Procedure: AV GRAFT REVISION;  Surgeon: Jairo Hopkins M.D.;  Location: SURGERY [facility];  Service: General    IRRIGATION & DEBRIDEMENT GENERAL  7/31/2017    Procedure: IRRIGATION & DEBRIDEMENT GENERAL-ABDOMEN;  Surgeon: Samson Rosenbaum Jr., M.D.;  Location: [facility];  Service:     ABDOMINOPLASTY  6/5/2017    Procedure: ABDOMINOPLASTY - FOR PANNICULECTOMY;  Surgeon: Samson Rosenbaum Jr., M.D.;  Location: [facility];  Service:     SPINAL CORD STIMULATOR N/A 5/4/2017    Procedure: SPINAL CORD STIMULATOR - EXPLANT;  Surgeon: Bin Pro M.D.;  Location: [facility];  Service:     THROMBECTOMY Left 1/6/2017    Procedure: THROMBECTOMY-OPEN THROMBECTOMY WITH LEFT THIGH GRAFT;  Surgeon: Jairo Hopkins M.D.;  Location: SURGERY [facility];  Service:     CATH PLACEMENT Right 1/6/2017    Procedure: CATH PLACEMENT;  Surgeon: Jairo Hopkins M.D.;  Location: SURGERY [facility];  Service:     THROMBECTOMY Left 1/5/2017    Procedure: THROMBECTOMY-THIGH AV LOOP GRAFT AND ANGIOJET;  Surgeon: Jairo Hopkins M.D.;  Location: [facility];  Service:     FLAP CLOSURE Left 11/17/2016    Procedure: Fasciocutaneous Flap Closure Left Upper Leg;  Surgeon: Samson Rosenbaum Jr., M.D.;  Location: [facility];  Service:     IRRIGATION & DEBRIDEMENT GENERAL Left 10/28/2016    Procedure: IRRIGATION & DEBRIDEMENT GENERAL THIGH WITH IRRIGATING WOUND VAC PLACEMENT;  Surgeon: Jairo Hopkins M.D.;  Location: SURGERY [facility];  Service:     THROMBECTOMY Left 10/20/2016    Procedure: THROMBECTOMY THIGH;  Surgeon: Jairo Hopkins M.D.;  Location: [facility];  Service:     INCISION AND DRAINAGE GENERAL  10/20/2016    Procedure: INCISION AND DRAINAGE GENERAL HEMATOMA;  Surgeon: Jairo Hopkins M.D.;  Location: [facility];  Service:     THROMBECTOMY Left 9/18/2016    Procedure: THROMBECTOMY - and fistula revision;  Surgeon: Jairo Hopkins M.D.;  Location: SURGERY [facility];  Service:     AV FISTULOGRAM  9/18/2016    Procedure: AV FISTULOGRAM;  Surgeon: Jairo Hopkins M.D.;  Location: SURGERY [facility];  Service:     CATH PLACEMENT Right 9/17/2016    Procedure: CATH PLACEMENT - tunneled dialysis cath placement right femoral ;  Surgeon: Quentin Alicia M.D.;  Location: SURGERY [facility];  Service:     MASS EXCISION GENERAL Right 8/5/2016     Procedure: MASS EXCISION GENERAL FOR CALCIPHYLAXIS SKIN AND SUBCUTANEOUS TISSUE FOREARM;  Surgeon: Jairo Hopkins M.D.;  Location: SURGERY [facility];  Service:     LESION EXCISION GENERAL Right 7/11/2016    Procedure: LESION EXCISION GENERAL FOR ARM SKIN;  Surgeon: Jairo Hopkins M.D.;  Location: SURGERY [facility];  Service:     RECOVERY  7/8/2016    Procedure: IR1-VASCULAR CASE-VIANEY-LEFT THIGH AV GRAFT THROMBOLYSIS WITH TISSUE PLASMINOGEN ACTIVATOR AND ANGIOJET ARTHERECTOMY   ;  Surgeon: Melinda Surgery;  Location: SURGERY PRE-POST PROC UNIT [facility];  Service:     SPINAL CORD STIMULATOR N/A 3/25/2016    Procedure: SPINAL CORD STIMULATOR;  Surgeon: Bin Pro;  Location: SURGERY [facility];  Service:     SD PERCUT IMPLNT NEUROELECT,EPIDURAL  2/19/2016    Procedure: IMPLANT NEUROSTIM EPI ARRAY;  Surgeon: Bin Pro;  Location: SURGERY [facility];  Service: Pain Management    SD PERCUT IMPLNT NEUROELECT,EPIDURAL  2/19/2016    Procedure: IMPLANT NEUROSTIM EPI ARRAY;  Surgeon: Bin Pro;  Location: SURGERY [facility];  Service: Pain Management    RECOVERY  8/7/2015    Procedure:  VASCULAR CASE VIANEY-RIGHT ILIAC VENOGRAM WITH ANGIOPLASTY, REMOVAL RIGHT FEMORAL TUNNELED DIALYSIS CATHETER  **VRE**;  Surgeon: Recoveryonly Surgery;  Location: SURGERY PRE-POST PROC UNIT [facility];  Service:     AV FISTULA REVISION Left 6/17/2015    Procedure: AV FISTULA REVISION;  Surgeon: Jairo Hopkins M.D.;  Location: SURGERY [facility];  Service:     IRRIGATION & DEBRIDEMENT GENERAL Left 6/17/2015    Procedure: IRRIGATION & DEBRIDEMENT GENERAL ARM W/EXPLORATION WOUND & CONTROL BLEEDING;  Surgeon: Jairo Hopkins M.D.;  Location: SURGERY [facility];  Service:     AV FISTULA THROMBOLYSIS Left 6/9/2015    Procedure: THROMBECTOMY AV GRAFT THIGH;  Surgeon: Jairo Hopkins M.D.;  Location: SURGERY [facility];  Service:     AV FISTULA THROMBOLYSIS Left 6/3/2015    Procedure: AV FISTULA  THROMBOLYSIS THIGH REPLACEMENT;  Surgeon: Jairo Hopkins M.D.;  Location: SURGERY [facility];  Service:     IRRIGATION & DEBRIDEMENT GENERAL Left 6/3/2015    Procedure: IRRIGATION & DEBRIDEMENT GENERAL;  Surgeon: Jairo Hopkins M.D.;  Location: SURGERY [facility];  Service:     AV FISTULA CREATION  4/20/2015    Performed by Jairo Hopkins M.D. at SURGERY [facility]    PB INJECT NERV BLCK,STELLATE GANGLION  3/24/2015    Performed by Bin Pro at SURGERY [facility] ORS    AV FISTULA REVISION  3/20/2015    Performed by Jairo Hopkins M.D. at SURGERY [facility]    AV FISTULA REVISION  2/22/2015    Performed by Lurdes Moffett M.D. at SURGERY [facility]    AV FISTULA REVISION  2/8/2015    Performed by Jairo Hopkins M.D. at SURGERY [facility]    IRRIGATION & DEBRIDEMENT GENERAL  12/15/2014    Performed by Jairo Hopkins M.D. at SURGERY [facility]    AV FISTULA REVISION  9/30/2014    Performed by Jairo Hopkins M.D. at SURGERY [facility]    RECOVERY  6/13/2014    Performed by Ir-Recovery Surgery at SURGERY SAME DAY [facility] ORS    INCISION AND DRAINAGE GENERAL  9/13/2013    Performed by Jairo Hopkins M.D. at SURGERY [facility]    DEBRIDEMENT  9/13/2013    Performed by Jairo Hopkins M.D. at SURGERY [facility]    VEIN LIGATION  9/13/2013    Performed by Jairo Hopkins M.D. at SURGERY [facility]    RECOVERY  5/18/2012    Performed by SURGERY, IR-RECOVERY at SURGERY [facility]    BONE SPUR EXCISION  12/8/2011    Performed by BELKIS BREAUX at SURGERY SAME DAY [facility]    AV FISTULA REVISION  11/3/2011    Performed by JAIRO HOPKINS at SURGERY [facility]    AV FISTULOGRAM  6/5/2011    Performed by JAIRO HOPKINS at SURGERY [facility] ORS    CATH PLACEMENT  6/5/2011    Performed by JAIRO HOPKINS at SURGERY [facility]    CATH PLACEMENT  2/1/2011    Performed by JAIRO HOPKINS at SURGERY [facility]  ORS    ANGIOPLASTY BALLOON  2/1/2011    Performed by MARI WINTERS at SURGERY [facility] ORS    ENDARTERECTOMY  11/16/2010    Performed by MARI WINTERS at SURGERY [facility]    AV FISTULOGRAM  11/16/2010    Performed by MARI WINTERS at SURGERY [facility] ORS    ARTERIOGRAM  3/5/2009    Performed by MARI WINTERS at SURGERY [facility] ORS    ANGIOPLASTY BALLOON  3/5/2009    Performed by MARI WINTERS at SURGERY [facility] ORS    AV FISTULOGRAM  3/5/2009    Performed by MARI WINTERS at SURGERY [facility]    AV FISTULA CREATION  11/6/08    Performed by MARI WINTERS at SURGERY [facility]    MASS EXCISION ORTHO  10/9/08    Performed by BELKIS BREAUX at SURGERY SAME DAY [facility]    PARATHYROIDECTOMY  2006    INGUINAL HERNIA REPAIR Bilateral 2001, 2002    -KIDNEY TRANSPLANT  1996, 2001    x2       Current Outpatient Medications:  Current Outpatient Medications   Medication Sig Dispense Refill    sevelamer carbonate (RENVELA) 800 MG Tab tablet Take 3 Tablets by mouth 3 times a day with meals. 270 Tablet 2    docusate sodium 100 MG Cap Take 100 mg by mouth every day.      doxycycline (VIBRAMYCIN) 100 MG Tab Take 100 mg by mouth every day.      lidocaine (XYLOCAINE) 5 % Ointment Apply 1 Each topically as needed (Apply to wound).      lidocaine (XYLOCAINE) 2 % Solution Apply 20 mL topically as needed. Apply to the top of wound      oxyCODONE immediate release (ROXICODONE) 10 MG immediate release tablet Take 10 mg by mouth every four hours as needed for Moderate Pain.      Ferric Citrate (AURYXIA) 1  MG(Fe) Tab Take 3 g by mouth 3 times a day with meals.      diphenhydrAMINE (BENADRYL) 25 MG Tab Take 50 mg by mouth at bedtime.      B Complex-C-Folic Acid (DIALYVITE 800) 0.8 MG Tab Take 1 Tablet by mouth at bedtime.      gentamicin (GARAMYCIN) 0.1 % cream Apply 1 Application topically every day.      Methoxy PEG-Epoetin Beta (MIRCERA INJ) Inject 150 mcg  under the skin every 14 days.      Naloxone (NARCAN) 4 MG/0.1ML Liquid Administer 1 mg into affected nostril(S) as needed. Indications: Opioid Overdose      calcitRIOL (ROCALTROL) 0.25 MCG Cap Take 0.75 mcg by mouth every day.      tizanidine (ZANAFLEX) 4 MG Tab Take 8 mg by mouth every bedtime. 2 tablets = 8 mg       No current facility-administered medications for this visit.       Allergies:  Allergies   Allergen Reactions    Baclofen Unspecified     Total loss of memory, sedation.   RXN=6/2015    Chlorhexidine Itching and Rash    Contrast Media With Iodine [Iodine] Rash     RXN=1/5/2017    Pcn [Penicillins] Rash     RXN=possibly >10 years ago  Tolerated Zosyn on 2/20/18    Historically tolerated cefazolin (2020) and ceftriaxone (2022)      Tape Rash     Paper tape and tegaderm ok  RXN=ongoing    Cephalexin Rash     Historically tolerated cefazolin (2020) and ceftriaxone (2022)    Requip Vomiting    Ropinirole Vomiting     Requip      Diagnostic X-Ray Materials Rash     Patient has a history of an IV contrast reaction. Patient instructed to take prednisone 50 mg by mouth 13 hours, 7 hours and 1 hour prior to IV contrast injection and  Diphenhydramine 50 mg by mouth 1 hour prior to IV contrast injection. Prescription faxed to pharmacy of patient's choice. Patient reports that he has not had a reaction following the pretreatment.       Family History:  Family History   Problem Relation Age of Onset    Arthritis Father         RA    Lung Disease Father     Heart Disease Father         MI    Hypertension Brother        Social History:  Social History     Socioeconomic History    Marital status: Single     Spouse name: Not on file    Number of children: Not on file    Years of education: Not on file    Highest education level: Not on file   Occupational History    Occupation:      Employer: [employer]   Tobacco Use    Smoking status: Never    Smokeless tobacco: Never   Vaping Use    Vaping status: Never Used    Substance and Sexual Activity    Alcohol use: No    Drug use: No    Sexual activity: Yes     Partners: Female   Other Topics Concern    Primary/coprimary nurse & associates Not Asked    Family contact information Not Asked    OK to release patient information to the following Not Asked    Patient preferred routine/Privacy concerns Not Asked    Patient likes and dislikes Not Asked    Participating In Research Study Not Asked    Miscellaneous Not Asked   Social History Narrative    Not on file     Social Determinants of Health     Financial Resource Strain: Low Risk  (8/7/2023)    Received from [facility]    Overall Financial Resource Strain (CARDIA)     Difficulty of Paying Living Expenses: Not hard at all   Food Insecurity: No Food Insecurity (8/7/2023)    Received from [facility]    Hunger Vital Sign     Worried About Running Out of Food in the Last Year: Never true     Ran Out of Food in the Last Year: Never true   Transportation Needs: No Transportation Needs (8/7/2023)    Received from [facility]    PRAPARE - Transportation     Lack of Transportation (Medical): No     Lack of Transportation (Non-Medical): No   Physical Activity: Insufficiently Active (8/7/2023)    Received from [facility]    Exercise Vital Sign     Days of Exercise per Week: 3 days     Minutes of Exercise per Session: 20 min   Stress: Stress Concern Present (7/24/2022)    Received from [facility]    [employer] [place] [employer] - Occupational Stress Questionnaire     Feeling of Stress : Rather much   Social Connections: Socially Isolated (7/24/2022)    Received from [facility]    Social Connection and Isolation Panel [NHANES]     Frequency of Communication with Friends and Family: More than three times a week     Frequency of Social Gatherings with Friends and Family: Once a week     Attends Sikh Services: Never     Active Member of Clubs or Organizations: No     Attends Club or Organization Meetings: Never     Marital  Status: Never    Intimate Partner Violence: Not At Risk (8/7/2023)    Received from [facility]    Humiliation, Afraid, Rape, and Kick questionnaire     Fear of Current or Ex-Partner: No     Emotionally Abused: No     Physically Abused: No     Sexually Abused: No   Housing Stability: Low Risk  (8/7/2023)    Received from [facility]    Housing Stability     What is your living situation today?: I have a steady place to live          Physical Exam:  Physical Exam    Oriented x 3  Weight/BMI: There is no height or weight on file to calculate BMI.  There were no vitals taken for this visit.    Base Eye Exam       Visual Acuity (Snellen - Linear)         Right Left    Dist sc 20/50+1 NLP    Dist cc 20/40     Near sc J5               Tonometry (i care, 9:00 AM)         Right Left    Pressure 10 11              Pupils         React APD    Right Sluggish     Left  Trace              Visual Fields         Right Left                                    Additional Tests       Color         Right Left    Ishihara 9/9               Stereo       Fly: -    Animals: 0/3    Circles: 0/9                  Slit Lamp and Fundus Exam       External Exam         Right Left    External Normal Normal              Slit Lamp Exam         Right Left    Lids/Lashes Normal Normal    Conjunctiva/Sclera White and quiet White and quiet    Cornea Clear Clear    Anterior Chamber Deep and quiet Deep and quiet    Iris Round and reactive Round and reactive    Lens Clear Clear    Vitreous Normal Normal              Fundus Exam         Right Left    Disc Optic disc edema Optic disc edema    Macula Normal Normal    Vessels Normal Normal    Periphery superior retinal whitening Normal                  Refraction       Wearing Rx         Sphere Cylinder Axis    Right -1.75 +0.75 175    Left -1.50 +0.50 180      Type: SVL              Manifest Refraction (Auto)         Sphere Cylinder Axis    Right -1.75 +1.50 009    Left -0.25 +0.75 010                     Pertinent Lab/Test/Imaging Review:      Assessment and Plan:     Chronic kidney disease, stage V (HCC)  On dialysis    ESRD (end stage renal disease) (HCC)  On dialysis history of calciphylaxis.  Status post finger and arm amputation    Central retinal artery occlusion of left eye  6/25/2024-extremely complex patient with end-stage renal disease status post 2 failed kidney transplants.  History of calciphylaxis with left finger and right arm amputation.  Had acute vision loss especially in the left eye approximately 10 days ago.  Went to optometry who noticed eyes optic nerve swelling.  Was initially referred to [facility] by renal however they do not take his insurance.  Ended up going to [facility] emergency room where MRI scan was otherwise unremarkable except for 2 old remote chronic lacunar infarcts.  On examination today he has a dense superior arcuate defect of the right eye.  He has no light perception in the left eye.  On examination of the right eye the superior optic nerve is elevated.  There is a retinal whitening off the superior arcade.  In the left eye there is a diffuse nonhemorrhagic ischemic swelling of the left optic nerve.  There is box car of the venous vessels.  OCT demonstrates thinning of the inner retina.  Given his vasculopathic risk factors calciphylaxis this all suggest bilateral arterial occlusive disease.  In the right eye this is consistent with a branch retinal artery occlusion.  In the left eye this is consistent with a central retinal artery occlusion.  Thus given the bilaterality the concern is that this is a premonition for further stroke.  He did have a recent echo that was unremarkable.  However we will send him to the emergency room for a MR angiogram of the head and neck as well as to be consulted by stroke neurology.  His nephrologist Dr. Perales is aware as well.    60 minutes total time spent.  This included discussion with nephrologist, review MRI scan,  reviewed notes regarding end-stage renal disease, counseling patient on findings, sending patient emergency room, formulating note in epic.    Tino Garcia M.D.

## 2024-06-26 ENCOUNTER — APPOINTMENT (OUTPATIENT)
Dept: CARDIOLOGY | Facility: MEDICAL CENTER | Age: 44
End: 2024-06-26
Payer: MEDICARE

## 2024-06-26 VITALS
TEMPERATURE: 98.2 F | HEIGHT: 64 IN | DIASTOLIC BLOOD PRESSURE: 68 MMHG | HEART RATE: 99 BPM | WEIGHT: 179.68 LBS | RESPIRATION RATE: 17 BRPM | BODY MASS INDEX: 30.67 KG/M2 | SYSTOLIC BLOOD PRESSURE: 125 MMHG | OXYGEN SATURATION: 94 %

## 2024-06-26 LAB
ALBUMIN SERPL BCP-MCNC: 3.6 G/DL (ref 3.2–4.9)
ALBUMIN/GLOB SERPL: 1.3 G/DL
ALP SERPL-CCNC: 192 U/L (ref 30–99)
ALT SERPL-CCNC: 19 U/L (ref 2–50)
ANION GAP SERPL CALC-SCNC: 17 MMOL/L (ref 7–16)
AST SERPL-CCNC: 19 U/L (ref 12–45)
BILIRUB SERPL-MCNC: 0.4 MG/DL (ref 0.1–1.5)
BUN SERPL-MCNC: 31 MG/DL (ref 8–22)
CALCIUM ALBUM COR SERPL-MCNC: 9.3 MG/DL (ref 8.5–10.5)
CALCIUM SERPL-MCNC: 9 MG/DL (ref 8.5–10.5)
CHLORIDE SERPL-SCNC: 95 MMOL/L (ref 96–112)
CHOLEST SERPL-MCNC: 139 MG/DL (ref 100–199)
CO2 SERPL-SCNC: 22 MMOL/L (ref 20–33)
CREAT SERPL-MCNC: 7.26 MG/DL (ref 0.5–1.4)
ERYTHROCYTE [DISTWIDTH] IN BLOOD BY AUTOMATED COUNT: 63.7 FL (ref 35.9–50)
EST. AVERAGE GLUCOSE BLD GHB EST-MCNC: 94 MG/DL
GFR SERPLBLD CREATININE-BSD FMLA CKD-EPI: 9 ML/MIN/1.73 M 2
GLOBULIN SER CALC-MCNC: 2.7 G/DL (ref 1.9–3.5)
GLUCOSE SERPL-MCNC: 368 MG/DL (ref 65–99)
HBA1C MFR BLD: 4.9 % (ref 4–5.6)
HCT VFR BLD AUTO: 28.8 % (ref 42–52)
HDLC SERPL-MCNC: 33 MG/DL
HGB BLD-MCNC: 9.3 G/DL (ref 14–18)
HIV 1+2 AB+HIV1 P24 AG SERPL QL IA: NORMAL
LDLC SERPL CALC-MCNC: 92 MG/DL
LV EJECT FRACT  99904: 67
LV EJECT FRACT MOD 2C 99903: 70.69
LV EJECT FRACT MOD 4C 99902: 64.62
LV EJECT FRACT MOD BP 99901: 67.93
MCH RBC QN AUTO: 33.1 PG (ref 27–33)
MCHC RBC AUTO-ENTMCNC: 32.3 G/DL (ref 32.3–36.5)
MCV RBC AUTO: 102.5 FL (ref 81.4–97.8)
PHOSPHATE SERPL-MCNC: 4.5 MG/DL (ref 2.5–4.5)
PLATELET # BLD AUTO: 144 K/UL (ref 164–446)
PMV BLD AUTO: 10.7 FL (ref 9–12.9)
POTASSIUM SERPL-SCNC: 5.1 MMOL/L (ref 3.6–5.5)
PROT SERPL-MCNC: 6.3 G/DL (ref 6–8.2)
RBC # BLD AUTO: 2.81 M/UL (ref 4.7–6.1)
SODIUM SERPL-SCNC: 134 MMOL/L (ref 135–145)
T PALLIDUM AB SER QL IA: NORMAL
TRIGL SERPL-MCNC: 72 MG/DL (ref 0–149)
WBC # BLD AUTO: 4.8 K/UL (ref 4.8–10.8)

## 2024-06-26 PROCEDURE — 93306 TTE W/DOPPLER COMPLETE: CPT | Mod: 26 | Performed by: INTERNAL MEDICINE

## 2024-06-26 PROCEDURE — 99239 HOSP IP/OBS DSCHRG MGMT >30: CPT | Mod: GC | Performed by: INTERNAL MEDICINE

## 2024-06-26 PROCEDURE — 83036 HEMOGLOBIN GLYCOSYLATED A1C: CPT

## 2024-06-26 PROCEDURE — 700102 HCHG RX REV CODE 250 W/ 637 OVERRIDE(OP)

## 2024-06-26 PROCEDURE — 87389 HIV-1 AG W/HIV-1&-2 AB AG IA: CPT

## 2024-06-26 PROCEDURE — 80053 COMPREHEN METABOLIC PANEL: CPT

## 2024-06-26 PROCEDURE — 86694 HERPES SIMPLEX NES ANTBDY: CPT

## 2024-06-26 PROCEDURE — 93306 TTE W/DOPPLER COMPLETE: CPT

## 2024-06-26 PROCEDURE — 86695 HERPES SIMPLEX TYPE 1 TEST: CPT

## 2024-06-26 PROCEDURE — 86696 HERPES SIMPLEX TYPE 2 TEST: CPT

## 2024-06-26 PROCEDURE — 99232 SBSQ HOSP IP/OBS MODERATE 35: CPT | Performed by: INTERNAL MEDICINE

## 2024-06-26 PROCEDURE — 84100 ASSAY OF PHOSPHORUS: CPT

## 2024-06-26 PROCEDURE — A9270 NON-COVERED ITEM OR SERVICE: HCPCS

## 2024-06-26 PROCEDURE — 80061 LIPID PANEL: CPT

## 2024-06-26 PROCEDURE — 86780 TREPONEMA PALLIDUM: CPT

## 2024-06-26 PROCEDURE — 85027 COMPLETE CBC AUTOMATED: CPT

## 2024-06-26 RX ORDER — ASPIRIN 81 MG/1
81 TABLET ORAL DAILY
Qty: 100 TABLET | Refills: 0 | Status: SHIPPED | OUTPATIENT
Start: 2024-06-26

## 2024-06-26 RX ADMIN — SEVELAMER CARBONATE 800 MG: 800 TABLET, FILM COATED ORAL at 10:15

## 2024-06-26 RX ADMIN — DOXYCYCLINE 100 MG: 100 TABLET, FILM COATED ORAL at 06:25

## 2024-06-26 ASSESSMENT — ENCOUNTER SYMPTOMS
SINUS PAIN: 0
WHEEZING: 0
FEVER: 0
ORTHOPNEA: 0
VOMITING: 0
WEIGHT LOSS: 0
CHILLS: 0
NAUSEA: 0
PALPITATIONS: 0
SHORTNESS OF BREATH: 0
HEMOPTYSIS: 0
COUGH: 0

## 2024-06-26 ASSESSMENT — PAIN DESCRIPTION - PAIN TYPE: TYPE: ACUTE PAIN

## 2024-06-26 ASSESSMENT — FIBROSIS 4 INDEX: FIB4 SCORE: 1.27

## 2024-06-26 NOTE — DISCHARGE SUMMARY
[facility] Internal Medicine Discharge Summary    Attending: Dr. Su   Senior Resident: Dr. Weiss  Intern:  Dr. Duggan   Contact Number: [phone]    CHIEF COMPLAINT ON ADMISSION  Chief Complaint   Patient presents with    Visual Problems       Reason for Admission  Vision Loss     Admission Date  6/25/2024    CODE STATUS  Full Code    HPI & HOSPITAL COURSE    Denis De Anda is a 43 y.o. male who presented 6/25/2024 with painless vision loss progressively worsen for around 10 days.Patient was in the ED 6/19 for the same reason, MRI was negative for acute brain or orbit change, patient did have tiny bilateral chronic lacunar infarcts in the cerebellum, one on each side on the MRI. After that, patient feels the vision loss is progressively worsen, today he lost the entire left side vision, and right eye can only have upper half vision field. He is evaluated by the ophthalmologist  today, is found bilateral optic disc edema, right superior retinal whitening, with right eye branch retinal artery occlusion and left eye central retinal artery occlusion. So he is sent to the ED again for stroke work up.  Neurologist is consulted in the ED, suggest CTA head and neck, which return with normal limits.  Patient denies any associated symptoms at this moment, such as headache, eye pains, nausea,vomiting, weakness or other focal neuroglial change.  CBC shows anemia, Hb 9.6, chem panel shows increased Cr 6.2, potassium normal at 4.6. Patient has history of ESRD, needs dialysis regularly, nephrologist Dr. Yoon sees him in the ED, suggest no need for emergent dialysis. Therefore, patient is admitted to the floor for further workup to explore the reason that causing him bilateral vision loss.   Overnight patient underwent lab test including lipid profile, HbA1c, HIV/syphilis screening test, generally unremarkable.  Echo with bubble did not found any right-to-left shunt.  Ophthalmologist Dr. 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  Nephrologist Dr. Perales is aware of the situation and will schedule the dialysis on June 27 with sodium thiosulfate     Therefore, he is discharged in fair and stable condition to home with close outpatient follow-up.    The patient met 2-midnight criteria for an inpatient stay at the time of discharge.    Discharge Date  6/26/2024    Physical Exam on Day of Discharge  Physical Exam  Constitutional:       General: He is not in acute distress.     Appearance: He is obese. He is not ill-appearing.   HENT:      Head: Normocephalic.      Nose: Nose normal.      Mouth/Throat:      Mouth: Mucous membranes are moist.   Eyes:      Extraocular Movements: Extraocular movements intact.   Cardiovascular:      Rate and Rhythm: Normal rate and regular rhythm.      Pulses: Normal pulses.      Heart sounds: Normal heart sounds.   Pulmonary:      Effort: Pulmonary effort is normal.      Breath sounds: Normal breath sounds.   Abdominal:      General: There is no distension.      Palpations: Abdomen is soft.      Tenderness: There is no abdominal tenderness.   Musculoskeletal:         General: No swelling or tenderness.      Cervical back: Normal range of motion and neck supple.   Skin:     General: Skin is warm and dry.   Neurological:      General: No focal deficit present.      Mental Status: He is alert and oriented to person, place, and time.      Comments: Speech is clear and fluent, goal directed. Is able to name, repeat and comprehend. Pupils are equal size about 3mm, left pupil has no direct or consensual pupillary reaction, right pupil is slowly/slugish reactive to direct light reaction but consensual light reflex is absent. Extra ocular movements intact. Visual field is lost in L eye completely (\"black\") and in the R inferior temporal and nasal visual " fields. Facial and body symmetry. Strength 5 out of 5 in upper and lower extremities. Sensitivity intact. Normal deep tendon reflexes. Normal tone.    Psychiatric:         Mood and Affect: Mood normal.         Behavior: Behavior normal.         FOLLOW UP ITEMS POST DISCHARGE  Regular dialysis on Monday Wednesday and Friday    DISCHARGE DIAGNOSES  Principal Problem:    Acute loss of vision (POA: Yes)  Active Problems:    ESRD (end stage renal disease) on dialysis ([facility]) (POA: Yes)    Below-elbow amputation of right upper extremity ([facility]) (POA: Yes)    PAD (peripheral artery disease) ([facility]) (POA: Unknown)  Resolved Problems:    * No resolved hospital problems. *      FOLLOW UP  No future appointments.  No follow-up provider specified.    MEDICATIONS ON DISCHARGE     Medication List        START taking these medications        Instructions   aspirin 81 MG EC tablet   Take 1 Tablet by mouth every day.  Dose: 81 mg            CONTINUE taking these medications        Instructions   Auryxia 1  MG(Fe) Tabs  Generic drug: Ferric Citrate   Take 3 g by mouth 3 times a day with meals. 3 gm = 3 tabs  Dose: 3 g     Dialyvite 800 0.8 MG Tabs   Take 1 Tablet by mouth every day.  Dose: 1 Tablet     DIPHENHYDRAMINE HCL PO   Take 2 Tablets by mouth at bedtime.  Dose: 2 Tablet     doxycycline 100 MG Tabs  Commonly known as: Vibramycin   Take 100 mg by mouth 2 times a day.  Dose: 100 mg     Methoxy PEG-Epoetin Beta 30 MCG/0.3ML Sosy   Inject 30 mcg under the skin every 14 days.  Dose: 30 mcg     mirtazapine 15 MG Tabs  Commonly known as: Remeron   Take 15 mg by mouth every evening.  Dose: 15 mg     Narcan 4 MG/0.1ML Liqd  Generic drug: Naloxone   Administer 1 mg into affected nostril(S) as needed. Indications: Opioid Overdose  Dose: 1 mg     oxyCODONE immediate release 10 MG immediate release tablet  Commonly known as: Roxicodone   Take 30 mg by mouth at bedtime. 30 mg = 3 tabs  Dose: 30 mg     tizanidine 4 MG Tabs  Commonly known  as: Zanaflex   Take 8 mg by mouth every bedtime. 2 tablets = 8 mg  Dose: 8 mg     Xtampza ER 13.5 MG C12a  Generic drug: oxyCODONE ER   Take 13.5 mg by mouth 2 times a day.  Dose: 13.5 mg              Allergies  Allergies   Allergen Reactions    Baclofen Unspecified     Total loss of memory, sedation.   RXN=6/2015    Chlorhexidine Itching and Rash    Contrast Media With Iodine [Iodine] Rash     RXN=1/5/2017    Pcn [Penicillins] Rash     RXN=possibly >10 years ago  Tolerated Zosyn on 2/20/18    Historically tolerated cefazolin (2020) and ceftriaxone (2022)      Tape Rash     Paper tape and tegaderm ok  RXN=ongoing    Cephalexin Rash     Historically tolerated cefazolin (2020) and ceftriaxone (2022)    Requip Vomiting    Ropinirole Vomiting     Requip      Diagnostic X-Ray Materials Rash     Patient has a history of an IV contrast reaction. Patient instructed to take prednisone 50 mg by mouth 13 hours, 7 hours and 1 hour prior to IV contrast injection and  Diphenhydramine 50 mg by mouth 1 hour prior to IV contrast injection. Prescription faxed to pharmacy of patient's choice. Patient reports that he has not had a reaction following the pretreatment.       DIET  Orders Placed This Encounter   Procedures    Diet Order Diet: Renal; Fluid modifications: (optional): 2000 ml Fluid Restriction     Standing Status:   Standing     Number of Occurrences:   1     Order Specific Question:   Diet:     Answer:   Renal [8]     Order Specific Question:   Fluid modifications: (optional)     Answer:   2000 ml Fluid Restriction [11]    Discontinue Diet Tray     Standing Status:   Standing     Number of Occurrences:   1       ACTIVITY  As tolerated.  Weight bearing as tolerated    CONSULTATIONS  Ophthalmology  Neurology  Neurology    PROCEDURES  None    LABORATORY  Lab Results   Component Value Date    SODIUM 134 (L) 06/26/2024    POTASSIUM 5.1 06/26/2024    CHLORIDE 95 (L) 06/26/2024    CO2 22 06/26/2024    GLUCOSE 368 (H) 06/26/2024     BUN 31 (H) 06/26/2024    CREATININE 7.26 (HH) 06/26/2024    CREATININE 8.2 (HH) 05/06/2009        Lab Results   Component Value Date    WBC 4.8 06/26/2024    HEMOGLOBIN 9.3 (L) 06/26/2024    HEMATOCRIT 28.8 (L) 06/26/2024    PLATELETCT 144 (L) 06/26/2024        Total time of the discharge process exceeds 60 minutes.

## 2024-06-26 NOTE — PROGRESS NOTES
Patient being sent to discharge lounge to have: paperwork discussed, medications reviewed, and follow up appointments discussed. Patient is alert and oriented x4. Mom at bedside. Medication returned to patient. All belongings with patient.

## 2024-06-26 NOTE — PROGRESS NOTES
Nephrology Daily Progress Note    Date of Service  6/26/2024    Chief Complaint  Denis De Anda is a 43 y.o. male with ESRD/HD, admitted 6/25/2024 with vision loss        Interval Problem Update  6/26 - no acute events, CT negative for CVA  Per Ophtalmologist  recommendations with start sodium thiosulfate with dialysis  No contraindications for Eliquis      Code Status  Full Code    Disposition  <MEDICALLYCLEARED>  I have placed the appropriate orders for post-discharge needs.    Review of Systems  Review of Systems   Constitutional:  Negative for chills, fever, malaise/fatigue and weight loss.   HENT:  Negative for congestion, hearing loss and sinus pain.    Eyes:         Vision loss   Respiratory:  Negative for cough, hemoptysis, shortness of breath and wheezing.    Cardiovascular:  Negative for chest pain, palpitations, orthopnea and leg swelling.   Gastrointestinal:  Negative for nausea and vomiting.   Skin: Negative.    All other systems reviewed and are negative.       Physical Exam  Temp:  [36.4 °C (97.5 °F)-36.8 °C (98.2 °F)] 36.8 °C (98.2 °F)  Pulse:  [78-99] 99  Resp:  [14-26] 17  BP: (101-157)/(45-89) 125/68  SpO2:  [92 %-97 %] 94 %    Physical Exam  Vitals reviewed.   Constitutional:       General: He is not in acute distress.     Appearance: Normal appearance. He is well-developed. He is not diaphoretic.   HENT:      Head: Normocephalic and atraumatic.      Nose: Nose normal.      Mouth/Throat:      Mouth: Mucous membranes are moist.      Pharynx: Oropharynx is clear.   Cardiovascular:      Rate and Rhythm: Normal rate and regular rhythm.      Pulses: Normal pulses.      Heart sounds: Normal heart sounds.   Pulmonary:      Effort: Pulmonary effort is normal. No respiratory distress.      Breath sounds: Normal breath sounds. No wheezing or rales.   Abdominal:      General: Bowel sounds are normal. There is no distension.      Palpations: Abdomen is soft. There is no mass.       Tenderness: There is no abdominal tenderness. There is no right CVA tenderness or left CVA tenderness.   Musculoskeletal:      Cervical back: Normal range of motion and neck supple.      Right lower leg: No edema.      Left lower leg: No edema.   Skin:     General: Skin is warm.      Coloration: Skin is not pale.      Findings: No erythema or rash.   Neurological:      General: No focal deficit present.      Mental Status: He is alert and oriented to person, place, and time.      Cranial Nerves: No cranial nerve deficit.      Coordination: Coordination normal.   Psychiatric:         Mood and Affect: Mood normal.         Behavior: Behavior normal.         Thought Content: Thought content normal.         Judgment: Judgment normal.         Fluids    Intake/Output Summary (Last 24 hours) at 6/26/2024 1321  Last data filed at 6/26/2024 1205  Gross per 24 hour   Intake 400 ml   Output 0 ml   Net 400 ml       Laboratory  Recent Labs     06/25/24  1208 06/26/24  0057   WBC 6.4 4.8   RBC 2.91* 2.81*   HEMOGLOBIN 9.6* 9.3*   HEMATOCRIT 29.2* 28.8*   .3* 102.5*   MCH 33.0 33.1*   MCHC 32.9 32.3   RDW 62.6* 63.7*   PLATELETCT 143* 144*   MPV 10.1 10.7     Recent Labs     06/25/24  1208 06/26/24  0057   SODIUM 142 134*   POTASSIUM 4.6 5.1   CHLORIDE 100 95*   CO2 27 22   GLUCOSE 90 368*   BUN 25* 31*   CREATININE 6.20* 7.26*   CALCIUM 9.5 9.0             Recent Labs     06/26/24  0057   TRIGLYCERIDE 72   HDL 33*   LDL 92       Imaging  EC-ECHOCARDIOGRAM COMPLETE W/O CONT         CT-CTA NECK WITH & W/O-POST PROCESSING   Final Result      1.  Mild atherosclerotic plaquing of the anterior and posterior circulation without evidence of significant stenosis.      CT-CTA HEAD WITH & W/O-POST PROCESS   Final Result      CT angiogram of the [place] within normal limits.           Assessment/Plan     The patient is a very pleasant 43 years old male with   multiple medical problems, end-stage renal disease, on hemodialysis,  admitted   with vision loss to rule out stroke.  1.  End-stage renal disease.  We will continue dialysis tomorrow  2.  Electrolytes remains well controlled.  3.  Hypertension.  Blood pressure well controlled.  4.  Volume, well controlled.  5.  Anemia, noticed drop in hemoglobin level, to monitor closely.in dialysis unit  6.  Vision loss, possible calciphylaxis -will start sodium thiosulfate with dialysis     RECOMMENDATIONS:   Hemodialysis scheduled for tomorrow  Sodium thiosulfate ordered with dialysis in [facility]

## 2024-06-26 NOTE — PROGRESS NOTES
4 Eyes Skin Assessment Completed by LETY Nguyễn and LETY Mascorro.    Head WDL  Ears WDL  Nose WDL  Mouth WDL  Neck WDL  Breast/Chest Scar, calcium deposits  Shoulder Blades WDL  Spine WDL  (R) Arm/Elbow/Hand Redness, Blanching, and Scar, below the elbow amputation  (L) Arm/Elbow/Hand WDL  Abdomen WDL  Groin WDL  Scrotum/Coccyx/Buttocks WDL  (R) Leg WDL  (L) Leg WDL  (R) Heel/Foot/Toe WDL  (L) Heel/Foot/Toe WDL          Devices In Places Tele Box, Blood Pressure Cuff, and Pulse Ox      Interventions In Place Gray Ear Foams    Possible Skin Injury No    Pictures Uploaded Into Epic Yes  Wound Consult Placed N/A  RN Wound Prevention Protocol Ordered No

## 2024-06-26 NOTE — ASSESSMENT & PLAN NOTE
Due to severe calciphylaxis episodes leading to L1st finger amputation and R arm amputation and chronic pain.   - Pain management

## 2024-06-26 NOTE — ASSESSMENT & PLAN NOTE
Needs regular HD, follow with .  No need for urgent dialysis.  - Continue Regular MWF HD  - Monitor CBC and CMP  - Monitor phos and K  - Renal diet, 2L fluid restriction  - I/O   - Daily weight   - Renal dose medication  - Nephrology is following

## 2024-06-26 NOTE — DISCHARGE INSTRUCTIONS
Please close follow up with your nephrologist to arrange the dialysis. You next dialysis will be 6/27 morning 10:15am  Please continue to follow up with the Ophthalmologist to monitor your vision loss.

## 2024-06-26 NOTE — PROGRESS NOTES
Discussed with ID about the lab order of HSV 1/2 IGG with specific reflex. ID suggested to put the patient on droplet isolation until lab comes back due to being tested for meningitis.

## 2024-06-26 NOTE — DISCHARGE PLANNING
Outpatient Dialysis Note     Confirmed patient is active at:     [facility]  [address] [place] [id number]     Schedule: Mon, Wed, Fri   Time: 10:15 AM     Patient is rescheduled for treatment tomorrow 6/27 at 10:15 AM     Patient is followed by Dr. Perales.      Spoke with Elena at facility who confirmed patient information.   Forwarded records for review.     Xitlaly Reyes   Dialysis Coordinator / [facility]  Ph: [phone]

## 2024-06-26 NOTE — ASSESSMENT & PLAN NOTE
CTA neck shows mild atherosclerotic plaquing of the anterior and posterior circulation without evidence of significant stenosis.   - Will discuss aspirin and statin treatment  - Pending Lipid profile/HbA1c

## 2024-06-26 NOTE — CARE PLAN
The patient is Stable - Low risk of patient condition declining or worsening    Shift Goals  Clinical Goals: (P) safety,  Patient Goals: (P) update on plan of care, rest  Family Goals: (P) edmond    Progress made toward(s) clinical / shift goals:    Problem: Knowledge Deficit - Standard  Goal: Patient and family/care givers will demonstrate understanding of plan of care, disease process/condition, diagnostic tests and medications  Description: Target End Date:  1-3 days or as soon as patient condition allows    Document in Patient Education    1.  Patient and family/caregiver oriented to unit, equipment, visitation policy and means for communicating concern  2.  Complete/review Learning Assessment  3.  Assess knowledge level of disease process/condition, treatment plan, diagnostic tests and medications  4.  Explain disease process/condition, treatment plan, diagnostic tests and medications  Outcome: Progressing  Note: POC discussed with pt, all questions answered at this time.       Problem: Pain - Standard  Goal: Alleviation of pain or a reduction in pain to the patient’s comfort goal  Description: Target End Date:  Prior to discharge or change in level of care    Document on Vitals flowsheet    1.  Document pain using the appropriate pain scale per order or unit policy  2.  Educate and implement non-pharmacologic comfort measures (i.e. relaxation, distraction, massage, cold/heat therapy, etc.)  3.  Pain management medications as ordered  4.  Reassess pain after pain med administration per policy  5.  If opiods administered assess patient's response to pain medication is appropriate per POSS sedation scale  6.  Follow pain management plan developed in collaboration with patient and interdisciplinary team (including palliative care or pain specialists if applicable)  Outcome: Progressing  Flowsheets (Taken 6/25/2024 2235)  Pain Rating Scale (NPRS): 0  Note: Patient using numbers scale to rate pain. Non-pharm techniques  used to relieve discomfort. PRNs given appropriately at request         Patient is not progressing towards the following goals:

## 2024-06-26 NOTE — PROGRESS NOTES
Patient to be discharged today - patient aware and agreeable to plan. D/c instructions reviewed with patient - ?'s/concerns answered. No piv present and patient declined meds to beds.

## 2024-06-26 NOTE — ASSESSMENT & PLAN NOTE
Duration for 10 days, getting worse, has done MRI/CTA without positive findings about stroke.  Other differentials include embolism or eye intrinsic diease, but patient denies eye pain, no eye discharge, 6/25 eye exam finding bilateral optic edema, normal pressure, not support glaucoma or eye infection disease.   - Admit to Floor  - Neuro Check Q4h  - Pending Ophthalmology consult  - Neurology is following    - Repeat Echo with bubble

## 2024-06-26 NOTE — HOSPITAL COURSE
Denis De Anda is a 43 y.o. male who presented 6/25/2024 with painless vision loss progressively worsen for around 10 days.Patient was in the ED 6/19 for the same reason, MRI was negative for acute brain or orbit change, patient did have tiny bilateral chronic lacunar infarcts in the cerebellum, one on each side on the MRI. After that, patient feels the vision loss is progressively worsen, today he lost the entire left side vision, and right eye can only have upper half vision field. He is evaluated by the ophthalmologist  today, is found bilateral optic disc edema, right superior retinal whitening, with right eye branch retinal artery occlusion and left eye central retinal artery occlusion. So he is sent to the ED again for stroke work up.  Neurologist is consulted in the ED, suggest CTA head and neck, which return with normal limits.  Patient denies any associated symptoms at this moment, such as headache, eye pains, nausea,vomiting, weakness or other focal neuroglial change.  CBC shows anemia, Hb 9.6, chem panel shows increased Cr 6.2, potassium normal at 4.6. Patient has history of ESRD, needs dialysis regularly, nephrologist Dr. Yoon sees him in the ED, suggest no need for emergent dialysis. Therefore, patient is admitted to the floor for further workup to explore the reason that causing him bilateral vision loss.   Overnight patient underwent lab test including lipid profile, HbA1c, HIV/syphilis screening test, generally unremarkable.  Echo with bubble did not found any right-to-left shunt.  Ophthalmologist Dr. 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  Nephrologist Dr. Perales is aware of the situation and will schedule the dialysis on June 27 with sodium thiosulfate

## 2024-06-26 NOTE — CONSULTS
Patient admitted for stroke workup from emergency room.  CT angiogram of [place] was within normal limits.  CT angiogram of the head and neck demonstrated mild atherosclerotic plaquing of the anterior and posterior circulation without evidence of significant stenosis.  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  This is an unusual condition but there are case reports in the literature demonstrating a combination of ischemic optic neuropathy with arteriolar retinal occlusion.  Given his age giant cell arteritis would be rare.  Also he is not not having any specific other symptoms of GCA. Further workup including HIV and treponemal antibody negative.  In reviewing the literature treatment of this condition is variable however most patients, especially those with history of calciphylaxis involving peripheral sites,  have been treated with sodium thiosulfate through hemodialysis.  Warfarin is contraindicated given the association between warfarin and calciphylaxis.It is uncertain as to whether aspirin or Eliquis is helpful.  It would also be important to rule out hyperphosphatemia, hypercalcemia as well as other hypercoagulable states.  Please discuss with nephrology regarding beginning sodium thiosulfate as well as any contraindication to Eliquis.

## 2024-06-26 NOTE — PROGRESS NOTES
Rhythm: SR-ST  HR:    Ectopy: PVC's    IL: 0.14  QRS: 0.08  QT: 0.32    Tele strip image not available this shift

## 2024-06-27 LAB — HSV1+2 IGG SER IA-ACNC: 16 IV

## 2024-06-28 LAB
HSV1 GG IGG SER-ACNC: 2.35 IV
HSV2 GG IGG SER-ACNC: 0.13 IV

## 2024-07-01 ENCOUNTER — TELEPHONE (OUTPATIENT)
Dept: OPHTHALMOLOGY | Facility: MEDICAL CENTER | Age: 44
End: 2024-07-01
Payer: MEDICARE

## 2024-07-24 ENCOUNTER — APPOINTMENT (OUTPATIENT)
Dept: OPHTHALMOLOGY | Facility: MEDICAL CENTER | Age: 44
End: 2024-07-24
Payer: MEDICARE

## 2024-11-18 NOTE — PROGRESS NOTES
Detail Level: Detailed Pt had aptt ordered for 2200.  No one came to draw.  Called lab to find out what happen.  Lab said she doesn't see an order.  This RN asked a phlebotomist to come up and draw.  This RN put stat Aptt order in [facility].     Quality 226: Preventive Care And Screening: Tobacco Use: Screening And Cessation Intervention: Patient screened for tobacco use and is an ex/non-smoker

## 2025-01-08 ENCOUNTER — HOSPITAL ENCOUNTER (EMERGENCY)
Facility: MEDICAL CENTER | Age: 45
End: 2025-01-08
Attending: EMERGENCY MEDICINE
Payer: MEDICARE

## 2025-01-08 VITALS
SYSTOLIC BLOOD PRESSURE: 101 MMHG | WEIGHT: 180.78 LBS | OXYGEN SATURATION: 90 % | RESPIRATION RATE: 20 BRPM | DIASTOLIC BLOOD PRESSURE: 68 MMHG | HEART RATE: 73 BPM | HEIGHT: 64 IN | TEMPERATURE: 97.3 F | BODY MASS INDEX: 30.86 KG/M2

## 2025-01-08 VITALS
TEMPERATURE: 97 F | RESPIRATION RATE: 16 BRPM | BODY MASS INDEX: 30.9 KG/M2 | SYSTOLIC BLOOD PRESSURE: 133 MMHG | WEIGHT: 180 LBS | HEART RATE: 85 BPM | OXYGEN SATURATION: 94 % | DIASTOLIC BLOOD PRESSURE: 56 MMHG

## 2025-01-08 DIAGNOSIS — T82.9XXA COMPLICATION ASSOCIATED WITH DIALYSIS CATHETER: ICD-10-CM

## 2025-01-08 DIAGNOSIS — T82.9XXA COMPLICATION OF VASCULAR DIALYSIS CATHETER, UNSPECIFIED COMPLICATION, INITIAL ENCOUNTER: ICD-10-CM

## 2025-01-08 LAB
ALBUMIN SERPL BCP-MCNC: 4.2 G/DL (ref 3.2–4.9)
ALBUMIN/GLOB SERPL: 1.3 G/DL
ALP SERPL-CCNC: 167 U/L (ref 30–99)
ALT SERPL-CCNC: 23 U/L (ref 2–50)
ANION GAP SERPL CALC-SCNC: 18 MMOL/L (ref 7–16)
AST SERPL-CCNC: 25 U/L (ref 12–45)
BASOPHILS # BLD AUTO: 0.9 % (ref 0–1.8)
BASOPHILS # BLD: 0.05 K/UL (ref 0–0.12)
BILIRUB SERPL-MCNC: 0.5 MG/DL (ref 0.1–1.5)
BUN SERPL-MCNC: 50 MG/DL (ref 8–22)
CALCIUM ALBUM COR SERPL-MCNC: 9.2 MG/DL (ref 8.5–10.5)
CALCIUM SERPL-MCNC: 9.4 MG/DL (ref 8.4–10.2)
CHLORIDE SERPL-SCNC: 94 MMOL/L (ref 96–112)
CO2 SERPL-SCNC: 25 MMOL/L (ref 20–33)
CREAT SERPL-MCNC: 9.19 MG/DL (ref 0.5–1.4)
EOSINOPHIL # BLD AUTO: 0.08 K/UL (ref 0–0.51)
EOSINOPHIL NFR BLD: 1.4 % (ref 0–6.9)
ERYTHROCYTE [DISTWIDTH] IN BLOOD BY AUTOMATED COUNT: 57.5 FL (ref 35.9–50)
GFR SERPLBLD CREATININE-BSD FMLA CKD-EPI: 7 ML/MIN/1.73 M 2
GLOBULIN SER CALC-MCNC: 3.3 G/DL (ref 1.9–3.5)
GLUCOSE SERPL-MCNC: 110 MG/DL (ref 65–99)
HCT VFR BLD AUTO: 40.7 % (ref 42–52)
HGB BLD-MCNC: 13.1 G/DL (ref 14–18)
IMM GRANULOCYTES # BLD AUTO: 0.03 K/UL (ref 0–0.11)
IMM GRANULOCYTES NFR BLD AUTO: 0.5 % (ref 0–0.9)
LYMPHOCYTES # BLD AUTO: 1.28 K/UL (ref 1–4.8)
LYMPHOCYTES NFR BLD: 21.9 % (ref 22–41)
MCH RBC QN AUTO: 32.8 PG (ref 27–33)
MCHC RBC AUTO-ENTMCNC: 32.2 G/DL (ref 32.3–36.5)
MCV RBC AUTO: 102 FL (ref 81.4–97.8)
MONOCYTES # BLD AUTO: 0.35 K/UL (ref 0–0.85)
MONOCYTES NFR BLD AUTO: 6 % (ref 0–13.4)
NEUTROPHILS # BLD AUTO: 4.06 K/UL (ref 1.82–7.42)
NEUTROPHILS NFR BLD: 69.3 % (ref 44–72)
NRBC # BLD AUTO: 0 K/UL
NRBC BLD-RTO: 0 /100 WBC (ref 0–0.2)
PLATELET # BLD AUTO: 149 K/UL (ref 164–446)
PMV BLD AUTO: 10.2 FL (ref 9–12.9)
POTASSIUM SERPL-SCNC: 5.4 MMOL/L (ref 3.6–5.5)
PROT SERPL-MCNC: 7.5 G/DL (ref 6–8.2)
RBC # BLD AUTO: 3.99 M/UL (ref 4.7–6.1)
SODIUM SERPL-SCNC: 137 MMOL/L (ref 135–145)
WBC # BLD AUTO: 5.9 K/UL (ref 4.8–10.8)

## 2025-01-08 PROCEDURE — 85025 COMPLETE CBC W/AUTO DIFF WBC: CPT

## 2025-01-08 PROCEDURE — 80053 COMPREHEN METABOLIC PANEL: CPT

## 2025-01-08 PROCEDURE — 99283 EMERGENCY DEPT VISIT LOW MDM: CPT

## 2025-01-08 PROCEDURE — 36415 COLL VENOUS BLD VENIPUNCTURE: CPT

## 2025-01-08 PROCEDURE — 99284 EMERGENCY DEPT VISIT MOD MDM: CPT | Performed by: SURGERY

## 2025-01-08 RX ORDER — DIPHENHYDRAMINE HCL 25 MG
50 TABLET ORAL
COMMUNITY

## 2025-01-08 RX ORDER — CINACALCET 60 MG/1
60 TABLET, FILM COATED ORAL SEE ADMIN INSTRUCTIONS
COMMUNITY

## 2025-01-08 RX ORDER — METHOXY POLYETHYLENE GLYCOL-EPOETIN BETA 50 UG/.3ML
50 INJECTION, SOLUTION INTRAVENOUS SEE ADMIN INSTRUCTIONS
COMMUNITY

## 2025-01-08 ASSESSMENT — FIBROSIS 4 INDEX
FIB4 SCORE: 1.5
FIB4 SCORE: 1.3

## 2025-01-08 NOTE — ED TRIAGE NOTES
Chief Complaint   Patient presents with    Sent by MD     Sent from [facility] for problem with dialysis catheter     Pt BIB EMS from [facility]. Pt reports that he noted his dialysis catheter was dislodged. Last dialysis Monday. Chart up for ERP.

## 2025-01-08 NOTE — ED PROVIDER NOTES
"ED Provider Note    CHIEF COMPLAINT  Chief Complaint   Patient presents with    Other     Reports dialysis center unable to use catheter \"because it's bulging out\" of R thigh. Did not receive dialysis this AM due to same.       EXTERNAL RECORDS REVIEWED  Inpatient Notes patient underwent removal and replacement of a femoral dialysis catheter in October 2024 in [facility].  Here is an excerpt of the note from the vascular surgeon below:    \"Mr. De Anda was discharged from the [facility] in [place], [place] on a same-day surgery basis after undergoing removal and replacement of his dysfunctional right common femoral vein tunneled catheter which was positioned near the level of the atrium. Over the past several dialysis treatment flows have been poor in spite of the use of tPA.    He underwent removal of his catheter with replacement through the same venous access site in the groin, but through a different tunnel to avoid contaminating the new catheter with the old tunnel site.\"    HPI/ROS  LIMITATION TO HISTORY   Select: : None  OUTSIDE HISTORIAN(S):  Family      Denis De Anda is a 44 y.o. male who presents to the ER from the access center for removal and replacement of a right tunneled femoral dialysis catheter.  The patient has been on dialysis since 2004.  He has undergone 2 renal transplants having lost the last 1 many years ago.  He is no longer on any immunosuppressive's.  He has been here in [place] since October.  He has a home in [place].  In October he had his right femoral dialysis catheter replaced.  He said he tried to go to dialysis today but they were not able to dialyze him because part of the dialysis catheter was outside of the skin.  They sent him to the access center but they could not replace the dialysis catheter at access center because they only had a 40 and he needs a 60.  He said the catheter goes all the way into the atrium.  He says if it is too short or just collapses " "on itself and they cannot use it.  Prior to October 2024 it was last replaced 19 months prior to that.  Patient says he feels fine.  No vomiting or nausea.  No abdominal pain.  No chest pains or shortness of breath.  No swelling in his legs.  Patient said [facility] center but \"the waiting room was full\" so he decided to come out here to [facility].  He says his  vascular surgeon in [place] says that he cannot do IR anymore because his only access site is his right groin and he has had it tunneled several times.  He says he has no other access sites available.    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    SURGICAL HISTORY   has a past surgical history that includes mass excision ortho (10/9/08); av fistula creation (11/6/08); arteriogram (3/5/2009); angioplasty balloon (3/5/2009); av fistulogram (3/5/2009); us-kidney transplant (1996, 2001); endarterectomy (11/16/2010); av fistulogram (11/16/2010); cath placement (2/1/2011); angioplasty balloon (2/1/2011); av fistulogram (6/5/2011); cath placement (6/5/2011); av fistula revision (11/3/2011); recovery (5/18/2012); incision and drainage general (9/13/2013); debridement (9/13/2013); vein ligation (9/13/2013); recovery (6/13/2014); av fistula revision (9/30/2014); irrigation & debridement general (12/15/2014); av fistula revision (2/8/2015); av fistula revision (2/22/2015); av fistula revision (3/20/2015); inject nerv blck,stellate ganglion (3/24/2015); av fistula creation (4/20/2015); av fistula thrombolysis (Left, 6/3/2015); " irrigation & debridement general (Left, 6/3/2015); av fistula thrombolysis (Left, 6/9/2015); av fistula revision (Left, 6/17/2015); irrigation & debridement general (Left, 6/17/2015); recovery (8/7/2015); percut implnt neuroelect,epidural (2/19/2016); percut implnt neuroelect,epidural (2/19/2016); parathyroidectomy (2006); spinal cord stimulator (N/A, 3/25/2016); recovery (7/8/2016); lesion excision general (Right, 7/11/2016); mass excision general (Right, 8/5/2016); cath placement (Right, 9/17/2016); thrombectomy (Left, 9/18/2016); av fistulogram (9/18/2016); thrombectomy (Left, 10/20/2016); incision and drainage general (10/20/2016); irrigation & debridement general (Left, 10/28/2016); flap closure (Left, 11/17/2016); thrombectomy (Left, 1/6/2017); cath placement (Right, 1/6/2017); thrombectomy (Left, 1/5/2017); spinal cord stimulator (N/A, 5/4/2017); abdominoplasty (6/5/2017); irrigation & debridement general (7/31/2017); cath placement (Right, 11/14/2017); av fistula revision (Left, 11/14/2017); wound exploration general (2/1/2018); wound closure general (2/19/2018); split thickness skin graft (2/26/2018); thrombectomy (Left, 3/9/2018); cath placement (Right, 3/9/2018); thrombectomy (Left, 4/27/2018); thrombectomy (Left, 4/28/2018); abdominal exploration (6/25/2018); myocutaneous flap (Right, 8/27/2018); skin flap delayed (Left, 9/10/2018); split thickness skin graft (Right, 9/21/2018); av fistula revision (Left, 12/23/2018); av fistula revision (Left, 1/25/2019); thrombectomy (Left, 1/26/2019); intro cath dialysis circuit dx angrph fluor s&i (Left, 2/20/2020); av fistula revision (Left, 2/20/2020); av fistula creation (Right, 3/13/2020); incision and drainage general (Right, 4/20/2020); inguinal hernia repair (Bilateral, 2001, 2002); and bone spur excision (12/8/2011).    FAMILY HISTORY  Family History   Problem Relation Age of Onset    Arthritis Father         RA    Lung Disease Father     Heart Disease Father          MI    Hypertension Brother        SOCIAL HISTORY  Social History     Tobacco Use    Smoking status: Never    Smokeless tobacco: Never   Vaping Use    Vaping status: Never Used   Substance and Sexual Activity    Alcohol use: No    Drug use: No    Sexual activity: Yes     Partners: Female       CURRENT MEDICATIONS  Home Medications       Reviewed by Julissa Mcdonald R.N. (Registered Nurse) on 01/08/25 at 1255  Med List Status: Not Addressed     Medication Last Dose Status   aspirin 81 MG EC tablet  Active   B Complex-C-Folic Acid (DIALYVITE 800) 0.8 MG Tab  Active   DIPHENHYDRAMINE HCL PO  Active   doxycycline (VIBRAMYCIN) 100 MG Tab  Active   Ferric Citrate (AURYXIA) 1  MG(Fe) Tab  Active   Methoxy PEG-Epoetin Beta 30 MCG/0.3ML Solution Prefilled Syringe  Active   mirtazapine (REMERON) 15 MG Tab  Active   Naloxone (NARCAN) 4 MG/0.1ML Liquid  Active   oxyCODONE immediate release (ROXICODONE) 10 MG immediate release tablet  Active   tizanidine (ZANAFLEX) 4 MG Tab  Active   XTAMPZA ER 13.5 MG Capsule Extended Release 12 hour Abuse-Deterrent  Active                    ALLERGIES  Allergies   Allergen Reactions    Baclofen Unspecified     Total loss of memory, sedation.   RXN=6/2015    Chlorhexidine Itching and Rash    Contrast Media With Iodine [Iodine] Rash     RXN=1/5/2017    Pcn [Penicillins] Rash     RXN=possibly >10 years ago  Tolerated Zosyn on 2/20/18    Historically tolerated cefazolin (2020) and ceftriaxone (2022)      Tape Rash     Paper tape and tegaderm ok  RXN=ongoing    Cephalexin Rash     Historically tolerated cefazolin (2020) and ceftriaxone (2022)    Requip Vomiting    Ropinirole Vomiting     Requip      Diagnostic X-Ray Materials Rash     Patient has a history of an IV contrast reaction. Patient instructed to take prednisone 50 mg by mouth 13 hours, 7 hours and 1 hour prior to IV contrast injection and  Diphenhydramine 50 mg by mouth 1 hour prior to IV contrast injection. Prescription  "faxed to pharmacy of patient's choice. Patient reports that he has not had a reaction following the pretreatment.       PHYSICAL EXAM  VITAL SIGNS: /68   Pulse 73   Temp 36.3 °C (97.3 °F) (Temporal)   Resp 20   Ht 1.626 m (5' 4\")   Wt 82 kg (180 lb 12.4 oz)   SpO2 90%   BMI 31.03 kg/m²      Constitutional: , Alert in no apparent distress.  HENT: Normocephalic, Bilateral external ears normal. Nose normal.   Eyes: Pupils are equal and reactive. Conjunctiva normal, non-icteric.   Thorax & Lungs: Easy unlabored respirations.  Lungs are clear to auscultation bilaterally.  No wheezes rales or rhonchi.  Heart is distant but regular.  No obvious murmur.  Skin: Visualized skin is  Dry, No erythema, No rash.   Extremities:   Patient has surgical amputation of right upper extremity.  There is a tunneled femoral dialysis catheter in the right groin which seems to be coming out.  No surrounding induration or erythema.  Unable to palpate pedal pulses, but able to Doppler DP and PT pulses which are equal bilaterally.  Trace edema bilaterally.  Nontender calves.  Neurologic: Alert, clear speech  Psychiatric: Affect and Mood normal         EKG/LABS  Results for orders placed or performed during the hospital encounter of 01/08/25   CBC WITH DIFFERENTIAL    Collection Time: 01/08/25  2:00 PM   Result Value Ref Range    WBC 5.9 4.8 - 10.8 K/uL    RBC 3.99 (L) 4.70 - 6.10 M/uL    Hemoglobin 13.1 (L) 14.0 - 18.0 g/dL    Hematocrit 40.7 (L) 42.0 - 52.0 %    .0 (H) 81.4 - 97.8 fL    MCH 32.8 27.0 - 33.0 pg    MCHC 32.2 (L) 32.3 - 36.5 g/dL    RDW 57.5 (H) 35.9 - 50.0 fL    Platelet Count 149 (L) 164 - 446 K/uL    MPV 10.2 9.0 - 12.9 fL    Neutrophils-Polys 69.30 44.00 - 72.00 %    Lymphocytes 21.90 (L) 22.00 - 41.00 %    Monocytes 6.00 0.00 - 13.40 %    Eosinophils 1.40 0.00 - 6.90 %    Basophils 0.90 0.00 - 1.80 %    Immature Granulocytes 0.50 0.00 - 0.90 %    Nucleated RBC 0.00 0.00 - 0.20 /100 WBC    Neutrophils " (Absolute) 4.06 1.82 - 7.42 K/uL    Lymphs (Absolute) 1.28 1.00 - 4.80 K/uL    Monos (Absolute) 0.35 0.00 - 0.85 K/uL    Eos (Absolute) 0.08 0.00 - 0.51 K/uL    Baso (Absolute) 0.05 0.00 - 0.12 K/uL    Immature Granulocytes (abs) 0.03 0.00 - 0.11 K/uL    NRBC (Absolute) 0.00 K/uL   COMP METABOLIC PANEL    Collection Time: 01/08/25  2:00 PM   Result Value Ref Range    Sodium 137 135 - 145 mmol/L    Potassium 5.4 3.6 - 5.5 mmol/L    Chloride 94 (L) 96 - 112 mmol/L    Co2 25 20 - 33 mmol/L    Anion Gap 18.0 (H) 7.0 - 16.0    Glucose 110 (H) 65 - 99 mg/dL    Bun 50 (H) 8 - 22 mg/dL    Creatinine 9.19 (HH) 0.50 - 1.40 mg/dL    Calcium 9.4 8.4 - 10.2 mg/dL    Correct Calcium 9.2 8.5 - 10.5 mg/dL    AST(SGOT) 25 12 - 45 U/L    ALT(SGPT) 23 2 - 50 U/L    Alkaline Phosphatase 167 (H) 30 - 99 U/L    Total Bilirubin 0.5 0.1 - 1.5 mg/dL    Albumin 4.2 3.2 - 4.9 g/dL    Total Protein 7.5 6.0 - 8.2 g/dL    Globulin 3.3 1.9 - 3.5 g/dL    A-G Ratio 1.3 g/dL   ESTIMATED GFR    Collection Time: 01/08/25  2:00 PM   Result Value Ref Range    GFR (CKD-EPI) 7 (A) >60 mL/min/1.73 m 2     *Note: Due to a large number of results and/or encounters for the requested time period, some results have not been displayed. A complete set of results can be found in Results Review.          COURSE & MEDICAL DECISION MAKING    ASSESSMENT, COURSE AND PLAN  Care Narrative: Patient comes here from the dialysis access center with a need for a new tunneled right femoral dialysis catheter.  The patient says he is a very poor access patient.  He says the only place that he can get dialysis catheters he states is in his right femoral area.  He is had several tunnel catheters in this area as they can no longer use his left leg, any of his fistulas, or his neck.  Patient said that his vascular surgeon in [place] so that he cannot have IR do his catheters anymore either, because he is so complicated/difficult.  Patient went to get dialyzed today and they were  unable to dialyze him because the femoral catheter had moved out and part of it was outside of the skin.  Patient was sent to the access center, but the access center was unable to replace the catheter because the patient needs a 60 and the only had a 40.  Patient says that his catheter currently goes into the atrium because if it is too short it just gets squished up against the wall of the vessel and collapses and they cannot dialyze in throat.  Patient was last dialyzed fully on Monday.  He says overall he feels fine.  He has no shortness of breath.  No nausea or vomiting.  He is awake and alert.  His labs do not look too bad.  Potassium is 5.4.  Although he lives in [place], he loosely follows with Dr. Perales, nephrologist here in [place], for when he is here in [facility] and needs dialysis.  Patient clearly is in need of a new right femoral tunneled dialysis catheter.  He is a complicated vascular access patient.  I spoke with Dr. Carrera he says that [facility] does not have the appropriate equipment needed to perform this exchange.  Additionally, we do not have vascular surgery here at [facility].  Patient will need to be transferred.  I spoke with the ER physician on duty at [facility] and he will except the patient in transfer.          1420: Discussed with Dr. Carrera, surgeon on-call.  He says that he knows that [facility] does not have the appropriate equipment to do this particular type of tunneled femoral catheter and therefore patient will need to be transferred to [facility] for vascular surgery.    1435: Discussed with Dr. Paul, ER physician on duty at [facility] and he will kindly accept the patient in transfer.    ADDITIONAL PROBLEMS MANAGED  Problem #1: Need for new tunneled femoral dialysis catheter.    DISPOSITION AND DISCUSSIONS  I have discussed management of the patient with the following physicians and THEA's: General Surgery here at [facility], ER  physician at [facility]    Discussion of management with other [facility] or appropriate source(s): None     Escalation of care considered, and ultimately not performed: We do not have the equipment needed to perform this particular dialysis catheter exchange, nor do we have vascular surgery out here at [facility].  Patient will need to be transferred per Dr Carrera    Barriers to care at this time, including but not limited to:  none known .     Decision tools and prescription drugs considered including, but not limited to: Pain Medications patient is not in any pain.  No need for pain medication. .    FINAL DIAGNOSIS  1. Complication of vascular dialysis catheter, unspecified complication, initial encounter Acute        This dictation has been created using voice recognition software. The accuracy of the dictation is limited by the abilities of the software. I expect there may be some errors of grammar and possibly content. I made every attempt to manually correct the errors within my dictation. However, errors related to voice recognition software may still exist and should be interpreted within the appropriate context.     Electronically signed by: Taylor Lord M.D., 1/8/2025 1:14 PM

## 2025-01-08 NOTE — ED NOTES
Medication history reviewed with pt. Med rec is complete.  Allergies reviewed, per pt  Interviewed pt with mother at bedside with permission from pt.    Pt receives his dialysis from [facility] on Mon, Wed, and Fri.  Last treatment was on 1/6/2024.  Called [facility] at [phone] to verify MIRCERA, pt took 50MCG on 1/6/2024.    Pt takes DOXYCYCLINE 100MG BID prophylaxis, last dose was taken on 1/8/2024 at 0730.     Pt is not on any anticoagulants

## 2025-01-08 NOTE — ED NOTES
Report to [facility] charge RN. [facility] transport to [facility] ER

## 2025-01-08 NOTE — ED TRIAGE NOTES
"Chief Complaint   Patient presents with    Other     Reports dialysis center unable to use catheter \"because it's bulging out\" of R thigh. Did not receive dialysis this AM due to same.     Physical Exam  Pulmonary:      Effort: Pulmonary effort is normal.   Skin:     General: Skin is warm and dry.   Neurological:      Mental Status: He is alert.       /68   Pulse 73   Temp 36.3 °C (97.3 °F) (Temporal)   Resp 20   Ht 1.626 m (5' 4\")   Wt 82 kg (180 lb 12.4 oz)   SpO2 90%   BMI 31.03 kg/m²     "

## 2025-01-09 NOTE — CONSULTS
VASCULAR SURGERY CONSULTATION      DATE OF CONSULTATION: 1/8/2025     REFERRING PHYSICIAN: Jason Arredondo MD    CONSULTING PHYSICIAN: Jeff Lowery M.D.    REASON FOR CONSULTATION: Evaluate patient with malpositioned PermCath.     HISTORY OF PRESENT ILLNESS: The patient is a 44 y.o. gentleman who suffers from end-stage renal disease on maintenance hemodialysis.  The patient has exhausted all central access except for a femoral approach to the IVC for which she has a long 60 cm PermCath in place.  Patient reports that he has been receiving his dialysis management in [place].  Reports that this catheter has been in for quite some time however the cuff did come out of the subcutaneous tissue.  There is no surrounding erythema drainage or evidence of obvious infection other than the cuff not being incorporated.  Patient was told that they could not use the catheter and to come to the emergency room.  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     PAST SURGICAL HISTORY:  has a past surgical history that includes mass excision ortho (10/9/08); av fistula creation (11/6/08); arteriogram (3/5/2009); angioplasty balloon (3/5/2009); av fistulogram (3/5/2009); us-kidney transplant (1996, 2001); endarterectomy (11/16/2010); av fistulogram (11/16/2010); cath placement (2/1/2011); angioplasty balloon (2/1/2011); av fistulogram (6/5/2011); cath placement (6/5/2011); av fistula revision (11/3/2011); recovery (5/18/2012); incision and drainage general (9/13/2013); debridement (9/13/2013); vein ligation (9/13/2013); recovery (6/13/2014); av  fistula revision (9/30/2014); irrigation & debridement general (12/15/2014); av fistula revision (2/8/2015); av fistula revision (2/22/2015); av fistula revision (3/20/2015); inject nerv blck,stellate ganglion (3/24/2015); av fistula creation (4/20/2015); av fistula thrombolysis (Left, 6/3/2015); irrigation & debridement general (Left, 6/3/2015); av fistula thrombolysis (Left, 6/9/2015); av fistula revision (Left, 6/17/2015); irrigation & debridement general (Left, 6/17/2015); recovery (8/7/2015); percut implnt neuroelect,epidural (2/19/2016); percut implnt neuroelect,epidural (2/19/2016); parathyroidectomy (2006); spinal cord stimulator (N/A, 3/25/2016); recovery (7/8/2016); lesion excision general (Right, 7/11/2016); mass excision general (Right, 8/5/2016); cath placement (Right, 9/17/2016); thrombectomy (Left, 9/18/2016); av fistulogram (9/18/2016); thrombectomy (Left, 10/20/2016); incision and drainage general (10/20/2016); irrigation & debridement general (Left, 10/28/2016); flap closure (Left, 11/17/2016); thrombectomy (Left, 1/6/2017); cath placement (Right, 1/6/2017); thrombectomy (Left, 1/5/2017); spinal cord stimulator (N/A, 5/4/2017); abdominoplasty (6/5/2017); irrigation & debridement general (7/31/2017); cath placement (Right, 11/14/2017); av fistula revision (Left, 11/14/2017); wound exploration general (2/1/2018); wound closure general (2/19/2018); split thickness skin graft (2/26/2018); thrombectomy (Left, 3/9/2018); cath placement (Right, 3/9/2018); thrombectomy (Left, 4/27/2018); thrombectomy (Left, 4/28/2018); abdominal exploration (6/25/2018); myocutaneous flap (Right, 8/27/2018); skin flap delayed (Left, 9/10/2018); split thickness skin graft (Right, 9/21/2018); av fistula revision (Left, 12/23/2018); av fistula revision (Left, 1/25/2019); thrombectomy (Left, 1/26/2019); intro cath dialysis circuit dx angrph fluor s&i (Left, 2/20/2020); av fistula revision (Left, 2/20/2020); av fistula creation  (Right, 3/13/2020); incision and drainage general (Right, 4/20/2020); inguinal hernia repair (Bilateral, 2001, 2002); and bone spur excision (12/8/2011).     ALLERGIES:   Allergies   Allergen Reactions    Baclofen Unspecified     Total loss of memory, sedation.   RXN=6/2015    Chlorhexidine Itching and Rash    Contrast Media With Iodine [Iodine] Rash     RXN=1/5/2017    Pcn [Penicillins] Rash     RXN=possibly >10 years ago  Tolerated Zosyn on 2/20/18    Historically tolerated cefazolin (2020) and ceftriaxone (2022)      Tape Rash     Paper tape and tegaderm ok  RXN=ongoing    Cephalexin Rash     Historically tolerated cefazolin (2020) and ceftriaxone (2022)    Requip Vomiting    Ropinirole Vomiting     Requip      Diagnostic X-Ray Materials Rash     Patient has a history of an IV contrast reaction. Patient instructed to take prednisone 50 mg by mouth 13 hours, 7 hours and 1 hour prior to IV contrast injection and  Diphenhydramine 50 mg by mouth 1 hour prior to IV contrast injection. Prescription faxed to pharmacy of patient's choice. Patient reports that he has not had a reaction following the pretreatment.        CURRENT MEDICATIONS:   Home Medications    **Home medications have not yet been reviewed for this encounter**         FAMILY HISTORY:   Family History   Problem Relation Age of Onset    Arthritis Father         RA    Lung Disease Father     Heart Disease Father         MI    Hypertension Brother        History reviewed. No pertinent family history.       SOCIAL HISTORY:   Social History     Tobacco Use    Smoking status: Never    Smokeless tobacco: Never   Vaping Use    Vaping status: Never Used   Substance and Sexual Activity    Alcohol use: No    Drug use: No    Sexual activity: Yes     Partners: Female       Patient reports malpositioning of the PermCath  Denies chest pain   Denies Shortness of breath   Denies focal neuro deficits suggestive of stroke   Denies nausea and vomiting       All other systems  were reviewed and are negative (AMA/CMS criteria)                General - Awake Alert  Head - normocephalic   Lungs - Clear   Heart _ RRR  Abdomen - Soft, Nontender   Neuro - Grossly intact   Extremities - Warm and well perfused at rest       LABORATORY VALUES:   Recent Labs     01/08/25  1400   WBC 5.9   RBC 3.99*   HEMOGLOBIN 13.1*   HEMATOCRIT 40.7*   .0*   MCH 32.8   MCHC 32.2*   RDW 57.5*   PLATELETCT 149*   MPV 10.2     Recent Labs     01/08/25  1400   SODIUM 137   POTASSIUM 5.4   CHLORIDE 94*   CO2 25   GLUCOSE 110*   BUN 50*   CREATININE 9.19*   CALCIUM 9.4     Recent Labs     01/08/25  1400   ASTSGOT 25   ALTSGPT 23   TBILIRUBIN 0.5   ALKPHOSPHAT 167*   GLOBULIN 3.3            IMAGING:   No orders to display       IMPRESSION AND PLAN:     Renal failure with difficult dialysis access  Malpositioned PermCath right groin with cuff extrusion    The catheter is perfectly functional.  It secured with Tegaderm.  I have recommended the patient be discharged from the hospital and I will schedule the patient for outpatient catheter exchange.  A long catheter will need to be ordered through the company which will take couple days to arrive.  There is no contraindication to catheter use as long as it is secured with a Tegaderm.    Patient is very much in favor of this outpatient plan.  He demonstrates a clear understanding of the situation and will contact me if there is any changes.    My office will contact the patient to order the catheter and arrange for the catheter exchange to take place next week.      ____________________________________   Jeff Lowery M.D.          DD: 1/8/2025   DT: 5:45 PM

## 2025-01-09 NOTE — ED PROVIDER NOTES
CHIEF COMPLAINT  Chief Complaint   Patient presents with    Sent by MD     Sent from [facility] for problem with dialysis catheter       LIMITATION TO HISTORY   Select: none    HPI    Denis De Anda is a 44 y.o. male who presents to the Emergency Department for an acute dialysis catheter problem onset about 2 days ago. The patient reports that he has a 60 mm dialysis tunnel catheter and reports that it began to push out 2 days ago. He states that he had gone to received [facility] and was about to have it done, when they had told them that since it had come out they were unable to perform dialysis. [facility], who could also not perform dialysis due to not having the right sized tube. The patient then had gone to the [facility] who then sent him here for higher level of care. The patient states that he had his catheter put in in [place] and will be in town till the 1 st of February. Patient also reports that he sees Dr. Perales with nephrology. He also reports that his catheter works. The patient has an allergy to Baclofen, Chlorhexidine, Contrast Media with iodine, Penicillins, Cephalexin, Tape, Requip, Ropinirole, and Diagnostic x-ray materials.     OUTSIDE HISTORIAN(S):  Select: None    EXTERNAL RECORDS REVIEWED  Select: Reviewed labs from earlier today performed [facility] that include: CBC with diff and CMP. See labs below.    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    Toxic uninodular goiter without mention of thyrotoxic crisis or storm      SURGICAL HISTORY  Past Surgical History:   Procedure Laterality Date    INCISION AND DRAINAGE GENERAL Right 4/20/2020    Procedure: INCISION AND DRAINAGE, REMOVAL INFECTED THIGH GRAFT;  Surgeon: Lurdes Moffett M.D.;  Location: [facility];  Service: General    AV FISTULA CREATION Right 3/13/2020    Procedure: CREATION, AV FISTULA- THIGH AND EXPLORATION OF LEFT THIGH AV GRAFT;  Surgeon: Lurdes Moffett M.D.;  Location: [facility];  Service: General    PA INTRO CATH DIALYSIS CIRCUIT DX ANGRPH FLUOR S&I Left 2/20/2020    Procedure: FISTULOGRAM AND ANGIOPLASTY, VENOGRAM THROUGH EXISTING CATHETER.;  Surgeon: Lurdes Moffett M.D.;  Location: [facility];  Service: General    AV FISTULA REVISION Left 2/20/2020    Procedure: REPAIR BLEEDING SITE LEFT ARTERIOVENOUS GRAFT.;  Surgeon: Lurdes Moffett M.D.;  Location: [facility];  Service: General    THROMBECTOMY Left 1/26/2019    Procedure: THROMBECTOMY  and angioplasty AV GRAFT;  Surgeon: Jairo Hopkins M.D.;  Location: [facility];  Service: General    AV FISTULA REVISION Left 1/25/2019    Procedure: AV FISTULA REVISION - THIGH LOOP GRAFT;  Surgeon: Jairo Hopkins M.D.;  Location: [facility];  Service: General    AV FISTULA REVISION Left 12/23/2018    Procedure: AV FISTULA REVISION;  Surgeon: Lurdes Moffett M.D.;  Location: [facility];  Service: General    SPLIT THICKNESS SKIN GRAFT Right 9/21/2018    Procedure: SPLIT THICKNESS SKIN GRAFT - ARM TO ABDOMEN;  Surgeon: Samson Rosenbaum Jr., M.D.;  Location: [facility];  Service: Plastics    SKIN FLAP DELAYED Left 9/10/2018    Procedure: SKIN FLAP DELAYED- FOR DIVISION AND INSET FLAP WITH SKIN GRAFT ON ARM;   Surgeon: Samson Rosenbaum Jr., M.D.;  Location: SURGERY SAME DAY [facility] ORS;  Service: Plastics    MYOCUTANEOUS FLAP Right 8/27/2018    Procedure: MYOCUTANEOUS FLAP - RIGHT ARM TO TRUNK;  Surgeon: Samson Rosenbaum Jr., M.D.;  Location: SURGERY [facility];  Service: Plastics    ABDOMINAL EXPLORATION  6/25/2018    Procedure: ABDOMINAL EXPLORATION- CONTROL ABDOMINAL BLEEDING;  Surgeon: Samson Rosenbaum Jr., M.D.;  Location: SURGERY [facility];  Service: Plastics    THROMBECTOMY Left 4/28/2018    Procedure: THROMBECTOMY- Thigh W/ Graft;  Surgeon: Jairo Hopkins M.D.;  Location: SURGERY [facility];  Service: General    THROMBECTOMY Left 4/27/2018    Procedure: THROMBECTOMY - THIGH GRAFT AND REVISION;  Surgeon: Jairo Hopkins M.D.;  Location: SURGERY [facility];  Service: General    THROMBECTOMY Left 3/9/2018    Procedure: THROMBECTOMY- THIGH DIALYSIS GRAFT AND REVISION  ;  Surgeon: Jairo Hopkins M.D.;  Location: SURGERY [facility];  Service: General    CATH PLACEMENT Right 3/9/2018    Procedure: CATH PLACEMENT - REPLACE DIALYSIS CATH RIGHT THIGH;  Surgeon: Jairo Hopkins M.D.;  Location: SURGERY [facility];  Service: General    SPLIT THICKNESS SKIN GRAFT  2/26/2018    Procedure: SPLIT THICKNESS SKIN GRAFT- TO ABDOMEN;  Surgeon: Samson Rosenbaum Jr., M.D.;  Location: SURGERY [facility];  Service: Plastics    WOUND CLOSURE GENERAL  2/19/2018    Procedure: WOUND CLOSURE GENERAL-ABDOMINAL WALL HEMORRHAGE;  Surgeon: Jason Robertson M.D.;  Location: SURGERY [facility];  Service: Plastics    WOUND EXPLORATION GENERAL  2/1/2018    Procedure: WOUND EXPLORATION GENERAL, REPAIR BLEEDING;  Surgeon: Samson Rosenbaum Jr., M.D.;  Location: SURGERY [facility];  Service: Plastics    CATH PLACEMENT Right 11/14/2017    Procedure: CATH PLACEMENT - PERMA;  Surgeon: Jairo Hopkins M.D.;  Location: SURGERY [facility];  Service: General    AV FISTULA REVISION Left  11/14/2017    Procedure: AV GRAFT REVISION;  Surgeon: Jairo Hopkins M.D.;  Location: SURGERY [facility];  Service: General    IRRIGATION & DEBRIDEMENT GENERAL  7/31/2017    Procedure: IRRIGATION & DEBRIDEMENT GENERAL-ABDOMEN;  Surgeon: Samson Rosenbaum Jr., M.D.;  Location: [facility];  Service:     ABDOMINOPLASTY  6/5/2017    Procedure: ABDOMINOPLASTY - FOR PANNICULECTOMY;  Surgeon: Samson Rosenbaum Jr., M.D.;  Location: [facility];  Service:     SPINAL CORD STIMULATOR N/A 5/4/2017    Procedure: SPINAL CORD STIMULATOR - EXPLANT;  Surgeon: Bin Pro M.D.;  Location: [facility];  Service:     THROMBECTOMY Left 1/6/2017    Procedure: THROMBECTOMY-OPEN THROMBECTOMY WITH LEFT THIGH GRAFT;  Surgeon: Jairo Hopkins M.D.;  Location: [facility];  Service:     CATH PLACEMENT Right 1/6/2017    Procedure: CATH PLACEMENT;  Surgeon: Jairo Hopkins M.D.;  Location: [facility];  Service:     THROMBECTOMY Left 1/5/2017    Procedure: THROMBECTOMY-THIGH AV LOOP GRAFT AND ANGIOJET;  Surgeon: Jairo Hopkins M.D.;  Location: [facility];  Service:     FLAP CLOSURE Left 11/17/2016    Procedure: Fasciocutaneous Flap Closure Left Upper Leg;  Surgeon: Samson Rosenbaum Jr., M.D.;  Location: [facility];  Service:     IRRIGATION & DEBRIDEMENT GENERAL Left 10/28/2016    Procedure: IRRIGATION & DEBRIDEMENT GENERAL THIGH WITH IRRIGATING WOUND VAC PLACEMENT;  Surgeon: Jairo Hopkins M.D.;  Location: [facility];  Service:     THROMBECTOMY Left 10/20/2016    Procedure: THROMBECTOMY THIGH;  Surgeon: Jairo Hopkins M.D.;  Location: [facility];  Service:     INCISION AND DRAINAGE GENERAL  10/20/2016    Procedure: INCISION AND DRAINAGE GENERAL HEMATOMA;  Surgeon: Jairo Hopkins M.D.;  Location: [facility];  Service:     THROMBECTOMY Left 9/18/2016    Procedure: THROMBECTOMY - and fistula  revision;  Surgeon: Jairo Hopkins M.D.;  Location: SURGERY [facility];  Service:     AV FISTULOGRAM  9/18/2016    Procedure: AV FISTULOGRAM;  Surgeon: Jairo Hopkins M.D.;  Location: SURGERY [facility];  Service:     CATH PLACEMENT Right 9/17/2016    Procedure: CATH PLACEMENT - tunneled dialysis cath placement right femoral ;  Surgeon: Quentin Alicia M.D.;  Location: SURGERY [facility];  Service:     MASS EXCISION GENERAL Right 8/5/2016    Procedure: MASS EXCISION GENERAL FOR CALCIPHYLAXIS SKIN AND SUBCUTANEOUS TISSUE FOREARM;  Surgeon: Jairo Hopkins M.D.;  Location: SURGERY [facility];  Service:     LESION EXCISION GENERAL Right 7/11/2016    Procedure: LESION EXCISION GENERAL FOR ARM SKIN;  Surgeon: Jairo Hopkins M.D.;  Location: SURGERY [facility];  Service:     RECOVERY  7/8/2016    Procedure: IR1-VASCULAR CASE-VIANEY-LEFT THIGH AV GRAFT THROMBOLYSIS WITH TISSUE PLASMINOGEN ACTIVATOR AND ANGIOJET ARTHERECTOMY   ;  Surgeon: Melinda Surgery;  Location: SURGERY PRE-POST PROC UNIT [facility];  Service:     SPINAL CORD STIMULATOR N/A 3/25/2016    Procedure: SPINAL CORD STIMULATOR;  Surgeon: Bin Pro;  Location: [facility];  Service:     CA PERCUT IMPLNT NEUROELECT,EPIDURAL  2/19/2016    Procedure: IMPLANT NEUROSTIM EPI ARRAY;  Surgeon: Bin Pro;  Location: [facility];  Service: Pain Management    CA PERCUT IMPLNT NEUROELECT,EPIDURAL  2/19/2016    Procedure: IMPLANT NEUROSTIM EPI ARRAY;  Surgeon: Bin Pro;  Location: [facility];  Service: Pain Management    RECOVERY  8/7/2015    Procedure:  VASCULAR CASE VIANEY-RIGHT ILIAC VENOGRAM WITH ANGIOPLASTY, REMOVAL RIGHT FEMORAL TUNNELED DIALYSIS CATHETER  **VRE**;  Surgeon: Melinda Surgery;  Location: SURGERY PRE-POST PROC UNIT [facility];  Service:     AV FISTULA REVISION Left 6/17/2015    Procedure: AV FISTULA REVISION;  Surgeon: Jairo Hopkins M.D.;  Location: [facility]  ORS;  Service:     IRRIGATION & DEBRIDEMENT GENERAL Left 6/17/2015    Procedure: IRRIGATION & DEBRIDEMENT GENERAL ARM W/EXPLORATION WOUND & CONTROL BLEEDING;  Surgeon: Jairo Hopkins M.D.;  Location: SURGERY [facility];  Service:     AV FISTULA THROMBOLYSIS Left 6/9/2015    Procedure: THROMBECTOMY AV GRAFT THIGH;  Surgeon: Jairo Hopkins M.D.;  Location: SURGERY [facility];  Service:     AV FISTULA THROMBOLYSIS Left 6/3/2015    Procedure: AV FISTULA THROMBOLYSIS THIGH REPLACEMENT;  Surgeon: Jairo Hopkins M.D.;  Location: SURGERY [facility];  Service:     IRRIGATION & DEBRIDEMENT GENERAL Left 6/3/2015    Procedure: IRRIGATION & DEBRIDEMENT GENERAL;  Surgeon: Jairo Hopkins M.D.;  Location: SURGERY [facility];  Service:     AV FISTULA CREATION  4/20/2015    Performed by Jairo Hopkins M.D. at SURGERY [facility]    PB INJECT NERV BLCK,STELLATE GANGLION  3/24/2015    Performed by Bin Pro at SURGERY [facility] ORS    AV FISTULA REVISION  3/20/2015    Performed by Jairo Hopkins M.D. at SURGERY [facility]    AV FISTULA REVISION  2/22/2015    Performed by Lurdes Moffett M.D. at SURGERY [facility]    AV FISTULA REVISION  2/8/2015    Performed by Jairo Hopkins M.D. at [facility]    IRRIGATION & DEBRIDEMENT GENERAL  12/15/2014    Performed by Jairo Hopkins M.D. at SURGERY [facility]    AV FISTULA REVISION  9/30/2014    Performed by Jairo Hopkins M.D. at SURGERY [facility]    RECOVERY  6/13/2014    Performed by Ir-Recovery Surgery at [facility] SAME DAY [facility] ORS    INCISION AND DRAINAGE GENERAL  9/13/2013    Performed by Jairo Hopkins M.D. at SURGERY [facility]    DEBRIDEMENT  9/13/2013    Performed by Jairo Hopkins M.D. at SURGERY [facility]    VEIN LIGATION  9/13/2013    Performed by Jairo Hopkins M.D. at SURGERY [facility]    RECOVERY  5/18/2012    Performed by SURGERY, IR-RECOVERY at [facility]    BONE SPUR EXCISION  12/8/2011    Performed by BELKIS BREAUX at SURGERY SAME DAY [facility] ORS    AV FISTULA REVISION  11/3/2011    Performed by MARI WINTERS at SURGERY [facility] ORS    AV FISTULOGRAM  6/5/2011    Performed by MARI WINTRES at SURGERY [facility] ORS    CATH PLACEMENT  6/5/2011    Performed by MARI WINTERS at SURGERY [facility] ORS    CATH PLACEMENT  2/1/2011    Performed by MARI WINTERS at SURGERY [facility] ORS    ANGIOPLASTY BALLOON  2/1/2011    Performed by MARI WINTERS at SURGERY [facility] ORS    ENDARTERECTOMY  11/16/2010    Performed by MARI WINTERS at SURGERY [facility] ORS    AV FISTULOGRAM  11/16/2010    Performed by MARI WINTERS at SURGERY [facility] ORS    ARTERIOGRAM  3/5/2009    Performed by MARI WINTERS at SURGERY [facility] ORS    ANGIOPLASTY BALLOON  3/5/2009    Performed by MARI WINTERS at SURGERY [facility] ORS    AV FISTULOGRAM  3/5/2009    Performed by MARI WINTERS at SURGERY [facility] ORS    AV FISTULA CREATION  11/6/08    Performed by MARI WINTERS at SURGERY [facility] ORS    MASS EXCISION ORTHO  10/9/08    Performed by BELKIS BREAUX at SURGERY SAME DAY [facility] ORS    PARATHYROIDECTOMY  2006    INGUINAL HERNIA REPAIR Bilateral 2001, 2002    US-KIDNEY TRANSPLANT  1996, 2001    x2     FAMILY HISTORY  Family History   Problem Relation Age of Onset    Arthritis Father         RA    Lung Disease Father     Heart Disease Father         MI    Hypertension Brother       SOCIAL HISTORY  Social History     Tobacco Use    Smoking status: Never    Smokeless tobacco: Never   Vaping Use    Vaping status: Never Used   Substance Use Topics    Alcohol use: No    Drug use: No     CURRENT MEDICATIONS  Current Outpatient Medications Prior to Encounter   Medication Sig Dispense Refill    Methoxy PEG-Epoetin Beta (MIRCERA) 50 MCG/0.3ML Solution Prefilled Syringe Inject 50 mcg as directed see  administration instructions. Called Negin ([phone]) to verify dose, last took on 1/6/2024      Cinacalcet HCl 60 MG Tab Take 60 mg by mouth see administration instructions. Pt takes in the evening on Mon, Wed, and Friday      diphenhydrAMINE (BENADRYL) 25 MG Tab Take 50 mg by mouth at bedtime.      omeprazole (PRILOSEC) 20 MG delayed-release capsule Take 20 mg by mouth every day.      aspirin 81 MG EC tablet Take 1 Tablet by mouth every day. 100 Tablet 0    mirtazapine (REMERON) 15 MG Tab Take 15 mg by mouth every evening.      XTAMPZA ER 13.5 MG Capsule Extended Release 12 hour Abuse-Deterrent Take 13.5 mg by mouth 2 times a day.      doxycycline (VIBRAMYCIN) 100 MG Tab Take 100 mg by mouth 2 times a day.      oxyCODONE immediate release (ROXICODONE) 10 MG immediate release tablet Take 20 mg by mouth every 6 hours as needed for Severe Pain.      Ferric Citrate (AURYXIA) 1  MG(Fe) Tab Take 3 g by mouth 2 times a day with meals. 3 gm = 3 tabs      B Complex-C-Folic Acid (DIALYVITE 800) 0.8 MG Tab Take 1 Tablet by mouth every day.      Naloxone (NARCAN) 4 MG/0.1ML Liquid Administer 1 mg into affected nostril(S) as needed. Indications: Opioid Overdose      tizanidine (ZANAFLEX) 4 MG Tab Take 8 mg by mouth every bedtime. 2 tablets = 8 mg       ALLERGIES  Allergies   Allergen Reactions    Baclofen Unspecified     Total loss of memory, sedation.   RXN=6/2015    Chlorhexidine Itching and Rash    Contrast Media With Iodine [Iodine] Rash     RXN=1/5/2017    Pcn [Penicillins] Rash     RXN=possibly >10 years ago  Tolerated Zosyn on 2/20/18    Historically tolerated cefazolin (2020) and ceftriaxone (2022)      Tape Rash     Paper tape and tegaderm ok  RXN=ongoing    Cephalexin Rash     Historically tolerated cefazolin (2020) and ceftriaxone (2022)    Requip Vomiting    Ropinirole Vomiting     Requip      Diagnostic X-Ray Materials Rash     Patient has a history of an IV contrast reaction. Patient instructed to take  prednisone 50 mg by mouth 13 hours, 7 hours and 1 hour prior to IV contrast injection and  Diphenhydramine 50 mg by mouth 1 hour prior to IV contrast injection. Prescription faxed to pharmacy of patient's choice. Patient reports that he has not had a reaction following the pretreatment.     PHYSICAL EXAM  VITAL SIGNS:/51   Pulse 87   Temp (!) 35.6 °C (96 °F) (Oral)   Resp 16   Wt 81.6 kg (180 lb)   SpO2 98%   BMI 30.90 kg/m²       Constitutional: Well-developed no acute distress   HENT: Normocephalic, Atraumatic, Bilateral external ears normal.  Eyes:  conjunctiva are normal.   Neck: Supple.  Non tender midline  Cardiovascular: Regular rate and rhythm without murmurs gallops or rubs.   Thorax & Lungs: No respiratory distress. Breathing comfortably. Lungs are clear to auscultation bilaterally, there are no wheezes no rales. Chest wall is non tender.  Abdomen: Soft, non distended, non tender   Skin: Warm, Dry, No erythema,   Back: No tenderness, No CVA tenderness.  Musculoskeletal: Missing right arm at the lower end that is chronic, Catheter to the right femoral area that is partially removed, No erythema around site and appears normal, No clubbing cyanosis or edema, good range of motion   Neurologic: Alert & oriented x 3, normal sensation moving all extremities appears normal   Psychiatric: Affect normal, Judgment normal, Mood normal.     DIAGNOSTIC STUDIES / PROCEDURES   LABS  Results for orders placed or performed during the hospital encounter of 01/08/25   CBC WITH DIFFERENTIAL    Collection Time: 01/08/25  2:00 PM   Result Value Ref Range    WBC 5.9 4.8 - 10.8 K/uL    RBC 3.99 (L) 4.70 - 6.10 M/uL    Hemoglobin 13.1 (L) 14.0 - 18.0 g/dL    Hematocrit 40.7 (L) 42.0 - 52.0 %    .0 (H) 81.4 - 97.8 fL    MCH 32.8 27.0 - 33.0 pg    MCHC 32.2 (L) 32.3 - 36.5 g/dL    RDW 57.5 (H) 35.9 - 50.0 fL    Platelet Count 149 (L) 164 - 446 K/uL    MPV 10.2 9.0 - 12.9 fL    Neutrophils-Polys 69.30 44.00 - 72.00  %    Lymphocytes 21.90 (L) 22.00 - 41.00 %    Monocytes 6.00 0.00 - 13.40 %    Eosinophils 1.40 0.00 - 6.90 %    Basophils 0.90 0.00 - 1.80 %    Immature Granulocytes 0.50 0.00 - 0.90 %    Nucleated RBC 0.00 0.00 - 0.20 /100 WBC    Neutrophils (Absolute) 4.06 1.82 - 7.42 K/uL    Lymphs (Absolute) 1.28 1.00 - 4.80 K/uL    Monos (Absolute) 0.35 0.00 - 0.85 K/uL    Eos (Absolute) 0.08 0.00 - 0.51 K/uL    Baso (Absolute) 0.05 0.00 - 0.12 K/uL    Immature Granulocytes (abs) 0.03 0.00 - 0.11 K/uL    NRBC (Absolute) 0.00 K/uL   COMP METABOLIC PANEL    Collection Time: 01/08/25  2:00 PM   Result Value Ref Range    Sodium 137 135 - 145 mmol/L    Potassium 5.4 3.6 - 5.5 mmol/L    Chloride 94 (L) 96 - 112 mmol/L    Co2 25 20 - 33 mmol/L    Anion Gap 18.0 (H) 7.0 - 16.0    Glucose 110 (H) 65 - 99 mg/dL    Bun 50 (H) 8 - 22 mg/dL    Creatinine 9.19 (HH) 0.50 - 1.40 mg/dL    Calcium 9.4 8.4 - 10.2 mg/dL    Correct Calcium 9.2 8.5 - 10.5 mg/dL    AST(SGOT) 25 12 - 45 U/L    ALT(SGPT) 23 2 - 50 U/L    Alkaline Phosphatase 167 (H) 30 - 99 U/L    Total Bilirubin 0.5 0.1 - 1.5 mg/dL    Albumin 4.2 3.2 - 4.9 g/dL    Total Protein 7.5 6.0 - 8.2 g/dL    Globulin 3.3 1.9 - 3.5 g/dL    A-G Ratio 1.3 g/dL   ESTIMATED GFR    Collection Time: 01/08/25  2:00 PM   Result Value Ref Range    GFR (CKD-EPI) 7 (A) >60 mL/min/1.73 m 2     *Note: Due to a large number of results and/or encounters for the requested time period, some results have not been displayed. A complete set of results can be found in Results Review.       COURSE & MEDICAL DECISION MAKING    ED COURSE:    ED Observation Status? No, The patient does not qualify for observation status     INTERVENTIONS BY ME:  Medications - No data to display    4:09 PM - Patient seen and examined at bedside. Discussed plan of care, including consulting Vascular Surgery. Patient agrees to the plan of care.     4:15 PM - Paged Vascular Surgery.     4:30 PM - I discussed the patient's case and the  above findings with Dr. Lowery (Vascular Surgery) who said that he will consult the patient.      5:30 PM Dr. Julio evaluated the patient below is the results.    INITIAL ASSESSMENT, COURSE AND PLAN  Care Narrative: Patient presents for transfer and definitive treatment of his vascular catheter in the right lower extremity.  Because it is a long catheter 60 cm they could not do it at [facility].  Dr. Julio evaluated the catheter its appears to be functioning and at this point does not need to be admitted for catheter exchange today.  Is recommended that he continue with dialysis and he will try to get the patient on the schedule Monday for Catheter replacement.  At this point Dr. Julio does not feel the patient needs to be admitted the hospital and the patient can be safely discharged.  Dr. Julio states that this catheter can be utilized for dialysis without complications and the patient should have dialysis done on Friday and then they we will be able to replace the catheter on Monday.  Patient is to return to the emergency department if there is any change to the status or as needed.         ADDITIONAL PROBLEM LIST  End-stage renal disease requiring dialysis    DISPOSITION AND DISCUSSIONS  I have discussed management of the patient with the following physicians/ THEA's/ ancillary staff:  Dr. Lowery (Vascular Surgery)    Escalation of care considered, and ultimately not performed:acute inpatient care management, however at this time, the patient is most appropriate for outpatient management    Barriers to care at this time, including but not limited to: Patient does not have established PCP.     Decision tools and prescription drugs considered including, but not limited to: As described above.    Patient will be discharged stable condition recommended follow-up with Dr. Julio for further treatment and care of his dialysis catheter replacement.    FINAL DIAGNOSIS  1. Complication associated with dialysis  catheter         I, Leyla Ibrahim (Sugey), am scribing for, and in the presence of, Jason Ibrahim M.D..    Electronically signed by: Leyla Ibrahim (Sugey), 1/8/2025    Jason SANCHEZ M.D. personally performed the services described in this documentation, as scribed by Leyla Ibrahim in my presence, and it is both accurate and complete.     Electronically signed by: Jason Ibrahim M.D.,5:14 PM 01/08/25

## (undated) DEVICE — SUTURE 3-0 VICRYL PLUS SH - 8X 18 INCH (12/BX)

## (undated) DEVICE — BALLOON 8.0X40 75CM MUSTANG

## (undated) DEVICE — GLOVE BIOGEL SZ 7.5 SURGICAL PF LTX - (50PR/BX 4BX/CA)

## (undated) DEVICE — SENSOR SPO2 NEO LNCS ADHESIVE (20/BX) SEE USER NOTES

## (undated) DEVICE — GLOVE BIOGEL INDICATOR SZ 6.5 SURGICAL PF LTX - (50PR/BX 4BX/CA)

## (undated) DEVICE — Device

## (undated) DEVICE — ELECTRODE DUAL RETURN W/ CORD - (50/PK)

## (undated) DEVICE — CLIP MED LG INTNL HRZN TI ESCP - (20/BX)

## (undated) DEVICE — SUTURE 4-0 MONOCRYL PLUS PS-2 - 27 INCH (36/BX)

## (undated) DEVICE — SUTURE 3-0 SILK 12 X 18 IN - (36/BX)

## (undated) DEVICE — SUTURE GENERAL

## (undated) DEVICE — PACK MINOR BASIN - (2EA/CA)

## (undated) DEVICE — SET LEADWIRE 5 LEAD BEDSIDE DISPOSABLE ECG (1SET OF 5/EA)

## (undated) DEVICE — JELLY, KY 2 0Z STERILE

## (undated) DEVICE — DRESSING TRANSPARENT FILM TEGADERM 2.375 X 2.75"  (100EA/BX)"

## (undated) DEVICE — HEAD HOLDER JUNIOR/ADULT

## (undated) DEVICE — GLIDEWIRE ANGLED .035 X 180 (5EA/BX)

## (undated) DEVICE — LACTATED RINGERS INJ 1000 ML - (14EA/CA 60CA/PF)

## (undated) DEVICE — SODIUM CHL IRRIGATION 0.9% 1000ML (12EA/CA)

## (undated) DEVICE — PEN SKIN MARKER W/RULER - (50EA/BX)

## (undated) DEVICE — TOWELS CLOTH SURGICAL - (4/PK 20PK/CA)

## (undated) DEVICE — GLOVE BIOGEL PI ORTHO SZ 7.5 PF LF (40PR/BX)

## (undated) DEVICE — NEPTUNE 4 PORT MANIFOLD - (20/PK)

## (undated) DEVICE — SHEATH RO 6F 10CM (10EA/BX)

## (undated) DEVICE — DRAPE SURGICAL U 77X120 - (10/CA)

## (undated) DEVICE — GLOVE BIOGEL SZ 6.5 SURGICAL PF LTX (50PR/BX 4BX/CA)

## (undated) DEVICE — GLOVE SZ 6.5 BIOGEL PI MICRO - PF LF (50PR/BX)

## (undated) DEVICE — GLOVE BIOGEL SZ 6 PF LATEX - (50EA/BX 4BX/CA)

## (undated) DEVICE — KIT  I.V. START (100EA/CA)

## (undated) DEVICE — BLADE SURGICAL #15 - (50/BX 3BX/CA)

## (undated) DEVICE — SPONGE GAUZESTER 4 X 4 4PLY - (128PK/CA)

## (undated) DEVICE — MASK ANESTHESIA ADULT  - (100/CA)

## (undated) DEVICE — TUBING CLEARLINK DUO-VENT - C-FLO (48EA/CA)

## (undated) DEVICE — SET EXTENSION WITH 2 PORTS (48EA/CA) ***PART #2C8610 IS A SUBSTITUTE*****

## (undated) DEVICE — TRAY SRGPRP PVP IOD WT PRP - (20/CA)

## (undated) DEVICE — GLOVE BIOGEL PI INDICATOR SZ 7.0 SURGICAL PF LF - (50/BX 4BX/CA)

## (undated) DEVICE — GLOVE, BIOGEL ECLIPSE, SZ 7.0, PF LTX (50/BX)

## (undated) DEVICE — NEEDLE NON SAFETY 25 GA X 1 1/2 IN HYPO (100EA/BX)

## (undated) DEVICE — SYRINGE 10 ML CONTROL LL (25EA/BX 4BX/CA)

## (undated) DEVICE — SLEEVE, VASO, THIGH, MED

## (undated) DEVICE — BAG DECANTER (50EA/CS)

## (undated) DEVICE — GLOVE BIOGEL PI INDICATOR SZ 6.5 SURGICAL PF LF - (50/BX 4BX/CA)

## (undated) DEVICE — SUTURE 0 VICRYL PLUS CT-1 - 8 X 18 INCH (12/BX)

## (undated) DEVICE — DRESSING ABDOMINAL PAD STERILE 8 X 10" (360EA/CA)"

## (undated) DEVICE — PAD PREP 24 X 48 CUFFED - (100/CA)

## (undated) DEVICE — SYRINGE 20 ML LL (50EA/BX 4BX/CA)

## (undated) DEVICE — DISH PETRI STERILE (50EA/CA)

## (undated) DEVICE — NEEDLE NON SAFETY HYPO 22 GA X 1 1/2 IN (100/BX)

## (undated) DEVICE — SUTURE 3-0 ETHILON FS-1 - (36/BX) 30 INCH

## (undated) DEVICE — SUTURE CV

## (undated) DEVICE — CLIP MED INTNL HRZN TI ESCP - (25/BX)

## (undated) DEVICE — SUTURE GORE-TEX CV-5 TT-13 (12/BX)

## (undated) DEVICE — GOWN SURGEONS X-LARGE - DISP. (30/CA)

## (undated) DEVICE — CANISTER SUCTION 3000ML MECHANICAL FILTER AUTO SHUTOFF MEDI-VAC NONSTERILE LF DISP  (40EA/CA)

## (undated) DEVICE — LEAD SET 6 DISP. EKG NIHON KOHDEN

## (undated) DEVICE — PROTECTOR ULNA NERVE - (36PR/CA)

## (undated) DEVICE — SUTURE 4-0 VICRYL PLUS RB-1 - 27 INCH (36/BX)

## (undated) DEVICE — DRESSING NON-ADHERING 8 X 3 - (50/BX)

## (undated) DEVICE — DRESSING TEGADERM 8 X 12 - (10/BX 8BX/CA)

## (undated) DEVICE — SUCTION INSTRUMENT YANKAUER BULBOUS TIP W/O VENT (50EA/CA)

## (undated) DEVICE — BALLOON 10.0X40 75CM MUSTANG

## (undated) DEVICE — BLADE SURGICAL #11 - (50/BX)

## (undated) DEVICE — DRAPE IOBAN II INCISE 23X17 - (10EA/BX 4BX/CA)

## (undated) DEVICE — DRAPE LAPAROTOMY T SHEET - (12EA/CA)

## (undated) DEVICE — SPONGE TONSIL LARGE XRAY STERILE - (5/PK 20PK/CA)

## (undated) DEVICE — VESSELOOP MINI BLUE STERILE - SURG-I-LOOP (10EA/BX)

## (undated) DEVICE — SUTURE 7-0 PROLENE BV175-6 (36PK/BX)

## (undated) DEVICE — STERI STRIP COMPOUND BENZOIN - TINCTURE 0.6ML WITH APPLICATOR (40EA/BX)

## (undated) DEVICE — GLOVE SZ 7.5 BIOGEL PI MICRO - PF LF (50PR/BX)

## (undated) DEVICE — KIT ROOM DECONTAMINATION

## (undated) DEVICE — SUTURE 2-0 VICRYL PLUS CT-1 36 (36PK/BX)"

## (undated) DEVICE — SPONGE PEANUT - (5/PK 50PK/CA)

## (undated) DEVICE — KIT ANESTHESIA W/CIRCUIT & 3/LT BAG W/FILTER (20EA/CA)

## (undated) DEVICE — ELECTRODE 850 FOAM ADHESIVE - HYDROGEL RADIOTRNSPRNT (50/PK)

## (undated) DEVICE — DEVICE INFLATION DIGITAL BLUE DIAMOND (5EA/BX)

## (undated) DEVICE — SUTURE 3-0 CHROMIC GUT SH 27 (36PK/BX)"

## (undated) DEVICE — SET EXTENSION 31IN APPX 3.2ML WITH CLAMP (50/CA)WAS 4610-03

## (undated) DEVICE — DRESSING TRANSPARENT FILM TEGADERM 4 X 4.75" (50EA/BX)"

## (undated) DEVICE — KIT SURGIFLO W/OUT THROMBIN - (6EA/CA)

## (undated) DEVICE — STAPLER SKIN DISP - (6/BX 10BX/CA) VISISTAT

## (undated) DEVICE — GLOVE BIOGEL INDICATOR SZ 7.5 SURGICAL PF LTX - (50PR/BX 4BX/CA)

## (undated) DEVICE — SPONGE GAUZESTER. 2X2 4-PL - (2/PK 50PK/BX 30BX/CS)

## (undated) DEVICE — SHEATH RO 6F 6CM (10EA/BX)

## (undated) DEVICE — SYRINGE SAFETY 5 ML 18 GA X 1-1/2 BLUNT LL (100/BX 4BX/CA)

## (undated) DEVICE — TUBE CONNECTING SUCTION - CLEAR PLASTIC STERILE 72 IN (50EA/CA)

## (undated) DEVICE — DRESSING PETROLEUM GAUZE 5 X 9" (50EA/BX 4BX/CA)"

## (undated) DEVICE — SUTURE 5-0 PROLENE C-1 D/A 24 (36PK/BX)"

## (undated) DEVICE — SYRINGE SAFETY 3 ML 18 GA X 1 1/2 BLUNT LL (100/BX 8BX/CA)

## (undated) DEVICE — GLOVE BIOGEL SZ 8.5 SURGICAL PF LTX - (50PR/BX 4BX/CA)

## (undated) DEVICE — CLIP SM INTNL HRZN TI ESCP LGT - (24EA/PK 25PK/BX)

## (undated) DEVICE — CATHETER IRRIG OCC PRUITT 6FR

## (undated) DEVICE — SUTURE 4-0 SILK 12 X 18 INCH - (36/BX)

## (undated) DEVICE — BANDAGE ELASTIC 6 HONEYCOMB - 6X5YD LF (20/CA)"

## (undated) DEVICE — DECANTER FLD BLS - (50/CA)

## (undated) DEVICE — GLOVE BIOGEL ECLIPSE  PF LATEX SIZE 6.5 (50PR/BX)

## (undated) DEVICE — SUTURE 6-0 PROLENE BV-1 D/A 24 (36PK/BX)"

## (undated) DEVICE — SHEATH RO 7F 10CM

## (undated) DEVICE — SYRINGE SAFETY 10 ML 18 GA X 1 1/2 BLUNT LL (100/BX 4BX/CA)

## (undated) DEVICE — CHLORAPREP 26 ML APPLICATOR - ORANGE TINT(25/CA)

## (undated) DEVICE — TRANSDUCER ADULT DISP. SINGLE BONDED STERILE - (20EA/CA)

## (undated) DEVICE — GLOVE BIOGEL INDICATOR SZ 8 SURGICAL PF LTX - (50/BX 4BX/CA)

## (undated) DEVICE — GLOVE SZ 7 BIOGEL PI MICRO - PF LF (50PR/BX 4BX/CA)

## (undated) DEVICE — SUTURE 2-0 VICRYL PLUS CT-1 - 8 X 18 INCH(12/BX)

## (undated) DEVICE — STOCKINET TUBULAR 6IN STERILE - 6 X 48YDS (25/CA)

## (undated) DEVICE — SUTURE 2-0 ETHILON FS - (36/BX) 18 INCH

## (undated) DEVICE — SUTURE 6-0 PROLENE BV-1 D/A 30 (36PK/BX)"

## (undated) DEVICE — BALLOON 5.0X40 75CM MUSTANG

## (undated) DEVICE — CATHETER IRRIG OCC PRUITT 4FR

## (undated) DEVICE — GLOVE BIOGEL SZ 7 SURGICAL PF LTX - (50PR/BX 4BX/CA)

## (undated) DEVICE — BANDAGE ROLL STERILE BULKEE 4.5 IN X 4 YD (100EA/CA)

## (undated) DEVICE — SUTURE 0 SILK TIES (36PK/BX)

## (undated) DEVICE — DRAPE LARGE 3 QUARTER - (20/CA)

## (undated) DEVICE — PENCIL ELECTSURG 10FT BTN SWH - (50/CA)

## (undated) DEVICE — SUTURE 7-0 PROLENE BV-1 D/A 30 (36PK/BX)"

## (undated) DEVICE — CLIP LG INTNL HRZN TI ESCP LGT - (20/BX)

## (undated) DEVICE — DRAPE STRLE REG TOWEL 18X24 - (10/BX 4BX/CA)"

## (undated) DEVICE — GOWN SURGEONS LARGE - (32/CA)

## (undated) DEVICE — GLOVE BIOGEL SZ 8 SURGICAL PF LTX - (50PR/BX 4BX/CA)

## (undated) DEVICE — SUTURE 5-0 PROLENE C-1 -(36PK/BX)

## (undated) DEVICE — CANISTER SUCTION RIGID RED 1500CC (40EA/CA)

## (undated) DEVICE — SUTURE 2-0 CHROMIC GUT SH 27 (36PK/BX)"

## (undated) DEVICE — DRESSING ANTIMICROBIAL BIOPATCH 4.0M (40EA/CA)

## (undated) DEVICE — BLADE SURGICAL #10 - (50/BX)

## (undated) DEVICE — CATHETER EMBOLECTOMY 4FR (5EA/CA)

## (undated) DEVICE — MASK, LARYNGEAL AIRWAY #5

## (undated) DEVICE — VESSELOOP MAXI BLUE STERILE- SURG-I-LOOP (10EA/BX)

## (undated) DEVICE — HANDPIECE 10FT INTPLS SCT PLS IRRIGATION HAND CONTROL SET (6/PK)

## (undated) DEVICE — SUTURE 3-0 MONOCRYL PLUS PS-1 - 27 INCH (36/BX)

## (undated) DEVICE — SHEATH RO 8F 10CM (10EA/BX)

## (undated) DEVICE — SHEET TRANSVERSE LAP - (12EA/CA)

## (undated) DEVICE — SYR ANGIO YLW PLNG 10ML PLYCRB - 25/BX

## (undated) DEVICE — SUTURE 2-0 SILK 12 X 18" (36PK/BX)"

## (undated) DEVICE — SODIUM CHL. INJ. 0.9% 500ML (24EA/CA 50CA/PF)

## (undated) DEVICE — GORETEX CV-6 TT-12 D/A

## (undated) DEVICE — HEMOSTAT SURG ABSORBABLE - 4 X 8 IN SURGICEL (24EA/CA)

## (undated) DEVICE — GUIDEWIRES STARTER (PTFE COATED) J CURVED FIXED CORE 0.035 180CM 10 3 MM J FS

## (undated) DEVICE — GOWN SURGICAL XX-LARGE - (28EA/CA) SIRUS NON REINFORCED

## (undated) DEVICE — CANNULA W/ SUPPLY TUBING O2 - (50/CA)

## (undated) DEVICE — NEEDLE SPINAL NON-SAFETY 22 GA X 3 (25EA/BX)"

## (undated) DEVICE — V-18 8/150 STRAIGHT (1/EA)

## (undated) DEVICE — GLOVE BIOGEL PI INDICATOR SZ 8.0 SURGICAL PF LF -(50/BX 4BX/CA)

## (undated) DEVICE — MASK, LARYNGEAL AIRWAY #4

## (undated) DEVICE — SUTURE 3-0 CHROMIC GUT FS-2 27 (36PK/BX)"

## (undated) DEVICE — DRAIN J-VAC 10MM FLAT - (10/CA)

## (undated) DEVICE — GLOVE BIOGEL INDICATOR SZ 7SURGICAL PF LTX - (50/BX 4BX/CA)

## (undated) DEVICE — SUTURE 2-0 PROLENE SH D/A (36PK/BX)

## (undated) DEVICE — SYRINGE 30 ML LL (56/BX)

## (undated) DEVICE — GOWN WARMING STANDARD FLEX - (30/CA)

## (undated) DEVICE — SUTURE 2-0 PROLENE CT-1 30 (36PK/BX)"

## (undated) DEVICE — DRAPE LOWER EXTREMETY - (6/CA)

## (undated) DEVICE — HUMID-VENT HEAT AND MOISTURE EXCHANGE- (50/BX)

## (undated) DEVICE — TUBE E-T HI-LO CUFF 7.5MM (10EA/PK)

## (undated) DEVICE — SUTURE PLASTIC

## (undated) DEVICE — HEMOSTAT SURG ABSORBABLE - 2 X 3 IN SURGICEL (24EA/CA)

## (undated) DEVICE — BINDER ABDOMINAL 24X30X12 IN - FITS 24-30IN WAIST 12IN HIGH

## (undated) DEVICE — SYRINGE 12 CC LUER TIP - (80/BX) OBSOLETE ITEM

## (undated) DEVICE — CATHETER IV 20 GA X 1-1/4 ---SURG.& SDS ONLY--- (50EA/BX)

## (undated) DEVICE — CONTAINER SPECIMEN BAG OR - STERILE 4 OZ W/LID (100EA/CA)

## (undated) DEVICE — DRAIN PENROSE 1 IN X 18 IN - STERILE (25EA/BX)

## (undated) DEVICE — SET BIFURCATED BLOOD - (48EA/CS)

## (undated) DEVICE — PAD LAP STERILE 18 X 18 - (5/PK 40PK/CA)

## (undated) DEVICE — SOD. CHL. INJ. 0.9% 1000 ML - (14EA/CA 60CA/PF)

## (undated) DEVICE — SHEET THYROID - (10EA/CA)

## (undated) DEVICE — CLOSURE SKIN STRIP 1/2 X 4 IN - (STERI STRIP) (50/BX 4BX/CA)

## (undated) DEVICE — SET FLUID WARMING STANDARD FLOW - (10/CA)

## (undated) DEVICE — GLOVE BIOGEL INDICATOR SZ 8.5 SURGICAL PF LTX - (50/BX 4BX/CA)

## (undated) DEVICE — PACK MAJOR BASIN - (2EA/CA)

## (undated) DEVICE — TUBE E-T HI-LO CUFF 7.0MM (10EA/PK)

## (undated) DEVICE — WATER IRRIGATION STERILE 1000ML (12EA/CA)

## (undated) DEVICE — DRESSING NON ADHERENT 3 X 4 - STERILE (100/BX 12BX/CA)

## (undated) DEVICE — SOD. CHL. INJ. 0.9% 250 ML - (36/CA 50CA/PF)

## (undated) DEVICE — SUTURE GOR-TEX CV-6 TT-9 (36PK/BX)

## (undated) DEVICE — KIT RADIAL ARTERY 20GA W/MAX BARRIER AND BIOPATCH  (5EA/CA) #10740 IS FOR THE SET RADIAL ARTERIAL

## (undated) DEVICE — BALLOON 7.0X40 75CM MUSTANG

## (undated) DEVICE — GELAQUASONIC 100 ULTRASOUND - 48/BX 20GM STERILE FOIL POUCH

## (undated) DEVICE — BLADE SURGICAL CLIPPER - (50EA/CA)

## (undated) DEVICE — DRAPE C-ARM LARGE 41IN X 74 IN - (10/BX 2BX/CA)

## (undated) DEVICE — SUTURE 2 ETHILON LR D/A - (24/BX) 30 INCH

## (undated) DEVICE — DERMABOND ADVANCED - (12EA/BX)

## (undated) DEVICE — SUTURE 6-0 PROLENE C-1 D/A 24 (36PK/BX)"

## (undated) DEVICE — BLANKET WARMING UPPER BODY - (10/CA)

## (undated) DEVICE — SYRINGE 3 CC 21 GA X 1-1/2 - NDL SAFETY (50/BX 8BX/CA)

## (undated) DEVICE — CATHETER FOGARTY GRAFT THROMB 5FR

## (undated) DEVICE — SPONGE XRAY 8X4 STERL. 12PL - (10EA/TY 80TY/CA)

## (undated) DEVICE — NEEDLE SPINAL NON-SAFETY 25GA X 3 1/2 (25/BX)

## (undated) DEVICE — SUTURE ETHILON 2-0 FSLX 30 (36PK/BX)"

## (undated) DEVICE — GLOVE, LITE (PAIR)

## (undated) DEVICE — CATHETER EMBOLECTOMY 3FR (1EA)

## (undated) DEVICE — STOPCOCK 3-WAY W/SWIVEL LEVER LOCK (50EA/CA)

## (undated) DEVICE — SUTURE2-0 27IN VCRL ANTI VIOL (36PK/BX)

## (undated) DEVICE — DERMACARRIER 8 (NEW MESHGFT) - (10/BX)

## (undated) DEVICE — SUTURE 0 VICRYL PLUS CT-1 - 36 INCH (36/BX)

## (undated) DEVICE — COVER PROBE STERILE CONE (12EA/CA)

## (undated) DEVICE — TUBE E-T HI-LO CUFF 8.0MM (10EA/PK)

## (undated) DEVICE — DEPRESSOR TONGUE ADLT STERILE - 6 IN (100EA/BX)

## (undated) DEVICE — GUIDEWIRE HYDROPHILIC COATED STRAIGHT TIP GLIDEWIRE .035 X 180CM (5EA/BX)"

## (undated) DEVICE — GLOVE BIOGEL ECLIPSE PF LATEX SIZE 7.5

## (undated) DEVICE — SURGIFOAM (12X7) - (12EA/CA)

## (undated) DEVICE — GUIDEWIRE AMPLATZ STIFF .035 145CM 7 STRAIGHT (5/BX)

## (undated) DEVICE — TIP INTPLS HFLO ML ORFC BTRY - (12/CS)  FOR SURGILAV

## (undated) DEVICE — RESERVOIR SUCTION 100 CC - SILICONE (20EA/CA)

## (undated) DEVICE — SUTURE 3-0 VICRYL PLUS SH - 27 INCH (36/BX)

## (undated) DEVICE — GLOVE BIOGEL PI ORTHO SZ 6 1/2 SURGICAL PF LF (40PR/BX)

## (undated) DEVICE — GLOVE SZ 6 BIOGEL PI MICRO - PF LF (50PR/BX 4BX/CA)

## (undated) DEVICE — BAG ISOLATION 20X20 INVISI - SHEILD (10EA/BX)

## (undated) DEVICE — SUTURE 0 PROLENE CT-1 30 (36PK/BX)"

## (undated) DEVICE — TRAY, CV CATH MULTI-LUM.AK-25703

## (undated) DEVICE — HEADREST PRONEVIEW LARGE - (10/CA)

## (undated) DEVICE — SET INTRO MIRCROPUNCTURE - MPIS-501-SST

## (undated) DEVICE — SUTURE 4-0 MONOCRYL PLUS PS-1 - 27 INCH (36/BX)

## (undated) DEVICE — DRESSING XEROFORM 1X8 - (50/BX 4BX/CA)

## (undated) DEVICE — SYRINGE DISP. 12 CC LL - (100/BX)

## (undated) DEVICE — PAD PROVIDONE IODINE 2-1/8X1 (100EA/BX)

## (undated) DEVICE — DRAPE IOBAN II 23 IN X 33 IN - (10/BX)

## (undated) DEVICE — BINDER ABDOMINAL 30X45X12IN - FITS 30-45IN WAIST 12IN HIGH

## (undated) DEVICE — SUCTION TIP STRAIGHT ARGYLE - 50EA/CA

## (undated) DEVICE — SUTURE GORE-TEX CV-6 TT-13 (36PK/BX)

## (undated) DEVICE — SHEATH RO 5F 6CM (10EA/BX)

## (undated) DEVICE — GAUZE PACKING STRIP PLAIN STERILE - 1 IN X 5 YD (12/CA)

## (undated) DEVICE — SYRINGE SAFETY TB 1 ML 27 GA X 1/2 IN (100/BX 4BX/CA)

## (undated) DEVICE — BALLOON 8.0X80 75CM MUSTANG

## (undated) DEVICE — BLADE DERMATOME NEW AIR (10/BX)

## (undated) DEVICE — SODIUM CHL. IRRIGATION 0.9% 3000ML (4EA/CA 65CA/PF)

## (undated) DEVICE — GLOVE BIOGEL PI INDICATOR SZ 7.5 SURGICAL PF LF -(50/BX 4BX/CA)